# Patient Record
Sex: FEMALE | Race: WHITE | NOT HISPANIC OR LATINO | Employment: FULL TIME | URBAN - METROPOLITAN AREA
[De-identification: names, ages, dates, MRNs, and addresses within clinical notes are randomized per-mention and may not be internally consistent; named-entity substitution may affect disease eponyms.]

---

## 2023-01-01 ENCOUNTER — HOSPITAL ENCOUNTER (OUTPATIENT)
Dept: RADIOLOGY | Facility: HOSPITAL | Age: 68
Discharge: HOME | End: 2023-12-31
Payer: COMMERCIAL

## 2023-01-01 ENCOUNTER — APPOINTMENT (OUTPATIENT)
Dept: RADIOLOGY | Facility: HOSPITAL | Age: 68
DRG: 871 | End: 2023-01-01
Payer: COMMERCIAL

## 2023-01-01 ENCOUNTER — APPOINTMENT (OUTPATIENT)
Dept: CARDIOLOGY | Facility: HOSPITAL | Age: 68
DRG: 871 | End: 2023-01-01
Payer: COMMERCIAL

## 2023-01-01 ENCOUNTER — APPOINTMENT (OUTPATIENT)
Dept: RADIOLOGY | Facility: HOSPITAL | Age: 68
End: 2023-01-01
Payer: COMMERCIAL

## 2023-01-01 ENCOUNTER — APPOINTMENT (OUTPATIENT)
Dept: RADIOLOGY | Facility: HOSPITAL | Age: 68
DRG: 177 | End: 2023-01-01
Payer: COMMERCIAL

## 2023-01-01 ENCOUNTER — HOSPITAL ENCOUNTER (INPATIENT)
Facility: HOSPITAL | Age: 68
LOS: 17 days | Discharge: HOSPICE | DRG: 871 | End: 2023-11-09
Attending: STUDENT IN AN ORGANIZED HEALTH CARE EDUCATION/TRAINING PROGRAM | Admitting: INTERNAL MEDICINE
Payer: COMMERCIAL

## 2023-01-01 ENCOUNTER — APPOINTMENT (OUTPATIENT)
Dept: VASCULAR MEDICINE | Facility: HOSPITAL | Age: 68
DRG: 177 | End: 2023-01-01
Payer: COMMERCIAL

## 2023-01-01 ENCOUNTER — APPOINTMENT (OUTPATIENT)
Dept: CARDIOLOGY | Facility: HOSPITAL | Age: 68
End: 2023-01-01
Payer: COMMERCIAL

## 2023-01-01 ENCOUNTER — TELEMEDICINE (OUTPATIENT)
Dept: PRIMARY CARE | Facility: CLINIC | Age: 68
End: 2023-01-01
Payer: COMMERCIAL

## 2023-01-01 ENCOUNTER — APPOINTMENT (OUTPATIENT)
Dept: CARDIOLOGY | Facility: HOSPITAL | Age: 68
DRG: 177 | End: 2023-01-01
Payer: COMMERCIAL

## 2023-01-01 ENCOUNTER — HOSPITAL ENCOUNTER (OUTPATIENT)
Facility: HOSPITAL | Age: 68
Setting detail: OUTPATIENT SURGERY
End: 2023-01-01
Attending: STUDENT IN AN ORGANIZED HEALTH CARE EDUCATION/TRAINING PROGRAM | Admitting: STUDENT IN AN ORGANIZED HEALTH CARE EDUCATION/TRAINING PROGRAM
Payer: COMMERCIAL

## 2023-01-01 ENCOUNTER — HOSPITAL ENCOUNTER (INPATIENT)
Facility: HOSPITAL | Age: 68
LOS: 51 days | Discharge: NURSING, REHAB OR LONG-TERM CARE | DRG: 177 | End: 2023-12-30
Attending: INTERNAL MEDICINE | Admitting: STUDENT IN AN ORGANIZED HEALTH CARE EDUCATION/TRAINING PROGRAM
Payer: COMMERCIAL

## 2023-01-01 ENCOUNTER — LAB REQUISITION (OUTPATIENT)
Dept: LAB | Facility: HOSPITAL | Age: 68
End: 2023-01-01
Payer: COMMERCIAL

## 2023-01-01 ENCOUNTER — TELEPHONE (OUTPATIENT)
Dept: PRIMARY CARE | Facility: CLINIC | Age: 68
End: 2023-01-01

## 2023-01-01 ENCOUNTER — DOCUMENTATION (OUTPATIENT)
Dept: INTENSIVE CARE | Facility: HOSPITAL | Age: 68
End: 2023-01-01

## 2023-01-01 ENCOUNTER — TELEMEDICINE (OUTPATIENT)
Dept: UROLOGY | Facility: CLINIC | Age: 68
End: 2023-01-01
Payer: COMMERCIAL

## 2023-01-01 ENCOUNTER — HOSPITAL ENCOUNTER (OUTPATIENT)
Facility: HOSPITAL | Age: 68
End: 2024-01-29

## 2023-01-01 VITALS
BODY MASS INDEX: 26.45 KG/M2 | TEMPERATURE: 96.3 F | SYSTOLIC BLOOD PRESSURE: 129 MMHG | RESPIRATION RATE: 18 BRPM | HEIGHT: 63 IN | DIASTOLIC BLOOD PRESSURE: 66 MMHG | WEIGHT: 149.25 LBS | OXYGEN SATURATION: 99 % | HEART RATE: 68 BPM

## 2023-01-01 VITALS
DIASTOLIC BLOOD PRESSURE: 74 MMHG | SYSTOLIC BLOOD PRESSURE: 149 MMHG | HEIGHT: 63 IN | TEMPERATURE: 96.3 F | RESPIRATION RATE: 15 BRPM | BODY MASS INDEX: 26.91 KG/M2 | WEIGHT: 151.9 LBS | OXYGEN SATURATION: 95 % | HEART RATE: 51 BPM

## 2023-01-01 DIAGNOSIS — U07.1 COVID-19: ICD-10-CM

## 2023-01-01 DIAGNOSIS — J96.01 ACUTE RESPIRATORY FAILURE WITH HYPOXIA (MULTI): ICD-10-CM

## 2023-01-01 DIAGNOSIS — I10 BENIGN ESSENTIAL HTN: ICD-10-CM

## 2023-01-01 DIAGNOSIS — R53.81 PHYSICAL DECONDITIONING: ICD-10-CM

## 2023-01-01 DIAGNOSIS — N32.2 BLADDER FISTULA: Primary | ICD-10-CM

## 2023-01-01 DIAGNOSIS — F05 SUNDOWNING: ICD-10-CM

## 2023-01-01 DIAGNOSIS — N39.41 URGENCY INCONTINENCE: ICD-10-CM

## 2023-01-01 DIAGNOSIS — N39.0 RECURRENT UTI: Primary | ICD-10-CM

## 2023-01-01 DIAGNOSIS — Z00.00 ENCOUNTER FOR GENERAL ADULT MEDICAL EXAMINATION WITHOUT ABNORMAL FINDINGS: ICD-10-CM

## 2023-01-01 DIAGNOSIS — U07.1 COVID-19: Primary | ICD-10-CM

## 2023-01-01 DIAGNOSIS — R60.0 LOCALIZED EDEMA: ICD-10-CM

## 2023-01-01 DIAGNOSIS — M79.673 PAIN OF FOOT, UNSPECIFIED LATERALITY: ICD-10-CM

## 2023-01-01 DIAGNOSIS — E11.9 TYPE 2 DIABETES MELLITUS WITHOUT COMPLICATION, UNSPECIFIED WHETHER LONG TERM INSULIN USE (MULTI): ICD-10-CM

## 2023-01-01 DIAGNOSIS — U07.1 DEEP VEIN THROMBOSIS (DVT) ASSOCIATED WITH COVID-19: ICD-10-CM

## 2023-01-01 DIAGNOSIS — E55.9 VITAMIN D DEFICIENCY: ICD-10-CM

## 2023-01-01 DIAGNOSIS — I82.90 DEEP VEIN THROMBOSIS (DVT) ASSOCIATED WITH COVID-19: ICD-10-CM

## 2023-01-01 DIAGNOSIS — K13.79 MOUTH PAIN: ICD-10-CM

## 2023-01-01 DIAGNOSIS — N39.3 SUI (STRESS URINARY INCONTINENCE, FEMALE): ICD-10-CM

## 2023-01-01 DIAGNOSIS — J18.9 PNEUMONIA OF BOTH LOWER LOBES DUE TO INFECTIOUS ORGANISM: Primary | ICD-10-CM

## 2023-01-01 DIAGNOSIS — I48.91 ATRIAL FIBRILLATION, UNSPECIFIED TYPE (MULTI): ICD-10-CM

## 2023-01-01 DIAGNOSIS — N32.2 BLADDER FISTULA: ICD-10-CM

## 2023-01-01 DIAGNOSIS — J84.89 ORGANIZING PNEUMONIA (MULTI): ICD-10-CM

## 2023-01-01 DIAGNOSIS — N32.81 OAB (OVERACTIVE BLADDER): ICD-10-CM

## 2023-01-01 DIAGNOSIS — E51.9 THIAMINE DEFICIENCY: ICD-10-CM

## 2023-01-01 DIAGNOSIS — M54.50 LOW BACK PAIN, UNSPECIFIED BACK PAIN LATERALITY, UNSPECIFIED CHRONICITY, UNSPECIFIED WHETHER SCIATICA PRESENT: ICD-10-CM

## 2023-01-01 DIAGNOSIS — J96.01 ACUTE HYPOXEMIC RESPIRATORY FAILURE (MULTI): Primary | ICD-10-CM

## 2023-01-01 DIAGNOSIS — I82.4Z2 ACUTE EMBOLISM AND THROMBOSIS OF UNSPECIFIED DEEP VEINS OF LEFT DISTAL LOWER EXTREMITY (MULTI): ICD-10-CM

## 2023-01-01 DIAGNOSIS — K57.90 DIVERTICULAR DISEASE: ICD-10-CM

## 2023-01-01 DIAGNOSIS — M25.512 ACUTE PAIN OF LEFT SHOULDER: ICD-10-CM

## 2023-01-01 DIAGNOSIS — J96.01 ACUTE RESPIRATORY FAILURE WITH HYPOXEMIA (MULTI): ICD-10-CM

## 2023-01-01 DIAGNOSIS — Z79.52 LONG TERM (CURRENT) USE OF SYSTEMIC STEROIDS: ICD-10-CM

## 2023-01-01 LAB
25(OH)D3 SERPL-MCNC: 19 NG/ML (ref 30–100)
ABO GROUP (TYPE) IN BLOOD: NORMAL
ALBUMIN SERPL BCP-MCNC: 2.3 G/DL (ref 3.4–5)
ALBUMIN SERPL BCP-MCNC: 2.3 G/DL (ref 3.4–5)
ALBUMIN SERPL BCP-MCNC: 2.4 G/DL (ref 3.4–5)
ALBUMIN SERPL BCP-MCNC: 2.5 G/DL (ref 3.4–5)
ALBUMIN SERPL BCP-MCNC: 2.6 G/DL (ref 3.4–5)
ALBUMIN SERPL BCP-MCNC: 2.7 G/DL (ref 3.4–5)
ALBUMIN SERPL BCP-MCNC: 2.8 G/DL (ref 3.4–5)
ALBUMIN SERPL BCP-MCNC: 2.9 G/DL (ref 3.4–5)
ALBUMIN SERPL BCP-MCNC: 3 G/DL (ref 3.4–5)
ALBUMIN SERPL BCP-MCNC: 3.1 G/DL (ref 3.4–5)
ALBUMIN SERPL BCP-MCNC: 3.1 G/DL (ref 3.4–5)
ALBUMIN SERPL BCP-MCNC: 3.2 G/DL (ref 3.4–5)
ALBUMIN SERPL BCP-MCNC: 3.3 G/DL (ref 3.4–5)
ALBUMIN SERPL BCP-MCNC: 3.5 G/DL (ref 3.4–5)
ALBUMIN SERPL BCP-MCNC: 3.6 G/DL (ref 3.4–5)
ALBUMIN SERPL BCP-MCNC: 3.6 G/DL (ref 3.4–5)
ALBUMIN SERPL BCP-MCNC: 3.7 G/DL (ref 3.4–5)
ALBUMIN SERPL-MCNC: 2.5 G/DL (ref 3.5–5)
ALBUMIN SERPL-MCNC: 2.6 G/DL (ref 3.5–5)
ALBUMIN SERPL-MCNC: 2.8 G/DL (ref 3.5–5)
ALBUMIN SERPL-MCNC: 2.9 G/DL (ref 3.5–5)
ALBUMIN SERPL-MCNC: 2.9 G/DL (ref 3.5–5)
ALBUMIN SERPL-MCNC: 3 G/DL (ref 3.5–5)
ALBUMIN SERPL-MCNC: 3 G/DL (ref 3.5–5)
ALBUMIN SERPL-MCNC: 3.1 G/DL (ref 3.5–5)
ALBUMIN SERPL-MCNC: 3.2 G/DL (ref 3.5–5)
ALBUMIN SERPL-MCNC: 3.3 G/DL (ref 3.5–5)
ALBUMIN SERPL-MCNC: 3.3 G/DL (ref 3.5–5)
ALP BLD-CCNC: 106 U/L (ref 35–125)
ALP BLD-CCNC: 113 U/L (ref 35–125)
ALP BLD-CCNC: 114 U/L (ref 35–125)
ALP BLD-CCNC: 122 U/L (ref 35–125)
ALP BLD-CCNC: 133 U/L (ref 35–125)
ALP BLD-CCNC: 56 U/L (ref 35–125)
ALP BLD-CCNC: 59 U/L (ref 35–125)
ALP BLD-CCNC: 60 U/L (ref 35–125)
ALP BLD-CCNC: 61 U/L (ref 35–125)
ALP BLD-CCNC: 68 U/L (ref 35–125)
ALP BLD-CCNC: 74 U/L (ref 35–125)
ALP BLD-CCNC: 90 U/L (ref 35–125)
ALP BLD-CCNC: 91 U/L (ref 35–125)
ALP SERPL-CCNC: 45 U/L (ref 33–136)
ALP SERPL-CCNC: 50 U/L (ref 33–136)
ALP SERPL-CCNC: 51 U/L (ref 33–136)
ALP SERPL-CCNC: 61 U/L (ref 33–136)
ALP SERPL-CCNC: 64 U/L (ref 33–136)
ALP SERPL-CCNC: 65 U/L (ref 33–136)
ALP SERPL-CCNC: 68 U/L (ref 33–136)
ALP SERPL-CCNC: 71 U/L (ref 33–136)
ALT SERPL W P-5'-P-CCNC: 21 U/L (ref 7–45)
ALT SERPL W P-5'-P-CCNC: 23 U/L (ref 7–45)
ALT SERPL W P-5'-P-CCNC: 31 U/L (ref 7–45)
ALT SERPL W P-5'-P-CCNC: 35 U/L (ref 7–45)
ALT SERPL W P-5'-P-CCNC: 38 U/L (ref 7–45)
ALT SERPL W P-5'-P-CCNC: 53 U/L (ref 7–45)
ALT SERPL W P-5'-P-CCNC: 56 U/L (ref 7–45)
ALT SERPL W P-5'-P-CCNC: 63 U/L (ref 7–45)
ALT SERPL-CCNC: 13 U/L (ref 5–40)
ALT SERPL-CCNC: 14 U/L (ref 5–40)
ALT SERPL-CCNC: 14 U/L (ref 5–40)
ALT SERPL-CCNC: 15 U/L (ref 5–40)
ALT SERPL-CCNC: 18 U/L (ref 5–40)
ALT SERPL-CCNC: 21 U/L (ref 5–40)
ALT SERPL-CCNC: 21 U/L (ref 5–40)
ALT SERPL-CCNC: 22 U/L (ref 5–40)
ALT SERPL-CCNC: 23 U/L (ref 5–40)
ALT SERPL-CCNC: 25 U/L (ref 5–40)
ALT SERPL-CCNC: 28 U/L (ref 5–40)
ANION GAP BLDA CALCULATED.4IONS-SCNC: 14 MMO/L (ref 10–25)
ANION GAP BLDA CALCULATED.4IONS-SCNC: 15 MMO/L (ref 10–25)
ANION GAP BLDA CALCULATED.4IONS-SCNC: 4 MMO/L (ref 10–25)
ANION GAP BLDA CALCULATED.4IONS-SCNC: 7 MMO/L (ref 10–25)
ANION GAP BLDA CALCULATED.4IONS-SCNC: 7 MMO/L (ref 10–25)
ANION GAP SERPL CALC-SCNC: 10 MMOL/L
ANION GAP SERPL CALC-SCNC: 10 MMOL/L (ref 10–20)
ANION GAP SERPL CALC-SCNC: 11 MMOL/L
ANION GAP SERPL CALC-SCNC: 11 MMOL/L
ANION GAP SERPL CALC-SCNC: 11 MMOL/L (ref 10–20)
ANION GAP SERPL CALC-SCNC: 11 MMOL/L (ref 10–20)
ANION GAP SERPL CALC-SCNC: 12 MMOL/L
ANION GAP SERPL CALC-SCNC: 12 MMOL/L (ref 10–20)
ANION GAP SERPL CALC-SCNC: 13 MMOL/L
ANION GAP SERPL CALC-SCNC: 13 MMOL/L
ANION GAP SERPL CALC-SCNC: 13 MMOL/L (ref 10–20)
ANION GAP SERPL CALC-SCNC: 14 MMOL/L
ANION GAP SERPL CALC-SCNC: 14 MMOL/L
ANION GAP SERPL CALC-SCNC: 14 MMOL/L (ref 10–20)
ANION GAP SERPL CALC-SCNC: 15 MMOL/L (ref 10–20)
ANION GAP SERPL CALC-SCNC: 16 MMOL/L
ANION GAP SERPL CALC-SCNC: 16 MMOL/L
ANION GAP SERPL CALC-SCNC: 16 MMOL/L (ref 10–20)
ANION GAP SERPL CALC-SCNC: 17 MMOL/L
ANION GAP SERPL CALC-SCNC: 17 MMOL/L (ref 10–20)
ANION GAP SERPL CALC-SCNC: 18 MMOL/L
ANION GAP SERPL CALC-SCNC: 18 MMOL/L (ref 10–20)
ANION GAP SERPL CALC-SCNC: 19 MMOL/L (ref 10–20)
ANION GAP SERPL CALC-SCNC: 20 MMOL/L (ref 10–20)
ANION GAP SERPL CALC-SCNC: 21 MMOL/L (ref 10–20)
ANION GAP SERPL CALC-SCNC: 22 MMOL/L (ref 10–20)
ANION GAP SERPL CALC-SCNC: 23 MMOL/L (ref 10–20)
ANION GAP SERPL CALC-SCNC: 25 MMOL/L (ref 10–20)
ANION GAP SERPL CALC-SCNC: 26 MMOL/L (ref 10–20)
ANION GAP SERPL CALC-SCNC: 30 MMOL/L (ref 10–20)
ANION GAP SERPL CALC-SCNC: 31 MMOL/L (ref 10–20)
ANTIBODY SCREEN: NORMAL
AORTIC VALVE PEAK VELOCITY: 1.34
APPARATUS: ABNORMAL
APPEARANCE UR: ABNORMAL
APTT PPP: 17 SECONDS (ref 27–38)
APTT PPP: 19 SECONDS (ref 27–38)
APTT PPP: 25.6 SECONDS (ref 22–32.5)
APTT PPP: 26 SECONDS (ref 27–38)
APTT PPP: 26.5 SECONDS (ref 22–32.5)
APTT PPP: 47.6 SECONDS (ref 22–32.5)
APTT PPP: 58.4 SECONDS (ref 22–32.5)
APTT PPP: 58.8 SECONDS (ref 22–32.5)
APTT PPP: >139 SECONDS (ref 22–32.5)
APTT PPP: >200 SECONDS (ref 27–38)
ASPERGILLUS GALACTOMANNAN EIA,SERUM: 0.06
AST SERPL W P-5'-P-CCNC: 11 U/L (ref 9–39)
AST SERPL W P-5'-P-CCNC: 14 U/L (ref 9–39)
AST SERPL W P-5'-P-CCNC: 15 U/L (ref 9–39)
AST SERPL W P-5'-P-CCNC: 16 U/L (ref 9–39)
AST SERPL W P-5'-P-CCNC: 16 U/L (ref 9–39)
AST SERPL W P-5'-P-CCNC: 27 U/L (ref 9–39)
AST SERPL W P-5'-P-CCNC: 44 U/L (ref 9–39)
AST SERPL W P-5'-P-CCNC: 50 U/L (ref 9–39)
AST SERPL-CCNC: 11 U/L (ref 5–40)
AST SERPL-CCNC: 13 U/L (ref 5–40)
AST SERPL-CCNC: 13 U/L (ref 5–40)
AST SERPL-CCNC: 15 U/L (ref 5–40)
AST SERPL-CCNC: 16 U/L (ref 5–40)
AST SERPL-CCNC: 18 U/L (ref 5–40)
AST SERPL-CCNC: 19 U/L (ref 5–40)
AST SERPL-CCNC: 20 U/L (ref 5–40)
AST SERPL-CCNC: 20 U/L (ref 5–40)
AST SERPL-CCNC: 21 U/L (ref 5–40)
AST SERPL-CCNC: 21 U/L (ref 5–40)
AST SERPL-CCNC: 26 U/L (ref 5–40)
AST SERPL-CCNC: 29 U/L (ref 5–40)
ATRIAL RATE: 124 BPM
ATRIAL RATE: 52 BPM
ATRIAL RATE: 62 BPM
ATRIAL RATE: 67 BPM
ATRIAL RATE: 67 BPM
ATRIAL RATE: 77 BPM
AV PEAK GRADIENT: 7.2
AVA (PEAK VEL): 1.81
B DERMAT AB TITR SER: NEGATIVE {TITER}
BACTERIA #/AREA URNS AUTO: ABNORMAL /HPF
BACTERIA BLD AEROBE CULT: ABNORMAL
BACTERIA BLD AEROBE CULT: ABNORMAL
BACTERIA BLD CULT: ABNORMAL
BACTERIA BLD CULT: ABNORMAL
BACTERIA BLD CULT: NORMAL
BACTERIA BLD CULT: NORMAL
BACTERIA UR CULT: ABNORMAL
BACTERIA UR CULT: NORMAL
BASE EXCESS BLDA CALC-SCNC: -6.4 MMOL/L (ref -2–3)
BASE EXCESS BLDA CALC-SCNC: -7.5 MMOL/L (ref -2–3)
BASE EXCESS BLDA CALC-SCNC: 10.4 MMOL/L (ref -2–3)
BASE EXCESS BLDA CALC-SCNC: 12.6 MMOL/L (ref -2–3)
BASE EXCESS BLDA CALC-SCNC: 8.2 MMOL/L (ref -2–3)
BASE EXCESS BLDA CALC-SCNC: 8.2 MMOL/L (ref -2–3)
BASE EXCESS BLDA CALC-SCNC: 8.3 MMOL/L (ref -2–3)
BASO STIPL BLD QL SMEAR: PRESENT
BASO STIPL BLD QL SMEAR: PRESENT
BASOPHILS # BLD AUTO: 0 X10*3/UL (ref 0–0.1)
BASOPHILS # BLD AUTO: 0.01 X10*3/UL (ref 0–0.1)
BASOPHILS # BLD AUTO: 0.02 X10*3/UL (ref 0–0.1)
BASOPHILS # BLD AUTO: 0.03 X10*3/UL (ref 0–0.1)
BASOPHILS # BLD AUTO: 0.04 X10*3/UL (ref 0–0.1)
BASOPHILS # BLD AUTO: 0.05 X10*3/UL (ref 0–0.1)
BASOPHILS # BLD MANUAL: 0 X10*3/UL (ref 0–0.1)
BASOPHILS NFR BLD AUTO: 0 %
BASOPHILS NFR BLD AUTO: 0.1 %
BASOPHILS NFR BLD AUTO: 0.2 %
BASOPHILS NFR BLD AUTO: 0.3 %
BASOPHILS NFR BLD MANUAL: 0 %
BILIRUB DIRECT SERPL-MCNC: 0.1 MG/DL (ref 0–0.3)
BILIRUB DIRECT SERPL-MCNC: 0.1 MG/DL (ref 0–0.3)
BILIRUB SERPL-MCNC: 0.2 MG/DL (ref 0.1–1.2)
BILIRUB SERPL-MCNC: 0.2 MG/DL (ref 0–1.2)
BILIRUB SERPL-MCNC: 0.2 MG/DL (ref 0–1.2)
BILIRUB SERPL-MCNC: 0.3 MG/DL (ref 0.1–1.2)
BILIRUB SERPL-MCNC: 0.3 MG/DL (ref 0–1.2)
BILIRUB SERPL-MCNC: 0.4 MG/DL (ref 0.1–1.2)
BILIRUB SERPL-MCNC: 0.4 MG/DL (ref 0–1.2)
BILIRUB SERPL-MCNC: 0.5 MG/DL (ref 0.1–1.2)
BILIRUB SERPL-MCNC: 0.5 MG/DL (ref 0–1.2)
BILIRUB SERPL-MCNC: 0.6 MG/DL (ref 0–1.2)
BILIRUB SERPL-MCNC: 0.6 MG/DL (ref 0–1.2)
BILIRUB SERPL-MCNC: 0.9 MG/DL (ref 0–1.2)
BILIRUB UR STRIP.AUTO-MCNC: NEGATIVE MG/DL
BLOOD EXPIRATION DATE: NORMAL
BLOOD EXPIRATION DATE: NORMAL
BNP SERPL-MCNC: 210 PG/ML (ref 0–99)
BNP SERPL-MCNC: 88 PG/ML (ref 0–99)
BNP SERPL-MCNC: 99 PG/ML (ref 0–99)
BODY TEMPERATURE: 37 DEGREES CELSIUS
BUN SERPL-MCNC: 102 MG/DL (ref 6–23)
BUN SERPL-MCNC: 106 MG/DL (ref 6–23)
BUN SERPL-MCNC: 107 MG/DL (ref 6–23)
BUN SERPL-MCNC: 107 MG/DL (ref 6–23)
BUN SERPL-MCNC: 113 MG/DL (ref 6–23)
BUN SERPL-MCNC: 121 MG/DL (ref 6–23)
BUN SERPL-MCNC: 124 MG/DL (ref 6–23)
BUN SERPL-MCNC: 126 MG/DL (ref 6–23)
BUN SERPL-MCNC: 126 MG/DL (ref 6–23)
BUN SERPL-MCNC: 139 MG/DL (ref 6–23)
BUN SERPL-MCNC: 144 MG/DL (ref 6–23)
BUN SERPL-MCNC: 156 MG/DL (ref 6–23)
BUN SERPL-MCNC: 37 MG/DL (ref 6–23)
BUN SERPL-MCNC: 37 MG/DL (ref 6–23)
BUN SERPL-MCNC: 38 MG/DL (ref 6–23)
BUN SERPL-MCNC: 40 MG/DL (ref 6–23)
BUN SERPL-MCNC: 42 MG/DL (ref 6–23)
BUN SERPL-MCNC: 43 MG/DL (ref 6–23)
BUN SERPL-MCNC: 45 MG/DL (ref 6–23)
BUN SERPL-MCNC: 47 MG/DL (ref 6–23)
BUN SERPL-MCNC: 47 MG/DL (ref 6–23)
BUN SERPL-MCNC: 48 MG/DL (ref 6–23)
BUN SERPL-MCNC: 49 MG/DL (ref 6–23)
BUN SERPL-MCNC: 49 MG/DL (ref 6–23)
BUN SERPL-MCNC: 49 MG/DL (ref 8–25)
BUN SERPL-MCNC: 50 MG/DL (ref 6–23)
BUN SERPL-MCNC: 51 MG/DL (ref 8–25)
BUN SERPL-MCNC: 52 MG/DL (ref 6–23)
BUN SERPL-MCNC: 52 MG/DL (ref 6–23)
BUN SERPL-MCNC: 52 MG/DL (ref 8–25)
BUN SERPL-MCNC: 55 MG/DL (ref 8–25)
BUN SERPL-MCNC: 57 MG/DL (ref 8–25)
BUN SERPL-MCNC: 57 MG/DL (ref 8–25)
BUN SERPL-MCNC: 58 MG/DL (ref 6–23)
BUN SERPL-MCNC: 59 MG/DL (ref 8–25)
BUN SERPL-MCNC: 59 MG/DL (ref 8–25)
BUN SERPL-MCNC: 61 MG/DL (ref 8–25)
BUN SERPL-MCNC: 63 MG/DL (ref 6–23)
BUN SERPL-MCNC: 63 MG/DL (ref 6–23)
BUN SERPL-MCNC: 63 MG/DL (ref 8–25)
BUN SERPL-MCNC: 64 MG/DL (ref 6–23)
BUN SERPL-MCNC: 64 MG/DL (ref 8–25)
BUN SERPL-MCNC: 65 MG/DL (ref 6–23)
BUN SERPL-MCNC: 66 MG/DL (ref 6–23)
BUN SERPL-MCNC: 67 MG/DL (ref 6–23)
BUN SERPL-MCNC: 68 MG/DL (ref 6–23)
BUN SERPL-MCNC: 68 MG/DL (ref 6–23)
BUN SERPL-MCNC: 69 MG/DL (ref 6–23)
BUN SERPL-MCNC: 70 MG/DL (ref 6–23)
BUN SERPL-MCNC: 70 MG/DL (ref 6–23)
BUN SERPL-MCNC: 71 MG/DL (ref 6–23)
BUN SERPL-MCNC: 71 MG/DL (ref 6–23)
BUN SERPL-MCNC: 72 MG/DL (ref 6–23)
BUN SERPL-MCNC: 72 MG/DL (ref 6–23)
BUN SERPL-MCNC: 73 MG/DL (ref 6–23)
BUN SERPL-MCNC: 78 MG/DL (ref 8–25)
BUN SERPL-MCNC: 79 MG/DL (ref 6–23)
BUN SERPL-MCNC: 80 MG/DL (ref 6–23)
BUN SERPL-MCNC: 83 MG/DL (ref 6–23)
BUN SERPL-MCNC: 83 MG/DL (ref 6–23)
BUN SERPL-MCNC: 84 MG/DL (ref 6–23)
BUN SERPL-MCNC: 85 MG/DL (ref 6–23)
BUN SERPL-MCNC: 85 MG/DL (ref 6–23)
BUN SERPL-MCNC: 86 MG/DL (ref 6–23)
BUN SERPL-MCNC: 86 MG/DL (ref 8–25)
BUN SERPL-MCNC: 87 MG/DL (ref 8–25)
BUN SERPL-MCNC: 90 MG/DL (ref 6–23)
BUN SERPL-MCNC: 91 MG/DL (ref 6–23)
BUN SERPL-MCNC: 91 MG/DL (ref 6–23)
BUN SERPL-MCNC: 92 MG/DL (ref 6–23)
BUN SERPL-MCNC: 97 MG/DL (ref 6–23)
BURR CELLS BLD QL SMEAR: ABNORMAL
C COLI+JEJ+UPSA DNA STL QL NAA+PROBE: NOT DETECTED
C COLI+JEJ+UPSA DNA STL QL NAA+PROBE: NOT DETECTED
C DIF TOX TCDA+TCDB STL QL NAA+PROBE: NOT DETECTED
CA-I BLD-SCNC: 1.04 MMOL/L (ref 1.1–1.33)
CA-I BLDA-SCNC: 1.08 MMOL/L (ref 1.1–1.33)
CA-I BLDA-SCNC: 1.08 MMOL/L (ref 1.1–1.33)
CA-I BLDA-SCNC: 1.16 MMOL/L (ref 1.1–1.33)
CA-I BLDA-SCNC: 1.22 MMOL/L (ref 1.1–1.33)
CA-I BLDA-SCNC: 1.23 MMOL/L (ref 1.1–1.33)
CALCIUM SERPL-MCNC: 7.5 MG/DL (ref 8.6–10.6)
CALCIUM SERPL-MCNC: 7.9 MG/DL (ref 8.6–10.6)
CALCIUM SERPL-MCNC: 8 MG/DL (ref 8.6–10.6)
CALCIUM SERPL-MCNC: 8.1 MG/DL (ref 8.5–10.4)
CALCIUM SERPL-MCNC: 8.1 MG/DL (ref 8.6–10.3)
CALCIUM SERPL-MCNC: 8.1 MG/DL (ref 8.6–10.6)
CALCIUM SERPL-MCNC: 8.2 MG/DL (ref 8.6–10.6)
CALCIUM SERPL-MCNC: 8.3 MG/DL (ref 8.5–10.4)
CALCIUM SERPL-MCNC: 8.3 MG/DL (ref 8.6–10.6)
CALCIUM SERPL-MCNC: 8.4 MG/DL (ref 8.5–10.4)
CALCIUM SERPL-MCNC: 8.4 MG/DL (ref 8.6–10.6)
CALCIUM SERPL-MCNC: 8.5 MG/DL (ref 8.5–10.4)
CALCIUM SERPL-MCNC: 8.5 MG/DL (ref 8.6–10.6)
CALCIUM SERPL-MCNC: 8.6 MG/DL (ref 8.5–10.4)
CALCIUM SERPL-MCNC: 8.6 MG/DL (ref 8.6–10.6)
CALCIUM SERPL-MCNC: 8.7 MG/DL (ref 8.5–10.4)
CALCIUM SERPL-MCNC: 8.7 MG/DL (ref 8.5–10.4)
CALCIUM SERPL-MCNC: 8.7 MG/DL (ref 8.6–10.6)
CALCIUM SERPL-MCNC: 8.8 MG/DL (ref 8.6–10.6)
CALCIUM SERPL-MCNC: 8.9 MG/DL (ref 8.5–10.4)
CALCIUM SERPL-MCNC: 8.9 MG/DL (ref 8.6–10.6)
CALCIUM SERPL-MCNC: 9 MG/DL (ref 8.5–10.4)
CALCIUM SERPL-MCNC: 9 MG/DL (ref 8.5–10.4)
CALCIUM SERPL-MCNC: 9.1 MG/DL (ref 8.6–10.6)
CALCIUM SERPL-MCNC: 9.1 MG/DL (ref 8.6–10.6)
CALCIUM SERPL-MCNC: 9.2 MG/DL (ref 8.6–10.6)
CALCIUM SERPL-MCNC: 9.3 MG/DL (ref 8.5–10.4)
CALCIUM SERPL-MCNC: 9.4 MG/DL (ref 8.6–10.6)
CALCIUM SERPL-MCNC: 9.4 MG/DL (ref 8.6–10.6)
CALCIUM SERPL-MCNC: 9.5 MG/DL (ref 8.6–10.6)
CALCIUM SERPL-MCNC: 9.6 MG/DL (ref 8.6–10.6)
CARDIAC TROPONIN I PNL SERPL HS: 5 NG/L (ref 0–34)
CHLORIDE BLDA-SCNC: 108 MMOL/L (ref 98–107)
CHLORIDE BLDA-SCNC: 112 MMOL/L (ref 98–107)
CHLORIDE BLDA-SCNC: 97 MMOL/L (ref 98–107)
CHLORIDE BLDA-SCNC: 98 MMOL/L (ref 98–107)
CHLORIDE BLDA-SCNC: 98 MMOL/L (ref 98–107)
CHLORIDE SERPL-SCNC: 100 MMOL/L (ref 97–107)
CHLORIDE SERPL-SCNC: 100 MMOL/L (ref 98–107)
CHLORIDE SERPL-SCNC: 101 MMOL/L (ref 98–107)
CHLORIDE SERPL-SCNC: 102 MMOL/L (ref 97–107)
CHLORIDE SERPL-SCNC: 102 MMOL/L (ref 98–107)
CHLORIDE SERPL-SCNC: 103 MMOL/L (ref 97–107)
CHLORIDE SERPL-SCNC: 103 MMOL/L (ref 98–107)
CHLORIDE SERPL-SCNC: 104 MMOL/L (ref 97–107)
CHLORIDE SERPL-SCNC: 104 MMOL/L (ref 97–107)
CHLORIDE SERPL-SCNC: 105 MMOL/L (ref 97–107)
CHLORIDE SERPL-SCNC: 105 MMOL/L (ref 98–107)
CHLORIDE SERPL-SCNC: 105 MMOL/L (ref 98–107)
CHLORIDE SERPL-SCNC: 106 MMOL/L (ref 97–107)
CHLORIDE SERPL-SCNC: 106 MMOL/L (ref 98–107)
CHLORIDE SERPL-SCNC: 107 MMOL/L (ref 98–107)
CHLORIDE SERPL-SCNC: 108 MMOL/L (ref 97–107)
CHLORIDE SERPL-SCNC: 108 MMOL/L (ref 98–107)
CHLORIDE SERPL-SCNC: 109 MMOL/L (ref 98–107)
CHLORIDE SERPL-SCNC: 110 MMOL/L (ref 97–107)
CHLORIDE SERPL-SCNC: 110 MMOL/L (ref 98–107)
CHLORIDE SERPL-SCNC: 110 MMOL/L (ref 98–107)
CHLORIDE SERPL-SCNC: 111 MMOL/L (ref 98–107)
CHLORIDE SERPL-SCNC: 112 MMOL/L (ref 98–107)
CHLORIDE SERPL-SCNC: 113 MMOL/L (ref 98–107)
CHLORIDE SERPL-SCNC: 96 MMOL/L (ref 98–107)
CHLORIDE SERPL-SCNC: 97 MMOL/L (ref 97–107)
CHLORIDE SERPL-SCNC: 97 MMOL/L (ref 97–107)
CHLORIDE SERPL-SCNC: 97 MMOL/L (ref 98–107)
CHLORIDE SERPL-SCNC: 98 MMOL/L (ref 98–107)
CHLORIDE SERPL-SCNC: 98 MMOL/L (ref 98–107)
CHLORIDE SERPL-SCNC: 99 MMOL/L (ref 97–107)
CHLORIDE SERPL-SCNC: 99 MMOL/L (ref 98–107)
CHLORIDE UR-SCNC: 58 MMOL/L
CHLORIDE/CREATININE (MMOL/G) IN URINE: 145 MMOL/G CREAT (ref 38–318)
CO2 SERPL-SCNC: 14 MMOL/L (ref 24–31)
CO2 SERPL-SCNC: 16 MMOL/L (ref 24–31)
CO2 SERPL-SCNC: 17 MMOL/L (ref 21–32)
CO2 SERPL-SCNC: 17 MMOL/L (ref 24–31)
CO2 SERPL-SCNC: 18 MMOL/L (ref 21–32)
CO2 SERPL-SCNC: 18 MMOL/L (ref 21–32)
CO2 SERPL-SCNC: 18 MMOL/L (ref 24–31)
CO2 SERPL-SCNC: 19 MMOL/L (ref 24–31)
CO2 SERPL-SCNC: 20 MMOL/L (ref 21–32)
CO2 SERPL-SCNC: 20 MMOL/L (ref 24–31)
CO2 SERPL-SCNC: 20 MMOL/L (ref 24–31)
CO2 SERPL-SCNC: 21 MMOL/L (ref 21–32)
CO2 SERPL-SCNC: 21 MMOL/L (ref 24–31)
CO2 SERPL-SCNC: 22 MMOL/L (ref 21–32)
CO2 SERPL-SCNC: 23 MMOL/L (ref 21–32)
CO2 SERPL-SCNC: 23 MMOL/L (ref 24–31)
CO2 SERPL-SCNC: 24 MMOL/L (ref 21–32)
CO2 SERPL-SCNC: 24 MMOL/L (ref 24–31)
CO2 SERPL-SCNC: 24 MMOL/L (ref 24–31)
CO2 SERPL-SCNC: 25 MMOL/L (ref 21–32)
CO2 SERPL-SCNC: 25 MMOL/L (ref 24–31)
CO2 SERPL-SCNC: 25 MMOL/L (ref 24–31)
CO2 SERPL-SCNC: 26 MMOL/L (ref 21–32)
CO2 SERPL-SCNC: 26 MMOL/L (ref 24–31)
CO2 SERPL-SCNC: 27 MMOL/L (ref 21–32)
CO2 SERPL-SCNC: 28 MMOL/L (ref 21–32)
CO2 SERPL-SCNC: 29 MMOL/L (ref 21–32)
CO2 SERPL-SCNC: 29 MMOL/L (ref 21–32)
CO2 SERPL-SCNC: 30 MMOL/L (ref 21–32)
CO2 SERPL-SCNC: 31 MMOL/L (ref 24–31)
CO2 SERPL-SCNC: 31 MMOL/L (ref 24–31)
COLOR UR: ABNORMAL
COLOR UR: ABNORMAL
COLOR UR: YELLOW
CREAT SERPL-MCNC: 0.71 MG/DL (ref 0.5–1.05)
CREAT SERPL-MCNC: 0.72 MG/DL (ref 0.5–1.05)
CREAT SERPL-MCNC: 0.72 MG/DL (ref 0.5–1.05)
CREAT SERPL-MCNC: 0.73 MG/DL (ref 0.5–1.05)
CREAT SERPL-MCNC: 0.74 MG/DL (ref 0.5–1.05)
CREAT SERPL-MCNC: 0.74 MG/DL (ref 0.5–1.05)
CREAT SERPL-MCNC: 0.78 MG/DL (ref 0.5–1.05)
CREAT SERPL-MCNC: 0.8 MG/DL (ref 0.5–1.05)
CREAT SERPL-MCNC: 0.81 MG/DL (ref 0.5–1.05)
CREAT SERPL-MCNC: 0.83 MG/DL (ref 0.5–1.05)
CREAT SERPL-MCNC: 0.84 MG/DL (ref 0.5–1.05)
CREAT SERPL-MCNC: 0.87 MG/DL (ref 0.5–1.05)
CREAT SERPL-MCNC: 0.89 MG/DL (ref 0.5–1.05)
CREAT SERPL-MCNC: 0.9 MG/DL (ref 0.5–1.05)
CREAT SERPL-MCNC: 0.94 MG/DL (ref 0.5–1.05)
CREAT SERPL-MCNC: 0.94 MG/DL (ref 0.5–1.05)
CREAT SERPL-MCNC: 0.96 MG/DL (ref 0.5–1.05)
CREAT SERPL-MCNC: 0.96 MG/DL (ref 0.5–1.05)
CREAT SERPL-MCNC: 0.98 MG/DL (ref 0.5–1.05)
CREAT SERPL-MCNC: 1 MG/DL (ref 0.5–1.05)
CREAT SERPL-MCNC: 1.04 MG/DL (ref 0.5–1.05)
CREAT SERPL-MCNC: 1.07 MG/DL (ref 0.5–1.05)
CREAT SERPL-MCNC: 1.08 MG/DL (ref 0.5–1.05)
CREAT SERPL-MCNC: 1.09 MG/DL (ref 0.5–1.05)
CREAT SERPL-MCNC: 1.12 MG/DL (ref 0.5–1.05)
CREAT SERPL-MCNC: 1.13 MG/DL (ref 0.5–1.05)
CREAT SERPL-MCNC: 1.16 MG/DL (ref 0.5–1.05)
CREAT SERPL-MCNC: 1.17 MG/DL (ref 0.5–1.05)
CREAT SERPL-MCNC: 1.17 MG/DL (ref 0.5–1.05)
CREAT SERPL-MCNC: 1.22 MG/DL (ref 0.5–1.05)
CREAT SERPL-MCNC: 1.26 MG/DL (ref 0.5–1.05)
CREAT SERPL-MCNC: 1.27 MG/DL (ref 0.5–1.05)
CREAT SERPL-MCNC: 1.27 MG/DL (ref 0.5–1.05)
CREAT SERPL-MCNC: 1.29 MG/DL (ref 0.5–1.05)
CREAT SERPL-MCNC: 1.31 MG/DL (ref 0.5–1.05)
CREAT SERPL-MCNC: 1.32 MG/DL (ref 0.5–1.05)
CREAT SERPL-MCNC: 1.32 MG/DL (ref 0.5–1.05)
CREAT SERPL-MCNC: 1.4 MG/DL (ref 0.4–1.6)
CREAT SERPL-MCNC: 1.4 MG/DL (ref 0.4–1.6)
CREAT SERPL-MCNC: 1.42 MG/DL (ref 0.5–1.05)
CREAT SERPL-MCNC: 1.43 MG/DL (ref 0.5–1.05)
CREAT SERPL-MCNC: 1.44 MG/DL (ref 0.5–1.05)
CREAT SERPL-MCNC: 1.45 MG/DL (ref 0.5–1.05)
CREAT SERPL-MCNC: 1.48 MG/DL (ref 0.5–1.05)
CREAT SERPL-MCNC: 1.5 MG/DL (ref 0.4–1.6)
CREAT SERPL-MCNC: 1.5 MG/DL (ref 0.4–1.6)
CREAT SERPL-MCNC: 1.52 MG/DL (ref 0.5–1.05)
CREAT SERPL-MCNC: 1.52 MG/DL (ref 0.5–1.05)
CREAT SERPL-MCNC: 1.57 MG/DL (ref 0.5–1.05)
CREAT SERPL-MCNC: 1.58 MG/DL (ref 0.5–1.05)
CREAT SERPL-MCNC: 1.6 MG/DL (ref 0.4–1.6)
CREAT SERPL-MCNC: 1.63 MG/DL (ref 0.5–1.05)
CREAT SERPL-MCNC: 1.7 MG/DL (ref 0.4–1.6)
CREAT SERPL-MCNC: 1.8 MG/DL (ref 0.4–1.6)
CREAT SERPL-MCNC: 1.9 MG/DL (ref 0.4–1.6)
CREAT SERPL-MCNC: 1.99 MG/DL (ref 0.5–1.05)
CREAT SERPL-MCNC: 2.06 MG/DL (ref 0.5–1.05)
CREAT SERPL-MCNC: 2.1 MG/DL (ref 0.5–1.05)
CREAT SERPL-MCNC: 2.21 MG/DL (ref 0.5–1.05)
CREAT SERPL-MCNC: 2.23 MG/DL (ref 0.5–1.05)
CREAT SERPL-MCNC: 2.26 MG/DL (ref 0.5–1.05)
CREAT SERPL-MCNC: 2.26 MG/DL (ref 0.5–1.05)
CREAT SERPL-MCNC: 2.4 MG/DL (ref 0.4–1.6)
CREAT SERPL-MCNC: 2.46 MG/DL (ref 0.5–1.05)
CREAT SERPL-MCNC: 2.55 MG/DL (ref 0.5–1.05)
CREAT SERPL-MCNC: 2.67 MG/DL (ref 0.5–1.05)
CREAT SERPL-MCNC: 2.7 MG/DL (ref 0.4–1.6)
CREAT SERPL-MCNC: 2.81 MG/DL (ref 0.5–1.05)
CREAT SERPL-MCNC: 2.97 MG/DL (ref 0.5–1.05)
CREAT UR-MCNC: 40.1 MG/DL (ref 20–320)
CREAT UR-MCNC: 40.1 MG/DL (ref 20–320)
CREATININE (MG/DL) IN SER/PLAS: 2.53 MG/DL (ref 0.5–1.05)
CRITICAL CALL TIME: 1056
CRITICAL CALL TIME: 551
CRITICAL CALLED BY: ABNORMAL
CRITICAL CALLED BY: ABNORMAL
CRITICAL CALLED TO: ABNORMAL
CRITICAL CALLED TO: ABNORMAL
CRITICAL READ BACK: ABNORMAL
CRITICAL READ BACK: ABNORMAL
CRP SERPL-MCNC: 0.38 MG/DL
CRP SERPL-MCNC: 0.5 MG/DL (ref 0–2)
CRP SERPL-MCNC: 10.1 MG/DL (ref 0–2)
CRP SERPL-MCNC: 3.9 MG/DL (ref 0–2)
CRP SERPL-MCNC: 6.1 MG/DL (ref 0–2)
CRP SERPL-MCNC: <0.3 MG/DL (ref 0–2)
D DIMER PPP FEU-MCNC: 16.99 MG/L FEU (ref 0.19–0.5)
DACRYOCYTES BLD QL SMEAR: ABNORMAL
DISPENSE STATUS: NORMAL
DISPENSE STATUS: NORMAL
EC STX1 GENE STL QL NAA+PROBE: NOT DETECTED
EC STX1 GENE STL QL NAA+PROBE: NOT DETECTED
EC STX2 GENE STL QL NAA+PROBE: NOT DETECTED
EC STX2 GENE STL QL NAA+PROBE: NOT DETECTED
EJECTION FRACTION APICAL 4 CHAMBER: 72.1
EJECTION FRACTION APICAL 4 CHAMBER: 78.8
EJECTION FRACTION: 73
EOSINOPHIL # BLD AUTO: 0 X10*3/UL (ref 0–0.7)
EOSINOPHIL # BLD AUTO: 0.01 X10*3/UL (ref 0–0.7)
EOSINOPHIL # BLD AUTO: 0.02 X10*3/UL (ref 0–0.7)
EOSINOPHIL # BLD AUTO: 0.02 X10*3/UL (ref 0–0.7)
EOSINOPHIL # BLD AUTO: 0.03 X10*3/UL (ref 0–0.7)
EOSINOPHIL # BLD AUTO: 0.05 X10*3/UL (ref 0–0.7)
EOSINOPHIL # BLD AUTO: 0.08 X10*3/UL (ref 0–0.7)
EOSINOPHIL # BLD AUTO: 0.13 X10*3/UL (ref 0–0.7)
EOSINOPHIL # BLD MANUAL: 0 X10*3/UL (ref 0–0.7)
EOSINOPHIL # BLD MANUAL: 0.09 X10*3/UL (ref 0–0.7)
EOSINOPHIL # BLD MANUAL: 0.24 X10*3/UL (ref 0–0.7)
EOSINOPHIL NFR BLD AUTO: 0 %
EOSINOPHIL NFR BLD AUTO: 0.1 %
EOSINOPHIL NFR BLD AUTO: 0.2 %
EOSINOPHIL NFR BLD AUTO: 0.3 %
EOSINOPHIL NFR BLD AUTO: 0.6 %
EOSINOPHIL NFR BLD AUTO: 0.8 %
EOSINOPHIL NFR BLD MANUAL: 0 %
EOSINOPHIL NFR BLD MANUAL: 0.8 %
EOSINOPHIL NFR BLD MANUAL: 2.6 %
EPAP CMH2O: 8 CM H2O
ERYTHROCYTE [DISTWIDTH] IN BLOOD BY AUTOMATED COUNT: 14.3 % (ref 11.5–14.5)
ERYTHROCYTE [DISTWIDTH] IN BLOOD BY AUTOMATED COUNT: 14.3 % (ref 11.5–14.5)
ERYTHROCYTE [DISTWIDTH] IN BLOOD BY AUTOMATED COUNT: 14.4 % (ref 11.5–14.5)
ERYTHROCYTE [DISTWIDTH] IN BLOOD BY AUTOMATED COUNT: 14.5 % (ref 11.5–14.5)
ERYTHROCYTE [DISTWIDTH] IN BLOOD BY AUTOMATED COUNT: 14.6 % (ref 11.5–14.5)
ERYTHROCYTE [DISTWIDTH] IN BLOOD BY AUTOMATED COUNT: 14.7 % (ref 11.5–14.5)
ERYTHROCYTE [DISTWIDTH] IN BLOOD BY AUTOMATED COUNT: 14.8 % (ref 11.5–14.5)
ERYTHROCYTE [DISTWIDTH] IN BLOOD BY AUTOMATED COUNT: 14.9 % (ref 11.5–14.5)
ERYTHROCYTE [DISTWIDTH] IN BLOOD BY AUTOMATED COUNT: 15 % (ref 11.5–14.5)
ERYTHROCYTE [DISTWIDTH] IN BLOOD BY AUTOMATED COUNT: 15.1 % (ref 11.5–14.5)
ERYTHROCYTE [DISTWIDTH] IN BLOOD BY AUTOMATED COUNT: 15.1 % (ref 11.5–14.5)
ERYTHROCYTE [DISTWIDTH] IN BLOOD BY AUTOMATED COUNT: 15.2 % (ref 11.5–14.5)
ERYTHROCYTE [DISTWIDTH] IN BLOOD BY AUTOMATED COUNT: 15.2 % (ref 11.5–14.5)
ERYTHROCYTE [DISTWIDTH] IN BLOOD BY AUTOMATED COUNT: 15.4 % (ref 11.5–14.5)
ERYTHROCYTE [DISTWIDTH] IN BLOOD BY AUTOMATED COUNT: 15.8 % (ref 11.5–14.5)
ERYTHROCYTE [DISTWIDTH] IN BLOOD BY AUTOMATED COUNT: 15.9 % (ref 11.5–14.5)
ERYTHROCYTE [DISTWIDTH] IN BLOOD BY AUTOMATED COUNT: 16 % (ref 11.5–14.5)
ERYTHROCYTE [DISTWIDTH] IN BLOOD BY AUTOMATED COUNT: 16.1 % (ref 11.5–14.5)
ERYTHROCYTE [DISTWIDTH] IN BLOOD BY AUTOMATED COUNT: 16.5 % (ref 11.5–14.5)
ERYTHROCYTE [DISTWIDTH] IN BLOOD BY AUTOMATED COUNT: 16.5 % (ref 11.5–14.5)
ERYTHROCYTE [DISTWIDTH] IN BLOOD BY AUTOMATED COUNT: 16.6 % (ref 11.5–14.5)
ERYTHROCYTE [DISTWIDTH] IN BLOOD BY AUTOMATED COUNT: 16.8 % (ref 11.5–14.5)
ERYTHROCYTE [DISTWIDTH] IN BLOOD BY AUTOMATED COUNT: 16.9 % (ref 11.5–14.5)
ERYTHROCYTE [DISTWIDTH] IN BLOOD BY AUTOMATED COUNT: 16.9 % (ref 11.5–14.5)
ERYTHROCYTE [DISTWIDTH] IN BLOOD BY AUTOMATED COUNT: 17 % (ref 11.5–14.5)
ERYTHROCYTE [DISTWIDTH] IN BLOOD BY AUTOMATED COUNT: 17 % (ref 11.5–14.5)
ERYTHROCYTE [DISTWIDTH] IN BLOOD BY AUTOMATED COUNT: 17.1 % (ref 11.5–14.5)
ERYTHROCYTE [DISTWIDTH] IN BLOOD BY AUTOMATED COUNT: 17.2 % (ref 11.5–14.5)
ERYTHROCYTE [DISTWIDTH] IN BLOOD BY AUTOMATED COUNT: 17.4 % (ref 11.5–14.5)
ERYTHROCYTE [DISTWIDTH] IN BLOOD BY AUTOMATED COUNT: 17.6 % (ref 11.5–14.5)
ERYTHROCYTE [DISTWIDTH] IN BLOOD BY AUTOMATED COUNT: 17.6 % (ref 11.5–14.5)
ERYTHROCYTE [DISTWIDTH] IN BLOOD BY AUTOMATED COUNT: 18.1 % (ref 11.5–14.5)
ERYTHROCYTE [DISTWIDTH] IN BLOOD BY AUTOMATED COUNT: 18.3 % (ref 11.5–14.5)
ERYTHROCYTE [DISTWIDTH] IN BLOOD BY AUTOMATED COUNT: 18.6 % (ref 11.5–14.5)
ERYTHROCYTE [DISTWIDTH] IN BLOOD BY AUTOMATED COUNT: 18.6 % (ref 11.5–14.5)
ERYTHROCYTE [SEDIMENTATION RATE] IN BLOOD BY WESTERGREN METHOD: 30 MM/H (ref 0–30)
EST. AVERAGE GLUCOSE BLD GHB EST-MCNC: 134 MG/DL
FLOW: 30 LPM
FLOW: 30 LPM
FLUAV RNA RESP QL NAA+PROBE: NOT DETECTED
FLUBV RNA RESP QL NAA+PROBE: NOT DETECTED
FUNGITELL BETA-D GLUCAN,SERUM: <31 PG/ML
GFR FEMALE: 20 ML/MIN/1.73M2
GFR SERPL CREATININE-BSD FRML MDRD: 17 ML/MIN/1.73M*2
GFR SERPL CREATININE-BSD FRML MDRD: 18 ML/MIN/1.73M*2
GFR SERPL CREATININE-BSD FRML MDRD: 19 ML/MIN/1.73M*2
GFR SERPL CREATININE-BSD FRML MDRD: 20 ML/MIN/1.73M*2
GFR SERPL CREATININE-BSD FRML MDRD: 21 ML/MIN/1.73M*2
GFR SERPL CREATININE-BSD FRML MDRD: 21 ML/MIN/1.73M*2
GFR SERPL CREATININE-BSD FRML MDRD: 23 ML/MIN/1.73M*2
GFR SERPL CREATININE-BSD FRML MDRD: 24 ML/MIN/1.73M*2
GFR SERPL CREATININE-BSD FRML MDRD: 25 ML/MIN/1.73M*2
GFR SERPL CREATININE-BSD FRML MDRD: 26 ML/MIN/1.73M*2
GFR SERPL CREATININE-BSD FRML MDRD: 27 ML/MIN/1.73M*2
GFR SERPL CREATININE-BSD FRML MDRD: 28 ML/MIN/1.73M*2
GFR SERPL CREATININE-BSD FRML MDRD: 30 ML/MIN/1.73M*2
GFR SERPL CREATININE-BSD FRML MDRD: 32 ML/MIN/1.73M*2
GFR SERPL CREATININE-BSD FRML MDRD: 34 ML/MIN/1.73M*2
GFR SERPL CREATININE-BSD FRML MDRD: 35 ML/MIN/1.73M*2
GFR SERPL CREATININE-BSD FRML MDRD: 36 ML/MIN/1.73M*2
GFR SERPL CREATININE-BSD FRML MDRD: 37 ML/MIN/1.73M*2
GFR SERPL CREATININE-BSD FRML MDRD: 37 ML/MIN/1.73M*2
GFR SERPL CREATININE-BSD FRML MDRD: 38 ML/MIN/1.73M*2
GFR SERPL CREATININE-BSD FRML MDRD: 39 ML/MIN/1.73M*2
GFR SERPL CREATININE-BSD FRML MDRD: 39 ML/MIN/1.73M*2
GFR SERPL CREATININE-BSD FRML MDRD: 40 ML/MIN/1.73M*2
GFR SERPL CREATININE-BSD FRML MDRD: 40 ML/MIN/1.73M*2
GFR SERPL CREATININE-BSD FRML MDRD: 41 ML/MIN/1.73M*2
GFR SERPL CREATININE-BSD FRML MDRD: 41 ML/MIN/1.73M*2
GFR SERPL CREATININE-BSD FRML MDRD: 44 ML/MIN/1.73M*2
GFR SERPL CREATININE-BSD FRML MDRD: 45 ML/MIN/1.73M*2
GFR SERPL CREATININE-BSD FRML MDRD: 46 ML/MIN/1.73M*2
GFR SERPL CREATININE-BSD FRML MDRD: 48 ML/MIN/1.73M*2
GFR SERPL CREATININE-BSD FRML MDRD: 51 ML/MIN/1.73M*2
GFR SERPL CREATININE-BSD FRML MDRD: 53 ML/MIN/1.73M*2
GFR SERPL CREATININE-BSD FRML MDRD: 53 ML/MIN/1.73M*2
GFR SERPL CREATININE-BSD FRML MDRD: 55 ML/MIN/1.73M*2
GFR SERPL CREATININE-BSD FRML MDRD: 56 ML/MIN/1.73M*2
GFR SERPL CREATININE-BSD FRML MDRD: 56 ML/MIN/1.73M*2
GFR SERPL CREATININE-BSD FRML MDRD: 58 ML/MIN/1.73M*2
GFR SERPL CREATININE-BSD FRML MDRD: 61 ML/MIN/1.73M*2
GFR SERPL CREATININE-BSD FRML MDRD: 63 ML/MIN/1.73M*2
GFR SERPL CREATININE-BSD FRML MDRD: 64 ML/MIN/1.73M*2
GFR SERPL CREATININE-BSD FRML MDRD: 64 ML/MIN/1.73M*2
GFR SERPL CREATININE-BSD FRML MDRD: 66 ML/MIN/1.73M*2
GFR SERPL CREATININE-BSD FRML MDRD: 66 ML/MIN/1.73M*2
GFR SERPL CREATININE-BSD FRML MDRD: 69 ML/MIN/1.73M*2
GFR SERPL CREATININE-BSD FRML MDRD: 70 ML/MIN/1.73M*2
GFR SERPL CREATININE-BSD FRML MDRD: 72 ML/MIN/1.73M*2
GFR SERPL CREATININE-BSD FRML MDRD: 75 ML/MIN/1.73M*2
GFR SERPL CREATININE-BSD FRML MDRD: 76 ML/MIN/1.73M*2
GFR SERPL CREATININE-BSD FRML MDRD: 79 ML/MIN/1.73M*2
GFR SERPL CREATININE-BSD FRML MDRD: 80 ML/MIN/1.73M*2
GFR SERPL CREATININE-BSD FRML MDRD: 82 ML/MIN/1.73M*2
GFR SERPL CREATININE-BSD FRML MDRD: 88 ML/MIN/1.73M*2
GFR SERPL CREATININE-BSD FRML MDRD: 88 ML/MIN/1.73M*2
GFR SERPL CREATININE-BSD FRML MDRD: 89 ML/MIN/1.73M*2
GFR SERPL CREATININE-BSD FRML MDRD: >90 ML/MIN/1.73M*2
GLUCOSE BLD MANUAL STRIP-MCNC: 100 MG/DL (ref 74–99)
GLUCOSE BLD MANUAL STRIP-MCNC: 101 MG/DL (ref 74–99)
GLUCOSE BLD MANUAL STRIP-MCNC: 102 MG/DL (ref 74–99)
GLUCOSE BLD MANUAL STRIP-MCNC: 102 MG/DL (ref 74–99)
GLUCOSE BLD MANUAL STRIP-MCNC: 103 MG/DL (ref 74–99)
GLUCOSE BLD MANUAL STRIP-MCNC: 105 MG/DL (ref 74–99)
GLUCOSE BLD MANUAL STRIP-MCNC: 105 MG/DL (ref 74–99)
GLUCOSE BLD MANUAL STRIP-MCNC: 106 MG/DL (ref 74–99)
GLUCOSE BLD MANUAL STRIP-MCNC: 107 MG/DL (ref 74–99)
GLUCOSE BLD MANUAL STRIP-MCNC: 107 MG/DL (ref 74–99)
GLUCOSE BLD MANUAL STRIP-MCNC: 108 MG/DL (ref 74–99)
GLUCOSE BLD MANUAL STRIP-MCNC: 109 MG/DL (ref 74–99)
GLUCOSE BLD MANUAL STRIP-MCNC: 109 MG/DL (ref 74–99)
GLUCOSE BLD MANUAL STRIP-MCNC: 110 MG/DL (ref 74–99)
GLUCOSE BLD MANUAL STRIP-MCNC: 111 MG/DL (ref 74–99)
GLUCOSE BLD MANUAL STRIP-MCNC: 113 MG/DL (ref 74–99)
GLUCOSE BLD MANUAL STRIP-MCNC: 113 MG/DL (ref 74–99)
GLUCOSE BLD MANUAL STRIP-MCNC: 114 MG/DL (ref 74–99)
GLUCOSE BLD MANUAL STRIP-MCNC: 116 MG/DL (ref 74–99)
GLUCOSE BLD MANUAL STRIP-MCNC: 117 MG/DL (ref 74–99)
GLUCOSE BLD MANUAL STRIP-MCNC: 117 MG/DL (ref 74–99)
GLUCOSE BLD MANUAL STRIP-MCNC: 118 MG/DL (ref 74–99)
GLUCOSE BLD MANUAL STRIP-MCNC: 119 MG/DL (ref 74–99)
GLUCOSE BLD MANUAL STRIP-MCNC: 120 MG/DL (ref 74–99)
GLUCOSE BLD MANUAL STRIP-MCNC: 120 MG/DL (ref 74–99)
GLUCOSE BLD MANUAL STRIP-MCNC: 121 MG/DL (ref 74–99)
GLUCOSE BLD MANUAL STRIP-MCNC: 122 MG/DL (ref 74–99)
GLUCOSE BLD MANUAL STRIP-MCNC: 123 MG/DL (ref 74–99)
GLUCOSE BLD MANUAL STRIP-MCNC: 124 MG/DL (ref 74–99)
GLUCOSE BLD MANUAL STRIP-MCNC: 125 MG/DL (ref 74–99)
GLUCOSE BLD MANUAL STRIP-MCNC: 126 MG/DL (ref 74–99)
GLUCOSE BLD MANUAL STRIP-MCNC: 127 MG/DL (ref 74–99)
GLUCOSE BLD MANUAL STRIP-MCNC: 127 MG/DL (ref 74–99)
GLUCOSE BLD MANUAL STRIP-MCNC: 128 MG/DL (ref 74–99)
GLUCOSE BLD MANUAL STRIP-MCNC: 129 MG/DL (ref 74–99)
GLUCOSE BLD MANUAL STRIP-MCNC: 129 MG/DL (ref 74–99)
GLUCOSE BLD MANUAL STRIP-MCNC: 130 MG/DL (ref 74–99)
GLUCOSE BLD MANUAL STRIP-MCNC: 131 MG/DL (ref 74–99)
GLUCOSE BLD MANUAL STRIP-MCNC: 131 MG/DL (ref 74–99)
GLUCOSE BLD MANUAL STRIP-MCNC: 132 MG/DL (ref 74–99)
GLUCOSE BLD MANUAL STRIP-MCNC: 132 MG/DL (ref 74–99)
GLUCOSE BLD MANUAL STRIP-MCNC: 133 MG/DL (ref 74–99)
GLUCOSE BLD MANUAL STRIP-MCNC: 135 MG/DL (ref 74–99)
GLUCOSE BLD MANUAL STRIP-MCNC: 135 MG/DL (ref 74–99)
GLUCOSE BLD MANUAL STRIP-MCNC: 136 MG/DL (ref 74–99)
GLUCOSE BLD MANUAL STRIP-MCNC: 136 MG/DL (ref 74–99)
GLUCOSE BLD MANUAL STRIP-MCNC: 137 MG/DL (ref 74–99)
GLUCOSE BLD MANUAL STRIP-MCNC: 138 MG/DL (ref 74–99)
GLUCOSE BLD MANUAL STRIP-MCNC: 138 MG/DL (ref 74–99)
GLUCOSE BLD MANUAL STRIP-MCNC: 139 MG/DL (ref 74–99)
GLUCOSE BLD MANUAL STRIP-MCNC: 140 MG/DL (ref 74–99)
GLUCOSE BLD MANUAL STRIP-MCNC: 141 MG/DL (ref 74–99)
GLUCOSE BLD MANUAL STRIP-MCNC: 141 MG/DL (ref 74–99)
GLUCOSE BLD MANUAL STRIP-MCNC: 143 MG/DL (ref 74–99)
GLUCOSE BLD MANUAL STRIP-MCNC: 143 MG/DL (ref 74–99)
GLUCOSE BLD MANUAL STRIP-MCNC: 144 MG/DL (ref 74–99)
GLUCOSE BLD MANUAL STRIP-MCNC: 144 MG/DL (ref 74–99)
GLUCOSE BLD MANUAL STRIP-MCNC: 145 MG/DL (ref 74–99)
GLUCOSE BLD MANUAL STRIP-MCNC: 148 MG/DL (ref 74–99)
GLUCOSE BLD MANUAL STRIP-MCNC: 148 MG/DL (ref 74–99)
GLUCOSE BLD MANUAL STRIP-MCNC: 149 MG/DL (ref 74–99)
GLUCOSE BLD MANUAL STRIP-MCNC: 150 MG/DL (ref 74–99)
GLUCOSE BLD MANUAL STRIP-MCNC: 150 MG/DL (ref 74–99)
GLUCOSE BLD MANUAL STRIP-MCNC: 151 MG/DL (ref 74–99)
GLUCOSE BLD MANUAL STRIP-MCNC: 151 MG/DL (ref 74–99)
GLUCOSE BLD MANUAL STRIP-MCNC: 152 MG/DL (ref 74–99)
GLUCOSE BLD MANUAL STRIP-MCNC: 153 MG/DL (ref 74–99)
GLUCOSE BLD MANUAL STRIP-MCNC: 154 MG/DL (ref 74–99)
GLUCOSE BLD MANUAL STRIP-MCNC: 154 MG/DL (ref 74–99)
GLUCOSE BLD MANUAL STRIP-MCNC: 156 MG/DL (ref 74–99)
GLUCOSE BLD MANUAL STRIP-MCNC: 156 MG/DL (ref 74–99)
GLUCOSE BLD MANUAL STRIP-MCNC: 157 MG/DL (ref 74–99)
GLUCOSE BLD MANUAL STRIP-MCNC: 158 MG/DL (ref 74–99)
GLUCOSE BLD MANUAL STRIP-MCNC: 159 MG/DL (ref 74–99)
GLUCOSE BLD MANUAL STRIP-MCNC: 161 MG/DL (ref 74–99)
GLUCOSE BLD MANUAL STRIP-MCNC: 161 MG/DL (ref 74–99)
GLUCOSE BLD MANUAL STRIP-MCNC: 163 MG/DL (ref 74–99)
GLUCOSE BLD MANUAL STRIP-MCNC: 164 MG/DL (ref 74–99)
GLUCOSE BLD MANUAL STRIP-MCNC: 165 MG/DL (ref 74–99)
GLUCOSE BLD MANUAL STRIP-MCNC: 165 MG/DL (ref 74–99)
GLUCOSE BLD MANUAL STRIP-MCNC: 166 MG/DL (ref 74–99)
GLUCOSE BLD MANUAL STRIP-MCNC: 167 MG/DL (ref 74–99)
GLUCOSE BLD MANUAL STRIP-MCNC: 169 MG/DL (ref 74–99)
GLUCOSE BLD MANUAL STRIP-MCNC: 170 MG/DL (ref 74–99)
GLUCOSE BLD MANUAL STRIP-MCNC: 171 MG/DL (ref 74–99)
GLUCOSE BLD MANUAL STRIP-MCNC: 171 MG/DL (ref 74–99)
GLUCOSE BLD MANUAL STRIP-MCNC: 172 MG/DL (ref 74–99)
GLUCOSE BLD MANUAL STRIP-MCNC: 173 MG/DL (ref 74–99)
GLUCOSE BLD MANUAL STRIP-MCNC: 174 MG/DL (ref 74–99)
GLUCOSE BLD MANUAL STRIP-MCNC: 174 MG/DL (ref 74–99)
GLUCOSE BLD MANUAL STRIP-MCNC: 175 MG/DL (ref 74–99)
GLUCOSE BLD MANUAL STRIP-MCNC: 175 MG/DL (ref 74–99)
GLUCOSE BLD MANUAL STRIP-MCNC: 176 MG/DL (ref 74–99)
GLUCOSE BLD MANUAL STRIP-MCNC: 176 MG/DL (ref 74–99)
GLUCOSE BLD MANUAL STRIP-MCNC: 177 MG/DL (ref 74–99)
GLUCOSE BLD MANUAL STRIP-MCNC: 178 MG/DL (ref 74–99)
GLUCOSE BLD MANUAL STRIP-MCNC: 178 MG/DL (ref 74–99)
GLUCOSE BLD MANUAL STRIP-MCNC: 181 MG/DL (ref 74–99)
GLUCOSE BLD MANUAL STRIP-MCNC: 181 MG/DL (ref 74–99)
GLUCOSE BLD MANUAL STRIP-MCNC: 182 MG/DL (ref 74–99)
GLUCOSE BLD MANUAL STRIP-MCNC: 183 MG/DL (ref 74–99)
GLUCOSE BLD MANUAL STRIP-MCNC: 184 MG/DL (ref 74–99)
GLUCOSE BLD MANUAL STRIP-MCNC: 185 MG/DL (ref 74–99)
GLUCOSE BLD MANUAL STRIP-MCNC: 185 MG/DL (ref 74–99)
GLUCOSE BLD MANUAL STRIP-MCNC: 187 MG/DL (ref 74–99)
GLUCOSE BLD MANUAL STRIP-MCNC: 188 MG/DL (ref 74–99)
GLUCOSE BLD MANUAL STRIP-MCNC: 189 MG/DL (ref 74–99)
GLUCOSE BLD MANUAL STRIP-MCNC: 189 MG/DL (ref 74–99)
GLUCOSE BLD MANUAL STRIP-MCNC: 191 MG/DL (ref 74–99)
GLUCOSE BLD MANUAL STRIP-MCNC: 191 MG/DL (ref 74–99)
GLUCOSE BLD MANUAL STRIP-MCNC: 192 MG/DL (ref 74–99)
GLUCOSE BLD MANUAL STRIP-MCNC: 192 MG/DL (ref 74–99)
GLUCOSE BLD MANUAL STRIP-MCNC: 193 MG/DL (ref 74–99)
GLUCOSE BLD MANUAL STRIP-MCNC: 193 MG/DL (ref 74–99)
GLUCOSE BLD MANUAL STRIP-MCNC: 194 MG/DL (ref 74–99)
GLUCOSE BLD MANUAL STRIP-MCNC: 195 MG/DL (ref 74–99)
GLUCOSE BLD MANUAL STRIP-MCNC: 195 MG/DL (ref 74–99)
GLUCOSE BLD MANUAL STRIP-MCNC: 196 MG/DL (ref 74–99)
GLUCOSE BLD MANUAL STRIP-MCNC: 196 MG/DL (ref 74–99)
GLUCOSE BLD MANUAL STRIP-MCNC: 197 MG/DL (ref 74–99)
GLUCOSE BLD MANUAL STRIP-MCNC: 197 MG/DL (ref 74–99)
GLUCOSE BLD MANUAL STRIP-MCNC: 198 MG/DL (ref 74–99)
GLUCOSE BLD MANUAL STRIP-MCNC: 198 MG/DL (ref 74–99)
GLUCOSE BLD MANUAL STRIP-MCNC: 199 MG/DL (ref 74–99)
GLUCOSE BLD MANUAL STRIP-MCNC: 200 MG/DL (ref 74–99)
GLUCOSE BLD MANUAL STRIP-MCNC: 200 MG/DL (ref 74–99)
GLUCOSE BLD MANUAL STRIP-MCNC: 201 MG/DL (ref 74–99)
GLUCOSE BLD MANUAL STRIP-MCNC: 201 MG/DL (ref 74–99)
GLUCOSE BLD MANUAL STRIP-MCNC: 202 MG/DL (ref 74–99)
GLUCOSE BLD MANUAL STRIP-MCNC: 202 MG/DL (ref 74–99)
GLUCOSE BLD MANUAL STRIP-MCNC: 203 MG/DL (ref 74–99)
GLUCOSE BLD MANUAL STRIP-MCNC: 203 MG/DL (ref 74–99)
GLUCOSE BLD MANUAL STRIP-MCNC: 204 MG/DL (ref 74–99)
GLUCOSE BLD MANUAL STRIP-MCNC: 205 MG/DL (ref 74–99)
GLUCOSE BLD MANUAL STRIP-MCNC: 205 MG/DL (ref 74–99)
GLUCOSE BLD MANUAL STRIP-MCNC: 206 MG/DL (ref 74–99)
GLUCOSE BLD MANUAL STRIP-MCNC: 207 MG/DL (ref 74–99)
GLUCOSE BLD MANUAL STRIP-MCNC: 208 MG/DL (ref 74–99)
GLUCOSE BLD MANUAL STRIP-MCNC: 210 MG/DL (ref 74–99)
GLUCOSE BLD MANUAL STRIP-MCNC: 211 MG/DL (ref 74–99)
GLUCOSE BLD MANUAL STRIP-MCNC: 212 MG/DL (ref 74–99)
GLUCOSE BLD MANUAL STRIP-MCNC: 213 MG/DL (ref 74–99)
GLUCOSE BLD MANUAL STRIP-MCNC: 214 MG/DL (ref 74–99)
GLUCOSE BLD MANUAL STRIP-MCNC: 214 MG/DL (ref 74–99)
GLUCOSE BLD MANUAL STRIP-MCNC: 215 MG/DL (ref 74–99)
GLUCOSE BLD MANUAL STRIP-MCNC: 216 MG/DL (ref 74–99)
GLUCOSE BLD MANUAL STRIP-MCNC: 217 MG/DL (ref 74–99)
GLUCOSE BLD MANUAL STRIP-MCNC: 217 MG/DL (ref 74–99)
GLUCOSE BLD MANUAL STRIP-MCNC: 218 MG/DL (ref 74–99)
GLUCOSE BLD MANUAL STRIP-MCNC: 219 MG/DL (ref 74–99)
GLUCOSE BLD MANUAL STRIP-MCNC: 220 MG/DL (ref 74–99)
GLUCOSE BLD MANUAL STRIP-MCNC: 221 MG/DL (ref 74–99)
GLUCOSE BLD MANUAL STRIP-MCNC: 222 MG/DL (ref 74–99)
GLUCOSE BLD MANUAL STRIP-MCNC: 223 MG/DL (ref 74–99)
GLUCOSE BLD MANUAL STRIP-MCNC: 224 MG/DL (ref 74–99)
GLUCOSE BLD MANUAL STRIP-MCNC: 225 MG/DL (ref 74–99)
GLUCOSE BLD MANUAL STRIP-MCNC: 225 MG/DL (ref 74–99)
GLUCOSE BLD MANUAL STRIP-MCNC: 226 MG/DL (ref 74–99)
GLUCOSE BLD MANUAL STRIP-MCNC: 227 MG/DL (ref 74–99)
GLUCOSE BLD MANUAL STRIP-MCNC: 227 MG/DL (ref 74–99)
GLUCOSE BLD MANUAL STRIP-MCNC: 228 MG/DL (ref 74–99)
GLUCOSE BLD MANUAL STRIP-MCNC: 231 MG/DL (ref 74–99)
GLUCOSE BLD MANUAL STRIP-MCNC: 231 MG/DL (ref 74–99)
GLUCOSE BLD MANUAL STRIP-MCNC: 233 MG/DL (ref 74–99)
GLUCOSE BLD MANUAL STRIP-MCNC: 233 MG/DL (ref 74–99)
GLUCOSE BLD MANUAL STRIP-MCNC: 238 MG/DL (ref 74–99)
GLUCOSE BLD MANUAL STRIP-MCNC: 239 MG/DL (ref 74–99)
GLUCOSE BLD MANUAL STRIP-MCNC: 240 MG/DL (ref 74–99)
GLUCOSE BLD MANUAL STRIP-MCNC: 242 MG/DL (ref 74–99)
GLUCOSE BLD MANUAL STRIP-MCNC: 244 MG/DL (ref 74–99)
GLUCOSE BLD MANUAL STRIP-MCNC: 245 MG/DL (ref 74–99)
GLUCOSE BLD MANUAL STRIP-MCNC: 246 MG/DL (ref 74–99)
GLUCOSE BLD MANUAL STRIP-MCNC: 247 MG/DL (ref 74–99)
GLUCOSE BLD MANUAL STRIP-MCNC: 247 MG/DL (ref 74–99)
GLUCOSE BLD MANUAL STRIP-MCNC: 249 MG/DL (ref 74–99)
GLUCOSE BLD MANUAL STRIP-MCNC: 250 MG/DL (ref 74–99)
GLUCOSE BLD MANUAL STRIP-MCNC: 251 MG/DL (ref 74–99)
GLUCOSE BLD MANUAL STRIP-MCNC: 254 MG/DL (ref 74–99)
GLUCOSE BLD MANUAL STRIP-MCNC: 254 MG/DL (ref 74–99)
GLUCOSE BLD MANUAL STRIP-MCNC: 257 MG/DL (ref 74–99)
GLUCOSE BLD MANUAL STRIP-MCNC: 257 MG/DL (ref 74–99)
GLUCOSE BLD MANUAL STRIP-MCNC: 258 MG/DL (ref 74–99)
GLUCOSE BLD MANUAL STRIP-MCNC: 260 MG/DL (ref 74–99)
GLUCOSE BLD MANUAL STRIP-MCNC: 268 MG/DL (ref 74–99)
GLUCOSE BLD MANUAL STRIP-MCNC: 270 MG/DL (ref 74–99)
GLUCOSE BLD MANUAL STRIP-MCNC: 275 MG/DL (ref 74–99)
GLUCOSE BLD MANUAL STRIP-MCNC: 275 MG/DL (ref 74–99)
GLUCOSE BLD MANUAL STRIP-MCNC: 290 MG/DL (ref 74–99)
GLUCOSE BLD MANUAL STRIP-MCNC: 291 MG/DL (ref 74–99)
GLUCOSE BLD MANUAL STRIP-MCNC: 295 MG/DL (ref 74–99)
GLUCOSE BLD MANUAL STRIP-MCNC: 306 MG/DL (ref 74–99)
GLUCOSE BLD MANUAL STRIP-MCNC: 337 MG/DL (ref 74–99)
GLUCOSE BLD MANUAL STRIP-MCNC: 355 MG/DL (ref 74–99)
GLUCOSE BLD MANUAL STRIP-MCNC: 367 MG/DL (ref 74–99)
GLUCOSE BLD MANUAL STRIP-MCNC: 43 MG/DL (ref 74–99)
GLUCOSE BLD MANUAL STRIP-MCNC: 50 MG/DL (ref 74–99)
GLUCOSE BLD MANUAL STRIP-MCNC: 51 MG/DL (ref 74–99)
GLUCOSE BLD MANUAL STRIP-MCNC: 53 MG/DL (ref 74–99)
GLUCOSE BLD MANUAL STRIP-MCNC: 54 MG/DL (ref 74–99)
GLUCOSE BLD MANUAL STRIP-MCNC: 56 MG/DL (ref 74–99)
GLUCOSE BLD MANUAL STRIP-MCNC: 58 MG/DL (ref 74–99)
GLUCOSE BLD MANUAL STRIP-MCNC: 59 MG/DL (ref 74–99)
GLUCOSE BLD MANUAL STRIP-MCNC: 60 MG/DL (ref 74–99)
GLUCOSE BLD MANUAL STRIP-MCNC: 62 MG/DL (ref 74–99)
GLUCOSE BLD MANUAL STRIP-MCNC: 63 MG/DL (ref 74–99)
GLUCOSE BLD MANUAL STRIP-MCNC: 64 MG/DL (ref 74–99)
GLUCOSE BLD MANUAL STRIP-MCNC: 65 MG/DL (ref 74–99)
GLUCOSE BLD MANUAL STRIP-MCNC: 66 MG/DL (ref 74–99)
GLUCOSE BLD MANUAL STRIP-MCNC: 70 MG/DL (ref 74–99)
GLUCOSE BLD MANUAL STRIP-MCNC: 71 MG/DL (ref 74–99)
GLUCOSE BLD MANUAL STRIP-MCNC: 72 MG/DL (ref 74–99)
GLUCOSE BLD MANUAL STRIP-MCNC: 73 MG/DL (ref 74–99)
GLUCOSE BLD MANUAL STRIP-MCNC: 74 MG/DL (ref 74–99)
GLUCOSE BLD MANUAL STRIP-MCNC: 75 MG/DL (ref 74–99)
GLUCOSE BLD MANUAL STRIP-MCNC: 76 MG/DL (ref 74–99)
GLUCOSE BLD MANUAL STRIP-MCNC: 78 MG/DL (ref 74–99)
GLUCOSE BLD MANUAL STRIP-MCNC: 79 MG/DL (ref 74–99)
GLUCOSE BLD MANUAL STRIP-MCNC: 79 MG/DL (ref 74–99)
GLUCOSE BLD MANUAL STRIP-MCNC: 80 MG/DL (ref 74–99)
GLUCOSE BLD MANUAL STRIP-MCNC: 80 MG/DL (ref 74–99)
GLUCOSE BLD MANUAL STRIP-MCNC: 82 MG/DL (ref 74–99)
GLUCOSE BLD MANUAL STRIP-MCNC: 84 MG/DL (ref 74–99)
GLUCOSE BLD MANUAL STRIP-MCNC: 84 MG/DL (ref 74–99)
GLUCOSE BLD MANUAL STRIP-MCNC: 86 MG/DL (ref 74–99)
GLUCOSE BLD MANUAL STRIP-MCNC: 87 MG/DL (ref 74–99)
GLUCOSE BLD MANUAL STRIP-MCNC: 87 MG/DL (ref 74–99)
GLUCOSE BLD MANUAL STRIP-MCNC: 89 MG/DL (ref 74–99)
GLUCOSE BLD MANUAL STRIP-MCNC: 90 MG/DL (ref 74–99)
GLUCOSE BLD MANUAL STRIP-MCNC: 91 MG/DL (ref 74–99)
GLUCOSE BLD MANUAL STRIP-MCNC: 91 MG/DL (ref 74–99)
GLUCOSE BLD MANUAL STRIP-MCNC: 93 MG/DL (ref 74–99)
GLUCOSE BLD MANUAL STRIP-MCNC: 94 MG/DL (ref 74–99)
GLUCOSE BLD MANUAL STRIP-MCNC: 95 MG/DL (ref 74–99)
GLUCOSE BLD MANUAL STRIP-MCNC: 95 MG/DL (ref 74–99)
GLUCOSE BLD MANUAL STRIP-MCNC: 97 MG/DL (ref 74–99)
GLUCOSE BLD MANUAL STRIP-MCNC: 99 MG/DL (ref 74–99)
GLUCOSE BLD MANUAL STRIP-MCNC: <10 MG/DL (ref 74–99)
GLUCOSE BLDA-MCNC: 103 MG/DL (ref 74–99)
GLUCOSE BLDA-MCNC: 105 MG/DL (ref 74–99)
GLUCOSE BLDA-MCNC: 117 MG/DL (ref 74–99)
GLUCOSE BLDA-MCNC: 145 MG/DL (ref 74–99)
GLUCOSE BLDA-MCNC: 157 MG/DL (ref 74–99)
GLUCOSE SERPL-MCNC: 100 MG/DL (ref 74–99)
GLUCOSE SERPL-MCNC: 106 MG/DL (ref 74–99)
GLUCOSE SERPL-MCNC: 107 MG/DL (ref 74–99)
GLUCOSE SERPL-MCNC: 109 MG/DL (ref 74–99)
GLUCOSE SERPL-MCNC: 115 MG/DL (ref 65–99)
GLUCOSE SERPL-MCNC: 117 MG/DL (ref 74–99)
GLUCOSE SERPL-MCNC: 122 MG/DL (ref 74–99)
GLUCOSE SERPL-MCNC: 127 MG/DL (ref 74–99)
GLUCOSE SERPL-MCNC: 127 MG/DL (ref 74–99)
GLUCOSE SERPL-MCNC: 128 MG/DL (ref 74–99)
GLUCOSE SERPL-MCNC: 129 MG/DL (ref 65–99)
GLUCOSE SERPL-MCNC: 129 MG/DL (ref 74–99)
GLUCOSE SERPL-MCNC: 129 MG/DL (ref 74–99)
GLUCOSE SERPL-MCNC: 132 MG/DL (ref 74–99)
GLUCOSE SERPL-MCNC: 133 MG/DL (ref 74–99)
GLUCOSE SERPL-MCNC: 136 MG/DL (ref 74–99)
GLUCOSE SERPL-MCNC: 141 MG/DL (ref 74–99)
GLUCOSE SERPL-MCNC: 142 MG/DL (ref 74–99)
GLUCOSE SERPL-MCNC: 142 MG/DL (ref 74–99)
GLUCOSE SERPL-MCNC: 143 MG/DL (ref 65–99)
GLUCOSE SERPL-MCNC: 144 MG/DL (ref 74–99)
GLUCOSE SERPL-MCNC: 145 MG/DL (ref 65–99)
GLUCOSE SERPL-MCNC: 145 MG/DL (ref 74–99)
GLUCOSE SERPL-MCNC: 146 MG/DL (ref 65–99)
GLUCOSE SERPL-MCNC: 148 MG/DL (ref 65–99)
GLUCOSE SERPL-MCNC: 150 MG/DL (ref 65–99)
GLUCOSE SERPL-MCNC: 150 MG/DL (ref 74–99)
GLUCOSE SERPL-MCNC: 151 MG/DL (ref 65–99)
GLUCOSE SERPL-MCNC: 152 MG/DL (ref 74–99)
GLUCOSE SERPL-MCNC: 155 MG/DL (ref 65–99)
GLUCOSE SERPL-MCNC: 155 MG/DL (ref 74–99)
GLUCOSE SERPL-MCNC: 156 MG/DL (ref 74–99)
GLUCOSE SERPL-MCNC: 159 MG/DL (ref 65–99)
GLUCOSE SERPL-MCNC: 162 MG/DL (ref 74–99)
GLUCOSE SERPL-MCNC: 170 MG/DL (ref 65–99)
GLUCOSE SERPL-MCNC: 172 MG/DL (ref 74–99)
GLUCOSE SERPL-MCNC: 178 MG/DL (ref 74–99)
GLUCOSE SERPL-MCNC: 179 MG/DL (ref 74–99)
GLUCOSE SERPL-MCNC: 181 MG/DL (ref 74–99)
GLUCOSE SERPL-MCNC: 187 MG/DL (ref 65–99)
GLUCOSE SERPL-MCNC: 188 MG/DL (ref 74–99)
GLUCOSE SERPL-MCNC: 190 MG/DL (ref 74–99)
GLUCOSE SERPL-MCNC: 190 MG/DL (ref 74–99)
GLUCOSE SERPL-MCNC: 198 MG/DL (ref 65–99)
GLUCOSE SERPL-MCNC: 202 MG/DL (ref 74–99)
GLUCOSE SERPL-MCNC: 202 MG/DL (ref 74–99)
GLUCOSE SERPL-MCNC: 203 MG/DL (ref 65–99)
GLUCOSE SERPL-MCNC: 204 MG/DL (ref 74–99)
GLUCOSE SERPL-MCNC: 206 MG/DL (ref 74–99)
GLUCOSE SERPL-MCNC: 208 MG/DL (ref 65–99)
GLUCOSE SERPL-MCNC: 217 MG/DL (ref 74–99)
GLUCOSE SERPL-MCNC: 219 MG/DL (ref 74–99)
GLUCOSE SERPL-MCNC: 228 MG/DL (ref 74–99)
GLUCOSE SERPL-MCNC: 233 MG/DL (ref 74–99)
GLUCOSE SERPL-MCNC: 233 MG/DL (ref 74–99)
GLUCOSE SERPL-MCNC: 243 MG/DL (ref 74–99)
GLUCOSE SERPL-MCNC: 244 MG/DL (ref 74–99)
GLUCOSE SERPL-MCNC: 245 MG/DL (ref 74–99)
GLUCOSE SERPL-MCNC: 251 MG/DL (ref 74–99)
GLUCOSE SERPL-MCNC: 258 MG/DL (ref 74–99)
GLUCOSE SERPL-MCNC: 60 MG/DL (ref 74–99)
GLUCOSE SERPL-MCNC: 61 MG/DL (ref 74–99)
GLUCOSE SERPL-MCNC: 63 MG/DL (ref 74–99)
GLUCOSE SERPL-MCNC: 64 MG/DL (ref 74–99)
GLUCOSE SERPL-MCNC: 67 MG/DL (ref 74–99)
GLUCOSE SERPL-MCNC: 73 MG/DL (ref 74–99)
GLUCOSE SERPL-MCNC: 76 MG/DL (ref 65–99)
GLUCOSE SERPL-MCNC: 76 MG/DL (ref 74–99)
GLUCOSE SERPL-MCNC: 79 MG/DL (ref 74–99)
GLUCOSE SERPL-MCNC: 80 MG/DL (ref 74–99)
GLUCOSE SERPL-MCNC: 91 MG/DL (ref 74–99)
GLUCOSE SERPL-MCNC: 92 MG/DL (ref 74–99)
GLUCOSE SERPL-MCNC: 95 MG/DL (ref 74–99)
GLUCOSE SERPL-MCNC: 96 MG/DL (ref 74–99)
GLUCOSE SERPL-MCNC: 98 MG/DL (ref 74–99)
GLUCOSE UR STRIP.AUTO-MCNC: ABNORMAL MG/DL
GLUCOSE UR STRIP.AUTO-MCNC: NEGATIVE MG/DL
GLUCOSE UR STRIP.AUTO-MCNC: NEGATIVE MG/DL
GRAM STN SPEC: ABNORMAL
GRAN CASTS #/AREA UR COMP ASSIST: ABNORMAL /LPF
H CAPSUL AG SPEC QL: NOT DETECTED
HBA1C MFR BLD: 6.3 %
HCO3 BLDA-SCNC: 17.1 MMOL/L (ref 22–26)
HCO3 BLDA-SCNC: 17.7 MMOL/L (ref 22–26)
HCO3 BLDA-SCNC: 31.9 MMOL/L (ref 22–26)
HCO3 BLDA-SCNC: 31.9 MMOL/L (ref 22–26)
HCO3 BLDA-SCNC: 32.8 MMOL/L (ref 22–26)
HCO3 BLDA-SCNC: 35.7 MMOL/L (ref 22–26)
HCO3 BLDA-SCNC: 37.4 MMOL/L (ref 22–26)
HCT VFR BLD AUTO: 18.2 % (ref 36–46)
HCT VFR BLD AUTO: 23 % (ref 36–46)
HCT VFR BLD AUTO: 23.4 % (ref 36–46)
HCT VFR BLD AUTO: 23.9 % (ref 36–46)
HCT VFR BLD AUTO: 25.4 % (ref 36–46)
HCT VFR BLD AUTO: 25.9 % (ref 36–46)
HCT VFR BLD AUTO: 26 % (ref 36–46)
HCT VFR BLD AUTO: 26.4 % (ref 36–46)
HCT VFR BLD AUTO: 26.5 % (ref 36–46)
HCT VFR BLD AUTO: 26.9 % (ref 36–46)
HCT VFR BLD AUTO: 27 % (ref 36–46)
HCT VFR BLD AUTO: 27 % (ref 36–46)
HCT VFR BLD AUTO: 27.3 % (ref 36–46)
HCT VFR BLD AUTO: 27.7 % (ref 36–46)
HCT VFR BLD AUTO: 27.8 % (ref 36–46)
HCT VFR BLD AUTO: 28.1 % (ref 36–46)
HCT VFR BLD AUTO: 28.3 % (ref 36–46)
HCT VFR BLD AUTO: 28.6 % (ref 36–46)
HCT VFR BLD AUTO: 28.7 % (ref 36–46)
HCT VFR BLD AUTO: 28.7 % (ref 36–46)
HCT VFR BLD AUTO: 29 % (ref 36–46)
HCT VFR BLD AUTO: 29.2 % (ref 36–46)
HCT VFR BLD AUTO: 29.3 % (ref 36–46)
HCT VFR BLD AUTO: 29.3 % (ref 36–46)
HCT VFR BLD AUTO: 29.4 % (ref 36–46)
HCT VFR BLD AUTO: 29.6 % (ref 36–46)
HCT VFR BLD AUTO: 29.7 % (ref 36–46)
HCT VFR BLD AUTO: 29.7 % (ref 36–46)
HCT VFR BLD AUTO: 29.8 % (ref 36–46)
HCT VFR BLD AUTO: 30.2 % (ref 36–46)
HCT VFR BLD AUTO: 30.3 % (ref 36–46)
HCT VFR BLD AUTO: 30.6 % (ref 36–46)
HCT VFR BLD AUTO: 30.7 % (ref 36–46)
HCT VFR BLD AUTO: 30.9 % (ref 36–46)
HCT VFR BLD AUTO: 31 % (ref 36–46)
HCT VFR BLD AUTO: 31.1 % (ref 36–46)
HCT VFR BLD AUTO: 31.1 % (ref 36–46)
HCT VFR BLD AUTO: 31.6 % (ref 36–46)
HCT VFR BLD AUTO: 31.9 % (ref 36–46)
HCT VFR BLD AUTO: 32.6 % (ref 36–46)
HCT VFR BLD AUTO: 32.7 % (ref 36–46)
HCT VFR BLD AUTO: 32.8 % (ref 36–46)
HCT VFR BLD AUTO: 32.9 % (ref 36–46)
HCT VFR BLD AUTO: 32.9 % (ref 36–46)
HCT VFR BLD AUTO: 33.1 % (ref 36–46)
HCT VFR BLD AUTO: 33.3 % (ref 36–46)
HCT VFR BLD AUTO: 33.4 % (ref 36–46)
HCT VFR BLD AUTO: 33.6 % (ref 36–46)
HCT VFR BLD AUTO: 33.7 % (ref 36–46)
HCT VFR BLD AUTO: 33.8 % (ref 36–46)
HCT VFR BLD AUTO: 33.8 % (ref 36–46)
HCT VFR BLD AUTO: 34 % (ref 36–46)
HCT VFR BLD AUTO: 34 % (ref 36–46)
HCT VFR BLD AUTO: 34.1 % (ref 36–46)
HCT VFR BLD AUTO: 34.1 % (ref 36–46)
HCT VFR BLD AUTO: 34.3 % (ref 36–46)
HCT VFR BLD AUTO: 34.5 % (ref 36–46)
HCT VFR BLD AUTO: 34.7 % (ref 36–46)
HCT VFR BLD AUTO: 35.6 % (ref 36–46)
HCT VFR BLD AUTO: 35.7 % (ref 36–46)
HCT VFR BLD AUTO: 35.7 % (ref 36–46)
HCT VFR BLD AUTO: 35.8 % (ref 36–46)
HCT VFR BLD AUTO: 36.3 % (ref 36–46)
HCT VFR BLD AUTO: 36.5 % (ref 36–46)
HCT VFR BLD AUTO: 36.5 % (ref 36–46)
HCT VFR BLD AUTO: 36.6 % (ref 36–46)
HCT VFR BLD AUTO: 36.8 % (ref 36–46)
HCT VFR BLD AUTO: 37.2 % (ref 36–46)
HCT VFR BLD AUTO: 38.7 % (ref 36–46)
HCT VFR BLD AUTO: 39.2 % (ref 36–46)
HCT VFR BLD AUTO: 39.9 % (ref 36–46)
HCT VFR BLD AUTO: 40.8 % (ref 36–46)
HCT VFR BLD AUTO: 41.5 % (ref 36–46)
HCT VFR BLD AUTO: 42.3 % (ref 36–46)
HCT VFR BLD AUTO: 42.3 % (ref 36–46)
HCT VFR BLD AUTO: 42.5 % (ref 36–46)
HCT VFR BLD AUTO: 43.5 % (ref 36–46)
HCT VFR BLD EST: 34 % (ref 36–46)
HCT VFR BLD EST: 34 % (ref 36–46)
HCT VFR BLD EST: 35 % (ref 36–46)
HCT VFR BLD EST: 35 % (ref 36–46)
HCT VFR BLD EST: 38 % (ref 36–46)
HGB BLD-MCNC: 10.1 G/DL (ref 12–16)
HGB BLD-MCNC: 10.1 G/DL (ref 12–16)
HGB BLD-MCNC: 10.2 G/DL (ref 12–16)
HGB BLD-MCNC: 10.3 G/DL (ref 12–16)
HGB BLD-MCNC: 10.4 G/DL (ref 12–16)
HGB BLD-MCNC: 10.5 G/DL (ref 12–16)
HGB BLD-MCNC: 10.6 G/DL (ref 12–16)
HGB BLD-MCNC: 10.7 G/DL (ref 12–16)
HGB BLD-MCNC: 10.8 G/DL (ref 12–16)
HGB BLD-MCNC: 10.9 G/DL (ref 12–16)
HGB BLD-MCNC: 10.9 G/DL (ref 12–16)
HGB BLD-MCNC: 11 G/DL (ref 12–16)
HGB BLD-MCNC: 11.1 G/DL (ref 12–16)
HGB BLD-MCNC: 11.2 G/DL (ref 12–16)
HGB BLD-MCNC: 11.3 G/DL (ref 12–16)
HGB BLD-MCNC: 11.4 G/DL (ref 12–16)
HGB BLD-MCNC: 11.5 G/DL (ref 12–16)
HGB BLD-MCNC: 11.5 G/DL (ref 12–16)
HGB BLD-MCNC: 11.6 G/DL (ref 12–16)
HGB BLD-MCNC: 11.7 G/DL (ref 12–16)
HGB BLD-MCNC: 11.8 G/DL (ref 12–16)
HGB BLD-MCNC: 11.8 G/DL (ref 12–16)
HGB BLD-MCNC: 11.9 G/DL (ref 12–16)
HGB BLD-MCNC: 12 G/DL (ref 12–16)
HGB BLD-MCNC: 12.2 G/DL (ref 12–16)
HGB BLD-MCNC: 12.4 G/DL (ref 12–16)
HGB BLD-MCNC: 12.4 G/DL (ref 12–16)
HGB BLD-MCNC: 13.3 G/DL (ref 12–16)
HGB BLD-MCNC: 13.6 G/DL (ref 12–16)
HGB BLD-MCNC: 13.6 G/DL (ref 12–16)
HGB BLD-MCNC: 13.8 G/DL (ref 12–16)
HGB BLD-MCNC: 14 G/DL (ref 12–16)
HGB BLD-MCNC: 14 G/DL (ref 12–16)
HGB BLD-MCNC: 14.2 G/DL (ref 12–16)
HGB BLD-MCNC: 5.9 G/DL (ref 12–16)
HGB BLD-MCNC: 7 G/DL (ref 12–16)
HGB BLD-MCNC: 7.4 G/DL (ref 12–16)
HGB BLD-MCNC: 7.6 G/DL (ref 12–16)
HGB BLD-MCNC: 7.9 G/DL (ref 12–16)
HGB BLD-MCNC: 8.1 G/DL (ref 12–16)
HGB BLD-MCNC: 8.4 G/DL (ref 12–16)
HGB BLD-MCNC: 8.5 G/DL (ref 12–16)
HGB BLD-MCNC: 8.6 G/DL (ref 12–16)
HGB BLD-MCNC: 8.7 G/DL (ref 12–16)
HGB BLD-MCNC: 8.9 G/DL (ref 12–16)
HGB BLD-MCNC: 9 G/DL (ref 12–16)
HGB BLD-MCNC: 9 G/DL (ref 12–16)
HGB BLD-MCNC: 9.1 G/DL (ref 12–16)
HGB BLD-MCNC: 9.1 G/DL (ref 12–16)
HGB BLD-MCNC: 9.2 G/DL (ref 12–16)
HGB BLD-MCNC: 9.3 G/DL (ref 12–16)
HGB BLD-MCNC: 9.3 G/DL (ref 12–16)
HGB BLD-MCNC: 9.4 G/DL (ref 12–16)
HGB BLD-MCNC: 9.5 G/DL (ref 12–16)
HGB BLD-MCNC: 9.6 G/DL (ref 12–16)
HGB BLD-MCNC: 9.7 G/DL (ref 12–16)
HGB BLD-MCNC: 9.7 G/DL (ref 12–16)
HGB BLD-MCNC: 9.8 G/DL (ref 12–16)
HGB BLD-MCNC: 9.9 G/DL (ref 12–16)
HGB BLDA-MCNC: 11.4 G/DL (ref 12–16)
HGB BLDA-MCNC: 11.4 G/DL (ref 12–16)
HGB BLDA-MCNC: 11.6 G/DL (ref 12–16)
HGB BLDA-MCNC: 11.7 G/DL (ref 12–16)
HGB BLDA-MCNC: 12.8 G/DL (ref 12–16)
IGA SERPL-MCNC: 259 MG/DL (ref 70–400)
IGG SERPL-MCNC: 626 MG/DL (ref 700–1600)
IGM SERPL-MCNC: 118 MG/DL (ref 40–230)
IMM GRANULOCYTES # BLD AUTO: 0.07 X10*3/UL (ref 0–0.7)
IMM GRANULOCYTES # BLD AUTO: 0.08 X10*3/UL (ref 0–0.7)
IMM GRANULOCYTES # BLD AUTO: 0.08 X10*3/UL (ref 0–0.7)
IMM GRANULOCYTES # BLD AUTO: 0.09 X10*3/UL (ref 0–0.7)
IMM GRANULOCYTES # BLD AUTO: 0.1 X10*3/UL (ref 0–0.7)
IMM GRANULOCYTES # BLD AUTO: 0.11 X10*3/UL (ref 0–0.7)
IMM GRANULOCYTES # BLD AUTO: 0.15 X10*3/UL (ref 0–0.7)
IMM GRANULOCYTES # BLD AUTO: 0.18 X10*3/UL (ref 0–0.7)
IMM GRANULOCYTES # BLD AUTO: 0.19 X10*3/UL (ref 0–0.7)
IMM GRANULOCYTES # BLD AUTO: 0.2 X10*3/UL (ref 0–0.7)
IMM GRANULOCYTES # BLD AUTO: 0.24 X10*3/UL (ref 0–0.7)
IMM GRANULOCYTES # BLD AUTO: 0.3 X10*3/UL (ref 0–0.7)
IMM GRANULOCYTES # BLD AUTO: 0.3 X10*3/UL (ref 0–0.7)
IMM GRANULOCYTES # BLD AUTO: 0.31 X10*3/UL (ref 0–0.7)
IMM GRANULOCYTES # BLD AUTO: 0.34 X10*3/UL (ref 0–0.7)
IMM GRANULOCYTES # BLD AUTO: 0.34 X10*3/UL (ref 0–0.7)
IMM GRANULOCYTES # BLD AUTO: 0.36 X10*3/UL (ref 0–0.7)
IMM GRANULOCYTES # BLD AUTO: 0.37 X10*3/UL (ref 0–0.7)
IMM GRANULOCYTES # BLD AUTO: 0.37 X10*3/UL (ref 0–0.7)
IMM GRANULOCYTES # BLD AUTO: 0.41 X10*3/UL (ref 0–0.7)
IMM GRANULOCYTES # BLD AUTO: 0.43 X10*3/UL (ref 0–0.7)
IMM GRANULOCYTES # BLD AUTO: 0.44 X10*3/UL (ref 0–0.7)
IMM GRANULOCYTES # BLD AUTO: 0.44 X10*3/UL (ref 0–0.7)
IMM GRANULOCYTES # BLD AUTO: 0.48 X10*3/UL (ref 0–0.7)
IMM GRANULOCYTES # BLD AUTO: 0.51 X10*3/UL (ref 0–0.7)
IMM GRANULOCYTES # BLD AUTO: 0.54 X10*3/UL (ref 0–0.7)
IMM GRANULOCYTES # BLD AUTO: 0.57 X10*3/UL (ref 0–0.7)
IMM GRANULOCYTES # BLD AUTO: 0.58 X10*3/UL (ref 0–0.7)
IMM GRANULOCYTES # BLD AUTO: 0.61 X10*3/UL (ref 0–0.7)
IMM GRANULOCYTES # BLD AUTO: 0.62 X10*3/UL (ref 0–0.7)
IMM GRANULOCYTES # BLD AUTO: 0.63 X10*3/UL (ref 0–0.7)
IMM GRANULOCYTES # BLD AUTO: 0.63 X10*3/UL (ref 0–0.7)
IMM GRANULOCYTES # BLD AUTO: 0.66 X10*3/UL (ref 0–0.7)
IMM GRANULOCYTES # BLD AUTO: 0.7 X10*3/UL (ref 0–0.7)
IMM GRANULOCYTES # BLD AUTO: 0.71 X10*3/UL (ref 0–0.7)
IMM GRANULOCYTES # BLD AUTO: 0.74 X10*3/UL (ref 0–0.7)
IMM GRANULOCYTES # BLD AUTO: 0.78 X10*3/UL (ref 0–0.7)
IMM GRANULOCYTES # BLD AUTO: 0.81 X10*3/UL (ref 0–0.7)
IMM GRANULOCYTES # BLD AUTO: 0.84 X10*3/UL (ref 0–0.7)
IMM GRANULOCYTES # BLD AUTO: 0.85 X10*3/UL (ref 0–0.7)
IMM GRANULOCYTES # BLD AUTO: 0.86 X10*3/UL (ref 0–0.7)
IMM GRANULOCYTES # BLD AUTO: 0.89 X10*3/UL (ref 0–0.7)
IMM GRANULOCYTES # BLD AUTO: 0.89 X10*3/UL (ref 0–0.7)
IMM GRANULOCYTES # BLD AUTO: 0.97 X10*3/UL (ref 0–0.7)
IMM GRANULOCYTES # BLD AUTO: 0.97 X10*3/UL (ref 0–0.7)
IMM GRANULOCYTES # BLD AUTO: 1.02 X10*3/UL (ref 0–0.7)
IMM GRANULOCYTES # BLD AUTO: 1.05 X10*3/UL (ref 0–0.7)
IMM GRANULOCYTES # BLD AUTO: 1.06 X10*3/UL (ref 0–0.7)
IMM GRANULOCYTES # BLD AUTO: 1.06 X10*3/UL (ref 0–0.7)
IMM GRANULOCYTES # BLD AUTO: 1.08 X10*3/UL (ref 0–0.7)
IMM GRANULOCYTES # BLD AUTO: 1.09 X10*3/UL (ref 0–0.7)
IMM GRANULOCYTES # BLD AUTO: 1.1 X10*3/UL (ref 0–0.7)
IMM GRANULOCYTES # BLD AUTO: 1.13 X10*3/UL (ref 0–0.7)
IMM GRANULOCYTES # BLD AUTO: 1.3 X10*3/UL (ref 0–0.7)
IMM GRANULOCYTES # BLD AUTO: 1.31 X10*3/UL (ref 0–0.7)
IMM GRANULOCYTES NFR BLD AUTO: 0.5 % (ref 0–0.9)
IMM GRANULOCYTES NFR BLD AUTO: 0.7 % (ref 0–0.9)
IMM GRANULOCYTES NFR BLD AUTO: 0.8 % (ref 0–0.9)
IMM GRANULOCYTES NFR BLD AUTO: 0.9 % (ref 0–0.9)
IMM GRANULOCYTES NFR BLD AUTO: 1.1 % (ref 0–0.9)
IMM GRANULOCYTES NFR BLD AUTO: 1.3 % (ref 0–0.9)
IMM GRANULOCYTES NFR BLD AUTO: 1.7 % (ref 0–0.9)
IMM GRANULOCYTES NFR BLD AUTO: 1.8 % (ref 0–0.9)
IMM GRANULOCYTES NFR BLD AUTO: 10.1 % (ref 0–0.9)
IMM GRANULOCYTES NFR BLD AUTO: 10.1 % (ref 0–0.9)
IMM GRANULOCYTES NFR BLD AUTO: 10.5 % (ref 0–0.9)
IMM GRANULOCYTES NFR BLD AUTO: 11.2 % (ref 0–0.9)
IMM GRANULOCYTES NFR BLD AUTO: 11.5 % (ref 0–0.9)
IMM GRANULOCYTES NFR BLD AUTO: 11.6 % (ref 0–0.9)
IMM GRANULOCYTES NFR BLD AUTO: 11.7 % (ref 0–0.9)
IMM GRANULOCYTES NFR BLD AUTO: 11.9 % (ref 0–0.9)
IMM GRANULOCYTES NFR BLD AUTO: 2.1 % (ref 0–0.9)
IMM GRANULOCYTES NFR BLD AUTO: 2.3 % (ref 0–0.9)
IMM GRANULOCYTES NFR BLD AUTO: 2.4 % (ref 0–0.9)
IMM GRANULOCYTES NFR BLD AUTO: 2.5 % (ref 0–0.9)
IMM GRANULOCYTES NFR BLD AUTO: 2.7 % (ref 0–0.9)
IMM GRANULOCYTES NFR BLD AUTO: 2.8 % (ref 0–0.9)
IMM GRANULOCYTES NFR BLD AUTO: 3 % (ref 0–0.9)
IMM GRANULOCYTES NFR BLD AUTO: 3 % (ref 0–0.9)
IMM GRANULOCYTES NFR BLD AUTO: 3.2 % (ref 0–0.9)
IMM GRANULOCYTES NFR BLD AUTO: 3.5 % (ref 0–0.9)
IMM GRANULOCYTES NFR BLD AUTO: 3.8 % (ref 0–0.9)
IMM GRANULOCYTES NFR BLD AUTO: 3.9 % (ref 0–0.9)
IMM GRANULOCYTES NFR BLD AUTO: 4.4 % (ref 0–0.9)
IMM GRANULOCYTES NFR BLD AUTO: 4.4 % (ref 0–0.9)
IMM GRANULOCYTES NFR BLD AUTO: 4.6 % (ref 0–0.9)
IMM GRANULOCYTES NFR BLD AUTO: 4.8 % (ref 0–0.9)
IMM GRANULOCYTES NFR BLD AUTO: 5.4 % (ref 0–0.9)
IMM GRANULOCYTES NFR BLD AUTO: 5.5 % (ref 0–0.9)
IMM GRANULOCYTES NFR BLD AUTO: 5.9 % (ref 0–0.9)
IMM GRANULOCYTES NFR BLD AUTO: 6.1 % (ref 0–0.9)
IMM GRANULOCYTES NFR BLD AUTO: 6.5 % (ref 0–0.9)
IMM GRANULOCYTES NFR BLD AUTO: 7.6 % (ref 0–0.9)
IMM GRANULOCYTES NFR BLD AUTO: 7.6 % (ref 0–0.9)
IMM GRANULOCYTES NFR BLD AUTO: 7.9 % (ref 0–0.9)
IMM GRANULOCYTES NFR BLD AUTO: 9.1 % (ref 0–0.9)
IMM GRANULOCYTES NFR BLD AUTO: 9.4 % (ref 0–0.9)
IMM GRANULOCYTES NFR BLD AUTO: 9.4 % (ref 0–0.9)
IMM GRANULOCYTES NFR BLD AUTO: 9.8 % (ref 0–0.9)
IMM GRANULOCYTES NFR BLD AUTO: 9.8 % (ref 0–0.9)
IMM GRANULOCYTES NFR BLD AUTO: 9.9 % (ref 0–0.9)
INHALED O2 CONCENTRATION: 100 %
INHALED O2 CONCENTRATION: 50 %
INHALED O2 CONCENTRATION: 50 %
INHALED O2 CONCENTRATION: 70 %
INHALED O2 CONCENTRATION: 90 %
INR PPP: 1 (ref 0.9–1.1)
INR PPP: 1.1 (ref 0.9–1.1)
INR PPP: 1.2 (ref 0.9–1.1)
INR PPP: 1.2 (ref 0.9–1.1)
IPAP CMH2O: 20 CM H2O
KETONES UR STRIP.AUTO-MCNC: ABNORMAL MG/DL
KETONES UR STRIP.AUTO-MCNC: NEGATIVE MG/DL
LACTATE BLDA-SCNC: 1 MMOL/L (ref 0.4–2)
LACTATE BLDA-SCNC: 1 MMOL/L (ref 0.4–2)
LACTATE BLDA-SCNC: 1.2 MMOL/L (ref 0.4–2)
LACTATE BLDA-SCNC: 2.1 MMOL/L (ref 0.4–2)
LACTATE BLDA-SCNC: 2.9 MMOL/L (ref 0.4–2)
LACTATE BLDV-SCNC: 1.7 MMOL/L (ref 0.4–2)
LACTATE BLDV-SCNC: 2.2 MMOL/L (ref 0.4–2)
LACTATE SERPL-SCNC: 1.5 MMOL/L (ref 0.4–2)
LEFT VENTRICLE INTERNAL DIMENSION DIASTOLE: 4 (ref 3.5–6)
LEFT VENTRICULAR OUTFLOW TRACT DIAMETER: 1.9
LEGIONELLA AG UR QL: NEGATIVE
LEUKOCYTE ESTERASE UR QL STRIP.AUTO: ABNORMAL
LEUKOCYTE ESTERASE UR QL STRIP.AUTO: NEGATIVE
LYMPHOCYTES # BLD AUTO: 0.11 X10*3/UL (ref 1.2–4.8)
LYMPHOCYTES # BLD AUTO: 0.13 X10*3/UL (ref 1.2–4.8)
LYMPHOCYTES # BLD AUTO: 0.15 X10*3/UL (ref 1.2–4.8)
LYMPHOCYTES # BLD AUTO: 0.16 X10*3/UL (ref 1.2–4.8)
LYMPHOCYTES # BLD AUTO: 0.18 X10*3/UL (ref 1.2–4.8)
LYMPHOCYTES # BLD AUTO: 0.19 X10*3/UL (ref 1.2–4.8)
LYMPHOCYTES # BLD AUTO: 0.19 X10*3/UL (ref 1.2–4.8)
LYMPHOCYTES # BLD AUTO: 0.2 X10*3/UL (ref 1.2–4.8)
LYMPHOCYTES # BLD AUTO: 0.22 X10*3/UL (ref 1.2–4.8)
LYMPHOCYTES # BLD AUTO: 0.25 X10*3/UL (ref 1.2–4.8)
LYMPHOCYTES # BLD AUTO: 0.26 X10*3/UL (ref 1.2–4.8)
LYMPHOCYTES # BLD AUTO: 0.27 X10*3/UL (ref 1.2–4.8)
LYMPHOCYTES # BLD AUTO: 0.29 X10*3/UL (ref 1.2–4.8)
LYMPHOCYTES # BLD AUTO: 0.29 X10*3/UL (ref 1.2–4.8)
LYMPHOCYTES # BLD AUTO: 0.33 X10*3/UL (ref 1.2–4.8)
LYMPHOCYTES # BLD AUTO: 0.33 X10*3/UL (ref 1.2–4.8)
LYMPHOCYTES # BLD AUTO: 0.34 X10*3/UL (ref 1.2–4.8)
LYMPHOCYTES # BLD AUTO: 0.34 X10*3/UL (ref 1.2–4.8)
LYMPHOCYTES # BLD AUTO: 0.35 X10*3/UL (ref 1.2–4.8)
LYMPHOCYTES # BLD AUTO: 0.36 X10*3/UL (ref 1.2–4.8)
LYMPHOCYTES # BLD AUTO: 0.36 X10*3/UL (ref 1.2–4.8)
LYMPHOCYTES # BLD AUTO: 0.4 X10*3/UL (ref 1.2–4.8)
LYMPHOCYTES # BLD AUTO: 0.42 X10*3/UL (ref 1.2–4.8)
LYMPHOCYTES # BLD AUTO: 0.47 X10*3/UL (ref 1.2–4.8)
LYMPHOCYTES # BLD AUTO: 0.7 X10*3/UL (ref 1.2–4.8)
LYMPHOCYTES # BLD MANUAL: 0.07 X10*3/UL (ref 1.2–4.8)
LYMPHOCYTES # BLD MANUAL: 0.09 X10*3/UL (ref 1.2–4.8)
LYMPHOCYTES # BLD MANUAL: 0.16 X10*3/UL (ref 1.2–4.8)
LYMPHOCYTES # BLD MANUAL: 0.28 X10*3/UL (ref 1.2–4.8)
LYMPHOCYTES # BLD MANUAL: 0.28 X10*3/UL (ref 1.2–4.8)
LYMPHOCYTES # BLD MANUAL: 0.29 X10*3/UL (ref 1.2–4.8)
LYMPHOCYTES # BLD MANUAL: 0.3 X10*3/UL (ref 1.2–4.8)
LYMPHOCYTES # BLD MANUAL: 0.32 X10*3/UL (ref 1.2–4.8)
LYMPHOCYTES # BLD MANUAL: 0.34 X10*3/UL (ref 1.2–4.8)
LYMPHOCYTES # BLD MANUAL: 0.37 X10*3/UL (ref 1.2–4.8)
LYMPHOCYTES # BLD MANUAL: 0.46 X10*3/UL (ref 1.2–4.8)
LYMPHOCYTES # BLD MANUAL: 0.46 X10*3/UL (ref 1.2–4.8)
LYMPHOCYTES # BLD MANUAL: 0.48 X10*3/UL (ref 1.2–4.8)
LYMPHOCYTES # BLD MANUAL: 0.59 X10*3/UL (ref 1.2–4.8)
LYMPHOCYTES # BLD MANUAL: 0.65 X10*3/UL (ref 1.2–4.8)
LYMPHOCYTES # BLD MANUAL: 0.66 X10*3/UL (ref 1.2–4.8)
LYMPHOCYTES # BLD MANUAL: 0.67 X10*3/UL (ref 1.2–4.8)
LYMPHOCYTES # BLD MANUAL: 0.68 X10*3/UL (ref 1.2–4.8)
LYMPHOCYTES # BLD MANUAL: 0.99 X10*3/UL (ref 1.2–4.8)
LYMPHOCYTES # BLD MANUAL: 1.15 X10*3/UL (ref 1.2–4.8)
LYMPHOCYTES NFR BLD AUTO: 0.5 %
LYMPHOCYTES NFR BLD AUTO: 0.6 %
LYMPHOCYTES NFR BLD AUTO: 0.6 %
LYMPHOCYTES NFR BLD AUTO: 0.7 %
LYMPHOCYTES NFR BLD AUTO: 0.8 %
LYMPHOCYTES NFR BLD AUTO: 1 %
LYMPHOCYTES NFR BLD AUTO: 1.1 %
LYMPHOCYTES NFR BLD AUTO: 1.1 %
LYMPHOCYTES NFR BLD AUTO: 1.3 %
LYMPHOCYTES NFR BLD AUTO: 1.4 %
LYMPHOCYTES NFR BLD AUTO: 1.4 %
LYMPHOCYTES NFR BLD AUTO: 1.8 %
LYMPHOCYTES NFR BLD AUTO: 1.8 %
LYMPHOCYTES NFR BLD AUTO: 2 %
LYMPHOCYTES NFR BLD AUTO: 2.2 %
LYMPHOCYTES NFR BLD AUTO: 2.3 %
LYMPHOCYTES NFR BLD AUTO: 3.1 %
LYMPHOCYTES NFR BLD AUTO: 3.1 %
LYMPHOCYTES NFR BLD AUTO: 3.6 %
LYMPHOCYTES NFR BLD AUTO: 3.6 %
LYMPHOCYTES NFR BLD AUTO: 4.4 %
LYMPHOCYTES NFR BLD AUTO: 5.2 %
LYMPHOCYTES NFR BLD MANUAL: 0.9 %
LYMPHOCYTES NFR BLD MANUAL: 0.9 %
LYMPHOCYTES NFR BLD MANUAL: 1.7 %
LYMPHOCYTES NFR BLD MANUAL: 10.1 %
LYMPHOCYTES NFR BLD MANUAL: 2.6 %
LYMPHOCYTES NFR BLD MANUAL: 3 %
LYMPHOCYTES NFR BLD MANUAL: 3 %
LYMPHOCYTES NFR BLD MANUAL: 3.4 %
LYMPHOCYTES NFR BLD MANUAL: 3.5 %
LYMPHOCYTES NFR BLD MANUAL: 4.2 %
LYMPHOCYTES NFR BLD MANUAL: 4.3 %
LYMPHOCYTES NFR BLD MANUAL: 5.2 %
LYMPHOCYTES NFR BLD MANUAL: 5.3 %
LYMPHOCYTES NFR BLD MANUAL: 6.8 %
LYMPHOCYTES NFR BLD MANUAL: 6.8 %
LYMPHOCYTES NFR BLD MANUAL: 7 %
LYMPHOCYTES NFR BLD MANUAL: 7 %
LYMPHOCYTES NFR BLD MANUAL: 9.3 %
MAGNESIUM SERPL-MCNC: 1.83 MG/DL (ref 1.6–2.4)
MAGNESIUM SERPL-MCNC: 1.83 MG/DL (ref 1.6–2.4)
MAGNESIUM SERPL-MCNC: 1.87 MG/DL (ref 1.6–2.4)
MAGNESIUM SERPL-MCNC: 1.91 MG/DL (ref 1.6–2.4)
MAGNESIUM SERPL-MCNC: 1.95 MG/DL (ref 1.6–2.4)
MAGNESIUM SERPL-MCNC: 1.97 MG/DL (ref 1.6–2.4)
MAGNESIUM SERPL-MCNC: 1.97 MG/DL (ref 1.6–2.4)
MAGNESIUM SERPL-MCNC: 1.99 MG/DL (ref 1.6–2.4)
MAGNESIUM SERPL-MCNC: 2 MG/DL (ref 1.6–3.1)
MAGNESIUM SERPL-MCNC: 2.03 MG/DL (ref 1.6–2.4)
MAGNESIUM SERPL-MCNC: 2.03 MG/DL (ref 1.6–2.4)
MAGNESIUM SERPL-MCNC: 2.04 MG/DL (ref 1.6–2.4)
MAGNESIUM SERPL-MCNC: 2.05 MG/DL (ref 1.6–2.4)
MAGNESIUM SERPL-MCNC: 2.06 MG/DL (ref 1.6–2.4)
MAGNESIUM SERPL-MCNC: 2.08 MG/DL (ref 1.6–2.4)
MAGNESIUM SERPL-MCNC: 2.1 MG/DL (ref 1.6–2.4)
MAGNESIUM SERPL-MCNC: 2.1 MG/DL (ref 1.6–2.4)
MAGNESIUM SERPL-MCNC: 2.11 MG/DL (ref 1.6–2.4)
MAGNESIUM SERPL-MCNC: 2.12 MG/DL (ref 1.6–2.4)
MAGNESIUM SERPL-MCNC: 2.13 MG/DL (ref 1.6–2.4)
MAGNESIUM SERPL-MCNC: 2.14 MG/DL (ref 1.6–2.4)
MAGNESIUM SERPL-MCNC: 2.15 MG/DL (ref 1.6–2.4)
MAGNESIUM SERPL-MCNC: 2.16 MG/DL (ref 1.6–2.4)
MAGNESIUM SERPL-MCNC: 2.18 MG/DL (ref 1.6–2.4)
MAGNESIUM SERPL-MCNC: 2.19 MG/DL (ref 1.6–2.4)
MAGNESIUM SERPL-MCNC: 2.2 MG/DL (ref 1.6–3.1)
MAGNESIUM SERPL-MCNC: 2.2 MG/DL (ref 1.6–3.1)
MAGNESIUM SERPL-MCNC: 2.21 MG/DL (ref 1.6–2.4)
MAGNESIUM SERPL-MCNC: 2.24 MG/DL (ref 1.6–2.4)
MAGNESIUM SERPL-MCNC: 2.26 MG/DL (ref 1.6–2.4)
MAGNESIUM SERPL-MCNC: 2.27 MG/DL (ref 1.6–2.4)
MAGNESIUM SERPL-MCNC: 2.28 MG/DL (ref 1.6–2.4)
MAGNESIUM SERPL-MCNC: 2.28 MG/DL (ref 1.6–2.4)
MAGNESIUM SERPL-MCNC: 2.29 MG/DL (ref 1.6–2.4)
MAGNESIUM SERPL-MCNC: 2.3 MG/DL (ref 1.6–2.4)
MAGNESIUM SERPL-MCNC: 2.3 MG/DL (ref 1.6–3.1)
MAGNESIUM SERPL-MCNC: 2.33 MG/DL (ref 1.6–2.4)
MAGNESIUM SERPL-MCNC: 2.33 MG/DL (ref 1.6–2.4)
MAGNESIUM SERPL-MCNC: 2.41 MG/DL (ref 1.6–2.4)
MAGNESIUM SERPL-MCNC: 2.45 MG/DL (ref 1.6–2.4)
MAGNESIUM SERPL-MCNC: 2.46 MG/DL (ref 1.6–2.4)
MAGNESIUM SERPL-MCNC: 2.5 MG/DL (ref 1.6–2.4)
MAGNESIUM SERPL-MCNC: 2.61 MG/DL (ref 1.6–2.4)
MAGNESIUM SERPL-MCNC: 2.63 MG/DL (ref 1.6–2.4)
MAGNESIUM SERPL-MCNC: 2.64 MG/DL (ref 1.6–2.4)
MAGNESIUM SERPL-MCNC: 2.64 MG/DL (ref 1.6–2.4)
MAGNESIUM SERPL-MCNC: 2.65 MG/DL (ref 1.6–2.4)
MAGNESIUM SERPL-MCNC: 2.72 MG/DL (ref 1.6–2.4)
MAGNESIUM SERPL-MCNC: 2.87 MG/DL (ref 1.6–2.4)
MAGNESIUM SERPL-MCNC: 2.99 MG/DL (ref 1.6–2.4)
MAGNESIUM SERPL-MCNC: 3 MG/DL (ref 1.6–2.4)
MCH RBC QN AUTO: 27.1 PG (ref 26–34)
MCH RBC QN AUTO: 27.1 PG (ref 26–34)
MCH RBC QN AUTO: 27.2 PG (ref 26–34)
MCH RBC QN AUTO: 27.3 PG (ref 26–34)
MCH RBC QN AUTO: 27.4 PG (ref 26–34)
MCH RBC QN AUTO: 27.4 PG (ref 26–34)
MCH RBC QN AUTO: 27.5 PG (ref 26–34)
MCH RBC QN AUTO: 27.6 PG (ref 26–34)
MCH RBC QN AUTO: 27.7 PG (ref 26–34)
MCH RBC QN AUTO: 27.8 PG (ref 26–34)
MCH RBC QN AUTO: 27.9 PG (ref 26–34)
MCH RBC QN AUTO: 28 PG (ref 26–34)
MCH RBC QN AUTO: 28.1 PG (ref 26–34)
MCH RBC QN AUTO: 28.2 PG (ref 26–34)
MCH RBC QN AUTO: 28.3 PG (ref 26–34)
MCH RBC QN AUTO: 28.4 PG (ref 26–34)
MCH RBC QN AUTO: 28.4 PG (ref 26–34)
MCH RBC QN AUTO: 28.5 PG (ref 26–34)
MCH RBC QN AUTO: 28.6 PG (ref 26–34)
MCH RBC QN AUTO: 28.7 PG (ref 26–34)
MCH RBC QN AUTO: 28.8 PG (ref 26–34)
MCH RBC QN AUTO: 28.9 PG (ref 26–34)
MCH RBC QN AUTO: 29 PG (ref 26–34)
MCH RBC QN AUTO: 29 PG (ref 26–34)
MCH RBC QN AUTO: 29.1 PG (ref 26–34)
MCH RBC QN AUTO: 29.3 PG (ref 26–34)
MCH RBC QN AUTO: 29.4 PG (ref 26–34)
MCH RBC QN AUTO: 29.4 PG (ref 26–34)
MCH RBC QN AUTO: 29.6 PG (ref 26–34)
MCH RBC QN AUTO: 29.8 PG (ref 26–34)
MCH RBC QN AUTO: 29.8 PG (ref 26–34)
MCHC RBC AUTO-ENTMCNC: 29.9 G/DL (ref 32–36)
MCHC RBC AUTO-ENTMCNC: 30.4 G/DL (ref 32–36)
MCHC RBC AUTO-ENTMCNC: 30.5 G/DL (ref 32–36)
MCHC RBC AUTO-ENTMCNC: 30.7 G/DL (ref 32–36)
MCHC RBC AUTO-ENTMCNC: 31 G/DL (ref 32–36)
MCHC RBC AUTO-ENTMCNC: 31.3 G/DL (ref 32–36)
MCHC RBC AUTO-ENTMCNC: 31.3 G/DL (ref 32–36)
MCHC RBC AUTO-ENTMCNC: 31.4 G/DL (ref 32–36)
MCHC RBC AUTO-ENTMCNC: 31.5 G/DL (ref 32–36)
MCHC RBC AUTO-ENTMCNC: 31.5 G/DL (ref 32–36)
MCHC RBC AUTO-ENTMCNC: 31.6 G/DL (ref 32–36)
MCHC RBC AUTO-ENTMCNC: 31.6 G/DL (ref 32–36)
MCHC RBC AUTO-ENTMCNC: 31.7 G/DL (ref 32–36)
MCHC RBC AUTO-ENTMCNC: 31.7 G/DL (ref 32–36)
MCHC RBC AUTO-ENTMCNC: 31.8 G/DL (ref 32–36)
MCHC RBC AUTO-ENTMCNC: 31.9 G/DL (ref 32–36)
MCHC RBC AUTO-ENTMCNC: 31.9 G/DL (ref 32–36)
MCHC RBC AUTO-ENTMCNC: 32 G/DL (ref 32–36)
MCHC RBC AUTO-ENTMCNC: 32.1 G/DL (ref 32–36)
MCHC RBC AUTO-ENTMCNC: 32.2 G/DL (ref 32–36)
MCHC RBC AUTO-ENTMCNC: 32.2 G/DL (ref 32–36)
MCHC RBC AUTO-ENTMCNC: 32.3 G/DL (ref 32–36)
MCHC RBC AUTO-ENTMCNC: 32.4 G/DL (ref 32–36)
MCHC RBC AUTO-ENTMCNC: 32.5 G/DL (ref 32–36)
MCHC RBC AUTO-ENTMCNC: 32.5 G/DL (ref 32–36)
MCHC RBC AUTO-ENTMCNC: 32.6 G/DL (ref 32–36)
MCHC RBC AUTO-ENTMCNC: 32.7 G/DL (ref 32–36)
MCHC RBC AUTO-ENTMCNC: 32.8 G/DL (ref 32–36)
MCHC RBC AUTO-ENTMCNC: 32.9 G/DL (ref 32–36)
MCHC RBC AUTO-ENTMCNC: 33 G/DL (ref 32–36)
MCHC RBC AUTO-ENTMCNC: 33.1 G/DL (ref 32–36)
MCHC RBC AUTO-ENTMCNC: 33.2 G/DL (ref 32–36)
MCHC RBC AUTO-ENTMCNC: 33.3 G/DL (ref 32–36)
MCHC RBC AUTO-ENTMCNC: 33.4 G/DL (ref 32–36)
MCHC RBC AUTO-ENTMCNC: 33.4 G/DL (ref 32–36)
MCHC RBC AUTO-ENTMCNC: 33.5 G/DL (ref 32–36)
MCHC RBC AUTO-ENTMCNC: 33.6 G/DL (ref 32–36)
MCHC RBC AUTO-ENTMCNC: 33.7 G/DL (ref 32–36)
MCHC RBC AUTO-ENTMCNC: 33.8 G/DL (ref 32–36)
MCHC RBC AUTO-ENTMCNC: 33.9 G/DL (ref 32–36)
MCHC RBC AUTO-ENTMCNC: 34.1 G/DL (ref 32–36)
MCHC RBC AUTO-ENTMCNC: 34.3 G/DL (ref 32–36)
MCV RBC AUTO: 82 FL (ref 80–100)
MCV RBC AUTO: 82 FL (ref 80–100)
MCV RBC AUTO: 83 FL (ref 80–100)
MCV RBC AUTO: 84 FL (ref 80–100)
MCV RBC AUTO: 85 FL (ref 80–100)
MCV RBC AUTO: 86 FL (ref 80–100)
MCV RBC AUTO: 87 FL (ref 80–100)
MCV RBC AUTO: 88 FL (ref 80–100)
MCV RBC AUTO: 89 FL (ref 80–100)
MCV RBC AUTO: 90 FL (ref 80–100)
MCV RBC AUTO: 91 FL (ref 80–100)
MCV RBC AUTO: 92 FL (ref 80–100)
MCV RBC AUTO: 92 FL (ref 80–100)
MCV RBC AUTO: 93 FL (ref 80–100)
MCV RBC AUTO: 94 FL (ref 80–100)
MCV RBC AUTO: 95 FL (ref 80–100)
METAMYELOCYTES # BLD MANUAL: 0.07 X10*3/UL
METAMYELOCYTES # BLD MANUAL: 0.08 X10*3/UL
METAMYELOCYTES # BLD MANUAL: 0.08 X10*3/UL
METAMYELOCYTES # BLD MANUAL: 0.09 X10*3/UL
METAMYELOCYTES # BLD MANUAL: 0.1 X10*3/UL
METAMYELOCYTES # BLD MANUAL: 0.1 X10*3/UL
METAMYELOCYTES # BLD MANUAL: 0.15 X10*3/UL
METAMYELOCYTES # BLD MANUAL: 0.15 X10*3/UL
METAMYELOCYTES # BLD MANUAL: 0.17 X10*3/UL
METAMYELOCYTES # BLD MANUAL: 0.19 X10*3/UL
METAMYELOCYTES # BLD MANUAL: 0.28 X10*3/UL
METAMYELOCYTES # BLD MANUAL: 0.31 X10*3/UL
METAMYELOCYTES # BLD MANUAL: 0.33 X10*3/UL
METAMYELOCYTES NFR BLD MANUAL: 0.8 %
METAMYELOCYTES NFR BLD MANUAL: 0.9 %
METAMYELOCYTES NFR BLD MANUAL: 1.7 %
METAMYELOCYTES NFR BLD MANUAL: 2 %
METAMYELOCYTES NFR BLD MANUAL: 2 %
METAMYELOCYTES NFR BLD MANUAL: 3 %
METAMYELOCYTES NFR BLD MANUAL: 3.4 %
MITRAL VALVE E/A RATIO: 0.89
MITRAL VALVE E/E' RATIO: 14.28
MONOCYTES # BLD AUTO: 0.21 X10*3/UL (ref 0.1–1)
MONOCYTES # BLD AUTO: 0.21 X10*3/UL (ref 0.1–1)
MONOCYTES # BLD AUTO: 0.23 X10*3/UL (ref 0.1–1)
MONOCYTES # BLD AUTO: 0.28 X10*3/UL (ref 0.1–1)
MONOCYTES # BLD AUTO: 0.29 X10*3/UL (ref 0.1–1)
MONOCYTES # BLD AUTO: 0.29 X10*3/UL (ref 0.1–1)
MONOCYTES # BLD AUTO: 0.31 X10*3/UL (ref 0.1–1)
MONOCYTES # BLD AUTO: 0.36 X10*3/UL (ref 0.1–1)
MONOCYTES # BLD AUTO: 0.38 X10*3/UL (ref 0.1–1)
MONOCYTES # BLD AUTO: 0.39 X10*3/UL (ref 0.1–1)
MONOCYTES # BLD AUTO: 0.4 X10*3/UL (ref 0.1–1)
MONOCYTES # BLD AUTO: 0.41 X10*3/UL (ref 0.1–1)
MONOCYTES # BLD AUTO: 0.46 X10*3/UL (ref 0.1–1)
MONOCYTES # BLD AUTO: 0.46 X10*3/UL (ref 0.1–1)
MONOCYTES # BLD AUTO: 0.47 X10*3/UL (ref 0.1–1)
MONOCYTES # BLD AUTO: 0.48 X10*3/UL (ref 0.1–1)
MONOCYTES # BLD AUTO: 0.49 X10*3/UL (ref 0.1–1)
MONOCYTES # BLD AUTO: 0.49 X10*3/UL (ref 0.1–1)
MONOCYTES # BLD AUTO: 0.5 X10*3/UL (ref 0.1–1)
MONOCYTES # BLD AUTO: 0.51 X10*3/UL (ref 0.1–1)
MONOCYTES # BLD AUTO: 0.51 X10*3/UL (ref 0.1–1)
MONOCYTES # BLD AUTO: 0.55 X10*3/UL (ref 0.1–1)
MONOCYTES # BLD AUTO: 0.58 X10*3/UL (ref 0.1–1)
MONOCYTES # BLD AUTO: 0.59 X10*3/UL (ref 0.1–1)
MONOCYTES # BLD AUTO: 0.65 X10*3/UL (ref 0.1–1)
MONOCYTES # BLD AUTO: 0.68 X10*3/UL (ref 0.1–1)
MONOCYTES # BLD AUTO: 0.71 X10*3/UL (ref 0.1–1)
MONOCYTES # BLD AUTO: 0.72 X10*3/UL (ref 0.1–1)
MONOCYTES # BLD AUTO: 0.79 X10*3/UL (ref 0.1–1)
MONOCYTES # BLD AUTO: 0.87 X10*3/UL (ref 0.1–1)
MONOCYTES # BLD AUTO: 0.89 X10*3/UL (ref 0.1–1)
MONOCYTES # BLD AUTO: 1.04 X10*3/UL (ref 0.1–1)
MONOCYTES # BLD AUTO: 1.12 X10*3/UL (ref 0.1–1)
MONOCYTES # BLD AUTO: 1.18 X10*3/UL (ref 0.1–1)
MONOCYTES # BLD MANUAL: 0 X10*3/UL (ref 0.1–1)
MONOCYTES # BLD MANUAL: 0.07 X10*3/UL (ref 0.1–1)
MONOCYTES # BLD MANUAL: 0.1 X10*3/UL (ref 0.1–1)
MONOCYTES # BLD MANUAL: 0.15 X10*3/UL (ref 0.1–1)
MONOCYTES # BLD MANUAL: 0.17 X10*3/UL (ref 0.1–1)
MONOCYTES # BLD MANUAL: 0.17 X10*3/UL (ref 0.1–1)
MONOCYTES # BLD MANUAL: 0.19 X10*3/UL (ref 0.1–1)
MONOCYTES # BLD MANUAL: 0.25 X10*3/UL (ref 0.1–1)
MONOCYTES # BLD MANUAL: 0.28 X10*3/UL (ref 0.1–1)
MONOCYTES # BLD MANUAL: 0.41 X10*3/UL (ref 0.1–1)
MONOCYTES # BLD MANUAL: 0.46 X10*3/UL (ref 0.1–1)
MONOCYTES # BLD MANUAL: 0.47 X10*3/UL (ref 0.1–1)
MONOCYTES # BLD MANUAL: 0.49 X10*3/UL (ref 0.1–1)
MONOCYTES # BLD MANUAL: 0.72 X10*3/UL (ref 0.1–1)
MONOCYTES # BLD MANUAL: 0.77 X10*3/UL (ref 0.1–1)
MONOCYTES # BLD MANUAL: 1.15 X10*3/UL (ref 0.1–1)
MONOCYTES NFR BLD AUTO: 1.4 %
MONOCYTES NFR BLD AUTO: 1.5 %
MONOCYTES NFR BLD AUTO: 1.6 %
MONOCYTES NFR BLD AUTO: 1.9 %
MONOCYTES NFR BLD AUTO: 2.1 %
MONOCYTES NFR BLD AUTO: 2.1 %
MONOCYTES NFR BLD AUTO: 2.2 %
MONOCYTES NFR BLD AUTO: 2.3 %
MONOCYTES NFR BLD AUTO: 2.4 %
MONOCYTES NFR BLD AUTO: 2.4 %
MONOCYTES NFR BLD AUTO: 2.5 %
MONOCYTES NFR BLD AUTO: 2.6 %
MONOCYTES NFR BLD AUTO: 2.7 %
MONOCYTES NFR BLD AUTO: 2.9 %
MONOCYTES NFR BLD AUTO: 3.2 %
MONOCYTES NFR BLD AUTO: 3.2 %
MONOCYTES NFR BLD AUTO: 3.3 %
MONOCYTES NFR BLD AUTO: 3.4 %
MONOCYTES NFR BLD AUTO: 3.4 %
MONOCYTES NFR BLD AUTO: 3.8 %
MONOCYTES NFR BLD AUTO: 4.2 %
MONOCYTES NFR BLD AUTO: 4.4 %
MONOCYTES NFR BLD AUTO: 4.4 %
MONOCYTES NFR BLD AUTO: 4.6 %
MONOCYTES NFR BLD AUTO: 4.7 %
MONOCYTES NFR BLD AUTO: 5.1 %
MONOCYTES NFR BLD AUTO: 5.2 %
MONOCYTES NFR BLD AUTO: 6.3 %
MONOCYTES NFR BLD AUTO: 6.5 %
MONOCYTES NFR BLD AUTO: 7 %
MONOCYTES NFR BLD AUTO: 7.1 %
MONOCYTES NFR BLD MANUAL: 0 %
MONOCYTES NFR BLD MANUAL: 0.9 %
MONOCYTES NFR BLD MANUAL: 0.9 %
MONOCYTES NFR BLD MANUAL: 1.7 %
MONOCYTES NFR BLD MANUAL: 1.7 %
MONOCYTES NFR BLD MANUAL: 1.8 %
MONOCYTES NFR BLD MANUAL: 12 %
MONOCYTES NFR BLD MANUAL: 2 %
MONOCYTES NFR BLD MANUAL: 2.5 %
MONOCYTES NFR BLD MANUAL: 2.5 %
MONOCYTES NFR BLD MANUAL: 4.2 %
MONOCYTES NFR BLD MANUAL: 4.2 %
MONOCYTES NFR BLD MANUAL: 5 %
MONOCYTES NFR BLD MANUAL: 5.1 %
MONOCYTES NFR BLD MANUAL: 5.2 %
MONOCYTES NFR BLD MANUAL: 7.8 %
MRSA DNA SPEC QL NAA+PROBE: NOT DETECTED
MRSA DNA SPEC QL NAA+PROBE: NOT DETECTED
MYELOCYTES # BLD MANUAL: 0.07 X10*3/UL
MYELOCYTES # BLD MANUAL: 0.09 X10*3/UL
MYELOCYTES # BLD MANUAL: 0.1 X10*3/UL
MYELOCYTES # BLD MANUAL: 0.15 X10*3/UL
MYELOCYTES # BLD MANUAL: 0.15 X10*3/UL
MYELOCYTES # BLD MANUAL: 0.16 X10*3/UL
MYELOCYTES # BLD MANUAL: 0.17 X10*3/UL
MYELOCYTES # BLD MANUAL: 0.17 X10*3/UL
MYELOCYTES # BLD MANUAL: 0.24 X10*3/UL
MYELOCYTES # BLD MANUAL: 0.25 X10*3/UL
MYELOCYTES # BLD MANUAL: 0.28 X10*3/UL
MYELOCYTES # BLD MANUAL: 0.35 X10*3/UL
MYELOCYTES # BLD MANUAL: 0.38 X10*3/UL
MYELOCYTES # BLD MANUAL: 0.39 X10*3/UL
MYELOCYTES # BLD MANUAL: 0.47 X10*3/UL
MYELOCYTES # BLD MANUAL: 0.59 X10*3/UL
MYELOCYTES NFR BLD MANUAL: 0.9 %
MYELOCYTES NFR BLD MANUAL: 1.7 %
MYELOCYTES NFR BLD MANUAL: 1.8 %
MYELOCYTES NFR BLD MANUAL: 2.5 %
MYELOCYTES NFR BLD MANUAL: 2.5 %
MYELOCYTES NFR BLD MANUAL: 3 %
MYELOCYTES NFR BLD MANUAL: 3.4 %
MYELOCYTES NFR BLD MANUAL: 3.4 %
MYELOCYTES NFR BLD MANUAL: 3.5 %
MYELOCYTES NFR BLD MANUAL: 4.2 %
MYELOCYTES NFR BLD MANUAL: 6.1 %
NEUTROPHILS # BLD AUTO: 10.09 X10*3/UL (ref 1.2–7.7)
NEUTROPHILS # BLD AUTO: 10.49 X10*3/UL (ref 1.2–7.7)
NEUTROPHILS # BLD AUTO: 10.96 X10*3/UL (ref 1.2–7.7)
NEUTROPHILS # BLD AUTO: 11.56 X10*3/UL (ref 1.2–7.7)
NEUTROPHILS # BLD AUTO: 11.82 X10*3/UL (ref 1.2–7.7)
NEUTROPHILS # BLD AUTO: 12.23 X10*3/UL (ref 1.2–7.7)
NEUTROPHILS # BLD AUTO: 12.65 X10*3/UL (ref 1.2–7.7)
NEUTROPHILS # BLD AUTO: 13.07 X10*3/UL (ref 1.2–7.7)
NEUTROPHILS # BLD AUTO: 13.11 X10*3/UL (ref 1.2–7.7)
NEUTROPHILS # BLD AUTO: 13.44 X10*3/UL (ref 1.2–7.7)
NEUTROPHILS # BLD AUTO: 13.46 X10*3/UL (ref 1.2–7.7)
NEUTROPHILS # BLD AUTO: 13.62 X10*3/UL (ref 1.2–7.7)
NEUTROPHILS # BLD AUTO: 13.67 X10*3/UL (ref 1.2–7.7)
NEUTROPHILS # BLD AUTO: 13.87 X10*3/UL (ref 1.2–7.7)
NEUTROPHILS # BLD AUTO: 13.95 X10*3/UL (ref 1.2–7.7)
NEUTROPHILS # BLD AUTO: 14.04 X10*3/UL (ref 1.2–7.7)
NEUTROPHILS # BLD AUTO: 14.26 X10*3/UL (ref 1.2–7.7)
NEUTROPHILS # BLD AUTO: 15.2 X10*3/UL (ref 1.2–7.7)
NEUTROPHILS # BLD AUTO: 15.63 X10*3/UL (ref 1.2–7.7)
NEUTROPHILS # BLD AUTO: 16.11 X10*3/UL (ref 1.2–7.7)
NEUTROPHILS # BLD AUTO: 16.67 X10*3/UL (ref 1.2–7.7)
NEUTROPHILS # BLD AUTO: 16.67 X10*3/UL (ref 1.2–7.7)
NEUTROPHILS # BLD AUTO: 17.46 X10*3/UL (ref 1.2–7.7)
NEUTROPHILS # BLD AUTO: 18.61 X10*3/UL (ref 1.2–7.7)
NEUTROPHILS # BLD AUTO: 18.77 X10*3/UL (ref 1.2–7.7)
NEUTROPHILS # BLD AUTO: 19.98 X10*3/UL (ref 1.2–7.7)
NEUTROPHILS # BLD AUTO: 20.79 X10*3/UL (ref 1.2–7.7)
NEUTROPHILS # BLD AUTO: 21.71 X10*3/UL (ref 1.2–7.7)
NEUTROPHILS # BLD AUTO: 22.71 X10*3/UL (ref 1.2–7.7)
NEUTROPHILS # BLD AUTO: 22.99 X10*3/UL (ref 1.2–7.7)
NEUTROPHILS # BLD AUTO: 7.18 X10*3/UL (ref 1.2–7.7)
NEUTROPHILS # BLD AUTO: 8.91 X10*3/UL (ref 1.2–7.7)
NEUTROPHILS # BLD AUTO: 8.96 X10*3/UL (ref 1.2–7.7)
NEUTROPHILS # BLD AUTO: 9.73 X10*3/UL (ref 1.2–7.7)
NEUTROPHILS # BLD MANUAL: 10.11 X10*3/UL (ref 1.2–7.7)
NEUTROPHILS # BLD MANUAL: 10.14 X10*3/UL (ref 1.2–7.7)
NEUTROPHILS # BLD MANUAL: 10.61 X10*3/UL (ref 1.2–7.7)
NEUTROPHILS # BLD MANUAL: 11.24 X10*3/UL (ref 1.2–7.7)
NEUTROPHILS # BLD MANUAL: 13.86 X10*3/UL (ref 1.2–7.7)
NEUTROPHILS # BLD MANUAL: 7.29 X10*3/UL (ref 1.2–7.7)
NEUTROPHILS # BLD MANUAL: 7.48 X10*3/UL (ref 1.2–7.7)
NEUTROPHILS # BLD MANUAL: 7.67 X10*3/UL (ref 1.2–7.7)
NEUTROPHILS # BLD MANUAL: 7.97 X10*3/UL (ref 1.2–7.7)
NEUTROPHILS # BLD MANUAL: 8.07 X10*3/UL (ref 1.2–7.7)
NEUTROPHILS # BLD MANUAL: 8.16 X10*3/UL (ref 1.2–7.7)
NEUTROPHILS # BLD MANUAL: 8.28 X10*3/UL (ref 1.2–7.7)
NEUTROPHILS # BLD MANUAL: 9.1 X10*3/UL (ref 1.2–7.7)
NEUTROPHILS # BLD MANUAL: 9.34 X10*3/UL (ref 1.2–7.7)
NEUTROPHILS # BLD MANUAL: 9.47 X10*3/UL (ref 1.2–7.7)
NEUTROPHILS # BLD MANUAL: 9.78 X10*3/UL (ref 1.2–7.7)
NEUTROPHILS # BLD MANUAL: 9.87 X10*3/UL (ref 1.2–7.7)
NEUTROPHILS NFR BLD AUTO: 79.5 %
NEUTROPHILS NFR BLD AUTO: 86.4 %
NEUTROPHILS NFR BLD AUTO: 86.8 %
NEUTROPHILS NFR BLD AUTO: 88.4 %
NEUTROPHILS NFR BLD AUTO: 89.7 %
NEUTROPHILS NFR BLD AUTO: 89.9 %
NEUTROPHILS NFR BLD AUTO: 90.1 %
NEUTROPHILS NFR BLD AUTO: 90.1 %
NEUTROPHILS NFR BLD AUTO: 91.1 %
NEUTROPHILS NFR BLD AUTO: 91.2 %
NEUTROPHILS NFR BLD AUTO: 91.3 %
NEUTROPHILS NFR BLD AUTO: 91.7 %
NEUTROPHILS NFR BLD AUTO: 91.9 %
NEUTROPHILS NFR BLD AUTO: 92.3 %
NEUTROPHILS NFR BLD AUTO: 92.6 %
NEUTROPHILS NFR BLD AUTO: 92.6 %
NEUTROPHILS NFR BLD AUTO: 92.7 %
NEUTROPHILS NFR BLD AUTO: 92.7 %
NEUTROPHILS NFR BLD AUTO: 92.8 %
NEUTROPHILS NFR BLD AUTO: 92.8 %
NEUTROPHILS NFR BLD AUTO: 93 %
NEUTROPHILS NFR BLD AUTO: 93 %
NEUTROPHILS NFR BLD AUTO: 93.3 %
NEUTROPHILS NFR BLD AUTO: 93.3 %
NEUTROPHILS NFR BLD AUTO: 93.5 %
NEUTROPHILS NFR BLD AUTO: 93.6 %
NEUTROPHILS NFR BLD AUTO: 93.7 %
NEUTROPHILS NFR BLD AUTO: 94 %
NEUTROPHILS NFR BLD AUTO: 94.1 %
NEUTROPHILS NFR BLD AUTO: 94.5 %
NEUTROPHILS NFR BLD AUTO: 94.7 %
NEUTROPHILS NFR BLD AUTO: 94.7 %
NEUTROPHILS NFR BLD AUTO: 95.6 %
NEUTROPHILS NFR BLD AUTO: 95.7 %
NEUTS BAND # BLD MANUAL: 0.1 X10*3/UL (ref 0–0.7)
NEUTS BAND # BLD MANUAL: 0.17 X10*3/UL (ref 0–0.7)
NEUTS BAND # BLD MANUAL: 0.17 X10*3/UL (ref 0–0.7)
NEUTS BAND # BLD MANUAL: 0.19 X10*3/UL (ref 0–0.7)
NEUTS BAND # BLD MANUAL: 0.29 X10*3/UL (ref 0–0.7)
NEUTS BAND # BLD MANUAL: 0.31 X10*3/UL (ref 0–0.7)
NEUTS BAND # BLD MANUAL: 0.35 X10*3/UL (ref 0–0.7)
NEUTS BAND # BLD MANUAL: 0.43 X10*3/UL (ref 0–0.7)
NEUTS BAND # BLD MANUAL: 0.53 X10*3/UL (ref 0–0.7)
NEUTS BAND # BLD MANUAL: 0.68 X10*3/UL (ref 0–0.7)
NEUTS BAND # BLD MANUAL: 0.82 X10*3/UL (ref 0–0.7)
NEUTS BAND # BLD MANUAL: 0.84 X10*3/UL (ref 0–0.7)
NEUTS BAND # BLD MANUAL: 0.86 X10*3/UL (ref 0–0.7)
NEUTS BAND # BLD MANUAL: 0.9 X10*3/UL (ref 0–0.7)
NEUTS BAND # BLD MANUAL: 0.99 X10*3/UL (ref 0–0.7)
NEUTS BAND # BLD MANUAL: 1.15 X10*3/UL (ref 0–0.7)
NEUTS BAND # BLD MANUAL: 1.18 X10*3/UL (ref 0–0.7)
NEUTS BAND NFR BLD MANUAL: 0.9 %
NEUTS BAND NFR BLD MANUAL: 1.7 %
NEUTS BAND NFR BLD MANUAL: 1.7 %
NEUTS BAND NFR BLD MANUAL: 10.1 %
NEUTS BAND NFR BLD MANUAL: 10.3 %
NEUTS BAND NFR BLD MANUAL: 2 %
NEUTS BAND NFR BLD MANUAL: 2 %
NEUTS BAND NFR BLD MANUAL: 2.6 %
NEUTS BAND NFR BLD MANUAL: 4.4 %
NEUTS BAND NFR BLD MANUAL: 4.4 %
NEUTS BAND NFR BLD MANUAL: 6 %
NEUTS BAND NFR BLD MANUAL: 6.7 %
NEUTS BAND NFR BLD MANUAL: 7.6 %
NEUTS BAND NFR BLD MANUAL: 8.5 %
NEUTS BAND NFR BLD MANUAL: 8.8 %
NEUTS BAND NFR BLD MANUAL: 9 %
NEUTS BAND NFR BLD MANUAL: 9.3 %
NEUTS SEG # BLD MANUAL: 10.04 X10*3/UL (ref 1.2–7)
NEUTS SEG # BLD MANUAL: 10.09 X10*3/UL (ref 1.2–7)
NEUTS SEG # BLD MANUAL: 13.55 X10*3/UL (ref 1.2–7)
NEUTS SEG # BLD MANUAL: 7.1 X10*3/UL (ref 1.2–7)
NEUTS SEG # BLD MANUAL: 7.13 X10*3/UL (ref 1.2–7)
NEUTS SEG # BLD MANUAL: 7.14 X10*3/UL (ref 1.2–7)
NEUTS SEG # BLD MANUAL: 7.15 X10*3/UL (ref 1.2–7)
NEUTS SEG # BLD MANUAL: 7.17 X10*3/UL (ref 1.2–7)
NEUTS SEG # BLD MANUAL: 7.44 X10*3/UL (ref 1.2–7)
NEUTS SEG # BLD MANUAL: 7.68 X10*3/UL (ref 1.2–7)
NEUTS SEG # BLD MANUAL: 7.99 X10*3/UL (ref 1.2–7)
NEUTS SEG # BLD MANUAL: 8.25 X10*3/UL (ref 1.2–7)
NEUTS SEG # BLD MANUAL: 8.28 X10*3/UL (ref 1.2–7)
NEUTS SEG # BLD MANUAL: 8.66 X10*3/UL (ref 1.2–7)
NEUTS SEG # BLD MANUAL: 8.67 X10*3/UL (ref 1.2–7)
NEUTS SEG # BLD MANUAL: 9.01 X10*3/UL (ref 1.2–7)
NEUTS SEG # BLD MANUAL: 9.12 X10*3/UL (ref 1.2–7)
NEUTS SEG # BLD MANUAL: 9.3 X10*3/UL (ref 1.2–7)
NEUTS SEG # BLD MANUAL: 9.43 X10*3/UL (ref 1.2–7)
NEUTS SEG # BLD MANUAL: 9.49 X10*3/UL (ref 1.2–7)
NEUTS SEG NFR BLD MANUAL: 73.7 %
NEUTS SEG NFR BLD MANUAL: 74 %
NEUTS SEG NFR BLD MANUAL: 79.7 %
NEUTS SEG NFR BLD MANUAL: 80 %
NEUTS SEG NFR BLD MANUAL: 80.2 %
NEUTS SEG NFR BLD MANUAL: 80.6 %
NEUTS SEG NFR BLD MANUAL: 81.4 %
NEUTS SEG NFR BLD MANUAL: 81.4 %
NEUTS SEG NFR BLD MANUAL: 81.5 %
NEUTS SEG NFR BLD MANUAL: 82 %
NEUTS SEG NFR BLD MANUAL: 83.5 %
NEUTS SEG NFR BLD MANUAL: 85.1 %
NEUTS SEG NFR BLD MANUAL: 85.5 %
NEUTS SEG NFR BLD MANUAL: 85.7 %
NEUTS SEG NFR BLD MANUAL: 86.3 %
NEUTS SEG NFR BLD MANUAL: 88 %
NEUTS SEG NFR BLD MANUAL: 89.4 %
NEUTS SEG NFR BLD MANUAL: 90.3 %
NEUTS SEG NFR BLD MANUAL: 93.8 %
NEUTS SEG NFR BLD MANUAL: 93.9 %
NEUTS VAC BLD QL SMEAR: PRESENT
NITRITE UR QL STRIP.AUTO: NEGATIVE
NOROVIRUS GI + GII RNA STL NAA+PROBE: NOT DETECTED
NOROVIRUS GI + GII RNA STL NAA+PROBE: NOT DETECTED
NRBC BLD MANUAL-RTO: 2.6 % (ref 0–0)
NRBC BLD-RTO: 0 /100 WBCS (ref 0–0)
NRBC BLD-RTO: 0.1 /100 WBCS (ref 0–0)
NRBC BLD-RTO: 0.2 /100 WBCS (ref 0–0)
NRBC BLD-RTO: 0.2 /100 WBCS (ref 0–0)
NRBC BLD-RTO: 0.4 /100 WBCS (ref 0–0)
NRBC BLD-RTO: 0.5 /100 WBCS (ref 0–0)
NRBC BLD-RTO: 0.5 /100 WBCS (ref 0–0)
NRBC BLD-RTO: 0.6 /100 WBCS (ref 0–0)
NRBC BLD-RTO: 0.7 /100 WBCS (ref 0–0)
NRBC BLD-RTO: 0.8 /100 WBCS (ref 0–0)
NRBC BLD-RTO: 0.8 /100 WBCS (ref 0–0)
NRBC BLD-RTO: 0.9 /100 WBCS (ref 0–0)
NRBC BLD-RTO: 0.9 /100 WBCS (ref 0–0)
NRBC BLD-RTO: 1 /100 WBCS (ref 0–0)
NRBC BLD-RTO: 1 /100 WBCS (ref 0–0)
NRBC BLD-RTO: 1.1 /100 WBCS (ref 0–0)
NRBC BLD-RTO: 1.3 /100 WBCS (ref 0–0)
NRBC BLD-RTO: 1.4 /100 WBCS (ref 0–0)
NRBC BLD-RTO: 1.5 /100 WBCS (ref 0–0)
NRBC BLD-RTO: 1.5 /100 WBCS (ref 0–0)
NRBC BLD-RTO: 1.7 /100 WBCS (ref 0–0)
NRBC BLD-RTO: 2 /100 WBCS (ref 0–0)
NRBC BLD-RTO: 2.6 /100 WBCS (ref 0–0)
NRBC BLD-RTO: 2.8 /100 WBCS (ref 0–0)
NRBC BLD-RTO: 2.8 /100 WBCS (ref 0–0)
NT-PROBNP SERPL-MCNC: 1624 PG/ML (ref 0–353)
NT-PROBNP SERPL-MCNC: 510 PG/ML (ref 0–353)
NT-PROBNP SERPL-MCNC: 695 PG/ML (ref 0–353)
OVALOCYTES BLD QL SMEAR: ABNORMAL
OXYHGB MFR BLDA: 72.8 % (ref 94–98)
OXYHGB MFR BLDA: 80 % (ref 94–98)
OXYHGB MFR BLDA: 80 % (ref 94–98)
OXYHGB MFR BLDA: 91.5 % (ref 94–98)
OXYHGB MFR BLDA: 93.8 % (ref 94–98)
OXYHGB MFR BLDA: 96.9 % (ref 94–98)
OXYHGB MFR BLDA: 97.6 % (ref 94–98)
P AXIS: 44 DEGREES
P AXIS: 45 DEGREES
P AXIS: 46 DEGREES
P AXIS: 53 DEGREES
P AXIS: 58 DEGREES
P AXIS: 61 DEGREES
P OFFSET: 192 MS
P OFFSET: 193 MS
P OFFSET: 201 MS
P OFFSET: 209 MS
P OFFSET: 210 MS
P OFFSET: 211 MS
P ONSET: 147 MS
P ONSET: 150 MS
P ONSET: 159 MS
P ONSET: 159 MS
P ONSET: 160 MS
P ONSET: 163 MS
PCO2 BLDA: 30 MM HG (ref 38–42)
PCO2 BLDA: 31 MM HG (ref 38–42)
PCO2 BLDA: 40 MM HG (ref 38–42)
PCO2 BLDA: 40 MM HG (ref 38–42)
PCO2 BLDA: 44 MM HG (ref 38–42)
PCO2 BLDA: 48 MM HG (ref 38–42)
PCO2 BLDA: 49 MM HG (ref 38–42)
PH BLDA: 7.35 PH (ref 7.38–7.42)
PH BLDA: 7.38 PH (ref 7.38–7.42)
PH BLDA: 7.47 PH (ref 7.38–7.42)
PH BLDA: 7.48 PH (ref 7.38–7.42)
PH BLDA: 7.5 PH (ref 7.38–7.42)
PH BLDA: 7.51 PH (ref 7.38–7.42)
PH BLDA: 7.51 PH (ref 7.38–7.42)
PH UR STRIP.AUTO: 6 [PH]
PHOSPHATE SERPL-MCNC: 2.5 MG/DL (ref 2.5–4.9)
PHOSPHATE SERPL-MCNC: 2.5 MG/DL (ref 2.5–4.9)
PHOSPHATE SERPL-MCNC: 2.7 MG/DL (ref 2.5–4.9)
PHOSPHATE SERPL-MCNC: 2.8 MG/DL (ref 2.5–4.9)
PHOSPHATE SERPL-MCNC: 2.9 MG/DL (ref 2.5–4.9)
PHOSPHATE SERPL-MCNC: 3 MG/DL (ref 2.5–4.9)
PHOSPHATE SERPL-MCNC: 3.1 MG/DL (ref 2.5–4.9)
PHOSPHATE SERPL-MCNC: 3.2 MG/DL (ref 2.5–4.9)
PHOSPHATE SERPL-MCNC: 3.3 MG/DL (ref 2.5–4.9)
PHOSPHATE SERPL-MCNC: 3.4 MG/DL (ref 2.5–4.9)
PHOSPHATE SERPL-MCNC: 3.5 MG/DL (ref 2.5–4.9)
PHOSPHATE SERPL-MCNC: 3.6 MG/DL (ref 2.5–4.9)
PHOSPHATE SERPL-MCNC: 3.7 MG/DL (ref 2.5–4.9)
PHOSPHATE SERPL-MCNC: 3.7 MG/DL (ref 2.5–4.9)
PHOSPHATE SERPL-MCNC: 3.8 MG/DL (ref 2.5–4.9)
PHOSPHATE SERPL-MCNC: 3.9 MG/DL (ref 2.5–4.9)
PHOSPHATE SERPL-MCNC: 4 MG/DL (ref 2.5–4.5)
PHOSPHATE SERPL-MCNC: 4.2 MG/DL (ref 2.5–4.9)
PHOSPHATE SERPL-MCNC: 4.2 MG/DL (ref 2.5–4.9)
PHOSPHATE SERPL-MCNC: 4.3 MG/DL (ref 2.5–4.9)
PHOSPHATE SERPL-MCNC: 4.4 MG/DL (ref 2.5–4.9)
PHOSPHATE SERPL-MCNC: 4.5 MG/DL (ref 2.5–4.9)
PHOSPHATE SERPL-MCNC: 4.6 MG/DL (ref 2.5–4.9)
PHOSPHATE SERPL-MCNC: 4.7 MG/DL (ref 2.5–4.9)
PHOSPHATE SERPL-MCNC: 4.7 MG/DL (ref 2.5–4.9)
PHOSPHATE SERPL-MCNC: 4.8 MG/DL (ref 2.5–4.9)
PHOSPHATE SERPL-MCNC: 5.1 MG/DL (ref 2.5–4.9)
PHOSPHATE SERPL-MCNC: 5.4 MG/DL (ref 2.5–4.9)
PHOSPHATE SERPL-MCNC: 5.6 MG/DL (ref 2.5–4.9)
PHOSPHATE SERPL-MCNC: 6 MG/DL (ref 2.5–4.9)
PHOSPHATE SERPL-MCNC: 6.1 MG/DL (ref 2.5–4.9)
PHOSPHATE SERPL-MCNC: 6.2 MG/DL (ref 2.5–4.9)
PHOSPHATE SERPL-MCNC: 6.2 MG/DL (ref 2.5–4.9)
PHOSPHATE SERPL-MCNC: 6.4 MG/DL (ref 2.5–4.9)
PHOSPHATE SERPL-MCNC: 6.4 MG/DL (ref 2.5–4.9)
PHOSPHATE SERPL-MCNC: 6.5 MG/DL (ref 2.5–4.9)
PHOSPHATE SERPL-MCNC: 6.7 MG/DL (ref 2.5–4.9)
PHOSPHATE SERPL-MCNC: 7.4 MG/DL (ref 2.5–4.9)
PHOSPHATE SERPL-MCNC: 7.4 MG/DL (ref 2.5–4.9)
PLATELET # BLD AUTO: 102 X10*3/UL (ref 150–450)
PLATELET # BLD AUTO: 103 X10*3/UL (ref 150–450)
PLATELET # BLD AUTO: 103 X10*3/UL (ref 150–450)
PLATELET # BLD AUTO: 107 X10*3/UL (ref 150–450)
PLATELET # BLD AUTO: 111 X10*3/UL (ref 150–450)
PLATELET # BLD AUTO: 118 X10*3/UL (ref 150–450)
PLATELET # BLD AUTO: 120 X10*3/UL (ref 150–450)
PLATELET # BLD AUTO: 123 X10*3/UL (ref 150–450)
PLATELET # BLD AUTO: 124 X10*3/UL (ref 150–450)
PLATELET # BLD AUTO: 126 X10*3/UL (ref 150–450)
PLATELET # BLD AUTO: 127 X10*3/UL (ref 150–450)
PLATELET # BLD AUTO: 130 X10*3/UL (ref 150–450)
PLATELET # BLD AUTO: 131 X10*3/UL (ref 150–450)
PLATELET # BLD AUTO: 134 X10*3/UL (ref 150–450)
PLATELET # BLD AUTO: 137 X10*3/UL (ref 150–450)
PLATELET # BLD AUTO: 138 X10*3/UL (ref 150–450)
PLATELET # BLD AUTO: 139 X10*3/UL (ref 150–450)
PLATELET # BLD AUTO: 140 X10*3/UL (ref 150–450)
PLATELET # BLD AUTO: 140 X10*3/UL (ref 150–450)
PLATELET # BLD AUTO: 142 X10*3/UL (ref 150–450)
PLATELET # BLD AUTO: 144 X10*3/UL (ref 150–450)
PLATELET # BLD AUTO: 146 X10*3/UL (ref 150–450)
PLATELET # BLD AUTO: 148 X10*3/UL (ref 150–450)
PLATELET # BLD AUTO: 149 X10*3/UL (ref 150–450)
PLATELET # BLD AUTO: 150 X10*3/UL (ref 150–450)
PLATELET # BLD AUTO: 151 X10*3/UL (ref 150–450)
PLATELET # BLD AUTO: 152 X10*3/UL (ref 150–450)
PLATELET # BLD AUTO: 155 X10*3/UL (ref 150–450)
PLATELET # BLD AUTO: 155 X10*3/UL (ref 150–450)
PLATELET # BLD AUTO: 156 X10*3/UL (ref 150–450)
PLATELET # BLD AUTO: 158 X10*3/UL (ref 150–450)
PLATELET # BLD AUTO: 158 X10*3/UL (ref 150–450)
PLATELET # BLD AUTO: 161 X10*3/UL (ref 150–450)
PLATELET # BLD AUTO: 163 X10*3/UL (ref 150–450)
PLATELET # BLD AUTO: 167 X10*3/UL (ref 150–450)
PLATELET # BLD AUTO: 168 X10*3/UL (ref 150–450)
PLATELET # BLD AUTO: 168 X10*3/UL (ref 150–450)
PLATELET # BLD AUTO: 169 X10*3/UL (ref 150–450)
PLATELET # BLD AUTO: 170 X10*3/UL (ref 150–450)
PLATELET # BLD AUTO: 170 X10*3/UL (ref 150–450)
PLATELET # BLD AUTO: 172 X10*3/UL (ref 150–450)
PLATELET # BLD AUTO: 175 X10*3/UL (ref 150–450)
PLATELET # BLD AUTO: 179 X10*3/UL (ref 150–450)
PLATELET # BLD AUTO: 179 X10*3/UL (ref 150–450)
PLATELET # BLD AUTO: 182 X10*3/UL (ref 150–450)
PLATELET # BLD AUTO: 182 X10*3/UL (ref 150–450)
PLATELET # BLD AUTO: 191 X10*3/UL (ref 150–450)
PLATELET # BLD AUTO: 192 X10*3/UL (ref 150–450)
PLATELET # BLD AUTO: 194 X10*3/UL (ref 150–450)
PLATELET # BLD AUTO: 196 X10*3/UL (ref 150–450)
PLATELET # BLD AUTO: 198 X10*3/UL (ref 150–450)
PLATELET # BLD AUTO: 210 X10*3/UL (ref 150–450)
PLATELET # BLD AUTO: 215 X10*3/UL (ref 150–450)
PLATELET # BLD AUTO: 222 X10*3/UL (ref 150–450)
PLATELET # BLD AUTO: 225 X10*3/UL (ref 150–450)
PLATELET # BLD AUTO: 249 X10*3/UL (ref 150–450)
PLATELET # BLD AUTO: 253 X10*3/UL (ref 150–450)
PLATELET # BLD AUTO: 275 X10*3/UL (ref 150–450)
PLATELET # BLD AUTO: 289 X10*3/UL (ref 150–450)
PLATELET # BLD AUTO: 65 X10*3/UL (ref 150–450)
PLATELET # BLD AUTO: 81 X10*3/UL (ref 150–450)
PLATELET # BLD AUTO: 82 X10*3/UL (ref 150–450)
PLATELET # BLD AUTO: 82 X10*3/UL (ref 150–450)
PLATELET # BLD AUTO: 85 X10*3/UL (ref 150–450)
PLATELET # BLD AUTO: 87 X10*3/UL (ref 150–450)
PLATELET # BLD AUTO: 92 X10*3/UL (ref 150–450)
PLATELET # BLD AUTO: 93 X10*3/UL (ref 150–450)
PLATELET # BLD AUTO: 93 X10*3/UL (ref 150–450)
PLATELET # BLD AUTO: 94 X10*3/UL (ref 150–450)
PLATELET # BLD AUTO: 94 X10*3/UL (ref 150–450)
PLATELET # BLD AUTO: 98 X10*3/UL (ref 150–450)
PLATELET CLUMP BLD QL SMEAR: PRESENT
PMV BLD AUTO: 10.4 FL (ref 7.5–11.5)
PMV BLD AUTO: 10.6 FL (ref 7.5–11.5)
PMV BLD AUTO: 10.6 FL (ref 7.5–11.5)
PMV BLD AUTO: 10.7 FL (ref 7.5–11.5)
PMV BLD AUTO: 11 FL (ref 7.5–11.5)
PMV BLD AUTO: 11 FL (ref 7.5–11.5)
PMV BLD AUTO: 11.1 FL (ref 7.5–11.5)
PMV BLD AUTO: 11.4 FL (ref 7.5–11.5)
PMV BLD AUTO: 11.5 FL (ref 7.5–11.5)
PMV BLD AUTO: 11.5 FL (ref 7.5–11.5)
PMV BLD AUTO: 11.7 FL (ref 7.5–11.5)
PO2 BLDA: 115 MM HG (ref 85–95)
PO2 BLDA: 175 MM HG (ref 85–95)
PO2 BLDA: 43 MM HG (ref 85–95)
PO2 BLDA: 51 MM HG (ref 85–95)
PO2 BLDA: 51 MM HG (ref 85–95)
PO2 BLDA: 67 MM HG (ref 85–95)
PO2 BLDA: 71 MM HG (ref 85–95)
POLYCHROMASIA BLD QL SMEAR: ABNORMAL
POTASSIUM BLDA-SCNC: 3.3 MMOL/L (ref 3.5–5.3)
POTASSIUM BLDA-SCNC: 3.8 MMOL/L (ref 3.5–5.3)
POTASSIUM BLDA-SCNC: 4.1 MMOL/L (ref 3.5–5.3)
POTASSIUM BLDA-SCNC: 4.3 MMOL/L (ref 3.5–5.3)
POTASSIUM BLDA-SCNC: 4.9 MMOL/L (ref 3.5–5.3)
POTASSIUM SERPL-SCNC: 3.2 MMOL/L (ref 3.5–5.3)
POTASSIUM SERPL-SCNC: 3.3 MMOL/L (ref 3.4–5.1)
POTASSIUM SERPL-SCNC: 3.3 MMOL/L (ref 3.5–5.3)
POTASSIUM SERPL-SCNC: 3.4 MMOL/L (ref 3.5–5.3)
POTASSIUM SERPL-SCNC: 3.6 MMOL/L (ref 3.5–5.3)
POTASSIUM SERPL-SCNC: 3.7 MMOL/L (ref 3.4–5.1)
POTASSIUM SERPL-SCNC: 3.9 MMOL/L (ref 3.4–5.1)
POTASSIUM SERPL-SCNC: 3.9 MMOL/L (ref 3.5–5.3)
POTASSIUM SERPL-SCNC: 4 MMOL/L (ref 3.4–5.1)
POTASSIUM SERPL-SCNC: 4 MMOL/L (ref 3.5–5.3)
POTASSIUM SERPL-SCNC: 4.1 MMOL/L (ref 3.4–5.1)
POTASSIUM SERPL-SCNC: 4.1 MMOL/L (ref 3.4–5.1)
POTASSIUM SERPL-SCNC: 4.1 MMOL/L (ref 3.5–5.3)
POTASSIUM SERPL-SCNC: 4.2 MMOL/L (ref 3.4–5.1)
POTASSIUM SERPL-SCNC: 4.2 MMOL/L (ref 3.4–5.1)
POTASSIUM SERPL-SCNC: 4.2 MMOL/L (ref 3.5–5.3)
POTASSIUM SERPL-SCNC: 4.3 MMOL/L (ref 3.4–5.1)
POTASSIUM SERPL-SCNC: 4.3 MMOL/L (ref 3.5–5.3)
POTASSIUM SERPL-SCNC: 4.4 MMOL/L (ref 3.5–5.3)
POTASSIUM SERPL-SCNC: 4.5 MMOL/L (ref 3.4–5.1)
POTASSIUM SERPL-SCNC: 4.5 MMOL/L (ref 3.5–5.3)
POTASSIUM SERPL-SCNC: 4.6 MMOL/L (ref 3.4–5.1)
POTASSIUM SERPL-SCNC: 4.6 MMOL/L (ref 3.5–5.3)
POTASSIUM SERPL-SCNC: 4.7 MMOL/L (ref 3.4–5.1)
POTASSIUM SERPL-SCNC: 4.7 MMOL/L (ref 3.5–5.3)
POTASSIUM SERPL-SCNC: 4.7 MMOL/L (ref 3.5–5.3)
POTASSIUM SERPL-SCNC: 4.8 MMOL/L (ref 3.4–5.1)
POTASSIUM SERPL-SCNC: 4.8 MMOL/L (ref 3.5–5.3)
POTASSIUM SERPL-SCNC: 4.9 MMOL/L (ref 3.4–5.1)
POTASSIUM SERPL-SCNC: 4.9 MMOL/L (ref 3.5–5.3)
POTASSIUM SERPL-SCNC: 5 MMOL/L (ref 3.5–5.3)
POTASSIUM SERPL-SCNC: 5 MMOL/L (ref 3.5–5.3)
POTASSIUM SERPL-SCNC: 5.1 MMOL/L (ref 3.4–5.1)
POTASSIUM SERPL-SCNC: 5.1 MMOL/L (ref 3.5–5.3)
POTASSIUM SERPL-SCNC: 5.1 MMOL/L (ref 3.5–5.3)
POTASSIUM SERPL-SCNC: 5.2 MMOL/L (ref 3.5–5.3)
POTASSIUM SERPL-SCNC: 5.4 MMOL/L (ref 3.5–5.3)
POTASSIUM SERPL-SCNC: 5.4 MMOL/L (ref 3.5–5.3)
POTASSIUM SERPL-SCNC: 5.5 MMOL/L (ref 3.4–5.1)
POTASSIUM SERPL-SCNC: 5.5 MMOL/L (ref 3.5–5.3)
POTASSIUM SERPL-SCNC: 5.5 MMOL/L (ref 3.5–5.3)
POTASSIUM UR-SCNC: 39 MMOL/L
POTASSIUM/CREAT UR-RTO: 97 MMOL/G CREAT
PR INTERVAL: 112 MS
PR INTERVAL: 128 MS
PR INTERVAL: 130 MS
PR INTERVAL: 134 MS
PR INTERVAL: 134 MS
PR INTERVAL: 136 MS
PROCALCITONIN SERPL-MCNC: 0.04 NG/ML
PROCALCITONIN SERPL-MCNC: 0.25 NG/ML
PRODUCT BLOOD TYPE: 6200
PRODUCT BLOOD TYPE: 6200
PRODUCT CODE: NORMAL
PRODUCT CODE: NORMAL
PROMYELOCYTES # BLD MANUAL: 0.09 X10*3/UL
PROMYELOCYTES # BLD MANUAL: 0.2 X10*3/UL
PROMYELOCYTES NFR BLD MANUAL: 0.9 %
PROMYELOCYTES NFR BLD MANUAL: 1.8 %
PROT SERPL-MCNC: 4.9 G/DL (ref 6.4–8.2)
PROT SERPL-MCNC: 5.1 G/DL (ref 6.4–8.2)
PROT SERPL-MCNC: 5.3 G/DL (ref 6.4–8.2)
PROT SERPL-MCNC: 5.3 G/DL (ref 6.4–8.2)
PROT SERPL-MCNC: 5.4 G/DL (ref 6.4–8.2)
PROT SERPL-MCNC: 5.4 G/DL (ref 6.4–8.2)
PROT SERPL-MCNC: 5.5 G/DL (ref 5.9–7.9)
PROT SERPL-MCNC: 5.6 G/DL (ref 5.9–7.9)
PROT SERPL-MCNC: 5.6 G/DL (ref 6.4–8.2)
PROT SERPL-MCNC: 5.7 G/DL (ref 5.9–7.9)
PROT SERPL-MCNC: 5.8 G/DL (ref 6.4–8.2)
PROT SERPL-MCNC: 6 G/DL (ref 5.9–7.9)
PROT SERPL-MCNC: 6 G/DL (ref 5.9–7.9)
PROT SERPL-MCNC: 6.1 G/DL (ref 5.9–7.9)
PROT SERPL-MCNC: 7.1 G/DL (ref 5.9–7.9)
PROT UR STRIP.AUTO-MCNC: ABNORMAL MG/DL
PROTHROMBIN TIME: 11.4 SECONDS (ref 9.8–12.8)
PROTHROMBIN TIME: 12.4 SECONDS (ref 9.8–12.8)
PROTHROMBIN TIME: 13 SECONDS (ref 9.8–12.8)
PROTHROMBIN TIME: 13.7 SECONDS (ref 9.8–12.8)
Q ONSET: 215 MS
Q ONSET: 217 MS
Q ONSET: 219 MS
Q ONSET: 224 MS
Q ONSET: 224 MS
Q ONSET: 226 MS
QRS COUNT: 10 BEATS
QRS COUNT: 11 BEATS
QRS COUNT: 11 BEATS
QRS COUNT: 13 BEATS
QRS COUNT: 21 BEATS
QRS COUNT: 8 BEATS
QRS DURATION: 70 MS
QRS DURATION: 78 MS
QRS DURATION: 80 MS
QRS DURATION: 82 MS
QT INTERVAL: 330 MS
QT INTERVAL: 352 MS
QT INTERVAL: 370 MS
QT INTERVAL: 370 MS
QT INTERVAL: 388 MS
QT INTERVAL: 442 MS
QTC CALCULATION(BAZETT): 371 MS
QTC CALCULATION(BAZETT): 390 MS
QTC CALCULATION(BAZETT): 393 MS
QTC CALCULATION(BAZETT): 411 MS
QTC CALCULATION(BAZETT): 418 MS
QTC CALCULATION(BAZETT): 474 MS
QTC FREDERICIA: 365 MS
QTC FREDERICIA: 384 MS
QTC FREDERICIA: 392 MS
QTC FREDERICIA: 402 MS
QTC FREDERICIA: 420 MS
QTC FREDERICIA: 421 MS
R AXIS: 20 DEGREES
R AXIS: 24 DEGREES
R AXIS: 31 DEGREES
R AXIS: 39 DEGREES
R AXIS: 41 DEGREES
R AXIS: 48 DEGREES
RBC # BLD AUTO: 1.98 X10*6/UL (ref 4–5.2)
RBC # BLD AUTO: 2.43 X10*6/UL (ref 4–5.2)
RBC # BLD AUTO: 2.55 X10*6/UL (ref 4–5.2)
RBC # BLD AUTO: 2.55 X10*6/UL (ref 4–5.2)
RBC # BLD AUTO: 2.78 X10*6/UL (ref 4–5.2)
RBC # BLD AUTO: 2.81 X10*6/UL (ref 4–5.2)
RBC # BLD AUTO: 2.97 X10*6/UL (ref 4–5.2)
RBC # BLD AUTO: 2.97 X10*6/UL (ref 4–5.2)
RBC # BLD AUTO: 2.98 X10*6/UL (ref 4–5.2)
RBC # BLD AUTO: 3.05 X10*6/UL (ref 4–5.2)
RBC # BLD AUTO: 3.15 X10*6/UL (ref 4–5.2)
RBC # BLD AUTO: 3.16 X10*6/UL (ref 4–5.2)
RBC # BLD AUTO: 3.18 X10*6/UL (ref 4–5.2)
RBC # BLD AUTO: 3.2 X10*6/UL (ref 4–5.2)
RBC # BLD AUTO: 3.22 X10*6/UL (ref 4–5.2)
RBC # BLD AUTO: 3.23 X10*6/UL (ref 4–5.2)
RBC # BLD AUTO: 3.24 X10*6/UL (ref 4–5.2)
RBC # BLD AUTO: 3.25 X10*6/UL (ref 4–5.2)
RBC # BLD AUTO: 3.26 X10*6/UL (ref 4–5.2)
RBC # BLD AUTO: 3.27 X10*6/UL (ref 4–5.2)
RBC # BLD AUTO: 3.28 X10*6/UL (ref 4–5.2)
RBC # BLD AUTO: 3.31 X10*6/UL (ref 4–5.2)
RBC # BLD AUTO: 3.32 X10*6/UL (ref 4–5.2)
RBC # BLD AUTO: 3.33 X10*6/UL (ref 4–5.2)
RBC # BLD AUTO: 3.33 X10*6/UL (ref 4–5.2)
RBC # BLD AUTO: 3.37 X10*6/UL (ref 4–5.2)
RBC # BLD AUTO: 3.4 X10*6/UL (ref 4–5.2)
RBC # BLD AUTO: 3.41 X10*6/UL (ref 4–5.2)
RBC # BLD AUTO: 3.44 X10*6/UL (ref 4–5.2)
RBC # BLD AUTO: 3.44 X10*6/UL (ref 4–5.2)
RBC # BLD AUTO: 3.51 X10*6/UL (ref 4–5.2)
RBC # BLD AUTO: 3.6 X10*6/UL (ref 4–5.2)
RBC # BLD AUTO: 3.63 X10*6/UL (ref 4–5.2)
RBC # BLD AUTO: 3.69 X10*6/UL (ref 4–5.2)
RBC # BLD AUTO: 3.71 X10*6/UL (ref 4–5.2)
RBC # BLD AUTO: 3.72 X10*6/UL (ref 4–5.2)
RBC # BLD AUTO: 3.76 X10*6/UL (ref 4–5.2)
RBC # BLD AUTO: 3.77 X10*6/UL (ref 4–5.2)
RBC # BLD AUTO: 3.77 X10*6/UL (ref 4–5.2)
RBC # BLD AUTO: 3.78 X10*6/UL (ref 4–5.2)
RBC # BLD AUTO: 3.81 X10*6/UL (ref 4–5.2)
RBC # BLD AUTO: 3.83 X10*6/UL (ref 4–5.2)
RBC # BLD AUTO: 3.84 X10*6/UL (ref 4–5.2)
RBC # BLD AUTO: 3.85 X10*6/UL (ref 4–5.2)
RBC # BLD AUTO: 3.86 X10*6/UL (ref 4–5.2)
RBC # BLD AUTO: 3.87 X10*6/UL (ref 4–5.2)
RBC # BLD AUTO: 3.89 X10*6/UL (ref 4–5.2)
RBC # BLD AUTO: 3.89 X10*6/UL (ref 4–5.2)
RBC # BLD AUTO: 3.91 X10*6/UL (ref 4–5.2)
RBC # BLD AUTO: 3.94 X10*6/UL (ref 4–5.2)
RBC # BLD AUTO: 3.98 X10*6/UL (ref 4–5.2)
RBC # BLD AUTO: 3.99 X10*6/UL (ref 4–5.2)
RBC # BLD AUTO: 4 X10*6/UL (ref 4–5.2)
RBC # BLD AUTO: 4.02 X10*6/UL (ref 4–5.2)
RBC # BLD AUTO: 4.04 X10*6/UL (ref 4–5.2)
RBC # BLD AUTO: 4.06 X10*6/UL (ref 4–5.2)
RBC # BLD AUTO: 4.11 X10*6/UL (ref 4–5.2)
RBC # BLD AUTO: 4.11 X10*6/UL (ref 4–5.2)
RBC # BLD AUTO: 4.12 X10*6/UL (ref 4–5.2)
RBC # BLD AUTO: 4.13 X10*6/UL (ref 4–5.2)
RBC # BLD AUTO: 4.14 X10*6/UL (ref 4–5.2)
RBC # BLD AUTO: 4.21 X10*6/UL (ref 4–5.2)
RBC # BLD AUTO: 4.24 X10*6/UL (ref 4–5.2)
RBC # BLD AUTO: 4.26 X10*6/UL (ref 4–5.2)
RBC # BLD AUTO: 4.28 X10*6/UL (ref 4–5.2)
RBC # BLD AUTO: 4.29 X10*6/UL (ref 4–5.2)
RBC # BLD AUTO: 4.44 X10*6/UL (ref 4–5.2)
RBC # BLD AUTO: 4.54 X10*6/UL (ref 4–5.2)
RBC # BLD AUTO: 4.72 X10*6/UL (ref 4–5.2)
RBC # BLD AUTO: 4.91 X10*6/UL (ref 4–5.2)
RBC # BLD AUTO: 4.93 X10*6/UL (ref 4–5.2)
RBC # BLD AUTO: 5.09 X10*6/UL (ref 4–5.2)
RBC # UR STRIP.AUTO: ABNORMAL /UL
RBC #/AREA URNS AUTO: >20 /HPF
RBC MORPH BLD: ABNORMAL
RH FACTOR (ANTIGEN D): NORMAL
RIGHT VENTRICLE FREE WALL PEAK S': 14.7
RSV RNA RESP QL NAA+PROBE: NOT DETECTED
RV RNA STL NAA+PROBE: NOT DETECTED
RV RNA STL NAA+PROBE: NOT DETECTED
S PNEUM AG UR QL: NEGATIVE
SALMONELLA DNA STL QL NAA+PROBE: NOT DETECTED
SALMONELLA DNA STL QL NAA+PROBE: NOT DETECTED
SAO2 % BLDA: 74 % (ref 94–100)
SAO2 % BLDA: 82 % (ref 94–100)
SAO2 % BLDA: 82 % (ref 94–100)
SAO2 % BLDA: 94 % (ref 94–100)
SAO2 % BLDA: 96 % (ref 94–100)
SAO2 % BLDA: 98 % (ref 94–100)
SAO2 % BLDA: 99 % (ref 94–100)
SARS-COV-2 RNA RESP QL NAA+PROBE: DETECTED
SARS-COV-2 RNA RESP QL NAA+PROBE: DETECTED
SCAN RESULT: NORMAL
SCHISTOCYTES BLD QL SMEAR: ABNORMAL
SHIGELLA DNA SPEC QL NAA+PROBE: NOT DETECTED
SHIGELLA DNA SPEC QL NAA+PROBE: NOT DETECTED
SODIUM BLDA-SCNC: 132 MMOL/L (ref 136–145)
SODIUM BLDA-SCNC: 134 MMOL/L (ref 136–145)
SODIUM BLDA-SCNC: 135 MMOL/L (ref 136–145)
SODIUM BLDA-SCNC: 136 MMOL/L (ref 136–145)
SODIUM BLDA-SCNC: 140 MMOL/L (ref 136–145)
SODIUM SERPL-SCNC: 134 MMOL/L (ref 133–145)
SODIUM SERPL-SCNC: 135 MMOL/L (ref 133–145)
SODIUM SERPL-SCNC: 135 MMOL/L (ref 136–145)
SODIUM SERPL-SCNC: 136 MMOL/L (ref 133–145)
SODIUM SERPL-SCNC: 136 MMOL/L (ref 136–145)
SODIUM SERPL-SCNC: 136 MMOL/L (ref 136–145)
SODIUM SERPL-SCNC: 137 MMOL/L (ref 133–145)
SODIUM SERPL-SCNC: 137 MMOL/L (ref 136–145)
SODIUM SERPL-SCNC: 138 MMOL/L (ref 133–145)
SODIUM SERPL-SCNC: 138 MMOL/L (ref 136–145)
SODIUM SERPL-SCNC: 139 MMOL/L (ref 133–145)
SODIUM SERPL-SCNC: 139 MMOL/L (ref 136–145)
SODIUM SERPL-SCNC: 140 MMOL/L (ref 133–145)
SODIUM SERPL-SCNC: 140 MMOL/L (ref 136–145)
SODIUM SERPL-SCNC: 141 MMOL/L (ref 133–145)
SODIUM SERPL-SCNC: 141 MMOL/L (ref 136–145)
SODIUM SERPL-SCNC: 142 MMOL/L (ref 133–145)
SODIUM SERPL-SCNC: 142 MMOL/L (ref 136–145)
SODIUM SERPL-SCNC: 143 MMOL/L (ref 136–145)
SODIUM SERPL-SCNC: 144 MMOL/L (ref 136–145)
SODIUM SERPL-SCNC: 145 MMOL/L (ref 136–145)
SODIUM SERPL-SCNC: 147 MMOL/L (ref 136–145)
SODIUM UR-SCNC: 79 MMOL/L
SODIUM/CREAT UR-RTO: 197 MMOL/G CREAT
SP GR UR STRIP.AUTO: 1.01
SP GR UR STRIP.AUTO: 1.02
SQUAMOUS #/AREA URNS AUTO: ABNORMAL /HPF
T AXIS: 104 DEGREES
T AXIS: 109 DEGREES
T AXIS: 31 DEGREES
T AXIS: 58 DEGREES
T AXIS: 65 DEGREES
T AXIS: 87 DEGREES
T OFFSET: 384 MS
T OFFSET: 402 MS
T OFFSET: 402 MS
T OFFSET: 409 MS
T OFFSET: 418 MS
T OFFSET: 436 MS
T4 FREE SERPL-MCNC: 1.22 NG/DL (ref 0.78–1.48)
TARGETS BLD QL SMEAR: ABNORMAL
TOTAL CELLS COUNTED BLD: 100
TOTAL CELLS COUNTED BLD: 113
TOTAL CELLS COUNTED BLD: 113
TOTAL CELLS COUNTED BLD: 114
TOTAL CELLS COUNTED BLD: 115
TOTAL CELLS COUNTED BLD: 115
TOTAL CELLS COUNTED BLD: 116
TOTAL CELLS COUNTED BLD: 117
TOTAL CELLS COUNTED BLD: 118
TOTAL CELLS COUNTED BLD: 119
TROPONIN T SERPL-MCNC: 13 NG/L
TROPONIN T SERPL-MCNC: 13 NG/L
TROPONIN T SERPL-MCNC: 14 NG/L
TROPONIN T SERPL-MCNC: 15 NG/L
TSH SERPL-ACNC: 0.44 MIU/L (ref 0.44–3.98)
UFH PPP CHRO-ACNC: 0.2 IU/ML
UFH PPP CHRO-ACNC: 0.7 IU/ML
UFH PPP CHRO-ACNC: 1.1 IU/ML
UFH PPP CHRO-ACNC: 1.4 IU/ML
UFH PPP CHRO-ACNC: 1.6 IU/ML
UFH PPP CHRO-ACNC: 1.7 IU/ML
UFH PPP CHRO-ACNC: >2 IU/ML
UNIT ABO: NORMAL
UNIT ABO: NORMAL
UNIT NUMBER: NORMAL
UNIT NUMBER: NORMAL
UNIT RH: NORMAL
UNIT RH: NORMAL
UNIT VOLUME: 350
UNIT VOLUME: 350
UREA/CREAT UR-SRTO: 19.2 G/G CREAT
URINE CULTURE: NO GROWTH
URINE CULTURE: NORMAL
UROBILINOGEN UR STRIP.AUTO-MCNC: <2 MG/DL
UROBILINOGEN UR STRIP.AUTO-MCNC: NORMAL MG/DL
UUN UR-MCNC: 770 MG/DL
V CHOLERAE DNA STL QL NAA+PROBE: NOT DETECTED
V CHOLERAE DNA STL QL NAA+PROBE: NOT DETECTED
VANCOMYCIN SERPL-MCNC: 12 UG/ML (ref 10–20)
VANCOMYCIN SERPL-MCNC: 13 UG/ML (ref 10–20)
VANCOMYCIN SERPL-MCNC: 17 UG/ML (ref 10–20)
VANCOMYCIN SERPL-MCNC: 23 UG/ML (ref 10–20)
VANCOMYCIN SERPL-MCNC: 7 UG/ML (ref 10–20)
VARIANT LYMPHS # BLD MANUAL: 0.09 X10*3/UL (ref 0–0.5)
VARIANT LYMPHS # BLD MANUAL: 0.13 X10*3/UL (ref 0–0.5)
VARIANT LYMPHS # BLD MANUAL: 0.29 X10*3/UL (ref 0–0.5)
VARIANT LYMPHS # BLD MANUAL: 0.3 X10*3/UL (ref 0–0.5)
VARIANT LYMPHS NFR BLD: 0.9 %
VARIANT LYMPHS NFR BLD: 1.7 %
VARIANT LYMPHS NFR BLD: 2.7 %
VARIANT LYMPHS NFR BLD: 3 %
VENTILATOR MODE: ABNORMAL
VENTRICULAR RATE: 124 BPM
VENTRICULAR RATE: 52 BPM
VENTRICULAR RATE: 62 BPM
VENTRICULAR RATE: 67 BPM
VENTRICULAR RATE: 67 BPM
VENTRICULAR RATE: 77 BPM
WBC # BLD AUTO: 10.1 X10*3/UL (ref 4.4–11.3)
WBC # BLD AUTO: 10.1 X10*3/UL (ref 4.4–11.3)
WBC # BLD AUTO: 10.2 X10*3/UL (ref 4.4–11.3)
WBC # BLD AUTO: 10.7 X10*3/UL (ref 4.4–11.3)
WBC # BLD AUTO: 10.7 X10*3/UL (ref 4.4–11.3)
WBC # BLD AUTO: 10.9 X10*3/UL (ref 4.4–11.3)
WBC # BLD AUTO: 11 X10*3/UL (ref 4.4–11.3)
WBC # BLD AUTO: 11.1 X10*3/UL (ref 4.4–11.3)
WBC # BLD AUTO: 11.1 X10*3/UL (ref 4.4–11.3)
WBC # BLD AUTO: 11.2 X10*3/UL (ref 4.4–11.3)
WBC # BLD AUTO: 11.2 X10*3/UL (ref 4.4–11.3)
WBC # BLD AUTO: 11.3 X10*3/UL (ref 4.4–11.3)
WBC # BLD AUTO: 11.4 X10*3/UL (ref 4.4–11.3)
WBC # BLD AUTO: 11.5 X10*3/UL (ref 4.4–11.3)
WBC # BLD AUTO: 11.8 X10*3/UL (ref 4.4–11.3)
WBC # BLD AUTO: 12.4 X10*3/UL (ref 4.4–11.3)
WBC # BLD AUTO: 12.9 X10*3/UL (ref 4.4–11.3)
WBC # BLD AUTO: 13 X10*3/UL (ref 4.4–11.3)
WBC # BLD AUTO: 13.1 X10*3/UL (ref 4.4–11.3)
WBC # BLD AUTO: 13.7 X10*3/UL (ref 4.4–11.3)
WBC # BLD AUTO: 13.8 X10*3/UL (ref 4.4–11.3)
WBC # BLD AUTO: 13.9 X10*3/UL (ref 4.4–11.3)
WBC # BLD AUTO: 14.1 X10*3/UL (ref 4.4–11.3)
WBC # BLD AUTO: 14.2 X10*3/UL (ref 4.4–11.3)
WBC # BLD AUTO: 14.4 X10*3/UL (ref 4.4–11.3)
WBC # BLD AUTO: 14.8 X10*3/UL (ref 4.4–11.3)
WBC # BLD AUTO: 14.8 X10*3/UL (ref 4.4–11.3)
WBC # BLD AUTO: 15 X10*3/UL (ref 4.4–11.3)
WBC # BLD AUTO: 15 X10*3/UL (ref 4.4–11.3)
WBC # BLD AUTO: 15.1 X10*3/UL (ref 4.4–11.3)
WBC # BLD AUTO: 15.1 X10*3/UL (ref 4.4–11.3)
WBC # BLD AUTO: 15.4 X10*3/UL (ref 4.4–11.3)
WBC # BLD AUTO: 15.7 X10*3/UL (ref 4.4–11.3)
WBC # BLD AUTO: 15.9 X10*3/UL (ref 4.4–11.3)
WBC # BLD AUTO: 16 X10*3/UL (ref 4.4–11.3)
WBC # BLD AUTO: 16.2 X10*3/UL (ref 4.4–11.3)
WBC # BLD AUTO: 16.2 X10*3/UL (ref 4.4–11.3)
WBC # BLD AUTO: 16.9 X10*3/UL (ref 4.4–11.3)
WBC # BLD AUTO: 17.1 X10*3/UL (ref 4.4–11.3)
WBC # BLD AUTO: 17.5 X10*3/UL (ref 4.4–11.3)
WBC # BLD AUTO: 18.1 X10*3/UL (ref 4.4–11.3)
WBC # BLD AUTO: 18.2 X10*3/UL (ref 4.4–11.3)
WBC # BLD AUTO: 18.3 X10*3/UL (ref 4.4–11.3)
WBC # BLD AUTO: 18.4 X10*3/UL (ref 4.4–11.3)
WBC # BLD AUTO: 18.6 X10*3/UL (ref 4.4–11.3)
WBC # BLD AUTO: 19 X10*3/UL (ref 4.4–11.3)
WBC # BLD AUTO: 20.3 X10*3/UL (ref 4.4–11.3)
WBC # BLD AUTO: 20.6 X10*3/UL (ref 4.4–11.3)
WBC # BLD AUTO: 20.8 X10*3/UL (ref 4.4–11.3)
WBC # BLD AUTO: 21 X10*3/UL (ref 4.4–11.3)
WBC # BLD AUTO: 21 X10*3/UL (ref 4.4–11.3)
WBC # BLD AUTO: 21.2 X10*3/UL (ref 4.4–11.3)
WBC # BLD AUTO: 21.5 X10*3/UL (ref 4.4–11.3)
WBC # BLD AUTO: 22 X10*3/UL (ref 4.4–11.3)
WBC # BLD AUTO: 22.4 X10*3/UL (ref 4.4–11.3)
WBC # BLD AUTO: 23.2 X10*3/UL (ref 4.4–11.3)
WBC # BLD AUTO: 23.8 X10*3/UL (ref 4.4–11.3)
WBC # BLD AUTO: 24.7 X10*3/UL (ref 4.4–11.3)
WBC # BLD AUTO: 7.9 X10*3/UL (ref 4.4–11.3)
WBC # BLD AUTO: 8.1 X10*3/UL (ref 4.4–11.3)
WBC # BLD AUTO: 8.9 X10*3/UL (ref 4.4–11.3)
WBC # BLD AUTO: 8.9 X10*3/UL (ref 4.4–11.3)
WBC # BLD AUTO: 9 X10*3/UL (ref 4.4–11.3)
WBC # BLD AUTO: 9.2 X10*3/UL (ref 4.4–11.3)
WBC # BLD AUTO: 9.3 X10*3/UL (ref 4.4–11.3)
WBC # BLD AUTO: 9.6 X10*3/UL (ref 4.4–11.3)
WBC # BLD AUTO: 9.7 X10*3/UL (ref 4.4–11.3)
WBC # BLD AUTO: 9.8 X10*3/UL (ref 4.4–11.3)
WBC # BLD AUTO: 9.9 X10*3/UL (ref 4.4–11.3)
WBC #/AREA URNS AUTO: >50 /HPF
WBC #/AREA URNS AUTO: ABNORMAL /HPF
WBC #/AREA URNS AUTO: ABNORMAL /HPF
WBC CLUMPS #/AREA URNS AUTO: ABNORMAL /HPF
WBC CLUMPS #/AREA URNS AUTO: ABNORMAL /HPF
XM INTEP: NORMAL
XM INTEP: NORMAL
Y ENTEROCOL DNA STL QL NAA+PROBE: NOT DETECTED
Y ENTEROCOL DNA STL QL NAA+PROBE: NOT DETECTED
YEAST BUDDING #/AREA UR COMP ASSIST: PRESENT /HPF

## 2023-01-01 PROCEDURE — 2500000001 HC RX 250 WO HCPCS SELF ADMINISTERED DRUGS (ALT 637 FOR MEDICARE OP)

## 2023-01-01 PROCEDURE — 82947 ASSAY GLUCOSE BLOOD QUANT: CPT

## 2023-01-01 PROCEDURE — 93005 ELECTROCARDIOGRAM TRACING: CPT

## 2023-01-01 PROCEDURE — 80069 RENAL FUNCTION PANEL: CPT

## 2023-01-01 PROCEDURE — 2500000005 HC RX 250 GENERAL PHARMACY W/O HCPCS: Performed by: NURSE PRACTITIONER

## 2023-01-01 PROCEDURE — 87449 NOS EACH ORGANISM AG IA: CPT

## 2023-01-01 PROCEDURE — 1200000002 HC GENERAL ROOM WITH TELEMETRY DAILY

## 2023-01-01 PROCEDURE — 94640 AIRWAY INHALATION TREATMENT: CPT

## 2023-01-01 PROCEDURE — 85027 COMPLETE CBC AUTOMATED: CPT | Performed by: STUDENT IN AN ORGANIZED HEALTH CARE EDUCATION/TRAINING PROGRAM

## 2023-01-01 PROCEDURE — 80069 RENAL FUNCTION PANEL: CPT | Performed by: NURSE PRACTITIONER

## 2023-01-01 PROCEDURE — 2500000001 HC RX 250 WO HCPCS SELF ADMINISTERED DRUGS (ALT 637 FOR MEDICARE OP): Performed by: INTERNAL MEDICINE

## 2023-01-01 PROCEDURE — 96372 THER/PROPH/DIAG INJ SC/IM: CPT | Performed by: HOSPITALIST

## 2023-01-01 PROCEDURE — 2500000005 HC RX 250 GENERAL PHARMACY W/O HCPCS: Performed by: INTERNAL MEDICINE

## 2023-01-01 PROCEDURE — 99233 SBSQ HOSP IP/OBS HIGH 50: CPT | Performed by: INTERNAL MEDICINE

## 2023-01-01 PROCEDURE — 2500000004 HC RX 250 GENERAL PHARMACY W/ HCPCS (ALT 636 FOR OP/ED)

## 2023-01-01 PROCEDURE — 2500000005 HC RX 250 GENERAL PHARMACY W/O HCPCS

## 2023-01-01 PROCEDURE — 2500000004 HC RX 250 GENERAL PHARMACY W/ HCPCS (ALT 636 FOR OP/ED): Performed by: NURSE PRACTITIONER

## 2023-01-01 PROCEDURE — 99232 SBSQ HOSP IP/OBS MODERATE 35: CPT | Performed by: INTERNAL MEDICINE

## 2023-01-01 PROCEDURE — 94660 CPAP INITIATION&MGMT: CPT

## 2023-01-01 PROCEDURE — 97110 THERAPEUTIC EXERCISES: CPT | Mod: GP

## 2023-01-01 PROCEDURE — 84132 ASSAY OF SERUM POTASSIUM: CPT | Performed by: HOSPITALIST

## 2023-01-01 PROCEDURE — 2500000002 HC RX 250 W HCPCS SELF ADMINISTERED DRUGS (ALT 637 FOR MEDICARE OP, ALT 636 FOR OP/ED): Performed by: INTERNAL MEDICINE

## 2023-01-01 PROCEDURE — 2500000001 HC RX 250 WO HCPCS SELF ADMINISTERED DRUGS (ALT 637 FOR MEDICARE OP): Performed by: NURSE PRACTITIONER

## 2023-01-01 PROCEDURE — 36415 COLL VENOUS BLD VENIPUNCTURE: CPT | Performed by: NURSE PRACTITIONER

## 2023-01-01 PROCEDURE — 97535 SELF CARE MNGMENT TRAINING: CPT | Mod: GO

## 2023-01-01 PROCEDURE — 82947 ASSAY GLUCOSE BLOOD QUANT: CPT | Mod: 59

## 2023-01-01 PROCEDURE — 36415 COLL VENOUS BLD VENIPUNCTURE: CPT

## 2023-01-01 PROCEDURE — 97166 OT EVAL MOD COMPLEX 45 MIN: CPT | Mod: GO

## 2023-01-01 PROCEDURE — 85025 COMPLETE CBC W/AUTO DIFF WBC: CPT | Performed by: HOSPITALIST

## 2023-01-01 PROCEDURE — 83735 ASSAY OF MAGNESIUM: CPT

## 2023-01-01 PROCEDURE — 2500000004 HC RX 250 GENERAL PHARMACY W/ HCPCS (ALT 636 FOR OP/ED): Performed by: STUDENT IN AN ORGANIZED HEALTH CARE EDUCATION/TRAINING PROGRAM

## 2023-01-01 PROCEDURE — 36415 COLL VENOUS BLD VENIPUNCTURE: CPT | Performed by: INTERNAL MEDICINE

## 2023-01-01 PROCEDURE — 80053 COMPREHEN METABOLIC PANEL: CPT | Performed by: INTERNAL MEDICINE

## 2023-01-01 PROCEDURE — 2500000004 HC RX 250 GENERAL PHARMACY W/ HCPCS (ALT 636 FOR OP/ED): Performed by: HOSPITALIST

## 2023-01-01 PROCEDURE — 71045 X-RAY EXAM CHEST 1 VIEW: CPT

## 2023-01-01 PROCEDURE — 85025 COMPLETE CBC W/AUTO DIFF WBC: CPT | Performed by: NURSE PRACTITIONER

## 2023-01-01 PROCEDURE — 2500000005 HC RX 250 GENERAL PHARMACY W/O HCPCS: Performed by: STUDENT IN AN ORGANIZED HEALTH CARE EDUCATION/TRAINING PROGRAM

## 2023-01-01 PROCEDURE — 9420000001 HC RT PATIENT EDUCATION 5 MIN

## 2023-01-01 PROCEDURE — 85007 BL SMEAR W/DIFF WBC COUNT: CPT

## 2023-01-01 PROCEDURE — 84132 ASSAY OF SERUM POTASSIUM: CPT | Mod: OUT | Performed by: INTERNAL MEDICINE

## 2023-01-01 PROCEDURE — 85007 BL SMEAR W/DIFF WBC COUNT: CPT | Performed by: NURSE PRACTITIONER

## 2023-01-01 PROCEDURE — 84540 ASSAY OF URINE/UREA-N: CPT

## 2023-01-01 PROCEDURE — 85027 COMPLETE CBC AUTOMATED: CPT

## 2023-01-01 PROCEDURE — 85027 COMPLETE CBC AUTOMATED: CPT | Performed by: INTERNAL MEDICINE

## 2023-01-01 PROCEDURE — 2500000004 HC RX 250 GENERAL PHARMACY W/ HCPCS (ALT 636 FOR OP/ED): Performed by: INTERNAL MEDICINE

## 2023-01-01 PROCEDURE — 36415 COLL VENOUS BLD VENIPUNCTURE: CPT | Performed by: STUDENT IN AN ORGANIZED HEALTH CARE EDUCATION/TRAINING PROGRAM

## 2023-01-01 PROCEDURE — 36415 COLL VENOUS BLD VENIPUNCTURE: CPT | Performed by: HOSPITALIST

## 2023-01-01 PROCEDURE — 87493 C DIFF AMPLIFIED PROBE: CPT | Mod: 59 | Performed by: NURSE PRACTITIONER

## 2023-01-01 PROCEDURE — 71045 X-RAY EXAM CHEST 1 VIEW: CPT | Performed by: RADIOLOGY

## 2023-01-01 PROCEDURE — 83880 ASSAY OF NATRIURETIC PEPTIDE: CPT | Performed by: INTERNAL MEDICINE

## 2023-01-01 PROCEDURE — 85027 COMPLETE CBC AUTOMATED: CPT | Performed by: HOSPITALIST

## 2023-01-01 PROCEDURE — 2500000002 HC RX 250 W HCPCS SELF ADMINISTERED DRUGS (ALT 637 FOR MEDICARE OP, ALT 636 FOR OP/ED): Performed by: NURSE PRACTITIONER

## 2023-01-01 PROCEDURE — 99231 SBSQ HOSP IP/OBS SF/LOW 25: CPT | Performed by: INTERNAL MEDICINE

## 2023-01-01 PROCEDURE — 2020000001 HC ICU ROOM DAILY

## 2023-01-01 PROCEDURE — 2500000005 HC RX 250 GENERAL PHARMACY W/O HCPCS: Performed by: HOSPITALIST

## 2023-01-01 PROCEDURE — 82805 BLOOD GASES W/O2 SATURATION: CPT | Performed by: HOSPITALIST

## 2023-01-01 PROCEDURE — 87506 IADNA-DNA/RNA PROBE TQ 6-11: CPT

## 2023-01-01 PROCEDURE — 99232 SBSQ HOSP IP/OBS MODERATE 35: CPT | Performed by: NURSE PRACTITIONER

## 2023-01-01 PROCEDURE — 76770 US EXAM ABDO BACK WALL COMP: CPT

## 2023-01-01 PROCEDURE — 71250 CT THORAX DX C-: CPT

## 2023-01-01 PROCEDURE — 87641 MR-STAPH DNA AMP PROBE: CPT | Performed by: INTERNAL MEDICINE

## 2023-01-01 PROCEDURE — 85027 COMPLETE CBC AUTOMATED: CPT | Performed by: NURSE PRACTITIONER

## 2023-01-01 PROCEDURE — 78580 LUNG PERFUSION IMAGING: CPT

## 2023-01-01 PROCEDURE — 81001 URINALYSIS AUTO W/SCOPE: CPT

## 2023-01-01 PROCEDURE — 87086 URINE CULTURE/COLONY COUNT: CPT

## 2023-01-01 PROCEDURE — 71046 X-RAY EXAM CHEST 2 VIEWS: CPT | Performed by: RADIOLOGY

## 2023-01-01 PROCEDURE — 82805 BLOOD GASES W/O2 SATURATION: CPT

## 2023-01-01 PROCEDURE — 85025 COMPLETE CBC W/AUTO DIFF WBC: CPT

## 2023-01-01 PROCEDURE — 85520 HEPARIN ASSAY: CPT

## 2023-01-01 PROCEDURE — 93970 EXTREMITY STUDY: CPT

## 2023-01-01 PROCEDURE — 99233 SBSQ HOSP IP/OBS HIGH 50: CPT

## 2023-01-01 PROCEDURE — 94760 N-INVAS EAR/PLS OXIMETRY 1: CPT

## 2023-01-01 PROCEDURE — 99222 1ST HOSP IP/OBS MODERATE 55: CPT | Performed by: INTERNAL MEDICINE

## 2023-01-01 PROCEDURE — 80053 COMPREHEN METABOLIC PANEL: CPT

## 2023-01-01 PROCEDURE — 96372 THER/PROPH/DIAG INJ SC/IM: CPT

## 2023-01-01 PROCEDURE — 80053 COMPREHEN METABOLIC PANEL: CPT | Performed by: HOSPITALIST

## 2023-01-01 PROCEDURE — 82435 ASSAY OF BLOOD CHLORIDE: CPT | Performed by: INTERNAL MEDICINE

## 2023-01-01 PROCEDURE — 2500000002 HC RX 250 W HCPCS SELF ADMINISTERED DRUGS (ALT 637 FOR MEDICARE OP, ALT 636 FOR OP/ED)

## 2023-01-01 PROCEDURE — C9113 INJ PANTOPRAZOLE SODIUM, VIA: HCPCS

## 2023-01-01 PROCEDURE — 99291 CRITICAL CARE FIRST HOUR: CPT

## 2023-01-01 PROCEDURE — 97112 NEUROMUSCULAR REEDUCATION: CPT | Mod: GP

## 2023-01-01 PROCEDURE — 85520 HEPARIN ASSAY: CPT | Mod: OUT

## 2023-01-01 PROCEDURE — 97530 THERAPEUTIC ACTIVITIES: CPT | Mod: GO

## 2023-01-01 PROCEDURE — 5A0955A ASSISTANCE WITH RESPIRATORY VENTILATION, GREATER THAN 96 CONSECUTIVE HOURS, HIGH NASAL FLOW/VELOCITY: ICD-10-PCS

## 2023-01-01 PROCEDURE — 71046 X-RAY EXAM CHEST 2 VIEWS: CPT | Mod: FY

## 2023-01-01 PROCEDURE — 99231 SBSQ HOSP IP/OBS SF/LOW 25: CPT | Performed by: NURSE PRACTITIONER

## 2023-01-01 PROCEDURE — 2500000004 HC RX 250 GENERAL PHARMACY W/ HCPCS (ALT 636 FOR OP/ED): Mod: JZ

## 2023-01-01 PROCEDURE — 36600 WITHDRAWAL OF ARTERIAL BLOOD: CPT

## 2023-01-01 PROCEDURE — 86140 C-REACTIVE PROTEIN: CPT | Performed by: INTERNAL MEDICINE

## 2023-01-01 PROCEDURE — 84484 ASSAY OF TROPONIN QUANT: CPT

## 2023-01-01 PROCEDURE — 87077 CULTURE AEROBIC IDENTIFY: CPT | Performed by: NURSE PRACTITIONER

## 2023-01-01 PROCEDURE — 84484 ASSAY OF TROPONIN QUANT: CPT | Performed by: INTERNAL MEDICINE

## 2023-01-01 PROCEDURE — 83735 ASSAY OF MAGNESIUM: CPT | Performed by: NURSE PRACTITIONER

## 2023-01-01 PROCEDURE — 84145 PROCALCITONIN (PCT): CPT

## 2023-01-01 PROCEDURE — 86612 BLASTOMYCES ANTIBODY: CPT

## 2023-01-01 PROCEDURE — 76775 US EXAM ABDO BACK WALL LIM: CPT | Performed by: RADIOLOGY

## 2023-01-01 PROCEDURE — 80053 COMPREHEN METABOLIC PANEL: CPT | Performed by: STUDENT IN AN ORGANIZED HEALTH CARE EDUCATION/TRAINING PROGRAM

## 2023-01-01 PROCEDURE — 99223 1ST HOSP IP/OBS HIGH 75: CPT | Performed by: INTERNAL MEDICINE

## 2023-01-01 PROCEDURE — 87086 URINE CULTURE/COLONY COUNT: CPT | Performed by: NURSE PRACTITIONER

## 2023-01-01 PROCEDURE — 99232 SBSQ HOSP IP/OBS MODERATE 35: CPT

## 2023-01-01 PROCEDURE — 82805 BLOOD GASES W/O2 SATURATION: CPT | Performed by: INTERNAL MEDICINE

## 2023-01-01 PROCEDURE — C9113 INJ PANTOPRAZOLE SODIUM, VIA: HCPCS | Performed by: HOSPITALIST

## 2023-01-01 PROCEDURE — 97530 THERAPEUTIC ACTIVITIES: CPT | Mod: GP

## 2023-01-01 PROCEDURE — 83735 ASSAY OF MAGNESIUM: CPT | Mod: OUT | Performed by: INTERNAL MEDICINE

## 2023-01-01 PROCEDURE — 96372 THER/PROPH/DIAG INJ SC/IM: CPT | Performed by: INTERNAL MEDICINE

## 2023-01-01 PROCEDURE — 82947 ASSAY GLUCOSE BLOOD QUANT: CPT | Performed by: NURSE PRACTITIONER

## 2023-01-01 PROCEDURE — 93010 ELECTROCARDIOGRAM REPORT: CPT | Performed by: INTERNAL MEDICINE

## 2023-01-01 PROCEDURE — 71045 X-RAY EXAM CHEST 1 VIEW: CPT | Mod: FY

## 2023-01-01 PROCEDURE — 43761 REPOSITION GASTROSTOMY TUBE: CPT | Performed by: NURSE PRACTITIONER

## 2023-01-01 PROCEDURE — 80069 RENAL FUNCTION PANEL: CPT | Mod: CCI

## 2023-01-01 PROCEDURE — 86140 C-REACTIVE PROTEIN: CPT

## 2023-01-01 PROCEDURE — 2500000001 HC RX 250 WO HCPCS SELF ADMINISTERED DRUGS (ALT 637 FOR MEDICARE OP): Performed by: STUDENT IN AN ORGANIZED HEALTH CARE EDUCATION/TRAINING PROGRAM

## 2023-01-01 PROCEDURE — 96372 THER/PROPH/DIAG INJ SC/IM: CPT | Performed by: STUDENT IN AN ORGANIZED HEALTH CARE EDUCATION/TRAINING PROGRAM

## 2023-01-01 PROCEDURE — 99232 SBSQ HOSP IP/OBS MODERATE 35: CPT | Performed by: STUDENT IN AN ORGANIZED HEALTH CARE EDUCATION/TRAINING PROGRAM

## 2023-01-01 PROCEDURE — 83880 ASSAY OF NATRIURETIC PEPTIDE: CPT

## 2023-01-01 PROCEDURE — 94664 DEMO&/EVAL PT USE INHALER: CPT

## 2023-01-01 PROCEDURE — 87305 ASPERGILLUS AG IA: CPT

## 2023-01-01 PROCEDURE — 99222 1ST HOSP IP/OBS MODERATE 55: CPT

## 2023-01-01 PROCEDURE — 82248 BILIRUBIN DIRECT: CPT

## 2023-01-01 PROCEDURE — 85652 RBC SED RATE AUTOMATED: CPT

## 2023-01-01 PROCEDURE — 87634 RSV DNA/RNA AMP PROBE: CPT

## 2023-01-01 PROCEDURE — 83605 ASSAY OF LACTIC ACID: CPT | Performed by: HOSPITALIST

## 2023-01-01 PROCEDURE — 99285 EMERGENCY DEPT VISIT HI MDM: CPT | Performed by: STUDENT IN AN ORGANIZED HEALTH CARE EDUCATION/TRAINING PROGRAM

## 2023-01-01 PROCEDURE — 96365 THER/PROPH/DIAG IV INF INIT: CPT

## 2023-01-01 PROCEDURE — 83735 ASSAY OF MAGNESIUM: CPT | Performed by: INTERNAL MEDICINE

## 2023-01-01 PROCEDURE — 2550000001 HC RX 255 CONTRASTS: Mod: JZ | Performed by: INTERNAL MEDICINE

## 2023-01-01 PROCEDURE — 76770 US EXAM ABDO BACK WALL COMP: CPT | Performed by: STUDENT IN AN ORGANIZED HEALTH CARE EDUCATION/TRAINING PROGRAM

## 2023-01-01 PROCEDURE — 3E0333Z INTRODUCTION OF ANTI-INFLAMMATORY INTO PERIPHERAL VEIN, PERCUTANEOUS APPROACH: ICD-10-PCS | Performed by: INTERNAL MEDICINE

## 2023-01-01 PROCEDURE — 99291 CRITICAL CARE FIRST HOUR: CPT | Performed by: INTERNAL MEDICINE

## 2023-01-01 PROCEDURE — 97162 PT EVAL MOD COMPLEX 30 MIN: CPT | Mod: GP

## 2023-01-01 PROCEDURE — 82306 VITAMIN D 25 HYDROXY: CPT

## 2023-01-01 PROCEDURE — 51702 INSERT TEMP BLADDER CATH: CPT

## 2023-01-01 PROCEDURE — 84100 ASSAY OF PHOSPHORUS: CPT

## 2023-01-01 PROCEDURE — 99442 PR PHYS/QHP TELEPHONE EVALUATION 11-20 MIN: CPT | Performed by: STUDENT IN AN ORGANIZED HEALTH CARE EDUCATION/TRAINING PROGRAM

## 2023-01-01 PROCEDURE — 80202 ASSAY OF VANCOMYCIN: CPT | Performed by: INTERNAL MEDICINE

## 2023-01-01 PROCEDURE — 83036 HEMOGLOBIN GLYCOSYLATED A1C: CPT | Performed by: INTERNAL MEDICINE

## 2023-01-01 PROCEDURE — 99221 1ST HOSP IP/OBS SF/LOW 40: CPT

## 2023-01-01 PROCEDURE — 76377 3D RENDER W/INTRP POSTPROCES: CPT

## 2023-01-01 PROCEDURE — 80069 RENAL FUNCTION PANEL: CPT | Performed by: STUDENT IN AN ORGANIZED HEALTH CARE EDUCATION/TRAINING PROGRAM

## 2023-01-01 PROCEDURE — XW033E5 INTRODUCTION OF REMDESIVIR ANTI-INFECTIVE INTO PERIPHERAL VEIN, PERCUTANEOUS APPROACH, NEW TECHNOLOGY GROUP 5: ICD-10-PCS | Performed by: INTERNAL MEDICINE

## 2023-01-01 PROCEDURE — 84132 ASSAY OF SERUM POTASSIUM: CPT | Performed by: STUDENT IN AN ORGANIZED HEALTH CARE EDUCATION/TRAINING PROGRAM

## 2023-01-01 PROCEDURE — 78830 RP LOCLZJ TUM SPECT W/CT 1: CPT

## 2023-01-01 PROCEDURE — 76857 US EXAM PELVIC LIMITED: CPT

## 2023-01-01 PROCEDURE — 93970 EXTREMITY STUDY: CPT | Performed by: INTERNAL MEDICINE

## 2023-01-01 PROCEDURE — 85025 COMPLETE CBC W/AUTO DIFF WBC: CPT | Mod: OUT | Performed by: INTERNAL MEDICINE

## 2023-01-01 PROCEDURE — 84075 ASSAY ALKALINE PHOSPHATASE: CPT

## 2023-01-01 PROCEDURE — P9040 RBC LEUKOREDUCED IRRADIATED: HCPCS

## 2023-01-01 PROCEDURE — 82330 ASSAY OF CALCIUM: CPT

## 2023-01-01 PROCEDURE — 83605 ASSAY OF LACTIC ACID: CPT

## 2023-01-01 PROCEDURE — 82040 ASSAY OF SERUM ALBUMIN: CPT

## 2023-01-01 PROCEDURE — 92610 EVALUATE SWALLOWING FUNCTION: CPT | Mod: GN

## 2023-01-01 PROCEDURE — 84100 ASSAY OF PHOSPHORUS: CPT | Performed by: STUDENT IN AN ORGANIZED HEALTH CARE EDUCATION/TRAINING PROGRAM

## 2023-01-01 PROCEDURE — 84443 ASSAY THYROID STIM HORMONE: CPT

## 2023-01-01 PROCEDURE — 86901 BLOOD TYPING SEROLOGIC RH(D): CPT

## 2023-01-01 PROCEDURE — 87899 AGENT NOS ASSAY W/OPTIC: CPT

## 2023-01-01 PROCEDURE — 99223 1ST HOSP IP/OBS HIGH 75: CPT

## 2023-01-01 PROCEDURE — 93306 TTE W/DOPPLER COMPLETE: CPT

## 2023-01-01 PROCEDURE — 82784 ASSAY IGA/IGD/IGG/IGM EACH: CPT

## 2023-01-01 PROCEDURE — 71275 CT ANGIOGRAPHY CHEST: CPT | Performed by: RADIOLOGY

## 2023-01-01 PROCEDURE — 99213 OFFICE O/P EST LOW 20 MIN: CPT | Performed by: FAMILY MEDICINE

## 2023-01-01 PROCEDURE — 85025 COMPLETE CBC W/AUTO DIFF WBC: CPT | Performed by: INTERNAL MEDICINE

## 2023-01-01 PROCEDURE — 84132 ASSAY OF SERUM POTASSIUM: CPT

## 2023-01-01 PROCEDURE — 87641 MR-STAPH DNA AMP PROBE: CPT | Performed by: NURSE PRACTITIONER

## 2023-01-01 PROCEDURE — 80048 BASIC METABOLIC PNL TOTAL CA: CPT | Performed by: INTERNAL MEDICINE

## 2023-01-01 PROCEDURE — 99231 SBSQ HOSP IP/OBS SF/LOW 25: CPT

## 2023-01-01 PROCEDURE — 81001 URINALYSIS AUTO W/SCOPE: CPT | Performed by: NURSE PRACTITIONER

## 2023-01-01 PROCEDURE — 85610 PROTHROMBIN TIME: CPT

## 2023-01-01 PROCEDURE — 83880 ASSAY OF NATRIURETIC PEPTIDE: CPT | Performed by: STUDENT IN AN ORGANIZED HEALTH CARE EDUCATION/TRAINING PROGRAM

## 2023-01-01 PROCEDURE — 85730 THROMBOPLASTIN TIME PARTIAL: CPT

## 2023-01-01 PROCEDURE — 84100 ASSAY OF PHOSPHORUS: CPT | Performed by: NURSE PRACTITIONER

## 2023-01-01 PROCEDURE — 85730 THROMBOPLASTIN TIME PARTIAL: CPT | Performed by: INTERNAL MEDICINE

## 2023-01-01 PROCEDURE — 82436 ASSAY OF URINE CHLORIDE: CPT

## 2023-01-01 PROCEDURE — 71275 CT ANGIOGRAPHY CHEST: CPT

## 2023-01-01 PROCEDURE — 93970 EXTREMITY STUDY: CPT | Performed by: SURGERY

## 2023-01-01 PROCEDURE — 96375 TX/PRO/DX INJ NEW DRUG ADDON: CPT

## 2023-01-01 PROCEDURE — 93306 TTE W/DOPPLER COMPLETE: CPT | Performed by: STUDENT IN AN ORGANIZED HEALTH CARE EDUCATION/TRAINING PROGRAM

## 2023-01-01 PROCEDURE — 96367 TX/PROPH/DG ADDL SEQ IV INF: CPT

## 2023-01-01 PROCEDURE — 2550000001 HC RX 255 CONTRASTS: Performed by: INTERNAL MEDICINE

## 2023-01-01 PROCEDURE — 87385 HISTOPLASMA CAPSUL AG IA: CPT

## 2023-01-01 PROCEDURE — 74018 RADEX ABDOMEN 1 VIEW: CPT | Performed by: RADIOLOGY

## 2023-01-01 PROCEDURE — 99203 OFFICE O/P NEW LOW 30 MIN: CPT | Performed by: STUDENT IN AN ORGANIZED HEALTH CARE EDUCATION/TRAINING PROGRAM

## 2023-01-01 PROCEDURE — 83735 ASSAY OF MAGNESIUM: CPT | Performed by: STUDENT IN AN ORGANIZED HEALTH CARE EDUCATION/TRAINING PROGRAM

## 2023-01-01 PROCEDURE — 87040 BLOOD CULTURE FOR BACTERIA: CPT

## 2023-01-01 PROCEDURE — 5A0935A ASSISTANCE WITH RESPIRATORY VENTILATION, LESS THAN 24 CONSECUTIVE HOURS, HIGH NASAL FLOW/VELOCITY: ICD-10-PCS | Performed by: INTERNAL MEDICINE

## 2023-01-01 PROCEDURE — 3430000001 HC RX 343 DIAGNOSTIC RADIOPHARMACEUTICALS

## 2023-01-01 PROCEDURE — 1170000001 HC PRIVATE ONCOLOGY ROOM DAILY

## 2023-01-01 PROCEDURE — 74018 RADEX ABDOMEN 1 VIEW: CPT

## 2023-01-01 PROCEDURE — 85300 ANTITHROMBIN III ACTIVITY: CPT | Performed by: HOSPITALIST

## 2023-01-01 PROCEDURE — 94762 N-INVAS EAR/PLS OXIMTRY CONT: CPT

## 2023-01-01 PROCEDURE — 87493 C DIFF AMPLIFIED PROBE: CPT

## 2023-01-01 PROCEDURE — 87635 SARS-COV-2 COVID-19 AMP PRB: CPT

## 2023-01-01 PROCEDURE — 87506 IADNA-DNA/RNA PROBE TQ 6-11: CPT | Mod: TRILAB | Performed by: NURSE PRACTITIONER

## 2023-01-01 PROCEDURE — 86920 COMPATIBILITY TEST SPIN: CPT

## 2023-01-01 PROCEDURE — A9540 TC99M MAA: HCPCS

## 2023-01-01 PROCEDURE — 84100 ASSAY OF PHOSPHORUS: CPT | Performed by: INTERNAL MEDICINE

## 2023-01-01 PROCEDURE — 92612 ENDOSCOPY SWALLOW (FEES) VID: CPT | Mod: GN

## 2023-01-01 PROCEDURE — 2500000001 HC RX 250 WO HCPCS SELF ADMINISTERED DRUGS (ALT 637 FOR MEDICARE OP): Performed by: HOSPITALIST

## 2023-01-01 PROCEDURE — 76376 3D RENDER W/INTRP POSTPROCES: CPT | Performed by: RADIOLOGY

## 2023-01-01 PROCEDURE — 92526 ORAL FUNCTION THERAPY: CPT | Mod: GN

## 2023-01-01 PROCEDURE — 97129 THER IVNTJ 1ST 15 MIN: CPT | Mod: GO

## 2023-01-01 PROCEDURE — 81001 URINALYSIS AUTO W/SCOPE: CPT | Performed by: HOSPITALIST

## 2023-01-01 PROCEDURE — 84439 ASSAY OF FREE THYROXINE: CPT

## 2023-01-01 PROCEDURE — 86140 C-REACTIVE PROTEIN: CPT | Performed by: HOSPITALIST

## 2023-01-01 PROCEDURE — 80048 BASIC METABOLIC PNL TOTAL CA: CPT | Performed by: HOSPITALIST

## 2023-01-01 PROCEDURE — 82310 ASSAY OF CALCIUM: CPT | Performed by: NURSE PRACTITIONER

## 2023-01-01 PROCEDURE — 94799 UNLISTED PULMONARY SVC/PX: CPT

## 2023-01-01 PROCEDURE — 85007 BL SMEAR W/DIFF WBC COUNT: CPT | Performed by: STUDENT IN AN ORGANIZED HEALTH CARE EDUCATION/TRAINING PROGRAM

## 2023-01-01 PROCEDURE — 36430 TRANSFUSION BLD/BLD COMPNT: CPT

## 2023-01-01 PROCEDURE — 82435 ASSAY OF BLOOD CHLORIDE: CPT | Performed by: HOSPITALIST

## 2023-01-01 PROCEDURE — 93306 TTE W/DOPPLER COMPLETE: CPT | Performed by: INTERNAL MEDICINE

## 2023-01-01 PROCEDURE — 99223 1ST HOSP IP/OBS HIGH 75: CPT | Performed by: NURSE PRACTITIONER

## 2023-01-01 PROCEDURE — 99497 ADVNCD CARE PLAN 30 MIN: CPT | Performed by: INTERNAL MEDICINE

## 2023-01-01 PROCEDURE — 84520 ASSAY OF UREA NITROGEN: CPT | Performed by: NURSE PRACTITIONER

## 2023-01-01 PROCEDURE — 74177 CT ABD & PELVIS W/CONTRAST: CPT | Performed by: RADIOLOGY

## 2023-01-01 RX ORDER — METOPROLOL TARTRATE 25 MG/1
25 TABLET, FILM COATED ORAL ONCE
Status: COMPLETED | OUTPATIENT
Start: 2023-01-01 | End: 2023-01-01

## 2023-01-01 RX ORDER — HYDROMORPHONE HCL/0.9% NACL/PF 15 MG/30ML
PATIENT CONTROLLED ANALGESIA SYRINGE INTRAVENOUS CONTINUOUS
Status: DISCONTINUED | OUTPATIENT
Start: 2023-01-01 | End: 2023-01-01

## 2023-01-01 RX ORDER — POTASSIUM CHLORIDE 1.5 G/1.58G
20 POWDER, FOR SOLUTION ORAL ONCE
Status: COMPLETED | OUTPATIENT
Start: 2023-01-01 | End: 2023-01-01

## 2023-01-01 RX ORDER — LOPERAMIDE HCL 1MG/7.5ML
2 LIQUID (ML) ORAL 4 TIMES DAILY PRN
Status: DISCONTINUED | OUTPATIENT
Start: 2023-01-01 | End: 2023-01-01 | Stop reason: HOSPADM

## 2023-01-01 RX ORDER — METOPROLOL TARTRATE 1 MG/ML
5 INJECTION, SOLUTION INTRAVENOUS ONCE
Status: COMPLETED | OUTPATIENT
Start: 2023-01-01 | End: 2023-01-01

## 2023-01-01 RX ORDER — ACETAMINOPHEN 325 MG/1
650 TABLET ORAL EVERY 8 HOURS PRN
Status: DISCONTINUED | OUTPATIENT
Start: 2023-01-01 | End: 2023-01-01 | Stop reason: HOSPADM

## 2023-01-01 RX ORDER — HEPARIN SODIUM 10000 [USP'U]/100ML
0-4000 INJECTION, SOLUTION INTRAVENOUS CONTINUOUS
Start: 2023-01-01 | End: 2023-01-01 | Stop reason: HOSPADM

## 2023-01-01 RX ORDER — PREDNISONE 10 MG/1
30 TABLET ORAL 2 TIMES DAILY
Qty: 42 TABLET | Refills: 0 | Status: CANCELLED | OUTPATIENT
Start: 2023-01-01 | End: 2023-01-01

## 2023-01-01 RX ORDER — FUROSEMIDE 20 MG/1
10 TABLET ORAL ONCE
Status: COMPLETED | OUTPATIENT
Start: 2023-01-01 | End: 2023-01-01

## 2023-01-01 RX ORDER — PANTOPRAZOLE SODIUM 40 MG/10ML
40 INJECTION, POWDER, LYOPHILIZED, FOR SOLUTION INTRAVENOUS DAILY
Status: DISCONTINUED | OUTPATIENT
Start: 2023-01-01 | End: 2023-01-01

## 2023-01-01 RX ORDER — ENOXAPARIN SODIUM 100 MG/ML
30 INJECTION SUBCUTANEOUS DAILY
Status: DISCONTINUED | OUTPATIENT
Start: 2023-01-01 | End: 2023-01-01

## 2023-01-01 RX ORDER — POLYETHYLENE GLYCOL 3350 17 G/17G
17 POWDER, FOR SOLUTION ORAL DAILY PRN
Status: DISCONTINUED | OUTPATIENT
Start: 2023-01-01 | End: 2023-01-01 | Stop reason: HOSPADM

## 2023-01-01 RX ORDER — ONDANSETRON 4 MG/1
4 TABLET, ORALLY DISINTEGRATING ORAL EVERY 8 HOURS PRN
Status: DISCONTINUED | OUTPATIENT
Start: 2023-01-01 | End: 2023-01-01 | Stop reason: HOSPADM

## 2023-01-01 RX ORDER — FUROSEMIDE 10 MG/ML
INJECTION INTRAMUSCULAR; INTRAVENOUS
Status: COMPLETED
Start: 2023-01-01 | End: 2023-01-01

## 2023-01-01 RX ORDER — INSULIN LISPRO 100 [IU]/ML
0-15 INJECTION, SOLUTION INTRAVENOUS; SUBCUTANEOUS
Status: DISCONTINUED | OUTPATIENT
Start: 2023-01-01 | End: 2023-01-01 | Stop reason: HOSPADM

## 2023-01-01 RX ORDER — HYDRALAZINE HYDROCHLORIDE 20 MG/ML
10 INJECTION INTRAMUSCULAR; INTRAVENOUS EVERY 8 HOURS PRN
Qty: 1 ML
Start: 2023-01-01 | End: 2023-01-01 | Stop reason: HOSPADM

## 2023-01-01 RX ORDER — ACETAMINOPHEN 325 MG/1
650 TABLET ORAL EVERY 4 HOURS PRN
Status: DISCONTINUED | OUTPATIENT
Start: 2023-01-01 | End: 2023-01-01

## 2023-01-01 RX ORDER — ENOXAPARIN SODIUM 100 MG/ML
40 INJECTION SUBCUTANEOUS DAILY
Status: DISCONTINUED | OUTPATIENT
Start: 2023-01-01 | End: 2023-01-01 | Stop reason: HOSPADM

## 2023-01-01 RX ORDER — HEPARIN SODIUM 10000 [USP'U]/100ML
0-4500 INJECTION, SOLUTION INTRAVENOUS CONTINUOUS
Status: DISCONTINUED | OUTPATIENT
Start: 2023-01-01 | End: 2023-01-01

## 2023-01-01 RX ORDER — TALC
3 POWDER (GRAM) TOPICAL NIGHTLY PRN
Status: DISCONTINUED | OUTPATIENT
Start: 2023-01-01 | End: 2023-01-01

## 2023-01-01 RX ORDER — INSULIN GLARGINE 100 [IU]/ML
24 INJECTION, SOLUTION SUBCUTANEOUS DAILY
Status: DISCONTINUED | OUTPATIENT
Start: 2023-01-01 | End: 2023-01-01

## 2023-01-01 RX ORDER — FUROSEMIDE 10 MG/ML
40 INJECTION INTRAMUSCULAR; INTRAVENOUS ONCE
Status: COMPLETED | OUTPATIENT
Start: 2023-01-01 | End: 2023-01-01

## 2023-01-01 RX ORDER — TRIAMTERENE AND HYDROCHLOROTHIAZIDE 75; 50 MG/1; MG/1
1 TABLET ORAL DAILY
COMMUNITY
Start: 2015-05-05 | End: 2023-01-01 | Stop reason: ALTCHOICE

## 2023-01-01 RX ORDER — QUETIAPINE FUMARATE 25 MG/1
12.5 TABLET, FILM COATED ORAL DAILY PRN
Status: DISCONTINUED | OUTPATIENT
Start: 2023-01-01 | End: 2023-01-01 | Stop reason: HOSPADM

## 2023-01-01 RX ORDER — INSULIN LISPRO 100 [IU]/ML
0-15 INJECTION, SOLUTION INTRAVENOUS; SUBCUTANEOUS
Qty: 13.5 ML | Refills: 0
Start: 2023-01-01 | End: 2024-01-01

## 2023-01-01 RX ORDER — NALOXONE HYDROCHLORIDE 0.4 MG/ML
0.2 INJECTION, SOLUTION INTRAMUSCULAR; INTRAVENOUS; SUBCUTANEOUS AS NEEDED
Status: DISCONTINUED | OUTPATIENT
Start: 2023-01-01 | End: 2023-01-01

## 2023-01-01 RX ORDER — TALC
3 POWDER (GRAM) TOPICAL NIGHTLY
Status: DISCONTINUED | OUTPATIENT
Start: 2023-01-01 | End: 2023-01-01 | Stop reason: HOSPADM

## 2023-01-01 RX ORDER — IPRATROPIUM BROMIDE AND ALBUTEROL SULFATE 2.5; .5 MG/3ML; MG/3ML
3 SOLUTION RESPIRATORY (INHALATION)
Status: DISCONTINUED | OUTPATIENT
Start: 2023-01-01 | End: 2023-01-01

## 2023-01-01 RX ORDER — SULFAMETHOXAZOLE AND TRIMETHOPRIM 200; 40 MG/5ML; MG/5ML
80 SUSPENSION ORAL
Status: DISCONTINUED | OUTPATIENT
Start: 2023-01-01 | End: 2023-01-01

## 2023-01-01 RX ORDER — LOPERAMIDE HYDROCHLORIDE 2 MG/1
2 CAPSULE ORAL 4 TIMES DAILY PRN
Status: DISCONTINUED | OUTPATIENT
Start: 2023-01-01 | End: 2023-01-01

## 2023-01-01 RX ORDER — FLUTICASONE PROPIONATE 50 MCG
1 SPRAY, SUSPENSION (ML) NASAL 2 TIMES DAILY
Qty: 16 G | Refills: 12 | Status: SHIPPED | OUTPATIENT
Start: 2023-01-01 | End: 2024-02-01

## 2023-01-01 RX ORDER — LANOLIN ALCOHOL/MO/W.PET/CERES
100 CREAM (GRAM) TOPICAL DAILY
Qty: 30 TABLET | Refills: 0 | Status: CANCELLED | OUTPATIENT
Start: 2023-01-01 | End: 2024-01-01

## 2023-01-01 RX ORDER — METHYLPREDNISOLONE 4 MG/1
TABLET ORAL
COMMUNITY
Start: 2017-02-18 | End: 2023-01-01 | Stop reason: ALTCHOICE

## 2023-01-01 RX ORDER — DEXAMETHASONE SODIUM PHOSPHATE 100 MG/10ML
10 INJECTION INTRAMUSCULAR; INTRAVENOUS EVERY 12 HOURS
Status: DISCONTINUED | OUTPATIENT
Start: 2023-01-01 | End: 2023-01-01

## 2023-01-01 RX ORDER — GUAIFENESIN 100 MG/5ML
200 SOLUTION ORAL EVERY 4 HOURS PRN
Status: DISCONTINUED | OUTPATIENT
Start: 2023-01-01 | End: 2023-01-01 | Stop reason: HOSPADM

## 2023-01-01 RX ORDER — ACETYLCYSTEINE 200 MG/ML
3 SOLUTION ORAL; RESPIRATORY (INHALATION)
Start: 2023-01-01 | End: 2023-01-01 | Stop reason: HOSPADM

## 2023-01-01 RX ORDER — DEXTROSE MONOHYDRATE 100 MG/ML
0.3 INJECTION, SOLUTION INTRAVENOUS ONCE AS NEEDED
Status: DISCONTINUED | OUTPATIENT
Start: 2023-01-01 | End: 2023-01-01 | Stop reason: HOSPADM

## 2023-01-01 RX ORDER — PREDNISONE 20 MG/1
80 TABLET ORAL 2 TIMES DAILY
Status: DISCONTINUED | OUTPATIENT
Start: 2023-01-01 | End: 2023-01-01

## 2023-01-01 RX ORDER — POLYETHYLENE GLYCOL 3350 17 G/17G
17 POWDER, FOR SOLUTION ORAL DAILY
Status: DISCONTINUED | OUTPATIENT
Start: 2023-01-01 | End: 2023-01-01

## 2023-01-01 RX ORDER — BISACODYL 5 MG
10 TABLET, DELAYED RELEASE (ENTERIC COATED) ORAL DAILY PRN
Status: DISCONTINUED | OUTPATIENT
Start: 2023-01-01 | End: 2023-01-01

## 2023-01-01 RX ORDER — BUDESONIDE 0.5 MG/2ML
0.5 INHALANT ORAL
Qty: 60 ML | Refills: 11
Start: 2023-01-01 | End: 2023-01-01 | Stop reason: HOSPADM

## 2023-01-01 RX ORDER — DEXAMETHASONE 4 MG/1
1 TABLET ORAL DAILY
COMMUNITY
Start: 2017-02-22 | End: 2023-01-01 | Stop reason: ALTCHOICE

## 2023-01-01 RX ORDER — POTASSIUM CHLORIDE 1.5 G/1.58G
40 POWDER, FOR SOLUTION ORAL ONCE
Status: COMPLETED | OUTPATIENT
Start: 2023-01-01 | End: 2023-01-01

## 2023-01-01 RX ORDER — DEXMEDETOMIDINE HYDROCHLORIDE 4 UG/ML
.1-1.5 INJECTION, SOLUTION INTRAVENOUS CONTINUOUS
Status: DISCONTINUED | OUTPATIENT
Start: 2023-01-01 | End: 2023-01-01

## 2023-01-01 RX ORDER — ACETAMINOPHEN 650 MG/1
650 SUPPOSITORY RECTAL EVERY 4 HOURS PRN
Qty: 12 SUPPOSITORY | Refills: 0 | Status: SHIPPED | OUTPATIENT
Start: 2023-01-01 | End: 2023-01-01 | Stop reason: HOSPADM

## 2023-01-01 RX ORDER — METOPROLOL TARTRATE 1 MG/ML
INJECTION, SOLUTION INTRAVENOUS
Status: DISPENSED
Start: 2023-01-01 | End: 2023-01-01

## 2023-01-01 RX ORDER — DEXTROSE MONOHYDRATE 100 MG/ML
0.3 INJECTION, SOLUTION INTRAVENOUS ONCE AS NEEDED
Status: DISCONTINUED | OUTPATIENT
Start: 2023-01-01 | End: 2023-01-01

## 2023-01-01 RX ORDER — METOPROLOL TARTRATE 1 MG/ML
INJECTION, SOLUTION INTRAVENOUS
Status: COMPLETED
Start: 2023-01-01 | End: 2023-01-01

## 2023-01-01 RX ORDER — GUAIFENESIN 100 MG/5ML
200 SOLUTION ORAL EVERY 4 HOURS PRN
Status: DISCONTINUED | OUTPATIENT
Start: 2023-01-01 | End: 2023-01-01

## 2023-01-01 RX ORDER — CIPROFLOXACIN 750 MG/1
750 TABLET, FILM COATED ORAL 2 TIMES DAILY
Status: DISCONTINUED | OUTPATIENT
Start: 2023-01-01 | End: 2023-01-01

## 2023-01-01 RX ORDER — GUAIFENESIN 600 MG/1
600 TABLET, EXTENDED RELEASE ORAL EVERY 12 HOURS PRN
Status: DISCONTINUED | OUTPATIENT
Start: 2023-01-01 | End: 2023-01-01

## 2023-01-01 RX ORDER — OFLOXACIN 300 MG/1
300 TABLET, COATED ORAL
COMMUNITY
Start: 2022-09-06 | End: 2023-01-01 | Stop reason: ALTCHOICE

## 2023-01-01 RX ORDER — TRAZODONE HYDROCHLORIDE 50 MG/1
25 TABLET ORAL NIGHTLY
Status: DISCONTINUED | OUTPATIENT
Start: 2023-01-01 | End: 2023-01-01

## 2023-01-01 RX ORDER — PREDNISONE 20 MG/1
40 TABLET ORAL 2 TIMES DAILY
Status: DISCONTINUED | OUTPATIENT
Start: 2023-01-01 | End: 2023-01-01

## 2023-01-01 RX ORDER — LANOLIN ALCOHOL/MO/W.PET/CERES
100 CREAM (GRAM) TOPICAL DAILY
Status: DISCONTINUED | OUTPATIENT
Start: 2023-01-01 | End: 2023-01-01 | Stop reason: HOSPADM

## 2023-01-01 RX ORDER — INSULIN LISPRO 100 [IU]/ML
0-10 INJECTION, SOLUTION INTRAVENOUS; SUBCUTANEOUS
Status: DISCONTINUED | OUTPATIENT
Start: 2023-01-01 | End: 2023-01-01

## 2023-01-01 RX ORDER — DIGOXIN 0.25 MG/ML
250 INJECTION INTRAMUSCULAR; INTRAVENOUS ONCE
Status: DISCONTINUED | OUTPATIENT
Start: 2023-01-01 | End: 2023-01-01

## 2023-01-01 RX ORDER — DEXAMETHASONE SODIUM PHOSPHATE 4 MG/ML
8 INJECTION, SOLUTION INTRA-ARTICULAR; INTRALESIONAL; INTRAMUSCULAR; INTRAVENOUS; SOFT TISSUE EVERY 24 HOURS
Status: DISCONTINUED | OUTPATIENT
Start: 2023-01-01 | End: 2023-01-01

## 2023-01-01 RX ORDER — IPRATROPIUM BROMIDE AND ALBUTEROL SULFATE 2.5; .5 MG/3ML; MG/3ML
3 SOLUTION RESPIRATORY (INHALATION) EVERY 4 HOURS PRN
Status: DISCONTINUED | OUTPATIENT
Start: 2023-01-01 | End: 2023-01-01 | Stop reason: HOSPADM

## 2023-01-01 RX ORDER — DEXMEDETOMIDINE HYDROCHLORIDE 4 UG/ML
.1-1.5 INJECTION, SOLUTION INTRAVENOUS CONTINUOUS
Status: DISCONTINUED | OUTPATIENT
Start: 2023-01-01 | End: 2023-01-01 | Stop reason: HOSPADM

## 2023-01-01 RX ORDER — INSULIN GLARGINE 100 [IU]/ML
15 INJECTION, SOLUTION SUBCUTANEOUS NIGHTLY
Status: DISCONTINUED | OUTPATIENT
Start: 2023-01-01 | End: 2023-01-01

## 2023-01-01 RX ORDER — SULFAMETHOXAZOLE AND TRIMETHOPRIM 400; 80 MG/1; MG/1
80 TABLET ORAL DAILY
Status: DISCONTINUED | OUTPATIENT
Start: 2023-01-01 | End: 2023-01-01

## 2023-01-01 RX ORDER — VANCOMYCIN 1.75 G/350ML
1250 INJECTION, SOLUTION INTRAVENOUS ONCE
Status: COMPLETED | OUTPATIENT
Start: 2023-01-01 | End: 2023-01-01

## 2023-01-01 RX ORDER — GLIPIZIDE 5 MG/1
2.5 TABLET ORAL
Status: DISCONTINUED | OUTPATIENT
Start: 2023-01-01 | End: 2023-01-01

## 2023-01-01 RX ORDER — HYDRALAZINE HYDROCHLORIDE 20 MG/ML
10 INJECTION INTRAMUSCULAR; INTRAVENOUS EVERY 8 HOURS PRN
Status: DISCONTINUED | OUTPATIENT
Start: 2023-01-01 | End: 2023-01-01

## 2023-01-01 RX ORDER — INSULIN GLARGINE 100 [IU]/ML
16 INJECTION, SOLUTION SUBCUTANEOUS NIGHTLY
Status: ON HOLD
Start: 2023-01-01 | End: 2023-01-01 | Stop reason: SDUPTHER

## 2023-01-01 RX ORDER — ACETAMINOPHEN 160 MG/5ML
650 SOLUTION ORAL EVERY 4 HOURS PRN
Status: DISCONTINUED | OUTPATIENT
Start: 2023-01-01 | End: 2023-01-01

## 2023-01-01 RX ORDER — DOCUSATE SODIUM 100 MG/1
100 CAPSULE, LIQUID FILLED ORAL 2 TIMES DAILY PRN
Status: DISCONTINUED | OUTPATIENT
Start: 2023-01-01 | End: 2023-01-01

## 2023-01-01 RX ORDER — BISACODYL 5 MG
10 TABLET, DELAYED RELEASE (ENTERIC COATED) ORAL DAILY PRN
Status: DISCONTINUED | OUTPATIENT
Start: 2023-01-01 | End: 2023-01-01 | Stop reason: HOSPADM

## 2023-01-01 RX ORDER — LIDOCAINE HYDROCHLORIDE 20 MG/ML
1 JELLY TOPICAL ONCE
Status: DISCONTINUED | OUTPATIENT
Start: 2023-01-01 | End: 2023-01-01 | Stop reason: HOSPADM

## 2023-01-01 RX ORDER — ERGOCALCIFEROL 1.25 MG/1
1250 CAPSULE ORAL
Qty: 4 CAPSULE | Refills: 0
Start: 2024-01-01 | End: 2024-02-01

## 2023-01-01 RX ORDER — HEPARIN SODIUM 10000 [USP'U]/100ML
0-4000 INJECTION, SOLUTION INTRAVENOUS CONTINUOUS
Status: DISCONTINUED | OUTPATIENT
Start: 2023-01-01 | End: 2023-01-01

## 2023-01-01 RX ORDER — ACETAMINOPHEN 160 MG/5ML
650 SOLUTION ORAL EVERY 8 HOURS PRN
Status: DISCONTINUED | OUTPATIENT
Start: 2023-01-01 | End: 2023-01-01

## 2023-01-01 RX ORDER — INSULIN GLARGINE 100 [IU]/ML
4 INJECTION, SOLUTION SUBCUTANEOUS ONCE
Status: DISCONTINUED | OUTPATIENT
Start: 2023-01-01 | End: 2023-01-01 | Stop reason: HOSPADM

## 2023-01-01 RX ORDER — ACETAMINOPHEN 325 MG/1
650 TABLET ORAL EVERY 8 HOURS PRN
Qty: 30 TABLET | Refills: 0 | Status: CANCELLED | OUTPATIENT
Start: 2023-01-01

## 2023-01-01 RX ORDER — LIDOCAINE 560 MG/1
1 PATCH PERCUTANEOUS; TOPICAL; TRANSDERMAL DAILY
Status: DISCONTINUED | OUTPATIENT
Start: 2023-01-01 | End: 2023-01-01 | Stop reason: HOSPADM

## 2023-01-01 RX ORDER — DEXTROSE 50 % IN WATER (D50W) INTRAVENOUS SYRINGE
25
Status: DISCONTINUED | OUTPATIENT
Start: 2023-01-01 | End: 2023-01-01

## 2023-01-01 RX ORDER — HYDROXYZINE HYDROCHLORIDE 25 MG/1
25 TABLET, FILM COATED ORAL
COMMUNITY
End: 2023-01-01 | Stop reason: ALTCHOICE

## 2023-01-01 RX ORDER — ONDANSETRON HYDROCHLORIDE 2 MG/ML
4 INJECTION, SOLUTION INTRAVENOUS EVERY 8 HOURS PRN
Status: DISCONTINUED | OUTPATIENT
Start: 2023-01-01 | End: 2023-01-01 | Stop reason: HOSPADM

## 2023-01-01 RX ORDER — IPRATROPIUM BROMIDE AND ALBUTEROL SULFATE 2.5; .5 MG/3ML; MG/3ML
3 SOLUTION RESPIRATORY (INHALATION) EVERY 4 HOURS PRN
Qty: 180 ML | Refills: 11
Start: 2023-01-01 | End: 2024-02-01

## 2023-01-01 RX ORDER — BETAMETHASONE DIPROPIONATE 0.5 MG/G
CREAM TOPICAL 2 TIMES DAILY
COMMUNITY
End: 2023-01-01 | Stop reason: ENTERED-IN-ERROR

## 2023-01-01 RX ORDER — FENTANYL CITRATE 50 UG/ML
INJECTION, SOLUTION INTRAMUSCULAR; INTRAVENOUS
Status: COMPLETED
Start: 2023-01-01 | End: 2023-01-01

## 2023-01-01 RX ORDER — METOPROLOL TARTRATE 25 MG/1
12.5 TABLET, FILM COATED ORAL 2 TIMES DAILY
Status: DISCONTINUED | OUTPATIENT
Start: 2023-01-01 | End: 2023-01-01

## 2023-01-01 RX ORDER — SULFAMETHOXAZOLE AND TRIMETHOPRIM 200; 40 MG/5ML; MG/5ML
160 SUSPENSION ORAL
Status: DISCONTINUED | OUTPATIENT
Start: 2023-01-01 | End: 2023-01-01

## 2023-01-01 RX ORDER — ACETAMINOPHEN 650 MG/1
650 SUPPOSITORY RECTAL EVERY 8 HOURS PRN
Status: DISCONTINUED | OUTPATIENT
Start: 2023-01-01 | End: 2023-01-01

## 2023-01-01 RX ORDER — TALC
3 POWDER (GRAM) TOPICAL NIGHTLY
Status: DISCONTINUED | OUTPATIENT
Start: 2023-01-01 | End: 2023-01-01 | Stop reason: SDUPTHER

## 2023-01-01 RX ORDER — QUETIAPINE FUMARATE 25 MG/1
12.5 TABLET, FILM COATED ORAL ONCE
Status: COMPLETED | OUTPATIENT
Start: 2023-01-01 | End: 2023-01-01

## 2023-01-01 RX ORDER — INSULIN GLARGINE 100 [IU]/ML
8 INJECTION, SOLUTION SUBCUTANEOUS NIGHTLY
Qty: 2.4 ML | Refills: 0
Start: 2023-01-01 | End: 2024-01-01

## 2023-01-01 RX ORDER — SODIUM BICARBONATE 650 MG/1
650 TABLET ORAL 3 TIMES DAILY
Status: DISCONTINUED | OUTPATIENT
Start: 2023-01-01 | End: 2023-01-01

## 2023-01-01 RX ORDER — ACETAMINOPHEN 500 MG
50000 TABLET ORAL
Status: DISCONTINUED | OUTPATIENT
Start: 2023-01-01 | End: 2023-01-01

## 2023-01-01 RX ORDER — QUETIAPINE FUMARATE 25 MG/1
12.5 TABLET, FILM COATED ORAL
Status: DISCONTINUED | OUTPATIENT
Start: 2023-01-01 | End: 2023-01-01

## 2023-01-01 RX ORDER — CEPHALEXIN 500 MG/1
500 CAPSULE ORAL EVERY 24 HOURS
COMMUNITY
Start: 2021-11-19 | End: 2023-01-01 | Stop reason: ALTCHOICE

## 2023-01-01 RX ORDER — HEPARIN SODIUM 5000 [USP'U]/ML
INJECTION, SOLUTION INTRAVENOUS; SUBCUTANEOUS AS NEEDED
Status: DISCONTINUED | OUTPATIENT
Start: 2023-01-01 | End: 2023-01-01 | Stop reason: HOSPADM

## 2023-01-01 RX ORDER — HEPARIN SODIUM 5000 [USP'U]/ML
5000 INJECTION, SOLUTION INTRAVENOUS; SUBCUTANEOUS EVERY 8 HOURS
Status: DISCONTINUED | OUTPATIENT
Start: 2023-01-01 | End: 2023-01-01

## 2023-01-01 RX ORDER — LORAZEPAM 2 MG/ML
0.5 INJECTION INTRAMUSCULAR 4 TIMES DAILY PRN
Status: DISCONTINUED | OUTPATIENT
Start: 2023-01-01 | End: 2023-01-01 | Stop reason: HOSPADM

## 2023-01-01 RX ORDER — GLIPIZIDE 5 MG/1
5 TABLET ORAL
Status: DISCONTINUED | OUTPATIENT
Start: 2023-01-01 | End: 2023-01-01

## 2023-01-01 RX ORDER — CIPROFLOXACIN 750 MG/1
750 TABLET, FILM COATED ORAL 2 TIMES DAILY
Qty: 4 TABLET | Refills: 0
Start: 2023-01-01 | End: 2023-01-01 | Stop reason: SDUPTHER

## 2023-01-01 RX ORDER — NAPROXEN 250 MG/1
TABLET ORAL
COMMUNITY
End: 2023-01-01 | Stop reason: ALTCHOICE

## 2023-01-01 RX ORDER — ALBUTEROL SULFATE 0.83 MG/ML
2.5 SOLUTION RESPIRATORY (INHALATION) 2 TIMES DAILY
Status: DISCONTINUED | OUTPATIENT
Start: 2023-01-01 | End: 2023-01-01

## 2023-01-01 RX ORDER — MEROPENEM 500 MG/1
500 INJECTION, POWDER, FOR SOLUTION INTRAVENOUS EVERY 12 HOURS
Status: DISCONTINUED | OUTPATIENT
Start: 2023-01-01 | End: 2023-01-01

## 2023-01-01 RX ORDER — ACETAMINOPHEN 650 MG/1
650 SUPPOSITORY RECTAL EVERY 4 HOURS PRN
Status: DISCONTINUED | OUTPATIENT
Start: 2023-01-01 | End: 2023-01-01 | Stop reason: HOSPADM

## 2023-01-01 RX ORDER — PREDNISONE 20 MG/1
60 TABLET ORAL ONCE
Status: CANCELLED | OUTPATIENT
Start: 2023-01-01

## 2023-01-01 RX ORDER — HEPARIN SODIUM 5000 [USP'U]/ML
INJECTION, SOLUTION INTRAVENOUS; SUBCUTANEOUS AS NEEDED
Status: DISCONTINUED | OUTPATIENT
Start: 2023-01-01 | End: 2023-01-01

## 2023-01-01 RX ORDER — ACETAMINOPHEN 650 MG/1
650 SUPPOSITORY RECTAL EVERY 4 HOURS PRN
Status: DISCONTINUED | OUTPATIENT
Start: 2023-01-01 | End: 2023-01-01

## 2023-01-01 RX ORDER — IPRATROPIUM BROMIDE AND ALBUTEROL SULFATE 2.5; .5 MG/3ML; MG/3ML
3 SOLUTION RESPIRATORY (INHALATION) EVERY 2 HOUR PRN
Status: DISCONTINUED | OUTPATIENT
Start: 2023-01-01 | End: 2023-01-01 | Stop reason: HOSPADM

## 2023-01-01 RX ORDER — AMOXICILLIN 250 MG
1 CAPSULE ORAL 2 TIMES DAILY
Status: DISCONTINUED | OUTPATIENT
Start: 2023-01-01 | End: 2023-01-01 | Stop reason: HOSPADM

## 2023-01-01 RX ORDER — DOXYCYCLINE 40 MG/1
40 CAPSULE ORAL
COMMUNITY
Start: 2016-04-21 | End: 2023-01-01 | Stop reason: ALTCHOICE

## 2023-01-01 RX ORDER — VANCOMYCIN 750 MG/150ML
750 INJECTION, SOLUTION INTRAVENOUS EVERY 24 HOURS
Status: DISCONTINUED | OUTPATIENT
Start: 2023-01-01 | End: 2023-01-01

## 2023-01-01 RX ORDER — DUPILUMAB 300 MG/2ML
300 INJECTION, SOLUTION SUBCUTANEOUS
COMMUNITY
End: 2023-01-01 | Stop reason: HOSPADM

## 2023-01-01 RX ORDER — DIGOXIN 0.25 MG/ML
125 INJECTION INTRAMUSCULAR; INTRAVENOUS ONCE
Status: DISCONTINUED | OUTPATIENT
Start: 2023-01-01 | End: 2023-01-01

## 2023-01-01 RX ORDER — HYDROMORPHONE HYDROCHLORIDE 1 MG/ML
0.2 INJECTION, SOLUTION INTRAMUSCULAR; INTRAVENOUS; SUBCUTANEOUS ONCE
Status: COMPLETED | OUTPATIENT
Start: 2023-01-01 | End: 2023-01-01

## 2023-01-01 RX ORDER — SULFAMETHOXAZOLE AND TRIMETHOPRIM 400; 80 MG/1; MG/1
1 TABLET ORAL 2 TIMES DAILY
Qty: 8 TABLET
Start: 2023-01-01 | End: 2024-01-01

## 2023-01-01 RX ORDER — NITROFURANTOIN 25; 75 MG/1; MG/1
100 CAPSULE ORAL
COMMUNITY
End: 2023-01-01 | Stop reason: ENTERED-IN-ERROR

## 2023-01-01 RX ORDER — PANTOPRAZOLE SODIUM 40 MG/1
40 TABLET, DELAYED RELEASE ORAL
Start: 2023-01-01 | End: 2023-01-01 | Stop reason: HOSPADM

## 2023-01-01 RX ORDER — INSULIN GLARGINE 100 [IU]/ML
8 INJECTION, SOLUTION SUBCUTANEOUS NIGHTLY
Status: DISCONTINUED | OUTPATIENT
Start: 2023-01-01 | End: 2023-01-01 | Stop reason: HOSPADM

## 2023-01-01 RX ORDER — SODIUM CHLORIDE FOR INHALATION 3 %
3 VIAL, NEBULIZER (ML) INHALATION 2 TIMES DAILY
Status: DISCONTINUED | OUTPATIENT
Start: 2023-01-01 | End: 2023-01-01

## 2023-01-01 RX ORDER — QUETIAPINE FUMARATE 25 MG/1
25 TABLET, FILM COATED ORAL NIGHTLY
Status: DISCONTINUED | OUTPATIENT
Start: 2023-01-01 | End: 2023-01-01 | Stop reason: HOSPADM

## 2023-01-01 RX ORDER — PREDNISONE 10 MG/1
TABLET ORAL
Qty: 42 TABLET | Refills: 0
Start: 2023-01-01 | End: 2024-01-01

## 2023-01-01 RX ORDER — ACETAMINOPHEN 500 MG
5 TABLET ORAL NIGHTLY
Qty: 30 TABLET | Refills: 0
Start: 2023-01-01 | End: 2024-01-01

## 2023-01-01 RX ORDER — ESTRADIOL 0.1 MG/G
CREAM VAGINAL
COMMUNITY
End: 2023-01-01 | Stop reason: ENTERED-IN-ERROR

## 2023-01-01 RX ORDER — FUROSEMIDE 10 MG/ML
20 INJECTION INTRAMUSCULAR; INTRAVENOUS 2 TIMES DAILY
Status: DISCONTINUED | OUTPATIENT
Start: 2023-01-01 | End: 2023-01-01

## 2023-01-01 RX ORDER — ACETAMINOPHEN 325 MG/1
650 TABLET ORAL EVERY 4 HOURS PRN
Qty: 30 TABLET | Refills: 0 | Status: SHIPPED | OUTPATIENT
Start: 2023-01-01 | End: 2023-01-01 | Stop reason: HOSPADM

## 2023-01-01 RX ORDER — PANTOPRAZOLE SODIUM 40 MG/1
40 TABLET, DELAYED RELEASE ORAL
Status: DISCONTINUED | OUTPATIENT
Start: 2023-01-01 | End: 2023-01-01

## 2023-01-01 RX ORDER — LIDOCAINE 560 MG/1
1 PATCH PERCUTANEOUS; TOPICAL; TRANSDERMAL DAILY
Qty: 10 PATCH
Start: 2023-01-01 | End: 2024-01-01

## 2023-01-01 RX ORDER — PREDNISONE 20 MG/1
80 TABLET ORAL DAILY
Status: COMPLETED | OUTPATIENT
Start: 2023-01-01 | End: 2023-01-01

## 2023-01-01 RX ORDER — LIDOCAINE 560 MG/1
1 PATCH PERCUTANEOUS; TOPICAL; TRANSDERMAL DAILY
Status: CANCELLED | OUTPATIENT
Start: 2023-01-01

## 2023-01-01 RX ORDER — HEPARIN SODIUM 5000 [USP'U]/ML
80 INJECTION, SOLUTION INTRAVENOUS; SUBCUTANEOUS ONCE
Status: COMPLETED | OUTPATIENT
Start: 2023-01-01 | End: 2023-01-01

## 2023-01-01 RX ORDER — ACETAMINOPHEN 325 MG/1
650 TABLET ORAL EVERY 8 HOURS PRN
Status: DISCONTINUED | OUTPATIENT
Start: 2023-01-01 | End: 2023-01-01

## 2023-01-01 RX ORDER — FUROSEMIDE 10 MG/ML
20 INJECTION INTRAMUSCULAR; INTRAVENOUS ONCE
Status: COMPLETED | OUTPATIENT
Start: 2023-01-01 | End: 2023-01-01

## 2023-01-01 RX ORDER — INSULIN GLARGINE 100 [IU]/ML
24 INJECTION, SOLUTION SUBCUTANEOUS NIGHTLY
Status: DISCONTINUED | OUTPATIENT
Start: 2023-01-01 | End: 2023-01-01

## 2023-01-01 RX ORDER — LIDOCAINE 560 MG/1
1 PATCH PERCUTANEOUS; TOPICAL; TRANSDERMAL DAILY
Qty: 10 PATCH | Refills: 0
Start: 2023-01-01 | End: 2024-01-01

## 2023-01-01 RX ORDER — IPRATROPIUM BROMIDE AND ALBUTEROL SULFATE 2.5; .5 MG/3ML; MG/3ML
3 SOLUTION RESPIRATORY (INHALATION) EVERY 2 HOUR PRN
Qty: 180 ML | Refills: 11 | Status: SHIPPED | OUTPATIENT
Start: 2023-01-01 | End: 2023-01-01 | Stop reason: HOSPADM

## 2023-01-01 RX ORDER — HEPARIN SODIUM 10000 [USP'U]/100ML
0-4000 INJECTION, SOLUTION INTRAVENOUS CONTINUOUS
Status: CANCELLED | OUTPATIENT
Start: 2023-01-01

## 2023-01-01 RX ORDER — PANTOPRAZOLE SODIUM 40 MG/1
40 TABLET, DELAYED RELEASE ORAL
Status: DISCONTINUED | OUTPATIENT
Start: 2023-01-01 | End: 2023-01-01 | Stop reason: HOSPADM

## 2023-01-01 RX ORDER — HEPARIN SODIUM 5000 [USP'U]/ML
INJECTION, SOLUTION INTRAVENOUS; SUBCUTANEOUS EVERY 4 HOURS PRN
Status: CANCELLED | OUTPATIENT
Start: 2023-01-01

## 2023-01-01 RX ORDER — AMOXICILLIN 250 MG
1 CAPSULE ORAL 2 TIMES DAILY
Qty: 60 TABLET | Refills: 0
Start: 2023-01-01 | End: 2024-01-01

## 2023-01-01 RX ORDER — PREDNISONE 20 MG/1
20 TABLET ORAL ONCE
Status: COMPLETED | OUTPATIENT
Start: 2023-01-01 | End: 2023-01-01

## 2023-01-01 RX ORDER — IVERMECTIN 10 MG/G
CREAM TOPICAL
COMMUNITY
End: 2023-01-01 | Stop reason: ENTERED-IN-ERROR

## 2023-01-01 RX ORDER — DEXAMETHASONE 6 MG/1
6 TABLET ORAL DAILY
Qty: 7 TABLET | Refills: 0 | Status: SHIPPED | OUTPATIENT
Start: 2023-01-01 | End: 2023-01-01 | Stop reason: ENTERED-IN-ERROR

## 2023-01-01 RX ORDER — QUETIAPINE FUMARATE 25 MG/1
25 TABLET, FILM COATED ORAL NIGHTLY
Qty: 30 TABLET | Refills: 0
Start: 2023-01-01 | End: 2024-01-01

## 2023-01-01 RX ORDER — FUROSEMIDE 10 MG/ML
60 INJECTION INTRAMUSCULAR; INTRAVENOUS ONCE
Status: COMPLETED | OUTPATIENT
Start: 2023-01-01 | End: 2023-01-01

## 2023-01-01 RX ORDER — IPRATROPIUM BROMIDE AND ALBUTEROL SULFATE 2.5; .5 MG/3ML; MG/3ML
3 SOLUTION RESPIRATORY (INHALATION) EVERY 2 HOUR PRN
Status: DISCONTINUED | OUTPATIENT
Start: 2023-01-01 | End: 2023-01-01

## 2023-01-01 RX ORDER — DOCUSATE SODIUM 100 MG/1
100 CAPSULE, LIQUID FILLED ORAL 2 TIMES DAILY PRN
Status: DISCONTINUED | OUTPATIENT
Start: 2023-01-01 | End: 2023-01-01 | Stop reason: HOSPADM

## 2023-01-01 RX ORDER — PREDNISONE 20 MG/1
60 TABLET ORAL ONCE
Status: COMPLETED | OUTPATIENT
Start: 2023-01-01 | End: 2023-01-01

## 2023-01-01 RX ORDER — DIPHENHYDRAMINE HCL 25 MG
25 CAPSULE ORAL ONCE
Status: COMPLETED | OUTPATIENT
Start: 2023-01-01 | End: 2023-01-01

## 2023-01-01 RX ORDER — INSULIN LISPRO 100 [IU]/ML
0-5 INJECTION, SOLUTION INTRAVENOUS; SUBCUTANEOUS
Status: DISCONTINUED | OUTPATIENT
Start: 2023-01-01 | End: 2023-01-01

## 2023-01-01 RX ORDER — ACETAMINOPHEN 325 MG/1
325 TABLET ORAL EVERY 4 HOURS
COMMUNITY
End: 2023-01-01 | Stop reason: ENTERED-IN-ERROR

## 2023-01-01 RX ORDER — HYDROCODONE BITARTRATE AND HOMATROPINE METHYLBROMIDE ORAL SOLUTION 5; 1.5 MG/5ML; MG/5ML
5 LIQUID ORAL ONCE
Status: COMPLETED | OUTPATIENT
Start: 2023-01-01 | End: 2023-01-01

## 2023-01-01 RX ORDER — NYSTATIN 100000 [USP'U]/ML
4 SUSPENSION ORAL 2 TIMES DAILY
Status: DISCONTINUED | OUTPATIENT
Start: 2023-01-01 | End: 2023-01-01

## 2023-01-01 RX ORDER — LIDOCAINE 560 MG/1
1 PATCH PERCUTANEOUS; TOPICAL; TRANSDERMAL DAILY
Status: COMPLETED | OUTPATIENT
Start: 2023-01-01 | End: 2023-01-01

## 2023-01-01 RX ORDER — ACETAMINOPHEN, ASPIRIN (NSAID), AND CAFFEINE 250; 250; 65 MG/1; MG/1; MG/1
TABLET, FILM COATED ORAL
COMMUNITY
End: 2023-01-01 | Stop reason: ENTERED-IN-ERROR

## 2023-01-01 RX ORDER — HEPARIN SODIUM 5000 [USP'U]/ML
4000 INJECTION, SOLUTION INTRAVENOUS; SUBCUTANEOUS ONCE
Status: COMPLETED | OUTPATIENT
Start: 2023-01-01 | End: 2023-01-01

## 2023-01-01 RX ORDER — DIGOXIN 0.25 MG/ML
500 INJECTION INTRAMUSCULAR; INTRAVENOUS ONCE
Status: COMPLETED | OUTPATIENT
Start: 2023-01-01 | End: 2023-01-01

## 2023-01-01 RX ORDER — PREDNISONE 20 MG/1
60 TABLET ORAL 2 TIMES DAILY
Status: DISCONTINUED | OUTPATIENT
Start: 2023-01-01 | End: 2023-01-01

## 2023-01-01 RX ORDER — INSULIN GLARGINE 100 [IU]/ML
10 INJECTION, SOLUTION SUBCUTANEOUS NIGHTLY
Status: DISCONTINUED | OUTPATIENT
Start: 2023-01-01 | End: 2023-01-01

## 2023-01-01 RX ORDER — HYDRALAZINE HYDROCHLORIDE 20 MG/ML
10 INJECTION INTRAMUSCULAR; INTRAVENOUS EVERY 8 HOURS PRN
Status: DISCONTINUED | OUTPATIENT
Start: 2023-01-01 | End: 2023-01-01 | Stop reason: HOSPADM

## 2023-01-01 RX ORDER — FENTANYL CITRATE 50 UG/ML
25 INJECTION, SOLUTION INTRAMUSCULAR; INTRAVENOUS ONCE
Status: COMPLETED | OUTPATIENT
Start: 2023-01-01 | End: 2023-01-01

## 2023-01-01 RX ORDER — INSULIN GLARGINE 100 [IU]/ML
16 INJECTION, SOLUTION SUBCUTANEOUS NIGHTLY
Status: DISCONTINUED | OUTPATIENT
Start: 2023-01-01 | End: 2023-01-01 | Stop reason: HOSPADM

## 2023-01-01 RX ORDER — CIPROFLOXACIN 750 MG/1
750 TABLET, FILM COATED ORAL 2 TIMES DAILY
Status: DISCONTINUED | OUTPATIENT
Start: 2023-01-01 | End: 2023-01-01 | Stop reason: HOSPADM

## 2023-01-01 RX ORDER — DEXTROSE 50 % IN WATER (D50W) INTRAVENOUS SYRINGE
25
Status: DISCONTINUED | OUTPATIENT
Start: 2023-01-01 | End: 2023-01-01 | Stop reason: HOSPADM

## 2023-01-01 RX ORDER — INSULIN GLARGINE 100 [IU]/ML
7.5 INJECTION, SOLUTION SUBCUTANEOUS NIGHTLY
Status: DISCONTINUED | OUTPATIENT
Start: 2023-01-01 | End: 2023-01-01

## 2023-01-01 RX ORDER — AMLODIPINE BESYLATE 2.5 MG/1
2.5 TABLET ORAL DAILY
Status: DISCONTINUED | OUTPATIENT
Start: 2023-01-01 | End: 2023-01-01

## 2023-01-01 RX ORDER — DEXAMETHASONE SODIUM PHOSPHATE 10 MG/ML
6 INJECTION INTRAMUSCULAR; INTRAVENOUS EVERY 24 HOURS
Status: DISCONTINUED | OUTPATIENT
Start: 2023-01-01 | End: 2023-01-01

## 2023-01-01 RX ORDER — OXYCODONE HYDROCHLORIDE 5 MG/1
5 TABLET ORAL EVERY 6 HOURS PRN
Status: DISCONTINUED | OUTPATIENT
Start: 2023-01-01 | End: 2023-01-01 | Stop reason: HOSPADM

## 2023-01-01 RX ORDER — HYDROCORTISONE 1 %
1 CREAM (GRAM) TOPICAL DAILY
COMMUNITY

## 2023-01-01 RX ORDER — IPRATROPIUM BROMIDE AND ALBUTEROL SULFATE 2.5; .5 MG/3ML; MG/3ML
3 SOLUTION RESPIRATORY (INHALATION)
Start: 2023-01-01 | End: 2023-01-01 | Stop reason: HOSPADM

## 2023-01-01 RX ORDER — TRAZODONE HYDROCHLORIDE 50 MG/1
25 TABLET ORAL ONCE
Status: COMPLETED | OUTPATIENT
Start: 2023-01-01 | End: 2023-01-01

## 2023-01-01 RX ORDER — GUAIFENESIN 100 MG/5ML
200 SOLUTION ORAL EVERY 4 HOURS PRN
Qty: 120 ML | Refills: 0 | Status: SHIPPED | OUTPATIENT
Start: 2023-01-01 | End: 2023-01-01 | Stop reason: HOSPADM

## 2023-01-01 RX ORDER — LIDOCAINE 560 MG/1
1 PATCH PERCUTANEOUS; TOPICAL; TRANSDERMAL DAILY
Status: DISCONTINUED | OUTPATIENT
Start: 2023-01-01 | End: 2023-01-01

## 2023-01-01 RX ORDER — BUDESONIDE 0.5 MG/2ML
0.5 INHALANT ORAL
Status: DISCONTINUED | OUTPATIENT
Start: 2023-01-01 | End: 2023-01-01

## 2023-01-01 RX ORDER — ONDANSETRON 4 MG/1
4 TABLET, ORALLY DISINTEGRATING ORAL EVERY 8 HOURS PRN
Qty: 20 TABLET | Refills: 0 | Status: CANCELLED | OUTPATIENT
Start: 2023-01-01

## 2023-01-01 RX ORDER — QUETIAPINE FUMARATE 25 MG/1
12.5 TABLET, FILM COATED ORAL DAILY PRN
Status: DISCONTINUED | OUTPATIENT
Start: 2023-01-01 | End: 2023-01-01

## 2023-01-01 RX ORDER — METOPROLOL TARTRATE 75 MG/1
75 TABLET, FILM COATED ORAL 2 TIMES DAILY
Qty: 60 TABLET | Refills: 0
Start: 2023-01-01 | End: 2024-01-01

## 2023-01-01 RX ORDER — PHENAZOPYRIDINE HYDROCHLORIDE 200 MG/1
200 TABLET, FILM COATED ORAL
COMMUNITY
Start: 2022-09-15 | End: 2023-01-01 | Stop reason: ALTCHOICE

## 2023-01-01 RX ORDER — TALC
6 POWDER (GRAM) TOPICAL NIGHTLY PRN
Status: DISCONTINUED | OUTPATIENT
Start: 2023-01-01 | End: 2023-01-01

## 2023-01-01 RX ORDER — ACETAMINOPHEN 160 MG/5ML
650 SOLUTION ORAL EVERY 4 HOURS PRN
Status: DISCONTINUED | OUTPATIENT
Start: 2023-01-01 | End: 2023-01-01 | Stop reason: HOSPADM

## 2023-01-01 RX ORDER — LIDOCAINE HYDROCHLORIDE 20 MG/ML
1 JELLY TOPICAL ONCE
Status: COMPLETED | OUTPATIENT
Start: 2023-01-01 | End: 2023-01-01

## 2023-01-01 RX ORDER — MAGNESIUM HYDROXIDE 2400 MG/10ML
5 SUSPENSION ORAL
COMMUNITY
Start: 2022-12-14 | End: 2023-01-01 | Stop reason: ENTERED-IN-ERROR

## 2023-01-01 RX ORDER — ACETYLCYSTEINE 200 MG/ML
3 SOLUTION ORAL; RESPIRATORY (INHALATION)
Status: DISCONTINUED | OUTPATIENT
Start: 2023-01-01 | End: 2023-01-01 | Stop reason: HOSPADM

## 2023-01-01 RX ORDER — METOPROLOL TARTRATE 50 MG/1
50 TABLET ORAL 2 TIMES DAILY
Status: DISCONTINUED | OUTPATIENT
Start: 2023-01-01 | End: 2023-01-01

## 2023-01-01 RX ORDER — HEPARIN SODIUM 5000 [USP'U]/ML
3000-6000 INJECTION, SOLUTION INTRAVENOUS; SUBCUTANEOUS AS NEEDED
Status: DISCONTINUED | OUTPATIENT
Start: 2023-01-01 | End: 2023-01-01

## 2023-01-01 RX ORDER — BUDESONIDE 0.5 MG/2ML
0.5 INHALANT ORAL
Status: DISCONTINUED | OUTPATIENT
Start: 2023-01-01 | End: 2023-01-01 | Stop reason: HOSPADM

## 2023-01-01 RX ORDER — ACETYLCYSTEINE 200 MG/ML
6 SOLUTION ORAL; RESPIRATORY (INHALATION)
Status: DISCONTINUED | OUTPATIENT
Start: 2023-01-01 | End: 2023-01-01

## 2023-01-01 RX ORDER — METOPROLOL TARTRATE 1 MG/ML
5 INJECTION, SOLUTION INTRAVENOUS ONCE
Status: DISCONTINUED | OUTPATIENT
Start: 2023-01-01 | End: 2023-01-01

## 2023-01-01 RX ORDER — CIPROFLOXACIN 750 MG/1
750 TABLET, FILM COATED ORAL 2 TIMES DAILY
Qty: 4 TABLET | Refills: 0 | Status: SHIPPED | OUTPATIENT
Start: 2023-01-01 | End: 2023-01-01

## 2023-01-01 RX ORDER — HEPARIN SODIUM 10000 [USP'U]/100ML
0-4000 INJECTION, SOLUTION INTRAVENOUS CONTINUOUS
Status: DISCONTINUED | OUTPATIENT
Start: 2023-01-01 | End: 2023-01-01 | Stop reason: HOSPADM

## 2023-01-01 RX ORDER — INSULIN LISPRO 100 [IU]/ML
0-5 INJECTION, SOLUTION INTRAVENOUS; SUBCUTANEOUS EVERY 4 HOURS
Status: DISCONTINUED | OUTPATIENT
Start: 2023-01-01 | End: 2023-01-01

## 2023-01-01 RX ORDER — PANTOPRAZOLE SODIUM 40 MG/1
40 TABLET, DELAYED RELEASE ORAL
Qty: 30 TABLET | Refills: 0
Start: 2023-01-01 | End: 2024-01-01

## 2023-01-01 RX ORDER — DEXAMETHASONE SODIUM PHOSPHATE 10 MG/ML
10 INJECTION INTRAMUSCULAR; INTRAVENOUS EVERY 12 HOURS
Status: DISCONTINUED | OUTPATIENT
Start: 2023-01-01 | End: 2023-01-01 | Stop reason: HOSPADM

## 2023-01-01 RX ORDER — NAPROXEN SODIUM 220 MG
220 TABLET ORAL
COMMUNITY
Start: 2016-04-21 | End: 2023-01-01 | Stop reason: ENTERED-IN-ERROR

## 2023-01-01 RX ORDER — HEPARIN SODIUM 5000 [USP'U]/ML
4000 INJECTION, SOLUTION INTRAVENOUS; SUBCUTANEOUS ONCE
Status: CANCELLED | OUTPATIENT
Start: 2023-01-01 | End: 2023-01-01

## 2023-01-01 RX ORDER — CIPROFLOXACIN 500 MG/1
500 TABLET ORAL EVERY 12 HOURS SCHEDULED
Status: COMPLETED | OUTPATIENT
Start: 2023-01-01 | End: 2023-01-01

## 2023-01-01 RX ORDER — IPRATROPIUM BROMIDE AND ALBUTEROL SULFATE 2.5; .5 MG/3ML; MG/3ML
3 SOLUTION RESPIRATORY (INHALATION)
Status: DISCONTINUED | OUTPATIENT
Start: 2023-01-01 | End: 2023-01-01 | Stop reason: HOSPADM

## 2023-01-01 RX ORDER — DEXMEDETOMIDINE HYDROCHLORIDE 4 UG/ML
.1-1.5 INJECTION, SOLUTION INTRAVENOUS CONTINUOUS
Start: 2023-01-01 | End: 2023-01-01 | Stop reason: HOSPADM

## 2023-01-01 RX ORDER — ESOMEPRAZOLE MAGNESIUM 40 MG/1
40 GRANULE, DELAYED RELEASE ORAL
Status: DISCONTINUED | OUTPATIENT
Start: 2023-01-01 | End: 2023-01-01

## 2023-01-01 RX ORDER — LORAZEPAM 2 MG/ML
0.5 INJECTION INTRAMUSCULAR 4 TIMES DAILY PRN
Status: DISCONTINUED | OUTPATIENT
Start: 2023-01-01 | End: 2023-01-01

## 2023-01-01 RX ORDER — DEXAMETHASONE SODIUM PHOSPHATE 100 MG/10ML
10 INJECTION INTRAMUSCULAR; INTRAVENOUS DAILY
Status: DISCONTINUED | OUTPATIENT
Start: 2023-01-01 | End: 2023-01-01

## 2023-01-01 RX ORDER — METOPROLOL TARTRATE 25 MG/1
12.5 TABLET, FILM COATED ORAL EVERY 6 HOURS
Status: DISCONTINUED | OUTPATIENT
Start: 2023-01-01 | End: 2023-01-01

## 2023-01-01 RX ORDER — ACETAMINOPHEN 160 MG/5ML
650 SOLUTION ORAL EVERY 4 HOURS PRN
Start: 2023-01-01 | End: 2023-01-01 | Stop reason: HOSPADM

## 2023-01-01 RX ORDER — ONDANSETRON HYDROCHLORIDE 2 MG/ML
4 INJECTION, SOLUTION INTRAVENOUS EVERY 8 HOURS PRN
Status: DISCONTINUED | OUTPATIENT
Start: 2023-01-01 | End: 2023-01-01

## 2023-01-01 RX ORDER — FLUTICASONE PROPIONATE 50 MCG
1 SPRAY, SUSPENSION (ML) NASAL 2 TIMES DAILY
Status: DISCONTINUED | OUTPATIENT
Start: 2023-01-01 | End: 2023-01-01 | Stop reason: HOSPADM

## 2023-01-01 RX ORDER — MEROPENEM 500 MG/1
500 INJECTION, POWDER, FOR SOLUTION INTRAVENOUS EVERY 12 HOURS
Status: DISCONTINUED | OUTPATIENT
Start: 2023-01-01 | End: 2023-01-01 | Stop reason: HOSPADM

## 2023-01-01 RX ORDER — FLUTICASONE PROPIONATE 50 MCG
1 SPRAY, SUSPENSION (ML) NASAL 2 TIMES DAILY
Status: DISCONTINUED | OUTPATIENT
Start: 2023-01-01 | End: 2023-01-01

## 2023-01-01 RX ORDER — ENOXAPARIN SODIUM 100 MG/ML
40 INJECTION SUBCUTANEOUS EVERY 24 HOURS
Status: DISCONTINUED | OUTPATIENT
Start: 2023-01-01 | End: 2023-01-01

## 2023-01-01 RX ORDER — ACETYLCYSTEINE 200 MG/ML
3 SOLUTION ORAL; RESPIRATORY (INHALATION)
Status: DISCONTINUED | OUTPATIENT
Start: 2023-01-01 | End: 2023-01-01

## 2023-01-01 RX ORDER — SULFAMETHOXAZOLE AND TRIMETHOPRIM 400; 80 MG/1; MG/1
80 TABLET ORAL DAILY
Status: DISCONTINUED | OUTPATIENT
Start: 2023-01-01 | End: 2023-01-01 | Stop reason: HOSPADM

## 2023-01-01 RX ORDER — METOPROLOL TARTRATE 25 MG/1
12.5 TABLET, FILM COATED ORAL ONCE
Status: COMPLETED | OUTPATIENT
Start: 2023-01-01 | End: 2023-01-01

## 2023-01-01 RX ORDER — OXYCODONE HYDROCHLORIDE 5 MG/1
5 TABLET ORAL EVERY 8 HOURS PRN
Status: DISCONTINUED | OUTPATIENT
Start: 2023-01-01 | End: 2023-01-01

## 2023-01-01 RX ORDER — FEXOFENADINE HCL AND PSEUDOEPHEDRINE HCI 60; 120 MG/1; MG/1
1 TABLET, EXTENDED RELEASE ORAL
COMMUNITY
Start: 2016-04-21 | End: 2023-01-01 | Stop reason: ENTERED-IN-ERROR

## 2023-01-01 RX ORDER — DICLOFENAC SODIUM 10 MG/G
4 GEL TOPICAL 4 TIMES DAILY PRN
Status: DISCONTINUED | OUTPATIENT
Start: 2023-01-01 | End: 2023-01-01 | Stop reason: HOSPADM

## 2023-01-01 RX ORDER — LOSARTAN POTASSIUM 100 MG/1
100 TABLET ORAL EVERY 24 HOURS
COMMUNITY
End: 2023-01-01 | Stop reason: HOSPADM

## 2023-01-01 RX ORDER — BENZONATATE 100 MG/1
200 CAPSULE ORAL 3 TIMES DAILY PRN
Status: DISCONTINUED | OUTPATIENT
Start: 2023-01-01 | End: 2023-01-01

## 2023-01-01 RX ORDER — ACETAMINOPHEN 500 MG
5 TABLET ORAL
Status: DISCONTINUED | OUTPATIENT
Start: 2023-01-01 | End: 2023-01-01 | Stop reason: HOSPADM

## 2023-01-01 RX ORDER — CEFTRIAXONE 1 G/50ML
1 INJECTION, SOLUTION INTRAVENOUS ONCE
Status: COMPLETED | OUTPATIENT
Start: 2023-01-01 | End: 2023-01-01

## 2023-01-01 RX ORDER — DEXAMETHASONE SODIUM PHOSPHATE 10 MG/ML
10 INJECTION INTRAMUSCULAR; INTRAVENOUS EVERY 12 HOURS
Start: 2023-01-01 | End: 2023-01-01 | Stop reason: HOSPADM

## 2023-01-01 RX ORDER — HYDROMORPHONE HYDROCHLORIDE 1 MG/ML
INJECTION, SOLUTION INTRAMUSCULAR; INTRAVENOUS; SUBCUTANEOUS
Status: COMPLETED
Start: 2023-01-01 | End: 2023-01-01

## 2023-01-01 RX ORDER — LOSARTAN POTASSIUM 50 MG/1
100 TABLET ORAL DAILY
Status: DISCONTINUED | OUTPATIENT
Start: 2023-01-01 | End: 2023-01-01

## 2023-01-01 RX ORDER — DIGOXIN 0.25 MG/ML
INJECTION INTRAMUSCULAR; INTRAVENOUS
Status: COMPLETED
Start: 2023-01-01 | End: 2023-01-01

## 2023-01-01 RX ORDER — ESOMEPRAZOLE MAGNESIUM 40 MG/1
40 GRANULE, DELAYED RELEASE ORAL DAILY
Status: DISCONTINUED | OUTPATIENT
Start: 2023-01-01 | End: 2023-01-01

## 2023-01-01 RX ORDER — POLYETHYLENE GLYCOL 3350 17 G/17G
17 POWDER, FOR SOLUTION ORAL DAILY PRN
Status: DISCONTINUED | OUTPATIENT
Start: 2023-01-01 | End: 2023-01-01

## 2023-01-01 RX ORDER — LOSARTAN POTASSIUM 100 MG/1
100 TABLET ORAL DAILY
COMMUNITY
End: 2023-01-01 | Stop reason: HOSPADM

## 2023-01-01 RX ORDER — DIGOXIN 0.25 MG/ML
62.5 INJECTION INTRAMUSCULAR; INTRAVENOUS
Status: DISCONTINUED | OUTPATIENT
Start: 2023-01-01 | End: 2023-01-01

## 2023-01-01 RX ORDER — INSULIN LISPRO 100 [IU]/ML
0-10 INJECTION, SOLUTION INTRAVENOUS; SUBCUTANEOUS EVERY 4 HOURS
Status: DISCONTINUED | OUTPATIENT
Start: 2023-01-01 | End: 2023-01-01

## 2023-01-01 RX ORDER — ERGOCALCIFEROL 1.25 MG/1
1250 CAPSULE ORAL
Status: DISCONTINUED | OUTPATIENT
Start: 2023-01-01 | End: 2023-01-01 | Stop reason: HOSPADM

## 2023-01-01 RX ORDER — METOPROLOL TARTRATE 25 MG/1
25 TABLET, FILM COATED ORAL EVERY 6 HOURS
Status: DISCONTINUED | OUTPATIENT
Start: 2023-01-01 | End: 2023-01-01

## 2023-01-01 RX ORDER — ACETAMINOPHEN 325 MG/1
650 TABLET ORAL EVERY 4 HOURS PRN
Status: DISCONTINUED | OUTPATIENT
Start: 2023-01-01 | End: 2023-01-01 | Stop reason: HOSPADM

## 2023-01-01 RX ORDER — LEVOCETIRIZINE DIHYDROCHLORIDE 5 MG/1
5 TABLET, FILM COATED ORAL
COMMUNITY
End: 2023-01-01 | Stop reason: ALTCHOICE

## 2023-01-01 RX ORDER — ACETAMINOPHEN 325 MG/1
650 TABLET ORAL EVERY 8 HOURS PRN
Qty: 30 TABLET | Refills: 0
Start: 2023-01-01 | End: 2024-01-01

## 2023-01-01 RX ORDER — MEROPENEM 500 MG/1
500 INJECTION, POWDER, FOR SOLUTION INTRAVENOUS EVERY 12 HOURS
Start: 2023-01-01 | End: 2023-01-01 | Stop reason: HOSPADM

## 2023-01-01 RX ORDER — TRIAMCINOLONE ACETONIDE 1 MG/G
CREAM TOPICAL 2 TIMES DAILY
COMMUNITY
End: 2023-01-01 | Stop reason: ENTERED-IN-ERROR

## 2023-01-01 RX ORDER — DIGOXIN 0.25 MG/ML
125 INJECTION INTRAMUSCULAR; INTRAVENOUS DAILY
Status: DISCONTINUED | OUTPATIENT
Start: 2023-01-01 | End: 2023-01-01

## 2023-01-01 RX ORDER — HYDROXYZINE HYDROCHLORIDE 10 MG/1
10 TABLET, FILM COATED ORAL EVERY 6 HOURS PRN
Status: DISCONTINUED | OUTPATIENT
Start: 2023-01-01 | End: 2023-01-01

## 2023-01-01 RX ORDER — OXYCODONE HYDROCHLORIDE 5 MG/1
5 TABLET ORAL EVERY 6 HOURS PRN
Qty: 15 TABLET | Refills: 0 | Status: SHIPPED
Start: 2023-01-01 | End: 2024-01-01

## 2023-01-01 RX ORDER — LANOLIN ALCOHOL/MO/W.PET/CERES
100 CREAM (GRAM) TOPICAL DAILY
Qty: 30 TABLET | Refills: 0
Start: 2023-01-01 | End: 2024-01-01

## 2023-01-01 RX ORDER — TRAMADOL HYDROCHLORIDE 50 MG/1
50 TABLET ORAL 2 TIMES DAILY
COMMUNITY
Start: 2022-12-14 | End: 2023-01-01 | Stop reason: ENTERED-IN-ERROR

## 2023-01-01 RX ORDER — HEPARIN SODIUM 5000 [USP'U]/ML
2000-4000 INJECTION, SOLUTION INTRAVENOUS; SUBCUTANEOUS EVERY 4 HOURS PRN
Status: DISCONTINUED | OUTPATIENT
Start: 2023-01-01 | End: 2023-01-01

## 2023-01-01 RX ORDER — TALC
3 POWDER (GRAM) TOPICAL NIGHTLY PRN
Status: DISCONTINUED | OUTPATIENT
Start: 2023-01-01 | End: 2023-01-01 | Stop reason: HOSPADM

## 2023-01-01 RX ORDER — DEXAMETHASONE SODIUM PHOSPHATE 4 MG/ML
8 INJECTION, SOLUTION INTRA-ARTICULAR; INTRALESIONAL; INTRAMUSCULAR; INTRAVENOUS; SOFT TISSUE ONCE
Status: COMPLETED | OUTPATIENT
Start: 2023-01-01 | End: 2023-01-01

## 2023-01-01 RX ORDER — METOPROLOL TARTRATE 50 MG/1
50 TABLET ORAL 2 TIMES DAILY
Qty: 60 TABLET | Refills: 0 | Status: CANCELLED | OUTPATIENT
Start: 2023-01-01 | End: 2024-01-01

## 2023-01-01 RX ORDER — SODIUM CHLORIDE 9 MG/ML
100 INJECTION, SOLUTION INTRAVENOUS CONTINUOUS
Status: DISCONTINUED | OUTPATIENT
Start: 2023-01-01 | End: 2023-01-01

## 2023-01-01 RX ORDER — CIPROFLOXACIN 250 MG/1
250 TABLET, FILM COATED ORAL 2 TIMES DAILY
Status: DISCONTINUED | OUTPATIENT
Start: 2023-01-01 | End: 2023-01-01

## 2023-01-01 RX ORDER — LEVOCETIRIZINE DIHYDROCHLORIDE 5 MG/1
5 TABLET, FILM COATED ORAL EVERY EVENING
COMMUNITY

## 2023-01-01 RX ADMIN — FLUTICASONE PROPIONATE 1 SPRAY: 50 SPRAY, METERED NASAL at 10:12

## 2023-01-01 RX ADMIN — INSULIN LISPRO 2 UNITS: 100 INJECTION, SOLUTION INTRAVENOUS; SUBCUTANEOUS at 12:15

## 2023-01-01 RX ADMIN — ACETAMINOPHEN 650 MG: 325 TABLET ORAL at 20:32

## 2023-01-01 RX ADMIN — APIXABAN 5 MG: 5 TABLET, FILM COATED ORAL at 08:12

## 2023-01-01 RX ADMIN — DEXAMETHASONE SODIUM PHOSPHATE 6 MG: 10 INJECTION INTRAMUSCULAR; INTRAVENOUS at 21:11

## 2023-01-01 RX ADMIN — QUETIAPINE 25 MG: 25 TABLET, FILM COATED ORAL at 20:46

## 2023-01-01 RX ADMIN — SENNOSIDES AND DOCUSATE SODIUM 1 TABLET: 8.6; 5 TABLET ORAL at 08:26

## 2023-01-01 RX ADMIN — BENZONATATE 200 MG: 100 CAPSULE ORAL at 21:47

## 2023-01-01 RX ADMIN — APIXABAN 5 MG: 5 TABLET, FILM COATED ORAL at 10:13

## 2023-01-01 RX ADMIN — APIXABAN 5 MG: 5 TABLET, FILM COATED ORAL at 20:13

## 2023-01-01 RX ADMIN — SENNOSIDES AND DOCUSATE SODIUM 1 TABLET: 8.6; 5 TABLET ORAL at 08:59

## 2023-01-01 RX ADMIN — INSULIN LISPRO 2 UNITS: 100 INJECTION, SOLUTION INTRAVENOUS; SUBCUTANEOUS at 08:34

## 2023-01-01 RX ADMIN — MELATONIN 6 MG: 3 TAB ORAL at 20:30

## 2023-01-01 RX ADMIN — PREDNISONE 30 MG: 10 TABLET ORAL at 08:32

## 2023-01-01 RX ADMIN — METOPROLOL TARTRATE 50 MG: 50 TABLET, FILM COATED ORAL at 20:50

## 2023-01-01 RX ADMIN — Medication 5 MG: at 21:11

## 2023-01-01 RX ADMIN — PANTOPRAZOLE SODIUM 40 MG: 40 TABLET, DELAYED RELEASE ORAL at 07:00

## 2023-01-01 RX ADMIN — PANTOPRAZOLE SODIUM 40 MG: 40 TABLET, DELAYED RELEASE ORAL at 06:34

## 2023-01-01 RX ADMIN — Medication: at 19:00

## 2023-01-01 RX ADMIN — Medication 3 MG: at 20:32

## 2023-01-01 RX ADMIN — Medication 30 L/MIN: at 19:40

## 2023-01-01 RX ADMIN — APIXABAN 5 MG: 5 TABLET, FILM COATED ORAL at 08:58

## 2023-01-01 RX ADMIN — SODIUM BICARBONATE 650 MG: 650 TABLET ORAL at 15:12

## 2023-01-01 RX ADMIN — METOPROLOL TARTRATE 50 MG: 50 TABLET, FILM COATED ORAL at 08:27

## 2023-01-01 RX ADMIN — PREDNISONE 60 MG: 20 TABLET ORAL at 21:28

## 2023-01-01 RX ADMIN — HEPARIN SODIUM 5000 UNITS: 5000 INJECTION, SOLUTION INTRAVENOUS; SUBCUTANEOUS at 00:04

## 2023-01-01 RX ADMIN — METHYLPREDNISOLONE SODIUM SUCCINATE 125 MG: 125 INJECTION, POWDER, LYOPHILIZED, FOR SOLUTION INTRAMUSCULAR; INTRAVENOUS at 23:35

## 2023-01-01 RX ADMIN — PIPERACILLIN SODIUM AND TAZOBACTAM SODIUM 2.25 G: 2; .25 INJECTION, SOLUTION INTRAVENOUS at 19:30

## 2023-01-01 RX ADMIN — SODIUM BICARBONATE 650 MG: 650 TABLET ORAL at 09:19

## 2023-01-01 RX ADMIN — CIPROFLOXACIN HYDROCHLORIDE 500 MG: 500 TABLET, FILM COATED ORAL at 18:09

## 2023-01-01 RX ADMIN — PREDNISONE 50 MG: 20 TABLET ORAL at 12:20

## 2023-01-01 RX ADMIN — INSULIN LISPRO 4 UNITS: 100 INJECTION, SOLUTION INTRAVENOUS; SUBCUTANEOUS at 04:03

## 2023-01-01 RX ADMIN — SODIUM BICARBONATE 650 MG: 650 TABLET ORAL at 21:06

## 2023-01-01 RX ADMIN — GLIPIZIDE 2.5 MG: 5 TABLET ORAL at 06:36

## 2023-01-01 RX ADMIN — SODIUM BICARBONATE 650 MG: 650 TABLET ORAL at 15:13

## 2023-01-01 RX ADMIN — TRAZODONE HYDROCHLORIDE 25 MG: 50 TABLET ORAL at 21:45

## 2023-01-01 RX ADMIN — METOPROLOL TARTRATE 50 MG: 50 TABLET, FILM COATED ORAL at 08:46

## 2023-01-01 RX ADMIN — PREDNISONE 50 MG: 20 TABLET ORAL at 11:46

## 2023-01-01 RX ADMIN — Medication: at 07:27

## 2023-01-01 RX ADMIN — LIDOCAINE 1 PATCH: 4 PATCH TOPICAL at 20:50

## 2023-01-01 RX ADMIN — INSULIN LISPRO 4 UNITS: 100 INJECTION, SOLUTION INTRAVENOUS; SUBCUTANEOUS at 16:07

## 2023-01-01 RX ADMIN — INSULIN LISPRO 4 UNITS: 100 INJECTION, SOLUTION INTRAVENOUS; SUBCUTANEOUS at 12:22

## 2023-01-01 RX ADMIN — APIXABAN 5 MG: 5 TABLET, FILM COATED ORAL at 08:42

## 2023-01-01 RX ADMIN — SENNOSIDES AND DOCUSATE SODIUM 1 TABLET: 8.6; 5 TABLET ORAL at 20:41

## 2023-01-01 RX ADMIN — CIPROFLOXACIN 750 MG: 250 TABLET, COATED ORAL at 08:25

## 2023-01-01 RX ADMIN — OXYCODONE HYDROCHLORIDE 5 MG: 5 TABLET ORAL at 11:04

## 2023-01-01 RX ADMIN — INSULIN LISPRO 2 UNITS: 100 INJECTION, SOLUTION INTRAVENOUS; SUBCUTANEOUS at 20:57

## 2023-01-01 RX ADMIN — IPRATROPIUM BROMIDE AND ALBUTEROL SULFATE 3 ML: 2.5; .5 SOLUTION RESPIRATORY (INHALATION) at 06:46

## 2023-01-01 RX ADMIN — SULFAMETHOXAZOLE AND TRIMETHOPRIM 80 MG: 400; 80 TABLET ORAL at 09:27

## 2023-01-01 RX ADMIN — PREDNISONE 30 MG: 10 TABLET ORAL at 21:11

## 2023-01-01 RX ADMIN — PREDNISONE 30 MG: 10 TABLET ORAL at 20:37

## 2023-01-01 RX ADMIN — INSULIN LISPRO 4 UNITS: 100 INJECTION, SOLUTION INTRAVENOUS; SUBCUTANEOUS at 04:11

## 2023-01-01 RX ADMIN — ALBUTEROL SULFATE 2.5 MG: 2.5 SOLUTION RESPIRATORY (INHALATION) at 09:39

## 2023-01-01 RX ADMIN — POTASSIUM PHOSPHATE, MONOBASIC POTASSIUM PHOSPHATE, DIBASIC 30 MMOL: 224; 236 INJECTION, SOLUTION, CONCENTRATE INTRAVENOUS at 22:57

## 2023-01-01 RX ADMIN — QUETIAPINE 25 MG: 25 TABLET, FILM COATED ORAL at 20:21

## 2023-01-01 RX ADMIN — Medication 2 G: at 00:38

## 2023-01-01 RX ADMIN — Medication 5 MG: at 20:50

## 2023-01-01 RX ADMIN — HYDROXYZINE HYDROCHLORIDE 10 MG: 10 TABLET ORAL at 12:30

## 2023-01-01 RX ADMIN — CIPROFLOXACIN HYDROCHLORIDE 500 MG: 500 TABLET, FILM COATED ORAL at 17:01

## 2023-01-01 RX ADMIN — IPRATROPIUM BROMIDE AND ALBUTEROL SULFATE 3 ML: 2.5; .5 SOLUTION RESPIRATORY (INHALATION) at 07:27

## 2023-01-01 RX ADMIN — PIPERACILLIN SODIUM AND TAZOBACTAM SODIUM 3.38 G: 3; .375 INJECTION, SOLUTION INTRAVENOUS at 16:46

## 2023-01-01 RX ADMIN — NYSTATIN 400000 UNITS: 100000 SUSPENSION ORAL at 21:15

## 2023-01-01 RX ADMIN — APIXABAN 10 MG: 5 TABLET, FILM COATED ORAL at 21:45

## 2023-01-01 RX ADMIN — ALBUTEROL SULFATE 2.5 MG: 2.5 SOLUTION RESPIRATORY (INHALATION) at 21:35

## 2023-01-01 RX ADMIN — DEXAMETHASONE SODIUM PHOSPHATE 6 MG: 10 INJECTION INTRAMUSCULAR; INTRAVENOUS at 21:07

## 2023-01-01 RX ADMIN — ACETAMINOPHEN 650 MG: 325 TABLET ORAL at 20:41

## 2023-01-01 RX ADMIN — TRAZODONE HYDROCHLORIDE 25 MG: 50 TABLET ORAL at 20:11

## 2023-01-01 RX ADMIN — INSULIN LISPRO 4 UNITS: 100 INJECTION, SOLUTION INTRAVENOUS; SUBCUTANEOUS at 17:30

## 2023-01-01 RX ADMIN — SENNOSIDES AND DOCUSATE SODIUM 1 TABLET: 8.6; 5 TABLET ORAL at 20:36

## 2023-01-01 RX ADMIN — HYDROXYZINE HYDROCHLORIDE 10 MG: 10 TABLET ORAL at 20:46

## 2023-01-01 RX ADMIN — PANTOPRAZOLE SODIUM 40 MG: 40 TABLET, DELAYED RELEASE ORAL at 05:11

## 2023-01-01 RX ADMIN — Medication 2 G: at 00:04

## 2023-01-01 RX ADMIN — Medication 3 MG: at 20:04

## 2023-01-01 RX ADMIN — SODIUM ZIRCONIUM CYCLOSILICATE 10 G: 10 POWDER, FOR SUSPENSION ORAL at 09:00

## 2023-01-01 RX ADMIN — SULFAMETHOXAZOLE AND TRIMETHOPRIM 80 MG: 400; 80 TABLET ORAL at 08:44

## 2023-01-01 RX ADMIN — Medication 5 MG: at 20:22

## 2023-01-01 RX ADMIN — METOPROLOL TARTRATE 25 MG: 25 TABLET, FILM COATED ORAL at 16:50

## 2023-01-01 RX ADMIN — QUETIAPINE 25 MG: 25 TABLET, FILM COATED ORAL at 20:23

## 2023-01-01 RX ADMIN — Medication 5 MG: at 17:49

## 2023-01-01 RX ADMIN — INSULIN LISPRO 2 UNITS: 100 INJECTION, SOLUTION INTRAVENOUS; SUBCUTANEOUS at 11:31

## 2023-01-01 RX ADMIN — METOPROLOL TARTRATE 25 MG: 25 TABLET, FILM COATED ORAL at 16:27

## 2023-01-01 RX ADMIN — INSULIN LISPRO 2 UNITS: 100 INJECTION, SOLUTION INTRAVENOUS; SUBCUTANEOUS at 04:27

## 2023-01-01 RX ADMIN — DEXAMETHASONE SODIUM PHOSPHATE 6 MG: 10 INJECTION INTRAMUSCULAR; INTRAVENOUS at 21:58

## 2023-01-01 RX ADMIN — ENOXAPARIN SODIUM 30 MG: 30 INJECTION SUBCUTANEOUS at 05:00

## 2023-01-01 RX ADMIN — INSULIN GLARGINE 15 UNITS: 100 INJECTION, SOLUTION SUBCUTANEOUS at 21:54

## 2023-01-01 RX ADMIN — NYSTATIN 400000 UNITS: 100000 SUSPENSION ORAL at 08:54

## 2023-01-01 RX ADMIN — PANTOPRAZOLE SODIUM 40 MG: 40 TABLET, DELAYED RELEASE ORAL at 10:38

## 2023-01-01 RX ADMIN — PREDNISONE 80 MG: 20 TABLET ORAL at 08:46

## 2023-01-01 RX ADMIN — OXYCODONE HYDROCHLORIDE 5 MG: 5 TABLET ORAL at 09:35

## 2023-01-01 RX ADMIN — LIDOCAINE 1 PATCH: 4 PATCH TOPICAL at 08:44

## 2023-01-01 RX ADMIN — ALBUTEROL SULFATE 2.5 MG: 2.5 SOLUTION RESPIRATORY (INHALATION) at 21:42

## 2023-01-01 RX ADMIN — Medication 3 MG: at 21:15

## 2023-01-01 RX ADMIN — Medication 30 L/MIN: at 18:58

## 2023-01-01 RX ADMIN — SULFAMETHOXAZOLE AND TRIMETHOPRIM 160 MG OF TRIMETHOPRIM: 200; 40 SUSPENSION ORAL at 08:31

## 2023-01-01 RX ADMIN — ALBUTEROL SULFATE 2.5 MG: 2.5 SOLUTION RESPIRATORY (INHALATION) at 09:09

## 2023-01-01 RX ADMIN — ERGOCALCIFEROL 1250 MCG: 1.25 CAPSULE ORAL at 09:14

## 2023-01-01 RX ADMIN — ENOXAPARIN SODIUM 30 MG: 30 INJECTION SUBCUTANEOUS at 12:30

## 2023-01-01 RX ADMIN — LIDOCAINE 1 PATCH: 4 PATCH TOPICAL at 20:30

## 2023-01-01 RX ADMIN — PANTOPRAZOLE SODIUM 40 MG: 40 TABLET, DELAYED RELEASE ORAL at 06:26

## 2023-01-01 RX ADMIN — Medication: at 07:28

## 2023-01-01 RX ADMIN — MEROPENEM 500 MG: 500 INJECTION, POWDER, FOR SOLUTION INTRAVENOUS at 13:50

## 2023-01-01 RX ADMIN — AMLODIPINE BESYLATE 2.5 MG: 2.5 TABLET ORAL at 09:06

## 2023-01-01 RX ADMIN — IPRATROPIUM BROMIDE AND ALBUTEROL SULFATE 3 ML: 2.5; .5 SOLUTION RESPIRATORY (INHALATION) at 19:30

## 2023-01-01 RX ADMIN — Medication: at 15:00

## 2023-01-01 RX ADMIN — Medication 3 MG: at 20:50

## 2023-01-01 RX ADMIN — APIXABAN 5 MG: 5 TABLET, FILM COATED ORAL at 20:36

## 2023-01-01 RX ADMIN — FLUTICASONE PROPIONATE 1 SPRAY: 50 SPRAY, METERED NASAL at 20:52

## 2023-01-01 RX ADMIN — KIT FOR THE PREPARATION OF TECHNETIUM TC 99M ALBUMIN AGGREGATED 4.2 MILLICURIE: 2.5 INJECTION, POWDER, FOR SOLUTION INTRAVENOUS at 20:44

## 2023-01-01 RX ADMIN — INSULIN LISPRO 4 UNITS: 100 INJECTION, SOLUTION INTRAVENOUS; SUBCUTANEOUS at 09:10

## 2023-01-01 RX ADMIN — INSULIN LISPRO 2 UNITS: 100 INJECTION, SOLUTION INTRAVENOUS; SUBCUTANEOUS at 23:59

## 2023-01-01 RX ADMIN — INSULIN LISPRO 1 UNITS: 100 INJECTION, SOLUTION INTRAVENOUS; SUBCUTANEOUS at 12:31

## 2023-01-01 RX ADMIN — NYSTATIN 400000 UNITS: 100000 SUSPENSION ORAL at 21:50

## 2023-01-01 RX ADMIN — Medication 5 MG: at 18:12

## 2023-01-01 RX ADMIN — PREDNISONE 60 MG: 20 TABLET ORAL at 20:23

## 2023-01-01 RX ADMIN — SULFAMETHOXAZOLE AND TRIMETHOPRIM 80 MG: 400; 80 TABLET ORAL at 08:29

## 2023-01-01 RX ADMIN — Medication 30 L/MIN: at 19:00

## 2023-01-01 RX ADMIN — ALBUTEROL SULFATE 2.5 MG: 2.5 SOLUTION RESPIRATORY (INHALATION) at 20:27

## 2023-01-01 RX ADMIN — FLUTICASONE PROPIONATE 1 SPRAY: 50 SPRAY, METERED NASAL at 21:15

## 2023-01-01 RX ADMIN — Medication: at 07:00

## 2023-01-01 RX ADMIN — SODIUM ZIRCONIUM CYCLOSILICATE 10 G: 10 POWDER, FOR SUSPENSION ORAL at 08:58

## 2023-01-01 RX ADMIN — METOPROLOL TARTRATE 25 MG: 25 TABLET, FILM COATED ORAL at 22:57

## 2023-01-01 RX ADMIN — INSULIN LISPRO 2 UNITS: 100 INJECTION, SOLUTION INTRAVENOUS; SUBCUTANEOUS at 18:24

## 2023-01-01 RX ADMIN — DOXYCYCLINE 100 MG: 100 INJECTION, POWDER, LYOPHILIZED, FOR SOLUTION INTRAVENOUS at 19:45

## 2023-01-01 RX ADMIN — THIAMINE HCL TAB 100 MG 100 MG: 100 TAB at 09:48

## 2023-01-01 RX ADMIN — IPRATROPIUM BROMIDE AND ALBUTEROL SULFATE 3 ML: 2.5; .5 SOLUTION RESPIRATORY (INHALATION) at 07:12

## 2023-01-01 RX ADMIN — ACETAMINOPHEN 650 MG: 325 TABLET ORAL at 13:09

## 2023-01-01 RX ADMIN — ACETYLCYSTEINE 600 MG: 200 SOLUTION ORAL; RESPIRATORY (INHALATION) at 07:22

## 2023-01-01 RX ADMIN — Medication: at 01:00

## 2023-01-01 RX ADMIN — DIGOXIN 500 MCG: 0.25 INJECTION INTRAMUSCULAR; INTRAVENOUS at 00:25

## 2023-01-01 RX ADMIN — IPRATROPIUM BROMIDE AND ALBUTEROL SULFATE 3 ML: 2.5; .5 SOLUTION RESPIRATORY (INHALATION) at 12:15

## 2023-01-01 RX ADMIN — Medication 40 L/MIN: at 12:47

## 2023-01-01 RX ADMIN — PREDNISONE 50 MG: 20 TABLET ORAL at 17:40

## 2023-01-01 RX ADMIN — PIPERACILLIN SODIUM AND TAZOBACTAM SODIUM 3.38 G: 3; .375 INJECTION, SOLUTION INTRAVENOUS at 10:26

## 2023-01-01 RX ADMIN — ESOMEPRAZOLE MAGNESIUM 40 MG: 40 FOR SUSPENSION ORAL at 05:00

## 2023-01-01 RX ADMIN — INSULIN LISPRO 3 UNITS: 100 INJECTION, SOLUTION INTRAVENOUS; SUBCUTANEOUS at 12:14

## 2023-01-01 RX ADMIN — QUETIAPINE 25 MG: 25 TABLET, FILM COATED ORAL at 21:19

## 2023-01-01 RX ADMIN — VANCOMYCIN 750 MG: 750 INJECTION, SOLUTION INTRAVENOUS at 15:10

## 2023-01-01 RX ADMIN — APIXABAN 5 MG: 5 TABLET, FILM COATED ORAL at 09:28

## 2023-01-01 RX ADMIN — FLUTICASONE PROPIONATE 1 SPRAY: 50 SPRAY, METERED NASAL at 09:48

## 2023-01-01 RX ADMIN — FLUTICASONE PROPIONATE 1 SPRAY: 50 SPRAY, METERED NASAL at 09:00

## 2023-01-01 RX ADMIN — SULFAMETHOXAZOLE AND TRIMETHOPRIM 80 MG: 400; 80 TABLET ORAL at 08:20

## 2023-01-01 RX ADMIN — INSULIN LISPRO 6 UNITS: 100 INJECTION, SOLUTION INTRAVENOUS; SUBCUTANEOUS at 16:51

## 2023-01-01 RX ADMIN — DEXAMETHASONE SODIUM PHOSPHATE 6 MG: 10 INJECTION INTRAMUSCULAR; INTRAVENOUS at 20:32

## 2023-01-01 RX ADMIN — LOPERAMIDE HYDROCHLORIDE 2 MG: 2 SOLUTION ORAL at 21:20

## 2023-01-01 RX ADMIN — IPRATROPIUM BROMIDE AND ALBUTEROL SULFATE 3 ML: 2.5; .5 SOLUTION RESPIRATORY (INHALATION) at 07:28

## 2023-01-01 RX ADMIN — INSULIN LISPRO 6 UNITS: 100 INJECTION, SOLUTION INTRAVENOUS; SUBCUTANEOUS at 08:36

## 2023-01-01 RX ADMIN — METOPROLOL TARTRATE 50 MG: 50 TABLET, FILM COATED ORAL at 21:11

## 2023-01-01 RX ADMIN — IPRATROPIUM BROMIDE AND ALBUTEROL SULFATE 3 ML: 2.5; .5 SOLUTION RESPIRATORY (INHALATION) at 12:00

## 2023-01-01 RX ADMIN — Medication 5 MG: at 20:21

## 2023-01-01 RX ADMIN — SODIUM CHLORIDE 30 MG/ML INHALATION SOLUTION 3 ML: 30 SOLUTION INHALANT at 21:34

## 2023-01-01 RX ADMIN — Medication 5 MG: at 20:41

## 2023-01-01 RX ADMIN — INSULIN LISPRO 4 UNITS: 100 INJECTION, SOLUTION INTRAVENOUS; SUBCUTANEOUS at 04:46

## 2023-01-01 RX ADMIN — Medication: at 11:00

## 2023-01-01 RX ADMIN — OXYCODONE HYDROCHLORIDE 5 MG: 5 TABLET ORAL at 14:35

## 2023-01-01 RX ADMIN — LIDOCAINE 1 PATCH: 4 PATCH TOPICAL at 20:21

## 2023-01-01 RX ADMIN — THIAMINE HCL TAB 100 MG 100 MG: 100 TAB at 09:28

## 2023-01-01 RX ADMIN — SENNOSIDES AND DOCUSATE SODIUM 1 TABLET: 8.6; 5 TABLET ORAL at 08:45

## 2023-01-01 RX ADMIN — IPRATROPIUM BROMIDE AND ALBUTEROL SULFATE 3 ML: 2.5; .5 SOLUTION RESPIRATORY (INHALATION) at 19:20

## 2023-01-01 RX ADMIN — SODIUM ZIRCONIUM CYCLOSILICATE 10 G: 10 POWDER, FOR SUSPENSION ORAL at 08:48

## 2023-01-01 RX ADMIN — VORICONAZOLE 300 MG: 200 INJECTION, POWDER, LYOPHILIZED, FOR SOLUTION INTRAVENOUS at 20:30

## 2023-01-01 RX ADMIN — METOPROLOL TARTRATE 75 MG: 50 TABLET, FILM COATED ORAL at 08:33

## 2023-01-01 RX ADMIN — FENTANYL CITRATE 25 MCG: 50 INJECTION INTRAMUSCULAR; INTRAVENOUS at 16:08

## 2023-01-01 RX ADMIN — Medication 3 MG: at 21:01

## 2023-01-01 RX ADMIN — METOPROLOL TARTRATE 75 MG: 50 TABLET, FILM COATED ORAL at 21:27

## 2023-01-01 RX ADMIN — INSULIN GLARGINE 8 UNITS: 100 INJECTION, SOLUTION SUBCUTANEOUS at 20:35

## 2023-01-01 RX ADMIN — Medication: at 07:22

## 2023-01-01 RX ADMIN — ESOMEPRAZOLE MAGNESIUM 40 MG: 40 FOR SUSPENSION ORAL at 06:36

## 2023-01-01 RX ADMIN — Medication 100 MG: at 15:10

## 2023-01-01 RX ADMIN — PANTOPRAZOLE SODIUM 40 MG: 40 INJECTION, POWDER, FOR SOLUTION INTRAVENOUS at 08:17

## 2023-01-01 RX ADMIN — ACETYLCYSTEINE 600 MG: 200 SOLUTION ORAL; RESPIRATORY (INHALATION) at 13:07

## 2023-01-01 RX ADMIN — METOPROLOL TARTRATE 25 MG: 25 TABLET, FILM COATED ORAL at 22:40

## 2023-01-01 RX ADMIN — Medication 100 MG: at 14:01

## 2023-01-01 RX ADMIN — ENOXAPARIN SODIUM 30 MG: 30 INJECTION SUBCUTANEOUS at 05:20

## 2023-01-01 RX ADMIN — IPRATROPIUM BROMIDE AND ALBUTEROL SULFATE 3 ML: 2.5; .5 SOLUTION RESPIRATORY (INHALATION) at 12:59

## 2023-01-01 RX ADMIN — APIXABAN 5 MG: 5 TABLET, FILM COATED ORAL at 20:47

## 2023-01-01 RX ADMIN — SULFAMETHOXAZOLE AND TRIMETHOPRIM 80 MG OF TRIMETHOPRIM: 80; 16 INJECTION, SOLUTION, CONCENTRATE INTRAVENOUS at 10:35

## 2023-01-01 RX ADMIN — FLUTICASONE PROPIONATE 1 SPRAY: 50 SPRAY, METERED NASAL at 21:00

## 2023-01-01 RX ADMIN — METOPROLOL TARTRATE 50 MG: 50 TABLET, FILM COATED ORAL at 08:32

## 2023-01-01 RX ADMIN — SULFAMETHOXAZOLE AND TRIMETHOPRIM 80 MG: 400; 80 TABLET ORAL at 08:12

## 2023-01-01 RX ADMIN — SENNOSIDES AND DOCUSATE SODIUM 1 TABLET: 8.6; 5 TABLET ORAL at 08:47

## 2023-01-01 RX ADMIN — METHYLPREDNISOLONE SODIUM SUCCINATE 81.25 MG: 125 INJECTION, POWDER, FOR SOLUTION INTRAMUSCULAR; INTRAVENOUS at 23:14

## 2023-01-01 RX ADMIN — Medication 100 MG: at 14:44

## 2023-01-01 RX ADMIN — METOPROLOL TARTRATE 50 MG: 50 TABLET, FILM COATED ORAL at 08:29

## 2023-01-01 RX ADMIN — DEXAMETHASONE SODIUM PHOSPHATE 6 MG: 10 INJECTION INTRAMUSCULAR; INTRAVENOUS at 22:13

## 2023-01-01 RX ADMIN — METOPROLOL TARTRATE 50 MG: 50 TABLET, FILM COATED ORAL at 20:24

## 2023-01-01 RX ADMIN — DEXAMETHASONE SODIUM PHOSPHATE 10 MG: 10 INJECTION INTRAMUSCULAR; INTRAVENOUS at 17:44

## 2023-01-01 RX ADMIN — PREDNISONE 80 MG: 20 TABLET ORAL at 20:22

## 2023-01-01 RX ADMIN — IPRATROPIUM BROMIDE AND ALBUTEROL SULFATE 3 ML: 2.5; .5 SOLUTION RESPIRATORY (INHALATION) at 12:34

## 2023-01-01 RX ADMIN — SODIUM ZIRCONIUM CYCLOSILICATE 5 G: 5 POWDER, FOR SUSPENSION ORAL at 09:00

## 2023-01-01 RX ADMIN — DEXAMETHASONE SODIUM PHOSPHATE 8 MG: 4 INJECTION, SOLUTION INTRAMUSCULAR; INTRAVENOUS at 21:32

## 2023-01-01 RX ADMIN — SODIUM BICARBONATE 650 MG: 650 TABLET ORAL at 09:00

## 2023-01-01 RX ADMIN — THIAMINE HCL TAB 100 MG 100 MG: 100 TAB at 08:45

## 2023-01-01 RX ADMIN — NYSTATIN 400000 UNITS: 100000 SUSPENSION ORAL at 20:47

## 2023-01-01 RX ADMIN — PANTOPRAZOLE SODIUM 40 MG: 40 INJECTION, POWDER, LYOPHILIZED, FOR SOLUTION INTRAVENOUS at 10:05

## 2023-01-01 RX ADMIN — FLUTICASONE PROPIONATE 1 SPRAY: 50 SPRAY, METERED NASAL at 10:04

## 2023-01-01 RX ADMIN — SODIUM BICARBONATE 650 MG: 650 TABLET ORAL at 10:01

## 2023-01-01 RX ADMIN — SODIUM CHLORIDE, POTASSIUM CHLORIDE, SODIUM LACTATE AND CALCIUM CHLORIDE 500 ML: 600; 310; 30; 20 INJECTION, SOLUTION INTRAVENOUS at 12:31

## 2023-01-01 RX ADMIN — APIXABAN 5 MG: 5 TABLET, FILM COATED ORAL at 20:38

## 2023-01-01 RX ADMIN — QUETIAPINE FUMARATE 12.5 MG: 25 TABLET ORAL at 09:07

## 2023-01-01 RX ADMIN — LIDOCAINE 1 PATCH: 4 PATCH TOPICAL at 09:39

## 2023-01-01 RX ADMIN — METHYLPREDNISOLONE SODIUM SUCCINATE 40 MG: 40 INJECTION, POWDER, LYOPHILIZED, FOR SOLUTION INTRAMUSCULAR; INTRAVENOUS at 23:43

## 2023-01-01 RX ADMIN — THIAMINE HCL TAB 100 MG 100 MG: 100 TAB at 09:10

## 2023-01-01 RX ADMIN — IPRATROPIUM BROMIDE AND ALBUTEROL SULFATE 3 ML: 2.5; .5 SOLUTION RESPIRATORY (INHALATION) at 19:28

## 2023-01-01 RX ADMIN — VANCOMYCIN 750 MG: 750 INJECTION, SOLUTION INTRAVENOUS at 12:09

## 2023-01-01 RX ADMIN — CIPROFLOXACIN HYDROCHLORIDE 500 MG: 500 TABLET, FILM COATED ORAL at 17:29

## 2023-01-01 RX ADMIN — Medication 5 MG: at 20:36

## 2023-01-01 RX ADMIN — LOPERAMIDE HYDROCHLORIDE 2 MG: 2 SOLUTION ORAL at 16:08

## 2023-01-01 RX ADMIN — PANTOPRAZOLE SODIUM 40 MG: 40 TABLET, DELAYED RELEASE ORAL at 05:14

## 2023-01-01 RX ADMIN — PREDNISONE 80 MG: 20 TABLET ORAL at 08:12

## 2023-01-01 RX ADMIN — OXYCODONE HYDROCHLORIDE 5 MG: 5 TABLET ORAL at 11:44

## 2023-01-01 RX ADMIN — DEXAMETHASONE SODIUM PHOSPHATE 6 MG: 10 INJECTION INTRAMUSCULAR; INTRAVENOUS at 21:15

## 2023-01-01 RX ADMIN — INSULIN LISPRO 2 UNITS: 100 INJECTION, SOLUTION INTRAVENOUS; SUBCUTANEOUS at 16:37

## 2023-01-01 RX ADMIN — CIPROFLOXACIN 750 MG: 250 TABLET, COATED ORAL at 20:47

## 2023-01-01 RX ADMIN — PANTOPRAZOLE SODIUM 40 MG: 40 INJECTION, POWDER, LYOPHILIZED, FOR SOLUTION INTRAVENOUS at 14:09

## 2023-01-01 RX ADMIN — ACETAMINOPHEN, ASPIRIN, CAFFEINE 2 TABLET: 250; 65; 250 TABLET, FILM COATED ORAL at 23:58

## 2023-01-01 RX ADMIN — DEXAMETHASONE SODIUM PHOSPHATE 10 MG: 10 INJECTION INTRAMUSCULAR; INTRAVENOUS at 08:45

## 2023-01-01 RX ADMIN — PANTOPRAZOLE SODIUM 40 MG: 40 INJECTION, POWDER, FOR SOLUTION INTRAVENOUS at 08:44

## 2023-01-01 RX ADMIN — VANCOMYCIN 750 MG: 750 INJECTION, SOLUTION INTRAVENOUS at 15:13

## 2023-01-01 RX ADMIN — SODIUM BICARBONATE 650 MG: 650 TABLET ORAL at 21:34

## 2023-01-01 RX ADMIN — TRAZODONE HYDROCHLORIDE 25 MG: 50 TABLET ORAL at 20:18

## 2023-01-01 RX ADMIN — INSULIN LISPRO 4 UNITS: 100 INJECTION, SOLUTION INTRAVENOUS; SUBCUTANEOUS at 08:33

## 2023-01-01 RX ADMIN — INSULIN LISPRO 2 UNITS: 100 INJECTION, SOLUTION INTRAVENOUS; SUBCUTANEOUS at 11:52

## 2023-01-01 RX ADMIN — PANTOPRAZOLE SODIUM 40 MG: 40 TABLET, DELAYED RELEASE ORAL at 09:00

## 2023-01-01 RX ADMIN — SULFAMETHOXAZOLE AND TRIMETHOPRIM 160 MG OF TRIMETHOPRIM: 200; 40 SUSPENSION ORAL at 09:50

## 2023-01-01 RX ADMIN — METOPROLOL TARTRATE 50 MG: 50 TABLET, FILM COATED ORAL at 09:03

## 2023-01-01 RX ADMIN — POTASSIUM CHLORIDE 40 MEQ: 1.5 POWDER, FOR SOLUTION ORAL at 00:26

## 2023-01-01 RX ADMIN — SODIUM CHLORIDE 30 MG/ML INHALATION SOLUTION 3 ML: 30 SOLUTION INHALANT at 21:42

## 2023-01-01 RX ADMIN — INSULIN LISPRO 6 UNITS: 100 INJECTION, SOLUTION INTRAVENOUS; SUBCUTANEOUS at 18:34

## 2023-01-01 RX ADMIN — THIAMINE HCL TAB 100 MG 100 MG: 100 TAB at 10:38

## 2023-01-01 RX ADMIN — Medication 5 MG: at 20:24

## 2023-01-01 RX ADMIN — APIXABAN 5 MG: 5 TABLET, FILM COATED ORAL at 08:29

## 2023-01-01 RX ADMIN — OXYCODONE HYDROCHLORIDE 5 MG: 5 TABLET ORAL at 09:31

## 2023-01-01 RX ADMIN — Medication 50 L/MIN: at 07:00

## 2023-01-01 RX ADMIN — INSULIN LISPRO 4 UNITS: 100 INJECTION, SOLUTION INTRAVENOUS; SUBCUTANEOUS at 07:59

## 2023-01-01 RX ADMIN — INSULIN GLARGINE 10 UNITS: 100 INJECTION, SOLUTION SUBCUTANEOUS at 22:20

## 2023-01-01 RX ADMIN — SULFAMETHOXAZOLE AND TRIMETHOPRIM 160 MG OF TRIMETHOPRIM: 200; 40 SUSPENSION ORAL at 08:33

## 2023-01-01 RX ADMIN — ACETAMINOPHEN 650 MG: 325 TABLET ORAL at 18:18

## 2023-01-01 RX ADMIN — ACETAMINOPHEN 650 MG: 325 TABLET ORAL at 16:55

## 2023-01-01 RX ADMIN — SODIUM ZIRCONIUM CYCLOSILICATE 10 G: 10 POWDER, FOR SUSPENSION ORAL at 10:37

## 2023-01-01 RX ADMIN — FLUTICASONE PROPIONATE 1 SPRAY: 50 SPRAY, METERED NASAL at 08:51

## 2023-01-01 RX ADMIN — THIAMINE HCL TAB 100 MG 100 MG: 100 TAB at 08:30

## 2023-01-01 RX ADMIN — CIPROFLOXACIN 750 MG: 250 TABLET, COATED ORAL at 20:36

## 2023-01-01 RX ADMIN — PREDNISONE 50 MG: 20 TABLET ORAL at 23:45

## 2023-01-01 RX ADMIN — FLUTICASONE PROPIONATE 1 SPRAY: 50 SPRAY, METERED NASAL at 21:01

## 2023-01-01 RX ADMIN — LIDOCAINE 1 PATCH: 4 PATCH TOPICAL at 21:41

## 2023-01-01 RX ADMIN — METOPROLOL TARTRATE 25 MG: 25 TABLET, FILM COATED ORAL at 22:47

## 2023-01-01 RX ADMIN — IPRATROPIUM BROMIDE AND ALBUTEROL SULFATE 3 ML: 2.5; .5 SOLUTION RESPIRATORY (INHALATION) at 13:00

## 2023-01-01 RX ADMIN — PREDNISONE 30 MG: 10 TABLET ORAL at 20:18

## 2023-01-01 RX ADMIN — ACETAMINOPHEN 650 MG: 325 TABLET ORAL at 20:05

## 2023-01-01 RX ADMIN — FLUTICASONE PROPIONATE 1 SPRAY: 50 SPRAY, METERED NASAL at 09:10

## 2023-01-01 RX ADMIN — PREDNISONE 30 MG: 10 TABLET ORAL at 09:35

## 2023-01-01 RX ADMIN — BENZONATATE 200 MG: 100 CAPSULE ORAL at 21:27

## 2023-01-01 RX ADMIN — CIPROFLOXACIN HYDROCHLORIDE 500 MG: 500 TABLET, FILM COATED ORAL at 20:51

## 2023-01-01 RX ADMIN — METOPROLOL TARTRATE 25 MG: 25 TABLET, FILM COATED ORAL at 23:21

## 2023-01-01 RX ADMIN — Medication 3 MG: at 21:07

## 2023-01-01 RX ADMIN — PANTOPRAZOLE SODIUM 40 MG: 40 TABLET, DELAYED RELEASE ORAL at 06:39

## 2023-01-01 RX ADMIN — METOPROLOL TARTRATE 50 MG: 50 TABLET, FILM COATED ORAL at 21:29

## 2023-01-01 RX ADMIN — DEXMEDETOMIDINE HYDROCHLORIDE 0.84 MCG/KG/HR: 4 INJECTION, SOLUTION INTRAVENOUS at 07:14

## 2023-01-01 RX ADMIN — SODIUM CHLORIDE, POTASSIUM CHLORIDE, SODIUM LACTATE AND CALCIUM CHLORIDE 500 ML: 600; 310; 30; 20 INJECTION, SOLUTION INTRAVENOUS at 16:43

## 2023-01-01 RX ADMIN — SODIUM ZIRCONIUM CYCLOSILICATE 10 G: 10 POWDER, FOR SUSPENSION ORAL at 21:00

## 2023-01-01 RX ADMIN — PIPERACILLIN SODIUM AND TAZOBACTAM SODIUM 2.25 G: 2; .25 INJECTION, SOLUTION INTRAVENOUS at 10:24

## 2023-01-01 RX ADMIN — ACETYLCYSTEINE 800 MG: 200 SOLUTION ORAL; RESPIRATORY (INHALATION) at 20:23

## 2023-01-01 RX ADMIN — LIDOCAINE 1 PATCH: 4 PATCH TOPICAL at 20:38

## 2023-01-01 RX ADMIN — SULFAMETHOXAZOLE AND TRIMETHOPRIM 80 MG: 400; 80 TABLET ORAL at 08:45

## 2023-01-01 RX ADMIN — FLUTICASONE PROPIONATE 1 SPRAY: 50 SPRAY, METERED NASAL at 20:07

## 2023-01-01 RX ADMIN — HYDROMORPHONE HYDROCHLORIDE 0.2 MG: 1 INJECTION, SOLUTION INTRAMUSCULAR; INTRAVENOUS; SUBCUTANEOUS at 15:43

## 2023-01-01 RX ADMIN — TRAZODONE HYDROCHLORIDE 25 MG: 50 TABLET ORAL at 20:37

## 2023-01-01 RX ADMIN — PREDNISONE 30 MG: 10 TABLET ORAL at 08:33

## 2023-01-01 RX ADMIN — IPRATROPIUM BROMIDE AND ALBUTEROL SULFATE 3 ML: 2.5; .5 SOLUTION RESPIRATORY (INHALATION) at 19:11

## 2023-01-01 RX ADMIN — PREDNISONE 60 MG: 20 TABLET ORAL at 09:07

## 2023-01-01 RX ADMIN — THIAMINE HCL TAB 100 MG 100 MG: 100 TAB at 08:12

## 2023-01-01 RX ADMIN — LIDOCAINE 1 PATCH: 4 PATCH TOPICAL at 22:00

## 2023-01-01 RX ADMIN — DEXAMETHASONE SODIUM PHOSPHATE 10 MG: 10 INJECTION INTRAMUSCULAR; INTRAVENOUS at 08:29

## 2023-01-01 RX ADMIN — HEPARIN SODIUM 5000 UNITS: 5000 INJECTION, SOLUTION INTRAVENOUS; SUBCUTANEOUS at 16:00

## 2023-01-01 RX ADMIN — BUDESONIDE 0.5 MG: 0.5 INHALANT RESPIRATORY (INHALATION) at 19:42

## 2023-01-01 RX ADMIN — APIXABAN 10 MG: 5 TABLET, FILM COATED ORAL at 20:30

## 2023-01-01 RX ADMIN — CIPROFLOXACIN HYDROCHLORIDE 500 MG: 500 TABLET, FILM COATED ORAL at 05:33

## 2023-01-01 RX ADMIN — PREDNISONE 80 MG: 20 TABLET ORAL at 20:12

## 2023-01-01 RX ADMIN — FUROSEMIDE 40 MG: 10 INJECTION, SOLUTION INTRAMUSCULAR; INTRAVENOUS at 14:09

## 2023-01-01 RX ADMIN — HEPARIN SODIUM 5000 UNITS: 5000 INJECTION, SOLUTION INTRAVENOUS; SUBCUTANEOUS at 00:53

## 2023-01-01 RX ADMIN — SODIUM ZIRCONIUM CYCLOSILICATE 10 G: 10 POWDER, FOR SUSPENSION ORAL at 16:50

## 2023-01-01 RX ADMIN — SODIUM CHLORIDE 30 MG/ML INHALATION SOLUTION 3 ML: 30 SOLUTION INHALANT at 09:54

## 2023-01-01 RX ADMIN — PREDNISONE 80 MG: 20 TABLET ORAL at 09:50

## 2023-01-01 RX ADMIN — SODIUM BICARBONATE 650 MG: 650 TABLET ORAL at 17:34

## 2023-01-01 RX ADMIN — SODIUM BICARBONATE 650 MG: 650 TABLET ORAL at 15:16

## 2023-01-01 RX ADMIN — INSULIN GLARGINE 15 UNITS: 100 INJECTION, SOLUTION SUBCUTANEOUS at 21:50

## 2023-01-01 RX ADMIN — Medication 3 MG: at 21:59

## 2023-01-01 RX ADMIN — IPRATROPIUM BROMIDE AND ALBUTEROL SULFATE 3 ML: 2.5; .5 SOLUTION RESPIRATORY (INHALATION) at 19:36

## 2023-01-01 RX ADMIN — PANTOPRAZOLE SODIUM 40 MG: 40 INJECTION, POWDER, FOR SOLUTION INTRAVENOUS at 09:27

## 2023-01-01 RX ADMIN — LIDOCAINE 1 PATCH: 4 PATCH TOPICAL at 21:29

## 2023-01-01 RX ADMIN — SULFAMETHOXAZOLE AND TRIMETHOPRIM 80 MG: 400; 80 TABLET ORAL at 09:21

## 2023-01-01 RX ADMIN — ENOXAPARIN SODIUM 30 MG: 30 INJECTION SUBCUTANEOUS at 06:06

## 2023-01-01 RX ADMIN — ALBUTEROL SULFATE 2.5 MG: 2.5 SOLUTION RESPIRATORY (INHALATION) at 08:52

## 2023-01-01 RX ADMIN — INSULIN LISPRO 1 UNITS: 100 INJECTION, SOLUTION INTRAVENOUS; SUBCUTANEOUS at 09:27

## 2023-01-01 RX ADMIN — IPRATROPIUM BROMIDE AND ALBUTEROL SULFATE 3 ML: 2.5; .5 SOLUTION RESPIRATORY (INHALATION) at 19:42

## 2023-01-01 RX ADMIN — PANTOPRAZOLE SODIUM 40 MG: 40 TABLET, DELAYED RELEASE ORAL at 06:42

## 2023-01-01 RX ADMIN — ACETAMINOPHEN 650 MG: 325 TABLET ORAL at 12:43

## 2023-01-01 RX ADMIN — METOPROLOL TARTRATE 12.5 MG: 25 TABLET, FILM COATED ORAL at 16:27

## 2023-01-01 RX ADMIN — PREDNISONE 80 MG: 20 TABLET ORAL at 21:15

## 2023-01-01 RX ADMIN — NYSTATIN 400000 UNITS: 100000 SUSPENSION ORAL at 20:22

## 2023-01-01 RX ADMIN — INSULIN GLARGINE 8 UNITS: 100 INJECTION, SOLUTION SUBCUTANEOUS at 22:11

## 2023-01-01 RX ADMIN — TRAZODONE HYDROCHLORIDE 25 MG: 50 TABLET ORAL at 01:35

## 2023-01-01 RX ADMIN — INSULIN LISPRO 2 UNITS: 100 INJECTION, SOLUTION INTRAVENOUS; SUBCUTANEOUS at 12:50

## 2023-01-01 RX ADMIN — Medication 5 MG: at 21:17

## 2023-01-01 RX ADMIN — DEXAMETHASONE SODIUM PHOSPHATE 6 MG: 10 INJECTION INTRAMUSCULAR; INTRAVENOUS at 21:33

## 2023-01-01 RX ADMIN — INSULIN LISPRO 2 UNITS: 100 INJECTION, SOLUTION INTRAVENOUS; SUBCUTANEOUS at 11:55

## 2023-01-01 RX ADMIN — SODIUM CHLORIDE 30 MG/ML INHALATION SOLUTION 3 ML: 30 SOLUTION INHALANT at 10:48

## 2023-01-01 RX ADMIN — METOPROLOL TARTRATE 25 MG: 25 TABLET, FILM COATED ORAL at 10:07

## 2023-01-01 RX ADMIN — ESOMEPRAZOLE MAGNESIUM 40 MG: 40 FOR SUSPENSION ORAL at 09:50

## 2023-01-01 RX ADMIN — SENNOSIDES AND DOCUSATE SODIUM 1 TABLET: 8.6; 5 TABLET ORAL at 21:03

## 2023-01-01 RX ADMIN — BENZONATATE 200 MG: 100 CAPSULE ORAL at 20:22

## 2023-01-01 RX ADMIN — METOPROLOL TARTRATE 75 MG: 50 TABLET, FILM COATED ORAL at 21:16

## 2023-01-01 RX ADMIN — IPRATROPIUM BROMIDE AND ALBUTEROL SULFATE 3 ML: 2.5; .5 SOLUTION RESPIRATORY (INHALATION) at 12:06

## 2023-01-01 RX ADMIN — THIAMINE HCL TAB 100 MG 100 MG: 100 TAB at 09:18

## 2023-01-01 RX ADMIN — ENOXAPARIN SODIUM 40 MG: 100 INJECTION SUBCUTANEOUS at 21:28

## 2023-01-01 RX ADMIN — Medication 3 MG: at 21:03

## 2023-01-01 RX ADMIN — IPRATROPIUM BROMIDE AND ALBUTEROL SULFATE 3 ML: 2.5; .5 SOLUTION RESPIRATORY (INHALATION) at 20:12

## 2023-01-01 RX ADMIN — BENZONATATE 200 MG: 100 CAPSULE ORAL at 16:47

## 2023-01-01 RX ADMIN — OXYCODONE HYDROCHLORIDE 5 MG: 5 TABLET ORAL at 00:08

## 2023-01-01 RX ADMIN — LIDOCAINE 1 PATCH: 4 PATCH TOPICAL at 17:25

## 2023-01-01 RX ADMIN — METOPROLOL TARTRATE 75 MG: 50 TABLET, FILM COATED ORAL at 08:48

## 2023-01-01 RX ADMIN — CIPROFLOXACIN HYDROCHLORIDE 500 MG: 500 TABLET, FILM COATED ORAL at 06:16

## 2023-01-01 RX ADMIN — HEPARIN SODIUM 5000 UNITS: 5000 INJECTION, SOLUTION INTRAVENOUS; SUBCUTANEOUS at 16:57

## 2023-01-01 RX ADMIN — SODIUM BICARBONATE 650 MG: 650 TABLET ORAL at 14:44

## 2023-01-01 RX ADMIN — INSULIN LISPRO 4 UNITS: 100 INJECTION, SOLUTION INTRAVENOUS; SUBCUTANEOUS at 23:36

## 2023-01-01 RX ADMIN — TRAZODONE HYDROCHLORIDE 25 MG: 50 TABLET ORAL at 21:41

## 2023-01-01 RX ADMIN — PANTOPRAZOLE SODIUM 40 MG: 40 INJECTION, POWDER, LYOPHILIZED, FOR SOLUTION INTRAVENOUS at 10:23

## 2023-01-01 RX ADMIN — METOPROLOL TARTRATE 25 MG: 25 TABLET, FILM COATED ORAL at 16:12

## 2023-01-01 RX ADMIN — SENNOSIDES AND DOCUSATE SODIUM 1 TABLET: 8.6; 5 TABLET ORAL at 09:35

## 2023-01-01 RX ADMIN — Medication 35 L/MIN: at 23:00

## 2023-01-01 RX ADMIN — INSULIN GLARGINE 8 UNITS: 100 INJECTION, SOLUTION SUBCUTANEOUS at 20:39

## 2023-01-01 RX ADMIN — PIPERACILLIN SODIUM AND TAZOBACTAM SODIUM 3.38 G: 3; .375 INJECTION, SOLUTION INTRAVENOUS at 04:30

## 2023-01-01 RX ADMIN — SODIUM CHLORIDE 30 MG/ML INHALATION SOLUTION 3 ML: 30 SOLUTION INHALANT at 09:41

## 2023-01-01 RX ADMIN — THIAMINE HCL TAB 100 MG 100 MG: 100 TAB at 08:00

## 2023-01-01 RX ADMIN — FLUTICASONE PROPIONATE 1 SPRAY: 50 SPRAY, METERED NASAL at 22:00

## 2023-01-01 RX ADMIN — LIDOCAINE 1 PATCH: 4 PATCH TOPICAL at 20:17

## 2023-01-01 RX ADMIN — SODIUM BICARBONATE 650 MG: 650 TABLET ORAL at 20:04

## 2023-01-01 RX ADMIN — PREDNISONE 30 MG: 10 TABLET ORAL at 20:50

## 2023-01-01 RX ADMIN — TRAZODONE HYDROCHLORIDE 25 MG: 50 TABLET ORAL at 20:52

## 2023-01-01 RX ADMIN — FLUTICASONE PROPIONATE 1 SPRAY: 50 SPRAY, METERED NASAL at 08:18

## 2023-01-01 RX ADMIN — IPRATROPIUM BROMIDE AND ALBUTEROL SULFATE 3 ML: 2.5; .5 SOLUTION RESPIRATORY (INHALATION) at 14:43

## 2023-01-01 RX ADMIN — METOPROLOL TARTRATE 50 MG: 50 TABLET, FILM COATED ORAL at 09:21

## 2023-01-01 RX ADMIN — PANTOPRAZOLE SODIUM 40 MG: 40 TABLET, DELAYED RELEASE ORAL at 08:27

## 2023-01-01 RX ADMIN — METOPROLOL TARTRATE 12.5 MG: 25 TABLET, FILM COATED ORAL at 16:22

## 2023-01-01 RX ADMIN — SODIUM BICARBONATE 650 MG: 650 TABLET ORAL at 21:07

## 2023-01-01 RX ADMIN — SODIUM BICARBONATE 650 MG: 650 TABLET ORAL at 20:19

## 2023-01-01 RX ADMIN — DEXAMETHASONE SODIUM PHOSPHATE 10 MG: 10 INJECTION INTRAMUSCULAR; INTRAVENOUS at 09:34

## 2023-01-01 RX ADMIN — THIAMINE HCL TAB 100 MG 100 MG: 100 TAB at 09:38

## 2023-01-01 RX ADMIN — PREDNISONE 30 MG: 10 TABLET ORAL at 21:16

## 2023-01-01 RX ADMIN — INSULIN LISPRO 3 UNITS: 100 INJECTION, SOLUTION INTRAVENOUS; SUBCUTANEOUS at 17:51

## 2023-01-01 RX ADMIN — Medication 3 MG: at 20:19

## 2023-01-01 RX ADMIN — FUROSEMIDE 40 MG: 10 INJECTION, SOLUTION INTRAVENOUS at 17:27

## 2023-01-01 RX ADMIN — PANTOPRAZOLE SODIUM 40 MG: 40 TABLET, DELAYED RELEASE ORAL at 07:36

## 2023-01-01 RX ADMIN — SODIUM ZIRCONIUM CYCLOSILICATE 10 G: 10 POWDER, FOR SUSPENSION ORAL at 15:34

## 2023-01-01 RX ADMIN — INSULIN LISPRO 6 UNITS: 100 INJECTION, SOLUTION INTRAVENOUS; SUBCUTANEOUS at 12:30

## 2023-01-01 RX ADMIN — SENNOSIDES AND DOCUSATE SODIUM 1 TABLET: 8.6; 5 TABLET ORAL at 20:15

## 2023-01-01 RX ADMIN — ALBUTEROL SULFATE 2.5 MG: 2.5 SOLUTION RESPIRATORY (INHALATION) at 20:28

## 2023-01-01 RX ADMIN — METOPROLOL TARTRATE 25 MG: 25 TABLET, FILM COATED ORAL at 20:46

## 2023-01-01 RX ADMIN — INSULIN LISPRO 6 UNITS: 100 INJECTION, SOLUTION INTRAVENOUS; SUBCUTANEOUS at 08:44

## 2023-01-01 RX ADMIN — THIAMINE HCL TAB 100 MG 100 MG: 100 TAB at 09:45

## 2023-01-01 RX ADMIN — THIAMINE HCL TAB 100 MG 100 MG: 100 TAB at 10:13

## 2023-01-01 RX ADMIN — SULFAMETHOXAZOLE AND TRIMETHOPRIM 80 MG OF TRIMETHOPRIM: 200; 40 SUSPENSION ORAL at 08:43

## 2023-01-01 RX ADMIN — INSULIN LISPRO 3 UNITS: 100 INJECTION, SOLUTION INTRAVENOUS; SUBCUTANEOUS at 07:33

## 2023-01-01 RX ADMIN — INSULIN GLARGINE 8 UNITS: 100 INJECTION, SOLUTION SUBCUTANEOUS at 20:33

## 2023-01-01 RX ADMIN — HEPARIN SODIUM 800 UNITS/HR: 10000 INJECTION, SOLUTION INTRAVENOUS at 17:47

## 2023-01-01 RX ADMIN — FLUTICASONE PROPIONATE 1 SPRAY: 50 SPRAY, METERED NASAL at 20:22

## 2023-01-01 RX ADMIN — Medication: at 09:15

## 2023-01-01 RX ADMIN — PANTOPRAZOLE SODIUM 40 MG: 40 TABLET, DELAYED RELEASE ORAL at 05:15

## 2023-01-01 RX ADMIN — QUETIAPINE 25 MG: 25 TABLET, FILM COATED ORAL at 21:28

## 2023-01-01 RX ADMIN — SODIUM CHLORIDE 30 MG/ML INHALATION SOLUTION 3 ML: 30 SOLUTION INHALANT at 12:47

## 2023-01-01 RX ADMIN — CIPROFLOXACIN HYDROCHLORIDE 500 MG: 500 TABLET, FILM COATED ORAL at 20:44

## 2023-01-01 RX ADMIN — THIAMINE HCL TAB 100 MG 100 MG: 100 TAB at 09:07

## 2023-01-01 RX ADMIN — METOPROLOL TARTRATE 50 MG: 50 TABLET, FILM COATED ORAL at 21:49

## 2023-01-01 RX ADMIN — OXYCODONE HYDROCHLORIDE 5 MG: 5 TABLET ORAL at 17:13

## 2023-01-01 RX ADMIN — PREDNISONE 50 MG: 20 TABLET ORAL at 11:32

## 2023-01-01 RX ADMIN — OXYCODONE HYDROCHLORIDE 5 MG: 5 TABLET ORAL at 20:36

## 2023-01-01 RX ADMIN — Medication 5 MG: at 20:15

## 2023-01-01 RX ADMIN — INSULIN LISPRO 4 UNITS: 100 INJECTION, SOLUTION INTRAVENOUS; SUBCUTANEOUS at 17:05

## 2023-01-01 RX ADMIN — METOPROLOL TARTRATE 5 MG: 1 INJECTION, SOLUTION INTRAVENOUS at 23:20

## 2023-01-01 RX ADMIN — IPRATROPIUM BROMIDE AND ALBUTEROL SULFATE 3 ML: 2.5; .5 SOLUTION RESPIRATORY (INHALATION) at 07:52

## 2023-01-01 RX ADMIN — PREDNISONE 80 MG: 20 TABLET ORAL at 08:02

## 2023-01-01 RX ADMIN — METOPROLOL TARTRATE 25 MG: 25 TABLET, FILM COATED ORAL at 04:18

## 2023-01-01 RX ADMIN — APIXABAN 5 MG: 5 TABLET, FILM COATED ORAL at 21:17

## 2023-01-01 RX ADMIN — METOPROLOL TARTRATE 75 MG: 50 TABLET, FILM COATED ORAL at 20:36

## 2023-01-01 RX ADMIN — SENNOSIDES AND DOCUSATE SODIUM 1 TABLET: 8.6; 5 TABLET ORAL at 20:33

## 2023-01-01 RX ADMIN — SODIUM CHLORIDE 1000 ML: 900 INJECTION, SOLUTION INTRAVENOUS at 21:33

## 2023-01-01 RX ADMIN — THIAMINE HCL TAB 100 MG 100 MG: 100 TAB at 08:44

## 2023-01-01 RX ADMIN — IPRATROPIUM BROMIDE AND ALBUTEROL SULFATE 3 ML: 2.5; .5 SOLUTION RESPIRATORY (INHALATION) at 07:22

## 2023-01-01 RX ADMIN — LIDOCAINE 1 PATCH: 4 PATCH TOPICAL at 21:00

## 2023-01-01 RX ADMIN — INSULIN LISPRO 4 UNITS: 100 INJECTION, SOLUTION INTRAVENOUS; SUBCUTANEOUS at 16:51

## 2023-01-01 RX ADMIN — ESOMEPRAZOLE MAGNESIUM 40 MG: 40 FOR SUSPENSION ORAL at 09:45

## 2023-01-01 RX ADMIN — QUETIAPINE 25 MG: 25 TABLET, FILM COATED ORAL at 20:15

## 2023-01-01 RX ADMIN — PREDNISONE 60 MG: 20 TABLET ORAL at 08:29

## 2023-01-01 RX ADMIN — HEPARIN SODIUM 5000 UNITS: 5000 INJECTION, SOLUTION INTRAVENOUS; SUBCUTANEOUS at 09:03

## 2023-01-01 RX ADMIN — APIXABAN 10 MG: 5 TABLET, FILM COATED ORAL at 15:15

## 2023-01-01 RX ADMIN — METHYLPREDNISOLONE SODIUM SUCCINATE 81.25 MG: 125 INJECTION, POWDER, FOR SOLUTION INTRAMUSCULAR; INTRAVENOUS at 04:36

## 2023-01-01 RX ADMIN — INSULIN GLARGINE 16 UNITS: 100 INJECTION, SOLUTION SUBCUTANEOUS at 21:07

## 2023-01-01 RX ADMIN — LIDOCAINE 1 PATCH: 4 PATCH TOPICAL at 08:30

## 2023-01-01 RX ADMIN — METOPROLOL TARTRATE 25 MG: 25 TABLET, FILM COATED ORAL at 09:40

## 2023-01-01 RX ADMIN — OXYCODONE HYDROCHLORIDE 5 MG: 5 TABLET ORAL at 17:38

## 2023-01-01 RX ADMIN — Medication 3 MG: at 21:06

## 2023-01-01 RX ADMIN — METHYLPREDNISOLONE SODIUM SUCCINATE 40 MG: 40 INJECTION, POWDER, LYOPHILIZED, FOR SOLUTION INTRAMUSCULAR; INTRAVENOUS at 06:00

## 2023-01-01 RX ADMIN — IPRATROPIUM BROMIDE AND ALBUTEROL SULFATE 3 ML: 2.5; .5 SOLUTION RESPIRATORY (INHALATION) at 13:07

## 2023-01-01 RX ADMIN — METOPROLOL TARTRATE 25 MG: 25 TABLET, FILM COATED ORAL at 16:08

## 2023-01-01 RX ADMIN — SULFAMETHOXAZOLE AND TRIMETHOPRIM 160 MG OF TRIMETHOPRIM: 200; 40 SUSPENSION ORAL at 09:10

## 2023-01-01 RX ADMIN — FLUTICASONE PROPIONATE 1 SPRAY: 50 SPRAY, METERED NASAL at 08:58

## 2023-01-01 RX ADMIN — THIAMINE HCL TAB 100 MG 100 MG: 100 TAB at 08:47

## 2023-01-01 RX ADMIN — OXYCODONE HYDROCHLORIDE 5 MG: 5 TABLET ORAL at 21:04

## 2023-01-01 RX ADMIN — SULFAMETHOXAZOLE AND TRIMETHOPRIM 160 MG OF TRIMETHOPRIM: 200; 40 SUSPENSION ORAL at 09:03

## 2023-01-01 RX ADMIN — ACETAMINOPHEN 650 MG: 325 TABLET ORAL at 02:21

## 2023-01-01 RX ADMIN — Medication 5 MG: at 20:38

## 2023-01-01 RX ADMIN — PIPERACILLIN SODIUM AND TAZOBACTAM SODIUM 2.25 G: 2; .25 INJECTION, SOLUTION INTRAVENOUS at 03:36

## 2023-01-01 RX ADMIN — APIXABAN 10 MG: 5 TABLET, FILM COATED ORAL at 20:52

## 2023-01-01 RX ADMIN — REMDESIVIR 200 MG: 100 INJECTION, POWDER, LYOPHILIZED, FOR SOLUTION INTRAVENOUS at 14:09

## 2023-01-01 RX ADMIN — APIXABAN 5 MG: 5 TABLET, FILM COATED ORAL at 21:00

## 2023-01-01 RX ADMIN — ACETAMINOPHEN 650 MG: 325 TABLET ORAL at 09:03

## 2023-01-01 RX ADMIN — INSULIN LISPRO 4 UNITS: 100 INJECTION, SOLUTION INTRAVENOUS; SUBCUTANEOUS at 20:51

## 2023-01-01 RX ADMIN — NYSTATIN 400000 UNITS: 100000 SUSPENSION ORAL at 08:01

## 2023-01-01 RX ADMIN — Medication 30 L/MIN: at 16:00

## 2023-01-01 RX ADMIN — BENZONATATE 200 MG: 100 CAPSULE ORAL at 21:07

## 2023-01-01 RX ADMIN — PREDNISONE 50 MG: 20 TABLET ORAL at 17:42

## 2023-01-01 RX ADMIN — PANTOPRAZOLE SODIUM 40 MG: 40 TABLET, DELAYED RELEASE ORAL at 06:20

## 2023-01-01 RX ADMIN — PREDNISONE 50 MG: 20 TABLET ORAL at 17:28

## 2023-01-01 RX ADMIN — FLUTICASONE PROPIONATE 1 SPRAY: 50 SPRAY, METERED NASAL at 20:38

## 2023-01-01 RX ADMIN — THIAMINE HCL TAB 100 MG 100 MG: 100 TAB at 08:33

## 2023-01-01 RX ADMIN — Medication 5 MG: at 20:17

## 2023-01-01 RX ADMIN — FLUTICASONE PROPIONATE 1 SPRAY: 50 SPRAY, METERED NASAL at 21:19

## 2023-01-01 RX ADMIN — Medication 5 MG: at 20:37

## 2023-01-01 RX ADMIN — HYDROXYZINE HYDROCHLORIDE 10 MG: 10 TABLET ORAL at 15:31

## 2023-01-01 RX ADMIN — FLUTICASONE PROPIONATE 1 SPRAY: 50 SPRAY, METERED NASAL at 10:39

## 2023-01-01 RX ADMIN — LIDOCAINE 1 PATCH: 4 PATCH TOPICAL at 21:17

## 2023-01-01 RX ADMIN — QUETIAPINE 25 MG: 25 TABLET, FILM COATED ORAL at 20:33

## 2023-01-01 RX ADMIN — LOPERAMIDE HYDROCHLORIDE 2 MG: 2 SOLUTION ORAL at 10:59

## 2023-01-01 RX ADMIN — PREDNISONE 50 MG: 20 TABLET ORAL at 17:25

## 2023-01-01 RX ADMIN — APIXABAN 5 MG: 5 TABLET, FILM COATED ORAL at 21:44

## 2023-01-01 RX ADMIN — PREDNISONE 50 MG: 20 TABLET ORAL at 12:13

## 2023-01-01 RX ADMIN — POTASSIUM CHLORIDE 40 MEQ: 1.5 POWDER, FOR SOLUTION ORAL at 15:38

## 2023-01-01 RX ADMIN — METOPROLOL TARTRATE 75 MG: 50 TABLET, FILM COATED ORAL at 20:38

## 2023-01-01 RX ADMIN — SODIUM BICARBONATE 650 MG: 650 TABLET ORAL at 16:36

## 2023-01-01 RX ADMIN — METOPROLOL TARTRATE 50 MG: 50 TABLET, FILM COATED ORAL at 20:33

## 2023-01-01 RX ADMIN — DEXAMETHASONE SODIUM PHOSPHATE 6 MG: 10 INJECTION INTRAMUSCULAR; INTRAVENOUS at 20:04

## 2023-01-01 RX ADMIN — INSULIN LISPRO 10 UNITS: 100 INJECTION, SOLUTION INTRAVENOUS; SUBCUTANEOUS at 12:15

## 2023-01-01 RX ADMIN — IPRATROPIUM BROMIDE AND ALBUTEROL SULFATE 3 ML: 2.5; .5 SOLUTION RESPIRATORY (INHALATION) at 20:17

## 2023-01-01 RX ADMIN — INSULIN LISPRO 6 UNITS: 100 INJECTION, SOLUTION INTRAVENOUS; SUBCUTANEOUS at 17:41

## 2023-01-01 RX ADMIN — SENNOSIDES AND DOCUSATE SODIUM 1 TABLET: 8.6; 5 TABLET ORAL at 08:32

## 2023-01-01 RX ADMIN — FLUTICASONE PROPIONATE 1 SPRAY: 50 SPRAY, METERED NASAL at 20:50

## 2023-01-01 RX ADMIN — Medication: at 23:00

## 2023-01-01 RX ADMIN — NYSTATIN 400000 UNITS: 100000 SUSPENSION ORAL at 09:48

## 2023-01-01 RX ADMIN — METOPROLOL TARTRATE 75 MG: 50 TABLET, FILM COATED ORAL at 08:41

## 2023-01-01 RX ADMIN — METOPROLOL TARTRATE 50 MG: 50 TABLET, FILM COATED ORAL at 20:19

## 2023-01-01 RX ADMIN — FLUTICASONE PROPIONATE 1 SPRAY: 50 SPRAY, METERED NASAL at 21:31

## 2023-01-01 RX ADMIN — IOHEXOL 75 ML: 350 INJECTION, SOLUTION INTRAVENOUS at 11:36

## 2023-01-01 RX ADMIN — FLUTICASONE PROPIONATE 1 SPRAY: 50 SPRAY, METERED NASAL at 09:06

## 2023-01-01 RX ADMIN — VORICONAZOLE 300 MG: 200 INJECTION, POWDER, LYOPHILIZED, FOR SOLUTION INTRAVENOUS at 09:35

## 2023-01-01 RX ADMIN — INSULIN GLARGINE 16 UNITS: 100 INJECTION, SOLUTION SUBCUTANEOUS at 20:41

## 2023-01-01 RX ADMIN — SODIUM ZIRCONIUM CYCLOSILICATE 10 G: 10 POWDER, FOR SUSPENSION ORAL at 08:28

## 2023-01-01 RX ADMIN — METOPROLOL TARTRATE 25 MG: 25 TABLET, FILM COATED ORAL at 04:11

## 2023-01-01 RX ADMIN — APIXABAN 5 MG: 5 TABLET, FILM COATED ORAL at 20:41

## 2023-01-01 RX ADMIN — SODIUM CHLORIDE 30 MG/ML INHALATION SOLUTION 3 ML: 30 SOLUTION INHALANT at 20:27

## 2023-01-01 RX ADMIN — DEXMEDETOMIDINE HYDROCHLORIDE 1.5 MCG/KG/HR: 4 INJECTION, SOLUTION INTRAVENOUS at 20:14

## 2023-01-01 RX ADMIN — PIPERACILLIN SODIUM AND TAZOBACTAM SODIUM 3.38 G: 3; .375 INJECTION, SOLUTION INTRAVENOUS at 21:13

## 2023-01-01 RX ADMIN — QUETIAPINE 25 MG: 25 TABLET, FILM COATED ORAL at 20:39

## 2023-01-01 RX ADMIN — INSULIN LISPRO 4 UNITS: 100 INJECTION, SOLUTION INTRAVENOUS; SUBCUTANEOUS at 05:18

## 2023-01-01 RX ADMIN — CIPROFLOXACIN HYDROCHLORIDE 500 MG: 500 TABLET, FILM COATED ORAL at 18:14

## 2023-01-01 RX ADMIN — ESOMEPRAZOLE MAGNESIUM 40 MG: 40 FOR SUSPENSION ORAL at 06:15

## 2023-01-01 RX ADMIN — PIPERACILLIN SODIUM AND TAZOBACTAM SODIUM 3.38 G: 3; .375 INJECTION, SOLUTION INTRAVENOUS at 10:07

## 2023-01-01 RX ADMIN — SODIUM BICARBONATE 650 MG: 650 TABLET ORAL at 09:20

## 2023-01-01 RX ADMIN — LIDOCAINE 1 PATCH: 4 PATCH TOPICAL at 21:11

## 2023-01-01 RX ADMIN — HEPARIN SODIUM 5000 UNITS: 5000 INJECTION, SOLUTION INTRAVENOUS; SUBCUTANEOUS at 08:29

## 2023-01-01 RX ADMIN — CIPROFLOXACIN HYDROCHLORIDE 500 MG: 500 TABLET, FILM COATED ORAL at 06:04

## 2023-01-01 RX ADMIN — METOPROLOL TARTRATE 50 MG: 50 TABLET, FILM COATED ORAL at 09:31

## 2023-01-01 RX ADMIN — QUETIAPINE 25 MG: 25 TABLET, FILM COATED ORAL at 21:41

## 2023-01-01 RX ADMIN — PIPERACILLIN SODIUM AND TAZOBACTAM SODIUM 2.25 G: 2; .25 INJECTION, SOLUTION INTRAVENOUS at 10:10

## 2023-01-01 RX ADMIN — METOPROLOL TARTRATE 50 MG: 50 TABLET, FILM COATED ORAL at 08:12

## 2023-01-01 RX ADMIN — SULFAMETHOXAZOLE AND TRIMETHOPRIM 80 MG: 400; 80 TABLET ORAL at 09:34

## 2023-01-01 RX ADMIN — PREDNISONE 80 MG: 20 TABLET ORAL at 09:32

## 2023-01-01 RX ADMIN — Medication 5 MG: at 20:18

## 2023-01-01 RX ADMIN — METHYLPREDNISOLONE SODIUM SUCCINATE 81.25 MG: 125 INJECTION, POWDER, FOR SOLUTION INTRAMUSCULAR; INTRAVENOUS at 22:22

## 2023-01-01 RX ADMIN — APIXABAN 5 MG: 5 TABLET, FILM COATED ORAL at 09:10

## 2023-01-01 RX ADMIN — PANTOPRAZOLE SODIUM 40 MG: 40 TABLET, DELAYED RELEASE ORAL at 09:35

## 2023-01-01 RX ADMIN — HYDROCODONE BITARTRATE AND HOMATROPINE METHYLBROMIDE ORAL SOLUTION 5 ML: 5; 1.5 LIQUID ORAL at 10:57

## 2023-01-01 RX ADMIN — METOPROLOL TARTRATE 50 MG: 50 TABLET, FILM COATED ORAL at 09:27

## 2023-01-01 RX ADMIN — INSULIN LISPRO 2 UNITS: 100 INJECTION, SOLUTION INTRAVENOUS; SUBCUTANEOUS at 04:19

## 2023-01-01 RX ADMIN — METOPROLOL TARTRATE 25 MG: 25 TABLET, FILM COATED ORAL at 16:06

## 2023-01-01 RX ADMIN — Medication 5 MG: at 21:50

## 2023-01-01 RX ADMIN — METOPROLOL TARTRATE 25 MG: 25 TABLET, FILM COATED ORAL at 11:03

## 2023-01-01 RX ADMIN — INSULIN LISPRO 2 UNITS: 100 INJECTION, SOLUTION INTRAVENOUS; SUBCUTANEOUS at 13:13

## 2023-01-01 RX ADMIN — INSULIN GLARGINE 8 UNITS: 100 INJECTION, SOLUTION SUBCUTANEOUS at 20:24

## 2023-01-01 RX ADMIN — LIDOCAINE 1 PATCH: 4 PATCH TOPICAL at 20:36

## 2023-01-01 RX ADMIN — PREDNISONE 50 MG: 20 TABLET ORAL at 12:50

## 2023-01-01 RX ADMIN — OXYCODONE HYDROCHLORIDE 5 MG: 5 TABLET ORAL at 08:48

## 2023-01-01 RX ADMIN — GUAIFENESIN 200 MG: 200 SOLUTION ORAL at 12:34

## 2023-01-01 RX ADMIN — CIPROFLOXACIN HYDROCHLORIDE 500 MG: 500 TABLET, FILM COATED ORAL at 06:41

## 2023-01-01 RX ADMIN — Medication 5 MG: at 20:47

## 2023-01-01 RX ADMIN — TRAZODONE HYDROCHLORIDE 25 MG: 50 TABLET ORAL at 21:50

## 2023-01-01 RX ADMIN — TRAZODONE HYDROCHLORIDE 25 MG: 50 TABLET ORAL at 20:07

## 2023-01-01 RX ADMIN — BENZONATATE 200 MG: 100 CAPSULE ORAL at 23:40

## 2023-01-01 RX ADMIN — THIAMINE HCL TAB 100 MG 100 MG: 100 TAB at 08:42

## 2023-01-01 RX ADMIN — SULFAMETHOXAZOLE AND TRIMETHOPRIM 80 MG: 400; 80 TABLET ORAL at 08:59

## 2023-01-01 RX ADMIN — METOPROLOL TARTRATE 50 MG: 50 TABLET, FILM COATED ORAL at 09:13

## 2023-01-01 RX ADMIN — IPRATROPIUM BROMIDE AND ALBUTEROL SULFATE 3 ML: 2.5; .5 SOLUTION RESPIRATORY (INHALATION) at 15:27

## 2023-01-01 RX ADMIN — FLUTICASONE PROPIONATE 1 SPRAY: 50 SPRAY, METERED NASAL at 08:30

## 2023-01-01 RX ADMIN — APIXABAN 10 MG: 5 TABLET, FILM COATED ORAL at 08:59

## 2023-01-01 RX ADMIN — PREDNISONE 30 MG: 10 TABLET ORAL at 09:26

## 2023-01-01 RX ADMIN — QUETIAPINE 25 MG: 25 TABLET, FILM COATED ORAL at 20:36

## 2023-01-01 RX ADMIN — DEXMEDETOMIDINE HYDROCHLORIDE 0.7 MCG/KG/HR: 400 INJECTION INTRAVENOUS at 04:46

## 2023-01-01 RX ADMIN — PANTOPRAZOLE SODIUM 40 MG: 40 TABLET, DELAYED RELEASE ORAL at 06:49

## 2023-01-01 RX ADMIN — FLUTICASONE PROPIONATE 1 SPRAY: 50 SPRAY, METERED NASAL at 21:39

## 2023-01-01 RX ADMIN — METHYLPREDNISOLONE SODIUM SUCCINATE 81.25 MG: 125 INJECTION, POWDER, FOR SOLUTION INTRAMUSCULAR; INTRAVENOUS at 10:16

## 2023-01-01 RX ADMIN — FLUTICASONE PROPIONATE 1 SPRAY: 50 SPRAY, METERED NASAL at 08:31

## 2023-01-01 RX ADMIN — PREDNISONE 80 MG: 20 TABLET ORAL at 10:37

## 2023-01-01 RX ADMIN — OXYCODONE HYDROCHLORIDE 5 MG: 5 TABLET ORAL at 05:35

## 2023-01-01 RX ADMIN — PREDNISONE 80 MG: 20 TABLET ORAL at 08:26

## 2023-01-01 RX ADMIN — TRAZODONE HYDROCHLORIDE 25 MG: 50 TABLET ORAL at 21:15

## 2023-01-01 RX ADMIN — METOPROLOL TARTRATE 25 MG: 25 TABLET, FILM COATED ORAL at 11:34

## 2023-01-01 RX ADMIN — ESOMEPRAZOLE MAGNESIUM 40 MG: 40 FOR SUSPENSION ORAL at 08:54

## 2023-01-01 RX ADMIN — IPRATROPIUM BROMIDE AND ALBUTEROL SULFATE 3 ML: 2.5; .5 SOLUTION RESPIRATORY (INHALATION) at 10:35

## 2023-01-01 RX ADMIN — METOPROLOL TARTRATE 25 MG: 25 TABLET, FILM COATED ORAL at 05:33

## 2023-01-01 RX ADMIN — METOPROLOL TARTRATE 25 MG: 25 TABLET, FILM COATED ORAL at 04:41

## 2023-01-01 RX ADMIN — CIPROFLOXACIN HYDROCHLORIDE 500 MG: 500 TABLET, FILM COATED ORAL at 05:21

## 2023-01-01 RX ADMIN — LIDOCAINE 1 PATCH: 4 PATCH TOPICAL at 20:42

## 2023-01-01 RX ADMIN — Medication: at 11:22

## 2023-01-01 RX ADMIN — IPRATROPIUM BROMIDE AND ALBUTEROL SULFATE 3 ML: 2.5; .5 SOLUTION RESPIRATORY (INHALATION) at 12:37

## 2023-01-01 RX ADMIN — ERGOCALCIFEROL 1250 MCG: 1.25 CAPSULE ORAL at 08:34

## 2023-01-01 RX ADMIN — LIDOCAINE 1 PATCH: 4 PATCH TOPICAL at 21:20

## 2023-01-01 RX ADMIN — APIXABAN 5 MG: 5 TABLET, FILM COATED ORAL at 08:07

## 2023-01-01 RX ADMIN — SULFAMETHOXAZOLE AND TRIMETHOPRIM 160 MG OF TRIMETHOPRIM: 200; 40 SUSPENSION ORAL at 09:45

## 2023-01-01 RX ADMIN — VORICONAZOLE 300 MG: 200 INJECTION, POWDER, LYOPHILIZED, FOR SOLUTION INTRAVENOUS at 08:29

## 2023-01-01 RX ADMIN — PANTOPRAZOLE SODIUM 40 MG: 40 TABLET, DELAYED RELEASE ORAL at 05:55

## 2023-01-01 RX ADMIN — PREDNISONE 30 MG: 10 TABLET ORAL at 09:15

## 2023-01-01 RX ADMIN — BUDESONIDE 0.5 MG: 0.5 INHALANT RESPIRATORY (INHALATION) at 20:24

## 2023-01-01 RX ADMIN — SULFAMETHOXAZOLE AND TRIMETHOPRIM 80 MG: 400; 80 TABLET ORAL at 08:32

## 2023-01-01 RX ADMIN — METOPROLOL TARTRATE 25 MG: 25 TABLET, FILM COATED ORAL at 11:48

## 2023-01-01 RX ADMIN — SODIUM BICARBONATE 650 MG: 650 TABLET ORAL at 08:55

## 2023-01-01 RX ADMIN — METOPROLOL TARTRATE 5 MG: 1 INJECTION, SOLUTION INTRAVENOUS at 16:34

## 2023-01-01 RX ADMIN — SODIUM BICARBONATE 650 MG: 650 TABLET ORAL at 09:47

## 2023-01-01 RX ADMIN — APIXABAN 5 MG: 5 TABLET, FILM COATED ORAL at 21:49

## 2023-01-01 RX ADMIN — Medication 35 L/MIN: at 01:15

## 2023-01-01 RX ADMIN — INSULIN LISPRO 2 UNITS: 100 INJECTION, SOLUTION INTRAVENOUS; SUBCUTANEOUS at 16:46

## 2023-01-01 RX ADMIN — APIXABAN 5 MG: 5 TABLET, FILM COATED ORAL at 09:33

## 2023-01-01 RX ADMIN — OXYCODONE HYDROCHLORIDE 5 MG: 5 TABLET ORAL at 20:06

## 2023-01-01 RX ADMIN — PREDNISONE 80 MG: 20 TABLET ORAL at 09:45

## 2023-01-01 RX ADMIN — CIPROFLOXACIN HYDROCHLORIDE 500 MG: 500 TABLET, FILM COATED ORAL at 09:50

## 2023-01-01 RX ADMIN — APIXABAN 5 MG: 5 TABLET, FILM COATED ORAL at 08:47

## 2023-01-01 RX ADMIN — ACETAMINOPHEN 650 MG: 325 TABLET ORAL at 01:09

## 2023-01-01 RX ADMIN — APIXABAN 5 MG: 5 TABLET, FILM COATED ORAL at 20:08

## 2023-01-01 RX ADMIN — PIPERACILLIN SODIUM AND TAZOBACTAM SODIUM 2.25 G: 2; .25 INJECTION, SOLUTION INTRAVENOUS at 21:45

## 2023-01-01 RX ADMIN — SENNOSIDES AND DOCUSATE SODIUM 1 TABLET: 8.6; 5 TABLET ORAL at 09:31

## 2023-01-01 RX ADMIN — FUROSEMIDE 40 MG: 10 INJECTION, SOLUTION INTRAMUSCULAR; INTRAVENOUS at 11:43

## 2023-01-01 RX ADMIN — LIDOCAINE 1 PATCH: 4 PATCH TOPICAL at 08:59

## 2023-01-01 RX ADMIN — PREDNISONE 80 MG: 20 TABLET ORAL at 20:46

## 2023-01-01 RX ADMIN — Medication 3 MG: at 20:39

## 2023-01-01 RX ADMIN — GUAIFENESIN 200 MG: 200 SOLUTION ORAL at 05:01

## 2023-01-01 RX ADMIN — PANTOPRAZOLE SODIUM 40 MG: 40 TABLET, DELAYED RELEASE ORAL at 05:40

## 2023-01-01 RX ADMIN — ENOXAPARIN SODIUM 30 MG: 30 INJECTION SUBCUTANEOUS at 06:34

## 2023-01-01 RX ADMIN — APIXABAN 10 MG: 5 TABLET, FILM COATED ORAL at 08:34

## 2023-01-01 RX ADMIN — HEPARIN SODIUM 5000 UNITS: 5000 INJECTION, SOLUTION INTRAVENOUS; SUBCUTANEOUS at 16:06

## 2023-01-01 RX ADMIN — SODIUM BICARBONATE 650 MG: 650 TABLET ORAL at 15:49

## 2023-01-01 RX ADMIN — INSULIN LISPRO 4 UNITS: 100 INJECTION, SOLUTION INTRAVENOUS; SUBCUTANEOUS at 08:25

## 2023-01-01 RX ADMIN — METOPROLOL TARTRATE 25 MG: 25 TABLET, FILM COATED ORAL at 16:46

## 2023-01-01 RX ADMIN — SULFAMETHOXAZOLE AND TRIMETHOPRIM 80 MG: 400; 80 TABLET ORAL at 08:27

## 2023-01-01 RX ADMIN — INSULIN LISPRO 1 UNITS: 100 INJECTION, SOLUTION INTRAVENOUS; SUBCUTANEOUS at 10:02

## 2023-01-01 RX ADMIN — SODIUM BICARBONATE 650 MG: 650 TABLET ORAL at 14:43

## 2023-01-01 RX ADMIN — SENNOSIDES AND DOCUSATE SODIUM 1 TABLET: 8.6; 5 TABLET ORAL at 10:38

## 2023-01-01 RX ADMIN — METOPROLOL TARTRATE 25 MG: 25 TABLET, FILM COATED ORAL at 05:15

## 2023-01-01 RX ADMIN — DIPHENHYDRAMINE HYDROCHLORIDE AND LIDOCAINE HYDROCHLORIDE AND ALUMINUM HYDROXIDE AND MAGNESIUM HYDRO 10 ML: KIT at 22:10

## 2023-01-01 RX ADMIN — SODIUM ZIRCONIUM CYCLOSILICATE 10 G: 10 POWDER, FOR SUSPENSION ORAL at 12:15

## 2023-01-01 RX ADMIN — LIDOCAINE 1 PATCH: 4 PATCH TOPICAL at 20:12

## 2023-01-01 RX ADMIN — SULFAMETHOXAZOLE AND TRIMETHOPRIM 160 MG OF TRIMETHOPRIM: 200; 40 SUSPENSION ORAL at 09:28

## 2023-01-01 RX ADMIN — PREDNISONE 80 MG: 20 TABLET ORAL at 09:47

## 2023-01-01 RX ADMIN — Medication 30 L/MIN: at 00:01

## 2023-01-01 RX ADMIN — METOPROLOL TARTRATE 25 MG: 25 TABLET, FILM COATED ORAL at 10:46

## 2023-01-01 RX ADMIN — PIPERACILLIN SODIUM AND TAZOBACTAM SODIUM 3.38 G: 3; .375 INJECTION, SOLUTION INTRAVENOUS at 09:06

## 2023-01-01 RX ADMIN — METOPROLOL TARTRATE 25 MG: 25 TABLET, FILM COATED ORAL at 10:41

## 2023-01-01 RX ADMIN — ACETAMINOPHEN 650 MG: 325 TABLET ORAL at 04:56

## 2023-01-01 RX ADMIN — DEXAMETHASONE SODIUM PHOSPHATE 6 MG: 10 INJECTION INTRAMUSCULAR; INTRAVENOUS at 20:51

## 2023-01-01 RX ADMIN — Medication 15 L/MIN: at 23:00

## 2023-01-01 RX ADMIN — QUETIAPINE 25 MG: 25 TABLET, FILM COATED ORAL at 20:50

## 2023-01-01 RX ADMIN — QUETIAPINE 25 MG: 25 TABLET, FILM COATED ORAL at 20:38

## 2023-01-01 RX ADMIN — MELATONIN 6 MG: 3 TAB ORAL at 22:05

## 2023-01-01 RX ADMIN — INSULIN LISPRO 2 UNITS: 100 INJECTION, SOLUTION INTRAVENOUS; SUBCUTANEOUS at 00:04

## 2023-01-01 RX ADMIN — SULFAMETHOXAZOLE AND TRIMETHOPRIM 80 MG: 400; 80 TABLET ORAL at 09:07

## 2023-01-01 RX ADMIN — LIDOCAINE 1 PATCH: 4 PATCH TOPICAL at 20:24

## 2023-01-01 RX ADMIN — SENNOSIDES AND DOCUSATE SODIUM 1 TABLET: 8.6; 5 TABLET ORAL at 20:50

## 2023-01-01 RX ADMIN — INSULIN LISPRO 1 UNITS: 100 INJECTION, SOLUTION INTRAVENOUS; SUBCUTANEOUS at 19:31

## 2023-01-01 RX ADMIN — INSULIN LISPRO 2 UNITS: 100 INJECTION, SOLUTION INTRAVENOUS; SUBCUTANEOUS at 15:22

## 2023-01-01 RX ADMIN — CIPROFLOXACIN HYDROCHLORIDE 500 MG: 500 TABLET, FILM COATED ORAL at 05:50

## 2023-01-01 RX ADMIN — PREDNISONE 60 MG: 20 TABLET ORAL at 21:00

## 2023-01-01 RX ADMIN — OXYCODONE HYDROCHLORIDE 5 MG: 5 TABLET ORAL at 04:40

## 2023-01-01 RX ADMIN — APIXABAN 5 MG: 5 TABLET, FILM COATED ORAL at 10:38

## 2023-01-01 RX ADMIN — INSULIN LISPRO 6 UNITS: 100 INJECTION, SOLUTION INTRAVENOUS; SUBCUTANEOUS at 04:29

## 2023-01-01 RX ADMIN — THIAMINE HCL TAB 100 MG 100 MG: 100 TAB at 08:54

## 2023-01-01 RX ADMIN — APIXABAN 5 MG: 5 TABLET, FILM COATED ORAL at 21:28

## 2023-01-01 RX ADMIN — IPRATROPIUM BROMIDE AND ALBUTEROL SULFATE 3 ML: 2.5; .5 SOLUTION RESPIRATORY (INHALATION) at 12:04

## 2023-01-01 RX ADMIN — ENOXAPARIN SODIUM 30 MG: 30 INJECTION SUBCUTANEOUS at 05:11

## 2023-01-01 RX ADMIN — SODIUM ZIRCONIUM CYCLOSILICATE 10 G: 10 POWDER, FOR SUSPENSION ORAL at 20:21

## 2023-01-01 RX ADMIN — QUETIAPINE 25 MG: 25 TABLET, FILM COATED ORAL at 21:16

## 2023-01-01 RX ADMIN — PREDNISONE 80 MG: 20 TABLET ORAL at 21:50

## 2023-01-01 RX ADMIN — LIDOCAINE 1 PATCH: 4 PATCH TOPICAL at 20:22

## 2023-01-01 RX ADMIN — Medication 5 MG: at 21:42

## 2023-01-01 RX ADMIN — TRAZODONE HYDROCHLORIDE 25 MG: 50 TABLET ORAL at 20:46

## 2023-01-01 RX ADMIN — METOPROLOL TARTRATE 50 MG: 50 TABLET, FILM COATED ORAL at 09:33

## 2023-01-01 RX ADMIN — AMLODIPINE BESYLATE 2.5 MG: 2.5 TABLET ORAL at 08:02

## 2023-01-01 RX ADMIN — THIAMINE HCL TAB 100 MG 100 MG: 100 TAB at 08:07

## 2023-01-01 RX ADMIN — PANTOPRAZOLE SODIUM 40 MG: 40 TABLET, DELAYED RELEASE ORAL at 09:15

## 2023-01-01 RX ADMIN — THIAMINE HCL TAB 100 MG 100 MG: 100 TAB at 09:13

## 2023-01-01 RX ADMIN — APIXABAN 5 MG: 5 TABLET, FILM COATED ORAL at 21:41

## 2023-01-01 RX ADMIN — INSULIN LISPRO 1 UNITS: 100 INJECTION, SOLUTION INTRAVENOUS; SUBCUTANEOUS at 04:35

## 2023-01-01 RX ADMIN — ACETAMINOPHEN 650 MG: 325 TABLET ORAL at 10:49

## 2023-01-01 RX ADMIN — INSULIN GLARGINE 8 UNITS: 100 INJECTION, SOLUTION SUBCUTANEOUS at 20:51

## 2023-01-01 RX ADMIN — TRAZODONE HYDROCHLORIDE 25 MG: 50 TABLET ORAL at 20:47

## 2023-01-01 RX ADMIN — METOPROLOL TARTRATE 75 MG: 50 TABLET, FILM COATED ORAL at 08:25

## 2023-01-01 RX ADMIN — METOPROLOL TARTRATE 50 MG: 50 TABLET, FILM COATED ORAL at 20:38

## 2023-01-01 RX ADMIN — GLIPIZIDE 5 MG: 5 TABLET ORAL at 06:06

## 2023-01-01 RX ADMIN — QUETIAPINE 25 MG: 25 TABLET, FILM COATED ORAL at 21:18

## 2023-01-01 RX ADMIN — METOPROLOL TARTRATE 50 MG: 50 TABLET, FILM COATED ORAL at 09:50

## 2023-01-01 RX ADMIN — ENOXAPARIN SODIUM 30 MG: 30 INJECTION SUBCUTANEOUS at 06:09

## 2023-01-01 RX ADMIN — CIPROFLOXACIN HYDROCHLORIDE 500 MG: 500 TABLET, FILM COATED ORAL at 17:34

## 2023-01-01 RX ADMIN — CIPROFLOXACIN 750 MG: 250 TABLET, COATED ORAL at 21:28

## 2023-01-01 RX ADMIN — CIPROFLOXACIN HYDROCHLORIDE 500 MG: 500 TABLET, FILM COATED ORAL at 19:05

## 2023-01-01 RX ADMIN — PREDNISONE 50 MG: 20 TABLET ORAL at 00:09

## 2023-01-01 RX ADMIN — APIXABAN 5 MG: 5 TABLET, FILM COATED ORAL at 08:10

## 2023-01-01 RX ADMIN — VORICONAZOLE 300 MG: 200 INJECTION, POWDER, LYOPHILIZED, FOR SOLUTION INTRAVENOUS at 09:03

## 2023-01-01 RX ADMIN — PIPERACILLIN SODIUM AND TAZOBACTAM SODIUM 2.25 G: 2; .25 INJECTION, SOLUTION INTRAVENOUS at 16:14

## 2023-01-01 RX ADMIN — PREDNISONE 50 MG: 20 TABLET ORAL at 11:45

## 2023-01-01 RX ADMIN — QUETIAPINE FUMARATE 12.5 MG: 25 TABLET ORAL at 08:29

## 2023-01-01 RX ADMIN — METOPROLOL TARTRATE 50 MG: 50 TABLET, FILM COATED ORAL at 20:36

## 2023-01-01 RX ADMIN — SALINE NASAL SPRAY 1 SPRAY: 1.5 SOLUTION NASAL at 21:44

## 2023-01-01 RX ADMIN — Medication 3 MG: at 20:40

## 2023-01-01 RX ADMIN — FLUTICASONE PROPIONATE 1 SPRAY: 50 SPRAY, METERED NASAL at 22:28

## 2023-01-01 RX ADMIN — PANTOPRAZOLE SODIUM 40 MG: 40 TABLET, DELAYED RELEASE ORAL at 05:34

## 2023-01-01 RX ADMIN — APIXABAN 5 MG: 5 TABLET, FILM COATED ORAL at 08:01

## 2023-01-01 RX ADMIN — ESOMEPRAZOLE MAGNESIUM 40 MG: 40 FOR SUSPENSION ORAL at 06:05

## 2023-01-01 RX ADMIN — METOPROLOL TARTRATE 50 MG: 50 TABLET, FILM COATED ORAL at 20:15

## 2023-01-01 RX ADMIN — METOPROLOL TARTRATE 5 MG: 1 INJECTION, SOLUTION INTRAVENOUS at 15:58

## 2023-01-01 RX ADMIN — Medication 5 MG: at 20:30

## 2023-01-01 RX ADMIN — Medication 5 MG: at 20:46

## 2023-01-01 RX ADMIN — FUROSEMIDE 40 MG: 10 INJECTION INTRAMUSCULAR; INTRAVENOUS at 17:27

## 2023-01-01 RX ADMIN — Medication 35 L/MIN: at 02:14

## 2023-01-01 RX ADMIN — METHYLPREDNISOLONE SODIUM SUCCINATE 81.25 MG: 125 INJECTION, POWDER, FOR SOLUTION INTRAMUSCULAR; INTRAVENOUS at 16:43

## 2023-01-01 RX ADMIN — INSULIN LISPRO 3 UNITS: 100 INJECTION, SOLUTION INTRAVENOUS; SUBCUTANEOUS at 16:59

## 2023-01-01 RX ADMIN — FLUTICASONE PROPIONATE 1 SPRAY: 50 SPRAY, METERED NASAL at 21:03

## 2023-01-01 RX ADMIN — QUETIAPINE FUMARATE 12.5 MG: 25 TABLET ORAL at 08:45

## 2023-01-01 RX ADMIN — Medication 5 MG: at 18:06

## 2023-01-01 RX ADMIN — SODIUM ZIRCONIUM CYCLOSILICATE 5 G: 5 POWDER, FOR SUSPENSION ORAL at 15:04

## 2023-01-01 RX ADMIN — FLUTICASONE PROPIONATE 1 SPRAY: 50 SPRAY, METERED NASAL at 08:34

## 2023-01-01 RX ADMIN — HEPARIN SODIUM 5000 UNITS: 5000 INJECTION, SOLUTION INTRAVENOUS; SUBCUTANEOUS at 00:26

## 2023-01-01 RX ADMIN — AMLODIPINE BESYLATE 2.5 MG: 2.5 TABLET ORAL at 09:20

## 2023-01-01 RX ADMIN — APIXABAN 10 MG: 5 TABLET, FILM COATED ORAL at 21:15

## 2023-01-01 RX ADMIN — Medication 10 L/MIN: at 15:41

## 2023-01-01 RX ADMIN — SODIUM BICARBONATE 650 MG: 650 TABLET ORAL at 15:10

## 2023-01-01 RX ADMIN — FLUTICASONE PROPIONATE 1 SPRAY: 50 SPRAY, METERED NASAL at 21:22

## 2023-01-01 RX ADMIN — FUROSEMIDE 20 MG: 10 INJECTION, SOLUTION INTRAMUSCULAR; INTRAVENOUS at 10:45

## 2023-01-01 RX ADMIN — PANTOPRAZOLE SODIUM 40 MG: 40 TABLET, DELAYED RELEASE ORAL at 06:14

## 2023-01-01 RX ADMIN — PANTOPRAZOLE SODIUM 40 MG: 40 TABLET, DELAYED RELEASE ORAL at 07:59

## 2023-01-01 RX ADMIN — METOPROLOL TARTRATE 25 MG: 25 TABLET, FILM COATED ORAL at 23:59

## 2023-01-01 RX ADMIN — METOPROLOL TARTRATE 50 MG: 50 TABLET, FILM COATED ORAL at 09:02

## 2023-01-01 RX ADMIN — INSULIN LISPRO 3 UNITS: 100 INJECTION, SOLUTION INTRAVENOUS; SUBCUTANEOUS at 04:58

## 2023-01-01 RX ADMIN — METOPROLOL TARTRATE 50 MG: 50 TABLET, FILM COATED ORAL at 09:07

## 2023-01-01 RX ADMIN — CIPROFLOXACIN HYDROCHLORIDE 500 MG: 500 TABLET, FILM COATED ORAL at 06:00

## 2023-01-01 RX ADMIN — SENNOSIDES AND DOCUSATE SODIUM 1 TABLET: 8.6; 5 TABLET ORAL at 21:28

## 2023-01-01 RX ADMIN — GLIPIZIDE 2.5 MG: 5 TABLET ORAL at 06:37

## 2023-01-01 RX ADMIN — THIAMINE HCL TAB 100 MG 100 MG: 100 TAB at 09:41

## 2023-01-01 RX ADMIN — PIPERACILLIN SODIUM AND TAZOBACTAM SODIUM 3.38 G: 3; .375 INJECTION, SOLUTION INTRAVENOUS at 15:50

## 2023-01-01 RX ADMIN — ESOMEPRAZOLE MAGNESIUM 40 MG: 40 FOR SUSPENSION ORAL at 09:32

## 2023-01-01 RX ADMIN — METOPROLOL TARTRATE 25 MG: 25 TABLET, FILM COATED ORAL at 05:50

## 2023-01-01 RX ADMIN — INSULIN GLARGINE 16 UNITS: 100 INJECTION, SOLUTION SUBCUTANEOUS at 21:22

## 2023-01-01 RX ADMIN — ACETAMINOPHEN, ASPIRIN, CAFFEINE 2 TABLET: 250; 65; 250 TABLET, FILM COATED ORAL at 09:47

## 2023-01-01 RX ADMIN — ACETAMINOPHEN 650 MG: 325 TABLET ORAL at 01:33

## 2023-01-01 RX ADMIN — SENNOSIDES AND DOCUSATE SODIUM 1 TABLET: 8.6; 5 TABLET ORAL at 08:27

## 2023-01-01 RX ADMIN — FLUTICASONE PROPIONATE 1 SPRAY: 50 SPRAY, METERED NASAL at 09:45

## 2023-01-01 RX ADMIN — THIAMINE HCL TAB 100 MG 100 MG: 100 TAB at 08:10

## 2023-01-01 RX ADMIN — SODIUM CHLORIDE, POTASSIUM CHLORIDE, SODIUM LACTATE AND CALCIUM CHLORIDE 500 ML: 600; 310; 30; 20 INJECTION, SOLUTION INTRAVENOUS at 01:37

## 2023-01-01 RX ADMIN — METHYLPREDNISOLONE SODIUM SUCCINATE 40 MG: 40 INJECTION, POWDER, LYOPHILIZED, FOR SOLUTION INTRAMUSCULAR; INTRAVENOUS at 18:10

## 2023-01-01 RX ADMIN — HEPARIN SODIUM 5000 UNITS: 5000 INJECTION, SOLUTION INTRAVENOUS; SUBCUTANEOUS at 08:00

## 2023-01-01 RX ADMIN — INSULIN LISPRO 2 UNITS: 100 INJECTION, SOLUTION INTRAVENOUS; SUBCUTANEOUS at 16:27

## 2023-01-01 RX ADMIN — SENNOSIDES AND DOCUSATE SODIUM 1 TABLET: 8.6; 5 TABLET ORAL at 20:30

## 2023-01-01 RX ADMIN — PREDNISONE 80 MG: 20 TABLET ORAL at 21:42

## 2023-01-01 RX ADMIN — INSULIN LISPRO 3 UNITS: 100 INJECTION, SOLUTION INTRAVENOUS; SUBCUTANEOUS at 17:00

## 2023-01-01 RX ADMIN — LIDOCAINE 1 PATCH: 4 PATCH TOPICAL at 22:08

## 2023-01-01 RX ADMIN — PANTOPRAZOLE SODIUM 40 MG: 40 INJECTION, POWDER, FOR SOLUTION INTRAVENOUS at 09:34

## 2023-01-01 RX ADMIN — THIAMINE HCL TAB 100 MG 100 MG: 100 TAB at 09:50

## 2023-01-01 RX ADMIN — INSULIN LISPRO 2 UNITS: 100 INJECTION, SOLUTION INTRAVENOUS; SUBCUTANEOUS at 22:09

## 2023-01-01 RX ADMIN — FLUTICASONE PROPIONATE 1 SPRAY: 50 SPRAY, METERED NASAL at 21:14

## 2023-01-01 RX ADMIN — Medication 30 L/MIN: at 19:31

## 2023-01-01 RX ADMIN — IPRATROPIUM BROMIDE AND ALBUTEROL SULFATE 3 ML: 2.5; .5 SOLUTION RESPIRATORY (INHALATION) at 12:22

## 2023-01-01 RX ADMIN — PIPERACILLIN SODIUM AND TAZOBACTAM SODIUM 2.25 G: 2; .25 INJECTION, SOLUTION INTRAVENOUS at 03:42

## 2023-01-01 RX ADMIN — PANTOPRAZOLE SODIUM 40 MG: 40 TABLET, DELAYED RELEASE ORAL at 06:45

## 2023-01-01 RX ADMIN — METOPROLOL TARTRATE 5 MG: 1 INJECTION, SOLUTION INTRAVENOUS at 22:33

## 2023-01-01 RX ADMIN — SODIUM CHLORIDE 30 MG/ML INHALATION SOLUTION 3 ML: 30 SOLUTION INHALANT at 08:52

## 2023-01-01 RX ADMIN — ACETAMINOPHEN 650 MG: 325 TABLET ORAL at 20:56

## 2023-01-01 RX ADMIN — THIAMINE HCL TAB 100 MG 100 MG: 100 TAB at 08:18

## 2023-01-01 RX ADMIN — FLUTICASONE PROPIONATE 1 SPRAY: 50 SPRAY, METERED NASAL at 20:40

## 2023-01-01 RX ADMIN — QUETIAPINE FUMARATE 12.5 MG: 25 TABLET ORAL at 10:33

## 2023-01-01 RX ADMIN — Medication 3 MG: at 20:56

## 2023-01-01 RX ADMIN — FLUTICASONE PROPIONATE 1 SPRAY: 50 SPRAY, METERED NASAL at 09:38

## 2023-01-01 RX ADMIN — SODIUM CHLORIDE, POTASSIUM CHLORIDE, SODIUM LACTATE AND CALCIUM CHLORIDE 500 ML: 600; 310; 30; 20 INJECTION, SOLUTION INTRAVENOUS at 10:16

## 2023-01-01 RX ADMIN — ESOMEPRAZOLE MAGNESIUM 40 MG: 40 FOR SUSPENSION ORAL at 09:02

## 2023-01-01 RX ADMIN — APIXABAN 5 MG: 5 TABLET, FILM COATED ORAL at 20:37

## 2023-01-01 RX ADMIN — FUROSEMIDE 60 MG: 10 INJECTION, SOLUTION INTRAVENOUS at 06:42

## 2023-01-01 RX ADMIN — INSULIN LISPRO 2 UNITS: 100 INJECTION, SOLUTION INTRAVENOUS; SUBCUTANEOUS at 20:07

## 2023-01-01 RX ADMIN — PREDNISONE 80 MG: 20 TABLET ORAL at 20:37

## 2023-01-01 RX ADMIN — INSULIN LISPRO 4 UNITS: 100 INJECTION, SOLUTION INTRAVENOUS; SUBCUTANEOUS at 17:29

## 2023-01-01 RX ADMIN — IOHEXOL 56 ML: 350 INJECTION, SOLUTION INTRAVENOUS at 13:35

## 2023-01-01 RX ADMIN — APIXABAN 10 MG: 5 TABLET, FILM COATED ORAL at 09:38

## 2023-01-01 RX ADMIN — BUDESONIDE 0.5 MG: 0.5 INHALANT RESPIRATORY (INHALATION) at 19:36

## 2023-01-01 RX ADMIN — AMLODIPINE BESYLATE 2.5 MG: 2.5 TABLET ORAL at 09:47

## 2023-01-01 RX ADMIN — PREDNISONE 30 MG: 10 TABLET ORAL at 08:25

## 2023-01-01 RX ADMIN — FLUTICASONE PROPIONATE 1 SPRAY: 50 SPRAY, METERED NASAL at 20:39

## 2023-01-01 RX ADMIN — ACETAMINOPHEN 650 MG: 325 TABLET ORAL at 09:31

## 2023-01-01 RX ADMIN — FUROSEMIDE 60 MG: 10 INJECTION, SOLUTION INTRAVENOUS at 11:17

## 2023-01-01 RX ADMIN — APIXABAN 5 MG: 5 TABLET, FILM COATED ORAL at 20:23

## 2023-01-01 RX ADMIN — METOPROLOL TARTRATE 50 MG: 50 TABLET, FILM COATED ORAL at 20:46

## 2023-01-01 RX ADMIN — FLUTICASONE PROPIONATE 1 SPRAY: 50 SPRAY, METERED NASAL at 08:55

## 2023-01-01 RX ADMIN — ALBUTEROL SULFATE 2.5 MG: 2.5 SOLUTION RESPIRATORY (INHALATION) at 10:49

## 2023-01-01 RX ADMIN — INSULIN LISPRO 4 UNITS: 100 INJECTION, SOLUTION INTRAVENOUS; SUBCUTANEOUS at 20:16

## 2023-01-01 RX ADMIN — NYSTATIN 400000 UNITS: 100000 SUSPENSION ORAL at 09:32

## 2023-01-01 RX ADMIN — Medication 2 G: at 23:47

## 2023-01-01 RX ADMIN — SENNOSIDES AND DOCUSATE SODIUM 1 TABLET: 8.6; 5 TABLET ORAL at 20:17

## 2023-01-01 RX ADMIN — LIDOCAINE 1 PATCH: 4 PATCH TOPICAL at 20:15

## 2023-01-01 RX ADMIN — METHYLPREDNISOLONE SODIUM SUCCINATE 81.25 MG: 125 INJECTION, POWDER, FOR SOLUTION INTRAMUSCULAR; INTRAVENOUS at 16:28

## 2023-01-01 RX ADMIN — FLUTICASONE PROPIONATE 1 SPRAY: 50 SPRAY, METERED NASAL at 08:11

## 2023-01-01 RX ADMIN — ACETAMINOPHEN, ASPIRIN, CAFFEINE 2 TABLET: 250; 65; 250 TABLET, FILM COATED ORAL at 16:23

## 2023-01-01 RX ADMIN — CIPROFLOXACIN 750 MG: 250 TABLET, COATED ORAL at 08:10

## 2023-01-01 RX ADMIN — FLUTICASONE PROPIONATE 1 SPRAY: 50 SPRAY, METERED NASAL at 21:51

## 2023-01-01 RX ADMIN — PREDNISONE 20 MG: 20 TABLET ORAL at 16:51

## 2023-01-01 RX ADMIN — IPRATROPIUM BROMIDE AND ALBUTEROL SULFATE 3 ML: 2.5; .5 SOLUTION RESPIRATORY (INHALATION) at 12:09

## 2023-01-01 RX ADMIN — SENNOSIDES AND DOCUSATE SODIUM 1 TABLET: 8.6; 5 TABLET ORAL at 08:13

## 2023-01-01 RX ADMIN — METHYLPREDNISOLONE SODIUM SUCCINATE 81.25 MG: 125 INJECTION, POWDER, FOR SOLUTION INTRAMUSCULAR; INTRAVENOUS at 10:35

## 2023-01-01 RX ADMIN — INSULIN LISPRO 2 UNITS: 100 INJECTION, SOLUTION INTRAVENOUS; SUBCUTANEOUS at 09:00

## 2023-01-01 RX ADMIN — PREDNISONE 80 MG: 20 TABLET ORAL at 20:23

## 2023-01-01 RX ADMIN — Medication 5 MG: at 21:44

## 2023-01-01 RX ADMIN — CEFTRIAXONE SODIUM 1 G: 1 INJECTION, SOLUTION INTRAVENOUS at 17:31

## 2023-01-01 RX ADMIN — HEPARIN SODIUM 5000 UNITS: 5000 INJECTION, SOLUTION INTRAVENOUS; SUBCUTANEOUS at 08:45

## 2023-01-01 RX ADMIN — ACETAMINOPHEN 650 MG: 325 TABLET ORAL at 09:52

## 2023-01-01 RX ADMIN — ALBUTEROL SULFATE 2.5 MG: 2.5 SOLUTION RESPIRATORY (INHALATION) at 09:53

## 2023-01-01 RX ADMIN — FLUTICASONE PROPIONATE 1 SPRAY: 50 SPRAY, METERED NASAL at 20:53

## 2023-01-01 RX ADMIN — PIPERACILLIN SODIUM AND TAZOBACTAM SODIUM 2.25 G: 2; .25 INJECTION, SOLUTION INTRAVENOUS at 11:30

## 2023-01-01 RX ADMIN — ENOXAPARIN SODIUM 30 MG: 30 INJECTION SUBCUTANEOUS at 06:15

## 2023-01-01 RX ADMIN — OXYCODONE HYDROCHLORIDE 5 MG: 5 TABLET ORAL at 10:57

## 2023-01-01 RX ADMIN — DIPHENHYDRAMINE HYDROCHLORIDE AND LIDOCAINE HYDROCHLORIDE AND ALUMINUM HYDROXIDE AND MAGNESIUM HYDRO 10 ML: KIT at 00:08

## 2023-01-01 RX ADMIN — HYDROXYZINE HYDROCHLORIDE 10 MG: 10 TABLET ORAL at 21:47

## 2023-01-01 RX ADMIN — INSULIN LISPRO 2 UNITS: 100 INJECTION, SOLUTION INTRAVENOUS; SUBCUTANEOUS at 16:16

## 2023-01-01 RX ADMIN — INSULIN LISPRO 4 UNITS: 100 INJECTION, SOLUTION INTRAVENOUS; SUBCUTANEOUS at 09:02

## 2023-01-01 RX ADMIN — Medication 2 G: at 00:53

## 2023-01-01 RX ADMIN — INSULIN LISPRO 2 UNITS: 100 INJECTION, SOLUTION INTRAVENOUS; SUBCUTANEOUS at 16:50

## 2023-01-01 RX ADMIN — ACETAMINOPHEN 650 MG: 325 TABLET ORAL at 08:58

## 2023-01-01 RX ADMIN — OXYCODONE HYDROCHLORIDE 5 MG: 5 TABLET ORAL at 17:21

## 2023-01-01 RX ADMIN — IPRATROPIUM BROMIDE AND ALBUTEROL SULFATE 3 ML: 2.5; .5 SOLUTION RESPIRATORY (INHALATION) at 20:23

## 2023-01-01 RX ADMIN — SODIUM CHLORIDE, POTASSIUM CHLORIDE, SODIUM LACTATE AND CALCIUM CHLORIDE 500 ML: 600; 310; 30; 20 INJECTION, SOLUTION INTRAVENOUS at 15:48

## 2023-01-01 RX ADMIN — Medication 5 MG: at 21:03

## 2023-01-01 RX ADMIN — SODIUM BICARBONATE 650 MG: 650 TABLET ORAL at 09:06

## 2023-01-01 RX ADMIN — DEXAMETHASONE SODIUM PHOSPHATE 10 MG: 10 INJECTION INTRAMUSCULAR; INTRAVENOUS at 09:03

## 2023-01-01 RX ADMIN — QUETIAPINE 25 MG: 25 TABLET, FILM COATED ORAL at 21:43

## 2023-01-01 RX ADMIN — METHYLPREDNISOLONE SODIUM SUCCINATE 125 MG: 125 INJECTION, POWDER, LYOPHILIZED, FOR SOLUTION INTRAMUSCULAR; INTRAVENOUS at 05:00

## 2023-01-01 RX ADMIN — ACETAMINOPHEN 650 MG: 325 TABLET ORAL at 16:28

## 2023-01-01 RX ADMIN — APIXABAN 5 MG: 5 TABLET, FILM COATED ORAL at 21:11

## 2023-01-01 RX ADMIN — INSULIN LISPRO 2 UNITS: 100 INJECTION, SOLUTION INTRAVENOUS; SUBCUTANEOUS at 11:47

## 2023-01-01 RX ADMIN — LIDOCAINE 1 PATCH: 4 PATCH TOPICAL at 08:17

## 2023-01-01 RX ADMIN — PANTOPRAZOLE SODIUM 40 MG: 40 TABLET, DELAYED RELEASE ORAL at 06:09

## 2023-01-01 RX ADMIN — THIAMINE HCL TAB 100 MG 100 MG: 100 TAB at 08:34

## 2023-01-01 RX ADMIN — PREDNISONE 60 MG: 20 TABLET ORAL at 09:13

## 2023-01-01 RX ADMIN — PIPERACILLIN SODIUM AND TAZOBACTAM SODIUM 2.25 G: 2; .25 INJECTION, SOLUTION INTRAVENOUS at 04:11

## 2023-01-01 RX ADMIN — INSULIN GLARGINE 8 UNITS: 100 INJECTION, SOLUTION SUBCUTANEOUS at 23:07

## 2023-01-01 RX ADMIN — INSULIN LISPRO 6 UNITS: 100 INJECTION, SOLUTION INTRAVENOUS; SUBCUTANEOUS at 18:18

## 2023-01-01 RX ADMIN — PREDNISONE 50 MG: 20 TABLET ORAL at 23:20

## 2023-01-01 RX ADMIN — THIAMINE HCL TAB 100 MG 100 MG: 100 TAB at 08:48

## 2023-01-01 RX ADMIN — INSULIN LISPRO 4 UNITS: 100 INJECTION, SOLUTION INTRAVENOUS; SUBCUTANEOUS at 08:30

## 2023-01-01 RX ADMIN — FLUTICASONE PROPIONATE 1 SPRAY: 50 SPRAY, METERED NASAL at 09:28

## 2023-01-01 RX ADMIN — METOPROLOL TARTRATE 50 MG: 50 TABLET, FILM COATED ORAL at 20:37

## 2023-01-01 RX ADMIN — APIXABAN 10 MG: 5 TABLET, FILM COATED ORAL at 08:44

## 2023-01-01 RX ADMIN — ERGOCALCIFEROL 1250 MCG: 1.25 CAPSULE ORAL at 09:41

## 2023-01-01 RX ADMIN — APIXABAN 5 MG: 5 TABLET, FILM COATED ORAL at 09:07

## 2023-01-01 RX ADMIN — CIPROFLOXACIN 750 MG: 250 TABLET, COATED ORAL at 08:20

## 2023-01-01 RX ADMIN — PANTOPRAZOLE SODIUM 40 MG: 40 TABLET, DELAYED RELEASE ORAL at 08:08

## 2023-01-01 RX ADMIN — APIXABAN 10 MG: 5 TABLET, FILM COATED ORAL at 21:18

## 2023-01-01 RX ADMIN — ACETAMINOPHEN 650 MG: 325 TABLET ORAL at 06:20

## 2023-01-01 RX ADMIN — METOPROLOL TARTRATE 50 MG: 50 TABLET, FILM COATED ORAL at 08:59

## 2023-01-01 RX ADMIN — SODIUM ZIRCONIUM CYCLOSILICATE 10 G: 10 POWDER, FOR SUSPENSION ORAL at 15:17

## 2023-01-01 RX ADMIN — HEPARIN SODIUM 4000 UNITS: 5000 INJECTION, SOLUTION INTRAVENOUS; SUBCUTANEOUS at 15:45

## 2023-01-01 RX ADMIN — INSULIN LISPRO 1 UNITS: 100 INJECTION, SOLUTION INTRAVENOUS; SUBCUTANEOUS at 17:51

## 2023-01-01 RX ADMIN — THIAMINE HCL TAB 100 MG 100 MG: 100 TAB at 08:36

## 2023-01-01 RX ADMIN — SULFAMETHOXAZOLE AND TRIMETHOPRIM 80 MG: 400; 80 TABLET ORAL at 09:31

## 2023-01-01 RX ADMIN — INSULIN LISPRO 4 UNITS: 100 INJECTION, SOLUTION INTRAVENOUS; SUBCUTANEOUS at 23:47

## 2023-01-01 RX ADMIN — HEPARIN SODIUM AND DEXTROSE 800 UNITS/HR: 10000; 5 INJECTION INTRAVENOUS at 05:18

## 2023-01-01 RX ADMIN — SODIUM BICARBONATE 650 MG: 650 TABLET ORAL at 08:58

## 2023-01-01 RX ADMIN — SULFAMETHOXAZOLE AND TRIMETHOPRIM 80 MG: 400; 80 TABLET ORAL at 08:41

## 2023-01-01 RX ADMIN — TRAZODONE HYDROCHLORIDE 25 MG: 50 TABLET ORAL at 21:00

## 2023-01-01 RX ADMIN — Medication 1 SPRAY: at 12:30

## 2023-01-01 RX ADMIN — METHYLPREDNISOLONE SODIUM SUCCINATE 81.25 MG: 125 INJECTION, POWDER, FOR SOLUTION INTRAMUSCULAR; INTRAVENOUS at 05:31

## 2023-01-01 RX ADMIN — METHYLPREDNISOLONE SODIUM SUCCINATE 81.25 MG: 125 INJECTION, POWDER, FOR SOLUTION INTRAMUSCULAR; INTRAVENOUS at 11:55

## 2023-01-01 RX ADMIN — LIDOCAINE 1 PATCH: 4 PATCH TOPICAL at 09:02

## 2023-01-01 RX ADMIN — THIAMINE HCL TAB 100 MG 100 MG: 100 TAB at 08:59

## 2023-01-01 RX ADMIN — FLUTICASONE PROPIONATE 1 SPRAY: 50 SPRAY, METERED NASAL at 21:18

## 2023-01-01 RX ADMIN — INSULIN LISPRO 2 UNITS: 100 INJECTION, SOLUTION INTRAVENOUS; SUBCUTANEOUS at 11:59

## 2023-01-01 RX ADMIN — INSULIN LISPRO 4 UNITS: 100 INJECTION, SOLUTION INTRAVENOUS; SUBCUTANEOUS at 03:30

## 2023-01-01 RX ADMIN — IPRATROPIUM BROMIDE AND ALBUTEROL SULFATE 3 ML: 2.5; .5 SOLUTION RESPIRATORY (INHALATION) at 08:24

## 2023-01-01 RX ADMIN — INSULIN LISPRO 1 UNITS: 100 INJECTION, SOLUTION INTRAVENOUS; SUBCUTANEOUS at 18:01

## 2023-01-01 RX ADMIN — ESOMEPRAZOLE MAGNESIUM 40 MG: 40 FOR SUSPENSION ORAL at 08:42

## 2023-01-01 RX ADMIN — APIXABAN 5 MG: 5 TABLET, FILM COATED ORAL at 09:26

## 2023-01-01 RX ADMIN — METHYLPREDNISOLONE SODIUM SUCCINATE 125 MG: 125 INJECTION, POWDER, LYOPHILIZED, FOR SOLUTION INTRAMUSCULAR; INTRAVENOUS at 18:21

## 2023-01-01 RX ADMIN — METOPROLOL TARTRATE 50 MG: 50 TABLET, FILM COATED ORAL at 09:45

## 2023-01-01 RX ADMIN — ACETYLCYSTEINE 600 MG: 200 SOLUTION ORAL; RESPIRATORY (INHALATION) at 13:00

## 2023-01-01 RX ADMIN — FLUTICASONE PROPIONATE 1 SPRAY: 50 SPRAY, METERED NASAL at 23:35

## 2023-01-01 RX ADMIN — FUROSEMIDE 20 MG: 10 INJECTION, SOLUTION INTRAMUSCULAR; INTRAVENOUS at 09:19

## 2023-01-01 RX ADMIN — OXYCODONE HYDROCHLORIDE 5 MG: 5 TABLET ORAL at 17:41

## 2023-01-01 RX ADMIN — METOPROLOL TARTRATE 50 MG: 50 TABLET, FILM COATED ORAL at 08:02

## 2023-01-01 RX ADMIN — OXYCODONE HYDROCHLORIDE 5 MG: 5 TABLET ORAL at 14:40

## 2023-01-01 RX ADMIN — NYSTATIN 400000 UNITS: 100000 SUSPENSION ORAL at 09:45

## 2023-01-01 RX ADMIN — METOPROLOL TARTRATE 5 MG: 1 INJECTION, SOLUTION INTRAVENOUS at 11:54

## 2023-01-01 RX ADMIN — PIPERACILLIN SODIUM AND TAZOBACTAM SODIUM 2.25 G: 2; .25 INJECTION, SOLUTION INTRAVENOUS at 16:37

## 2023-01-01 RX ADMIN — DEXAMETHASONE SODIUM PHOSPHATE 10 MG: 10 INJECTION INTRAMUSCULAR; INTRAVENOUS at 17:06

## 2023-01-01 RX ADMIN — ESOMEPRAZOLE MAGNESIUM 40 MG: 40 FOR SUSPENSION ORAL at 06:00

## 2023-01-01 RX ADMIN — APIXABAN 5 MG: 5 TABLET, FILM COATED ORAL at 20:28

## 2023-01-01 RX ADMIN — METOPROLOL TARTRATE 50 MG: 50 TABLET, FILM COATED ORAL at 21:50

## 2023-01-01 RX ADMIN — METHYLPREDNISOLONE SODIUM SUCCINATE 40 MG: 40 INJECTION, POWDER, LYOPHILIZED, FOR SOLUTION INTRAMUSCULAR; INTRAVENOUS at 05:50

## 2023-01-01 RX ADMIN — DEXTROSE MONOHYDRATE 25 G: 25 INJECTION, SOLUTION INTRAVENOUS at 16:32

## 2023-01-01 RX ADMIN — THIAMINE HCL TAB 100 MG 100 MG: 100 TAB at 09:03

## 2023-01-01 RX ADMIN — IPRATROPIUM BROMIDE AND ALBUTEROL SULFATE 3 ML: 2.5; .5 SOLUTION RESPIRATORY (INHALATION) at 20:24

## 2023-01-01 RX ADMIN — VORICONAZOLE 400 MG: 200 INJECTION, POWDER, LYOPHILIZED, FOR SOLUTION INTRAVENOUS at 20:41

## 2023-01-01 RX ADMIN — INSULIN LISPRO 2 UNITS: 100 INJECTION, SOLUTION INTRAVENOUS; SUBCUTANEOUS at 18:05

## 2023-01-01 RX ADMIN — INSULIN LISPRO 1 UNITS: 100 INJECTION, SOLUTION INTRAVENOUS; SUBCUTANEOUS at 08:31

## 2023-01-01 RX ADMIN — ESOMEPRAZOLE MAGNESIUM 40 MG: 40 FOR SUSPENSION ORAL at 08:02

## 2023-01-01 RX ADMIN — SODIUM BICARBONATE 650 MG: 650 TABLET ORAL at 15:32

## 2023-01-01 RX ADMIN — SULFAMETHOXAZOLE AND TRIMETHOPRIM 80 MG: 400; 80 TABLET ORAL at 13:06

## 2023-01-01 RX ADMIN — NYSTATIN 400000 UNITS: 100000 SUSPENSION ORAL at 09:49

## 2023-01-01 RX ADMIN — METOPROLOL TARTRATE 12.5 MG: 25 TABLET, FILM COATED ORAL at 23:01

## 2023-01-01 RX ADMIN — LIDOCAINE 1 PATCH: 4 PATCH TOPICAL at 21:15

## 2023-01-01 RX ADMIN — Medication: at 15:23

## 2023-01-01 RX ADMIN — SODIUM CHLORIDE, POTASSIUM CHLORIDE, SODIUM LACTATE AND CALCIUM CHLORIDE 500 ML: 600; 310; 30; 20 INJECTION, SOLUTION INTRAVENOUS at 12:43

## 2023-01-01 RX ADMIN — METOPROLOL TARTRATE 50 MG: 50 TABLET, FILM COATED ORAL at 08:10

## 2023-01-01 RX ADMIN — INSULIN LISPRO 2 UNITS: 100 INJECTION, SOLUTION INTRAVENOUS; SUBCUTANEOUS at 12:23

## 2023-01-01 RX ADMIN — TOCILIZUMAB: 20 INJECTION, SOLUTION, CONCENTRATE INTRAVENOUS at 20:34

## 2023-01-01 RX ADMIN — Medication 30 L/MIN: at 01:00

## 2023-01-01 RX ADMIN — FUROSEMIDE 40 MG: 10 INJECTION, SOLUTION INTRAVENOUS at 17:14

## 2023-01-01 RX ADMIN — Medication 3 MG: at 21:34

## 2023-01-01 RX ADMIN — METHYLPREDNISOLONE SODIUM SUCCINATE 81.25 MG: 125 INJECTION, POWDER, FOR SOLUTION INTRAMUSCULAR; INTRAVENOUS at 16:27

## 2023-01-01 RX ADMIN — BUDESONIDE 0.5 MG: 0.5 INHALANT RESPIRATORY (INHALATION) at 07:23

## 2023-01-01 RX ADMIN — QUETIAPINE 25 MG: 25 TABLET, FILM COATED ORAL at 20:37

## 2023-01-01 RX ADMIN — INSULIN LISPRO 2 UNITS: 100 INJECTION, SOLUTION INTRAVENOUS; SUBCUTANEOUS at 11:35

## 2023-01-01 RX ADMIN — VANCOMYCIN 1250 MG: 1.75 INJECTION, SOLUTION INTRAVENOUS at 01:36

## 2023-01-01 RX ADMIN — INSULIN LISPRO 9 UNITS: 100 INJECTION, SOLUTION INTRAVENOUS; SUBCUTANEOUS at 17:11

## 2023-01-01 RX ADMIN — LIDOCAINE 1 PATCH: 4 PATCH TOPICAL at 21:18

## 2023-01-01 RX ADMIN — SULFAMETHOXAZOLE AND TRIMETHOPRIM 160 MG OF TRIMETHOPRIM: 200; 40 SUSPENSION ORAL at 09:48

## 2023-01-01 RX ADMIN — PREDNISONE 30 MG: 10 TABLET ORAL at 20:30

## 2023-01-01 RX ADMIN — PANTOPRAZOLE SODIUM 40 MG: 40 TABLET, DELAYED RELEASE ORAL at 06:10

## 2023-01-01 RX ADMIN — SENNOSIDES AND DOCUSATE SODIUM 1 TABLET: 8.6; 5 TABLET ORAL at 09:15

## 2023-01-01 RX ADMIN — METOPROLOL TARTRATE 12.5 MG: 25 TABLET, FILM COATED ORAL at 10:17

## 2023-01-01 RX ADMIN — INSULIN LISPRO 6 UNITS: 100 INJECTION, SOLUTION INTRAVENOUS; SUBCUTANEOUS at 12:42

## 2023-01-01 RX ADMIN — QUETIAPINE 25 MG: 25 TABLET, FILM COATED ORAL at 20:30

## 2023-01-01 RX ADMIN — INSULIN LISPRO 2 UNITS: 100 INJECTION, SOLUTION INTRAVENOUS; SUBCUTANEOUS at 11:49

## 2023-01-01 RX ADMIN — HEPARIN SODIUM 5500 UNITS: 5000 INJECTION, SOLUTION INTRAVENOUS; SUBCUTANEOUS at 22:19

## 2023-01-01 RX ADMIN — GLIPIZIDE 2.5 MG: 5 TABLET ORAL at 05:00

## 2023-01-01 RX ADMIN — HYDROXYZINE HYDROCHLORIDE 10 MG: 10 TABLET ORAL at 00:02

## 2023-01-01 RX ADMIN — METOPROLOL TARTRATE 50 MG: 50 TABLET, FILM COATED ORAL at 10:38

## 2023-01-01 RX ADMIN — OXYCODONE HYDROCHLORIDE 5 MG: 5 TABLET ORAL at 08:34

## 2023-01-01 RX ADMIN — Medication 5 MG: at 21:28

## 2023-01-01 RX ADMIN — LIDOCAINE 1 PATCH: 4 PATCH TOPICAL at 08:29

## 2023-01-01 RX ADMIN — INSULIN LISPRO 6 UNITS: 100 INJECTION, SOLUTION INTRAVENOUS; SUBCUTANEOUS at 16:11

## 2023-01-01 RX ADMIN — PREDNISONE 50 MG: 20 TABLET ORAL at 18:42

## 2023-01-01 RX ADMIN — FLUTICASONE PROPIONATE 1 SPRAY: 50 SPRAY, METERED NASAL at 21:21

## 2023-01-01 RX ADMIN — APIXABAN 5 MG: 5 TABLET, FILM COATED ORAL at 20:50

## 2023-01-01 RX ADMIN — Medication 5 MG: at 21:20

## 2023-01-01 RX ADMIN — FLUTICASONE PROPIONATE 1 SPRAY: 50 SPRAY, METERED NASAL at 10:16

## 2023-01-01 RX ADMIN — Medication 100 MG: at 13:45

## 2023-01-01 RX ADMIN — NYSTATIN 400000 UNITS: 100000 SUSPENSION ORAL at 08:43

## 2023-01-01 RX ADMIN — FLUTICASONE PROPIONATE 1 SPRAY: 50 SPRAY, METERED NASAL at 20:23

## 2023-01-01 RX ADMIN — INSULIN GLARGINE 8 UNITS: 100 INJECTION, SOLUTION SUBCUTANEOUS at 21:19

## 2023-01-01 RX ADMIN — METOPROLOL TARTRATE 12.5 MG: 25 TABLET, FILM COATED ORAL at 04:30

## 2023-01-01 RX ADMIN — SODIUM CHLORIDE 30 MG/ML INHALATION SOLUTION 3 ML: 30 SOLUTION INHALANT at 20:25

## 2023-01-01 RX ADMIN — Medication 2 G: at 15:28

## 2023-01-01 RX ADMIN — LIDOCAINE 1 PATCH: 4 PATCH TOPICAL at 09:10

## 2023-01-01 RX ADMIN — FLUTICASONE PROPIONATE 1 SPRAY: 50 SPRAY, METERED NASAL at 09:32

## 2023-01-01 RX ADMIN — METHYLPREDNISOLONE SODIUM SUCCINATE 81.25 MG: 125 INJECTION, POWDER, FOR SOLUTION INTRAMUSCULAR; INTRAVENOUS at 22:29

## 2023-01-01 RX ADMIN — BENZONATATE 200 MG: 100 CAPSULE ORAL at 21:12

## 2023-01-01 RX ADMIN — THIAMINE HCL TAB 100 MG 100 MG: 100 TAB at 09:02

## 2023-01-01 RX ADMIN — PIPERACILLIN SODIUM AND TAZOBACTAM SODIUM 2.25 G: 2; .25 INJECTION, SOLUTION INTRAVENOUS at 22:13

## 2023-01-01 RX ADMIN — APIXABAN 5 MG: 5 TABLET, FILM COATED ORAL at 09:03

## 2023-01-01 RX ADMIN — SULFAMETHOXAZOLE AND TRIMETHOPRIM 80 MG: 400; 80 TABLET ORAL at 08:48

## 2023-01-01 RX ADMIN — INSULIN LISPRO 2 UNITS: 100 INJECTION, SOLUTION INTRAVENOUS; SUBCUTANEOUS at 11:50

## 2023-01-01 RX ADMIN — SODIUM BICARBONATE 650 MG: 650 TABLET ORAL at 10:05

## 2023-01-01 RX ADMIN — THIAMINE HCL TAB 100 MG 100 MG: 100 TAB at 08:01

## 2023-01-01 RX ADMIN — METOPROLOL TARTRATE 75 MG: 50 TABLET, FILM COATED ORAL at 08:10

## 2023-01-01 RX ADMIN — FLUTICASONE PROPIONATE 1 SPRAY: 50 SPRAY, METERED NASAL at 09:15

## 2023-01-01 RX ADMIN — FLUTICASONE PROPIONATE 1 SPRAY: 50 SPRAY, METERED NASAL at 20:06

## 2023-01-01 RX ADMIN — IPRATROPIUM BROMIDE AND ALBUTEROL SULFATE 3 ML: 2.5; .5 SOLUTION RESPIRATORY (INHALATION) at 00:30

## 2023-01-01 RX ADMIN — MELATONIN 6 MG: 3 TAB ORAL at 21:05

## 2023-01-01 RX ADMIN — OXYCODONE HYDROCHLORIDE 5 MG: 5 TABLET ORAL at 04:28

## 2023-01-01 RX ADMIN — PANTOPRAZOLE SODIUM 40 MG: 40 TABLET, DELAYED RELEASE ORAL at 05:58

## 2023-01-01 RX ADMIN — SULFAMETHOXAZOLE AND TRIMETHOPRIM 160 MG OF TRIMETHOPRIM: 200; 40 SUSPENSION ORAL at 08:01

## 2023-01-01 RX ADMIN — FUROSEMIDE 20 MG: 10 INJECTION, SOLUTION INTRAMUSCULAR; INTRAVENOUS at 11:56

## 2023-01-01 RX ADMIN — APIXABAN 5 MG: 5 TABLET, FILM COATED ORAL at 20:33

## 2023-01-01 RX ADMIN — FLUTICASONE PROPIONATE 1 SPRAY: 50 SPRAY, METERED NASAL at 20:43

## 2023-01-01 RX ADMIN — INSULIN LISPRO 2 UNITS: 100 INJECTION, SOLUTION INTRAVENOUS; SUBCUTANEOUS at 17:14

## 2023-01-01 RX ADMIN — QUETIAPINE FUMARATE 12.5 MG: 25 TABLET ORAL at 14:26

## 2023-01-01 RX ADMIN — FLUTICASONE PROPIONATE 1 SPRAY: 50 SPRAY, METERED NASAL at 09:52

## 2023-01-01 RX ADMIN — METOPROLOL TARTRATE 50 MG: 50 TABLET, FILM COATED ORAL at 20:21

## 2023-01-01 RX ADMIN — HEPARIN SODIUM 5500 UNITS: 5000 INJECTION, SOLUTION INTRAVENOUS; SUBCUTANEOUS at 18:36

## 2023-01-01 RX ADMIN — OXYCODONE HYDROCHLORIDE 5 MG: 5 TABLET ORAL at 17:49

## 2023-01-01 RX ADMIN — Medication 200 MG: at 14:15

## 2023-01-01 RX ADMIN — APIXABAN 5 MG: 5 TABLET, FILM COATED ORAL at 08:27

## 2023-01-01 RX ADMIN — PREDNISONE 50 MG: 20 TABLET ORAL at 06:36

## 2023-01-01 RX ADMIN — CIPROFLOXACIN 750 MG: 250 TABLET, COATED ORAL at 21:18

## 2023-01-01 RX ADMIN — SODIUM BICARBONATE 650 MG: 650 TABLET ORAL at 09:08

## 2023-01-01 RX ADMIN — SODIUM BICARBONATE 650 MG: 650 TABLET ORAL at 16:50

## 2023-01-01 RX ADMIN — AMLODIPINE BESYLATE 2.5 MG: 2.5 TABLET ORAL at 12:25

## 2023-01-01 RX ADMIN — FUROSEMIDE 40 MG: 10 INJECTION, SOLUTION INTRAMUSCULAR; INTRAVENOUS at 19:10

## 2023-01-01 RX ADMIN — DEXTROSE MONOHYDRATE 25 G: 25 INJECTION, SOLUTION INTRAVENOUS at 12:09

## 2023-01-01 RX ADMIN — PREDNISONE 50 MG: 20 TABLET ORAL at 23:41

## 2023-01-01 RX ADMIN — SODIUM ZIRCONIUM CYCLOSILICATE 10 G: 10 POWDER, FOR SUSPENSION ORAL at 14:24

## 2023-01-01 RX ADMIN — LIDOCAINE 1 PATCH: 4 PATCH TOPICAL at 21:27

## 2023-01-01 RX ADMIN — PERFLUTREN 1.5 ML OF DILUTION: 6.52 INJECTION, SUSPENSION INTRAVENOUS at 11:18

## 2023-01-01 RX ADMIN — METOPROLOL TARTRATE 50 MG: 50 TABLET, FILM COATED ORAL at 08:45

## 2023-01-01 RX ADMIN — LIDOCAINE 1 PATCH: 4 PATCH TOPICAL at 09:34

## 2023-01-01 RX ADMIN — APIXABAN 5 MG: 5 TABLET, FILM COATED ORAL at 09:45

## 2023-01-01 RX ADMIN — IPRATROPIUM BROMIDE AND ALBUTEROL SULFATE 3 ML: .5; 3 SOLUTION RESPIRATORY (INHALATION) at 09:04

## 2023-01-01 RX ADMIN — SULFAMETHOXAZOLE AND TRIMETHOPRIM 80 MG OF TRIMETHOPRIM: 80; 16 INJECTION, SOLUTION, CONCENTRATE INTRAVENOUS at 08:17

## 2023-01-01 RX ADMIN — Medication 30 L/MIN: at 20:25

## 2023-01-01 RX ADMIN — FLUTICASONE PROPIONATE 1 SPRAY: 50 SPRAY, METERED NASAL at 08:48

## 2023-01-01 RX ADMIN — THIAMINE HCL TAB 100 MG 100 MG: 100 TAB at 09:26

## 2023-01-01 RX ADMIN — PANTOPRAZOLE SODIUM 40 MG: 40 TABLET, DELAYED RELEASE ORAL at 08:44

## 2023-01-01 RX ADMIN — Medication 2 G: at 13:04

## 2023-01-01 RX ADMIN — INSULIN LISPRO 1 UNITS: 100 INJECTION, SOLUTION INTRAVENOUS; SUBCUTANEOUS at 20:53

## 2023-01-01 RX ADMIN — SULFAMETHOXAZOLE AND TRIMETHOPRIM 80 MG: 400; 80 TABLET ORAL at 10:13

## 2023-01-01 RX ADMIN — Medication 5 MG: at 21:18

## 2023-01-01 RX ADMIN — PANTOPRAZOLE SODIUM 40 MG: 40 INJECTION, POWDER, FOR SOLUTION INTRAVENOUS at 08:58

## 2023-01-01 RX ADMIN — ENOXAPARIN SODIUM 30 MG: 30 INJECTION SUBCUTANEOUS at 06:37

## 2023-01-01 RX ADMIN — SULFAMETHOXAZOLE AND TRIMETHOPRIM 80 MG OF TRIMETHOPRIM: 200; 40 SUSPENSION ORAL at 14:59

## 2023-01-01 RX ADMIN — CIPROFLOXACIN 750 MG: 250 TABLET, COATED ORAL at 20:17

## 2023-01-01 RX ADMIN — PREDNISONE 50 MG: 20 TABLET ORAL at 00:03

## 2023-01-01 RX ADMIN — SULFUR HEXAFLUORIDE 24.28 MG: KIT at 14:52

## 2023-01-01 RX ADMIN — METOPROLOL TARTRATE 50 MG: 50 TABLET, FILM COATED ORAL at 20:30

## 2023-01-01 RX ADMIN — METOPROLOL TARTRATE 50 MG: 50 TABLET, FILM COATED ORAL at 08:42

## 2023-01-01 RX ADMIN — OXYCODONE HYDROCHLORIDE 5 MG: 5 TABLET ORAL at 01:48

## 2023-01-01 RX ADMIN — PREDNISONE 30 MG: 10 TABLET ORAL at 08:45

## 2023-01-01 RX ADMIN — PIPERACILLIN SODIUM AND TAZOBACTAM SODIUM 2.25 G: 2; .25 INJECTION, SOLUTION INTRAVENOUS at 15:44

## 2023-01-01 RX ADMIN — SULFAMETHOXAZOLE AND TRIMETHOPRIM 80 MG: 400; 80 TABLET ORAL at 09:35

## 2023-01-01 RX ADMIN — SODIUM BICARBONATE 650 MG: 650 TABLET ORAL at 09:27

## 2023-01-01 RX ADMIN — METOPROLOL TARTRATE 5 MG: 1 INJECTION, SOLUTION INTRAVENOUS at 11:30

## 2023-01-01 RX ADMIN — TRAZODONE HYDROCHLORIDE 25 MG: 50 TABLET ORAL at 21:18

## 2023-01-01 RX ADMIN — ACETAMINOPHEN 650 MG: 325 TABLET ORAL at 11:21

## 2023-01-01 RX ADMIN — THIAMINE HCL TAB 100 MG 100 MG: 100 TAB at 09:33

## 2023-01-01 RX ADMIN — SULFAMETHOXAZOLE AND TRIMETHOPRIM 160 MG OF TRIMETHOPRIM: 200; 40 SUSPENSION ORAL at 09:14

## 2023-01-01 RX ADMIN — SODIUM ZIRCONIUM CYCLOSILICATE 10 G: 10 POWDER, FOR SUSPENSION ORAL at 17:29

## 2023-01-01 RX ADMIN — PREDNISONE 50 MG: 20 TABLET ORAL at 11:33

## 2023-01-01 RX ADMIN — PREDNISONE 80 MG: 20 TABLET ORAL at 08:42

## 2023-01-01 RX ADMIN — FLUTICASONE PROPIONATE 1 SPRAY: 50 SPRAY, METERED NASAL at 20:30

## 2023-01-01 RX ADMIN — Medication 15 L/MIN: at 01:00

## 2023-01-01 RX ADMIN — METOPROLOL TARTRATE 50 MG: 50 TABLET, FILM COATED ORAL at 21:44

## 2023-01-01 RX ADMIN — QUETIAPINE 25 MG: 25 TABLET, FILM COATED ORAL at 21:02

## 2023-01-01 RX ADMIN — QUETIAPINE 25 MG: 25 TABLET, FILM COATED ORAL at 21:49

## 2023-01-01 RX ADMIN — Medication 5 MG: at 17:40

## 2023-01-01 RX ADMIN — INSULIN LISPRO 4 UNITS: 100 INJECTION, SOLUTION INTRAVENOUS; SUBCUTANEOUS at 00:04

## 2023-01-01 RX ADMIN — IPRATROPIUM BROMIDE AND ALBUTEROL SULFATE 3 ML: 2.5; .5 SOLUTION RESPIRATORY (INHALATION) at 07:58

## 2023-01-01 RX ADMIN — FLUTICASONE PROPIONATE 1 SPRAY: 50 SPRAY, METERED NASAL at 08:44

## 2023-01-01 RX ADMIN — ENOXAPARIN SODIUM 30 MG: 30 INJECTION SUBCUTANEOUS at 05:39

## 2023-01-01 RX ADMIN — ACETAMINOPHEN 650 MG: 325 TABLET ORAL at 01:48

## 2023-01-01 RX ADMIN — MEROPENEM 500 MG: 500 INJECTION, POWDER, FOR SOLUTION INTRAVENOUS at 11:10

## 2023-01-01 RX ADMIN — INSULIN LISPRO 4 UNITS: 100 INJECTION, SOLUTION INTRAVENOUS; SUBCUTANEOUS at 17:25

## 2023-01-01 RX ADMIN — HEPARIN SODIUM AND DEXTROSE 1200 UNITS/HR: 10000; 5 INJECTION INTRAVENOUS at 18:31

## 2023-01-01 RX ADMIN — APIXABAN 5 MG: 5 TABLET, FILM COATED ORAL at 20:21

## 2023-01-01 RX ADMIN — INSULIN LISPRO 4 UNITS: 100 INJECTION, SOLUTION INTRAVENOUS; SUBCUTANEOUS at 04:36

## 2023-01-01 RX ADMIN — SENNOSIDES AND DOCUSATE SODIUM 1 TABLET: 8.6; 5 TABLET ORAL at 21:17

## 2023-01-01 RX ADMIN — ENOXAPARIN SODIUM 30 MG: 30 INJECTION SUBCUTANEOUS at 05:40

## 2023-01-01 RX ADMIN — FUROSEMIDE 20 MG: 10 INJECTION, SOLUTION INTRAMUSCULAR; INTRAVENOUS at 21:07

## 2023-01-01 RX ADMIN — QUETIAPINE 25 MG: 25 TABLET, FILM COATED ORAL at 20:19

## 2023-01-01 RX ADMIN — INSULIN LISPRO 2 UNITS: 100 INJECTION, SOLUTION INTRAVENOUS; SUBCUTANEOUS at 16:06

## 2023-01-01 RX ADMIN — IPRATROPIUM BROMIDE AND ALBUTEROL SULFATE 3 ML: 2.5; .5 SOLUTION RESPIRATORY (INHALATION) at 07:11

## 2023-01-01 RX ADMIN — METOPROLOL TARTRATE 50 MG: 50 TABLET, FILM COATED ORAL at 21:18

## 2023-01-01 RX ADMIN — QUETIAPINE 25 MG: 25 TABLET, FILM COATED ORAL at 20:42

## 2023-01-01 RX ADMIN — Medication 15 L/MIN: at 19:00

## 2023-01-01 RX ADMIN — BENZONATATE 200 MG: 100 CAPSULE ORAL at 20:04

## 2023-01-01 RX ADMIN — Medication 5 MG: at 20:33

## 2023-01-01 RX ADMIN — INSULIN LISPRO 4 UNITS: 100 INJECTION, SOLUTION INTRAVENOUS; SUBCUTANEOUS at 12:13

## 2023-01-01 RX ADMIN — PANTOPRAZOLE SODIUM 40 MG: 40 TABLET, DELAYED RELEASE ORAL at 08:30

## 2023-01-01 RX ADMIN — Medication 5 MG: at 20:12

## 2023-01-01 RX ADMIN — TRAZODONE HYDROCHLORIDE 25 MG: 50 TABLET ORAL at 20:24

## 2023-01-01 RX ADMIN — NYSTATIN 400000 UNITS: 100000 SUSPENSION ORAL at 09:03

## 2023-01-01 RX ADMIN — METOPROLOL TARTRATE 25 MG: 25 TABLET, FILM COATED ORAL at 06:25

## 2023-01-01 RX ADMIN — INSULIN LISPRO 2 UNITS: 100 INJECTION, SOLUTION INTRAVENOUS; SUBCUTANEOUS at 04:18

## 2023-01-01 RX ADMIN — FLUTICASONE PROPIONATE 1 SPRAY: 50 SPRAY, METERED NASAL at 15:01

## 2023-01-01 RX ADMIN — METOPROLOL TARTRATE 50 MG: 50 TABLET, FILM COATED ORAL at 21:41

## 2023-01-01 RX ADMIN — ACETAMINOPHEN, ASPIRIN, CAFFEINE 2 TABLET: 250; 65; 250 TABLET, FILM COATED ORAL at 18:01

## 2023-01-01 RX ADMIN — Medication: at 11:25

## 2023-01-01 RX ADMIN — ACETAMINOPHEN 650 MG: 325 TABLET ORAL at 06:04

## 2023-01-01 RX ADMIN — PANTOPRAZOLE SODIUM 40 MG: 40 TABLET, DELAYED RELEASE ORAL at 07:02

## 2023-01-01 RX ADMIN — DEXMEDETOMIDINE HYDROCHLORIDE 0.4 MCG/KG/HR: 4 INJECTION, SOLUTION INTRAVENOUS at 15:16

## 2023-01-01 RX ADMIN — METOPROLOL TARTRATE 25 MG: 25 TABLET, FILM COATED ORAL at 04:56

## 2023-01-01 RX ADMIN — APIXABAN 10 MG: 5 TABLET, FILM COATED ORAL at 20:06

## 2023-01-01 RX ADMIN — METOPROLOL TARTRATE 75 MG: 50 TABLET, FILM COATED ORAL at 21:03

## 2023-01-01 RX ADMIN — PREDNISONE 60 MG: 20 TABLET ORAL at 21:19

## 2023-01-01 RX ADMIN — APIXABAN 5 MG: 5 TABLET, FILM COATED ORAL at 08:00

## 2023-01-01 RX ADMIN — SODIUM BICARBONATE 650 MG: 650 TABLET ORAL at 08:02

## 2023-01-01 RX ADMIN — IPRATROPIUM BROMIDE AND ALBUTEROL SULFATE 3 ML: 2.5; .5 SOLUTION RESPIRATORY (INHALATION) at 19:08

## 2023-01-01 RX ADMIN — Medication 5 MG: at 21:49

## 2023-01-01 RX ADMIN — QUETIAPINE 25 MG: 25 TABLET, FILM COATED ORAL at 20:12

## 2023-01-01 RX ADMIN — IPRATROPIUM BROMIDE AND ALBUTEROL SULFATE 3 ML: 2.5; .5 SOLUTION RESPIRATORY (INHALATION) at 07:15

## 2023-01-01 RX ADMIN — INSULIN LISPRO 1 UNITS: 100 INJECTION, SOLUTION INTRAVENOUS; SUBCUTANEOUS at 11:52

## 2023-01-01 RX ADMIN — INSULIN LISPRO 1 UNITS: 100 INJECTION, SOLUTION INTRAVENOUS; SUBCUTANEOUS at 12:02

## 2023-01-01 RX ADMIN — SULFAMETHOXAZOLE AND TRIMETHOPRIM 160 MG OF TRIMETHOPRIM: 200; 40 SUSPENSION ORAL at 09:52

## 2023-01-01 RX ADMIN — THIAMINE HCL TAB 100 MG 100 MG: 100 TAB at 08:29

## 2023-01-01 RX ADMIN — Medication: at 07:23

## 2023-01-01 RX ADMIN — ACETAMINOPHEN 650 MG: 325 TABLET ORAL at 08:57

## 2023-01-01 RX ADMIN — PREDNISONE 60 MG: 20 TABLET ORAL at 09:28

## 2023-01-01 RX ADMIN — PANTOPRAZOLE SODIUM 40 MG: 40 INJECTION, POWDER, FOR SOLUTION INTRAVENOUS at 09:49

## 2023-01-01 RX ADMIN — APIXABAN 10 MG: 5 TABLET, FILM COATED ORAL at 09:10

## 2023-01-01 RX ADMIN — FLUTICASONE PROPIONATE 1 SPRAY: 50 SPRAY, METERED NASAL at 08:59

## 2023-01-01 RX ADMIN — CIPROFLOXACIN 750 MG: 250 TABLET, COATED ORAL at 08:41

## 2023-01-01 RX ADMIN — SODIUM CHLORIDE 30 MG/ML INHALATION SOLUTION 3 ML: 30 SOLUTION INHALANT at 09:09

## 2023-01-01 RX ADMIN — METOPROLOL TARTRATE 50 MG: 50 TABLET, FILM COATED ORAL at 09:35

## 2023-01-01 RX ADMIN — PREDNISONE 30 MG: 10 TABLET ORAL at 09:31

## 2023-01-01 RX ADMIN — SENNOSIDES AND DOCUSATE SODIUM 1 TABLET: 8.6; 5 TABLET ORAL at 09:26

## 2023-01-01 RX ADMIN — INSULIN LISPRO 2 UNITS: 100 INJECTION, SOLUTION INTRAVENOUS; SUBCUTANEOUS at 20:22

## 2023-01-01 RX ADMIN — FUROSEMIDE 40 MG: 10 INJECTION, SOLUTION INTRAVENOUS at 11:25

## 2023-01-01 RX ADMIN — TRAZODONE HYDROCHLORIDE 25 MG: 50 TABLET ORAL at 21:29

## 2023-01-01 RX ADMIN — PANTOPRAZOLE SODIUM 40 MG: 40 TABLET, DELAYED RELEASE ORAL at 07:01

## 2023-01-01 RX ADMIN — LOPERAMIDE HYDROCHLORIDE 2 MG: 2 SOLUTION ORAL at 21:43

## 2023-01-01 RX ADMIN — SODIUM CHLORIDE 500 ML: 900 INJECTION, SOLUTION INTRAVENOUS at 17:31

## 2023-01-01 RX ADMIN — PANTOPRAZOLE SODIUM 40 MG: 40 TABLET, DELAYED RELEASE ORAL at 05:00

## 2023-01-01 RX ADMIN — PREDNISONE 50 MG: 20 TABLET ORAL at 05:15

## 2023-01-01 RX ADMIN — INSULIN LISPRO 6 UNITS: 100 INJECTION, SOLUTION INTRAVENOUS; SUBCUTANEOUS at 18:02

## 2023-01-01 RX ADMIN — PANTOPRAZOLE SODIUM 40 MG: 40 TABLET, DELAYED RELEASE ORAL at 06:06

## 2023-01-01 RX ADMIN — FLUTICASONE PROPIONATE 1 SPRAY: 50 SPRAY, METERED NASAL at 09:39

## 2023-01-01 RX ADMIN — INSULIN LISPRO 2 UNITS: 100 INJECTION, SOLUTION INTRAVENOUS; SUBCUTANEOUS at 09:48

## 2023-01-01 RX ADMIN — METOPROLOL TARTRATE 50 MG: 50 TABLET, FILM COATED ORAL at 20:41

## 2023-01-01 RX ADMIN — THIAMINE HCL TAB 100 MG 100 MG: 100 TAB at 08:41

## 2023-01-01 RX ADMIN — LIDOCAINE HYDROCHLORIDE 1 APPLICATION: 20 JELLY TOPICAL at 12:54

## 2023-01-01 RX ADMIN — PIPERACILLIN SODIUM AND TAZOBACTAM SODIUM 2.25 G: 2; .25 INJECTION, SOLUTION INTRAVENOUS at 10:05

## 2023-01-01 RX ADMIN — METOPROLOL TARTRATE 5 MG: 1 INJECTION, SOLUTION INTRAVENOUS at 06:19

## 2023-01-01 RX ADMIN — CIPROFLOXACIN HYDROCHLORIDE 500 MG: 500 TABLET, FILM COATED ORAL at 17:42

## 2023-01-01 RX ADMIN — LIDOCAINE 1 PATCH: 4 PATCH TOPICAL at 21:49

## 2023-01-01 RX ADMIN — TRAZODONE HYDROCHLORIDE 25 MG: 50 TABLET ORAL at 20:50

## 2023-01-01 RX ADMIN — ENOXAPARIN SODIUM 30 MG: 30 INJECTION SUBCUTANEOUS at 07:56

## 2023-01-01 RX ADMIN — INSULIN LISPRO 4 UNITS: 100 INJECTION, SOLUTION INTRAVENOUS; SUBCUTANEOUS at 08:31

## 2023-01-01 RX ADMIN — PREDNISONE 60 MG: 20 TABLET ORAL at 08:45

## 2023-01-01 RX ADMIN — PIPERACILLIN SODIUM AND TAZOBACTAM SODIUM 3.38 G: 3; .375 INJECTION, SOLUTION INTRAVENOUS at 16:50

## 2023-01-01 RX ADMIN — QUETIAPINE 25 MG: 25 TABLET, FILM COATED ORAL at 21:00

## 2023-01-01 RX ADMIN — OXYCODONE HYDROCHLORIDE 5 MG: 5 TABLET ORAL at 12:30

## 2023-01-01 RX ADMIN — INSULIN LISPRO 4 UNITS: 100 INJECTION, SOLUTION INTRAVENOUS; SUBCUTANEOUS at 20:46

## 2023-01-01 RX ADMIN — FLUTICASONE PROPIONATE 1 SPRAY: 50 SPRAY, METERED NASAL at 08:49

## 2023-01-01 RX ADMIN — ESOMEPRAZOLE MAGNESIUM 40 MG: 40 FOR SUSPENSION ORAL at 05:33

## 2023-01-01 RX ADMIN — METOPROLOL TARTRATE 25 MG: 25 TABLET, FILM COATED ORAL at 16:16

## 2023-01-01 RX ADMIN — MEROPENEM 500 MG: 500 INJECTION, POWDER, FOR SOLUTION INTRAVENOUS at 13:16

## 2023-01-01 RX ADMIN — METOPROLOL TARTRATE 25 MG: 25 TABLET, FILM COATED ORAL at 22:41

## 2023-01-01 RX ADMIN — SODIUM ZIRCONIUM CYCLOSILICATE 5 G: 5 POWDER, FOR SUSPENSION ORAL at 21:19

## 2023-01-01 RX ADMIN — ACETAMINOPHEN 650 MG: 325 TABLET ORAL at 03:29

## 2023-01-01 RX ADMIN — SODIUM BICARBONATE 650 MG: 650 TABLET ORAL at 15:15

## 2023-01-01 RX ADMIN — PANTOPRAZOLE SODIUM 40 MG: 40 TABLET, DELAYED RELEASE ORAL at 05:21

## 2023-01-01 RX ADMIN — POLYETHYLENE GLYCOL 3350 17 G: 17 POWDER, FOR SOLUTION ORAL at 09:05

## 2023-01-01 RX ADMIN — METOPROLOL TARTRATE 50 MG: 50 TABLET, FILM COATED ORAL at 08:54

## 2023-01-01 RX ADMIN — PREDNISONE 50 MG: 20 TABLET ORAL at 05:00

## 2023-01-01 RX ADMIN — PIPERACILLIN SODIUM AND TAZOBACTAM SODIUM 2.25 G: 2; .25 INJECTION, SOLUTION INTRAVENOUS at 10:07

## 2023-01-01 RX ADMIN — PANTOPRAZOLE SODIUM 40 MG: 40 TABLET, DELAYED RELEASE ORAL at 10:01

## 2023-01-01 RX ADMIN — PANTOPRAZOLE SODIUM 40 MG: 40 INJECTION, POWDER, LYOPHILIZED, FOR SOLUTION INTRAVENOUS at 09:08

## 2023-01-01 RX ADMIN — Medication 35 L/MIN: at 02:05

## 2023-01-01 RX ADMIN — INSULIN GLARGINE 15 UNITS: 100 INJECTION, SOLUTION SUBCUTANEOUS at 20:19

## 2023-01-01 RX ADMIN — SULFAMETHOXAZOLE AND TRIMETHOPRIM 80 MG: 400; 80 TABLET ORAL at 08:25

## 2023-01-01 RX ADMIN — PIPERACILLIN SODIUM AND TAZOBACTAM SODIUM 3.38 G: 3; .375 INJECTION, SOLUTION INTRAVENOUS at 21:07

## 2023-01-01 RX ADMIN — TRAZODONE HYDROCHLORIDE 25 MG: 50 TABLET ORAL at 20:56

## 2023-01-01 RX ADMIN — SODIUM BICARBONATE 650 MG: 650 TABLET ORAL at 20:50

## 2023-01-01 RX ADMIN — FLUTICASONE PROPIONATE 1 SPRAY: 50 SPRAY, METERED NASAL at 08:13

## 2023-01-01 RX ADMIN — ACETAMINOPHEN 650 MG: 325 TABLET ORAL at 13:02

## 2023-01-01 RX ADMIN — CIPROFLOXACIN 750 MG: 250 TABLET, COATED ORAL at 21:03

## 2023-01-01 RX ADMIN — METOPROLOL TARTRATE 25 MG: 25 TABLET, FILM COATED ORAL at 06:05

## 2023-01-01 RX ADMIN — PREDNISONE 50 MG: 20 TABLET ORAL at 06:05

## 2023-01-01 RX ADMIN — TRAZODONE HYDROCHLORIDE 25 MG: 50 TABLET ORAL at 21:44

## 2023-01-01 RX ADMIN — FLUTICASONE PROPIONATE 1 SPRAY: 50 SPRAY, METERED NASAL at 21:29

## 2023-01-01 RX ADMIN — MEROPENEM 500 MG: 500 INJECTION, POWDER, FOR SOLUTION INTRAVENOUS at 23:55

## 2023-01-01 RX ADMIN — PIPERACILLIN SODIUM AND TAZOBACTAM SODIUM 3.38 G: 3; .375 INJECTION, SOLUTION INTRAVENOUS at 09:47

## 2023-01-01 RX ADMIN — PREDNISONE 30 MG: 10 TABLET ORAL at 20:15

## 2023-01-01 RX ADMIN — INSULIN LISPRO 3 UNITS: 100 INJECTION, SOLUTION INTRAVENOUS; SUBCUTANEOUS at 17:03

## 2023-01-01 RX ADMIN — ACETAMINOPHEN 650 MG: 325 TABLET ORAL at 05:40

## 2023-01-01 RX ADMIN — APIXABAN 5 MG: 5 TABLET, FILM COATED ORAL at 09:35

## 2023-01-01 RX ADMIN — IPRATROPIUM BROMIDE AND ALBUTEROL SULFATE 3 ML: .5; 3 SOLUTION RESPIRATORY (INHALATION) at 15:49

## 2023-01-01 RX ADMIN — SENNOSIDES AND DOCUSATE SODIUM 1 TABLET: 8.6; 5 TABLET ORAL at 08:18

## 2023-01-01 RX ADMIN — Medication 30 L/MIN: at 23:00

## 2023-01-01 RX ADMIN — APIXABAN 5 MG: 5 TABLET, FILM COATED ORAL at 09:13

## 2023-01-01 RX ADMIN — THIAMINE HCL TAB 100 MG 100 MG: 100 TAB at 08:27

## 2023-01-01 RX ADMIN — PREDNISONE 80 MG: 20 TABLET ORAL at 10:13

## 2023-01-01 RX ADMIN — PIPERACILLIN SODIUM AND TAZOBACTAM SODIUM 2.25 G: 2; .25 INJECTION, SOLUTION INTRAVENOUS at 03:24

## 2023-01-01 RX ADMIN — PREDNISONE 50 MG: 20 TABLET ORAL at 16:46

## 2023-01-01 RX ADMIN — DEXAMETHASONE SODIUM PHOSPHATE 6 MG: 10 INJECTION INTRAMUSCULAR; INTRAVENOUS at 22:24

## 2023-01-01 RX ADMIN — DOCUSATE SODIUM 100 MG: 100 CAPSULE, LIQUID FILLED ORAL at 10:01

## 2023-01-01 RX ADMIN — CIPROFLOXACIN HYDROCHLORIDE 500 MG: 500 TABLET, FILM COATED ORAL at 06:05

## 2023-01-01 RX ADMIN — THIAMINE HCL TAB 100 MG 100 MG: 100 TAB at 22:22

## 2023-01-01 RX ADMIN — BENZONATATE 200 MG: 100 CAPSULE ORAL at 20:57

## 2023-01-01 RX ADMIN — VORICONAZOLE 300 MG: 200 INJECTION, POWDER, LYOPHILIZED, FOR SOLUTION INTRAVENOUS at 20:22

## 2023-01-01 RX ADMIN — PIPERACILLIN SODIUM AND TAZOBACTAM SODIUM 2.25 G: 2; .25 INJECTION, SOLUTION INTRAVENOUS at 22:41

## 2023-01-01 RX ADMIN — FUROSEMIDE 40 MG: 10 INJECTION, SOLUTION INTRAVENOUS at 12:12

## 2023-01-01 RX ADMIN — INSULIN LISPRO 2 UNITS: 100 INJECTION, SOLUTION INTRAVENOUS; SUBCUTANEOUS at 23:41

## 2023-01-01 RX ADMIN — NYSTATIN 400000 UNITS: 100000 SUSPENSION ORAL at 20:26

## 2023-01-01 RX ADMIN — INSULIN LISPRO 2 UNITS: 100 INJECTION, SOLUTION INTRAVENOUS; SUBCUTANEOUS at 23:43

## 2023-01-01 RX ADMIN — INSULIN LISPRO 2 UNITS: 100 INJECTION, SOLUTION INTRAVENOUS; SUBCUTANEOUS at 15:44

## 2023-01-01 RX ADMIN — PIPERACILLIN SODIUM AND TAZOBACTAM SODIUM 2.25 G: 2; .25 INJECTION, SOLUTION INTRAVENOUS at 10:15

## 2023-01-01 RX ADMIN — FUROSEMIDE 40 MG: 10 INJECTION, SOLUTION INTRAMUSCULAR; INTRAVENOUS at 11:54

## 2023-01-01 RX ADMIN — APIXABAN 5 MG: 5 TABLET, FILM COATED ORAL at 09:52

## 2023-01-01 RX ADMIN — METOPROLOL TARTRATE 50 MG: 50 TABLET, FILM COATED ORAL at 21:00

## 2023-01-01 RX ADMIN — APIXABAN 5 MG: 5 TABLET, FILM COATED ORAL at 21:50

## 2023-01-01 RX ADMIN — INSULIN LISPRO 9 UNITS: 100 INJECTION, SOLUTION INTRAVENOUS; SUBCUTANEOUS at 17:21

## 2023-01-01 RX ADMIN — SODIUM BICARBONATE 650 MG: 650 TABLET ORAL at 21:04

## 2023-01-01 RX ADMIN — PREDNISONE 80 MG: 20 TABLET ORAL at 08:59

## 2023-01-01 RX ADMIN — INSULIN LISPRO 4 UNITS: 100 INJECTION, SOLUTION INTRAVENOUS; SUBCUTANEOUS at 12:20

## 2023-01-01 RX ADMIN — APIXABAN 5 MG: 5 TABLET, FILM COATED ORAL at 08:45

## 2023-01-01 RX ADMIN — PIPERACILLIN SODIUM AND TAZOBACTAM SODIUM 3.38 G: 3; .375 INJECTION, SOLUTION INTRAVENOUS at 21:58

## 2023-01-01 RX ADMIN — DEXMEDETOMIDINE HYDROCHLORIDE 0.84 MCG/KG/HR: 4 INJECTION, SOLUTION INTRAVENOUS at 20:43

## 2023-01-01 RX ADMIN — INSULIN LISPRO 1 UNITS: 100 INJECTION, SOLUTION INTRAVENOUS; SUBCUTANEOUS at 12:30

## 2023-01-01 RX ADMIN — METOPROLOL TARTRATE 12.5 MG: 25 TABLET, FILM COATED ORAL at 22:22

## 2023-01-01 RX ADMIN — ACETAMINOPHEN 650 MG: 325 TABLET ORAL at 06:14

## 2023-01-01 RX ADMIN — NYSTATIN 400000 UNITS: 100000 SUSPENSION ORAL at 21:42

## 2023-01-01 RX ADMIN — APIXABAN 5 MG: 5 TABLET, FILM COATED ORAL at 21:18

## 2023-01-01 RX ADMIN — PREDNISONE 50 MG: 20 TABLET ORAL at 22:47

## 2023-01-01 RX ADMIN — PIPERACILLIN SODIUM AND TAZOBACTAM SODIUM 3.38 G: 3; .375 INJECTION, SOLUTION INTRAVENOUS at 09:19

## 2023-01-01 RX ADMIN — MELATONIN 6 MG: 3 TAB ORAL at 23:22

## 2023-01-01 RX ADMIN — PREDNISONE 50 MG: 20 TABLET ORAL at 18:12

## 2023-01-01 RX ADMIN — METOPROLOL TARTRATE 25 MG: 25 TABLET, FILM COATED ORAL at 21:45

## 2023-01-01 RX ADMIN — OXYCODONE HYDROCHLORIDE 5 MG: 5 TABLET ORAL at 09:51

## 2023-01-01 RX ADMIN — IPRATROPIUM BROMIDE AND ALBUTEROL SULFATE 3 ML: 2.5; .5 SOLUTION RESPIRATORY (INHALATION) at 07:34

## 2023-01-01 RX ADMIN — INSULIN LISPRO 2 UNITS: 100 INJECTION, SOLUTION INTRAVENOUS; SUBCUTANEOUS at 07:55

## 2023-01-01 RX ADMIN — PREDNISONE 80 MG: 20 TABLET ORAL at 08:54

## 2023-01-01 RX ADMIN — FLUTICASONE PROPIONATE 1 SPRAY: 50 SPRAY, METERED NASAL at 20:33

## 2023-01-01 RX ADMIN — INSULIN LISPRO 2 UNITS: 100 INJECTION, SOLUTION INTRAVENOUS; SUBCUTANEOUS at 17:35

## 2023-01-01 RX ADMIN — TRAZODONE HYDROCHLORIDE 25 MG: 50 TABLET ORAL at 21:32

## 2023-01-01 RX ADMIN — SULFAMETHOXAZOLE AND TRIMETHOPRIM 80 MG: 400; 80 TABLET ORAL at 08:33

## 2023-01-01 RX ADMIN — SODIUM BICARBONATE 650 MG: 650 TABLET ORAL at 20:32

## 2023-01-01 RX ADMIN — PREDNISONE 60 MG: 20 TABLET ORAL at 20:19

## 2023-01-01 RX ADMIN — FLUTICASONE PROPIONATE 1 SPRAY: 50 SPRAY, METERED NASAL at 08:35

## 2023-01-01 RX ADMIN — ACETAMINOPHEN 650 MG: 325 TABLET ORAL at 17:17

## 2023-01-01 RX ADMIN — PIPERACILLIN SODIUM AND TAZOBACTAM SODIUM 3.38 G: 3; .375 INJECTION, SOLUTION INTRAVENOUS at 15:16

## 2023-01-01 RX ADMIN — VANCOMYCIN 1250 MG: 1.75 INJECTION, SOLUTION INTRAVENOUS at 17:04

## 2023-01-01 RX ADMIN — SENNOSIDES AND DOCUSATE SODIUM 1 TABLET: 8.6; 5 TABLET ORAL at 20:21

## 2023-01-01 RX ADMIN — METOPROLOL TARTRATE 75 MG: 50 TABLET, FILM COATED ORAL at 08:18

## 2023-01-01 RX ADMIN — METOPROLOL TARTRATE 25 MG: 25 TABLET, FILM COATED ORAL at 04:06

## 2023-01-01 RX ADMIN — SULFAMETHOXAZOLE AND TRIMETHOPRIM 160 MG OF TRIMETHOPRIM: 200; 40 SUSPENSION ORAL at 09:32

## 2023-01-01 RX ADMIN — FLUTICASONE PROPIONATE 1 SPRAY: 50 SPRAY, METERED NASAL at 20:37

## 2023-01-01 RX ADMIN — DEXAMETHASONE SODIUM PHOSPHATE 10 MG: 10 INJECTION INTRAMUSCULAR; INTRAVENOUS at 05:16

## 2023-01-01 RX ADMIN — OXYCODONE HYDROCHLORIDE 5 MG: 5 TABLET ORAL at 21:28

## 2023-01-01 RX ADMIN — METOPROLOL TARTRATE 50 MG: 50 TABLET, FILM COATED ORAL at 10:13

## 2023-01-01 RX ADMIN — MELATONIN 6 MG: 3 TAB ORAL at 00:33

## 2023-01-01 RX ADMIN — PREDNISONE 30 MG: 10 TABLET ORAL at 20:36

## 2023-01-01 RX ADMIN — HEPARIN SODIUM 800 UNITS/HR: 10000 INJECTION, SOLUTION INTRAVENOUS at 20:44

## 2023-01-01 RX ADMIN — INSULIN LISPRO 2 UNITS: 100 INJECTION, SOLUTION INTRAVENOUS; SUBCUTANEOUS at 14:59

## 2023-01-01 RX ADMIN — Medication 5 MG: at 21:15

## 2023-01-01 RX ADMIN — CIPROFLOXACIN HYDROCHLORIDE 500 MG: 500 TABLET, FILM COATED ORAL at 17:04

## 2023-01-01 RX ADMIN — ACETAMINOPHEN 650 MG: 325 TABLET ORAL at 16:30

## 2023-01-01 RX ADMIN — PANTOPRAZOLE SODIUM 40 MG: 40 TABLET, DELAYED RELEASE ORAL at 06:47

## 2023-01-01 RX ADMIN — INSULIN LISPRO 3 UNITS: 100 INJECTION, SOLUTION INTRAVENOUS; SUBCUTANEOUS at 12:00

## 2023-01-01 RX ADMIN — PIPERACILLIN SODIUM AND TAZOBACTAM SODIUM 2.25 G: 2; .25 INJECTION, SOLUTION INTRAVENOUS at 22:10

## 2023-01-01 RX ADMIN — SODIUM BICARBONATE 650 MG: 650 TABLET ORAL at 16:46

## 2023-01-01 RX ADMIN — APIXABAN 5 MG: 5 TABLET, FILM COATED ORAL at 20:46

## 2023-01-01 RX ADMIN — APIXABAN 5 MG: 5 TABLET, FILM COATED ORAL at 09:50

## 2023-01-01 RX ADMIN — METOPROLOL TARTRATE 12.5 MG: 25 TABLET, FILM COATED ORAL at 21:32

## 2023-01-01 RX ADMIN — FLUTICASONE PROPIONATE 1 SPRAY: 50 SPRAY, METERED NASAL at 08:28

## 2023-01-01 RX ADMIN — INSULIN LISPRO 4 UNITS: 100 INJECTION, SOLUTION INTRAVENOUS; SUBCUTANEOUS at 00:09

## 2023-01-01 RX ADMIN — PREDNISONE 50 MG: 20 TABLET ORAL at 23:38

## 2023-01-01 RX ADMIN — METOPROLOL TARTRATE 25 MG: 25 TABLET, FILM COATED ORAL at 17:25

## 2023-01-01 RX ADMIN — IPRATROPIUM BROMIDE AND ALBUTEROL SULFATE 3 ML: 2.5; .5 SOLUTION RESPIRATORY (INHALATION) at 19:35

## 2023-01-01 RX ADMIN — PREDNISONE 60 MG: 20 TABLET ORAL at 21:18

## 2023-01-01 RX ADMIN — METOPROLOL TARTRATE 50 MG: 50 TABLET, FILM COATED ORAL at 21:20

## 2023-01-01 RX ADMIN — THIAMINE HCL TAB 100 MG 100 MG: 100 TAB at 09:35

## 2023-01-01 RX ADMIN — QUETIAPINE 25 MG: 25 TABLET, FILM COATED ORAL at 21:11

## 2023-01-01 RX ADMIN — PREDNISONE 50 MG: 20 TABLET ORAL at 12:30

## 2023-01-01 RX ADMIN — NYSTATIN 400000 UNITS: 100000 SUSPENSION ORAL at 20:50

## 2023-01-01 RX ADMIN — SULFAMETHOXAZOLE AND TRIMETHOPRIM 80 MG OF TRIMETHOPRIM: 200; 40 SUSPENSION ORAL at 08:59

## 2023-01-01 RX ADMIN — FLUTICASONE PROPIONATE 1 SPRAY: 50 SPRAY, METERED NASAL at 08:02

## 2023-01-01 RX ADMIN — DEXAMETHASONE SODIUM PHOSPHATE 6 MG: 10 INJECTION INTRAMUSCULAR; INTRAVENOUS at 20:19

## 2023-01-01 RX ADMIN — AMLODIPINE BESYLATE 2.5 MG: 2.5 TABLET ORAL at 09:19

## 2023-01-01 RX ADMIN — PIPERACILLIN SODIUM AND TAZOBACTAM SODIUM 2.25 G: 2; .25 INJECTION, SOLUTION INTRAVENOUS at 21:34

## 2023-01-01 RX ADMIN — PREDNISONE 30 MG: 10 TABLET ORAL at 08:07

## 2023-01-01 RX ADMIN — HEPARIN SODIUM 1300 UNITS/HR: 10000 INJECTION, SOLUTION INTRAVENOUS at 22:19

## 2023-01-01 RX ADMIN — Medication 40 L/MIN: at 23:44

## 2023-01-01 RX ADMIN — INSULIN LISPRO 4 UNITS: 100 INJECTION, SOLUTION INTRAVENOUS; SUBCUTANEOUS at 08:55

## 2023-01-01 RX ADMIN — INSULIN LISPRO 6 UNITS: 100 INJECTION, SOLUTION INTRAVENOUS; SUBCUTANEOUS at 18:07

## 2023-01-01 RX ADMIN — INSULIN LISPRO 2 UNITS: 100 INJECTION, SOLUTION INTRAVENOUS; SUBCUTANEOUS at 18:13

## 2023-01-01 RX ADMIN — CIPROFLOXACIN HYDROCHLORIDE 500 MG: 500 TABLET, FILM COATED ORAL at 05:03

## 2023-01-01 RX ADMIN — APIXABAN 5 MG: 5 TABLET, FILM COATED ORAL at 20:15

## 2023-01-01 RX ADMIN — LIDOCAINE 1 PATCH: 4 PATCH TOPICAL at 20:46

## 2023-01-01 RX ADMIN — ALBUTEROL SULFATE 2.5 MG: 2.5 SOLUTION RESPIRATORY (INHALATION) at 12:47

## 2023-01-01 RX ADMIN — DEXMEDETOMIDINE HYDROCHLORIDE 0.84 MCG/KG/HR: 4 INJECTION, SOLUTION INTRAVENOUS at 00:55

## 2023-01-01 RX ADMIN — PREDNISONE 60 MG: 20 TABLET ORAL at 13:06

## 2023-01-01 RX ADMIN — QUETIAPINE 25 MG: 25 TABLET, FILM COATED ORAL at 20:35

## 2023-01-01 RX ADMIN — SENNOSIDES AND DOCUSATE SODIUM 1 TABLET: 8.6; 5 TABLET ORAL at 21:11

## 2023-01-01 RX ADMIN — METHYLPREDNISOLONE SODIUM SUCCINATE 40 MG: 40 INJECTION, POWDER, LYOPHILIZED, FOR SOLUTION INTRAMUSCULAR; INTRAVENOUS at 12:23

## 2023-01-01 RX ADMIN — HEPARIN SODIUM 5000 UNITS: 5000 INJECTION, SOLUTION INTRAVENOUS; SUBCUTANEOUS at 23:47

## 2023-01-01 RX ADMIN — SODIUM ZIRCONIUM CYCLOSILICATE 10 G: 10 POWDER, FOR SUSPENSION ORAL at 16:27

## 2023-01-01 RX ADMIN — FUROSEMIDE 10 MG: 20 TABLET ORAL at 10:35

## 2023-01-01 RX ADMIN — CIPROFLOXACIN 750 MG: 250 TABLET, COATED ORAL at 08:34

## 2023-01-01 RX ADMIN — APIXABAN 5 MG: 5 TABLET, FILM COATED ORAL at 09:31

## 2023-01-01 RX ADMIN — FENTANYL CITRATE 25 MCG: 50 INJECTION, SOLUTION INTRAMUSCULAR; INTRAVENOUS at 16:08

## 2023-01-01 RX ADMIN — SULFAMETHOXAZOLE AND TRIMETHOPRIM 160 MG OF TRIMETHOPRIM: 200; 40 SUSPENSION ORAL at 09:40

## 2023-01-01 RX ADMIN — APIXABAN 5 MG: 5 TABLET, FILM COATED ORAL at 08:54

## 2023-01-01 RX ADMIN — BENZONATATE 200 MG: 100 CAPSULE ORAL at 23:39

## 2023-01-01 RX ADMIN — PIPERACILLIN SODIUM AND TAZOBACTAM SODIUM 3.38 G: 3; .375 INJECTION, SOLUTION INTRAVENOUS at 04:25

## 2023-01-01 RX ADMIN — IPRATROPIUM BROMIDE AND ALBUTEROL SULFATE 3 ML: 2.5; .5 SOLUTION RESPIRATORY (INHALATION) at 19:53

## 2023-01-01 RX ADMIN — INSULIN GLARGINE 15 UNITS: 100 INJECTION, SOLUTION SUBCUTANEOUS at 21:00

## 2023-01-01 RX ADMIN — QUETIAPINE 25 MG: 25 TABLET, FILM COATED ORAL at 21:50

## 2023-01-01 RX ADMIN — IPRATROPIUM BROMIDE AND ALBUTEROL SULFATE 3 ML: 2.5; .5 SOLUTION RESPIRATORY (INHALATION) at 06:56

## 2023-01-01 RX ADMIN — FLUTICASONE PROPIONATE 1 SPRAY: 50 SPRAY, METERED NASAL at 21:35

## 2023-01-01 RX ADMIN — SODIUM BICARBONATE 650 MG: 650 TABLET ORAL at 21:59

## 2023-01-01 RX ADMIN — APIXABAN 5 MG: 5 TABLET, FILM COATED ORAL at 09:15

## 2023-01-01 RX ADMIN — INSULIN GLARGINE 15 UNITS: 100 INJECTION, SOLUTION SUBCUTANEOUS at 20:25

## 2023-01-01 RX ADMIN — METOPROLOL TARTRATE 75 MG: 50 TABLET, FILM COATED ORAL at 20:17

## 2023-01-01 RX ADMIN — SENNOSIDES AND DOCUSATE SODIUM 1 TABLET: 8.6; 5 TABLET ORAL at 20:35

## 2023-01-01 RX ADMIN — HEPARIN SODIUM 5000 UNITS: 5000 INJECTION, SOLUTION INTRAVENOUS; SUBCUTANEOUS at 16:35

## 2023-01-01 RX ADMIN — TRAZODONE HYDROCHLORIDE 25 MG: 50 TABLET ORAL at 20:30

## 2023-01-01 RX ADMIN — PIPERACILLIN SODIUM AND TAZOBACTAM SODIUM 3.38 G: 3; .375 INJECTION, SOLUTION INTRAVENOUS at 20:52

## 2023-01-01 RX ADMIN — SODIUM BICARBONATE 650 MG: 650 TABLET ORAL at 20:56

## 2023-01-01 RX ADMIN — ERGOCALCIFEROL 1250 MCG: 1.25 CAPSULE ORAL at 08:12

## 2023-01-01 RX ADMIN — TRAZODONE HYDROCHLORIDE 25 MG: 50 TABLET ORAL at 21:19

## 2023-01-01 RX ADMIN — IPRATROPIUM BROMIDE AND ALBUTEROL SULFATE 3 ML: 2.5; .5 SOLUTION RESPIRATORY (INHALATION) at 19:14

## 2023-01-01 RX ADMIN — PREDNISONE 60 MG: 20 TABLET ORAL at 09:03

## 2023-01-01 RX ADMIN — INSULIN LISPRO 9 UNITS: 100 INJECTION, SOLUTION INTRAVENOUS; SUBCUTANEOUS at 18:31

## 2023-01-01 RX ADMIN — THIAMINE HCL TAB 100 MG 100 MG: 100 TAB at 09:52

## 2023-01-01 RX ADMIN — PANTOPRAZOLE SODIUM 40 MG: 40 TABLET, DELAYED RELEASE ORAL at 06:36

## 2023-01-01 RX ADMIN — METOPROLOL TARTRATE 50 MG: 50 TABLET, FILM COATED ORAL at 09:28

## 2023-01-01 RX ADMIN — CIPROFLOXACIN 750 MG: 250 TABLET, COATED ORAL at 08:49

## 2023-01-01 RX ADMIN — SULFAMETHOXAZOLE AND TRIMETHOPRIM 160 MG OF TRIMETHOPRIM: 200; 40 SUSPENSION ORAL at 08:54

## 2023-01-01 RX ADMIN — SULFAMETHOXAZOLE AND TRIMETHOPRIM 80 MG: 400; 80 TABLET ORAL at 10:38

## 2023-01-01 RX ADMIN — SENNOSIDES AND DOCUSATE SODIUM 1 TABLET: 8.6; 5 TABLET ORAL at 20:38

## 2023-01-01 RX ADMIN — FLUTICASONE PROPIONATE 1 SPRAY: 50 SPRAY, METERED NASAL at 22:26

## 2023-01-01 RX ADMIN — Medication 5 MG: at 20:56

## 2023-01-01 RX ADMIN — QUETIAPINE FUMARATE 12.5 MG: 25 TABLET ORAL at 12:32

## 2023-01-01 RX ADMIN — INSULIN LISPRO 4 UNITS: 100 INJECTION, SOLUTION INTRAVENOUS; SUBCUTANEOUS at 23:20

## 2023-01-01 RX ADMIN — SODIUM CHLORIDE 100 ML/HR: 900 INJECTION, SOLUTION INTRAVENOUS at 23:20

## 2023-01-01 RX ADMIN — POTASSIUM CHLORIDE 20 MEQ: 1.5 POWDER, FOR SOLUTION ORAL at 05:58

## 2023-01-01 RX ADMIN — INSULIN LISPRO 2 UNITS: 100 INJECTION, SOLUTION INTRAVENOUS; SUBCUTANEOUS at 09:52

## 2023-01-01 RX ADMIN — LIDOCAINE 1 PATCH: 4 PATCH TOPICAL at 20:47

## 2023-01-01 RX ADMIN — HYDROXYZINE HYDROCHLORIDE 10 MG: 10 TABLET ORAL at 20:50

## 2023-01-01 RX ADMIN — INSULIN LISPRO 2 UNITS: 100 INJECTION, SOLUTION INTRAVENOUS; SUBCUTANEOUS at 22:32

## 2023-01-01 RX ADMIN — PREDNISONE 50 MG: 20 TABLET ORAL at 05:45

## 2023-01-01 RX ADMIN — ACETAMINOPHEN 650 MG: 325 TABLET ORAL at 22:31

## 2023-01-01 RX ADMIN — ACETYLCYSTEINE 800 MG: 200 SOLUTION ORAL; RESPIRATORY (INHALATION) at 19:36

## 2023-01-01 RX ADMIN — INSULIN LISPRO 6 UNITS: 100 INJECTION, SOLUTION INTRAVENOUS; SUBCUTANEOUS at 18:35

## 2023-01-01 RX ADMIN — FLUTICASONE PROPIONATE 1 SPRAY: 50 SPRAY, METERED NASAL at 20:51

## 2023-01-01 RX ADMIN — INSULIN LISPRO 4 UNITS: 100 INJECTION, SOLUTION INTRAVENOUS; SUBCUTANEOUS at 19:39

## 2023-01-01 RX ADMIN — DIPHENHYDRAMINE HYDROCHLORIDE 25 MG: 25 CAPSULE ORAL at 02:28

## 2023-01-01 RX ADMIN — PREDNISONE 50 MG: 20 TABLET ORAL at 06:00

## 2023-01-01 RX ADMIN — SULFAMETHOXAZOLE AND TRIMETHOPRIM 80 MG: 400; 80 TABLET ORAL at 08:10

## 2023-01-01 RX ADMIN — INSULIN LISPRO 2 UNITS: 100 INJECTION, SOLUTION INTRAVENOUS; SUBCUTANEOUS at 12:30

## 2023-01-01 RX ADMIN — APIXABAN 10 MG: 5 TABLET, FILM COATED ORAL at 08:30

## 2023-01-01 RX ADMIN — TRAZODONE HYDROCHLORIDE 25 MG: 50 TABLET ORAL at 20:23

## 2023-01-01 RX ADMIN — FUROSEMIDE 40 MG: 10 INJECTION, SOLUTION INTRAVENOUS at 05:54

## 2023-01-01 RX ADMIN — MEROPENEM 500 MG: 500 INJECTION, POWDER, FOR SOLUTION INTRAVENOUS at 23:26

## 2023-01-01 RX ADMIN — ACETYLCYSTEINE 200 MG: 200 SOLUTION ORAL; RESPIRATORY (INHALATION) at 19:35

## 2023-01-01 RX ADMIN — METOPROLOL TARTRATE 25 MG: 25 TABLET, FILM COATED ORAL at 17:13

## 2023-01-01 RX ADMIN — PANTOPRAZOLE SODIUM 40 MG: 40 INJECTION, POWDER, FOR SOLUTION INTRAVENOUS at 08:30

## 2023-01-01 RX ADMIN — SENNOSIDES AND DOCUSATE SODIUM 1 TABLET: 8.6; 5 TABLET ORAL at 08:15

## 2023-01-01 RX ADMIN — METOPROLOL TARTRATE 25 MG: 25 TABLET, FILM COATED ORAL at 09:10

## 2023-01-01 RX ADMIN — NYSTATIN 400000 UNITS: 100000 SUSPENSION ORAL at 20:36

## 2023-01-01 RX ADMIN — ALBUTEROL SULFATE 2.5 MG: 2.5 SOLUTION RESPIRATORY (INHALATION) at 20:25

## 2023-01-01 RX ADMIN — SULFAMETHOXAZOLE AND TRIMETHOPRIM 160 MG OF TRIMETHOPRIM: 200; 40 SUSPENSION ORAL at 08:00

## 2023-01-01 RX ADMIN — FLUTICASONE PROPIONATE 1 SPRAY: 50 SPRAY, METERED NASAL at 21:42

## 2023-01-01 RX ADMIN — FLUTICASONE PROPIONATE 1 SPRAY: 50 SPRAY, METERED NASAL at 09:47

## 2023-01-01 RX ADMIN — HEPARIN SODIUM 5000 UNITS: 5000 INJECTION, SOLUTION INTRAVENOUS; SUBCUTANEOUS at 16:12

## 2023-01-01 RX ADMIN — SULFAMETHOXAZOLE AND TRIMETHOPRIM 80 MG: 400; 80 TABLET ORAL at 08:07

## 2023-01-01 RX ADMIN — DEXAMETHASONE SODIUM PHOSPHATE 6 MG: 10 INJECTION INTRAMUSCULAR; INTRAVENOUS at 21:45

## 2023-01-01 RX ADMIN — METOPROLOL TARTRATE 25 MG: 25 TABLET, FILM COATED ORAL at 10:49

## 2023-01-01 RX ADMIN — INSULIN LISPRO 1 UNITS: 100 INJECTION, SOLUTION INTRAVENOUS; SUBCUTANEOUS at 16:50

## 2023-01-01 RX ADMIN — METOPROLOL TARTRATE 25 MG: 25 TABLET, FILM COATED ORAL at 09:52

## 2023-01-01 RX ADMIN — INSULIN GLARGINE 15 UNITS: 100 INJECTION, SOLUTION SUBCUTANEOUS at 21:20

## 2023-01-01 RX ADMIN — LIDOCAINE 1 PATCH: 4 PATCH TOPICAL at 08:34

## 2023-01-01 RX ADMIN — FLUTICASONE PROPIONATE 1 SPRAY: 50 SPRAY, METERED NASAL at 09:01

## 2023-01-01 RX ADMIN — METOPROLOL TARTRATE 5 MG: 5 INJECTION, SOLUTION INTRAVENOUS at 11:54

## 2023-01-01 RX ADMIN — TRAZODONE HYDROCHLORIDE 25 MG: 50 TABLET ORAL at 01:44

## 2023-01-01 RX ADMIN — LIDOCAINE 1 PATCH: 4 PATCH TOPICAL at 09:00

## 2023-01-01 RX ADMIN — PANTOPRAZOLE SODIUM 40 MG: 40 TABLET, DELAYED RELEASE ORAL at 08:36

## 2023-01-01 RX ADMIN — SODIUM BICARBONATE 650 MG: 650 TABLET ORAL at 21:01

## 2023-01-01 RX ADMIN — PANTOPRAZOLE SODIUM 40 MG: 40 INJECTION, POWDER, FOR SOLUTION INTRAVENOUS at 08:34

## 2023-01-01 RX ADMIN — LIDOCAINE 1 PATCH: 4 PATCH TOPICAL at 21:02

## 2023-01-01 RX ADMIN — METOPROLOL TARTRATE 25 MG: 25 TABLET, FILM COATED ORAL at 21:34

## 2023-01-01 RX ADMIN — PREDNISONE 60 MG: 20 TABLET ORAL at 20:50

## 2023-01-01 RX ADMIN — LOPERAMIDE HYDROCHLORIDE 2 MG: 2 SOLUTION ORAL at 09:49

## 2023-01-01 RX ADMIN — Medication 5 MG: at 17:28

## 2023-01-01 RX ADMIN — SENNOSIDES AND DOCUSATE SODIUM 1 TABLET: 8.6; 5 TABLET ORAL at 08:10

## 2023-01-01 RX ADMIN — SENNOSIDES AND DOCUSATE SODIUM 1 TABLET: 8.6; 5 TABLET ORAL at 08:48

## 2023-01-01 RX ADMIN — FLUTICASONE PROPIONATE 1 SPRAY: 50 SPRAY, METERED NASAL at 08:32

## 2023-01-01 RX ADMIN — PIPERACILLIN SODIUM AND TAZOBACTAM SODIUM 3.38 G: 3; .375 INJECTION, SOLUTION INTRAVENOUS at 03:02

## 2023-01-01 RX ADMIN — APIXABAN 5 MG: 5 TABLET, FILM COATED ORAL at 09:47

## 2023-01-01 RX ADMIN — APIXABAN 10 MG: 5 TABLET, FILM COATED ORAL at 20:56

## 2023-01-01 RX ADMIN — FUROSEMIDE 40 MG: 10 INJECTION, SOLUTION INTRAMUSCULAR; INTRAVENOUS at 12:07

## 2023-01-01 RX ADMIN — APIXABAN 5 MG: 5 TABLET, FILM COATED ORAL at 08:32

## 2023-01-01 RX ADMIN — DEXAMETHASONE SODIUM PHOSPHATE 6 MG: 10 INJECTION INTRAMUSCULAR; INTRAVENOUS at 21:01

## 2023-01-01 RX ADMIN — OXYCODONE HYDROCHLORIDE 5 MG: 5 TABLET ORAL at 02:21

## 2023-01-01 RX ADMIN — DEXMEDETOMIDINE HYDROCHLORIDE 0.84 MCG/KG/HR: 4 INJECTION, SOLUTION INTRAVENOUS at 15:18

## 2023-01-01 RX ADMIN — CIPROFLOXACIN HYDROCHLORIDE 500 MG: 500 TABLET, FILM COATED ORAL at 05:36

## 2023-01-01 RX ADMIN — IPRATROPIUM BROMIDE AND ALBUTEROL SULFATE 3 ML: 2.5; .5 SOLUTION RESPIRATORY (INHALATION) at 19:07

## 2023-01-01 RX ADMIN — ENOXAPARIN SODIUM 30 MG: 30 INJECTION SUBCUTANEOUS at 05:09

## 2023-01-01 RX ADMIN — PREDNISONE 50 MG: 20 TABLET ORAL at 05:16

## 2023-01-01 RX ADMIN — NYSTATIN 400000 UNITS: 100000 SUSPENSION ORAL at 20:12

## 2023-01-01 RX ADMIN — DEXTROSE MONOHYDRATE 25 G: 25 INJECTION, SOLUTION INTRAVENOUS at 06:59

## 2023-01-01 RX ADMIN — IPRATROPIUM BROMIDE AND ALBUTEROL SULFATE 3 ML: 2.5; .5 SOLUTION RESPIRATORY (INHALATION) at 07:14

## 2023-01-01 RX ADMIN — APIXABAN 5 MG: 5 TABLET, FILM COATED ORAL at 21:14

## 2023-01-01 RX ADMIN — Medication 5 MG: at 20:35

## 2023-01-01 RX ADMIN — Medication 5 MG: at 18:09

## 2023-01-01 RX ADMIN — ENOXAPARIN SODIUM 30 MG: 30 INJECTION SUBCUTANEOUS at 05:48

## 2023-01-01 RX ADMIN — SENNOSIDES AND DOCUSATE SODIUM 1 TABLET: 8.6; 5 TABLET ORAL at 08:41

## 2023-01-01 RX ADMIN — PREDNISONE 30 MG: 10 TABLET ORAL at 08:41

## 2023-01-01 RX ADMIN — SULFAMETHOXAZOLE AND TRIMETHOPRIM 160 MG OF TRIMETHOPRIM: 200; 40 SUSPENSION ORAL at 08:28

## 2023-01-01 RX ADMIN — SODIUM BICARBONATE 650 MG: 650 TABLET ORAL at 20:39

## 2023-01-01 RX ADMIN — SODIUM ZIRCONIUM CYCLOSILICATE 10 G: 10 POWDER, FOR SUSPENSION ORAL at 20:37

## 2023-01-01 RX ADMIN — ACETAMINOPHEN 650 MG: 325 TABLET ORAL at 08:43

## 2023-01-01 RX ADMIN — VORICONAZOLE 400 MG: 200 INJECTION, POWDER, LYOPHILIZED, FOR SOLUTION INTRAVENOUS at 08:30

## 2023-01-01 RX ADMIN — Medication 15 L/MIN: at 02:22

## 2023-01-01 RX ADMIN — FLUTICASONE PROPIONATE 1 SPRAY: 50 SPRAY, METERED NASAL at 21:08

## 2023-01-01 RX ADMIN — PANTOPRAZOLE SODIUM 40 MG: 40 TABLET, DELAYED RELEASE ORAL at 06:04

## 2023-01-01 RX ADMIN — FLUTICASONE PROPIONATE 1 SPRAY: 50 SPRAY, METERED NASAL at 20:47

## 2023-01-01 RX ADMIN — ACETAMINOPHEN 650 MG: 325 TABLET ORAL at 07:35

## 2023-01-01 RX ADMIN — INSULIN LISPRO 15 UNITS: 100 INJECTION, SOLUTION INTRAVENOUS; SUBCUTANEOUS at 11:34

## 2023-01-01 RX ADMIN — SODIUM BICARBONATE 650 MG: 650 TABLET ORAL at 21:15

## 2023-01-01 RX ADMIN — DEXAMETHASONE SODIUM PHOSPHATE 10 MG: 10 INJECTION INTRAMUSCULAR; INTRAVENOUS at 04:45

## 2023-01-01 RX ADMIN — HYDROMORPHONE HYDROCHLORIDE 0.2 MG: 1 INJECTION, SOLUTION INTRAMUSCULAR; INTRAVENOUS; SUBCUTANEOUS at 04:10

## 2023-01-01 RX ADMIN — IPRATROPIUM BROMIDE AND ALBUTEROL SULFATE 3 ML: 2.5; .5 SOLUTION RESPIRATORY (INHALATION) at 13:24

## 2023-01-01 RX ADMIN — INSULIN LISPRO 6 UNITS: 100 INJECTION, SOLUTION INTRAVENOUS; SUBCUTANEOUS at 17:31

## 2023-01-01 RX ADMIN — INSULIN LISPRO 6 UNITS: 100 INJECTION, SOLUTION INTRAVENOUS; SUBCUTANEOUS at 08:02

## 2023-01-01 RX ADMIN — INSULIN LISPRO 4 UNITS: 100 INJECTION, SOLUTION INTRAVENOUS; SUBCUTANEOUS at 20:10

## 2023-01-01 RX ADMIN — SODIUM ZIRCONIUM CYCLOSILICATE 10 G: 10 POWDER, FOR SUSPENSION ORAL at 17:31

## 2023-01-01 RX ADMIN — PIPERACILLIN SODIUM AND TAZOBACTAM SODIUM 2.25 G: 2; .25 INJECTION, SOLUTION INTRAVENOUS at 04:06

## 2023-01-01 RX ADMIN — PREDNISONE 80 MG: 20 TABLET ORAL at 09:03

## 2023-01-01 RX ADMIN — APIXABAN 5 MG: 5 TABLET, FILM COATED ORAL at 20:30

## 2023-01-01 RX ADMIN — ENOXAPARIN SODIUM 40 MG: 100 INJECTION SUBCUTANEOUS at 21:02

## 2023-01-01 RX ADMIN — SODIUM BICARBONATE 650 MG: 650 TABLET ORAL at 08:30

## 2023-01-01 RX ADMIN — PANTOPRAZOLE SODIUM 40 MG: 40 TABLET, DELAYED RELEASE ORAL at 06:07

## 2023-01-01 RX ADMIN — SULFAMETHOXAZOLE AND TRIMETHOPRIM 160 MG OF TRIMETHOPRIM: 200; 40 SUSPENSION ORAL at 08:42

## 2023-01-01 RX ADMIN — APIXABAN 5 MG: 5 TABLET, FILM COATED ORAL at 21:19

## 2023-01-01 RX ADMIN — IPRATROPIUM BROMIDE AND ALBUTEROL SULFATE 3 ML: 2.5; .5 SOLUTION RESPIRATORY (INHALATION) at 13:51

## 2023-01-01 RX ADMIN — THIAMINE HCL TAB 100 MG 100 MG: 100 TAB at 08:26

## 2023-01-01 RX ADMIN — TRAZODONE HYDROCHLORIDE 25 MG: 50 TABLET ORAL at 20:21

## 2023-01-01 RX ADMIN — INSULIN GLARGINE 10 UNITS: 100 INJECTION, SOLUTION SUBCUTANEOUS at 20:51

## 2023-01-01 RX ADMIN — LIDOCAINE 1 PATCH: 4 PATCH TOPICAL at 09:26

## 2023-01-01 RX ADMIN — Medication: at 17:53

## 2023-01-01 RX ADMIN — QUETIAPINE FUMARATE 12.5 MG: 25 TABLET ORAL at 12:23

## 2023-01-01 SDOH — SOCIAL STABILITY: SOCIAL INSECURITY: HAVE YOU HAD THOUGHTS OF HARMING ANYONE ELSE?: NO

## 2023-01-01 SDOH — ECONOMIC STABILITY: INCOME INSECURITY: IN THE LAST 12 MONTHS, WAS THERE A TIME WHEN YOU WERE NOT ABLE TO PAY THE MORTGAGE OR RENT ON TIME?: NO

## 2023-01-01 SDOH — ECONOMIC STABILITY: TRANSPORTATION INSECURITY: IN THE PAST 12 MONTHS, HAS LACK OF TRANSPORTATION KEPT YOU FROM MEDICAL APPOINTMENTS OR FROM GETTING MEDICATIONS?: NO

## 2023-01-01 SDOH — ECONOMIC STABILITY: TRANSPORTATION INSECURITY
IN THE PAST 12 MONTHS, HAS THE LACK OF TRANSPORTATION KEPT YOU FROM MEDICAL APPOINTMENTS OR FROM GETTING MEDICATIONS?: NO

## 2023-01-01 SDOH — SOCIAL STABILITY: SOCIAL INSECURITY: ARE THERE ANY APPARENT SIGNS OF INJURIES/BEHAVIORS THAT COULD BE RELATED TO ABUSE/NEGLECT?: NO

## 2023-01-01 SDOH — SOCIAL STABILITY: SOCIAL INSECURITY
WITHIN THE LAST YEAR, HAVE YOU BEEN KICKED, HIT, SLAPPED, OR OTHERWISE PHYSICALLY HURT BY YOUR PARTNER OR EX-PARTNER?: NO

## 2023-01-01 SDOH — SOCIAL STABILITY: SOCIAL INSECURITY
WITHIN THE LAST YEAR, HAVE TO BEEN RAPED OR FORCED TO HAVE ANY KIND OF SEXUAL ACTIVITY BY YOUR PARTNER OR EX-PARTNER?: NO

## 2023-01-01 SDOH — ECONOMIC STABILITY: HOUSING INSECURITY
IN THE LAST 12 MONTHS, WAS THERE A TIME WHEN YOU DID NOT HAVE A STEADY PLACE TO SLEEP OR SLEPT IN A SHELTER (INCLUDING NOW)?: NO

## 2023-01-01 SDOH — SOCIAL STABILITY: SOCIAL INSECURITY: DO YOU FEEL ANYONE HAS EXPLOITED OR TAKEN ADVANTAGE OF YOU FINANCIALLY OR OF YOUR PERSONAL PROPERTY?: NO

## 2023-01-01 SDOH — SOCIAL STABILITY: SOCIAL INSECURITY: DOES ANYONE TRY TO KEEP YOU FROM HAVING/CONTACTING OTHER FRIENDS OR DOING THINGS OUTSIDE YOUR HOME?: NO

## 2023-01-01 SDOH — SOCIAL STABILITY: SOCIAL INSECURITY: DO YOU FEEL UNSAFE GOING BACK TO THE PLACE WHERE YOU ARE LIVING?: NO

## 2023-01-01 SDOH — ECONOMIC STABILITY: TRANSPORTATION INSECURITY
IN THE PAST 12 MONTHS, HAS LACK OF TRANSPORTATION KEPT YOU FROM MEETINGS, WORK, OR FROM GETTING THINGS NEEDED FOR DAILY LIVING?: NO

## 2023-01-01 SDOH — SOCIAL STABILITY: SOCIAL INSECURITY: ARE YOU OR HAVE YOU BEEN THREATENED OR ABUSED PHYSICALLY, EMOTIONALLY, OR SEXUALLY BY ANYONE?: NO

## 2023-01-01 SDOH — SOCIAL STABILITY: SOCIAL INSECURITY: WITHIN THE LAST YEAR, HAVE YOU BEEN AFRAID OF YOUR PARTNER OR EX-PARTNER?: NO

## 2023-01-01 SDOH — ECONOMIC STABILITY: HOUSING INSECURITY: IN THE LAST 12 MONTHS, HOW MANY PLACES HAVE YOU LIVED?: 1

## 2023-01-01 SDOH — ECONOMIC STABILITY: INCOME INSECURITY: IN THE PAST 12 MONTHS, HAS THE ELECTRIC, GAS, OIL, OR WATER COMPANY THREATENED TO SHUT OFF SERVICE IN YOUR HOME?: NO

## 2023-01-01 SDOH — SOCIAL STABILITY: SOCIAL INSECURITY: HAS ANYONE EVER THREATENED TO HURT YOUR FAMILY OR YOUR PETS?: NO

## 2023-01-01 SDOH — ECONOMIC STABILITY: INCOME INSECURITY: HOW HARD IS IT FOR YOU TO PAY FOR THE VERY BASICS LIKE FOOD, HOUSING, MEDICAL CARE, AND HEATING?: NOT VERY HARD

## 2023-01-01 SDOH — ECONOMIC STABILITY: FOOD INSECURITY: WITHIN THE PAST 12 MONTHS, THE FOOD YOU BOUGHT JUST DIDN'T LAST AND YOU DIDN'T HAVE MONEY TO GET MORE.: NEVER TRUE

## 2023-01-01 SDOH — ECONOMIC STABILITY: FOOD INSECURITY: HOW HARD IS IT FOR YOU TO PAY FOR THE VERY BASICS LIKE FOOD, HOUSING, MEDICAL CARE, AND HEATING?: NOT VERY HARD

## 2023-01-01 SDOH — SOCIAL STABILITY: SOCIAL INSECURITY: WITHIN THE LAST YEAR, HAVE YOU BEEN HUMILIATED OR EMOTIONALLY ABUSED IN OTHER WAYS BY YOUR PARTNER OR EX-PARTNER?: NO

## 2023-01-01 SDOH — SOCIAL STABILITY: SOCIAL INSECURITY: ABUSE: ADULT

## 2023-01-01 SDOH — ECONOMIC STABILITY: FOOD INSECURITY: WITHIN THE PAST 12 MONTHS, YOU WORRIED THAT YOUR FOOD WOULD RUN OUT BEFORE YOU GOT MONEY TO BUY MORE.: NEVER TRUE

## 2023-01-01 SDOH — ECONOMIC STABILITY: HOUSING INSECURITY: IN THE LAST 12 MONTHS, WAS THERE A TIME WHEN YOU WERE NOT ABLE TO PAY THE MORTGAGE OR RENT ON TIME?: NO

## 2023-01-01 SDOH — ECONOMIC STABILITY: INCOME INSECURITY: HOW HARD IS IT FOR YOU TO PAY FOR THE VERY BASICS LIKE FOOD, HOUSING, MEDICAL CARE, AND HEATING?: NOT HARD AT ALL

## 2023-01-01 SDOH — SOCIAL STABILITY: SOCIAL INSECURITY: WERE YOU ABLE TO COMPLETE ALL THE BEHAVIORAL HEALTH SCREENINGS?: YES

## 2023-01-01 ASSESSMENT — COGNITIVE AND FUNCTIONAL STATUS - GENERAL
CLIMB 3 TO 5 STEPS WITH RAILING: TOTAL
TOILETING: TOTAL
TURNING FROM BACK TO SIDE WHILE IN FLAT BAD: TOTAL
MOVING TO AND FROM BED TO CHAIR: A LOT
EATING MEALS: TOTAL
STANDING UP FROM CHAIR USING ARMS: TOTAL
DRESSING REGULAR LOWER BODY CLOTHING: A LITTLE
CLIMB 3 TO 5 STEPS WITH RAILING: TOTAL
STANDING UP FROM CHAIR USING ARMS: A LITTLE
DRESSING REGULAR LOWER BODY CLOTHING: TOTAL
STANDING UP FROM CHAIR USING ARMS: A LITTLE
DAILY ACTIVITIY SCORE: 12
MOBILITY SCORE: 7
MOBILITY SCORE: 6
TURNING FROM BACK TO SIDE WHILE IN FLAT BAD: A LOT
PERSONAL GROOMING: A LOT
DAILY ACTIVITIY SCORE: 16
DRESSING REGULAR LOWER BODY CLOTHING: A LITTLE
DAILY ACTIVITIY SCORE: 11
DRESSING REGULAR LOWER BODY CLOTHING: TOTAL
STANDING UP FROM CHAIR USING ARMS: TOTAL
WALKING IN HOSPITAL ROOM: TOTAL
TURNING FROM BACK TO SIDE WHILE IN FLAT BAD: TOTAL
WALKING IN HOSPITAL ROOM: TOTAL
HELP NEEDED FOR BATHING: TOTAL
PERSONAL GROOMING: A LOT
PERSONAL GROOMING: A LITTLE
STANDING UP FROM CHAIR USING ARMS: TOTAL
MOBILITY SCORE: 6
TOILETING: A LOT
MOVING TO AND FROM BED TO CHAIR: A LOT
HELP NEEDED FOR BATHING: TOTAL
TOILETING: A LOT
HELP NEEDED FOR BATHING: A LOT
TURNING FROM BACK TO SIDE WHILE IN FLAT BAD: A LITTLE
WALKING IN HOSPITAL ROOM: TOTAL
MOVING FROM LYING ON BACK TO SITTING ON SIDE OF FLAT BED WITH BEDRAILS: A LOT
TOILETING: TOTAL
MOVING TO AND FROM BED TO CHAIR: TOTAL
DAILY ACTIVITIY SCORE: 15
DAILY ACTIVITIY SCORE: 8
MOBILITY SCORE: 17
TURNING FROM BACK TO SIDE WHILE IN FLAT BAD: TOTAL
MOVING FROM LYING ON BACK TO SITTING ON SIDE OF FLAT BED WITH BEDRAILS: A LOT
MOBILITY SCORE: 12
CLIMB 3 TO 5 STEPS WITH RAILING: A LOT
DRESSING REGULAR UPPER BODY CLOTHING: A LITTLE
STANDING UP FROM CHAIR USING ARMS: TOTAL
MOBILITY SCORE: 6
DRESSING REGULAR UPPER BODY CLOTHING: A LOT
PERSONAL GROOMING: A LITTLE
CLIMB 3 TO 5 STEPS WITH RAILING: TOTAL
CLIMB 3 TO 5 STEPS WITH RAILING: TOTAL
DAILY ACTIVITIY SCORE: 14
MOVING TO AND FROM BED TO CHAIR: A LOT
HELP NEEDED FOR BATHING: A LOT
STANDING UP FROM CHAIR USING ARMS: TOTAL
WALKING IN HOSPITAL ROOM: TOTAL
MOBILITY SCORE: 20
TURNING FROM BACK TO SIDE WHILE IN FLAT BAD: A LOT
MOVING TO AND FROM BED TO CHAIR: TOTAL
TURNING FROM BACK TO SIDE WHILE IN FLAT BAD: A LOT
MOVING TO AND FROM BED TO CHAIR: TOTAL
TOILETING: A LOT
HELP NEEDED FOR BATHING: TOTAL
WALKING IN HOSPITAL ROOM: A LITTLE
CLIMB 3 TO 5 STEPS WITH RAILING: TOTAL
HELP NEEDED FOR BATHING: A LOT
DRESSING REGULAR UPPER BODY CLOTHING: A LOT
DAILY ACTIVITIY SCORE: 19
TURNING FROM BACK TO SIDE WHILE IN FLAT BAD: A LOT
WALKING IN HOSPITAL ROOM: TOTAL
TURNING FROM BACK TO SIDE WHILE IN FLAT BAD: A LITTLE
HELP NEEDED FOR BATHING: A LITTLE
DAILY ACTIVITIY SCORE: 12
HELP NEEDED FOR BATHING: TOTAL
MOBILITY SCORE: 12
CLIMB 3 TO 5 STEPS WITH RAILING: A LOT
MOVING TO AND FROM BED TO CHAIR: TOTAL
STANDING UP FROM CHAIR USING ARMS: TOTAL
TOILETING: A LOT
PERSONAL GROOMING: A LOT
WALKING IN HOSPITAL ROOM: A LITTLE
MOVING FROM LYING ON BACK TO SITTING ON SIDE OF FLAT BED WITH BEDRAILS: A LOT
CLIMB 3 TO 5 STEPS WITH RAILING: TOTAL
MOVING TO AND FROM BED TO CHAIR: TOTAL
HELP NEEDED FOR BATHING: A LITTLE
MOVING TO AND FROM BED TO CHAIR: A LOT
CLIMB 3 TO 5 STEPS WITH RAILING: A LOT
DRESSING REGULAR LOWER BODY CLOTHING: A LOT
HELP NEEDED FOR BATHING: TOTAL
DRESSING REGULAR LOWER BODY CLOTHING: TOTAL
TOILETING: A LITTLE
WALKING IN HOSPITAL ROOM: A LITTLE
DAILY ACTIVITIY SCORE: 12
STANDING UP FROM CHAIR USING ARMS: TOTAL
STANDING UP FROM CHAIR USING ARMS: A LOT
MOVING FROM LYING ON BACK TO SITTING ON SIDE OF FLAT BED WITH BEDRAILS: A LOT
MOBILITY SCORE: 8
PERSONAL GROOMING: A LOT
WALKING IN HOSPITAL ROOM: TOTAL
MOVING FROM LYING ON BACK TO SITTING ON SIDE OF FLAT BED WITH BEDRAILS: A LOT
TOILETING: TOTAL
DRESSING REGULAR UPPER BODY CLOTHING: A LOT
CLIMB 3 TO 5 STEPS WITH RAILING: TOTAL
DRESSING REGULAR UPPER BODY CLOTHING: A LOT
MOVING TO AND FROM BED TO CHAIR: TOTAL
CLIMB 3 TO 5 STEPS WITH RAILING: TOTAL
TURNING FROM BACK TO SIDE WHILE IN FLAT BAD: A LOT
CLIMB 3 TO 5 STEPS WITH RAILING: TOTAL
EATING MEALS: A LITTLE
PERSONAL GROOMING: A LITTLE
MOVING FROM LYING ON BACK TO SITTING ON SIDE OF FLAT BED WITH BEDRAILS: A LOT
TOILETING: TOTAL
MOVING TO AND FROM BED TO CHAIR: TOTAL
TOILETING: TOTAL
DRESSING REGULAR LOWER BODY CLOTHING: TOTAL
PERSONAL GROOMING: A LITTLE
DRESSING REGULAR LOWER BODY CLOTHING: A LOT
EATING MEALS: A LITTLE
PERSONAL GROOMING: A LOT
WALKING IN HOSPITAL ROOM: TOTAL
PERSONAL GROOMING: A LOT
WALKING IN HOSPITAL ROOM: TOTAL
MOBILITY SCORE: 6
PERSONAL GROOMING: A LITTLE
HELP NEEDED FOR BATHING: TOTAL
CLIMB 3 TO 5 STEPS WITH RAILING: TOTAL
MOVING FROM LYING ON BACK TO SITTING ON SIDE OF FLAT BED WITH BEDRAILS: A LITTLE
STANDING UP FROM CHAIR USING ARMS: A LOT
DRESSING REGULAR UPPER BODY CLOTHING: A LOT
DAILY ACTIVITIY SCORE: 10
TOILETING: A LOT
PERSONAL GROOMING: A LOT
TURNING FROM BACK TO SIDE WHILE IN FLAT BAD: A LITTLE
MOBILITY SCORE: 8
MOVING TO AND FROM BED TO CHAIR: A LOT
TURNING FROM BACK TO SIDE WHILE IN FLAT BAD: TOTAL
MOVING TO AND FROM BED TO CHAIR: TOTAL
TURNING FROM BACK TO SIDE WHILE IN FLAT BAD: A LOT
HELP NEEDED FOR BATHING: A LITTLE
EATING MEALS: A LITTLE
PERSONAL GROOMING: A LITTLE
DAILY ACTIVITIY SCORE: 11
DRESSING REGULAR LOWER BODY CLOTHING: TOTAL
CLIMB 3 TO 5 STEPS WITH RAILING: A LOT
HELP NEEDED FOR BATHING: TOTAL
CLIMB 3 TO 5 STEPS WITH RAILING: TOTAL
DRESSING REGULAR UPPER BODY CLOTHING: TOTAL
TURNING FROM BACK TO SIDE WHILE IN FLAT BAD: TOTAL
WALKING IN HOSPITAL ROOM: TOTAL
HELP NEEDED FOR BATHING: TOTAL
WALKING IN HOSPITAL ROOM: A LOT
CLIMB 3 TO 5 STEPS WITH RAILING: TOTAL
STANDING UP FROM CHAIR USING ARMS: TOTAL
HELP NEEDED FOR BATHING: A LITTLE
DRESSING REGULAR LOWER BODY CLOTHING: A LITTLE
MOVING TO AND FROM BED TO CHAIR: TOTAL
WALKING IN HOSPITAL ROOM: TOTAL
MOVING FROM LYING ON BACK TO SITTING ON SIDE OF FLAT BED WITH BEDRAILS: A LOT
STANDING UP FROM CHAIR USING ARMS: TOTAL
MOBILITY SCORE: 12
PERSONAL GROOMING: A LOT
TURNING FROM BACK TO SIDE WHILE IN FLAT BAD: TOTAL
DRESSING REGULAR UPPER BODY CLOTHING: A LOT
MOVING FROM LYING ON BACK TO SITTING ON SIDE OF FLAT BED WITH BEDRAILS: A LOT
DAILY ACTIVITIY SCORE: 12
MOBILITY SCORE: 6
MOBILITY SCORE: 8
WALKING IN HOSPITAL ROOM: TOTAL
DRESSING REGULAR LOWER BODY CLOTHING: TOTAL
EATING MEALS: A LITTLE
HELP NEEDED FOR BATHING: A LOT
MOBILITY SCORE: 6
PERSONAL GROOMING: A LOT
STANDING UP FROM CHAIR USING ARMS: TOTAL
TURNING FROM BACK TO SIDE WHILE IN FLAT BAD: A LOT
TOILETING: A LITTLE
TURNING FROM BACK TO SIDE WHILE IN FLAT BAD: TOTAL
MOVING TO AND FROM BED TO CHAIR: A LOT
MOBILITY SCORE: 8
TURNING FROM BACK TO SIDE WHILE IN FLAT BAD: TOTAL
STANDING UP FROM CHAIR USING ARMS: TOTAL
MOBILITY SCORE: 6
DRESSING REGULAR UPPER BODY CLOTHING: A LOT
HELP NEEDED FOR BATHING: TOTAL
WALKING IN HOSPITAL ROOM: TOTAL
STANDING UP FROM CHAIR USING ARMS: TOTAL
STANDING UP FROM CHAIR USING ARMS: TOTAL
TOILETING: TOTAL
PATIENT BASELINE BEDBOUND: NO
DRESSING REGULAR UPPER BODY CLOTHING: A LITTLE
MOBILITY SCORE: 12
HELP NEEDED FOR BATHING: TOTAL
DRESSING REGULAR UPPER BODY CLOTHING: A LOT
TURNING FROM BACK TO SIDE WHILE IN FLAT BAD: A LOT
MOVING TO AND FROM BED TO CHAIR: TOTAL
MOBILITY SCORE: 8
MOVING FROM LYING ON BACK TO SITTING ON SIDE OF FLAT BED WITH BEDRAILS: A LOT
DAILY ACTIVITIY SCORE: 16
WALKING IN HOSPITAL ROOM: A LOT
STANDING UP FROM CHAIR USING ARMS: TOTAL
TURNING FROM BACK TO SIDE WHILE IN FLAT BAD: A LOT
HELP NEEDED FOR BATHING: TOTAL
TOILETING: A LOT
MOVING TO AND FROM BED TO CHAIR: TOTAL
DRESSING REGULAR UPPER BODY CLOTHING: A LOT
DAILY ACTIVITIY SCORE: 13
WALKING IN HOSPITAL ROOM: TOTAL
MOVING FROM LYING ON BACK TO SITTING ON SIDE OF FLAT BED WITH BEDRAILS: A LOT
DRESSING REGULAR LOWER BODY CLOTHING: A LITTLE
HELP NEEDED FOR BATHING: A LOT
MOVING FROM LYING ON BACK TO SITTING ON SIDE OF FLAT BED WITH BEDRAILS: TOTAL
TOILETING: A LOT
EATING MEALS: A LITTLE
HELP NEEDED FOR BATHING: TOTAL
MOVING FROM LYING ON BACK TO SITTING ON SIDE OF FLAT BED WITH BEDRAILS: TOTAL
DAILY ACTIVITIY SCORE: 12
PERSONAL GROOMING: A LOT
MOVING TO AND FROM BED TO CHAIR: TOTAL
CLIMB 3 TO 5 STEPS WITH RAILING: A LOT
STANDING UP FROM CHAIR USING ARMS: TOTAL
WALKING IN HOSPITAL ROOM: A LITTLE
TURNING FROM BACK TO SIDE WHILE IN FLAT BAD: A LOT
MOVING TO AND FROM BED TO CHAIR: A LITTLE
DAILY ACTIVITIY SCORE: 13
WALKING IN HOSPITAL ROOM: TOTAL
DAILY ACTIVITIY SCORE: 15
CLIMB 3 TO 5 STEPS WITH RAILING: TOTAL
HELP NEEDED FOR BATHING: TOTAL
TOILETING: A LOT
DRESSING REGULAR LOWER BODY CLOTHING: A LOT
HELP NEEDED FOR BATHING: A LITTLE
STANDING UP FROM CHAIR USING ARMS: TOTAL
MOVING TO AND FROM BED TO CHAIR: TOTAL
STANDING UP FROM CHAIR USING ARMS: A LITTLE
DRESSING REGULAR UPPER BODY CLOTHING: A LOT
MOVING TO AND FROM BED TO CHAIR: A LOT
MOBILITY SCORE: 12
DRESSING REGULAR LOWER BODY CLOTHING: TOTAL
TURNING FROM BACK TO SIDE WHILE IN FLAT BAD: A LOT
MOVING TO AND FROM BED TO CHAIR: A LOT
DRESSING REGULAR LOWER BODY CLOTHING: A LOT
TOILETING: TOTAL
HELP NEEDED FOR BATHING: TOTAL
DAILY ACTIVITIY SCORE: 17
DAILY ACTIVITIY SCORE: 12
WALKING IN HOSPITAL ROOM: TOTAL
HELP NEEDED FOR BATHING: TOTAL
MOVING FROM LYING ON BACK TO SITTING ON SIDE OF FLAT BED WITH BEDRAILS: TOTAL
HELP NEEDED FOR BATHING: TOTAL
HELP NEEDED FOR BATHING: A LOT
DRESSING REGULAR LOWER BODY CLOTHING: TOTAL
STANDING UP FROM CHAIR USING ARMS: TOTAL
DRESSING REGULAR LOWER BODY CLOTHING: TOTAL
MOBILITY SCORE: 6
TOILETING: TOTAL
DRESSING REGULAR UPPER BODY CLOTHING: A LITTLE
TOILETING: A LOT
EATING MEALS: A LITTLE
STANDING UP FROM CHAIR USING ARMS: TOTAL
MOBILITY SCORE: 9
TOILETING: A LOT
TURNING FROM BACK TO SIDE WHILE IN FLAT BAD: TOTAL
CLIMB 3 TO 5 STEPS WITH RAILING: TOTAL
CLIMB 3 TO 5 STEPS WITH RAILING: TOTAL
DAILY ACTIVITIY SCORE: 12
TURNING FROM BACK TO SIDE WHILE IN FLAT BAD: TOTAL
STANDING UP FROM CHAIR USING ARMS: TOTAL
PERSONAL GROOMING: A LOT
HELP NEEDED FOR BATHING: A LOT
TURNING FROM BACK TO SIDE WHILE IN FLAT BAD: TOTAL
CLIMB 3 TO 5 STEPS WITH RAILING: A LOT
DRESSING REGULAR UPPER BODY CLOTHING: A LOT
HELP NEEDED FOR BATHING: TOTAL
MOVING FROM LYING ON BACK TO SITTING ON SIDE OF FLAT BED WITH BEDRAILS: A LOT
MOBILITY SCORE: 8
CLIMB 3 TO 5 STEPS WITH RAILING: A LITTLE
MOBILITY SCORE: 22
MOVING TO AND FROM BED TO CHAIR: TOTAL
STANDING UP FROM CHAIR USING ARMS: TOTAL
DRESSING REGULAR LOWER BODY CLOTHING: A LOT
MOBILITY SCORE: 8
MOBILITY SCORE: 6
TURNING FROM BACK TO SIDE WHILE IN FLAT BAD: TOTAL
MOBILITY SCORE: 8
CLIMB 3 TO 5 STEPS WITH RAILING: TOTAL
STANDING UP FROM CHAIR USING ARMS: TOTAL
MOBILITY SCORE: 7
WALKING IN HOSPITAL ROOM: A LOT
TOILETING: A LOT
DAILY ACTIVITIY SCORE: 17
CLIMB 3 TO 5 STEPS WITH RAILING: TOTAL
STANDING UP FROM CHAIR USING ARMS: TOTAL
MOVING FROM LYING ON BACK TO SITTING ON SIDE OF FLAT BED WITH BEDRAILS: A LOT
DRESSING REGULAR UPPER BODY CLOTHING: A LITTLE
STANDING UP FROM CHAIR USING ARMS: A LOT
MOVING FROM LYING ON BACK TO SITTING ON SIDE OF FLAT BED WITH BEDRAILS: A LOT
DAILY ACTIVITIY SCORE: 12
MOBILITY SCORE: 6
MOVING TO AND FROM BED TO CHAIR: TOTAL
EATING MEALS: A LITTLE
DRESSING REGULAR UPPER BODY CLOTHING: A LITTLE
TOILETING: A LOT
MOVING FROM LYING ON BACK TO SITTING ON SIDE OF FLAT BED WITH BEDRAILS: TOTAL
TOILETING: A LOT
TURNING FROM BACK TO SIDE WHILE IN FLAT BAD: A LOT
MOVING TO AND FROM BED TO CHAIR: A LITTLE
DRESSING REGULAR LOWER BODY CLOTHING: TOTAL
HELP NEEDED FOR BATHING: A LOT
HELP NEEDED FOR BATHING: A LOT
PERSONAL GROOMING: A LOT
MOVING FROM LYING ON BACK TO SITTING ON SIDE OF FLAT BED WITH BEDRAILS: A LOT
MOBILITY SCORE: 6
DRESSING REGULAR LOWER BODY CLOTHING: TOTAL
DRESSING REGULAR LOWER BODY CLOTHING: TOTAL
TOILETING: A LOT
CLIMB 3 TO 5 STEPS WITH RAILING: TOTAL
CLIMB 3 TO 5 STEPS WITH RAILING: A LITTLE
TURNING FROM BACK TO SIDE WHILE IN FLAT BAD: A LITTLE
TURNING FROM BACK TO SIDE WHILE IN FLAT BAD: A LOT
MOVING TO AND FROM BED TO CHAIR: A LITTLE
HELP NEEDED FOR BATHING: TOTAL
STANDING UP FROM CHAIR USING ARMS: A LOT
DAILY ACTIVITIY SCORE: 15
MOVING TO AND FROM BED TO CHAIR: TOTAL
MOBILITY SCORE: 21
DRESSING REGULAR UPPER BODY CLOTHING: A LITTLE
TURNING FROM BACK TO SIDE WHILE IN FLAT BAD: A LOT
DRESSING REGULAR UPPER BODY CLOTHING: A LOT
CLIMB 3 TO 5 STEPS WITH RAILING: TOTAL
WALKING IN HOSPITAL ROOM: TOTAL
MOVING FROM LYING ON BACK TO SITTING ON SIDE OF FLAT BED WITH BEDRAILS: TOTAL
DRESSING REGULAR UPPER BODY CLOTHING: A LOT
WALKING IN HOSPITAL ROOM: A LITTLE
STANDING UP FROM CHAIR USING ARMS: TOTAL
DAILY ACTIVITIY SCORE: 13
WALKING IN HOSPITAL ROOM: A LITTLE
MOVING FROM LYING ON BACK TO SITTING ON SIDE OF FLAT BED WITH BEDRAILS: A LOT
STANDING UP FROM CHAIR USING ARMS: A LITTLE
DRESSING REGULAR UPPER BODY CLOTHING: A LITTLE
DAILY ACTIVITIY SCORE: 16
TURNING FROM BACK TO SIDE WHILE IN FLAT BAD: TOTAL
MOVING TO AND FROM BED TO CHAIR: TOTAL
DRESSING REGULAR UPPER BODY CLOTHING: A LOT
TURNING FROM BACK TO SIDE WHILE IN FLAT BAD: A LOT
DRESSING REGULAR LOWER BODY CLOTHING: TOTAL
WALKING IN HOSPITAL ROOM: A LOT
CLIMB 3 TO 5 STEPS WITH RAILING: A LOT
WALKING IN HOSPITAL ROOM: A LOT
CLIMB 3 TO 5 STEPS WITH RAILING: TOTAL
STANDING UP FROM CHAIR USING ARMS: A LOT
WALKING IN HOSPITAL ROOM: TOTAL
CLIMB 3 TO 5 STEPS WITH RAILING: TOTAL
STANDING UP FROM CHAIR USING ARMS: TOTAL
DRESSING REGULAR LOWER BODY CLOTHING: TOTAL
MOVING FROM LYING ON BACK TO SITTING ON SIDE OF FLAT BED WITH BEDRAILS: TOTAL
TOILETING: A LITTLE
CLIMB 3 TO 5 STEPS WITH RAILING: TOTAL
CLIMB 3 TO 5 STEPS WITH RAILING: TOTAL
DRESSING REGULAR UPPER BODY CLOTHING: A LOT
TOILETING: A LOT
DRESSING REGULAR UPPER BODY CLOTHING: A LOT
TURNING FROM BACK TO SIDE WHILE IN FLAT BAD: A LOT
DRESSING REGULAR LOWER BODY CLOTHING: TOTAL
WALKING IN HOSPITAL ROOM: A LITTLE
MOVING FROM LYING ON BACK TO SITTING ON SIDE OF FLAT BED WITH BEDRAILS: A LITTLE
HELP NEEDED FOR BATHING: A LOT
HELP NEEDED FOR BATHING: TOTAL
MOVING FROM LYING ON BACK TO SITTING ON SIDE OF FLAT BED WITH BEDRAILS: TOTAL
MOBILITY SCORE: 7
MOVING TO AND FROM BED TO CHAIR: TOTAL
HELP NEEDED FOR BATHING: TOTAL
HELP NEEDED FOR BATHING: A LOT
WALKING IN HOSPITAL ROOM: TOTAL
MOVING FROM LYING ON BACK TO SITTING ON SIDE OF FLAT BED WITH BEDRAILS: TOTAL
PATIENT BASELINE BEDBOUND: NO
DRESSING REGULAR UPPER BODY CLOTHING: A LOT
TOILETING: A LOT
PERSONAL GROOMING: A LITTLE
MOVING FROM LYING ON BACK TO SITTING ON SIDE OF FLAT BED WITH BEDRAILS: TOTAL
DRESSING REGULAR LOWER BODY CLOTHING: TOTAL
DAILY ACTIVITIY SCORE: 16
DRESSING REGULAR UPPER BODY CLOTHING: A LOT
STANDING UP FROM CHAIR USING ARMS: TOTAL
WALKING IN HOSPITAL ROOM: TOTAL
PERSONAL GROOMING: A LITTLE
CLIMB 3 TO 5 STEPS WITH RAILING: A LOT
DAILY ACTIVITIY SCORE: 21
STANDING UP FROM CHAIR USING ARMS: A LOT
MOVING TO AND FROM BED TO CHAIR: A LOT
WALKING IN HOSPITAL ROOM: TOTAL
TOILETING: A LOT
CLIMB 3 TO 5 STEPS WITH RAILING: TOTAL
MOVING TO AND FROM BED TO CHAIR: TOTAL
TURNING FROM BACK TO SIDE WHILE IN FLAT BAD: A LOT
CLIMB 3 TO 5 STEPS WITH RAILING: TOTAL
MOVING FROM LYING ON BACK TO SITTING ON SIDE OF FLAT BED WITH BEDRAILS: A LOT
TURNING FROM BACK TO SIDE WHILE IN FLAT BAD: TOTAL
PERSONAL GROOMING: A LITTLE
MOVING FROM LYING ON BACK TO SITTING ON SIDE OF FLAT BED WITH BEDRAILS: A LOT
WALKING IN HOSPITAL ROOM: TOTAL
PERSONAL GROOMING: A LITTLE
PERSONAL GROOMING: A LITTLE
TOILETING: A LOT
DRESSING REGULAR UPPER BODY CLOTHING: A LOT
MOVING TO AND FROM BED TO CHAIR: TOTAL
MOBILITY SCORE: 6
TOILETING: A LOT
TURNING FROM BACK TO SIDE WHILE IN FLAT BAD: A LOT
MOBILITY SCORE: 17
MOBILITY SCORE: 8
CLIMB 3 TO 5 STEPS WITH RAILING: A LOT
WALKING IN HOSPITAL ROOM: TOTAL
WALKING IN HOSPITAL ROOM: TOTAL
MOBILITY SCORE: 8
EATING MEALS: A LITTLE
MOBILITY SCORE: 8
HELP NEEDED FOR BATHING: TOTAL
DAILY ACTIVITIY SCORE: 8
DRESSING REGULAR UPPER BODY CLOTHING: A LOT
STANDING UP FROM CHAIR USING ARMS: A LOT
CLIMB 3 TO 5 STEPS WITH RAILING: TOTAL
DRESSING REGULAR LOWER BODY CLOTHING: A LOT
TOILETING: TOTAL
MOVING FROM LYING ON BACK TO SITTING ON SIDE OF FLAT BED WITH BEDRAILS: A LITTLE
DRESSING REGULAR LOWER BODY CLOTHING: TOTAL
HELP NEEDED FOR BATHING: A LOT
TURNING FROM BACK TO SIDE WHILE IN FLAT BAD: TOTAL
HELP NEEDED FOR BATHING: TOTAL
EATING MEALS: A LITTLE
MOVING TO AND FROM BED TO CHAIR: A LOT
DRESSING REGULAR LOWER BODY CLOTHING: A LOT
MOBILITY SCORE: 8
CLIMB 3 TO 5 STEPS WITH RAILING: TOTAL
DRESSING REGULAR LOWER BODY CLOTHING: TOTAL
PERSONAL GROOMING: A LOT
DRESSING REGULAR UPPER BODY CLOTHING: A LOT
DAILY ACTIVITIY SCORE: 22
DRESSING REGULAR LOWER BODY CLOTHING: TOTAL
MOBILITY SCORE: 9
MOVING FROM LYING ON BACK TO SITTING ON SIDE OF FLAT BED WITH BEDRAILS: TOTAL
HELP NEEDED FOR BATHING: A LOT
TURNING FROM BACK TO SIDE WHILE IN FLAT BAD: A LOT
CLIMB 3 TO 5 STEPS WITH RAILING: TOTAL
MOVING TO AND FROM BED TO CHAIR: TOTAL
HELP NEEDED FOR BATHING: TOTAL
MOVING TO AND FROM BED TO CHAIR: A LITTLE
WALKING IN HOSPITAL ROOM: A LOT
MOBILITY SCORE: 16
TOILETING: A LOT
TURNING FROM BACK TO SIDE WHILE IN FLAT BAD: A LOT
DRESSING REGULAR LOWER BODY CLOTHING: A LOT
TOILETING: A LOT
MOVING TO AND FROM BED TO CHAIR: TOTAL
DRESSING REGULAR LOWER BODY CLOTHING: A LOT
MOVING FROM LYING ON BACK TO SITTING ON SIDE OF FLAT BED WITH BEDRAILS: TOTAL
CLIMB 3 TO 5 STEPS WITH RAILING: TOTAL
DRESSING REGULAR UPPER BODY CLOTHING: A LOT
DRESSING REGULAR LOWER BODY CLOTHING: TOTAL
CLIMB 3 TO 5 STEPS WITH RAILING: A LOT
HELP NEEDED FOR BATHING: A LOT
MOBILITY SCORE: 8
STANDING UP FROM CHAIR USING ARMS: TOTAL
STANDING UP FROM CHAIR USING ARMS: A LOT
PERSONAL GROOMING: A LITTLE
DAILY ACTIVITIY SCORE: 13
MOVING TO AND FROM BED TO CHAIR: TOTAL
WALKING IN HOSPITAL ROOM: A LOT
TURNING FROM BACK TO SIDE WHILE IN FLAT BAD: TOTAL
MOVING TO AND FROM BED TO CHAIR: TOTAL
MOBILITY SCORE: 13
DRESSING REGULAR UPPER BODY CLOTHING: A LOT
MOVING TO AND FROM BED TO CHAIR: TOTAL
MOVING FROM LYING ON BACK TO SITTING ON SIDE OF FLAT BED WITH BEDRAILS: A LITTLE
DRESSING REGULAR UPPER BODY CLOTHING: TOTAL
MOBILITY SCORE: 12
TOILETING: A LOT
WALKING IN HOSPITAL ROOM: A LITTLE
HELP NEEDED FOR BATHING: A LOT
PERSONAL GROOMING: A LITTLE
MOVING FROM LYING ON BACK TO SITTING ON SIDE OF FLAT BED WITH BEDRAILS: A LITTLE
MOVING FROM LYING ON BACK TO SITTING ON SIDE OF FLAT BED WITH BEDRAILS: A LITTLE
DAILY ACTIVITIY SCORE: 12
MOVING FROM LYING ON BACK TO SITTING ON SIDE OF FLAT BED WITH BEDRAILS: A LOT
CLIMB 3 TO 5 STEPS WITH RAILING: A LOT
CLIMB 3 TO 5 STEPS WITH RAILING: TOTAL
DRESSING REGULAR UPPER BODY CLOTHING: A LOT
TOILETING: TOTAL
DRESSING REGULAR UPPER BODY CLOTHING: A LOT
TOILETING: A LOT
DRESSING REGULAR LOWER BODY CLOTHING: A LOT
HELP NEEDED FOR BATHING: A LITTLE
WALKING IN HOSPITAL ROOM: TOTAL
STANDING UP FROM CHAIR USING ARMS: A LOT
PERSONAL GROOMING: A LOT
TOILETING: A LITTLE
STANDING UP FROM CHAIR USING ARMS: A LOT
EATING MEALS: A LOT
MOVING TO AND FROM BED TO CHAIR: TOTAL
EATING MEALS: A LITTLE
TOILETING: TOTAL
DRESSING REGULAR LOWER BODY CLOTHING: A LOT
DAILY ACTIVITIY SCORE: 12
DRESSING REGULAR UPPER BODY CLOTHING: A LOT
DAILY ACTIVITIY SCORE: 12
DAILY ACTIVITIY SCORE: 15
DRESSING REGULAR UPPER BODY CLOTHING: A LOT
PERSONAL GROOMING: A LOT
DRESSING REGULAR LOWER BODY CLOTHING: A LITTLE
DRESSING REGULAR UPPER BODY CLOTHING: TOTAL
MOVING TO AND FROM BED TO CHAIR: TOTAL
MOBILITY SCORE: 8
WALKING IN HOSPITAL ROOM: TOTAL
WALKING IN HOSPITAL ROOM: TOTAL
PERSONAL GROOMING: A LOT
MOBILITY SCORE: 8
WALKING IN HOSPITAL ROOM: TOTAL
CLIMB 3 TO 5 STEPS WITH RAILING: TOTAL
TOILETING: A LOT
DAILY ACTIVITIY SCORE: 13
CLIMB 3 TO 5 STEPS WITH RAILING: TOTAL
DRESSING REGULAR UPPER BODY CLOTHING: A LOT
DAILY ACTIVITIY SCORE: 17
WALKING IN HOSPITAL ROOM: A LITTLE
MOVING TO AND FROM BED TO CHAIR: TOTAL
TOILETING: A LOT
MOBILITY SCORE: 6
HELP NEEDED FOR BATHING: A LITTLE
HELP NEEDED FOR BATHING: A LOT
STANDING UP FROM CHAIR USING ARMS: TOTAL
MOBILITY SCORE: 6
MOVING FROM LYING ON BACK TO SITTING ON SIDE OF FLAT BED WITH BEDRAILS: A LITTLE
MOVING TO AND FROM BED TO CHAIR: TOTAL
DRESSING REGULAR UPPER BODY CLOTHING: A LOT
STANDING UP FROM CHAIR USING ARMS: TOTAL
STANDING UP FROM CHAIR USING ARMS: TOTAL
TOILETING: TOTAL
CLIMB 3 TO 5 STEPS WITH RAILING: A LITTLE
MOBILITY SCORE: 14
PERSONAL GROOMING: A LOT
DRESSING REGULAR LOWER BODY CLOTHING: A LOT
TURNING FROM BACK TO SIDE WHILE IN FLAT BAD: A LITTLE
DRESSING REGULAR LOWER BODY CLOTHING: TOTAL
CLIMB 3 TO 5 STEPS WITH RAILING: A LOT
DAILY ACTIVITIY SCORE: 11
DRESSING REGULAR UPPER BODY CLOTHING: A LOT
DAILY ACTIVITIY SCORE: 12
PERSONAL GROOMING: TOTAL
DRESSING REGULAR LOWER BODY CLOTHING: TOTAL
TOILETING: TOTAL
MOBILITY SCORE: 20
PERSONAL GROOMING: A LOT
MOVING FROM LYING ON BACK TO SITTING ON SIDE OF FLAT BED WITH BEDRAILS: A LOT
DAILY ACTIVITIY SCORE: 23
HELP NEEDED FOR BATHING: A LOT
EATING MEALS: A LITTLE
DRESSING REGULAR LOWER BODY CLOTHING: TOTAL
DRESSING REGULAR UPPER BODY CLOTHING: A LOT
MOVING FROM LYING ON BACK TO SITTING ON SIDE OF FLAT BED WITH BEDRAILS: A LOT
STANDING UP FROM CHAIR USING ARMS: TOTAL
WALKING IN HOSPITAL ROOM: TOTAL
STANDING UP FROM CHAIR USING ARMS: TOTAL
MOBILITY SCORE: 6
CLIMB 3 TO 5 STEPS WITH RAILING: A LITTLE
MOVING FROM LYING ON BACK TO SITTING ON SIDE OF FLAT BED WITH BEDRAILS: TOTAL
HELP NEEDED FOR BATHING: A LITTLE
DRESSING REGULAR UPPER BODY CLOTHING: A LOT
DRESSING REGULAR UPPER BODY CLOTHING: A LITTLE
WALKING IN HOSPITAL ROOM: TOTAL
DRESSING REGULAR LOWER BODY CLOTHING: A LOT
WALKING IN HOSPITAL ROOM: TOTAL
MOBILITY SCORE: 14
STANDING UP FROM CHAIR USING ARMS: TOTAL
MOBILITY SCORE: 12
MOBILITY SCORE: 20
TURNING FROM BACK TO SIDE WHILE IN FLAT BAD: A LOT
WALKING IN HOSPITAL ROOM: TOTAL
PERSONAL GROOMING: A LITTLE
MOVING TO AND FROM BED TO CHAIR: TOTAL
MOBILITY SCORE: 6
PERSONAL GROOMING: TOTAL
PERSONAL GROOMING: A LOT
EATING MEALS: A LITTLE
TURNING FROM BACK TO SIDE WHILE IN FLAT BAD: TOTAL
MOVING TO AND FROM BED TO CHAIR: A LITTLE
MOVING FROM LYING ON BACK TO SITTING ON SIDE OF FLAT BED WITH BEDRAILS: A LOT
WALKING IN HOSPITAL ROOM: TOTAL
STANDING UP FROM CHAIR USING ARMS: A LITTLE
STANDING UP FROM CHAIR USING ARMS: A LITTLE
PERSONAL GROOMING: A LITTLE
WALKING IN HOSPITAL ROOM: TOTAL
MOVING FROM LYING ON BACK TO SITTING ON SIDE OF FLAT BED WITH BEDRAILS: TOTAL
MOVING FROM LYING ON BACK TO SITTING ON SIDE OF FLAT BED WITH BEDRAILS: TOTAL
DRESSING REGULAR LOWER BODY CLOTHING: TOTAL
TURNING FROM BACK TO SIDE WHILE IN FLAT BAD: A LOT
DRESSING REGULAR UPPER BODY CLOTHING: A LOT
MOVING TO AND FROM BED TO CHAIR: A LOT
MOVING TO AND FROM BED TO CHAIR: A LOT
MOVING TO AND FROM BED TO CHAIR: A LITTLE
TOILETING: A LITTLE
DRESSING REGULAR LOWER BODY CLOTHING: A LOT
MOBILITY SCORE: 11
PERSONAL GROOMING: A LITTLE
TURNING FROM BACK TO SIDE WHILE IN FLAT BAD: TOTAL
TOILETING: TOTAL
PERSONAL GROOMING: A LOT
DRESSING REGULAR UPPER BODY CLOTHING: A LITTLE
STANDING UP FROM CHAIR USING ARMS: TOTAL
DRESSING REGULAR UPPER BODY CLOTHING: A LOT
PERSONAL GROOMING: A LOT
PERSONAL GROOMING: A LOT
MOBILITY SCORE: 10
STANDING UP FROM CHAIR USING ARMS: TOTAL
MOVING TO AND FROM BED TO CHAIR: TOTAL
HELP NEEDED FOR BATHING: TOTAL
EATING MEALS: A LITTLE
WALKING IN HOSPITAL ROOM: A LOT
CLIMB 3 TO 5 STEPS WITH RAILING: A LITTLE
WALKING IN HOSPITAL ROOM: TOTAL
WALKING IN HOSPITAL ROOM: A LOT
WALKING IN HOSPITAL ROOM: TOTAL
DAILY ACTIVITIY SCORE: 20
CLIMB 3 TO 5 STEPS WITH RAILING: TOTAL
TOILETING: TOTAL
DAILY ACTIVITIY SCORE: 8
MOVING FROM LYING ON BACK TO SITTING ON SIDE OF FLAT BED WITH BEDRAILS: TOTAL
WALKING IN HOSPITAL ROOM: TOTAL
HELP NEEDED FOR BATHING: A LITTLE
DAILY ACTIVITIY SCORE: 11
CLIMB 3 TO 5 STEPS WITH RAILING: TOTAL
MOVING FROM LYING ON BACK TO SITTING ON SIDE OF FLAT BED WITH BEDRAILS: A LOT
DRESSING REGULAR LOWER BODY CLOTHING: A LITTLE
TURNING FROM BACK TO SIDE WHILE IN FLAT BAD: TOTAL
WALKING IN HOSPITAL ROOM: TOTAL
DAILY ACTIVITIY SCORE: 12
CLIMB 3 TO 5 STEPS WITH RAILING: TOTAL
STANDING UP FROM CHAIR USING ARMS: TOTAL
STANDING UP FROM CHAIR USING ARMS: TOTAL
DRESSING REGULAR LOWER BODY CLOTHING: A LOT
TURNING FROM BACK TO SIDE WHILE IN FLAT BAD: A LOT
MOVING FROM LYING ON BACK TO SITTING ON SIDE OF FLAT BED WITH BEDRAILS: A LOT
DAILY ACTIVITIY SCORE: 17
TOILETING: A LITTLE
MOVING TO AND FROM BED TO CHAIR: TOTAL
CLIMB 3 TO 5 STEPS WITH RAILING: TOTAL
DRESSING REGULAR UPPER BODY CLOTHING: A LOT
CLIMB 3 TO 5 STEPS WITH RAILING: TOTAL
MOBILITY SCORE: 8
STANDING UP FROM CHAIR USING ARMS: A LOT
DRESSING REGULAR LOWER BODY CLOTHING: TOTAL
DRESSING REGULAR UPPER BODY CLOTHING: A LITTLE
DAILY ACTIVITIY SCORE: 14
TOILETING: A LOT
MOVING TO AND FROM BED TO CHAIR: A LOT
DRESSING REGULAR LOWER BODY CLOTHING: A LITTLE
MOVING TO AND FROM BED TO CHAIR: TOTAL
TOILETING: A LOT
TURNING FROM BACK TO SIDE WHILE IN FLAT BAD: TOTAL
DRESSING REGULAR UPPER BODY CLOTHING: A LOT
STANDING UP FROM CHAIR USING ARMS: TOTAL
DAILY ACTIVITIY SCORE: 14
TOILETING: TOTAL
CLIMB 3 TO 5 STEPS WITH RAILING: A LITTLE
MOBILITY SCORE: 20
MOVING TO AND FROM BED TO CHAIR: A LOT
EATING MEALS: A LOT
DRESSING REGULAR LOWER BODY CLOTHING: A LOT
WALKING IN HOSPITAL ROOM: TOTAL
DRESSING REGULAR UPPER BODY CLOTHING: A LOT
DAILY ACTIVITIY SCORE: 15
DAILY ACTIVITIY SCORE: 11
STANDING UP FROM CHAIR USING ARMS: TOTAL
CLIMB 3 TO 5 STEPS WITH RAILING: TOTAL
DAILY ACTIVITIY SCORE: 13
MOVING FROM LYING ON BACK TO SITTING ON SIDE OF FLAT BED WITH BEDRAILS: A LOT
DRESSING REGULAR LOWER BODY CLOTHING: A LITTLE
MOVING FROM LYING ON BACK TO SITTING ON SIDE OF FLAT BED WITH BEDRAILS: A LOT
MOVING FROM LYING ON BACK TO SITTING ON SIDE OF FLAT BED WITH BEDRAILS: TOTAL
DRESSING REGULAR LOWER BODY CLOTHING: A LOT
MOVING TO AND FROM BED TO CHAIR: TOTAL
MOVING TO AND FROM BED TO CHAIR: TOTAL
DAILY ACTIVITIY SCORE: 13
HELP NEEDED FOR BATHING: TOTAL
WALKING IN HOSPITAL ROOM: TOTAL
MOVING FROM LYING ON BACK TO SITTING ON SIDE OF FLAT BED WITH BEDRAILS: A LOT
DAILY ACTIVITIY SCORE: 19
HELP NEEDED FOR BATHING: TOTAL
WALKING IN HOSPITAL ROOM: TOTAL
HELP NEEDED FOR BATHING: TOTAL
DRESSING REGULAR LOWER BODY CLOTHING: TOTAL
TURNING FROM BACK TO SIDE WHILE IN FLAT BAD: A LOT
PERSONAL GROOMING: A LITTLE
CLIMB 3 TO 5 STEPS WITH RAILING: A LOT
HELP NEEDED FOR BATHING: A LOT
MOBILITY SCORE: 10
DAILY ACTIVITIY SCORE: 21
STANDING UP FROM CHAIR USING ARMS: TOTAL
TOILETING: A LOT
MOBILITY SCORE: 13
MOVING FROM LYING ON BACK TO SITTING ON SIDE OF FLAT BED WITH BEDRAILS: TOTAL
STANDING UP FROM CHAIR USING ARMS: TOTAL
STANDING UP FROM CHAIR USING ARMS: A LOT
TURNING FROM BACK TO SIDE WHILE IN FLAT BAD: A LOT
DAILY ACTIVITIY SCORE: 12
TOILETING: A LOT
TURNING FROM BACK TO SIDE WHILE IN FLAT BAD: A LOT
DRESSING REGULAR UPPER BODY CLOTHING: A LITTLE
HELP NEEDED FOR BATHING: A LOT
DAILY ACTIVITIY SCORE: 11
STANDING UP FROM CHAIR USING ARMS: A LITTLE
CLIMB 3 TO 5 STEPS WITH RAILING: TOTAL
MOVING FROM LYING ON BACK TO SITTING ON SIDE OF FLAT BED WITH BEDRAILS: A LOT
EATING MEALS: A LITTLE
DRESSING REGULAR LOWER BODY CLOTHING: TOTAL
PERSONAL GROOMING: A LITTLE
MOVING FROM LYING ON BACK TO SITTING ON SIDE OF FLAT BED WITH BEDRAILS: A LOT
WALKING IN HOSPITAL ROOM: A LOT
MOVING TO AND FROM BED TO CHAIR: TOTAL
CLIMB 3 TO 5 STEPS WITH RAILING: TOTAL
MOBILITY SCORE: 8
HELP NEEDED FOR BATHING: A LITTLE
MOVING TO AND FROM BED TO CHAIR: TOTAL
DRESSING REGULAR UPPER BODY CLOTHING: A LOT
MOVING TO AND FROM BED TO CHAIR: A LOT
DAILY ACTIVITIY SCORE: 14
CLIMB 3 TO 5 STEPS WITH RAILING: TOTAL
DAILY ACTIVITIY SCORE: 12
PERSONAL GROOMING: A LOT
DAILY ACTIVITIY SCORE: 14
WALKING IN HOSPITAL ROOM: TOTAL
PERSONAL GROOMING: A LITTLE
STANDING UP FROM CHAIR USING ARMS: TOTAL
PERSONAL GROOMING: A LITTLE
TURNING FROM BACK TO SIDE WHILE IN FLAT BAD: A LOT
MOBILITY SCORE: 8
TURNING FROM BACK TO SIDE WHILE IN FLAT BAD: A LITTLE
CLIMB 3 TO 5 STEPS WITH RAILING: A LITTLE
MOVING TO AND FROM BED TO CHAIR: TOTAL
MOVING TO AND FROM BED TO CHAIR: A LITTLE
MOBILITY SCORE: 17
DRESSING REGULAR LOWER BODY CLOTHING: A LOT
CLIMB 3 TO 5 STEPS WITH RAILING: A LOT
MOVING TO AND FROM BED TO CHAIR: TOTAL
CLIMB 3 TO 5 STEPS WITH RAILING: TOTAL
MOVING FROM LYING ON BACK TO SITTING ON SIDE OF FLAT BED WITH BEDRAILS: A LOT
MOBILITY SCORE: 6
MOVING FROM LYING ON BACK TO SITTING ON SIDE OF FLAT BED WITH BEDRAILS: A LOT
CLIMB 3 TO 5 STEPS WITH RAILING: TOTAL
MOVING FROM LYING ON BACK TO SITTING ON SIDE OF FLAT BED WITH BEDRAILS: A LOT
DAILY ACTIVITIY SCORE: 12
TOILETING: A LOT
TURNING FROM BACK TO SIDE WHILE IN FLAT BAD: TOTAL
WALKING IN HOSPITAL ROOM: TOTAL
DRESSING REGULAR LOWER BODY CLOTHING: TOTAL
TURNING FROM BACK TO SIDE WHILE IN FLAT BAD: A LOT
EATING MEALS: A LITTLE
MOVING FROM LYING ON BACK TO SITTING ON SIDE OF FLAT BED WITH BEDRAILS: TOTAL
WALKING IN HOSPITAL ROOM: TOTAL
TOILETING: A LITTLE
MOVING TO AND FROM BED TO CHAIR: TOTAL
DAILY ACTIVITIY SCORE: 12
DRESSING REGULAR LOWER BODY CLOTHING: TOTAL
TURNING FROM BACK TO SIDE WHILE IN FLAT BAD: A LOT
MOVING TO AND FROM BED TO CHAIR: TOTAL
CLIMB 3 TO 5 STEPS WITH RAILING: TOTAL
PERSONAL GROOMING: A LOT
WALKING IN HOSPITAL ROOM: A LOT
STANDING UP FROM CHAIR USING ARMS: TOTAL
STANDING UP FROM CHAIR USING ARMS: A LOT
TOILETING: A LITTLE
STANDING UP FROM CHAIR USING ARMS: TOTAL
MOVING TO AND FROM BED TO CHAIR: A LITTLE
TURNING FROM BACK TO SIDE WHILE IN FLAT BAD: TOTAL
PERSONAL GROOMING: A LITTLE
MOVING TO AND FROM BED TO CHAIR: A LOT
WALKING IN HOSPITAL ROOM: A LOT
WALKING IN HOSPITAL ROOM: A LOT
MOBILITY SCORE: 8
MOBILITY SCORE: 8
TOILETING: A LOT
MOVING FROM LYING ON BACK TO SITTING ON SIDE OF FLAT BED WITH BEDRAILS: TOTAL
MOVING TO AND FROM BED TO CHAIR: TOTAL
WALKING IN HOSPITAL ROOM: TOTAL
PERSONAL GROOMING: A LITTLE
TOILETING: A LOT
TOILETING: A LOT
STANDING UP FROM CHAIR USING ARMS: A LITTLE
TURNING FROM BACK TO SIDE WHILE IN FLAT BAD: A LOT
HELP NEEDED FOR BATHING: A LOT
TOILETING: A LOT
DRESSING REGULAR LOWER BODY CLOTHING: A LOT
MOVING FROM LYING ON BACK TO SITTING ON SIDE OF FLAT BED WITH BEDRAILS: TOTAL
TURNING FROM BACK TO SIDE WHILE IN FLAT BAD: TOTAL
PERSONAL GROOMING: A LOT
STANDING UP FROM CHAIR USING ARMS: TOTAL
DRESSING REGULAR LOWER BODY CLOTHING: TOTAL

## 2023-01-01 ASSESSMENT — LIFESTYLE VARIABLES
HOW OFTEN DO YOU HAVE 6 OR MORE DRINKS ON ONE OCCASION: NEVER
HOW MANY STANDARD DRINKS CONTAINING ALCOHOL DO YOU HAVE ON A TYPICAL DAY: PATIENT DOES NOT DRINK
SKIP TO QUESTIONS 9-10: 1
HOW OFTEN DO YOU HAVE A DRINK CONTAINING ALCOHOL: NEVER
SUBSTANCE_ABUSE_PAST_12_MONTHS: NO
AUDIT-C TOTAL SCORE: 0
AUDIT-C TOTAL SCORE: 0
HOW OFTEN DO YOU HAVE 6 OR MORE DRINKS ON ONE OCCASION: NEVER
AUDIT-C TOTAL SCORE: 0
SKIP TO QUESTIONS 9-10: 1
HOW MANY STANDARD DRINKS CONTAINING ALCOHOL DO YOU HAVE ON A TYPICAL DAY: PATIENT DOES NOT DRINK
HOW OFTEN DO YOU HAVE A DRINK CONTAINING ALCOHOL: NEVER
PRESCIPTION_ABUSE_PAST_12_MONTHS: NO
AUDIT-C TOTAL SCORE: 0

## 2023-01-01 ASSESSMENT — PAIN SCALES - GENERAL
PAINLEVEL_OUTOF10: 0 - NO PAIN
PAINLEVEL_OUTOF10: 7
PAINLEVEL_OUTOF10: 8
PAINLEVEL_OUTOF10: 0 - NO PAIN
PAINLEVEL_OUTOF10: 5 - MODERATE PAIN
PAINLEVEL_OUTOF10: 4
PAINLEVEL_OUTOF10: 0 - NO PAIN
PAINLEVEL_OUTOF10: 2
PAINLEVEL_OUTOF10: 0 - NO PAIN
PAINLEVEL_OUTOF10: 5 - MODERATE PAIN
PAINLEVEL_OUTOF10: 0 - NO PAIN
PAINLEVEL_OUTOF10: 5 - MODERATE PAIN
PAINLEVEL_OUTOF10: 8
PAINLEVEL_OUTOF10: 0 - NO PAIN
PAINLEVEL_OUTOF10: 3
PAINLEVEL_OUTOF10: 0 - NO PAIN
PAINLEVEL_OUTOF10: 7
PAINLEVEL_OUTOF10: 0 - NO PAIN
PAINLEVEL_OUTOF10: 0 - NO PAIN
PAINLEVEL_OUTOF10: 8
PAINLEVEL_OUTOF10: 0 - NO PAIN
PAINLEVEL_OUTOF10: 5 - MODERATE PAIN
PAINLEVEL_OUTOF10: 5 - MODERATE PAIN
PAINLEVEL_OUTOF10: 0 - NO PAIN
PAINLEVEL_OUTOF10: 7
PAINLEVEL_OUTOF10: 5 - MODERATE PAIN
PAINLEVEL_OUTOF10: 0 - NO PAIN
PAINLEVEL_OUTOF10: 4
PAINLEVEL_OUTOF10: 0 - NO PAIN
PAINLEVEL_OUTOF10: 0 - NO PAIN
PAINLEVEL_OUTOF10: 6
PAINLEVEL_OUTOF10: 6
PAINLEVEL_OUTOF10: 0 - NO PAIN
PAINLEVEL_OUTOF10: 6
PAINLEVEL_OUTOF10: 0 - NO PAIN
PAINLEVEL_OUTOF10: 7
PAINLEVEL_OUTOF10: 0 - NO PAIN
PAINLEVEL_OUTOF10: 6
PAINLEVEL_OUTOF10: 7
PAINLEVEL_OUTOF10: 0 - NO PAIN
PAINLEVEL_OUTOF10: 7
PAINLEVEL_OUTOF10: 0 - NO PAIN
PAINLEVEL_OUTOF10: 0 - NO PAIN
PAINLEVEL_OUTOF10: 2
PAINLEVEL_OUTOF10: 0 - NO PAIN
PAINLEVEL_OUTOF10: 6
PAINLEVEL_OUTOF10: 4
PAINLEVEL_OUTOF10: 0 - NO PAIN
PAINLEVEL_OUTOF10: 2
PAINLEVEL_OUTOF10: 0 - NO PAIN
PAINLEVEL_OUTOF10: 9
PAINLEVEL_OUTOF10: 0 - NO PAIN
PAINLEVEL_OUTOF10: 7
PAINLEVEL_OUTOF10: 0 - NO PAIN
PAINLEVEL_OUTOF10: 0 - NO PAIN
PAINLEVEL_OUTOF10: 4
PAINLEVEL_OUTOF10: 0 - NO PAIN
PAINLEVEL_OUTOF10: 4
PAINLEVEL_OUTOF10: 0 - NO PAIN
PAINLEVEL_OUTOF10: 5 - MODERATE PAIN
PAINLEVEL_OUTOF10: 0 - NO PAIN
PAINLEVEL_OUTOF10: 10 - WORST POSSIBLE PAIN
PAINLEVEL_OUTOF10: 0 - NO PAIN
PAINLEVEL_OUTOF10: 0 - NO PAIN
PAINLEVEL_OUTOF10: 8
PAINLEVEL_OUTOF10: 0 - NO PAIN
PAINLEVEL_OUTOF10: 0 - NO PAIN
PAINLEVEL_OUTOF10: 7
PAINLEVEL_OUTOF10: 4
PAINLEVEL_OUTOF10: 0 - NO PAIN
PAINLEVEL_OUTOF10: 5 - MODERATE PAIN
PAINLEVEL_OUTOF10: 0 - NO PAIN
PAINLEVEL_OUTOF10: 0 - NO PAIN
PAINLEVEL_OUTOF10: 6
PAINLEVEL_OUTOF10: 0 - NO PAIN
PAINLEVEL_OUTOF10: 5 - MODERATE PAIN
PAINLEVEL_OUTOF10: 0 - NO PAIN
PAINLEVEL_OUTOF10: 7
PAINLEVEL_OUTOF10: 0 - NO PAIN
PAINLEVEL_OUTOF10: 0 - NO PAIN
PAINLEVEL_OUTOF10: 7
PAINLEVEL_OUTOF10: 0 - NO PAIN
PAINLEVEL_OUTOF10: 7
PAINLEVEL_OUTOF10: 0 - NO PAIN
PAINLEVEL_OUTOF10: 8
PAINLEVEL_OUTOF10: 0 - NO PAIN
PAINLEVEL_OUTOF10: 0 - NO PAIN
PAINLEVEL_OUTOF10: 5 - MODERATE PAIN
PAINLEVEL_OUTOF10: 7
PAINLEVEL_OUTOF10: 0 - NO PAIN
PAINLEVEL_OUTOF10: 3
PAINLEVEL_OUTOF10: 0 - NO PAIN
PAINLEVEL_OUTOF10: 2
PAINLEVEL_OUTOF10: 0 - NO PAIN
PAINLEVEL_OUTOF10: 5 - MODERATE PAIN
PAINLEVEL_OUTOF10: 0 - NO PAIN
PAINLEVEL_OUTOF10: 7
PAINLEVEL_OUTOF10: 0 - NO PAIN
PAINLEVEL_OUTOF10: 0 - NO PAIN
PAINLEVEL_OUTOF10: 7
PAINLEVEL_OUTOF10: 0 - NO PAIN
PAINLEVEL_OUTOF10: 6
PAINLEVEL_OUTOF10: 0 - NO PAIN
PAINLEVEL_OUTOF10: 8
PAINLEVEL_OUTOF10: 5 - MODERATE PAIN
PAINLEVEL_OUTOF10: 0 - NO PAIN
PAINLEVEL_OUTOF10: 3
PAINLEVEL_OUTOF10: 0 - NO PAIN
PAINLEVEL_OUTOF10: 6
PAINLEVEL_OUTOF10: 6
PAINLEVEL_OUTOF10: 0 - NO PAIN
PAINLEVEL_OUTOF10: 7
PAINLEVEL_OUTOF10: 0 - NO PAIN
PAINLEVEL_OUTOF10: 3
PAINLEVEL_OUTOF10: 0 - NO PAIN
PAINLEVEL_OUTOF10: 0 - NO PAIN
PAINLEVEL_OUTOF10: 4
PAINLEVEL_OUTOF10: 0 - NO PAIN
PAINLEVEL_OUTOF10: 5 - MODERATE PAIN
PAINLEVEL_OUTOF10: 0 - NO PAIN
PAINLEVEL_OUTOF10: 4
PAINLEVEL_OUTOF10: 7
PAINLEVEL_OUTOF10: 0 - NO PAIN
PAINLEVEL_OUTOF10: 3
PAINLEVEL_OUTOF10: 0 - NO PAIN
PAINLEVEL_OUTOF10: 6
PAINLEVEL_OUTOF10: 4
PAINLEVEL_OUTOF10: 0 - NO PAIN
PAINLEVEL_OUTOF10: 0 - NO PAIN
PAINLEVEL_OUTOF10: 7
PAINLEVEL_OUTOF10: 0 - NO PAIN
PAINLEVEL_OUTOF10: 6
PAINLEVEL_OUTOF10: 6
PAINLEVEL_OUTOF10: 7
PAINLEVEL_OUTOF10: 0 - NO PAIN
PAINLEVEL_OUTOF10: 7
PAINLEVEL_OUTOF10: 6
PAINLEVEL_OUTOF10: 0 - NO PAIN
PAINLEVEL_OUTOF10: 0 - NO PAIN

## 2023-01-01 ASSESSMENT — PAIN - FUNCTIONAL ASSESSMENT
PAIN_FUNCTIONAL_ASSESSMENT: 0-10
PAIN_FUNCTIONAL_ASSESSMENT: UNABLE TO SELF-REPORT
PAIN_FUNCTIONAL_ASSESSMENT: 0-10

## 2023-01-01 ASSESSMENT — ENCOUNTER SYMPTOMS
DIFFICULTY URINATING: 0
HEMATURIA: 0
NERVOUS/ANXIOUS: 0
NEUROLOGICAL NEGATIVE: 1
DYSURIA: 0
PSYCHIATRIC NEGATIVE: 1
WHEEZING: 0
GASTROINTESTINAL NEGATIVE: 1
ABDOMINAL PAIN: 0
TROUBLE SWALLOWING: 0
MYALGIAS: 0
HEMATURIA: 0
ABDOMINAL PAIN: 0
VOMITING: 0
ENDOCRINE NEGATIVE: 1
ABDOMINAL DISTENTION: 0
PALPITATIONS: 0
FLANK PAIN: 0
MUSCULOSKELETAL NEGATIVE: 1
NUMBNESS: 0
MYALGIAS: 0
PALPITATIONS: 0
WOUND: 0
FATIGUE: 1
FATIGUE: 1
TREMORS: 0
VOMITING: 0
HEADACHES: 1
CONSTIPATION: 0
CONSTITUTIONAL NEGATIVE: 1
WHEEZING: 0
DIZZINESS: 0
WEAKNESS: 0
CHILLS: 0
COUGH: 1
SINUS PAIN: 0
SORE THROAT: 0
ABDOMINAL DISTENTION: 0
DIZZINESS: 0
CONFUSION: 0
COUGH: 0
FEVER: 0
CONSTITUTIONAL NEGATIVE: 1
ABDOMINAL DISTENTION: 0
EYES NEGATIVE: 1
DIZZINESS: 0
LIGHT-HEADEDNESS: 0
COUGH: 0
CONFUSION: 0
MUSCULOSKELETAL NEGATIVE: 1
PSYCHIATRIC NEGATIVE: 1
ABDOMINAL PAIN: 0
DIARRHEA: 0
CONFUSION: 0
POLYPHAGIA: 0
NAUSEA: 0
ARTHRALGIAS: 0
SHORTNESS OF BREATH: 1
COLOR CHANGE: 0
POLYDIPSIA: 0
EYE PAIN: 0
SEIZURES: 0
COLOR CHANGE: 0
PALPITATIONS: 0
NUMBNESS: 0
HEMATURIA: 0
NEUROLOGICAL NEGATIVE: 1
SHORTNESS OF BREATH: 1
SLEEP DISTURBANCE: 0
CARDIOVASCULAR NEGATIVE: 1
NAUSEA: 0
DIARRHEA: 0
NAUSEA: 0
WHEEZING: 0
CHILLS: 0
NUMBNESS: 0
ENDOCRINE NEGATIVE: 1
SHORTNESS OF BREATH: 0
EYES NEGATIVE: 1
FLANK PAIN: 0
FEVER: 0
COUGH: 0
FREQUENCY: 0
SHORTNESS OF BREATH: 1
BRUISES/BLEEDS EASILY: 0
UNEXPECTED WEIGHT CHANGE: 0
LIGHT-HEADEDNESS: 0
SINUS PRESSURE: 1
CARDIOVASCULAR NEGATIVE: 1
MYALGIAS: 0
LIGHT-HEADEDNESS: 0
APPETITE CHANGE: 1
DIARRHEA: 1
HEMATOLOGIC/LYMPHATIC NEGATIVE: 1
FACIAL SWELLING: 0
DIFFICULTY URINATING: 0
ACTIVITY CHANGE: 0
GASTROINTESTINAL NEGATIVE: 1
DIFFICULTY URINATING: 0
DYSURIA: 0
FREQUENCY: 0
SHORTNESS OF BREATH: 1
FEVER: 0
CHILLS: 0

## 2023-01-01 ASSESSMENT — PAIN DESCRIPTION - LOCATION
LOCATION: BACK
LOCATION: ABDOMEN
LOCATION: ABDOMEN
LOCATION: RIB CAGE
LOCATION: ABDOMEN
LOCATION: ABDOMEN
LOCATION: BACK
LOCATION: ABDOMEN
LOCATION: ANKLE
LOCATION: THROAT
LOCATION: ABDOMEN
LOCATION: ABDOMEN

## 2023-01-01 ASSESSMENT — ACTIVITIES OF DAILY LIVING (ADL)
HEARING - RIGHT EAR: FUNCTIONAL
PATIENT'S MEMORY ADEQUATE TO SAFELY COMPLETE DAILY ACTIVITIES?: YES
HOME_MANAGEMENT_TIME_ENTRY: 18
LACK_OF_TRANSPORTATION: NO
HOME_MANAGEMENT_TIME_ENTRY: 19
ADEQUATE_TO_COMPLETE_ADL: YES
JUDGMENT_ADEQUATE_SAFELY_COMPLETE_DAILY_ACTIVITIES: YES
BATHING: NEEDS ASSISTANCE
LACK_OF_TRANSPORTATION: NO
ADL_ASSISTANCE: INDEPENDENT
LACK_OF_TRANSPORTATION: NO
LACK_OF_TRANSPORTATION: NO
GROOMING: NEEDS ASSISTANCE
HEARING - RIGHT EAR: FUNCTIONAL
JUDGMENT_ADEQUATE_SAFELY_COMPLETE_DAILY_ACTIVITIES: YES
DRESSING YOURSELF: NEEDS ASSISTANCE
WALKS IN HOME: INDEPENDENT
HEARING - LEFT EAR: FUNCTIONAL
FEEDING YOURSELF: INDEPENDENT
HOME_MANAGEMENT_TIME_ENTRY: 23
BATHING_ASSISTANCE: MODERATE
PATIENT'S MEMORY ADEQUATE TO SAFELY COMPLETE DAILY ACTIVITIES?: YES
BATHING: NEEDS ASSISTANCE
HEARING - LEFT EAR: FUNCTIONAL
WALKS IN HOME: NEEDS ASSISTANCE
GROOMING: NEEDS ASSISTANCE
TOILETING: NEEDS ASSISTANCE
DRESSING YOURSELF: NEEDS ASSISTANCE
FEEDING YOURSELF: INDEPENDENT
ADEQUATE_TO_COMPLETE_ADL: YES
TOILETING: NEEDS ASSISTANCE
LACK_OF_TRANSPORTATION: NO
HOME_MANAGEMENT_TIME_ENTRY: 10
ADL_ASSISTANCE: INDEPENDENT

## 2023-01-01 ASSESSMENT — PAIN SCALES - WONG BAKER
WONGBAKER_NUMERICALRESPONSE: HURTS LITTLE BIT
WONGBAKER_NUMERICALRESPONSE: NO HURT

## 2023-01-01 ASSESSMENT — PAIN DESCRIPTION - DESCRIPTORS
DESCRIPTORS: BURNING;DISCOMFORT;ACHING
DESCRIPTORS: ACHING
DESCRIPTORS: DULL;ACHING
DESCRIPTORS: ACHING
DESCRIPTORS: ACHING;DISCOMFORT
DESCRIPTORS: ACHING

## 2023-01-01 ASSESSMENT — COLUMBIA-SUICIDE SEVERITY RATING SCALE - C-SSRS
4. HAVE YOU HAD THESE THOUGHTS AND HAD SOME INTENTION OF ACTING ON THEM?: NO
6. HAVE YOU EVER DONE ANYTHING, STARTED TO DO ANYTHING, OR PREPARED TO DO ANYTHING TO END YOUR LIFE?: NO
2. HAVE YOU ACTUALLY HAD ANY THOUGHTS OF KILLING YOURSELF?: NO
5. HAVE YOU STARTED TO WORK OUT OR WORKED OUT THE DETAILS OF HOW TO KILL YOURSELF? DO YOU INTEND TO CARRY OUT THIS PLAN?: NO
6. HAVE YOU EVER DONE ANYTHING, STARTED TO DO ANYTHING, OR PREPARED TO DO ANYTHING TO END YOUR LIFE?: NO
1. IN THE PAST MONTH, HAVE YOU WISHED YOU WERE DEAD OR WISHED YOU COULD GO TO SLEEP AND NOT WAKE UP?: NO
2. HAVE YOU ACTUALLY HAD ANY THOUGHTS OF KILLING YOURSELF?: NO
1. IN THE PAST MONTH, HAVE YOU WISHED YOU WERE DEAD OR WISHED YOU COULD GO TO SLEEP AND NOT WAKE UP?: NO

## 2023-01-01 ASSESSMENT — PATIENT HEALTH QUESTIONNAIRE - PHQ9
SUM OF ALL RESPONSES TO PHQ9 QUESTIONS 1 & 2: 0
SUM OF ALL RESPONSES TO PHQ9 QUESTIONS 1 & 2: 0
2. FEELING DOWN, DEPRESSED OR HOPELESS: NOT AT ALL
2. FEELING DOWN, DEPRESSED OR HOPELESS: NOT AT ALL
1. LITTLE INTEREST OR PLEASURE IN DOING THINGS: NOT AT ALL
1. LITTLE INTEREST OR PLEASURE IN DOING THINGS: NOT AT ALL

## 2023-01-01 ASSESSMENT — PAIN DESCRIPTION - ORIENTATION
ORIENTATION: LEFT
ORIENTATION: LEFT

## 2023-10-17 PROBLEM — R73.01 IMPAIRED FASTING GLUCOSE: Status: ACTIVE | Noted: 2023-01-01

## 2023-10-17 PROBLEM — N20.0 CALCULUS OF KIDNEY: Status: ACTIVE | Noted: 2023-01-01

## 2023-10-17 PROBLEM — N95.0 POST-MENOPAUSAL BLEEDING: Status: ACTIVE | Noted: 2023-01-01

## 2023-10-17 PROBLEM — R92.8 ABNORMAL MAMMOGRAM: Status: ACTIVE | Noted: 2023-01-01

## 2023-10-17 PROBLEM — K57.90 DIVERTICULAR DISEASE: Status: ACTIVE | Noted: 2023-01-01

## 2023-10-17 PROBLEM — E66.9 OBESITY: Status: ACTIVE | Noted: 2023-01-01

## 2023-10-17 PROBLEM — M25.512 ACUTE PAIN OF LEFT SHOULDER: Status: ACTIVE | Noted: 2023-01-01

## 2023-10-17 PROBLEM — N30.01 ACUTE CYSTITIS WITH HEMATURIA: Status: ACTIVE | Noted: 2023-01-01

## 2023-10-17 PROBLEM — R31.0 GROSS HEMATURIA: Status: ACTIVE | Noted: 2023-01-01

## 2023-10-17 PROBLEM — R73.03 PREDIABETES: Status: ACTIVE | Noted: 2023-01-01

## 2023-10-17 PROBLEM — N32.2 BLADDER FISTULA: Status: ACTIVE | Noted: 2023-01-01

## 2023-10-17 PROBLEM — N26.1 ATROPHIC KIDNEY: Status: ACTIVE | Noted: 2023-01-01

## 2023-10-17 PROBLEM — N18.4 STAGE 4 CHRONIC KIDNEY DISEASE (MULTI): Status: ACTIVE | Noted: 2023-01-01

## 2023-10-17 PROBLEM — I10 BENIGN ESSENTIAL HTN: Status: ACTIVE | Noted: 2023-01-01

## 2023-10-17 PROBLEM — N28.89 NODULE OF KIDNEY: Status: ACTIVE | Noted: 2023-01-01

## 2023-10-17 PROBLEM — R31.9 HEMATURIA, UNSPECIFIED: Status: ACTIVE | Noted: 2023-01-01

## 2023-10-17 PROBLEM — N20.0 STAGHORN RENAL CALCULUS: Status: ACTIVE | Noted: 2023-01-01

## 2023-10-17 PROBLEM — D47.2 GAMMOPATHY: Status: ACTIVE | Noted: 2023-01-01

## 2023-10-17 PROBLEM — N39.41 URGENCY INCONTINENCE: Status: ACTIVE | Noted: 2023-01-01

## 2023-10-17 PROBLEM — J30.9 ALLERGIC RHINITIS: Status: ACTIVE | Noted: 2023-01-01

## 2023-10-19 NOTE — PROGRESS NOTES
HISTORY OF PRESENT ILLNESS:  Doing well with bladder, still having some issues with UI, but better, but she does have COVID now, planning on surgery 12/22          Past Medical History  She has a past medical history of Personal history of diseases of the skin and subcutaneous tissue.    Surgical History  She has a past surgical history that includes Appendectomy (04/01/2016) and Other surgical history (11/14/2022).     Social History  She has no history on file for tobacco use, alcohol use, and drug use.    Family History  Family History   Problem Relation Name Age of Onset    Breast cancer Mother      Heart failure Mother      No Known Problems Father      No Known Problems Sister          Allergies  Fesoterodine, Mirabegron, and Prednisone      A comprehensive 10+ review of systems was negative except for: see hpi                     Assessment:  68-year-old with interstitial cystitis, OAB, and Rodrigo status post TLH and lysis of adhesions 12/14/2022     IC/OAB   - failed Elmiron, Myrbetriq, and Toviaz and had allergic reactions to the latter two medications  - DNA NGS demonstrated polymicrobial infection, olfloxacin did not change symptoms  - biopsy previously showed inflammation  - did 5/6 instillations without much improvement  - treated for kidney stones with Dr. Sandoval  - cystoscopy demonstrated large ulcer which was fulgurated and biopsied, biopsy showed chronic with acute inflammation on 10/17/22  - Botox performed 10/17/2022 with % improvement in her symptoms  - now reporting recurrent UUI, will schedule next Botox in office  Return to clinic for Botox  s/p Botox, 7/05/2023, 100 units, improved     Rodrigo  - continue using estradiol cream  - continue taking daily Macrobid     cystoscopy findings:   possible fistula tract, had negative cysto after hysterectomy, relatively benign findings on CT AP 3/23  CT cystogram negative, but still concern  will plan on cystocopy in OR with possible repair  vs if normal, injection of AM           Russel Duran MD

## 2023-10-19 NOTE — PROGRESS NOTES
Subjective   With patient's permission, this is a Telemedicine visit with video and audio. The provider and patient participated in this telemedicine encounter.     This is a 69 y/o WF on telehealth visit for URI sx's x 9 days with lethargy, tiredness, and dry cough. She tested + for COVID a couple days ago. She was exposed to a coworker with COVID.     HPI     Review of Systems    Objective   There were no vitals taken for this visit.    Physical Exam    Looks good on video, occasional cough but no respiratory distress      Diagnoses and all orders for this visit:  COVID-19  -     dexAMETHasone (Decadron) 6 mg tablet; Take 1 tablet (6 mg) by mouth once daily for 7 days.   -      I have given a note to RTW Monday 10/23/23. Her son will pick it up.

## 2023-10-19 NOTE — LETTER
Date: 2023  RE:  Macarena Wilson  :  1955      To Whom It May Concern:    Our patient, Macarena, has been under our care and now may return back to work without restrictions.    Their return to work date is:  10/23/23    If you have questions concerning this patient's immediate care, please feel free to contact our office at [phone].    Sincerely,      Shirley Bills MD

## 2023-10-23 PROBLEM — J18.9 PNEUMONIA OF BOTH LOWER LOBES DUE TO INFECTIOUS ORGANISM: Status: ACTIVE | Noted: 2023-01-01

## 2023-10-23 PROBLEM — U07.1 COVID-19: Status: ACTIVE | Noted: 2023-01-01

## 2023-10-23 PROBLEM — J96.01 ACUTE RESPIRATORY FAILURE WITH HYPOXEMIA (MULTI): Status: ACTIVE | Noted: 2023-01-01

## 2023-10-23 NOTE — ED PROVIDER NOTES
HPI   Chief Complaint   Patient presents with    Shortness of Breath     Covid positive 1 week ago. States she started feeling unwell about 10 days ago, SOB on exertion, generalized bodyaches and fatigue. Over the past couple days developed some dizziness.        Patient is a 68-year-old female presents emergency department for worsening shortness of breath and COVID symptoms.  Patient states that approximately 10 days ago she began having symptoms of upper respiratory cough, congestion, generalized fatigue, malaise.  She states that she took a home COVID test which was positive.  She has been doing okay, but notes over the last several days she has had increasing shortness of breath.  She states it significantly worse with exertion.  She states that she has history of high blood pressure and eczema, but denies any other significant past medical history.  EMS reports that upon their arrival she was significantly hypoxic on room air around 65% and requiring 4 L nasal cannula oxygen.  She does not wear any oxygen at baseline.  She denies any history of smoking or alcohol use.  She denies any history of any heart disease.  She states that she has no associated chest pain, abdominal pain, nausea, vomiting, fevers, chills.      History provided by:  Patient   used: No                        [place] Coma Scale Score: 15                  Patient History   Past Medical History:   Diagnosis Date    Personal history of diseases of the skin and subcutaneous tissue     History of rosacea     Past Surgical History:   Procedure Laterality Date    APPENDECTOMY  04/01/2016    Appendectomy    OTHER SURGICAL HISTORY  11/14/2022    Cystoscopy     Family History   Problem Relation Name Age of Onset    Breast cancer Mother      Heart failure Mother      No Known Problems Father      No Known Problems Sister       Social History     Tobacco Use    Smoking status: Never    Smokeless tobacco: Never   Vaping Use     Vaping Use: Never used   Substance Use Topics    Alcohol use: Not on file    Drug use: Never       Physical Exam   ED Triage Vitals [10/23/23 1541]   Temp Heart Rate Resp BP   36.8 °C (98.2 °F) 78 20 123/73      SpO2 Temp Source Heart Rate Source Patient Position   (!) 65 % Oral Monitor Sitting      BP Location FiO2 (%)     Left arm --       Physical Exam  Constitutional:       Appearance: She is well-developed.   HENT:      Head: Normocephalic and atraumatic.   Eyes:      Extraocular Movements: Extraocular movements intact.      Pupils: Pupils are equal, round, and reactive to light.   Cardiovascular:      Rate and Rhythm: Normal rate and regular rhythm.   Pulmonary:      Effort: Pulmonary effort is normal.      Breath sounds: Examination of the right-lower field reveals decreased breath sounds and rales. Examination of the left-lower field reveals rales. Decreased breath sounds and rales present. No wheezing.   Abdominal:      Palpations: Abdomen is soft.      Tenderness: There is no abdominal tenderness.   Musculoskeletal:         General: Normal range of motion.      Cervical back: Normal range of motion and neck supple.   Skin:     General: Skin is warm and dry.   Neurological:      General: No focal deficit present.      Mental Status: She is alert and oriented to person, place, and time.         ED Course & MDM   Diagnoses as of 10/23/23 2133   Pneumonia of both lower lobes due to infectious organism   COVID-19   Acute respiratory failure with hypoxia (CMS/HCC)       Medical Decision Making  Patient is a 68-year-old female presents emergency department for evaluation of worsening shortness of breath and recent positive COVID test.    EKG was interpreted by attending physician.    Lab work done today included CMP, CBC, troponins, proBNP, and COVID swab.  Lab work with mildly elevated proBNP, and troponins within normal range with leukocytosis and COVID swab positive.    Scans done today were  interpreted/confirmed by radiologist and also interpreted by me which included chest x-ray and VQ scan.  Chest x-ray shows bilateral groundglass infiltrates.  VQ scan shows normal perfusion lung scan with low suspicion for PE.    Medications given at today's visit include IV ceftriaxone, IV doxycycline, IV fluids     I saw this patient in conjunction with Dr. Duke.  Patient significantly hypoxic on room air and requiring 4 L nasal cannula oxygen.  She does not wear any oxygen at baseline and this is new requirement for her.  She is relatively healthy individual and denies any history of any heart disease or lung disease.  On exam today she does have diminished breath sounds bilaterally worse on the right than left and evidence of groundglass bilateral infiltrates along with testing positive for COVID-19.  In the setting of COVID-positive test 10 days ago, consider proposed bacterial pneumonia and covered with ceftriaxone and doxycycline.  Profound hypoxia initially considered CTA of chest, but given patient's chronic renal insufficiency, VQ scan was done instead which showed normal perfusion ventilation scan and low suspicion for PE.  Given her hypoxic respiratory failure in the setting of COVID-positive test will admit for further management. Dr. Duke with hospitalist on-call was agreeable admission of patient for further management.    The patient/family was counseled on clinical impression, expectations, and plan along with recommendations to admission.  All questions were answered and involved parties were understanding and agreeable to course of treatment.  Case was discussed with admitting physician and any consultants. Bed type, ED treatment and further ED workup decided by joint decision making with admitting team and any consultants. Patient stable for admission per my assessment and further management of patient will be deferred to the inpatient setting.    ** Disclaimer:  Parts of this document were  written utilizing a voice to text dictation software.  Note may contain minor transcription or typographical errors that were inadvertently transcribed by the computer software.           Myriam Prado PA-C  10/23/23 8238

## 2023-10-23 NOTE — TELEPHONE ENCOUNTER
Macarena is not improving after having COVID. Mathew called in to see if something can be called. She had taken her last dose of the steroid Foster this morning.      CAMILLE Recio

## 2023-10-23 NOTE — ED PROVIDER NOTES
Patient was seen by both myself and advanced practitioner.  I performed substantive portion of the visit including all aspects of the medical decision making.  Please refer to advanced practitioner's note further workup, evaluation.    Patient is a 68-year-old female who presents emergency room for evaluation of shortness of breath, hypoxia.  Patient notes that roughly just over 10 days ago she started having cough, congestion, generalized body aches and fatigue.  She did take a home COVID test at that time which was positive consistent with patient's symptoms.  She had been doing okay however over the last several days she has had increasing shortness of breath on exertion and fatigue.  She denies nausea, vomiting, fever, chest pain.  Patient not on oxygen at baseline.  Given her worsening shortness of breath she did call EMS who noted that she was significantly hypoxic and placed on oxygen and brought in for further evaluation.    On exam patient uncomfortable appearing but in no obvious distress.  She was noted to be significantly hypoxic with an oxygen saturation in the 60s on room air and was placed on 5 L nasal cannula with improvement of her oxygen saturation to 93 to 94%.  EKG showed no evidence of STEMI at this time.  Blood work ordered including CBC, CMP, troponin.  Patient did test positive for COVID-19 consistent with patient's known infection.  Chest x-ray shows bilateral infiltrates in the lower lobes consistent with a pneumonia however given her recent COVID infection initially CT scan with IV contrast of the chest was ordered to evaluate for pulmonary embolism.  Patient found to have acute renal failure and unable to obtain CT angio at this time.  VQ scan ordered.  Remainder of blood work was remarkable for significant leukocytosis of 15 and patient was treated with IV Rocephin and doxycycline for suspected pneumonia.  VQ scan ordered and pending at this time.  VQ scan was low risk for pulmonary  embolism and history exam more consistent with a pneumonia.  Patient was given dose of Decadron for her positive COVID test and hypoxia.  Given her new oxygen requirements patient will be admitted to stepdown unit.  Discussed case with Dr. Worley who accepted patient for further treatment and evaluation.     Bill Duke, DO  10/23/23 2406

## 2023-10-24 NOTE — CARE PLAN
The patient's goals for the shift include To get rest    The clinical goals for the shift include Decrease oxygen need    Over the shift, the patient did not make progress toward the following goals. Barriers to progression include  Recommendations to address these barriers include

## 2023-10-24 NOTE — CONSULTS
Inpatient consult to Infectious Diseases  Consult performed by: Charmaine Schaeffer, APRN-CNP  Consult ordered by: David Rodriguez MD  Reason for consult: covid        Primary MD: Shirley Bills MD      History Of Present Illness  Macarena Wilson is a 68 y.o. female with past medical history of chronic kidney disease, hypertension.  She reports to months that was on 10/13/2023 with cough, she tested positive coronavirus with home test.  She reports she was seen and started on steroids.  She reports her symptoms did not improve.  She reported progressive shortness of breath and worsening cough.  She denied nausea vomiting however did report episodes of diarrhea.  She denies chest pain.  She reports she opened a door into herself and bumped right side of head.   She denies any current pain in the area she does have a small ecchymosis right forehead, bridge of nose.  Her oxygen requirements have increased, she is on high flow oxygen, she was transferred to ICU.  She denies fever chills or sweats.  She is afebrile.  Chest x-ray showed bilateral groundglass infiltrates.  CT of chest without IV contrast and NM lung perfusion showed pulmonary embolism.  She was started on dexamethasone, remdesivir, Zosyn.    Past Medical History  She has a past medical history of Chronic kidney disease, Hypertension, and Personal history of diseases of the skin and subcutaneous tissue.    Surgical History  She has a past surgical history that includes Appendectomy (04/01/2016); Other surgical history (11/14/2022); and Hysterectomy.     Social History     Occupational History    Not on file   Tobacco Use    Smoking status: Never    Smokeless tobacco: Never   Vaping Use    Vaping Use: Never used   Substance and Sexual Activity    Alcohol use: Not on file    Drug use: Never    Sexual activity: Not on file     Travel History   Travel since 09/24/23    No documented travel since 09/24/23          Family History  Family History   Problem  "Relation Name Age of Onset    Breast cancer Mother      Heart failure Mother      No Known Problems Father      No Known Problems Sister       Allergies  Fesoterodine, Mirabegron, and Prednisone       There is no immunization history on file for this patient.  Medications  Home medications:  Medications Prior to Admission   Medication Sig Dispense Refill Last Dose    dupilumab (Dupixent Pen) 300 mg/2 mL injection Inject 2 mL (300 mg) under the skin every 14 (fourteen) days.   Past Week    losartan (Cozaar) 100 mg tablet Take 1 tablet (100 mg) by mouth once every 24 hours. TAKE PER DIRECTED   10/23/2023     Current medications:  Scheduled medications  dexAMETHasone, 6 mg, intravenous, q24h  enoxaparin, 30 mg, subcutaneous, Daily  insulin lispro, 0-5 Units, subcutaneous, TID with meals  ipratropium-albuteroL, 3 mL, nebulization, q6h while awake  piperacillin-tazobactam, 2.25 g, intravenous, q6h  polyethylene glycol, 17 g, oral, Daily  remdesivir, 200 mg, intravenous, Once   Followed by  [START ON 10/25/2023] remdesivir, 100 mg, intravenous, q24h      Continuous medications     PRN medications  PRN medications: acetaminophen **OR** acetaminophen **OR** acetaminophen, bisacodyl, dextrose 10 % in water (D10W), dextrose, glucagon, guaiFENesin, ipratropium-albuteroL, ondansetron ODT **OR** ondansetron, [Held by provider] oxygen, oxygen    Review of Systems   Constitutional: Negative.    HENT: Negative.     Eyes: Negative.    Respiratory:  Positive for cough and shortness of breath.    Cardiovascular: Negative.    Gastrointestinal: Negative.    Endocrine: Negative.    Genitourinary: Negative.    Musculoskeletal: Negative.    Skin: Negative.    Neurological: Negative.    Psychiatric/Behavioral: Negative.          Objective  Range of Vitals (last 24 hours)  Heart Rate:  [3-88]   Temp:  [35.5 °C (95.9 °F)-37 °C (98.6 °F)]   Resp:  [18-26]   BP: (118-152)/(60-92)   Height:  [160 cm (5' 3\")]   Weight:  [77.1 kg (170 lb)]   SpO2: "  [65 %-96 %]   Daily Weight  10/23/23 : 77.1 kg (170 lb)    Body mass index is 30.11 kg/m².     Physical Exam  Constitutional:       Appearance: Normal appearance. She is ill-appearing.   HENT:      Head: Normocephalic and atraumatic.      Nose: Nose normal.      Mouth/Throat:      Mouth: Mucous membranes are moist.      Pharynx: Oropharynx is clear.   Eyes:      Extraocular Movements: Extraocular movements intact.      Conjunctiva/sclera: Conjunctivae normal.   Cardiovascular:      Rate and Rhythm: Normal rate and regular rhythm.   Pulmonary:      Effort: Pulmonary effort is normal.      Breath sounds: Normal breath sounds.   Abdominal:      General: Bowel sounds are normal.      Palpations: Abdomen is soft.   Musculoskeletal:         General: Normal range of motion.      Cervical back: Normal range of motion and neck supple.   Skin:     General: Skin is warm and dry.   Neurological:      General: No focal deficit present.      Mental Status: She is alert and oriented to person, place, and time. Mental status is at baseline.   Psychiatric:         Mood and Affect: Mood normal.         Behavior: Behavior normal.          Relevant Results  Outside Hospital Results  No  Labs  Results from last 72 hours   Lab Units 10/24/23  0616 10/23/23  1627   WBC AUTO x10*3/uL 17.1* 15.9*   HEMOGLOBIN g/dL 10.4* 12.4   HEMATOCRIT % 32.9* 38.7   PLATELETS AUTO x10*3/uL 253 275     Results from last 72 hours   Lab Units 10/24/23  0616 10/23/23  1627   SODIUM mmol/L 139 138   POTASSIUM mmol/L 4.7 5.5*   CHLORIDE mmol/L 104 103   CO2 mmol/L 18* 17*   BUN mg/dL 87* 86*   CREATININE mg/dL 2.70* 2.70*   GLUCOSE mg/dL 150* 187*   CALCIUM mg/dL 8.9 9.3   ANION GAP mmol/L 17 18   EGFR mL/min/1.73m*2 19* 19*     Results from last 72 hours   Lab Units 10/24/23  0616 10/23/23  1627   ALK PHOS U/L 61 74   BILIRUBIN TOTAL mg/dL 0.2 0.3   PROTEIN TOTAL g/dL 6.1 7.1   ALT U/L 13 15   AST U/L 20 26   ALBUMIN g/dL 2.8* 3.3*     Estimated Creatinine  "Clearance: 19.3 mL/min (A) (by C-G formula based on SCr of 2.7 mg/dL (H)).  C-Reactive Protein   Date Value Ref Range Status   10/24/2023 10.10 (H) 0.00 - 2.00 mg/dL Final     No results found for: \"HIV1X2\", \"HIVCONF\", \"CCTXSY7ZV\"  No results found for: \"HEPCABINIT\", \"HEPCAB\", \"HCVPCRQUANT\"  Microbiology  No results found for the last 90 days.      Imaging  NM lung perfusion particulate    Result Date: 10/23/2023  Interpreted By:  Yoly Stanlye, STUDY: NM LUNG PERFUSION PARTICULATE 10/23/2023 8:44 pm   INDICATION: Signs/Symptoms:hypoxia, r/o PE   COMPARISON: Chest x-ray done earlier today   ACCESSION NUMBER(S): [id number]   ORDERING CLINICIAN: ANAHY EVANS   TECHNIQUE: 4.2 millicuries of technetium 99 M MAA was injected intravenously with perfusion lung scan in 8 projections completed.   FINDINGS: No perfusion defect is seen within either lung.       Normal perfusion lung scan.   Signed by: Yoly Satnley 10/23/2023 8:57 PM Dictation workstation:   [id number]    XR chest 1 view    Result Date: 10/23/2023  Interpreted By:  Yoly Stanley, STUDY: XR CHEST 1 VIEW 10/23/2023 4:55 pm   INDICATION: Signs/Symptoms:dyspnea, cough, fatigue, and malaise. Positive COVID.   COMPARISON: None available.   ACCESSION NUMBER(S): [id number]   ORDERING CLINICIAN: ANAHY EVANS   TECHNIQUE: AP erect view of the chest   FINDINGS: The cardiac size is normal with no mediastinal abnormality identified. The trachea is midline. No pleural abnormality is seen. However, there are bilateral infiltrates with ground-glass appearance with relative sparing of the left upper lung zone.       Bilateral ground-glass infiltrates.   Signed by: Yoly Stanley 10/23/2023 5:08 PM Dictation workstation:   [id number]     Assessment/Plan   Acute hypoxic respiratory failure  Coronavirus, CRP 10  Chronic Kidney disease, stage IV      IV dexamethasone  IV remdesivir  IV Zosyn  Monitor renal and hepatic parameters  CBC with diff  Discussing  giving Actemra " (Tocilizumab) with pulmonary, pharmacy  Assessing for starting baricitinib -  Monitor temperature and WBC  Monitor oxygenation  Supplemental oxygen  Pulmonary follow-up      Charmaine Schaeffer, APRN-CNP

## 2023-10-24 NOTE — CONSULTS
Pulmonary Consult Note    Chief Complaint   Patient presents with    Shortness of Breath     Covid positive 1 week ago. States she started feeling unwell about 10 days ago, SOB on exertion, generalized bodyaches and fatigue. Over the past couple days developed some dizziness.         History Of Present Illness  Macarena Wilson is a 68 y.o. female presenting with shortness of breath.  She had a home test on 10/13/2023 that was positive for COVID.  She endorses a dry cough.  Given no improvement she decided to seek medical attention.  Overnight she had escalating oxygen requirements and we are asked to assist in her management.  She denies any nausea or vomiting.  She denies any fevers or chills, abdominal pain or chest pain.     Past Medical History  She has a past medical history of Chronic kidney disease, Hypertension, and Personal history of diseases of the skin and subcutaneous tissue.    Surgical History  She has a past surgical history that includes Appendectomy (04/01/2016); Other surgical history (11/14/2022); and Hysterectomy.     Social History  She reports that she has never smoked. She has never used smokeless tobacco. She reports that she does not use drugs. No history on file for alcohol use.    Family History  Family History   Problem Relation Name Age of Onset    Breast cancer Mother      Heart failure Mother      No Known Problems Father      No Known Problems Sister          Allergies  Fesoterodine, Mirabegron, and Prednisone    Review of Systems   Constitutional:  Positive for fatigue.   HENT: Negative.     Eyes: Negative.    Respiratory:  Positive for cough and shortness of breath. Negative for wheezing.    Cardiovascular: Negative.    Gastrointestinal: Negative.    Genitourinary: Negative.    Musculoskeletal: Negative.    Skin: Negative.    Neurological: Negative.    Hematological: Negative.    Psychiatric/Behavioral: Negative.           Last Recorded Vitals  Blood pressure 140/64, pulse (!)  "3, temperature 36.5 °C (97.7 °F), temperature source Temporal, resp. rate 18, height 1.6 m (5' 3\"), weight 77.1 kg (170 lb), SpO2 94 %.     Physical Exam  Constitutional:       Appearance: She is ill-appearing.   HENT:      Head: Normocephalic and atraumatic.      Nose: Nose normal.      Mouth/Throat:      Pharynx: No oropharyngeal exudate.   Eyes:      Extraocular Movements: Extraocular movements intact.      Conjunctiva/sclera: Conjunctivae normal.   Cardiovascular:      Rate and Rhythm: Normal rate and regular rhythm.      Pulses: Normal pulses.      Heart sounds: Normal heart sounds.   Pulmonary:      Effort: Pulmonary effort is normal.      Breath sounds: No wheezing, rhonchi or rales.   Abdominal:      General: Abdomen is flat.      Palpations: Abdomen is soft.   Musculoskeletal:         General: Normal range of motion.      Cervical back: Normal range of motion.   Skin:     General: Skin is warm and dry.   Neurological:      General: No focal deficit present.      Mental Status: She is alert and oriented to person, place, and time.   Psychiatric:         Mood and Affect: Mood normal.         Behavior: Behavior normal.         Meds  Scheduled medications  dexAMETHasone, 6 mg, intravenous, q24h  enoxaparin, 30 mg, subcutaneous, Daily  insulin lispro, 0-5 Units, subcutaneous, TID with meals  ipratropium-albuteroL, 3 mL, nebulization, q6h while awake  piperacillin-tazobactam, 2.25 g, intravenous, q6h  polyethylene glycol, 17 g, oral, Daily  remdesivir, 200 mg, intravenous, Once   Followed by  [START ON 10/25/2023] remdesivir, 100 mg, intravenous, q24h      Continuous medications     PRN medications  PRN medications: acetaminophen **OR** acetaminophen **OR** acetaminophen, bisacodyl, dextrose 10 % in water (D10W), dextrose, glucagon, guaiFENesin, ipratropium-albuteroL, ondansetron ODT **OR** ondansetron, [Held by provider] oxygen, oxygen       Relevant Results  CBC reviewed and notable for white count of " 15,000  BMP reviewed and notable for chronic kidney disease with creatinine of 2.7 elevated potassium on admission which has improved this morning  Chest x-ray personally reviewed and agree shows bilateral airspace disease  Noncontrasted chest CT shows diffuse round glass infiltrates consistent with COVID-19 pneumonitis  VQ scan personally reviewed showing no perfusion defects to my eye.      Principal Problem:    Acute respiratory failure with hypoxemia (CMS/HCC)  Active Problems:    COVID-19    Benign essential HTN    Stage 4 chronic kidney disease (CMS/HCC)     Patient with acute lung injury in the setting of COVID-19 pneumonitis.    Recommendations  Continue with high flow.  Would be willing to target transcutaneous saturations of 88% and above.  At this point in time she truly represents the happy hypoxic.  Remains though at increased risk for requiring noninvasive versus invasive mechanical ventilation.  While in the room, I did instruct patient to do awake proning and with this maneuver for transcutaneous saturations did improve 97%.  She does have limited reserve and I encouraged her in the setting to try to perform prone positioning as best as possible  Decadron  Remdesivir per ID  We are also checking a CRP and seeing if she could qualify for baricitinib  Maintain on DVT prophylaxis  Empiric antibiotics per ID although I do believe this is all in the setting of COVID-19 pneumonitis  Given escalating oxygen requirements and high risk for progression and decompensation, will transfer to the ICU    Discussed with bedside RN, hospitalist team, infectious disease    Thank you for this consultation.  We will follow with you.      Due to the high probability of life threatening clinical decompensation, the patient required critical care time evaluating and managing this patient.  Critical care time included obtaining a history, examining the patient, ordering and reviewing studies, discussing, developing, and  implementing a management plan, evaluating the patient's response to treatment, and discussion with other care team providers. I saw and evaluated the patient myself.  Critical care time was performed exclusive of billable procedures.    Critical care time: 40 mins         Sean Barclay MD  Pulmonary/Critical Care Physician  [facility]

## 2023-10-24 NOTE — PROGRESS NOTES
Macarena Wilson is a 68 y.o. female on day 1 of admission presenting with Acute respiratory failure with hypoxemia (CMS/HCC).      Subjective   Patient is currently on 70% and 35 liters high flow  Patient is awake alert oriented x3 in mild respiratory distress    Objective     Last Recorded Vitals  /78 (BP Location: Right arm, Patient Position: Lying)   Pulse 93   Temp 35.4 °C (95.7 °F)   Resp 22   Wt 77.1 kg (170 lb)   SpO2 92%   Intake/Output last 3 Shifts:    Intake/Output Summary (Last 24 hours) at 10/24/2023 1259  Last data filed at 10/24/2023 0943  Gross per 24 hour   Intake 1663.33 ml   Output 0 ml   Net 1663.33 ml       Admission Weight  Weight: 77.1 kg (170 lb) (10/23/23 1541)    Daily Weight  10/23/23 : 77.1 kg (170 lb)    Image Results  CT chest wo IV contrast  Narrative: Interpreted By:  Nirmal Mcmillan,   STUDY:  CT CHEST WO IV CONTRAST; 10/24/2023 12:27 am      INDICATION:  Signs/Symptoms:Covid.      COMPARISON:  None      ACCESSION NUMBER(S):  [id number]      ORDERING CLINICIAN:  GERTRUDE FIERRO      TECHNIQUE:  Contiguous axial images of the thorax were obtained from the level of  the thoracic inlet through the lung bases. Coronal and sagittal  reformatted images were obtained.          FINDINGS:  The thyroid gland is unremarkable.      The heart size is within normal limits.  No pericardial effusion is  identified. The aorta and pulmonary arteries are normal in caliber.      There are no pathologically enlarged mediastinal, hilar, or axillary  lymph nodes are seen.      The trachea and mainstem bronchi are patent. Diffuse patchy  ground-glass opacities bilaterally which is compatible with atypical  pneumonia. There is no evidence of pneumothorax or pleural effusion.      The visualized osseous structures are intact.      Limited images through the upper abdomen are unremarkable.      Impression: Diffuse, patchy ground-glass opacities bilaterally suggesting  atypical pneumonia such  as COVID.          Signed by: Nirmal Mcmillan 10/24/2023 12:32 PM  Dictation workstation:   [id number]      Physical Exam  Constitutional:       Appearance: She is normal weight.   HENT:      Head: Normocephalic.      Nose: Congestion present.      Mouth/Throat:      Mouth: Mucous membranes are moist.   Eyes:      Extraocular Movements: Extraocular movements intact.      Conjunctiva/sclera: Conjunctivae normal.      Pupils: Pupils are equal, round, and reactive to light.   Cardiovascular:      Rate and Rhythm: Tachycardia present.   Pulmonary:      Effort: Respiratory distress present.   Abdominal:      General: Bowel sounds are normal.      Palpations: Abdomen is soft.      Tenderness: There is no abdominal tenderness.   Musculoskeletal:         General: Normal range of motion.      Cervical back: Normal range of motion.   Skin:     General: Skin is warm.      Capillary Refill: Capillary refill takes less than 2 seconds.   Neurological:      General: No focal deficit present.      Mental Status: She is alert and oriented to person, place, and time.         Relevant Results               Assessment/Plan   This patient currently has cardiac telemetry ordered; if you would like to modify or discontinue the telemetry order, click here to go to the orders activity to modify/discontinue the order.              Principal Problem:    Acute respiratory failure with hypoxemia (CMS/HCC)  Active Problems:    Benign essential HTN    Stage 4 chronic kidney disease (CMS/HCC)    COVID-19  Elevated CRP  Leukocytosis    -At risk of decompensation and may require intubation remains full code, chest xray  confirms bilateral interstitial changes concerning for ARDS  -Continue incentive spirometry  -VQ scan was of low probability for PE  -Continue empiric pneumonia treatment with vancomycin and Zosyn  -Continue high flow nasal cannula at this time patient open to intubation if indicated gets worse  -Given ARDS patient will be on  dexamethasone as well as remdesivir and broad-spectrum antibiotics, holding Lasix at this time due to elevation in creatinine in setting of stage IV CKD consult with nephrology  -Critically ill patient transferred to ICU status continue nebs every 4  -Pulmonology considering baricitinib appreciate recs  -Losartan is being held for hypotension  History of bladder fistula status post partial hysterectomy plan for surgical repair scheduled for December 2023    DVT prophylaxis: Lovenox daily    GI prophylaxis: Protonix    PT/OT/ST: Unable to tolerate PT OT at this time due to respiratory status    Nutrition: Regular diet    Code status: Full code    Have IV fluids been discontinued:  NO    Central lines present: NO    level of care indicated: ICU    Location prior to arrival: Home    Dispo plan: Critically ill                    Lydia Moreland MD

## 2023-10-24 NOTE — PROGRESS NOTES
"Vancomycin Dosing by Pharmacy- INITIAL    Macarena Wilson is a 68 y.o. year old female who Pharmacy has been consulted for vancomycin dosing for pneumonia. Based on the patient's indication and renal status this patient will be dosed based on a goal AUC of 400-600.     Renal function is currently stable.    Visit Vitals  /60 (BP Location: Left arm, Patient Position: Sitting)   Pulse 77   Temp 36.8 °C (98.2 °F)   Resp 21        Lab Results   Component Value Date    CREATININE 2.70 (H) 10/23/2023    CREATININE 2.53 (H) 07/24/2023    CREATININE 1.92 (H) 03/01/2023    CREATININE 2.05 (H) 12/06/2022    CREATININE 2.18 (H) 10/12/2022        Patient weight is No results found for: \"PTWEIGHT\"    No results found for: \"CULTURE\"     No intake/output data recorded.  [unfilled]    No results found for: \"PATIENTTEMP\"       Assessment/Plan     Patient will be given a loading dose of 1250 mg.  Will initiate vancomycin maintenance, a one time dose of 1250 mg.    This dosing regimen is predicted by [facility] to result in the following pharmacokinetic parameters:     One time dose and dosing by levels    Follow-up level will be ordered on 10/24/23 at 2300 unless clinically indicated sooner.  Will continue to monitor renal function daily while on vancomycin and order serum creatinine at least every 48 hours if not already ordered.  Follow for continued vancomycin needs, clinical response, and signs/symptoms of toxicity.       Faustino Walden, PharmD       "

## 2023-10-24 NOTE — PROGRESS NOTES
Patient on 35L/70% HFNC.  No PT/OT orders at this time.  Patient is from home where she is independent.  Patient anticipated to return home with no needs at discharge.  RN TCC continuing to follow for updates/changes.    Madiha Salazar RN

## 2023-10-24 NOTE — CARE PLAN
The patient's goals for the shift include To get rest    The clinical goals for the shift include Decrease oxygen    Over the shift, the patient did not make progress toward the following goals. Barriers to progression include increased O2. Recommendations to address these barriers include deep breathing ,I/S  .

## 2023-10-24 NOTE — PROGRESS NOTES
Macarena Wilson is a 68 y.o. female on day 1 of admission presenting with Acute respiratory failure with hypoxemia (CMS/[facility]).      Subjective   Duplicate note entered in error       Objective     Last Recorded Vitals  /64 (BP Location: Left arm, Patient Position: Lying)   Pulse (!) 3   Temp 36.5 °C (97.7 °F) (Temporal)   Resp 18   Wt 77.1 kg (170 lb)   SpO2 94%   Intake/Output last 3 Shifts:    Intake/Output Summary (Last 24 hours) at 10/24/2023 1046  Last data filed at 10/24/2023 0943  Gross per 24 hour   Intake 1663.33 ml   Output 0 ml   Net 1663.33 ml       Admission Weight  Weight: 77.1 kg (170 lb) (10/23/23 1541)    Daily Weight  10/23/23 : 77.1 kg (170 lb)    Image Results  NM lung perfusion particulate  Narrative: Interpreted By:  Yoly Stanley,   STUDY:  NM LUNG PERFUSION PARTICULATE 10/23/2023 8:44 pm      INDICATION:  Signs/Symptoms:hypoxia, r/o PE      COMPARISON:  Chest x-ray done earlier today      ACCESSION NUMBER(S):  [id number]      ORDERING CLINICIAN:  ANAHY EVANS      TECHNIQUE:  4.2 millicuries of technetium 99 M MAA was injected intravenously  with perfusion lung scan in 8 projections completed.      FINDINGS:  No perfusion defect is seen within either lung.      Impression: Normal perfusion lung scan.      Signed by: Yoly Stanley 10/23/2023 8:57 PM  Dictation workstation:   [id number]  XR chest 1 view  Narrative: Interpreted By:  Yoly Stanley,   STUDY:  XR CHEST 1 VIEW 10/23/2023 4:55 pm      INDICATION:  Signs/Symptoms:dyspnea, cough, fatigue, and malaise. Positive COVID.      COMPARISON:  None available.      ACCESSION NUMBER(S):  [id number]      ORDERING CLINICIAN:  ANAHY EVANS      TECHNIQUE:  AP erect view of the chest      FINDINGS:  The cardiac size is normal with no mediastinal abnormality  identified. The trachea is midline. No pleural abnormality is seen.  However, there are bilateral infiltrates with ground-glass appearance  with relative sparing of the left upper  lung zone.      Impression: Bilateral ground-glass infiltrates.      Signed by: Yoly Stanley 10/23/2023 5:08 PM  Dictation workstation:   [id number]      Physical Exam    Relevant Results               Assessment/Plan   This patient currently has cardiac telemetry ordered; if you would like to modify or discontinue the telemetry order, click here to go to the orders activity to modify/discontinue the order.              Principal Problem:    Acute respiratory failure with hypoxemia (CMS/HCC)  Active Problems:    Benign essential HTN    Stage 4 chronic kidney disease (CMS/HCC)    COVID-19    Duplicate note entered in error              Lydia Moreland MD

## 2023-10-24 NOTE — H&P
History Of Present Illness  Macarena Wilson is a 68 y.o. female presenting with shortness of breath. States she tested positive for Covid about 10 days ago on 10/13. She continues to have shortness of breath with no improvement. Presented today with shortness of breath.  Denies chest pain, lightheadedness, fevers, chills, abdominal pain, leg edema. .     Past Medical History  Past Medical History:   Diagnosis Date    Chronic kidney disease     Hypertension     Personal history of diseases of the skin and subcutaneous tissue     History of rosacea       Surgical History  Past Surgical History:   Procedure Laterality Date    APPENDECTOMY  04/01/2016    Appendectomy    HYSTERECTOMY      Partial    OTHER SURGICAL HISTORY  11/14/2022    Cystoscopy        Social History  She reports that she has never smoked. She has never used smokeless tobacco. She reports that she does not use drugs. No history on file for alcohol use.    Family History  Family History   Problem Relation Name Age of Onset    Breast cancer Mother      Heart failure Mother      No Known Problems Father      No Known Problems Sister          Allergies  Fesoterodine, Mirabegron, and Prednisone    Review of Systems   Constitutional: Negative.    HENT: Negative.     Eyes: Negative.    Respiratory:  Positive for shortness of breath.    Cardiovascular: Negative.    Gastrointestinal: Negative.    Endocrine: Negative.    Musculoskeletal: Negative.    Skin: Negative.    Neurological: Negative.    Psychiatric/Behavioral: Negative.          Physical Exam  Constitutional:       General: She is not in acute distress.     Appearance: Normal appearance.   HENT:      Head: Normocephalic and atraumatic.      Mouth/Throat:      Mouth: Mucous membranes are dry.   Eyes:      Extraocular Movements: Extraocular movements intact.      Conjunctiva/sclera: Conjunctivae normal.      Pupils: Pupils are equal, round, and reactive to light.   Cardiovascular:      Rate and Rhythm:  "Normal rate and regular rhythm.      Pulses: Normal pulses.      Heart sounds: Normal heart sounds.   Pulmonary:      Breath sounds: Rhonchi present.   Abdominal:      General: Bowel sounds are normal.      Palpations: Abdomen is soft.      Tenderness: There is no abdominal tenderness.   Musculoskeletal:         General: Normal range of motion.      Cervical back: Normal range of motion and neck supple.   Skin:     General: Skin is warm and dry.   Neurological:      General: No focal deficit present.      Mental Status: She is alert and oriented to person, place, and time.   Psychiatric:         Mood and Affect: Mood normal.         Behavior: Behavior normal.          Last Recorded Vitals  Blood pressure 118/60, pulse 77, temperature 36.8 °C (98.2 °F), resp. rate 21, height 1.6 m (5' 3\"), weight 77.1 kg (170 lb), SpO2 91 %.    Relevant Results  Results for orders placed or performed during the hospital encounter of 10/23/23 (from the past 24 hour(s))   CBC and Auto Differential   Result Value Ref Range    WBC 15.9 (H) 4.4 - 11.3 x10*3/uL    nRBC 0.0 0.0 - 0.0 /100 WBCs    RBC 4.54 4.00 - 5.20 x10*6/uL    Hemoglobin 12.4 12.0 - 16.0 g/dL    Hematocrit 38.7 36.0 - 46.0 %    MCV 85 80 - 100 fL    MCH 27.3 26.0 - 34.0 pg    MCHC 32.0 32.0 - 36.0 g/dL    RDW 15.0 (H) 11.5 - 14.5 %    Platelets 275 150 - 450 x10*3/uL    MPV 11.5 7.5 - 11.5 fL    Immature Granulocytes %, Automated 5.4 (H) 0.0 - 0.9 %    Immature Granulocytes Absolute, Automated 0.86 (H) 0.00 - 0.70 x10*3/uL   Comprehensive metabolic panel   Result Value Ref Range    Glucose 187 (H) 65 - 99 mg/dL    Sodium 138 133 - 145 mmol/L    Potassium 5.5 (H) 3.4 - 5.1 mmol/L    Chloride 103 97 - 107 mmol/L    Bicarbonate 17 (L) 24 - 31 mmol/L    Urea Nitrogen 86 (H) 8 - 25 mg/dL    Creatinine 2.70 (H) 0.40 - 1.60 mg/dL    eGFR 19 (L) >60 mL/min/1.73m*2    Calcium 9.3 8.5 - 10.4 mg/dL    Albumin 3.3 (L) 3.5 - 5.0 g/dL    Alkaline Phosphatase 74 35 - 125 U/L    Total " Protein 7.1 5.9 - 7.9 g/dL    AST 26 5 - 40 U/L    Bilirubin, Total 0.3 0.1 - 1.2 mg/dL    ALT 15 5 - 40 U/L    Anion Gap 18 <=19 mmol/L   SARS-CoV-2 RT PCR   Result Value Ref Range    Coronavirus 2019, PCR Detected (A) Not Detected   Serial Troponin, Initial ([facility])   Result Value Ref Range    Troponin T, High Sensitivity 15 <=15 ng/L   NT Pro-BNP   Result Value Ref Range    PROBNP 695 (H) 0 - 353 pg/mL   Serial Troponin, 2 Hour ([facility])   Result Value Ref Range    Troponin T, High Sensitivity 15 <=15 ng/L   Serial Troponin, 6 Hour ([facility])   Result Value Ref Range    Troponin T, High Sensitivity 15 <=15 ng/L            Assessment/Plan   Principal Problem:    Acute respiratory failure with hypoxemia (CMS/HCC)   Titrate O2 as tolerated    Consult pulmonology    Duonebs    IS    Consult respiratory    Concerning for Covid pneumonia - Covid + 10/13   VQ scan low probability   Active Problems:    Benign essential HTN   Hold losartan     Stage 4 chronic kidney disease (CMS/HCC)   Creatinine at baseline    Continue to monitor    Bladder fistula   S/P partial hysterectomy    Plan for surgical repair scheduled in December     COVID-19   IV decadron q day    Chest CT without contrast    IV antibiotics to cover possible bacterial pneumonia    X-ray concerning for bilateral pneumonia     Pneumonia of both lower lobes due to infectious organism        I spent 45 minutes in the professional and overall care of this patient.      Marcela Wesley, APRN-CNP

## 2023-10-24 NOTE — CARE PLAN
Problem: Fall/Injury  Goal: Not fall by end of shift  10/24/2023 1059 by Marcela Singh RN  Outcome: Progressing  10/24/2023 1050 by Marcela Singh RN  Outcome: Progressing  Goal: Be free from injury by end of the shift  10/24/2023 1059 by Marcela Singh RN  Outcome: Progressing  10/24/2023 1050 by Marcela Singh RN  Outcome: Progressing  Goal: Verbalize understanding of personal risk factors for fall in the hospital  10/24/2023 1059 by Marcela Singh RN  Outcome: Progressing  10/24/2023 1050 by Marcela Singh RN  Outcome: Progressing  Goal: Verbalize understanding of risk factor reduction measures to prevent injury from fall in the home  10/24/2023 1059 by Marcela Singh RN  Outcome: Progressing  10/24/2023 1050 by Marcela Singh RN  Outcome: Progressing  Goal: Use assistive devices by end of the shift  10/24/2023 1059 by Marcela Singh RN  Outcome: Progressing  10/24/2023 1050 by Marcela Singh RN  Outcome: Progressing  Goal: Pace activities to prevent fatigue by end of the shift  10/24/2023 1059 by Marcela Singh RN  Outcome: Progressing  10/24/2023 1050 by Marcela Singh RN  Outcome: Progressing   The patient's goals for the shift include To get rest    The clinical goals for the shift include decrease o2 demands    Over the shift, the patient did not make progress toward the following goals. Barriers to progression include o2 demands Recommendations to address these barriers include prone sleeping

## 2023-10-25 NOTE — CONSULTS
Inpatient consult to Palliative Care  Consult performed by: Lou Hodges, APRN-CNP  Consult ordered by: Lydia Moreland MD  Reason for consult: symptom control, family request          Reason For Consult  Reason for Consult: symptom management     History Of Present Illness  Macarena Wilson is a 68 y.o. female with past medical history of COVID-19 is presenting with sob.  Presented from home, COVID + since 10/13. Has had progressive dyspnea since then.  Upon presentation to the emergency room was placed on oxygen, CRP elevated, renal function at baseline(CKD 4).  Started on nasal cannula initially, titrated to high flow, given IV dexamethasone Actemra and remdesivir as well as Zosyn for possible secondary infection, initial VQ scan with low probability of pulmonary embolus, CT without contrast showing diffuse bilateral groundglass infiltrates.  Patient was able to be weaned down to 30 L/min with 80% FiO2, she continues to have hypoxia with even minimal exertion or conversation, she denies worsening dyspnea no fevers or chills, she does have complaints of headache alleviated with Tylenol, described as over her face.  She does have some worsening loose bowels without nausea abdominal pain or discomfort, she denies dysuria or hematuria flank pain.     Symptoms (0 - 10, Best to Worst)  [place] Symptom Assessment System  Pain Score: 0 - No pain    BM in last 48 hours? yes  Personal/Social History    She reports that she has never smoked. She has never used smokeless tobacco. She reports that she does not use drugs. No history on file for alcohol use.    Functional Status        Caregiving/Caregiver Support  Does the patient require assistance in some or all components of his care, including coordination of medical care? No  If Yes, which person serves that role?  unknown   Caregiver emotional or practical needs:      Past Medical History  She has a past medical history of Chronic kidney disease, Hypertension, and  Personal history of diseases of the skin and subcutaneous tissue.    Surgical History  She has a past surgical history that includes Appendectomy (04/01/2016); Other surgical history (11/14/2022); and Hysterectomy.     Family History  Family History   Problem Relation Name Age of Onset    Breast cancer Mother      Heart failure Mother      No Known Problems Father      No Known Problems Sister       Allergies  Fesoterodine, Mirabegron, and Prednisone    Review of Systems   Constitutional:  Positive for appetite change and fatigue. Negative for activity change, chills, fever and unexpected weight change.   HENT:  Positive for congestion and sinus pressure. Negative for ear pain, facial swelling, hearing loss, mouth sores, nosebleeds, sinus pain, sneezing, sore throat and trouble swallowing.    Eyes:  Negative for pain.   Respiratory:  Negative for cough and shortness of breath.    Cardiovascular:  Negative for chest pain, palpitations and leg swelling.   Gastrointestinal:  Positive for diarrhea. Negative for abdominal distention, abdominal pain, constipation and nausea.   Endocrine: Negative for polydipsia, polyphagia and polyuria.   Genitourinary:  Negative for difficulty urinating and hematuria.   Musculoskeletal:  Negative for arthralgias, gait problem and myalgias.   Skin:  Negative for color change, rash and wound.   Neurological:  Positive for headaches. Negative for dizziness, tremors, seizures, weakness, light-headedness and numbness.   Hematological:  Does not bruise/bleed easily.   Psychiatric/Behavioral:  Negative for confusion and sleep disturbance. The patient is not nervous/anxious.         Physical Exam  Vitals and nursing note reviewed.   Constitutional:       General: She is not in acute distress.     Appearance: She is ill-appearing.   HENT:      Head: Normocephalic and atraumatic.      Nose: Congestion present.      Mouth/Throat:      Mouth: Mucous membranes are moist.      Pharynx: Oropharynx is  "clear. No oropharyngeal exudate or posterior oropharyngeal erythema.   Eyes:      Extraocular Movements: Extraocular movements intact.      Pupils: Pupils are equal, round, and reactive to light.   Cardiovascular:      Rate and Rhythm: Normal rate and regular rhythm.      Pulses: Normal pulses.      Heart sounds: No murmur heard.  Pulmonary:      Effort: Pulmonary effort is normal. No respiratory distress.      Breath sounds: Normal breath sounds. No wheezing or rhonchi.      Comments: Hfnc 30/80%  Abdominal:      General: Abdomen is flat. Bowel sounds are normal. There is no distension.      Palpations: Abdomen is soft.      Tenderness: There is no abdominal tenderness.   Musculoskeletal:         General: No swelling, tenderness, deformity or signs of injury. Normal range of motion.      Cervical back: Normal range of motion and neck supple.   Skin:     General: Skin is warm and dry.      Capillary Refill: Capillary refill takes 2 to 3 seconds.   Neurological:      General: No focal deficit present.      Mental Status: She is alert and oriented to person, place, and time.   Psychiatric:         Mood and Affect: Mood normal.         Last Recorded Vitals  Blood pressure (!) 114/49, pulse 74, temperature 35.5 °C (95.9 °F), temperature source Oral, resp. rate 26, height 1.6 m (5' 3\"), weight 77.3 kg (170 lb 6.7 oz), SpO2 90 %.    Relevant Results  Results for orders placed or performed during the hospital encounter of 10/23/23 (from the past 24 hour(s))   POCT GLUCOSE   Result Value Ref Range    POCT Glucose 184 (H) 74 - 99 mg/dL   Vancomycin   Result Value Ref Range    Vancomycin 12.0 10.0 - 20.0 ug/mL   Blood Gas Arterial Full Panel   Result Value Ref Range    POCT pH, Arterial 7.35 (L) 7.38 - 7.42 pH    POCT pCO2, Arterial 31 (L) 38 - 42 mm Hg    POCT pO2, Arterial 115 (H) 85 - 95 mm Hg    POCT SO2, Arterial 99 94 - 100 %    POCT Oxy Hemoglobin, Arterial 96.9 94.0 - 98.0 %    POCT Hematocrit Calculated, Arterial " 34.0 (L) 36.0 - 46.0 %    POCT Sodium, Arterial 140 136 - 145 mmol/L    POCT Potassium, Arterial 4.3 3.5 - 5.3 mmol/L    POCT Chloride, Arterial 112 (H) 98 - 107 mmol/L    POCT Ionized Calcium, Arterial 1.23 1.10 - 1.33 mmol/L    POCT Glucose, Arterial 117 (H) 74 - 99 mg/dL    POCT Lactate, Arterial 2.1 (H) 0.4 - 2.0 mmol/L    POCT Base Excess, Arterial -7.5 (L) -2.0 - 3.0 mmol/L    POCT HCO3 Calculated, Arterial 17.1 (L) 22.0 - 26.0 mmol/L    POCT Hemoglobin, Arterial 11.4 (L) 12.0 - 16.0 g/dL    POCT Anion Gap, Arterial 15 10 - 25 mmo/L    Patient Temperature 37.0 degrees Celsius    FiO2 100 %    [place] HIGH FLOW CANNULA     Flow 30.0 LPM    Critical Called By MELINA     Critical Called To DR MERCADO     Critical Call Time [phone]     Critical Read Back Y    Comprehensive metabolic panel   Result Value Ref Range    Glucose 115 (H) 65 - 99 mg/dL    Sodium 142 133 - 145 mmol/L    Potassium 4.2 3.4 - 5.1 mmol/L    Chloride 110 (H) 97 - 107 mmol/L    Bicarbonate 16 (L) 24 - 31 mmol/L    Urea Nitrogen 78 (H) 8 - 25 mg/dL    Creatinine 2.70 (H) 0.40 - 1.60 mg/dL    eGFR 19 (L) >60 mL/min/1.73m*2    Calcium 9.0 8.5 - 10.4 mg/dL    Albumin 2.5 (L) 3.5 - 5.0 g/dL    Alkaline Phosphatase 60 35 - 125 U/L    Total Protein 6.0 5.9 - 7.9 g/dL    AST 18 5 - 40 U/L    Bilirubin, Total 0.2 0.1 - 1.2 mg/dL    ALT 13 5 - 40 U/L    Anion Gap 16 <=19 mmol/L   C-reactive protein   Result Value Ref Range    C-Reactive Protein 6.10 (H) 0.00 - 2.00 mg/dL   CBC and Auto Differential   Result Value Ref Range    WBC 15.4 (H) 4.4 - 11.3 x10*3/uL    nRBC 0.1 (H) 0.0 - 0.0 /100 WBCs    RBC 3.89 (L) 4.00 - 5.20 x10*6/uL    Hemoglobin 10.7 (L) 12.0 - 16.0 g/dL    Hematocrit 34.0 (L) 36.0 - 46.0 %    MCV 87 80 - 100 fL    MCH 27.5 26.0 - 34.0 pg    MCHC 31.5 (L) 32.0 - 36.0 g/dL    RDW 14.8 (H) 11.5 - 14.5 %    Platelets 249 150 - 450 x10*3/uL    MPV 10.4 7.5 - 11.5 fL    Immature Granulocytes %, Automated 5.5 (H) 0.0 - 0.9 %    Immature  Granulocytes Absolute, Automated 0.85 (H) 0.00 - 0.70 x10*3/uL   Manual Differential   Result Value Ref Range    Neutrophils %, Manual 88.0 40.0 - 80.0 %    Bands %, Manual 2.0 0.0 - 5.0 %    Lymphocytes %, Manual 3.0 13.0 - 44.0 %    Monocytes %, Manual 5.0 2.0 - 10.0 %    Eosinophils %, Manual 0.0 0.0 - 6.0 %    Basophils %, Manual 0.0 0.0 - 2.0 %    Metamyelocytes %, Manual 2.0 0.0 - 0.0 %    Seg Neutrophils Absolute, Manual 13.55 (H) 1.20 - 7.00 x10*3/uL    Bands Absolute, Manual 0.31 0.00 - 0.70 x10*3/uL    Lymphocytes Absolute, Manual 0.46 (L) 1.20 - 4.80 x10*3/uL    Monocytes Absolute, Manual 0.77 0.10 - 1.00 x10*3/uL    Eosinophils Absolute, Manual 0.00 0.00 - 0.70 x10*3/uL    Basophils Absolute, Manual 0.00 0.00 - 0.10 x10*3/uL    Metamyelocytes Absolute, Manual 0.31 0.00 - 0.00 x10*3/uL    Total Cells Counted 100     Neutrophils Absolute, Manual 13.86 (H) 1.20 - 7.70 x10*3/uL    RBC Morphology See Below     Polychromasia Mild     Ovalocytes Few     Bertrand Cells Few     Vacuolated Neutrophils Present    Blood Gas Lactic Acid, Venous   Result Value Ref Range    POCT Lactate, Venous 1.7 0.4 - 2.0 mmol/L   POCT GLUCOSE   Result Value Ref Range    POCT Glucose 101 (H) 74 - 99 mg/dL   POCT GLUCOSE   Result Value Ref Range    POCT Glucose 119 (H) 74 - 99 mg/dL    CT chest wo IV contrast    Result Date: 10/24/2023  Interpreted By:  Nirmal Mcmillan, STUDY: CT CHEST WO IV CONTRAST; 10/24/2023 12:27 am   INDICATION: Signs/Symptoms:Covid.   COMPARISON: None   ACCESSION NUMBER(S): [id number]   ORDERING CLINICIAN: GERTRUDE FIERRO   TECHNIQUE: Contiguous axial images of the thorax were obtained from the level of the thoracic inlet through the lung bases. Coronal and sagittal reformatted images were obtained.     FINDINGS: The thyroid gland is unremarkable.   The heart size is within normal limits.  No pericardial effusion is identified. The aorta and pulmonary arteries are normal in caliber.   There are no  pathologically enlarged mediastinal, hilar, or axillary lymph nodes are seen.   The trachea and mainstem bronchi are patent. Diffuse patchy ground-glass opacities bilaterally which is compatible with atypical pneumonia. There is no evidence of pneumothorax or pleural effusion.   The visualized osseous structures are intact.   Limited images through the upper abdomen are unremarkable.       Diffuse, patchy ground-glass opacities bilaterally suggesting atypical pneumonia such as COVID.     Signed by: Nirmal Mcmillan 10/24/2023 12:32 PM Dictation workstation:   [id number]    NM lung perfusion particulate    Result Date: 10/23/2023  Interpreted By:  Yoly Stanley, STUDY: NM LUNG PERFUSION PARTICULATE 10/23/2023 8:44 pm   INDICATION: Signs/Symptoms:hypoxia, r/o PE   COMPARISON: Chest x-ray done earlier today   ACCESSION NUMBER(S): [id number]   ORDERING CLINICIAN: ANAHY EVANS   TECHNIQUE: 4.2 millicuries of technetium 99 M MAA was injected intravenously with perfusion lung scan in 8 projections completed.   FINDINGS: No perfusion defect is seen within either lung.       Normal perfusion lung scan.   Signed by: Yoly Stanley 10/23/2023 8:57 PM Dictation workstation:   [id number]    XR chest 1 view    Result Date: 10/23/2023  Interpreted By:  Yoly Stanley, STUDY: XR CHEST 1 VIEW 10/23/2023 4:55 pm   INDICATION: Signs/Symptoms:dyspnea, cough, fatigue, and malaise. Positive COVID.   COMPARISON: None available.   ACCESSION NUMBER(S): [id number]   ORDERING CLINICIAN: ANAHY EVANS   TECHNIQUE: AP erect view of the chest   FINDINGS: The cardiac size is normal with no mediastinal abnormality identified. The trachea is midline. No pleural abnormality is seen. However, there are bilateral infiltrates with ground-glass appearance with relative sparing of the left upper lung zone.       Bilateral ground-glass infiltrates.   Signed by: Yoly Stanley 10/23/2023 5:08 PM Dictation workstation:   [id number]       Assessment/Plan    IMP:    COVID-19  Acute respiratory failure  CKD4  Diarrhea  Hypertension  Palliative care    Chart reviewed, discussed with attending service and staff RN.  Full code  Patient is capable  Her stated healthcare garcia of  are her 2 sons Mathew and Josias, she has a living will.    68-year-old female presenting to the hospital with acute respiratory failure in the setting of COVID-19 with underlying CKD 4 requiring high flow oxygen.  Prior to admission she was working full-time and independent with all ADLs and IADLs.  She had excellent quality of life without any functional limitations.  Her ultimate goal is to treat her acute condition and return to her prior level of function.  She is open to all measures including chest compressions ventilation in order to keep her alive at this point in time, however she has no wish to be sustained on life support indefinitely, and trusts her 2 sons to fill her living will if she cannot be weaned off of ventilator.  Patient very aware that her prognosis is guarded, given her high oxygen requirements.    She does have some stuffy nose and sinus congestion from her high flow oxygen, her cough is rather ineffective and preventing her from sleeping at night, she has headache and some loose bowel movements.  Added melatonin and Tessalon to suppress cough at bedtime to improve sleep.  Continue Tylenol for headache.  Added Flonase and Ocean nasal spray for sinus congestion and stuffiness.  Discontinue routine MiraLAX for diarrhea.    -Full code  -treatment model of care  -See medication changes to control symptoms.    I spent 75 minutes in the professional and overall care of this patient.      Lou Hodges, APRN-CNP

## 2023-10-25 NOTE — PROGRESS NOTES
Macarena Wilson is a 68 y.o. female on day 2 of admission presenting with Acute respiratory failure with hypoxemia (CMS/HCC).    Subjective   Interval History:   Afebrile, no chills  Awake, alert  Sitting in bed  Mild to moderate nonproductive cough  No chest pain        Review of Systems   All other systems reviewed and are negative.      Objective   Range of Vitals (last 24 hours)  Heart Rate:  [59-96]   Temp:  [35.4 °C (95.7 °F)-36.4 °C (97.5 °F)]   Resp:  [16-44]   BP: (103-140)/(49-96)   Weight:  [77.1 kg (169 lb 15.6 oz)]   SpO2:  [81 %-100 %]   Daily Weight  10/24/23 : 77.1 kg (169 lb 15.6 oz)    Body mass index is 30.11 kg/m².    Physical Exam  Constitutional:       Appearance: Normal appearance. She is ill-appearing.   HENT:      Head: Normocephalic and atraumatic.      Nose: Nose normal.      Mouth/Throat:      Mouth: Mucous membranes are moist.      Pharynx: Oropharynx is clear.   Eyes:      Extraocular Movements: Extraocular movements intact.      Conjunctiva/sclera: Conjunctivae normal.   Cardiovascular:      Rate and Rhythm: Normal rate and regular rhythm.   Pulmonary:      Effort: Pulmonary effort is normal.      Breath sounds: Normal breath sounds.   Abdominal:      General: Bowel sounds are normal.      Palpations: Abdomen is soft.   Musculoskeletal:         General: Normal range of motion.      Cervical back: Normal range of motion and neck supple.   Skin:     General: Skin is warm and dry.   Neurological:      General: No focal deficit present.      Mental Status: She is alert and oriented to person, place, and time. Mental status is at baseline.   Psychiatric:         Mood and Affect: Mood normal.         Behavior: Behavior normal.     Antibiotics    sodium chloride 0.9 % bolus 500 mL  doxycycline (Vibramycin) in dextrose 5 % in water (D5W) 100 mL  mg  cefTRIAXone (Rocephin) IVPB 1 g  Tc-99m-albumin (Draximage MAA) injection 4.2 millicurie  dexAMETHasone (Decadron) injection 8 mg  sodium  chloride 0.9 % bolus 1,000 mL  enoxaparin (Lovenox) syringe 30 mg  sodium chloride 0.9% infusion  acetaminophen (Tylenol) tablet 650 mg  acetaminophen (Tylenol) oral liquid 650 mg  acetaminophen (Tylenol) suppository 650 mg  ondansetron ODT (Zofran-ODT) disintegrating tablet 4 mg  ondansetron (Zofran) injection 4 mg  polyethylene glycol (Glycolax, Miralax) packet 17 g  bisacodyl (Dulcolax) EC tablet 10 mg  guaiFENesin (Mucinex) 12 hr tablet 600 mg  oxygen (O2) therapy  piperacillin-tazobactam-dextrose (Zosyn) IV 2.25 g  ipratropium-albuteroL (Duo-Neb) 0.5-2.5 mg/3 mL nebulizer solution 3 mL  dexAMETHasone (Decadron) injection 8 mg  dexAMETHasone (Decadron) injection 6 mg  dextrose 50 % injection 25 g  glucagon (Glucagen) injection 1 mg  dextrose 10 % in water (D10W) infusion  insulin lispro (HumaLOG) injection 0-5 Units  vancomycin-diluent combo no.1 (Xellia) IVPB 1,250 mg  ipratropium-albuteroL (Duo-Neb) 0.5-2.5 mg/3 mL nebulizer solution 3 mL  ipratropium-albuteroL (Duo-Neb) 0.5-2.5 mg/3 mL nebulizer solution 3 mL  oxygen (O2) therapy  remdesivir (Veklury) 200 mg in sodium chloride 0.9% 250 mL IV  remdesivir (Veklury) 100 mg in sodium chloride 0.9% 250 mL IV  oxygen (O2) therapy  pantoprazole (ProtoNix) injection 40 mg  remdesivir (Veklury) 200 mg in sodium chloride 0.9% 250 mL IV  remdesivir (Veklury) 100 mg in sodium chloride 0.9% 250 mL IV  tocilizumab (Actemra) 600 mg in sodium chloride 0.9% 70 mL IV      Relevant Results  Labs  Results from last 72 hours   Lab Units 10/25/23  0607 10/24/23  1237 10/24/23  0616   WBC AUTO x10*3/uL 15.4* 18.6* 17.1*   HEMOGLOBIN g/dL 10.7* 11.4* 10.4*   HEMATOCRIT % 34.0* 35.7* 32.9*   PLATELETS AUTO x10*3/uL 249 289 253   NEUTROS PCT AUTO %  --  86.4  --    LYMPHO PCT MAN % 3.0  --   --    LYMPHS PCT AUTO %  --  2.3  --    MONO PCT MAN % 5.0  --   --    MONOS PCT AUTO %  --  6.3  --    EOSINO PCT MAN % 0.0  --   --    EOS PCT AUTO %  --  0.0  --      Results from last 72 hours    Lab Units 10/25/23  0607 10/24/23  0616 10/23/23  1627   SODIUM mmol/L 142 139 138   POTASSIUM mmol/L 4.2 4.7 5.5*   CHLORIDE mmol/L 110* 104 103   CO2 mmol/L 16* 18* 17*   BUN mg/dL 78* 87* 86*   CREATININE mg/dL 2.70* 2.70* 2.70*   GLUCOSE mg/dL 115* 150* 187*   CALCIUM mg/dL 9.0 8.9 9.3   ANION GAP mmol/L 16 17 18   EGFR mL/min/1.73m*2 19* 19* 19*     Results from last 72 hours   Lab Units 10/25/23  0607 10/24/23  0616 10/23/23  1627   ALK PHOS U/L 60 61 74   BILIRUBIN TOTAL mg/dL 0.2 0.2 0.3   PROTEIN TOTAL g/dL 6.0 6.1 7.1   ALT U/L 13 13 15   AST U/L 18 20 26   ALBUMIN g/dL 2.5* 2.8* 3.3*     Estimated Creatinine Clearance: 19.3 mL/min (A) (by C-G formula based on SCr of 2.7 mg/dL (H)).  C-Reactive Protein   Date Value Ref Range Status   10/25/2023 6.10 (H) 0.00 - 2.00 mg/dL Final   10/24/2023 10.10 (H) 0.00 - 2.00 mg/dL Final     Microbiology  Reviewed  Imaging  CT chest wo IV contrast    Result Date: 10/24/2023  Interpreted By:  Nirmal Mcmillan, STUDY: CT CHEST WO IV CONTRAST; 10/24/2023 12:27 am   INDICATION: Signs/Symptoms:Covid.   COMPARISON: None   ACCESSION NUMBER(S): [id number]   ORDERING CLINICIAN: GERTRUDE FIERRO   TECHNIQUE: Contiguous axial images of the thorax were obtained from the level of the thoracic inlet through the lung bases. Coronal and sagittal reformatted images were obtained.     FINDINGS: The thyroid gland is unremarkable.   The heart size is within normal limits.  No pericardial effusion is identified. The aorta and pulmonary arteries are normal in caliber.   There are no pathologically enlarged mediastinal, hilar, or axillary lymph nodes are seen.   The trachea and mainstem bronchi are patent. Diffuse patchy ground-glass opacities bilaterally which is compatible with atypical pneumonia. There is no evidence of pneumothorax or pleural effusion.   The visualized osseous structures are intact.   Limited images through the upper abdomen are unremarkable.       Diffuse, patchy  ground-glass opacities bilaterally suggesting atypical pneumonia such as COVID.     Signed by: Nirmal Mcmillan 10/24/2023 12:32 PM Dictation workstation:   [id number]    NM lung perfusion particulate    Result Date: 10/23/2023  Interpreted By:  Yoly Stanley, STUDY: NM LUNG PERFUSION PARTICULATE 10/23/2023 8:44 pm   INDICATION: Signs/Symptoms:hypoxia, r/o PE   COMPARISON: Chest x-ray done earlier today   ACCESSION NUMBER(S): [id number]   ORDERING CLINICIAN: ANAHY EVANS   TECHNIQUE: 4.2 millicuries of technetium 99 M MAA was injected intravenously with perfusion lung scan in 8 projections completed.   FINDINGS: No perfusion defect is seen within either lung.       Normal perfusion lung scan.   Signed by: Yoly Stanley 10/23/2023 8:57 PM Dictation workstation:   [id number]    XR chest 1 view    Result Date: 10/23/2023  Interpreted By:  Yoly Stanley, STUDY: XR CHEST 1 VIEW 10/23/2023 4:55 pm   INDICATION: Signs/Symptoms:dyspnea, cough, fatigue, and malaise. Positive COVID.   COMPARISON: None available.   ACCESSION NUMBER(S): [id number]   ORDERING CLINICIAN: ANAHY EVANS   TECHNIQUE: AP erect view of the chest   FINDINGS: The cardiac size is normal with no mediastinal abnormality identified. The trachea is midline. No pleural abnormality is seen. However, there are bilateral infiltrates with ground-glass appearance with relative sparing of the left upper lung zone.       Bilateral ground-glass infiltrates.   Signed by: Yoly Stanley 10/23/2023 5:08 PM Dictation workstation:   [id number]    Assessment/Plan   Acute hypoxic respiratory failure  Severe coronavirus pneumonitis  Stage IV chronic kidney disease  Leukocytosis    Continue dexamethasone  Continue remdesivir  Monitor renal and hematologic parameters  S/p Actemra-10/24/2023  Supportive care  Monitor temperature and WBC  Supplemental oxygen as needed  Pulmonary follow-up    Js Vasquez MD

## 2023-10-25 NOTE — PROGRESS NOTES
"Critical Care Daily Progress        Subjective   Patient is a 68 y.o. female admitted on 10/23/2023  3:35 PM with the following indication(s) for ICU care respiratory failure in the setting of COVID-19 pneumonitis.     Interval History: No significant overnight events.  Did have increase in her high flow requirements overnight to 100% but now back down to 80%.  Did receive a dose of Actemra yesterday.    Complaints: Endorses a dry cough.  Denies any chest pain.    Scheduled Medications:   dexAMETHasone, 6 mg, intravenous, q24h  enoxaparin, 30 mg, subcutaneous, Daily  insulin lispro, 0-5 Units, subcutaneous, TID with meals  ipratropium-albuteroL, 3 mL, nebulization, q6h while awake  pantoprazole, 40 mg, intravenous, Daily  piperacillin-tazobactam, 2.25 g, intravenous, q6h  polyethylene glycol, 17 g, oral, Daily  remdesivir, 100 mg, intravenous, q24h         Continuous Medications:         PRN Medications:   PRN medications: acetaminophen **OR** acetaminophen **OR** acetaminophen, bisacodyl, dextrose 10 % in water (D10W), dextrose, glucagon, guaiFENesin, ipratropium-albuteroL, ondansetron ODT **OR** ondansetron, [Held by provider] oxygen, oxygen    Objective   Vitals:  Most Recent:  Vitals:    10/25/23 0744   BP: 129/71   Pulse: 61   Resp: (!) 35   Temp:    SpO2: 91%       24hr Min/Max:  Temp  Min: 35.4 °C (95.7 °F)  Max: 36.4 °C (97.5 °F)  Pulse  Min: 59  Max: 96  BP  Min: 103/58  Max: 140/78  Resp  Min: 16  Max: 44  SpO2  Min: 81 %  Max: 100 %    LDA:         Vent settings:  FiO2 (%):  [70 %-100 %] 100 %    Hemodynamic parameters for last 24 hours:       I/O:  I/O last 2 completed shifts:  In: 0 (0 mL/kg)   Out: 450 (5.8 mL/kg) [Urine:450 (0.2 mL/kg/hr)]  Weight: 77.1 kg     Physical Exam:   /71 (BP Location: Left arm, Patient Position: Lying)   Pulse 61   Temp 35.5 °C (95.9 °F) (Oral)   Resp (!) 35   Ht 1.6 m (5' 3\")   Wt 77.1 kg (169 lb 15.6 oz)   SpO2 91%   BMI 30.11 kg/m²     Alert, ill-appearing " but no acute distress  Vapotherm  Lungs are diminished but clear, no wheezing  S1-S2 +  No CCE  Normal mood and affect      Lab/Radiology/Diagnostic Review:  Results for orders placed or performed during the hospital encounter of 10/23/23 (from the past 24 hour(s))   CBC and Auto Differential   Result Value Ref Range    WBC 18.6 (H) 4.4 - 11.3 x10*3/uL    nRBC 0.0 0.0 - 0.0 /100 WBCs    RBC 4.21 4.00 - 5.20 x10*6/uL    Hemoglobin 11.4 (L) 12.0 - 16.0 g/dL    Hematocrit 35.7 (L) 36.0 - 46.0 %    MCV 85 80 - 100 fL    MCH 27.1 26.0 - 34.0 pg    MCHC 31.9 (L) 32.0 - 36.0 g/dL    RDW 14.9 (H) 11.5 - 14.5 %    Platelets 289 150 - 450 x10*3/uL    MPV 10.6 7.5 - 11.5 fL    Neutrophils % 86.4 40.0 - 80.0 %    Immature Granulocytes %, Automated 4.8 (H) 0.0 - 0.9 %    Lymphocytes % 2.3 13.0 - 44.0 %    Monocytes % 6.3 2.0 - 10.0 %    Eosinophils % 0.0 0.0 - 6.0 %    Basophils % 0.2 0.0 - 2.0 %    Neutrophils Absolute 16.11 (H) 1.20 - 7.70 x10*3/uL    Immature Granulocytes Absolute, Automated 0.89 (H) 0.00 - 0.70 x10*3/uL    Lymphocytes Absolute 0.42 (L) 1.20 - 4.80 x10*3/uL    Monocytes Absolute 1.18 (H) 0.10 - 1.00 x10*3/uL    Eosinophils Absolute 0.00 0.00 - 0.70 x10*3/uL    Basophils Absolute 0.03 0.00 - 0.10 x10*3/uL   Serial Troponin, 6 Hour ([facility])   Result Value Ref Range    Troponin T, High Sensitivity 13 <=15 ng/L   POCT GLUCOSE   Result Value Ref Range    POCT Glucose 151 (H) 74 - 99 mg/dL   BLOOD GAS LACTIC ACID, VENOUS   Result Value Ref Range    POCT Lactate, Venous 2.2 (H) 0.4 - 2.0 mmol/L   POCT GLUCOSE   Result Value Ref Range    POCT Glucose 184 (H) 74 - 99 mg/dL   Vancomycin   Result Value Ref Range    Vancomycin 12.0 10.0 - 20.0 ug/mL   Blood Gas Arterial Full Panel   Result Value Ref Range    POCT pH, Arterial 7.35 (L) 7.38 - 7.42 pH    POCT pCO2, Arterial 31 (L) 38 - 42 mm Hg    POCT pO2, Arterial 115 (H) 85 - 95 mm Hg    POCT SO2, Arterial 99 94 - 100 %    POCT Oxy Hemoglobin, Arterial 96.9 94.0 - 98.0 %     POCT Hematocrit Calculated, Arterial 34.0 (L) 36.0 - 46.0 %    POCT Sodium, Arterial 140 136 - 145 mmol/L    POCT Potassium, Arterial 4.3 3.5 - 5.3 mmol/L    POCT Chloride, Arterial 112 (H) 98 - 107 mmol/L    POCT Ionized Calcium, Arterial 1.23 1.10 - 1.33 mmol/L    POCT Glucose, Arterial 117 (H) 74 - 99 mg/dL    POCT Lactate, Arterial 2.1 (H) 0.4 - 2.0 mmol/L    POCT Base Excess, Arterial -7.5 (L) -2.0 - 3.0 mmol/L    POCT HCO3 Calculated, Arterial 17.1 (L) 22.0 - 26.0 mmol/L    POCT Hemoglobin, Arterial 11.4 (L) 12.0 - 16.0 g/dL    POCT Anion Gap, Arterial 15 10 - 25 mmo/L    Patient Temperature 37.0 degrees Celsius    FiO2 100 %    Apparatus HIGH FLOW CANNULA     Flow 30.0 LPM    Critical Called By MELINA     Critical Called To DR MERCADO     Critical Call Time [phone]     Critical Read Back Y    Comprehensive metabolic panel   Result Value Ref Range    Glucose 115 (H) 65 - 99 mg/dL    Sodium 142 133 - 145 mmol/L    Potassium 4.2 3.4 - 5.1 mmol/L    Chloride 110 (H) 97 - 107 mmol/L    Bicarbonate 16 (L) 24 - 31 mmol/L    Urea Nitrogen 78 (H) 8 - 25 mg/dL    Creatinine 2.70 (H) 0.40 - 1.60 mg/dL    eGFR 19 (L) >60 mL/min/1.73m*2    Calcium 9.0 8.5 - 10.4 mg/dL    Albumin 2.5 (L) 3.5 - 5.0 g/dL    Alkaline Phosphatase 60 35 - 125 U/L    Total Protein 6.0 5.9 - 7.9 g/dL    AST 18 5 - 40 U/L    Bilirubin, Total 0.2 0.1 - 1.2 mg/dL    ALT 13 5 - 40 U/L    Anion Gap 16 <=19 mmol/L   C-reactive protein   Result Value Ref Range    C-Reactive Protein 6.10 (H) 0.00 - 2.00 mg/dL   CBC and Auto Differential   Result Value Ref Range    WBC 15.4 (H) 4.4 - 11.3 x10*3/uL    nRBC 0.1 (H) 0.0 - 0.0 /100 WBCs    RBC 3.89 (L) 4.00 - 5.20 x10*6/uL    Hemoglobin 10.7 (L) 12.0 - 16.0 g/dL    Hematocrit 34.0 (L) 36.0 - 46.0 %    MCV 87 80 - 100 fL    MCH 27.5 26.0 - 34.0 pg    MCHC 31.5 (L) 32.0 - 36.0 g/dL    RDW 14.8 (H) 11.5 - 14.5 %    Platelets 249 150 - 450 x10*3/uL    MPV 10.4 7.5 - 11.5 fL    Immature Granulocytes %, Automated  5.5 (H) 0.0 - 0.9 %    Immature Granulocytes Absolute, Automated 0.85 (H) 0.00 - 0.70 x10*3/uL   Manual Differential   Result Value Ref Range    Neutrophils %, Manual 88.0 40.0 - 80.0 %    Bands %, Manual 2.0 0.0 - 5.0 %    Lymphocytes %, Manual 3.0 13.0 - 44.0 %    Monocytes %, Manual 5.0 2.0 - 10.0 %    Eosinophils %, Manual 0.0 0.0 - 6.0 %    Basophils %, Manual 0.0 0.0 - 2.0 %    Metamyelocytes %, Manual 2.0 0.0 - 0.0 %    Seg Neutrophils Absolute, Manual 13.55 (H) 1.20 - 7.00 x10*3/uL    Bands Absolute, Manual 0.31 0.00 - 0.70 x10*3/uL    Lymphocytes Absolute, Manual 0.46 (L) 1.20 - 4.80 x10*3/uL    Monocytes Absolute, Manual 0.77 0.10 - 1.00 x10*3/uL    Eosinophils Absolute, Manual 0.00 0.00 - 0.70 x10*3/uL    Basophils Absolute, Manual 0.00 0.00 - 0.10 x10*3/uL    Metamyelocytes Absolute, Manual 0.31 0.00 - 0.00 x10*3/uL    Total Cells Counted 100     Neutrophils Absolute, Manual 13.86 (H) 1.20 - 7.70 x10*3/uL    RBC Morphology See Below     Polychromasia Mild     Ovalocytes Few     [place] Cells Few     Vacuolated Neutrophils Present    Blood Gas Lactic Acid, Venous   Result Value Ref Range    POCT Lactate, Venous 1.7 0.4 - 2.0 mmol/L   POCT GLUCOSE   Result Value Ref Range    POCT Glucose 101 (H) 74 - 99 mg/dL     CT chest wo IV contrast    Result Date: 10/24/2023  Interpreted By:  Nirmal Mcmillan, STUDY: CT CHEST WO IV CONTRAST; 10/24/2023 12:27 am   INDICATION: Signs/Symptoms:Covid.   COMPARISON: None   ACCESSION NUMBER(S): [id number]   ORDERING CLINICIAN: GERTRUDE FIERRO   TECHNIQUE: Contiguous axial images of the thorax were obtained from the level of the thoracic inlet through the lung bases. Coronal and sagittal reformatted images were obtained.     FINDINGS: The thyroid gland is unremarkable.   The heart size is within normal limits.  No pericardial effusion is identified. The aorta and pulmonary arteries are normal in caliber.   There are no pathologically enlarged mediastinal, hilar, or axillary  lymph nodes are seen.   The trachea and mainstem bronchi are patent. Diffuse patchy ground-glass opacities bilaterally which is compatible with atypical pneumonia. There is no evidence of pneumothorax or pleural effusion.   The visualized osseous structures are intact.   Limited images through the upper abdomen are unremarkable.       Diffuse, patchy ground-glass opacities bilaterally suggesting atypical pneumonia such as COVID.     Signed by: Nirmal Mcmillan 10/24/2023 12:32 PM Dictation workstation:   [id number]    NM lung perfusion particulate    Result Date: 10/23/2023  Interpreted By:  Yoly Stanley, STUDY: NM LUNG PERFUSION PARTICULATE 10/23/2023 8:44 pm   INDICATION: Signs/Symptoms:hypoxia, r/o PE   COMPARISON: Chest x-ray done earlier today   ACCESSION NUMBER(S): [id number]   ORDERING CLINICIAN: ANAHY EVANS   TECHNIQUE: 4.2 millicuries of technetium 99 M MAA was injected intravenously with perfusion lung scan in 8 projections completed.   FINDINGS: No perfusion defect is seen within either lung.       Normal perfusion lung scan.   Signed by: Yoly Stanley 10/23/2023 8:57 PM Dictation workstation:   [id number]    XR chest 1 view    Result Date: 10/23/2023  Interpreted By:  Yoly Stanley, STUDY: XR CHEST 1 VIEW 10/23/2023 4:55 pm   INDICATION: Signs/Symptoms:dyspnea, cough, fatigue, and malaise. Positive COVID.   COMPARISON: None available.   ACCESSION NUMBER(S): [id number]   ORDERING CLINICIAN: ANAHY EVANS   TECHNIQUE: AP erect view of the chest   FINDINGS: The cardiac size is normal with no mediastinal abnormality identified. The trachea is midline. No pleural abnormality is seen. However, there are bilateral infiltrates with ground-glass appearance with relative sparing of the left upper lung zone.       Bilateral ground-glass infiltrates.   Signed by: Yoly Stanley 10/23/2023 5:08 PM Dictation workstation:   [id number]          Assessment/Plan   Principal Problem:    Acute respiratory failure with  hypoxemia (CMS/HCC)  Active Problems:    COVID-19    Benign essential HTN    Stage 4 chronic kidney disease (CMS/HCC)    Continue with Vapotherm and encouraging of proning.  Wean as tolerated  Remains though at risk for escalation  Status post Actemra  Continue with remdesivir  Decadron  Defer antibiotics to ID  Continue to watch creatinine and will ask nephrology to follow along  Prophylaxis  Guarded prognosis    Discussed with ICU RT, ICU RN      Due to the high probability of life threatening clinical decompensation, the patient required critical care time evaluating and managing this patient.  Critical care time included obtaining a history, examining the patient, ordering and reviewing studies, discussing, developing, and implementing a management plan, evaluating the patient's response to treatment, and discussion with other care team providers. I saw and evaluated the patient myself.  Critical care time was performed exclusive of billable procedures.    Critical care time: 33

## 2023-10-25 NOTE — PROGRESS NOTES
Macarena Wilson is a 68 y.o. female on day 2 of admission presenting with Acute respiratory failure with hypoxemia (CMS/[facility]).      Subjective   Patient is currently on 80% and 35 liters high flow, overnight her sats dropped to 81% she was placed on 100% FiO2 this was after ambulation    Patient is awake alert oriented x3 in mild respiratory distress    Objective     Last Recorded Vitals  /71 (BP Location: Left arm, Patient Position: Lying)   Pulse 61   Temp 35.5 °C (95.9 °F) (Oral)   Resp (!) 35   Wt 77.1 kg (169 lb 15.6 oz)   SpO2 91%   Intake/Output last 3 Shifts:    Intake/Output Summary (Last 24 hours) at 10/25/2023 1020  Last data filed at 10/25/2023 0324  Gross per 24 hour   Intake --   Output 450 ml   Net -450 ml         Admission Weight  Weight: 77.1 kg (170 lb) (10/23/23 1541)    Daily Weight  10/24/23 : 77.1 kg (169 lb 15.6 oz)    Image Results  CT chest wo IV contrast  Narrative: Interpreted By:  Nirmal Mcmillan,   STUDY:  CT CHEST WO IV CONTRAST; 10/24/2023 12:27 am      INDICATION:  Signs/Symptoms:Covid.      COMPARISON:  None      ACCESSION NUMBER(S):  [id number]      ORDERING CLINICIAN:  GERTRUDE FIERRO      TECHNIQUE:  Contiguous axial images of the thorax were obtained from the level of  the thoracic inlet through the lung bases. Coronal and sagittal  reformatted images were obtained.          FINDINGS:  The thyroid gland is unremarkable.      The heart size is within normal limits.  No pericardial effusion is  identified. The aorta and pulmonary arteries are normal in caliber.      There are no pathologically enlarged mediastinal, hilar, or axillary  lymph nodes are seen.      The trachea and mainstem bronchi are patent. Diffuse patchy  ground-glass opacities bilaterally which is compatible with atypical  pneumonia. There is no evidence of pneumothorax or pleural effusion.      The visualized osseous structures are intact.      Limited images through the upper abdomen are  unremarkable.      Impression: Diffuse, patchy ground-glass opacities bilaterally suggesting  atypical pneumonia such as COVID.          Signed by: Nirmal Mcmillan 10/24/2023 12:32 PM  Dictation workstation:   [id number]      Physical Exam  Constitutional:       Appearance: She is normal weight.   HENT:      Head: Normocephalic.      Nose: Congestion present.      Mouth/Throat:      Mouth: Mucous membranes are moist.   Eyes:      Extraocular Movements: Extraocular movements intact.      Conjunctiva/sclera: Conjunctivae normal.      Pupils: Pupils are equal, round, and reactive to light.   Cardiovascular:      Rate and Rhythm: Tachycardia present.   Pulmonary:      Effort: Respiratory distress present.      Breath sounds: Rhonchi and rales present.   Abdominal:      General: Bowel sounds are normal.      Palpations: Abdomen is soft.      Tenderness: There is no abdominal tenderness.   Musculoskeletal:         General: Normal range of motion.      Cervical back: Normal range of motion.   Skin:     General: Skin is warm.      Capillary Refill: Capillary refill takes less than 2 seconds.   Neurological:      General: No focal deficit present.      Mental Status: She is alert and oriented to person, place, and time.         Relevant Results             CT chest wo IV contrast    Result Date: 10/24/2023  Interpreted By:  Nirmal Mcmillan, STUDY: CT CHEST WO IV CONTRAST; 10/24/2023 12:27 am   INDICATION: Signs/Symptoms:Covid.   COMPARISON: None   ACCESSION NUMBER(S): [id number]   ORDERING CLINICIAN: GERTRUDE FIERRO   TECHNIQUE: Contiguous axial images of the thorax were obtained from the level of the thoracic inlet through the lung bases. Coronal and sagittal reformatted images were obtained.     FINDINGS: The thyroid gland is unremarkable.   The heart size is within normal limits.  No pericardial effusion is identified. The aorta and pulmonary arteries are normal in caliber.   There are no pathologically enlarged  mediastinal, hilar, or axillary lymph nodes are seen.   The trachea and mainstem bronchi are patent. Diffuse patchy ground-glass opacities bilaterally which is compatible with atypical pneumonia. There is no evidence of pneumothorax or pleural effusion.   The visualized osseous structures are intact.   Limited images through the upper abdomen are unremarkable.       Diffuse, patchy ground-glass opacities bilaterally suggesting atypical pneumonia such as COVID.     Signed by: Nirmal Mcmillan 10/24/2023 12:32 PM Dictation workstation:   [id number]    NM lung perfusion particulate    Result Date: 10/23/2023  Interpreted By:  Yoly Stanley, STUDY: NM LUNG PERFUSION PARTICULATE 10/23/2023 8:44 pm   INDICATION: Signs/Symptoms:hypoxia, r/o PE   COMPARISON: Chest x-ray done earlier today   ACCESSION NUMBER(S): [id number]   ORDERING CLINICIAN: ANAHY EVANS   TECHNIQUE: 4.2 millicuries of technetium 99 M MAA was injected intravenously with perfusion lung scan in 8 projections completed.   FINDINGS: No perfusion defect is seen within either lung.       Normal perfusion lung scan.   Signed by: Yoly Stanley 10/23/2023 8:57 PM Dictation workstation:   [id number]    XR chest 1 view    Result Date: 10/23/2023  Interpreted By:  Yoly Stanley, STUDY: XR CHEST 1 VIEW 10/23/2023 4:55 pm   INDICATION: Signs/Symptoms:dyspnea, cough, fatigue, and malaise. Positive COVID.   COMPARISON: None available.   ACCESSION NUMBER(S): [id number]   ORDERING CLINICIAN: ANAHY EVANS   TECHNIQUE: AP erect view of the chest   FINDINGS: The cardiac size is normal with no mediastinal abnormality identified. The trachea is midline. No pleural abnormality is seen. However, there are bilateral infiltrates with ground-glass appearance with relative sparing of the left upper lung zone.       Bilateral ground-glass infiltrates.   Signed by: Yoly Stanley 10/23/2023 5:08 PM Dictation workstation:   [id number]     Results for orders placed or performed during  the hospital encounter of 10/23/23 (from the past 24 hour(s))   CBC and Auto Differential   Result Value Ref Range    WBC 18.6 (H) 4.4 - 11.3 x10*3/uL    nRBC 0.0 0.0 - 0.0 /100 WBCs    RBC 4.21 4.00 - 5.20 x10*6/uL    Hemoglobin 11.4 (L) 12.0 - 16.0 g/dL    Hematocrit 35.7 (L) 36.0 - 46.0 %    MCV 85 80 - 100 fL    MCH 27.1 26.0 - 34.0 pg    MCHC 31.9 (L) 32.0 - 36.0 g/dL    RDW 14.9 (H) 11.5 - 14.5 %    Platelets 289 150 - 450 x10*3/uL    MPV 10.6 7.5 - 11.5 fL    Neutrophils % 86.4 40.0 - 80.0 %    Immature Granulocytes %, Automated 4.8 (H) 0.0 - 0.9 %    Lymphocytes % 2.3 13.0 - 44.0 %    Monocytes % 6.3 2.0 - 10.0 %    Eosinophils % 0.0 0.0 - 6.0 %    Basophils % 0.2 0.0 - 2.0 %    Neutrophils Absolute 16.11 (H) 1.20 - 7.70 x10*3/uL    Immature Granulocytes Absolute, Automated 0.89 (H) 0.00 - 0.70 x10*3/uL    Lymphocytes Absolute 0.42 (L) 1.20 - 4.80 x10*3/uL    Monocytes Absolute 1.18 (H) 0.10 - 1.00 x10*3/uL    Eosinophils Absolute 0.00 0.00 - 0.70 x10*3/uL    Basophils Absolute 0.03 0.00 - 0.10 x10*3/uL   Serial Troponin, 6 Hour ([facility])   Result Value Ref Range    Troponin T, High Sensitivity 13 <=15 ng/L   POCT GLUCOSE   Result Value Ref Range    POCT Glucose 151 (H) 74 - 99 mg/dL   BLOOD GAS LACTIC ACID, VENOUS   Result Value Ref Range    POCT Lactate, Venous 2.2 (H) 0.4 - 2.0 mmol/L   POCT GLUCOSE   Result Value Ref Range    POCT Glucose 184 (H) 74 - 99 mg/dL   Vancomycin   Result Value Ref Range    Vancomycin 12.0 10.0 - 20.0 ug/mL   Blood Gas Arterial Full Panel   Result Value Ref Range    POCT pH, Arterial 7.35 (L) 7.38 - 7.42 pH    POCT pCO2, Arterial 31 (L) 38 - 42 mm Hg    POCT pO2, Arterial 115 (H) 85 - 95 mm Hg    POCT SO2, Arterial 99 94 - 100 %    POCT Oxy Hemoglobin, Arterial 96.9 94.0 - 98.0 %    POCT Hematocrit Calculated, Arterial 34.0 (L) 36.0 - 46.0 %    POCT Sodium, Arterial 140 136 - 145 mmol/L    POCT Potassium, Arterial 4.3 3.5 - 5.3 mmol/L    POCT Chloride, Arterial 112 (H) 98 - 107  mmol/L    POCT Ionized Calcium, Arterial 1.23 1.10 - 1.33 mmol/L    POCT Glucose, Arterial 117 (H) 74 - 99 mg/dL    POCT Lactate, Arterial 2.1 (H) 0.4 - 2.0 mmol/L    POCT Base Excess, Arterial -7.5 (L) -2.0 - 3.0 mmol/L    POCT HCO3 Calculated, Arterial 17.1 (L) 22.0 - 26.0 mmol/L    POCT Hemoglobin, Arterial 11.4 (L) 12.0 - 16.0 g/dL    POCT Anion Gap, Arterial 15 10 - 25 mmo/L    Patient Temperature 37.0 degrees Celsius    FiO2 100 %    Apparatus HIGH FLOW CANNULA     Flow 30.0 LPM    Critical Called By MELINA     Critical Called To DR MERCADO     Critical Call Time [phone]     Critical Read Back Y    Comprehensive metabolic panel   Result Value Ref Range    Glucose 115 (H) 65 - 99 mg/dL    Sodium 142 133 - 145 mmol/L    Potassium 4.2 3.4 - 5.1 mmol/L    Chloride 110 (H) 97 - 107 mmol/L    Bicarbonate 16 (L) 24 - 31 mmol/L    Urea Nitrogen 78 (H) 8 - 25 mg/dL    Creatinine 2.70 (H) 0.40 - 1.60 mg/dL    eGFR 19 (L) >60 mL/min/1.73m*2    Calcium 9.0 8.5 - 10.4 mg/dL    Albumin 2.5 (L) 3.5 - 5.0 g/dL    Alkaline Phosphatase 60 35 - 125 U/L    Total Protein 6.0 5.9 - 7.9 g/dL    AST 18 5 - 40 U/L    Bilirubin, Total 0.2 0.1 - 1.2 mg/dL    ALT 13 5 - 40 U/L    Anion Gap 16 <=19 mmol/L   C-reactive protein   Result Value Ref Range    C-Reactive Protein 6.10 (H) 0.00 - 2.00 mg/dL   CBC and Auto Differential   Result Value Ref Range    WBC 15.4 (H) 4.4 - 11.3 x10*3/uL    nRBC 0.1 (H) 0.0 - 0.0 /100 WBCs    RBC 3.89 (L) 4.00 - 5.20 x10*6/uL    Hemoglobin 10.7 (L) 12.0 - 16.0 g/dL    Hematocrit 34.0 (L) 36.0 - 46.0 %    MCV 87 80 - 100 fL    MCH 27.5 26.0 - 34.0 pg    MCHC 31.5 (L) 32.0 - 36.0 g/dL    RDW 14.8 (H) 11.5 - 14.5 %    Platelets 249 150 - 450 x10*3/uL    MPV 10.4 7.5 - 11.5 fL    Immature Granulocytes %, Automated 5.5 (H) 0.0 - 0.9 %    Immature Granulocytes Absolute, Automated 0.85 (H) 0.00 - 0.70 x10*3/uL   Manual Differential   Result Value Ref Range    Neutrophils %, Manual 88.0 40.0 - 80.0 %    Bands %,  Manual 2.0 0.0 - 5.0 %    Lymphocytes %, Manual 3.0 13.0 - 44.0 %    Monocytes %, Manual 5.0 2.0 - 10.0 %    Eosinophils %, Manual 0.0 0.0 - 6.0 %    Basophils %, Manual 0.0 0.0 - 2.0 %    Metamyelocytes %, Manual 2.0 0.0 - 0.0 %    Seg Neutrophils Absolute, Manual 13.55 (H) 1.20 - 7.00 x10*3/uL    Bands Absolute, Manual 0.31 0.00 - 0.70 x10*3/uL    Lymphocytes Absolute, Manual 0.46 (L) 1.20 - 4.80 x10*3/uL    Monocytes Absolute, Manual 0.77 0.10 - 1.00 x10*3/uL    Eosinophils Absolute, Manual 0.00 0.00 - 0.70 x10*3/uL    Basophils Absolute, Manual 0.00 0.00 - 0.10 x10*3/uL    Metamyelocytes Absolute, Manual 0.31 0.00 - 0.00 x10*3/uL    Total Cells Counted 100     Neutrophils Absolute, Manual 13.86 (H) 1.20 - 7.70 x10*3/uL    RBC Morphology See Below     Polychromasia Mild     Ovalocytes Few     Bertrand Cells Few     Vacuolated Neutrophils Present    Blood Gas Lactic Acid, Venous   Result Value Ref Range    POCT Lactate, Venous 1.7 0.4 - 2.0 mmol/L   POCT GLUCOSE   Result Value Ref Range    POCT Glucose 101 (H) 74 - 99 mg/dL       Scheduled medications  dexAMETHasone, 6 mg, intravenous, q24h  enoxaparin, 30 mg, subcutaneous, Daily  insulin lispro, 0-5 Units, subcutaneous, TID with meals  ipratropium-albuteroL, 3 mL, nebulization, q6h while awake  pantoprazole, 40 mg, intravenous, Daily  piperacillin-tazobactam, 2.25 g, intravenous, q6h  polyethylene glycol, 17 g, oral, Daily  remdesivir, 100 mg, intravenous, q24h      Continuous medications     PRN medications  PRN medications: acetaminophen **OR** acetaminophen **OR** acetaminophen, bisacodyl, dextrose 10 % in water (D10W), dextrose, glucagon, guaiFENesin, ipratropium-albuteroL, ondansetron ODT **OR** ondansetron, [Held by provider] oxygen, oxygen    Assessment/Plan   This patient currently has cardiac telemetry ordered; if you would like to modify or discontinue the telemetry order, click here to go to the orders activity to modify/discontinue the  order.              Principal Problem:    Acute respiratory failure with hypoxemia (CMS/[facility])  Active Problems:    Benign essential HTN    Stage 4 chronic kidney disease (CMS/HCC)    COVID-19  Elevated CRP  Leukocytosis    -The patient unfortunately remains at high risk for decompensation thus we will continue ICU stay, presentation overall concerning for acute respiratory distress syndrome related to COVID 19 infection  -CRP remains elevated leukocytosis likely improvement with steroids as well  -Continue pulmonary toilet with incentive spirometer positional changes, patient educated on prone positioning as well.  -VQ scan was of low probability for PE on hospital presentation  -Continue empiric pneumonia treatment with vancomycin and Zosyn  -Continue remdesivir and Decadron  -Continue high flow nasal cannula at this time patient open to intubation if indicated gets worse  -Involve palliative care  -Given ARDS patient will be on dexamethasone as well as remdesivir and broad-spectrum antibiotics,  -I really want to try a Lasix oral dose for her even at 10 mg as a lung exam with basilar crackles, appreciate nephrology's input on diuresis today however I notice her creatinine is still elevated she has CKD but I believe that if she can tolerate oral this may be beneficial for her if there is a component of pulmonary edema  - continue nebs every 4  -Pulmonology considering baricitinib appreciate recs  -Losartan for hypertension as tolerated, hold for systolic blood pressure less than 110  History of bladder fistula status post partial hysterectomy plan for surgical repair scheduled for December 2023    DVT prophylaxis: Lovenox daily    GI prophylaxis: Protonix    PT/OT/ST: Unable to tolerate PT OT at this time due to respiratory status    Nutrition: Regular diet    Code status: Full code    Have IV fluids been discontinued:  yes    Central lines present: NO    level of care indicated: ICU    Location prior to arrival:  Home    Dispo plan: Critically ill                    Lydia Moreland MD

## 2023-10-25 NOTE — CONSULTS
.Reason For Consult  Acute kidney injury on top of chronic kidney disease stage IV    History Of Present Illness  Macarena Wilson is a 68 y.o. female who is known to have underlying CKD stage IV not been feeling well at home tested herself for COVID-19 which was positive she also complains of shortness of breath and cough dry cough admitted hospital with hypoxemia the patient in consultation because of her advanced kidney disease and worsening renal function she denies any diarrhea denies any abdominal pain chest pain vomiting she is eating okay.     Review of Systems  Review of Systems   10 point review of system was done all negative except response for the history of present illness    Past Medical History  She has a past medical history of Chronic kidney disease, Hypertension, and Personal history of diseases of the skin and subcutaneous tissue.    Surgical History  She has a past surgical history that includes Appendectomy (04/01/2016); Other surgical history (11/14/2022); and Hysterectomy.     Social History  She reports that she has never smoked. She has never used smokeless tobacco. She reports that she does not use drugs. No history on file for alcohol use.    Family History  Family History   Problem Relation Name Age of Onset    Breast cancer Mother      Heart failure Mother      No Known Problems Father      No Known Problems Sister          Current Facility-Administered Medications:     acetaminophen (Tylenol) tablet 650 mg, 650 mg, oral, q4h PRN, 650 mg at 10/25/23 1121 **OR** acetaminophen (Tylenol) oral liquid 650 mg, 650 mg, nasogastric tube, q4h PRN **OR** acetaminophen (Tylenol) suppository 650 mg, 650 mg, rectal, q4h PRN, David Rodriguez MD    benzonatate (Tessalon) capsule 200 mg, 200 mg, oral, TID PRN, Lou Hodges, APRN-CNP    bisacodyl (Dulcolax) EC tablet 10 mg, 10 mg, oral, Daily PRN, David Rodriguez MD    dexAMETHasone (Decadron) injection 6 mg, 6 mg, intravenous,  q24h, David Rodriguez MD, 6 mg at 10/24/23 2213    dextrose 10 % in water (D10W) infusion, 0.3 g/kg/hr, intravenous, Once PRN, David Rodriguez MD    dextrose 50 % injection 25 g, 25 g, intravenous, q15 min PRN, David Rodriguez MD    enoxaparin (Lovenox) syringe 30 mg, 30 mg, subcutaneous, Daily, David Rodriguez MD, 30 mg at 10/25/23 0539    fluticasone (Flonase) nasal spray 1 spray, 1 spray, Each Nostril, BID, JASON Burgess    glucagon (Glucagen) injection 1 mg, 1 mg, intramuscular, q15 min PRN, David Rodriguez MD    guaiFENesin (Mucinex) 12 hr tablet 600 mg, 600 mg, oral, q12h PRN, David Rodriguez MD    insulin lispro (HumaLOG) injection 0-5 Units, 0-5 Units, subcutaneous, TID with meals, David Rodriguez MD, 1 Units at 10/24/23 1931    ipratropium-albuteroL (Duo-Neb) 0.5-2.5 mg/3 mL nebulizer solution 3 mL, 3 mL, nebulization, q6h while awake, David Rodriguez MD, 3 mL at 10/25/23 1204    ipratropium-albuteroL (Duo-Neb) 0.5-2.5 mg/3 mL nebulizer solution 3 mL, 3 mL, nebulization, q2h PRN, David Rodriguez MD    melatonin tablet 3 mg, 3 mg, oral, Nightly, ZION Burgess-CNP    ondansetron ODT (Zofran-ODT) disintegrating tablet 4 mg, 4 mg, oral, q8h PRN **OR** ondansetron (Zofran) injection 4 mg, 4 mg, intravenous, q8h PRN, David Rodriguez MD    [Held by provider] oxygen (O2) therapy, , inhalation, Continuous PRN - O2/gases, David Rodriguez MD, 35 L/min at 10/24/23 0115    oxygen (O2) therapy, , inhalation, Continuous PRN - O2/gases, Lydia Moreland MD, 30 L/min at 10/25/23 0001    pantoprazole (ProtoNix) injection 40 mg, 40 mg, intravenous, Daily, Lydia Moreland MD, 40 mg at 10/25/23 1023    piperacillin-tazobactam-dextrose (Zosyn) IV 2.25 g, 2.25 g, intravenous, q6h, David Rodriguez MD, Stopped at 10/25/23 1054    polyethylene glycol (Glycolax, Miralax) packet 17 g, 17 g, oral, Daily PRN, Lou Hodges, APRN-CNP     remdesivir (Veklury) 100 mg in sodium chloride 0.9% 250 mL IV, 100 mg, intravenous, q24h, Lydia Moreland MD    sodium chloride (Ocean) 0.65 % nasal spray 1 spray, 1 spray, Each Nostril, PRN, Lou Hodges, APRN-CNP   Allergies  Fesoterodine, Mirabegron, and Prednisone         Physical Exam  No distress  Increasing his pressure  Normal lung movements  Not distended abdomen  No peripheral edema     I&O 24HR    Intake/Output Summary (Last 24 hours) at 10/25/2023 1341  Last data filed at 10/25/2023 1054  Gross per 24 hour   Intake 50 ml   Output 450 ml   Net -400 ml       Vitals 24HR  Heart Rate:  [59-96]   Temp:  [35.5 °C (95.9 °F)-36.4 °C (97.5 °F)]   Resp:  [16-44]   BP: (103-139)/(49-96)   Weight:  [77.1 kg (169 lb 15.6 oz)-77.3 kg (170 lb 6.7 oz)]   SpO2:  [81 %-100 %]         Relevant Results        Results for orders placed or performed during the hospital encounter of 10/23/23 (from the past 96 hour(s))   CBC and Auto Differential   Result Value Ref Range    WBC 15.9 (H) 4.4 - 11.3 x10*3/uL    nRBC 0.0 0.0 - 0.0 /100 WBCs    RBC 4.54 4.00 - 5.20 x10*6/uL    Hemoglobin 12.4 12.0 - 16.0 g/dL    Hematocrit 38.7 36.0 - 46.0 %    MCV 85 80 - 100 fL    MCH 27.3 26.0 - 34.0 pg    MCHC 32.0 32.0 - 36.0 g/dL    RDW 15.0 (H) 11.5 - 14.5 %    Platelets 275 150 - 450 x10*3/uL    MPV 11.5 7.5 - 11.5 fL    Immature Granulocytes %, Automated 5.4 (H) 0.0 - 0.9 %    Immature Granulocytes Absolute, Automated 0.86 (H) 0.00 - 0.70 x10*3/uL   Comprehensive metabolic panel   Result Value Ref Range    Glucose 187 (H) 65 - 99 mg/dL    Sodium 138 133 - 145 mmol/L    Potassium 5.5 (H) 3.4 - 5.1 mmol/L    Chloride 103 97 - 107 mmol/L    Bicarbonate 17 (L) 24 - 31 mmol/L    Urea Nitrogen 86 (H) 8 - 25 mg/dL    Creatinine 2.70 (H) 0.40 - 1.60 mg/dL    eGFR 19 (L) >60 mL/min/1.73m*2    Calcium 9.3 8.5 - 10.4 mg/dL    Albumin 3.3 (L) 3.5 - 5.0 g/dL    Alkaline Phosphatase 74 35 - 125 U/L    Total Protein 7.1 5.9 - 7.9 g/dL    AST 26 5 - 40  U/L    Bilirubin, Total 0.3 0.1 - 1.2 mg/dL    ALT 15 5 - 40 U/L    Anion Gap 18 <=19 mmol/L   SARS-CoV-2 RT PCR   Result Value Ref Range    Coronavirus 2019, PCR Detected (A) Not Detected   Serial Troponin, Initial ([facility])   Result Value Ref Range    Troponin T, High Sensitivity 15 <=15 ng/L   NT Pro-BNP   Result Value Ref Range    PROBNP 695 (H) 0 - 353 pg/mL   Serial Troponin, 2 Hour ([facility])   Result Value Ref Range    Troponin T, High Sensitivity 15 <=15 ng/L   Serial Troponin, 6 Hour ([facility])   Result Value Ref Range    Troponin T, High Sensitivity 15 <=15 ng/L   MRSA Surveillance for Vancomycin De-escalation, PCR    Specimen: Anterior Nares; Swab   Result Value Ref Range    MRSA PCR Not Detected Not Detected   Blood Gas Arterial Full Panel Unsolicited   Result Value Ref Range    POCT pH, Arterial 7.38 7.38 - 7.42 pH    POCT pCO2, Arterial 30 (L) 38 - 42 mm Hg    POCT pO2, Arterial 175 (H) 85 - 95 mm Hg    POCT SO2, Arterial 98 94 - 100 %    POCT Oxy Hemoglobin, Arterial 97.6 94.0 - 98.0 %    POCT Hematocrit Calculated, Arterial 35.0 (L) 36.0 - 46.0 %    POCT Sodium, Arterial 135 (L) 136 - 145 mmol/L    POCT Potassium, Arterial 4.9 3.5 - 5.3 mmol/L    POCT Chloride, Arterial 108 (H) 98 - 107 mmol/L    POCT Ionized Calcium, Arterial 1.22 1.10 - 1.33 mmol/L    POCT Glucose, Arterial 145 (H) 74 - 99 mg/dL    POCT Lactate, Arterial 1.0 0.4 - 2.0 mmol/L    POCT Base Excess, Arterial -6.4 (L) -2.0 - 3.0 mmol/L    POCT HCO3 Calculated, Arterial 17.7 (L) 22.0 - 26.0 mmol/L    POCT Hemoglobin, Arterial 11.6 (L) 12.0 - 16.0 g/dL    POCT Anion Gap, Arterial 14 10 - 25 mmo/L    Patient Temperature 37.0 degrees Celsius    FiO2 100 %    Apparatus NON REBREATHER    Hemoglobin A1C   Result Value Ref Range    Hemoglobin A1C 6.3 (H) See below %    Estimated Average Glucose 134 Not Established mg/dL   CBC   Result Value Ref Range    WBC 17.1 (H) 4.4 - 11.3 x10*3/uL    nRBC 0.0 0.0 - 0.0 /100 WBCs    RBC 3.77 (L) 4.00 - 5.20 x10*6/uL     Hemoglobin 10.4 (L) 12.0 - 16.0 g/dL    Hematocrit 32.9 (L) 36.0 - 46.0 %    MCV 87 80 - 100 fL    MCH 27.6 26.0 - 34.0 pg    MCHC 31.6 (L) 32.0 - 36.0 g/dL    RDW 14.9 (H) 11.5 - 14.5 %    Platelets 253 150 - 450 x10*3/uL    MPV 11.0 7.5 - 11.5 fL   Comprehensive metabolic panel   Result Value Ref Range    Glucose 150 (H) 65 - 99 mg/dL    Sodium 139 133 - 145 mmol/L    Potassium 4.7 3.4 - 5.1 mmol/L    Chloride 104 97 - 107 mmol/L    Bicarbonate 18 (L) 24 - 31 mmol/L    Urea Nitrogen 87 (H) 8 - 25 mg/dL    Creatinine 2.70 (H) 0.40 - 1.60 mg/dL    eGFR 19 (L) >60 mL/min/1.73m*2    Calcium 8.9 8.5 - 10.4 mg/dL    Albumin 2.8 (L) 3.5 - 5.0 g/dL    Alkaline Phosphatase 61 35 - 125 U/L    Total Protein 6.1 5.9 - 7.9 g/dL    AST 20 5 - 40 U/L    Bilirubin, Total 0.2 0.1 - 1.2 mg/dL    ALT 13 5 - 40 U/L    Anion Gap 17 <=19 mmol/L   Serial Troponin, Initial ([facility])   Result Value Ref Range    Troponin T, High Sensitivity 13 <=15 ng/L   Serial Troponin, 2 Hour ([facility])   Result Value Ref Range    Troponin T, High Sensitivity 14 <=15 ng/L   C-reactive protein   Result Value Ref Range    C-Reactive Protein 10.10 (H) 0.00 - 2.00 mg/dL   POCT GLUCOSE   Result Value Ref Range    POCT Glucose 136 (H) 74 - 99 mg/dL   CBC and Auto Differential   Result Value Ref Range    WBC 18.6 (H) 4.4 - 11.3 x10*3/uL    nRBC 0.0 0.0 - 0.0 /100 WBCs    RBC 4.21 4.00 - 5.20 x10*6/uL    Hemoglobin 11.4 (L) 12.0 - 16.0 g/dL    Hematocrit 35.7 (L) 36.0 - 46.0 %    MCV 85 80 - 100 fL    MCH 27.1 26.0 - 34.0 pg    MCHC 31.9 (L) 32.0 - 36.0 g/dL    RDW 14.9 (H) 11.5 - 14.5 %    Platelets 289 150 - 450 x10*3/uL    MPV 10.6 7.5 - 11.5 fL    Neutrophils % 86.4 40.0 - 80.0 %    Immature Granulocytes %, Automated 4.8 (H) 0.0 - 0.9 %    Lymphocytes % 2.3 13.0 - 44.0 %    Monocytes % 6.3 2.0 - 10.0 %    Eosinophils % 0.0 0.0 - 6.0 %    Basophils % 0.2 0.0 - 2.0 %    Neutrophils Absolute 16.11 (H) 1.20 - 7.70 x10*3/uL    Immature Granulocytes Absolute,  Automated 0.89 (H) 0.00 - 0.70 x10*3/uL    Lymphocytes Absolute 0.42 (L) 1.20 - 4.80 x10*3/uL    Monocytes Absolute 1.18 (H) 0.10 - 1.00 x10*3/uL    Eosinophils Absolute 0.00 0.00 - 0.70 x10*3/uL    Basophils Absolute 0.03 0.00 - 0.10 x10*3/uL   Serial Troponin, 6 Hour ([facility])   Result Value Ref Range    Troponin T, High Sensitivity 13 <=15 ng/L   POCT GLUCOSE   Result Value Ref Range    POCT Glucose 151 (H) 74 - 99 mg/dL   BLOOD GAS LACTIC ACID, VENOUS   Result Value Ref Range    POCT Lactate, Venous 2.2 (H) 0.4 - 2.0 mmol/L   POCT GLUCOSE   Result Value Ref Range    POCT Glucose 184 (H) 74 - 99 mg/dL   Vancomycin   Result Value Ref Range    Vancomycin 12.0 10.0 - 20.0 ug/mL   Blood Gas Arterial Full Panel   Result Value Ref Range    POCT pH, Arterial 7.35 (L) 7.38 - 7.42 pH    POCT pCO2, Arterial 31 (L) 38 - 42 mm Hg    POCT pO2, Arterial 115 (H) 85 - 95 mm Hg    POCT SO2, Arterial 99 94 - 100 %    POCT Oxy Hemoglobin, Arterial 96.9 94.0 - 98.0 %    POCT Hematocrit Calculated, Arterial 34.0 (L) 36.0 - 46.0 %    POCT Sodium, Arterial 140 136 - 145 mmol/L    POCT Potassium, Arterial 4.3 3.5 - 5.3 mmol/L    POCT Chloride, Arterial 112 (H) 98 - 107 mmol/L    POCT Ionized Calcium, Arterial 1.23 1.10 - 1.33 mmol/L    POCT Glucose, Arterial 117 (H) 74 - 99 mg/dL    POCT Lactate, Arterial 2.1 (H) 0.4 - 2.0 mmol/L    POCT Base Excess, Arterial -7.5 (L) -2.0 - 3.0 mmol/L    POCT HCO3 Calculated, Arterial 17.1 (L) 22.0 - 26.0 mmol/L    POCT Hemoglobin, Arterial 11.4 (L) 12.0 - 16.0 g/dL    POCT Anion Gap, Arterial 15 10 - 25 mmo/L    Patient Temperature 37.0 degrees Celsius    FiO2 100 %    Apparatus HIGH FLOW CANNULA     Flow 30.0 LPM    Critical Called By MELINA     Critical Called To DR MERCADO     Critical Call Time [phone]     Critical Read Back Y    Comprehensive metabolic panel   Result Value Ref Range    Glucose 115 (H) 65 - 99 mg/dL    Sodium 142 133 - 145 mmol/L    Potassium 4.2 3.4 - 5.1 mmol/L    Chloride 110 (H) 97  - 107 mmol/L    Bicarbonate 16 (L) 24 - 31 mmol/L    Urea Nitrogen 78 (H) 8 - 25 mg/dL    Creatinine 2.70 (H) 0.40 - 1.60 mg/dL    eGFR 19 (L) >60 mL/min/1.73m*2    Calcium 9.0 8.5 - 10.4 mg/dL    Albumin 2.5 (L) 3.5 - 5.0 g/dL    Alkaline Phosphatase 60 35 - 125 U/L    Total Protein 6.0 5.9 - 7.9 g/dL    AST 18 5 - 40 U/L    Bilirubin, Total 0.2 0.1 - 1.2 mg/dL    ALT 13 5 - 40 U/L    Anion Gap 16 <=19 mmol/L   C-reactive protein   Result Value Ref Range    C-Reactive Protein 6.10 (H) 0.00 - 2.00 mg/dL   CBC and Auto Differential   Result Value Ref Range    WBC 15.4 (H) 4.4 - 11.3 x10*3/uL    nRBC 0.1 (H) 0.0 - 0.0 /100 WBCs    RBC 3.89 (L) 4.00 - 5.20 x10*6/uL    Hemoglobin 10.7 (L) 12.0 - 16.0 g/dL    Hematocrit 34.0 (L) 36.0 - 46.0 %    MCV 87 80 - 100 fL    MCH 27.5 26.0 - 34.0 pg    MCHC 31.5 (L) 32.0 - 36.0 g/dL    RDW 14.8 (H) 11.5 - 14.5 %    Platelets 249 150 - 450 x10*3/uL    MPV 10.4 7.5 - 11.5 fL    Immature Granulocytes %, Automated 5.5 (H) 0.0 - 0.9 %    Immature Granulocytes Absolute, Automated 0.85 (H) 0.00 - 0.70 x10*3/uL   Manual Differential   Result Value Ref Range    Neutrophils %, Manual 88.0 40.0 - 80.0 %    Bands %, Manual 2.0 0.0 - 5.0 %    Lymphocytes %, Manual 3.0 13.0 - 44.0 %    Monocytes %, Manual 5.0 2.0 - 10.0 %    Eosinophils %, Manual 0.0 0.0 - 6.0 %    Basophils %, Manual 0.0 0.0 - 2.0 %    Metamyelocytes %, Manual 2.0 0.0 - 0.0 %    Seg Neutrophils Absolute, Manual 13.55 (H) 1.20 - 7.00 x10*3/uL    Bands Absolute, Manual 0.31 0.00 - 0.70 x10*3/uL    Lymphocytes Absolute, Manual 0.46 (L) 1.20 - 4.80 x10*3/uL    Monocytes Absolute, Manual 0.77 0.10 - 1.00 x10*3/uL    Eosinophils Absolute, Manual 0.00 0.00 - 0.70 x10*3/uL    Basophils Absolute, Manual 0.00 0.00 - 0.10 x10*3/uL    Metamyelocytes Absolute, Manual 0.31 0.00 - 0.00 x10*3/uL    Total Cells Counted 100     Neutrophils Absolute, Manual 13.86 (H) 1.20 - 7.70 x10*3/uL    RBC Morphology See Below     Polychromasia Mild      Ovalocytes Few     [place] Cells Few     Vacuolated Neutrophils Present    Blood Gas Lactic Acid, Venous   Result Value Ref Range    POCT Lactate, Venous 1.7 0.4 - 2.0 mmol/L   POCT GLUCOSE   Result Value Ref Range    POCT Glucose 101 (H) 74 - 99 mg/dL   POCT GLUCOSE   Result Value Ref Range    POCT Glucose 119 (H) 74 - 99 mg/dL          Assessment/Plan     CT chest wo IV contrast    Result Date: 10/24/2023  Interpreted By:  Nirmal Mcmillan, STUDY: CT CHEST WO IV CONTRAST; 10/24/2023 12:27 am   INDICATION: Signs/Symptoms:Covid.   COMPARISON: None   ACCESSION NUMBER(S): [id number]   ORDERING CLINICIAN: GERTRUDE FIERRO   TECHNIQUE: Contiguous axial images of the thorax were obtained from the level of the thoracic inlet through the lung bases. Coronal and sagittal reformatted images were obtained.     FINDINGS: The thyroid gland is unremarkable.   The heart size is within normal limits.  No pericardial effusion is identified. The aorta and pulmonary arteries are normal in caliber.   There are no pathologically enlarged mediastinal, hilar, or axillary lymph nodes are seen.   The trachea and mainstem bronchi are patent. Diffuse patchy ground-glass opacities bilaterally which is compatible with atypical pneumonia. There is no evidence of pneumothorax or pleural effusion.   The visualized osseous structures are intact.   Limited images through the upper abdomen are unremarkable.       Diffuse, patchy ground-glass opacities bilaterally suggesting atypical pneumonia such as COVID.     Signed by: Nirmal Mcmillan 10/24/2023 12:32 PM Dictation workstation:   [id number]    NM lung perfusion particulate    Result Date: 10/23/2023  Interpreted By:  Yoly Stanley, STUDY: NM LUNG PERFUSION PARTICULATE 10/23/2023 8:44 pm   INDICATION: Signs/Symptoms:hypoxia, r/o PE   COMPARISON: Chest x-ray done earlier today   ACCESSION NUMBER(S): [id number]   ORDERING CLINICIAN: ANAHY VEANS   TECHNIQUE: 4.2 millicuries of technetium 99 M MAA  was injected intravenously with perfusion lung scan in 8 projections completed.   FINDINGS: No perfusion defect is seen within either lung.       Normal perfusion lung scan.   Signed by: Yoly Stanley 10/23/2023 8:57 PM Dictation workstation:   [id number]    XR chest 1 view    Result Date: 10/23/2023  Interpreted By:  Yoly Stanley, STUDY: XR CHEST 1 VIEW 10/23/2023 4:55 pm   INDICATION: Signs/Symptoms:dyspnea, cough, fatigue, and malaise. Positive COVID.   COMPARISON: None available.   ACCESSION NUMBER(S): [id number]   ORDERING CLINICIAN: ANAHY EVANS   TECHNIQUE: AP erect view of the chest   FINDINGS: The cardiac size is normal with no mediastinal abnormality identified. The trachea is midline. No pleural abnormality is seen. However, there are bilateral infiltrates with ground-glass appearance with relative sparing of the left upper lung zone.       Bilateral ground-glass infiltrates.   Signed by: Yoly Stanley 10/23/2023 5:08 PM Dictation workstation:   [id number]      Impression:  Acute kidney injury secondary to prerenal azotemia from COVID-19  Chronic disease stage IV  COVID-19  Acute respiratory failur  Metabolic acidosis    Recommendations:  We will current management continue with oxygen therapy, continue remdesivir and Decadron, avoid nephrotoxic medications, there is no indication for therapy, we will continue to monitor renal function very closely we will add sodium bicarbonate orally for her metabolic acidosis.      Thank you for your consult      Katelynn Brown

## 2023-10-25 NOTE — CARE PLAN
The patient's goals for the shift include To get rest    The clinical goals for the shift include decrease oxygen demand    Over the shift, the patient did not make progress toward the following goals. Barriers to progression include Patient condition. Recommendations to address these barriers include Attempt to improve patient .

## 2023-10-25 NOTE — SIGNIFICANT EVENT
10/25/23 0001   Medical Gas Therapy/Pulse Ox   Oxygen Therapy Supplemental oxygen   O2 Delivery Method High flow nasal cannula   Humidification Yes   O2 Temperature 33 °C (91.4 °F)   Water Level Full   SpO2 (!) 81 %   Patient Activity During SpO2 Measurement At rest   Pulse Oximetry Type Continuous   Oximetry Probe Site Changed No   FiO2 (%) (S)  100 %   $ High Flow O2 Initial Day Charge High flow oxygenation     At this time pts FiO2 was increased to 100% after using bathroom, her sats stayed in high 70s and low 80s.

## 2023-10-26 NOTE — PROGRESS NOTES
Macarena Wilson is a 68 y.o. female on day 3 of admission presenting with Acute respiratory failure with hypoxemia (CMS/HCC).      Subjective   Clinically patient appears better though she remains on 80% high flow    Patient is awake alert oriented x3 in mild respiratory distress    Objective     Last Recorded Vitals  /54 (Patient Position: Lying)   Pulse 78   Temp 37.1 °C (98.8 °F) (Oral)   Resp (!) 32   Wt 77.3 kg (170 lb 6.7 oz)   SpO2 90%   Intake/Output last 3 Shifts:    Intake/Output Summary (Last 24 hours) at 10/26/2023 1005  Last data filed at 10/25/2023 1819  Gross per 24 hour   Intake 720 ml   Output --   Net 720 ml         Admission Weight  Weight: 77.1 kg (170 lb) (10/23/23 1541)    Daily Weight  10/25/23 : 77.3 kg (170 lb 6.7 oz)    Image Results  CT chest wo IV contrast  Narrative: Interpreted By:  Nirmal Mcmillan,   STUDY:  CT CHEST WO IV CONTRAST; 10/24/2023 12:27 am      INDICATION:  Signs/Symptoms:Covid.      COMPARISON:  None      ACCESSION NUMBER(S):  [id number]      ORDERING CLINICIAN:  GERTRUDE FIERRO      TECHNIQUE:  Contiguous axial images of the thorax were obtained from the level of  the thoracic inlet through the lung bases. Coronal and sagittal  reformatted images were obtained.          FINDINGS:  The thyroid gland is unremarkable.      The heart size is within normal limits.  No pericardial effusion is  identified. The aorta and pulmonary arteries are normal in caliber.      There are no pathologically enlarged mediastinal, hilar, or axillary  lymph nodes are seen.      The trachea and mainstem bronchi are patent. Diffuse patchy  ground-glass opacities bilaterally which is compatible with atypical  pneumonia. There is no evidence of pneumothorax or pleural effusion.      The visualized osseous structures are intact.      Limited images through the upper abdomen are unremarkable.      Impression: Diffuse, patchy ground-glass opacities bilaterally suggesting  atypical  pneumonia such as COVID.          Signed by: Nirmal Mcmillan 10/24/2023 12:32 PM  Dictation workstation:   [id number]      Physical Exam  Constitutional:       Appearance: She is normal weight.   HENT:      Head: Normocephalic.      Nose: Congestion present.      Mouth/Throat:      Mouth: Mucous membranes are moist.   Eyes:      Extraocular Movements: Extraocular movements intact.      Conjunctiva/sclera: Conjunctivae normal.      Pupils: Pupils are equal, round, and reactive to light.   Cardiovascular:      Rate and Rhythm: Tachycardia present.   Pulmonary:      Effort: Respiratory distress present.      Breath sounds: Rhonchi and rales present.   Abdominal:      General: Bowel sounds are normal.      Palpations: Abdomen is soft.      Tenderness: There is no abdominal tenderness.   Musculoskeletal:         General: Normal range of motion.      Cervical back: Normal range of motion.   Skin:     General: Skin is warm.      Capillary Refill: Capillary refill takes less than 2 seconds.   Neurological:      General: No focal deficit present.      Mental Status: She is alert and oriented to person, place, and time.         Relevant Results             CT chest wo IV contrast    Result Date: 10/24/2023  Interpreted By:  Nirmal Mcmillan, STUDY: CT CHEST WO IV CONTRAST; 10/24/2023 12:27 am   INDICATION: Signs/Symptoms:Covid.   COMPARISON: None   ACCESSION NUMBER(S): [id number]   ORDERING CLINICIAN: GERTRUDE FIERRO   TECHNIQUE: Contiguous axial images of the thorax were obtained from the level of the thoracic inlet through the lung bases. Coronal and sagittal reformatted images were obtained.     FINDINGS: The thyroid gland is unremarkable.   The heart size is within normal limits.  No pericardial effusion is identified. The aorta and pulmonary arteries are normal in caliber.   There are no pathologically enlarged mediastinal, hilar, or axillary lymph nodes are seen.   The trachea and mainstem bronchi are patent.  Diffuse patchy ground-glass opacities bilaterally which is compatible with atypical pneumonia. There is no evidence of pneumothorax or pleural effusion.   The visualized osseous structures are intact.   Limited images through the upper abdomen are unremarkable.       Diffuse, patchy ground-glass opacities bilaterally suggesting atypical pneumonia such as COVID.     Signed by: Nirmal Mcmillan 10/24/2023 12:32 PM Dictation workstation:   [id number]    NM lung perfusion particulate    Result Date: 10/23/2023  Interpreted By:  Yoly Stanley, STUDY: NM LUNG PERFUSION PARTICULATE 10/23/2023 8:44 pm   INDICATION: Signs/Symptoms:hypoxia, r/o PE   COMPARISON: Chest x-ray done earlier today   ACCESSION NUMBER(S): [id number]   ORDERING CLINICIAN: ANAHY EVANS   TECHNIQUE: 4.2 millicuries of technetium 99 M MAA was injected intravenously with perfusion lung scan in 8 projections completed.   FINDINGS: No perfusion defect is seen within either lung.       Normal perfusion lung scan.   Signed by: Yoly Stanley 10/23/2023 8:57 PM Dictation workstation:   [id number]    XR chest 1 view    Result Date: 10/23/2023  Interpreted By:  Yoly Stanley, STUDY: XR CHEST 1 VIEW 10/23/2023 4:55 pm   INDICATION: Signs/Symptoms:dyspnea, cough, fatigue, and malaise. Positive COVID.   COMPARISON: None available.   ACCESSION NUMBER(S): [id number]   ORDERING CLINICIAN: ANAHY EVANS   TECHNIQUE: AP erect view of the chest   FINDINGS: The cardiac size is normal with no mediastinal abnormality identified. The trachea is midline. No pleural abnormality is seen. However, there are bilateral infiltrates with ground-glass appearance with relative sparing of the left upper lung zone.       Bilateral ground-glass infiltrates.   Signed by: Yoly Stanley 10/23/2023 5:08 PM Dictation workstation:   [id number]     Results for orders placed or performed during the hospital encounter of 10/23/23 (from the past 24 hour(s))   POCT GLUCOSE   Result Value Ref Range     POCT Glucose 119 (H) 74 - 99 mg/dL   POCT GLUCOSE   Result Value Ref Range    POCT Glucose 79 74 - 99 mg/dL   POCT GLUCOSE   Result Value Ref Range    POCT Glucose 91 74 - 99 mg/dL   Comprehensive metabolic panel   Result Value Ref Range    Glucose 198 (H) 65 - 99 mg/dL    Sodium 136 133 - 145 mmol/L    Potassium 4.1 3.4 - 5.1 mmol/L    Chloride 106 97 - 107 mmol/L    Bicarbonate 14 (L) 24 - 31 mmol/L    Urea Nitrogen 64 (H) 8 - 25 mg/dL    Creatinine 2.40 (H) 0.40 - 1.60 mg/dL    eGFR 21 (L) >60 mL/min/1.73m*2    Calcium 8.6 8.5 - 10.4 mg/dL    Albumin 2.6 (L) 3.5 - 5.0 g/dL    Alkaline Phosphatase 68 35 - 125 U/L    Total Protein 5.7 (L) 5.9 - 7.9 g/dL    AST 19 5 - 40 U/L    Bilirubin, Total 0.2 0.1 - 1.2 mg/dL    ALT 13 5 - 40 U/L    Anion Gap 16 <=19 mmol/L   C-reactive protein   Result Value Ref Range    C-Reactive Protein 3.90 (H) 0.00 - 2.00 mg/dL   CBC and Auto Differential   Result Value Ref Range    WBC 13.1 (H) 4.4 - 11.3 x10*3/uL    nRBC 0.0 0.0 - 0.0 /100 WBCs    RBC 3.94 (L) 4.00 - 5.20 x10*6/uL    Hemoglobin 10.9 (L) 12.0 - 16.0 g/dL    Hematocrit 35.7 (L) 36.0 - 46.0 %    MCV 91 80 - 100 fL    MCH 27.7 26.0 - 34.0 pg    MCHC 30.5 (L) 32.0 - 36.0 g/dL    RDW 15.0 (H) 11.5 - 14.5 %    Platelets 225 150 - 450 x10*3/uL    MPV 10.4 7.5 - 11.5 fL    Neutrophils % 90.1 40.0 - 80.0 %    Immature Granulocytes %, Automated 4.6 (H) 0.0 - 0.9 %    Lymphocytes % 2.2 13.0 - 44.0 %    Monocytes % 2.9 2.0 - 10.0 %    Eosinophils % 0.0 0.0 - 6.0 %    Basophils % 0.2 0.0 - 2.0 %    Neutrophils Absolute 11.82 (H) 1.20 - 7.70 x10*3/uL    Immature Granulocytes Absolute, Automated 0.61 0.00 - 0.70 x10*3/uL    Lymphocytes Absolute 0.29 (L) 1.20 - 4.80 x10*3/uL    Monocytes Absolute 0.38 0.10 - 1.00 x10*3/uL    Eosinophils Absolute 0.00 0.00 - 0.70 x10*3/uL    Basophils Absolute 0.03 0.00 - 0.10 x10*3/uL   Vancomycin   Result Value Ref Range    Vancomycin 23.0 (H) 10.0 - 20.0 ug/mL   POCT GLUCOSE   Result Value Ref  Range    POCT Glucose 143 (H) 74 - 99 mg/dL       Scheduled medications  dexAMETHasone, 6 mg, intravenous, q24h  enoxaparin, 30 mg, subcutaneous, Daily  fluticasone, 1 spray, Each Nostril, BID  insulin lispro, 0-5 Units, subcutaneous, TID with meals  ipratropium-albuteroL, 3 mL, nebulization, q6h while awake  melatonin, 3 mg, oral, Nightly  pantoprazole, 40 mg, intravenous, Daily  piperacillin-tazobactam, 2.25 g, intravenous, q6h  remdesivir, 100 mg, intravenous, q24h  sodium bicarbonate, 650 mg, oral, TID      Continuous medications     PRN medications  PRN medications: acetaminophen **OR** acetaminophen **OR** acetaminophen, benzonatate, bisacodyl, dextrose 10 % in water (D10W), dextrose, glucagon, guaiFENesin, ipratropium-albuteroL, ondansetron ODT **OR** ondansetron, [Held by provider] oxygen, oxygen, polyethylene glycol, sodium chloride    Assessment/Plan   This patient currently has cardiac telemetry ordered; if you would like to modify or discontinue the telemetry order, click here to go to the orders activity to modify/discontinue the order.              Principal Problem:    Acute respiratory failure with hypoxemia (CMS/HCC)  Active Problems:    Benign essential HTN    Stage 4 chronic kidney disease (CMS/HCC)    COVID-19  Elevated CRP  Leukocytosis  Diarrhea    -The patient unfortunately remains at high risk for decompensation thus we will continue ICU stay, presentation overall concerning for acute respiratory distress syndrome related to COVID 19 infection  -She is status post baricitinib  -Continue pulmonary toilet with incentive spirometer positional changes, patient re  educated on prone positioning as well.  -VQ scan was of low probability for PE on hospital presentation  -Continue empiric pneumonia treatment with vancomycin and Zosyn, ID discontinued today  -Continue remdesivir and Decadron, Decadron dose has been decreased  -Continue high flow nasal cannula at this time patient open to intubation if  indicated gets worse  -Appreciate palliative care's recommendations  -Given ARDS patient will be on dexamethasone as well as remdesivir and broad-spectrum antibiotics,  -Tolerated Lasix 10 mg creatinine is still stable no additional Lasix at this time  - continue nebs every 4  - for diarrhea Cdiff/stool cx ordered  -Losartan for hypertension as tolerated, hold for systolic blood pressure less than 110  History of bladder fistula status post partial hysterectomy plan for surgical repair scheduled for December 2023    DVT prophylaxis: Lovenox daily    GI prophylaxis: Protonix    PT/OT/ST: Unable to tolerate PT OT at this time due to respiratory status    Nutrition: Regular diet    Code status: Full code    Have IV fluids been discontinued:  yes    Central lines present: NO    level of care indicated: ICU    Location prior to arrival: Home    Dispo plan: Critically ill                    Lydia Moreland MD

## 2023-10-26 NOTE — PROGRESS NOTES
Critical Care Daily Progress        Subjective   Patient is a 68 y.o. female admitted on 10/23/2023  3:35 PM with the following indication(s) for ICU care respiratory failure in the setting of COVID-19 pneumonitis.     Interval History:   Again with easy desaturation with minimal movement.  Placed on 100% FiO2 via Vapotherm but now back down to 80%.  Reports that she seems to do better with bedtime.  Not able to fully prone but does sleep on her side which aids in her oxygenation.    Complaints: Denies any chest pain.  Notes some diarrhea.  Endorses fatigue but no shortness of breath    Scheduled Medications:   dexAMETHasone, 6 mg, intravenous, q24h  enoxaparin, 30 mg, subcutaneous, Daily  fluticasone, 1 spray, Each Nostril, BID  insulin lispro, 0-5 Units, subcutaneous, TID with meals  ipratropium-albuteroL, 3 mL, nebulization, q6h while awake  melatonin, 3 mg, oral, Nightly  pantoprazole, 40 mg, intravenous, Daily  piperacillin-tazobactam, 2.25 g, intravenous, q6h  remdesivir, 100 mg, intravenous, q24h  sodium bicarbonate, 650 mg, oral, TID         Continuous Medications:         PRN Medications:   PRN medications: acetaminophen **OR** acetaminophen **OR** acetaminophen, benzonatate, bisacodyl, dextrose 10 % in water (D10W), dextrose, glucagon, guaiFENesin, ipratropium-albuteroL, ondansetron ODT **OR** ondansetron, [Held by provider] oxygen, oxygen, polyethylene glycol, sodium chloride    Objective   Vitals:  Most Recent:  Vitals:    10/26/23 0930   BP:    Pulse: 78   Resp: (!) 32   Temp:    SpO2: 90%       24hr Min/Max:  Temp  Min: 36 °C (96.8 °F)  Max: 37.1 °C (98.8 °F)  Pulse  Min: 58  Max: 100  BP  Min: 105/64  Max: 134/62  Resp  Min: 18  Max: 35  SpO2  Min: 83 %  Max: 99 %    LDA:         Vent settings:  FiO2 (%):  [80 %-100 %] 100 %    Hemodynamic parameters for last 24 hours:       I/O:  I/O last 2 completed shifts:  In: 720 (9.3 mL/kg) [P.O.:120; IV Piggyback:600]  Out: - (0 mL/kg)   Weight: 77.3 kg  "    Physical Exam:   /54 (Patient Position: Lying)   Pulse 78   Temp 37.1 °C (98.8 °F) (Oral)   Resp (!) 32   Ht 1.6 m (5' 3\")   Wt 77.3 kg (170 lb 6.7 oz)   SpO2 90%   BMI 30.19 kg/m²     Alert, ill-appearing but no acute distress  Vapotherm  Lungs are diminished but clear, no wheezing  Portable bedside ultrasound with butterfly ultrasound probe scan both anterior hemithoraces, good lung sliding bilaterally with B-lines bilaterally suggestive of likely interstitial edema from noncardiogenic processes.  S1-S2 +  No CCE  Normal mood and affect      Lab/Radiology/Diagnostic Review:  Results for orders placed or performed during the hospital encounter of 10/23/23 (from the past 24 hour(s))   POCT GLUCOSE   Result Value Ref Range    POCT Glucose 119 (H) 74 - 99 mg/dL   POCT GLUCOSE   Result Value Ref Range    POCT Glucose 79 74 - 99 mg/dL   POCT GLUCOSE   Result Value Ref Range    POCT Glucose 91 74 - 99 mg/dL   Comprehensive metabolic panel   Result Value Ref Range    Glucose 198 (H) 65 - 99 mg/dL    Sodium 136 133 - 145 mmol/L    Potassium 4.1 3.4 - 5.1 mmol/L    Chloride 106 97 - 107 mmol/L    Bicarbonate 14 (L) 24 - 31 mmol/L    Urea Nitrogen 64 (H) 8 - 25 mg/dL    Creatinine 2.40 (H) 0.40 - 1.60 mg/dL    eGFR 21 (L) >60 mL/min/1.73m*2    Calcium 8.6 8.5 - 10.4 mg/dL    Albumin 2.6 (L) 3.5 - 5.0 g/dL    Alkaline Phosphatase 68 35 - 125 U/L    Total Protein 5.7 (L) 5.9 - 7.9 g/dL    AST 19 5 - 40 U/L    Bilirubin, Total 0.2 0.1 - 1.2 mg/dL    ALT 13 5 - 40 U/L    Anion Gap 16 <=19 mmol/L   C-reactive protein   Result Value Ref Range    C-Reactive Protein 3.90 (H) 0.00 - 2.00 mg/dL   CBC and Auto Differential   Result Value Ref Range    WBC 13.1 (H) 4.4 - 11.3 x10*3/uL    nRBC 0.0 0.0 - 0.0 /100 WBCs    RBC 3.94 (L) 4.00 - 5.20 x10*6/uL    Hemoglobin 10.9 (L) 12.0 - 16.0 g/dL    Hematocrit 35.7 (L) 36.0 - 46.0 %    MCV 91 80 - 100 fL    MCH 27.7 26.0 - 34.0 pg    MCHC 30.5 (L) 32.0 - 36.0 g/dL    RDW 15.0 " (H) 11.5 - 14.5 %    Platelets 225 150 - 450 x10*3/uL    MPV 10.4 7.5 - 11.5 fL    Neutrophils % 90.1 40.0 - 80.0 %    Immature Granulocytes %, Automated 4.6 (H) 0.0 - 0.9 %    Lymphocytes % 2.2 13.0 - 44.0 %    Monocytes % 2.9 2.0 - 10.0 %    Eosinophils % 0.0 0.0 - 6.0 %    Basophils % 0.2 0.0 - 2.0 %    Neutrophils Absolute 11.82 (H) 1.20 - 7.70 x10*3/uL    Immature Granulocytes Absolute, Automated 0.61 0.00 - 0.70 x10*3/uL    Lymphocytes Absolute 0.29 (L) 1.20 - 4.80 x10*3/uL    Monocytes Absolute 0.38 0.10 - 1.00 x10*3/uL    Eosinophils Absolute 0.00 0.00 - 0.70 x10*3/uL    Basophils Absolute 0.03 0.00 - 0.10 x10*3/uL   Vancomycin   Result Value Ref Range    Vancomycin 23.0 (H) 10.0 - 20.0 ug/mL   POCT GLUCOSE   Result Value Ref Range    POCT Glucose 143 (H) 74 - 99 mg/dL     CT chest wo IV contrast    Result Date: 10/24/2023  Interpreted By:  Nirmal Mcmillan, STUDY: CT CHEST WO IV CONTRAST; 10/24/2023 12:27 am   INDICATION: Signs/Symptoms:Covid.   COMPARISON: None   ACCESSION NUMBER(S): [id number]   ORDERING CLINICIAN: GERTRUDE FIERRO   TECHNIQUE: Contiguous axial images of the thorax were obtained from the level of the thoracic inlet through the lung bases. Coronal and sagittal reformatted images were obtained.     FINDINGS: The thyroid gland is unremarkable.   The heart size is within normal limits.  No pericardial effusion is identified. The aorta and pulmonary arteries are normal in caliber.   There are no pathologically enlarged mediastinal, hilar, or axillary lymph nodes are seen.   The trachea and mainstem bronchi are patent. Diffuse patchy ground-glass opacities bilaterally which is compatible with atypical pneumonia. There is no evidence of pneumothorax or pleural effusion.   The visualized osseous structures are intact.   Limited images through the upper abdomen are unremarkable.       Diffuse, patchy ground-glass opacities bilaterally suggesting atypical pneumonia such as COVID.     Signed by:  Nirmal Mcmillan 10/24/2023 12:32 PM Dictation workstation:   [id number]    NM lung perfusion particulate    Result Date: 10/23/2023  Interpreted By:  Yoly Stanley, STUDY: NM LUNG PERFUSION PARTICULATE 10/23/2023 8:44 pm   INDICATION: Signs/Symptoms:hypoxia, r/o PE   COMPARISON: Chest x-ray done earlier today   ACCESSION NUMBER(S): [id number]   ORDERING CLINICIAN: ANAHY EVANS   TECHNIQUE: 4.2 millicuries of technetium 99 M MAA was injected intravenously with perfusion lung scan in 8 projections completed.   FINDINGS: No perfusion defect is seen within either lung.       Normal perfusion lung scan.   Signed by: Yoly Stanley 10/23/2023 8:57 PM Dictation workstation:   [id number]    XR chest 1 view    Result Date: 10/23/2023  Interpreted By:  Yoly Stanley, STUDY: XR CHEST 1 VIEW 10/23/2023 4:55 pm   INDICATION: Signs/Symptoms:dyspnea, cough, fatigue, and malaise. Positive COVID.   COMPARISON: None available.   ACCESSION NUMBER(S): [id number]   ORDERING CLINICIAN: ANAHY EVANS   TECHNIQUE: AP erect view of the chest   FINDINGS: The cardiac size is normal with no mediastinal abnormality identified. The trachea is midline. No pleural abnormality is seen. However, there are bilateral infiltrates with ground-glass appearance with relative sparing of the left upper lung zone.       Bilateral ground-glass infiltrates.   Signed by: Yoly Stanley 10/23/2023 5:08 PM Dictation workstation:   [id number]          Assessment/Plan   Principal Problem:    Acute respiratory failure with hypoxemia (CMS/HCC)  Active Problems:    COVID-19    Benign essential HTN    Stage 4 chronic kidney disease (CMS/HCC)    Continue with Vapotherm, would be willing to target transcutaneous saturations as low as 85% as long as not in any extremis.  Continue to wean as tolerated.  Encourage prone positioning, will hope to mobilize a little bit more today as much as can tolerate at the very least get up to the side of the bed.  Status post  Actemra  Continue with remdesivir  Decadron  Defer any antibiotics to ID although no need from my perspective  Prophylaxis  Guarded prognosis    Discussed with ICU RT, ICU RN      Due to the high probability of life threatening clinical decompensation, the patient required critical care time evaluating and managing this patient.  Critical care time included obtaining a history, examining the patient, ordering and reviewing studies, discussing, developing, and implementing a management plan, evaluating the patient's response to treatment, and discussion with other care team providers. I saw and evaluated the patient myself.  Critical care time was performed exclusive of billable procedures.    Critical care time: 31 mins

## 2023-10-26 NOTE — CARE PLAN
Problem: Respiratory  Goal: Minimize anxiety/maximize coping throughout shift  Outcome: Progressing     Problem: Respiratory  Goal: Minimal/no exertional discomfort or dyspnea this shift  Outcome: Progressing     Problem: Respiratory  Goal: No signs of respiratory distress (eg. Use of accessory muscles. Peds grunting)  Outcome: Progressing     Problem: Respiratory  Goal: Wean oxygen to maintain O2 saturation per order/standard this shift  Outcome: Progressing

## 2023-10-26 NOTE — CARE PLAN
The patient's goals for the shift include To get rest    The clinical goals for the shift include Decrease oxygen demands    Over the shift, the patient did not make progress toward the following goals. Barriers to progression include shortness of breath. Recommendations to address these barriers include cough and deep breathing to increase breathing.

## 2023-10-26 NOTE — PROGRESS NOTES
"Vancomycin Dosing by Pharmacy- FOLLOW UP    Macarena Wilson is a 68 y.o. year old female who Pharmacy has been consulted for vancomycin dosing for pneumonia. Based on the patient's indication and renal status this patient is being dosed based on levels.     Renal function is currently improving.    Last dose received 1250 mg x 1 10/25/23 1600    Most recent random level: 23 mcg/mL    Visit Vitals  /62 (Patient Position: Lying)   Pulse 88   Temp 37.1 °C (98.8 °F) (Oral)   Resp 23        Lab Results   Component Value Date    CREATININE 2.40 (H) 10/26/2023    CREATININE 2.70 (H) 10/25/2023    CREATININE 2.70 (H) 10/24/2023    CREATININE 2.70 (H) 10/23/2023        Patient weight is 77.3 kg    No results found for: \"CULTURE\"     I/O last 3 completed shifts:  In: 720 (9.3 mL/kg) [P.O.:120; IV Piggyback:600]  Out: 450 (5.8 mL/kg) [Urine:450 (0.2 mL/kg/hr)]  Weight: 77.3 kg   [unfilled]    Lab Results   Component Value Date    PATIENTTEMP 37.0 10/25/2023    PATIENTTEMP 37.0 10/24/2023        Assessment/Plan    Above goal random/trough level. Repeat level tomorrow 10/27 with AM labs and redose if below target level of 15-20.  2. Will continue to monitor renal function daily while on vancomycin and order serum creatinine at least every 48 hours if not already ordered.  3. Follow for continued vancomycin needs, clinical response, and signs/symptoms of toxicity.       KIMBERLY ALBERTS, PharmD           "

## 2023-10-26 NOTE — CARE PLAN
The patient's goals for the shift include To get rest    The clinical goals for the shift include Decrease O2 demand throughout the shift    Over the shift, the patient did not make progress toward the following goals. Barriers to progression include COVID, unable to wean O2, pt unwilling to prone d/t comfort. Recommendations to address these barriers include continue to encourage side sleeping and proning, reminders to breathe through nose for oxygen.     Problem: Respiratory  Goal: Minimal/no exertional discomfort or dyspnea this shift  Outcome: Not Progressing  Goal: No signs of respiratory distress (eg. Use of accessory muscles. Peds grunting)  Outcome: Not Progressing  Goal: Wean oxygen to maintain O2 saturation per order/standard this shift  Outcome: Not Progressing

## 2023-10-26 NOTE — PROGRESS NOTES
"Macarena Wilson is a 68 y.o. female on day 3 of admission presenting with Acute respiratory failure with hypoxemia (CMS/HCC).    Subjective      Seen for acute kidney injury on top of chronic kidney disease stage IV.  With COVID-19 she is stable has some diarrhea but her oxygenation is down to 80% FiO2    Review of Systems    Objective     Physical Exam  No change in physical exam  Last Recorded Vitals  Blood pressure 118/62, pulse 88, temperature 37.1 °C (98.8 °F), temperature source Oral, resp. rate 23, height 1.6 m (5' 3\"), weight 77.3 kg (170 lb 6.7 oz), SpO2 92 %.    Intake/Output last 3 Shifts:  I/O last 3 completed shifts:  In: 720 (9.3 mL/kg) [P.O.:120; IV Piggyback:600]  Out: 450 (5.8 mL/kg) [Urine:450 (0.2 mL/kg/hr)]  Weight: 77.3 kg     Current Facility-Administered Medications:     acetaminophen (Tylenol) tablet 650 mg, 650 mg, oral, q4h PRN, 650 mg at 10/26/23 0735 **OR** acetaminophen (Tylenol) oral liquid 650 mg, 650 mg, nasogastric tube, q4h PRN **OR** acetaminophen (Tylenol) suppository 650 mg, 650 mg, rectal, q4h PRN, David Rodriguez MD    benzonatate (Tessalon) capsule 200 mg, 200 mg, oral, TID PRN, Lou Hodges, APRN-CNP    bisacodyl (Dulcolax) EC tablet 10 mg, 10 mg, oral, Daily PRN, David Rodriguez MD    dexAMETHasone (Decadron) injection 6 mg, 6 mg, intravenous, q24h, David Rodriguez MD, 6 mg at 10/25/23 2111    dextrose 10 % in water (D10W) infusion, 0.3 g/kg/hr, intravenous, Once PRN, David Rodriguez MD    dextrose 50 % injection 25 g, 25 g, intravenous, q15 min PRN, David Rodriguez MD    enoxaparin (Lovenox) syringe 30 mg, 30 mg, subcutaneous, Daily, David Rodriguez MD, 30 mg at 10/26/23 0548    fluticasone (Flonase) nasal spray 1 spray, 1 spray, Each Nostril, BID, Lou Hodges, APRN-CNP, 1 spray at 10/25/23 2039    glucagon (Glucagen) injection 1 mg, 1 mg, intramuscular, q15 min PRN, David Rodriguez MD    guaiFENesin (Mucinex) " 12 hr tablet 600 mg, 600 mg, oral, q12h PRN, David Rodriguez MD    insulin lispro (HumaLOG) injection 0-5 Units, 0-5 Units, subcutaneous, TID with meals, David Rodriguez MD, 1 Units at 10/24/23 1931    ipratropium-albuteroL (Duo-Neb) 0.5-2.5 mg/3 mL nebulizer solution 3 mL, 3 mL, nebulization, q6h while awake, David Rodriguez MD, 3 mL at 10/26/23 0752    ipratropium-albuteroL (Duo-Neb) 0.5-2.5 mg/3 mL nebulizer solution 3 mL, 3 mL, nebulization, q2h PRN, David Rodriguez MD    melatonin tablet 3 mg, 3 mg, oral, Nightly, Lou Hodges APRN-CNP, 3 mg at 10/25/23 2039    ondansetron ODT (Zofran-ODT) disintegrating tablet 4 mg, 4 mg, oral, q8h PRN **OR** ondansetron (Zofran) injection 4 mg, 4 mg, intravenous, q8h PRN, David Rodriguez MD    [Held by provider] oxygen (O2) therapy, , inhalation, Continuous PRN - O2/gases, David Rodriguez MD, 35 L/min at 10/24/23 0115    oxygen (O2) therapy, , inhalation, Continuous PRN - O2/gases, Lydia Moreland MD, 30 L/min at 10/26/23 0400    pantoprazole (ProtoNix) injection 40 mg, 40 mg, intravenous, Daily, Lydia Moreland MD, 40 mg at 10/25/23 1023    piperacillin-tazobactam-dextrose (Zosyn) IV 2.25 g, 2.25 g, intravenous, q6h, David Rodriguez MD, Stopped at 10/26/23 0406    polyethylene glycol (Glycolax, Miralax) packet 17 g, 17 g, oral, Daily PRN, Lou Hodges, APRN-CNP    remdesivir (Veklury) 100 mg in sodium chloride 0.9% 250 mL IV, 100 mg, intravenous, q24h, Lydia Moreland MD, Stopped at 10/25/23 1544    sodium bicarbonate tablet 650 mg, 650 mg, oral, TID, Danial Henderson MD, 650 mg at 10/25/23 2039    sodium chloride (Ocean) 0.65 % nasal spray 1 spray, 1 spray, Each Nostril, PRN, Lou Hodges, APRN-CNP   Relevant Results    Results for orders placed or performed during the hospital encounter of 10/23/23 (from the past 96 hour(s))   CBC and Auto Differential   Result Value Ref Range    WBC 15.9 (H) 4.4 - 11.3 x10*3/uL    nRBC 0.0  0.0 - 0.0 /100 WBCs    RBC 4.54 4.00 - 5.20 x10*6/uL    Hemoglobin 12.4 12.0 - 16.0 g/dL    Hematocrit 38.7 36.0 - 46.0 %    MCV 85 80 - 100 fL    MCH 27.3 26.0 - 34.0 pg    MCHC 32.0 32.0 - 36.0 g/dL    RDW 15.0 (H) 11.5 - 14.5 %    Platelets 275 150 - 450 x10*3/uL    MPV 11.5 7.5 - 11.5 fL    Immature Granulocytes %, Automated 5.4 (H) 0.0 - 0.9 %    Immature Granulocytes Absolute, Automated 0.86 (H) 0.00 - 0.70 x10*3/uL   Comprehensive metabolic panel   Result Value Ref Range    Glucose 187 (H) 65 - 99 mg/dL    Sodium 138 133 - 145 mmol/L    Potassium 5.5 (H) 3.4 - 5.1 mmol/L    Chloride 103 97 - 107 mmol/L    Bicarbonate 17 (L) 24 - 31 mmol/L    Urea Nitrogen 86 (H) 8 - 25 mg/dL    Creatinine 2.70 (H) 0.40 - 1.60 mg/dL    eGFR 19 (L) >60 mL/min/1.73m*2    Calcium 9.3 8.5 - 10.4 mg/dL    Albumin 3.3 (L) 3.5 - 5.0 g/dL    Alkaline Phosphatase 74 35 - 125 U/L    Total Protein 7.1 5.9 - 7.9 g/dL    AST 26 5 - 40 U/L    Bilirubin, Total 0.3 0.1 - 1.2 mg/dL    ALT 15 5 - 40 U/L    Anion Gap 18 <=19 mmol/L   SARS-CoV-2 RT PCR   Result Value Ref Range    Coronavirus 2019, PCR Detected (A) Not Detected   Serial Troponin, Initial ([facility])   Result Value Ref Range    Troponin T, High Sensitivity 15 <=15 ng/L   NT Pro-BNP   Result Value Ref Range    PROBNP 695 (H) 0 - 353 pg/mL   Serial Troponin, 2 Hour ([facility])   Result Value Ref Range    Troponin T, High Sensitivity 15 <=15 ng/L   Serial Troponin, 6 Hour ([facility])   Result Value Ref Range    Troponin T, High Sensitivity 15 <=15 ng/L   MRSA Surveillance for Vancomycin De-escalation, PCR    Specimen: Anterior Nares; Swab   Result Value Ref Range    MRSA PCR Not Detected Not Detected   Blood Gas Arterial Full Panel Unsolicited   Result Value Ref Range    POCT pH, Arterial 7.38 7.38 - 7.42 pH    POCT pCO2, Arterial 30 (L) 38 - 42 mm Hg    POCT pO2, Arterial 175 (H) 85 - 95 mm Hg    POCT SO2, Arterial 98 94 - 100 %    POCT Oxy Hemoglobin, Arterial 97.6 94.0 - 98.0 %    POCT Hematocrit  Calculated, Arterial 35.0 (L) 36.0 - 46.0 %    POCT Sodium, Arterial 135 (L) 136 - 145 mmol/L    POCT Potassium, Arterial 4.9 3.5 - 5.3 mmol/L    POCT Chloride, Arterial 108 (H) 98 - 107 mmol/L    POCT Ionized Calcium, Arterial 1.22 1.10 - 1.33 mmol/L    POCT Glucose, Arterial 145 (H) 74 - 99 mg/dL    POCT Lactate, Arterial 1.0 0.4 - 2.0 mmol/L    POCT Base Excess, Arterial -6.4 (L) -2.0 - 3.0 mmol/L    POCT HCO3 Calculated, Arterial 17.7 (L) 22.0 - 26.0 mmol/L    POCT Hemoglobin, Arterial 11.6 (L) 12.0 - 16.0 g/dL    POCT Anion Gap, Arterial 14 10 - 25 mmo/L    Patient Temperature 37.0 degrees Celsius    FiO2 100 %    Apparatus NON REBREATHER    Hemoglobin A1C   Result Value Ref Range    Hemoglobin A1C 6.3 (H) See below %    Estimated Average Glucose 134 Not Established mg/dL   CBC   Result Value Ref Range    WBC 17.1 (H) 4.4 - 11.3 x10*3/uL    nRBC 0.0 0.0 - 0.0 /100 WBCs    RBC 3.77 (L) 4.00 - 5.20 x10*6/uL    Hemoglobin 10.4 (L) 12.0 - 16.0 g/dL    Hematocrit 32.9 (L) 36.0 - 46.0 %    MCV 87 80 - 100 fL    MCH 27.6 26.0 - 34.0 pg    MCHC 31.6 (L) 32.0 - 36.0 g/dL    RDW 14.9 (H) 11.5 - 14.5 %    Platelets 253 150 - 450 x10*3/uL    MPV 11.0 7.5 - 11.5 fL   Comprehensive metabolic panel   Result Value Ref Range    Glucose 150 (H) 65 - 99 mg/dL    Sodium 139 133 - 145 mmol/L    Potassium 4.7 3.4 - 5.1 mmol/L    Chloride 104 97 - 107 mmol/L    Bicarbonate 18 (L) 24 - 31 mmol/L    Urea Nitrogen 87 (H) 8 - 25 mg/dL    Creatinine 2.70 (H) 0.40 - 1.60 mg/dL    eGFR 19 (L) >60 mL/min/1.73m*2    Calcium 8.9 8.5 - 10.4 mg/dL    Albumin 2.8 (L) 3.5 - 5.0 g/dL    Alkaline Phosphatase 61 35 - 125 U/L    Total Protein 6.1 5.9 - 7.9 g/dL    AST 20 5 - 40 U/L    Bilirubin, Total 0.2 0.1 - 1.2 mg/dL    ALT 13 5 - 40 U/L    Anion Gap 17 <=19 mmol/L   Serial Troponin, Initial ([facility])   Result Value Ref Range    Troponin T, High Sensitivity 13 <=15 ng/L   Serial Troponin, 2 Hour ([facility])   Result Value Ref Range    Troponin T, High  Sensitivity 14 <=15 ng/L   C-reactive protein   Result Value Ref Range    C-Reactive Protein 10.10 (H) 0.00 - 2.00 mg/dL   POCT GLUCOSE   Result Value Ref Range    POCT Glucose 136 (H) 74 - 99 mg/dL   CBC and Auto Differential   Result Value Ref Range    WBC 18.6 (H) 4.4 - 11.3 x10*3/uL    nRBC 0.0 0.0 - 0.0 /100 WBCs    RBC 4.21 4.00 - 5.20 x10*6/uL    Hemoglobin 11.4 (L) 12.0 - 16.0 g/dL    Hematocrit 35.7 (L) 36.0 - 46.0 %    MCV 85 80 - 100 fL    MCH 27.1 26.0 - 34.0 pg    MCHC 31.9 (L) 32.0 - 36.0 g/dL    RDW 14.9 (H) 11.5 - 14.5 %    Platelets 289 150 - 450 x10*3/uL    MPV 10.6 7.5 - 11.5 fL    Neutrophils % 86.4 40.0 - 80.0 %    Immature Granulocytes %, Automated 4.8 (H) 0.0 - 0.9 %    Lymphocytes % 2.3 13.0 - 44.0 %    Monocytes % 6.3 2.0 - 10.0 %    Eosinophils % 0.0 0.0 - 6.0 %    Basophils % 0.2 0.0 - 2.0 %    Neutrophils Absolute 16.11 (H) 1.20 - 7.70 x10*3/uL    Immature Granulocytes Absolute, Automated 0.89 (H) 0.00 - 0.70 x10*3/uL    Lymphocytes Absolute 0.42 (L) 1.20 - 4.80 x10*3/uL    Monocytes Absolute 1.18 (H) 0.10 - 1.00 x10*3/uL    Eosinophils Absolute 0.00 0.00 - 0.70 x10*3/uL    Basophils Absolute 0.03 0.00 - 0.10 x10*3/uL   Serial Troponin, 6 Hour ([facility])   Result Value Ref Range    Troponin T, High Sensitivity 13 <=15 ng/L   POCT GLUCOSE   Result Value Ref Range    POCT Glucose 151 (H) 74 - 99 mg/dL   BLOOD GAS LACTIC ACID, VENOUS   Result Value Ref Range    POCT Lactate, Venous 2.2 (H) 0.4 - 2.0 mmol/L   POCT GLUCOSE   Result Value Ref Range    POCT Glucose 184 (H) 74 - 99 mg/dL   Vancomycin   Result Value Ref Range    Vancomycin 12.0 10.0 - 20.0 ug/mL   Blood Gas Arterial Full Panel   Result Value Ref Range    POCT pH, Arterial 7.35 (L) 7.38 - 7.42 pH    POCT pCO2, Arterial 31 (L) 38 - 42 mm Hg    POCT pO2, Arterial 115 (H) 85 - 95 mm Hg    POCT SO2, Arterial 99 94 - 100 %    POCT Oxy Hemoglobin, Arterial 96.9 94.0 - 98.0 %    POCT Hematocrit Calculated, Arterial 34.0 (L) 36.0 - 46.0 %     POCT Sodium, Arterial 140 136 - 145 mmol/L    POCT Potassium, Arterial 4.3 3.5 - 5.3 mmol/L    POCT Chloride, Arterial 112 (H) 98 - 107 mmol/L    POCT Ionized Calcium, Arterial 1.23 1.10 - 1.33 mmol/L    POCT Glucose, Arterial 117 (H) 74 - 99 mg/dL    POCT Lactate, Arterial 2.1 (H) 0.4 - 2.0 mmol/L    POCT Base Excess, Arterial -7.5 (L) -2.0 - 3.0 mmol/L    POCT HCO3 Calculated, Arterial 17.1 (L) 22.0 - 26.0 mmol/L    POCT Hemoglobin, Arterial 11.4 (L) 12.0 - 16.0 g/dL    POCT Anion Gap, Arterial 15 10 - 25 mmo/L    Patient Temperature 37.0 degrees Celsius    FiO2 100 %    Apparatus HIGH FLOW CANNULA     Flow 30.0 LPM    Critical Called By MELINA     Critical Called To DR MERCADO     Critical Call Time [phone]     Critical Read Back Y    Comprehensive metabolic panel   Result Value Ref Range    Glucose 115 (H) 65 - 99 mg/dL    Sodium 142 133 - 145 mmol/L    Potassium 4.2 3.4 - 5.1 mmol/L    Chloride 110 (H) 97 - 107 mmol/L    Bicarbonate 16 (L) 24 - 31 mmol/L    Urea Nitrogen 78 (H) 8 - 25 mg/dL    Creatinine 2.70 (H) 0.40 - 1.60 mg/dL    eGFR 19 (L) >60 mL/min/1.73m*2    Calcium 9.0 8.5 - 10.4 mg/dL    Albumin 2.5 (L) 3.5 - 5.0 g/dL    Alkaline Phosphatase 60 35 - 125 U/L    Total Protein 6.0 5.9 - 7.9 g/dL    AST 18 5 - 40 U/L    Bilirubin, Total 0.2 0.1 - 1.2 mg/dL    ALT 13 5 - 40 U/L    Anion Gap 16 <=19 mmol/L   C-reactive protein   Result Value Ref Range    C-Reactive Protein 6.10 (H) 0.00 - 2.00 mg/dL   CBC and Auto Differential   Result Value Ref Range    WBC 15.4 (H) 4.4 - 11.3 x10*3/uL    nRBC 0.1 (H) 0.0 - 0.0 /100 WBCs    RBC 3.89 (L) 4.00 - 5.20 x10*6/uL    Hemoglobin 10.7 (L) 12.0 - 16.0 g/dL    Hematocrit 34.0 (L) 36.0 - 46.0 %    MCV 87 80 - 100 fL    MCH 27.5 26.0 - 34.0 pg    MCHC 31.5 (L) 32.0 - 36.0 g/dL    RDW 14.8 (H) 11.5 - 14.5 %    Platelets 249 150 - 450 x10*3/uL    MPV 10.4 7.5 - 11.5 fL    Immature Granulocytes %, Automated 5.5 (H) 0.0 - 0.9 %    Immature Granulocytes Absolute, Automated  0.85 (H) 0.00 - 0.70 x10*3/uL   Manual Differential   Result Value Ref Range    Neutrophils %, Manual 88.0 40.0 - 80.0 %    Bands %, Manual 2.0 0.0 - 5.0 %    Lymphocytes %, Manual 3.0 13.0 - 44.0 %    Monocytes %, Manual 5.0 2.0 - 10.0 %    Eosinophils %, Manual 0.0 0.0 - 6.0 %    Basophils %, Manual 0.0 0.0 - 2.0 %    Metamyelocytes %, Manual 2.0 0.0 - 0.0 %    Seg Neutrophils Absolute, Manual 13.55 (H) 1.20 - 7.00 x10*3/uL    Bands Absolute, Manual 0.31 0.00 - 0.70 x10*3/uL    Lymphocytes Absolute, Manual 0.46 (L) 1.20 - 4.80 x10*3/uL    Monocytes Absolute, Manual 0.77 0.10 - 1.00 x10*3/uL    Eosinophils Absolute, Manual 0.00 0.00 - 0.70 x10*3/uL    Basophils Absolute, Manual 0.00 0.00 - 0.10 x10*3/uL    Metamyelocytes Absolute, Manual 0.31 0.00 - 0.00 x10*3/uL    Total Cells Counted 100     Neutrophils Absolute, Manual 13.86 (H) 1.20 - 7.70 x10*3/uL    RBC Morphology See Below     Polychromasia Mild     Ovalocytes Few     [place] Cells Few     Vacuolated Neutrophils Present    Blood Gas Lactic Acid, Venous   Result Value Ref Range    POCT Lactate, Venous 1.7 0.4 - 2.0 mmol/L   POCT GLUCOSE   Result Value Ref Range    POCT Glucose 101 (H) 74 - 99 mg/dL   POCT GLUCOSE   Result Value Ref Range    POCT Glucose 119 (H) 74 - 99 mg/dL   POCT GLUCOSE   Result Value Ref Range    POCT Glucose 79 74 - 99 mg/dL   POCT GLUCOSE   Result Value Ref Range    POCT Glucose 91 74 - 99 mg/dL   Comprehensive metabolic panel   Result Value Ref Range    Glucose 198 (H) 65 - 99 mg/dL    Sodium 136 133 - 145 mmol/L    Potassium 4.1 3.4 - 5.1 mmol/L    Chloride 106 97 - 107 mmol/L    Bicarbonate 14 (L) 24 - 31 mmol/L    Urea Nitrogen 64 (H) 8 - 25 mg/dL    Creatinine 2.40 (H) 0.40 - 1.60 mg/dL    eGFR 21 (L) >60 mL/min/1.73m*2    Calcium 8.6 8.5 - 10.4 mg/dL    Albumin 2.6 (L) 3.5 - 5.0 g/dL    Alkaline Phosphatase 68 35 - 125 U/L    Total Protein 5.7 (L) 5.9 - 7.9 g/dL    AST 19 5 - 40 U/L    Bilirubin, Total 0.2 0.1 - 1.2 mg/dL    ALT 13 5  - 40 U/L    Anion Gap 16 <=19 mmol/L   C-reactive protein   Result Value Ref Range    C-Reactive Protein 3.90 (H) 0.00 - 2.00 mg/dL   CBC and Auto Differential   Result Value Ref Range    WBC 13.1 (H) 4.4 - 11.3 x10*3/uL    nRBC 0.0 0.0 - 0.0 /100 WBCs    RBC 3.94 (L) 4.00 - 5.20 x10*6/uL    Hemoglobin 10.9 (L) 12.0 - 16.0 g/dL    Hematocrit 35.7 (L) 36.0 - 46.0 %    MCV 91 80 - 100 fL    MCH 27.7 26.0 - 34.0 pg    MCHC 30.5 (L) 32.0 - 36.0 g/dL    RDW 15.0 (H) 11.5 - 14.5 %    Platelets 225 150 - 450 x10*3/uL    MPV 10.4 7.5 - 11.5 fL    Neutrophils % 90.1 40.0 - 80.0 %    Immature Granulocytes %, Automated 4.6 (H) 0.0 - 0.9 %    Lymphocytes % 2.2 13.0 - 44.0 %    Monocytes % 2.9 2.0 - 10.0 %    Eosinophils % 0.0 0.0 - 6.0 %    Basophils % 0.2 0.0 - 2.0 %    Neutrophils Absolute 11.82 (H) 1.20 - 7.70 x10*3/uL    Immature Granulocytes Absolute, Automated 0.61 0.00 - 0.70 x10*3/uL    Lymphocytes Absolute 0.29 (L) 1.20 - 4.80 x10*3/uL    Monocytes Absolute 0.38 0.10 - 1.00 x10*3/uL    Eosinophils Absolute 0.00 0.00 - 0.70 x10*3/uL    Basophils Absolute 0.03 0.00 - 0.10 x10*3/uL   Vancomycin   Result Value Ref Range    Vancomycin 23.0 (H) 10.0 - 20.0 ug/mL   POCT GLUCOSE   Result Value Ref Range    POCT Glucose 143 (H) 74 - 99 mg/dL       Assessment/Plan   Acute kidney injury secondary to prerenal azotemia from COVID-19 renal function is fairly stable continue to monitor encourage oral intake no need for IV fluids  Chronic disease stage IV  COVID-19  Acute respiratory failur  Metabolic acidosis              Danial Henderson MD

## 2023-10-26 NOTE — CARE PLAN
Problem: Respiratory  Goal: Minimize anxiety/maximize coping throughout shift  10/25/2023 2000 by Diane Blackmon, RRT  Outcome: Progressing  10/25/2023 2000 by Diane Blackmon, RRT  Outcome: Progressing  Goal: Minimal/no exertional discomfort or dyspnea this shift  10/25/2023 2000 by Diane Blackmon, RRT  Outcome: Progressing  10/25/2023 2000 by Diane Blackmon, RRT  Outcome: Progressing  Goal: No signs of respiratory distress (eg. Use of accessory muscles. Peds grunting)  10/25/2023 2000 by Diane Blackmon, RRT  Outcome: Progressing  10/25/2023 2000 by Diane Blackmon, RRT  Outcome: Progressing  Goal: Wean oxygen to maintain O2 saturation per order/standard this shift  10/25/2023 2000 by Diane Blackmon, RRT  Outcome: Progressing  10/25/2023 2000 by Diane Blackmon, RRT  Outcome: Progressing

## 2023-10-26 NOTE — PROGRESS NOTES
Patient remains on HFNC.  Continuing to monitor patient.  Therapy orders will be entered as appropriate.  Patient is from home alone where she is independent.  Anticipate needs at discharge.  Awaiting therapy evals to assist with safe discharge planning.  RN TCC following.    Madiha Salazar RN

## 2023-10-26 NOTE — PROGRESS NOTES
"Macarena Wilson is a 68 y.o. female on day 3 of admission presenting with COVID, respiratory failure.     Feels like she is starting to cough up more sputum today, unable to describe color consistency, continues to complain of fatigue but felt like she slept better overnight last night    Objective     Vitals and nursing note reviewed.   Constitutional:       General: She is not in acute distress.     Appearance: She is ill-appearing.   HENT:      Head: Normocephalic and atraumatic.      Nose: Congestion present.      Mouth/Throat:      Mouth: Mucous membranes are moist.      Pharynx: Oropharynx is clear. No oropharyngeal exudate or posterior oropharyngeal erythema.   Eyes:      Extraocular Movements: Extraocular movements intact.      Pupils: Pupils are equal, round, and reactive to light.   Cardiovascular:      Rate and Rhythm: Normal rate and regular rhythm.      Pulses: Normal pulses.      Heart sounds: No murmur heard.  Pulmonary:      Effort: Pulmonary effort is normal. No respiratory distress.      Breath sounds: Normal breath sounds. No wheezing or rhonchi.      Comments: Hfnc 30/80%  Abdominal:      General: Abdomen is flat. Bowel sounds are normal. There is no distension.      Palpations: Abdomen is soft.      Tenderness: There is no abdominal tenderness.   Musculoskeletal:         General: No swelling, tenderness, deformity or signs of injury. Normal range of motion.      Cervical back: Normal range of motion and neck supple.   Skin:     General: Skin is warm and dry.      Capillary Refill: Capillary refill takes 2 to 3 seconds.   Neurological:      General: No focal deficit present.      Mental Status: She is alert and oriented to person, place, and time.   Psychiatric:         Mood and Affect: Mood normal.        Last Recorded Vitals  /52 (BP Location: Left arm, Patient Position: Lying)   Pulse 91   Temp 36 °C (96.8 °F) (Temporal)   Resp (!) 28   Ht 1.6 m (5' 3\")   Wt 77.3 kg (170 lb 6.7 " oz)   SpO2 92%   BMI 30.19 kg/m²      Intake/Output last 3 Shifts:  I/O last 3 completed shifts:  In: 720 (9.3 mL/kg) [P.O.:120; IV Piggyback:600]  Out: 450 (5.8 mL/kg) [Urine:450 (0.2 mL/kg/hr)]  Weight: 77.3 kg     Relevant Results  Results for orders placed or performed during the hospital encounter of 10/23/23 (from the past 24 hour(s))   POCT GLUCOSE   Result Value Ref Range    POCT Glucose 91 74 - 99 mg/dL   Comprehensive metabolic panel   Result Value Ref Range    Glucose 198 (H) 65 - 99 mg/dL    Sodium 136 133 - 145 mmol/L    Potassium 4.1 3.4 - 5.1 mmol/L    Chloride 106 97 - 107 mmol/L    Bicarbonate 14 (L) 24 - 31 mmol/L    Urea Nitrogen 64 (H) 8 - 25 mg/dL    Creatinine 2.40 (H) 0.40 - 1.60 mg/dL    eGFR 21 (L) >60 mL/min/1.73m*2    Calcium 8.6 8.5 - 10.4 mg/dL    Albumin 2.6 (L) 3.5 - 5.0 g/dL    Alkaline Phosphatase 68 35 - 125 U/L    Total Protein 5.7 (L) 5.9 - 7.9 g/dL    AST 19 5 - 40 U/L    Bilirubin, Total 0.2 0.1 - 1.2 mg/dL    ALT 13 5 - 40 U/L    Anion Gap 16 <=19 mmol/L   C-reactive protein   Result Value Ref Range    C-Reactive Protein 3.90 (H) 0.00 - 2.00 mg/dL   CBC and Auto Differential   Result Value Ref Range    WBC 13.1 (H) 4.4 - 11.3 x10*3/uL    nRBC 0.0 0.0 - 0.0 /100 WBCs    RBC 3.94 (L) 4.00 - 5.20 x10*6/uL    Hemoglobin 10.9 (L) 12.0 - 16.0 g/dL    Hematocrit 35.7 (L) 36.0 - 46.0 %    MCV 91 80 - 100 fL    MCH 27.7 26.0 - 34.0 pg    MCHC 30.5 (L) 32.0 - 36.0 g/dL    RDW 15.0 (H) 11.5 - 14.5 %    Platelets 225 150 - 450 x10*3/uL    MPV 10.4 7.5 - 11.5 fL    Neutrophils % 90.1 40.0 - 80.0 %    Immature Granulocytes %, Automated 4.6 (H) 0.0 - 0.9 %    Lymphocytes % 2.2 13.0 - 44.0 %    Monocytes % 2.9 2.0 - 10.0 %    Eosinophils % 0.0 0.0 - 6.0 %    Basophils % 0.2 0.0 - 2.0 %    Neutrophils Absolute 11.82 (H) 1.20 - 7.70 x10*3/uL    Immature Granulocytes Absolute, Automated 0.61 0.00 - 0.70 x10*3/uL    Lymphocytes Absolute 0.29 (L) 1.20 - 4.80 x10*3/uL    Monocytes Absolute 0.38  0.10 - 1.00 x10*3/uL    Eosinophils Absolute 0.00 0.00 - 0.70 x10*3/uL    Basophils Absolute 0.03 0.00 - 0.10 x10*3/uL   Vancomycin   Result Value Ref Range    Vancomycin 23.0 (H) 10.0 - 20.0 ug/mL   POCT GLUCOSE   Result Value Ref Range    POCT Glucose 143 (H) 74 - 99 mg/dL   POCT GLUCOSE   Result Value Ref Range    POCT Glucose 139 (H) 74 - 99 mg/dL      No results found.   Current Facility-Administered Medications   Medication Dose Route Frequency Provider Last Rate Last Admin    acetaminophen (Tylenol) tablet 650 mg  650 mg oral q4h PRN David Rodriguez MD   650 mg at 10/26/23 0735    Or    acetaminophen (Tylenol) oral liquid 650 mg  650 mg nasogastric tube q4h PRN David Rodriguez MD        Or    acetaminophen (Tylenol) suppository 650 mg  650 mg rectal q4h PRN David Rodriguez MD        benzonatate (Tessalon) capsule 200 mg  200 mg oral TID PRN ZION Burgess-CNP        bisacodyl (Dulcolax) EC tablet 10 mg  10 mg oral Daily PRN David Rdoriguez MD        dexAMETHasone (Decadron) injection 6 mg  6 mg intravenous q24h David Rodriguez MD   6 mg at 10/25/23 2111    dextrose 10 % in water (D10W) infusion  0.3 g/kg/hr intravenous Once PRN David Rodriguez MD        dextrose 50 % injection 25 g  25 g intravenous q15 min PRN David Rodriguez MD        enoxaparin (Lovenox) syringe 30 mg  30 mg subcutaneous Daily David Rodriguez MD   30 mg at 10/26/23 0548    fluticasone (Flonase) nasal spray 1 spray  1 spray Each Nostril BID ZION Burgess-CNP   1 spray at 10/26/23 1016    glucagon (Glucagen) injection 1 mg  1 mg intramuscular q15 min PRN David Rodriguez MD        guaiFENesin (Mucinex) 12 hr tablet 600 mg  600 mg oral q12h PRN David Rodriguez MD        insulin lispro (HumaLOG) injection 0-5 Units  0-5 Units subcutaneous TID with meals David Rodriguez MD   1 Units at 10/24/23 1931    ipratropium-albuteroL (Duo-Neb) 0.5-2.5 mg/3 mL  nebulizer solution 3 mL  3 mL nebulization q6h while awake David Rodriguez MD   3 mL at 10/26/23 1234    ipratropium-albuteroL (Duo-Neb) 0.5-2.5 mg/3 mL nebulizer solution 3 mL  3 mL nebulization q2h PRN David Rodriguez MD        melatonin tablet 3 mg  3 mg oral Nightly JASON Burgess   3 mg at 10/25/23 2039    ondansetron ODT (Zofran-ODT) disintegrating tablet 4 mg  4 mg oral q8h PRN David Rodriguez MD        Or    ondansetron (Zofran) injection 4 mg  4 mg intravenous q8h PRN David Rodriguez MD        [Held by provider] oxygen (O2) therapy   inhalation Continuous PRN - O2/gases David Rodriguez MD   35 L/min at 10/24/23 0115    oxygen (O2) therapy   inhalation Continuous PRN - O2/gases Lydia Moreland MD   30 L/min at 10/26/23 0400    pantoprazole (ProtoNix) injection 40 mg  40 mg intravenous Daily Lydia Moreland MD   40 mg at 10/26/23 1005    polyethylene glycol (Glycolax, Miralax) packet 17 g  17 g oral Daily PRN JASON Burgess        remdesivir (Veklury) 100 mg in sodium chloride 0.9% 250 mL IV  100 mg intravenous q24h Lydia Moreland  mL/hr at 10/26/23 1401 100 mg at 10/26/23 1401    sodium bicarbonate tablet 650 mg  650 mg oral TID Danial Henderson MD   650 mg at 10/26/23 1005    sodium chloride (Ocean) 0.65 % nasal spray 1 spray  1 spray Each Nostril PRN JASON Burgess            Assessment/Plan   Principal Problem:    Acute respiratory failure with hypoxemia (CMS/HCC)  Active Problems:    Benign essential HTN    Stage 4 chronic kidney disease (CMS/HCC)    COVID-19    COVID-19  Acute respiratory failure  CKD4, acidosis-started on bicarb 10/25  Diarrhea  Hypertension  Palliative care     Chart reviewed, discussed with attending service and staff RN.  Full code  Patient is capable  Her stated healthcare garcia of  are her 2 sons Mathew and Josias, she has a living will.     68-year-old female presenting to the hospital with acute respiratory  failure in the setting of COVID-19 with underlying CKD 4 requiring high flow oxygen.  Prior to admission she was working full-time and independent with all ADLs and IADLs.  She had excellent quality of life without any functional limitations.  Her ultimate goal is to treat her acute condition and return to her prior level of function.  She is open to all measures including chest compressions ventilation in order to keep her alive at this point in time, however she has no wish to be sustained on life support indefinitely, and trusts her 2 sons to fill her living will if she cannot be weaned off of ventilator.  Patient very aware that her prognosis is guarded, given her high oxygen requirements.     She does have some stuffy nose and sinus congestion from her high flow oxygen, her cough is rather ineffective and preventing her from sleeping at night, she has headache and some loose bowel movements.  Added melatonin and Tessalon to suppress cough at bedtime to improve sleep.  Continue Tylenol for headache.  Added Flonase and Ocean nasal spray for sinus congestion and stuffiness.  Discontinue routine MiraLAX for diarrhea.    10/26  Added Metamucil as needed to assist with bulking stools and reduce diarrhea, continue to encourage intake of yogurt to provide probiotics, encourage proning and deep breathing/coughing to improve pulmonary function.    Full code    I spent 25 minutes in the professional and overall care of this patient.      Lou Hodges, APRN-CNP

## 2023-10-26 NOTE — PROGRESS NOTES
Macarena Wilson is a 68 y.o. female on day 3 of admission presenting with Acute respiratory failure with hypoxemia (CMS/HCC).    Subjective   Interval History:   Afebrile, no chills  Awake, alert  Reports interval improvement  Mild to moderate intermittent productive cough  No chest pain   Intermittent diarrhea-none yet today           Objective   Range of Vitals (last 24 hours)  Heart Rate:  []   Temp:  [36 °C (96.8 °F)-37.1 °C (98.8 °F)]   Resp:  [18-35]   BP: (105-134)/(49-84)   Weight:  [77.3 kg (170 lb 6.7 oz)]   SpO2:  [83 %-99 %]   Daily Weight  10/25/23 : 77.3 kg (170 lb 6.7 oz)    Body mass index is 30.19 kg/m².    Physical Exam  Constitutional:       Appearance: Normal appearance. She is ill-appearing.   HENT:      Head: Normocephalic and atraumatic.      Nose: Nose normal.      Mouth/Throat:      Mouth: Mucous membranes are moist.      Pharynx: Oropharynx is clear.   Eyes:      Extraocular Movements: Extraocular movements intact.      Conjunctiva/sclera: Conjunctivae normal.   Cardiovascular:      Rate and Rhythm: Normal rate and regular rhythm.   Pulmonary:      Effort: Pulmonary effort is normal.      Breath sounds: Normal breath sounds.   Abdominal:      General: Bowel sounds are normal.      Palpations: Abdomen is soft.   Musculoskeletal:         General: Normal range of motion.      Cervical back: Normal range of motion and neck supple.   Skin:     General: Skin is warm and dry.   Neurological:      General: No focal deficit present.      Mental Status: She is alert and oriented to person, place, and time. Mental status is at baseline.   Psychiatric:         Mood and Affect: Mood normal.         Behavior: Behavior normal.     Antibiotics    sodium chloride 0.9 % bolus 500 mL  doxycycline (Vibramycin) in dextrose 5 % in water (D5W) 100 mL  mg  cefTRIAXone (Rocephin) IVPB 1 g  Tc-99m-albumin (Draximage MAA) injection 4.2 millicurie  dexAMETHasone (Decadron) injection 8 mg  sodium  chloride 0.9 % bolus 1,000 mL  enoxaparin (Lovenox) syringe 30 mg  sodium chloride 0.9% infusion  acetaminophen (Tylenol) tablet 650 mg  acetaminophen (Tylenol) oral liquid 650 mg  acetaminophen (Tylenol) suppository 650 mg  ondansetron ODT (Zofran-ODT) disintegrating tablet 4 mg  ondansetron (Zofran) injection 4 mg  polyethylene glycol (Glycolax, Miralax) packet 17 g  bisacodyl (Dulcolax) EC tablet 10 mg  guaiFENesin (Mucinex) 12 hr tablet 600 mg  oxygen (O2) therapy  piperacillin-tazobactam-dextrose (Zosyn) IV 2.25 g  ipratropium-albuteroL (Duo-Neb) 0.5-2.5 mg/3 mL nebulizer solution 3 mL  dexAMETHasone (Decadron) injection 8 mg  dexAMETHasone (Decadron) injection 6 mg  dextrose 50 % injection 25 g  glucagon (Glucagen) injection 1 mg  dextrose 10 % in water (D10W) infusion  insulin lispro (HumaLOG) injection 0-5 Units  vancomycin-diluent combo no.1 (Xellia) IVPB 1,250 mg  ipratropium-albuteroL (Duo-Neb) 0.5-2.5 mg/3 mL nebulizer solution 3 mL  ipratropium-albuteroL (Duo-Neb) 0.5-2.5 mg/3 mL nebulizer solution 3 mL  oxygen (O2) therapy  remdesivir (Veklury) 200 mg in sodium chloride 0.9% 250 mL IV  remdesivir (Veklury) 100 mg in sodium chloride 0.9% 250 mL IV  oxygen (O2) therapy  pantoprazole (ProtoNix) injection 40 mg  remdesivir (Veklury) 200 mg in sodium chloride 0.9% 250 mL IV  remdesivir (Veklury) 100 mg in sodium chloride 0.9% 250 mL IV  tocilizumab (Actemra) 600 mg in sodium chloride 0.9% 70 mL IV      Relevant Results  Labs  Results from last 72 hours   Lab Units 10/26/23  0550 10/25/23  0607 10/24/23  1237   WBC AUTO x10*3/uL 13.1* 15.4* 18.6*   HEMOGLOBIN g/dL 10.9* 10.7* 11.4*   HEMATOCRIT % 35.7* 34.0* 35.7*   PLATELETS AUTO x10*3/uL 225 249 289   NEUTROS PCT AUTO % 90.1  --  86.4   LYMPHO PCT MAN %  --  3.0  --    LYMPHS PCT AUTO % 2.2  --  2.3   MONO PCT MAN %  --  5.0  --    MONOS PCT AUTO % 2.9  --  6.3   EOSINO PCT MAN %  --  0.0  --    EOS PCT AUTO % 0.0  --  0.0       Results from last 72 hours    Lab Units 10/26/23  0550 10/25/23  0607 10/24/23  0616   SODIUM mmol/L 136 142 139   POTASSIUM mmol/L 4.1 4.2 4.7   CHLORIDE mmol/L 106 110* 104   CO2 mmol/L 14* 16* 18*   BUN mg/dL 64* 78* 87*   CREATININE mg/dL 2.40* 2.70* 2.70*   GLUCOSE mg/dL 198* 115* 150*   CALCIUM mg/dL 8.6 9.0 8.9   ANION GAP mmol/L 16 16 17   EGFR mL/min/1.73m*2 21* 19* 19*       Results from last 72 hours   Lab Units 10/26/23  0550 10/25/23  0607 10/24/23  0616   ALK PHOS U/L 68 60 61   BILIRUBIN TOTAL mg/dL 0.2 0.2 0.2   PROTEIN TOTAL g/dL 5.7* 6.0 6.1   ALT U/L 13 13 13   AST U/L 19 18 20   ALBUMIN g/dL 2.6* 2.5* 2.8*       Estimated Creatinine Clearance: 21.8 mL/min (A) (by C-G formula based on SCr of 2.4 mg/dL (H)).  C-Reactive Protein   Date Value Ref Range Status   10/26/2023 3.90 (H) 0.00 - 2.00 mg/dL Final   10/25/2023 6.10 (H) 0.00 - 2.00 mg/dL Final   10/24/2023 10.10 (H) 0.00 - 2.00 mg/dL Final     Microbiology  Reviewed  Imaging  CT chest wo IV contrast    Result Date: 10/24/2023  Interpreted By:  Nirmal Mcmillan, STUDY: CT CHEST WO IV CONTRAST; 10/24/2023 12:27 am   INDICATION: Signs/Symptoms:Covid.   COMPARISON: None   ACCESSION NUMBER(S): [id number]   ORDERING CLINICIAN: GERTRUDE FIERRO   TECHNIQUE: Contiguous axial images of the thorax were obtained from the level of the thoracic inlet through the lung bases. Coronal and sagittal reformatted images were obtained.     FINDINGS: The thyroid gland is unremarkable.   The heart size is within normal limits.  No pericardial effusion is identified. The aorta and pulmonary arteries are normal in caliber.   There are no pathologically enlarged mediastinal, hilar, or axillary lymph nodes are seen.   The trachea and mainstem bronchi are patent. Diffuse patchy ground-glass opacities bilaterally which is compatible with atypical pneumonia. There is no evidence of pneumothorax or pleural effusion.   The visualized osseous structures are intact.   Limited images through the upper  abdomen are unremarkable.       Diffuse, patchy ground-glass opacities bilaterally suggesting atypical pneumonia such as COVID.     Signed by: Nirmal Mcmillan 10/24/2023 12:32 PM Dictation workstation:   [id number]    NM lung perfusion particulate    Result Date: 10/23/2023  Interpreted By:  Yoly Stanley, STUDY: NM LUNG PERFUSION PARTICULATE 10/23/2023 8:44 pm   INDICATION: Signs/Symptoms:hypoxia, r/o PE   COMPARISON: Chest x-ray done earlier today   ACCESSION NUMBER(S): [id number]   ORDERING CLINICIAN: ANAHY EVANS   TECHNIQUE: 4.2 millicuries of technetium 99 M MAA was injected intravenously with perfusion lung scan in 8 projections completed.   FINDINGS: No perfusion defect is seen within either lung.       Normal perfusion lung scan.   Signed by: Yoly Stanley 10/23/2023 8:57 PM Dictation workstation:   [id number]    XR chest 1 view    Result Date: 10/23/2023  Interpreted By:  Yoly Stanley, STUDY: XR CHEST 1 VIEW 10/23/2023 4:55 pm   INDICATION: Signs/Symptoms:dyspnea, cough, fatigue, and malaise. Positive COVID.   COMPARISON: None available.   ACCESSION NUMBER(S): [id number]   ORDERING CLINICIAN: ANAHY EVANS   TECHNIQUE: AP erect view of the chest   FINDINGS: The cardiac size is normal with no mediastinal abnormality identified. The trachea is midline. No pleural abnormality is seen. However, there are bilateral infiltrates with ground-glass appearance with relative sparing of the left upper lung zone.       Bilateral ground-glass infiltrates.   Signed by: Yoly Stanley 10/23/2023 5:08 PM Dictation workstation:   [id number]    Assessment/Plan   Acute hypoxic respiratory failure  Severe coronavirus pneumonitis  Stage IV chronic kidney disease  Leukocytosis  Diarrhea-rule out infectious etiology    Continue dexamethasone  Continue remdesivir  Discontinue zosyn  Monitor renal and hematologic parameters  If diarrhea persists- C. Difficile PCR/stool pathogen PCR  S/p Actemra-10/24/2023  Supportive  care  Monitor temperature and WBC  Supplemental oxygen as needed  Pulmonary follow-up    Charmaine Schaeffer, APRN-CNP

## 2023-10-27 NOTE — PROGRESS NOTES
Macarena Wilson is a 68 y.o. female on day 4 of admission presenting with Acute respiratory failure with hypoxemia (CMS/[facility]).      Subjective       Patient is awake alert oriented x3 in mild respiratory distress    Objective     Last Recorded Vitals  /71   Pulse 103   Temp 35.5 °C (95.9 °F) (Oral)   Resp 24   Wt 70.9 kg (156 lb 4.9 oz)   SpO2 90%   Intake/Output last 3 Shifts:    Intake/Output Summary (Last 24 hours) at 10/27/2023 1043  Last data filed at 10/27/2023 0000  Gross per 24 hour   Intake 480 ml   Output --   Net 480 ml         Admission Weight  Weight: 77.1 kg (170 lb) (10/23/23 1541)    Daily Weight  10/26/23 : 70.9 kg (156 lb 4.9 oz)    Image Results  CT chest wo IV contrast  Narrative: Interpreted By:  Nirmal Mcmillan,   STUDY:  CT CHEST WO IV CONTRAST; 10/24/2023 12:27 am      INDICATION:  Signs/Symptoms:Covid.      COMPARISON:  None      ACCESSION NUMBER(S):  [id number]      ORDERING CLINICIAN:  GERTRUDE FIERRO      TECHNIQUE:  Contiguous axial images of the thorax were obtained from the level of  the thoracic inlet through the lung bases. Coronal and sagittal  reformatted images were obtained.          FINDINGS:  The thyroid gland is unremarkable.      The heart size is within normal limits.  No pericardial effusion is  identified. The aorta and pulmonary arteries are normal in caliber.      There are no pathologically enlarged mediastinal, hilar, or axillary  lymph nodes are seen.      The trachea and mainstem bronchi are patent. Diffuse patchy  ground-glass opacities bilaterally which is compatible with atypical  pneumonia. There is no evidence of pneumothorax or pleural effusion.      The visualized osseous structures are intact.      Limited images through the upper abdomen are unremarkable.      Impression: Diffuse, patchy ground-glass opacities bilaterally suggesting  atypical pneumonia such as COVID.          Signed by: Nirmal Mcmillan 10/24/2023 12:32 PM  Dictation  workstation:   [id number]      Physical Exam  Constitutional:       Appearance: Normal appearance. She is normal weight. She is ill-appearing.   HENT:      Head: Normocephalic.      Nose: Congestion present.      Mouth/Throat:      Mouth: Mucous membranes are moist.   Eyes:      Extraocular Movements: Extraocular movements intact.      Conjunctiva/sclera: Conjunctivae normal.      Pupils: Pupils are equal, round, and reactive to light.   Cardiovascular:      Rate and Rhythm: Tachycardia present.   Pulmonary:      Breath sounds: Rhonchi and rales present.   Chest:      Chest wall: No tenderness.   Abdominal:      General: Bowel sounds are normal.      Palpations: Abdomen is soft.      Tenderness: There is no abdominal tenderness.   Musculoskeletal:         General: Normal range of motion.      Cervical back: Normal range of motion.   Skin:     General: Skin is warm.      Capillary Refill: Capillary refill takes less than 2 seconds.   Neurological:      General: No focal deficit present.      Mental Status: She is alert and oriented to person, place, and time.         Relevant Results             CT chest wo IV contrast    Result Date: 10/24/2023  Interpreted By:  Nirmal Mcmillan, STUDY: CT CHEST WO IV CONTRAST; 10/24/2023 12:27 am   INDICATION: Signs/Symptoms:Covid.   COMPARISON: None   ACCESSION NUMBER(S): [id number]   ORDERING CLINICIAN: GERTRUDE FIERRO   TECHNIQUE: Contiguous axial images of the thorax were obtained from the level of the thoracic inlet through the lung bases. Coronal and sagittal reformatted images were obtained.     FINDINGS: The thyroid gland is unremarkable.   The heart size is within normal limits.  No pericardial effusion is identified. The aorta and pulmonary arteries are normal in caliber.   There are no pathologically enlarged mediastinal, hilar, or axillary lymph nodes are seen.   The trachea and mainstem bronchi are patent. Diffuse patchy ground-glass opacities bilaterally which  is compatible with atypical pneumonia. There is no evidence of pneumothorax or pleural effusion.   The visualized osseous structures are intact.   Limited images through the upper abdomen are unremarkable.       Diffuse, patchy ground-glass opacities bilaterally suggesting atypical pneumonia such as COVID.     Signed by: Nirmal Mcmillan 10/24/2023 12:32 PM Dictation workstation:   [id number]    NM lung perfusion particulate    Result Date: 10/23/2023  Interpreted By:  Yoly Stanley, STUDY: NM LUNG PERFUSION PARTICULATE 10/23/2023 8:44 pm   INDICATION: Signs/Symptoms:hypoxia, r/o PE   COMPARISON: Chest x-ray done earlier today   ACCESSION NUMBER(S): [id number]   ORDERING CLINICIAN: ANAHY EVANS   TECHNIQUE: 4.2 millicuries of technetium 99 M MAA was injected intravenously with perfusion lung scan in 8 projections completed.   FINDINGS: No perfusion defect is seen within either lung.       Normal perfusion lung scan.   Signed by: Yoly Stanley 10/23/2023 8:57 PM Dictation workstation:   [id number]    XR chest 1 view    Result Date: 10/23/2023  Interpreted By:  Yoly Stanley, STUDY: XR CHEST 1 VIEW 10/23/2023 4:55 pm   INDICATION: Signs/Symptoms:dyspnea, cough, fatigue, and malaise. Positive COVID.   COMPARISON: None available.   ACCESSION NUMBER(S): [id number]   ORDERING CLINICIAN: ANAHY EVANS   TECHNIQUE: AP erect view of the chest   FINDINGS: The cardiac size is normal with no mediastinal abnormality identified. The trachea is midline. No pleural abnormality is seen. However, there are bilateral infiltrates with ground-glass appearance with relative sparing of the left upper lung zone.       Bilateral ground-glass infiltrates.   Signed by: Yoly Stanley 10/23/2023 5:08 PM Dictation workstation:   [id number]         Scheduled medications  dexAMETHasone, 6 mg, intravenous, q24h  enoxaparin, 30 mg, subcutaneous, Daily  fluticasone, 1 spray, Each Nostril, BID  insulin lispro, 0-5 Units, subcutaneous, TID with  meals  ipratropium-albuteroL, 3 mL, nebulization, q6h while awake  melatonin, 3 mg, oral, Nightly  pantoprazole, 40 mg, intravenous, Daily  remdesivir, 100 mg, intravenous, q24h  sodium bicarbonate, 650 mg, oral, TID      Continuous medications     PRN medications  PRN medications: acetaminophen **OR** acetaminophen **OR** acetaminophen, benzonatate, bisacodyl, dextrose 10 % in water (D10W), dextrose, glucagon, guaiFENesin, ipratropium-albuteroL, ondansetron ODT **OR** ondansetron, [Held by provider] oxygen, oxygen, polyethylene glycol, sodium chloride    Assessment/Plan   This patient currently has cardiac telemetry ordered; if you would like to modify or discontinue the telemetry order, click here to go to the orders activity to modify/discontinue the order.    Results for orders placed or performed during the hospital encounter of 10/23/23 (from the past 24 hour(s))   POCT GLUCOSE   Result Value Ref Range    POCT Glucose 139 (H) 74 - 99 mg/dL   POCT GLUCOSE   Result Value Ref Range    POCT Glucose 114 (H) 74 - 99 mg/dL   C. difficile, PCR    Specimen: Stool   Result Value Ref Range    C. difficile, PCR Not Detected Not Detected   POCT GLUCOSE   Result Value Ref Range    POCT Glucose 123 (H) 74 - 99 mg/dL   Comprehensive metabolic panel   Result Value Ref Range    Glucose 143 (H) 65 - 99 mg/dL    Sodium 139 133 - 145 mmol/L    Potassium 4.1 3.4 - 5.1 mmol/L    Chloride 108 (H) 97 - 107 mmol/L    Bicarbonate 19 (L) 24 - 31 mmol/L    Urea Nitrogen 52 (H) 8 - 25 mg/dL    Creatinine 1.90 (H) 0.40 - 1.60 mg/dL    eGFR 28 (L) >60 mL/min/1.73m*2    Calcium 8.5 8.5 - 10.4 mg/dL    Albumin 2.6 (L) 3.5 - 5.0 g/dL    Alkaline Phosphatase 90 35 - 125 U/L    Total Protein 5.5 (L) 5.9 - 7.9 g/dL    AST 21 5 - 40 U/L    Bilirubin, Total 0.2 0.1 - 1.2 mg/dL    ALT 14 5 - 40 U/L    Anion Gap 12 <=19 mmol/L   CBC and Auto Differential   Result Value Ref Range    WBC 13.8 (H) 4.4 - 11.3 x10*3/uL    nRBC 0.0 0.0 - 0.0 /100 WBCs     RBC 4.04 4.00 - 5.20 x10*6/uL    Hemoglobin 11.0 (L) 12.0 - 16.0 g/dL    Hematocrit 33.8 (L) 36.0 - 46.0 %    MCV 84 80 - 100 fL    MCH 27.2 26.0 - 34.0 pg    MCHC 32.5 32.0 - 36.0 g/dL    RDW 14.6 (H) 11.5 - 14.5 %    Platelets 196 150 - 450 x10*3/uL    MPV 10.6 7.5 - 11.5 fL    Neutrophils % 91.7 40.0 - 80.0 %    Immature Granulocytes %, Automated 2.7 (H) 0.0 - 0.9 %    Lymphocytes % 2.0 13.0 - 44.0 %    Monocytes % 3.3 2.0 - 10.0 %    Eosinophils % 0.1 0.0 - 6.0 %    Basophils % 0.2 0.0 - 2.0 %    Neutrophils Absolute 12.65 (H) 1.20 - 7.70 x10*3/uL    Immature Granulocytes Absolute, Automated 0.37 0.00 - 0.70 x10*3/uL    Lymphocytes Absolute 0.27 (L) 1.20 - 4.80 x10*3/uL    Monocytes Absolute 0.46 0.10 - 1.00 x10*3/uL    Eosinophils Absolute 0.02 0.00 - 0.70 x10*3/uL    Basophils Absolute 0.03 0.00 - 0.10 x10*3/uL   Vancomycin   Result Value Ref Range    Vancomycin 13.0 10.0 - 20.0 ug/mL   POCT GLUCOSE   Result Value Ref Range    POCT Glucose 153 (H) 74 - 99 mg/dL   D-dimer, Non VTE   Result Value Ref Range    D-Dimer Non VTE, Quant (mg/L FEU) 16.99 (H) 0.19 - 0.50 mg/L FEU               Principal Problem:    Acute respiratory failure with hypoxemia (CMS/HCC)  Active Problems:    Benign essential HTN    Stage 4 chronic kidney disease (CMS/HCC)    COVID-19  Acute respiratory distress syndrome   elevated CRP  Leukocytosis  Diarrhea    - Clinical presentation overall concerning for acute respiratory distress syndrome related to COVID 19 infection  -The patient unfortunately remains at high risk for decompensation thus we will continue ICU stay for ARDS   - Still on 80% HFNC , check xray   - Pulm added dimer and elevated so DVT USG ordered    -Continue remdesivir and Decadron, Decadron dose has been decreased, started 10/24  -Continue high flow nasal cannula at this time patient open to intubation if indicated gets worse  -She is status post baricitinib 10/25  -Continue pulmonary toilet with incentive spirometer  positional changes, patient re  educated on prone positioning as well  -VQ scan was of low probability for PE on hospital presentation, no CTPE due to renal dysfunction  -Per ID discontinue  empiric pneumonia treatment with vancomycin and Zosyn    -Appreciate palliative care's recommendations  - Continue Nebs every 6hrs   - C diff is negative (10/26)     -Losartan for hypertension as tolerated, hold for systolic blood pressure less than 110  - History of bladder fistula status post partial hysterectomy plan for surgical repair scheduled for December 2023    DVT prophylaxis: Lovenox daily    GI prophylaxis: Protonix    PT/OT/ST: Unable to tolerate PT OT at this time due to respiratory status    Nutrition: Regular diet/protein supplements     Code status: Full code    Have IV fluids been discontinued:  yes    Central lines present: NO    level of care indicated: ICU    Location prior to arrival: Home    Dispo plan: Critically ill, continue ICU                    Lydia Moreland MD

## 2023-10-27 NOTE — CARE PLAN
The patient's goals for the shift include To get rest    The clinical goals for the shift include Decrease O2 demand throughout the shift    Over the shift, the patient did not make progress toward the following goals. Barriers to progression include COVID, pt refusing to prone. Recommendations to address these barriers include continue to encourage side sleeping and proning, continue to encourage coughing and deep breathing.

## 2023-10-27 NOTE — PROGRESS NOTES
Renal function continues to improve creatinine level is down to 1.9 mg/dL she has normal electrolytes treated for COVID-19 on 80% FiO2 we will continue to monitor renal function very closely

## 2023-10-27 NOTE — CARE PLAN
Problem: Respiratory  Goal: Wean oxygen to maintain O2 saturation per order/standard this shift  Outcome: Not Progressing   Had to increase FIO2 to 75% due to low SpO2

## 2023-10-27 NOTE — PROGRESS NOTES
"Macarena Wilson is a 68 y.o. female on day 4 of admission presenting with Acute respiratory failure with hypoxemia (CMS/HCC).    Subjective   O2 weaned from 80 to 70% since yesterday. Still having occasional cough, especially at night when trying to sleep. Nares dry, c/o occ mild nose bleed d/t dryness. Appetite fair. Not sleeping great, but does think the melatonin is helpful. Still having some diarrhea but she feels it is definitely slowing down.       Objective     Physical Exam  Vitals and nursing note reviewed.   Constitutional:       General: She is not in acute distress.  HENT:      Head: Normocephalic.      Comments: Bruise right forehead     Nose:      Comments: dry     Mouth/Throat:      Mouth: Mucous membranes are dry.      Pharynx: Oropharynx is clear.   Eyes:      Extraocular Movements: Extraocular movements intact.      Conjunctiva/sclera: Conjunctivae normal.   Pulmonary:      Effort: Pulmonary effort is normal.      Comments: 30 lpm, 70% FiO2  Musculoskeletal:      Cervical back: Neck supple.      Right lower leg: No edema.      Left lower leg: No edema.   Skin:     General: Skin is warm and dry.   Neurological:      General: No focal deficit present.      Mental Status: She is alert and oriented to person, place, and time.   Psychiatric:         Mood and Affect: Mood normal.         Behavior: Behavior normal.         Thought Content: Thought content normal.         Judgment: Judgment normal.         Last Recorded Vitals  Blood pressure 125/65, pulse 87, temperature 35.6 °C (96 °F), temperature source Oral, resp. rate (!) 29, height 1.6 m (5' 3\"), weight 70.9 kg (156 lb 4.9 oz), SpO2 98 %.  Intake/Output last 3 Shifts:  I/O last 3 completed shifts:  In: 480 (6.8 mL/kg) [P.O.:480]  Out: - (0 mL/kg)   Weight: 70.9 kg     Scheduled medications  dexAMETHasone, 6 mg, intravenous, q24h  enoxaparin, 30 mg, subcutaneous, Daily  fluticasone, 1 spray, Each Nostril, BID  insulin lispro, 0-5 Units, " subcutaneous, TID with meals  ipratropium-albuteroL, 3 mL, nebulization, q6h while awake  melatonin, 3 mg, oral, Nightly  pantoprazole, 40 mg, intravenous, Daily  remdesivir, 100 mg, intravenous, q24h  sodium bicarbonate, 650 mg, oral, TID      Continuous medications     PRN medications  PRN medications: acetaminophen **OR** acetaminophen **OR** acetaminophen, benzonatate, bisacodyl, dextrose 10 % in water (D10W), dextrose, glucagon, guaiFENesin, ipratropium-albuteroL, ondansetron ODT **OR** ondansetron, [Held by provider] oxygen, oxygen, polyethylene glycol, sodium chloride   Relevant Results     Results for orders placed or performed during the hospital encounter of 10/23/23 (from the past 24 hour(s))   POCT GLUCOSE   Result Value Ref Range    POCT Glucose 114 (H) 74 - 99 mg/dL   C. difficile, PCR    Specimen: Stool   Result Value Ref Range    C. difficile, PCR Not Detected Not Detected   POCT GLUCOSE   Result Value Ref Range    POCT Glucose 123 (H) 74 - 99 mg/dL   Comprehensive metabolic panel   Result Value Ref Range    Glucose 143 (H) 65 - 99 mg/dL    Sodium 139 133 - 145 mmol/L    Potassium 4.1 3.4 - 5.1 mmol/L    Chloride 108 (H) 97 - 107 mmol/L    Bicarbonate 19 (L) 24 - 31 mmol/L    Urea Nitrogen 52 (H) 8 - 25 mg/dL    Creatinine 1.90 (H) 0.40 - 1.60 mg/dL    eGFR 28 (L) >60 mL/min/1.73m*2    Calcium 8.5 8.5 - 10.4 mg/dL    Albumin 2.6 (L) 3.5 - 5.0 g/dL    Alkaline Phosphatase 90 35 - 125 U/L    Total Protein 5.5 (L) 5.9 - 7.9 g/dL    AST 21 5 - 40 U/L    Bilirubin, Total 0.2 0.1 - 1.2 mg/dL    ALT 14 5 - 40 U/L    Anion Gap 12 <=19 mmol/L   CBC and Auto Differential   Result Value Ref Range    WBC 13.8 (H) 4.4 - 11.3 x10*3/uL    nRBC 0.0 0.0 - 0.0 /100 WBCs    RBC 4.04 4.00 - 5.20 x10*6/uL    Hemoglobin 11.0 (L) 12.0 - 16.0 g/dL    Hematocrit 33.8 (L) 36.0 - 46.0 %    MCV 84 80 - 100 fL    MCH 27.2 26.0 - 34.0 pg    MCHC 32.5 32.0 - 36.0 g/dL    RDW 14.6 (H) 11.5 - 14.5 %    Platelets 196 150 - 450  x10*3/uL    MPV 10.6 7.5 - 11.5 fL    Neutrophils % 91.7 40.0 - 80.0 %    Immature Granulocytes %, Automated 2.7 (H) 0.0 - 0.9 %    Lymphocytes % 2.0 13.0 - 44.0 %    Monocytes % 3.3 2.0 - 10.0 %    Eosinophils % 0.1 0.0 - 6.0 %    Basophils % 0.2 0.0 - 2.0 %    Neutrophils Absolute 12.65 (H) 1.20 - 7.70 x10*3/uL    Immature Granulocytes Absolute, Automated 0.37 0.00 - 0.70 x10*3/uL    Lymphocytes Absolute 0.27 (L) 1.20 - 4.80 x10*3/uL    Monocytes Absolute 0.46 0.10 - 1.00 x10*3/uL    Eosinophils Absolute 0.02 0.00 - 0.70 x10*3/uL    Basophils Absolute 0.03 0.00 - 0.10 x10*3/uL   Vancomycin   Result Value Ref Range    Vancomycin 13.0 10.0 - 20.0 ug/mL   POCT GLUCOSE   Result Value Ref Range    POCT Glucose 153 (H) 74 - 99 mg/dL   D-dimer, Non VTE   Result Value Ref Range    D-Dimer Non VTE, Quant (mg/L FEU) 16.99 (H) 0.19 - 0.50 mg/L FEU   POCT GLUCOSE   Result Value Ref Range    POCT Glucose 139 (H) 74 - 99 mg/dL   Lower extremity venous duplex bilateral   Result Value Ref Range    BSA 1.78 m2        Assessment/Plan   Principal Problem:    Acute respiratory failure with hypoxemia (CMS/[facility])  Active Problems:    Benign essential HTN    Stage 4 chronic kidney disease (CMS/[facility])    COVID-19    Acute resp failure 2/2 Covid pneumonia: pulmonology following, cont to titrate O2 as able. On Decadron and antiviral; reviewed good pulm hygiene practices, IS  CKD-4: creatinine downtrending since admission. Monitor  Nosebleed: add nasal saline mist prn  Cough/congestion: claritin daily x7 days, robitussin prn  Diarrhea improved/resolving  Insomnia 2/2 illness, hospitalization somewhat better with melatonin  Palliative care: FULL CODE. Pt is a capable decision maker. Goals of care are established. Palliative will cont to follow to assist with symptom management.    D/w bedside nurse Coy Brown, APRN-CNP

## 2023-10-27 NOTE — CARE PLAN
The patient's goals for the shift include To get rest    The clinical goals for the shift include Wean oxygen    Over the shift, the patient did not make progress toward the following goals. Barriers to progression include Continued high oxygen demand. Recommendations to address these barriers include position for optimal breeathing.

## 2023-10-27 NOTE — CARE PLAN
Problem: Respiratory  Goal: No signs of respiratory distress (eg. Use of accessory muscles. Peds grunting)  Outcome: Progressing     Problem: Respiratory  Goal: Wean oxygen to maintain O2 saturation per order/standard this shift  Outcome: Progressing

## 2023-10-27 NOTE — PROGRESS NOTES
"Critical Care Daily Progress        Subjective   Patient is a 68 y.o. female admitted on 10/23/2023  3:35 PM with the following indication(s) for ICU care respiratory failure in the setting of COVID-19 pneumonitis.     Interval History:   Did not sleep well last night.  Difficult to prone.  Remains on Vapotherm.    Complaints:   Denies any chest pain    Scheduled Medications:   dexAMETHasone, 6 mg, intravenous, q24h  enoxaparin, 30 mg, subcutaneous, Daily  fluticasone, 1 spray, Each Nostril, BID  insulin lispro, 0-5 Units, subcutaneous, TID with meals  ipratropium-albuteroL, 3 mL, nebulization, q6h while awake  melatonin, 3 mg, oral, Nightly  pantoprazole, 40 mg, intravenous, Daily  remdesivir, 100 mg, intravenous, q24h  sodium bicarbonate, 650 mg, oral, TID         Continuous Medications:         PRN Medications:   PRN medications: acetaminophen **OR** acetaminophen **OR** acetaminophen, benzonatate, bisacodyl, dextrose 10 % in water (D10W), dextrose, glucagon, guaiFENesin, ipratropium-albuteroL, ondansetron ODT **OR** ondansetron, [Held by provider] oxygen, oxygen, polyethylene glycol, sodium chloride    Objective   Vitals:  Most Recent:  Vitals:    10/27/23 0723   BP:    Pulse:    Resp:    Temp:    SpO2: 94%       24hr Min/Max:  Temp  Min: 35.9 °C (96.6 °F)  Max: 37.1 °C (98.8 °F)  Pulse  Min: 61  Max: 100  BP  Min: 102/53  Max: 127/71  Resp  Min: 18  Max: 39  SpO2  Min: 83 %  Max: 97 %    LDA:         Vent settings:  FiO2 (%):  [80 %-100 %] 80 %    Hemodynamic parameters for last 24 hours:       I/O:  I/O last 2 completed shifts:  In: 480 (6.8 mL/kg) [P.O.:480]  Out: - (0 mL/kg)   Weight: 70.9 kg     Physical Exam:   /65   Pulse 78   Temp 36.6 °C (97.8 °F) (Oral)   Resp (!) 27   Ht 1.6 m (5' 3\")   Wt 70.9 kg (156 lb 4.9 oz)   SpO2 94%   BMI 27.69 kg/m²     Direct physical exam deferred  Ill-appearing again but holding her own  Remains on Vapotherm  Sinus on the monitor  Does appear to tachypneic " observation  Remains alert, comprehends all questions were reports    Lab/Radiology/Diagnostic Review:  Results for orders placed or performed during the hospital encounter of 10/23/23 (from the past 24 hour(s))   POCT GLUCOSE   Result Value Ref Range    POCT Glucose 143 (H) 74 - 99 mg/dL   POCT GLUCOSE   Result Value Ref Range    POCT Glucose 139 (H) 74 - 99 mg/dL   POCT GLUCOSE   Result Value Ref Range    POCT Glucose 114 (H) 74 - 99 mg/dL   C. difficile, PCR    Specimen: Stool   Result Value Ref Range    C. difficile, PCR Not Detected Not Detected   POCT GLUCOSE   Result Value Ref Range    POCT Glucose 123 (H) 74 - 99 mg/dL   Comprehensive metabolic panel   Result Value Ref Range    Glucose 143 (H) 65 - 99 mg/dL    Sodium 139 133 - 145 mmol/L    Potassium 4.1 3.4 - 5.1 mmol/L    Chloride 108 (H) 97 - 107 mmol/L    Bicarbonate 19 (L) 24 - 31 mmol/L    Urea Nitrogen 52 (H) 8 - 25 mg/dL    Creatinine 1.90 (H) 0.40 - 1.60 mg/dL    eGFR 28 (L) >60 mL/min/1.73m*2    Calcium 8.5 8.5 - 10.4 mg/dL    Albumin 2.6 (L) 3.5 - 5.0 g/dL    Alkaline Phosphatase 90 35 - 125 U/L    Total Protein 5.5 (L) 5.9 - 7.9 g/dL    AST 21 5 - 40 U/L    Bilirubin, Total 0.2 0.1 - 1.2 mg/dL    ALT 14 5 - 40 U/L    Anion Gap 12 <=19 mmol/L   CBC and Auto Differential   Result Value Ref Range    WBC 13.8 (H) 4.4 - 11.3 x10*3/uL    nRBC 0.0 0.0 - 0.0 /100 WBCs    RBC 4.04 4.00 - 5.20 x10*6/uL    Hemoglobin 11.0 (L) 12.0 - 16.0 g/dL    Hematocrit 33.8 (L) 36.0 - 46.0 %    MCV 84 80 - 100 fL    MCH 27.2 26.0 - 34.0 pg    MCHC 32.5 32.0 - 36.0 g/dL    RDW 14.6 (H) 11.5 - 14.5 %    Platelets 196 150 - 450 x10*3/uL    MPV 10.6 7.5 - 11.5 fL    Neutrophils % 91.7 40.0 - 80.0 %    Immature Granulocytes %, Automated 2.7 (H) 0.0 - 0.9 %    Lymphocytes % 2.0 13.0 - 44.0 %    Monocytes % 3.3 2.0 - 10.0 %    Eosinophils % 0.1 0.0 - 6.0 %    Basophils % 0.2 0.0 - 2.0 %    Neutrophils Absolute 12.65 (H) 1.20 - 7.70 x10*3/uL    Immature Granulocytes Absolute,  Automated 0.37 0.00 - 0.70 x10*3/uL    Lymphocytes Absolute 0.27 (L) 1.20 - 4.80 x10*3/uL    Monocytes Absolute 0.46 0.10 - 1.00 x10*3/uL    Eosinophils Absolute 0.02 0.00 - 0.70 x10*3/uL    Basophils Absolute 0.03 0.00 - 0.10 x10*3/uL   Vancomycin   Result Value Ref Range    Vancomycin 13.0 10.0 - 20.0 ug/mL     CT chest wo IV contrast    Result Date: 10/24/2023  Interpreted By:  Nirmal Mcmillan, STUDY: CT CHEST WO IV CONTRAST; 10/24/2023 12:27 am   INDICATION: Signs/Symptoms:Covid.   COMPARISON: None   ACCESSION NUMBER(S): [id number]   ORDERING CLINICIAN: GERTRUDE FIERRO   TECHNIQUE: Contiguous axial images of the thorax were obtained from the level of the thoracic inlet through the lung bases. Coronal and sagittal reformatted images were obtained.     FINDINGS: The thyroid gland is unremarkable.   The heart size is within normal limits.  No pericardial effusion is identified. The aorta and pulmonary arteries are normal in caliber.   There are no pathologically enlarged mediastinal, hilar, or axillary lymph nodes are seen.   The trachea and mainstem bronchi are patent. Diffuse patchy ground-glass opacities bilaterally which is compatible with atypical pneumonia. There is no evidence of pneumothorax or pleural effusion.   The visualized osseous structures are intact.   Limited images through the upper abdomen are unremarkable.       Diffuse, patchy ground-glass opacities bilaterally suggesting atypical pneumonia such as COVID.     Signed by: Nirmal Mcmillan 10/24/2023 12:32 PM Dictation workstation:   [id number]    NM lung perfusion particulate    Result Date: 10/23/2023  Interpreted By:  Yoly Stanley, STUDY: NM LUNG PERFUSION PARTICULATE 10/23/2023 8:44 pm   INDICATION: Signs/Symptoms:hypoxia, r/o PE   COMPARISON: Chest x-ray done earlier today   ACCESSION NUMBER(S): [id number]   ORDERING CLINICIAN: ANAHY EVANS   TECHNIQUE: 4.2 millicuries of technetium 99 M MAA was injected intravenously with perfusion  lung scan in 8 projections completed.   FINDINGS: No perfusion defect is seen within either lung.       Normal perfusion lung scan.   Signed by: Yoly Stanley 10/23/2023 8:57 PM Dictation workstation:   [id number]    XR chest 1 view    Result Date: 10/23/2023  Interpreted By:  Yoly Stanley, STUDY: XR CHEST 1 VIEW 10/23/2023 4:55 pm   INDICATION: Signs/Symptoms:dyspnea, cough, fatigue, and malaise. Positive COVID.   COMPARISON: None available.   ACCESSION NUMBER(S): [id number]   ORDERING CLINICIAN: ANAHY EVANS   TECHNIQUE: AP erect view of the chest   FINDINGS: The cardiac size is normal with no mediastinal abnormality identified. The trachea is midline. No pleural abnormality is seen. However, there are bilateral infiltrates with ground-glass appearance with relative sparing of the left upper lung zone.       Bilateral ground-glass infiltrates.   Signed by: Yoly Stanley 10/23/2023 5:08 PM Dictation workstation:   [id number]          Assessment/Plan   Principal Problem:    Acute respiratory failure with hypoxemia (CMS/HCC)  Active Problems:    COVID-19    Benign essential HTN    Stage 4 chronic kidney disease (CMS/HCC)    Continues to be be happy hypoxic.  I do admit that I remain guarded about patient's ability to maintain respiratory status with current measures.  We have required escalation while at bedtime but has settled out during the daytime.  As expected she does drop her oxygen with any movement.  We will rule out any additional insults and although had bilateral pleural sliding on portable bedside lung ultrasound yesterday, we will proceed with a portable chest x-ray today.  We will also get a D-dimer and if elevated at the very minimum get ultrasound of her legs and/or empirically start on therapeutic anticoagulation  Again encourage prone positioning  Status post Actemra  Continue with remdesivir  Decadron  Prophylaxis  Guarded prognosis    Discussed with ICU RN      Due to the high probability of life  threatening clinical decompensation, the patient required critical care time evaluating and managing this patient.  Critical care time included obtaining a history, examining the patient, ordering and reviewing studies, discussing, developing, and implementing a management plan, evaluating the patient's response to treatment, and discussion with other care team providers. I saw and evaluated the patient myself.  Critical care time was performed exclusive of billable procedures.    Critical care time: 18 mins

## 2023-10-27 NOTE — CARE PLAN
Problem: Respiratory  Goal: Minimize anxiety/maximize coping throughout shift  Outcome: Progressing  Goal: Minimal/no exertional discomfort or dyspnea this shift  Outcome: Progressing  Goal: No signs of respiratory distress (eg. Use of accessory muscles. Peds grunting)  Outcome: Progressing  Goal: Wean oxygen to maintain O2 saturation per order/standard this shift  Outcome: Progressing

## 2023-10-27 NOTE — PROGRESS NOTES
"Vancomycin Dosing by Pharmacy- FOLLOW UP    Macarena Wilson is a 68 y.o. year old female who Pharmacy has been consulted for vancomycin dosing for pneumonia. Based on the patient's indication and renal status this patient is being dosed based on a goal AUC of 400-600.     Renal function is currently improving. Has received 2 doses 1250 mg and dosed by levels due to HUNG.  Renal function improving, and will utilize [employer] for dosing based on good fit result.    Current vancomycin dose: 750 mg given every 24 hours      Visit Vitals  BP (!) 126/112   Pulse 90   Temp 35.6 °C (96 °F) (Oral)   Resp 23        Lab Results   Component Value Date    CREATININE 1.90 (H) 10/27/2023    CREATININE 2.40 (H) 10/26/2023    CREATININE 2.70 (H) 10/25/2023    CREATININE 2.70 (H) 10/24/2023        Patient weight is 70.9 kg    No results found for: \"CULTURE\"     I/O last 3 completed shifts:  In: 480 (6.8 mL/kg) [P.O.:480]  Out: - (0 mL/kg)   Weight: 70.9 kg   [unfilled]    Lab Results   Component Value Date    PATIENTTEMP 37.0 10/25/2023    PATIENTTEMP 37.0 10/24/2023        Assessment/Plan    Random level below target so will redose based on AUC dosing protocol as patient has improving renal function and good fit per [employer].    This dosing regimen is predicted by [employer] to result in the following pharmacokinetic parameters:    Loading dose: N/A  Regimen: 750 mg IV every 24 hours.  Start time: 12:59 on 10/27/2023  Exposure target: AUC24 (range)400-600 mg/L.hr   AUC24,ss: 490 mg/L.hr  Probability of AUC24 > 400: 87 %  Ctrough,ss: 15.5 mg/L  Probability of Ctrough,ss > 20: 15 %  Probability of nephrotoxicity (Lodise DANILO 2009): 11 %    The next level will be obtained on 10/28 at 0500 with AM labs. May be obtained sooner if clinically indicated.   Will continue to monitor renal function daily while on vancomycin and order serum creatinine at least every 48 hours if not already ordered.  Follow for continued vancomycin needs, " clinical response, and signs/symptoms of toxicity.       KIMBERLY ALBERTS, PharmD

## 2023-10-27 NOTE — PROGRESS NOTES
Macarena iWlson is a 68 y.o. female on day 4 of admission presenting with Acute respiratory failure with hypoxemia (CMS/HCC).      Subjective  Patient sitting up on the side of the bed.  On high flow 80/20, saturating at 95% on my exam  Tells me she is feeling a little better  Denies chest pain, nausea or vomiting  Endorses fatigue    Objective    Physical Exam  Constitutional:       Appearance: Normal appearance.  No acute distress  HENT:      Head: Normocephalic and atraumatic.      Nose: Nose normal.      Mouth/Throat:      Mouth: Mucous membranes are moist.      Pharynx: Oropharynx is clear.   Eyes:      Extraocular Movements: Extraocular movements intact.      Conjunctiva/sclera: Conjunctivae normal.   Cardiovascular:      Rate and Rhythm: Normal rate and regular rhythm.   Pulmonary:      Effort: Pulmonary effort is normal.      Breath sounds: Diminished  Abdominal:      General: Bowel sounds are normal.      Palpations: Abdomen is soft.   Musculoskeletal:         General: Normal range of motion.      Cervical back: Normal range of motion and neck supple.   Skin:     General: Skin is warm and dry.   Neurological:      General: No focal deficit present.      Mental Status: She is alert and oriented to person, place, and time. Mental status is at baseline.   Psychiatric:         Mood and Affect: Mood normal.         Behavior: Behavior normal.     Last Recorded Vitals      10/27/2023     2:00 AM 10/27/2023     3:00 AM 10/27/2023     4:00 AM 10/27/2023     5:00 AM 10/27/2023     6:00 AM 10/27/2023     7:23 AM 10/27/2023     8:00 AM   Vitals   Systolic 118 102 123 125 126  111   Diastolic 64 53 61 57 65  77   Heart Rate 76 71 61 68 78  90   Temp   36.6 °C (97.8 °F)   35.5 °C (95.9 °F)    Resp 20 38 39 27 27  25       Relevant Results  Results for orders placed or performed during the hospital encounter of 10/23/23 (from the past 24 hour(s))   POCT GLUCOSE   Result Value Ref Range    POCT Glucose 139 (H) 74 - 99  mg/dL   POCT GLUCOSE   Result Value Ref Range    POCT Glucose 114 (H) 74 - 99 mg/dL   C. difficile, PCR    Specimen: Stool   Result Value Ref Range    C. difficile, PCR Not Detected Not Detected   POCT GLUCOSE   Result Value Ref Range    POCT Glucose 123 (H) 74 - 99 mg/dL   Comprehensive metabolic panel   Result Value Ref Range    Glucose 143 (H) 65 - 99 mg/dL    Sodium 139 133 - 145 mmol/L    Potassium 4.1 3.4 - 5.1 mmol/L    Chloride 108 (H) 97 - 107 mmol/L    Bicarbonate 19 (L) 24 - 31 mmol/L    Urea Nitrogen 52 (H) 8 - 25 mg/dL    Creatinine 1.90 (H) 0.40 - 1.60 mg/dL    eGFR 28 (L) >60 mL/min/1.73m*2    Calcium 8.5 8.5 - 10.4 mg/dL    Albumin 2.6 (L) 3.5 - 5.0 g/dL    Alkaline Phosphatase 90 35 - 125 U/L    Total Protein 5.5 (L) 5.9 - 7.9 g/dL    AST 21 5 - 40 U/L    Bilirubin, Total 0.2 0.1 - 1.2 mg/dL    ALT 14 5 - 40 U/L    Anion Gap 12 <=19 mmol/L   CBC and Auto Differential   Result Value Ref Range    WBC 13.8 (H) 4.4 - 11.3 x10*3/uL    nRBC 0.0 0.0 - 0.0 /100 WBCs    RBC 4.04 4.00 - 5.20 x10*6/uL    Hemoglobin 11.0 (L) 12.0 - 16.0 g/dL    Hematocrit 33.8 (L) 36.0 - 46.0 %    MCV 84 80 - 100 fL    MCH 27.2 26.0 - 34.0 pg    MCHC 32.5 32.0 - 36.0 g/dL    RDW 14.6 (H) 11.5 - 14.5 %    Platelets 196 150 - 450 x10*3/uL    MPV 10.6 7.5 - 11.5 fL    Neutrophils % 91.7 40.0 - 80.0 %    Immature Granulocytes %, Automated 2.7 (H) 0.0 - 0.9 %    Lymphocytes % 2.0 13.0 - 44.0 %    Monocytes % 3.3 2.0 - 10.0 %    Eosinophils % 0.1 0.0 - 6.0 %    Basophils % 0.2 0.0 - 2.0 %    Neutrophils Absolute 12.65 (H) 1.20 - 7.70 x10*3/uL    Immature Granulocytes Absolute, Automated 0.37 0.00 - 0.70 x10*3/uL    Lymphocytes Absolute 0.27 (L) 1.20 - 4.80 x10*3/uL    Monocytes Absolute 0.46 0.10 - 1.00 x10*3/uL    Eosinophils Absolute 0.02 0.00 - 0.70 x10*3/uL    Basophils Absolute 0.03 0.00 - 0.10 x10*3/uL   Vancomycin   Result Value Ref Range    Vancomycin 13.0 10.0 - 20.0 ug/mL   POCT GLUCOSE   Result Value Ref Range    POCT  Glucose 153 (H) 74 - 99 mg/dL   D-dimer, Non VTE   Result Value Ref Range    D-Dimer Non VTE, Quant (mg/L FEU) 16.99 (H) 0.19 - 0.50 mg/L FEU       Assessment/Plan   Acute hypoxic respiratory failure, requiring high flow  Severe coronavirus pneumonitis  Stage IV chronic kidney disease  Leukocytosis  Diarrhea-rule out infectious etiology     Continue dexamethasone  Continue remdesivir  Monitor renal and hematologic parameters  If diarrhea persists- C. Difficile PCR/stool pathogen PCR  S/p Actemra-10/24/2023  Supportive care  Monitor temperature and WBC  Supplemental oxygen as needed  Pulmonary follow-up

## 2023-10-28 NOTE — CARE PLAN
Problem: Respiratory  Goal: Minimal/no exertional discomfort or dyspnea this shift  Outcome: Progressing  Goal: No signs of respiratory distress (eg. Use of accessory muscles. Peds grunting)  Outcome: Progressing  Goal: Wean oxygen to maintain O2 saturation per order/standard this shift  Outcome: Progressing   The patient's goals for the shift include To get rest    The clinical goals for the shift include wean oxygen    Over the shift, the patient did not make progress toward the following goals. Barriers to progression include Recommendations to address these barriers include

## 2023-10-28 NOTE — PROGRESS NOTES
Critical Care Progress Note    Macarena Wilson is a 68 y.o. female on day 5 of admission presenting with Acute respiratory failure with hypoxemia (CMS/HCC).    Subjective   Follow up hypoxia / covid  Objective   Vital Signs      10/28/2023     9:00 AM 10/28/2023    10:00 AM 10/28/2023    11:59 AM 10/28/2023    12:06 PM 10/28/2023    12:16 PM 10/28/2023     3:46 PM 10/28/2023     4:09 PM   Vitals   Systolic 118 129 120  117  131   Diastolic 64 62 69  64  65   Heart Rate 98 86 81  96  98   Temp    34.6 °C (94.3 °F)   35.2 °C (95.4 °F)   Resp 30 30 31  20  30   Weight (lb)      156.09    BMI      27.65 kg/m2    BSA (m2)      1.77 m2        Oxygen Therapy  SpO2: 92 %  Medical Gas Therapy: Supplemental oxygen  O2 Delivery Method: High flow nasal cannula    FiO2 (%):  [85 %-100 %] 100 %    Intake/Output previous 24 hours:  No intake or output data in the 24 hours ending 10/28/23 3623    Physical Exam  Constitutional:       Appearance: She is ill-appearing.   HENT:      Head: Normocephalic and atraumatic.      Mouth/Throat:      Mouth: Mucous membranes are dry.   Eyes:      Extraocular Movements: Extraocular movements intact.      Pupils: Pupils are equal, round, and reactive to light.   Cardiovascular:      Rate and Rhythm: Normal rate and regular rhythm.      Pulses: Normal pulses.      Heart sounds: Normal heart sounds.   Pulmonary:      Effort: Pulmonary effort is normal.      Breath sounds: Decreased air movement present. Decreased breath sounds present.   Abdominal:      General: Bowel sounds are normal.      Palpations: Abdomen is soft.   Musculoskeletal:         General: Normal range of motion.      Cervical back: Normal range of motion.   Skin:     General: Skin is warm and dry.   Neurological:      General: No focal deficit present.      Mental Status: She is alert and oriented to person, place, and time.   Psychiatric:         Mood and Affect: Mood normal.       Lines and Tubes:  Peripheral IV 10/23/23 20 G  (Active)   Placement Date/Time: 10/23/23 1624   Size (Gauge): 20 G   Number of days: 5       Peripheral IV 10/27/23 20 G Distal;Left;Anterior Forearm (Active)   Placement Date/Time: 10/27/23 1900   Earliest Known Present: 10/27/23  Size (Gauge): 20 G  Orientation: Distal;Left;Anterior  Location: Forearm   Number of days: 0       Peripheral IV 10/23/23 20 G Left;Proximal;Anterior Forearm (Active)   Placement Date/Time: (?) 10/23/23 (?) 0000   Earliest Known Present: (?) 10/23/23  Size (Gauge): (?) 20 G  Orientation: (?) Left;Proximal;Anterior  Location: (?) Forearm   Number of days: 5         Scheduled medications  dexAMETHasone, 6 mg, intravenous, q24h  enoxaparin, 30 mg, subcutaneous, Daily  fluticasone, 1 spray, Each Nostril, BID  insulin lispro, 0-5 Units, subcutaneous, TID with meals  ipratropium-albuteroL, 3 mL, nebulization, q6h while awake  melatonin, 3 mg, oral, Nightly  pantoprazole, 40 mg, oral, Daily before breakfast  sodium bicarbonate, 650 mg, oral, TID  vancomycin-diluent combo no.1, 750 mg, intravenous, q24h      Continuous medications     PRN medications  PRN medications: acetaminophen **OR** acetaminophen **OR** acetaminophen, benzonatate, bisacodyl, dextrose 10 % in water (D10W), dextrose, glucagon, guaiFENesin, ipratropium-albuteroL, ondansetron ODT **OR** ondansetron, [Held by provider] oxygen, oxygen, polyethylene glycol, sodium chloride    Relevant Results  Results from last 7 days   Lab Units 10/28/23  0505 10/27/23  0555 10/26/23  0550   WBC AUTO x10*3/uL 13.9* 13.8* 13.1*   HEMOGLOBIN g/dL 11.1* 11.0* 10.9*   HEMATOCRIT % 33.4* 33.8* 35.7*   PLATELETS AUTO x10*3/uL 134* 196 225      Results from last 7 days   Lab Units 10/28/23  0505 10/27/23  0555 10/26/23  0550   SODIUM mmol/L 135 139 136   POTASSIUM mmol/L 3.7 4.1 4.1   CHLORIDE mmol/L 104 108* 106   CO2 mmol/L 20* 19* 14*   BUN mg/dL 49* 52* 64*   CREATININE mg/dL 1.70* 1.90* 2.40*   GLUCOSE mg/dL 208* 143* 198*   CALCIUM mg/dL 8.3* 8.5  8.6      Results from last 7 days   Lab Units 10/25/23  0507   POCT PH, ARTERIAL pH 7.35*   POCT PCO2, ARTERIAL mm Hg 31*   POCT PO2, ARTERIAL mm Hg 115*   POCT HCO3 CALCULATED, ARTERIAL mmol/L 17.1*   POCT BASE EXCESS, ARTERIAL mmol/L -7.5*     XR chest 1 view 10/27/2023    Narrative  Interpreted By:  Nirmal Mcmillan,  STUDY:  XR CHEST 1 VIEW; 10/27/2023 10:06 am    INDICATION:  Signs/Symptoms:follow up hypoxemia.    COMPARISON:  10/23/2023    ACCESSION NUMBER(S):  [id number]    ORDERING CLINICIAN:  SUSANNE FRANCOIS    TECHNIQUE:  1 view of the chest was performed.    FINDINGS:  There is diffuse ground-glass opacity bilaterally consistent with  history of COVID. No lobar or large consolidation however the  ground-glass opacities are prominent but stable from the previous  examination. No pleural effusion. No pneumothorax.  The  cardiomediastinal silhouette is within normal limits.    Impression  No significant change. Prominent ground-glass opacities bilaterally,  however, not significantly changed.    Signed by: Nirmal Mcmillan 10/27/2023 10:24 PM  Dictation workstation:   [id number]      Patient Active Problem List   Diagnosis    Abnormal mammogram    Acute pain of left shoulder    Allergic rhinitis    Benign essential HTN    Calculus of kidney    Staghorn renal calculus    Diverticular disease    Gammopathy    Gross hematuria    Hematuria, unspecified    Impaired fasting glucose    Nodule of kidney    Obesity    Post-menopausal bleeding    Prediabetes    Acute cystitis with hematuria    Stage 4 chronic kidney disease (CMS/HCC)    Urgency incontinence    Bladder fistula    Atrophic kidney    COVID-19    Acute respiratory failure with hypoxemia (CMS/HCC)    Pneumonia of both lower lobes due to infectious organism     Assessment/Plan    Acute respiratory failure with hypoxemia (CMS/HCC)  Active Problems:    COVID-19    Benign essential HTN    Stage 4 chronic kidney disease (CMS/HCC)    Persistent hypoxia  D-dimer  16  VQ was low probability several days ago  Duplex studies pending  Diuresis  HFNC  Discusses NIPPV - patient does not feel she will tolerate  Remdesivir  Decadron  Aerosol Rx    Davin Avendaño MD  [facility]       This critically ill patient continues to be at-risk for deterioration / failure due to the above mentioned dysfunctional unstable organ systems.   Critical care time is spent at bedside includes review of diagnostic tests, labs, and radiographs, serial assessments and management of hemodynamics, respiratory status, and coordination of care with different members of interdisciplinary team  Assessment, impression and plans are reflected in the note above as well as the orders.     Critical concerns addressed:   Acute respiratory failure  Hypoxia  COVID-19 pneumonitis  Elevated D-dimer rule out VTE  Time Spent in critical Care, exclusive of procedures : 25 mins.     Disclaimer: Parts of this chart were dictated with voice recognition software. Please excuse any errors of grammar, spelling, or transcription which are not corrected. Please contact me with any questions regarding documentation.

## 2023-10-28 NOTE — PROGRESS NOTES
"Macarena Wilson is a 68 y.o. female on day 5 of admission presenting with Acute respiratory failure with hypoxemia (CMS/HCC).    Subjective      Seen for acute kidney injury on top of chronic kidney disease stage IV.  Admitted with COVID-19 the patient oxygen current went up to FiO2 of 100  Diarrhea to get severely short of breath on exertion she is in positive fluid balance  Review of Systems    Objective     Physical Exam  No change in physical exam  Last Recorded Vitals  Blood pressure 128/68, pulse 92, temperature 36.5 °C (97.7 °F), temperature source Temporal, resp. rate (!) 30, height 1.6 m (5' 3\"), weight 70.9 kg (156 lb 4.9 oz), SpO2 90 %.    Intake/Output last 3 Shifts:  I/O last 3 completed shifts:  In: 880 (12.4 mL/kg) [P.O.:480; IV Piggyback:400]  Out: - (0 mL/kg)   Weight: 70.9 kg     Current Facility-Administered Medications:     acetaminophen (Tylenol) tablet 650 mg, 650 mg, oral, q4h PRN, 650 mg at 10/27/23 2056 **OR** acetaminophen (Tylenol) oral liquid 650 mg, 650 mg, nasogastric tube, q4h PRN **OR** acetaminophen (Tylenol) suppository 650 mg, 650 mg, rectal, q4h PRN, David Rodriguez MD    benzonatate (Tessalon) capsule 200 mg, 200 mg, oral, TID PRN, Lou Hodges, APRN-CNP    bisacodyl (Dulcolax) EC tablet 10 mg, 10 mg, oral, Daily PRN, David Rodriguez MD    dexAMETHasone (Decadron) injection 6 mg, 6 mg, intravenous, q24h, David Rodriguez MD, 6 mg at 10/27/23 2145    dextrose 10 % in water (D10W) infusion, 0.3 g/kg/hr, intravenous, Once PRN, David Rodriguez MD    dextrose 50 % injection 25 g, 25 g, intravenous, q15 min PRN, David Rodriguez MD    enoxaparin (Lovenox) syringe 30 mg, 30 mg, subcutaneous, Daily, David Rodriguez MD, 30 mg at 10/28/23 0634    fluticasone (Flonase) nasal spray 1 spray, 1 spray, Each Nostril, BID, Lou Hodges, APRN-CNP, 1 spray at 10/28/23 0900    glucagon (Glucagen) injection 1 mg, 1 mg, intramuscular, q15 min PRN, " David Rodriguez MD    guaiFENesin (Robitussin) 100 mg/5 mL syrup 200 mg, 200 mg, oral, q4h PRN, JASON Shankar    insulin lispro (HumaLOG) injection 0-5 Units, 0-5 Units, subcutaneous, TID with meals, David Rodriguez MD, 1 Units at 10/28/23 0927    ipratropium-albuteroL (Duo-Neb) 0.5-2.5 mg/3 mL nebulizer solution 3 mL, 3 mL, nebulization, q6h while awake, David Rodriguez MD, 3 mL at 10/28/23 0711    ipratropium-albuteroL (Duo-Neb) 0.5-2.5 mg/3 mL nebulizer solution 3 mL, 3 mL, nebulization, q2h PRN, David Rodriguez MD    melatonin tablet 3 mg, 3 mg, oral, Nightly, ZION Burgess-CNP, 3 mg at 10/27/23 2056    ondansetron ODT (Zofran-ODT) disintegrating tablet 4 mg, 4 mg, oral, q8h PRN **OR** ondansetron (Zofran) injection 4 mg, 4 mg, intravenous, q8h PRN, David Rodriguez MD    [Held by provider] oxygen (O2) therapy, , inhalation, Continuous PRN - O2/gases, David Rodriguez MD, 35 L/min at 10/24/23 0115    oxygen (O2) therapy, , inhalation, Continuous PRN - O2/gases, Lydai Moreland MD    pantoprazole (ProtoNix) EC tablet 40 mg, 40 mg, oral, Daily before breakfast, Lydia Moreland MD, 40 mg at 10/28/23 0634    polyethylene glycol (Glycolax, Miralax) packet 17 g, 17 g, oral, Daily PRN, ZION Burgess-CNP    remdesivir (Veklury) 100 mg in sodium chloride 0.9% 250 mL IV, 100 mg, intravenous, q24h, Lydia Moreland MD, Stopped at 10/27/23 1445    sodium bicarbonate tablet 650 mg, 650 mg, oral, TID, Danial Henderson MD, 650 mg at 10/28/23 0927    sodium chloride (Ocean) 0.65 % nasal spray 1 spray, 1 spray, Each Nostril, PRN, Lou Hodges, ZION-CNP    vancomycin-diluent combo no.1 (Xellia) IVPB 750 mg, 750 mg, intravenous, q24h, Marcela Wesley, ZION-CNP, Stopped at 10/27/23 1558   Relevant Results    Results for orders placed or performed during the hospital encounter of 10/23/23 (from the past 96 hour(s))   CBC and Auto Differential   Result Value Ref Range     WBC 18.6 (H) 4.4 - 11.3 x10*3/uL    nRBC 0.0 0.0 - 0.0 /100 WBCs    RBC 4.21 4.00 - 5.20 x10*6/uL    Hemoglobin 11.4 (L) 12.0 - 16.0 g/dL    Hematocrit 35.7 (L) 36.0 - 46.0 %    MCV 85 80 - 100 fL    MCH 27.1 26.0 - 34.0 pg    MCHC 31.9 (L) 32.0 - 36.0 g/dL    RDW 14.9 (H) 11.5 - 14.5 %    Platelets 289 150 - 450 x10*3/uL    MPV 10.6 7.5 - 11.5 fL    Neutrophils % 86.4 40.0 - 80.0 %    Immature Granulocytes %, Automated 4.8 (H) 0.0 - 0.9 %    Lymphocytes % 2.3 13.0 - 44.0 %    Monocytes % 6.3 2.0 - 10.0 %    Eosinophils % 0.0 0.0 - 6.0 %    Basophils % 0.2 0.0 - 2.0 %    Neutrophils Absolute 16.11 (H) 1.20 - 7.70 x10*3/uL    Immature Granulocytes Absolute, Automated 0.89 (H) 0.00 - 0.70 x10*3/uL    Lymphocytes Absolute 0.42 (L) 1.20 - 4.80 x10*3/uL    Monocytes Absolute 1.18 (H) 0.10 - 1.00 x10*3/uL    Eosinophils Absolute 0.00 0.00 - 0.70 x10*3/uL    Basophils Absolute 0.03 0.00 - 0.10 x10*3/uL   Serial Troponin, 6 Hour ([facility])   Result Value Ref Range    Troponin T, High Sensitivity 13 <=15 ng/L   POCT GLUCOSE   Result Value Ref Range    POCT Glucose 151 (H) 74 - 99 mg/dL   BLOOD GAS LACTIC ACID, VENOUS   Result Value Ref Range    POCT Lactate, Venous 2.2 (H) 0.4 - 2.0 mmol/L   POCT GLUCOSE   Result Value Ref Range    POCT Glucose 184 (H) 74 - 99 mg/dL   Vancomycin   Result Value Ref Range    Vancomycin 12.0 10.0 - 20.0 ug/mL   Blood Gas Arterial Full Panel   Result Value Ref Range    POCT pH, Arterial 7.35 (L) 7.38 - 7.42 pH    POCT pCO2, Arterial 31 (L) 38 - 42 mm Hg    POCT pO2, Arterial 115 (H) 85 - 95 mm Hg    POCT SO2, Arterial 99 94 - 100 %    POCT Oxy Hemoglobin, Arterial 96.9 94.0 - 98.0 %    POCT Hematocrit Calculated, Arterial 34.0 (L) 36.0 - 46.0 %    POCT Sodium, Arterial 140 136 - 145 mmol/L    POCT Potassium, Arterial 4.3 3.5 - 5.3 mmol/L    POCT Chloride, Arterial 112 (H) 98 - 107 mmol/L    POCT Ionized Calcium, Arterial 1.23 1.10 - 1.33 mmol/L    POCT Glucose, Arterial 117 (H) 74 - 99 mg/dL    POCT  Lactate, Arterial 2.1 (H) 0.4 - 2.0 mmol/L    POCT Base Excess, Arterial -7.5 (L) -2.0 - 3.0 mmol/L    POCT HCO3 Calculated, Arterial 17.1 (L) 22.0 - 26.0 mmol/L    POCT Hemoglobin, Arterial 11.4 (L) 12.0 - 16.0 g/dL    POCT Anion Gap, Arterial 15 10 - 25 mmo/L    Patient Temperature 37.0 degrees Celsius    FiO2 100 %    Apparatus HIGH FLOW CANNULA     Flow 30.0 LPM    Critical Called By MELINA Humphries Called To DR MERCADO     Critical Call Time [phone]     Critical Read Back Y    Comprehensive metabolic panel   Result Value Ref Range    Glucose 115 (H) 65 - 99 mg/dL    Sodium 142 133 - 145 mmol/L    Potassium 4.2 3.4 - 5.1 mmol/L    Chloride 110 (H) 97 - 107 mmol/L    Bicarbonate 16 (L) 24 - 31 mmol/L    Urea Nitrogen 78 (H) 8 - 25 mg/dL    Creatinine 2.70 (H) 0.40 - 1.60 mg/dL    eGFR 19 (L) >60 mL/min/1.73m*2    Calcium 9.0 8.5 - 10.4 mg/dL    Albumin 2.5 (L) 3.5 - 5.0 g/dL    Alkaline Phosphatase 60 35 - 125 U/L    Total Protein 6.0 5.9 - 7.9 g/dL    AST 18 5 - 40 U/L    Bilirubin, Total 0.2 0.1 - 1.2 mg/dL    ALT 13 5 - 40 U/L    Anion Gap 16 <=19 mmol/L   C-reactive protein   Result Value Ref Range    C-Reactive Protein 6.10 (H) 0.00 - 2.00 mg/dL   CBC and Auto Differential   Result Value Ref Range    WBC 15.4 (H) 4.4 - 11.3 x10*3/uL    nRBC 0.1 (H) 0.0 - 0.0 /100 WBCs    RBC 3.89 (L) 4.00 - 5.20 x10*6/uL    Hemoglobin 10.7 (L) 12.0 - 16.0 g/dL    Hematocrit 34.0 (L) 36.0 - 46.0 %    MCV 87 80 - 100 fL    MCH 27.5 26.0 - 34.0 pg    MCHC 31.5 (L) 32.0 - 36.0 g/dL    RDW 14.8 (H) 11.5 - 14.5 %    Platelets 249 150 - 450 x10*3/uL    MPV 10.4 7.5 - 11.5 fL    Immature Granulocytes %, Automated 5.5 (H) 0.0 - 0.9 %    Immature Granulocytes Absolute, Automated 0.85 (H) 0.00 - 0.70 x10*3/uL   Manual Differential   Result Value Ref Range    Neutrophils %, Manual 88.0 40.0 - 80.0 %    Bands %, Manual 2.0 0.0 - 5.0 %    Lymphocytes %, Manual 3.0 13.0 - 44.0 %    Monocytes %, Manual 5.0 2.0 - 10.0 %    Eosinophils %,  Manual 0.0 0.0 - 6.0 %    Basophils %, Manual 0.0 0.0 - 2.0 %    Metamyelocytes %, Manual 2.0 0.0 - 0.0 %    Seg Neutrophils Absolute, Manual 13.55 (H) 1.20 - 7.00 x10*3/uL    Bands Absolute, Manual 0.31 0.00 - 0.70 x10*3/uL    Lymphocytes Absolute, Manual 0.46 (L) 1.20 - 4.80 x10*3/uL    Monocytes Absolute, Manual 0.77 0.10 - 1.00 x10*3/uL    Eosinophils Absolute, Manual 0.00 0.00 - 0.70 x10*3/uL    Basophils Absolute, Manual 0.00 0.00 - 0.10 x10*3/uL    Metamyelocytes Absolute, Manual 0.31 0.00 - 0.00 x10*3/uL    Total Cells Counted 100     Neutrophils Absolute, Manual 13.86 (H) 1.20 - 7.70 x10*3/uL    RBC Morphology See Below     Polychromasia Mild     Ovalocytes Few     Bertrand Cells Few     Vacuolated Neutrophils Present    Blood Gas Lactic Acid, Venous   Result Value Ref Range    POCT Lactate, Venous 1.7 0.4 - 2.0 mmol/L   POCT GLUCOSE   Result Value Ref Range    POCT Glucose 101 (H) 74 - 99 mg/dL   POCT GLUCOSE   Result Value Ref Range    POCT Glucose 119 (H) 74 - 99 mg/dL   POCT GLUCOSE   Result Value Ref Range    POCT Glucose 79 74 - 99 mg/dL   POCT GLUCOSE   Result Value Ref Range    POCT Glucose 91 74 - 99 mg/dL   Comprehensive metabolic panel   Result Value Ref Range    Glucose 198 (H) 65 - 99 mg/dL    Sodium 136 133 - 145 mmol/L    Potassium 4.1 3.4 - 5.1 mmol/L    Chloride 106 97 - 107 mmol/L    Bicarbonate 14 (L) 24 - 31 mmol/L    Urea Nitrogen 64 (H) 8 - 25 mg/dL    Creatinine 2.40 (H) 0.40 - 1.60 mg/dL    eGFR 21 (L) >60 mL/min/1.73m*2    Calcium 8.6 8.5 - 10.4 mg/dL    Albumin 2.6 (L) 3.5 - 5.0 g/dL    Alkaline Phosphatase 68 35 - 125 U/L    Total Protein 5.7 (L) 5.9 - 7.9 g/dL    AST 19 5 - 40 U/L    Bilirubin, Total 0.2 0.1 - 1.2 mg/dL    ALT 13 5 - 40 U/L    Anion Gap 16 <=19 mmol/L   C-reactive protein   Result Value Ref Range    C-Reactive Protein 3.90 (H) 0.00 - 2.00 mg/dL   CBC and Auto Differential   Result Value Ref Range    WBC 13.1 (H) 4.4 - 11.3 x10*3/uL    nRBC 0.0 0.0 - 0.0 /100 WBCs     RBC 3.94 (L) 4.00 - 5.20 x10*6/uL    Hemoglobin 10.9 (L) 12.0 - 16.0 g/dL    Hematocrit 35.7 (L) 36.0 - 46.0 %    MCV 91 80 - 100 fL    MCH 27.7 26.0 - 34.0 pg    MCHC 30.5 (L) 32.0 - 36.0 g/dL    RDW 15.0 (H) 11.5 - 14.5 %    Platelets 225 150 - 450 x10*3/uL    MPV 10.4 7.5 - 11.5 fL    Neutrophils % 90.1 40.0 - 80.0 %    Immature Granulocytes %, Automated 4.6 (H) 0.0 - 0.9 %    Lymphocytes % 2.2 13.0 - 44.0 %    Monocytes % 2.9 2.0 - 10.0 %    Eosinophils % 0.0 0.0 - 6.0 %    Basophils % 0.2 0.0 - 2.0 %    Neutrophils Absolute 11.82 (H) 1.20 - 7.70 x10*3/uL    Immature Granulocytes Absolute, Automated 0.61 0.00 - 0.70 x10*3/uL    Lymphocytes Absolute 0.29 (L) 1.20 - 4.80 x10*3/uL    Monocytes Absolute 0.38 0.10 - 1.00 x10*3/uL    Eosinophils Absolute 0.00 0.00 - 0.70 x10*3/uL    Basophils Absolute 0.03 0.00 - 0.10 x10*3/uL   Vancomycin   Result Value Ref Range    Vancomycin 23.0 (H) 10.0 - 20.0 ug/mL   POCT GLUCOSE   Result Value Ref Range    POCT Glucose 143 (H) 74 - 99 mg/dL   POCT GLUCOSE   Result Value Ref Range    POCT Glucose 139 (H) 74 - 99 mg/dL   POCT GLUCOSE   Result Value Ref Range    POCT Glucose 114 (H) 74 - 99 mg/dL   C. difficile, PCR    Specimen: Stool   Result Value Ref Range    C. difficile, PCR Not Detected Not Detected   Stool Pathogen Panel, PCR    Specimen: Stool   Result Value Ref Range    Campylobacter Group Not Detected Not Detected    Salmonella species Not Detected Not Detected    Shigella species Not Detected Not Detected    Vibrio Group Not Detected Not Detected    Yersinia Enterocolitica Not Detected Not Detected    Shiga Toxin 1 Not Detected Not Detected    Shiga Toxin 2 Not Detected Not Detected    Norovirus GI/GII Not Detected Not Detected    Rotavirus A Not Detected Not Detected   POCT GLUCOSE   Result Value Ref Range    POCT Glucose 123 (H) 74 - 99 mg/dL   Comprehensive metabolic panel   Result Value Ref Range    Glucose 143 (H) 65 - 99 mg/dL    Sodium 139 133 - 145 mmol/L     Potassium 4.1 3.4 - 5.1 mmol/L    Chloride 108 (H) 97 - 107 mmol/L    Bicarbonate 19 (L) 24 - 31 mmol/L    Urea Nitrogen 52 (H) 8 - 25 mg/dL    Creatinine 1.90 (H) 0.40 - 1.60 mg/dL    eGFR 28 (L) >60 mL/min/1.73m*2    Calcium 8.5 8.5 - 10.4 mg/dL    Albumin 2.6 (L) 3.5 - 5.0 g/dL    Alkaline Phosphatase 90 35 - 125 U/L    Total Protein 5.5 (L) 5.9 - 7.9 g/dL    AST 21 5 - 40 U/L    Bilirubin, Total 0.2 0.1 - 1.2 mg/dL    ALT 14 5 - 40 U/L    Anion Gap 12 <=19 mmol/L   CBC and Auto Differential   Result Value Ref Range    WBC 13.8 (H) 4.4 - 11.3 x10*3/uL    nRBC 0.0 0.0 - 0.0 /100 WBCs    RBC 4.04 4.00 - 5.20 x10*6/uL    Hemoglobin 11.0 (L) 12.0 - 16.0 g/dL    Hematocrit 33.8 (L) 36.0 - 46.0 %    MCV 84 80 - 100 fL    MCH 27.2 26.0 - 34.0 pg    MCHC 32.5 32.0 - 36.0 g/dL    RDW 14.6 (H) 11.5 - 14.5 %    Platelets 196 150 - 450 x10*3/uL    MPV 10.6 7.5 - 11.5 fL    Neutrophils % 91.7 40.0 - 80.0 %    Immature Granulocytes %, Automated 2.7 (H) 0.0 - 0.9 %    Lymphocytes % 2.0 13.0 - 44.0 %    Monocytes % 3.3 2.0 - 10.0 %    Eosinophils % 0.1 0.0 - 6.0 %    Basophils % 0.2 0.0 - 2.0 %    Neutrophils Absolute 12.65 (H) 1.20 - 7.70 x10*3/uL    Immature Granulocytes Absolute, Automated 0.37 0.00 - 0.70 x10*3/uL    Lymphocytes Absolute 0.27 (L) 1.20 - 4.80 x10*3/uL    Monocytes Absolute 0.46 0.10 - 1.00 x10*3/uL    Eosinophils Absolute 0.02 0.00 - 0.70 x10*3/uL    Basophils Absolute 0.03 0.00 - 0.10 x10*3/uL   Vancomycin   Result Value Ref Range    Vancomycin 13.0 10.0 - 20.0 ug/mL   POCT GLUCOSE   Result Value Ref Range    POCT Glucose 153 (H) 74 - 99 mg/dL   D-dimer, Non VTE   Result Value Ref Range    D-Dimer Non VTE, Quant (mg/L FEU) 16.99 (H) 0.19 - 0.50 mg/L FEU   POCT GLUCOSE   Result Value Ref Range    POCT Glucose 139 (H) 74 - 99 mg/dL   Lower extremity venous duplex bilateral   Result Value Ref Range    BSA 1.78 m2   POCT GLUCOSE   Result Value Ref Range    POCT Glucose 109 (H) 74 - 99 mg/dL   POCT GLUCOSE    Result Value Ref Range    POCT Glucose 124 (H) 74 - 99 mg/dL   Comprehensive metabolic panel   Result Value Ref Range    Glucose 208 (H) 65 - 99 mg/dL    Sodium 135 133 - 145 mmol/L    Potassium 3.7 3.4 - 5.1 mmol/L    Chloride 104 97 - 107 mmol/L    Bicarbonate 20 (L) 24 - 31 mmol/L    Urea Nitrogen 49 (H) 8 - 25 mg/dL    Creatinine 1.70 (H) 0.40 - 1.60 mg/dL    eGFR 32 (L) >60 mL/min/1.73m*2    Calcium 8.3 (L) 8.5 - 10.4 mg/dL    Albumin 2.6 (L) 3.5 - 5.0 g/dL    Alkaline Phosphatase 113 35 - 125 U/L    Total Protein 5.7 (L) 5.9 - 7.9 g/dL    AST 21 5 - 40 U/L    Bilirubin, Total 0.2 0.1 - 1.2 mg/dL    ALT 14 5 - 40 U/L    Anion Gap 11 <=19 mmol/L   CBC and Auto Differential   Result Value Ref Range    WBC 13.9 (H) 4.4 - 11.3 x10*3/uL    nRBC 0.0 0.0 - 0.0 /100 WBCs    RBC 3.99 (L) 4.00 - 5.20 x10*6/uL    Hemoglobin 11.1 (L) 12.0 - 16.0 g/dL    Hematocrit 33.4 (L) 36.0 - 46.0 %    MCV 84 80 - 100 fL    MCH 27.8 26.0 - 34.0 pg    MCHC 33.2 32.0 - 36.0 g/dL    RDW 14.6 (H) 11.5 - 14.5 %    Platelets 134 (L) 150 - 450 x10*3/uL    MPV 10.7 7.5 - 11.5 fL    Neutrophils % 93.7 40.0 - 80.0 %    Immature Granulocytes %, Automated 1.7 (H) 0.0 - 0.9 %    Lymphocytes % 1.8 13.0 - 44.0 %    Monocytes % 2.1 2.0 - 10.0 %    Eosinophils % 0.6 0.0 - 6.0 %    Basophils % 0.1 0.0 - 2.0 %    Neutrophils Absolute 13.07 (H) 1.20 - 7.70 x10*3/uL    Immature Granulocytes Absolute, Automated 0.24 0.00 - 0.70 x10*3/uL    Lymphocytes Absolute 0.25 (L) 1.20 - 4.80 x10*3/uL    Monocytes Absolute 0.29 0.10 - 1.00 x10*3/uL    Eosinophils Absolute 0.08 0.00 - 0.70 x10*3/uL    Basophils Absolute 0.01 0.00 - 0.10 x10*3/uL   Vancomycin   Result Value Ref Range    Vancomycin 17.0 10.0 - 20.0 ug/mL   POCT GLUCOSE   Result Value Ref Range    POCT Glucose 151 (H) 74 - 99 mg/dL       Assessment/Plan   Acute kidney injury which is resolved  Chronic disease stage IV  COVID-19  Acute respiratory failur  Metabolic acidosis     Plan:  Give her 1 dose of  IV furosemide 20 mg today  Continue oxygen therapy  Need to monitor renal function         Danial Henderson MD

## 2023-10-28 NOTE — PROGRESS NOTES
"Vancomycin Dosing by Pharmacy- FOLLOW UP    Macarena Wilson is a 68 y.o. year old female who Pharmacy has been consulted for vancomycin dosing for pneumonia. Based on the patient's indication and renal status this patient is being dosed based on a goal AUC of 400-600.     Renal function is currently improving.    Current vancomycin dose: 750 mg given every 24 hours    Estimated vancomycin AUC on current dose: 452 mg/L.hr     Visit Vitals  /68 (BP Location: Left arm, Patient Position: Lying)   Pulse 92   Temp 36.5 °C (97.7 °F) (Temporal)   Resp (!) 30        Lab Results   Component Value Date    CREATININE 1.70 (H) 10/28/2023    CREATININE 1.90 (H) 10/27/2023    CREATININE 2.40 (H) 10/26/2023    CREATININE 2.70 (H) 10/25/2023        Patient weight is No results found for: \"PTWEIGHT\"    No results found for: \"CULTURE\"     I/O last 3 completed shifts:  In: 880 (12.4 mL/kg) [P.O.:480; IV Piggyback:400]  Out: - (0 mL/kg)   Weight: 70.9 kg   [unfilled]    Lab Results   Component Value Date    PATIENTTEMP 37.0 10/25/2023    PATIENTTEMP 37.0 10/24/2023        Assessment/Plan    Within goal AUC range. Continue current vancomycin regimen.    This dosing regimen is predicted by [employer] to result in the following pharmacokinetic parameters:  Loading dose: N/A  Regimen: 750 mg IV every 48 hours.  Start time: 15:13 on 10/29/2023  Exposure target: AUC24 (range)400-600 mg/L.hr   AUC24,ss: 237 mg/L.hr  Probability of AUC24 > 400: 0 %  Ctrough,ss: 5.7 mg/L  Probability of Ctrough,ss > 20: 0 %  Probability of nephrotoxicity (Lodise DANILO 2009): 4 %      The next level will be obtained on 10/30 at 0500. May be obtained sooner if clinically indicated.   Will continue to monitor renal function daily while on vancomycin and order serum creatinine at least every 48 hours if not already ordered.  Follow for continued vancomycin needs, clinical response, and signs/symptoms of toxicity.       RENATO BOLAND, PharmD           "

## 2023-10-28 NOTE — PROGRESS NOTES
"Macarena Wilson is a 68 y.o. female on day 5 of admission presenting with covid.     Diarrhea better. Appetite improved, denies shortness of breath at rest.  Increased FiO2 now on 100% FiO2 at 40 L/min SPO2 96%, Lasix ordered by nephrology today for positive fluid balance.  No reports of edema.    Objective     Vitals and nursing note reviewed.   Constitutional:       General: She is not in acute distress.     Appearance: She is ill-appearing.   HENT:      Head: Normocephalic and atraumatic.      Nose: Congestion present.      Mouth/Throat:      Mouth: Mucous membranes are moist.      Pharynx: Oropharynx is clear. No oropharyngeal exudate or posterior oropharyngeal erythema.   Eyes:      Extraocular Movements: Extraocular movements intact.      Pupils: Pupils are equal, round, and reactive to light.   Cardiovascular:      Rate and Rhythm: Normal rate and regular rhythm.      Pulses: Normal pulses.      Heart sounds: No murmur heard.  Pulmonary:      Effort: Pulmonary effort is normal. No respiratory distress.      Breath sounds: Normal breath sounds. No wheezing or rhonchi.      Comments: Hfnc 40/100%  Abdominal:      General: Abdomen is flat. Bowel sounds are normal. There is no distension.      Palpations: Abdomen is soft.      Tenderness: There is no abdominal tenderness.   Musculoskeletal:         General: No swelling, tenderness, deformity or signs of injury. Normal range of motion.      Cervical back: Normal range of motion and neck supple.   Skin:     General: Skin is warm and dry.      Capillary Refill: Capillary refill takes 2 to 3 seconds.   Neurological:      General: No focal deficit present.      Mental Status: She is alert and oriented to person, place, and time.   Psychiatric:         Mood and Affect: Mood normal.     Last Recorded Vitals  /64 (BP Location: Left arm, Patient Position: Lying)   Pulse 96   Temp 34.6 °C (94.3 °F) (Temporal)   Resp 20   Ht 1.6 m (5' 3\")   Wt 70.9 kg (156 lb " 4.9 oz)   SpO2 92%   BMI 27.69 kg/m²      Intake/Output last 3 Shifts:  I/O last 3 completed shifts:  In: 880 (12.4 mL/kg) [P.O.:480; IV Piggyback:400]  Out: - (0 mL/kg)   Weight: 70.9 kg     Relevant Results  Results for orders placed or performed during the hospital encounter of 10/23/23 (from the past 24 hour(s))   POCT GLUCOSE   Result Value Ref Range    POCT Glucose 109 (H) 74 - 99 mg/dL   POCT GLUCOSE   Result Value Ref Range    POCT Glucose 124 (H) 74 - 99 mg/dL   Comprehensive metabolic panel   Result Value Ref Range    Glucose 208 (H) 65 - 99 mg/dL    Sodium 135 133 - 145 mmol/L    Potassium 3.7 3.4 - 5.1 mmol/L    Chloride 104 97 - 107 mmol/L    Bicarbonate 20 (L) 24 - 31 mmol/L    Urea Nitrogen 49 (H) 8 - 25 mg/dL    Creatinine 1.70 (H) 0.40 - 1.60 mg/dL    eGFR 32 (L) >60 mL/min/1.73m*2    Calcium 8.3 (L) 8.5 - 10.4 mg/dL    Albumin 2.6 (L) 3.5 - 5.0 g/dL    Alkaline Phosphatase 113 35 - 125 U/L    Total Protein 5.7 (L) 5.9 - 7.9 g/dL    AST 21 5 - 40 U/L    Bilirubin, Total 0.2 0.1 - 1.2 mg/dL    ALT 14 5 - 40 U/L    Anion Gap 11 <=19 mmol/L   CBC and Auto Differential   Result Value Ref Range    WBC 13.9 (H) 4.4 - 11.3 x10*3/uL    nRBC 0.0 0.0 - 0.0 /100 WBCs    RBC 3.99 (L) 4.00 - 5.20 x10*6/uL    Hemoglobin 11.1 (L) 12.0 - 16.0 g/dL    Hematocrit 33.4 (L) 36.0 - 46.0 %    MCV 84 80 - 100 fL    MCH 27.8 26.0 - 34.0 pg    MCHC 33.2 32.0 - 36.0 g/dL    RDW 14.6 (H) 11.5 - 14.5 %    Platelets 134 (L) 150 - 450 x10*3/uL    MPV 10.7 7.5 - 11.5 fL    Neutrophils % 93.7 40.0 - 80.0 %    Immature Granulocytes %, Automated 1.7 (H) 0.0 - 0.9 %    Lymphocytes % 1.8 13.0 - 44.0 %    Monocytes % 2.1 2.0 - 10.0 %    Eosinophils % 0.6 0.0 - 6.0 %    Basophils % 0.1 0.0 - 2.0 %    Neutrophils Absolute 13.07 (H) 1.20 - 7.70 x10*3/uL    Immature Granulocytes Absolute, Automated 0.24 0.00 - 0.70 x10*3/uL    Lymphocytes Absolute 0.25 (L) 1.20 - 4.80 x10*3/uL    Monocytes Absolute 0.29 0.10 - 1.00 x10*3/uL     Eosinophils Absolute 0.08 0.00 - 0.70 x10*3/uL    Basophils Absolute 0.01 0.00 - 0.10 x10*3/uL   Vancomycin   Result Value Ref Range    Vancomycin 17.0 10.0 - 20.0 ug/mL   POCT GLUCOSE   Result Value Ref Range    POCT Glucose 151 (H) 74 - 99 mg/dL   POCT GLUCOSE   Result Value Ref Range    POCT Glucose 91 74 - 99 mg/dL      XR chest 1 view    Result Date: 10/27/2023  Interpreted By:  Nirmal Mcmillan, STUDY: XR CHEST 1 VIEW; 10/27/2023 10:06 am   INDICATION: Signs/Symptoms:follow up hypoxemia.   COMPARISON: 10/23/2023   ACCESSION NUMBER(S): [id number]   ORDERING CLINICIAN: SUSANNE FRANCOIS   TECHNIQUE: 1 view of the chest was performed.   FINDINGS: There is diffuse ground-glass opacity bilaterally consistent with history of COVID. No lobar or large consolidation however the ground-glass opacities are prominent but stable from the previous examination. No pleural effusion. No pneumothorax.  The cardiomediastinal silhouette is within normal limits.       No significant change. Prominent ground-glass opacities bilaterally, however, not significantly changed.   Signed by: Nirmal Mcmillan 10/27/2023 10:24 PM Dictation workstation:   [id number]     Current Facility-Administered Medications   Medication Dose Route Frequency Provider Last Rate Last Admin    acetaminophen (Tylenol) tablet 650 mg  650 mg oral q4h PRN David Rodriguez MD   650 mg at 10/27/23 2056    Or    acetaminophen (Tylenol) oral liquid 650 mg  650 mg nasogastric tube q4h PRN David Rodriguez MD        Or    acetaminophen (Tylenol) suppository 650 mg  650 mg rectal q4h PRN David Rodriguez MD        benzonatate (Tessalon) capsule 200 mg  200 mg oral TID PRN Lou Hodges, APRN-CNP        bisacodyl (Dulcolax) EC tablet 10 mg  10 mg oral Daily PRN David Rodriguez MD        dexAMETHasone (Decadron) injection 6 mg  6 mg intravenous q24h David Rodriguez MD   6 mg at 10/27/23 2145    dextrose 10 % in water (D10W) infusion  0.3  g/kg/hr intravenous Once PRN David Rodriguez MD        dextrose 50 % injection 25 g  25 g intravenous q15 min PRN David Rodriguez MD        enoxaparin (Lovenox) syringe 30 mg  30 mg subcutaneous Daily David Rodriguez MD   30 mg at 10/28/23 0634    fluticasone (Flonase) nasal spray 1 spray  1 spray Each Nostril BID JASON Burgess   1 spray at 10/28/23 0900    glucagon (Glucagen) injection 1 mg  1 mg intramuscular q15 min PRN David Rodriguez MD        guaiFENesin (Robitussin) 100 mg/5 mL syrup 200 mg  200 mg oral q4h PRN JASON Shankar        insulin lispro (HumaLOG) injection 0-5 Units  0-5 Units subcutaneous TID with meals David Rodriguez MD   1 Units at 10/28/23 0927    ipratropium-albuteroL (Duo-Neb) 0.5-2.5 mg/3 mL nebulizer solution 3 mL  3 mL nebulization q6h while awake David Rodriguez MD   3 mL at 10/28/23 1259    ipratropium-albuteroL (Duo-Neb) 0.5-2.5 mg/3 mL nebulizer solution 3 mL  3 mL nebulization q2h PRN David Rodriguez MD        melatonin tablet 3 mg  3 mg oral Nightly JASON Burgess   3 mg at 10/27/23 2056    ondansetron ODT (Zofran-ODT) disintegrating tablet 4 mg  4 mg oral q8h PRN David Rodriguez MD        Or    ondansetron (Zofran) injection 4 mg  4 mg intravenous q8h PRN David Rodriguez MD        [Held by provider] oxygen (O2) therapy   inhalation Continuous PRN - O2/gases David Rodriguez MD   35 L/min at 10/24/23 0115    oxygen (O2) therapy   inhalation Continuous PRN - O2/gases Lydia Moreland MD        pantoprazole (ProtoNix) EC tablet 40 mg  40 mg oral Daily before breakfast Lydia Moreland MD   40 mg at 10/28/23 0634    polyethylene glycol (Glycolax, Miralax) packet 17 g  17 g oral Daily PRN Lou Hodges APRN-CNP        remdesivir (Veklury) 100 mg in sodium chloride 0.9% 250 mL IV  100 mg intravenous q24h Lydia Moreland MD   Stopped at 10/27/23 1445    sodium bicarbonate tablet 650 mg  650  mg oral TID Danial Henderson MD   650 mg at 10/28/23 0927    sodium chloride (Ocean) 0.65 % nasal spray 1 spray  1 spray Each Nostril PRN ZION Burgess-CNP        vancomycin-diluent combo no.1 (Xellia) IVPB 750 mg  750 mg intravenous q24h JASON Sands   Stopped at 10/27/23 1558        Assessment/Plan   Principal Problem:    Acute respiratory failure with hypoxemia (CMS/HCC)  Active Problems:    Benign essential HTN    Stage 4 chronic kidney disease (CMS/HCC)    COVID-19    COVID-19  Acute respiratory failure  CKD4, acidosis-started on bicarb 10/25  Diarrhea  Hypertension  Palliative care     Chart reviewed, discussed with attending service and staff RN.  Full code  Patient is capable  Her stated healthcare garcia of  are her 2 sons Mathew and Josias, she has a living will.     68-year-old female presenting to the hospital with acute respiratory failure in the setting of COVID-19 with underlying CKD 4 requiring high flow oxygen.  Prior to admission she was working full-time and independent with all ADLs and IADLs.  She had excellent quality of life without any functional limitations.  Her ultimate goal is to treat her acute condition and return to her prior level of function.  She is open to all measures including chest compressions ventilation in order to keep her alive at this point in time, however she has no wish to be sustained on life support indefinitely, and trusts her 2 sons to fill her living will if she cannot be weaned off of ventilator.  Patient very aware that her prognosis is guarded, given her high oxygen requirements.      10/28  Diarrhea improved. No sinus congestion. Worried about chronic eczema, added cream.      Full code    I spent 25 minutes in the professional and overall care of this patient.      Lou Hodges, ZION-ANGELIQUE

## 2023-10-28 NOTE — PROGRESS NOTES
Macarena Wilson is a 68 y.o. female on day 5 of admission presenting with Acute respiratory failure with hypoxemia (CMS/HCC).    Subjective   Interval History:   Afebrile, no chills  Remains on high flow oxygen  Nonproductive cough  No chest pain  No nausea vomiting or diarrhea        Review of Systems   All other systems reviewed and are negative.      Objective   Range of Vitals (last 24 hours)  Heart Rate:  []   Temp:  [34.6 °C (94.3 °F)-36.5 °C (97.7 °F)]   Resp:  [15-42]   BP: (104-139)/(55-75)   Weight:  [70.9 kg (156 lb 4.9 oz)]   SpO2:  [86 %-99 %]   Daily Weight  10/27/23 : 70.9 kg (156 lb 4.9 oz)    Body mass index is 27.69 kg/m².    Physical Exam  Constitutional:       Appearance: Normal appearance.  No acute distress  HENT:      Head: Normocephalic and atraumatic.      Nose: Nose normal.      Mouth/Throat:      Mouth: Mucous membranes are moist.      Pharynx: Oropharynx is clear.   Eyes:      Extraocular Movements: Extraocular movements intact.      Conjunctiva/sclera: Conjunctivae normal.   Cardiovascular:      Rate and Rhythm: Normal rate and regular rhythm.   Pulmonary:      Effort: Pulmonary effort is normal.      Breath sounds: Diminished  Abdominal:      General: Bowel sounds are normal.      Palpations: Abdomen is soft.   Musculoskeletal:         General: Normal range of motion.      Cervical back: Normal range of motion and neck supple.   Skin:     General: Skin is warm and dry.   Neurological:      General: No focal deficit present.      Mental Status: She is alert and oriented to person, place, and time. Mental status is at baseline.   Psychiatric:         Mood and Affect: Mood normal.         Behavior: Behavior normal.        Antibiotics    sodium chloride 0.9 % bolus 500 mL  doxycycline (Vibramycin) in dextrose 5 % in water (D5W) 100 mL  mg  cefTRIAXone (Rocephin) IVPB 1 g  Tc-99m-albumin (Draximage MAA) injection 4.2 millicurie  dexAMETHasone (Decadron) injection 8 mg  sodium  chloride 0.9 % bolus 1,000 mL  enoxaparin (Lovenox) syringe 30 mg  sodium chloride 0.9% infusion  acetaminophen (Tylenol) tablet 650 mg  acetaminophen (Tylenol) oral liquid 650 mg  acetaminophen (Tylenol) suppository 650 mg  ondansetron ODT (Zofran-ODT) disintegrating tablet 4 mg  ondansetron (Zofran) injection 4 mg  polyethylene glycol (Glycolax, Miralax) packet 17 g  bisacodyl (Dulcolax) EC tablet 10 mg  guaiFENesin (Mucinex) 12 hr tablet 600 mg  oxygen (O2) therapy  piperacillin-tazobactam-dextrose (Zosyn) IV 2.25 g  ipratropium-albuteroL (Duo-Neb) 0.5-2.5 mg/3 mL nebulizer solution 3 mL  dexAMETHasone (Decadron) injection 8 mg  dexAMETHasone (Decadron) injection 6 mg  dextrose 50 % injection 25 g  glucagon (Glucagen) injection 1 mg  dextrose 10 % in water (D10W) infusion  insulin lispro (HumaLOG) injection 0-5 Units  vancomycin-diluent combo no.1 (Xellia) IVPB 1,250 mg  ipratropium-albuteroL (Duo-Neb) 0.5-2.5 mg/3 mL nebulizer solution 3 mL  ipratropium-albuteroL (Duo-Neb) 0.5-2.5 mg/3 mL nebulizer solution 3 mL  oxygen (O2) therapy  remdesivir (Veklury) 200 mg in sodium chloride 0.9% 250 mL IV  remdesivir (Veklury) 100 mg in sodium chloride 0.9% 250 mL IV  oxygen (O2) therapy  pantoprazole (ProtoNix) injection 40 mg  remdesivir (Veklury) 200 mg in sodium chloride 0.9% 250 mL IV  remdesivir (Veklury) 100 mg in sodium chloride 0.9% 250 mL IV  tocilizumab (Actemra) 600 mg in sodium chloride 0.9% 70 mL IV  furosemide (Lasix) tablet 10 mg  melatonin tablet 3 mg  benzonatate (Tessalon) capsule 200 mg  polyethylene glycol (Glycolax, Miralax) packet 17 g  fluticasone (Flonase) nasal spray 1 spray  sodium chloride (Ocean) 0.65 % nasal spray 1 spray  sodium bicarbonate tablet 650 mg  vancomycin-diluent combo no.1 (Xellia) IVPB 1,250 mg  guaiFENesin (Robitussin) 100 mg/5 mL syrup 200 mg  vancomycin-diluent combo no.1 (Xellia) IVPB 750 mg  pantoprazole (ProtoNix) EC tablet 40 mg  oxygen (O2) therapy  furosemide (Lasix)  injection 20 mg      Relevant Results  Labs  Results from last 72 hours   Lab Units 10/28/23  0505 10/27/23  0555 10/26/23  0550   WBC AUTO x10*3/uL 13.9* 13.8* 13.1*   HEMOGLOBIN g/dL 11.1* 11.0* 10.9*   HEMATOCRIT % 33.4* 33.8* 35.7*   PLATELETS AUTO x10*3/uL 134* 196 225   NEUTROS PCT AUTO % 93.7 91.7 90.1   LYMPHS PCT AUTO % 1.8 2.0 2.2   MONOS PCT AUTO % 2.1 3.3 2.9   EOS PCT AUTO % 0.6 0.1 0.0     Results from last 72 hours   Lab Units 10/28/23  0505 10/27/23  0555 10/26/23  0550   SODIUM mmol/L 135 139 136   POTASSIUM mmol/L 3.7 4.1 4.1   CHLORIDE mmol/L 104 108* 106   CO2 mmol/L 20* 19* 14*   BUN mg/dL 49* 52* 64*   CREATININE mg/dL 1.70* 1.90* 2.40*   GLUCOSE mg/dL 208* 143* 198*   CALCIUM mg/dL 8.3* 8.5 8.6   ANION GAP mmol/L 11 12 16   EGFR mL/min/1.73m*2 32* 28* 21*     Results from last 72 hours   Lab Units 10/28/23  0505 10/27/23  0555 10/26/23  0550   ALK PHOS U/L 113 90 68   BILIRUBIN TOTAL mg/dL 0.2 0.2 0.2   PROTEIN TOTAL g/dL 5.7* 5.5* 5.7*   ALT U/L 14 14 13   AST U/L 21 21 19   ALBUMIN g/dL 2.6* 2.6* 2.6*     Estimated Creatinine Clearance: 29.5 mL/min (A) (by C-G formula based on SCr of 1.7 mg/dL (H)).  C-Reactive Protein   Date Value Ref Range Status   10/26/2023 3.90 (H) 0.00 - 2.00 mg/dL Final   10/25/2023 6.10 (H) 0.00 - 2.00 mg/dL Final   10/24/2023 10.10 (H) 0.00 - 2.00 mg/dL Final     Microbiology  Reviewed   Imaging  XR chest 1 view    Result Date: 10/27/2023  Interpreted By:  Nirmal Mcmillan, STUDY: XR CHEST 1 VIEW; 10/27/2023 10:06 am   INDICATION: Signs/Symptoms:follow up hypoxemia.   COMPARISON: 10/23/2023   ACCESSION NUMBER(S): [id number]   ORDERING CLINICIAN: SUSANNE FRANCOIS   TECHNIQUE: 1 view of the chest was performed.   FINDINGS: There is diffuse ground-glass opacity bilaterally consistent with history of COVID. No lobar or large consolidation however the ground-glass opacities are prominent but stable from the previous examination. No pleural effusion. No pneumothorax.  The  cardiomediastinal silhouette is within normal limits.       No significant change. Prominent ground-glass opacities bilaterally, however, not significantly changed.   Signed by: Nirmal Mcmillan 10/27/2023 10:24 PM Dictation workstation:   [id number]    CT chest wo IV contrast    Result Date: 10/24/2023  Interpreted By:  Nirmal Mcmillan, STUDY: CT CHEST WO IV CONTRAST; 10/24/2023 12:27 am   INDICATION: Signs/Symptoms:Covid.   COMPARISON: None   ACCESSION NUMBER(S): [id number]   ORDERING CLINICIAN: GERTRUDE FIERRO   TECHNIQUE: Contiguous axial images of the thorax were obtained from the level of the thoracic inlet through the lung bases. Coronal and sagittal reformatted images were obtained.     FINDINGS: The thyroid gland is unremarkable.   The heart size is within normal limits.  No pericardial effusion is identified. The aorta and pulmonary arteries are normal in caliber.   There are no pathologically enlarged mediastinal, hilar, or axillary lymph nodes are seen.   The trachea and mainstem bronchi are patent. Diffuse patchy ground-glass opacities bilaterally which is compatible with atypical pneumonia. There is no evidence of pneumothorax or pleural effusion.   The visualized osseous structures are intact.   Limited images through the upper abdomen are unremarkable.       Diffuse, patchy ground-glass opacities bilaterally suggesting atypical pneumonia such as COVID.     Signed by: Nirmal Mcmillan 10/24/2023 12:32 PM Dictation workstation:   [id number]    NM lung perfusion particulate    Result Date: 10/23/2023  Interpreted By:  Yoly Stanley, STUDY: NM LUNG PERFUSION PARTICULATE 10/23/2023 8:44 pm   INDICATION: Signs/Symptoms:hypoxia, r/o PE   COMPARISON: Chest x-ray done earlier today   ACCESSION NUMBER(S): [id number]   ORDERING CLINICIAN: ANAHY EVANS   TECHNIQUE: 4.2 millicuries of technetium 99 M MAA was injected intravenously with perfusion lung scan in 8 projections completed.   FINDINGS: No  perfusion defect is seen within either lung.       Normal perfusion lung scan.   Signed by: Yoly Stanley 10/23/2023 8:57 PM Dictation workstation:   [id number]    XR chest 1 view    Result Date: 10/23/2023  Interpreted By:  Yoly Stanley, STUDY: XR CHEST 1 VIEW 10/23/2023 4:55 pm   INDICATION: Signs/Symptoms:dyspnea, cough, fatigue, and malaise. Positive COVID.   COMPARISON: None available.   ACCESSION NUMBER(S): [id number]   ORDERING CLINICIAN: ANAHY EVANS   TECHNIQUE: AP erect view of the chest   FINDINGS: The cardiac size is normal with no mediastinal abnormality identified. The trachea is midline. No pleural abnormality is seen. However, there are bilateral infiltrates with ground-glass appearance with relative sparing of the left upper lung zone.       Bilateral ground-glass infiltrates.   Signed by: Yoly Stanley 10/23/2023 5:08 PM Dictation workstation:   [id number]      Assessment/Plan   Acute hypoxic respiratory failure, requiring high flow  Severe coronavirus pneumonitis  Stage IV chronic kidney disease  Leukocytosis  Diarrhea-rule out infectious etiology-resolved     Continue dexamethasone  Remdesivir completed  Monitor renal and hematologic parameters  S/p Actemra-10/24/2023  Supportive care  Monitor temperature and WBC  Supplemental oxygen as needed  Pulmonary follow-up    Js Vasquez MD

## 2023-10-28 NOTE — PROGRESS NOTES
Macarena Wilson is a 68 y.o. female on day 5 of admission presenting with Acute respiratory failure with hypoxemia (CMS/HCC).      Subjective       Patient is awake alert oriented x3 in mild respiratory distress    Objective     Last Recorded Vitals  /64 (BP Location: Left arm, Patient Position: Lying)   Pulse 96   Temp 34.6 °C (94.3 °F) (Temporal)   Resp 20   Wt 70.9 kg (156 lb 4.9 oz)   SpO2 92%   Intake/Output last 3 Shifts:    Intake/Output Summary (Last 24 hours) at 10/28/2023 1424  Last data filed at 10/27/2023 1513  Gross per 24 hour   Intake 150 ml   Output --   Net 150 ml         Admission Weight  Weight: 77.1 kg (170 lb) (10/23/23 1541)    Daily Weight  10/27/23 : 70.9 kg (156 lb 4.9 oz)    Image Results  XR chest 1 view  Narrative: Interpreted By:  Nirmal Mcmillan,   STUDY:  XR CHEST 1 VIEW; 10/27/2023 10:06 am      INDICATION:  Signs/Symptoms:follow up hypoxemia.      COMPARISON:  10/23/2023      ACCESSION NUMBER(S):  [id number]      ORDERING CLINICIAN:  SUSANNE FRANCOIS      TECHNIQUE:  1 view of the chest was performed.      FINDINGS:  There is diffuse ground-glass opacity bilaterally consistent with  history of COVID. No lobar or large consolidation however the  ground-glass opacities are prominent but stable from the previous  examination. No pleural effusion. No pneumothorax.  The  cardiomediastinal silhouette is within normal limits.      Impression: No significant change. Prominent ground-glass opacities bilaterally,  however, not significantly changed.      Signed by: Nirmal Mcmillan 10/27/2023 10:24 PM  Dictation workstation:   [id number]      Physical Exam  Constitutional:       Appearance: Normal appearance. She is normal weight. She is ill-appearing.   HENT:      Head: Normocephalic.      Nose: Congestion present.      Mouth/Throat:      Mouth: Mucous membranes are moist.   Eyes:      Extraocular Movements: Extraocular movements intact.      Conjunctiva/sclera: Conjunctivae  normal.      Pupils: Pupils are equal, round, and reactive to light.   Cardiovascular:      Rate and Rhythm: Tachycardia present.   Pulmonary:      Breath sounds: Rhonchi and rales present.   Chest:      Chest wall: No tenderness.   Abdominal:      General: Bowel sounds are normal.      Palpations: Abdomen is soft.      Tenderness: There is no abdominal tenderness.   Musculoskeletal:         General: Normal range of motion.      Cervical back: Normal range of motion.   Skin:     General: Skin is warm.      Capillary Refill: Capillary refill takes less than 2 seconds.   Neurological:      General: No focal deficit present.      Mental Status: She is alert and oriented to person, place, and time.         Relevant Results             CT chest wo IV contrast    Result Date: 10/24/2023  Interpreted By:  Nirmal Mcmillan, STUDY: CT CHEST WO IV CONTRAST; 10/24/2023 12:27 am   INDICATION: Signs/Symptoms:Covid.   COMPARISON: None   ACCESSION NUMBER(S): [id number]   ORDERING CLINICIAN: GERTRUDE FIERRO   TECHNIQUE: Contiguous axial images of the thorax were obtained from the level of the thoracic inlet through the lung bases. Coronal and sagittal reformatted images were obtained.     FINDINGS: The thyroid gland is unremarkable.   The heart size is within normal limits.  No pericardial effusion is identified. The aorta and pulmonary arteries are normal in caliber.   There are no pathologically enlarged mediastinal, hilar, or axillary lymph nodes are seen.   The trachea and mainstem bronchi are patent. Diffuse patchy ground-glass opacities bilaterally which is compatible with atypical pneumonia. There is no evidence of pneumothorax or pleural effusion.   The visualized osseous structures are intact.   Limited images through the upper abdomen are unremarkable.       Diffuse, patchy ground-glass opacities bilaterally suggesting atypical pneumonia such as COVID.     Signed by: Nirmal Mcmillan 10/24/2023 12:32 PM Dictation  workstation:   [id number]    NM lung perfusion particulate    Result Date: 10/23/2023  Interpreted By:  Yoly Stanley, STUDY: NM LUNG PERFUSION PARTICULATE 10/23/2023 8:44 pm   INDICATION: Signs/Symptoms:hypoxia, r/o PE   COMPARISON: Chest x-ray done earlier today   ACCESSION NUMBER(S): [id number]   ORDERING CLINICIAN: ANAHY EVANS   TECHNIQUE: 4.2 millicuries of technetium 99 M MAA was injected intravenously with perfusion lung scan in 8 projections completed.   FINDINGS: No perfusion defect is seen within either lung.       Normal perfusion lung scan.   Signed by: Yoly Stanley 10/23/2023 8:57 PM Dictation workstation:   [id number]    XR chest 1 view    Result Date: 10/23/2023  Interpreted By:  Yoly Stanley, STUDY: XR CHEST 1 VIEW 10/23/2023 4:55 pm   INDICATION: Signs/Symptoms:dyspnea, cough, fatigue, and malaise. Positive COVID.   COMPARISON: None available.   ACCESSION NUMBER(S): [id number]   ORDERING CLINICIAN: ANAHY EVANS   TECHNIQUE: AP erect view of the chest   FINDINGS: The cardiac size is normal with no mediastinal abnormality identified. The trachea is midline. No pleural abnormality is seen. However, there are bilateral infiltrates with ground-glass appearance with relative sparing of the left upper lung zone.       Bilateral ground-glass infiltrates.   Signed by: Yoly Stanley 10/23/2023 5:08 PM Dictation workstation:   [id number]     Results for orders placed or performed during the hospital encounter of 10/23/23 (from the past 24 hour(s))   POCT GLUCOSE   Result Value Ref Range    POCT Glucose 109 (H) 74 - 99 mg/dL   POCT GLUCOSE   Result Value Ref Range    POCT Glucose 124 (H) 74 - 99 mg/dL   Comprehensive metabolic panel   Result Value Ref Range    Glucose 208 (H) 65 - 99 mg/dL    Sodium 135 133 - 145 mmol/L    Potassium 3.7 3.4 - 5.1 mmol/L    Chloride 104 97 - 107 mmol/L    Bicarbonate 20 (L) 24 - 31 mmol/L    Urea Nitrogen 49 (H) 8 - 25 mg/dL    Creatinine 1.70 (H) 0.40 - 1.60 mg/dL    eGFR  32 (L) >60 mL/min/1.73m*2    Calcium 8.3 (L) 8.5 - 10.4 mg/dL    Albumin 2.6 (L) 3.5 - 5.0 g/dL    Alkaline Phosphatase 113 35 - 125 U/L    Total Protein 5.7 (L) 5.9 - 7.9 g/dL    AST 21 5 - 40 U/L    Bilirubin, Total 0.2 0.1 - 1.2 mg/dL    ALT 14 5 - 40 U/L    Anion Gap 11 <=19 mmol/L   CBC and Auto Differential   Result Value Ref Range    WBC 13.9 (H) 4.4 - 11.3 x10*3/uL    nRBC 0.0 0.0 - 0.0 /100 WBCs    RBC 3.99 (L) 4.00 - 5.20 x10*6/uL    Hemoglobin 11.1 (L) 12.0 - 16.0 g/dL    Hematocrit 33.4 (L) 36.0 - 46.0 %    MCV 84 80 - 100 fL    MCH 27.8 26.0 - 34.0 pg    MCHC 33.2 32.0 - 36.0 g/dL    RDW 14.6 (H) 11.5 - 14.5 %    Platelets 134 (L) 150 - 450 x10*3/uL    MPV 10.7 7.5 - 11.5 fL    Neutrophils % 93.7 40.0 - 80.0 %    Immature Granulocytes %, Automated 1.7 (H) 0.0 - 0.9 %    Lymphocytes % 1.8 13.0 - 44.0 %    Monocytes % 2.1 2.0 - 10.0 %    Eosinophils % 0.6 0.0 - 6.0 %    Basophils % 0.1 0.0 - 2.0 %    Neutrophils Absolute 13.07 (H) 1.20 - 7.70 x10*3/uL    Immature Granulocytes Absolute, Automated 0.24 0.00 - 0.70 x10*3/uL    Lymphocytes Absolute 0.25 (L) 1.20 - 4.80 x10*3/uL    Monocytes Absolute 0.29 0.10 - 1.00 x10*3/uL    Eosinophils Absolute 0.08 0.00 - 0.70 x10*3/uL    Basophils Absolute 0.01 0.00 - 0.10 x10*3/uL   Vancomycin   Result Value Ref Range    Vancomycin 17.0 10.0 - 20.0 ug/mL   POCT GLUCOSE   Result Value Ref Range    POCT Glucose 151 (H) 74 - 99 mg/dL   POCT GLUCOSE   Result Value Ref Range    POCT Glucose 91 74 - 99 mg/dL         Scheduled medications  dexAMETHasone, 6 mg, intravenous, q24h  enoxaparin, 30 mg, subcutaneous, Daily  fluticasone, 1 spray, Each Nostril, BID  insulin lispro, 0-5 Units, subcutaneous, TID with meals  ipratropium-albuteroL, 3 mL, nebulization, q6h while awake  melatonin, 3 mg, oral, Nightly  pantoprazole, 40 mg, oral, Daily before breakfast  remdesivir, 100 mg, intravenous, q24h  sodium bicarbonate, 650 mg, oral, TID  vancomycin-diluent combo no.1, 750 mg,  intravenous, q24h      Continuous medications     PRN medications  PRN medications: acetaminophen **OR** acetaminophen **OR** acetaminophen, benzonatate, bisacodyl, dextrose 10 % in water (D10W), dextrose, glucagon, guaiFENesin, ipratropium-albuteroL, ondansetron ODT **OR** ondansetron, [Held by provider] oxygen, oxygen, polyethylene glycol, sodium chloride    Assessment/Plan   This patient currently has cardiac telemetry ordered; if you would like to modify or discontinue the telemetry order, click here to go to the orders activity to modify/discontinue the order.    Results for orders placed or performed during the hospital encounter of 10/23/23 (from the past 24 hour(s))   POCT GLUCOSE   Result Value Ref Range    POCT Glucose 109 (H) 74 - 99 mg/dL   POCT GLUCOSE   Result Value Ref Range    POCT Glucose 124 (H) 74 - 99 mg/dL   Comprehensive metabolic panel   Result Value Ref Range    Glucose 208 (H) 65 - 99 mg/dL    Sodium 135 133 - 145 mmol/L    Potassium 3.7 3.4 - 5.1 mmol/L    Chloride 104 97 - 107 mmol/L    Bicarbonate 20 (L) 24 - 31 mmol/L    Urea Nitrogen 49 (H) 8 - 25 mg/dL    Creatinine 1.70 (H) 0.40 - 1.60 mg/dL    eGFR 32 (L) >60 mL/min/1.73m*2    Calcium 8.3 (L) 8.5 - 10.4 mg/dL    Albumin 2.6 (L) 3.5 - 5.0 g/dL    Alkaline Phosphatase 113 35 - 125 U/L    Total Protein 5.7 (L) 5.9 - 7.9 g/dL    AST 21 5 - 40 U/L    Bilirubin, Total 0.2 0.1 - 1.2 mg/dL    ALT 14 5 - 40 U/L    Anion Gap 11 <=19 mmol/L   CBC and Auto Differential   Result Value Ref Range    WBC 13.9 (H) 4.4 - 11.3 x10*3/uL    nRBC 0.0 0.0 - 0.0 /100 WBCs    RBC 3.99 (L) 4.00 - 5.20 x10*6/uL    Hemoglobin 11.1 (L) 12.0 - 16.0 g/dL    Hematocrit 33.4 (L) 36.0 - 46.0 %    MCV 84 80 - 100 fL    MCH 27.8 26.0 - 34.0 pg    MCHC 33.2 32.0 - 36.0 g/dL    RDW 14.6 (H) 11.5 - 14.5 %    Platelets 134 (L) 150 - 450 x10*3/uL    MPV 10.7 7.5 - 11.5 fL    Neutrophils % 93.7 40.0 - 80.0 %    Immature Granulocytes %, Automated 1.7 (H) 0.0 - 0.9 %     Lymphocytes % 1.8 13.0 - 44.0 %    Monocytes % 2.1 2.0 - 10.0 %    Eosinophils % 0.6 0.0 - 6.0 %    Basophils % 0.1 0.0 - 2.0 %    Neutrophils Absolute 13.07 (H) 1.20 - 7.70 x10*3/uL    Immature Granulocytes Absolute, Automated 0.24 0.00 - 0.70 x10*3/uL    Lymphocytes Absolute 0.25 (L) 1.20 - 4.80 x10*3/uL    Monocytes Absolute 0.29 0.10 - 1.00 x10*3/uL    Eosinophils Absolute 0.08 0.00 - 0.70 x10*3/uL    Basophils Absolute 0.01 0.00 - 0.10 x10*3/uL   Vancomycin   Result Value Ref Range    Vancomycin 17.0 10.0 - 20.0 ug/mL   POCT GLUCOSE   Result Value Ref Range    POCT Glucose 151 (H) 74 - 99 mg/dL   POCT GLUCOSE   Result Value Ref Range    POCT Glucose 91 74 - 99 mg/dL               Principal Problem:    Acute respiratory failure with hypoxemia (CMS/[facility])  Active Problems:    Benign essential HTN    Stage 4 chronic kidney disease (CMS/[facility])    COVID-19  Acute respiratory distress syndrome   elevated CRP  Leukocytosis  Diarrhea    - Clinical presentation overall concerning for acute respiratory distress syndrome related to COVID 19 infection,  happy hypoxemia, patient unfortunately remains at high risk for decompensation thus we will continue ICU stay for ARDS   - Still on 100% HFNC , CXR: no significant change, prominent glass opacities bilaterally  - DVT USG results pending, VQ scan was of low probability for PE on hospital presentation, no CTPE due to renal dysfunction  - Agree with lasix today  -Continue Remdesivir and Decadron, Decadron dose has been decreased, started 10/24  -Continue high flow nasal cannula at this time patient open to intubation if indicated gets worse  -She is status post baricitinib 10/25  -Continue pulmonary toilet with incentive spirometer positional changes, patient re -educated on prone positioning as well  -Per ID discontinued  empiric pneumonia treatment with vancomycin and Zosyn  -Appreciate palliative care's recommendations  - Continue Nebs every 6hrs   - C diff is negative (10/26)    -Losartan for hypertension as tolerated, hold for systolic blood pressure less than 110  - History of bladder fistula status post partial hysterectomy plan for surgical repair scheduled for December 2023    DVT prophylaxis: Lovenox daily    GI prophylaxis: Protonix    PT/OT/ST: Unable to tolerate PT OT at this time due to respiratory status    Nutrition: Regular diet/protein supplements     Code status: Full code    Have IV fluids been discontinued:  yes    Central lines present: NO    level of care indicated: ICU    Location prior to arrival: Home    Dispo plan: Critically ill, continue ICU                    Lydia Moreland MD

## 2023-10-29 NOTE — CARE PLAN
Problem: Respiratory  Goal: Minimal/no exertional discomfort or dyspnea this shift  Outcome: Progressing  Goal: No signs of respiratory distress (eg. Use of accessory muscles. Peds grunting)  Outcome: Progressing  Goal: Wean oxygen to maintain O2 saturation per order/standard this shift  Outcome: Progressing     Problem: Pain - Adult  Goal: Verbalizes/displays adequate comfort level or baseline comfort level  Outcome: Progressing     Problem: Safety - Adult  Goal: Free from fall injury  Outcome: Progressing     Problem: Discharge Planning  Goal: Discharge to home or other facility with appropriate resources  Outcome: Progressing     Problem: Chronic Conditions and Co-morbidities  Goal: Patient's chronic conditions and co-morbidity symptoms are monitored and maintained or improved  Outcome: Progressing   The patient's goals for the shift include To get rest    The clinical goals for the shift include Improved oxygen

## 2023-10-29 NOTE — CARE PLAN
Problem: Respiratory  Goal: Tolerate pulmonary toileting this shift  Outcome: Progressing  Goal: Verbalize decreased shortness of breath this shift  Outcome: Progressing  Goal: Increase self care and/or family involvement in next 24 hours  Outcome: Progressing

## 2023-10-29 NOTE — CARE PLAN
Problem: Skin  Goal: Participates in plan/prevention/treatment measures  Outcome: Progressing   The patient's goals for the shift include To get rest    The clinical goals for the shift include decrease oxygen demands, use IS    Monitor skin integrity

## 2023-10-29 NOTE — PROGRESS NOTES
Macarena Wilson is a 68 y.o. female on day 6 of admission presenting with Acute respiratory failure with hypoxemia (CMS/HCC).      Subjective       Patient was up to bedside commode and desaturated to 77 percent , recovered after 5 mins on 90% . Reasonably concerned about her prognosis.    Objective     Last Recorded Vitals  /68 (BP Location: Right arm, Patient Position: Sitting)   Pulse 60   Temp 35.9 °C (96.6 °F) (Temporal)   Resp (!) 31   Wt 70 kg (154 lb 5.2 oz)   SpO2 93%   Intake/Output last 3 Shifts:    Intake/Output Summary (Last 24 hours) at 10/29/2023 0939  Last data filed at 10/29/2023 0519  Gross per 24 hour   Intake 360 ml   Output 1600 ml   Net -1240 ml         Admission Weight  Weight: 77.1 kg (170 lb) (10/23/23 1541)    Daily Weight  10/29/23 : 70 kg (154 lb 5.2 oz)    Image Results  XR chest 1 view  Narrative: Interpreted By:  Nirmal Mcmillan,   STUDY:  XR CHEST 1 VIEW; 10/27/2023 10:06 am      INDICATION:  Signs/Symptoms:follow up hypoxemia.      COMPARISON:  10/23/2023      ACCESSION NUMBER(S):  [id number]      ORDERING CLINICIAN:  SUSANNE FRANCOIS      TECHNIQUE:  1 view of the chest was performed.      FINDINGS:  There is diffuse ground-glass opacity bilaterally consistent with  history of COVID. No lobar or large consolidation however the  ground-glass opacities are prominent but stable from the previous  examination. No pleural effusion. No pneumothorax.  The  cardiomediastinal silhouette is within normal limits.      Impression: No significant change. Prominent ground-glass opacities bilaterally,  however, not significantly changed.      Signed by: Nirmal Mcmillan 10/27/2023 10:24 PM  Dictation workstation:   [id number]      Physical Exam  Constitutional:       General: She is in acute distress.      Appearance: Normal appearance. She is normal weight. She is ill-appearing.   HENT:      Head: Normocephalic.      Nose: Congestion present.      Mouth/Throat:      Mouth: Mucous  membranes are moist.   Eyes:      Extraocular Movements: Extraocular movements intact.      Conjunctiva/sclera: Conjunctivae normal.      Pupils: Pupils are equal, round, and reactive to light.   Cardiovascular:      Rate and Rhythm: Tachycardia present.   Pulmonary:      Effort: Respiratory distress present.   Chest:      Chest wall: No tenderness.   Abdominal:      General: Bowel sounds are normal.      Palpations: Abdomen is soft.      Tenderness: There is no abdominal tenderness.   Musculoskeletal:         General: Normal range of motion.      Cervical back: Normal range of motion.   Skin:     General: Skin is warm.      Capillary Refill: Capillary refill takes less than 2 seconds.   Neurological:      General: No focal deficit present.      Mental Status: She is alert and oriented to person, place, and time.   Psychiatric:         Mood and Affect: Mood normal.         Thought Content: Thought content normal.         Judgment: Judgment normal.         Relevant Results             CT chest wo IV contrast    Result Date: 10/24/2023  Interpreted By:  Nirmal Mcmillan, STUDY: CT CHEST WO IV CONTRAST; 10/24/2023 12:27 am   INDICATION: Signs/Symptoms:Covid.   COMPARISON: None   ACCESSION NUMBER(S): [id number]   ORDERING CLINICIAN: GERTRUDE FIERRO   TECHNIQUE: Contiguous axial images of the thorax were obtained from the level of the thoracic inlet through the lung bases. Coronal and sagittal reformatted images were obtained.     FINDINGS: The thyroid gland is unremarkable.   The heart size is within normal limits.  No pericardial effusion is identified. The aorta and pulmonary arteries are normal in caliber.   There are no pathologically enlarged mediastinal, hilar, or axillary lymph nodes are seen.   The trachea and mainstem bronchi are patent. Diffuse patchy ground-glass opacities bilaterally which is compatible with atypical pneumonia. There is no evidence of pneumothorax or pleural effusion.   The visualized  osseous structures are intact.   Limited images through the upper abdomen are unremarkable.       Diffuse, patchy ground-glass opacities bilaterally suggesting atypical pneumonia such as COVID.     Signed by: Nirmal Mcmillan 10/24/2023 12:32 PM Dictation workstation:   [id number]    NM lung perfusion particulate    Result Date: 10/23/2023  Interpreted By:  Yoly Stanley, STUDY: NM LUNG PERFUSION PARTICULATE 10/23/2023 8:44 pm   INDICATION: Signs/Symptoms:hypoxia, r/o PE   COMPARISON: Chest x-ray done earlier today   ACCESSION NUMBER(S): [id number]   ORDERING CLINICIAN: ANAHY EVANS   TECHNIQUE: 4.2 millicuries of technetium 99 M MAA was injected intravenously with perfusion lung scan in 8 projections completed.   FINDINGS: No perfusion defect is seen within either lung.       Normal perfusion lung scan.   Signed by: Yoly Stanley 10/23/2023 8:57 PM Dictation workstation:   [id number]    XR chest 1 view    Result Date: 10/23/2023  Interpreted By:  Yoly Stanley, STUDY: XR CHEST 1 VIEW 10/23/2023 4:55 pm   INDICATION: Signs/Symptoms:dyspnea, cough, fatigue, and malaise. Positive COVID.   COMPARISON: None available.   ACCESSION NUMBER(S): [id number]   ORDERING CLINICIAN: ANAHY EVANS   TECHNIQUE: AP erect view of the chest   FINDINGS: The cardiac size is normal with no mediastinal abnormality identified. The trachea is midline. No pleural abnormality is seen. However, there are bilateral infiltrates with ground-glass appearance with relative sparing of the left upper lung zone.       Bilateral ground-glass infiltrates.   Signed by: Yoly Stanley 10/23/2023 5:08 PM Dictation workstation:   [id number]     Results for orders placed or performed during the hospital encounter of 10/23/23 (from the past 24 hour(s))   POCT GLUCOSE   Result Value Ref Range    POCT Glucose 91 74 - 99 mg/dL   POCT GLUCOSE   Result Value Ref Range    POCT Glucose 123 (H) 74 - 99 mg/dL   aPTT   Result Value Ref Range    aPTT 26.5 22.0 - 32.5  seconds   CBC   Result Value Ref Range    WBC 15.7 (H) 4.4 - 11.3 x10*3/uL    nRBC 0.0 0.0 - 0.0 /100 WBCs    RBC 4.00 4.00 - 5.20 x10*6/uL    Hemoglobin 11.0 (L) 12.0 - 16.0 g/dL    Hematocrit 33.7 (L) 36.0 - 46.0 %    MCV 84 80 - 100 fL    MCH 27.5 26.0 - 34.0 pg    MCHC 32.6 32.0 - 36.0 g/dL    RDW 14.7 (H) 11.5 - 14.5 %    Platelets 124 (L) 150 - 450 x10*3/uL    MPV 10.4 7.5 - 11.5 fL   POCT GLUCOSE   Result Value Ref Range    POCT Glucose 129 (H) 74 - 99 mg/dL   CBC and Auto Differential   Result Value Ref Range    WBC 15.0 (H) 4.4 - 11.3 x10*3/uL    nRBC 0.0 0.0 - 0.0 /100 WBCs    RBC 3.72 (L) 4.00 - 5.20 x10*6/uL    Hemoglobin 10.2 (L) 12.0 - 16.0 g/dL    Hematocrit 30.6 (L) 36.0 - 46.0 %    MCV 82 80 - 100 fL    MCH 27.4 26.0 - 34.0 pg    MCHC 33.3 32.0 - 36.0 g/dL    RDW 14.6 (H) 11.5 - 14.5 %    Platelets 120 (L) 150 - 450 x10*3/uL    MPV 11.5 7.5 - 11.5 fL    Neutrophils % 93.3 40.0 - 80.0 %    Immature Granulocytes %, Automated 2.5 (H) 0.0 - 0.9 %    Lymphocytes % 1.8 13.0 - 44.0 %    Monocytes % 2.1 2.0 - 10.0 %    Eosinophils % 0.2 0.0 - 6.0 %    Basophils % 0.1 0.0 - 2.0 %    Neutrophils Absolute 14.04 (H) 1.20 - 7.70 x10*3/uL    Immature Granulocytes Absolute, Automated 0.37 0.00 - 0.70 x10*3/uL    Lymphocytes Absolute 0.27 (L) 1.20 - 4.80 x10*3/uL    Monocytes Absolute 0.31 0.10 - 1.00 x10*3/uL    Eosinophils Absolute 0.03 0.00 - 0.70 x10*3/uL    Basophils Absolute 0.02 0.00 - 0.10 x10*3/uL   APTT   Result Value Ref Range    aPTT >139.0 (HH) 22.0 - 32.5 seconds   POCT GLUCOSE   Result Value Ref Range    POCT Glucose 123 (H) 74 - 99 mg/dL   APTT   Result Value Ref Range    aPTT 47.6 (H) 22.0 - 32.5 seconds           Scheduled medications  dexAMETHasone, 6 mg, intravenous, q24h  fluticasone, 1 spray, Each Nostril, BID  insulin lispro, 0-5 Units, subcutaneous, TID with meals  ipratropium-albuteroL, 3 mL, nebulization, q6h while awake  melatonin, 3 mg, oral, Nightly  pantoprazole, 40 mg, oral, Daily  "before breakfast  sodium bicarbonate, 650 mg, oral, TID      Continuous medications  heparin, 0-4,500 Units/hr, Last Rate: Stopped (10/29/23 0543)    PRN medications  PRN medications: acetaminophen **OR** acetaminophen **OR** acetaminophen, benzonatate, bisacodyl, dextrose 10 % in water (D10W), dextrose, glucagon, guaiFENesin, heparin (porcine), ipratropium-albuteroL, ondansetron ODT **OR** ondansetron, [Held by provider] oxygen, oxygen, polyethylene glycol, sodium chloride    Assessment/Plan   This patient currently has cardiac telemetry ordered; if you would like to modify or discontinue the telemetry order, click here to go to the orders activity to modify/discontinue the order.                Principal Problem:    Acute respiratory failure with hypoxemia (CMS/HCC)  Active Problems:    Benign essential HTN    Stage 4 chronic kidney disease (CMS/HCC)    COVID-19  Acute respiratory distress syndrome   elevated CRP  Leukocytosis  Diarrhea  Anemia  DVT ruled out  Debility  Protein Calorie Malnutrition  Thrombocytopenia    - Guarded prognosis as without marked clinical improvement  -  Presentation c/w acute respiratory distress syndrome related to severe COVID 19 infection in  \"happy hypoxemia\" patient unfortunately remains at high risk for decompensation thus we will continue ICU stay for ARDS   - Discussed  NIPPV, MV, tracheostomy vs prolonged HFNC, defer to pulm for long term ventilation plan  - Still on 90% HFNC , CXR: no significant change, prominent glass opacities bilaterally  - DVT USG no DVT (10/28), VQ scan was of low probability for PE on hospital presentation, no CTPE due to renal dysfunction  - Tolerated lasix for pulm edema UOP -940ml, nephrology guiding diuresis given CKD 3  -Completed 5 doses of  Remdesivir on 10/28  - Remains on Decadron, Decadron dose has been decreased, started 10/24, 10 day goal  -She is status post baricitinib 10/25  -Continue pulmonary toilet with incentive spirometer positional " changes, patient re -educated on prone positioning as well, not able to tolerate proning unfortunately  -Per ID discontinued  empiric pneumonia treatment with vancomycin and Zosyn  -Palliative care following   - Continue Nebs every 6hrs   - Will clarify with pulm if goal is empiric therapeutic anticoagulation vs prophylactic     Diarrhea  - C diff is negative (10/26)    Thrombocytopenia  - If platelets drop tomorrow then consider HIT abx     Anemia of chronic disease  -monitor    Hypertension - chronic   -Losartan for hypertension as tolerated, hold for systolic blood pressure less than 110    Bladder fistula - chronic   - History of bladder fistula status post partial hysterectomy plan for surgical repair scheduled for December 2023    Hematuria  - started after hep drip, on hold for now, plts lower, monitor,check ua  -?relationship with bladder fistula    DVT prophylaxis: Lovenox daily, dc hep drip    GI prophylaxis: Protonix    PT/OT/ST: Unable to tolerate PT OT at this time due to respiratory status    Nutrition: Regular diet/protein supplements     Code status: Full code    Have IV fluids been discontinued:  yes    Central lines present: NO    level of care indicated: ICU    Location prior to arrival: Home    Dispo plan: Critically ill, continue ICU                    Lydia Moreland MD

## 2023-10-29 NOTE — PROGRESS NOTES
Macarena Wilson is a 68 y.o. female on day 6 of admission presenting with acute respiratory failure, COVID.    More fatigued today, sleeping poorly overnight.  Having a difficult time recovering respiratory wise for minimal exertion, utilizing 100% nonrebreather after exertion to improve SPO2.  Remains on 90% high flow at 40 L/min.  Coughing today somewhat productive.       Objective     Vitals and nursing note reviewed.   Constitutional:       General: She is not in acute distress.     Appearance: She is ill-appearing.  Increased work of breathing today after minimal exertion  HENT:      Head: Normocephalic and atraumatic.      Nose: Congestion present.      Mouth/Throat:      Mouth: Mucous membranes are moist.      Pharynx: Oropharynx is clear. No oropharyngeal exudate or posterior oropharyngeal erythema.   Eyes:      Extraocular Movements: Extraocular movements intact.      Pupils: Pupils are equal, round, and reactive to light.   Cardiovascular:      Rate and Rhythm: Normal rate and regular rhythm.      Pulses: Normal pulses.      Heart sounds: No murmur heard.  Pulmonary:      Effort: Pulmonary effort is normal.  Tachypneic, accessory muscle use noted     Breath sounds: Normal breath sounds.  Scattered crackles posteriorly     Comments: Hfnc 40/90%, +100% nonrebreather  Abdominal:      General: Abdomen is flat. Bowel sounds are normal. There is no distension.      Palpations: Abdomen is soft.      Tenderness: There is no abdominal tenderness.   Musculoskeletal:         General: No swelling, tenderness, deformity or signs of injury. Normal range of motion.      Cervical back: Normal range of motion and neck supple.   Skin:     General: Skin is warm and dry.      Capillary Refill: Capillary refill takes 2 to 3 seconds.   Neurological:      General: No focal deficit present.      Mental Status: She is alert and oriented to person, place, and time.   Psychiatric:         Mood and Affect: Mood normal.     Last  "Recorded Vitals  /70   Pulse 89   Temp 36.3 °C (97.3 °F)   Resp 12   Ht 1.6 m (5' 3\")   Wt 70 kg (154 lb 5.2 oz)   SpO2 91%   BMI 27.34 kg/m²      Intake/Output last 3 Shifts:  I/O last 3 completed shifts:  In: 660 (9.3 mL/kg) [P.O.:660]  Out: 1600 (22.6 mL/kg) [Urine:1600 (0.6 mL/kg/hr)]  Weight: 70.8 kg     Relevant Results  Results for orders placed or performed during the hospital encounter of 10/23/23 (from the past 24 hour(s))   POCT GLUCOSE   Result Value Ref Range    POCT Glucose 123 (H) 74 - 99 mg/dL   aPTT   Result Value Ref Range    aPTT 26.5 22.0 - 32.5 seconds   CBC   Result Value Ref Range    WBC 15.7 (H) 4.4 - 11.3 x10*3/uL    nRBC 0.0 0.0 - 0.0 /100 WBCs    RBC 4.00 4.00 - 5.20 x10*6/uL    Hemoglobin 11.0 (L) 12.0 - 16.0 g/dL    Hematocrit 33.7 (L) 36.0 - 46.0 %    MCV 84 80 - 100 fL    MCH 27.5 26.0 - 34.0 pg    MCHC 32.6 32.0 - 36.0 g/dL    RDW 14.7 (H) 11.5 - 14.5 %    Platelets 124 (L) 150 - 450 x10*3/uL    MPV 10.4 7.5 - 11.5 fL   POCT GLUCOSE   Result Value Ref Range    POCT Glucose 129 (H) 74 - 99 mg/dL   CBC and Auto Differential   Result Value Ref Range    WBC 15.0 (H) 4.4 - 11.3 x10*3/uL    nRBC 0.0 0.0 - 0.0 /100 WBCs    RBC 3.72 (L) 4.00 - 5.20 x10*6/uL    Hemoglobin 10.2 (L) 12.0 - 16.0 g/dL    Hematocrit 30.6 (L) 36.0 - 46.0 %    MCV 82 80 - 100 fL    MCH 27.4 26.0 - 34.0 pg    MCHC 33.3 32.0 - 36.0 g/dL    RDW 14.6 (H) 11.5 - 14.5 %    Platelets 120 (L) 150 - 450 x10*3/uL    MPV 11.5 7.5 - 11.5 fL    Neutrophils % 93.3 40.0 - 80.0 %    Immature Granulocytes %, Automated 2.5 (H) 0.0 - 0.9 %    Lymphocytes % 1.8 13.0 - 44.0 %    Monocytes % 2.1 2.0 - 10.0 %    Eosinophils % 0.2 0.0 - 6.0 %    Basophils % 0.1 0.0 - 2.0 %    Neutrophils Absolute 14.04 (H) 1.20 - 7.70 x10*3/uL    Immature Granulocytes Absolute, Automated 0.37 0.00 - 0.70 x10*3/uL    Lymphocytes Absolute 0.27 (L) 1.20 - 4.80 x10*3/uL    Monocytes Absolute 0.31 0.10 - 1.00 x10*3/uL    Eosinophils Absolute 0.03 " 0.00 - 0.70 x10*3/uL    Basophils Absolute 0.02 0.00 - 0.10 x10*3/uL   APTT   Result Value Ref Range    aPTT >139.0 (HH) 22.0 - 32.5 seconds   POCT GLUCOSE   Result Value Ref Range    POCT Glucose 123 (H) 74 - 99 mg/dL   APTT   Result Value Ref Range    aPTT 47.6 (H) 22.0 - 32.5 seconds   CBC   Result Value Ref Range    WBC 13.7 (H) 4.4 - 11.3 x10*3/uL    nRBC 0.0 0.0 - 0.0 /100 WBCs    RBC 3.81 (L) 4.00 - 5.20 x10*6/uL    Hemoglobin 10.5 (L) 12.0 - 16.0 g/dL    Hematocrit 31.1 (L) 36.0 - 46.0 %    MCV 82 80 - 100 fL    MCH 27.6 26.0 - 34.0 pg    MCHC 33.8 32.0 - 36.0 g/dL    RDW 14.6 (H) 11.5 - 14.5 %    Platelets 120 (L) 150 - 450 x10*3/uL    MPV 11.1 7.5 - 11.5 fL   Urinalysis with Reflex Microscopic   Result Value Ref Range    Color, Urine Brown (N) Light-Yellow, Yellow, Dark-Yellow    Appearance, Urine Turbid (N) Clear    Specific Gravity, Urine 1.016 1.005 - 1.035    pH, Urine 6.0 5.0, 5.5, 6.0, 6.5, 7.0, 7.5, 8.0    Protein, Urine 70 (1+) (A) NEGATIVE, 10 (TRACE), 20 (TRACE) mg/dL    Glucose, Urine 30 (TRACE) (A) Normal mg/dL    Blood, Urine OVER (3+) (A) NEGATIVE    Ketones, Urine NEGATIVE NEGATIVE mg/dL    Bilirubin, Urine NEGATIVE NEGATIVE    Urobilinogen, Urine Normal Normal mg/dL    Nitrite, Urine NEGATIVE NEGATIVE    Leukocyte Esterase, Urine 500 Fanny/µL (A) NEGATIVE   Microscopic Only, Urine   Result Value Ref Range    WBC, Urine >50 (A) 1-5, NONE /HPF    WBC Clumps, Urine FEW Reference range not established. /HPF    RBC, Urine >20 (A) NONE, 1-2, 3-5 /HPF    Squamous Epithelial Cells, Urine 1-9 (SPARSE) Reference range not established. /HPF   Basic metabolic panel   Result Value Ref Range    Glucose 148 (H) 65 - 99 mg/dL    Sodium 137 133 - 145 mmol/L    Potassium 3.3 (L) 3.4 - 5.1 mmol/L    Chloride 103 97 - 107 mmol/L    Bicarbonate 20 (L) 24 - 31 mmol/L    Urea Nitrogen 49 (H) 8 - 25 mg/dL    Creatinine 1.60 0.40 - 1.60 mg/dL    eGFR 35 (L) >60 mL/min/1.73m*2    Calcium 8.4 (L) 8.5 - 10.4 mg/dL     Anion Gap 14 <=19 mmol/L   POCT GLUCOSE   Result Value Ref Range    POCT Glucose 157 (H) 74 - 99 mg/dL      No results found.   Current Facility-Administered Medications   Medication Dose Route Frequency Provider Last Rate Last Admin    acetaminophen (Tylenol) tablet 650 mg  650 mg oral q4h PRN David Rodriguez MD   650 mg at 10/29/23 1243    Or    acetaminophen (Tylenol) oral liquid 650 mg  650 mg nasogastric tube q4h PRN David Rodriguez MD        Or    acetaminophen (Tylenol) suppository 650 mg  650 mg rectal q4h PRN David Rodriguez MD        benzonatate (Tessalon) capsule 200 mg  200 mg oral TID PRN JASON Burgess        bisacodyl (Dulcolax) EC tablet 10 mg  10 mg oral Daily PRN David Rodriguez MD        dexAMETHasone (Decadron) injection 6 mg  6 mg intravenous q24h David Rodriguez MD   6 mg at 10/28/23 2101    dextrose 10 % in water (D10W) infusion  0.3 g/kg/hr intravenous Once PRN David Rodriguez MD        dextrose 50 % injection 25 g  25 g intravenous q15 min PRN David Rodriguez MD        enoxaparin (Lovenox) syringe 30 mg  30 mg subcutaneous Daily Lydia Moreland MD   30 mg at 10/29/23 1230    fluticasone (Flonase) nasal spray 1 spray  1 spray Each Nostril BID JASON Burgess   1 spray at 10/29/23 0900    glucagon (Glucagen) injection 1 mg  1 mg intramuscular q15 min PRN David Rodriguez MD        guaiFENesin (Robitussin) 100 mg/5 mL syrup 200 mg  200 mg oral q4h PRN JASON Shankar   200 mg at 10/29/23 1234    insulin lispro (HumaLOG) injection 0-5 Units  0-5 Units subcutaneous TID with meals David Rodriguez MD   1 Units at 10/29/23 1230    ipratropium-albuteroL (Duo-Neb) 0.5-2.5 mg/3 mL nebulizer solution 3 mL  3 mL nebulization q6h while awake David Rodriguez MD   3 mL at 10/29/23 1206    ipratropium-albuteroL (Duo-Neb) 0.5-2.5 mg/3 mL nebulizer solution 3 mL  3 mL nebulization q2h PRN David Rodriguez  MD        melatonin tablet 3 mg  3 mg oral Nightly ZION Burgess-CNP   3 mg at 10/28/23 2101    ondansetron ODT (Zofran-ODT) disintegrating tablet 4 mg  4 mg oral q8h PRN David Rodriguez MD        Or    ondansetron (Zofran) injection 4 mg  4 mg intravenous q8h PRN David Rodriguez MD        [Held by provider] oxygen (O2) therapy   inhalation Continuous PRN - O2/gases David Rodriguez MD   35 L/min at 10/24/23 0115    oxygen (O2) therapy   inhalation Continuous PRN - O2/gases Davin Avendaño MD        pantoprazole (ProtoNix) EC tablet 40 mg  40 mg oral Daily before breakfast Lydia Moreland MD   40 mg at 10/29/23 0900    polyethylene glycol (Glycolax, Miralax) packet 17 g  17 g oral Daily PRN Lou Hodges APRN-CNP        sodium bicarbonate tablet 650 mg  650 mg oral TID Danial Henderson MD   650 mg at 10/29/23 0900    sodium chloride (Ocean) 0.65 % nasal spray 1 spray  1 spray Each Nostril PRN ZION Burgess-CNP   1 spray at 10/29/23 1230        Assessment/Plan   Principal Problem:    Acute respiratory failure with hypoxemia (CMS/HCC)  Active Problems:    Benign essential HTN    Stage 4 chronic kidney disease (CMS/HCC)    COVID-19    COVID-19-Actemra 10/24, finished course of remdesivir, remains on IV dexamethasone, aerosols, given Lasix 20 mg x 1 yesterday by nephrology remains on high flow  Acute respiratory failure  CKD4, acidosis-started on bicarb 10/25, creatinine stable, nephrology managing  Diarrhea-solved off laxatives  Insomnia-continue melatonin, adjust dose  Hypertension  Palliative care     Chart reviewed, discussed with attending service and staff RN.  Full code  Patient is capable  Her stated healthcare garcia of  are her 2 sons Mathew and Josias, she has a living will.     68-year-old female presenting to the hospital with acute respiratory failure in the setting of COVID-19 with underlying CKD 4 requiring high flow oxygen.  Prior to admission she was working  full-time and independent with all ADLs and IADLs.  She had excellent quality of life without any functional limitations.  Her ultimate goal is to treat her acute condition and return to her prior level of function.  She is open to all measures including chest compressions ventilation in order to keep her alive at this point in time, however she has no wish to be sustained on life support indefinitely, and trusts her 2 sons to fill her living will if she cannot be weaned off of ventilator.  Patient very aware that her prognosis is guarded, given her high oxygen requirements.      10/28  Diarrhea improved. No sinus congestion. Worried about chronic eczema, added cream.     10/29 appears more fatigued today, not sleeping well, increased work of breathing, concern the patient is fatiguing from her prolonged respiratory failure.  Remains aggressive treatment model of care and full code, open to intubation if necessary.     Full code    I spent 30 minutes in the professional and overall care of this patient.      Lou Hodges, APRN-CNP

## 2023-10-29 NOTE — CARE PLAN
Problem: Skin  Goal: Participates in plan/prevention/treatment measures  10/29/2023 0805 by Kayce Lobato RN  Outcome: Progressing  10/29/2023 0803 by Kayce Lobato RN  Flowsheets (Taken 10/26/2023 0622 by Shayna Whitehead RN)  Participates in plan/prevention/treatment measures: Elevate heels  10/29/2023 0802 by Kayce Lobato RN  Outcome: Progressing   The patient's goals for the shift include To get rest    The clinical goals for the shift include decrease oxygen demands, use IS    Monitor scooter area for excess moisture

## 2023-10-29 NOTE — CARE PLAN
RT Care provided as ordered.  Problem: Respiratory  Goal: Minimal/no exertional discomfort or dyspnea this shift  Outcome: Progressing  Goal: No signs of respiratory distress (eg. Use of accessory muscles. Peds grunting)  Outcome: Progressing  Goal: Wean oxygen to maintain O2 saturation per order/standard this shift  Outcome: Progressing

## 2023-10-29 NOTE — PROGRESS NOTES
Critical Care Progress Note    Macarena Wilson is a 68 y.o. female on day 6 of admission presenting with Acute respiratory failure with hypoxemia (CMS/HCC).    Subjective   Follow up hypoxia / covid  On HFNC  Objective   Vital Signs      10/29/2023     5:00 AM 10/29/2023     7:11 AM 10/29/2023     7:17 AM 10/29/2023     8:00 AM 10/29/2023     8:05 AM 10/29/2023    10:00 AM 10/29/2023    11:00 AM   Vitals   Systolic 120 124  134  135 121   Diastolic 62 68  61  73 70   Heart Rate 73 60  85  78 89   Temp   35.9 °C (96.6 °F)    36.3 °C (97.3 °F)   Resp 31   32  12    Weight (lb)     154.32     BMI     27.34 kg/m2     BSA (m2)     1.76 m2         Oxygen Therapy  SpO2: 91 %  Medical Gas Therapy: Supplemental oxygen  O2 Delivery Method: High flow nasal cannula (90%/40L)    FiO2 (%):  [90 %] 90 %    Intake/Output previous 24 hours:    Intake/Output Summary (Last 24 hours) at 10/29/2023 1240  Last data filed at 10/29/2023 0519  Gross per 24 hour   Intake 360 ml   Output 1600 ml   Net -1240 ml       Physical Exam  Constitutional:       Appearance: She is ill-appearing.   HENT:      Head: Normocephalic and atraumatic.      Mouth/Throat:      Mouth: Mucous membranes are dry.   Eyes:      Extraocular Movements: Extraocular movements intact.      Pupils: Pupils are equal, round, and reactive to light.   Cardiovascular:      Rate and Rhythm: Normal rate and regular rhythm.      Pulses: Normal pulses.      Heart sounds: Normal heart sounds.   Pulmonary:      Effort: Pulmonary effort is normal.      Breath sounds: Decreased air movement present. Decreased breath sounds present.   Abdominal:      General: Bowel sounds are normal.      Palpations: Abdomen is soft.   Musculoskeletal:         General: Normal range of motion.      Cervical back: Normal range of motion.   Skin:     General: Skin is warm and dry.   Neurological:      General: No focal deficit present.      Mental Status: She is alert and oriented to person, place, and  time.   Psychiatric:         Mood and Affect: Mood normal.         Lines and Tubes:  Peripheral IV 10/23/23 20 G (Active)   Placement Date/Time: 10/23/23 1624   Size (Gauge): 20 G   Number of days: 5       Peripheral IV 10/27/23 20 G Distal;Left;Anterior Forearm (Active)   Placement Date/Time: 10/27/23 1900   Earliest Known Present: 10/27/23  Size (Gauge): 20 G  Orientation: Distal;Left;Anterior  Location: Forearm   Number of days: 0       Peripheral IV 10/23/23 20 G Left;Proximal;Anterior Forearm (Active)   Placement Date/Time: (?) 10/23/23 (?) 0000   Earliest Known Present: (?) 10/23/23  Size (Gauge): (?) 20 G  Orientation: (?) Left;Proximal;Anterior  Location: (?) Forearm   Number of days: 5         Scheduled medications  dexAMETHasone, 6 mg, intravenous, q24h  enoxaparin, 30 mg, subcutaneous, Daily  fluticasone, 1 spray, Each Nostril, BID  insulin lispro, 0-5 Units, subcutaneous, TID with meals  ipratropium-albuteroL, 3 mL, nebulization, q6h while awake  melatonin, 3 mg, oral, Nightly  pantoprazole, 40 mg, oral, Daily before breakfast  sodium bicarbonate, 650 mg, oral, TID      Continuous medications     PRN medications  PRN medications: acetaminophen **OR** acetaminophen **OR** acetaminophen, benzonatate, bisacodyl, dextrose 10 % in water (D10W), dextrose, glucagon, guaiFENesin, ipratropium-albuteroL, ondansetron ODT **OR** ondansetron, [Held by provider] oxygen, oxygen, polyethylene glycol, sodium chloride    Relevant Results  Results from last 7 days   Lab Units 10/29/23  1005 10/29/23  0523 10/28/23  2100   WBC AUTO x10*3/uL 13.7* 15.0* 15.7*   HEMOGLOBIN g/dL 10.5* 10.2* 11.0*   HEMATOCRIT % 31.1* 30.6* 33.7*   PLATELETS AUTO x10*3/uL 120* 120* 124*        Results from last 7 days   Lab Units 10/29/23  1133 10/28/23  0505 10/27/23  0555   SODIUM mmol/L 137 135 139   POTASSIUM mmol/L 3.3* 3.7 4.1   CHLORIDE mmol/L 103 104 108*   CO2 mmol/L 20* 20* 19*   BUN mg/dL 49* 49* 52*   CREATININE mg/dL 1.60 1.70*  1.90*   GLUCOSE mg/dL 148* 208* 143*   CALCIUM mg/dL 8.4* 8.3* 8.5        Results from last 7 days   Lab Units 10/25/23  0507   POCT PH, ARTERIAL pH 7.35*   POCT PCO2, ARTERIAL mm Hg 31*   POCT PO2, ARTERIAL mm Hg 115*   POCT HCO3 CALCULATED, ARTERIAL mmol/L 17.1*   POCT BASE EXCESS, ARTERIAL mmol/L -7.5*       XR chest 1 view 10/27/2023    Narrative  Interpreted By:  Nirmal Mcmillan,  STUDY:  XR CHEST 1 VIEW; 10/27/2023 10:06 am    INDICATION:  Signs/Symptoms:follow up hypoxemia.    COMPARISON:  10/23/2023    ACCESSION NUMBER(S):  [id number]    ORDERING CLINICIAN:  SUSANNE FRANCOIS    TECHNIQUE:  1 view of the chest was performed.    FINDINGS:  There is diffuse ground-glass opacity bilaterally consistent with  history of COVID. No lobar or large consolidation however the  ground-glass opacities are prominent but stable from the previous  examination. No pleural effusion. No pneumothorax.  The  cardiomediastinal silhouette is within normal limits.    Impression  No significant change. Prominent ground-glass opacities bilaterally,  however, not significantly changed.    Signed by: Nirmal Mcmillan 10/27/2023 10:24 PM  Dictation workstation:   [id number]      Patient Active Problem List   Diagnosis    Abnormal mammogram    Acute pain of left shoulder    Allergic rhinitis    Benign essential HTN    Calculus of kidney    Staghorn renal calculus    Diverticular disease    Gammopathy    Gross hematuria    Hematuria, unspecified    Impaired fasting glucose    Nodule of kidney    Obesity    Post-menopausal bleeding    Prediabetes    Acute cystitis with hematuria    Stage 4 chronic kidney disease (CMS/HCC)    Urgency incontinence    Bladder fistula    Atrophic kidney    COVID-19    Acute respiratory failure with hypoxemia (CMS/HCC)    Pneumonia of both lower lobes due to infectious organism     Assessment/Plan    Acute respiratory failure with hypoxemia (CMS/HCC)  Active Problems:    COVID-19    Benign essential HTN    Stage 4  chronic kidney disease (CMS/HCC)    Persistent hypoxia  D-dimer 16  VQ was low probability several days ago  Duplex studies negative  Empiric heparin drip was started last night due to elevated D-dimer.  Nonresolving hypoxia.  Doppler results in the meantime of come back negative.  Patient is experiencing significant hematuria.  Heparin drip stopped.  Resume prophylactic Lovenox    Continue diuresis  Wean HFNC -on 90% and 40 L  Hold on NIPPV - patient does not feel she will tolerate  Remdesivir  Decadron  Aerosol Rx    Davin Avendaño MD  [facility]       This critically ill patient continues to be at-risk for deterioration / failure due to the above mentioned dysfunctional unstable organ systems.   Critical care time is spent at bedside includes review of diagnostic tests, labs, and radiographs, serial assessments and management of hemodynamics, respiratory status, and coordination of care with different members of interdisciplinary team  Assessment, impression and plans are reflected in the note above as well as the orders.     Critical concerns addressed:  Acute respiratory failure  Hypoxia  COVID-19 pneumonitis  Elevated D-dimer rule out VTE  Time Spent in critical Care, exclusive of procedures : 25 mins.     Disclaimer: Parts of this chart were dictated with voice recognition software. Please excuse any errors of grammar, spelling, or transcription which are not corrected. Please contact me with any questions regarding documentation.

## 2023-10-29 NOTE — PROGRESS NOTES
Macarena Wilson is a 68 y.o. female on day 6 of admission presenting with Acute respiratory failure with hypoxemia (CMS/HCC).    Subjective   Interval History:   Room not entered-limit exposure  Patient on high flow oxygen-90%-worsening oxygenation with minimal exertion  Given Lasix per nephrology          Review of Systems    Objective   Range of Vitals (last 24 hours)  Heart Rate:  []   Temp:  [35.2 °C (95.4 °F)-36.3 °C (97.3 °F)]   Resp:  [12-37]   BP: ()/(56-87)   Weight:  [70 kg (154 lb 5.2 oz)-70.8 kg (156 lb 1.4 oz)]   SpO2:  [82 %-100 %]   Daily Weight  10/29/23 : 70 kg (154 lb 5.2 oz)    Body mass index is 27.34 kg/m².    Physical Exam  Awake, alert  On high flow oxygen    Antibiotics    sodium chloride 0.9 % bolus 500 mL  doxycycline (Vibramycin) in dextrose 5 % in water (D5W) 100 mL  mg  cefTRIAXone (Rocephin) IVPB 1 g  Tc-99m-albumin (Draximage MAA) injection 4.2 millicurie  dexAMETHasone (Decadron) injection 8 mg  sodium chloride 0.9 % bolus 1,000 mL  enoxaparin (Lovenox) syringe 30 mg  sodium chloride 0.9% infusion  acetaminophen (Tylenol) tablet 650 mg  acetaminophen (Tylenol) oral liquid 650 mg  acetaminophen (Tylenol) suppository 650 mg  ondansetron ODT (Zofran-ODT) disintegrating tablet 4 mg  ondansetron (Zofran) injection 4 mg  polyethylene glycol (Glycolax, Miralax) packet 17 g  bisacodyl (Dulcolax) EC tablet 10 mg  guaiFENesin (Mucinex) 12 hr tablet 600 mg  oxygen (O2) therapy  piperacillin-tazobactam-dextrose (Zosyn) IV 2.25 g  ipratropium-albuteroL (Duo-Neb) 0.5-2.5 mg/3 mL nebulizer solution 3 mL  dexAMETHasone (Decadron) injection 8 mg  dexAMETHasone (Decadron) injection 6 mg  dextrose 50 % injection 25 g  glucagon (Glucagen) injection 1 mg  dextrose 10 % in water (D10W) infusion  insulin lispro (HumaLOG) injection 0-5 Units  vancomycin-diluent combo no.1 (Xellia) IVPB 1,250 mg  ipratropium-albuteroL (Duo-Neb) 0.5-2.5 mg/3 mL nebulizer solution 3  mL  ipratropium-albuteroL (Duo-Neb) 0.5-2.5 mg/3 mL nebulizer solution 3 mL  oxygen (O2) therapy  remdesivir (Veklury) 200 mg in sodium chloride 0.9% 250 mL IV  remdesivir (Veklury) 100 mg in sodium chloride 0.9% 250 mL IV  oxygen (O2) therapy  pantoprazole (ProtoNix) injection 40 mg  remdesivir (Veklury) 200 mg in sodium chloride 0.9% 250 mL IV  remdesivir (Veklury) 100 mg in sodium chloride 0.9% 250 mL IV  tocilizumab (Actemra) 600 mg in sodium chloride 0.9% 70 mL IV  furosemide (Lasix) tablet 10 mg  melatonin tablet 3 mg  benzonatate (Tessalon) capsule 200 mg  polyethylene glycol (Glycolax, Miralax) packet 17 g  fluticasone (Flonase) nasal spray 1 spray  sodium chloride (Ocean) 0.65 % nasal spray 1 spray  sodium bicarbonate tablet 650 mg  vancomycin-diluent combo no.1 (Xellia) IVPB 1,250 mg  guaiFENesin (Robitussin) 100 mg/5 mL syrup 200 mg  vancomycin-diluent combo no.1 (Xellia) IVPB 750 mg  pantoprazole (ProtoNix) EC tablet 40 mg  oxygen (O2) therapy  furosemide (Lasix) injection 20 mg  oxygen (O2) therapy  heparin (porcine) injection 5,750 Units  heparin 25,000 Units in dextrose 5% 250 mL (100 Units/mL) infusion (premix)  heparin (porcine) injection 3,000-6,000 Units  enoxaparin (Lovenox) syringe 30 mg  melatonin tablet 3 mg      Relevant Results  Labs  Results from last 72 hours   Lab Units 10/29/23  1005 10/29/23  0523 10/28/23  2100 10/28/23  0505 10/27/23  0555   WBC AUTO x10*3/uL 13.7* 15.0* 15.7* 13.9* 13.8*   HEMOGLOBIN g/dL 10.5* 10.2* 11.0* 11.1* 11.0*   HEMATOCRIT % 31.1* 30.6* 33.7* 33.4* 33.8*   PLATELETS AUTO x10*3/uL 120* 120* 124* 134* 196   NEUTROS PCT AUTO %  --  93.3  --  93.7 91.7   LYMPHS PCT AUTO %  --  1.8  --  1.8 2.0   MONOS PCT AUTO %  --  2.1  --  2.1 3.3   EOS PCT AUTO %  --  0.2  --  0.6 0.1     Results from last 72 hours   Lab Units 10/29/23  1133 10/28/23  0505 10/27/23  0555   SODIUM mmol/L 137 135 139   POTASSIUM mmol/L 3.3* 3.7 4.1   CHLORIDE mmol/L 103 104 108*   CO2 mmol/L  20* 20* 19*   BUN mg/dL 49* 49* 52*   CREATININE mg/dL 1.60 1.70* 1.90*   GLUCOSE mg/dL 148* 208* 143*   CALCIUM mg/dL 8.4* 8.3* 8.5   ANION GAP mmol/L 14 11 12   EGFR mL/min/1.73m*2 35* 32* 28*     Results from last 72 hours   Lab Units 10/28/23  0505 10/27/23  0555   ALK PHOS U/L 113 90   BILIRUBIN TOTAL mg/dL 0.2 0.2   PROTEIN TOTAL g/dL 5.7* 5.5*   ALT U/L 14 14   AST U/L 21 21   ALBUMIN g/dL 2.6* 2.6*     Estimated Creatinine Clearance: 31.1 mL/min (by C-G formula based on SCr of 1.6 mg/dL).  C-Reactive Protein   Date Value Ref Range Status   10/26/2023 3.90 (H) 0.00 - 2.00 mg/dL Final   10/25/2023 6.10 (H) 0.00 - 2.00 mg/dL Final   10/24/2023 10.10 (H) 0.00 - 2.00 mg/dL Final     Microbiology  No results found for the last 14 days.  Reviewed  Imaging  XR chest 1 view    Result Date: 10/29/2023  Interpreted By:  Karina Rodriguez, STUDY: XR CHEST 1 VIEW; 10/29/2023 1:02 pm   INDICATION: Signs/Symptoms:hyppoxemia   COMPARISON: 10/27/2023   ACCESSION NUMBER(S): [id number]   ORDERING CLINICIAN: SUSANNE FRANCOIS   TECHNIQUE: Frontal  chest radiographs.   FINDINGS: The cardiomediastinal silhouette is unremarkable. Diffuse airspace opacities are noted overlying both lungs, unchanged from prior examinations. No pleural effusion is identified.   The osseous structures are intact.       No significant change from prior examination. Diffuse airspace opacities overlying both lungs, differential includes ARDS, diffuse alveolar edema/hemorrhage.     Signed by: Karina Rodriguez 10/29/2023 2:19 PM Dictation workstation:   [id number]    XR chest 1 view    Result Date: 10/27/2023  Interpreted By:  Nirmal Mcmillan, STUDY: XR CHEST 1 VIEW; 10/27/2023 10:06 am   INDICATION: Signs/Symptoms:follow up hypoxemia.   COMPARISON: 10/23/2023   ACCESSION NUMBER(S): [id number]   ORDERING CLINICIAN: SUSANNE FRANCOIS   TECHNIQUE: 1 view of the chest was performed.   FINDINGS: There is diffuse ground-glass opacity bilaterally consistent with history of  COVID. No lobar or large consolidation however the ground-glass opacities are prominent but stable from the previous examination. No pleural effusion. No pneumothorax.  The cardiomediastinal silhouette is within normal limits.       No significant change. Prominent ground-glass opacities bilaterally, however, not significantly changed.   Signed by: Nirmal Mcmillan 10/27/2023 10:24 PM Dictation workstation:   [id number]    CT chest wo IV contrast    Result Date: 10/24/2023  Interpreted By:  Nirmal Mcmillan, STUDY: CT CHEST WO IV CONTRAST; 10/24/2023 12:27 am   INDICATION: Signs/Symptoms:Covid.   COMPARISON: None   ACCESSION NUMBER(S): [id number]   ORDERING CLINICIAN: GERTRUDE FIERRO   TECHNIQUE: Contiguous axial images of the thorax were obtained from the level of the thoracic inlet through the lung bases. Coronal and sagittal reformatted images were obtained.     FINDINGS: The thyroid gland is unremarkable.   The heart size is within normal limits.  No pericardial effusion is identified. The aorta and pulmonary arteries are normal in caliber.   There are no pathologically enlarged mediastinal, hilar, or axillary lymph nodes are seen.   The trachea and mainstem bronchi are patent. Diffuse patchy ground-glass opacities bilaterally which is compatible with atypical pneumonia. There is no evidence of pneumothorax or pleural effusion.   The visualized osseous structures are intact.   Limited images through the upper abdomen are unremarkable.       Diffuse, patchy ground-glass opacities bilaterally suggesting atypical pneumonia such as COVID.     Signed by: Nirmal Mcmillan 10/24/2023 12:32 PM Dictation workstation:   [id number]    NM lung perfusion particulate    Result Date: 10/23/2023  Interpreted By:  Yoly Stanley, STUDY: NM LUNG PERFUSION PARTICULATE 10/23/2023 8:44 pm   INDICATION: Signs/Symptoms:hypoxia, r/o PE   COMPARISON: Chest x-ray done earlier today   ACCESSION NUMBER(S): [id number]   ORDERING  CLINICIAN: ANAHY EVANS   TECHNIQUE: 4.2 millicuries of technetium 99 M MAA was injected intravenously with perfusion lung scan in 8 projections completed.   FINDINGS: No perfusion defect is seen within either lung.       Normal perfusion lung scan.   Signed by: Yoly Stanley 10/23/2023 8:57 PM Dictation workstation:   [id number]    XR chest 1 view    Result Date: 10/23/2023  Interpreted By:  Yoly Stanley, STUDY: XR CHEST 1 VIEW 10/23/2023 4:55 pm   INDICATION: Signs/Symptoms:dyspnea, cough, fatigue, and malaise. Positive COVID.   COMPARISON: None available.   ACCESSION NUMBER(S): [id number]   ORDERING CLINICIAN: ANAHY EVANS   TECHNIQUE: AP erect view of the chest   FINDINGS: The cardiac size is normal with no mediastinal abnormality identified. The trachea is midline. No pleural abnormality is seen. However, there are bilateral infiltrates with ground-glass appearance with relative sparing of the left upper lung zone.       Bilateral ground-glass infiltrates.   Signed by: Yoly Stanley 10/23/2023 5:08 PM Dictation workstation:   [id number]       Assessment/Plan     Acute hypoxic respiratory failure, requiring high flow-interval worsening  Severe coronavirus pneumonitis  Acute kidney injury on chronic kidney disease-resolved  Leukocytosis  Diarrhea-rule out infectious etiology-resolved     Continue dexamethasone  Remdesivir completed  Monitor renal and hematologic parameters  S/p Actemra-10/24/2023  Supportive care  Monitor temperature and WBC  Supplemental oxygen as needed  Pulmonary follow-up  Consider CT chest if patient is able to tolerate    Js ANAMARIA Vasquez MD

## 2023-10-29 NOTE — CARE PLAN
The patient's goals for the shift include To get rest    The clinical goals for the shift include improved oxygen

## 2023-10-29 NOTE — SIGNIFICANT EVENT
10/29/23 0519   Urine Output/Assessment   Voided Urine (mL) 400 mL   Urinary Incontinence Yes   Urine Amount Medium   Urine Color Red  (heparin drip stopped)

## 2023-10-29 NOTE — CARE PLAN
Problem: Skin  Goal: Participates in plan/prevention/treatment measures  10/29/2023 0807 by Kayce Lobato RN  Outcome: Progressing  10/29/2023 0805 by Kayce Lobato RN  Outcome: Progressing  10/29/2023 0803 by Kayce Lobato RN  Flowsheets (Taken 10/26/2023 0622 by Shayna Whitehead RN)  Participates in plan/prevention/treatment measures: Elevate heels  10/29/2023 0802 by Kayce Lobato RN  Outcome: Progressing   The patient's goals for the shift include To get rest    The clinical goals for the shift include decrease oxygen demands, use IS

## 2023-10-29 NOTE — PROGRESS NOTES
"Macarena Wilson is a 68 y.o. female on day 6 of admission presenting with Acute respiratory failure with hypoxemia (CMS/HCC).    Subjective      Seen for acute kidney injury on top of chronic kidney disease stage IV.  Admitted with COVID-19 the patient oxygen current went up to FiO2 of 100  Still struggling with respiration in spite of 100% FiO2 I did give her 1 dose of Lasix yesterday she is in negative fluid balance 940 over the last 24 hours      Objective     Physical Exam  No change in physical exam  Last Recorded Vitals  Blood pressure 139/73, pulse 107, temperature 36.3 °C (97.3 °F), resp. rate 25, height 1.6 m (5' 3\"), weight 70 kg (154 lb 5.2 oz), SpO2 94 %.    Intake/Output last 3 Shifts:  I/O last 3 completed shifts:  In: 660 (9.3 mL/kg) [P.O.:660]  Out: 1600 (22.6 mL/kg) [Urine:1600 (0.6 mL/kg/hr)]  Weight: 70.8 kg     Current Facility-Administered Medications:     acetaminophen (Tylenol) tablet 650 mg, 650 mg, oral, q4h PRN, 650 mg at 10/29/23 1243 **OR** acetaminophen (Tylenol) oral liquid 650 mg, 650 mg, nasogastric tube, q4h PRN **OR** acetaminophen (Tylenol) suppository 650 mg, 650 mg, rectal, q4h PRN, David Rodriguez MD    benzonatate (Tessalon) capsule 200 mg, 200 mg, oral, TID PRN, Lou Hodges, APRN-CNP    bisacodyl (Dulcolax) EC tablet 10 mg, 10 mg, oral, Daily PRN, David Rodriguez MD    dexAMETHasone (Decadron) injection 6 mg, 6 mg, intravenous, q24h, David Rodriguez MD, 6 mg at 10/28/23 2101    dextrose 10 % in water (D10W) infusion, 0.3 g/kg/hr, intravenous, Once PRN, David Rodriguez MD    dextrose 50 % injection 25 g, 25 g, intravenous, q15 min PRN, David Rodriguez MD    enoxaparin (Lovenox) syringe 30 mg, 30 mg, subcutaneous, Daily, Lydia Moreland MD, 30 mg at 10/29/23 1230    fluticasone (Flonase) nasal spray 1 spray, 1 spray, Each Nostril, BID, Lou Hodges, APRN-CNP, 1 spray at 10/29/23 0900    glucagon (Glucagen) injection 1 mg, 1 mg, " intramuscular, q15 min PRN, David Rodriguez MD    guaiFENesin (Robitussin) 100 mg/5 mL syrup 200 mg, 200 mg, oral, q4h PRN, JASON Shankar, 200 mg at 10/29/23 1234    insulin lispro (HumaLOG) injection 0-5 Units, 0-5 Units, subcutaneous, TID with meals, David Rodriguez MD, 1 Units at 10/29/23 1230    ipratropium-albuteroL (Duo-Neb) 0.5-2.5 mg/3 mL nebulizer solution 3 mL, 3 mL, nebulization, q6h while awake, David Rodriguez MD, 3 mL at 10/29/23 1206    ipratropium-albuteroL (Duo-Neb) 0.5-2.5 mg/3 mL nebulizer solution 3 mL, 3 mL, nebulization, q2h PRN, David Rodriguez MD    melatonin tablet 3 mg, 3 mg, oral, Nightly, JASON Burgess, 3 mg at 10/28/23 2101    melatonin tablet 3 mg, 3 mg, oral, Nightly PRN, JASON Burgess    ondansetron ODT (Zofran-ODT) disintegrating tablet 4 mg, 4 mg, oral, q8h PRN **OR** ondansetron (Zofran) injection 4 mg, 4 mg, intravenous, q8h PRN, David Rodriguez MD    [Held by provider] oxygen (O2) therapy, , inhalation, Continuous PRN - O2/gases, David Rodriguez MD, 35 L/min at 10/24/23 0115    oxygen (O2) therapy, , inhalation, Continuous PRN - O2/gases, Davin Avendaño MD    pantoprazole (ProtoNix) EC tablet 40 mg, 40 mg, oral, Daily before breakfast, Lydia Moreland MD, 40 mg at 10/29/23 0900    polyethylene glycol (Glycolax, Miralax) packet 17 g, 17 g, oral, Daily PRN, Lou R Parsell, APRN-CNP    sodium bicarbonate tablet 650 mg, 650 mg, oral, TID, Danial Henderson MD, 650 mg at 10/29/23 0900    sodium chloride (Ocean) 0.65 % nasal spray 1 spray, 1 spray, Each Nostril, PRN, ZION Burgess-CNP, 1 spray at 10/29/23 1230   Relevant Results    Results for orders placed or performed during the hospital encounter of 10/23/23 (from the past 96 hour(s))   POCT GLUCOSE   Result Value Ref Range    POCT Glucose 79 74 - 99 mg/dL   POCT GLUCOSE   Result Value Ref Range    POCT Glucose 91 74 - 99 mg/dL   Comprehensive  metabolic panel   Result Value Ref Range    Glucose 198 (H) 65 - 99 mg/dL    Sodium 136 133 - 145 mmol/L    Potassium 4.1 3.4 - 5.1 mmol/L    Chloride 106 97 - 107 mmol/L    Bicarbonate 14 (L) 24 - 31 mmol/L    Urea Nitrogen 64 (H) 8 - 25 mg/dL    Creatinine 2.40 (H) 0.40 - 1.60 mg/dL    eGFR 21 (L) >60 mL/min/1.73m*2    Calcium 8.6 8.5 - 10.4 mg/dL    Albumin 2.6 (L) 3.5 - 5.0 g/dL    Alkaline Phosphatase 68 35 - 125 U/L    Total Protein 5.7 (L) 5.9 - 7.9 g/dL    AST 19 5 - 40 U/L    Bilirubin, Total 0.2 0.1 - 1.2 mg/dL    ALT 13 5 - 40 U/L    Anion Gap 16 <=19 mmol/L   C-reactive protein   Result Value Ref Range    C-Reactive Protein 3.90 (H) 0.00 - 2.00 mg/dL   CBC and Auto Differential   Result Value Ref Range    WBC 13.1 (H) 4.4 - 11.3 x10*3/uL    nRBC 0.0 0.0 - 0.0 /100 WBCs    RBC 3.94 (L) 4.00 - 5.20 x10*6/uL    Hemoglobin 10.9 (L) 12.0 - 16.0 g/dL    Hematocrit 35.7 (L) 36.0 - 46.0 %    MCV 91 80 - 100 fL    MCH 27.7 26.0 - 34.0 pg    MCHC 30.5 (L) 32.0 - 36.0 g/dL    RDW 15.0 (H) 11.5 - 14.5 %    Platelets 225 150 - 450 x10*3/uL    MPV 10.4 7.5 - 11.5 fL    Neutrophils % 90.1 40.0 - 80.0 %    Immature Granulocytes %, Automated 4.6 (H) 0.0 - 0.9 %    Lymphocytes % 2.2 13.0 - 44.0 %    Monocytes % 2.9 2.0 - 10.0 %    Eosinophils % 0.0 0.0 - 6.0 %    Basophils % 0.2 0.0 - 2.0 %    Neutrophils Absolute 11.82 (H) 1.20 - 7.70 x10*3/uL    Immature Granulocytes Absolute, Automated 0.61 0.00 - 0.70 x10*3/uL    Lymphocytes Absolute 0.29 (L) 1.20 - 4.80 x10*3/uL    Monocytes Absolute 0.38 0.10 - 1.00 x10*3/uL    Eosinophils Absolute 0.00 0.00 - 0.70 x10*3/uL    Basophils Absolute 0.03 0.00 - 0.10 x10*3/uL   Vancomycin   Result Value Ref Range    Vancomycin 23.0 (H) 10.0 - 20.0 ug/mL   POCT GLUCOSE   Result Value Ref Range    POCT Glucose 143 (H) 74 - 99 mg/dL   POCT GLUCOSE   Result Value Ref Range    POCT Glucose 139 (H) 74 - 99 mg/dL   POCT GLUCOSE   Result Value Ref Range    POCT Glucose 114 (H) 74 - 99 mg/dL   C.  difficile, PCR    Specimen: Stool   Result Value Ref Range    C. difficile, PCR Not Detected Not Detected   Stool Pathogen Panel, PCR    Specimen: Stool   Result Value Ref Range    Campylobacter Group Not Detected Not Detected    Salmonella species Not Detected Not Detected    Shigella species Not Detected Not Detected    Vibrio Group Not Detected Not Detected    Yersinia Enterocolitica Not Detected Not Detected    Shiga Toxin 1 Not Detected Not Detected    Shiga Toxin 2 Not Detected Not Detected    Norovirus GI/GII Not Detected Not Detected    Rotavirus A Not Detected Not Detected   POCT GLUCOSE   Result Value Ref Range    POCT Glucose 123 (H) 74 - 99 mg/dL   Comprehensive metabolic panel   Result Value Ref Range    Glucose 143 (H) 65 - 99 mg/dL    Sodium 139 133 - 145 mmol/L    Potassium 4.1 3.4 - 5.1 mmol/L    Chloride 108 (H) 97 - 107 mmol/L    Bicarbonate 19 (L) 24 - 31 mmol/L    Urea Nitrogen 52 (H) 8 - 25 mg/dL    Creatinine 1.90 (H) 0.40 - 1.60 mg/dL    eGFR 28 (L) >60 mL/min/1.73m*2    Calcium 8.5 8.5 - 10.4 mg/dL    Albumin 2.6 (L) 3.5 - 5.0 g/dL    Alkaline Phosphatase 90 35 - 125 U/L    Total Protein 5.5 (L) 5.9 - 7.9 g/dL    AST 21 5 - 40 U/L    Bilirubin, Total 0.2 0.1 - 1.2 mg/dL    ALT 14 5 - 40 U/L    Anion Gap 12 <=19 mmol/L   CBC and Auto Differential   Result Value Ref Range    WBC 13.8 (H) 4.4 - 11.3 x10*3/uL    nRBC 0.0 0.0 - 0.0 /100 WBCs    RBC 4.04 4.00 - 5.20 x10*6/uL    Hemoglobin 11.0 (L) 12.0 - 16.0 g/dL    Hematocrit 33.8 (L) 36.0 - 46.0 %    MCV 84 80 - 100 fL    MCH 27.2 26.0 - 34.0 pg    MCHC 32.5 32.0 - 36.0 g/dL    RDW 14.6 (H) 11.5 - 14.5 %    Platelets 196 150 - 450 x10*3/uL    MPV 10.6 7.5 - 11.5 fL    Neutrophils % 91.7 40.0 - 80.0 %    Immature Granulocytes %, Automated 2.7 (H) 0.0 - 0.9 %    Lymphocytes % 2.0 13.0 - 44.0 %    Monocytes % 3.3 2.0 - 10.0 %    Eosinophils % 0.1 0.0 - 6.0 %    Basophils % 0.2 0.0 - 2.0 %    Neutrophils Absolute 12.65 (H) 1.20 - 7.70 x10*3/uL     Immature Granulocytes Absolute, Automated 0.37 0.00 - 0.70 x10*3/uL    Lymphocytes Absolute 0.27 (L) 1.20 - 4.80 x10*3/uL    Monocytes Absolute 0.46 0.10 - 1.00 x10*3/uL    Eosinophils Absolute 0.02 0.00 - 0.70 x10*3/uL    Basophils Absolute 0.03 0.00 - 0.10 x10*3/uL   Vancomycin   Result Value Ref Range    Vancomycin 13.0 10.0 - 20.0 ug/mL   POCT GLUCOSE   Result Value Ref Range    POCT Glucose 153 (H) 74 - 99 mg/dL   D-dimer, Non VTE   Result Value Ref Range    D-Dimer Non VTE, Quant (mg/L FEU) 16.99 (H) 0.19 - 0.50 mg/L FEU   POCT GLUCOSE   Result Value Ref Range    POCT Glucose 139 (H) 74 - 99 mg/dL   Lower extremity venous duplex bilateral   Result Value Ref Range    BSA 1.78 m2   POCT GLUCOSE   Result Value Ref Range    POCT Glucose 109 (H) 74 - 99 mg/dL   POCT GLUCOSE   Result Value Ref Range    POCT Glucose 124 (H) 74 - 99 mg/dL   Comprehensive metabolic panel   Result Value Ref Range    Glucose 208 (H) 65 - 99 mg/dL    Sodium 135 133 - 145 mmol/L    Potassium 3.7 3.4 - 5.1 mmol/L    Chloride 104 97 - 107 mmol/L    Bicarbonate 20 (L) 24 - 31 mmol/L    Urea Nitrogen 49 (H) 8 - 25 mg/dL    Creatinine 1.70 (H) 0.40 - 1.60 mg/dL    eGFR 32 (L) >60 mL/min/1.73m*2    Calcium 8.3 (L) 8.5 - 10.4 mg/dL    Albumin 2.6 (L) 3.5 - 5.0 g/dL    Alkaline Phosphatase 113 35 - 125 U/L    Total Protein 5.7 (L) 5.9 - 7.9 g/dL    AST 21 5 - 40 U/L    Bilirubin, Total 0.2 0.1 - 1.2 mg/dL    ALT 14 5 - 40 U/L    Anion Gap 11 <=19 mmol/L   CBC and Auto Differential   Result Value Ref Range    WBC 13.9 (H) 4.4 - 11.3 x10*3/uL    nRBC 0.0 0.0 - 0.0 /100 WBCs    RBC 3.99 (L) 4.00 - 5.20 x10*6/uL    Hemoglobin 11.1 (L) 12.0 - 16.0 g/dL    Hematocrit 33.4 (L) 36.0 - 46.0 %    MCV 84 80 - 100 fL    MCH 27.8 26.0 - 34.0 pg    MCHC 33.2 32.0 - 36.0 g/dL    RDW 14.6 (H) 11.5 - 14.5 %    Platelets 134 (L) 150 - 450 x10*3/uL    MPV 10.7 7.5 - 11.5 fL    Neutrophils % 93.7 40.0 - 80.0 %    Immature Granulocytes %, Automated 1.7 (H) 0.0 - 0.9 %     Lymphocytes % 1.8 13.0 - 44.0 %    Monocytes % 2.1 2.0 - 10.0 %    Eosinophils % 0.6 0.0 - 6.0 %    Basophils % 0.1 0.0 - 2.0 %    Neutrophils Absolute 13.07 (H) 1.20 - 7.70 x10*3/uL    Immature Granulocytes Absolute, Automated 0.24 0.00 - 0.70 x10*3/uL    Lymphocytes Absolute 0.25 (L) 1.20 - 4.80 x10*3/uL    Monocytes Absolute 0.29 0.10 - 1.00 x10*3/uL    Eosinophils Absolute 0.08 0.00 - 0.70 x10*3/uL    Basophils Absolute 0.01 0.00 - 0.10 x10*3/uL   Vancomycin   Result Value Ref Range    Vancomycin 17.0 10.0 - 20.0 ug/mL   POCT GLUCOSE   Result Value Ref Range    POCT Glucose 151 (H) 74 - 99 mg/dL   POCT GLUCOSE   Result Value Ref Range    POCT Glucose 91 74 - 99 mg/dL   POCT GLUCOSE   Result Value Ref Range    POCT Glucose 123 (H) 74 - 99 mg/dL   aPTT   Result Value Ref Range    aPTT 26.5 22.0 - 32.5 seconds   CBC   Result Value Ref Range    WBC 15.7 (H) 4.4 - 11.3 x10*3/uL    nRBC 0.0 0.0 - 0.0 /100 WBCs    RBC 4.00 4.00 - 5.20 x10*6/uL    Hemoglobin 11.0 (L) 12.0 - 16.0 g/dL    Hematocrit 33.7 (L) 36.0 - 46.0 %    MCV 84 80 - 100 fL    MCH 27.5 26.0 - 34.0 pg    MCHC 32.6 32.0 - 36.0 g/dL    RDW 14.7 (H) 11.5 - 14.5 %    Platelets 124 (L) 150 - 450 x10*3/uL    MPV 10.4 7.5 - 11.5 fL   POCT GLUCOSE   Result Value Ref Range    POCT Glucose 129 (H) 74 - 99 mg/dL   CBC and Auto Differential   Result Value Ref Range    WBC 15.0 (H) 4.4 - 11.3 x10*3/uL    nRBC 0.0 0.0 - 0.0 /100 WBCs    RBC 3.72 (L) 4.00 - 5.20 x10*6/uL    Hemoglobin 10.2 (L) 12.0 - 16.0 g/dL    Hematocrit 30.6 (L) 36.0 - 46.0 %    MCV 82 80 - 100 fL    MCH 27.4 26.0 - 34.0 pg    MCHC 33.3 32.0 - 36.0 g/dL    RDW 14.6 (H) 11.5 - 14.5 %    Platelets 120 (L) 150 - 450 x10*3/uL    MPV 11.5 7.5 - 11.5 fL    Neutrophils % 93.3 40.0 - 80.0 %    Immature Granulocytes %, Automated 2.5 (H) 0.0 - 0.9 %    Lymphocytes % 1.8 13.0 - 44.0 %    Monocytes % 2.1 2.0 - 10.0 %    Eosinophils % 0.2 0.0 - 6.0 %    Basophils % 0.1 0.0 - 2.0 %    Neutrophils Absolute  14.04 (H) 1.20 - 7.70 x10*3/uL    Immature Granulocytes Absolute, Automated 0.37 0.00 - 0.70 x10*3/uL    Lymphocytes Absolute 0.27 (L) 1.20 - 4.80 x10*3/uL    Monocytes Absolute 0.31 0.10 - 1.00 x10*3/uL    Eosinophils Absolute 0.03 0.00 - 0.70 x10*3/uL    Basophils Absolute 0.02 0.00 - 0.10 x10*3/uL   APTT   Result Value Ref Range    aPTT >139.0 (HH) 22.0 - 32.5 seconds   POCT GLUCOSE   Result Value Ref Range    POCT Glucose 123 (H) 74 - 99 mg/dL   APTT   Result Value Ref Range    aPTT 47.6 (H) 22.0 - 32.5 seconds   CBC   Result Value Ref Range    WBC 13.7 (H) 4.4 - 11.3 x10*3/uL    nRBC 0.0 0.0 - 0.0 /100 WBCs    RBC 3.81 (L) 4.00 - 5.20 x10*6/uL    Hemoglobin 10.5 (L) 12.0 - 16.0 g/dL    Hematocrit 31.1 (L) 36.0 - 46.0 %    MCV 82 80 - 100 fL    MCH 27.6 26.0 - 34.0 pg    MCHC 33.8 32.0 - 36.0 g/dL    RDW 14.6 (H) 11.5 - 14.5 %    Platelets 120 (L) 150 - 450 x10*3/uL    MPV 11.1 7.5 - 11.5 fL   Urinalysis with Reflex Microscopic   Result Value Ref Range    Color, Urine Brown (N) Light-Yellow, Yellow, Dark-Yellow    Appearance, Urine Turbid (N) Clear    Specific Gravity, Urine 1.016 1.005 - 1.035    pH, Urine 6.0 5.0, 5.5, 6.0, 6.5, 7.0, 7.5, 8.0    Protein, Urine 70 (1+) (A) NEGATIVE, 10 (TRACE), 20 (TRACE) mg/dL    Glucose, Urine 30 (TRACE) (A) Normal mg/dL    Blood, Urine OVER (3+) (A) NEGATIVE    Ketones, Urine NEGATIVE NEGATIVE mg/dL    Bilirubin, Urine NEGATIVE NEGATIVE    Urobilinogen, Urine Normal Normal mg/dL    Nitrite, Urine NEGATIVE NEGATIVE    Leukocyte Esterase, Urine 500 Fanny/µL (A) NEGATIVE   Microscopic Only, Urine   Result Value Ref Range    WBC, Urine >50 (A) 1-5, NONE /HPF    WBC Clumps, Urine FEW Reference range not established. /HPF    RBC, Urine >20 (A) NONE, 1-2, 3-5 /HPF    Squamous Epithelial Cells, Urine 1-9 (SPARSE) Reference range not established. /HPF   Basic metabolic panel   Result Value Ref Range    Glucose 148 (H) 65 - 99 mg/dL    Sodium 137 133 - 145 mmol/L    Potassium 3.3 (L)  3.4 - 5.1 mmol/L    Chloride 103 97 - 107 mmol/L    Bicarbonate 20 (L) 24 - 31 mmol/L    Urea Nitrogen 49 (H) 8 - 25 mg/dL    Creatinine 1.60 0.40 - 1.60 mg/dL    eGFR 35 (L) >60 mL/min/1.73m*2    Calcium 8.4 (L) 8.5 - 10.4 mg/dL    Anion Gap 14 <=19 mmol/L   POCT GLUCOSE   Result Value Ref Range    POCT Glucose 157 (H) 74 - 99 mg/dL       Assessment/Plan   Acute kidney injury which is resolved  Chronic disease stage IV creatinine is back to baseline as a matter fact a little bit better than baseline  COVID-19  Acute respiratory failur getting worse pulmonary is following     Plan:  Continue oxygen therapy  monitor renal function         Danial Henderson MD

## 2023-10-30 NOTE — CARE PLAN
The patient's goals for the shift include To get rest    The clinical goals for the shift include Decrease O2 demand throughout the shift    Over the shift, the patient did not make progress toward the following goals. Barriers to progression include COVID, pt unwilling to prone d/t comfort. Recommendations to address these barriers include continue to encourage IS and proning, side sleeping.

## 2023-10-30 NOTE — PROGRESS NOTES
"Macarena Wilson is a 68 y.o. female on day 7 of admission presenting with Acute respiratory failure with hypoxemia (CMS/HCC).    Subjective   Symptoms (0 - 10, Best to Worst)  [facility] Symptom Assessment System  Pain Score: 0 - No pain  Still increased work of breathing, tachypnic, but no pain with cough, cough improved, less sob. Had incomplete understanding of HFNC, education provided on wean and o2 requirements necessary for dc home. Pt voices hopes she will get better.   Noted to have erythema on cheeks, pt denies fever, states related to rosacea and eczema, chronic.         Objective       Constitutional:       General: Patient is not in acute distress.  HENT:      Head: Normocephalic.      Mouth: Mucous membranes are moist. Rosacea erythema on cheeks and forehead  Eyes:      Conjunctiva/sclera: Conjunctivae clear, sclerae white. No discharge.     Pupils: Pupils are equal, round, and reactive to light.   Neck:      Vascular: No carotid bruit.   Cardiovascular:      Rate and Rhythm: Normal rate and regular rhythm.      Heart sounds: No murmur heard.  Pulmonary:      Effort:  Tachypnic in 30s. On 90/40 HF, no more need for NRB     Breath sounds: Clear to auscultation  Abdominal:      General: There is no distension.      Tenderness: There is no abdominal tenderness. There is no guarding.   Urology:     Genitalia: normal genitalia    Urine: urine yellow and clear  Musculoskeletal:         General: No deformity.   Skin:     Coloration: Skin is not jaundiced. Cap refill delayed  Neurological:      General: No focal deficit present.      Mental Status: is oriented to person, place, and time.   Psychiatric:         Behavior: Behavior normal. Behavior is cooperative.        Last Recorded Vitals  Blood pressure 147/65, pulse 101, temperature 35.5 °C (95.9 °F), temperature source Temporal, resp. rate (!) 27, height 1.6 m (5' 3\"), weight 71.7 kg (158 lb 1.1 oz), SpO2 90 %.  Intake/Output last 3 Shifts:  I/O last 3 " completed shifts:  In: 480 (6.7 mL/kg) [P.O.:480]  Out: 2500 (34.9 mL/kg) [Urine:2500 (1 mL/kg/hr)]  Weight: 71.7 kg     Relevant Results  Results for orders placed or performed during the hospital encounter of 10/23/23 (from the past 24 hour(s))   POCT GLUCOSE   Result Value Ref Range    POCT Glucose 141 (H) 74 - 99 mg/dL   POCT GLUCOSE   Result Value Ref Range    POCT Glucose 156 (H) 74 - 99 mg/dL   Comprehensive metabolic panel   Result Value Ref Range    Glucose 151 (H) 65 - 99 mg/dL    Sodium 140 133 - 145 mmol/L    Potassium 4.0 3.4 - 5.1 mmol/L    Chloride 105 97 - 107 mmol/L    Bicarbonate 21 (L) 24 - 31 mmol/L    Urea Nitrogen 51 (H) 8 - 25 mg/dL    Creatinine 1.40 0.40 - 1.60 mg/dL    eGFR 41 (L) >60 mL/min/1.73m*2    Calcium 8.5 8.5 - 10.4 mg/dL    Albumin 2.9 (L) 3.5 - 5.0 g/dL    Alkaline Phosphatase 133 (H) 35 - 125 U/L    Total Protein 5.7 (L) 5.9 - 7.9 g/dL    AST 29 5 - 40 U/L    Bilirubin, Total 0.2 0.1 - 1.2 mg/dL    ALT 28 5 - 40 U/L    Anion Gap 14 <=19 mmol/L   POCT GLUCOSE   Result Value Ref Range    POCT Glucose 133 (H) 74 - 99 mg/dL    Scheduled medications  dexAMETHasone, 6 mg, intravenous, q24h  enoxaparin, 30 mg, subcutaneous, Daily  fluticasone, 1 spray, Each Nostril, BID  insulin lispro, 0-5 Units, subcutaneous, TID with meals  ipratropium-albuteroL, 3 mL, nebulization, q6h while awake  melatonin, 3 mg, oral, Nightly  oxygen, , inhalation, q4h  pantoprazole, 40 mg, oral, Daily before breakfast  sodium bicarbonate, 650 mg, oral, TID      Continuous medications     PRN medications  PRN medications: acetaminophen **OR** acetaminophen **OR** acetaminophen, benzonatate, bisacodyl, dextrose 10 % in water (D10W), dextrose, glucagon, guaiFENesin, ipratropium-albuteroL, melatonin, ondansetron ODT **OR** ondansetron, [Held by provider] oxygen, polyethylene glycol, sodium chloride      Assessment/Plan     COVID-19-Actemra 10/24, finished course of remdesivir, remains on IV dexamethasone, aerosols,  remains on high flow  Acute respiratory failure - slight improvement as off nrb and feels better  CKD4, acidosis-started on bicarb 10/25, creatinine stable, nephrology managing  Diarrhea-resolved, off laxatives  Insomnia-continue melatonin  Hypertension - controlled  Palliative care    Full code  Patient is capable  Her stated healthcare garcia of  are her 2 sons Mathew and Josias, she has a living will.    10/27  68-year-old female presenting to the hospital with acute respiratory failure in the setting of COVID-19 with underlying CKD 4 requiring high flow oxygen.  Prior to admission she was working full-time and independent with all ADLs and IADLs.  She had excellent quality of life without any functional limitations.  Her ultimate goal is to treat her acute condition and return to her prior level of function.  She is open to all measures including chest compressions ventilation in order to keep her alive at this point in time, however she has no wish to be sustained on life support indefinitely, and trusts her 2 sons to fill her living will if she cannot be weaned off of ventilator.  Patient very aware that her prognosis is guarded, given her high oxygen requirements.     10/28  Diarrhea improved. No sinus congestion. Worried about chronic eczema, added cream.      10/29 appears more fatigued today, not sleeping well, increased work of breathing, concern the patient is fatiguing from her prolonged respiratory failure.  Remains aggressive treatment model of care and full code, open to intubation if necessary.    10/30  Plan remains to be aggressive. Pt feels better is optimistic. Full code.   No med changes by me today, much education provided.  Will follow.     I spent 40 minutes in the professional and overall care of this patient.      Chari Borjas, APRN-CNP

## 2023-10-30 NOTE — PROGRESS NOTES
Critical Care Progress Note    Macarena Wilson is a 68 y.o. female on day 7 of admission presenting with Acute respiratory failure with hypoxemia (CMS/HCC).    Subjective   Follow up hypoxia / covid  On HFNC  Objective   Vital Signs      10/30/2023     3:00 AM 10/30/2023     4:00 AM 10/30/2023     5:00 AM 10/30/2023     6:00 AM 10/30/2023     7:00 AM 10/30/2023     8:00 AM 10/30/2023     9:05 AM   Vitals   Systolic 154 142 155 153 141 148 141   Diastolic 65 75 80 75 73 69 64   Heart Rate 64 71 87 81 82 98 100   Temp  35.6 °C (96.1 °F)    35.6 °C (96.1 °F)    Resp 26 26 20 23 32 28 21   Weight (lb)  158.07        BMI  28 kg/m2        BSA (m2)  1.79 m2            Oxygen Therapy  SpO2: 92 %  Medical Gas Therapy: Supplemental oxygen  O2 Delivery Method: High flow nasal cannula    FiO2 (%):  [90 %] 90 %    Intake/Output previous 24 hours:    Intake/Output Summary (Last 24 hours) at 10/30/2023 1059  Last data filed at 10/30/2023 0500  Gross per 24 hour   Intake 480 ml   Output 900 ml   Net -420 ml       Direct physical exam deferred  Ill-appearing again but holding her own  Remains on Vapotherm  Sinus on the monitor  Does appear to tachypneic observation  Remains alert, comprehends all questions were reports      Lines and Tubes:  Peripheral IV 10/23/23 20 G (Active)   Placement Date/Time: 10/23/23 1624   Size (Gauge): 20 G   Number of days: 5       Peripheral IV 10/27/23 20 G Distal;Left;Anterior Forearm (Active)   Placement Date/Time: 10/27/23 1900   Earliest Known Present: 10/27/23  Size (Gauge): 20 G  Orientation: Distal;Left;Anterior  Location: Forearm   Number of days: 0       Peripheral IV 10/23/23 20 G Left;Proximal;Anterior Forearm (Active)   Placement Date/Time: (?) 10/23/23 (?) 0000   Earliest Known Present: (?) 10/23/23  Size (Gauge): (?) 20 G  Orientation: (?) Left;Proximal;Anterior  Location: (?) Forearm   Number of days: 5         Scheduled medications  dexAMETHasone, 6 mg, intravenous,  q24h  enoxaparin, 30 mg, subcutaneous, Daily  fluticasone, 1 spray, Each Nostril, BID  insulin lispro, 0-5 Units, subcutaneous, TID with meals  ipratropium-albuteroL, 3 mL, nebulization, q6h while awake  melatonin, 3 mg, oral, Nightly  oxygen, , inhalation, q4h  pantoprazole, 40 mg, oral, Daily before breakfast  sodium bicarbonate, 650 mg, oral, TID      Continuous medications     PRN medications  PRN medications: acetaminophen **OR** acetaminophen **OR** acetaminophen, benzonatate, bisacodyl, dextrose 10 % in water (D10W), dextrose, glucagon, guaiFENesin, ipratropium-albuteroL, melatonin, ondansetron ODT **OR** ondansetron, [Held by provider] oxygen, polyethylene glycol, sodium chloride    Relevant Results  Results from last 7 days   Lab Units 10/29/23  1005 10/29/23  0523 10/28/23  2100   WBC AUTO x10*3/uL 13.7* 15.0* 15.7*   HEMOGLOBIN g/dL 10.5* 10.2* 11.0*   HEMATOCRIT % 31.1* 30.6* 33.7*   PLATELETS AUTO x10*3/uL 120* 120* 124*        Results from last 7 days   Lab Units 10/30/23  0551 10/29/23  1133 10/28/23  0505   SODIUM mmol/L 140 137 135   POTASSIUM mmol/L 4.0 3.3* 3.7   CHLORIDE mmol/L 105 103 104   CO2 mmol/L 21* 20* 20*   BUN mg/dL 51* 49* 49*   CREATININE mg/dL 1.40 1.60 1.70*   GLUCOSE mg/dL 151* 148* 208*   CALCIUM mg/dL 8.5 8.4* 8.3*        Results from last 7 days   Lab Units 10/25/23  0507   POCT PH, ARTERIAL pH 7.35*   POCT PCO2, ARTERIAL mm Hg 31*   POCT PO2, ARTERIAL mm Hg 115*   POCT HCO3 CALCULATED, ARTERIAL mmol/L 17.1*   POCT BASE EXCESS, ARTERIAL mmol/L -7.5*       XR chest 1 view 10/27/2023    Narrative  Interpreted By:  Nirmal Mcmillan,  STUDY:  XR CHEST 1 VIEW; 10/27/2023 10:06 am    INDICATION:  Signs/Symptoms:follow up hypoxemia.    COMPARISON:  10/23/2023    ACCESSION NUMBER(S):  [id number]    ORDERING CLINICIAN:  SUSANNE FRANCOIS    TECHNIQUE:  1 view of the chest was performed.    FINDINGS:  There is diffuse ground-glass opacity bilaterally consistent with  history of COVID. No lobar  or large consolidation however the  ground-glass opacities are prominent but stable from the previous  examination. No pleural effusion. No pneumothorax.  The  cardiomediastinal silhouette is within normal limits.    Impression  No significant change. Prominent ground-glass opacities bilaterally,  however, not significantly changed.    Signed by: Nirmal Mcmillan 10/27/2023 10:24 PM  Dictation workstation:   [id number]      Patient Active Problem List   Diagnosis    Abnormal mammogram    Acute pain of left shoulder    Allergic rhinitis    Benign essential HTN    Calculus of kidney    Staghorn renal calculus    Diverticular disease    Gammopathy    Gross hematuria    Hematuria, unspecified    Impaired fasting glucose    Nodule of kidney    Obesity    Post-menopausal bleeding    Prediabetes    Acute cystitis with hematuria    Stage 4 chronic kidney disease (CMS/HCC)    Urgency incontinence    Bladder fistula    Atrophic kidney    COVID-19    Acute respiratory failure with hypoxemia (CMS/HCC)    Pneumonia of both lower lobes due to infectious organism     Assessment/Plan    Acute respiratory failure with hypoxemia (CMS/HCC)  Active Problems:    COVID-19    Benign essential HTN    Stage 4 chronic kidney disease (CMS/HCC)    Persistent hypoxia  D-dimer 16  VQ was low probability several days ago  Duplex studies negative  Empiric heparin drip was started but stopped due to significant hematuria.  Prophylactic Lovenox  CXR stable ARDS      HFNC -on 90% and 40 L  Try to wean  Chest xray unchanged  Continue diuresis  Hold on NIPPV - patient does not feel she will tolerate  Remdesivir  Decadron  Aerosol Rx    Davin Avendaño MD  [facility]       This critically ill patient continues to be at-risk for deterioration / failure due to the above mentioned dysfunctional unstable organ systems.   Critical care time is spent at bedside includes review of diagnostic tests, labs, and radiographs, serial assessments  and management of hemodynamics, respiratory status, and coordination of care with different members of interdisciplinary team  Assessment, impression and plans are reflected in the note above as well as the orders.     Critical concerns addressed:  Acute respiratory failure  Hypoxia  COVID-19 pneumonitis  Elevated D-dimer rule out VTE  Time Spent in critical Care, exclusive of procedures : 25 mins.     Disclaimer: Parts of this chart were dictated with voice recognition software. Please excuse any errors of grammar, spelling, or transcription which are not corrected. Please contact me with any questions regarding documentation.

## 2023-10-30 NOTE — PROGRESS NOTES
Patient remains on 90% HFNC.  Awaiting PT/OT orders as appropriate.  PTA, patient was home alone where she was independent.  RN TCC following.    Madiha Salazar RN

## 2023-10-30 NOTE — PROGRESS NOTES
Macarena Wilson is a 68 y.o. female on day 7 of admission presenting with Acute respiratory failure with hypoxemia (CMS/HCC).    Subjective   Interval History:   Patient seen and examined  Afebrile, no chills  Remains on high flow but feeling better overall  Mild to moderate nonproductive cough  No chest pain  Shortness of breath with exertion  No nausea vomiting or diarrhea        Review of Systems   All other systems reviewed and are negative.      Objective   Range of Vitals (last 24 hours)  Heart Rate:  []   Temp:  [35.6 °C (96.1 °F)-36.6 °C (97.9 °F)]   Resp:  [20-39]   BP: (104-155)/(60-81)   Weight:  [71.7 kg (158 lb 1.1 oz)]   SpO2:  [88 %-98 %]   Daily Weight  10/30/23 : 71.7 kg (158 lb 1.1 oz)    Body mass index is 28 kg/m².    Physical Exam  Constitutional:       Appearance: Normal appearance.  No acute distress  HENT:      Head: Normocephalic and atraumatic.      Nose: Nose normal.      Mouth/Throat:      Mouth: Mucous membranes are moist.      Pharynx: Oropharynx is clear.   Eyes:      Extraocular Movements: Extraocular movements intact.      Conjunctiva/sclera: Conjunctivae normal.   Cardiovascular:      Rate and Rhythm: Normal rate and regular rhythm.   Pulmonary:      Effort: Pulmonary effort is normal.      Breath sounds: Diminished  Abdominal:      General: Bowel sounds are normal.      Palpations: Abdomen is soft.   Musculoskeletal:         General: Normal range of motion.      Cervical back: Normal range of motion and neck supple.   Skin:     General: Skin is warm and dry.   Neurological:      General: No focal deficit present.      Mental Status: She is alert and oriented to person, place, and time. Mental status is at baseline.   Psychiatric:         Mood and Affect: Mood normal.         Behavior: Behavior normal.        Antibiotics    sodium chloride 0.9 % bolus 500 mL  doxycycline (Vibramycin) in dextrose 5 % in water (D5W) 100 mL  mg  cefTRIAXone (Rocephin) IVPB 1  g  Tc-99m-albumin (Draximage MAA) injection 4.2 millicurie  dexAMETHasone (Decadron) injection 8 mg  sodium chloride 0.9 % bolus 1,000 mL  enoxaparin (Lovenox) syringe 30 mg  sodium chloride 0.9% infusion  acetaminophen (Tylenol) tablet 650 mg  acetaminophen (Tylenol) oral liquid 650 mg  acetaminophen (Tylenol) suppository 650 mg  ondansetron ODT (Zofran-ODT) disintegrating tablet 4 mg  ondansetron (Zofran) injection 4 mg  polyethylene glycol (Glycolax, Miralax) packet 17 g  bisacodyl (Dulcolax) EC tablet 10 mg  guaiFENesin (Mucinex) 12 hr tablet 600 mg  oxygen (O2) therapy  piperacillin-tazobactam-dextrose (Zosyn) IV 2.25 g  ipratropium-albuteroL (Duo-Neb) 0.5-2.5 mg/3 mL nebulizer solution 3 mL  dexAMETHasone (Decadron) injection 8 mg  dexAMETHasone (Decadron) injection 6 mg  dextrose 50 % injection 25 g  glucagon (Glucagen) injection 1 mg  dextrose 10 % in water (D10W) infusion  insulin lispro (HumaLOG) injection 0-5 Units  vancomycin-diluent combo no.1 (Xellia) IVPB 1,250 mg  ipratropium-albuteroL (Duo-Neb) 0.5-2.5 mg/3 mL nebulizer solution 3 mL  ipratropium-albuteroL (Duo-Neb) 0.5-2.5 mg/3 mL nebulizer solution 3 mL  oxygen (O2) therapy  remdesivir (Veklury) 200 mg in sodium chloride 0.9% 250 mL IV  remdesivir (Veklury) 100 mg in sodium chloride 0.9% 250 mL IV  oxygen (O2) therapy  pantoprazole (ProtoNix) injection 40 mg  remdesivir (Veklury) 200 mg in sodium chloride 0.9% 250 mL IV  remdesivir (Veklury) 100 mg in sodium chloride 0.9% 250 mL IV  tocilizumab (Actemra) 600 mg in sodium chloride 0.9% 70 mL IV  furosemide (Lasix) tablet 10 mg  melatonin tablet 3 mg  benzonatate (Tessalon) capsule 200 mg  polyethylene glycol (Glycolax, Miralax) packet 17 g  fluticasone (Flonase) nasal spray 1 spray  sodium chloride (Ocean) 0.65 % nasal spray 1 spray  sodium bicarbonate tablet 650 mg  vancomycin-diluent combo no.1 (Xellia) IVPB 1,250 mg  guaiFENesin (Robitussin) 100 mg/5 mL syrup 200 mg  vancomycin-diluent combo  no.1 (Xellia) IVPB 750 mg  pantoprazole (ProtoNix) EC tablet 40 mg  oxygen (O2) therapy  furosemide (Lasix) injection 20 mg  oxygen (O2) therapy  heparin (porcine) injection 5,750 Units  heparin 25,000 Units in dextrose 5% 250 mL (100 Units/mL) infusion (premix)  heparin (porcine) injection 3,000-6,000 Units  enoxaparin (Lovenox) syringe 30 mg  melatonin tablet 3 mg  oxygen (O2) therapy      Relevant Results  Labs  Results from last 72 hours   Lab Units 10/29/23  1005 10/29/23  0523 10/28/23  2100 10/28/23  0505   WBC AUTO x10*3/uL 13.7* 15.0* 15.7* 13.9*   HEMOGLOBIN g/dL 10.5* 10.2* 11.0* 11.1*   HEMATOCRIT % 31.1* 30.6* 33.7* 33.4*   PLATELETS AUTO x10*3/uL 120* 120* 124* 134*   NEUTROS PCT AUTO %  --  93.3  --  93.7   LYMPHS PCT AUTO %  --  1.8  --  1.8   MONOS PCT AUTO %  --  2.1  --  2.1   EOS PCT AUTO %  --  0.2  --  0.6     Results from last 72 hours   Lab Units 10/30/23  0551 10/29/23  1133 10/28/23  0505   SODIUM mmol/L 140 137 135   POTASSIUM mmol/L 4.0 3.3* 3.7   CHLORIDE mmol/L 105 103 104   CO2 mmol/L 21* 20* 20*   BUN mg/dL 51* 49* 49*   CREATININE mg/dL 1.40 1.60 1.70*   GLUCOSE mg/dL 151* 148* 208*   CALCIUM mg/dL 8.5 8.4* 8.3*   ANION GAP mmol/L 14 14 11   EGFR mL/min/1.73m*2 41* 35* 32*     Results from last 72 hours   Lab Units 10/30/23  0551 10/28/23  0505   ALK PHOS U/L 133* 113   BILIRUBIN TOTAL mg/dL 0.2 0.2   PROTEIN TOTAL g/dL 5.7* 5.7*   ALT U/L 28 14   AST U/L 29 21   ALBUMIN g/dL 2.9* 2.6*     Estimated Creatinine Clearance: 36 mL/min (by C-G formula based on SCr of 1.4 mg/dL).  C-Reactive Protein   Date Value Ref Range Status   10/26/2023 3.90 (H) 0.00 - 2.00 mg/dL Final   10/25/2023 6.10 (H) 0.00 - 2.00 mg/dL Final   10/24/2023 10.10 (H) 0.00 - 2.00 mg/dL Final     Reviewed  Imaging    Assessment/Plan   Acute hypoxic respiratory failure, requiring high flow  Severe coronavirus pneumonitis  Stage IV chronic kidney disease  Leukocytosis-resolving  Diarrhea-rule out infectious  etiology-resolved     Continue dexamethasone  Remdesivir completed  Monitor renal and hematologic parameters  S/p Actemra-10/24/2023  Supportive care  Monitor temperature and WBC  Supplemental oxygen as needed  Pulmonary follow-up      Js Vasquez MD

## 2023-10-30 NOTE — CARE PLAN
Problem: Respiratory  Goal: Minimal/no exertional discomfort or dyspnea this shift  Outcome: Progressing  Goal: No signs of respiratory distress (eg. Use of accessory muscles. Peds grunting)  Outcome: Progressing  Goal: Wean oxygen to maintain O2 saturation per order/standard this shift  Outcome: Progressing  Goal: Verbalize decreased shortness of breath this shift  Outcome: Progressing

## 2023-10-30 NOTE — PROGRESS NOTES
Macarena Wilson is a 68 y.o. female on day 7 of admission presenting with Acute respiratory failure with hypoxemia (CMS/[facility]).      Subjective   68-year-old female still admits to a nonproductive cough and shortness of breath.  Is tolerating her breakfast.  Denies any dysphagia or odontophagia.  Patient denies any headache admits to fatigue and weakness.  Patient denies any pain.  Patient denies any fevers or chills.  Patient on high flow oxygen       Objective     Last Recorded Vitals  /64   Pulse 100   Temp 35.6 °C (96.1 °F) (Temporal)   Resp 21   Wt 71.7 kg (158 lb 1.1 oz)   SpO2 92%   Intake/Output last 3 Shifts:    Intake/Output Summary (Last 24 hours) at 10/30/2023 1038  Last data filed at 10/30/2023 0500  Gross per 24 hour   Intake 480 ml   Output 900 ml   Net -420 ml       Admission Weight  Weight: 77.1 kg (170 lb) (10/23/23 1541)    Daily Weight  10/30/23 : 71.7 kg (158 lb 1.1 oz)    Image Results  XR chest 1 view  Narrative: Interpreted By:  Karina Rodriguez,   STUDY:  XR CHEST 1 VIEW; 10/29/2023 1:02 pm      INDICATION:  Signs/Symptoms:hyppoxemia      COMPARISON:  10/27/2023      ACCESSION NUMBER(S):  [id number]      ORDERING CLINICIAN:  SUSANNE FRANCOIS      TECHNIQUE:  Frontal  chest radiographs.      FINDINGS:  The cardiomediastinal silhouette is unremarkable. Diffuse airspace  opacities are noted overlying both lungs, unchanged from prior  examinations. No pleural effusion is identified.      The osseous structures are intact.      Impression: No significant change from prior examination.  Diffuse airspace opacities overlying both lungs, differential  includes ARDS, diffuse alveolar edema/hemorrhage.          Signed by: Karina Rodriguez 10/29/2023 2:19 PM  Dictation workstation:   [id number]      Physical Exam    Relevant Results           General: Alert and oriented x3  Lungs: Minimal fine crackles heard at the bilateral bases no rales or rhonchi.  Symmetrical expansion  Cardiovascular: S1  and S2, no S3, regular rate and rhythm no murmurs rubs or gallops  Vascular: 2++ bilateral radial dorsal pedis  Abdomen: Soft nontender palpation palpable the quadrants  Skin: To normal skin turgor  Assessment/Plan   This patient currently has cardiac telemetry ordered; if you would like to modify or discontinue the telemetry order, click here to go to the orders activity to modify/discontinue the order.              Principal Problem:    Acute respiratory failure with hypoxemia (CMS/HCC)  Active Problems:    Benign essential HTN    Stage 4 chronic kidney disease (CMS/HCC)    COVID-19    1.  Acute respiratory failure secondary to COVID-19 continue high flow oxygen continue steroids.  Pulmonology also continue to follow patient.  ID advised continue dexamethasone remdesivir has been completed consider repeat CT scan  2.  Chronic kidney stage III creatinine appears to be at baseline  3.  Plan to continue current management-oxygen continue with incentive spirometer              Justin Ferrara MD

## 2023-10-30 NOTE — CARE PLAN
RT Care provided as ordered.  Problem: Respiratory  Goal: Minimal/no exertional discomfort or dyspnea this shift  Outcome: Progressing  Goal: No signs of respiratory distress (eg. Use of accessory muscles. Peds grunting)  Outcome: Progressing  Goal: Wean oxygen to maintain O2 saturation per order/standard this shift  Outcome: Progressing  Goal: Clear secretions with interventions this shift  Outcome: Progressing

## 2023-10-30 NOTE — PROGRESS NOTES
"Macarena Wilson is a 68 y.o. female on day 7 of admission presenting with Acute respiratory failure with hypoxemia (CMS/HCC).    Subjective      Seen for acute kidney injury on top of chronic kidney disease stage IV.  Admitted with COVID-19 the patient oxygen requirement down to 90% FiO2 the patient is eating and drinking she is resting comfortably a little bit sleepy not appear in distress at this time      Objective   No change in physical exam no evidence of edema in lower extremities and chest movements are intact and abdomen not distended regular rhythm on the monitor  Last Recorded Vitals  Blood pressure 148/69, pulse 98, temperature 35.6 °C (96.1 °F), temperature source Temporal, resp. rate (!) 28, height 1.6 m (5' 3\"), weight 71.7 kg (158 lb 1.1 oz), SpO2 91 %.    Intake/Output last 3 Shifts:  I/O last 3 completed shifts:  In: 480 (6.7 mL/kg) [P.O.:480]  Out: 2500 (34.9 mL/kg) [Urine:2500 (1 mL/kg/hr)]  Weight: 71.7 kg     Current Facility-Administered Medications:     acetaminophen (Tylenol) tablet 650 mg, 650 mg, oral, q4h PRN, 650 mg at 10/30/23 0540 **OR** acetaminophen (Tylenol) oral liquid 650 mg, 650 mg, nasogastric tube, q4h PRN **OR** acetaminophen (Tylenol) suppository 650 mg, 650 mg, rectal, q4h PRN, David Rodriguez MD    benzonatate (Tessalon) capsule 200 mg, 200 mg, oral, TID PRN, Lou Hodges, APRN-CNP, 200 mg at 10/29/23 2004    bisacodyl (Dulcolax) EC tablet 10 mg, 10 mg, oral, Daily PRN, David Rodriguez MD    dexAMETHasone (Decadron) injection 6 mg, 6 mg, intravenous, q24h, David Rodriguez MD, 6 mg at 10/29/23 2004    dextrose 10 % in water (D10W) infusion, 0.3 g/kg/hr, intravenous, Once PRN, David Rodriguez MD    dextrose 50 % injection 25 g, 25 g, intravenous, q15 min PRN, David Rodriguez MD    enoxaparin (Lovenox) syringe 30 mg, 30 mg, subcutaneous, Daily, Lydia Moreland MD, 30 mg at 10/30/23 0540    fluticasone (Flonase) nasal spray 1 spray, 1 " spray, Each Nostril, BID, JASON Burgess, 1 spray at 10/30/23 0855    glucagon (Glucagen) injection 1 mg, 1 mg, intramuscular, q15 min PRN, David Rodriguez MD    guaiFENesin (Robitussin) 100 mg/5 mL syrup 200 mg, 200 mg, oral, q4h PRN, Sarina Brown, DELILAHCNP, 200 mg at 10/29/23 1234    insulin lispro (HumaLOG) injection 0-5 Units, 0-5 Units, subcutaneous, TID with meals, David Rodriguez MD, 1 Units at 10/29/23 1230    ipratropium-albuteroL (Duo-Neb) 0.5-2.5 mg/3 mL nebulizer solution 3 mL, 3 mL, nebulization, q6h while awake, David Rodriguez MD, 3 mL at 10/30/23 0727    ipratropium-albuteroL (Duo-Neb) 0.5-2.5 mg/3 mL nebulizer solution 3 mL, 3 mL, nebulization, q2h PRN, David Rodriguez MD    melatonin tablet 3 mg, 3 mg, oral, Nightly, JASON Burgess, 3 mg at 10/29/23 2004    melatonin tablet 3 mg, 3 mg, oral, Nightly PRN, JASON Burgess    ondansetron ODT (Zofran-ODT) disintegrating tablet 4 mg, 4 mg, oral, q8h PRN **OR** ondansetron (Zofran) injection 4 mg, 4 mg, intravenous, q8h PRN, David Rodriguez MD    [Held by provider] oxygen (O2) therapy, , inhalation, Continuous PRN - O2/gases, David Rodriguez MD, 35 L/min at 10/24/23 0115    oxygen (O2) therapy, , inhalation, Continuous PRN - O2/gases, Davin Avendaño MD, Start at 10/30/23 0727    pantoprazole (ProtoNix) EC tablet 40 mg, 40 mg, oral, Daily before breakfast, Lydia Moreland MD, 40 mg at 10/30/23 0540    polyethylene glycol (Glycolax, Miralax) packet 17 g, 17 g, oral, Daily PRN, JASON Burgess    sodium bicarbonate tablet 650 mg, 650 mg, oral, TID, Danial Henderson MD, 650 mg at 10/30/23 0855    sodium chloride (Ocean) 0.65 % nasal spray 1 spray, 1 spray, Each Nostril, PRN, JASON Burgess, 1 spray at 10/29/23 1230   Relevant Results    Results for orders placed or performed during the hospital encounter of 10/23/23 (from the past 96 hour(s))   POCT GLUCOSE    Result Value Ref Range    POCT Glucose 139 (H) 74 - 99 mg/dL   POCT GLUCOSE   Result Value Ref Range    POCT Glucose 114 (H) 74 - 99 mg/dL   C. difficile, PCR    Specimen: Stool   Result Value Ref Range    C. difficile, PCR Not Detected Not Detected   Stool Pathogen Panel, PCR    Specimen: Stool   Result Value Ref Range    Campylobacter Group Not Detected Not Detected    Salmonella species Not Detected Not Detected    Shigella species Not Detected Not Detected    Vibrio Group Not Detected Not Detected    Yersinia Enterocolitica Not Detected Not Detected    Shiga Toxin 1 Not Detected Not Detected    Shiga Toxin 2 Not Detected Not Detected    Norovirus GI/GII Not Detected Not Detected    Rotavirus A Not Detected Not Detected   POCT GLUCOSE   Result Value Ref Range    POCT Glucose 123 (H) 74 - 99 mg/dL   Comprehensive metabolic panel   Result Value Ref Range    Glucose 143 (H) 65 - 99 mg/dL    Sodium 139 133 - 145 mmol/L    Potassium 4.1 3.4 - 5.1 mmol/L    Chloride 108 (H) 97 - 107 mmol/L    Bicarbonate 19 (L) 24 - 31 mmol/L    Urea Nitrogen 52 (H) 8 - 25 mg/dL    Creatinine 1.90 (H) 0.40 - 1.60 mg/dL    eGFR 28 (L) >60 mL/min/1.73m*2    Calcium 8.5 8.5 - 10.4 mg/dL    Albumin 2.6 (L) 3.5 - 5.0 g/dL    Alkaline Phosphatase 90 35 - 125 U/L    Total Protein 5.5 (L) 5.9 - 7.9 g/dL    AST 21 5 - 40 U/L    Bilirubin, Total 0.2 0.1 - 1.2 mg/dL    ALT 14 5 - 40 U/L    Anion Gap 12 <=19 mmol/L   CBC and Auto Differential   Result Value Ref Range    WBC 13.8 (H) 4.4 - 11.3 x10*3/uL    nRBC 0.0 0.0 - 0.0 /100 WBCs    RBC 4.04 4.00 - 5.20 x10*6/uL    Hemoglobin 11.0 (L) 12.0 - 16.0 g/dL    Hematocrit 33.8 (L) 36.0 - 46.0 %    MCV 84 80 - 100 fL    MCH 27.2 26.0 - 34.0 pg    MCHC 32.5 32.0 - 36.0 g/dL    RDW 14.6 (H) 11.5 - 14.5 %    Platelets 196 150 - 450 x10*3/uL    MPV 10.6 7.5 - 11.5 fL    Neutrophils % 91.7 40.0 - 80.0 %    Immature Granulocytes %, Automated 2.7 (H) 0.0 - 0.9 %    Lymphocytes % 2.0 13.0 - 44.0 %     Monocytes % 3.3 2.0 - 10.0 %    Eosinophils % 0.1 0.0 - 6.0 %    Basophils % 0.2 0.0 - 2.0 %    Neutrophils Absolute 12.65 (H) 1.20 - 7.70 x10*3/uL    Immature Granulocytes Absolute, Automated 0.37 0.00 - 0.70 x10*3/uL    Lymphocytes Absolute 0.27 (L) 1.20 - 4.80 x10*3/uL    Monocytes Absolute 0.46 0.10 - 1.00 x10*3/uL    Eosinophils Absolute 0.02 0.00 - 0.70 x10*3/uL    Basophils Absolute 0.03 0.00 - 0.10 x10*3/uL   Vancomycin   Result Value Ref Range    Vancomycin 13.0 10.0 - 20.0 ug/mL   POCT GLUCOSE   Result Value Ref Range    POCT Glucose 153 (H) 74 - 99 mg/dL   D-dimer, Non VTE   Result Value Ref Range    D-Dimer Non VTE, Quant (mg/L FEU) 16.99 (H) 0.19 - 0.50 mg/L FEU   POCT GLUCOSE   Result Value Ref Range    POCT Glucose 139 (H) 74 - 99 mg/dL   Lower extremity venous duplex bilateral   Result Value Ref Range    BSA 1.78 m2   POCT GLUCOSE   Result Value Ref Range    POCT Glucose 109 (H) 74 - 99 mg/dL   POCT GLUCOSE   Result Value Ref Range    POCT Glucose 124 (H) 74 - 99 mg/dL   Comprehensive metabolic panel   Result Value Ref Range    Glucose 208 (H) 65 - 99 mg/dL    Sodium 135 133 - 145 mmol/L    Potassium 3.7 3.4 - 5.1 mmol/L    Chloride 104 97 - 107 mmol/L    Bicarbonate 20 (L) 24 - 31 mmol/L    Urea Nitrogen 49 (H) 8 - 25 mg/dL    Creatinine 1.70 (H) 0.40 - 1.60 mg/dL    eGFR 32 (L) >60 mL/min/1.73m*2    Calcium 8.3 (L) 8.5 - 10.4 mg/dL    Albumin 2.6 (L) 3.5 - 5.0 g/dL    Alkaline Phosphatase 113 35 - 125 U/L    Total Protein 5.7 (L) 5.9 - 7.9 g/dL    AST 21 5 - 40 U/L    Bilirubin, Total 0.2 0.1 - 1.2 mg/dL    ALT 14 5 - 40 U/L    Anion Gap 11 <=19 mmol/L   CBC and Auto Differential   Result Value Ref Range    WBC 13.9 (H) 4.4 - 11.3 x10*3/uL    nRBC 0.0 0.0 - 0.0 /100 WBCs    RBC 3.99 (L) 4.00 - 5.20 x10*6/uL    Hemoglobin 11.1 (L) 12.0 - 16.0 g/dL    Hematocrit 33.4 (L) 36.0 - 46.0 %    MCV 84 80 - 100 fL    MCH 27.8 26.0 - 34.0 pg    MCHC 33.2 32.0 - 36.0 g/dL    RDW 14.6 (H) 11.5 - 14.5 %     Platelets 134 (L) 150 - 450 x10*3/uL    MPV 10.7 7.5 - 11.5 fL    Neutrophils % 93.7 40.0 - 80.0 %    Immature Granulocytes %, Automated 1.7 (H) 0.0 - 0.9 %    Lymphocytes % 1.8 13.0 - 44.0 %    Monocytes % 2.1 2.0 - 10.0 %    Eosinophils % 0.6 0.0 - 6.0 %    Basophils % 0.1 0.0 - 2.0 %    Neutrophils Absolute 13.07 (H) 1.20 - 7.70 x10*3/uL    Immature Granulocytes Absolute, Automated 0.24 0.00 - 0.70 x10*3/uL    Lymphocytes Absolute 0.25 (L) 1.20 - 4.80 x10*3/uL    Monocytes Absolute 0.29 0.10 - 1.00 x10*3/uL    Eosinophils Absolute 0.08 0.00 - 0.70 x10*3/uL    Basophils Absolute 0.01 0.00 - 0.10 x10*3/uL   Vancomycin   Result Value Ref Range    Vancomycin 17.0 10.0 - 20.0 ug/mL   POCT GLUCOSE   Result Value Ref Range    POCT Glucose 151 (H) 74 - 99 mg/dL   POCT GLUCOSE   Result Value Ref Range    POCT Glucose 91 74 - 99 mg/dL   POCT GLUCOSE   Result Value Ref Range    POCT Glucose 123 (H) 74 - 99 mg/dL   aPTT   Result Value Ref Range    aPTT 26.5 22.0 - 32.5 seconds   CBC   Result Value Ref Range    WBC 15.7 (H) 4.4 - 11.3 x10*3/uL    nRBC 0.0 0.0 - 0.0 /100 WBCs    RBC 4.00 4.00 - 5.20 x10*6/uL    Hemoglobin 11.0 (L) 12.0 - 16.0 g/dL    Hematocrit 33.7 (L) 36.0 - 46.0 %    MCV 84 80 - 100 fL    MCH 27.5 26.0 - 34.0 pg    MCHC 32.6 32.0 - 36.0 g/dL    RDW 14.7 (H) 11.5 - 14.5 %    Platelets 124 (L) 150 - 450 x10*3/uL    MPV 10.4 7.5 - 11.5 fL   POCT GLUCOSE   Result Value Ref Range    POCT Glucose 129 (H) 74 - 99 mg/dL   CBC and Auto Differential   Result Value Ref Range    WBC 15.0 (H) 4.4 - 11.3 x10*3/uL    nRBC 0.0 0.0 - 0.0 /100 WBCs    RBC 3.72 (L) 4.00 - 5.20 x10*6/uL    Hemoglobin 10.2 (L) 12.0 - 16.0 g/dL    Hematocrit 30.6 (L) 36.0 - 46.0 %    MCV 82 80 - 100 fL    MCH 27.4 26.0 - 34.0 pg    MCHC 33.3 32.0 - 36.0 g/dL    RDW 14.6 (H) 11.5 - 14.5 %    Platelets 120 (L) 150 - 450 x10*3/uL    MPV 11.5 7.5 - 11.5 fL    Neutrophils % 93.3 40.0 - 80.0 %    Immature Granulocytes %, Automated 2.5 (H) 0.0 - 0.9 %     Lymphocytes % 1.8 13.0 - 44.0 %    Monocytes % 2.1 2.0 - 10.0 %    Eosinophils % 0.2 0.0 - 6.0 %    Basophils % 0.1 0.0 - 2.0 %    Neutrophils Absolute 14.04 (H) 1.20 - 7.70 x10*3/uL    Immature Granulocytes Absolute, Automated 0.37 0.00 - 0.70 x10*3/uL    Lymphocytes Absolute 0.27 (L) 1.20 - 4.80 x10*3/uL    Monocytes Absolute 0.31 0.10 - 1.00 x10*3/uL    Eosinophils Absolute 0.03 0.00 - 0.70 x10*3/uL    Basophils Absolute 0.02 0.00 - 0.10 x10*3/uL   APTT   Result Value Ref Range    aPTT >139.0 (HH) 22.0 - 32.5 seconds   POCT GLUCOSE   Result Value Ref Range    POCT Glucose 123 (H) 74 - 99 mg/dL   APTT   Result Value Ref Range    aPTT 47.6 (H) 22.0 - 32.5 seconds   CBC   Result Value Ref Range    WBC 13.7 (H) 4.4 - 11.3 x10*3/uL    nRBC 0.0 0.0 - 0.0 /100 WBCs    RBC 3.81 (L) 4.00 - 5.20 x10*6/uL    Hemoglobin 10.5 (L) 12.0 - 16.0 g/dL    Hematocrit 31.1 (L) 36.0 - 46.0 %    MCV 82 80 - 100 fL    MCH 27.6 26.0 - 34.0 pg    MCHC 33.8 32.0 - 36.0 g/dL    RDW 14.6 (H) 11.5 - 14.5 %    Platelets 120 (L) 150 - 450 x10*3/uL    MPV 11.1 7.5 - 11.5 fL   Urinalysis with Reflex Microscopic   Result Value Ref Range    Color, Urine Brown (N) Light-Yellow, Yellow, Dark-Yellow    Appearance, Urine Turbid (N) Clear    Specific Gravity, Urine 1.016 1.005 - 1.035    pH, Urine 6.0 5.0, 5.5, 6.0, 6.5, 7.0, 7.5, 8.0    Protein, Urine 70 (1+) (A) NEGATIVE, 10 (TRACE), 20 (TRACE) mg/dL    Glucose, Urine 30 (TRACE) (A) Normal mg/dL    Blood, Urine OVER (3+) (A) NEGATIVE    Ketones, Urine NEGATIVE NEGATIVE mg/dL    Bilirubin, Urine NEGATIVE NEGATIVE    Urobilinogen, Urine Normal Normal mg/dL    Nitrite, Urine NEGATIVE NEGATIVE    Leukocyte Esterase, Urine 500 Fanny/µL (A) NEGATIVE   Microscopic Only, Urine   Result Value Ref Range    WBC, Urine >50 (A) 1-5, NONE /HPF    WBC Clumps, Urine FEW Reference range not established. /HPF    RBC, Urine >20 (A) NONE, 1-2, 3-5 /HPF    Squamous Epithelial Cells, Urine 1-9 (SPARSE) Reference range not  established. /HPF   Basic metabolic panel   Result Value Ref Range    Glucose 148 (H) 65 - 99 mg/dL    Sodium 137 133 - 145 mmol/L    Potassium 3.3 (L) 3.4 - 5.1 mmol/L    Chloride 103 97 - 107 mmol/L    Bicarbonate 20 (L) 24 - 31 mmol/L    Urea Nitrogen 49 (H) 8 - 25 mg/dL    Creatinine 1.60 0.40 - 1.60 mg/dL    eGFR 35 (L) >60 mL/min/1.73m*2    Calcium 8.4 (L) 8.5 - 10.4 mg/dL    Anion Gap 14 <=19 mmol/L   POCT GLUCOSE   Result Value Ref Range    POCT Glucose 157 (H) 74 - 99 mg/dL   POCT GLUCOSE   Result Value Ref Range    POCT Glucose 141 (H) 74 - 99 mg/dL   POCT GLUCOSE   Result Value Ref Range    POCT Glucose 156 (H) 74 - 99 mg/dL   Comprehensive metabolic panel   Result Value Ref Range    Glucose 151 (H) 65 - 99 mg/dL    Sodium 140 133 - 145 mmol/L    Potassium 4.0 3.4 - 5.1 mmol/L    Chloride 105 97 - 107 mmol/L    Bicarbonate 21 (L) 24 - 31 mmol/L    Urea Nitrogen 51 (H) 8 - 25 mg/dL    Creatinine 1.40 0.40 - 1.60 mg/dL    eGFR 41 (L) >60 mL/min/1.73m*2    Calcium 8.5 8.5 - 10.4 mg/dL    Albumin 2.9 (L) 3.5 - 5.0 g/dL    Alkaline Phosphatase 133 (H) 35 - 125 U/L    Total Protein 5.7 (L) 5.9 - 7.9 g/dL    AST 29 5 - 40 U/L    Bilirubin, Total 0.2 0.1 - 1.2 mg/dL    ALT 28 5 - 40 U/L    Anion Gap 14 <=19 mmol/L       Assessment/Plan   Acute kidney injury which is resolved  Chronic disease stage 3 creatinine is better than baseline  COVID-19  Acute respiratory failur getting worse pulmonary is following     Plan:  Continue oxygen therapy  monitor renal function         Danial Henderson MD

## 2023-10-31 NOTE — PROGRESS NOTES
Critical Care Progress Note    Macarena Wilson is a 68 y.o. female on day 8 of admission presenting with Acute respiratory failure with hypoxemia (CMS/HCC).    Subjective   Follow up hypoxia / covid  On HFNC  Objective   Vital Signs      10/31/2023     1:00 AM 10/31/2023     2:00 AM 10/31/2023     3:00 AM 10/31/2023     4:00 AM 10/31/2023     5:00 AM 10/31/2023     6:00 AM 10/31/2023     7:00 AM   Vitals   Systolic 155 153 141 142 113 163 151   Diastolic 93 87 80 75 93 83 82   Heart Rate 88 81 82 84 81 81 84   Temp    35.7 °C (96.3 °F)   35.9 °C (96.6 °F)   Resp 27 32 27 29 25 20 20   Weight (lb)    156.75      BMI    27.77 kg/m2      BSA (m2)    1.78 m2          Oxygen Therapy  SpO2: 90 %  Medical Gas Therapy: Supplemental oxygen  O2 Delivery Method: Other (Comment) (VAPOTHERM)    FiO2 (%):  [85 %-90 %] 85 %    Intake/Output previous 24 hours:    Intake/Output Summary (Last 24 hours) at 10/31/2023 0920  Last data filed at 10/31/2023 0400  Gross per 24 hour   Intake 490 ml   Output 1600 ml   Net -1110 ml     Physical Exam  Constitutional:       Appearance: She is ill-appearing.   HENT:      Head: Normocephalic and atraumatic.      Mouth/Throat:      Mouth: Mucous membranes are dry.   Eyes:      Extraocular Movements: Extraocular movements intact.      Pupils: Pupils are equal, round, and reactive to light.   Cardiovascular:      Rate and Rhythm: Normal rate and regular rhythm.      Pulses: Normal pulses.      Heart sounds: Normal heart sounds.   Pulmonary:      Effort: Pulmonary effort is normal.      Breath sounds: Decreased air movement present. Decreased breath sounds present.   Abdominal:      General: Bowel sounds are normal.      Palpations: Abdomen is soft.   Musculoskeletal:         General: Normal range of motion.      Cervical back: Normal range of motion.   Skin:     General: Skin is warm and dry.   Neurological:      General: No focal deficit present.      Mental Status: She is alert and oriented to  person, place, and time.   Psychiatric:         Mood and Affect: Mood normal.       Lines and Tubes:  Peripheral IV 10/23/23 20 G (Active)   Placement Date/Time: 10/23/23 1624   Size (Gauge): 20 G   Number of days: 5       Peripheral IV 10/27/23 20 G Distal;Left;Anterior Forearm (Active)   Placement Date/Time: 10/27/23 1900   Earliest Known Present: 10/27/23  Size (Gauge): 20 G  Orientation: Distal;Left;Anterior  Location: Forearm   Number of days: 0       Peripheral IV 10/23/23 20 G Left;Proximal;Anterior Forearm (Active)   Placement Date/Time: (?) 10/23/23 (?) 0000   Earliest Known Present: (?) 10/23/23  Size (Gauge): (?) 20 G  Orientation: (?) Left;Proximal;Anterior  Location: (?) Forearm   Number of days: 5         Scheduled medications  dexAMETHasone, 6 mg, intravenous, q24h  enoxaparin, 30 mg, subcutaneous, Daily  fluticasone, 1 spray, Each Nostril, BID  insulin lispro, 0-5 Units, subcutaneous, TID with meals  ipratropium-albuteroL, 3 mL, nebulization, q6h while awake  melatonin, 3 mg, oral, Nightly  oxygen, , inhalation, q4h  pantoprazole, 40 mg, oral, Daily before breakfast  sodium bicarbonate, 650 mg, oral, TID      Continuous medications     PRN medications  PRN medications: acetaminophen **OR** acetaminophen **OR** acetaminophen, aspirin-acetaminophen-caffeine, benzonatate, bisacodyl, dextrose 10 % in water (D10W), dextrose, glucagon, guaiFENesin, ipratropium-albuteroL, melatonin, ondansetron ODT **OR** ondansetron, [Held by provider] oxygen, polyethylene glycol, sodium chloride    Relevant Results  Results from last 7 days   Lab Units 10/30/23  2036 10/29/23  1005 10/29/23  0523   WBC AUTO x10*3/uL 22.0* 13.7* 15.0*   HEMOGLOBIN g/dL 10.6* 10.5* 10.2*   HEMATOCRIT % 32.7* 31.1* 30.6*   PLATELETS AUTO x10*3/uL 148* 120* 120*      Results from last 7 days   Lab Units 10/31/23  0554 10/30/23  0551 10/29/23  1133   SODIUM mmol/L 138 140 137   POTASSIUM mmol/L 4.2 4.0 3.3*   CHLORIDE mmol/L 103 105 103   CO2  mmol/L 23* 21* 20*   BUN mg/dL 57* 51* 49*   CREATININE mg/dL 1.60 1.40 1.60   GLUCOSE mg/dL 146* 151* 148*   CALCIUM mg/dL 8.6 8.5 8.4*      Results from last 7 days   Lab Units 10/25/23  0507   POCT PH, ARTERIAL pH 7.35*   POCT PCO2, ARTERIAL mm Hg 31*   POCT PO2, ARTERIAL mm Hg 115*   POCT HCO3 CALCULATED, ARTERIAL mmol/L 17.1*   POCT BASE EXCESS, ARTERIAL mmol/L -7.5*     XR chest 1 view 10/27/2023    Narrative  Interpreted By:  Nirmal Mcmillan,  STUDY:  XR CHEST 1 VIEW; 10/27/2023 10:06 am    INDICATION:  Signs/Symptoms:follow up hypoxemia.    COMPARISON:  10/23/2023    ACCESSION NUMBER(S):  [id number]    ORDERING CLINICIAN:  SUSANNE FRANCOIS    TECHNIQUE:  1 view of the chest was performed.    FINDINGS:  There is diffuse ground-glass opacity bilaterally consistent with  history of COVID. No lobar or large consolidation however the  ground-glass opacities are prominent but stable from the previous  examination. No pleural effusion. No pneumothorax.  The  cardiomediastinal silhouette is within normal limits.    Impression  No significant change. Prominent ground-glass opacities bilaterally,  however, not significantly changed.    Signed by: Nirmal Mcmillan 10/27/2023 10:24 PM  Dictation workstation:   [id number]      Patient Active Problem List   Diagnosis    Abnormal mammogram    Acute pain of left shoulder    Allergic rhinitis    Benign essential HTN    Calculus of kidney    Staghorn renal calculus    Diverticular disease    Gammopathy    Gross hematuria    Hematuria, unspecified    Impaired fasting glucose    Nodule of kidney    Obesity    Post-menopausal bleeding    Prediabetes    Acute cystitis with hematuria    Stage 4 chronic kidney disease (CMS/HCC)    Urgency incontinence    Bladder fistula    Atrophic kidney    COVID-19    Acute respiratory failure with hypoxemia (CMS/HCC)    Pneumonia of both lower lobes due to infectious organism     Assessment/Plan    Acute respiratory failure with hypoxemia  (CMS/[facility])  Active Problems:    COVID-19    Benign essential HTN    Stage 4 chronic kidney disease (CMS/[facility])    Persistent hypoxia  D-dimer 16  VQ was low probability several days ago  Duplex studies negative  Empiric heparin drip was started but stopped due to significant hematuria.  CXR stable ARDS      HFNC -on 90% and 40 L  Very slow to wean  Chest xray unchanged    HUNG  Cr up to 1.6  Hold lasix today    Hold on NIPPV - patient does not feel she will tolerate    Remdesivir completed  Decadron  Aerosol Rx    Very slow to progress. Continue to eval daily    Davin Avendaño MD  [facility]       This critically ill patient continues to be at-risk for deterioration / failure due to the above mentioned dysfunctional unstable organ systems.   Critical care time is spent at bedside includes review of diagnostic tests, labs, and radiographs, serial assessments and management of hemodynamics, respiratory status, and coordination of care with different members of interdisciplinary team  Assessment, impression and plans are reflected in the note above as well as the orders.     Critical concerns addressed:  Acute respiratory failure  Hypoxia  COVID-19 pneumonitis  Elevated D-dimer rule out VTE  Time Spent in critical Care, exclusive of procedures : 20 mins.     Disclaimer: Parts of this chart were dictated with voice recognition software. Please excuse any errors of grammar, spelling, or transcription which are not corrected. Please contact me with any questions regarding documentation.

## 2023-10-31 NOTE — CARE PLAN
The patient's goals for the shift include tolerate activity/decreased SOB.    The clinical goals for the shift include decrease O2 demand.

## 2023-10-31 NOTE — PROGRESS NOTES
"HOSPITALIST  PROGRESS NOTE   Macarena Wilson    :  1955    Medical Record:  [id number]    DATE OF SERVICE: 10/31/2023  ADMIT DAY: 8.    Subjective:  Macarena Wilson is a 68 y.o. year-old female who was admitted on 10/23/2023 for Acute hypoxemic respiratory failure due to COVID-19, HUNG, among others.  The patient's labs, imaging studies and vital signs are noted with the case discussed with the nursing staff. The patient was seen and examined and the chart was reviewed. The patient is being seen for management of their BP along with the pt's other medical conditions.  I assumed care of the patient from Dr. Anderson on 10/31.  Today pt still on HFNC, reports \"short of breath if I move too much\" but she denies any F/C/N/V/Abd pain.    Objective:  Vitals:    10/31/23 0714   BP:    Pulse:    Resp:    Temp:    SpO2: 90%       I/O last 3 completed shifts:  In: 970 (13.6 mL/kg) [P.O.:970]  Out: 2000 (28.1 mL/kg) [Urine:2000 (0.8 mL/kg/hr)]  Weight: 71.1 kg   No intake/output data recorded.  Pulmonary:    General: Female, A&Ox3, is following commands but has conversational dyspnea.  HEENT: Normocephalic atraumatic, pupils equally reactive to light and accomodation, extra occular muscles are intact. Neck is supple, trachea is midline without observable bruits.  CVS: Regular rate and rhythm, S1 S2 without any 3 or S4.  Pulmonary: Decreased breath sounds with occasional rales and trace rhonchi.  GI: Abdomen is soft, non tender, positive bowel sounds are present.  EXT: B/L LE peripheral edema 1/4 with pulses are palpable throughout.  NEUROLOGY: CN 3-12 grossly intact for known chronic visual impairments.  Muscle strength is 4/5, DTRs are 2/4 throughout. There is no obvious facial asymmetry and no resting tremors.Moves all 4 extremities spontaneously.    LABS:  Results for orders placed or performed during the hospital encounter of 10/23/23 (from the past 24 hour(s))   POCT GLUCOSE   Result Value Ref Range    POCT " Glucose 156 (H) 74 - 99 mg/dL   CBC   Result Value Ref Range    WBC 22.0 (H) 4.4 - 11.3 x10*3/uL    nRBC 0.0 0.0 - 0.0 /100 WBCs    RBC 3.86 (L) 4.00 - 5.20 x10*6/uL    Hemoglobin 10.6 (L) 12.0 - 16.0 g/dL    Hematocrit 32.7 (L) 36.0 - 46.0 %    MCV 85 80 - 100 fL    MCH 27.5 26.0 - 34.0 pg    MCHC 32.4 32.0 - 36.0 g/dL    RDW 14.7 (H) 11.5 - 14.5 %    Platelets 148 (L) 150 - 450 x10*3/uL    MPV 11.7 (H) 7.5 - 11.5 fL   Comprehensive metabolic panel   Result Value Ref Range    Glucose 146 (H) 65 - 99 mg/dL    Sodium 138 133 - 145 mmol/L    Potassium 4.2 3.4 - 5.1 mmol/L    Chloride 103 97 - 107 mmol/L    Bicarbonate 23 (L) 24 - 31 mmol/L    Urea Nitrogen 57 (H) 8 - 25 mg/dL    Creatinine 1.60 0.40 - 1.60 mg/dL    eGFR 35 (L) >60 mL/min/1.73m*2    Calcium 8.6 8.5 - 10.4 mg/dL    Albumin 2.9 (L) 3.5 - 5.0 g/dL    Alkaline Phosphatase 122 35 - 125 U/L    Total Protein 5.7 (L) 5.9 - 7.9 g/dL    AST 20 5 - 40 U/L    Bilirubin, Total 0.3 0.1 - 1.2 mg/dL    ALT 25 5 - 40 U/L    Anion Gap 12 <=19 mmol/L   C-Reactive Protein   Result Value Ref Range    C-Reactive Protein 0.50 0.00 - 2.00 mg/dL   Phosphorus   Result Value Ref Range    Phosphorus 4.0 2.5 - 4.5 mg/dL   Magnesium   Result Value Ref Range    Magnesium 2.00 1.60 - 3.10 mg/dL   NT Pro-BNP   Result Value Ref Range    PROBNP 1,624 (H) 0 - 353 pg/mL   POCT GLUCOSE   Result Value Ref Range    POCT Glucose 128 (H) 74 - 99 mg/dL   CBC   Result Value Ref Range    WBC 20.8 (H) 4.4 - 11.3 x10*3/uL    nRBC 0.0 0.0 - 0.0 /100 WBCs    RBC 3.98 (L) 4.00 - 5.20 x10*6/uL    Hemoglobin 11.1 (L) 12.0 - 16.0 g/dL    Hematocrit 34.3 (L) 36.0 - 46.0 %    MCV 86 80 - 100 fL    MCH 27.9 26.0 - 34.0 pg    MCHC 32.4 32.0 - 36.0 g/dL    RDW 14.8 (H) 11.5 - 14.5 %    Platelets 152 150 - 450 x10*3/uL    MPV 11.4 7.5 - 11.5 fL   POCT GLUCOSE   Result Value Ref Range    POCT Glucose 114 (H) 74 - 99 mg/dL        No results found for the last 90 days.         MEDICATIONS:  Medication  Documentation Review Audit       Reviewed by Diane Peterson RN (Registered Nurse) on 10/23/23 at 1618      Medication Order Taking? Sig Documenting Provider Last Dose Status   acetaminophen (TylenoL) 325 mg tablet [id number] No Take 1 tablet (325 mg) by mouth every 4 hours. TAKE PER DIRECTED Historical MD Walt Past Month Active   aspirin-acetaminophen-caffeine (Excedrin Extra Strength) 250-250-65 mg tablet [id number] No Take by mouth. TAKE PER DIRECTED Historical MD Walt More than a month Active   betamethasone dipropionate 0.05 % cream [id number] No Apply topically 2 times a day. APPLY PER DIRECTED Historical Provider, MD More than a month Active   dexAMETHasone (Decadron) 6 mg tablet [id number] No Take 1 tablet (6 mg) by mouth once daily for 7 days.   Patient not taking: Reported on 10/23/2023    Shirley Bills MD More than a month Active   dupilumab (Dupixent Pen) 300 mg/2 mL injection [id number] No Inject 2 mL (300 mg) under the skin every 14 (fourteen) days. Historical Provider, MD Past Week Active   estradiol (Estrace) 0.01 % (0.1 mg/gram) vaginal cream [id number] No Insert into the vagina. PER DIRECTED Historical Provider, MD More than a month Active   fexofenadine-pseudoephedrine (Allegra-D 12 Hour)  mg 12 hr tablet [id number] No Take 1 tablet by mouth. TAKE PER DIRECTED Historical MD Walt More than a month Active   ivermectin (Soolantra) 1 % cream [id number] No Apply topically. PER DIRECTED Historical MD Walt More than a month Active   losartan (Cozaar) 100 mg tablet [id number] No Take 1 tablet (100 mg) by mouth once every 24 hours. TAKE PER DIRECTED Historical Provider, MD 10/23/2023 Active   magnesium hydroxide (Milk of Magnesia) 2,400 mg/10 mL suspension suspension [id number] No Take 5 mL by mouth. TAKE PER DIRECTED Historical Provider, MD More than a month Active   naproxen sodium (Aleve) 220 mg tablet [id number] No Take 1 tablet (220 mg) by mouth. TAKE PER DIRECTED Historical Provider  MD More than a month Active   nitrofurantoin, macrocrystal-monohydrate, (Macrobid) 100 mg capsule [id number] No Take 1 capsule (100 mg) by mouth. PER DIRECTED Historical Provider, MD More than a month Active   tralokinumab-ldrm (ADBRY SUBQ) [id number] No Inject under the skin. PER DIRECTED Historical Provider, MD More than a month Active   traMADol (Ultram) 50 mg tablet [id number] No Take 1 tablet (50 mg) by mouth twice a day. TAKE PER DIRECTED Historical Provider, MD More than a month Active   triamcinolone (Kenalog) 0.1 % cream [id number] No Apply topically 2 times a day. APPLY PER DIRECTED Historical Provider, MD More than a month Active                   PERTINENT IMAGING STUDIES/PROCEDURES:    CXR 10/31/2023 IMPRESSION:  No significant interval change in diffuse bilateral airspace  opacities. No pleural effusion or significant interval change.    Venous Doppler legs 10/28/2023 IMPRESSION:  No DVT    VQ scan 10/23/2023 IMPRESSION:  Normal perfusion lung scan.    CT chest 10/24/2023 IMPRESSION:  Diffuse, patchy ground-glass opacities bilaterally suggesting  atypical pneumonia such as COVID.    Code Status:Full Code    Assessment:  Macarena Wilson is a 68 y.o. 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 Still on HFNC   -Venous Doppler of legs no DVT (10/28), VQ scan was of low probability for PE on hospital presentation, no CTPE due to renal dysfunction  -Completed 5 doses of  Remdesivir on 10/28  - Remains on IV Decadron, Decadron dose has been decreased, started 10/24, 10 day goal  -She is status post  baricitinib 10/25  -Continue pulmonary toilet with incentive spirometer positional changes, patient re -educated on prone positioning as well, not able to tolerate proning unfortunately  -Per ID discontinued  empiric pneumonia treatment with vancomycin and Zosyn  - C diff is negative (10/26)     CARDIOLOGY:  -BP is high at times so started as needed IV hydralazine on 10/31.  The patient had been given a few doses of of Lasix with repeat labs ordered in the am.  2D echo ordered on 10/31    ENDOCRINOLOGY:     Accu-Cheks are being monitored as patient on Decadron and continue ISS.     GI:   -On Protonix     HEMATOLOGY:  -CBC noted especially the platelets and will recheck in am with no need for transfusions unless pt develops severe bleeding, platelet counts less than 10,000 or Hgb becomes < 7.        NEPHROLOGY:   -Nephrology on the case and is managing the pt's renal function, diuretics, as needed fluids as well as the electrolytes. Continue bicarb and patient previously on Lasix with nephrology guiding diuresis given CKD 3.The patient's Is and Os are being monitored and the patient continues to be off nephrotoxic agents as much as possible.     UROLOGY:        Bladder training protocol in place and patient is started on Hytrin and Urecholine at the LTAC facility with plan to wean off Max soon.     MUSCULOSKELETAL:     1- generalized weakness:  PT&OT     OTHER:    -The patient continues to be on the rest of their chronic home medications for osteoarthritis, etc.  Will update family once available.   -Palliative care input is appreciated  -Discharge planner is on the case with PT and OT following as well.    Bowel regimen: Colace     *Pain regimen:  Tylenol     *DVT prophylaxis:  Lovenox     *PUD PROPHYLAXIS:  Protonix     Time spent managing patient's care is greater than 40 minutes with approximately 50% or more spent in counseling and coordination of care.    Evan WIGGINS    Portions of this Progress  note were dictated using MModal and reviewed . While every effort was made to correct mistranscriptions, minor grammatical or typographical errors may be present from machine dictation

## 2023-10-31 NOTE — PROGRESS NOTES
Macarena Wilson is a 68 y.o. female on day 8 of admission presenting with Acute respiratory failure with hypoxemia (CMS/HCC).      Subjective  Patient sitting up in bed in no acute distress  Tells me they tried to wean her high flow down this morning but she desaturated.  She is on high flow 90 saturating at 89%  Eating breakfast  Denies chest pain, nausea or vomiting    Objective    Last Recorded Vitals      10/31/2023     1:00 AM 10/31/2023     2:00 AM 10/31/2023     3:00 AM 10/31/2023     4:00 AM 10/31/2023     5:00 AM 10/31/2023     6:00 AM 10/31/2023     7:00 AM   Vitals   Systolic 155 153 141 142 113 163 151   Diastolic 93 87 80 75 93 83 82   Heart Rate 88 81 82 84 81 81 84   Temp    35.7 °C (96.3 °F)   35.9 °C (96.6 °F)   Resp 27 32 27 29 25 20 20   Weight (lb)    156.75      BMI    27.77 kg/m2      BSA (m2)    1.78 m2        Physical Exam  Constitutional:       Appearance: Normal appearance.  No acute distress, ill-appearing  HENT:      Head: Normocephalic and atraumatic.      Nose: Nose normal.      Mouth/Throat:      Mouth: Mucous membranes are moist.      Pharynx: Oropharynx is clear.   Eyes:      Extraocular Movements: Extraocular movements intact.      Conjunctiva/sclera: Conjunctivae normal.   Cardiovascular:      Rate and Rhythm: Normal rate and regular rhythm.   Pulmonary:      Effort: Pulmonary effort is normal.      Breath sounds: Diminished  Abdominal:      General: Bowel sounds are normal.      Palpations: Abdomen is soft.   Musculoskeletal:         General: Normal range of motion.      Cervical back: Normal range of motion and neck supple.   Skin:     General: Skin is warm and dry.   Neurological:      General: No focal deficit present.      Mental Status: She is alert and oriented to person, place, and time. Mental status is at baseline.   Psychiatric:         Mood and Affect: Mood normal.         Behavior: Behavior normal.       Relevant Results  Results for orders placed or performed  during the hospital encounter of 10/23/23 (from the past 24 hour(s))   POCT GLUCOSE   Result Value Ref Range    POCT Glucose 133 (H) 74 - 99 mg/dL   POCT GLUCOSE   Result Value Ref Range    POCT Glucose 156 (H) 74 - 99 mg/dL   CBC   Result Value Ref Range    WBC 22.0 (H) 4.4 - 11.3 x10*3/uL    nRBC 0.0 0.0 - 0.0 /100 WBCs    RBC 3.86 (L) 4.00 - 5.20 x10*6/uL    Hemoglobin 10.6 (L) 12.0 - 16.0 g/dL    Hematocrit 32.7 (L) 36.0 - 46.0 %    MCV 85 80 - 100 fL    MCH 27.5 26.0 - 34.0 pg    MCHC 32.4 32.0 - 36.0 g/dL    RDW 14.7 (H) 11.5 - 14.5 %    Platelets 148 (L) 150 - 450 x10*3/uL    MPV 11.7 (H) 7.5 - 11.5 fL   Comprehensive metabolic panel   Result Value Ref Range    Glucose 146 (H) 65 - 99 mg/dL    Sodium 138 133 - 145 mmol/L    Potassium 4.2 3.4 - 5.1 mmol/L    Chloride 103 97 - 107 mmol/L    Bicarbonate 23 (L) 24 - 31 mmol/L    Urea Nitrogen 57 (H) 8 - 25 mg/dL    Creatinine 1.60 0.40 - 1.60 mg/dL    eGFR 35 (L) >60 mL/min/1.73m*2    Calcium 8.6 8.5 - 10.4 mg/dL    Albumin 2.9 (L) 3.5 - 5.0 g/dL    Alkaline Phosphatase 122 35 - 125 U/L    Total Protein 5.7 (L) 5.9 - 7.9 g/dL    AST 20 5 - 40 U/L    Bilirubin, Total 0.3 0.1 - 1.2 mg/dL    ALT 25 5 - 40 U/L    Anion Gap 12 <=19 mmol/L   C-Reactive Protein   Result Value Ref Range    C-Reactive Protein 0.50 0.00 - 2.00 mg/dL   Phosphorus   Result Value Ref Range    Phosphorus 4.0 2.5 - 4.5 mg/dL   Magnesium   Result Value Ref Range    Magnesium 2.00 1.60 - 3.10 mg/dL   NT Pro-BNP   Result Value Ref Range    PROBNP 1,624 (H) 0 - 353 pg/mL   POCT GLUCOSE   Result Value Ref Range    POCT Glucose 128 (H) 74 - 99 mg/dL       Assessment/Plan   Acute hypoxic respiratory failure, requiring high flow  Severe coronavirus pneumonitis  Stage IV chronic kidney disease  Leukocytosis-resolving  Diarrhea-rule out infectious etiology-resolved     Continue dexamethasone  Remdesivir completed  Monitor renal and hematologic parameters  S/p Actemra-10/24/2023  Supportive care  Monitor  temperature and WBC  Supplemental oxygen as needed, requiring high flow  Pulmonary follow-up

## 2023-10-31 NOTE — PROGRESS NOTES
Macarena Wilson is a 68 y.o. female on day 8 of admission presenting with Acute respiratory failure with hypoxemia (CMS/HCC).    Subjective   Symptoms (0 - 10, Best to Worst)  Rosine Symptom Assessment System  Pain Score: 0 - No pain  Sitting up in bed. remains tachypnic,unable to wean. Pt voices hope she will get better.        Objective       Constitutional:       General: Patient is not in acute distress.  HENT:      Head: Normocephalic.      Mouth: Mucous membranes are moist. Rosacea erythema on cheeks and forehead  Eyes:      Conjunctiva/sclera: Conjunctivae clear, sclerae white. No discharge.     Pupils: Pupils are equal, round, and reactive to light.   Neck:      Vascular: No carotid bruit.   Cardiovascular:      Rate and Rhythm: Normal rate and regular rhythm.      Heart sounds: No murmur heard.  Pulmonary:      Effort:  Tachypnic in 30s. On 90/40 HF, no more need for NRB     Breath sounds: Clear to auscultation  Abdominal:      General: There is no distension.      Tenderness: There is no abdominal tenderness. There is no guarding.   Urology:     Genitalia: normal genitalia    Urine: urine yellow and clear  Musculoskeletal:         General: No deformity.   Skin:     Coloration: Skin is not jaundiced. Cap refill delayed  Neurological:      General: No focal deficit present.      Mental Status: is oriented to person, place, and time.   Psychiatric:         Behavior: Behavior normal. Behavior is cooperative.        Relevant Results  Results for orders placed or performed during the hospital encounter of 10/23/23 (from the past 24 hour(s))   POCT GLUCOSE   Result Value Ref Range    POCT Glucose 156 (H) 74 - 99 mg/dL   CBC   Result Value Ref Range    WBC 22.0 (H) 4.4 - 11.3 x10*3/uL    nRBC 0.0 0.0 - 0.0 /100 WBCs    RBC 3.86 (L) 4.00 - 5.20 x10*6/uL    Hemoglobin 10.6 (L) 12.0 - 16.0 g/dL    Hematocrit 32.7 (L) 36.0 - 46.0 %    MCV 85 80 - 100 fL    MCH 27.5 26.0 - 34.0 pg    MCHC 32.4 32.0 - 36.0 g/dL     RDW 14.7 (H) 11.5 - 14.5 %    Platelets 148 (L) 150 - 450 x10*3/uL    MPV 11.7 (H) 7.5 - 11.5 fL   Comprehensive metabolic panel   Result Value Ref Range    Glucose 146 (H) 65 - 99 mg/dL    Sodium 138 133 - 145 mmol/L    Potassium 4.2 3.4 - 5.1 mmol/L    Chloride 103 97 - 107 mmol/L    Bicarbonate 23 (L) 24 - 31 mmol/L    Urea Nitrogen 57 (H) 8 - 25 mg/dL    Creatinine 1.60 0.40 - 1.60 mg/dL    eGFR 35 (L) >60 mL/min/1.73m*2    Calcium 8.6 8.5 - 10.4 mg/dL    Albumin 2.9 (L) 3.5 - 5.0 g/dL    Alkaline Phosphatase 122 35 - 125 U/L    Total Protein 5.7 (L) 5.9 - 7.9 g/dL    AST 20 5 - 40 U/L    Bilirubin, Total 0.3 0.1 - 1.2 mg/dL    ALT 25 5 - 40 U/L    Anion Gap 12 <=19 mmol/L   C-Reactive Protein   Result Value Ref Range    C-Reactive Protein 0.50 0.00 - 2.00 mg/dL   Phosphorus   Result Value Ref Range    Phosphorus 4.0 2.5 - 4.5 mg/dL   Magnesium   Result Value Ref Range    Magnesium 2.00 1.60 - 3.10 mg/dL   NT Pro-BNP   Result Value Ref Range    PROBNP 1,624 (H) 0 - 353 pg/mL   POCT GLUCOSE   Result Value Ref Range    POCT Glucose 128 (H) 74 - 99 mg/dL   CBC   Result Value Ref Range    WBC 20.8 (H) 4.4 - 11.3 x10*3/uL    nRBC 0.0 0.0 - 0.0 /100 WBCs    RBC 3.98 (L) 4.00 - 5.20 x10*6/uL    Hemoglobin 11.1 (L) 12.0 - 16.0 g/dL    Hematocrit 34.3 (L) 36.0 - 46.0 %    MCV 86 80 - 100 fL    MCH 27.9 26.0 - 34.0 pg    MCHC 32.4 32.0 - 36.0 g/dL    RDW 14.8 (H) 11.5 - 14.5 %    Platelets 152 150 - 450 x10*3/uL    MPV 11.4 7.5 - 11.5 fL     Scheduled medications  dexAMETHasone, 6 mg, intravenous, q24h  enoxaparin, 30 mg, subcutaneous, Daily  fluticasone, 1 spray, Each Nostril, BID  insulin lispro, 0-5 Units, subcutaneous, TID with meals  ipratropium-albuteroL, 3 mL, nebulization, q6h while awake  melatonin, 3 mg, oral, Nightly  oxygen, , inhalation, q4h  pantoprazole, 40 mg, oral, Daily before breakfast  sodium bicarbonate, 650 mg, oral, TID      Continuous medications     PRN medications  PRN medications: acetaminophen  "**OR** acetaminophen **OR** acetaminophen, aspirin-acetaminophen-caffeine, benzonatate, bisacodyl, dextrose 10 % in water (D10W), dextrose, glucagon, guaiFENesin, ipratropium-albuteroL, melatonin, ondansetron ODT **OR** ondansetron, [Held by provider] oxygen, polyethylene glycol, sodium chloride      Last Recorded Vitals  Blood pressure 151/82, pulse 84, temperature 35.9 °C (96.6 °F), temperature source Temporal, resp. rate 20, height 1.6 m (5' 3\"), weight 71.1 kg (156 lb 12 oz), SpO2 90 %.  Intake/Output last 3 Shifts:  I/O last 3 completed shifts:  In: 970 (13.6 mL/kg) [P.O.:970]  Out: 2000 (28.1 mL/kg) [Urine:2000 (0.8 mL/kg/hr)]  Weight: 71.1 kg       Assessment/Plan     COVID-19-Actemra 10/24, finished course of remdesivir, remains on IV dexamethasone, aerosols, remains on high flow  Acute respiratory failure - slight improvement as off nrb and feels better  CKD4, acidosis-started on bicarb 10/25, creatinine stable, nephrology managing  Diarrhea-resolved, off laxatives  Insomnia-continue melatonin  Hypertension - controlled  Palliative care    Full code  Patient is capable  Her stated healthcare garcia of  are her 2 sons Mathew and Josias, she has a living will.    10/27  68-year-old female presenting to the hospital with acute respiratory failure in the setting of COVID-19 with underlying CKD 4 requiring high flow oxygen.  Prior to admission she was working full-time and independent with all ADLs and IADLs.  She had excellent quality of life without any functional limitations.  Her ultimate goal is to treat her acute condition and return to her prior level of function.  She is open to all measures including chest compressions ventilation in order to keep her alive at this point in time, however she has no wish to be sustained on life support indefinitely, and trusts her 2 sons to fill her living will if she cannot be weaned off of ventilator.  Patient very aware that her prognosis is guarded, given her " high oxygen requirements.     10/28  Diarrhea improved. No sinus congestion. Worried about chronic eczema, added cream.      10/29 appears more fatigued today, not sleeping well, increased work of breathing, concern the patient is fatiguing from her prolonged respiratory failure.  Remains aggressive treatment model of care and full code, open to intubation if necessary.    10/30  Plan remains to be aggressive. Pt feels better is optimistic. Full code.   No med changes by me today, much education provided.  Will follow.     10/31  Remains full code  No improvement but wants to continue trying  Discussed with pulmonology- continue all treatments. Give more time.  Might need LTAC  Pall care signing off, please reconsult if needed    I spent 40 minutes in the professional and overall care of this patient.    Chari Borjas, APRELSI-CNP

## 2023-10-31 NOTE — CARE PLAN
Problem: Skin  Goal: Participates in plan/prevention/treatment measures  Outcome: Progressing  Flowsheets (Taken 10/26/2023 0622)  Participates in plan/prevention/treatment measures: Elevate heels  Goal: Prevent/manage excess moisture  Outcome: Progressing  Flowsheets (Taken 10/29/2023 0824 by Kayce Lobato, RN)  Prevent/manage excess moisture: Cleanse incontinence/protect with barrier cream  Goal: Prevent/minimize sheer/friction injuries  Outcome: Progressing  Flowsheets (Taken 10/27/2023 0609)  Prevent/minimize sheer/friction injuries: Turn/reposition every 2 hours/use positioning/transfer devices  Goal: Promote/optimize nutrition  Outcome: Progressing  Flowsheets (Taken 10/27/2023 0609)  Promote/optimize nutrition: Monitor/record intake including meals  Goal: Promote skin healing  Outcome: Progressing  Flowsheets (Taken 10/29/2023 0825 by Kayce Lobato RN)  Promote skin healing: Assess skin/pad under line(s)/device(s)   The patient's goals for the shift include To get rest    The clinical goals for the shift include decrease O2 demand throughout the shift    Over the shift, the patient did not make progress toward the following goals. Barriers to progression include covid, pt refusing to prone d/t comfort. Recommendations to address these barriers include encourage C&DB, IS, side sleeping and proning.

## 2023-10-31 NOTE — PROGRESS NOTES
Creatinine remains stable at baseline, will sign off at this time, please call back with any questions. Patient can follow-up with us with a renal function panel in 1 week and office visit in 2 weeks.       Seth Henderson MD

## 2023-10-31 NOTE — CARE PLAN
Problem: Respiratory  Goal: Minimal/no exertional discomfort or dyspnea this shift  Outcome: Progressing  Goal: No signs of respiratory distress (eg. Use of accessory muscles. Peds grunting)  Outcome: Progressing  Goal: Wean oxygen to maintain O2 saturation per order/standard this shift  Outcome: Progressing

## 2023-11-01 NOTE — CARE PLAN
Problem: Respiratory  Goal: Minimal/no exertional discomfort or dyspnea this shift  Outcome: Progressing  Goal: Wean oxygen to maintain O2 saturation per order/standard this shift  Outcome: Progressing  Goal: Minimize anxiety/maximize coping throughout shift  Outcome: Progressing

## 2023-11-01 NOTE — CONSULTS
"Nutrition Assessment Note    Reason for Hospital Admission:  Macarena Wilson is a 68 y.o. female who is admitted for acute respiratory failure w/ hypoxemia. Pt w/ COVID, conducted interview via phone, denies wt loss. PO intake not recorded, pt states she is eating \"fine\". States she has snacks in her room, denies need for supplements    Nutrition Assessment:  Reason for Assessment  Reason for Assessment: Length of stay     has a past medical history of Chronic kidney disease, Hypertension, and Personal history of diseases of the skin and subcutaneous tissue. diverticulitis    Allergies   Allergen Reactions    Fesoterodine Unknown    Mirabegron Rash    Prednisone Other     \"body aches, flu like symptoms\"        Lab Results   Component Value Date    WBC 18.3 (H) 11/01/2023    HGB 10.6 (L) 11/01/2023    HCT 31.6 (L) 11/01/2023     (L) 11/01/2023    ALT 22 11/01/2023    AST 16 11/01/2023     11/01/2023    K 4.6 11/01/2023     11/01/2023    CREATININE 1.50 11/01/2023    BUN 59 (H) 11/01/2023    CO2 24 11/01/2023    INR 1.0 12/02/2018    HGBA1C 6.3 (H) 10/24/2023       Current Facility-Administered Medications:     acetaminophen (Tylenol) tablet 650 mg, 650 mg, oral, q4h PRN, 650 mg at 10/30/23 1302 **OR** acetaminophen (Tylenol) oral liquid 650 mg, 650 mg, nasogastric tube, q4h PRN **OR** acetaminophen (Tylenol) suppository 650 mg, 650 mg, rectal, q4h PRN, David Rodriguez MD    aspirin-acetaminophen-caffeine (Excedrin Migraine) 250-250-65 mg per tablet 2 tablet, 2 tablet, oral, q6h PRN, Justin Ferrara MD, 2 tablet at 10/30/23 1801    benzonatate (Tessalon) capsule 200 mg, 200 mg, oral, TID PRN, Lou Hodges, APRN-CNP, 200 mg at 10/31/23 1136    bisacodyl (Dulcolax) EC tablet 10 mg, 10 mg, oral, Daily PRN, David Rodriguez MD    dexAMETHasone (Decadron) injection 6 mg, 6 mg, intravenous, q24h, David Rodriguez MD, 6 mg at 10/31/23 2224    dextrose 10 % in water (D10W) " infusion, 0.3 g/kg/hr, intravenous, Once PRN, David Rodriguez MD    dextrose 50 % injection 25 g, 25 g, intravenous, q15 min PRN, David Rodriguez MD    docusate sodium (Colace) capsule 100 mg, 100 mg, oral, BID PRN, Evan Mullins DO, 100 mg at 11/01/23 1001    enoxaparin (Lovenox) syringe 30 mg, 30 mg, subcutaneous, Daily, Lydia Moreland MD, 30 mg at 11/01/23 0756    fluticasone (Flonase) nasal spray 1 spray, 1 spray, Each Nostril, BID, JASON Burgess, 1 spray at 11/01/23 1004    glucagon (Glucagen) injection 1 mg, 1 mg, intramuscular, q15 min PRN, David Rodriguez MD    guaiFENesin (Robitussin) 100 mg/5 mL syrup 200 mg, 200 mg, oral, q4h PRN, Sarina Brown, ZION-CNP, 200 mg at 10/29/23 1234    hydrALAZINE (Apresoline) injection 10 mg, 10 mg, intravenous, q8h PRN, Evan Mullins DO    insulin lispro (HumaLOG) injection 0-5 Units, 0-5 Units, subcutaneous, TID with meals, David Rodriguez MD, 1 Units at 11/01/23 1231    ipratropium-albuteroL (Duo-Neb) 0.5-2.5 mg/3 mL nebulizer solution 3 mL, 3 mL, nebulization, q6h while awake, David Rodriguez MD, 3 mL at 11/01/23 1351    ipratropium-albuteroL (Duo-Neb) 0.5-2.5 mg/3 mL nebulizer solution 3 mL, 3 mL, nebulization, q2h PRN, David Rodriguez MD    melatonin tablet 3 mg, 3 mg, oral, Nightly, ZION Burgess-CNP, 3 mg at 10/31/23 2103    melatonin tablet 3 mg, 3 mg, oral, Nightly PRN, Lou R Parsell, APRN-CNP    ondansetron ODT (Zofran-ODT) disintegrating tablet 4 mg, 4 mg, oral, q8h PRN **OR** ondansetron (Zofran) injection 4 mg, 4 mg, intravenous, q8h PRN, David Rodriguez MD    [Held by provider] oxygen (O2) therapy, , inhalation, Continuous PRN - O2/gases, David Rodriguez MD, 35 L/min at 10/24/23 0115    oxygen (O2) therapy, , inhalation, q4h, Justin Frerara MD, Start at 11/01/23 1500    pantoprazole (ProtoNix) EC tablet 40 mg, 40 mg, oral, Daily before breakfast, Lydia Moreland MD, 40 mg at  "11/01/23 1001    perflutren lipid microspheres (Definity) injection 0.5-10 mL of dilution, 0.5-10 mL of dilution, intravenous, Once in imaging, Evan Mullins DO    perflutren protein A microsphere (Optison) injection 0.5 mL, 0.5 mL, intravenous, Once in imaging, Evan Mullins DO    polyethylene glycol (Glycolax, Miralax) packet 17 g, 17 g, oral, Daily PRN, JASON Burgess    sodium bicarbonate tablet 650 mg, 650 mg, oral, TID, Danial Henderson MD, 650 mg at 11/01/23 1001    sodium chloride (Ocean) 0.65 % nasal spray 1 spray, 1 spray, Each Nostril, PRN, JASON Burgess, 1 spray at 10/29/23 1230    Dietary Orders (From admission, onward)       Start     Ordered    10/25/23 1328  Oral nutritional supplements  Until discontinued        Comments: Chocolate   Question Answer Comment   Deliver with All meals    Select supplement: Ensure Compact        10/25/23 1328    10/23/23 2305  Adult diet Regular  Diet effective now        Question:  Diet type  Answer:  Regular    10/23/23 2304                   History:  Food and Nutrient History  Energy Intake: Good > 75 %    Anthropometrics:  Ht: 160 cm (5' 3\"), Wt: 71.1 kg (156 lb 12 oz), BMI: 27.77     Weight Change  Significant Weight Loss: No     IBW/kg (Dietitian Calculated): 52 kg  Percent of IBW: 136 %  Adjusted Body Weight (kg): 57 kg     Estimated Energy Needs  Total Energy Estimated Needs (kCal):  (2922-4864)  Total Estimated Energy Need per Day (kCal/kg):  (25-30)  Method for Estimating Needs: adj IBW    Estimated Protein Needs  Total Protein Estimated Needs (g):  (57-68)  Total Protein Estimated Needs (g/kg):  (1-1.2)  Method for Estimating Needs: adj IBW    Estimated Fluid Needs  Total Fluid Estimated Needs (mL):  (8198-6366)  Total Fluid Estimated Needs (mL/kg): 1 mL/kg  Method for Estimating Needs: adj IBW     Edema  Edema: none     Nutrition Diagnosis:  Malnutrition Diagnosis  Patient has Malnutrition Diagnosis: No    Patient has Nutrition " Diagnosis: No      Nutrition Interventions/Recommendations:  Food and/or Nutrient Delivery Interventions      Nutrition Monitoring/Evaluation:  Food and Nutrient Related History      Follow Up  Time Spent (min): 30 minutes  Last Date of Nutrition Visit: 11/01/23  Nutrition Follow-Up Needed?: 5-7 days  Follow up Comment: 11/8/23

## 2023-11-01 NOTE — PROGRESS NOTES
"HOSPITALIST  PROGRESS NOTE   Macarena Wilson    :  1955    Medical Record:  [id number]    DATE OF SERVICE: 2023  ADMIT DAY: 9.    Subjective:  Macarena Wilson is a 68 y.o. year-old female who was admitted on 10/23/2023 for Acute hypoxemic respiratory failure due to COVID-19, HUNG, among others.  The patient's labs, imaging studies and vital signs are noted with the case discussed with the nursing staff. The patient was seen and examined and the chart was reviewed. The patient is being seen for management of their BP along with the pt's other medical conditions.  I assumed care of the patient from Dr. Anderson on 10/31.  Today pt still on HFNC, denies any F/C/N/V/Abd pain and she reports \"doing a little better\".    Objective:  Vitals:    23 1351   BP:    Pulse:    Resp:    Temp:    SpO2: 91%       I/O last 3 completed shifts:  In: 490 (6.9 mL/kg) [P.O.:490]  Out: 400 (5.6 mL/kg) [Urine:400 (0.2 mL/kg/hr)]  Weight: 71.1 kg   I/O this shift:  In: -   Out: 1 [Stool:1]  Pulmonary:    General: Female, A&Ox3, is following commands with occasional conversational dyspnea.  HEENT: Normocephalic atraumatic, pupils equally reactive to light and accomodation, extra occular muscles are intact. Neck is supple, trachea is midline without observable bruits.  CVS: Regular rate and rhythm, S1 S2 without any 3 or S4.  Pulmonary: Decreased breath sounds with intermittent rhonchi.  GI: Abdomen is soft, non tender, positive bowel sounds are present.  EXT: B/L LE peripheral edema 1/4 with pulses are palpable throughout.  NEUROLOGY: CN 3-12 grossly intact for known chronic visual impairments.  Muscle strength is 4/5, DTRs are 2/4 throughout. There is no obvious facial asymmetry and no resting tremors.Moves all 4 extremities spontaneously.    LABS:  Results for orders placed or performed during the hospital encounter of 10/23/23 (from the past 24 hour(s))   Transthoracic Echo (TTE) Complete   Result Value Ref Range    LV " A4C EF 78.8    Comprehensive metabolic panel   Result Value Ref Range    Glucose 170 (H) 65 - 99 mg/dL    Sodium 137 133 - 145 mmol/L    Potassium 4.6 3.4 - 5.1 mmol/L    Chloride 102 97 - 107 mmol/L    Bicarbonate 24 24 - 31 mmol/L    Urea Nitrogen 59 (H) 8 - 25 mg/dL    Creatinine 1.50 0.40 - 1.60 mg/dL    eGFR 38 (L) >60 mL/min/1.73m*2    Calcium 8.5 8.5 - 10.4 mg/dL    Albumin 3.0 (L) 3.5 - 5.0 g/dL    Alkaline Phosphatase 106 35 - 125 U/L    Total Protein 5.5 (L) 5.9 - 7.9 g/dL    AST 16 5 - 40 U/L    Bilirubin, Total 0.3 0.1 - 1.2 mg/dL    ALT 22 5 - 40 U/L    Anion Gap 11 <=19 mmol/L   POCT GLUCOSE   Result Value Ref Range    POCT Glucose 169 (H) 74 - 99 mg/dL   CBC and Auto Differential   Result Value Ref Range    WBC 18.3 (H) 4.4 - 11.3 x10*3/uL    nRBC 0.0 0.0 - 0.0 /100 WBCs    RBC 3.77 (L) 4.00 - 5.20 x10*6/uL    Hemoglobin 10.6 (L) 12.0 - 16.0 g/dL    Hematocrit 31.6 (L) 36.0 - 46.0 %    MCV 84 80 - 100 fL    MCH 28.1 26.0 - 34.0 pg    MCHC 33.5 32.0 - 36.0 g/dL    RDW 14.7 (H) 11.5 - 14.5 %    Platelets 137 (L) 150 - 450 x10*3/uL    MPV 11.0 7.5 - 11.5 fL    Neutrophils % 91.1 40.0 - 80.0 %    Immature Granulocytes %, Automated 3.9 (H) 0.0 - 0.9 %    Lymphocytes % 2.2 13.0 - 44.0 %    Monocytes % 2.7 2.0 - 10.0 %    Eosinophils % 0.0 0.0 - 6.0 %    Basophils % 0.1 0.0 - 2.0 %    Neutrophils Absolute 16.67 (H) 1.20 - 7.70 x10*3/uL    Immature Granulocytes Absolute, Automated 0.71 (H) 0.00 - 0.70 x10*3/uL    Lymphocytes Absolute 0.40 (L) 1.20 - 4.80 x10*3/uL    Monocytes Absolute 0.49 0.10 - 1.00 x10*3/uL    Eosinophils Absolute 0.00 0.00 - 0.70 x10*3/uL    Basophils Absolute 0.02 0.00 - 0.10 x10*3/uL   POCT GLUCOSE   Result Value Ref Range    POCT Glucose 177 (H) 74 - 99 mg/dL        No results found for the last 90 days.         MEDICATIONS:  Medication Documentation Review Audit       Reviewed by Diane Peterson RN (Registered Nurse) on 10/23/23 at 1618      Medication Order Taking? Sig Documenting  Provider Last Dose Status   acetaminophen (TylenoL) 325 mg tablet [id number] No Take 1 tablet (325 mg) by mouth every 4 hours. TAKE PER DIRECTED Historical Provider, MD Past Month Active   aspirin-acetaminophen-caffeine (Excedrin Extra Strength) 250-250-65 mg tablet [id number] No Take by mouth. TAKE PER DIRECTED Historical Provider, MD More than a month Active   betamethasone dipropionate 0.05 % cream [id number] No Apply topically 2 times a day. APPLY PER DIRECTED Historical Provider, MD More than a month Active   dexAMETHasone (Decadron) 6 mg tablet [id number] No Take 1 tablet (6 mg) by mouth once daily for 7 days.   Patient not taking: Reported on 10/23/2023    Shirley Bills MD More than a month Active   dupilumab (Dupixent Pen) 300 mg/2 mL injection [id number] No Inject 2 mL (300 mg) under the skin every 14 (fourteen) days. Historical Provider, MD Past Week Active   estradiol (Estrace) 0.01 % (0.1 mg/gram) vaginal cream [id number] No Insert into the vagina. PER DIRECTED Historical Provider, MD More than a month Active   fexofenadine-pseudoephedrine (Allegra-D 12 Hour)  mg 12 hr tablet [id number] No Take 1 tablet by mouth. TAKE PER DIRECTED Historical Provider, MD More than a month Active   ivermectin (Soolantra) 1 % cream [id number] No Apply topically. PER DIRECTED Historical Provider, MD More than a month Active   losartan (Cozaar) 100 mg tablet [id number] No Take 1 tablet (100 mg) by mouth once every 24 hours. TAKE PER DIRECTED Historical Provider, MD 10/23/2023 Active   magnesium hydroxide (Milk of Magnesia) 2,400 mg/10 mL suspension suspension [id number] No Take 5 mL by mouth. TAKE PER DIRECTED Historical Provider, MD More than a month Active   naproxen sodium (Aleve) 220 mg tablet [id number] No Take 1 tablet (220 mg) by mouth. TAKE PER DIRECTED Historical Provider, MD More than a month Active   nitrofurantoin, macrocrystal-monohydrate, (Macrobid) 100 mg capsule [id number] No Take 1 capsule (100 mg) by mouth. PER  DIRECTED Historical Provider, MD More than a month Active   tralokinumab-ldrm (ADBRY SUBQ) [id number] No Inject under the skin. PER DIRECTED Historical Provider, MD More than a month Active   traMADol (Ultram) 50 mg tablet [id number] No Take 1 tablet (50 mg) by mouth twice a day. TAKE PER DIRECTED Historical Provider, MD More than a month Active   triamcinolone (Kenalog) 0.1 % cream [id number] No Apply topically 2 times a day. APPLY PER DIRECTED Historical Provider, MD More than a month Active                   PERTINENT IMAGING STUDIES/PROCEDURES:    CXR 10/31/2023 IMPRESSION:  No significant interval change in diffuse bilateral airspace  opacities. No pleural effusion or significant interval change.    Venous Doppler legs 10/28/2023 IMPRESSION:  No DVT    VQ scan 10/23/2023 IMPRESSION:  Normal perfusion lung scan.    CT chest 10/24/2023 IMPRESSION:  Diffuse, patchy ground-glass opacities bilaterally suggesting  atypical pneumonia such as COVID.    2D echo 10/31/2023 CONCLUSIONS:   1. Left ventricular systolic function is normal with a 60-65% estimated ejection fraction.   2. Spectral Doppler shows an impaired relaxation pattern of left ventricular diastolic filling.    Code Status:Full Code    Assessment:  Macarena Wilson is a 68 y.o. year-old female @ on admission for 9    Acute hypoxemic respiratory failure  Acute COVID-19 pneumonia sepsis  HUNG on CKD 4  Acute elevated BNP due to above  Acute leukocytosis exacerbated by steroids  Acute on chronic anemia  Acute transient thrombocytopenia  Acute hyperglycemia exacerbated by steroids  Essential hypertension  Deconditioning and impaired mobility    PULMONARY/ID:  -Pulmonary on the case and repeat chest x-ray results reviewed on 10/31. Still on HFNC   -Venous Doppler of legs no DVT (10/28), VQ scan was of low probability for PE on hospital presentation, no CTPE due to renal dysfunction  -Completed 5 doses of  Remdesivir on 10/28  - Remains on IV Decadron, Decadron  dose has been decreased, started 10/24, 10 day goal  -She is status post baricitinib 10/25  -Continue pulmonary toilet with incentive spirometer positional changes, patient re -educated on prone positioning as well, not able to tolerate proning unfortunately  -Per ID discontinued  empiric pneumonia treatment with vancomycin and Zosyn  - C diff is negative (10/26)     CARDIOLOGY:  -BP is being watched closely after I started as needed IV hydralazine on 10/31.  The patient had been given a few doses of of Lasix with repeat labs ordered in the am.  2D echo on 10/31 showed EF of 60-65%    ENDOCRINOLOGY:     Accu-Cheks are being checked frequently as patient on Decadron and continue ISS.     GI:   -On Protonix     HEMATOLOGY:  -CBC noted especially the platelets and will recheck in am with no need for transfusions unless pt develops severe bleeding, platelet counts less than 10,000 or Hgb becomes < 7.     NEPHROLOGY:   -Nephrology on the case and is managing the pt's renal function, diuretics, as needed fluids as well as the electrolytes. Continue bicarb and patient previously on Lasix with nephrology guiding diuresis given CKD 3.The patient's Is and Os are being monitored and the patient continues to be off nephrotoxic agents as much as possible.      MUSCULOSKELETAL:     1- generalized weakness:  PT&OT     OTHER:    -The patient continues to be on the rest of their chronic home medications for osteoarthritis, etc.  Will update family once available.   -Palliative care input is appreciated  -Discharge planner is on the case with PT and OT following as well.    Bowel regimen: Colace     *Pain regimen:  Tylenol     *DVT prophylaxis:  Lovenox     *PUD PROPHYLAXIS:  Protonix     Time spent managing patient's care is greater than 36 minutes with approximately 50% or more spent in counseling and coordination of care.    Evan WIGGINS    Portions of this Progress note were dictated using MModal and reviewed . While  every effort was made to correct mistranscriptions, minor grammatical or typographical errors may be present from machine dictation

## 2023-11-01 NOTE — PROGRESS NOTES
Macarena Wilson is a 68 y.o. female on day 9 of admission presenting with Acute respiratory failure with hypoxemia (CMS/HCC).    Subjective   Remains afebrile  Reports some SOB but reports improvement. Reports occasional cough. Denies chest pain  Denies nausea, vomiting, diarrhea  On HFNC at 80% FiO2 during my evaluation        Objective   Range of Vitals (last 24 hours)  Heart Rate:  []   Temp:  [35.6 °C (96.1 °F)-36.6 °C (97.9 °F)]   Resp:  [17-29]   BP: (113-159)/(62-95)   SpO2:  [86 %-100 %]   Daily Weight  10/31/23 : 71.1 kg (156 lb 12 oz)    Body mass index is 27.77 kg/m².    Physical Exam  Constitutional:       Appearance: Normal appearance.  No acute distress, ill-appearing  HENT:      Head: Normocephalic and atraumatic.      Nose: Nose normal.      Mouth/Throat:      Mouth: Mucous membranes are moist.      Pharynx: Oropharynx is clear.   Eyes:      Extraocular Movements: Extraocular movements intact.      Conjunctiva/sclera: Conjunctivae normal.   Cardiovascular:      Rate and Rhythm: Normal rate and regular rhythm.   Pulmonary:      Effort: Pulmonary effort is normal.      Breath sounds: Diminished  Abdominal:      General: Bowel sounds are normal.      Palpations: Abdomen is soft.   Musculoskeletal:         General: Normal range of motion.      Cervical back: Normal range of motion and neck supple.   Skin:     General: Skin is warm and dry.   Neurological:      General: No focal deficit present.      Mental Status: She is alert and oriented to person, place, and time. Mental status is at baseline.   Psychiatric:         Mood and Affect: Mood normal.         Behavior: Behavior normal.     Antibiotics    sodium chloride 0.9 % bolus 500 mL  doxycycline (Vibramycin) in dextrose 5 % in water (D5W) 100 mL  mg  cefTRIAXone (Rocephin) IVPB 1 g  Tc-99m-albumin (Draximage MAA) injection 4.2 millicurie  dexAMETHasone (Decadron) injection 8 mg  sodium chloride 0.9 % bolus 1,000 mL  enoxaparin (Lovenox)  syringe 30 mg  sodium chloride 0.9% infusion  acetaminophen (Tylenol) tablet 650 mg  acetaminophen (Tylenol) oral liquid 650 mg  acetaminophen (Tylenol) suppository 650 mg  ondansetron ODT (Zofran-ODT) disintegrating tablet 4 mg  ondansetron (Zofran) injection 4 mg  polyethylene glycol (Glycolax, Miralax) packet 17 g  bisacodyl (Dulcolax) EC tablet 10 mg  guaiFENesin (Mucinex) 12 hr tablet 600 mg  oxygen (O2) therapy  piperacillin-tazobactam-dextrose (Zosyn) IV 2.25 g  ipratropium-albuteroL (Duo-Neb) 0.5-2.5 mg/3 mL nebulizer solution 3 mL  dexAMETHasone (Decadron) injection 8 mg  dexAMETHasone (Decadron) injection 6 mg  dextrose 50 % injection 25 g  glucagon (Glucagen) injection 1 mg  dextrose 10 % in water (D10W) infusion  insulin lispro (HumaLOG) injection 0-5 Units  vancomycin-diluent combo no.1 (Xellia) IVPB 1,250 mg  ipratropium-albuteroL (Duo-Neb) 0.5-2.5 mg/3 mL nebulizer solution 3 mL  ipratropium-albuteroL (Duo-Neb) 0.5-2.5 mg/3 mL nebulizer solution 3 mL  oxygen (O2) therapy  remdesivir (Veklury) 200 mg in sodium chloride 0.9% 250 mL IV  remdesivir (Veklury) 100 mg in sodium chloride 0.9% 250 mL IV  oxygen (O2) therapy  pantoprazole (ProtoNix) injection 40 mg  remdesivir (Veklury) 200 mg in sodium chloride 0.9% 250 mL IV  remdesivir (Veklury) 100 mg in sodium chloride 0.9% 250 mL IV  tocilizumab (Actemra) 600 mg in sodium chloride 0.9% 70 mL IV  furosemide (Lasix) tablet 10 mg  melatonin tablet 3 mg  benzonatate (Tessalon) capsule 200 mg  polyethylene glycol (Glycolax, Miralax) packet 17 g  fluticasone (Flonase) nasal spray 1 spray  sodium chloride (Ocean) 0.65 % nasal spray 1 spray  sodium bicarbonate tablet 650 mg  vancomycin-diluent combo no.1 (Xellia) IVPB 1,250 mg  guaiFENesin (Robitussin) 100 mg/5 mL syrup 200 mg  vancomycin-diluent combo no.1 (Xellia) IVPB 750 mg  pantoprazole (ProtoNix) EC tablet 40 mg  oxygen (O2) therapy  furosemide (Lasix) injection 20 mg  oxygen (O2) therapy  heparin (porcine)  injection 5,750 Units  heparin 25,000 Units in dextrose 5% 250 mL (100 Units/mL) infusion (premix)  heparin (porcine) injection 3,000-6,000 Units  enoxaparin (Lovenox) syringe 30 mg  melatonin tablet 3 mg  oxygen (O2) therapy  furosemide (Lasix) injection 40 mg  acetaminophen-caffeine 500-65 mg tablet 2 tablet  aspirin-acetaminophen-caffeine (Excedrin Migraine) 250-250-65 mg per tablet 2 tablet  perflutren lipid microspheres (Definity) injection 0.5-10 mL of dilution  sulfur hexafluoride microsphr (Lumason) injection 24.28 mg  perflutren protein A microsphere (Optison) injection 0.5 mL  hydrALAZINE (Apresoline) injection 10 mg  docusate sodium (Colace) capsule 100 mg      Relevant Results  Labs  Results from last 72 hours   Lab Units 11/01/23  0914 10/31/23  1004 10/30/23  2036   WBC AUTO x10*3/uL 18.3* 20.8* 22.0*   HEMOGLOBIN g/dL 10.6* 11.1* 10.6*   HEMATOCRIT % 31.6* 34.3* 32.7*   PLATELETS AUTO x10*3/uL 137* 152 148*   NEUTROS PCT AUTO % 91.1  --   --    LYMPHS PCT AUTO % 2.2  --   --    MONOS PCT AUTO % 2.7  --   --    EOS PCT AUTO % 0.0  --   --      Results from last 72 hours   Lab Units 11/01/23  0643 10/31/23  0554 10/30/23  0551   SODIUM mmol/L 137 138 140   POTASSIUM mmol/L 4.6 4.2 4.0   CHLORIDE mmol/L 102 103 105   CO2 mmol/L 24 23* 21*   BUN mg/dL 59* 57* 51*   CREATININE mg/dL 1.50 1.60 1.40   GLUCOSE mg/dL 170* 146* 151*   CALCIUM mg/dL 8.5 8.6 8.5   ANION GAP mmol/L 11 12 14   EGFR mL/min/1.73m*2 38* 35* 41*   PHOSPHORUS mg/dL  --  4.0  --      Results from last 72 hours   Lab Units 11/01/23  0643 10/31/23  0554 10/30/23  0551   ALK PHOS U/L 106 122 133*   BILIRUBIN TOTAL mg/dL 0.3 0.3 0.2   PROTEIN TOTAL g/dL 5.5* 5.7* 5.7*   ALT U/L 22 25 28   AST U/L 16 20 29   ALBUMIN g/dL 3.0* 2.9* 2.9*     Estimated Creatinine Clearance: 33.5 mL/min (by C-G formula based on SCr of 1.5 mg/dL).  C-Reactive Protein   Date Value Ref Range Status   10/31/2023 0.50 0.00 - 2.00 mg/dL Final   10/26/2023 3.90 (H) 0.00  - 2.00 mg/dL Final   10/25/2023 6.10 (H) 0.00 - 2.00 mg/dL Final     Microbiology  C.diff negative  Stool pathogen panel negative  MRSA PCR negative  Imaging  Transthoracic Echo (TTE) Complete    Result Date: 11/1/2023            [facility] [address] , [place] [address]             Phone [phone] TRANSTHORACIC ECHOCARDIOGRAM REPORT  Patient Name:      KARLA AGUILERA Reading Physician:   [id number] Riccardo Bagley MD Study Date:        10/31/2023         Ordering Provider:   [id number] CUCA SAM MRN/PID:           [id number]           Fellow: Accession#:        [id number]       Nurse: Date of Birth/Age: 1955 / 68      Sonographer:         Temi Knig RCS                    years Gender:            F                  Additional Staff: Height:            160.02 cm          Admit Date:          10/31/2023 Weight:            70.76 kg           Admission Status:    Inpatient - Routine BSA:               1.74 m2            Department Location: Blood Pressure: 135 /85 mmHg Study Type:    TRANSTHORACIC ECHO (TTE) COMPLETE Diagnosis/ICD: Acute respiratory failure with hypoxia-J96.01 Indication:    Acute Respiratory failure with hypoxemia CPT Codes:     Echo Complete w Full [id number] Patient History: Pertinent History: HTN Prediabetes Chronic Kidney Disease. Study Detail: The following Echo studies were performed: 2D, M-Mode, Doppler and               color flow. Technically challenging study due to poor acoustic               windows, prominent lung artifact and patient lying in supine               position. Lumason used as a contrast agent for endocardial border               definition. Unable to obtain subcostal and suprasternal notch               view.  PHYSICIAN INTERPRETATION: Left Ventricle: Left ventricular systolic function is normal, with an estimated ejection fraction of 60-65%. There are no regional wall motion abnormalities. The left ventricular cavity size is normal.  Spectral Doppler shows an impaired relaxation pattern of left ventricular diastolic filling. Left Atrium: The left atrium is normal in size. Right Ventricle: The right ventricle is normal in size. There is normal right ventricular global systolic function. Right Atrium: The right atrium is normal in size. Aortic Valve: The aortic valve is trileaflet. There is trivial aortic valve regurgitation. The peak instantaneous gradient of the aortic valve is 12.0 mmHg. Mitral Valve: The mitral valve is normal in structure. There is trace mitral valve regurgitation. Tricuspid Valve: The tricuspid valve is structurally normal. There is trace tricuspid regurgitation. Pulmonic Valve: The pulmonic valve is not well visualized. The pulmonic valve regurgitation was not well visualized. Pericardium: There is no pericardial effusion noted. Aorta: The aortic root was not well visualized.  CONCLUSIONS:  1. Left ventricular systolic function is normal with a 60-65% estimated ejection fraction.  2. Spectral Doppler shows an impaired relaxation pattern of left ventricular diastolic filling. QUANTITATIVE DATA SUMMARY: 2D MEASUREMENTS:                          Normal Ranges: LAs:           2.60 cm   (2.7-4.0cm) IVSd:          0.88 cm   (0.6-1.1cm) LVPWd:         0.85 cm   (0.6-1.1cm) LVIDd:         3.51 cm   (3.9-5.9cm) LVIDs:         2.55 cm LV Mass Index: 48.5 g/m2 LV % FS        27.4 % LA VOLUME:                               Normal Ranges: LA Vol A2C:        73.7 ml LA Vol Index A2C:  42.4 ml/m2 LA Area A2C:       21.6 cm2 LA Major Axis A2C: 5.4 cm LA Volume Index:   33.2 ml/m2 LA Vol A2C:        70.6 ml RA VOLUME BY A/L METHOD:                              Normal Ranges: RA Vol A2C:        1.3 ml RA Vol Index A2C:  0.8 ml/m2 RA Area A2C:       5.0 cm2 RA Major Axis A2C: 15.7 cm M-MODE MEASUREMENTS:                  Normal Ranges: Ao Root: 2.70 cm (2.0-3.7cm) LAs:     2.60 cm (2.7-4.0cm) LV SYSTOLIC FUNCTION BY 2D PLANIMETRY (MOD):                      Normal Ranges: EF-A4C View: 78.8 % (>=55%) EF-A2C View: 73.2 % EF-Biplane:  75.3 % LV DIASTOLIC FUNCTION:                     Normal Ranges: MV Peak E: 0.82 m/s (0.7-1.2 m/s) MV Peak A: 1.03 m/s (0.42-0.7 m/s) E/A Ratio: 0.80     (1.0-2.2) MITRAL VALVE:                 Normal Ranges: MV DT: 193 msec (150-240msec) AORTIC VALVE:                          Normal Ranges: AoV Vmax:      1.73 m/s  (<=1.7m/s) AoV Peak P.0 mmHg (<20mmHg) LVOT Max Garrett:  1.37 m/s  (<=1.1m/s) LVOT VTI:      28.40 cm LVOT Diameter: 2.00 cm   (1.8-2.4cm) AoV Area,Vmax: 2.49 cm2  (2.5-4.5cm2)  RIGHT VENTRICLE: RV Basal 3.35 cm RV Mid   2.73 cm RV Major 5.6 cm TRICUSPID VALVE/RVSP:                             Normal Ranges: Peak TR Velocity: 1.97 m/s RV Syst Pressure: 18.5 mmHg (< 30mmHg) PULMONIC VALVE:                         Normal Ranges: PV Accel Time: 74 msec  (>120ms) PV Max Garrett:    1.1 m/s  (0.6-0.9m/s) PV Max P.5 mmHg  [id number] Riccardo Bagley MD Electronically signed on 2023 at 9:53:29 AM  ** Final **     XR chest 1 view    Result Date: 10/31/2023  Interpreted By:  Nirmal Mcmillan, STUDY: XR CHEST 1 VIEW; 10/31/2023 9:48 am   INDICATION: Shortness of breath   COMPARISON: 10/29/2023   ACCESSION NUMBER(S): [id number]   ORDERING CLINICIAN: CUCA SAM   TECHNIQUE: 1 view of the chest was performed.   FINDINGS: No significant interval change in diffuse bilateral airspace opacities involving both lungs more prominent in the mid and lower lungs. Findings do not appear significantly changed. No pleural effusion. No pneumothorax.  The cardiomediastinal silhouette is within normal limits.       No significant interval change in diffuse bilateral airspace opacities. No pleural effusion or significant interval change.   Signed by: Nirmal Mcmillan 10/31/2023 10:45 AM Dictation workstation:   [id number]     Assessment/Plan   Acute hypoxic respiratory failure, requiring high flow  Severe coronavirus pneumonitis  Stage  IV chronic kidney disease  Leukocytosis-resolving  Diarrhea-rule out infectious etiology-resolved     Continue dexamethasone  Remdesivir completed  Monitor renal and hematologic parameters  S/p Actemra-10/24/2023  Supportive care  Monitor temperature and WBC  Supplemental oxygen as needed, requiring high flow  Pulmonary follow-up      Shivani Fish, APRN-CNP

## 2023-11-01 NOTE — CARE PLAN
Problem: Respiratory  Goal: Minimal/no exertional discomfort or dyspnea this shift  Outcome: Progressing  Goal: No signs of respiratory distress (eg. Use of accessory muscles. Peds grunting)  Outcome: Progressing  Goal: Minimize anxiety/maximize coping throughout shift  Outcome: Progressing

## 2023-11-01 NOTE — PROGRESS NOTES
Critical Care Progress Note    Macarena Wilson is a 68 y.o. female on day 9 of admission presenting with Acute respiratory failure with hypoxemia (CMS/HCC).    Subjective   Follow up hypoxia / covid  On HFNC  Objective   Vital Signs      11/1/2023     4:00 AM 11/1/2023     5:00 AM 11/1/2023     6:00 AM 11/1/2023     7:00 AM 11/1/2023     8:00 AM 11/1/2023     8:14 AM 11/1/2023     9:00 AM   Vitals   Systolic 148 153 151 151 139 139 114   Diastolic 71 78 79 80 72 72 65   Heart Rate 83 67 65 72 115 126 109   Temp      35.6 °C (96.1 °F)    Resp 23 29 24 19 28 25 27       Oxygen Therapy  SpO2: 95 %  Medical Gas Therapy: Supplemental oxygen  O2 Delivery Method: Other (Comment) (VAPOTHERM)    FiO2 (%):  [80 %-90 %] 80 %    Intake/Output previous 24 hours:  No intake or output data in the 24 hours ending 11/01/23 1028    Physical Exam  Direct physical exam deferred  Ill-appearing again but holding her own  Remains on Vapotherm  Sinus on the monitor  Does appear to tachypneic observation  Remains alert, comprehends all questions were reports    Lines and Tubes:  Peripheral IV 10/23/23 20 G (Active)   Placement Date/Time: 10/23/23 1624   Size (Gauge): 20 G   Number of days: 5       Peripheral IV 10/27/23 20 G Distal;Left;Anterior Forearm (Active)   Placement Date/Time: 10/27/23 1900   Earliest Known Present: 10/27/23  Size (Gauge): 20 G  Orientation: Distal;Left;Anterior  Location: Forearm   Number of days: 0       Peripheral IV 10/23/23 20 G Left;Proximal;Anterior Forearm (Active)   Placement Date/Time: (?) 10/23/23 (?) 0000   Earliest Known Present: (?) 10/23/23  Size (Gauge): (?) 20 G  Orientation: (?) Left;Proximal;Anterior  Location: (?) Forearm   Number of days: 5         Scheduled medications  dexAMETHasone, 6 mg, intravenous, q24h  enoxaparin, 30 mg, subcutaneous, Daily  fluticasone, 1 spray, Each Nostril, BID  insulin lispro, 0-5 Units, subcutaneous, TID with meals  ipratropium-albuteroL, 3 mL, nebulization,  q6h while awake  melatonin, 3 mg, oral, Nightly  oxygen, , inhalation, q4h  pantoprazole, 40 mg, oral, Daily before breakfast  perflutren lipid microspheres, 0.5-10 mL of dilution, intravenous, Once in imaging  perflutren protein A microsphere, 0.5 mL, intravenous, Once in imaging  sodium bicarbonate, 650 mg, oral, TID      Continuous medications     PRN medications  PRN medications: acetaminophen **OR** acetaminophen **OR** acetaminophen, aspirin-acetaminophen-caffeine, benzonatate, bisacodyl, dextrose 10 % in water (D10W), dextrose, docusate sodium, glucagon, guaiFENesin, hydrALAZINE, ipratropium-albuteroL, melatonin, ondansetron ODT **OR** ondansetron, [Held by provider] oxygen, polyethylene glycol, sodium chloride    Relevant Results  Results from last 7 days   Lab Units 11/01/23  0914 10/31/23  1004 10/30/23  2036   WBC AUTO x10*3/uL 18.3* 20.8* 22.0*   HEMOGLOBIN g/dL 10.6* 11.1* 10.6*   HEMATOCRIT % 31.6* 34.3* 32.7*   PLATELETS AUTO x10*3/uL 137* 152 148*      Results from last 7 days   Lab Units 11/01/23  0643 10/31/23  0554 10/30/23  0551   SODIUM mmol/L 137 138 140   POTASSIUM mmol/L 4.6 4.2 4.0   CHLORIDE mmol/L 102 103 105   CO2 mmol/L 24 23* 21*   BUN mg/dL 59* 57* 51*   CREATININE mg/dL 1.50 1.60 1.40   GLUCOSE mg/dL 170* 146* 151*   CALCIUM mg/dL 8.5 8.6 8.5            XR chest 1 view 10/27/2023    Narrative  Interpreted By:  Nirmal Mcmillan,  STUDY:  XR CHEST 1 VIEW; 10/27/2023 10:06 am    INDICATION:  Signs/Symptoms:follow up hypoxemia.    COMPARISON:  10/23/2023    ACCESSION NUMBER(S):  [id number]    ORDERING CLINICIAN:  SUSANNE FRANCOIS    TECHNIQUE:  1 view of the chest was performed.    FINDINGS:  There is diffuse ground-glass opacity bilaterally consistent with  history of COVID. No lobar or large consolidation however the  ground-glass opacities are prominent but stable from the previous  examination. No pleural effusion. No pneumothorax.  The  cardiomediastinal silhouette is within normal  limits.    Impression  No significant change. Prominent ground-glass opacities bilaterally,  however, not significantly changed.    Signed by: Nirmal Mcmillan 10/27/2023 10:24 PM  Dictation workstation:   [id number]      Patient Active Problem List   Diagnosis    Abnormal mammogram    Acute pain of left shoulder    Allergic rhinitis    Benign essential HTN    Calculus of kidney    Staghorn renal calculus    Diverticular disease    Gammopathy    Gross hematuria    Hematuria, unspecified    Impaired fasting glucose    Nodule of kidney    Obesity    Post-menopausal bleeding    Prediabetes    Acute cystitis with hematuria    Stage 4 chronic kidney disease (CMS/HCC)    Urgency incontinence    Bladder fistula    Atrophic kidney    COVID-19    Acute respiratory failure with hypoxemia (CMS/HCC)    Pneumonia of both lower lobes due to infectious organism     Assessment/Plan    Acute respiratory failure with hypoxemia (CMS/HCC)  Active Problems:    COVID-19    Benign essential HTN    Stage 4 chronic kidney disease (CMS/HCC)    Persistent hypoxia  VQ was low probability several days ago  Duplex studies negative  Empiric heparin drip was started but stopped due to significant hematuria.  CXR stable ARDS      HFNC   Trial of  80% and 40 L  Very slow to wean      HUNG  Cr 1.5  Lasix on hold    Hold on NIPPV - patient does not feel she will tolerate    Remdesivir completed  Decadron  Aerosol Rx    Very slow to progress. Continue to eval daily    Davin Avendaño MD  [facility]       This critically ill patient continues to be at-risk for deterioration / failure due to the above mentioned dysfunctional unstable organ systems.   Critical care time is spent at bedside includes review of diagnostic tests, labs, and radiographs, serial assessments and management of hemodynamics, respiratory status, and coordination of care with different members of interdisciplinary team  Assessment, impression and plans are reflected  in the note above as well as the orders.     Critical concerns addressed:  Acute respiratory failure  Hypoxia  COVID-19 pneumonitis  Elevated D-dimer rule out VTE  Time Spent in critical Care, exclusive of procedures : 20 mins.     Disclaimer: Parts of this chart were dictated with voice recognition software. Please excuse any errors of grammar, spelling, or transcription which are not corrected. Please contact me with any questions regarding documentation.

## 2023-11-01 NOTE — CARE PLAN
The patient's goals for the shift include To get rest    The clinical goals for the shift include Wean oxygen as tolerated    Over the shift, the patient did not make progress toward the following goals. Barriers to progression include respiratory distress from COVID. Recommendations to address these barriers include Increase activity as tolerated.

## 2023-11-02 NOTE — PROGRESS NOTES
Macarena Wilson is a 68 y.o. female on day 10 of admission presenting with Acute respiratory failure with hypoxemia (CMS/HCC).    Subjective   Remains afebrile, no chills  Reports overall improvement  Reports dyspnea with exertion  Reports occasional cough.   Denies chest pain  Denies nausea, vomiting, diarrhea  On HFNC at 80% FiO2       Objective   Range of Vitals (last 24 hours)  Heart Rate:  []   Temp:  [35.3 °C (95.5 °F)-36.5 °C (97.7 °F)]   Resp:  [17-30]   BP: (125-182)/()   Weight:  [71.1 kg (156 lb 12 oz)]   SpO2:  [89 %-97 %]   Daily Weight  11/02/23 : 71.1 kg (156 lb 12 oz)    Body mass index is 27.77 kg/m².    Physical Exam  Constitutional:       Appearance: Normal appearance.  No acute distress, ill-appearing  HENT:      Head: Normocephalic and atraumatic.      Nose: Nose normal.      Mouth/Throat:      Mouth: Mucous membranes are moist.      Pharynx: Oropharynx is clear.   Eyes:      Extraocular Movements: Extraocular movements intact.      Conjunctiva/sclera: Conjunctivae normal.   Cardiovascular:      Rate and Rhythm: Normal rate and regular rhythm.   Pulmonary:      Effort: Pulmonary effort is normal.      Breath sounds: Diminished  Abdominal:      General: Bowel sounds are normal.      Palpations: Abdomen is soft.   Musculoskeletal:         General: Normal range of motion.      Cervical back: Normal range of motion and neck supple.   Skin:     General: Skin is warm and dry.   Neurological:      General: No focal deficit present.      Mental Status: She is alert and oriented to person, place, and time. Mental status is at baseline.   Psychiatric:         Mood and Affect: Mood normal.         Behavior: Behavior normal.     Antibiotics    sodium chloride 0.9 % bolus 500 mL  doxycycline (Vibramycin) in dextrose 5 % in water (D5W) 100 mL  mg  cefTRIAXone (Rocephin) IVPB 1 g  Tc-99m-albumin (Draximage MAA) injection 4.2 millicurie  dexAMETHasone (Decadron) injection 8 mg  sodium  chloride 0.9 % bolus 1,000 mL  enoxaparin (Lovenox) syringe 30 mg  sodium chloride 0.9% infusion  acetaminophen (Tylenol) tablet 650 mg  acetaminophen (Tylenol) oral liquid 650 mg  acetaminophen (Tylenol) suppository 650 mg  ondansetron ODT (Zofran-ODT) disintegrating tablet 4 mg  ondansetron (Zofran) injection 4 mg  polyethylene glycol (Glycolax, Miralax) packet 17 g  bisacodyl (Dulcolax) EC tablet 10 mg  guaiFENesin (Mucinex) 12 hr tablet 600 mg  oxygen (O2) therapy  piperacillin-tazobactam-dextrose (Zosyn) IV 2.25 g  ipratropium-albuteroL (Duo-Neb) 0.5-2.5 mg/3 mL nebulizer solution 3 mL  dexAMETHasone (Decadron) injection 8 mg  dexAMETHasone (Decadron) injection 6 mg  dextrose 50 % injection 25 g  glucagon (Glucagen) injection 1 mg  dextrose 10 % in water (D10W) infusion  insulin lispro (HumaLOG) injection 0-5 Units  vancomycin-diluent combo no.1 (Xellia) IVPB 1,250 mg  ipratropium-albuteroL (Duo-Neb) 0.5-2.5 mg/3 mL nebulizer solution 3 mL  ipratropium-albuteroL (Duo-Neb) 0.5-2.5 mg/3 mL nebulizer solution 3 mL  oxygen (O2) therapy  remdesivir (Veklury) 200 mg in sodium chloride 0.9% 250 mL IV  remdesivir (Veklury) 100 mg in sodium chloride 0.9% 250 mL IV  oxygen (O2) therapy  pantoprazole (ProtoNix) injection 40 mg  remdesivir (Veklury) 200 mg in sodium chloride 0.9% 250 mL IV  remdesivir (Veklury) 100 mg in sodium chloride 0.9% 250 mL IV  tocilizumab (Actemra) 600 mg in sodium chloride 0.9% 70 mL IV  furosemide (Lasix) tablet 10 mg  melatonin tablet 3 mg  benzonatate (Tessalon) capsule 200 mg  polyethylene glycol (Glycolax, Miralax) packet 17 g  fluticasone (Flonase) nasal spray 1 spray  sodium chloride (Ocean) 0.65 % nasal spray 1 spray  sodium bicarbonate tablet 650 mg  vancomycin-diluent combo no.1 (Xellia) IVPB 1,250 mg  guaiFENesin (Robitussin) 100 mg/5 mL syrup 200 mg  vancomycin-diluent combo no.1 (Xellia) IVPB 750 mg  pantoprazole (ProtoNix) EC tablet 40 mg  oxygen (O2) therapy  furosemide (Lasix)  injection 20 mg  oxygen (O2) therapy  heparin (porcine) injection 5,750 Units  heparin 25,000 Units in dextrose 5% 250 mL (100 Units/mL) infusion (premix)  heparin (porcine) injection 3,000-6,000 Units  enoxaparin (Lovenox) syringe 30 mg  melatonin tablet 3 mg  oxygen (O2) therapy  furosemide (Lasix) injection 40 mg  acetaminophen-caffeine 500-65 mg tablet 2 tablet  aspirin-acetaminophen-caffeine (Excedrin Migraine) 250-250-65 mg per tablet 2 tablet  perflutren lipid microspheres (Definity) injection 0.5-10 mL of dilution  sulfur hexafluoride microsphr (Lumason) injection 24.28 mg  perflutren protein A microsphere (Optison) injection 0.5 mL  hydrALAZINE (Apresoline) injection 10 mg  docusate sodium (Colace) capsule 100 mg      Relevant Results  Labs  Results from last 72 hours   Lab Units 11/02/23  0617 11/01/23  0914 10/31/23  1004   WBC AUTO x10*3/uL 21.2* 18.3* 20.8*   HEMOGLOBIN g/dL 11.0* 10.6* 11.1*   HEMATOCRIT % 34.1* 31.6* 34.3*   PLATELETS AUTO x10*3/uL 168 137* 152   NEUTROS PCT AUTO %  --  91.1  --    LYMPHS PCT AUTO %  --  2.2  --    MONOS PCT AUTO %  --  2.7  --    EOS PCT AUTO %  --  0.0  --        Results from last 72 hours   Lab Units 11/02/23 0617 11/01/23  0643 10/31/23  0554   SODIUM mmol/L 139 137 138   POTASSIUM mmol/L 4.8 4.6 4.2   CHLORIDE mmol/L 102 102 103   CO2 mmol/L 24 24 23*   BUN mg/dL 59* 59* 57*   CREATININE mg/dL 1.40 1.50 1.60   GLUCOSE mg/dL 203* 170* 146*   CALCIUM mg/dL 8.6 8.5 8.6   ANION GAP mmol/L 13 11 12   EGFR mL/min/1.73m*2 41* 38* 35*   PHOSPHORUS mg/dL  --   --  4.0       Results from last 72 hours   Lab Units 11/02/23  0617 11/01/23  0643 10/31/23  0554   ALK PHOS U/L 114 106 122   BILIRUBIN TOTAL mg/dL 0.3 0.3 0.3   PROTEIN TOTAL g/dL 6.0 5.5* 5.7*   ALT U/L 21 22 25   AST U/L 15 16 20   ALBUMIN g/dL 3.2* 3.0* 2.9*       Estimated Creatinine Clearance: 35.9 mL/min (by C-G formula based on SCr of 1.4 mg/dL).  C-Reactive Protein   Date Value Ref Range Status    10/31/2023 0.50 0.00 - 2.00 mg/dL Final   10/26/2023 3.90 (H) 0.00 - 2.00 mg/dL Final   10/25/2023 6.10 (H) 0.00 - 2.00 mg/dL Final     Microbiology  C.diff negative  Stool pathogen panel negative  MRSA PCR negative  Imaging  Transthoracic Echo (TTE) Complete    Result Date: 11/1/2023            [facility] [address], [place] [id number]             Phone [phone] TRANSTHORACIC ECHOCARDIOGRAM REPORT  Patient Name:      KARLAMILAGROS AGUILERA Reading Physician:   [id number] Riccardo Bagley MD Study Date:        10/31/2023         Ordering Provider:   [id number] CUCA SAM MRN/PID:           [id number]           Fellow: Accession#:        [id number]       Nurse: Date of Birth/Age: 1955 / 68      Sonographer:         Temi King RCS                    years Gender:            F                  Additional Staff: Height:            160.02 cm          Admit Date:          10/31/2023 Weight:            70.76 kg           Admission Status:    Inpatient - Routine BSA:               1.74 m2            Department Location: Blood Pressure: 135 /85 mmHg Study Type:    TRANSTHORACIC ECHO (TTE) COMPLETE Diagnosis/ICD: Acute respiratory failure with hypoxia-J96.01 Indication:    Acute Respiratory failure with hypoxemia CPT Codes:     Echo Complete w Full [id number] Patient History: Pertinent History: HTN Prediabetes Chronic Kidney Disease. Study Detail: The following Echo studies were performed: 2D, M-Mode, Doppler and               color flow. Technically challenging study due to poor acoustic               windows, prominent lung artifact and patient lying in supine               position. Lumason used as a contrast agent for endocardial border               definition. Unable to obtain subcostal and suprasternal notch               view.  PHYSICIAN INTERPRETATION: Left Ventricle: Left ventricular systolic function is normal, with an estimated ejection fraction of 60-65%. There are no regional wall  motion abnormalities. The left ventricular cavity size is normal. Spectral Doppler shows an impaired relaxation pattern of left ventricular diastolic filling. Left Atrium: The left atrium is normal in size. Right Ventricle: The right ventricle is normal in size. There is normal right ventricular global systolic function. Right Atrium: The right atrium is normal in size. Aortic Valve: The aortic valve is trileaflet. There is trivial aortic valve regurgitation. The peak instantaneous gradient of the aortic valve is 12.0 mmHg. Mitral Valve: The mitral valve is normal in structure. There is trace mitral valve regurgitation. Tricuspid Valve: The tricuspid valve is structurally normal. There is trace tricuspid regurgitation. Pulmonic Valve: The pulmonic valve is not well visualized. The pulmonic valve regurgitation was not well visualized. Pericardium: There is no pericardial effusion noted. Aorta: The aortic root was not well visualized.  CONCLUSIONS:  1. Left ventricular systolic function is normal with a 60-65% estimated ejection fraction.  2. Spectral Doppler shows an impaired relaxation pattern of left ventricular diastolic filling. QUANTITATIVE DATA SUMMARY: 2D MEASUREMENTS:                          Normal Ranges: LAs:           2.60 cm   (2.7-4.0cm) IVSd:          0.88 cm   (0.6-1.1cm) LVPWd:         0.85 cm   (0.6-1.1cm) LVIDd:         3.51 cm   (3.9-5.9cm) LVIDs:         2.55 cm LV Mass Index: 48.5 g/m2 LV % FS        27.4 % LA VOLUME:                               Normal Ranges: LA Vol A2C:        73.7 ml LA Vol Index A2C:  42.4 ml/m2 LA Area A2C:       21.6 cm2 LA Major Axis A2C: 5.4 cm LA Volume Index:   33.2 ml/m2 LA Vol A2C:        70.6 ml RA VOLUME BY A/L METHOD:                              Normal Ranges: RA Vol A2C:        1.3 ml RA Vol Index A2C:  0.8 ml/m2 RA Area A2C:       5.0 cm2 RA Major Axis A2C: 15.7 cm M-MODE MEASUREMENTS:                  Normal Ranges: Ao Root: 2.70 cm (2.0-3.7cm) LAs:     2.60  cm (2.7-4.0cm) LV SYSTOLIC FUNCTION BY 2D PLANIMETRY (MOD):                     Normal Ranges: EF-A4C View: 78.8 % (>=55%) EF-A2C View: 73.2 % EF-Biplane:  75.3 % LV DIASTOLIC FUNCTION:                     Normal Ranges: MV Peak E: 0.82 m/s (0.7-1.2 m/s) MV Peak A: 1.03 m/s (0.42-0.7 m/s) E/A Ratio: 0.80     (1.0-2.2) MITRAL VALVE:                 Normal Ranges: MV DT: 193 msec (150-240msec) AORTIC VALVE:                          Normal Ranges: AoV Vmax:      1.73 m/s  (<=1.7m/s) AoV Peak P.0 mmHg (<20mmHg) LVOT Max Garrett:  1.37 m/s  (<=1.1m/s) LVOT VTI:      28.40 cm LVOT Diameter: 2.00 cm   (1.8-2.4cm) AoV Area,Vmax: 2.49 cm2  (2.5-4.5cm2)  RIGHT VENTRICLE: RV Basal 3.35 cm RV Mid   2.73 cm RV Major 5.6 cm TRICUSPID VALVE/RVSP:                             Normal Ranges: Peak TR Velocity: 1.97 m/s RV Syst Pressure: 18.5 mmHg (< 30mmHg) PULMONIC VALVE:                         Normal Ranges: PV Accel Time: 74 msec  (>120ms) PV Max Garrett:    1.1 m/s  (0.6-0.9m/s) PV Max P.5 mmHg  [id number] Riccardo Bagley MD Electronically signed on 2023 at 9:53:29 AM  ** Final **     XR chest 1 view    Result Date: 10/31/2023  Interpreted By:  Nirmal cMmillan, STUDY: XR CHEST 1 VIEW; 10/31/2023 9:48 am   INDICATION: Shortness of breath   COMPARISON: 10/29/2023   ACCESSION NUMBER(S): [id number]   ORDERING CLINICIAN: CUCA SAM   TECHNIQUE: 1 view of the chest was performed.   FINDINGS: No significant interval change in diffuse bilateral airspace opacities involving both lungs more prominent in the mid and lower lungs. Findings do not appear significantly changed. No pleural effusion. No pneumothorax.  The cardiomediastinal silhouette is within normal limits.       No significant interval change in diffuse bilateral airspace opacities. No pleural effusion or significant interval change.   Signed by: Nirmal Mcmillan 10/31/2023 10:45 AM Dictation workstation:   [id number]     Assessment/Plan   Acute hypoxic respiratory  failure, requiring high flow  Severe coronavirus pneumonitis  Stage IV chronic kidney disease  Leukocytosis-resolving  Diarrhea-rule out infectious etiology-resolved     Continue dexamethasone  Remdesivir completed  Monitor renal and hematologic parameters  S/p Actemra-10/24/2023  Supportive care  Monitor temperature and WBC  Supplemental oxygen as needed, requiring high flow  Pulmonary follow-up      Charmaine Schaeffer, APRN-CNP

## 2023-11-02 NOTE — CARE PLAN
Problem: Respiratory  Goal: Wean oxygen to maintain O2 saturation per order/standard this shift  Outcome: Progressing  Goal: Minimize anxiety/maximize coping throughout shift  Outcome: Progressing  Goal: Verbalize decreased shortness of breath this shift  Outcome: Progressing

## 2023-11-02 NOTE — PROGRESS NOTES
Per conversation with pulmonology, patient appropriate for LTACH.  Referral submitted fore [facility] to [facility] for processing.  Awaiting updates.  Continuing to monitor patient.  RN TCC following.    1620  Per [facility], patient appropriate for LTACH and accepted by [facility].  Reached out to patient via telephone due to isolation; however, no answer.  Unable to discuss LTACH at discharge with patient at this time.  RN TCC following.    Madiha Salazar RN

## 2023-11-02 NOTE — PROGRESS NOTES
Critical Care Progress Note    Macarena Wilson is a 68 y.o. female on day 10 of admission presenting with Acute respiratory failure with hypoxemia (CMS/HCC).    Subjective   Follow up hypoxia / covid  On HFNC  Objective   Vital Signs      11/2/2023     2:00 AM 11/2/2023     3:00 AM 11/2/2023     4:00 AM 11/2/2023     5:00 AM 11/2/2023     6:00 AM 11/2/2023     7:00 AM 11/2/2023     8:00 AM   Vitals   Systolic 150 146 125 133 125  142   Diastolic 75 81 111 72 106  66   Heart Rate 73 82 73 75 83 66 95   Temp   36.4 °C (97.5 °F)    36 °C (96.8 °F)   Resp 25 26 27 24 28 24 28       Oxygen Therapy  SpO2: (!) 89 %  Medical Gas Therapy: Supplemental oxygen  O2 Delivery Method: High flow nasal cannula (80%/40L)    FiO2 (%):  [80 %] 80 %    Intake/Output previous 24 hours:    Intake/Output Summary (Last 24 hours) at 11/2/2023 0934  Last data filed at 11/1/2023 1600  Gross per 24 hour   Intake 440 ml   Output 1001 ml   Net -561 ml     Physical Exam  Constitutional:       Appearance: She is ill-appearing.   HENT:      Head: Normocephalic and atraumatic.      Mouth/Throat:      Mouth: Mucous membranes are dry.   Eyes:      Extraocular Movements: Extraocular movements intact.      Pupils: Pupils are equal, round, and reactive to light.   Cardiovascular:      Rate and Rhythm: Normal rate and regular rhythm.      Pulses: Normal pulses.      Heart sounds: Normal heart sounds.   Pulmonary:      Effort: Pulmonary effort is normal.      Breath sounds: Decreased air movement present. Decreased breath sounds present.   Abdominal:      General: Bowel sounds are normal.      Palpations: Abdomen is soft.   Musculoskeletal:         General: Normal range of motion.      Cervical back: Normal range of motion.   Skin:     General: Skin is warm and dry.   Neurological:      General: No focal deficit present.      Mental Status: She is alert and oriented to person, place, and time.   Psychiatric:         Mood and Affect: Mood normal.        Lines and Tubes:  Peripheral IV 10/23/23 20 G (Active)   Placement Date/Time: 10/23/23 1624   Size (Gauge): 20 G   Number of days: 5       Peripheral IV 10/27/23 20 G Distal;Left;Anterior Forearm (Active)   Placement Date/Time: 10/27/23 1900   Earliest Known Present: 10/27/23  Size (Gauge): 20 G  Orientation: Distal;Left;Anterior  Location: Forearm   Number of days: 0       Peripheral IV 10/23/23 20 G Left;Proximal;Anterior Forearm (Active)   Placement Date/Time: (?) 10/23/23 (?) 0000   Earliest Known Present: (?) 10/23/23  Size (Gauge): (?) 20 G  Orientation: (?) Left;Proximal;Anterior  Location: (?) Forearm   Number of days: 5         Scheduled medications  dexAMETHasone, 6 mg, intravenous, q24h  enoxaparin, 30 mg, subcutaneous, Daily  fluticasone, 1 spray, Each Nostril, BID  insulin lispro, 0-5 Units, subcutaneous, TID with meals  ipratropium-albuteroL, 3 mL, nebulization, q6h while awake  melatonin, 3 mg, oral, Nightly  oxygen, , inhalation, q4h  pantoprazole, 40 mg, oral, Daily before breakfast  perflutren lipid microspheres, 0.5-10 mL of dilution, intravenous, Once in imaging  perflutren protein A microsphere, 0.5 mL, intravenous, Once in imaging  sodium bicarbonate, 650 mg, oral, TID      Continuous medications     PRN medications  PRN medications: acetaminophen **OR** acetaminophen **OR** acetaminophen, aspirin-acetaminophen-caffeine, benzonatate, bisacodyl, dextrose 10 % in water (D10W), dextrose, docusate sodium, glucagon, guaiFENesin, hydrALAZINE, ipratropium-albuteroL, melatonin, ondansetron ODT **OR** ondansetron, [Held by provider] oxygen, polyethylene glycol, sodium chloride    Relevant Results  Results from last 7 days   Lab Units 11/02/23  0617 11/01/23  0914 10/31/23  1004   WBC AUTO x10*3/uL 21.2* 18.3* 20.8*   HEMOGLOBIN g/dL 11.0* 10.6* 11.1*   HEMATOCRIT % 34.1* 31.6* 34.3*   PLATELETS AUTO x10*3/uL 168 137* 152      Results from last 7 days   Lab Units 11/02/23  0617 11/01/23  0643  10/31/23  0554   SODIUM mmol/L 139 137 138   POTASSIUM mmol/L 4.8 4.6 4.2   CHLORIDE mmol/L 102 102 103   CO2 mmol/L 24 24 23*   BUN mg/dL 59* 59* 57*   CREATININE mg/dL 1.40 1.50 1.60   GLUCOSE mg/dL 203* 170* 146*   CALCIUM mg/dL 8.6 8.5 8.6            XR chest 1 view 10/27/2023    Narrative  Interpreted By:  Nirmal Mcmillan,  STUDY:  XR CHEST 1 VIEW; 10/27/2023 10:06 am    INDICATION:  Signs/Symptoms:follow up hypoxemia.    COMPARISON:  10/23/2023    ACCESSION NUMBER(S):  [id number]    ORDERING CLINICIAN:  SUSANNE FRANCOIS    TECHNIQUE:  1 view of the chest was performed.    FINDINGS:  There is diffuse ground-glass opacity bilaterally consistent with  history of COVID. No lobar or large consolidation however the  ground-glass opacities are prominent but stable from the previous  examination. No pleural effusion. No pneumothorax.  The  cardiomediastinal silhouette is within normal limits.    Impression  No significant change. Prominent ground-glass opacities bilaterally,  however, not significantly changed.    Signed by: Nirmal Mcmillan 10/27/2023 10:24 PM  Dictation workstation:   [id number]      Patient Active Problem List   Diagnosis    Abnormal mammogram    Acute pain of left shoulder    Allergic rhinitis    Benign essential HTN    Calculus of kidney    Staghorn renal calculus    Diverticular disease    Gammopathy    Gross hematuria    Hematuria, unspecified    Impaired fasting glucose    Nodule of kidney    Obesity    Post-menopausal bleeding    Prediabetes    Acute cystitis with hematuria    Stage 4 chronic kidney disease (CMS/HCC)    Urgency incontinence    Bladder fistula    Atrophic kidney    COVID-19    Acute respiratory failure with hypoxemia (CMS/HCC)    Pneumonia of both lower lobes due to infectious organism     Assessment/Plan    Acute respiratory failure with hypoxemia (CMS/HCC)  Active Problems:    COVID-19    Benign essential HTN    Stage 4 chronic kidney disease (CMS/HCC)    Persistent  hypoxia  VQ was low probability several days ago  Duplex studies negative  Empiric heparin drip was started but stopped due to significant hematuria.  CXR stable ARDS      HFNC   Continue with current settings.  80% and 40 L  Very slow to wean      HUNG  Cr 1.5  Lasix on hold    Hold on NIPPV - patient does not feel she will tolerate    Remdesivir completed  Decadron  Aerosol Rx    Very slow to progress. Continue to current treatments.  Likely will need LTAC    Davin Avendaño MD  [facility]       This critically ill patient continues to be at-risk for deterioration / failure due to the above mentioned dysfunctional unstable organ systems.   Critical care time is spent at bedside includes review of diagnostic tests, labs, and radiographs, serial assessments and management of hemodynamics, respiratory status, and coordination of care with different members of interdisciplinary team  Assessment, impression and plans are reflected in the note above as well as the orders.     Critical concerns addressed:  Acute respiratory failure  Hypoxia  COVID-19 pneumonitis  Elevated D-dimer rule out VTE  Time Spent in critical Care, exclusive of procedures : 20 mins.     Disclaimer: Parts of this chart were dictated with voice recognition software. Please excuse any errors of grammar, spelling, or transcription which are not corrected. Please contact me with any questions regarding documentation.

## 2023-11-02 NOTE — PROGRESS NOTES
"HOSPITALIST  PROGRESS NOTE   Macarena Wilson    :  1955    Medical Record:  [id number]    DATE OF SERVICE: 2023  ADMIT DAY: 10.    Subjective:  Macarena Wilson is a 68 y.o. year-old female who was admitted on 10/23/2023 for Acute hypoxemic respiratory failure due to COVID-19, HUNG, among others.  The patient's labs, imaging studies and vital signs are noted with the case discussed with the nursing staff. The patient was seen and examined and the chart was reviewed. The patient is being seen for management of their BP along with the pt's other medical conditions.  I assumed care of the patient from Dr. Anderson on 10/31.  Today pt still on HFNC, reports \"worried about my sugars\" but she denies any F/C/N/V/Abd pain.    Objective:  Vitals:    23 1000   BP: 128/70   Pulse: 88   Resp: (!) 28   Temp:    SpO2: 94%     I/O last 3 completed shifts:  In: 800 (11.3 mL/kg) [P.O.:800]  Out: 1001 (14.1 mL/kg) [Urine:1000 (0.4 mL/kg/hr); Stool:1]  Weight: 71.1 kg   No intake/output data recorded.  Pulmonary:    General: Female, A&Ox3, is following commands with occasional conversational dyspnea.  HEENT: Normocephalic atraumatic, pupils equally reactive to light and accomodation, extra occular muscles are intact. Neck is supple, trachea is midline without observable bruits.  CVS: Regular rate and rhythm, S1 S2 without any 3 or S4.  Pulmonary: Decreased breath sounds with intermittent rhonchi.  GI: Abdomen is soft, non tender, positive bowel sounds are present.  EXT: B/L LE peripheral edema 1/4 with pulses are palpable throughout.  NEUROLOGY: CN 3-12 grossly intact for known chronic visual impairments.  Muscle strength is 4/5, DTRs are 2/4 throughout. There is no obvious facial asymmetry and no resting tremors.Moves all 4 extremities spontaneously.    LABS:  Results for orders placed or performed during the hospital encounter of 10/23/23 (from the past 24 hour(s))   POCT GLUCOSE   Result Value Ref Range    POCT " Glucose 118 (H) 74 - 99 mg/dL   Comprehensive metabolic panel   Result Value Ref Range    Glucose 203 (H) 65 - 99 mg/dL    Sodium 139 133 - 145 mmol/L    Potassium 4.8 3.4 - 5.1 mmol/L    Chloride 102 97 - 107 mmol/L    Bicarbonate 24 24 - 31 mmol/L    Urea Nitrogen 59 (H) 8 - 25 mg/dL    Creatinine 1.40 0.40 - 1.60 mg/dL    eGFR 41 (L) >60 mL/min/1.73m*2    Calcium 8.6 8.5 - 10.4 mg/dL    Albumin 3.2 (L) 3.5 - 5.0 g/dL    Alkaline Phosphatase 114 35 - 125 U/L    Total Protein 6.0 5.9 - 7.9 g/dL    AST 15 5 - 40 U/L    Bilirubin, Total 0.3 0.1 - 1.2 mg/dL    ALT 21 5 - 40 U/L    Anion Gap 13 <=19 mmol/L   CBC   Result Value Ref Range    WBC 21.2 (H) 4.4 - 11.3 x10*3/uL    nRBC 0.0 0.0 - 0.0 /100 WBCs    RBC 4.02 4.00 - 5.20 x10*6/uL    Hemoglobin 11.0 (L) 12.0 - 16.0 g/dL    Hematocrit 34.1 (L) 36.0 - 46.0 %    MCV 85 80 - 100 fL    MCH 27.4 26.0 - 34.0 pg    MCHC 32.3 32.0 - 36.0 g/dL    RDW 14.8 (H) 11.5 - 14.5 %    Platelets 168 150 - 450 x10*3/uL   POCT GLUCOSE   Result Value Ref Range    POCT Glucose 165 (H) 74 - 99 mg/dL      MEDICATIONS:  Medication Documentation Review Audit       Reviewed by Diane Peterson RN (Registered Nurse) on 10/23/23 at 1618      Medication Order Taking? Sig Documenting Provider Last Dose Status   acetaminophen (TylenoL) 325 mg tablet [id number] No Take 1 tablet (325 mg) by mouth every 4 hours. TAKE PER DIRECTED Historical Provider, MD Past Month Active   aspirin-acetaminophen-caffeine (Excedrin Extra Strength) 250-250-65 mg tablet [id number] No Take by mouth. TAKE PER DIRECTED Historical Provider, MD More than a month Active   betamethasone dipropionate 0.05 % cream [id number] No Apply topically 2 times a day. APPLY PER DIRECTED Historical Provider, MD More than a month Active   dexAMETHasone (Decadron) 6 mg tablet [id number] No Take 1 tablet (6 mg) by mouth once daily for 7 days.   Patient not taking: Reported on 10/23/2023    Shirley Bills MD More than a month Active   dupilumab  (Dupixent Pen) 300 mg/2 mL injection [id number] No Inject 2 mL (300 mg) under the skin every 14 (fourteen) days. Historical Provider, MD Past Week Active   estradiol (Estrace) 0.01 % (0.1 mg/gram) vaginal cream [id number] No Insert into the vagina. PER DIRECTED Historical Provider, MD More than a month Active   fexofenadine-pseudoephedrine (Allegra-D 12 Hour)  mg 12 hr tablet [id number] No Take 1 tablet by mouth. TAKE PER DIRECTED Historical Provider, MD More than a month Active   ivermectin (Soolantra) 1 % cream [id number] No Apply topically. PER DIRECTED Historical Provider, MD More than a month Active   losartan (Cozaar) 100 mg tablet [id number] No Take 1 tablet (100 mg) by mouth once every 24 hours. TAKE PER DIRECTED Historical Provider, MD 10/23/2023 Active   magnesium hydroxide (Milk of Magnesia) 2,400 mg/10 mL suspension suspension [id number] No Take 5 mL by mouth. TAKE PER DIRECTED Historical Provider, MD More than a month Active   naproxen sodium (Aleve) 220 mg tablet [id number] No Take 1 tablet (220 mg) by mouth. TAKE PER DIRECTED Historical Provider, MD More than a month Active   nitrofurantoin, macrocrystal-monohydrate, (Macrobid) 100 mg capsule [id number] No Take 1 capsule (100 mg) by mouth. PER DIRECTED Historical Provider, MD More than a month Active   tralokinumab-ldrm (ADBRY SUBQ) [id number] No Inject under the skin. PER DIRECTED Historical Provider, MD More than a month Active   traMADol (Ultram) 50 mg tablet [id number] No Take 1 tablet (50 mg) by mouth twice a day. TAKE PER DIRECTED Historical Provider, MD More than a month Active   triamcinolone (Kenalog) 0.1 % cream [id number] No Apply topically 2 times a day. APPLY PER DIRECTED Historical Provider, MD More than a month Active                   PERTINENT IMAGING STUDIES/PROCEDURES:    CXR 10/31/2023 IMPRESSION:  No significant interval change in diffuse bilateral airspace  opacities. No pleural effusion or significant interval change.    Venous  Doppler legs 10/28/2023 IMPRESSION:  No DVT    VQ scan 10/23/2023 IMPRESSION:  Normal perfusion lung scan.    CT chest 10/24/2023 IMPRESSION:  Diffuse, patchy ground-glass opacities bilaterally suggesting  atypical pneumonia such as COVID.    2D echo 10/31/2023 CONCLUSIONS:   1. Left ventricular systolic function is normal with a 60-65% estimated ejection fraction.   2. Spectral Doppler shows an impaired relaxation pattern of left ventricular diastolic filling.    Assessment:  Macarena Wilson is a 68 y.o. year-old female @ on admission for 10    Acute hypoxemic respiratory failure  Acute COVID-19 pneumonia sepsis  HUNG on CKD 4  Acute elevated BNP due to above  Acute leukocytosis exacerbated by steroids  Acute on chronic anemia  Acute transient thrombocytopenia  Acute hyperglycemia exacerbated by steroids  Essential hypertension  Diabetes mellitus type 2-new onset with hemoglobin A1c 6.4  Deconditioning and impaired mobility    PULMONARY/ID:  -Pulmonary on the case and repeat chest x-ray results reviewed on 10/31. Still on HFNC   -Venous Doppler of legs no DVT (10/28), VQ scan was of low probability for PE on hospital presentation, no CTPE due to renal dysfunction  -Completed 5 doses of  Remdesivir on 10/28  - Remains on IV Decadron, Decadron dose has been decreased, started 10/24, 10 day goal  -She is status post baricitinib 10/25  -Continue pulmonary toilet with incentive spirometer positional changes, patient re -educated on prone positioning as well, not able to tolerate proning unfortunately  -Per ID discontinued  empiric pneumonia treatment with vancomycin and Zosyn  - C diff is negative (10/26)     CARDIOLOGY:  -BP is high at times so added Norvasc 5 mg daily on 11/2.  I previously started as needed IV hydralazine on 10/31.  The patient had been given a few doses of of Lasix with repeat labs ordered in the am.  2D echo on 10/31 showed EF of 60-65%    NEPHROLOGY:   -Nephrology on the case and is managing the  pt's renal function, diuretics, as needed fluids as well as the electrolytes. Continue bicarb and patient previously on Lasix with nephrology to decide when to restart Lasix.  The patient's Is and Os are being monitored and the patient continues to be off nephrotoxic agents as much as possible.     ENDOCRINOLOGY:     Accu-Cheks are elevated so started Glucotrol 5 mg daily on 11/2.  Hemoglobin A1c 6.3 and continue ISS.     GI:   -On Protonix     HEMATOLOGY:  -CBC noted and will recheck in am with no need for transfusions unless pt develops severe bleeding, platelet counts less than 10,000 or Hgb becomes < 7.     MUSCULOSKELETAL:     1- generalized weakness:  PT&OT with referral made to LTAC     OTHER:    -The patient continues to be on the rest of their chronic home medications for osteoarthritis, etc.  Will update family once available.   -Palliative care input is appreciated  -Discharge planner is on the case with PT and OT following as well.    Bowel regimen: Colace     *Pain regimen:  Tylenol     *DVT prophylaxis:  Lovenox     *PUD PROPHYLAXIS:  Protonix     Time spent managing patient's care is greater than 41 minutes with approximately 50% or more spent in counseling and coordination of care.    Evan WIGGINS    Portions of this Progress note were dictated using [employer] and reviewed . While every effort was made to correct mistranscriptions, minor grammatical or typographical errors may be present from machine dictation

## 2023-11-03 NOTE — PROGRESS NOTES
"HOSPITALIST  PROGRESS NOTE   Macarena Wilson    :  1955    Medical Record:  [id number]    DATE OF SERVICE: 11/3/2023  ADMIT DAY: 11.    Subjective:  Macarena Wilson is a 68 y.o. year-old female who was admitted on 10/23/2023 for Acute hypoxemic respiratory failure due to COVID-19, HUNG, among others.  The patient's labs, imaging studies and vital signs are noted with the case discussed with the nursing staff. The patient was seen and examined and the chart was reviewed. The patient is being seen for management of their BP along with the pt's other medical conditions.  I assumed care of the patient from Dr. Anderson on 10/31.  Today pt still on HFNC, denies any F/C/N/V/Abd pain but she reports \"unsteady on my feet\"    Objective:  Vitals:    23 0830   BP:    Pulse:    Resp:    Temp: 35.6 °C (96.1 °F)   SpO2:      I/O last 3 completed shifts:  In: 200 (2.8 mL/kg) [P.O.:200]  Out: 600 (8.4 mL/kg) [Urine:600 (0.2 mL/kg/hr)]  Weight: 71.1 kg   No intake/output data recorded.  Pulmonary:    General: Female, A&Ox3, is following commands with occasional conversational dyspnea.  HEENT: Normocephalic atraumatic, pupils equally reactive to light and accomodation, extra occular muscles are intact. Neck is supple, trachea is midline without observable bruits.  CVS: Regular rate and rhythm, S1 S2 without any 3 or S4.  Pulmonary: Decreased breath sounds with intermittent rhonchi.  GI: Abdomen is soft, non tender, positive bowel sounds are present.  EXT: B/L LE peripheral edema 1/4 with pulses are palpable throughout.  NEUROLOGY: CN 3-12 grossly intact for known chronic visual impairments.  Muscle strength is 4/5, DTRs are 2/4 throughout. There is no obvious facial asymmetry and no resting tremors.Moves all 4 extremities spontaneously.    LABS:  Results for orders placed or performed during the hospital encounter of 10/23/23 (from the past 24 hour(s))   POCT GLUCOSE   Result Value Ref Range    POCT Glucose 117 (H) 74 - " 99 mg/dL   POCT GLUCOSE   Result Value Ref Range    POCT Glucose 120 (H) 74 - 99 mg/dL   Comprehensive metabolic panel   Result Value Ref Range    Glucose 159 (H) 65 - 99 mg/dL    Sodium 134 133 - 145 mmol/L    Potassium 4.9 3.4 - 5.1 mmol/L    Chloride 99 97 - 107 mmol/L    Bicarbonate 25 24 - 31 mmol/L    Urea Nitrogen 61 (H) 8 - 25 mg/dL    Creatinine 1.60 0.40 - 1.60 mg/dL    eGFR 35 (L) >60 mL/min/1.73m*2    Calcium 8.7 8.5 - 10.4 mg/dL    Albumin 3.0 (L) 3.5 - 5.0 g/dL    Alkaline Phosphatase 91 35 - 125 U/L    Total Protein 5.5 (L) 5.9 - 7.9 g/dL    AST 13 5 - 40 U/L    Bilirubin, Total 0.3 0.1 - 1.2 mg/dL    ALT 18 5 - 40 U/L    Anion Gap 10 <=19 mmol/L   C-Reactive Protein   Result Value Ref Range    C-Reactive Protein <0.30 0.00 - 2.00 mg/dL   CBC   Result Value Ref Range    WBC 20.6 (H) 4.4 - 11.3 x10*3/uL    nRBC 0.1 (H) 0.0 - 0.0 /100 WBCs    RBC 3.71 (L) 4.00 - 5.20 x10*6/uL    Hemoglobin 10.3 (L) 12.0 - 16.0 g/dL    Hematocrit 31.1 (L) 36.0 - 46.0 %    MCV 84 80 - 100 fL    MCH 27.8 26.0 - 34.0 pg    MCHC 33.1 32.0 - 36.0 g/dL    RDW 14.9 (H) 11.5 - 14.5 %    Platelets 158 150 - 450 x10*3/uL   POCT GLUCOSE   Result Value Ref Range    POCT Glucose 73 (L) 74 - 99 mg/dL      MEDICATIONS:  Medication Documentation Review Audit       Reviewed by Diane Peterson RN (Registered Nurse) on 10/23/23 at 1618      Medication Order Taking? Sig Documenting Provider Last Dose Status   acetaminophen (TylenoL) 325 mg tablet [id number] No Take 1 tablet (325 mg) by mouth every 4 hours. TAKE PER DIRECTED Historical Provider, MD Past Month Active   aspirin-acetaminophen-caffeine (Excedrin Extra Strength) 250-250-65 mg tablet [id number] No Take by mouth. TAKE PER DIRECTED Historical Provider, MD More than a month Active   betamethasone dipropionate 0.05 % cream [id number] No Apply topically 2 times a day. APPLY PER DIRECTED Historical Provider, MD More than a month Active   dexAMETHasone (Decadron) 6 mg tablet [id number] No  Take 1 tablet (6 mg) by mouth once daily for 7 days.   Patient not taking: Reported on 10/23/2023    Shirley Bills MD More than a month Active   dupilumab (Dupixent Pen) 300 mg/2 mL injection [id number] No Inject 2 mL (300 mg) under the skin every 14 (fourteen) days. Historical Provider, MD Past Week Active   estradiol (Estrace) 0.01 % (0.1 mg/gram) vaginal cream [id number] No Insert into the vagina. PER DIRECTED Historical Provider, MD More than a month Active   fexofenadine-pseudoephedrine (Allegra-D 12 Hour)  mg 12 hr tablet [id number] No Take 1 tablet by mouth. TAKE PER DIRECTED Historical Provider, MD More than a month Active   ivermectin (Soolantra) 1 % cream [id number] No Apply topically. PER DIRECTED Historical Provider, MD More than a month Active   losartan (Cozaar) 100 mg tablet [id number] No Take 1 tablet (100 mg) by mouth once every 24 hours. TAKE PER DIRECTED Historical Provider, MD 10/23/2023 Active   magnesium hydroxide (Milk of Magnesia) 2,400 mg/10 mL suspension suspension [id number] No Take 5 mL by mouth. TAKE PER DIRECTED Historical Provider, MD More than a month Active   naproxen sodium (Aleve) 220 mg tablet [id number] No Take 1 tablet (220 mg) by mouth. TAKE PER DIRECTED Historical Provider, MD More than a month Active   nitrofurantoin, macrocrystal-monohydrate, (Macrobid) 100 mg capsule [id number] No Take 1 capsule (100 mg) by mouth. PER DIRECTED Historical Provider, MD More than a month Active   tralokinumab-ldrm (ADBRY SUBQ) [id number] No Inject under the skin. PER DIRECTED Historical Provider, MD More than a month Active   traMADol (Ultram) 50 mg tablet [id number] No Take 1 tablet (50 mg) by mouth twice a day. TAKE PER DIRECTED Historical Provider, MD More than a month Active   triamcinolone (Kenalog) 0.1 % cream [id number] No Apply topically 2 times a day. APPLY PER DIRECTED Historical Provider, MD More than a month Active                   PERTINENT IMAGING STUDIES/PROCEDURES:    CXR  10/31/2023 IMPRESSION:  No significant interval change in diffuse bilateral airspace  opacities. No pleural effusion or significant interval change.    Venous Doppler legs 10/28/2023 IMPRESSION:  No DVT    VQ scan 10/23/2023 IMPRESSION:  Normal perfusion lung scan.    CT chest 10/24/2023 IMPRESSION:  Diffuse, patchy ground-glass opacities bilaterally suggesting  atypical pneumonia such as COVID.    2D echo 10/31/2023 CONCLUSIONS:   1. Left ventricular systolic function is normal with a 60-65% estimated ejection fraction.   2. Spectral Doppler shows an impaired relaxation pattern of left ventricular diastolic filling.    Assessment:  Macarena Wilson is a 68 y.o. year-old female @ on admission for 11    Acute hypoxemic respiratory failure  Acute COVID-19 pneumonia sepsis  HUNG on CKD 4  Acute elevated BNP due to above  Acute leukocytosis exacerbated by steroids  Acute on chronic anemia  Acute transient thrombocytopenia  Acute hyperglycemia exacerbated by steroids  Essential hypertension  Diabetes mellitus type 2-new onset with hemoglobin A1c 6.4  Deconditioning and impaired mobility    PULMONARY/ID:  -Pulmonary on the case and repeat chest x-ray ordered for 11/3.  Still on HFNC   -Venous Doppler of legs no DVT (10/28), VQ scan was of low probability for PE on hospital presentation, no CTPE due to renal dysfunction  -Completed 5 doses of  Remdesivir on 10/28  - Remains on IV Decadron, Decadron dose has been decreased, started 10/24, 10 day goal  -She is status post baricitinib 10/25  -Continue pulmonary toilet with incentive spirometer positional changes, patient re   -Per ID discontinued  empiric pneumonia treatment with vancomycin and Zosyn  - C diff is negative (10/26)     CARDIOLOGY:  -BP is being watched closely after I added Norvasc 5 mg daily on 11/2.  I previously started as needed IV hydralazine on 10/31.  The patient had been given a few doses of of Lasix with repeat labs ordered in the am.  2D echo on  10/31 showed EF of 60-65%    NEPHROLOGY:   -Nephrology on the case and is managing the pt's renal function, diuretics, as needed fluids as well as the electrolytes. Continue bicarb and patient previously on Lasix with nephrology to decide when to restart Lasix.  The patient's Is and Os are being monitored and the patient continues to be off nephrotoxic agents as much as possible.     ENDOCRINOLOGY:     Accu-Cheks are low at times so decreased Glucotrol from 5 mg to 2.5 mg daily on 11/3 (started on 11/2).  Hemoglobin A1c 6.3 and continue ISS.     GI:   -On Protonix     HEMATOLOGY:  -CBC noted and will recheck in am with no need for transfusions unless pt develops severe bleeding, platelet counts less than 10,000 or Hgb becomes < 7.     MUSCULOSKELETAL:     1- generalized weakness:  PT&OT with referral made to LTAC but insurance Auth will likely take a few days     OTHER:    -The patient continues to be on the rest of their chronic home medications for osteoarthritis, etc.  Will update family once available.   -Palliative care input is appreciated  -Discharge planner is on the case with PT and OT following as well.    CODE STATUS: Full code    *DVT prophylaxis:  Lovenox     *PUD PROPHYLAXIS:  Protonix     Time spent managing patient's care is greater than 37 minutes with approximately 50% or more spent in counseling and coordination of care.    Evan WIGGINS    Portions of this Progress note were dictated using [employer] and reviewed . While every effort was made to correct mistranscriptions, minor grammatical or typographical errors may be present from machine dictation

## 2023-11-03 NOTE — CARE PLAN
Problem: Respiratory  Goal: Minimal/no exertional discomfort or dyspnea this shift  Outcome: Progressing  Goal: No signs of respiratory distress (eg. Use of accessory muscles. Peds grunting)  Outcome: Progressing  Goal: Verbalize decreased shortness of breath this shift  Outcome: Progressing

## 2023-11-03 NOTE — CARE PLAN
Problem: Respiratory  Goal: Minimal/no exertional discomfort or dyspnea this shift  Outcome: Progressing  Goal: No signs of respiratory distress (eg. Use of accessory muscles. Peds grunting)  Outcome: Progressing  Goal: Wean oxygen to maintain O2 saturation per order/standard this shift  Outcome: Progressing  Goal: Clear secretions with interventions this shift  Outcome: Progressing

## 2023-11-03 NOTE — SIGNIFICANT EVENT
11/03/23 1134   Medical Gas Therapy/Pulse Ox   Medical Gas Therapy Supplemental oxygen   O2 Delivery Method (S)  High flow nasal cannula  (MINI BUBBLER AT 15L)   Humidification Yes   Water Level Full   SpO2 92 %   Patient Activity During SpO2 Measurement At rest   Pulse Oximetry Type Continuous   FiO2 (%) 70 %     PATIENT TITRATED TO 15L MINI HFNC BUBBLER

## 2023-11-03 NOTE — PROGRESS NOTES
Critical Care Progress Note    Macarena Wilson is a 68 y.o. female on day 11 of admission presenting with Acute respiratory failure with hypoxemia (CMS/HCC).    Subjective   Follow up hypoxia / covid  On HFNC  Objective   Vital Signs      11/3/2023     1:00 AM 11/3/2023     2:00 AM 11/3/2023     3:00 AM 11/3/2023     4:00 AM 11/3/2023     7:59 AM 11/3/2023     8:27 AM 11/3/2023     8:30 AM   Vitals   Systolic 141 141 145 146  144    Diastolic 73 74 79 76  83    Heart Rate 80 78 94 75  91    Temp       35.6 °C (96.1 °F)   Resp 24 25 36 21      Weight (lb)     155.42     BMI     27.53 kg/m2     BSA (m2)     1.77 m2         Oxygen Therapy  SpO2: 95 %  Medical Gas Therapy: Supplemental oxygen  O2 Delivery Method: High flow nasal cannula    FiO2 (%):  [80 %] 80 %    Intake/Output previous 24 hours:    Intake/Output Summary (Last 24 hours) at 11/3/2023 0907  Last data filed at 11/2/2023 1400  Gross per 24 hour   Intake 200 ml   Output 600 ml   Net -400 ml     Physical Exam  Direct physical exam deferred  Ill-appearing again but holding her own  Remains on Vapotherm  Sinus on the monitor  Does appear to tachypneic observation  Remains alert, comprehends all questions were reports      Lines and Tubes:  Peripheral IV 10/23/23 20 G (Active)   Placement Date/Time: 10/23/23 1624   Size (Gauge): 20 G   Number of days: 5       Peripheral IV 10/27/23 20 G Distal;Left;Anterior Forearm (Active)   Placement Date/Time: 10/27/23 1900   Earliest Known Present: 10/27/23  Size (Gauge): 20 G  Orientation: Distal;Left;Anterior  Location: Forearm   Number of days: 0       Peripheral IV 10/23/23 20 G Left;Proximal;Anterior Forearm (Active)   Placement Date/Time: (?) 10/23/23 (?) 0000   Earliest Known Present: (?) 10/23/23  Size (Gauge): (?) 20 G  Orientation: (?) Left;Proximal;Anterior  Location: (?) Forearm   Number of days: 5         Scheduled medications  amLODIPine, 2.5 mg, oral, Daily  dexAMETHasone, 6 mg, intravenous,  q24h  enoxaparin, 30 mg, subcutaneous, Daily  fluticasone, 1 spray, Each Nostril, BID  glipiZIDE, 5 mg, oral, Daily before breakfast  insulin lispro, 0-5 Units, subcutaneous, TID with meals  ipratropium-albuteroL, 3 mL, nebulization, q6h while awake  melatonin, 3 mg, oral, Nightly  oxygen, , inhalation, q4h  pantoprazole, 40 mg, oral, Daily before breakfast  perflutren lipid microspheres, 0.5-10 mL of dilution, intravenous, Once in imaging  perflutren protein A microsphere, 0.5 mL, intravenous, Once in imaging  sodium bicarbonate, 650 mg, oral, TID      Continuous medications     PRN medications  PRN medications: acetaminophen **OR** acetaminophen **OR** acetaminophen, aspirin-acetaminophen-caffeine, benzonatate, bisacodyl, dextrose 10 % in water (D10W), dextrose, docusate sodium, glucagon, guaiFENesin, hydrALAZINE, ipratropium-albuteroL, melatonin, ondansetron ODT **OR** ondansetron, [Held by provider] oxygen, polyethylene glycol, sodium chloride    Relevant Results  Results from last 7 days   Lab Units 11/03/23  0617 11/02/23 0617 11/01/23  0914   WBC AUTO x10*3/uL 20.6* 21.2* 18.3*   HEMOGLOBIN g/dL 10.3* 11.0* 10.6*   HEMATOCRIT % 31.1* 34.1* 31.6*   PLATELETS AUTO x10*3/uL 158 168 137*      Results from last 7 days   Lab Units 11/03/23  0617 11/02/23 0617 11/01/23  0643   SODIUM mmol/L 134 139 137   POTASSIUM mmol/L 4.9 4.8 4.6   CHLORIDE mmol/L 99 102 102   CO2 mmol/L 25 24 24   BUN mg/dL 61* 59* 59*   CREATININE mg/dL 1.60 1.40 1.50   GLUCOSE mg/dL 159* 203* 170*   CALCIUM mg/dL 8.7 8.6 8.5            XR chest 1 view 10/27/2023    Narrative  Interpreted By:  Nirmal Mcmillan,  STUDY:  XR CHEST 1 VIEW; 10/27/2023 10:06 am    INDICATION:  Signs/Symptoms:follow up hypoxemia.    COMPARISON:  10/23/2023    ACCESSION NUMBER(S):  [id number]    ORDERING CLINICIAN:  SUSANNE FRANCOIS    TECHNIQUE:  1 view of the chest was performed.    FINDINGS:  There is diffuse ground-glass opacity bilaterally consistent with  history  of COVID. No lobar or large consolidation however the  ground-glass opacities are prominent but stable from the previous  examination. No pleural effusion. No pneumothorax.  The  cardiomediastinal silhouette is within normal limits.    Impression  No significant change. Prominent ground-glass opacities bilaterally,  however, not significantly changed.    Signed by: Nirmal Mcmillan 10/27/2023 10:24 PM  Dictation workstation:   [id number]      Patient Active Problem List   Diagnosis    Abnormal mammogram    Acute pain of left shoulder    Allergic rhinitis    Benign essential HTN    Calculus of kidney    Staghorn renal calculus    Diverticular disease    Gammopathy    Gross hematuria    Hematuria, unspecified    Impaired fasting glucose    Nodule of kidney    Obesity    Post-menopausal bleeding    Prediabetes    Acute cystitis with hematuria    Stage 4 chronic kidney disease (CMS/HCC)    Urgency incontinence    Bladder fistula    Atrophic kidney    COVID-19    Acute respiratory failure with hypoxemia (CMS/HCC)    Pneumonia of both lower lobes due to infectious organism     Assessment/Plan    Acute respiratory failure with hypoxemia (CMS/HCC)  Active Problems:    COVID-19    Benign essential HTN    Stage 4 chronic kidney disease (CMS/HCC)    Persistent hypoxia  VQ was low probability several days ago  Duplex studies negative  Empiric heparin drip was started but stopped due to significant hematuria.  CXR stable ARDS      HFNC   Continue with current settings.  80% and 40 L  Very slow to wean  WBCs elevated at 20    HUNG  Cr 1.6  Lasix on hold    Hold on NIPPV - patient does not feel she will tolerate    Persistent leukocytoisis  Repeat chest xray  Antibiotics                               Remdesivir completed  Decadron  Aerosol Rx    Very slow to progress. Continue to current treatments.  Likely will need LTAC    Davin Avendaño MD  [facility]       This critically ill patient continues to be at-risk  for deterioration / failure due to the above mentioned dysfunctional unstable organ systems.   Critical care time is spent at bedside includes review of diagnostic tests, labs, and radiographs, serial assessments and management of hemodynamics, respiratory status, and coordination of care with different members of interdisciplinary team  Assessment, impression and plans are reflected in the note above as well as the orders.     Critical concerns addressed:  Acute respiratory failure  Hypoxia  COVID-19 pneumonitis  Elevated D-dimer rule out VTE  Time Spent in critical Care, exclusive of procedures : 20 mins.     Disclaimer: Parts of this chart were dictated with voice recognition software. Please excuse any errors of grammar, spelling, or transcription which are not corrected. Please contact me with any questions regarding documentation.

## 2023-11-03 NOTE — PROGRESS NOTES
Macarena Wilson is a 68 y.o. female on day 11 of admission presenting with Acute respiratory failure with hypoxemia (CMS/HCC).    Subjective   Room not entered-limit exposure  Observed through glass window  Remains afebrile  On HFNC at 80% FiO2       Objective   Range of Vitals (last 24 hours)  Heart Rate:  []   Temp:  [35.6 °C (96.1 °F)-36 °C (96.8 °F)]   Resp:  [17-36]   BP: (132-151)/(63-83)   Weight:  [70.5 kg (155 lb 6.8 oz)-71.1 kg (156 lb 12 oz)]   SpO2:  [88 %-97 %]   Daily Weight  11/03/23 : 70.5 kg (155 lb 6.8 oz)    Body mass index is 27.53 kg/m².    Physical Exam  No acute distress  On high flow oxygen  Antibiotics    sodium chloride 0.9 % bolus 500 mL  doxycycline (Vibramycin) in dextrose 5 % in water (D5W) 100 mL  mg  cefTRIAXone (Rocephin) IVPB 1 g  Tc-99m-albumin (Draximage MAA) injection 4.2 millicurie  dexAMETHasone (Decadron) injection 8 mg  sodium chloride 0.9 % bolus 1,000 mL  enoxaparin (Lovenox) syringe 30 mg  sodium chloride 0.9% infusion  acetaminophen (Tylenol) tablet 650 mg  acetaminophen (Tylenol) oral liquid 650 mg  acetaminophen (Tylenol) suppository 650 mg  ondansetron ODT (Zofran-ODT) disintegrating tablet 4 mg  ondansetron (Zofran) injection 4 mg  polyethylene glycol (Glycolax, Miralax) packet 17 g  bisacodyl (Dulcolax) EC tablet 10 mg  guaiFENesin (Mucinex) 12 hr tablet 600 mg  oxygen (O2) therapy  piperacillin-tazobactam-dextrose (Zosyn) IV 2.25 g  ipratropium-albuteroL (Duo-Neb) 0.5-2.5 mg/3 mL nebulizer solution 3 mL  dexAMETHasone (Decadron) injection 8 mg  dexAMETHasone (Decadron) injection 6 mg  dextrose 50 % injection 25 g  glucagon (Glucagen) injection 1 mg  dextrose 10 % in water (D10W) infusion  insulin lispro (HumaLOG) injection 0-5 Units  vancomycin-diluent combo no.1 (Xellia) IVPB 1,250 mg  ipratropium-albuteroL (Duo-Neb) 0.5-2.5 mg/3 mL nebulizer solution 3 mL  ipratropium-albuteroL (Duo-Neb) 0.5-2.5 mg/3 mL nebulizer solution 3 mL  oxygen (O2)  therapy  remdesivir (Veklury) 200 mg in sodium chloride 0.9% 250 mL IV  remdesivir (Veklury) 100 mg in sodium chloride 0.9% 250 mL IV  oxygen (O2) therapy  pantoprazole (ProtoNix) injection 40 mg  remdesivir (Veklury) 200 mg in sodium chloride 0.9% 250 mL IV  remdesivir (Veklury) 100 mg in sodium chloride 0.9% 250 mL IV  tocilizumab (Actemra) 600 mg in sodium chloride 0.9% 70 mL IV  furosemide (Lasix) tablet 10 mg  melatonin tablet 3 mg  benzonatate (Tessalon) capsule 200 mg  polyethylene glycol (Glycolax, Miralax) packet 17 g  fluticasone (Flonase) nasal spray 1 spray  sodium chloride (Ocean) 0.65 % nasal spray 1 spray  sodium bicarbonate tablet 650 mg  vancomycin-diluent combo no.1 (Xellia) IVPB 1,250 mg  guaiFENesin (Robitussin) 100 mg/5 mL syrup 200 mg  vancomycin-diluent combo no.1 (Xellia) IVPB 750 mg  pantoprazole (ProtoNix) EC tablet 40 mg  oxygen (O2) therapy  furosemide (Lasix) injection 20 mg  oxygen (O2) therapy  heparin (porcine) injection 5,750 Units  heparin 25,000 Units in dextrose 5% 250 mL (100 Units/mL) infusion (premix)  heparin (porcine) injection 3,000-6,000 Units  enoxaparin (Lovenox) syringe 30 mg  melatonin tablet 3 mg  oxygen (O2) therapy  furosemide (Lasix) injection 40 mg  acetaminophen-caffeine 500-65 mg tablet 2 tablet  aspirin-acetaminophen-caffeine (Excedrin Migraine) 250-250-65 mg per tablet 2 tablet  perflutren lipid microspheres (Definity) injection 0.5-10 mL of dilution  sulfur hexafluoride microsphr (Lumason) injection 24.28 mg  perflutren protein A microsphere (Optison) injection 0.5 mL  hydrALAZINE (Apresoline) injection 10 mg  docusate sodium (Colace) capsule 100 mg      Relevant Results  Labs  Results from last 72 hours   Lab Units 11/03/23  0617 11/02/23  0617 11/01/23  0914   WBC AUTO x10*3/uL 20.6* 21.2* 18.3*   HEMOGLOBIN g/dL 10.3* 11.0* 10.6*   HEMATOCRIT % 31.1* 34.1* 31.6*   PLATELETS AUTO x10*3/uL 158 168 137*   NEUTROS PCT AUTO %  --   --  91.1   LYMPHS PCT AUTO %   --   --  2.2   MONOS PCT AUTO %  --   --  2.7   EOS PCT AUTO %  --   --  0.0       Results from last 72 hours   Lab Units 11/03/23  0617 11/02/23 0617 11/01/23  0643   SODIUM mmol/L 134 139 137   POTASSIUM mmol/L 4.9 4.8 4.6   CHLORIDE mmol/L 99 102 102   CO2 mmol/L 25 24 24   BUN mg/dL 61* 59* 59*   CREATININE mg/dL 1.60 1.40 1.50   GLUCOSE mg/dL 159* 203* 170*   CALCIUM mg/dL 8.7 8.6 8.5   ANION GAP mmol/L 10 13 11   EGFR mL/min/1.73m*2 35* 41* 38*       Results from last 72 hours   Lab Units 11/03/23 0617 11/02/23 0617 11/01/23  0643   ALK PHOS U/L 91 114 106   BILIRUBIN TOTAL mg/dL 0.3 0.3 0.3   PROTEIN TOTAL g/dL 5.5* 6.0 5.5*   ALT U/L 18 21 22   AST U/L 13 15 16   ALBUMIN g/dL 3.0* 3.2* 3.0*       Estimated Creatinine Clearance: 31.2 mL/min (by C-G formula based on SCr of 1.6 mg/dL).  C-Reactive Protein   Date Value Ref Range Status   11/03/2023 <0.30 0.00 - 2.00 mg/dL Final   10/31/2023 0.50 0.00 - 2.00 mg/dL Final   10/26/2023 3.90 (H) 0.00 - 2.00 mg/dL Final     Microbiology  C.diff negative  Stool pathogen panel negative  MRSA PCR negative  Imaging  Transthoracic Echo (TTE) Complete    Result Date: 11/1/2023            [facility] [address], [place] [id number]             Phone [phone] TRANSTHORACIC ECHOCARDIOGRAM REPORT  Patient Name:      KARLA Cruz Physician:   [id number] Riccardo Bagley MD Study Date:        10/31/2023         Ordering Provider:   [id number] CUCA SAM MRN/PID:           [id number]           Fellow: Accession#:        [id number]       Nurse: Date of Birth/Age: 1955 / 68      Sonographer:         Temi King RCS                    years Gender:            F                  Additional Staff: Height:            160.02 cm          Admit Date:          10/31/2023 Weight:            70.76 kg           Admission Status:    Inpatient - Routine BSA:               1.74 m2            Department Location: Blood Pressure: 135 /85 mmHg Study Type:     TRANSTHORACIC ECHO (TTE) COMPLETE Diagnosis/ICD: Acute respiratory failure with hypoxia-J96.01 Indication:    Acute Respiratory failure with hypoxemia CPT Codes:     Echo Complete w Full [id number] Patient History: Pertinent History: HTN Prediabetes Chronic Kidney Disease. Study Detail: The following Echo studies were performed: 2D, M-Mode, Doppler and               color flow. Technically challenging study due to poor acoustic               windows, prominent lung artifact and patient lying in supine               position. Lumason used as a contrast agent for endocardial border               definition. Unable to obtain subcostal and suprasternal notch               view.  PHYSICIAN INTERPRETATION: Left Ventricle: Left ventricular systolic function is normal, with an estimated ejection fraction of 60-65%. There are no regional wall motion abnormalities. The left ventricular cavity size is normal. Spectral Doppler shows an impaired relaxation pattern of left ventricular diastolic filling. Left Atrium: The left atrium is normal in size. Right Ventricle: The right ventricle is normal in size. There is normal right ventricular global systolic function. Right Atrium: The right atrium is normal in size. Aortic Valve: The aortic valve is trileaflet. There is trivial aortic valve regurgitation. The peak instantaneous gradient of the aortic valve is 12.0 mmHg. Mitral Valve: The mitral valve is normal in structure. There is trace mitral valve regurgitation. Tricuspid Valve: The tricuspid valve is structurally normal. There is trace tricuspid regurgitation. Pulmonic Valve: The pulmonic valve is not well visualized. The pulmonic valve regurgitation was not well visualized. Pericardium: There is no pericardial effusion noted. Aorta: The aortic root was not well visualized.  CONCLUSIONS:  1. Left ventricular systolic function is normal with a 60-65% estimated ejection fraction.  2. Spectral Doppler shows an impaired  relaxation pattern of left ventricular diastolic filling. QUANTITATIVE DATA SUMMARY: 2D MEASUREMENTS:                          Normal Ranges: LAs:           2.60 cm   (2.7-4.0cm) IVSd:          0.88 cm   (0.6-1.1cm) LVPWd:         0.85 cm   (0.6-1.1cm) LVIDd:         3.51 cm   (3.9-5.9cm) LVIDs:         2.55 cm LV Mass Index: 48.5 g/m2 LV % FS        27.4 % LA VOLUME:                               Normal Ranges: LA Vol A2C:        73.7 ml LA Vol Index A2C:  42.4 ml/m2 LA Area A2C:       21.6 cm2 LA Major Axis A2C: 5.4 cm LA Volume Index:   33.2 ml/m2 LA Vol A2C:        70.6 ml RA VOLUME BY A/L METHOD:                              Normal Ranges: RA Vol A2C:        1.3 ml RA Vol Index A2C:  0.8 ml/m2 RA Area A2C:       5.0 cm2 RA Major Axis A2C: 15.7 cm M-MODE MEASUREMENTS:                  Normal Ranges: Ao Root: 2.70 cm (2.0-3.7cm) LAs:     2.60 cm (2.7-4.0cm) LV SYSTOLIC FUNCTION BY 2D PLANIMETRY (MOD):                     Normal Ranges: EF-A4C View: 78.8 % (>=55%) EF-A2C View: 73.2 % EF-Biplane:  75.3 % LV DIASTOLIC FUNCTION:                     Normal Ranges: MV Peak E: 0.82 m/s (0.7-1.2 m/s) MV Peak A: 1.03 m/s (0.42-0.7 m/s) E/A Ratio: 0.80     (1.0-2.2) MITRAL VALVE:                 Normal Ranges: MV DT: 193 msec (150-240msec) AORTIC VALVE:                          Normal Ranges: AoV Vmax:      1.73 m/s  (<=1.7m/s) AoV Peak P.0 mmHg (<20mmHg) LVOT Max Garrett:  1.37 m/s  (<=1.1m/s) LVOT VTI:      28.40 cm LVOT Diameter: 2.00 cm   (1.8-2.4cm) AoV Area,Vmax: 2.49 cm2  (2.5-4.5cm2)  RIGHT VENTRICLE: RV Basal 3.35 cm RV Mid   2.73 cm RV Major 5.6 cm TRICUSPID VALVE/RVSP:                             Normal Ranges: Peak TR Velocity: 1.97 m/s RV Syst Pressure: 18.5 mmHg (< 30mmHg) PULMONIC VALVE:                         Normal Ranges: PV Accel Time: 74 msec  (>120ms) PV Max Garrett:    1.1 m/s  (0.6-0.9m/s) PV Max P.5 mmHg  [id number] Riccardo Bagley MD Electronically signed on 2023 at 9:53:29 AM  ** Final **      XR chest 1 view    Result Date: 10/31/2023  Interpreted By:  Nirmal Mcmillan, STUDY: XR CHEST 1 VIEW; 10/31/2023 9:48 am   INDICATION: Shortness of breath   COMPARISON: 10/29/2023   ACCESSION NUMBER(S): [id number]   ORDERING CLINICIAN: CUCA SAM   TECHNIQUE: 1 view of the chest was performed.   FINDINGS: No significant interval change in diffuse bilateral airspace opacities involving both lungs more prominent in the mid and lower lungs. Findings do not appear significantly changed. No pleural effusion. No pneumothorax.  The cardiomediastinal silhouette is within normal limits.       No significant interval change in diffuse bilateral airspace opacities. No pleural effusion or significant interval change.   Signed by: Nirmal Mcmillan 10/31/2023 10:45 AM Dictation workstation:   [id number]     Assessment/Plan   Acute hypoxic respiratory failure, requiring high flow  Severe coronavirus pneumonitis  Stage IV chronic kidney disease  Leukocytosis-resolving  Diarrhea-rule out infectious etiology-resolved     Continue dexamethasone  Remdesivir completed  Monitor renal and hematologic parameters  S/p Actemra-10/24/2023  Supportive care  Monitor temperature and WBC  Supplemental oxygen as needed, requiring high flow  Pulmonary follow-up      Charmaine Schaeffer, APRN-CNP

## 2023-11-03 NOTE — CARE PLAN
The patient's goals for the shift include To get rest    The clinical goals for the shift include TITRATE OXYGEN AS TOLERATED

## 2023-11-04 NOTE — PROGRESS NOTES
Pulmonary Progress Note 11/04/23     Patient seen in follow-up for COVID-19 pneumonitis and respiratory failure    Subjective   Interval History:   First time evaluating since day of admission.  She tells me she is feeling much better.  Was able to get up out of bed and move around.  Remains in droplet precautions.  Transitioned to mini bubbler high flow with holding of her transcutaneous saturation.    Objective   Vital signs in last 24 hours:  Temp:  [35.6 °C (96.1 °F)-36.7 °C (98.1 °F)] 35.6 °C (96.1 °F)  Heart Rate:  [60-96] 74  Resp:  [16-35] 30  BP: (128-157)/(63-85) 135/78  FiO2 (%):  [70 %-80 %] 80 %    Intake/Output last 3 shifts:  I/O last 3 completed shifts:  In: 100 (1.4 mL/kg) [IV Piggyback:100]  Out: 1400 (19.6 mL/kg) [Urine:1400 (0.5 mL/kg/hr)]  Weight: 71.3 kg   Intake/Output this shift:  I/O this shift:  In: 50 [IV Piggyback:50]  Out: -     Physical Exam  Direct physical exam deferred to preserve personal protective equipment in the setting of COVID-19 infection.  Exam based on staff documentation and  observation  Alert, no acute distress  Nasal cannula  Sinus on the monitor  Speaking in full sentences, nonlabored respirations  Prevention intact, answers questions appropriately, moves all extremities on observation    Scheduled medications  amLODIPine, 2.5 mg, oral, Daily  dexAMETHasone, 6 mg, intravenous, q24h  enoxaparin, 30 mg, subcutaneous, Daily  fluticasone, 1 spray, Each Nostril, BID  glipiZIDE, 2.5 mg, oral, Daily before breakfast  insulin lispro, 0-5 Units, subcutaneous, TID with meals  ipratropium-albuteroL, 3 mL, nebulization, q6h while awake  melatonin, 3 mg, oral, Nightly  oxygen, , inhalation, q4h  pantoprazole, 40 mg, oral, Daily before breakfast  perflutren lipid microspheres, 0.5-10 mL of dilution, intravenous, Once in imaging  perflutren protein A microsphere, 0.5 mL, intravenous, Once in imaging  piperacillin-tazobactam, 3.375 g, intravenous, q6h  sodium bicarbonate,  650 mg, oral, TID      Continuous medications     PRN medications  PRN medications: acetaminophen **OR** acetaminophen **OR** acetaminophen, aspirin-acetaminophen-caffeine, benzonatate, bisacodyl, dextrose 10 % in water (D10W), dextrose, docusate sodium, glucagon, guaiFENesin, hydrALAZINE, ipratropium-albuteroL, melatonin, ondansetron ODT **OR** ondansetron, [Held by provider] oxygen, polyethylene glycol, sodium chloride     Labs:  CBC and BMP reviewed.  Elevated BUN potentially in the setting of steroids.  Additionally elevated white count likely also in the setting of steroids    Imaging:  XR chest 1 view    Result Date: 11/3/2023  Interpreted By:  Aba Roach, STUDY: XR CHEST 1 VIEW; 11/3/2023 11:37 am   INDICATION: Signs/Symptoms:respiratory failure   COMPARISON: 10/31/2023   ACCESSION NUMBER(S): [id number]   ORDERING CLINICIAN: DENNYS MERCADO   FINDINGS: The cardiomediastinal silhouette is stable. Interval progression of severe patchy airspace opacities are noted bilaterally. No pleural effusion is identified.   The osseous structures are intact.       Interval progression of severe bilateral infiltrate.   MACRO: None   Signed by: Aba Roach 11/3/2023 4:15 PM Dictation workstation:   [id number]    Lower extremity venous duplex bilateral    Result Date: 11/2/2023  Interpreted By:  Russel Handley, STUDY: [facility] LOWER EXTREMITY VENOUS DUPLEX BILATERAL; 10/27/2023; 10/27/2023 11:15 am   INDICATION: Signs/Symptoms:elev dimer cv pos ro dvt. Shortness of breath.   ACCESSION NUMBER(S): [id number]   ORDERING CLINICIAN: SUSANNE FRANCOIS   TECHNIQUE: PROCEDURE: Grayscale, 2D, color Doppler, Doppler spectral analysis (waveform) and duplex images obtained.   FINDINGS: Right Lower Extremity: Patent with normal flow, and compressibility of the common and superficial femoral, popliteal, and greater saphenous veins  as well as in the tibioperoneal trunk and trifurcation.  There is no subcutaneous edema.   Left Lower  Extremity: Patent with normal flow, and compressibility of the common and superficial femoral, popliteal, and greater saphenous veins  as well as in the tibioperoneal trunk and trifurcation.  There is no subcutaneous edema.       1. Right Lower Extremity: No evidence of deep vein thrombosis.   2. Left Lower Extremity: No evidence of deep vein thrombosis.   Signed by: Russel Handley 11/2/2023 2:24 PM Dictation workstation:   [id number]    Transthoracic Echo (TTE) Complete    Result Date: 11/1/2023            [address], [place] [id number]             Phone [phone] TRANSTHORACIC ECHOCARDIOGRAM REPORT  Patient Name:      KARLA AGUILERA Reading Physician:   [id number] Riccardo Bagley MD Study Date:        10/31/2023         Ordering Provider:   [id number] CUCA SAM MRN/PID:           [id number]           Fellow: Accession#:        [id number]       Nurse: Date of Birth/Age: 1955 / 68      Sonographer:         Temi King RCS                    years Gender:            F                  Additional Staff: Height:            160.02 cm          Admit Date:          10/31/2023 Weight:            70.76 kg           Admission Status:    Inpatient - Routine BSA:               1.74 m2            Department Location: Blood Pressure: 135 /85 mmHg Study Type:    TRANSTHORACIC ECHO (TTE) COMPLETE Diagnosis/ICD: Acute respiratory failure with hypoxia-J96.01 Indication:    Acute Respiratory failure with hypoxemia CPT Codes:     Echo Complete w Full Doppler-[id number] Patient History: Pertinent History: HTN Prediabetes Chronic Kidney Disease. Study Detail: The following Echo studies were performed: 2D, M-Mode, Doppler and               color flow. Technically challenging study due to poor acoustic               windows, prominent lung artifact and patient lying in supine               position. Lumason used as a contrast agent for endocardial border               definition. Unable to obtain  subcostal and suprasternal notch               view.  PHYSICIAN INTERPRETATION: Left Ventricle: Left ventricular systolic function is normal, with an estimated ejection fraction of 60-65%. There are no regional wall motion abnormalities. The left ventricular cavity size is normal. Spectral Doppler shows an impaired relaxation pattern of left ventricular diastolic filling. Left Atrium: The left atrium is normal in size. Right Ventricle: The right ventricle is normal in size. There is normal right ventricular global systolic function. Right Atrium: The right atrium is normal in size. Aortic Valve: The aortic valve is trileaflet. There is trivial aortic valve regurgitation. The peak instantaneous gradient of the aortic valve is 12.0 mmHg. Mitral Valve: The mitral valve is normal in structure. There is trace mitral valve regurgitation. Tricuspid Valve: The tricuspid valve is structurally normal. There is trace tricuspid regurgitation. Pulmonic Valve: The pulmonic valve is not well visualized. The pulmonic valve regurgitation was not well visualized. Pericardium: There is no pericardial effusion noted. Aorta: The aortic root was not well visualized.  CONCLUSIONS:  1. Left ventricular systolic function is normal with a 60-65% estimated ejection fraction.  2. Spectral Doppler shows an impaired relaxation pattern of left ventricular diastolic filling. QUANTITATIVE DATA SUMMARY: 2D MEASUREMENTS:                          Normal Ranges: LAs:           2.60 cm   (2.7-4.0cm) IVSd:          0.88 cm   (0.6-1.1cm) LVPWd:         0.85 cm   (0.6-1.1cm) LVIDd:         3.51 cm   (3.9-5.9cm) LVIDs:         2.55 cm LV Mass Index: 48.5 g/m2 LV % FS        27.4 % LA VOLUME:                               Normal Ranges: LA Vol A2C:        73.7 ml LA Vol Index A2C:  42.4 ml/m2 LA Area A2C:       21.6 cm2 LA Major Axis A2C: 5.4 cm LA Volume Index:   33.2 ml/m2 LA Vol A2C:        70.6 ml RA VOLUME BY A/L METHOD:                               Normal Ranges: RA Vol A2C:        1.3 ml RA Vol Index A2C:  0.8 ml/m2 RA Area A2C:       5.0 cm2 RA Major Axis A2C: 15.7 cm M-MODE MEASUREMENTS:                  Normal Ranges: Ao Root: 2.70 cm (2.0-3.7cm) LAs:     2.60 cm (2.7-4.0cm) LV SYSTOLIC FUNCTION BY 2D PLANIMETRY (MOD):                     Normal Ranges: EF-A4C View: 78.8 % (>=55%) EF-A2C View: 73.2 % EF-Biplane:  75.3 % LV DIASTOLIC FUNCTION:                     Normal Ranges: MV Peak E: 0.82 m/s (0.7-1.2 m/s) MV Peak A: 1.03 m/s (0.42-0.7 m/s) E/A Ratio: 0.80     (1.0-2.2) MITRAL VALVE:                 Normal Ranges: MV DT: 193 msec (150-240msec) AORTIC VALVE:                          Normal Ranges: AoV Vmax:      1.73 m/s  (<=1.7m/s) AoV Peak P.0 mmHg (<20mmHg) LVOT Max Garrett:  1.37 m/s  (<=1.1m/s) LVOT VTI:      28.40 cm LVOT Diameter: 2.00 cm   (1.8-2.4cm) AoV Area,Vmax: 2.49 cm2  (2.5-4.5cm2)  RIGHT VENTRICLE: RV Basal 3.35 cm RV Mid   2.73 cm RV Major 5.6 cm TRICUSPID VALVE/RVSP:                             Normal Ranges: Peak TR Velocity: 1.97 m/s RV Syst Pressure: 18.5 mmHg (< 30mmHg) PULMONIC VALVE:                         Normal Ranges: PV Accel Time: 74 msec  (>120ms) PV Max Garrett:    1.1 m/s  (0.6-0.9m/s) PV Max P.5 mmHg  [id number] Riccardo Bagley MD Electronically signed on 2023 at 9:53:29 AM  ** Final **     XR chest 1 view    Result Date: 10/31/2023  Interpreted By:  Nirmal Mcmillan, STUDY: XR CHEST 1 VIEW; 10/31/2023 9:48 am   INDICATION: Shortness of breath   COMPARISON: 10/29/2023   ACCESSION NUMBER(S): [id number]   ORDERING CLINICIAN: CUCA SAM   TECHNIQUE: 1 view of the chest was performed.   FINDINGS: No significant interval change in diffuse bilateral airspace opacities involving both lungs more prominent in the mid and lower lungs. Findings do not appear significantly changed. No pleural effusion. No pneumothorax.  The cardiomediastinal silhouette is within normal limits.       No significant interval change in diffuse  bilateral airspace opacities. No pleural effusion or significant interval change.   Signed by: Nirmal Mcmillan 10/31/2023 10:45 AM Dictation workstation:   [id number]    XR chest 1 view    Result Date: 10/29/2023  Interpreted By:  Karina Rodriguez, STUDY: XR CHEST 1 VIEW; 10/29/2023 1:02 pm   INDICATION: Signs/Symptoms:hyppoxemia   COMPARISON: 10/27/2023   ACCESSION NUMBER(S): [id number]   ORDERING CLINICIAN: SUSANNE FRANCOIS   TECHNIQUE: Frontal  chest radiographs.   FINDINGS: The cardiomediastinal silhouette is unremarkable. Diffuse airspace opacities are noted overlying both lungs, unchanged from prior examinations. No pleural effusion is identified.   The osseous structures are intact.       No significant change from prior examination. Diffuse airspace opacities overlying both lungs, differential includes ARDS, diffuse alveolar edema/hemorrhage.     Signed by: Karina Rodriguez 10/29/2023 2:19 PM Dictation workstation:   [id number]    XR chest 1 view    Result Date: 10/27/2023  Interpreted By:  Nirmal Mcmillan, STUDY: XR CHEST 1 VIEW; 10/27/2023 10:06 am   INDICATION: Signs/Symptoms:follow up hypoxemia.   COMPARISON: 10/23/2023   ACCESSION NUMBER(S): [id number]   ORDERING CLINICIAN: SUSANNE FRANCOIS   TECHNIQUE: 1 view of the chest was performed.   FINDINGS: There is diffuse ground-glass opacity bilaterally consistent with history of COVID. No lobar or large consolidation however the ground-glass opacities are prominent but stable from the previous examination. No pleural effusion. No pneumothorax.  The cardiomediastinal silhouette is within normal limits.       No significant change. Prominent ground-glass opacities bilaterally, however, not significantly changed.   Signed by: Nirmal Mcmillan 10/27/2023 10:24 PM Dictation workstation:   [id number]    CT chest wo IV contrast    Result Date: 10/24/2023  Interpreted By:  Nirmal Mcmillan, STUDY: CT CHEST WO IV CONTRAST; 10/24/2023 12:27 am   INDICATION:  Signs/Symptoms:Covid.   COMPARISON: None   ACCESSION NUMBER(S): [id number]   ORDERING CLINICIAN: GERTRUDE FIERRO   TECHNIQUE: Contiguous axial images of the thorax were obtained from the level of the thoracic inlet through the lung bases. Coronal and sagittal reformatted images were obtained.     FINDINGS: The thyroid gland is unremarkable.   The heart size is within normal limits.  No pericardial effusion is identified. The aorta and pulmonary arteries are normal in caliber.   There are no pathologically enlarged mediastinal, hilar, or axillary lymph nodes are seen.   The trachea and mainstem bronchi are patent. Diffuse patchy ground-glass opacities bilaterally which is compatible with atypical pneumonia. There is no evidence of pneumothorax or pleural effusion.   The visualized osseous structures are intact.   Limited images through the upper abdomen are unremarkable.       Diffuse, patchy ground-glass opacities bilaterally suggesting atypical pneumonia such as COVID.     Signed by: Nirmal Mcmillan 10/24/2023 12:32 PM Dictation workstation:   [id number]    NM lung perfusion particulate    Result Date: 10/23/2023  Interpreted By:  Yoly Stanley, STUDY: NM LUNG PERFUSION PARTICULATE 10/23/2023 8:44 pm   INDICATION: Signs/Symptoms:hypoxia, r/o PE   COMPARISON: Chest x-ray done earlier today   ACCESSION NUMBER(S): [id number]   ORDERING CLINICIAN: ANAHY EVANS   TECHNIQUE: 4.2 millicuries of technetium 99 M MAA was injected intravenously with perfusion lung scan in 8 projections completed.   FINDINGS: No perfusion defect is seen within either lung.       Normal perfusion lung scan.   Signed by: Yoly Stanley 10/23/2023 8:57 PM Dictation workstation:   [id number]    XR chest 1 view    Result Date: 10/23/2023  Interpreted By:  Yoly Stanley, STUDY: XR CHEST 1 VIEW 10/23/2023 4:55 pm   INDICATION: Signs/Symptoms:dyspnea, cough, fatigue, and malaise. Positive COVID.   COMPARISON: None available.   ACCESSION NUMBER(S):  [id number]   ORDERING CLINICIAN: ANAHY EVANS   TECHNIQUE: AP erect view of the chest   FINDINGS: The cardiac size is normal with no mediastinal abnormality identified. The trachea is midline. No pleural abnormality is seen. However, there are bilateral infiltrates with ground-glass appearance with relative sparing of the left upper lung zone.       Bilateral ground-glass infiltrates.   Signed by: Yoly Stanley 10/23/2023 5:08 PM Dictation workstation:   [id number]              Assessment/Plan   Principal Problem:    Acute respiratory failure with hypoxemia (CMS/HCC)  Active Problems:    COVID-19    Benign essential HTN    Stage 4 chronic kidney disease (CMS/HCC)    Overall holding her own on many high flow.  Reviewed yesterday's chest x-ray showing acute lung injury.  Not sure how much of this is cardiogenic edema.    Continue with mini high flow with hopefully we can wean off of the 15 L.  I am okay with transcutaneous saturation of 88% and above  Completed remdesivir  Bronchodilators  Even fluid balance.  Assess for diuresis  Antibiotics per ID  Prophylaxis    We will continue to follow    Sean Barclay MD  Pulmonary/CC Medicine  [facility]       LOS: 12 days

## 2023-11-04 NOTE — CARE PLAN
Problem: Respiratory  Goal: Minimal/no exertional discomfort or dyspnea this shift  Outcome: Progressing  Goal: No signs of respiratory distress (eg. Use of accessory muscles. Peds grunting)  Outcome: Progressing  Goal: Clear secretions with interventions this shift  Outcome: Progressing  Goal: Minimize anxiety/maximize coping throughout shift  Outcome: Progressing

## 2023-11-04 NOTE — PROGRESS NOTES
Macarena Wilson is a 68 y.o. female on day 12 of admission presenting with Acute respiratory failure with hypoxemia (CMS/HCC).    Subjective   Interval History:   Awake, alert  Afebrile, no chills  No chest pain  On 15 L of oxygen  Mild nonproductive cough  No nausea vomiting or diarrhea        Review of Systems   All other systems reviewed and are negative.      Objective   Range of Vitals (last 24 hours)  Heart Rate:  [60-96]   Temp:  [35.6 °C (96.1 °F)-36.5 °C (97.7 °F)]   Resp:  [16-35]   BP: (118-171)/(57-85)   Weight:  [71.3 kg (157 lb 3 oz)]   SpO2:  [91 %-97 %]   Daily Weight  11/04/23 : 71.3 kg (157 lb 3 oz)    Body mass index is 27.84 kg/m².    Physical Exam  Constitutional:       Appearance: Normal appearance.  No acute distress, ill-appearing  HENT:      Head: Normocephalic and atraumatic.      Nose: Nose normal.     Eyes:      Extraocular Movements: Extraocular movements intact.      Conjunctiva/sclera: Conjunctivae normal.   Cardiovascular:      Rate and Rhythm: Normal rate and regular rhythm.   Pulmonary:      Effort: Pulmonary effort is normal.      Breath sounds: Diminished  Abdominal:      General: Bowel sounds are normal.      Palpations: Abdomen is soft.   Musculoskeletal:         General: Normal range of motion.      Cervical back: Normal range of motion and neck supple.   Skin:     General: Skin is warm and dry.   Neurological:      General: No focal deficit present.      Mental Status: She is alert and oriented to person, place, and time. Mental status is at baseline.   Psychiatric:         Mood and Affect: Mood normal.         Behavior: Behavior normal.     Antibiotics    sodium chloride 0.9 % bolus 500 mL  doxycycline (Vibramycin) in dextrose 5 % in water (D5W) 100 mL  mg  cefTRIAXone (Rocephin) IVPB 1 g  Tc-99m-albumin (Draximage MAA) injection 4.2 millicurie  dexAMETHasone (Decadron) injection 8 mg  sodium chloride 0.9 % bolus 1,000 mL  enoxaparin (Lovenox) syringe 30 mg  sodium  chloride 0.9% infusion  acetaminophen (Tylenol) tablet 650 mg  acetaminophen (Tylenol) oral liquid 650 mg  acetaminophen (Tylenol) suppository 650 mg  ondansetron ODT (Zofran-ODT) disintegrating tablet 4 mg  ondansetron (Zofran) injection 4 mg  polyethylene glycol (Glycolax, Miralax) packet 17 g  bisacodyl (Dulcolax) EC tablet 10 mg  guaiFENesin (Mucinex) 12 hr tablet 600 mg  oxygen (O2) therapy  piperacillin-tazobactam-dextrose (Zosyn) IV 2.25 g  ipratropium-albuteroL (Duo-Neb) 0.5-2.5 mg/3 mL nebulizer solution 3 mL  dexAMETHasone (Decadron) injection 8 mg  dexAMETHasone (Decadron) injection 6 mg  dextrose 50 % injection 25 g  glucagon (Glucagen) injection 1 mg  dextrose 10 % in water (D10W) infusion  insulin lispro (HumaLOG) injection 0-5 Units  vancomycin-diluent combo no.1 (Xellia) IVPB 1,250 mg  ipratropium-albuteroL (Duo-Neb) 0.5-2.5 mg/3 mL nebulizer solution 3 mL  ipratropium-albuteroL (Duo-Neb) 0.5-2.5 mg/3 mL nebulizer solution 3 mL  oxygen (O2) therapy  remdesivir (Veklury) 200 mg in sodium chloride 0.9% 250 mL IV  remdesivir (Veklury) 100 mg in sodium chloride 0.9% 250 mL IV  oxygen (O2) therapy  pantoprazole (ProtoNix) injection 40 mg  remdesivir (Veklury) 200 mg in sodium chloride 0.9% 250 mL IV  remdesivir (Veklury) 100 mg in sodium chloride 0.9% 250 mL IV  tocilizumab (Actemra) 600 mg in sodium chloride 0.9% 70 mL IV  furosemide (Lasix) tablet 10 mg  melatonin tablet 3 mg  benzonatate (Tessalon) capsule 200 mg  polyethylene glycol (Glycolax, Miralax) packet 17 g  fluticasone (Flonase) nasal spray 1 spray  sodium chloride (Ocean) 0.65 % nasal spray 1 spray  sodium bicarbonate tablet 650 mg  vancomycin-diluent combo no.1 (Xellia) IVPB 1,250 mg  guaiFENesin (Robitussin) 100 mg/5 mL syrup 200 mg  vancomycin-diluent combo no.1 (Xellia) IVPB 750 mg  pantoprazole (ProtoNix) EC tablet 40 mg  oxygen (O2) therapy  furosemide (Lasix) injection 20 mg  oxygen (O2) therapy  heparin (porcine) injection 5,750  Units  heparin 25,000 Units in dextrose 5% 250 mL (100 Units/mL) infusion (premix)  heparin (porcine) injection 3,000-6,000 Units  enoxaparin (Lovenox) syringe 30 mg  melatonin tablet 3 mg  oxygen (O2) therapy  furosemide (Lasix) injection 40 mg  acetaminophen-caffeine 500-65 mg tablet 2 tablet  aspirin-acetaminophen-caffeine (Excedrin Migraine) 250-250-65 mg per tablet 2 tablet  perflutren lipid microspheres (Definity) injection 0.5-10 mL of dilution  sulfur hexafluoride microsphr (Lumason) injection 24.28 mg  perflutren protein A microsphere (Optison) injection 0.5 mL  hydrALAZINE (Apresoline) injection 10 mg  docusate sodium (Colace) capsule 100 mg  amLODIPine (Norvasc) tablet 2.5 mg  glipiZIDE (Glucotrol) tablet 5 mg  piperacillin-tazobactam-dextrose (Zosyn) IV 3.375 g  glipiZIDE (Glucotrol) tablet 2.5 mg      Relevant Results  Labs  Results from last 72 hours   Lab Units 11/04/23  0639 11/03/23 0617 11/02/23 0617   WBC AUTO x10*3/uL 17.5* 20.6* 21.2*   HEMOGLOBIN g/dL 11.0* 10.3* 11.0*   HEMATOCRIT % 33.6* 31.1* 34.1*   PLATELETS AUTO x10*3/uL 149* 158 168     Results from last 72 hours   Lab Units 11/04/23  0638 11/03/23  0617 11/02/23 0617   SODIUM mmol/L 137 134 139   POTASSIUM mmol/L 5.1 4.9 4.8   CHLORIDE mmol/L 102 99 102   CO2 mmol/L 25 25 24   BUN mg/dL 63* 61* 59*   CREATININE mg/dL 1.60 1.60 1.40   GLUCOSE mg/dL 129* 159* 203*   CALCIUM mg/dL 8.1* 8.7 8.6   ANION GAP mmol/L 10 10 13   EGFR mL/min/1.73m*2 35* 35* 41*     Results from last 72 hours   Lab Units 11/03/23  0617 11/02/23 0617   ALK PHOS U/L 91 114   BILIRUBIN TOTAL mg/dL 0.3 0.3   PROTEIN TOTAL g/dL 5.5* 6.0   ALT U/L 18 21   AST U/L 13 15   ALBUMIN g/dL 3.0* 3.2*     Estimated Creatinine Clearance: 31.4 mL/min (by C-G formula based on SCr of 1.6 mg/dL).  C-Reactive Protein   Date Value Ref Range Status   11/03/2023 <0.30 0.00 - 2.00 mg/dL Final   10/31/2023 0.50 0.00 - 2.00 mg/dL Final   10/26/2023 3.90 (H) 0.00 - 2.00 mg/dL Final      Microbiology  Reviewed  Imaging  XR chest 1 view    Result Date: 11/3/2023  Interpreted By:  Aba Roach, STUDY: XR CHEST 1 VIEW; 11/3/2023 11:37 am   INDICATION: Signs/Symptoms:respiratory failure   COMPARISON: 10/31/2023   ACCESSION NUMBER(S): [id number]   ORDERING CLINICIAN: DENNYS MERCADO   FINDINGS: The cardiomediastinal silhouette is stable. Interval progression of severe patchy airspace opacities are noted bilaterally. No pleural effusion is identified.   The osseous structures are intact.       Interval progression of severe bilateral infiltrate.   MACRO: None   Signed by: Aba Roach 11/3/2023 4:15 PM Dictation workstation:   [id number]    Lower extremity venous duplex bilateral    Result Date: 11/2/2023  Interpreted By:  Russel Handley, STUDY: [facility] LOWER EXTREMITY VENOUS DUPLEX BILATERAL; 10/27/2023; 10/27/2023 11:15 am   INDICATION: Signs/Symptoms:elev dimer cv pos ro dvt. Shortness of breath.   ACCESSION NUMBER(S): [id number]   ORDERING CLINICIAN: SUSANNE FRANCOIS   TECHNIQUE: PROCEDURE: Grayscale, 2D, color Doppler, Doppler spectral analysis (waveform) and duplex images obtained.   FINDINGS: Right Lower Extremity: Patent with normal flow, and compressibility of the common and superficial femoral, popliteal, and greater saphenous veins  as well as in the tibioperoneal trunk and trifurcation.  There is no subcutaneous edema.   Left Lower Extremity: Patent with normal flow, and compressibility of the common and superficial femoral, popliteal, and greater saphenous veins  as well as in the tibioperoneal trunk and trifurcation.  There is no subcutaneous edema.       1. Right Lower Extremity: No evidence of deep vein thrombosis.   2. Left Lower Extremity: No evidence of deep vein thrombosis.   Signed by: Russel Handley 11/2/2023 2:24 PM Dictation workstation:   [id number]    Transthoracic Echo (TTE) Complete    Result Date: 11/1/2023            [facility] [address], [place] [address]             Phone [phone] TRANSTHORACIC ECHOCARDIOGRAM REPORT  Patient Name:      KARLA CAICEDO WILLY Reading Physician:   [id number] Riccardo Bagley MD Study Date:        10/31/2023         Ordering Provider:   [id number] CUCA SAM MRN/PID:           [id number]           Fellow: Accession#:        [id number]       Nurse: Date of Birth/Age: 1955 / 68      Sonographer:         Temi King RCS                    years Gender:            F                  Additional Staff: Height:            160.02 cm          Admit Date:          10/31/2023 Weight:            70.76 kg           Admission Status:    Inpatient - Routine BSA:               1.74 m2            Department Location: Blood Pressure: 135 /85 mmHg Study Type:    TRANSTHORACIC ECHO (TTE) COMPLETE Diagnosis/ICD: Acute respiratory failure with hypoxia-J96.01 Indication:    Acute Respiratory failure with hypoxemia CPT Codes:     Echo Complete w Full Doppler-[id number] Patient History: Pertinent History: HTN Prediabetes Chronic Kidney Disease. Study Detail: The following Echo studies were performed: 2D, M-Mode, Doppler and               color flow. Technically challenging study due to poor acoustic               windows, prominent lung artifact and patient lying in supine               position. Lumason used as a contrast agent for endocardial border               definition. Unable to obtain subcostal and suprasternal notch               view.  PHYSICIAN INTERPRETATION: Left Ventricle: Left ventricular systolic function is normal, with an estimated ejection fraction of 60-65%. There are no regional wall motion abnormalities. The left ventricular cavity size is normal. Spectral Doppler shows an impaired relaxation pattern of left ventricular diastolic filling. Left Atrium: The left atrium is normal in size. Right Ventricle: The right ventricle is normal in size. There is normal right ventricular global systolic function. Right Atrium: The right  atrium is normal in size. Aortic Valve: The aortic valve is trileaflet. There is trivial aortic valve regurgitation. The peak instantaneous gradient of the aortic valve is 12.0 mmHg. Mitral Valve: The mitral valve is normal in structure. There is trace mitral valve regurgitation. Tricuspid Valve: The tricuspid valve is structurally normal. There is trace tricuspid regurgitation. Pulmonic Valve: The pulmonic valve is not well visualized. The pulmonic valve regurgitation was not well visualized. Pericardium: There is no pericardial effusion noted. Aorta: The aortic root was not well visualized.  CONCLUSIONS:  1. Left ventricular systolic function is normal with a 60-65% estimated ejection fraction.  2. Spectral Doppler shows an impaired relaxation pattern of left ventricular diastolic filling. QUANTITATIVE DATA SUMMARY: 2D MEASUREMENTS:                          Normal Ranges: LAs:           2.60 cm   (2.7-4.0cm) IVSd:          0.88 cm   (0.6-1.1cm) LVPWd:         0.85 cm   (0.6-1.1cm) LVIDd:         3.51 cm   (3.9-5.9cm) LVIDs:         2.55 cm LV Mass Index: 48.5 g/m2 LV % FS        27.4 % LA VOLUME:                               Normal Ranges: LA Vol A2C:        73.7 ml LA Vol Index A2C:  42.4 ml/m2 LA Area A2C:       21.6 cm2 LA Major Axis A2C: 5.4 cm LA Volume Index:   33.2 ml/m2 LA Vol A2C:        70.6 ml RA VOLUME BY A/L METHOD:                              Normal Ranges: RA Vol A2C:        1.3 ml RA Vol Index A2C:  0.8 ml/m2 RA Area A2C:       5.0 cm2 RA Major Axis A2C: 15.7 cm M-MODE MEASUREMENTS:                  Normal Ranges: Ao Root: 2.70 cm (2.0-3.7cm) LAs:     2.60 cm (2.7-4.0cm) LV SYSTOLIC FUNCTION BY 2D PLANIMETRY (MOD):                     Normal Ranges: EF-A4C View: 78.8 % (>=55%) EF-A2C View: 73.2 % EF-Biplane:  75.3 % LV DIASTOLIC FUNCTION:                     Normal Ranges: MV Peak E: 0.82 m/s (0.7-1.2 m/s) MV Peak A: 1.03 m/s (0.42-0.7 m/s) E/A Ratio: 0.80     (1.0-2.2) MITRAL VALVE:                  Normal Ranges: MV DT: 193 msec (150-240msec) AORTIC VALVE:                          Normal Ranges: AoV Vmax:      1.73 m/s  (<=1.7m/s) AoV Peak P.0 mmHg (<20mmHg) LVOT Max Garrett:  1.37 m/s  (<=1.1m/s) LVOT VTI:      28.40 cm LVOT Diameter: 2.00 cm   (1.8-2.4cm) AoV Area,Vmax: 2.49 cm2  (2.5-4.5cm2)  RIGHT VENTRICLE: RV Basal 3.35 cm RV Mid   2.73 cm RV Major 5.6 cm TRICUSPID VALVE/RVSP:                             Normal Ranges: Peak TR Velocity: 1.97 m/s RV Syst Pressure: 18.5 mmHg (< 30mmHg) PULMONIC VALVE:                         Normal Ranges: PV Accel Time: 74 msec  (>120ms) PV Max Garrett:    1.1 m/s  (0.6-0.9m/s) PV Max P.5 mmHg  [id number] Riccardo Bagley MD Electronically signed on 2023 at 9:53:29 AM  ** Final **     XR chest 1 view    Result Date: 10/31/2023  Interpreted By:  Nirmal Mcmillan, STUDY: XR CHEST 1 VIEW; 10/31/2023 9:48 am   INDICATION: Shortness of breath   COMPARISON: 10/29/2023   ACCESSION NUMBER(S): [id number]   ORDERING CLINICIAN: CUCA SAM   TECHNIQUE: 1 view of the chest was performed.   FINDINGS: No significant interval change in diffuse bilateral airspace opacities involving both lungs more prominent in the mid and lower lungs. Findings do not appear significantly changed. No pleural effusion. No pneumothorax.  The cardiomediastinal silhouette is within normal limits.       No significant interval change in diffuse bilateral airspace opacities. No pleural effusion or significant interval change.   Signed by: Nirmal Mcmillan 10/31/2023 10:45 AM Dictation workstation:   [id number]    XR chest 1 view    Result Date: 10/29/2023  Interpreted By:  Karina Rodriguez, STUDY: XR CHEST 1 VIEW; 10/29/2023 1:02 pm   INDICATION: Signs/Symptoms:hyppoxemia   COMPARISON: 10/27/2023   ACCESSION NUMBER(S): [id number]   ORDERING CLINICIAN: SUSANNE FRANCOIS   TECHNIQUE: Frontal  chest radiographs.   FINDINGS: The cardiomediastinal silhouette is unremarkable. Diffuse airspace opacities are noted  overlying both lungs, unchanged from prior examinations. No pleural effusion is identified.   The osseous structures are intact.       No significant change from prior examination. Diffuse airspace opacities overlying both lungs, differential includes ARDS, diffuse alveolar edema/hemorrhage.     Signed by: Karina Rodriguez 10/29/2023 2:19 PM Dictation workstation:   [id number]    XR chest 1 view    Result Date: 10/27/2023  Interpreted By:  Nirmal Mcmillan, STUDY: XR CHEST 1 VIEW; 10/27/2023 10:06 am   INDICATION: Signs/Symptoms:follow up hypoxemia.   COMPARISON: 10/23/2023   ACCESSION NUMBER(S): [id number]   ORDERING CLINICIAN: SUSANNE FRANCOIS   TECHNIQUE: 1 view of the chest was performed.   FINDINGS: There is diffuse ground-glass opacity bilaterally consistent with history of COVID. No lobar or large consolidation however the ground-glass opacities are prominent but stable from the previous examination. No pleural effusion. No pneumothorax.  The cardiomediastinal silhouette is within normal limits.       No significant change. Prominent ground-glass opacities bilaterally, however, not significantly changed.   Signed by: Nirmal Mcmillan 10/27/2023 10:24 PM Dictation workstation:   [id number]    CT chest wo IV contrast    Result Date: 10/24/2023  Interpreted By:  Nirmal Mcmillan, STUDY: CT CHEST WO IV CONTRAST; 10/24/2023 12:27 am   INDICATION: Signs/Symptoms:Covid.   COMPARISON: None   ACCESSION NUMBER(S): [id number]   ORDERING CLINICIAN: GERTRUDE FIERRO   TECHNIQUE: Contiguous axial images of the thorax were obtained from the level of the thoracic inlet through the lung bases. Coronal and sagittal reformatted images were obtained.     FINDINGS: The thyroid gland is unremarkable.   The heart size is within normal limits.  No pericardial effusion is identified. The aorta and pulmonary arteries are normal in caliber.   There are no pathologically enlarged mediastinal, hilar, or axillary lymph nodes are seen.    The trachea and mainstem bronchi are patent. Diffuse patchy ground-glass opacities bilaterally which is compatible with atypical pneumonia. There is no evidence of pneumothorax or pleural effusion.   The visualized osseous structures are intact.   Limited images through the upper abdomen are unremarkable.       Diffuse, patchy ground-glass opacities bilaterally suggesting atypical pneumonia such as COVID.     Signed by: Nirmal Mcmillan 10/24/2023 12:32 PM Dictation workstation:   [id number]    NM lung perfusion particulate    Result Date: 10/23/2023  Interpreted By:  Yoly Stanley, STUDY: NM LUNG PERFUSION PARTICULATE 10/23/2023 8:44 pm   INDICATION: Signs/Symptoms:hypoxia, r/o PE   COMPARISON: Chest x-ray done earlier today   ACCESSION NUMBER(S): [id number]   ORDERING CLINICIAN: ANAHY EVANS   TECHNIQUE: 4.2 millicuries of technetium 99 M MAA was injected intravenously with perfusion lung scan in 8 projections completed.   FINDINGS: No perfusion defect is seen within either lung.       Normal perfusion lung scan.   Signed by: Yoly Stanley 10/23/2023 8:57 PM Dictation workstation:   [id number]    XR chest 1 view    Result Date: 10/23/2023  Interpreted By:  Yoly Stanley, STUDY: XR CHEST 1 VIEW 10/23/2023 4:55 pm   INDICATION: Signs/Symptoms:dyspnea, cough, fatigue, and malaise. Positive COVID.   COMPARISON: None available.   ACCESSION NUMBER(S): [id number]   ORDERING CLINICIAN: ANAHY EVANS   TECHNIQUE: AP erect view of the chest   FINDINGS: The cardiac size is normal with no mediastinal abnormality identified. The trachea is midline. No pleural abnormality is seen. However, there are bilateral infiltrates with ground-glass appearance with relative sparing of the left upper lung zone.       Bilateral ground-glass infiltrates.   Signed by: Yoly Stanley 10/23/2023 5:08 PM Dictation workstation:   [id number]    Assessment/Plan   Acute hypoxic respiratory failure, resolving  Severe coronavirus  pneumonitis-improving  Stage IV chronic kidney disease  Leukocytosis-resolving  Diarrhea-rule out infectious etiology-resolved     Continue dexamethasone  Remdesivir completed  Monitor renal and hematologic parameters  S/p Actemra-10/24/2023  Supportive care  Monitor temperature and WBC  Supplemental oxygen as needed, requiring high flow  Pulmonary follow-up      Js Vasquez MD

## 2023-11-04 NOTE — PROGRESS NOTES
Macarena Wislon is a 68 y.o. female on day 12 of admission presenting with Acute respiratory failure with hypoxemia (CMS/HCC).      Subjective   Patient is starting to feel better.  Still requiring vent support.  No complaints of chest pain, nausea vomiting.       Objective     Last Recorded Vitals  /78 (BP Location: Left arm, Patient Position: Lying)   Pulse 74   Temp 35.8 °C (96.4 °F) (Temporal)   Resp (!) 30   Wt 71.3 kg (157 lb 3 oz)   SpO2 93%   Intake/Output last 3 Shifts:    Intake/Output Summary (Last 24 hours) at 11/4/2023 1153  Last data filed at 11/4/2023 0936  Gross per 24 hour   Intake 150 ml   Output 1400 ml   Net -1250 ml       Admission Weight  Weight: 77.1 kg (170 lb) (10/23/23 1541)    Daily Weight  11/04/23 : 71.3 kg (157 lb 3 oz)    Image Results  XR chest 1 view  Narrative: Interpreted By:  Aba Roach,   STUDY:  XR CHEST 1 VIEW; 11/3/2023 11:37 am      INDICATION:  Signs/Symptoms:respiratory failure      COMPARISON:  10/31/2023      ACCESSION NUMBER(S):  [id number]      ORDERING CLINICIAN:  DENNYS MERCADO      FINDINGS:  The cardiomediastinal silhouette is stable. Interval progression of  severe patchy airspace opacities are noted bilaterally. No pleural  effusion is identified.      The osseous structures are intact.      Impression: Interval progression of severe bilateral infiltrate.      MACRO:  None      Signed by: Aba Roach 11/3/2023 4:15 PM  Dictation workstation:   [id number]      Physical Exam  Generally no acute distress  HEENT PERRL EOMI  Cardiovascular S1-S2 heard regular rate and rhythm  Lungs slight crackles at the left base otherwise relatively clear  Abdomen nontender nondistended bowel sounds present  Extremities no clubbing cyanosis edema    Relevant Results  Scheduled medications  amLODIPine, 2.5 mg, oral, Daily  dexAMETHasone, 6 mg, intravenous, q24h  enoxaparin, 30 mg, subcutaneous, Daily  fluticasone, 1 spray, Each Nostril, BID  glipiZIDE, 2.5  mg, oral, Daily before breakfast  insulin lispro, 0-5 Units, subcutaneous, TID with meals  ipratropium-albuteroL, 3 mL, nebulization, q6h while awake  melatonin, 3 mg, oral, Nightly  oxygen, , inhalation, q4h  pantoprazole, 40 mg, oral, Daily before breakfast  perflutren lipid microspheres, 0.5-10 mL of dilution, intravenous, Once in imaging  perflutren protein A microsphere, 0.5 mL, intravenous, Once in imaging  piperacillin-tazobactam, 3.375 g, intravenous, q6h  sodium bicarbonate, 650 mg, oral, TID      Continuous medications     PRN medications  PRN medications: acetaminophen **OR** acetaminophen **OR** acetaminophen, aspirin-acetaminophen-caffeine, benzonatate, bisacodyl, dextrose 10 % in water (D10W), dextrose, docusate sodium, glucagon, guaiFENesin, hydrALAZINE, ipratropium-albuteroL, melatonin, ondansetron ODT **OR** ondansetron, [Held by provider] oxygen, polyethylene glycol, sodium chloride  Results for orders placed or performed during the hospital encounter of 10/23/23 (from the past 24 hour(s))   POCT GLUCOSE   Result Value Ref Range    POCT Glucose 56 (L) 74 - 99 mg/dL   POCT GLUCOSE   Result Value Ref Range    POCT Glucose 93 74 - 99 mg/dL   POCT GLUCOSE   Result Value Ref Range    POCT Glucose 99 74 - 99 mg/dL   Magnesium   Result Value Ref Range    Magnesium 2.20 1.60 - 3.10 mg/dL   Basic Metabolic Panel   Result Value Ref Range    Glucose 129 (H) 65 - 99 mg/dL    Sodium 137 133 - 145 mmol/L    Potassium 5.1 3.4 - 5.1 mmol/L    Chloride 102 97 - 107 mmol/L    Bicarbonate 25 24 - 31 mmol/L    Urea Nitrogen 63 (H) 8 - 25 mg/dL    Creatinine 1.60 0.40 - 1.60 mg/dL    eGFR 35 (L) >60 mL/min/1.73m*2    Calcium 8.1 (L) 8.5 - 10.4 mg/dL    Anion Gap 10 <=19 mmol/L   CBC   Result Value Ref Range    WBC 17.5 (H) 4.4 - 11.3 x10*3/uL    nRBC 0.1 (H) 0.0 - 0.0 /100 WBCs    RBC 3.91 (L) 4.00 - 5.20 x10*6/uL    Hemoglobin 11.0 (L) 12.0 - 16.0 g/dL    Hematocrit 33.6 (L) 36.0 - 46.0 %    MCV 86 80 - 100 fL    MCH  28.1 26.0 - 34.0 pg    MCHC 32.7 32.0 - 36.0 g/dL    RDW 14.9 (H) 11.5 - 14.5 %    Platelets 149 (L) 150 - 450 x10*3/uL   POCT GLUCOSE   Result Value Ref Range    POCT Glucose 84 74 - 99 mg/dL   POCT GLUCOSE   Result Value Ref Range    POCT Glucose 62 (L) 74 - 99 mg/dL                    Assessment/Plan   This patient currently has cardiac telemetry ordered; if you would like to modify or discontinue the telemetry order, click here to go to the orders activity to modify/discontinue the order.    Impression: 68-year-old woman with acute hypoxic respiratory failure secondary to COVID, stabilized and improving.    Plan:    1.  Hypoxemia secondary to COVID  -Patient completed full course of anti--viral therapy, continue antibiotics per ID, appreciate infectious disease and pulmonary consult.  -Incentive spirometry at bedside this patient probably has some atelectasis on exam    2.  Hypertension  -Stable    3.  Acute renal failure  -Resolved    In general patient is on a good trajectory, can probably de-escalate antibiotics over the next couple days, continue to wean oxygen as tolerated.          Principal Problem:    Acute respiratory failure with hypoxemia (CMS/HCC)  Active Problems:    Benign essential HTN    Stage 4 chronic kidney disease (CMS/HCC)    COVID-19                  Donavan Ureña MD

## 2023-11-04 NOTE — CARE PLAN
Problem: Respiratory  Goal: Minimal/no exertional discomfort or dyspnea this shift  11/4/2023 0654 by Shayna Whitehead RN  Outcome: Progressing  Flowsheets (Taken 10/26/2023 0851 by Martha Lowery RN)  Minimal/no exertional discomfort or dyspnea this shift: Positioning to promote ventilation/comfort  11/4/2023 0653 by Shayna Whitehead RN  Outcome: Progressing  Goal: No signs of respiratory distress (eg. Use of accessory muscles. Peds grunting)  11/4/2023 0654 by Shayna Whitehead RN  Outcome: Progressing  11/4/2023 0653 by Shayna Whitehead RN  Outcome: Progressing  Goal: Wean oxygen to maintain O2 saturation per order/standard this shift  11/4/2023 0654 by Shayna Whitehead RN  Outcome: Progressing  Flowsheets (Taken 10/26/2023 0851 by Martha Lowery RN)  Wean oxygen to maintain O2 saturation per order/standard this shift:   Encourage activity/mobility   Incentive spirometry  11/4/2023 0653 by Shayna Whitehead RN  Outcome: Progressing  Goal: Clear secretions with interventions this shift  11/4/2023 0654 by Shayna Whitehead RN  Outcome: Progressing  Flowsheets (Taken 10/31/2023 0535)  Clear secretions with interventions this shift:   Encourage/provide pulmonary hygiene/secretion clearance   Incentive spirometry  11/4/2023 0653 by Shayna Whitehead RN  Outcome: Progressing  Goal: Minimize anxiety/maximize coping throughout shift  11/4/2023 0654 by Shayna Whitehead RN  Outcome: Progressing  Flowsheets (Taken 10/31/2023 0535)  Minimize anxiety/maximize coping throughout shift: Monitor pain/anxiety level  11/4/2023 0653 by Shayna Whitehead RN  Outcome: Progressing  Goal: Patent airway maintained this shift  11/4/2023 0654 by Shayna Whitehead RN  Outcome: Progressing  11/4/2023 0653 by Shayna Whitehead RN  Outcome: Progressing  Goal: Tolerate mechanical ventilation evidenced by VS/agitation level this shift  11/4/2023 0654 by Shayna Whitehead RN  Outcome: Progressing  11/4/2023 0653 by Shayna Whitehead RN  Outcome:  Progressing  Goal: Tolerate pulmonary toileting this shift  11/4/2023 0654 by Shayna Whitehead RN  Outcome: Progressing  Flowsheets (Taken 10/31/2023 0535)  Tolerate pulmonary toileting this shift: Positioning to promote ventilation/comfort  11/4/2023 0653 by Shayna Whitehead RN  Outcome: Progressing  Goal: Verbalize decreased shortness of breath this shift  11/4/2023 0654 by Shayna Whitehead RN  Outcome: Progressing  Flowsheets (Taken 10/31/2023 0535)  Verbalize decreased shortness of breath this shift:   Encourage/provide pulmonary hygiene/secretion clearance   Incentive spirometry  11/4/2023 0653 by Shayna Whitehead RN  Outcome: Progressing  Goal: Increase self care and/or family involvement in next 24 hours  11/4/2023 0654 by Shayna hWitehead RN  Outcome: Progressing  11/4/2023 0653 by Shyana Whitehead RN  Outcome: Progressing   The patient's goals for the shift include To get rest    The clinical goals for the shift include Decrease O2    Over the shift, the patient did not make progress toward the following goals. Barriers to progression include Covid. Recommendations to address these barriers include continue to encourage proning and side sleeping, as well as IS.

## 2023-11-05 NOTE — PROGRESS NOTES
Pulmonary Progress Note 11/05/23     Patient seen in follow-up for COVID-19 pneumonitis and respiratory failure    Subjective   Interval History:   No overnight events.  Tells me she is doing well.    Objective   Vital signs in last 24 hours:  Temp:  [35.8 °C (96.4 °F)-36 °C (96.8 °F)] 36 °C (96.8 °F)  Heart Rate:  [67-89] 81  Resp:  [18-30] 29  BP: (118-151)/(57-88) 129/67  FiO2 (%):  [80 %] 80 %    Intake/Output last 3 shifts:  I/O last 3 completed shifts:  In: 150 (2.1 mL/kg) [IV Piggyback:150]  Out: 2025 (28.4 mL/kg) [Urine:2025 (0.8 mL/kg/hr)]  Weight: 71.3 kg   Intake/Output this shift:  I/O this shift:  In: -   Out: 500 [Urine:500]    Physical Exam  Direct physical exam deferred to preserve personal protective equipment in the setting of COVID-19 infection.  Exam based on staff documentation and  observation  Alert, no acute distress  Remains on high flow at 15 L/min  Sinus on the monitor  Comprehension remains intact,    Scheduled medications  amLODIPine, 2.5 mg, oral, Daily  dexAMETHasone, 6 mg, intravenous, q24h  enoxaparin, 30 mg, subcutaneous, Daily  fluticasone, 1 spray, Each Nostril, BID  glipiZIDE, 2.5 mg, oral, Daily before breakfast  insulin lispro, 0-5 Units, subcutaneous, TID with meals  ipratropium-albuteroL, 3 mL, nebulization, q6h while awake  melatonin, 3 mg, oral, Nightly  oxygen, , inhalation, q4h  pantoprazole, 40 mg, oral, Daily before breakfast  perflutren lipid microspheres, 0.5-10 mL of dilution, intravenous, Once in imaging  perflutren protein A microsphere, 0.5 mL, intravenous, Once in imaging  piperacillin-tazobactam, 3.375 g, intravenous, q6h  sodium bicarbonate, 650 mg, oral, TID      Continuous medications     PRN medications  PRN medications: acetaminophen **OR** acetaminophen **OR** acetaminophen, aspirin-acetaminophen-caffeine, benzonatate, bisacodyl, dextrose 10 % in water (D10W), dextrose, docusate sodium, glucagon, guaiFENesin, hydrALAZINE,  ipratropium-albuteroL, melatonin, ondansetron ODT **OR** ondansetron, [Held by provider] oxygen, polyethylene glycol, sodium chloride     Labs:  Pending at the time of note    Imaging:  XR chest 1 view    Result Date: 11/3/2023  Interpreted By:  Aba Roach, STUDY: XR CHEST 1 VIEW; 11/3/2023 11:37 am   INDICATION: Signs/Symptoms:respiratory failure   COMPARISON: 10/31/2023   ACCESSION NUMBER(S): [id number]   ORDERING CLINICIAN: DENNYS MERCADO   FINDINGS: The cardiomediastinal silhouette is stable. Interval progression of severe patchy airspace opacities are noted bilaterally. No pleural effusion is identified.   The osseous structures are intact.       Interval progression of severe bilateral infiltrate.   MACRO: None   Signed by: Aba Roach 11/3/2023 4:15 PM Dictation workstation:   [id number]    Lower extremity venous duplex bilateral    Result Date: 11/2/2023  Interpreted By:  Russel Handley, STUDY: [facility] LOWER EXTREMITY VENOUS DUPLEX BILATERAL; 10/27/2023; 10/27/2023 11:15 am   INDICATION: Signs/Symptoms:elev dimer cv pos ro dvt. Shortness of breath.   ACCESSION NUMBER(S): [id number]   ORDERING CLINICIAN: SUSANNE FRANCOIS   TECHNIQUE: PROCEDURE: Grayscale, 2D, color Doppler, Doppler spectral analysis (waveform) and duplex images obtained.   FINDINGS: Right Lower Extremity: Patent with normal flow, and compressibility of the common and superficial femoral, popliteal, and greater saphenous veins  as well as in the tibioperoneal trunk and trifurcation.  There is no subcutaneous edema.   Left Lower Extremity: Patent with normal flow, and compressibility of the common and superficial femoral, popliteal, and greater saphenous veins  as well as in the tibioperoneal trunk and trifurcation.  There is no subcutaneous edema.       1. Right Lower Extremity: No evidence of deep vein thrombosis.   2. Left Lower Extremity: No evidence of deep vein thrombosis.   Signed by: Russel Handley 11/2/2023 2:24 PM Dictation  workstation:   [id number]    Transthoracic Echo (TTE) Complete    Result Date: 11/1/2023            Formerly named [facility] [address], [place] [address]             Phone [phone] TRANSTHORACIC ECHOCARDIOGRAM REPORT  Patient Name:      KARLA CAICEDO WILLY Cruz Physician:   [id number] Riccardo Bagley MD Study Date:        10/31/2023         Ordering Provider:   [id number] CUCA SAM MRN/PID:           [id number]           Fellow: Accession#:        [id number]       Nurse: Date of Birth/Age: 1955 / 68      Sonographer:         Temi King RCS                    years Gender:            F                  Additional Staff: Height:            160.02 cm          Admit Date:          10/31/2023 Weight:            70.76 kg           Admission Status:    Inpatient - Routine BSA:               1.74 m2            Department Location: Blood Pressure: 135 /85 mmHg Study Type:    TRANSTHORACIC ECHO (TTE) COMPLETE Diagnosis/ICD: Acute respiratory failure with hypoxia-J96.01 Indication:    Acute Respiratory failure with hypoxemia CPT Codes:     Echo Complete w Full Doppler-[id number] Patient History: Pertinent History: HTN Prediabetes Chronic Kidney Disease. Study Detail: The following Echo studies were performed: 2D, M-Mode, Doppler and               color flow. Technically challenging study due to poor acoustic               windows, prominent lung artifact and patient lying in supine               position. Lumason used as a contrast agent for endocardial border               definition. Unable to obtain subcostal and suprasternal notch               view.  PHYSICIAN INTERPRETATION: Left Ventricle: Left ventricular systolic function is normal, with an estimated ejection fraction of 60-65%. There are no regional wall motion abnormalities. The left ventricular cavity size is normal. Spectral Doppler shows an impaired relaxation pattern of left ventricular diastolic filling. Left Atrium: The left atrium is normal in  size. Right Ventricle: The right ventricle is normal in size. There is normal right ventricular global systolic function. Right Atrium: The right atrium is normal in size. Aortic Valve: The aortic valve is trileaflet. There is trivial aortic valve regurgitation. The peak instantaneous gradient of the aortic valve is 12.0 mmHg. Mitral Valve: The mitral valve is normal in structure. There is trace mitral valve regurgitation. Tricuspid Valve: The tricuspid valve is structurally normal. There is trace tricuspid regurgitation. Pulmonic Valve: The pulmonic valve is not well visualized. The pulmonic valve regurgitation was not well visualized. Pericardium: There is no pericardial effusion noted. Aorta: The aortic root was not well visualized.  CONCLUSIONS:  1. Left ventricular systolic function is normal with a 60-65% estimated ejection fraction.  2. Spectral Doppler shows an impaired relaxation pattern of left ventricular diastolic filling. QUANTITATIVE DATA SUMMARY: 2D MEASUREMENTS:                          Normal Ranges: LAs:           2.60 cm   (2.7-4.0cm) IVSd:          0.88 cm   (0.6-1.1cm) LVPWd:         0.85 cm   (0.6-1.1cm) LVIDd:         3.51 cm   (3.9-5.9cm) LVIDs:         2.55 cm LV Mass Index: 48.5 g/m2 LV % FS        27.4 % LA VOLUME:                               Normal Ranges: LA Vol A2C:        73.7 ml LA Vol Index A2C:  42.4 ml/m2 LA Area A2C:       21.6 cm2 LA Major Axis A2C: 5.4 cm LA Volume Index:   33.2 ml/m2 LA Vol A2C:        70.6 ml RA VOLUME BY A/L METHOD:                              Normal Ranges: RA Vol A2C:        1.3 ml RA Vol Index A2C:  0.8 ml/m2 RA Area A2C:       5.0 cm2 RA Major Axis A2C: 15.7 cm M-MODE MEASUREMENTS:                  Normal Ranges: Ao Root: 2.70 cm (2.0-3.7cm) LAs:     2.60 cm (2.7-4.0cm) LV SYSTOLIC FUNCTION BY 2D PLANIMETRY (MOD):                     Normal Ranges: EF-A4C View: 78.8 % (>=55%) EF-A2C View: 73.2 % EF-Biplane:  75.3 % LV DIASTOLIC FUNCTION:                      Normal Ranges: MV Peak E: 0.82 m/s (0.7-1.2 m/s) MV Peak A: 1.03 m/s (0.42-0.7 m/s) E/A Ratio: 0.80     (1.0-2.2) MITRAL VALVE:                 Normal Ranges: MV DT: 193 msec (150-240msec) AORTIC VALVE:                          Normal Ranges: AoV Vmax:      1.73 m/s  (<=1.7m/s) AoV Peak P.0 mmHg (<20mmHg) LVOT Max Garrett:  1.37 m/s  (<=1.1m/s) LVOT VTI:      28.40 cm LVOT Diameter: 2.00 cm   (1.8-2.4cm) AoV Area,Vmax: 2.49 cm2  (2.5-4.5cm2)  RIGHT VENTRICLE: RV Basal 3.35 cm RV Mid   2.73 cm RV Major 5.6 cm TRICUSPID VALVE/RVSP:                             Normal Ranges: Peak TR Velocity: 1.97 m/s RV Syst Pressure: 18.5 mmHg (< 30mmHg) PULMONIC VALVE:                         Normal Ranges: PV Accel Time: 74 msec  (>120ms) PV Max Garrett:    1.1 m/s  (0.6-0.9m/s) PV Max P.5 mmHg  [id number] Riccardo Bagley MD Electronically signed on 2023 at 9:53:29 AM  ** Final **     XR chest 1 view    Result Date: 10/31/2023  Interpreted By:  Nirmal Mcmillan, STUDY: XR CHEST 1 VIEW; 10/31/2023 9:48 am   INDICATION: Shortness of breath   COMPARISON: 10/29/2023   ACCESSION NUMBER(S): [id number]   ORDERING CLINICIAN: CUCA SAM   TECHNIQUE: 1 view of the chest was performed.   FINDINGS: No significant interval change in diffuse bilateral airspace opacities involving both lungs more prominent in the mid and lower lungs. Findings do not appear significantly changed. No pleural effusion. No pneumothorax.  The cardiomediastinal silhouette is within normal limits.       No significant interval change in diffuse bilateral airspace opacities. No pleural effusion or significant interval change.   Signed by: Nirmal Mcmillan 10/31/2023 10:45 AM Dictation workstation:   [id number]    XR chest 1 view    Result Date: 10/29/2023  Interpreted By:  Karina Rodriguez, STUDY: XR CHEST 1 VIEW; 10/29/2023 1:02 pm   INDICATION: Signs/Symptoms:hyppoxemia   COMPARISON: 10/27/2023   ACCESSION NUMBER(S): [id number]   ORDERING CLINICIAN:  SUSANNE FRANCOIS   TECHNIQUE: Frontal  chest radiographs.   FINDINGS: The cardiomediastinal silhouette is unremarkable. Diffuse airspace opacities are noted overlying both lungs, unchanged from prior examinations. No pleural effusion is identified.   The osseous structures are intact.       No significant change from prior examination. Diffuse airspace opacities overlying both lungs, differential includes ARDS, diffuse alveolar edema/hemorrhage.     Signed by: Karina Rodriguez 10/29/2023 2:19 PM Dictation workstation:   [id number]    XR chest 1 view    Result Date: 10/27/2023  Interpreted By:  Nirmal Mcmillan, STUDY: XR CHEST 1 VIEW; 10/27/2023 10:06 am   INDICATION: Signs/Symptoms:follow up hypoxemia.   COMPARISON: 10/23/2023   ACCESSION NUMBER(S): [id number]   ORDERING CLINICIAN: SUSANNE FRANCOIS   TECHNIQUE: 1 view of the chest was performed.   FINDINGS: There is diffuse ground-glass opacity bilaterally consistent with history of COVID. No lobar or large consolidation however the ground-glass opacities are prominent but stable from the previous examination. No pleural effusion. No pneumothorax.  The cardiomediastinal silhouette is within normal limits.       No significant change. Prominent ground-glass opacities bilaterally, however, not significantly changed.   Signed by: Nirmal Mcmillan 10/27/2023 10:24 PM Dictation workstation:   [id number]    CT chest wo IV contrast    Result Date: 10/24/2023  Interpreted By:  Nirmal Mcmillan, STUDY: CT CHEST WO IV CONTRAST; 10/24/2023 12:27 am   INDICATION: Signs/Symptoms:Covid.   COMPARISON: None   ACCESSION NUMBER(S): [id number]   ORDERING CLINICIAN: GERTRUDE FIERRO   TECHNIQUE: Contiguous axial images of the thorax were obtained from the level of the thoracic inlet through the lung bases. Coronal and sagittal reformatted images were obtained.     FINDINGS: The thyroid gland is unremarkable.   The heart size is within normal limits.  No pericardial effusion is identified.  The aorta and pulmonary arteries are normal in caliber.   There are no pathologically enlarged mediastinal, hilar, or axillary lymph nodes are seen.   The trachea and mainstem bronchi are patent. Diffuse patchy ground-glass opacities bilaterally which is compatible with atypical pneumonia. There is no evidence of pneumothorax or pleural effusion.   The visualized osseous structures are intact.   Limited images through the upper abdomen are unremarkable.       Diffuse, patchy ground-glass opacities bilaterally suggesting atypical pneumonia such as COVID.     Signed by: Nirmal Mcmillan 10/24/2023 12:32 PM Dictation workstation:   [id number]    NM lung perfusion particulate    Result Date: 10/23/2023  Interpreted By:  Yoly Stanley, STUDY: NM LUNG PERFUSION PARTICULATE 10/23/2023 8:44 pm   INDICATION: Signs/Symptoms:hypoxia, r/o PE   COMPARISON: Chest x-ray done earlier today   ACCESSION NUMBER(S): [id number]   ORDERING CLINICIAN: ANAHY EVANS   TECHNIQUE: 4.2 millicuries of technetium 99 M MAA was injected intravenously with perfusion lung scan in 8 projections completed.   FINDINGS: No perfusion defect is seen within either lung.       Normal perfusion lung scan.   Signed by: Yoly Stanley 10/23/2023 8:57 PM Dictation workstation:   [id number]    XR chest 1 view    Result Date: 10/23/2023  Interpreted By:  Yoly Satnley, STUDY: XR CHEST 1 VIEW 10/23/2023 4:55 pm   INDICATION: Signs/Symptoms:dyspnea, cough, fatigue, and malaise. Positive COVID.   COMPARISON: None available.   ACCESSION NUMBER(S): [id number]   ORDERING CLINICIAN: ANAHY EVANS   TECHNIQUE: AP erect view of the chest   FINDINGS: The cardiac size is normal with no mediastinal abnormality identified. The trachea is midline. No pleural abnormality is seen. However, there are bilateral infiltrates with ground-glass appearance with relative sparing of the left upper lung zone.       Bilateral ground-glass infiltrates.   Signed by: Yoly Stanley 10/23/2023  5:08 PM Dictation workstation:   [id number]              Assessment/Plan   Principal Problem:    Acute respiratory failure with hypoxemia (CMS/HCC)  Active Problems:    COVID-19    Benign essential HTN    Stage 4 chronic kidney disease (CMS/HCC)    Continues to hold her own.    Continue with mini high flow at 15 L and see if we can try to wean  Completed remdesivir  Bronchodilators  Daily assessment for ability to diuresis  Antibiotics per ID  Prophylaxis  As we have not had to escalate and has remained relatively stable on mini high flow, believe should be able to transfer out of the ICU today    Dr. Avendaño to follow-up in the morning    Sean Barclay MD  Pulmonary/CC Medicine  [facility]       LOS: 13 days

## 2023-11-05 NOTE — PROGRESS NOTES
Macarena Wilson is a 68 y.o. female on day 13 of admission presenting with Acute respiratory failure with hypoxemia (CMS/HCC).    Subjective   Interval History:   Room not entered-limit exposure  Patient discussed with respiratory therapist and nurse  Interval increase in oxygen requirement  Patient observed through glass window  Awake, alert    Review of Systems    Objective   Range of Vitals (last 24 hours)  Heart Rate:  [73-90]   Temp:  [35.7 °C (96.3 °F)-36.4 °C (97.5 °F)]   Resp:  [17-31]   BP: (120-154)/(67-78)   Weight:  [70 kg (154 lb 5.2 oz)]   SpO2:  [92 %-98 %]   Daily Weight  11/05/23 : 70 kg (154 lb 5.2 oz)    Body mass index is 27.34 kg/m².    Physical Exam  Awake, alert  Nasal cannula in place    Antibiotics    sodium chloride 0.9 % bolus 500 mL  doxycycline (Vibramycin) in dextrose 5 % in water (D5W) 100 mL  mg  cefTRIAXone (Rocephin) IVPB 1 g  Tc-99m-albumin (Draximage MAA) injection 4.2 millicurie  dexAMETHasone (Decadron) injection 8 mg  sodium chloride 0.9 % bolus 1,000 mL  enoxaparin (Lovenox) syringe 30 mg  sodium chloride 0.9% infusion  acetaminophen (Tylenol) tablet 650 mg  acetaminophen (Tylenol) oral liquid 650 mg  acetaminophen (Tylenol) suppository 650 mg  ondansetron ODT (Zofran-ODT) disintegrating tablet 4 mg  ondansetron (Zofran) injection 4 mg  polyethylene glycol (Glycolax, Miralax) packet 17 g  bisacodyl (Dulcolax) EC tablet 10 mg  guaiFENesin (Mucinex) 12 hr tablet 600 mg  oxygen (O2) therapy  piperacillin-tazobactam-dextrose (Zosyn) IV 2.25 g  ipratropium-albuteroL (Duo-Neb) 0.5-2.5 mg/3 mL nebulizer solution 3 mL  dexAMETHasone (Decadron) injection 8 mg  dexAMETHasone (Decadron) injection 6 mg  dextrose 50 % injection 25 g  glucagon (Glucagen) injection 1 mg  dextrose 10 % in water (D10W) infusion  insulin lispro (HumaLOG) injection 0-5 Units  vancomycin-diluent combo no.1 (Xellia) IVPB 1,250 mg  ipratropium-albuteroL (Duo-Neb) 0.5-2.5 mg/3 mL nebulizer solution 3  mL  ipratropium-albuteroL (Duo-Neb) 0.5-2.5 mg/3 mL nebulizer solution 3 mL  oxygen (O2) therapy  remdesivir (Veklury) 200 mg in sodium chloride 0.9% 250 mL IV  remdesivir (Veklury) 100 mg in sodium chloride 0.9% 250 mL IV  oxygen (O2) therapy  pantoprazole (ProtoNix) injection 40 mg  remdesivir (Veklury) 200 mg in sodium chloride 0.9% 250 mL IV  remdesivir (Veklury) 100 mg in sodium chloride 0.9% 250 mL IV  tocilizumab (Actemra) 600 mg in sodium chloride 0.9% 70 mL IV  furosemide (Lasix) tablet 10 mg  melatonin tablet 3 mg  benzonatate (Tessalon) capsule 200 mg  polyethylene glycol (Glycolax, Miralax) packet 17 g  fluticasone (Flonase) nasal spray 1 spray  sodium chloride (Ocean) 0.65 % nasal spray 1 spray  sodium bicarbonate tablet 650 mg  vancomycin-diluent combo no.1 (Xellia) IVPB 1,250 mg  guaiFENesin (Robitussin) 100 mg/5 mL syrup 200 mg  vancomycin-diluent combo no.1 (Xellia) IVPB 750 mg  pantoprazole (ProtoNix) EC tablet 40 mg  oxygen (O2) therapy  furosemide (Lasix) injection 20 mg  oxygen (O2) therapy  heparin (porcine) injection 5,750 Units  heparin 25,000 Units in dextrose 5% 250 mL (100 Units/mL) infusion (premix)  heparin (porcine) injection 3,000-6,000 Units  enoxaparin (Lovenox) syringe 30 mg  melatonin tablet 3 mg  oxygen (O2) therapy  furosemide (Lasix) injection 40 mg  acetaminophen-caffeine 500-65 mg tablet 2 tablet  aspirin-acetaminophen-caffeine (Excedrin Migraine) 250-250-65 mg per tablet 2 tablet  perflutren lipid microspheres (Definity) injection 0.5-10 mL of dilution  sulfur hexafluoride microsphr (Lumason) injection 24.28 mg  perflutren protein A microsphere (Optison) injection 0.5 mL  hydrALAZINE (Apresoline) injection 10 mg  docusate sodium (Colace) capsule 100 mg  amLODIPine (Norvasc) tablet 2.5 mg  glipiZIDE (Glucotrol) tablet 5 mg  piperacillin-tazobactam-dextrose (Zosyn) IV 3.375 g  glipiZIDE (Glucotrol) tablet 2.5 mg  furosemide (Lasix) injection 40 mg  oxygen (O2)  therapy      Relevant Results  Labs  Results from last 72 hours   Lab Units 11/05/23  0901 11/04/23  0639 11/03/23  0617   WBC AUTO x10*3/uL 16.2* 17.5* 20.6*   HEMOGLOBIN g/dL 11.5* 11.0* 10.3*   HEMATOCRIT % 35.6* 33.6* 31.1*   PLATELETS AUTO x10*3/uL 161 149* 158     Results from last 72 hours   Lab Units 11/05/23  0901 11/04/23  0638 11/03/23  0617   SODIUM mmol/L 138 137 134   POTASSIUM mmol/L 4.5 5.1 4.9   CHLORIDE mmol/L 100 102 99   CO2 mmol/L 26 25 25   BUN mg/dL 57* 63* 61*   CREATININE mg/dL 1.60 1.60 1.60   GLUCOSE mg/dL 76 129* 159*   CALCIUM mg/dL 8.7 8.1* 8.7   ANION GAP mmol/L 12 10 10   EGFR mL/min/1.73m*2 35* 35* 35*     Results from last 72 hours   Lab Units 11/03/23  0617   ALK PHOS U/L 91   BILIRUBIN TOTAL mg/dL 0.3   PROTEIN TOTAL g/dL 5.5*   ALT U/L 18   AST U/L 13   ALBUMIN g/dL 3.0*     Estimated Creatinine Clearance: 31.1 mL/min (by C-G formula based on SCr of 1.6 mg/dL).  C-Reactive Protein   Date Value Ref Range Status   11/03/2023 <0.30 0.00 - 2.00 mg/dL Final   10/31/2023 0.50 0.00 - 2.00 mg/dL Final   10/26/2023 3.90 (H) 0.00 - 2.00 mg/dL Final     Microbiology  Reviewed  Imaging  XR chest 1 view    Result Date: 11/3/2023  Interpreted By:  Aba Roach, STUDY: XR CHEST 1 VIEW; 11/3/2023 11:37 am   INDICATION: Signs/Symptoms:respiratory failure   COMPARISON: 10/31/2023   ACCESSION NUMBER(S): [id number]   ORDERING CLINICIAN: DENNYS MERCADO   FINDINGS: The cardiomediastinal silhouette is stable. Interval progression of severe patchy airspace opacities are noted bilaterally. No pleural effusion is identified.   The osseous structures are intact.       Interval progression of severe bilateral infiltrate.   MACRO: None   Signed by: Aba Roach 11/3/2023 4:15 PM Dictation workstation:   [id number]    Lower extremity venous duplex bilateral    Result Date: 11/2/2023  Interpreted By:  Russel Handley, STUDY: [facility] LOWER EXTREMITY VENOUS DUPLEX BILATERAL; 10/27/2023; 10/27/2023 11:15  am   INDICATION: Signs/Symptoms:elev dimer cv pos ro dvt. Shortness of breath.   ACCESSION NUMBER(S): [id number]   ORDERING CLINICIAN: SUSANNE FRANCOIS   TECHNIQUE: PROCEDURE: Grayscale, 2D, color Doppler, Doppler spectral analysis (waveform) and duplex images obtained.   FINDINGS: Right Lower Extremity: Patent with normal flow, and compressibility of the common and superficial femoral, popliteal, and greater saphenous veins  as well as in the tibioperoneal trunk and trifurcation.  There is no subcutaneous edema.   Left Lower Extremity: Patent with normal flow, and compressibility of the common and superficial femoral, popliteal, and greater saphenous veins  as well as in the tibioperoneal trunk and trifurcation.  There is no subcutaneous edema.       1. Right Lower Extremity: No evidence of deep vein thrombosis.   2. Left Lower Extremity: No evidence of deep vein thrombosis.   Signed by: Russel Handley 11/2/2023 2:24 PM Dictation workstation:   [id number]    Transthoracic Echo (TTE) Complete    Result Date: 11/1/2023            [place] [address]             Phone [phone] TRANSTHORACIC ECHOCARDIOGRAM REPORT  Patient Name:      KARLA AGUILERA Reading Physician:   [id number] Riccardo Bagley MD Study Date:        10/31/2023         Ordering Provider:   [id number] CUCA SAM MRN/PID:           [id number]           Fellow: Accession#:        [id number]       Nurse: Date of Birth/Age: 1955 / 68      Sonographer:         Temi King RCS                    years Gender:            F                  Additional Staff: Height:            160.02 cm          Admit Date:          10/31/2023 Weight:            70.76 kg           Admission Status:    Inpatient - Routine BSA:               1.74 m2            Department Location: Blood Pressure: 135 /85 mmHg Study Type:    TRANSTHORACIC ECHO (TTE) COMPLETE Diagnosis/ICD: Acute respiratory failure with hypoxia-J96.01 Indication:     Acute Respiratory failure with hypoxemia CPT Codes:     Echo Complete w Full [id number] Patient History: Pertinent History: HTN Prediabetes Chronic Kidney Disease. Study Detail: The following Echo studies were performed: 2D, M-Mode, Doppler and               color flow. Technically challenging study due to poor acoustic               windows, prominent lung artifact and patient lying in supine               position. Lumason used as a contrast agent for endocardial border               definition. Unable to obtain subcostal and suprasternal notch               view.  PHYSICIAN INTERPRETATION: Left Ventricle: Left ventricular systolic function is normal, with an estimated ejection fraction of 60-65%. There are no regional wall motion abnormalities. The left ventricular cavity size is normal. Spectral Doppler shows an impaired relaxation pattern of left ventricular diastolic filling. Left Atrium: The left atrium is normal in size. Right Ventricle: The right ventricle is normal in size. There is normal right ventricular global systolic function. Right Atrium: The right atrium is normal in size. Aortic Valve: The aortic valve is trileaflet. There is trivial aortic valve regurgitation. The peak instantaneous gradient of the aortic valve is 12.0 mmHg. Mitral Valve: The mitral valve is normal in structure. There is trace mitral valve regurgitation. Tricuspid Valve: The tricuspid valve is structurally normal. There is trace tricuspid regurgitation. Pulmonic Valve: The pulmonic valve is not well visualized. The pulmonic valve regurgitation was not well visualized. Pericardium: There is no pericardial effusion noted. Aorta: The aortic root was not well visualized.  CONCLUSIONS:  1. Left ventricular systolic function is normal with a 60-65% estimated ejection fraction.  2. Spectral Doppler shows an impaired relaxation pattern of left ventricular diastolic filling. QUANTITATIVE DATA SUMMARY: 2D MEASUREMENTS:                           Normal Ranges: LAs:           2.60 cm   (2.7-4.0cm) IVSd:          0.88 cm   (0.6-1.1cm) LVPWd:         0.85 cm   (0.6-1.1cm) LVIDd:         3.51 cm   (3.9-5.9cm) LVIDs:         2.55 cm LV Mass Index: 48.5 g/m2 LV % FS        27.4 % LA VOLUME:                               Normal Ranges: LA Vol A2C:        73.7 ml LA Vol Index A2C:  42.4 ml/m2 LA Area A2C:       21.6 cm2 LA Major Axis A2C: 5.4 cm LA Volume Index:   33.2 ml/m2 LA Vol A2C:        70.6 ml RA VOLUME BY A/L METHOD:                              Normal Ranges: RA Vol A2C:        1.3 ml RA Vol Index A2C:  0.8 ml/m2 RA Area A2C:       5.0 cm2 RA Major Axis A2C: 15.7 cm M-MODE MEASUREMENTS:                  Normal Ranges: Ao Root: 2.70 cm (2.0-3.7cm) LAs:     2.60 cm (2.7-4.0cm) LV SYSTOLIC FUNCTION BY 2D PLANIMETRY (MOD):                     Normal Ranges: EF-A4C View: 78.8 % (>=55%) EF-A2C View: 73.2 % EF-Biplane:  75.3 % LV DIASTOLIC FUNCTION:                     Normal Ranges: MV Peak E: 0.82 m/s (0.7-1.2 m/s) MV Peak A: 1.03 m/s (0.42-0.7 m/s) E/A Ratio: 0.80     (1.0-2.2) MITRAL VALVE:                 Normal Ranges: MV DT: 193 msec (150-240msec) AORTIC VALVE:                          Normal Ranges: AoV Vmax:      1.73 m/s  (<=1.7m/s) AoV Peak P.0 mmHg (<20mmHg) LVOT Max Garrett:  1.37 m/s  (<=1.1m/s) LVOT VTI:      28.40 cm LVOT Diameter: 2.00 cm   (1.8-2.4cm) AoV Area,Vmax: 2.49 cm2  (2.5-4.5cm2)  RIGHT VENTRICLE: RV Basal 3.35 cm RV Mid   2.73 cm RV Major 5.6 cm TRICUSPID VALVE/RVSP:                             Normal Ranges: Peak TR Velocity: 1.97 m/s RV Syst Pressure: 18.5 mmHg (< 30mmHg) PULMONIC VALVE:                         Normal Ranges: PV Accel Time: 74 msec  (>120ms) PV Max Garrett:    1.1 m/s  (0.6-0.9m/s) PV Max P.5 mmHg  [id number] Riccardo Bagley MD Electronically signed on 2023 at 9:53:29 AM  ** Final **     XR chest 1 view    Result Date: 10/31/2023  Interpreted By:  Nirmal Mcmillan, STUDY: XR CHEST 1 VIEW; 10/31/2023  9:48 am   INDICATION: Shortness of breath   COMPARISON: 10/29/2023   ACCESSION NUMBER(S): [id number]   ORDERING CLINICIAN: CUCA SAM   TECHNIQUE: 1 view of the chest was performed.   FINDINGS: No significant interval change in diffuse bilateral airspace opacities involving both lungs more prominent in the mid and lower lungs. Findings do not appear significantly changed. No pleural effusion. No pneumothorax.  The cardiomediastinal silhouette is within normal limits.       No significant interval change in diffuse bilateral airspace opacities. No pleural effusion or significant interval change.   Signed by: Nirmal Mcmillan 10/31/2023 10:45 AM Dictation workstation:   [id number]    XR chest 1 view    Result Date: 10/29/2023  Interpreted By:  Karina Rodriguez, STUDY: XR CHEST 1 VIEW; 10/29/2023 1:02 pm   INDICATION: Signs/Symptoms:hyppoxemia   COMPARISON: 10/27/2023   ACCESSION NUMBER(S): [id number]   ORDERING CLINICIAN: SUSANNE FRANCOIS   TECHNIQUE: Frontal  chest radiographs.   FINDINGS: The cardiomediastinal silhouette is unremarkable. Diffuse airspace opacities are noted overlying both lungs, unchanged from prior examinations. No pleural effusion is identified.   The osseous structures are intact.       No significant change from prior examination. Diffuse airspace opacities overlying both lungs, differential includes ARDS, diffuse alveolar edema/hemorrhage.     Signed by: Karina Rodriguez 10/29/2023 2:19 PM Dictation workstation:   [id number]    XR chest 1 view    Result Date: 10/27/2023  Interpreted By:  Nirmal Mcmillan, STUDY: XR CHEST 1 VIEW; 10/27/2023 10:06 am   INDICATION: Signs/Symptoms:follow up hypoxemia.   COMPARISON: 10/23/2023   ACCESSION NUMBER(S): [id number]   ORDERING CLINICIAN: SUSANNE FRANCOIS   TECHNIQUE: 1 view of the chest was performed.   FINDINGS: There is diffuse ground-glass opacity bilaterally consistent with history of COVID. No lobar or large consolidation however the ground-glass opacities  are prominent but stable from the previous examination. No pleural effusion. No pneumothorax.  The cardiomediastinal silhouette is within normal limits.       No significant change. Prominent ground-glass opacities bilaterally, however, not significantly changed.   Signed by: Nirmal Mcmillan 10/27/2023 10:24 PM Dictation workstation:   [id number]    CT chest wo IV contrast    Result Date: 10/24/2023  Interpreted By:  Nirmal Mcmillan, STUDY: CT CHEST WO IV CONTRAST; 10/24/2023 12:27 am   INDICATION: Signs/Symptoms:Covid.   COMPARISON: None   ACCESSION NUMBER(S): [id number]   ORDERING CLINICIAN: GERTRUDE FIERRO   TECHNIQUE: Contiguous axial images of the thorax were obtained from the level of the thoracic inlet through the lung bases. Coronal and sagittal reformatted images were obtained.     FINDINGS: The thyroid gland is unremarkable.   The heart size is within normal limits.  No pericardial effusion is identified. The aorta and pulmonary arteries are normal in caliber.   There are no pathologically enlarged mediastinal, hilar, or axillary lymph nodes are seen.   The trachea and mainstem bronchi are patent. Diffuse patchy ground-glass opacities bilaterally which is compatible with atypical pneumonia. There is no evidence of pneumothorax or pleural effusion.   The visualized osseous structures are intact.   Limited images through the upper abdomen are unremarkable.       Diffuse, patchy ground-glass opacities bilaterally suggesting atypical pneumonia such as COVID.     Signed by: Nirmal Mcmillan 10/24/2023 12:32 PM Dictation workstation:   [id number]    NM lung perfusion particulate    Result Date: 10/23/2023  Interpreted By:  Yoly Stanley, STUDY: NM LUNG PERFUSION PARTICULATE 10/23/2023 8:44 pm   INDICATION: Signs/Symptoms:hypoxia, r/o PE   COMPARISON: Chest x-ray done earlier today   ACCESSION NUMBER(S): [id number]   ORDERING CLINICIAN: ANAHY EVANS   TECHNIQUE: 4.2 millicuries of technetium 99 M MAA  was injected intravenously with perfusion lung scan in 8 projections completed.   FINDINGS: No perfusion defect is seen within either lung.       Normal perfusion lung scan.   Signed by: Yoly Stanley 10/23/2023 8:57 PM Dictation workstation:   [id number]    XR chest 1 view    Result Date: 10/23/2023  Interpreted By:  Yoly Stanley, STUDY: XR CHEST 1 VIEW 10/23/2023 4:55 pm   INDICATION: Signs/Symptoms:dyspnea, cough, fatigue, and malaise. Positive COVID.   COMPARISON: None available.   ACCESSION NUMBER(S): [id number]   ORDERING CLINICIAN: ANAHY EVANS   TECHNIQUE: AP erect view of the chest   FINDINGS: The cardiac size is normal with no mediastinal abnormality identified. The trachea is midline. No pleural abnormality is seen. However, there are bilateral infiltrates with ground-glass appearance with relative sparing of the left upper lung zone.       Bilateral ground-glass infiltrates.   Signed by: Yoly Stanley 10/23/2023 5:08 PM Dictation workstation:   [id number]    Assessment/Plan   Acute hypoxic respiratory failure, interval worsening-on 80% high flow  Severe coronavirus pneumonitis   Stage IV chronic kidney disease  Leukocytosis-resolving  Diarrhea-rule out infectious etiology-resolved     Continue dexamethasone  Remdesivir completed  Monitor renal and hematologic parameters  S/p Actemra-10/24/2023  Supportive care  Monitor temperature and WBC  Supplemental oxygen as needed,  Pulmonary follow-up      Js Vasquez MD

## 2023-11-05 NOTE — PROGRESS NOTES
Macarena Wilson is a 68 y.o. female on day 13 of admission presenting with Acute respiratory failure with hypoxemia (CMS/HCC).      Subjective   Patient about the same as yesterday, trying to keep spirits up.       Objective     Last Recorded Vitals  /75 (BP Location: Left arm, Patient Position: Lying)   Pulse 90   Temp 36.4 °C (97.5 °F) (Temporal)   Resp 25   Wt 70 kg (154 lb 5.2 oz)   SpO2 93%   Intake/Output last 3 Shifts:    Intake/Output Summary (Last 24 hours) at 11/5/2023 1521  Last data filed at 11/5/2023 1455  Gross per 24 hour   Intake --   Output 2425 ml   Net -2425 ml       Admission Weight  Weight: 77.1 kg (170 lb) (10/23/23 1541)    Daily Weight  11/05/23 : 70 kg (154 lb 5.2 oz)    Image Results  XR chest 1 view  Narrative: Interpreted By:  Aba Roach,   STUDY:  XR CHEST 1 VIEW; 11/3/2023 11:37 am      INDICATION:  Signs/Symptoms:respiratory failure      COMPARISON:  10/31/2023      ACCESSION NUMBER(S):  [id number]      ORDERING CLINICIAN:  DENNYS MERCADO      FINDINGS:  The cardiomediastinal silhouette is stable. Interval progression of  severe patchy airspace opacities are noted bilaterally. No pleural  effusion is identified.      The osseous structures are intact.      Impression: Interval progression of severe bilateral infiltrate.      MACRO:  None      Signed by: Aba Roach 11/3/2023 4:15 PM  Dictation workstation:   [id number]      Physical Exam  Generally looks okay no acute distress  HEENT PERRL EOMI  Cardiovascular S1-S2 heard regular rate rhythm no murmurs rubs or gallops  Lungs bibasilar crackles  Abdomen nontender nondistended bowel sounds present  Extremities there is no clubbing cyanosis or edema    Relevant Results  Scheduled medications  amLODIPine, 2.5 mg, oral, Daily  dexAMETHasone, 6 mg, intravenous, q24h  enoxaparin, 30 mg, subcutaneous, Daily  fluticasone, 1 spray, Each Nostril, BID  glipiZIDE, 2.5 mg, oral, Daily before breakfast  insulin lispro, 0-5  Units, subcutaneous, TID with meals  ipratropium-albuteroL, 3 mL, nebulization, q6h while awake  melatonin, 3 mg, oral, Nightly  oxygen, , inhalation, q4h  pantoprazole, 40 mg, oral, Daily before breakfast  perflutren lipid microspheres, 0.5-10 mL of dilution, intravenous, Once in imaging  perflutren protein A microsphere, 0.5 mL, intravenous, Once in imaging  piperacillin-tazobactam, 3.375 g, intravenous, q6h  sodium bicarbonate, 650 mg, oral, TID      Continuous medications     PRN medications  PRN medications: acetaminophen **OR** acetaminophen **OR** acetaminophen, aspirin-acetaminophen-caffeine, benzonatate, bisacodyl, dextrose 10 % in water (D10W), dextrose, docusate sodium, glucagon, guaiFENesin, hydrALAZINE, ipratropium-albuteroL, melatonin, ondansetron ODT **OR** ondansetron, [Held by provider] oxygen, polyethylene glycol, sodium chloride  Results for orders placed or performed during the hospital encounter of 10/23/23 (from the past 24 hour(s))   POCT GLUCOSE   Result Value Ref Range    POCT Glucose 144 (H) 74 - 99 mg/dL   POCT GLUCOSE   Result Value Ref Range    POCT Glucose 93 74 - 99 mg/dL   Basic metabolic panel   Result Value Ref Range    Glucose 76 65 - 99 mg/dL    Sodium 138 133 - 145 mmol/L    Potassium 4.5 3.4 - 5.1 mmol/L    Chloride 100 97 - 107 mmol/L    Bicarbonate 26 24 - 31 mmol/L    Urea Nitrogen 57 (H) 8 - 25 mg/dL    Creatinine 1.60 0.40 - 1.60 mg/dL    eGFR 35 (L) >60 mL/min/1.73m*2    Calcium 8.7 8.5 - 10.4 mg/dL    Anion Gap 12 <=19 mmol/L   CBC   Result Value Ref Range    WBC 16.2 (H) 4.4 - 11.3 x10*3/uL    nRBC 0.0 0.0 - 0.0 /100 WBCs    RBC 4.13 4.00 - 5.20 x10*6/uL    Hemoglobin 11.5 (L) 12.0 - 16.0 g/dL    Hematocrit 35.6 (L) 36.0 - 46.0 %    MCV 86 80 - 100 fL    MCH 27.8 26.0 - 34.0 pg    MCHC 32.3 32.0 - 36.0 g/dL    RDW 15.2 (H) 11.5 - 14.5 %    Platelets 161 150 - 450 x10*3/uL   POCT GLUCOSE   Result Value Ref Range    POCT Glucose 59 (L) 74 - 99 mg/dL                     Assessment/Plan   This patient currently has cardiac telemetry ordered; if you would like to modify or discontinue the telemetry order, click here to go to the orders activity to modify/discontinue the order.    Impression: This is a 68-year-old woman with acute hypoxic respiratory failure secondary to COVID with continued oxygen needs and bibasilar crackles.    Plan:    1.  Hypoxemia  -Patient completed a full course of antiviral therapy continue antibiotics per ID but patient is still hypoxemic and will need diuresis today given her history of acute lung injury and likely a component of vascular leak syndrome.  We will give a dose of 40 of IV Lasix monitor response, continue to follow renal function.  Discussed with pulmonary.    2.  Hypertension  -Stable    Continue with current plan to wean oxygen.  Still on good trajectory for recovery continue with current plan as above.          Principal Problem:    Acute respiratory failure with hypoxemia (CMS/HCC)  Active Problems:    Benign essential HTN    Stage 4 chronic kidney disease (CMS/HCC)    COVID-19                  Donavan Ureña MD

## 2023-11-05 NOTE — CARE PLAN
The patient's goals for the shift include improve respiratory status.     The clinical goals for the shift include decreasing oxygen requirements as tolerated by pt.     Over the shift, the patient did not make progress toward the following goals. Barriers to progression include . Recommendations to address these barriers include .

## 2023-11-06 NOTE — PROGRESS NOTES
"Macarena Wilson is a 68 y.o. female on day 14 of admission presenting with Acute respiratory failure with hypoxemia (CMS/HCC).    Subjective   Seen and examined during high flow nasal cannula 70% FiO2 30 L of flow repeat arterial blood gases pending adjust inhalation therapy check laboratories rating a diet O2 is improved since yesterday but still with significant work of breathing requiring ongoing ICU care       Objective     Physical Exam  Vitals and nursing note reviewed.   Constitutional:       Appearance: Normal appearance.   HENT:      Head: Normocephalic and atraumatic.      Mouth/Throat:      Mouth: Mucous membranes are moist.   Eyes:      Extraocular Movements: Extraocular movements intact.      Pupils: Pupils are equal, round, and reactive to light.   Cardiovascular:      Rate and Rhythm: Normal rate and regular rhythm.   Pulmonary:      Effort: Pulmonary effort is normal.      Comments: Breath sounds bilaterally moderate work of breathing  Abdominal:      General: Abdomen is flat.      Palpations: Abdomen is soft.   Musculoskeletal:         General: Normal range of motion.      Cervical back: Normal range of motion and neck supple.   Skin:     General: Skin is warm.      Capillary Refill: Capillary refill takes less than 2 seconds.   Neurological:      General: No focal deficit present.      Mental Status: She is alert and oriented to person, place, and time. Mental status is at baseline.   Psychiatric:         Mood and Affect: Mood normal.         Last Recorded Vitals  Blood pressure (!) 125/93, pulse 103, temperature 35.8 °C (96.4 °F), temperature source Temporal, resp. rate 17, height 1.6 m (5' 3\"), weight 70.6 kg (155 lb 10.3 oz), SpO2 93 %.  Intake/Output last 3 Shifts:  I/O last 3 completed shifts:  In: - (0 mL/kg)   Out: 1800 (25.7 mL/kg) [Urine:1800 (0.7 mL/kg/hr)]  Weight: 70 kg     Relevant Results           This patient currently has cardiac telemetry ordered; if you would like to modify or " discontinue the telemetry order, click here to go to the orders activity to modify/discontinue the order.    Results for orders placed or performed during the hospital encounter of 10/23/23 (from the past 24 hour(s))   POCT GLUCOSE   Result Value Ref Range    POCT Glucose 59 (L) 74 - 99 mg/dL   POCT GLUCOSE   Result Value Ref Range    POCT Glucose 71 (L) 74 - 99 mg/dL   POCT GLUCOSE   Result Value Ref Range    POCT Glucose 107 (H) 74 - 99 mg/dL   CBC   Result Value Ref Range    WBC 15.0 (H) 4.4 - 11.3 x10*3/uL    nRBC 0.0 0.0 - 0.0 /100 WBCs    RBC 3.89 (L) 4.00 - 5.20 x10*6/uL    Hemoglobin 10.9 (L) 12.0 - 16.0 g/dL    Hematocrit 33.3 (L) 36.0 - 46.0 %    MCV 86 80 - 100 fL    MCH 28.0 26.0 - 34.0 pg    MCHC 32.7 32.0 - 36.0 g/dL    RDW 15.1 (H) 11.5 - 14.5 %    Platelets 140 (L) 150 - 450 x10*3/uL   POCT GLUCOSE   Result Value Ref Range    POCT Glucose 337 (H) 74 - 99 mg/dL   Blood Gas Arterial Full Panel   Result Value Ref Range    POCT pH, Arterial 7.48 (H) 7.38 - 7.42 pH    POCT pCO2, Arterial 44 (H) 38 - 42 mm Hg    POCT pO2, Arterial 43 (LL) 85 - 95 mm Hg    POCT SO2, Arterial 74 (L) 94 - 100 %    POCT Oxy Hemoglobin, Arterial 72.8 (L) 94.0 - 98.0 %    POCT Hematocrit Calculated, Arterial 34.0 (L) 36.0 - 46.0 %    POCT Sodium, Arterial 134 (L) 136 - 145 mmol/L    POCT Potassium, Arterial 4.1 3.5 - 5.3 mmol/L    POCT Chloride, Arterial 98 98 - 107 mmol/L    POCT Ionized Calcium, Arterial 1.08 (L) 1.10 - 1.33 mmol/L    POCT Glucose, Arterial 157 (H) 74 - 99 mg/dL    POCT Lactate, Arterial 2.9 (H) 0.4 - 2.0 mmol/L    POCT Base Excess, Arterial 8.3 (H) -2.0 - 3.0 mmol/L    POCT HCO3 Calculated, Arterial 32.8 (H) 22.0 - 26.0 mmol/L    POCT Hemoglobin, Arterial 11.4 (L) 12.0 - 16.0 g/dL    POCT Anion Gap, Arterial 7 (L) 10 - 25 mmo/L    Patient Temperature 37.0 degrees Celsius    FiO2 70 %    Apparatus HIGH FLOW CANNULA     Flow 30.0 LPM    Critical Called By ALEN     Critical Called To DR MERCADO     Critical Call  Time 1056.0000     Critical Read Back Y            Scheduled medications  acetylcysteine, 6 mL, nebulization, 4x daily  amLODIPine, 2.5 mg, oral, Daily  budesonide, 0.5 mg, nebulization, BID  dexAMETHasone, 6 mg, intravenous, q24h  enoxaparin, 30 mg, subcutaneous, Daily  fluticasone, 1 spray, Each Nostril, BID  furosemide, 20 mg, intravenous, BID  insulin glargine, 24 Units, subcutaneous, q24h  insulin lispro, 0-5 Units, subcutaneous, TID with meals  ipratropium-albuteroL, 3 mL, nebulization, q6h while awake  ipratropium-albuteroL, 3 mL, nebulization, q6h  melatonin, 3 mg, oral, Nightly  oxygen, , inhalation, q4h  pantoprazole, 40 mg, oral, Daily before breakfast  perflutren lipid microspheres, 0.5-10 mL of dilution, intravenous, Once in imaging  perflutren protein A microsphere, 0.5 mL, intravenous, Once in imaging  piperacillin-tazobactam, 3.375 g, intravenous, q6h  sodium bicarbonate, 650 mg, oral, TID      Continuous medications     PRN medications  PRN medications: acetaminophen **OR** acetaminophen **OR** acetaminophen, aspirin-acetaminophen-caffeine, benzonatate, bisacodyl, dextrose 10 % in water (D10W), dextrose, docusate sodium, glucagon, guaiFENesin, hydrALAZINE, ipratropium-albuteroL, melatonin, ondansetron ODT **OR** ondansetron, oxygen, polyethylene glycol, sodium chloride        Assessment/Plan   Principal Problem:    Acute respiratory failure with hypoxemia (CMS/HCC)  Active Problems:    Benign essential HTN    Stage 4 chronic kidney disease (CMS/HCC)    COVID-19    Adjust insulin therapy add DuoNebs and inhaled mucolytic's continue IV antibiotics recheck morning laboratories consider need for CT if clinically worsening check lower extremity venous duplex.  Patient remains critically ill requiring high flow nasal cannula 70% FiO2 30 L of flow and is in respiratory isolation necessitating additional ICU level stay       I spent 45 minutes in the professional and overall care of this  patient.      Da Brown, DO

## 2023-11-06 NOTE — CARE PLAN
Problem: Respiratory  Goal: No signs of respiratory distress (eg. Use of accessory muscles. Peds grunting)  Outcome: Progressing  Goal: Wean oxygen to maintain O2 saturation per order/standard this shift  Outcome: Progressing  Goal: Clear secretions with interventions this shift  Outcome: Progressing

## 2023-11-06 NOTE — PROGRESS NOTES
Patient on 30L/80% HFNC with supplemental NRB at times.  Patient has been accepted to [facility].  Patient was to discuss with family over the weekend.  Awaiting outcomes of those conversations.  RN TCC following.    Madiha Salazar RN

## 2023-11-06 NOTE — PROGRESS NOTES
"Macarena Wilson is a 68 y.o. female on day 14 of admission presenting with Acute respiratory failure with hypoxemia (CMS/HCC).    Subjective   Patient is alert and pleasant in bed.  She denies any difficulty breathing, cough, wheezing, chest pain, palpitations, nausea or vomiting, abdominal pain, headaches, or changes in vision and hearing.    Blood pressures are normal elevated.  SPO2 is low on 40 L nasal cannula 70% FiO2.  Respiratory rate is increased.  Telemetry shows irregular heart rates overnight as well as an episode of SVT.  There are also episodes of desaturation and low SPO2.       Objective     Physical Exam  Constitutional:       General: She is not in acute distress.     Appearance: She is ill-appearing.   HENT:      Head: Normocephalic.   Eyes:      Extraocular Movements: Extraocular movements intact.      Pupils: Pupils are equal, round, and reactive to light.   Cardiovascular:      Rate and Rhythm: Regular rhythm. Tachycardia present.      Pulses: Normal pulses.   Pulmonary:      Comments: On 40 L NC 70% FiO2  Abdominal:      General: Abdomen is flat.      Palpations: Abdomen is soft.   Musculoskeletal:      Right lower leg: No edema.      Left lower leg: No edema.   Skin:     General: Skin is warm and dry.   Neurological:      Mental Status: She is alert and oriented to person, place, and time.   Psychiatric:         Mood and Affect: Mood normal.         Behavior: Behavior normal.         Thought Content: Thought content normal.         Judgment: Judgment normal.         Last Recorded Vitals  Blood pressure 119/62, pulse 106, temperature 35.8 °C (96.4 °F), temperature source Temporal, resp. rate (!) 31, height 1.6 m (5' 3\"), weight 70.6 kg (155 lb 10.3 oz), SpO2 (!) 85 %.  Intake/Output last 3 Shifts:  I/O last 3 completed shifts:  In: - (0 mL/kg)   Out: 1800 (25.7 mL/kg) [Urine:1800 (0.7 mL/kg/hr)]  Weight: 70 kg     Relevant Results  Elevated white blood cell count: 15  Decreased hemoglobin: " 10.9  Decreased platelets: 140  Elevated glucose: 337    Chest x-ray from 11/3: Severe bilateral infiltrate  Chest x-ray from 11/6: Ordered and pending       Assessment/Plan   Principal Problem:    Acute respiratory failure with hypoxemia (CMS/[facility])  Active Problems:    Benign essential HTN    Stage 4 chronic kidney disease (CMS/[facility])    COVID-19    Acute respiratory failure with hypoxemia  - Likely due to COVID-19  - Continue COVID-19 protocol  - Continue antibiotic and steroid regimen per infectious disease; Zosyn, dexamethasone  - Continue to monitor white blood cells, currently elevated  - Continue to wean oxygen as able per pulmonology  - Continue inhaled nebulizers for respiratory symptoms  - Monitor with chest x-ray for infiltrates, pending    Hypertension  - Continue amlodipine    Diabetes  - Consider alterations to current regimen, glucose is very elevated at 337  - Continue to monitor glucose levels             Ellen Sloan

## 2023-11-06 NOTE — CARE PLAN
Problem: Respiratory  Goal: Minimal/no exertional discomfort or dyspnea this shift  Outcome: Progressing  Goal: Wean oxygen to maintain O2 saturation per order/standard this shift  Outcome: Progressing  Goal: Verbalize decreased shortness of breath this shift  Outcome: Progressing

## 2023-11-06 NOTE — PROGRESS NOTES
Critical Care Progress Note    Macarena Wilson is a 68 y.o. female on day 14 of admission presenting with Acute respiratory failure with hypoxemia (CMS/HCC).    Subjective   Awake and alert. Increased oxygen need yesterday and overnight.  States she had a bad day yesterday.  Respiratory notes indicate she was switched to high flow for increased work of breathing and desats.  Patient also indicates that she was feeling increased work of breathing and feeling poorly.  Appears improved today.  Objective   Vital Signs      11/4/2023     6:00 PM 11/4/2023     7:30 PM 11/5/2023     8:23 AM 11/5/2023    11:59 AM 11/5/2023    12:43 PM 11/5/2023     4:26 PM 11/6/2023     7:57 AM   Vitals   Systolic 120 129 142 154  123 119   Diastolic 70 67 78 75  78 62   Heart Rate 73 81 78 90  82 106   Temp  36 °C (96.8 °F) 36 °C (96.8 °F) 36.4 °C (97.5 °F)  35.7 °C (96.3 °F) 35.8 °C (96.4 °F)   Resp 27 29 17 25  31    Weight (lb)     154.32     BMI     27.34 kg/m2     BSA (m2)     1.76 m2         Oxygen Therapy  SpO2: (!) 85 %  Medical Gas Therapy: Supplemental oxygen  O2 Delivery Method: High flow nasal cannula    FiO2 (%):  [80 %-100 %] 100 %    Intake/Output previous 24 hours:    Intake/Output Summary (Last 24 hours) at 11/6/2023 0956  Last data filed at 11/5/2023 1455  Gross per 24 hour   Intake --   Output 1300 ml   Net -1300 ml       Physical Exam  Constitutional:       Appearance: Normal appearance.   HENT:      Head: Normocephalic and atraumatic.   Eyes:      Extraocular Movements: Extraocular movements intact.      Pupils: Pupils are equal, round, and reactive to light.   Cardiovascular:      Rate and Rhythm: Normal rate and regular rhythm.      Pulses: Normal pulses.      Heart sounds: Normal heart sounds.   Pulmonary:      Effort: Pulmonary effort is normal.      Breath sounds: Wheezing present.   Abdominal:      General: Abdomen is flat. Bowel sounds are normal.      Palpations: Abdomen is soft.   Musculoskeletal:       Cervical back: Normal range of motion and neck supple.   Skin:     General: Skin is warm and dry.   Neurological:      General: No focal deficit present.      Mental Status: She is alert and oriented to person, place, and time.         Lines and Tubes:  Peripheral IV 10/29/23 Right;Anterior Forearm (Active)   Placement Date: 10/29/23   Earliest Known Present: 10/29/23  Orientation: Right;Anterior  Location: Forearm   Number of days: 8       Peripheral IV 10/30/23 20 G Right Arm (Active)   Placement Date: 10/30/23   Size (Gauge): 20 G  Orientation: Right  Location: Arm   Number of days: 7         Scheduled medications  amLODIPine, 2.5 mg, oral, Daily  dexAMETHasone, 6 mg, intravenous, q24h  enoxaparin, 30 mg, subcutaneous, Daily  fluticasone, 1 spray, Each Nostril, BID  glipiZIDE, 2.5 mg, oral, Daily before breakfast  insulin lispro, 0-5 Units, subcutaneous, TID with meals  ipratropium-albuteroL, 3 mL, nebulization, q6h while awake  melatonin, 3 mg, oral, Nightly  oxygen, , inhalation, q4h  pantoprazole, 40 mg, oral, Daily before breakfast  perflutren lipid microspheres, 0.5-10 mL of dilution, intravenous, Once in imaging  perflutren protein A microsphere, 0.5 mL, intravenous, Once in imaging  piperacillin-tazobactam, 3.375 g, intravenous, q6h  sodium bicarbonate, 650 mg, oral, TID      Continuous medications     PRN medications  PRN medications: acetaminophen **OR** acetaminophen **OR** acetaminophen, aspirin-acetaminophen-caffeine, benzonatate, bisacodyl, dextrose 10 % in water (D10W), dextrose, docusate sodium, glucagon, guaiFENesin, hydrALAZINE, ipratropium-albuteroL, melatonin, ondansetron ODT **OR** ondansetron, [Held by provider] oxygen, polyethylene glycol, sodium chloride    Relevant Results  Results from last 7 days   Lab Units 11/06/23  0538 11/05/23  0901 11/04/23  0639   WBC AUTO x10*3/uL 15.0* 16.2* 17.5*   HEMOGLOBIN g/dL 10.9* 11.5* 11.0*   HEMATOCRIT % 33.3* 35.6* 33.6*   PLATELETS AUTO x10*3/uL 140*  161 149*      Results from last 7 days   Lab Units 11/05/23  0901 11/04/23  0638 11/03/23  0617   SODIUM mmol/L 138 137 134   POTASSIUM mmol/L 4.5 5.1 4.9   CHLORIDE mmol/L 100 102 99   CO2 mmol/L 26 25 25   BUN mg/dL 57* 63* 61*   CREATININE mg/dL 1.60 1.60 1.60   GLUCOSE mg/dL 76 129* 159*   CALCIUM mg/dL 8.7 8.1* 8.7          XR chest 1 view 11/03/2023    Narrative  Interpreted By:  Aba Roach,  STUDY:  XR CHEST 1 VIEW; 11/3/2023 11:37 am    INDICATION:  Signs/Symptoms:respiratory failure    COMPARISON:  10/31/2023    ACCESSION NUMBER(S):  [id number]    ORDERING CLINICIAN:  DENNYS MERCADO    FINDINGS:  The cardiomediastinal silhouette is stable. Interval progression of  severe patchy airspace opacities are noted bilaterally. No pleural  effusion is identified.    The osseous structures are intact.    Impression  Interval progression of severe bilateral infiltrate.         CT CHEST WO IV CONTRAST;   10/24/2023 12:27 am      FINDINGS:   The thyroid gland is unremarkable.       The heart size is within normal limits.  No pericardial effusion is   identified. The aorta and pulmonary arteries are normal in caliber.      There are no pathologically enlarged mediastinal, hilar, or axillary   lymph nodes are seen.       The trachea and mainstem bronchi are patent. Diffuse patchy   ground-glass opacities bilaterally which is compatible with atypical   pneumonia. There is no evidence of pneumothorax or pleural effusion.       The visualized osseous structures are intact.       Limited images through the upper abdomen are unremarkable.   Impression:    Diffuse, patchy ground-glass opacities bilaterally suggesting   atypical pneumonia such as COVID.         TRANSTHORACIC ECHOCARDIOGRAM REPORT    CONCLUSIONS:   1. Left ventricular systolic function is normal with a 60-65% estimated ejection fraction.   2. Spectral Doppler shows an impaired relaxation pattern of left ventricular diastolic filling.    Patient Active  Problem List   Diagnosis    Abnormal mammogram    Acute pain of left shoulder    Allergic rhinitis    Benign essential HTN    Calculus of kidney    Staghorn renal calculus    Diverticular disease    Gammopathy    Gross hematuria    Hematuria, unspecified    Impaired fasting glucose    Nodule of kidney    Obesity    Post-menopausal bleeding    Prediabetes    Acute cystitis with hematuria    Stage 4 chronic kidney disease (CMS/HCC)    Urgency incontinence    Bladder fistula    Atrophic kidney    COVID-19    Acute respiratory failure with hypoxemia (CMS/[facility])    Pneumonia of both lower lobes due to infectious organism     Assessment/Plan   Principal Problem:    Acute respiratory failure with hypoxemia (CMS/HCC)  Active Problems:    COVID-19    Benign essential HTN    Stage 4 chronic kidney disease (CMS/HCC)    Chest x-ray from 11/3 showed some worsening infiltrates.  On increasing oxygen overnight.  Unclear etiology.  Question worsening edema, new infectious process.  Possible PE always remains in the setting of COVID.  However, she is receiving daily Lovenox.    Repeat chest x-ray today.  Question worsening edema.  Fluid status is -1.8 L for charting.  Lasix IV x1    Completed remdesivir  Continue steroids  Continue antibiotics    Await results of chest x-ray.  Consider CT of the chest pending these results.  However, reluctant to give contrast given her baseline creatinine.  Unsure if this is necessary at this time.  Again wait for chest x-ray today and then will proceed accordingly.    Wean high flow nasal cannula.  Current settings are 30 L/min and 70%.  Down from 100% this a.m.    Overall her progress is very slow.  Concerned about this recent setback.  However, have been able to drop her FiO2 significantly this AM.  We will continue to follow her closely.  If she remains stable on 70% or less for the next 24 hours, we can consider transfer to stepdown.    Davin Avendaño MD  [facility]       This  critically ill patient continues to be at-risk for deterioration / failure due to the above mentioned dysfunctional unstable organ systems.   Critical care time is spent at bedside includes review of diagnostic tests, labs, and radiographs, serial assessments and management of hemodynamics, respiratory status, and coordination of care with different members of interdisciplinary team  Assessment, impression and plans are reflected in the note above as well as the orders.     Critical concerns addressed:  Acute respiratory failure  COVID-19  Worsening hypoxia  HUNG    Time Spent in critical Care, exclusive of procedures : 30 mins.     Disclaimer: Parts of this chart were dictated with voice recognition software. Please excuse any errors of grammar, spelling, or transcription which are not corrected. Please contact me with any questions regarding documentation.

## 2023-11-06 NOTE — PROGRESS NOTES
Macarena Wilson is a 68 y.o. female on day 14 of admission presenting with Acute respiratory failure with hypoxemia (CMS/HCC).    Subjective   Interval History:   Discussed with nursing  On high flow oxygen Fio2 70%  Reports feeling better than yesterday  Afebrile          Objective   Range of Vitals (last 24 hours)  Heart Rate:  []   Temp:  [35.7 °C (96.3 °F)-35.8 °C (96.4 °F)]   Resp:  [17-37]   BP: (119-131)/()   Weight:  [70.6 kg (155 lb 10.3 oz)]   SpO2:  [73 %-98 %]   Daily Weight  11/06/23 : 70.6 kg (155 lb 10.3 oz)    Body mass index is 27.57 kg/m².    Physical Exam  Constitutional:       Appearance: Normal appearance.   HENT:      Head: Normocephalic and atraumatic.      Nose: Nose normal.      Mouth/Throat:      Mouth: Mucous membranes are moist.      Pharynx: Oropharynx is clear.   Eyes:      Extraocular Movements: Extraocular movements intact.      Conjunctiva/sclera: Conjunctivae normal.   Cardiovascular:      Rate and Rhythm: Normal rate and regular rhythm.   Pulmonary:      Comments: Decreased breath sounds bilateral  Abdominal:      General: Bowel sounds are normal.      Palpations: Abdomen is soft.   Musculoskeletal:         General: Normal range of motion.      Cervical back: Normal range of motion and neck supple.   Skin:     General: Skin is warm and dry.   Neurological:      General: No focal deficit present.      Mental Status: She is alert and oriented to person, place, and time. Mental status is at baseline.   Psychiatric:         Mood and Affect: Mood normal.         Behavior: Behavior normal.           Antibiotics    sodium chloride 0.9 % bolus 500 mL  doxycycline (Vibramycin) in dextrose 5 % in water (D5W) 100 mL  mg  cefTRIAXone (Rocephin) IVPB 1 g  Tc-99m-albumin (Draximage MAA) injection 4.2 millicurie  dexAMETHasone (Decadron) injection 8 mg  sodium chloride 0.9 % bolus 1,000 mL  enoxaparin (Lovenox) syringe 30 mg  sodium chloride 0.9% infusion  acetaminophen  (Tylenol) tablet 650 mg  acetaminophen (Tylenol) oral liquid 650 mg  acetaminophen (Tylenol) suppository 650 mg  ondansetron ODT (Zofran-ODT) disintegrating tablet 4 mg  ondansetron (Zofran) injection 4 mg  polyethylene glycol (Glycolax, Miralax) packet 17 g  bisacodyl (Dulcolax) EC tablet 10 mg  guaiFENesin (Mucinex) 12 hr tablet 600 mg  oxygen (O2) therapy  piperacillin-tazobactam-dextrose (Zosyn) IV 2.25 g  ipratropium-albuteroL (Duo-Neb) 0.5-2.5 mg/3 mL nebulizer solution 3 mL  dexAMETHasone (Decadron) injection 8 mg  dexAMETHasone (Decadron) injection 6 mg  dextrose 50 % injection 25 g  glucagon (Glucagen) injection 1 mg  dextrose 10 % in water (D10W) infusion  insulin lispro (HumaLOG) injection 0-5 Units  vancomycin-diluent combo no.1 (Xellia) IVPB 1,250 mg  ipratropium-albuteroL (Duo-Neb) 0.5-2.5 mg/3 mL nebulizer solution 3 mL  ipratropium-albuteroL (Duo-Neb) 0.5-2.5 mg/3 mL nebulizer solution 3 mL  oxygen (O2) therapy  remdesivir (Veklury) 200 mg in sodium chloride 0.9% 250 mL IV  remdesivir (Veklury) 100 mg in sodium chloride 0.9% 250 mL IV  oxygen (O2) therapy  pantoprazole (ProtoNix) injection 40 mg  remdesivir (Veklury) 200 mg in sodium chloride 0.9% 250 mL IV  remdesivir (Veklury) 100 mg in sodium chloride 0.9% 250 mL IV  tocilizumab (Actemra) 600 mg in sodium chloride 0.9% 70 mL IV  furosemide (Lasix) tablet 10 mg  melatonin tablet 3 mg  benzonatate (Tessalon) capsule 200 mg  polyethylene glycol (Glycolax, Miralax) packet 17 g  fluticasone (Flonase) nasal spray 1 spray  sodium chloride (Ocean) 0.65 % nasal spray 1 spray  sodium bicarbonate tablet 650 mg  vancomycin-diluent combo no.1 (Xellia) IVPB 1,250 mg  guaiFENesin (Robitussin) 100 mg/5 mL syrup 200 mg  vancomycin-diluent combo no.1 (Xellia) IVPB 750 mg  pantoprazole (ProtoNix) EC tablet 40 mg  oxygen (O2) therapy  furosemide (Lasix) injection 20 mg  oxygen (O2) therapy  heparin (porcine) injection 5,750 Units  heparin 25,000 Units in dextrose 5%  250 mL (100 Units/mL) infusion (premix)  heparin (porcine) injection 3,000-6,000 Units  enoxaparin (Lovenox) syringe 30 mg  melatonin tablet 3 mg  oxygen (O2) therapy  furosemide (Lasix) injection 40 mg  acetaminophen-caffeine 500-65 mg tablet 2 tablet  aspirin-acetaminophen-caffeine (Excedrin Migraine) 250-250-65 mg per tablet 2 tablet  perflutren lipid microspheres (Definity) injection 0.5-10 mL of dilution  sulfur hexafluoride microsphr (Lumason) injection 24.28 mg  perflutren protein A microsphere (Optison) injection 0.5 mL  hydrALAZINE (Apresoline) injection 10 mg  docusate sodium (Colace) capsule 100 mg  amLODIPine (Norvasc) tablet 2.5 mg  glipiZIDE (Glucotrol) tablet 5 mg  piperacillin-tazobactam-dextrose (Zosyn) IV 3.375 g  glipiZIDE (Glucotrol) tablet 2.5 mg  furosemide (Lasix) injection 40 mg  oxygen (O2) therapy      Relevant Results  Labs  Results from last 72 hours   Lab Units 11/06/23  0538 11/05/23  0901 11/04/23  0639   WBC AUTO x10*3/uL 15.0* 16.2* 17.5*   HEMOGLOBIN g/dL 10.9* 11.5* 11.0*   HEMATOCRIT % 33.3* 35.6* 33.6*   PLATELETS AUTO x10*3/uL 140* 161 149*       Results from last 72 hours   Lab Units 11/05/23  0901 11/04/23  0638   SODIUM mmol/L 138 137   POTASSIUM mmol/L 4.5 5.1   CHLORIDE mmol/L 100 102   CO2 mmol/L 26 25   BUN mg/dL 57* 63*   CREATININE mg/dL 1.60 1.60   GLUCOSE mg/dL 76 129*   CALCIUM mg/dL 8.7 8.1*   ANION GAP mmol/L 12 10   EGFR mL/min/1.73m*2 35* 35*             Estimated Creatinine Clearance: 31.3 mL/min (by C-G formula based on SCr of 1.6 mg/dL).  C-Reactive Protein   Date Value Ref Range Status   11/03/2023 <0.30 0.00 - 2.00 mg/dL Final   10/31/2023 0.50 0.00 - 2.00 mg/dL Final   10/26/2023 3.90 (H) 0.00 - 2.00 mg/dL Final     Microbiology  Reviewed  Imaging  XR chest 1 view    Result Date: 11/3/2023  Interpreted By:  Aba Roach, STUDY: XR CHEST 1 VIEW; 11/3/2023 11:37 am   INDICATION: Signs/Symptoms:respiratory failure   COMPARISON: 10/31/2023   ACCESSION  NUMBER(S): [id number]   ORDERING CLINICIAN: DENNYS MERCADO   FINDINGS: The cardiomediastinal silhouette is stable. Interval progression of severe patchy airspace opacities are noted bilaterally. No pleural effusion is identified.   The osseous structures are intact.       Interval progression of severe bilateral infiltrate.   MACRO: None   Signed by: Aba Roach 11/3/2023 4:15 PM Dictation workstation:   [id number]    Lower extremity venous duplex bilateral    Result Date: 11/2/2023  Interpreted By:  Russel Handley, STUDY: [facility] LOWER EXTREMITY VENOUS DUPLEX BILATERAL; 10/27/2023; 10/27/2023 11:15 am   INDICATION: Signs/Symptoms:elev dimer cv pos ro dvt. Shortness of breath.   ACCESSION NUMBER(S): [id number]   ORDERING CLINICIAN: SUSANNE FRANCOIS   TECHNIQUE: PROCEDURE: Grayscale, 2D, color Doppler, Doppler spectral analysis (waveform) and duplex images obtained.   FINDINGS: Right Lower Extremity: Patent with normal flow, and compressibility of the common and superficial femoral, popliteal, and greater saphenous veins  as well as in the tibioperoneal trunk and trifurcation.  There is no subcutaneous edema.   Left Lower Extremity: Patent with normal flow, and compressibility of the common and superficial femoral, popliteal, and greater saphenous veins  as well as in the tibioperoneal trunk and trifurcation.  There is no subcutaneous edema.       1. Right Lower Extremity: No evidence of deep vein thrombosis.   2. Left Lower Extremity: No evidence of deep vein thrombosis.   Signed by: Russel Handley 11/2/2023 2:24 PM Dictation workstation:   [id number]    Transthoracic Echo (TTE) Complete    Result Date: 11/1/2023            [address], [place] [id number]             Phone [phone] TRANSTHORACIC ECHOCARDIOGRAM REPORT  Patient Name:      KARLA AGUILERA Reading Physician:   [id number] Riccardo Bagley MD Study Date:        10/31/2023         Ordering Provider:   [id number] CUCA SAM  MRN/PID:           [id number]           Fellow: Accession#:        [id number]       Nurse: Date of Birth/Age: 1955 / 68      Sonographer:         Temi King RCS                    years Gender:            F                  Additional Staff: Height:            160.02 cm          Admit Date:          10/31/2023 Weight:            70.76 kg           Admission Status:    Inpatient - Routine BSA:               1.74 m2            Department Location: Blood Pressure: 135 /85 mmHg Study Type:    TRANSTHORACIC ECHO (TTE) COMPLETE Diagnosis/ICD: Acute respiratory failure with hypoxia-J96.01 Indication:    Acute Respiratory failure with hypoxemia CPT Codes:     Echo Complete w Full Doppler-[id number] Patient History: Pertinent History: HTN Prediabetes Chronic Kidney Disease. Study Detail: The following Echo studies were performed: 2D, M-Mode, Doppler and               color flow. Technically challenging study due to poor acoustic               windows, prominent lung artifact and patient lying in supine               position. Lumason used as a contrast agent for endocardial border               definition. Unable to obtain subcostal and suprasternal notch               view.  PHYSICIAN INTERPRETATION: Left Ventricle: Left ventricular systolic function is normal, with an estimated ejection fraction of 60-65%. There are no regional wall motion abnormalities. The left ventricular cavity size is normal. Spectral Doppler shows an impaired relaxation pattern of left ventricular diastolic filling. Left Atrium: The left atrium is normal in size. Right Ventricle: The right ventricle is normal in size. There is normal right ventricular global systolic function. Right Atrium: The right atrium is normal in size. Aortic Valve: The aortic valve is trileaflet. There is trivial aortic valve regurgitation. The peak instantaneous gradient of the aortic valve is 12.0 mmHg. Mitral Valve: The mitral valve is normal in structure. There is  trace mitral valve regurgitation. Tricuspid Valve: The tricuspid valve is structurally normal. There is trace tricuspid regurgitation. Pulmonic Valve: The pulmonic valve is not well visualized. The pulmonic valve regurgitation was not well visualized. Pericardium: There is no pericardial effusion noted. Aorta: The aortic root was not well visualized.  CONCLUSIONS:  1. Left ventricular systolic function is normal with a 60-65% estimated ejection fraction.  2. Spectral Doppler shows an impaired relaxation pattern of left ventricular diastolic filling. QUANTITATIVE DATA SUMMARY: 2D MEASUREMENTS:                          Normal Ranges: LAs:           2.60 cm   (2.7-4.0cm) IVSd:          0.88 cm   (0.6-1.1cm) LVPWd:         0.85 cm   (0.6-1.1cm) LVIDd:         3.51 cm   (3.9-5.9cm) LVIDs:         2.55 cm LV Mass Index: 48.5 g/m2 LV % FS        27.4 % LA VOLUME:                               Normal Ranges: LA Vol A2C:        73.7 ml LA Vol Index A2C:  42.4 ml/m2 LA Area A2C:       21.6 cm2 LA Major Axis A2C: 5.4 cm LA Volume Index:   33.2 ml/m2 LA Vol A2C:        70.6 ml RA VOLUME BY A/L METHOD:                              Normal Ranges: RA Vol A2C:        1.3 ml RA Vol Index A2C:  0.8 ml/m2 RA Area A2C:       5.0 cm2 RA Major Axis A2C: 15.7 cm M-MODE MEASUREMENTS:                  Normal Ranges: Ao Root: 2.70 cm (2.0-3.7cm) LAs:     2.60 cm (2.7-4.0cm) LV SYSTOLIC FUNCTION BY 2D PLANIMETRY (MOD):                     Normal Ranges: EF-A4C View: 78.8 % (>=55%) EF-A2C View: 73.2 % EF-Biplane:  75.3 % LV DIASTOLIC FUNCTION:                     Normal Ranges: MV Peak E: 0.82 m/s (0.7-1.2 m/s) MV Peak A: 1.03 m/s (0.42-0.7 m/s) E/A Ratio: 0.80     (1.0-2.2) MITRAL VALVE:                 Normal Ranges: MV DT: 193 msec (150-240msec) AORTIC VALVE:                          Normal Ranges: AoV Vmax:      1.73 m/s  (<=1.7m/s) AoV Peak P.0 mmHg (<20mmHg) LVOT Max Garrett:  1.37 m/s  (<=1.1m/s) LVOT VTI:      28.40 cm LVOT  Diameter: 2.00 cm   (1.8-2.4cm) AoV Area,Vmax: 2.49 cm2  (2.5-4.5cm2)  RIGHT VENTRICLE: RV Basal 3.35 cm RV Mid   2.73 cm RV Major 5.6 cm TRICUSPID VALVE/RVSP:                             Normal Ranges: Peak TR Velocity: 1.97 m/s RV Syst Pressure: 18.5 mmHg (< 30mmHg) PULMONIC VALVE:                         Normal Ranges: PV Accel Time: 74 msec  (>120ms) PV Max Garrett:    1.1 m/s  (0.6-0.9m/s) PV Max P.5 mmHg  [id number] Riccardo Bagley MD Electronically signed on 2023 at 9:53:29 AM  ** Final **     XR chest 1 view    Result Date: 10/31/2023  Interpreted By:  Nirmal Mcmillan, STUDY: XR CHEST 1 VIEW; 10/31/2023 9:48 am   INDICATION: Shortness of breath   COMPARISON: 10/29/2023   ACCESSION NUMBER(S): [id number]   ORDERING CLINICIAN: CUCA SAM   TECHNIQUE: 1 view of the chest was performed.   FINDINGS: No significant interval change in diffuse bilateral airspace opacities involving both lungs more prominent in the mid and lower lungs. Findings do not appear significantly changed. No pleural effusion. No pneumothorax.  The cardiomediastinal silhouette is within normal limits.       No significant interval change in diffuse bilateral airspace opacities. No pleural effusion or significant interval change.   Signed by: Nirmal Mcmillan 10/31/2023 10:45 AM Dictation workstation:   [id number]    XR chest 1 view    Result Date: 10/29/2023  Interpreted By:  Karina Rodriguez, STUDY: XR CHEST 1 VIEW; 10/29/2023 1:02 pm   INDICATION: Signs/Symptoms:hyppoxemia   COMPARISON: 10/27/2023   ACCESSION NUMBER(S): [id number]   ORDERING CLINICIAN: SUSANNE FRANCOIS   TECHNIQUE: Frontal  chest radiographs.   FINDINGS: The cardiomediastinal silhouette is unremarkable. Diffuse airspace opacities are noted overlying both lungs, unchanged from prior examinations. No pleural effusion is identified.   The osseous structures are intact.       No significant change from prior examination. Diffuse airspace opacities overlying both lungs,  differential includes ARDS, diffuse alveolar edema/hemorrhage.     Signed by: Karina Rodriguez 10/29/2023 2:19 PM Dictation workstation:   [id number]    XR chest 1 view    Result Date: 10/27/2023  Interpreted By:  Nirmal Mcmillan, STUDY: XR CHEST 1 VIEW; 10/27/2023 10:06 am   INDICATION: Signs/Symptoms:follow up hypoxemia.   COMPARISON: 10/23/2023   ACCESSION NUMBER(S): [id number]   ORDERING CLINICIAN: SUSANNE FRANCOIS   TECHNIQUE: 1 view of the chest was performed.   FINDINGS: There is diffuse ground-glass opacity bilaterally consistent with history of COVID. No lobar or large consolidation however the ground-glass opacities are prominent but stable from the previous examination. No pleural effusion. No pneumothorax.  The cardiomediastinal silhouette is within normal limits.       No significant change. Prominent ground-glass opacities bilaterally, however, not significantly changed.   Signed by: Nirmal Mcmillan 10/27/2023 10:24 PM Dictation workstation:   [id number]    CT chest wo IV contrast    Result Date: 10/24/2023  Interpreted By:  Nirmal Mcmillan, STUDY: CT CHEST WO IV CONTRAST; 10/24/2023 12:27 am   INDICATION: Signs/Symptoms:Covid.   COMPARISON: None   ACCESSION NUMBER(S): [id number]   ORDERING CLINICIAN: GERTRUDE FIERRO   TECHNIQUE: Contiguous axial images of the thorax were obtained from the level of the thoracic inlet through the lung bases. Coronal and sagittal reformatted images were obtained.     FINDINGS: The thyroid gland is unremarkable.   The heart size is within normal limits.  No pericardial effusion is identified. The aorta and pulmonary arteries are normal in caliber.   There are no pathologically enlarged mediastinal, hilar, or axillary lymph nodes are seen.   The trachea and mainstem bronchi are patent. Diffuse patchy ground-glass opacities bilaterally which is compatible with atypical pneumonia. There is no evidence of pneumothorax or pleural effusion.   The visualized osseous  structures are intact.   Limited images through the upper abdomen are unremarkable.       Diffuse, patchy ground-glass opacities bilaterally suggesting atypical pneumonia such as COVID.     Signed by: Nirmal Mcmillan 10/24/2023 12:32 PM Dictation workstation:   [id number]    NM lung perfusion particulate    Result Date: 10/23/2023  Interpreted By:  Yoly Stanley, STUDY: NM LUNG PERFUSION PARTICULATE 10/23/2023 8:44 pm   INDICATION: Signs/Symptoms:hypoxia, r/o PE   COMPARISON: Chest x-ray done earlier today   ACCESSION NUMBER(S): [id number]   ORDERING CLINICIAN: ANAHY EVANS   TECHNIQUE: 4.2 millicuries of technetium 99 M MAA was injected intravenously with perfusion lung scan in 8 projections completed.   FINDINGS: No perfusion defect is seen within either lung.       Normal perfusion lung scan.   Signed by: Yoly Stanley 10/23/2023 8:57 PM Dictation workstation:   [id number]    XR chest 1 view    Result Date: 10/23/2023  Interpreted By:  Yoly Stanley, STUDY: XR CHEST 1 VIEW 10/23/2023 4:55 pm   INDICATION: Signs/Symptoms:dyspnea, cough, fatigue, and malaise. Positive COVID.   COMPARISON: None available.   ACCESSION NUMBER(S): [id number]   ORDERING CLINICIAN: ANAHY EVANS   TECHNIQUE: AP erect view of the chest   FINDINGS: The cardiac size is normal with no mediastinal abnormality identified. The trachea is midline. No pleural abnormality is seen. However, there are bilateral infiltrates with ground-glass appearance with relative sparing of the left upper lung zone.       Bilateral ground-glass infiltrates.   Signed by: Yoly Stanley 10/23/2023 5:08 PM Dictation workstation:   [id number]    Assessment/Plan   Acute hypoxic respiratory failure, interval decrease-on 70% high flow  Severe coronavirus pneumonitis   Stage IV chronic kidney disease  Leukocytosis-resolving  Diarrhea-rule out infectious etiology-resolved     Continue dexamethasone  Remdesivir completed  Monitor renal and hematologic parameters  S/p  Actemra-10/24/2023  Supportive care  Monitor temperature and WBC  Supplemental oxygen as needed,  Pulmonary follow-up      Charmaine Schaeffer, APRN-CNP

## 2023-11-07 NOTE — PROGRESS NOTES
Macarena Wilson is a 68 y.o. female on day 15 of admission presenting with Acute respiratory failure with hypoxemia (CMS/[facility]).    Subjective   Seen and examined patient now requiring 100% high flow nasal cannula at 35 L still desatting from 92% to 82 to 84% with conversation and taking a significant time for recovery her CT scan of the chest is performed and shows no areas of normal lung in fact she has severe ARDS type picture involving the lower lobes and posterior lobes and moderate arts type picture involving the anterior and upper lobes.  I had a lengthy discussion with the patient regarding her impending hypoxemic respiratory failure she is also breathing anywhere from 28 to 40 breaths/minAnd I am anticipating this patient failing high flow nasal cannula oxygen we did discuss initiation of noninvasive positive pressure ventilation utilizing Precedex for anxiety control we will attempt this over the next few hours hoping to get to BiPAP settings of 20/8 at 100% FiO2.  I am equally concerned the patient will fail noninvasive positive pressure ventilation and require mechanical ventilation with intubation and BiPAP she may ultimately require transfer to a higher level of care.  She remains full code by her advanced directives.  She does not appear to be a candidate for tocilizumab given her renal impairment but I will review this with infectious disease.       Objective     Physical Exam  Vitals and nursing note reviewed.   Constitutional:       Appearance: Normal appearance.   HENT:      Head: Normocephalic and atraumatic.      Mouth/Throat:      Mouth: Mucous membranes are dry.   Eyes:      Extraocular Movements: Extraocular movements intact.      Pupils: Pupils are equal, round, and reactive to light.   Cardiovascular:      Rate and Rhythm: Normal rate and regular rhythm.      Pulses: Normal pulses.      Heart sounds: Normal heart sounds.   Pulmonary:      Effort: Respiratory distress present.       "Comments: Breath sounds are diminished increasingly tubular in nature with poor air exchange with increasing work of breathing with respiratory rate 30 to 35 breaths/min  Abdominal:      Palpations: Abdomen is soft.   Musculoskeletal:         General: Normal range of motion.      Cervical back: Normal range of motion and neck supple.   Skin:     General: Skin is warm.      Capillary Refill: Capillary refill takes less than 2 seconds.   Neurological:      General: No focal deficit present.      Mental Status: She is alert and oriented to person, place, and time. Mental status is at baseline.   Psychiatric:         Mood and Affect: Mood normal.         Last Recorded Vitals  Blood pressure 137/66, pulse 94, temperature 36.5 °C (97.7 °F), temperature source Temporal, resp. rate (!) 32, height 1.6 m (5' 3\"), weight 68.4 kg (150 lb 12.7 oz), SpO2 93 %.  Intake/Output last 3 Shifts:  I/O last 3 completed shifts:  In: 630 (9.2 mL/kg) [P.O.:480; IV Piggyback:150]  Out: 1600 (23.4 mL/kg) [Urine:1600 (0.6 mL/kg/hr)]  Weight: 68.4 kg     Relevant Results           This patient currently has cardiac telemetry ordered; if you would like to modify or discontinue the telemetry order, click here to go to the orders activity to modify/discontinue the order.    Scheduled medications  acetylcysteine, 3 mL, nebulization, TID  amLODIPine, 2.5 mg, oral, Daily  budesonide, 0.5 mg, nebulization, BID  dexAMETHasone, 6 mg, intravenous, q24h  enoxaparin, 30 mg, subcutaneous, Daily  fluticasone, 1 spray, Each Nostril, BID  insulin glargine, 16 Units, subcutaneous, Nightly  ipratropium-albuteroL, 3 mL, nebulization, q6h while awake  melatonin, 3 mg, oral, Nightly  meropenem, 500 mg, intravenous, q12h  oxygen, , inhalation, q4h  pantoprazole, 40 mg, oral, Daily before breakfast  perflutren lipid microspheres, 0.5-10 mL of dilution, intravenous, Once in imaging  perflutren protein A microsphere, 0.5 mL, intravenous, Once in imaging  sodium " bicarbonate, 650 mg, oral, TID  vancomycin, 750 mg, intravenous, q24h      Continuous medications  dexmedeTOMIDine, 0.1-1.5 mcg/kg/hr, Last Rate: 0.4 mcg/kg/hr (11/07/23 1516)      PRN medications  PRN medications: acetaminophen **OR** acetaminophen **OR** acetaminophen, aspirin-acetaminophen-caffeine, benzonatate, bisacodyl, dextrose 10 % in water (D10W), dextrose, docusate sodium, glucagon, guaiFENesin, hydrALAZINE, ipratropium-albuteroL, melatonin, ondansetron ODT **OR** ondansetron, oxygen, oxygen, polyethylene glycol, sodium chloride  Results for orders placed or performed during the hospital encounter of 10/23/23 (from the past 24 hour(s))   POCT GLUCOSE   Result Value Ref Range    POCT Glucose 66 (L) 74 - 99 mg/dL   POCT GLUCOSE   Result Value Ref Range    POCT Glucose 103 (H) 74 - 99 mg/dL   POCT GLUCOSE   Result Value Ref Range    POCT Glucose 105 (H) 74 - 99 mg/dL   Comprehensive Metabolic Panel   Result Value Ref Range    Glucose 155 (H) 65 - 99 mg/dL    Sodium 141 133 - 145 mmol/L    Potassium 4.3 3.4 - 5.1 mmol/L    Chloride 97 97 - 107 mmol/L    Bicarbonate 31 24 - 31 mmol/L    Urea Nitrogen 55 (H) 8 - 25 mg/dL    Creatinine 1.80 (H) 0.40 - 1.60 mg/dL    eGFR 30 (L) >60 mL/min/1.73m*2    Calcium 8.6 8.5 - 10.4 mg/dL    Albumin 3.3 (L) 3.5 - 5.0 g/dL    Alkaline Phosphatase 59 35 - 125 U/L    Total Protein 5.5 (L) 5.9 - 7.9 g/dL    AST 11 5 - 40 U/L    Bilirubin, Total 0.5 0.1 - 1.2 mg/dL    ALT 21 5 - 40 U/L    Anion Gap 13 <=19 mmol/L   Magnesium   Result Value Ref Range    Magnesium 2.20 1.60 - 3.10 mg/dL   CBC and Auto Differential   Result Value Ref Range    WBC 16.2 (H) 4.4 - 11.3 x10*3/uL    nRBC 0.0 0.0 - 0.0 /100 WBCs    RBC 3.84 (L) 4.00 - 5.20 x10*6/uL    Hemoglobin 10.8 (L) 12.0 - 16.0 g/dL    Hematocrit 32.9 (L) 36.0 - 46.0 %    MCV 86 80 - 100 fL    MCH 28.1 26.0 - 34.0 pg    MCHC 32.8 32.0 - 36.0 g/dL    RDW 14.9 (H) 11.5 - 14.5 %    Platelets 140 (L) 150 - 450 x10*3/uL    Neutrophils %  94.1 40.0 - 80.0 %    Immature Granulocytes %, Automated 1.1 (H) 0.0 - 0.9 %    Lymphocytes % 2.2 13.0 - 44.0 %    Monocytes % 2.4 2.0 - 10.0 %    Eosinophils % 0.1 0.0 - 6.0 %    Basophils % 0.1 0.0 - 2.0 %    Neutrophils Absolute 15.20 (H) 1.20 - 7.70 x10*3/uL    Immature Granulocytes Absolute, Automated 0.18 0.00 - 0.70 x10*3/uL    Lymphocytes Absolute 0.35 (L) 1.20 - 4.80 x10*3/uL    Monocytes Absolute 0.39 0.10 - 1.00 x10*3/uL    Eosinophils Absolute 0.02 0.00 - 0.70 x10*3/uL    Basophils Absolute 0.01 0.00 - 0.10 x10*3/uL   NT Pro-BNP   Result Value Ref Range    PROBNP 510 (H) 0 - 353 pg/mL   POCT GLUCOSE   Result Value Ref Range    POCT Glucose 135 (H) 74 - 99 mg/dL   MRSA Surveillance for Vancomycin De-escalation, PCR    Specimen: Anterior Nares; Swab   Result Value Ref Range    MRSA PCR Not Detected Not Detected   POCT GLUCOSE   Result Value Ref Range    POCT Glucose 187 (H) 74 - 99 mg/dL                 Assessment/Plan   Principal Problem:    Acute respiratory failure with hypoxemia (CMS/[facility])  Active Problems:    Benign essential HTN    Stage 4 chronic kidney disease (CMS/[facility])    COVID-19    Patient is manifesting progressive hypoxia and impending severe hypoxemic respiratory failure she may ultimately require intubation and mechanical ventilation we will initiate noninvasive positive pressure ventilation with BiPAP with a goal of 20 over 800% FiO2 initiate Precedex for anxiety we will increase her IV Decadron and initiate IV heparin in place of her DVT prophylaxis.  Her lower extremity venous duplex are negative however we cannot safely exclude any pulmonary emboli.  Her MRSA nasal swab is negative and will discontinue vancomycinAnd her outlook is extremely guarded with high probability of respiratory failure necessitating mechanical ventilation       I spent 60 minutes in the professional and overall care of this patient.      Da Brown DO

## 2023-11-07 NOTE — SIGNIFICANT EVENT
11/07/23 1115 11/07/23 1150   Medical Gas Therapy/Pulse Ox   Medical Gas Therapy Supplemental oxygen Supplemental oxygen   O2 Delivery Method Nasal cannula  (pt placed on 15L NC and 15L NRB simultaneously for transport) High flow nasal cannula  (90%/40L)   Humidification  --  Yes   O2 Temperature  --  33 °C (91.4 °F)   Water Level  --  Full   SpO2 (!) 86 % 91 %   Patient Activity During SpO2 Measurement Other (Comment)  (during transport) At rest   Pulse Oximetry Type Continuous Continuous     Pt transported to CT from ICU. No complications.

## 2023-11-07 NOTE — CARE PLAN
Problem: Skin  Goal: Participates in plan/prevention/treatment measures  Recent Flowsheet Documentation  Taken 11/7/2023 0532 by Shayna Whitehead RN  Participates in plan/prevention/treatment measures: Increase activity/out of bed for meals     Problem: Skin  Goal: Participates in plan/prevention/treatment measures  Recent Flowsheet Documentation  Taken 11/7/2023 0532 by Shayna Whitehead RN  Participates in plan/prevention/treatment measures: Increase activity/out of bed for meals     Problem: Skin  Goal: Participates in plan/prevention/treatment measures  Outcome: Progressing  Flowsheets (Taken 11/7/2023 0532)  Participates in plan/prevention/treatment measures: Increase activity/out of bed for meals  Goal: Prevent/manage excess moisture  Outcome: Progressing  Flowsheets (Taken 11/6/2023 0821 by Jaycee Wesley RN)  Prevent/manage excess moisture:   Cleanse incontinence/protect with barrier cream   Moisturize dry skin     Problem: Skin  Goal: Prevent/manage excess moisture  Outcome: Progressing  Flowsheets (Taken 11/6/2023 0821 by Jaycee Wesley RN)  Prevent/manage excess moisture:   Cleanse incontinence/protect with barrier cream   Moisturize dry skin     Problem: Respiratory  Goal: Minimal/no exertional discomfort or dyspnea this shift  Outcome: Progressing  Flowsheets (Taken 10/26/2023 0851 by Martha Lowery RN)  Minimal/no exertional discomfort or dyspnea this shift: Positioning to promote ventilation/comfort  Goal: No signs of respiratory distress (eg. Use of accessory muscles. Peds grunting)  Outcome: Progressing  Goal: Wean oxygen to maintain O2 saturation per order/standard this shift  Outcome: Progressing  Flowsheets (Taken 10/26/2023 0851 by Martha Lowery RN)  Wean oxygen to maintain O2 saturation per order/standard this shift:   Encourage activity/mobility   Incentive spirometry  Goal: Clear secretions with interventions this shift  Outcome: Progressing  Flowsheets (Taken 10/31/2023  0535)  Clear secretions with interventions this shift:   Encourage/provide pulmonary hygiene/secretion clearance   Incentive spirometry  Goal: Minimize anxiety/maximize coping throughout shift  Outcome: Progressing  Flowsheets (Taken 10/31/2023 0535)  Minimize anxiety/maximize coping throughout shift: Monitor pain/anxiety level  Goal: Patent airway maintained this shift  Outcome: Progressing  Goal: Tolerate mechanical ventilation evidenced by VS/agitation level this shift  Outcome: Progressing  Goal: Tolerate pulmonary toileting this shift  Outcome: Progressing  Flowsheets (Taken 10/31/2023 0535)  Tolerate pulmonary toileting this shift: Positioning to promote ventilation/comfort  Goal: Verbalize decreased shortness of breath this shift  Outcome: Progressing  Flowsheets (Taken 10/31/2023 0535)  Verbalize decreased shortness of breath this shift:   Encourage/provide pulmonary hygiene/secretion clearance   Incentive spirometry  Goal: Increase self care and/or family involvement in next 24 hours  Outcome: Progressing  Flowsheets (Taken 10/31/2023 0535)  Increase self care and/or family involvement in next 24 hours: Encourage activity/mobility     Problem: Chronic Conditions and Co-morbidities  Goal: Patient's chronic conditions and co-morbidity symptoms are monitored and maintained or improved  Outcome: Progressing  Flowsheets (Taken 10/31/2023 0535)  Care Plan - Patient's Chronic Conditions and Co-Morbidity Symptoms are Monitored and Maintained or Improved: Monitor and assess patient's chronic conditions and comorbid symptoms for stability, deterioration, or improvement   The patient's goals for the shift include To get rest    The clinical goals for the shift include Decrease O2 requirements    Over the shift, the patient did not make progress toward the following goals. Barriers to progression include Covid, pt not wanting to lay on side or prone. Recommendations to address these barriers include continue to  encourage IS, C&DB and side/proning to open lungs.

## 2023-11-07 NOTE — SIGNIFICANT EVENT
11/07/23 1700   Non-Invasive Ventilation   Mode - Non-Invasive BiPAP   Mask Type Face mask   Mask Size Medium   NIV ON/OFF On   Skin Integrity Checked Yes   Inspiratory Positive Airway Pressure (IPAP) (cmH20) 20 cmH20   Expiratory Positive Airway Pressure (EPAP) (cmH20) 8 cmH20     Pt placed on NIPPV per MD. FiO2 100%

## 2023-11-07 NOTE — PROGRESS NOTES
Attempted to place patient on NIPPV per Dr. Brown. Patient did not tolerate after x3 attempts. Dr. Brown aware. Patient will begin precedex and will reattempt.

## 2023-11-07 NOTE — PROGRESS NOTES
Macarena Wilson is a 68 y.o. female on day 15 of admission presenting with Acute respiratory failure with hypoxemia (CMS/HCC).    Subjective   Interval History:   Room not entered-limit exposure  Interval discussion with primary team and nurse  Interval worsening of respiratory status  CT chest ordered          Review of Systems    Objective   Range of Vitals (last 24 hours)  Heart Rate:  []   Temp:  [35.6 °C (96.1 °F)-36.5 °C (97.7 °F)]   Resp:  [17-36]   BP: (107-138)/(62-88)   Weight:  [68.4 kg (150 lb 12.7 oz)]   SpO2:  [71 %-97 %]   Daily Weight  11/07/23 : 68.4 kg (150 lb 12.7 oz)    Body mass index is 26.71 kg/m².    Physical Exam  Awake, alert  Nasal cannula in place    Antibiotics    sodium chloride 0.9 % bolus 500 mL  doxycycline (Vibramycin) in dextrose 5 % in water (D5W) 100 mL  mg  cefTRIAXone (Rocephin) IVPB 1 g  Tc-99m-albumin (Draximage MAA) injection 4.2 millicurie  dexAMETHasone (Decadron) injection 8 mg  sodium chloride 0.9 % bolus 1,000 mL  enoxaparin (Lovenox) syringe 30 mg  sodium chloride 0.9% infusion  acetaminophen (Tylenol) tablet 650 mg  acetaminophen (Tylenol) oral liquid 650 mg  acetaminophen (Tylenol) suppository 650 mg  ondansetron ODT (Zofran-ODT) disintegrating tablet 4 mg  ondansetron (Zofran) injection 4 mg  polyethylene glycol (Glycolax, Miralax) packet 17 g  bisacodyl (Dulcolax) EC tablet 10 mg  guaiFENesin (Mucinex) 12 hr tablet 600 mg  oxygen (O2) therapy  piperacillin-tazobactam-dextrose (Zosyn) IV 2.25 g  ipratropium-albuteroL (Duo-Neb) 0.5-2.5 mg/3 mL nebulizer solution 3 mL  dexAMETHasone (Decadron) injection 8 mg  dexAMETHasone (Decadron) injection 6 mg  dextrose 50 % injection 25 g  glucagon (Glucagen) injection 1 mg  dextrose 10 % in water (D10W) infusion  insulin lispro (HumaLOG) injection 0-5 Units  vancomycin-diluent combo no.1 (Xellia) IVPB 1,250 mg  ipratropium-albuteroL (Duo-Neb) 0.5-2.5 mg/3 mL nebulizer solution 3 mL  ipratropium-albuteroL  (Duo-Neb) 0.5-2.5 mg/3 mL nebulizer solution 3 mL  oxygen (O2) therapy  remdesivir (Veklury) 200 mg in sodium chloride 0.9% 250 mL IV  remdesivir (Veklury) 100 mg in sodium chloride 0.9% 250 mL IV  oxygen (O2) therapy  pantoprazole (ProtoNix) injection 40 mg  remdesivir (Veklury) 200 mg in sodium chloride 0.9% 250 mL IV  remdesivir (Veklury) 100 mg in sodium chloride 0.9% 250 mL IV  tocilizumab (Actemra) 600 mg in sodium chloride 0.9% 70 mL IV  furosemide (Lasix) tablet 10 mg  melatonin tablet 3 mg  benzonatate (Tessalon) capsule 200 mg  polyethylene glycol (Glycolax, Miralax) packet 17 g  fluticasone (Flonase) nasal spray 1 spray  sodium chloride (Ocean) 0.65 % nasal spray 1 spray  sodium bicarbonate tablet 650 mg  vancomycin-diluent combo no.1 (Xellia) IVPB 1,250 mg  guaiFENesin (Robitussin) 100 mg/5 mL syrup 200 mg  vancomycin-diluent combo no.1 (Xellia) IVPB 750 mg  pantoprazole (ProtoNix) EC tablet 40 mg  oxygen (O2) therapy  furosemide (Lasix) injection 20 mg  oxygen (O2) therapy  heparin (porcine) injection 5,750 Units  heparin 25,000 Units in dextrose 5% 250 mL (100 Units/mL) infusion (premix)  heparin (porcine) injection 3,000-6,000 Units  enoxaparin (Lovenox) syringe 30 mg  melatonin tablet 3 mg  oxygen (O2) therapy  furosemide (Lasix) injection 40 mg  acetaminophen-caffeine 500-65 mg tablet 2 tablet  aspirin-acetaminophen-caffeine (Excedrin Migraine) 250-250-65 mg per tablet 2 tablet  perflutren lipid microspheres (Definity) injection 0.5-10 mL of dilution  sulfur hexafluoride microsphr (Lumason) injection 24.28 mg  perflutren protein A microsphere (Optison) injection 0.5 mL  hydrALAZINE (Apresoline) injection 10 mg  docusate sodium (Colace) capsule 100 mg  amLODIPine (Norvasc) tablet 2.5 mg  glipiZIDE (Glucotrol) tablet 5 mg  piperacillin-tazobactam-dextrose (Zosyn) IV 3.375 g  glipiZIDE (Glucotrol) tablet 2.5 mg  furosemide (Lasix) injection 40 mg  oxygen (O2) therapy  ipratropium-albuteroL (Duo-Neb)  0.5-2.5 mg/3 mL nebulizer solution 3 mL  acetylcysteine (Mucomyst) 200 mg/mL (20 %) nebulizer solution 1,200 mg  budesonide (Pulmicort) 0.5 mg/2 mL nebulizer solution 0.5 mg  furosemide (Lasix) injection 20 mg  insulin glargine (Lantus) injection 24 Units  acetylcysteine (Mucomyst) 200 mg/mL (20 %) nebulizer solution 1,200 mg  acetylcysteine (Mucomyst) 200 mg/mL (20 %) nebulizer solution 600 mg  insulin glargine (Lantus) injection 24 Units  insulin glargine (Lantus) injection 16 Units  meropenem (Merrem) in sodium chloride 0.9 % 100 mL 500 mg  vancomycin-diluent combo no.1 (Xellia) IVPB 750 mg  oxygen (O2) therapy      Relevant Results  Labs  Results from last 72 hours   Lab Units 11/07/23  0634 11/06/23  0538 11/05/23  0901   WBC AUTO x10*3/uL 16.2* 15.0* 16.2*   HEMOGLOBIN g/dL 10.8* 10.9* 11.5*   HEMATOCRIT % 32.9* 33.3* 35.6*   PLATELETS AUTO x10*3/uL 140* 140* 161   NEUTROS PCT AUTO % 94.1  --   --    LYMPHS PCT AUTO % 2.2  --   --    MONOS PCT AUTO % 2.4  --   --    EOS PCT AUTO % 0.1  --   --      Results from last 72 hours   Lab Units 11/07/23  0634 11/05/23  0901   SODIUM mmol/L 141 138   POTASSIUM mmol/L 4.3 4.5   CHLORIDE mmol/L 97 100   CO2 mmol/L 31 26   BUN mg/dL 55* 57*   CREATININE mg/dL 1.80* 1.60   GLUCOSE mg/dL 155* 76   CALCIUM mg/dL 8.6 8.7   ANION GAP mmol/L 13 12   EGFR mL/min/1.73m*2 30* 35*     Results from last 72 hours   Lab Units 11/07/23  0634   ALK PHOS U/L 59   BILIRUBIN TOTAL mg/dL 0.5   PROTEIN TOTAL g/dL 5.5*   ALT U/L 21   AST U/L 11   ALBUMIN g/dL 3.3*     Estimated Creatinine Clearance: 27.4 mL/min (A) (by C-G formula based on SCr of 1.8 mg/dL (H)).  C-Reactive Protein   Date Value Ref Range Status   11/03/2023 <0.30 0.00 - 2.00 mg/dL Final   10/31/2023 0.50 0.00 - 2.00 mg/dL Final   10/26/2023 3.90 (H) 0.00 - 2.00 mg/dL Final     Microbiology  Reviewed  Imaging  XR chest 1 view    Result Date: 11/6/2023  Interpreted By:  Karina Rodriguez, STUDY: XR CHEST 1 VIEW; 11/6/2023 1:07  pm   INDICATION: Signs/Symptoms:hypoxia / pneumonia / covid   COMPARISON: 11/03/2023   ACCESSION NUMBER(S): [id number]   ORDERING CLINICIAN: DENNYS MERCADO   TECHNIQUE: Frontal  chest radiographs.   FINDINGS: The cardiomediastinal silhouette is unremarkable. Diffuse airspace opacities overlying both lungs appears stable as compared to prior examination. Question trace bibasilar effusions.   The osseous structures are intact.       Diffuse airspace opacities overlying both lungs is unchanged as compared to prior examinations.       Signed by: Karina Rodriguez 11/6/2023 9:13 PM Dictation workstation:   [id number]    Lower extremity venous duplex bilateral    Result Date: 11/6/2023  Interpreted By:  Aba Roach, STUDY: [facility] US LOWER EXTREMITY VENOUS DUPLEX BILATERAL; 11/6/2023 12:31 pm   INDICATION: Signs/Symptoms:swelling. /patient COVID positive/swelling/patient on blood thinners   COMPARISON: None   ACCESSION NUMBER(S): [id number]   ORDERING CLINICIAN: ERICKSON FALCON   TECHNIQUE: High resolution real-time compression grayscale ultrasound imaging with color flow/Doppler and spectral waveform analysis of the bilateral lower extremity was performed to evaluate for deep vein thrombosis. Attempts were made to visualize the posterior tibial and peroneal veins.   FINDINGS: RIGHT LOWER EXTREMITY:   The common femoral vein through the popliteal vein demonstrates normal compressibility, color, and spectral Doppler waveform analysis. Portions of the posterior tibial and peroneal veins were visualized.   LEFT LOWER EXTREMITY:   The common femoral vein through the popliteal vein demonstrates normal compressibility, color, and spectral Doppler waveform analysis. Portions of the posterior tibial and peroneal veins were visualized.       No evidence of deep vein thrombosis of the  bilateral lower extremities within the limits of this examination.   MACRO: None.   Signed by: Aba Roach 11/6/2023 12:33 PM Dictation  workstation:   [id number]    XR chest 1 view    Result Date: 11/3/2023  Interpreted By:  Aba Roach, STUDY: XR CHEST 1 VIEW; 11/3/2023 11:37 am   INDICATION: Signs/Symptoms:respiratory failure   COMPARISON: 10/31/2023   ACCESSION NUMBER(S): [id number]   ORDERING CLINICIAN: DENNYS MERCADO   FINDINGS: The cardiomediastinal silhouette is stable. Interval progression of severe patchy airspace opacities are noted bilaterally. No pleural effusion is identified.   The osseous structures are intact.       Interval progression of severe bilateral infiltrate.   MACRO: None   Signed by: Aba Roach 11/3/2023 4:15 PM Dictation workstation:   [id number]    Lower extremity venous duplex bilateral    Result Date: 11/2/2023  Interpreted By:  Russel Handley, STUDY: VAS US LOWER EXTREMITY VENOUS DUPLEX BILATERAL; 10/27/2023; 10/27/2023 11:15 am   INDICATION: Signs/Symptoms:elev dimer cv pos ro dvt. Shortness of breath.   ACCESSION NUMBER(S): [id number]   ORDERING CLINICIAN: SUSANNE FRANCOIS   TECHNIQUE: PROCEDURE: Grayscale, 2D, color Doppler, Doppler spectral analysis (waveform) and duplex images obtained.   FINDINGS: Right Lower Extremity: Patent with normal flow, and compressibility of the common and superficial femoral, popliteal, and greater saphenous veins  as well as in the tibioperoneal trunk and trifurcation.  There is no subcutaneous edema.   Left Lower Extremity: Patent with normal flow, and compressibility of the common and superficial femoral, popliteal, and greater saphenous veins  as well as in the tibioperoneal trunk and trifurcation.  There is no subcutaneous edema.       1. Right Lower Extremity: No evidence of deep vein thrombosis.   2. Left Lower Extremity: No evidence of deep vein thrombosis.   Signed by: Russel Handley 11/2/2023 2:24 PM Dictation workstation:   [id number]    Transthoracic Echo (TTE) Complete    Result Date: 11/1/2023            [id number], [place]  [address]             Phone [phone] TRANSTHORACIC ECHOCARDIOGRAM REPORT  Patient Name:      KARLA AGUILERA Reading Physician:   [id number] Riccardo Bagley MD Study Date:        10/31/2023         Ordering Provider:   [id number] CUCA SAM MRN/PID:           [id number]           Fellow: Accession#:        [id number]       Nurse: Date of Birth/Age: 1955 / 68      Sonographer:         Temi King RCS                    years Gender:            F                  Additional Staff: Height:            160.02 cm          Admit Date:          10/31/2023 Weight:            70.76 kg           Admission Status:    Inpatient - Routine BSA:               1.74 m2            Department Location: Blood Pressure: 135 /85 mmHg Study Type:    TRANSTHORACIC ECHO (TTE) COMPLETE Diagnosis/ICD: Acute respiratory failure with hypoxia-J96.01 Indication:    Acute Respiratory failure with hypoxemia CPT Codes:     Echo Complete w Full Doppler-[id number] Patient History: Pertinent History: HTN Prediabetes Chronic Kidney Disease. Study Detail: The following Echo studies were performed: 2D, M-Mode, Doppler and               color flow. Technically challenging study due to poor acoustic               windows, prominent lung artifact and patient lying in supine               position. Lumason used as a contrast agent for endocardial border               definition. Unable to obtain subcostal and suprasternal notch               view.  PHYSICIAN INTERPRETATION: Left Ventricle: Left ventricular systolic function is normal, with an estimated ejection fraction of 60-65%. There are no regional wall motion abnormalities. The left ventricular cavity size is normal. Spectral Doppler shows an impaired relaxation pattern of left ventricular diastolic filling. Left Atrium: The left atrium is normal in size. Right Ventricle: The right ventricle is normal in size. There is normal right ventricular global systolic function. Right Atrium: The right atrium is normal  in size. Aortic Valve: The aortic valve is trileaflet. There is trivial aortic valve regurgitation. The peak instantaneous gradient of the aortic valve is 12.0 mmHg. Mitral Valve: The mitral valve is normal in structure. There is trace mitral valve regurgitation. Tricuspid Valve: The tricuspid valve is structurally normal. There is trace tricuspid regurgitation. Pulmonic Valve: The pulmonic valve is not well visualized. The pulmonic valve regurgitation was not well visualized. Pericardium: There is no pericardial effusion noted. Aorta: The aortic root was not well visualized.  CONCLUSIONS:  1. Left ventricular systolic function is normal with a 60-65% estimated ejection fraction.  2. Spectral Doppler shows an impaired relaxation pattern of left ventricular diastolic filling. QUANTITATIVE DATA SUMMARY: 2D MEASUREMENTS:                          Normal Ranges: LAs:           2.60 cm   (2.7-4.0cm) IVSd:          0.88 cm   (0.6-1.1cm) LVPWd:         0.85 cm   (0.6-1.1cm) LVIDd:         3.51 cm   (3.9-5.9cm) LVIDs:         2.55 cm LV Mass Index: 48.5 g/m2 LV % FS        27.4 % LA VOLUME:                               Normal Ranges: LA Vol A2C:        73.7 ml LA Vol Index A2C:  42.4 ml/m2 LA Area A2C:       21.6 cm2 LA Major Axis A2C: 5.4 cm LA Volume Index:   33.2 ml/m2 LA Vol A2C:        70.6 ml RA VOLUME BY A/L METHOD:                              Normal Ranges: RA Vol A2C:        1.3 ml RA Vol Index A2C:  0.8 ml/m2 RA Area A2C:       5.0 cm2 RA Major Axis A2C: 15.7 cm M-MODE MEASUREMENTS:                  Normal Ranges: Ao Root: 2.70 cm (2.0-3.7cm) LAs:     2.60 cm (2.7-4.0cm) LV SYSTOLIC FUNCTION BY 2D PLANIMETRY (MOD):                     Normal Ranges: EF-A4C View: 78.8 % (>=55%) EF-A2C View: 73.2 % EF-Biplane:  75.3 % LV DIASTOLIC FUNCTION:                     Normal Ranges: MV Peak E: 0.82 m/s (0.7-1.2 m/s) MV Peak A: 1.03 m/s (0.42-0.7 m/s) E/A Ratio: 0.80     (1.0-2.2) MITRAL VALVE:                 Normal  Ranges: MV DT: 193 msec (150-240msec) AORTIC VALVE:                          Normal Ranges: AoV Vmax:      1.73 m/s  (<=1.7m/s) AoV Peak P.0 mmHg (<20mmHg) LVOT Max Garrett:  1.37 m/s  (<=1.1m/s) LVOT VTI:      28.40 cm LVOT Diameter: 2.00 cm   (1.8-2.4cm) AoV Area,Vmax: 2.49 cm2  (2.5-4.5cm2)  RIGHT VENTRICLE: RV Basal 3.35 cm RV Mid   2.73 cm RV Major 5.6 cm TRICUSPID VALVE/RVSP:                             Normal Ranges: Peak TR Velocity: 1.97 m/s RV Syst Pressure: 18.5 mmHg (< 30mmHg) PULMONIC VALVE:                         Normal Ranges: PV Accel Time: 74 msec  (>120ms) PV Max Garrett:    1.1 m/s  (0.6-0.9m/s) PV Max P.5 mmHg  [id number] Riccardo Bagley MD Electronically signed on 2023 at 9:53:29 AM  ** Final **     XR chest 1 view    Result Date: 10/31/2023  Interpreted By:  Nirmal Mcmillan, STUDY: XR CHEST 1 VIEW; 10/31/2023 9:48 am   INDICATION: Shortness of breath   COMPARISON: 10/29/2023   ACCESSION NUMBER(S): [id number]   ORDERING CLINICIAN: CUCA SAM   TECHNIQUE: 1 view of the chest was performed.   FINDINGS: No significant interval change in diffuse bilateral airspace opacities involving both lungs more prominent in the mid and lower lungs. Findings do not appear significantly changed. No pleural effusion. No pneumothorax.  The cardiomediastinal silhouette is within normal limits.       No significant interval change in diffuse bilateral airspace opacities. No pleural effusion or significant interval change.   Signed by: Nirmal Mcmillan 10/31/2023 10:45 AM Dictation workstation:   [id number]    XR chest 1 view    Result Date: 10/29/2023  Interpreted By:  Karina Rodriguez, STUDY: XR CHEST 1 VIEW; 10/29/2023 1:02 pm   INDICATION: Signs/Symptoms:hyppoxemia   COMPARISON: 10/27/2023   ACCESSION NUMBER(S): [id number]   ORDERING CLINICIAN: SUSANNE FRANCOIS   TECHNIQUE: Frontal  chest radiographs.   FINDINGS: The cardiomediastinal silhouette is unremarkable. Diffuse airspace opacities are noted overlying  both lungs, unchanged from prior examinations. No pleural effusion is identified.   The osseous structures are intact.       No significant change from prior examination. Diffuse airspace opacities overlying both lungs, differential includes ARDS, diffuse alveolar edema/hemorrhage.     Signed by: Karina Rodriguez 10/29/2023 2:19 PM Dictation workstation:   [id number]    XR chest 1 view    Result Date: 10/27/2023  Interpreted By:  Nirmal Mcmillan, STUDY: XR CHEST 1 VIEW; 10/27/2023 10:06 am   INDICATION: Signs/Symptoms:follow up hypoxemia.   COMPARISON: 10/23/2023   ACCESSION NUMBER(S): [id number]   ORDERING CLINICIAN: SUSANNE FRANCOIS   TECHNIQUE: 1 view of the chest was performed.   FINDINGS: There is diffuse ground-glass opacity bilaterally consistent with history of COVID. No lobar or large consolidation however the ground-glass opacities are prominent but stable from the previous examination. No pleural effusion. No pneumothorax.  The cardiomediastinal silhouette is within normal limits.       No significant change. Prominent ground-glass opacities bilaterally, however, not significantly changed.   Signed by: Nirmal Mcmillan 10/27/2023 10:24 PM Dictation workstation:   [id number]    CT chest wo IV contrast    Result Date: 10/24/2023  Interpreted By:  Nirmal Mcmillan, STUDY: CT CHEST WO IV CONTRAST; 10/24/2023 12:27 am   INDICATION: Signs/Symptoms:Covid.   COMPARISON: None   ACCESSION NUMBER(S): [id number]   ORDERING CLINICIAN: GERTRUDE FIERRO   TECHNIQUE: Contiguous axial images of the thorax were obtained from the level of the thoracic inlet through the lung bases. Coronal and sagittal reformatted images were obtained.     FINDINGS: The thyroid gland is unremarkable.   The heart size is within normal limits.  No pericardial effusion is identified. The aorta and pulmonary arteries are normal in caliber.   There are no pathologically enlarged mediastinal, hilar, or axillary lymph nodes are seen.   The  trachea and mainstem bronchi are patent. Diffuse patchy ground-glass opacities bilaterally which is compatible with atypical pneumonia. There is no evidence of pneumothorax or pleural effusion.   The visualized osseous structures are intact.   Limited images through the upper abdomen are unremarkable.       Diffuse, patchy ground-glass opacities bilaterally suggesting atypical pneumonia such as COVID.     Signed by: Nirmal Mcmillan 10/24/2023 12:32 PM Dictation workstation:   [id number]    NM lung perfusion particulate    Result Date: 10/23/2023  Interpreted By:  Yoly Stanley, STUDY: NM LUNG PERFUSION PARTICULATE 10/23/2023 8:44 pm   INDICATION: Signs/Symptoms:hypoxia, r/o PE   COMPARISON: Chest x-ray done earlier today   ACCESSION NUMBER(S): [id number]   ORDERING CLINICIAN: ANAHY EVANS   TECHNIQUE: 4.2 millicuries of technetium 99 M MAA was injected intravenously with perfusion lung scan in 8 projections completed.   FINDINGS: No perfusion defect is seen within either lung.       Normal perfusion lung scan.   Signed by: Yoly Stanley 10/23/2023 8:57 PM Dictation workstation:   [id number]    XR chest 1 view    Result Date: 10/23/2023  Interpreted By:  Yoly Stanley, STUDY: XR CHEST 1 VIEW 10/23/2023 4:55 pm   INDICATION: Signs/Symptoms:dyspnea, cough, fatigue, and malaise. Positive COVID.   COMPARISON: None available.   ACCESSION NUMBER(S): [id number]   ORDERING CLINICIAN: ANAHY EVANS   TECHNIQUE: AP erect view of the chest   FINDINGS: The cardiac size is normal with no mediastinal abnormality identified. The trachea is midline. No pleural abnormality is seen. However, there are bilateral infiltrates with ground-glass appearance with relative sparing of the left upper lung zone.       Bilateral ground-glass infiltrates.   Signed by: Yoly Stanley 10/23/2023 5:08 PM Dictation workstation:   [id number]     Assessment/Plan   Acute hypoxic respiratory failure, interval decrease-interval worsening  Severe  coronavirus pneumonitis   Stage IV chronic kidney disease  Leukocytosis-resolving  Diarrhea-rule out infectious etiology-resolved     Continue dexamethasone  Remdesivir completed  Monitor renal and hematologic parameters  S/p Actemra-10/24/2023  Supportive care  Monitor temperature and WBC  Supplemental oxygen as needed,  Pulmonary follow-up    Js Vasquez MD

## 2023-11-07 NOTE — CARE PLAN
The patient's goals for the shift include To get rest    The clinical goals for the shift include Wean oxygen    Over the shift, the patient did not make progress toward the following goals. Barriers to progression include COVID . Recommendations to address these barriers include trial of bipap.    Problem: Respiratory  Goal: Minimal/no exertional discomfort or dyspnea this shift  Outcome: Not Progressing  Goal: No signs of respiratory distress (eg. Use of accessory muscles. Peds grunting)  Outcome: Not Progressing  Goal: Wean oxygen to maintain O2 saturation per order/standard this shift  Outcome: Not Progressing  Goal: Clear secretions with interventions this shift  Outcome: Not Progressing  Goal: Minimize anxiety/maximize coping throughout shift  Outcome: Not Progressing  Goal: Patent airway maintained this shift  Outcome: Not Progressing  Goal: Tolerate mechanical ventilation evidenced by VS/agitation level this shift  Outcome: Not Progressing  Goal: Tolerate pulmonary toileting this shift  Outcome: Not Progressing  Goal: Verbalize decreased shortness of breath this shift  Outcome: Not Progressing  Goal: Increase self care and/or family involvement in next 24 hours  Outcome: Not Progressing      Island Pedicle Flap-Requiring Vessel Identification Text: The defect edges were debeveled with a #15 scalpel blade.  Given the location of the defect, shape of the defect and the proximity to free margins an island pedicle advancement flap was deemed most appropriate.  Using a sterile surgical marker, an appropriate advancement flap was drawn, based on the axial vessel mentioned above, incorporating the defect, outlining the appropriate donor tissue and placing the expected incisions within the relaxed skin tension lines where possible.    The area thus outlined was incised deep to adipose tissue with a #15 scalpel blade.  The skin margins were undermined to an appropriate distance in all directions around the primary defect and laterally outward around the island pedicle utilizing iris scissors.  There was minimal undermining beneath the pedicle flap.

## 2023-11-07 NOTE — PROGRESS NOTES
"Macarena Wilosn is a 68 y.o. female on day 15 of admission presenting with Acute respiratory failure with hypoxemia (CMS/Formerly [facility]).    Subjective   Patient patient was awake and alert in bed, breathing through mask.  She denies any chest pain, cough, headaches, abdominal pain, nausea, or vomiting.  She admits to shortness of breath as well as palpitations.  Her shortness of breath worsens with changing positions, and her palpitations feel more severe when she has difficulty breathing.     Blood pressure normal or mildly elevated.  SPO2 is low on 40 L high flow nasal cannula with an FiO2 of 90%.  This has increased since yesterday.  There is an episode of an SPO2 of 71 at 8 AM today.  Telemetry shows episodes of desaturation as well as irregularly irregular arrhythmias.  I's and O's indicate a net -970.       Objective     Physical Exam  Constitutional:       General: She is in acute distress.      Appearance: She is ill-appearing.   HENT:      Head: Normocephalic and atraumatic.   Eyes:      Extraocular Movements: Extraocular movements intact.      Pupils: Pupils are equal, round, and reactive to light.   Cardiovascular:      Rate and Rhythm: Regular rhythm. Tachycardia present.      Pulses: Normal pulses.   Pulmonary:      Comments: 40 L high flow NC with 90% FiO2  Musculoskeletal:      Cervical back: Normal range of motion.      Right lower leg: No edema.      Left lower leg: No edema.   Skin:     General: Skin is warm and dry.   Neurological:      Mental Status: She is alert and oriented to person, place, and time.         Last Recorded Vitals  Blood pressure 128/82, pulse 105, temperature 36.5 °C (97.7 °F), temperature source Temporal, resp. rate (!) 32, height 1.6 m (5' 3\"), weight 68.4 kg (150 lb 12.7 oz), SpO2 (!) 71 %.  Intake/Output last 3 Shifts:  I/O last 3 completed shifts:  In: 630 (9.2 mL/kg) [P.O.:480; IV Piggyback:150]  Out: 1600 (23.4 mL/kg) [Urine:1600 (0.6 mL/kg/hr)]  Weight: 68.4 kg     Relevant " Results  Scheduled medications  acetylcysteine, 3 mL, nebulization, TID  amLODIPine, 2.5 mg, oral, Daily  budesonide, 0.5 mg, nebulization, BID  dexAMETHasone, 6 mg, intravenous, q24h  enoxaparin, 30 mg, subcutaneous, Daily  fluticasone, 1 spray, Each Nostril, BID  furosemide, 20 mg, intravenous, BID  insulin glargine, 16 Units, subcutaneous, Nightly  ipratropium-albuteroL, 3 mL, nebulization, q6h while awake  melatonin, 3 mg, oral, Nightly  oxygen, , inhalation, q4h  pantoprazole, 40 mg, oral, Daily before breakfast  perflutren lipid microspheres, 0.5-10 mL of dilution, intravenous, Once in imaging  perflutren protein A microsphere, 0.5 mL, intravenous, Once in imaging  piperacillin-tazobactam, 3.375 g, intravenous, q6h  sodium bicarbonate, 650 mg, oral, TID      Continuous medications     PRN medications  PRN medications: acetaminophen **OR** acetaminophen **OR** acetaminophen, aspirin-acetaminophen-caffeine, benzonatate, bisacodyl, dextrose 10 % in water (D10W), dextrose, docusate sodium, glucagon, guaiFENesin, hydrALAZINE, ipratropium-albuteroL, melatonin, ondansetron ODT **OR** ondansetron, oxygen, polyethylene glycol, sodium chloride  Results for orders placed or performed during the hospital encounter of 10/23/23 (from the past 24 hour(s))   Blood Gas Arterial Full Panel   Result Value Ref Range    POCT pH, Arterial 7.48 (H) 7.38 - 7.42 pH    POCT pCO2, Arterial 44 (H) 38 - 42 mm Hg    POCT pO2, Arterial 43 (LL) 85 - 95 mm Hg    POCT SO2, Arterial 74 (L) 94 - 100 %    POCT Oxy Hemoglobin, Arterial 72.8 (L) 94.0 - 98.0 %    POCT Hematocrit Calculated, Arterial 34.0 (L) 36.0 - 46.0 %    POCT Sodium, Arterial 134 (L) 136 - 145 mmol/L    POCT Potassium, Arterial 4.1 3.5 - 5.3 mmol/L    POCT Chloride, Arterial 98 98 - 107 mmol/L    POCT Ionized Calcium, Arterial 1.08 (L) 1.10 - 1.33 mmol/L    POCT Glucose, Arterial 157 (H) 74 - 99 mg/dL    POCT Lactate, Arterial 2.9 (H) 0.4 - 2.0 mmol/L    POCT Base Excess,  Arterial 8.3 (H) -2.0 - 3.0 mmol/L    POCT HCO3 Calculated, Arterial 32.8 (H) 22.0 - 26.0 mmol/L    POCT Hemoglobin, Arterial 11.4 (L) 12.0 - 16.0 g/dL    POCT Anion Gap, Arterial 7 (L) 10 - 25 mmo/L    Patient Temperature 37.0 degrees Celsius    FiO2 70 %    Apparatus HIGH FLOW CANNULA     Flow 30.0 LPM    Critical Called By ALEN Humphries Called To DR MERCADO     Critical Call Time 1056.0000     Critical Read Back Y    POCT GLUCOSE   Result Value Ref Range    POCT Glucose 122 (H) 74 - 99 mg/dL   POCT GLUCOSE   Result Value Ref Range    POCT Glucose 66 (L) 74 - 99 mg/dL   POCT GLUCOSE   Result Value Ref Range    POCT Glucose 103 (H) 74 - 99 mg/dL   POCT GLUCOSE   Result Value Ref Range    POCT Glucose 105 (H) 74 - 99 mg/dL   Comprehensive Metabolic Panel   Result Value Ref Range    Glucose 155 (H) 65 - 99 mg/dL    Sodium 141 133 - 145 mmol/L    Potassium 4.3 3.4 - 5.1 mmol/L    Chloride 97 97 - 107 mmol/L    Bicarbonate 31 24 - 31 mmol/L    Urea Nitrogen 55 (H) 8 - 25 mg/dL    Creatinine 1.80 (H) 0.40 - 1.60 mg/dL    eGFR 30 (L) >60 mL/min/1.73m*2    Calcium 8.6 8.5 - 10.4 mg/dL    Albumin 3.3 (L) 3.5 - 5.0 g/dL    Alkaline Phosphatase 59 35 - 125 U/L    Total Protein 5.5 (L) 5.9 - 7.9 g/dL    AST 11 5 - 40 U/L    Bilirubin, Total 0.5 0.1 - 1.2 mg/dL    ALT 21 5 - 40 U/L    Anion Gap 13 <=19 mmol/L   Magnesium   Result Value Ref Range    Magnesium 2.20 1.60 - 3.10 mg/dL   CBC and Auto Differential   Result Value Ref Range    WBC 16.2 (H) 4.4 - 11.3 x10*3/uL    nRBC 0.0 0.0 - 0.0 /100 WBCs    RBC 3.84 (L) 4.00 - 5.20 x10*6/uL    Hemoglobin 10.8 (L) 12.0 - 16.0 g/dL    Hematocrit 32.9 (L) 36.0 - 46.0 %    MCV 86 80 - 100 fL    MCH 28.1 26.0 - 34.0 pg    MCHC 32.8 32.0 - 36.0 g/dL    RDW 14.9 (H) 11.5 - 14.5 %    Platelets 140 (L) 150 - 450 x10*3/uL    Neutrophils % 94.1 40.0 - 80.0 %    Immature Granulocytes %, Automated 1.1 (H) 0.0 - 0.9 %    Lymphocytes % 2.2 13.0 - 44.0 %    Monocytes % 2.4 2.0 - 10.0 %     Eosinophils % 0.1 0.0 - 6.0 %    Basophils % 0.1 0.0 - 2.0 %    Neutrophils Absolute 15.20 (H) 1.20 - 7.70 x10*3/uL    Immature Granulocytes Absolute, Automated 0.18 0.00 - 0.70 x10*3/uL    Lymphocytes Absolute 0.35 (L) 1.20 - 4.80 x10*3/uL    Monocytes Absolute 0.39 0.10 - 1.00 x10*3/uL    Eosinophils Absolute 0.02 0.00 - 0.70 x10*3/uL    Basophils Absolute 0.01 0.00 - 0.10 x10*3/uL   NT Pro-BNP   Result Value Ref Range    PROBNP 510 (H) 0 - 353 pg/mL   POCT GLUCOSE   Result Value Ref Range    POCT Glucose 135 (H) 74 - 99 mg/dL     XR chest 1 view    Result Date: 11/6/2023  Interpreted By:  Karina Rodriguez, STUDY: XR CHEST 1 VIEW; 11/6/2023 1:07 pm   INDICATION: Signs/Symptoms:hypoxia / pneumonia / covid   COMPARISON: 11/03/2023   ACCESSION NUMBER(S): [id number]   ORDERING CLINICIAN: DENNYS MERCADO   TECHNIQUE: Frontal  chest radiographs.   FINDINGS: The cardiomediastinal silhouette is unremarkable. Diffuse airspace opacities overlying both lungs appears stable as compared to prior examination. Question trace bibasilar effusions.   The osseous structures are intact.       Diffuse airspace opacities overlying both lungs is unchanged as compared to prior examinations.       Signed by: Karina Rodriguez 11/6/2023 9:13 PM Dictation workstation:   [id number]    Lower extremity venous duplex bilateral    Result Date: 11/6/2023  Interpreted By:  Aba Roach, STUDY: [facility] US LOWER EXTREMITY VENOUS DUPLEX BILATERAL; 11/6/2023 12:31 pm   INDICATION: Signs/Symptoms:swelling. /patient COVID positive/swelling/patient on blood thinners   COMPARISON: None   ACCESSION NUMBER(S): [id number]   ORDERING CLINICIAN: ERICKSON FALCON   TECHNIQUE: High resolution real-time compression grayscale ultrasound imaging with color flow/Doppler and spectral waveform analysis of the bilateral lower extremity was performed to evaluate for deep vein thrombosis. Attempts were made to visualize the posterior tibial and peroneal veins.    FINDINGS: RIGHT LOWER EXTREMITY:   The common femoral vein through the popliteal vein demonstrates normal compressibility, color, and spectral Doppler waveform analysis. Portions of the posterior tibial and peroneal veins were visualized.   LEFT LOWER EXTREMITY:   The common femoral vein through the popliteal vein demonstrates normal compressibility, color, and spectral Doppler waveform analysis. Portions of the posterior tibial and peroneal veins were visualized.       No evidence of deep vein thrombosis of the  bilateral lower extremities within the limits of this examination.   MACRO: None.   Signed by: Aba Roach 11/6/2023 12:33 PM Dictation workstation:   [id number]             Assessment/Plan   Principal Problem:    Acute respiratory failure with hypoxemia (CMS/HCC)  Active Problems:    Benign essential HTN    Stage 4 chronic kidney disease (CMS/HCC)    COVID-19    Acute respiratory failure with hypoxemia  - Likely due to COVID-19 with possible superimposition of another organism, respiratory culture ordered on 11/3 needs to be collected, white blood cells elevated  - Consider a chest CT, CXR only shows mild improvement since 11/3  - Continue inhaled nebulizers, steroids, and mucolytic  - Continue Zosyn, consider the addition of another antibiotic  - Continue oxygen therapy as needed, wean as possible  -Hold Lasix due to worsening kidney function, BUN of 55 and creatinine of 1.8    Diabetes  - Consider alterations to current insulin regimen, glucose is elevated at 155 and then 135 this morning    Hypertension  - Stable  - Continue regimen with amlodipine           Ellen Sloan

## 2023-11-07 NOTE — PROGRESS NOTES
"Vancomycin Dosing by Pharmacy- INITIAL    Macarena Wilson is a 68 y.o. year old female who Pharmacy has been consulted for vancomycin dosing for pneumonia. Based on the patient's indication and renal status this patient will be dosed based on a goal AUC of 400-600.     Renal function is currently declining.    Visit Vitals  /62   Pulse 98   Temp 36.5 °C (97.7 °F) (Temporal)   Resp (!) 35        Lab Results   Component Value Date    CREATININE 1.80 (H) 11/07/2023    CREATININE 1.60 11/05/2023    CREATININE 1.60 11/04/2023    CREATININE 1.60 11/03/2023        Patient weight is No results found for: \"PTWEIGHT\"    No results found for: \"CULTURE\"     I/O last 3 completed shifts:  In: 630 (9.2 mL/kg) [P.O.:480; IV Piggyback:150]  Out: 1600 (23.4 mL/kg) [Urine:1600 (0.6 mL/kg/hr)]  Weight: 68.4 kg   [unfilled]    Lab Results   Component Value Date    PATIENTTEMP 37.0 11/06/2023    PATIENTTEMP 37.0 10/25/2023    PATIENTTEMP 37.0 10/24/2023          Assessment/Plan     Patient will not be given a loading dose.  Will initiate vancomycin maintenance,  750 mg every 24 hours.    This dosing regimen is predicted by [employer] to result in the following pharmacokinetic parameters:  Loading dose: N/A  Regimen: 750 mg IV every 24 hours.  Start time: 09:40 on 11/07/2023  Exposure target: AUC24 (range)400-600 mg/L.hr   AUC24,ss: 434 mg/L.hr  Probability of AUC24 > 400: 60 %  Ctrough,ss: 14.3 mg/L  Probability of Ctrough,ss > 20: 15 %  Probability of nephrotoxicity (Lodise DANILO 2009): 9 %      Follow-up level will be ordered on 11/8 at 0500 unless clinically indicated sooner.  Will continue to monitor renal function daily while on vancomycin and order serum creatinine at least every 48 hours if not already ordered.  Follow for continued vancomycin needs, clinical response, and signs/symptoms of toxicity.       RENATO BOLAND, PharmD       "

## 2023-11-07 NOTE — CARE PLAN
Problem: Respiratory  Goal: Minimal/no exertional discomfort or dyspnea this shift  Outcome: Not Progressing  Goal: No signs of respiratory distress (eg. Use of accessory muscles. Peds grunting)  Outcome: Not Progressing  Goal: Wean oxygen to maintain O2 saturation per order/standard this shift  Outcome: Not Progressing     Problem: Respiratory  Goal: Minimal/no exertional discomfort or dyspnea this shift  Outcome: Not Progressing  Goal: No signs of respiratory distress (eg. Use of accessory muscles. Peds grunting)  Outcome: Not Progressing  Goal: Wean oxygen to maintain O2 saturation per order/standard this shift  Outcome: Not Progressing

## 2023-11-07 NOTE — PROGRESS NOTES
Critical Care Progress Note    Macarena Wilson is a 68 y.o. female on day 15 of admission presenting with Acute respiratory failure with hypoxemia (CMS/HCC).    Subjective   Awake and alert. Increased oxygen need yesterday and overnight.  Desat to 70s with conservation this AM.  Patient also indicates that she was feeling increased work of breathing and feeling poorly.  HFNC at 90% with NRB supplementation    Objective   Vital Signs      11/7/2023     2:00 AM 11/7/2023     3:00 AM 11/7/2023     4:00 AM 11/7/2023     5:00 AM 11/7/2023     6:00 AM 11/7/2023     7:00 AM 11/7/2023     8:00 AM   Vitals   Systolic 125 119 117 111 107 120 128   Diastolic 71 69 81 68 68 88 82   Heart Rate 74 92 91 75 68 94 105   Temp  36 °C (96.8 °F)    36.5 °C (97.7 °F)    Resp 19 17 31 20 23 23 32   Weight (lb)     150.79     BMI     26.71 kg/m2     BSA (m2)     1.74 m2         Oxygen Therapy  SpO2: (!) 71 %  Medical Gas Therapy: Supplemental oxygen  O2 Delivery Method: High flow nasal cannula    FiO2 (%):  [70 %-90 %] 90 %    Intake/Output previous 24 hours:    Intake/Output Summary (Last 24 hours) at 11/7/2023 0919  Last data filed at 11/7/2023 0302  Gross per 24 hour   Intake 630 ml   Output 1600 ml   Net -970 ml         Physical Exam  Constitutional:       Appearance: Normal appearance.   HENT:      Head: Normocephalic and atraumatic.   Eyes:      Extraocular Movements: Extraocular movements intact.      Pupils: Pupils are equal, round, and reactive to light.   Cardiovascular:      Rate and Rhythm: Normal rate and regular rhythm.      Pulses: Normal pulses.      Heart sounds: Normal heart sounds.   Pulmonary:      Effort: Pulmonary effort is normal.      Breath sounds: Wheezing present.   Abdominal:      General: Abdomen is flat. Bowel sounds are normal.      Palpations: Abdomen is soft.   Musculoskeletal:      Cervical back: Normal range of motion and neck supple.   Skin:     General: Skin is warm and dry.   Neurological:       General: No focal deficit present.      Mental Status: She is alert and oriented to person, place, and time.         Lines and Tubes:  Peripheral IV 10/29/23 Right;Anterior Forearm (Active)   Placement Date: 10/29/23   Earliest Known Present: 10/29/23  Orientation: Right;Anterior  Location: Forearm   Number of days: 8       Peripheral IV 10/30/23 20 G Right Arm (Active)   Placement Date: 10/30/23   Size (Gauge): 20 G  Orientation: Right  Location: Arm   Number of days: 7         Scheduled medications  acetylcysteine, 3 mL, nebulization, TID  amLODIPine, 2.5 mg, oral, Daily  budesonide, 0.5 mg, nebulization, BID  dexAMETHasone, 6 mg, intravenous, q24h  enoxaparin, 30 mg, subcutaneous, Daily  fluticasone, 1 spray, Each Nostril, BID  furosemide, 20 mg, intravenous, BID  insulin glargine, 16 Units, subcutaneous, Nightly  ipratropium-albuteroL, 3 mL, nebulization, q6h while awake  melatonin, 3 mg, oral, Nightly  oxygen, , inhalation, q4h  pantoprazole, 40 mg, oral, Daily before breakfast  perflutren lipid microspheres, 0.5-10 mL of dilution, intravenous, Once in imaging  perflutren protein A microsphere, 0.5 mL, intravenous, Once in imaging  piperacillin-tazobactam, 3.375 g, intravenous, q6h  sodium bicarbonate, 650 mg, oral, TID      Continuous medications     PRN medications  PRN medications: acetaminophen **OR** acetaminophen **OR** acetaminophen, aspirin-acetaminophen-caffeine, benzonatate, bisacodyl, dextrose 10 % in water (D10W), dextrose, docusate sodium, glucagon, guaiFENesin, hydrALAZINE, ipratropium-albuteroL, melatonin, ondansetron ODT **OR** ondansetron, oxygen, polyethylene glycol, sodium chloride    Relevant Results  Results from last 7 days   Lab Units 11/07/23  0634 11/06/23  0538 11/05/23  0901   WBC AUTO x10*3/uL 16.2* 15.0* 16.2*   HEMOGLOBIN g/dL 10.8* 10.9* 11.5*   HEMATOCRIT % 32.9* 33.3* 35.6*   PLATELETS AUTO x10*3/uL 140* 140* 161        Results from last 7 days   Lab Units 11/07/23  0629  11/05/23  0901 11/04/23  0638   SODIUM mmol/L 141 138 137   POTASSIUM mmol/L 4.3 4.5 5.1   CHLORIDE mmol/L 97 100 102   CO2 mmol/L 31 26 25   BUN mg/dL 55* 57* 63*   CREATININE mg/dL 1.80* 1.60 1.60   GLUCOSE mg/dL 155* 76 129*   CALCIUM mg/dL 8.6 8.7 8.1*        Results from last 7 days   Lab Units 11/06/23  1052   POCT PH, ARTERIAL pH 7.48*   POCT PCO2, ARTERIAL mm Hg 44*   POCT PO2, ARTERIAL mm Hg 43*   POCT HCO3 CALCULATED, ARTERIAL mmol/L 32.8*   POCT BASE EXCESS, ARTERIAL mmol/L 8.3*     XR chest 1 view 11/03/2023    Narrative  Interpreted By:  Aba Roach,  STUDY:  XR CHEST 1 VIEW; 11/3/2023 11:37 am    INDICATION:  Signs/Symptoms:respiratory failure    COMPARISON:  10/31/2023    ACCESSION NUMBER(S):  [id number]    ORDERING CLINICIAN:  DENNYS MERCADO    FINDINGS:  The cardiomediastinal silhouette is stable. Interval progression of  severe patchy airspace opacities are noted bilaterally. No pleural  effusion is identified.    The osseous structures are intact.    Impression  Interval progression of severe bilateral infiltrate.         CT CHEST WO IV CONTRAST;   10/24/2023 12:27 am      FINDINGS:   The thyroid gland is unremarkable.       The heart size is within normal limits.  No pericardial effusion is   identified. The aorta and pulmonary arteries are normal in caliber.      There are no pathologically enlarged mediastinal, hilar, or axillary   lymph nodes are seen.       The trachea and mainstem bronchi are patent. Diffuse patchy   ground-glass opacities bilaterally which is compatible with atypical   pneumonia. There is no evidence of pneumothorax or pleural effusion.       The visualized osseous structures are intact.       Limited images through the upper abdomen are unremarkable.   Impression:    Diffuse, patchy ground-glass opacities bilaterally suggesting   atypical pneumonia such as COVID.         TRANSTHORACIC ECHOCARDIOGRAM REPORT    CONCLUSIONS:   1. Left ventricular systolic function is  normal with a 60-65% estimated ejection fraction.   2. Spectral Doppler shows an impaired relaxation pattern of left ventricular diastolic filling.    Patient Active Problem List   Diagnosis    Abnormal mammogram    Acute pain of left shoulder    Allergic rhinitis    Benign essential HTN    Calculus of kidney    Staghorn renal calculus    Diverticular disease    Gammopathy    Gross hematuria    Hematuria, unspecified    Impaired fasting glucose    Nodule of kidney    Obesity    Post-menopausal bleeding    Prediabetes    Acute cystitis with hematuria    Stage 4 chronic kidney disease (CMS/HCC)    Urgency incontinence    Bladder fistula    Atrophic kidney    COVID-19    Acute respiratory failure with hypoxemia (CMS/HCC)    Pneumonia of both lower lobes due to infectious organism     Assessment/Plan   Principal Problem:    Acute respiratory failure with hypoxemia (CMS/HCC)  Active Problems:    COVID-19 pneumonitis - severe    ARDS    Benign essential HTN    Stage 4 chronic kidney disease (CMS/HCC)    HUNG    Chest x-ray from 11/3 showed some worsening infiltrates.  Chest xray yesterday stable compared with this  On increasing oxygen.   Unclear etiology.  Question worsening edema, new infectious process.  Possible PE always remains in the setting of COVID.  However, she is receiving daily Lovenox.    Obtain CT chest  Send sputum if able  Broaden abx    Completed remdesivir  Continue steroids    CT of the chest non contrast    HUNG  Hold  lasix    Overall her progress is very slow.  Concerned about this recent setback.  She is slowly worsening  May need to address code status again. She is refusing nippv    Davin Avendaño MD  [facility]       This critically ill patient continues to be at-risk for deterioration / failure due to the above mentioned dysfunctional unstable organ systems.   Critical care time is spent at bedside includes review of diagnostic tests, labs, and radiographs, serial assessments and  management of hemodynamics, respiratory status, and coordination of care with different members of interdisciplinary team  Assessment, impression and plans are reflected in the note above as well as the orders.     Critical concerns addressed:  Acute respiratory failure  COVID-19  Worsening hypoxia  HUNG    Time Spent in critical Care, exclusive of procedures : 35 mins.     Disclaimer: Parts of this chart were dictated with voice recognition software. Please excuse any errors of grammar, spelling, or transcription which are not corrected. Please contact me with any questions regarding documentation.

## 2023-11-07 NOTE — PROGRESS NOTES
Per rounds, patient to have CT chest.  Patient having increasing O2 needs.  Patient has been accepted by [facility].  Patient discussing with family.  Awaiting updates and improvement in patient's condition.  RN TCC following.    Madiha Salazar RN

## 2023-11-08 NOTE — PROGRESS NOTES
Macarena Wilson is a 68 y.o. female on day 16 of admission presenting with Acute respiratory failure with hypoxemia (CMS/HCC).    Subjective   Interval History:   Afebrile, no chills  Nonproductive cough  No chest pain  On high flow oxygen-40 L at 90%  No nausea vomiting or diarrhea        Review of Systems   All other systems reviewed and are negative.      Objective   Range of Vitals (last 24 hours)  Heart Rate:  []   Temp:  [35.2 °C (95.4 °F)-36.5 °C (97.7 °F)]   Resp:  [15-34]   BP: ()/(57-86)   Weight:  [68.9 kg (151 lb 14.4 oz)]   SpO2:  [84 %-98 %]   Daily Weight  11/08/23 : 68.9 kg (151 lb 14.4 oz)    Body mass index is 26.91 kg/m².    Physical Exam  Constitutional:       Appearance: Normal appearance.   HENT:      Head: Normocephalic and atraumatic.      Nose: Nose normal.      Mouth/Throat:      Mouth: Mucous membranes are moist.      Pharynx: Oropharynx is clear.   Eyes:      Extraocular Movements: Extraocular movements intact.      Conjunctiva/sclera: Conjunctivae normal.   Cardiovascular:      Rate and Rhythm: Normal rate and regular rhythm.   Pulmonary:      Comments: Decreased breath sounds bilateral  Abdominal:      General: Bowel sounds are normal.      Palpations: Abdomen is soft.   Musculoskeletal:         General: Normal range of motion.      Cervical back: Normal range of motion and neck supple.   Skin:     General: Skin is warm and dry.   Neurological:      General: No focal deficit present.      Mental Status: She is alert and oriented to person, place, and time. Mental status is at baseline.   Psychiatric:         Mood and Affect: Mood normal.         Behavior: Behavior normal.     Antibiotics    sodium chloride 0.9 % bolus 500 mL  doxycycline (Vibramycin) in dextrose 5 % in water (D5W) 100 mL  mg  cefTRIAXone (Rocephin) IVPB 1 g  Tc-99m-albumin (Draximage MAA) injection 4.2 millicurie  dexAMETHasone (Decadron) injection 8 mg  sodium chloride 0.9 % bolus 1,000  mL  enoxaparin (Lovenox) syringe 30 mg  sodium chloride 0.9% infusion  acetaminophen (Tylenol) tablet 650 mg  acetaminophen (Tylenol) oral liquid 650 mg  acetaminophen (Tylenol) suppository 650 mg  ondansetron ODT (Zofran-ODT) disintegrating tablet 4 mg  ondansetron (Zofran) injection 4 mg  polyethylene glycol (Glycolax, Miralax) packet 17 g  bisacodyl (Dulcolax) EC tablet 10 mg  guaiFENesin (Mucinex) 12 hr tablet 600 mg  oxygen (O2) therapy  piperacillin-tazobactam-dextrose (Zosyn) IV 2.25 g  ipratropium-albuteroL (Duo-Neb) 0.5-2.5 mg/3 mL nebulizer solution 3 mL  dexAMETHasone (Decadron) injection 8 mg  dexAMETHasone (Decadron) injection 6 mg  dextrose 50 % injection 25 g  glucagon (Glucagen) injection 1 mg  dextrose 10 % in water (D10W) infusion  insulin lispro (HumaLOG) injection 0-5 Units  vancomycin-diluent combo no.1 (Xellia) IVPB 1,250 mg  ipratropium-albuteroL (Duo-Neb) 0.5-2.5 mg/3 mL nebulizer solution 3 mL  ipratropium-albuteroL (Duo-Neb) 0.5-2.5 mg/3 mL nebulizer solution 3 mL  oxygen (O2) therapy  remdesivir (Veklury) 200 mg in sodium chloride 0.9% 250 mL IV  remdesivir (Veklury) 100 mg in sodium chloride 0.9% 250 mL IV  oxygen (O2) therapy  pantoprazole (ProtoNix) injection 40 mg  remdesivir (Veklury) 200 mg in sodium chloride 0.9% 250 mL IV  remdesivir (Veklury) 100 mg in sodium chloride 0.9% 250 mL IV  tocilizumab (Actemra) 600 mg in sodium chloride 0.9% 70 mL IV  furosemide (Lasix) tablet 10 mg  melatonin tablet 3 mg  benzonatate (Tessalon) capsule 200 mg  polyethylene glycol (Glycolax, Miralax) packet 17 g  fluticasone (Flonase) nasal spray 1 spray  sodium chloride (Ocean) 0.65 % nasal spray 1 spray  sodium bicarbonate tablet 650 mg  vancomycin-diluent combo no.1 (Xellia) IVPB 1,250 mg  guaiFENesin (Robitussin) 100 mg/5 mL syrup 200 mg  vancomycin-diluent combo no.1 (Xellia) IVPB 750 mg  pantoprazole (ProtoNix) EC tablet 40 mg  oxygen (O2) therapy  furosemide (Lasix) injection 20 mg  oxygen (O2)  therapy  heparin (porcine) injection 5,750 Units  heparin 25,000 Units in dextrose 5% 250 mL (100 Units/mL) infusion (premix)  heparin (porcine) injection 3,000-6,000 Units  enoxaparin (Lovenox) syringe 30 mg  melatonin tablet 3 mg  oxygen (O2) therapy  furosemide (Lasix) injection 40 mg  acetaminophen-caffeine 500-65 mg tablet 2 tablet  aspirin-acetaminophen-caffeine (Excedrin Migraine) 250-250-65 mg per tablet 2 tablet  perflutren lipid microspheres (Definity) injection 0.5-10 mL of dilution  sulfur hexafluoride microsphr (Lumason) injection 24.28 mg  perflutren protein A microsphere (Optison) injection 0.5 mL  hydrALAZINE (Apresoline) injection 10 mg  docusate sodium (Colace) capsule 100 mg  amLODIPine (Norvasc) tablet 2.5 mg  glipiZIDE (Glucotrol) tablet 5 mg  piperacillin-tazobactam-dextrose (Zosyn) IV 3.375 g  glipiZIDE (Glucotrol) tablet 2.5 mg  furosemide (Lasix) injection 40 mg  oxygen (O2) therapy  ipratropium-albuteroL (Duo-Neb) 0.5-2.5 mg/3 mL nebulizer solution 3 mL  acetylcysteine (Mucomyst) 200 mg/mL (20 %) nebulizer solution 1,200 mg  budesonide (Pulmicort) 0.5 mg/2 mL nebulizer solution 0.5 mg  furosemide (Lasix) injection 20 mg  insulin glargine (Lantus) injection 24 Units  acetylcysteine (Mucomyst) 200 mg/mL (20 %) nebulizer solution 1,200 mg  acetylcysteine (Mucomyst) 200 mg/mL (20 %) nebulizer solution 600 mg  insulin glargine (Lantus) injection 24 Units  insulin glargine (Lantus) injection 16 Units  meropenem (Merrem) in sodium chloride 0.9 % 100 mL 500 mg  vancomycin-diluent combo no.1 (Xellia) IVPB 750 mg  oxygen (O2) therapy  dexmedeTOMIDine in NS (Precedex) 400 mcg in 100 mL (4 mcg/mL) infusion  dexAMETHasone (Decadron) injection 10 mg  heparin (porcine) injection 4,000 Units  heparin 25,000 Units in dextrose 5% 250 mL (100 Units/mL) infusion (premix)  heparin (porcine) injection 1,500-3,000 Units  LORazepam (Ativan) injection 0.5 mg      Relevant Results  Labs  Results from last 72 hours    Lab Units 11/08/23  0623 11/07/23  1706 11/07/23  0634   WBC AUTO x10*3/uL 15.1* 18.2* 16.2*   HEMOGLOBIN g/dL 11.3* 11.8* 10.8*   HEMATOCRIT % 34.7* 36.5 32.9*   PLATELETS AUTO x10*3/uL 130* 148* 140*   NEUTROS PCT AUTO % 92.8  --  94.1   LYMPHS PCT AUTO % 2.2  --  2.2   MONOS PCT AUTO % 3.3  --  2.4   EOS PCT AUTO % 0.3  --  0.1     Results from last 72 hours   Lab Units 11/08/23  0623 11/07/23  0634   SODIUM mmol/L 138 141   POTASSIUM mmol/L 3.9 4.3   CHLORIDE mmol/L 97 97   CO2 mmol/L 31 31   BUN mg/dL 49* 55*   CREATININE mg/dL 1.50 1.80*   GLUCOSE mg/dL 145* 155*   CALCIUM mg/dL 9.0 8.6   ANION GAP mmol/L 10 13   EGFR mL/min/1.73m*2 38* 30*     Results from last 72 hours   Lab Units 11/08/23  0623 11/07/23  0634   ALK PHOS U/L 56 59   BILIRUBIN TOTAL mg/dL 0.4 0.5   PROTEIN TOTAL g/dL 5.6* 5.5*   ALT U/L 23 21   AST U/L 13 11   ALBUMIN g/dL 3.1* 3.3*     Estimated Creatinine Clearance: 33 mL/min (by C-G formula based on SCr of 1.5 mg/dL).  C-Reactive Protein   Date Value Ref Range Status   11/03/2023 <0.30 0.00 - 2.00 mg/dL Final   10/31/2023 0.50 0.00 - 2.00 mg/dL Final   10/26/2023 3.90 (H) 0.00 - 2.00 mg/dL Final     Microbiology  Reviewed  Imaging  CT chest wo IV contrast    Result Date: 11/7/2023  Interpreted By:  Da Samuels, STUDY: CT CHEST WO IV CONTRAST; 11/7/2023 10:57 am   INDICATION: Signs/Symptoms:worsening pneumonia.   COMPARISON: October 24, 2023.   ACCESSION NUMBER(S): [id number]   ORDERING CLINICIAN: DENNYS MERCADO   TECHNIQUE: Axial unenhanced images were obtained through the chest. Post processing sagittal and coronal reconstruction images were also performed. Coronal MIP images.   FINDINGS: Lung and Large Airways: The extensive ground-glass densities noted throughout both lungs now appear more confluent in the bilateral lower lobes and posterior aspect of the bilateral upper lobes. There is persistent ground-glass densities in the anterior aspect of the bilateral upper lobes.  Pleura: within normal limits. Vessels: No evidence for aortic aneurysm is noted. Heart: normal size. No pericardial effusion. Mediastinum and Gogo: within normal limits. Chest Wall and Lower Neck: within normal limits.   Upper Abdomen: within normal limits.   Bones: within normal limits.       Persistent extensive ground-glass densities in the lung apices and anterior aspect of the bilateral upper lobes with progression of previously identified ground-glass densities in the more confluent airspace opacities involving the bilateral lower lobes and posterior aspects of the bilateral upper lobes. Findings suggest atypical infection. Correlation with COVID status is recommended.   All CT examinations are performed with 1 or more of the following dose reduction techniques: Automated exposure control, adjustment of mA and/or kv according to patient's size, or use of iterative reconstruction techniques.   MACRO: none   Signed by: Da Samuels 11/7/2023 3:30 PM Dictation workstation:   [id number]    XR chest 1 view    Result Date: 11/6/2023  Interpreted By:  Karina Rodriguez, STUDY: XR CHEST 1 VIEW; 11/6/2023 1:07 pm   INDICATION: Signs/Symptoms:hypoxia / pneumonia / covid   COMPARISON: 11/03/2023   ACCESSION NUMBER(S): [id number]   ORDERING CLINICIAN: DENNYS MERCADO   TECHNIQUE: Frontal  chest radiographs.   FINDINGS: The cardiomediastinal silhouette is unremarkable. Diffuse airspace opacities overlying both lungs appears stable as compared to prior examination. Question trace bibasilar effusions.   The osseous structures are intact.       Diffuse airspace opacities overlying both lungs is unchanged as compared to prior examinations.       Signed by: Karina Rodriguez 11/6/2023 9:13 PM Dictation workstation:   [id number]    Lower extremity venous duplex bilateral    Result Date: 11/6/2023  Interpreted By:  Aba Roach, STUDY: VASC US LOWER EXTREMITY VENOUS DUPLEX BILATERAL; 11/6/2023 12:31 pm   INDICATION:  Signs/Symptoms:swelling. /patient COVID positive/swelling/patient on blood thinners   COMPARISON: None   ACCESSION NUMBER(S): [id number]   ORDERING CLINICIAN: ERICKSON FALCON   TECHNIQUE: High resolution real-time compression grayscale ultrasound imaging with color flow/Doppler and spectral waveform analysis of the bilateral lower extremity was performed to evaluate for deep vein thrombosis. Attempts were made to visualize the posterior tibial and peroneal veins.   FINDINGS: RIGHT LOWER EXTREMITY:   The common femoral vein through the popliteal vein demonstrates normal compressibility, color, and spectral Doppler waveform analysis. Portions of the posterior tibial and peroneal veins were visualized.   LEFT LOWER EXTREMITY:   The common femoral vein through the popliteal vein demonstrates normal compressibility, color, and spectral Doppler waveform analysis. Portions of the posterior tibial and peroneal veins were visualized.       No evidence of deep vein thrombosis of the  bilateral lower extremities within the limits of this examination.   MACRO: None.   Signed by: Aba Roach 11/6/2023 12:33 PM Dictation workstation:   [id number]    XR chest 1 view    Result Date: 11/3/2023  Interpreted By:  Aba Roach, STUDY: XR CHEST 1 VIEW; 11/3/2023 11:37 am   INDICATION: Signs/Symptoms:respiratory failure   COMPARISON: 10/31/2023   ACCESSION NUMBER(S): [id number]   ORDERING CLINICIAN: DENNYS MERCADO   FINDINGS: The cardiomediastinal silhouette is stable. Interval progression of severe patchy airspace opacities are noted bilaterally. No pleural effusion is identified.   The osseous structures are intact.       Interval progression of severe bilateral infiltrate.   MACRO: None   Signed by: Aba Roach 11/3/2023 4:15 PM Dictation workstation:   [id number]    Lower extremity venous duplex bilateral    Result Date: 11/2/2023  Interpreted By:  Russel Handley, STUDY: [facility] US LOWER EXTREMITY VENOUS DUPLEX  BILATERAL; 10/27/2023; 10/27/2023 11:15 am   INDICATION: Signs/Symptoms:elev dimer cv pos ro dvt. Shortness of breath.   ACCESSION NUMBER(S): [id number]   ORDERING CLINICIAN: SUSANNE FRANCOIS   TECHNIQUE: PROCEDURE: Grayscale, 2D, color Doppler, Doppler spectral analysis (waveform) and duplex images obtained.   FINDINGS: Right Lower Extremity: Patent with normal flow, and compressibility of the common and superficial femoral, popliteal, and greater saphenous veins  as well as in the tibioperoneal trunk and trifurcation.  There is no subcutaneous edema.   Left Lower Extremity: Patent with normal flow, and compressibility of the common and superficial femoral, popliteal, and greater saphenous veins  as well as in the tibioperoneal trunk and trifurcation.  There is no subcutaneous edema.       1. Right Lower Extremity: No evidence of deep vein thrombosis.   2. Left Lower Extremity: No evidence of deep vein thrombosis.   Signed by: Russel Handley 11/2/2023 2:24 PM Dictation workstation:   [id number]    Transthoracic Echo (TTE) Complete    Result Date: 11/1/2023            [place] [address]             Phone [phone] TRANSTHORACIC ECHOCARDIOGRAM REPORT  Patient Name:      KARLA AGUILERA Reading Physician:   [id number] Riccardo Bagley MD Study Date:        10/31/2023         Ordering Provider:   [id number] CUCA SAM MRN/PID:           [id number]           Fellow: Accession#:        [id number]       Nurse: Date of Birth/Age: 1955 / 68      Sonographer:         Temi King RCS                    years Gender:            F                  Additional Staff: Height:            160.02 cm          Admit Date:          10/31/2023 Weight:            70.76 kg           Admission Status:    Inpatient - Routine BSA:               1.74 m2            Department Location: Blood Pressure: 135 /85 mmHg Study Type:    TRANSTHORACIC ECHO (TTE) COMPLETE Diagnosis/ICD: Acute respiratory failure  with hypoxia-J96.01 Indication:    Acute Respiratory failure with hypoxemia CPT Codes:     Echo Complete w Full Doppler-[id number] Patient History: Pertinent History: HTN Prediabetes Chronic Kidney Disease. Study Detail: The following Echo studies were performed: 2D, M-Mode, Doppler and               color flow. Technically challenging study due to poor acoustic               windows, prominent lung artifact and patient lying in supine               position. Lumason used as a contrast agent for endocardial border               definition. Unable to obtain subcostal and suprasternal notch               view.  PHYSICIAN INTERPRETATION: Left Ventricle: Left ventricular systolic function is normal, with an estimated ejection fraction of 60-65%. There are no regional wall motion abnormalities. The left ventricular cavity size is normal. Spectral Doppler shows an impaired relaxation pattern of left ventricular diastolic filling. Left Atrium: The left atrium is normal in size. Right Ventricle: The right ventricle is normal in size. There is normal right ventricular global systolic function. Right Atrium: The right atrium is normal in size. Aortic Valve: The aortic valve is trileaflet. There is trivial aortic valve regurgitation. The peak instantaneous gradient of the aortic valve is 12.0 mmHg. Mitral Valve: The mitral valve is normal in structure. There is trace mitral valve regurgitation. Tricuspid Valve: The tricuspid valve is structurally normal. There is trace tricuspid regurgitation. Pulmonic Valve: The pulmonic valve is not well visualized. The pulmonic valve regurgitation was not well visualized. Pericardium: There is no pericardial effusion noted. Aorta: The aortic root was not well visualized.  CONCLUSIONS:  1. Left ventricular systolic function is normal with a 60-65% estimated ejection fraction.  2. Spectral Doppler shows an impaired relaxation pattern of left ventricular diastolic filling. QUANTITATIVE DATA  SUMMARY: 2D MEASUREMENTS:                          Normal Ranges: LAs:           2.60 cm   (2.7-4.0cm) IVSd:          0.88 cm   (0.6-1.1cm) LVPWd:         0.85 cm   (0.6-1.1cm) LVIDd:         3.51 cm   (3.9-5.9cm) LVIDs:         2.55 cm LV Mass Index: 48.5 g/m2 LV % FS        27.4 % LA VOLUME:                               Normal Ranges: LA Vol A2C:        73.7 ml LA Vol Index A2C:  42.4 ml/m2 LA Area A2C:       21.6 cm2 LA Major Axis A2C: 5.4 cm LA Volume Index:   33.2 ml/m2 LA Vol A2C:        70.6 ml RA VOLUME BY A/L METHOD:                              Normal Ranges: RA Vol A2C:        1.3 ml RA Vol Index A2C:  0.8 ml/m2 RA Area A2C:       5.0 cm2 RA Major Axis A2C: 15.7 cm M-MODE MEASUREMENTS:                  Normal Ranges: Ao Root: 2.70 cm (2.0-3.7cm) LAs:     2.60 cm (2.7-4.0cm) LV SYSTOLIC FUNCTION BY 2D PLANIMETRY (MOD):                     Normal Ranges: EF-A4C View: 78.8 % (>=55%) EF-A2C View: 73.2 % EF-Biplane:  75.3 % LV DIASTOLIC FUNCTION:                     Normal Ranges: MV Peak E: 0.82 m/s (0.7-1.2 m/s) MV Peak A: 1.03 m/s (0.42-0.7 m/s) E/A Ratio: 0.80     (1.0-2.2) MITRAL VALVE:                 Normal Ranges: MV DT: 193 msec (150-240msec) AORTIC VALVE:                          Normal Ranges: AoV Vmax:      1.73 m/s  (<=1.7m/s) AoV Peak P.0 mmHg (<20mmHg) LVOT Max Garrett:  1.37 m/s  (<=1.1m/s) LVOT VTI:      28.40 cm LVOT Diameter: 2.00 cm   (1.8-2.4cm) AoV Area,Vmax: 2.49 cm2  (2.5-4.5cm2)  RIGHT VENTRICLE: RV Basal 3.35 cm RV Mid   2.73 cm RV Major 5.6 cm TRICUSPID VALVE/RVSP:                             Normal Ranges: Peak TR Velocity: 1.97 m/s RV Syst Pressure: 18.5 mmHg (< 30mmHg) PULMONIC VALVE:                         Normal Ranges: PV Accel Time: 74 msec  (>120ms) PV Max Grarett:    1.1 m/s  (0.6-0.9m/s) PV Max P.5 mmHg  [id number] Riccardo Bagley MD Electronically signed on 2023 at 9:53:29 AM  ** Final **     XR chest 1 view    Result Date: 10/31/2023  Interpreted By:  Hipolito  Nirmal, STUDY: XR CHEST 1 VIEW; 10/31/2023 9:48 am   INDICATION: Shortness of breath   COMPARISON: 10/29/2023   ACCESSION NUMBER(S): [id number]   ORDERING CLINICIAN: CUCA SAM   TECHNIQUE: 1 view of the chest was performed.   FINDINGS: No significant interval change in diffuse bilateral airspace opacities involving both lungs more prominent in the mid and lower lungs. Findings do not appear significantly changed. No pleural effusion. No pneumothorax.  The cardiomediastinal silhouette is within normal limits.       No significant interval change in diffuse bilateral airspace opacities. No pleural effusion or significant interval change.   Signed by: Nirmal Mcmillan 10/31/2023 10:45 AM Dictation workstation:   [id number]    XR chest 1 view    Result Date: 10/29/2023  Interpreted By:  Karina Rodriguez, STUDY: XR CHEST 1 VIEW; 10/29/2023 1:02 pm   INDICATION: Signs/Symptoms:hyppoxemia   COMPARISON: 10/27/2023   ACCESSION NUMBER(S): [id number]   ORDERING CLINICIAN: SUSANNE FRANCOIS   TECHNIQUE: Frontal  chest radiographs.   FINDINGS: The cardiomediastinal silhouette is unremarkable. Diffuse airspace opacities are noted overlying both lungs, unchanged from prior examinations. No pleural effusion is identified.   The osseous structures are intact.       No significant change from prior examination. Diffuse airspace opacities overlying both lungs, differential includes ARDS, diffuse alveolar edema/hemorrhage.     Signed by: Karina Rodriguez 10/29/2023 2:19 PM Dictation workstation:   [id number]    XR chest 1 view    Result Date: 10/27/2023  Interpreted By:  Nirmal Mcmillan, STUDY: XR CHEST 1 VIEW; 10/27/2023 10:06 am   INDICATION: Signs/Symptoms:follow up hypoxemia.   COMPARISON: 10/23/2023   ACCESSION NUMBER(S): [id number]   ORDERING CLINICIAN: SUSANNE FRANCOIS   TECHNIQUE: 1 view of the chest was performed.   FINDINGS: There is diffuse ground-glass opacity bilaterally consistent with history of COVID. No lobar or large  consolidation however the ground-glass opacities are prominent but stable from the previous examination. No pleural effusion. No pneumothorax.  The cardiomediastinal silhouette is within normal limits.       No significant change. Prominent ground-glass opacities bilaterally, however, not significantly changed.   Signed by: Nirmal Mcmillan 10/27/2023 10:24 PM Dictation workstation:   [id number]    CT chest wo IV contrast    Result Date: 10/24/2023  Interpreted By:  Nirmal Mcmillan, STUDY: CT CHEST WO IV CONTRAST; 10/24/2023 12:27 am   INDICATION: Signs/Symptoms:Covid.   COMPARISON: None   ACCESSION NUMBER(S): [id number]   ORDERING CLINICIAN: GERTRUDE FIERRO   TECHNIQUE: Contiguous axial images of the thorax were obtained from the level of the thoracic inlet through the lung bases. Coronal and sagittal reformatted images were obtained.     FINDINGS: The thyroid gland is unremarkable.   The heart size is within normal limits.  No pericardial effusion is identified. The aorta and pulmonary arteries are normal in caliber.   There are no pathologically enlarged mediastinal, hilar, or axillary lymph nodes are seen.   The trachea and mainstem bronchi are patent. Diffuse patchy ground-glass opacities bilaterally which is compatible with atypical pneumonia. There is no evidence of pneumothorax or pleural effusion.   The visualized osseous structures are intact.   Limited images through the upper abdomen are unremarkable.       Diffuse, patchy ground-glass opacities bilaterally suggesting atypical pneumonia such as COVID.     Signed by: Nirmal Mcmillan 10/24/2023 12:32 PM Dictation workstation:   [id number]    NM lung perfusion particulate    Result Date: 10/23/2023  Interpreted By:  Yoly Stanley, STUDY: NM LUNG PERFUSION PARTICULATE 10/23/2023 8:44 pm   INDICATION: Signs/Symptoms:hypoxia, r/o PE   COMPARISON: Chest x-ray done earlier today   ACCESSION NUMBER(S): [id number]   ORDERING CLINICIAN: ANAHY EVANS    TECHNIQUE: 4.2 millicuries of technetium 99 M MAA was injected intravenously with perfusion lung scan in 8 projections completed.   FINDINGS: No perfusion defect is seen within either lung.       Normal perfusion lung scan.   Signed by: Yoly Stanley 10/23/2023 8:57 PM Dictation workstation:   [id number]    XR chest 1 view    Result Date: 10/23/2023  Interpreted By:  Yoly Stanley, STUDY: XR CHEST 1 VIEW 10/23/2023 4:55 pm   INDICATION: Signs/Symptoms:dyspnea, cough, fatigue, and malaise. Positive COVID.   COMPARISON: None available.   ACCESSION NUMBER(S): [id number]   ORDERING CLINICIAN: ANAHY EVANS   TECHNIQUE: AP erect view of the chest   FINDINGS: The cardiac size is normal with no mediastinal abnormality identified. The trachea is midline. No pleural abnormality is seen. However, there are bilateral infiltrates with ground-glass appearance with relative sparing of the left upper lung zone.       Bilateral ground-glass infiltrates.   Signed by: Yoly Stanley 10/23/2023 5:08 PM Dictation workstation:   [id number]     Assessment/Plan   Acute hypoxic respiratory failure, interval decrease-interval worsening  Severe coronavirus pneumonitis   Stage IV chronic kidney disease  Leukocytosis-resolving  Diarrhea-rule out infectious etiology-resolved     Continue dexamethasone  Remdesivir completed  Monitor renal function  S/p Actemra-10/24/2023  Supportive care  Monitor temperature and WBC  Supplemental oxygen as needed,  Pulmonary follow-up      Js Vasquez MD

## 2023-11-08 NOTE — PROGRESS NOTES
Per morning rounds pt will be transferring to [facility] once they have an open bed.     Katina MICHELA, LSW

## 2023-11-08 NOTE — PROGRESS NOTES
"Macarena Wilson is a 68 y.o. female on day 16 of admission presenting with Acute respiratory failure with hypoxemia (CMS/HCC).    Subjective      Seen and examined patient did not tolerate BiPAP overnight transition back to high flow nasal cannula at 100% FiO2 40 L of flow this morning's been reduced from 100% FiO2 to 90% FiO2.  Spoke with  transfer center and  medical intensive care unit the patient has been accepted for transfer but we are awaiting a bed they are aware and updating their plans for other patient transfers as well    Objective     Physical Exam  Vitals and nursing note reviewed.   Constitutional:       Appearance: Normal appearance.   HENT:      Head: Normocephalic and atraumatic.      Mouth/Throat:      Mouth: Mucous membranes are moist.   Eyes:      Extraocular Movements: Extraocular movements intact.      Pupils: Pupils are equal, round, and reactive to light.   Cardiovascular:      Rate and Rhythm: Normal rate and regular rhythm.   Pulmonary:      Effort: Pulmonary effort is normal.      Breath sounds: Normal breath sounds.   Abdominal:      Palpations: Abdomen is soft.   Musculoskeletal:         General: Normal range of motion.      Cervical back: Normal range of motion and neck supple.   Skin:     General: Skin is warm.      Capillary Refill: Capillary refill takes less than 2 seconds.   Neurological:      General: No focal deficit present.      Mental Status: She is alert and oriented to person, place, and time. Mental status is at baseline.   Psychiatric:         Mood and Affect: Mood normal.         Last Recorded Vitals  Blood pressure 139/80, pulse 56, temperature 36.3 °C (97.3 °F), temperature source Temporal, resp. rate 18, height 1.6 m (5' 3\"), weight 68.9 kg (151 lb 14.4 oz), SpO2 93 %.  Intake/Output last 3 Shifts:  I/O last 3 completed shifts:  In: 1085.4 (15.8 mL/kg) [P.O.:680; I.V.:355.4 (5.2 mL/kg); IV Piggyback:50]  Out: 2200 (31.9 mL/kg) [Urine:2200 (0.9 " mL/kg/hr)]  Weight: 68.9 kg     Relevant Results           This patient currently has cardiac telemetry ordered; if you would like to modify or discontinue the telemetry order, click here to go to the orders activity to modify/discontinue the order.    Scheduled medications  acetylcysteine, 3 mL, nebulization, TID  budesonide, 0.5 mg, nebulization, BID  dexAMETHasone, 10 mg, intravenous, q12h  fluticasone, 1 spray, Each Nostril, BID  insulin glargine, 16 Units, subcutaneous, Nightly  ipratropium-albuteroL, 3 mL, nebulization, q6h while awake  melatonin, 3 mg, oral, Nightly  meropenem, 500 mg, intravenous, q12h  oxygen, , inhalation, q4h  pantoprazole, 40 mg, oral, Daily before breakfast  perflutren lipid microspheres, 0.5-10 mL of dilution, intravenous, Once in imaging  perflutren protein A microsphere, 0.5 mL, intravenous, Once in imaging      Continuous medications  dexmedeTOMIDine, 0.1-1.5 mcg/kg/hr, Last Rate: 0.84 mcg/kg/hr (11/08/23 0714)  heparin, 0-4,000 Units/hr, Last Rate: 800 Units/hr (11/08/23 1019)      PRN medications  PRN medications: acetaminophen **OR** acetaminophen **OR** acetaminophen, bisacodyl, dextrose 10 % in water (D10W), dextrose, docusate sodium, glucagon, guaiFENesin, heparin (porcine), hydrALAZINE, ipratropium-albuteroL, LORazepam, melatonin, ondansetron ODT **OR** ondansetron, oxygen, oxygen, polyethylene glycol, sodium chloride  Results for orders placed or performed during the hospital encounter of 10/23/23 (from the past 24 hour(s))   POCT GLUCOSE   Result Value Ref Range    POCT Glucose 95 74 - 99 mg/dL   aPTT   Result Value Ref Range    aPTT 25.6 22.0 - 32.5 seconds   CBC   Result Value Ref Range    WBC 18.2 (H) 4.4 - 11.3 x10*3/uL    nRBC 0.0 0.0 - 0.0 /100 WBCs    RBC 4.26 4.00 - 5.20 x10*6/uL    Hemoglobin 11.8 (L) 12.0 - 16.0 g/dL    Hematocrit 36.5 36.0 - 46.0 %    MCV 86 80 - 100 fL    MCH 27.7 26.0 - 34.0 pg    MCHC 32.3 32.0 - 36.0 g/dL    RDW 14.7 (H) 11.5 - 14.5 %     Platelets 148 (L) 150 - 450 x10*3/uL   Blood Gas Arterial Full Panel   Result Value Ref Range    POCT pH, Arterial 7.50 (H) 7.38 - 7.42 pH    POCT pCO2, Arterial 48 (H) 38 - 42 mm Hg    POCT pO2, Arterial 67 (L) 85 - 95 mm Hg    POCT SO2, Arterial 94 94 - 100 %    POCT Oxy Hemoglobin, Arterial 91.5 (L) 94.0 - 98.0 %    POCT Hematocrit Calculated, Arterial 35.0 (L) 36.0 - 46.0 %    POCT Sodium, Arterial 136 136 - 145 mmol/L    POCT Potassium, Arterial 3.3 (L) 3.5 - 5.3 mmol/L    POCT Chloride, Arterial 98 98 - 107 mmol/L    POCT Ionized Calcium, Arterial 1.08 (L) 1.10 - 1.33 mmol/L    POCT Glucose, Arterial 105 (H) 74 - 99 mg/dL    POCT Lactate, Arterial 1.2 0.4 - 2.0 mmol/L    POCT Base Excess, Arterial 12.6 (H) -2.0 - 3.0 mmol/L    POCT HCO3 Calculated, Arterial 37.4 (H) 22.0 - 26.0 mmol/L    POCT Hemoglobin, Arterial 11.7 (L) 12.0 - 16.0 g/dL    POCT Anion Gap, Arterial 4 (L) 10 - 25 mmo/L    Patient Temperature 37.0 degrees Celsius    FiO2 100 %    Apparatus      Ventilator Mode BiPAP     Ipap CMH2O 20.0 cm H2O    Epap CMH2O 8.0 cm H2O   POCT GLUCOSE   Result Value Ref Range    POCT Glucose 215 (H) 74 - 99 mg/dL   APTT   Result Value Ref Range    aPTT 58.4 (H) 22.0 - 32.5 seconds   Comprehensive Metabolic Panel   Result Value Ref Range    Glucose 145 (H) 65 - 99 mg/dL    Sodium 138 133 - 145 mmol/L    Potassium 3.9 3.4 - 5.1 mmol/L    Chloride 97 97 - 107 mmol/L    Bicarbonate 31 24 - 31 mmol/L    Urea Nitrogen 49 (H) 8 - 25 mg/dL    Creatinine 1.50 0.40 - 1.60 mg/dL    eGFR 38 (L) >60 mL/min/1.73m*2    Calcium 9.0 8.5 - 10.4 mg/dL    Albumin 3.1 (L) 3.5 - 5.0 g/dL    Alkaline Phosphatase 56 35 - 125 U/L    Total Protein 5.6 (L) 5.9 - 7.9 g/dL    AST 13 5 - 40 U/L    Bilirubin, Total 0.4 0.1 - 1.2 mg/dL    ALT 23 5 - 40 U/L    Anion Gap 10 <=19 mmol/L   Magnesium   Result Value Ref Range    Magnesium 2.30 1.60 - 3.10 mg/dL   CBC and Auto Differential   Result Value Ref Range    WBC 15.1 (H) 4.4 - 11.3 x10*3/uL     nRBC 0.0 0.0 - 0.0 /100 WBCs    RBC 4.06 4.00 - 5.20 x10*6/uL    Hemoglobin 11.3 (L) 12.0 - 16.0 g/dL    Hematocrit 34.7 (L) 36.0 - 46.0 %    MCV 86 80 - 100 fL    MCH 27.8 26.0 - 34.0 pg    MCHC 32.6 32.0 - 36.0 g/dL    RDW 14.5 11.5 - 14.5 %    Platelets 130 (L) 150 - 450 x10*3/uL    Neutrophils % 92.8 40.0 - 80.0 %    Immature Granulocytes %, Automated 1.3 (H) 0.0 - 0.9 %    Lymphocytes % 2.2 13.0 - 44.0 %    Monocytes % 3.3 2.0 - 10.0 %    Eosinophils % 0.3 0.0 - 6.0 %    Basophils % 0.1 0.0 - 2.0 %    Neutrophils Absolute 13.95 (H) 1.20 - 7.70 x10*3/uL    Immature Granulocytes Absolute, Automated 0.20 0.00 - 0.70 x10*3/uL    Lymphocytes Absolute 0.33 (L) 1.20 - 4.80 x10*3/uL    Monocytes Absolute 0.50 0.10 - 1.00 x10*3/uL    Eosinophils Absolute 0.05 0.00 - 0.70 x10*3/uL    Basophils Absolute 0.02 0.00 - 0.10 x10*3/uL   Vancomycin   Result Value Ref Range    Vancomycin 7.0 (L) 10.0 - 20.0 ug/mL   aPTT   Result Value Ref Range    aPTT 58.8 (H) 22.0 - 32.5 seconds   POCT GLUCOSE   Result Value Ref Range    POCT Glucose 148 (H) 74 - 99 mg/dL   Blood Gas Arterial   Result Value Ref Range    POCT pH, Arterial 7.47 (H) 7.38 - 7.42 pH    POCT pCO2, Arterial 49 (H) 38 - 42 mm Hg    POCT pO2, Arterial 71 (L) 85 - 95 mm Hg    POCT SO2, Arterial 96 94 - 100 %    POCT Oxy Hemoglobin, Arterial 93.8 (L) 94.0 - 98.0 %    POCT Base Excess, Arterial 10.4 (H) -2.0 - 3.0 mmol/L    POCT HCO3 Calculated, Arterial 35.7 (H) 22.0 - 26.0 mmol/L    Patient Temperature 37.0 degrees Celsius    FiO2 90 %    Apparatus HIGH FLOW CANNULA    POCT GLUCOSE   Result Value Ref Range    POCT Glucose 139 (H) 74 - 99 mg/dL                  Assessment/Plan   Principal Problem:    Acute respiratory failure with hypoxemia (CMS/HCC)  Active Problems:    Benign essential HTN    Stage 4 chronic kidney disease (CMS/HCC)    COVID-19    Continue supportive care plan for transfer to [facility] when ICU bed becomes available       I spent 40  minutes in the professional and overall care of this patient.      Da Brown, DO

## 2023-11-08 NOTE — CARE PLAN
Problem: Respiratory  Goal: Wean oxygen to maintain O2 saturation per order/standard this shift  Outcome: Progressing  Goal: Tolerate pulmonary toileting this shift  Outcome: Progressing  Goal: Verbalize decreased shortness of breath this shift  Outcome: Progressing

## 2023-11-08 NOTE — PROGRESS NOTES
Critical Care Progress Note    Macarena Wilson is a 68 y.o. female on day 16 of admission presenting with Acute respiratory failure with hypoxemia (CMS/HCC).    Subjective   Persistent hypoxic respiratory failure  Remains on high flow  Could not tolerate BiPAP last night  Objective   Vital Signs      11/8/2023     2:00 AM 11/8/2023     3:00 AM 11/8/2023     4:00 AM 11/8/2023     5:00 AM 11/8/2023     6:00 AM 11/8/2023     7:00 AM 11/8/2023     7:14 AM   Vitals   Systolic 129 125 128 143 143 153 152   Diastolic 82 76 75 74 86 79 82   Heart Rate 66 61 57 55 60 51 50   Temp   35.5 °C (95.9 °F)    35.6 °C (96.1 °F)   Resp 23 17 16 16 25 15 16   Weight (lb)    151.9      BMI    26.91 kg/m2      BSA (m2)    1.75 m2          Oxygen Therapy  SpO2: 95 %  Medical Gas Therapy: Supplemental oxygen  O2 Delivery Method: (S) High flow nasal cannula (titreated to 90%)    FiO2 (%):  [90 %-100 %] 100 %    Intake/Output previous 24 hours:    Intake/Output Summary (Last 24 hours) at 11/8/2023 0940  Last data filed at 11/8/2023 0649  Gross per 24 hour   Intake 555.38 ml   Output 900 ml   Net -344.62 ml     Physical Exam  Constitutional:       Appearance: Normal appearance.   HENT:      Head: Normocephalic and atraumatic.   Eyes:      Extraocular Movements: Extraocular movements intact.      Pupils: Pupils are equal, round, and reactive to light.   Cardiovascular:      Rate and Rhythm: Normal rate and regular rhythm.      Pulses: Normal pulses.      Heart sounds: Normal heart sounds.   Pulmonary:      Effort: Pulmonary effort is normal.      Breath sounds: Wheezing present.   Abdominal:      General: Abdomen is flat. Bowel sounds are normal.      Palpations: Abdomen is soft.   Musculoskeletal:      Cervical back: Normal range of motion and neck supple.   Skin:     General: Skin is warm and dry.   Neurological:      General: No focal deficit present.      Mental Status: She is alert and oriented to person, place, and time.        Lines and Tubes:  Peripheral IV 10/29/23 Right;Anterior Forearm (Active)   Placement Date: 10/29/23   Earliest Known Present: 10/29/23  Orientation: Right;Anterior  Location: Forearm   Number of days: 10       Peripheral IV 11/08/23 20 G Left;Anterior Hand (Active)   Placement Date/Time: 11/08/23 0000   Size (Gauge): 20 G  Orientation: Left;Anterior  Location: Hand   Number of days: 0         Scheduled medications  acetylcysteine, 3 mL, nebulization, TID  budesonide, 0.5 mg, nebulization, BID  dexAMETHasone, 10 mg, intravenous, q12h  fluticasone, 1 spray, Each Nostril, BID  insulin glargine, 16 Units, subcutaneous, Nightly  ipratropium-albuteroL, 3 mL, nebulization, q6h while awake  melatonin, 3 mg, oral, Nightly  meropenem, 500 mg, intravenous, q12h  oxygen, , inhalation, q4h  pantoprazole, 40 mg, oral, Daily before breakfast  perflutren lipid microspheres, 0.5-10 mL of dilution, intravenous, Once in imaging  perflutren protein A microsphere, 0.5 mL, intravenous, Once in imaging      Continuous medications  dexmedeTOMIDine, 0.1-1.5 mcg/kg/hr, Last Rate: 0.84 mcg/kg/hr (11/08/23 0714)  heparin, 0-4,000 Units/hr, Last Rate: 800 Units/hr (11/08/23 0649)      PRN medications  PRN medications: acetaminophen **OR** acetaminophen **OR** acetaminophen, bisacodyl, dextrose 10 % in water (D10W), dextrose, docusate sodium, glucagon, guaiFENesin, heparin (porcine), hydrALAZINE, ipratropium-albuteroL, LORazepam, melatonin, ondansetron ODT **OR** ondansetron, oxygen, oxygen, polyethylene glycol, sodium chloride    Relevant Results  Results from last 7 days   Lab Units 11/08/23  0623 11/07/23  1706 11/07/23  0634   WBC AUTO x10*3/uL 15.1* 18.2* 16.2*   HEMOGLOBIN g/dL 11.3* 11.8* 10.8*   HEMATOCRIT % 34.7* 36.5 32.9*   PLATELETS AUTO x10*3/uL 130* 148* 140*      Results from last 7 days   Lab Units 11/08/23  0623 11/07/23  0634 11/05/23  0901   SODIUM mmol/L 138 141 138   POTASSIUM mmol/L 3.9 4.3 4.5   CHLORIDE mmol/L 97 97  100   CO2 mmol/L 31 31 26   BUN mg/dL 49* 55* 57*   CREATININE mg/dL 1.50 1.80* 1.60   GLUCOSE mg/dL 145* 155* 76   CALCIUM mg/dL 9.0 8.6 8.7      Results from last 7 days   Lab Units 11/07/23  1813   POCT PH, ARTERIAL pH 7.50*   POCT PCO2, ARTERIAL mm Hg 48*   POCT PO2, ARTERIAL mm Hg 67*   POCT HCO3 CALCULATED, ARTERIAL mmol/L 37.4*   POCT BASE EXCESS, ARTERIAL mmol/L 12.6*     XR chest 1 view 11/06/2023    Narrative  Interpreted By:  Karina Rodriguez,  STUDY:  XR CHEST 1 VIEW; 11/6/2023 1:07 pm    INDICATION:  Signs/Symptoms:hypoxia / pneumonia / covid    COMPARISON:  11/03/2023    ACCESSION NUMBER(S):  [id number]    ORDERING CLINICIAN:  DENNYS MERCADO    TECHNIQUE:  Frontal  chest radiographs.    FINDINGS:  The cardiomediastinal silhouette is unremarkable. Diffuse airspace  opacities overlying both lungs appears stable as compared to prior  examination. Question trace bibasilar effusions.    The osseous structures are intact.    Impression  Diffuse airspace opacities overlying both lungs is unchanged as  compared to prior examinations.        Signed by: Karina Rodriguez 11/6/2023 9:13 PM  Dictation workstation:   [id number]      CT CHEST WO IV CONTRAST      COMPARISON:   October 24, 2023.       FINDINGS:   Lung and Large Airways: The extensive ground-glass densities noted   throughout both lungs now appear more confluent in the bilateral   lower lobes and posterior aspect of the bilateral upper lobes. There   is persistent ground-glass densities in the anterior aspect of the   bilateral upper lobes. Pleura: within normal limits.   Vessels: No evidence for aortic aneurysm is noted.   Heart: normal size. No pericardial effusion.   Mediastinum and Gogo: within normal limits.   Chest Wall and Lower Neck: within normal limits.       Upper Abdomen: within normal limits.       Bones: within normal limits.       Impression:    Persistent extensive ground-glass densities in the lung apices and   anterior aspect of the  bilateral upper lobes with progression of   previously identified ground-glass densities in the more confluent   airspace opacities involving the bilateral lower lobes and posterior   aspects of the bilateral upper lobes. Findings suggest atypical   infection. Correlation with COVID status is recommended.             Patient Active Problem List   Diagnosis    Abnormal mammogram    Acute pain of left shoulder    Allergic rhinitis    Benign essential HTN    Calculus of kidney    Staghorn renal calculus    Diverticular disease    Gammopathy    Gross hematuria    Hematuria, unspecified    Impaired fasting glucose    Nodule of kidney    Obesity    Post-menopausal bleeding    Prediabetes    Acute cystitis with hematuria    Stage 4 chronic kidney disease (CMS/HCC)    Urgency incontinence    Bladder fistula    Atrophic kidney    COVID-19    Acute respiratory failure with hypoxemia (CMS/HCC)    Pneumonia of both lower lobes due to infectious organism     Assessment/Plan   Principal Problem:    Acute respiratory failure with hypoxemia (CMS/HCC)  Active Problems:    COVID-19 pneumonitis - severe    ARDS    Benign essential HTN    Stage 4 chronic kidney disease (CMS/HCC)    HUNG     CT of the chest shows worsening bilateral changes consistent with severe COVID-19 pneumonitis and ARDS.  She continues to do marginal at best on high flow nasal cannula with intermittent use of nonrebreather.    Yesterday post CT scan we will try to start BiPAP.  Patient could not tolerate.  Also tried use Precedex to help facilitate medicine patient failed reveal tolerated BiPAP.  Call placed to  for potential transfer.  Patient has been accepted bed is pending.    Continue with increased dose of steroids.  Heparin drip started yesterday in place of Lovenox.  Creatinine is improved slightly today.  Lasix IV x1  Continue with broaden antibiotic coverage  Patient completed remdesivir  Continue with standard GI prophylaxis    Patient is willing to  accept intubation.  I discussed with her today that she may need intubation for transport.  Continue to follow         Davin Avendaño MD  [facility]       This critically ill patient continues to be at-risk for deterioration / failure due to the above mentioned dysfunctional unstable organ systems.   Critical care time is spent at bedside includes review of diagnostic tests, labs, and radiographs, serial assessments and management of hemodynamics, respiratory status, and coordination of care with different members of interdisciplinary team  Assessment, impression and plans are reflected in the note above as well as the orders.     Critical concerns addressed:  Acute hypoxic respiratory failure  ARDS  Acute kidney injury  Time Spent in critical Care, exclusive of procedures : 30 mins.     Disclaimer: Parts of this chart were dictated with voice recognition software. Please excuse any errors of grammar, spelling, or transcription which are not corrected. Please contact me with any questions regarding documentation.

## 2023-11-08 NOTE — CARE PLAN
Problem: Respiratory  Goal: Minimal/no exertional discomfort or dyspnea this shift  Outcome: Progressing  Flowsheets (Taken 10/26/2023 0851 by Martha Lowery RN)  Minimal/no exertional discomfort or dyspnea this shift: Positioning to promote ventilation/comfort     Problem: Respiratory  Goal: No signs of respiratory distress (eg. Use of accessory muscles. Peds grunting)  Outcome: Progressing     Problem: Respiratory  Goal: Wean oxygen to maintain O2 saturation per order/standard this shift  Outcome: Progressing  Flowsheets (Taken 10/26/2023 0851 by Martha Lowery RN)  Wean oxygen to maintain O2 saturation per order/standard this shift:   Encourage activity/mobility   Incentive spirometry     Problem: Respiratory  Goal: Clear secretions with interventions this shift  Outcome: Progressing  Flowsheets (Taken 10/31/2023 0535)  Clear secretions with interventions this shift:   Encourage/provide pulmonary hygiene/secretion clearance   Incentive spirometry     Problem: Respiratory  Goal: Minimize anxiety/maximize coping throughout shift  Outcome: Progressing  Flowsheets (Taken 10/31/2023 0535)  Minimize anxiety/maximize coping throughout shift: Monitor pain/anxiety level     Problem: Respiratory  Goal: Patent airway maintained this shift  Outcome: Progressing     Problem: Respiratory  Goal: Tolerate pulmonary toileting this shift  Outcome: Progressing  Flowsheets (Taken 10/31/2023 0535)  Tolerate pulmonary toileting this shift: Positioning to promote ventilation/comfort     Problem: Respiratory  Goal: Verbalize decreased shortness of breath this shift  Outcome: Progressing  Flowsheets (Taken 10/31/2023 0535)  Verbalize decreased shortness of breath this shift:   Encourage/provide pulmonary hygiene/secretion clearance   Incentive spirometry     Problem: Chronic Conditions and Co-morbidities  Goal: Patient's chronic conditions and co-morbidity symptoms are monitored and maintained or improved  Outcome: Progressing    The patient's goals for the shift include To get rest    The clinical goals for the shift include decrease O2 requirements    Over the shift, the patient did not make progress toward the following goals. Barriers to progression include anxiety, covid, refusal to prone or side sleep. Recommendations to address these barriers include continue Precedex gtt, encourage C&DB, IS and  turning.

## 2023-11-08 NOTE — PROGRESS NOTES
Nutrition Follow up Note    Pending transfer to [facility]. Possible need for intubation. SOB noted when speaking and changing positions. Still in isolation for COVID.    Lab Results   Component Value Date    WBC 15.1 (H) 11/08/2023    HGB 11.3 (L) 11/08/2023    HCT 34.7 (L) 11/08/2023     (L) 11/08/2023    ALT 23 11/08/2023    AST 13 11/08/2023     11/08/2023    K 3.9 11/08/2023    CL 97 11/08/2023    CREATININE 1.50 11/08/2023    BUN 49 (H) 11/08/2023    CO2 31 11/08/2023    INR 1.0 12/02/2018    HGBA1C 6.3 (H) 10/24/2023       Current Facility-Administered Medications:     acetaminophen (Tylenol) tablet 650 mg, 650 mg, oral, q4h PRN, 650 mg at 10/30/23 1302 **OR** acetaminophen (Tylenol) oral liquid 650 mg, 650 mg, nasogastric tube, q4h PRN **OR** acetaminophen (Tylenol) suppository 650 mg, 650 mg, rectal, q4h PRN, David Rodriguez MD    acetylcysteine (Mucomyst) 200 mg/mL (20 %) nebulizer solution 600 mg, 3 mL, nebulization, TID, Davin Avendaño MD, 600 mg at 11/08/23 0722    bisacodyl (Dulcolax) EC tablet 10 mg, 10 mg, oral, Daily PRN, David Rodriguez MD    budesonide (Pulmicort) 0.5 mg/2 mL nebulizer solution 0.5 mg, 0.5 mg, nebulization, BID, Da Brown DO, 0.5 mg at 11/08/23 0723    dexAMETHasone (Decadron) injection 10 mg, 10 mg, intravenous, q12h, Da Brown DO, 10 mg at 11/08/23 0516    dexmedeTOMIDine in NS (Precedex) 400 mcg in 100 mL (4 mcg/mL) infusion, 0.1-1.5 mcg/kg/hr, intravenous, Continuous, Modesto Menolasino, DO, Last Rate: 14.36 mL/hr at 11/08/23 0714, 0.84 mcg/kg/hr at 11/08/23 0714    dextrose 10 % in water (D10W) infusion, 0.3 g/kg/hr, intravenous, Once PRN, David Rodriguez MD    dextrose 50 % injection 25 g, 25 g, intravenous, q15 min PRN, David Rodriguez MD, 25 g at 11/05/23 1209    docusate sodium (Colace) capsule 100 mg, 100 mg, oral, BID PRN, Evan Mullins DO, 100 mg at 11/01/23 1001    fluticasone (Flonase) nasal spray 1  spray, 1 spray, Each Nostril, BID, ZION Burgess-CNP, 1 spray at 11/08/23 0928    glucagon (Glucagen) injection 1 mg, 1 mg, intramuscular, q15 min PRN, David Rodriguez MD    guaiFENesin (Robitussin) 100 mg/5 mL syrup 200 mg, 200 mg, oral, q4h PRN, Sarina Brown, APRN-CNP, 200 mg at 10/29/23 1234    heparin (porcine) injection 1,500-3,000 Units, 1,500-3,000 Units, intravenous, PRN, Da Brown DO    heparin 25,000 Units in dextrose 5% 250 mL (100 Units/mL) infusion (premix), 0-4,000 Units/hr, intravenous, Continuous, Da Brown DO, Last Rate: 8 mL/hr at 11/08/23 1019, 800 Units/hr at 11/08/23 1019    hydrALAZINE (Apresoline) injection 10 mg, 10 mg, intravenous, q8h PRN, Evan Mullins DO    insulin glargine (Lantus) injection 16 Units, 16 Units, subcutaneous, Nightly, Da Brown DO, 16 Units at 11/07/23 2122    ipratropium-albuteroL (Duo-Neb) 0.5-2.5 mg/3 mL nebulizer solution 3 mL, 3 mL, nebulization, q6h while awake, David Rodriguez MD, 3 mL at 11/08/23 0722    ipratropium-albuteroL (Duo-Neb) 0.5-2.5 mg/3 mL nebulizer solution 3 mL, 3 mL, nebulization, q2h PRN, David Rodriguez MD, 3 mL at 11/06/23 0030    LORazepam (Ativan) injection 0.5 mg, 0.5 mg, intravenous, 4x daily PRN, Mars Honeycutt MD    melatonin tablet 3 mg, 3 mg, oral, Nightly, JASON Burgess, 3 mg at 11/06/23 2107    melatonin tablet 3 mg, 3 mg, oral, Nightly PRN, Lou Hodges, APRN-CNP    meropenem (Merrem) in sodium chloride 0.9 % 100 mL 500 mg, 500 mg, intravenous, q12h, Davin Avendaño MD, 500 mg at 11/07/23 2326    ondansetron ODT (Zofran-ODT) disintegrating tablet 4 mg, 4 mg, oral, q8h PRN **OR** ondansetron (Zofran) injection 4 mg, 4 mg, intravenous, q8h PRN, David Rodriguez MD    oxygen (O2) therapy, , inhalation, Continuous PRN - O2/gases, David Rodriguez MD, Rate Verify at 11/04/23 0000    oxygen (O2) therapy, , inhalation, q4h, Donavan DAILEY  "MD Niranjan, Start at 11/08/23 0722    oxygen (O2) therapy, , inhalation, Continuous PRN - O2/gases, Da Brown, DO    pantoprazole (ProtoNix) EC tablet 40 mg, 40 mg, oral, Daily before breakfast, Lydia Moreland MD, 40 mg at 11/08/23 0515    perflutren lipid microspheres (Definity) injection 0.5-10 mL of dilution, 0.5-10 mL of dilution, intravenous, Once in imaging, Evan Mullins, DO    perflutren protein A microsphere (Optison) injection 0.5 mL, 0.5 mL, intravenous, Once in imaging, Evan Mullins, DO    polyethylene glycol (Glycolax, Miralax) packet 17 g, 17 g, oral, Daily PRN, JASON Burgess    sodium chloride (Ocean) 0.65 % nasal spray 1 spray, 1 spray, Each Nostril, PRN, JASON Burgess, 1 spray at 10/29/23 1230    Dietary Orders (From admission, onward)       Start     Ordered    10/25/23 1328  Oral nutritional supplements  Until discontinued        Comments: Chocolate   Question Answer Comment   Deliver with All meals    Select supplement: Ensure Compact        10/25/23 1328    10/23/23 2305  Adult diet Regular  Diet effective now        Question:  Diet type  Answer:  Regular    10/23/23 2304                   Food and Nutrient History  Food and Nutrient History: no meals recorded since 10/25/23. pt likely with inadequate po intake.    Anthropometrics:  Ht: 160 cm (5' 3\"), Wt: 68.9 kg (151 lb 14.4 oz) (obtained at 0500), BMI: 26.91    Weight Change  Weight History / % Weight Change: wt of 170# noted during admission on 10/25/23 (likely not accurate). wt of 156# noted the next day on 10/26/23. pt with 5# (3.2%) wt loss over the 16 days she has been admitted.    IBW/kg (Dietitian Calculated): 52 kg  Adjusted Body Weight (kg): 57 kg    Estimated Energy Needs  Total Energy Estimated Needs (kCal):  (1654-5833)  Total Estimated Energy Need per Day (kCal/kg):  (25-30)  Method for Estimating Needs: adjusted wt    Estimated Protein Needs  Total Protein Estimated Needs (g):  " (57-68)  Total Protein Estimated Needs (g/kg):  (1-1.2)  Method for Estimating Needs: adjusted wt    Estimated Fluid Needs  Total Fluid Estimated Needs (mL):  (2260-1182)  Total Fluid Estimated Needs (mL/kg):  (25-30)  Method for Estimating Needs: adjusted wt    Nutrition Focused Physical Findings: deferred - covid isolation     Nutrition Diagnosis:  Patient has Nutrition Diagnosis: Yes  Nutrition Diagnosis 1: Inadequate oral intake  Diagnosis Status (1): New  Related to (1): decreased ability to consume sufficient energy  As Evidenced by (1): poor po intake    Nutrition Interventions/Recommendations:  Food and/or Nutrient Delivery Interventions  Interventions: Meals and snacks, Medical food supplement    Meals and Snacks: General healthful diet  Goal: provide as ordered    Medical Food Supplement: Commercial beverage  Goal: continue providing ensure compact TID    Education Documentation  No documentation found.      Nutrition Monitoring/Evaluation:  Food and Nutrient Related History  Energy Intake: Estimated energy intake  Criteria: pt to tolerate >/= 75% meals    Fluid Intake: Estimated fluid intake  Criteria: pt to tolerate supplements as ordered    RD Recommendations: None at this time.      Follow Up  Time Spent (min): 20 minutes  Last Date of Nutrition Visit: 11/08/23  Nutrition Follow-Up Needed?: 3-5 days  Follow up Comment: 11/10/23

## 2023-11-08 NOTE — NURSING NOTE
At change of shift, pt became very anxious and panic stricken. Would not tolerate BIPAP placed on her an hour prior.  RT replaced pt on HFNC 100% 40L and had family called in to discuss next steps. She, at this time, is currently sating in 90's but can not really talk or exert herself without decreasing sats.

## 2023-11-09 PROBLEM — J96.01 ACUTE HYPOXEMIC RESPIRATORY FAILURE (MULTI): Status: ACTIVE | Noted: 2023-01-01

## 2023-11-09 NOTE — PROGRESS NOTES
Macarena Wilson is a 68 y.o. female on day 0 of admission presenting with Acute hypoxemic respiratory failure (CMS/[facility]).    Subjective   Macarena Wilson is a 68 year old female with a PMH of CKD stage 3, HTN, and Rosasea who presents to the MICU in acute hypoxemic respiratory failure due to COVID pneumonia from an OSH where she was hospitalized on 10/24 with complaints of  shortness of breath. She states that she tested positive for Covid on 10/13 and continues to have shortness of breath.      OSH Hospital Course   The patient originally tested positive for coronavirus on October 13, 2023 and continued to experience shortness of breath, which caused her to present to [facility] on 10/24.  Patient remained in the hospital for 16 days being treated for acute respiratory failure secondary to coronavirus pneumonitis and considered as developing severe ARDS.  She had complications of acute kidney injury on top of chronic kidney disease.  CT of the chest showed worsening bilateral changes consistent with severe COVID-19 pneumonitis and ARDS.  She continued to do marginal at best on high flow nasal cannula with intermittent use of nonrebreather and could not tolerate a BiPAP device due to anxiety.  She was given increased dose of IV corticosteroids, started on a heparin drip., an given IV diuretics.  She was also evaluated by a pulmonologist and infectious disease specialist.  In addition, she completed a course of remdesivir and received a dose of Actemra on October 24, 2023.  The patient did develop diarrhea, which resolved.   Patient noted that symptoms of fatigue, malaise and SOB on exertion started on the 10/13, but her SOB worsened from SOB on exertion to at rest. She couldn't get in sentences without being sob. She took some time off work however her symptoms were not getting better so she decided to go into the hospital. She has not had a covid vaccine or flu vaccine and had been active and able to do her ADLS  "and IDLS. She denies smoking or use of illicit drugs. Patient denies any fever, chills, cough, diarrhea, abdominal pain, dysuria, lightheadedness, shortness of breath, chest pain, abdominal pain, N/V/C/D, lower extremity pain/swelling.    Upon presentation to the [facility] MICU, the patient was on AirVo 40L/90% satting at 96%. She was weaned down to 40L/50% with saturations at 96% for a few hours, but then started to desat again and was moved up to 50L/90%.      Objective     Physical Exam  Vitals and nursing note reviewed.   Constitutional:       Appearance: Normal appearance.   HENT:      Head: Normocephalic and atraumatic.   Cardiovascular:      Rate and Rhythm: Normal rate and regular rhythm.      Pulses: Normal pulses.      Heart sounds: Normal heart sounds.   Pulmonary:      Breath sounds: Normal breath sounds.      Comments: Patient on AirVo 40L/50%  Abdominal:      General: Abdomen is flat. There is no distension.      Palpations: Abdomen is soft.   Musculoskeletal:         General: Normal range of motion.      Cervical back: Normal range of motion.      Right lower leg: No edema.      Left lower leg: No edema.   Skin:     General: Skin is warm and dry.      Capillary Refill: Capillary refill takes less than 2 seconds.   Neurological:      Mental Status: She is alert and oriented to person, place, and time.      GCS: GCS eye subscore is 4. GCS verbal subscore is 5. GCS motor subscore is 6.   Psychiatric:         Attention and Perception: Attention and perception normal.         Mood and Affect: Mood and affect normal.         Behavior: Behavior is cooperative.       Last Recorded Vitals  Blood pressure 122/79, pulse 56, temperature 35.9 °C (96.6 °F), temperature source Temporal, resp. rate 25, height 1.6 m (5' 3\"), weight 68.4 kg (150 lb 12.7 oz), SpO2 96 %.  Intake/Output last 3 Shifts:  I/O last 3 completed shifts:  In: 29.9 (0.4 mL/kg) [I.V.:29.9 (0.4 mL/kg)]  Out: 100 (1.5 mL/kg) [Urine:100 (0 " mL/kg/hr)]  Dosing Weight: 68.4 kg     Relevant Results  Scheduled medications  dexAMETHasone, 10 mg, intravenous, Daily  fluticasone, 1 spray, Each Nostril, BID  insulin lispro, 0-5 Units, subcutaneous, TID with meals  meropenem, 500 mg, intravenous, q12h  oxygen, , inhalation, q4h  pantoprazole, 40 mg, oral, Daily before breakfast  perflutren lipid microspheres, 0.5-10 mL of dilution, intravenous, Once in imaging  perflutren protein A microsphere, 0.5 mL, intravenous, Once in imaging      Continuous medications     PRN medications  PRN medications: acetaminophen **OR** acetaminophen **OR** acetaminophen, bisacodyl, dextrose 10 % in water (D10W), dextrose, docusate sodium, glucagon, guaiFENesin, hydrALAZINE, ipratropium-albuteroL, melatonin, ondansetron ODT **OR** ondansetron, oxygen, oxygen, polyethylene glycol, sodium chloride    Results for orders placed or performed during the hospital encounter of 11/09/23 (from the past 24 hour(s))   Calcium, Ionized   Result Value Ref Range    POCT Calcium, Ionized 1.04 (L) 1.1 - 1.33 mmol/L     CT chest wo IV contrast    Result Date: 11/7/2023  Interpreted By:  Da Samuels, STUDY: CT CHEST WO IV CONTRAST; 11/7/2023 10:57 am   INDICATION: Signs/Symptoms:worsening pneumonia.   COMPARISON: October 24, 2023.   ACCESSION NUMBER(S): [id number]   ORDERING CLINICIAN: DENNYS MERCADO   TECHNIQUE: Axial unenhanced images were obtained through the chest. Post processing sagittal and coronal reconstruction images were also performed. Coronal MIP images.   FINDINGS: Lung and Large Airways: The extensive ground-glass densities noted throughout both lungs now appear more confluent in the bilateral lower lobes and posterior aspect of the bilateral upper lobes. There is persistent ground-glass densities in the anterior aspect of the bilateral upper lobes. Pleura: within normal limits. Vessels: No evidence for aortic aneurysm is noted. Heart: normal size. No pericardial effusion.  Mediastinum and Gogo: within normal limits. Chest Wall and Lower Neck: within normal limits.   Upper Abdomen: within normal limits.   Bones: within normal limits.       Persistent extensive ground-glass densities in the lung apices and anterior aspect of the bilateral upper lobes with progression of previously identified ground-glass densities in the more confluent airspace opacities involving the bilateral lower lobes and posterior aspects of the bilateral upper lobes. Findings suggest atypical infection. Correlation with COVID status is recommended.   All CT examinations are performed with 1 or more of the following dose reduction techniques: Automated exposure control, adjustment of mA and/or kv according to patient's size, or use of iterative reconstruction techniques.   MACRO: none   Signed by: Da Samuels 11/7/2023 3:30 PM Dictation workstation:   [id number]       Assessment/Plan   Principal Problem:    Acute hypoxemic respiratory failure (CMS/HCC)    Macarena Wilson is a 68 year old female with a PMH of CKD stage 3, HTN, and Rosasea who presents to the MICU in acute hypoxemic respiratory failure due to COVID pneumonia from an OSH where she was hospitalized on 10/24 with complaints of  shortness of breath. She states that she originally tested positive for Covid on 10/13 and continues to have shortness of breath. Imaging and timeline concerning for pulmonary fibrosis and organizing PNA.        NEUROLOGY/ PSYCH:  Awake, alert, oriented x3  On precedex drip for anxiety  Titrated off precedex  ICU delirium protocol, she has been in the ICU for >2 weeks      CARDIOVASCULAR:  Hypertension  Holding Losartan in the setting of HUNG on CKD  Hydralazine PRN ordered for sbp >180      PULMONARY:  #Acute hypoxic respiratory failure likely secondary to covid PNA  #Severe acute respiratory distress syndrome   #Known unvaccinated patient   #Worsening Ground glass opacities noted on imaging  Positive covid result on  10/13  #Increasing Oxygen requirement   #Pulmonary Fibrosis vs Organizing PNA  CT of the chest showed worsening bilateral changes consistent with severe COVID-19 pneumonitis and ARDS.   Patient could not tolerate BIPAP due to severe anxiety   S/p est on high flow nasal cannula with intermittent use of nonrebreather.  S/p Remdesivir (completed course)  S/p Actemra on October 24, 2023.  Continue Decadron 10mg daily (make sure she's on prednisone equivalent of 1mg/kg/day)  Duonebs q4 PRN   Rule out other causes of PNS: ordered streppnemo, RSV/flu, procal, legionella  Keep in view mechanically ventilating patient if respiratory status worsens  Diuresis with lasix 40mg  Follow up ESR, CRP ,D dimer  - Infection Disease Consult - recommended not doing another Remdesivir course  - NOT candidate for baricitininb (as got toci on 10/24 - needs to be 4 weeks out)  - Aspergillus antigen, Blastomyces antibodies, Fugal culture, Fungitell, Histoplasma antigen ordered  - Empiric treatment for fungal infection     - Consider Voriconazole     - Infectious Control contacted regarding Covid precautions      #RENAL/ GENITOURINARY:  #HUNG on CKD III(resolved)  eGFR 38  Monitor Is and outputs  Renally dose medications  Avoid neprotoxins  Added on urine lytes         #GASTROENTEROLOGY:  NPO given risk of intubation   Diarrhea(resolved)  Stool panel negative  On bowel regimen(miralax and docusate senna)  Monitor      ENDOCRINOLOGY:  #Steroid induced hyperglycemia  On lantus 16units at osh   ISS  Monitor blood glu q4 while NPO     RHEUMATOLOGY:  No active issues      HEMATOLOGY:  Covid PNA  Positive prothrombotic state   Started on prophylactic Heparin gtt      MUSCULOSKELETAL/ SKIN:  Eczema/ Rosasea  On home depixent      INFECTIOUS DISEASE:  Covid PNA   Patient unvaccinated  S/p Vancomycin at OSH (Discontinued)  MRSA negative  Ordered Respiratory panel  Cont Meropenem 500mg q12 (renally dosed)     Fluids: Replete PRN  Electrolytes: Keep mg  >2, phos >3  and K >4  Nutrition:  NPO Diet; Effective now   Antimicrobials: Vanc, meropenem  On Decadron 10mg daily  DVT PPX:  on therapeutic AC w/ Heparin gtt  GI ppx: Pantoprazole  Bowel care: Miralax and docusate senna   Catheter:Pure wick external catheter   Lines: PIV  Oxygen: Airvow 50L FIO2 90%  Drips: heparin      Disposition:   To be determined      Code Status: Full Code (confirmed on admission)   NOK:  Primary Emergency Contact: Mathew Wilson, Home Phone: [phone]         Russel Wesley DO

## 2023-11-09 NOTE — PROGRESS NOTES
Physical Therapy                 Therapy Communication Note    Patient Name: Macarena Wilson  MRN: [id number]  Today's Date: 11/9/2023     Discipline: Physical Therapy    Missed Visit Reason: Missed Visit Reason:  (RN, RT in the room; patient desaturating into high 70s/low 80s, questionable waveform however, patient with tenuous respiratory status; PT will hold and re-attempt as time and schedule allows and as medically appropriate.)    Missed Time: Attempt    Comment:

## 2023-11-09 NOTE — PROGRESS NOTES
SOCIAL WORK NOTE   Patient not currently able to participate. SW spoke with son Mathew for assessment (please see flowsheets for further details). Patient also has son Homar and Daughter in law Nicolasa. Patient was originally home, independent, and working prior to admission. Per notes from [facility], plan was to discharge to [facility], however family states that they no longer want this, but a facility in [place] ([place]?). Preference is home, but son acknowledged that this may not be realistic depending on her care needs. Social work to follow.  Milly Hatch, ROGELIO, LISW-S ([id number])

## 2023-11-09 NOTE — CARE PLAN
The patient's goals for the shift include To get rest    The clinical goals for the shift include Wean FIO2 as able to remain hemodynamically stable    Over the shift, the patient did not make progress toward the following goals. Barriers to progression include intolerance of bipap mask for ordered treatment. Recommendations to address these barriers include patient education & anxiety reduction.    Problem: Skin  Goal: Decreased wound size/increased tissue granulation at next dressing change  Outcome: Met - no wounds observed     Problem: Skin  Goal: Participates in plan/prevention/treatment measures  Outcome: Progressing  Goal: Prevent/manage excess moisture  Outcome: Progressing  Goal: Prevent/minimize sheer/friction injuries  Outcome: Progressing  Goal: Promote/optimize nutrition  Outcome: Progressing  Goal: Promote skin healing  Outcome: Progressing     Problem: Respiratory  Goal: Minimal/no exertional discomfort or dyspnea this shift  Outcome: Progressing  Goal: No signs of respiratory distress (eg. Use of accessory muscles. Peds grunting)  Outcome: Progressing  Goal: Wean oxygen to maintain O2 saturation per order/standard this shift  Outcome: Progressing  Goal: Clear secretions with interventions this shift  Outcome: Progressing  Goal: Minimize anxiety/maximize coping throughout shift  Outcome: Progressing  Goal: Patent airway maintained this shift  Outcome: Progressing  Goal: Tolerate pulmonary toileting this shift  Outcome: Progressing  Goal: Verbalize decreased shortness of breath this shift  Outcome: Progressing  Goal: Increase self care and/or family involvement in next 24 hours  Outcome: Progressing     Problem: Chronic Conditions and Co-morbidities  Goal: Patient's chronic conditions and co-morbidity symptoms are monitored and maintained or improved  Outcome: Progressing       Problem: Respiratory  Goal: Tolerate mechanical ventilation evidenced by VS/agitation level this shift  Outcome: Not  Progressing - pt refusing bipap

## 2023-11-09 NOTE — H&P
MEDICINE ADMISSION NOTE    History of Present Illness  Macarena Wilson is a 68 y.o. female with PMHx of  CKD tage 3, HTN, [place] who presented to an OSH on 10/24 with complaints of  shortness of breath. States she tested positive for Covid about 10 prior to presentation on 10/13. She continues to have shortness of breath with no improvement.     OSH Hospital Course   Her to arrival the patient tested positive for coronavirus on October 13, 2023.  She continued to experience shortness of breath.  Patient roughly remained in the hospital for 16 days being treated for acute respiratory failure secondary to coronavirus pneumonitis.  Considered as developing severe ARDS.  She had complications of acute kidney injury on top of chronic kidney disease.  Repeat imaging for the patient specifically CT of the chest showed worsening bilateral changes consistent with severe COVID-19 pneumonitis and ARDS.  Continues to do marginal at best on high flow nasal cannula with intermittent use of nonrebreather.  Difficulty wearing BiPAP device.  Was given increased dose of IV corticosteroids.  She was started on a heparin drip.  She was given IV diuretics.  Was evaluated by our pulmonologist as well as her infectious disease specialist.  Completed a course of remdesivir.  She received a dose of Actemra on October 24, 2023.  Developed diarrhea as well which resolved      On arrival to the MICU  She notes that her symptoms started on the 10/13 with fatigue, malaise and SOBE. Her SOB worsened from SOBE to at rest. She couldn't get in sentences without being sob. She took some time off work however her symptoms were not getting better so she decided to go into the hospital. She has not had a covid vaccine or flu vaccine, pretty active and able to do her ADLS and IDLS. She denies smoking or use of illicit drugs. Additional history could not be obtained due to extensive SOB.   Patient denies any fever, chills, cough, diarrhea, abdominal  "pain, dysuria, lightheadedness, shortness of breath, chest pain, abdominal pain, N/V/C/D, lower extremity pain/swelling.    Review of Systems  A 10 point ROS was negative except stated above    ED Course:  /75   Pulse 62   Temp 35.9 °C (96.6 °F) (Temporal)   Resp 24   Ht 1.6 m (5' 3\")   Wt 68.4 kg (150 lb 12.7 oz)   SpO2 98%   BMI 26.71 kg/m²      Labs:     CBC: WBC 15.1 , HGB 11.3,   BMP: , K 3.9, Cl 97, HCO3 31, BUN 49, CR 1.50, Glu 145  LFTS: AST 13 , ALT 23, ALKPHOS 56 , TBILI 0.4 , DBILI No results found for requested labs within last 365 days.  TROP: No results found for requested labs within last 365 days.  BNP: No results found for requested labs within last 365 days.  COAGS: PT No results found for requested labs within last 365 days. , PTT 58.8  , INR No results found for requested labs within last 365 days.  D dimer 16.99  PTT 58  CRP 10.1à <0.30  Campylobacter not detected   Viral stool panel negative   ABG:   Results from last 7 days   Lab Units 11/08/23  0938 11/07/23  1813 11/06/23  1052   POCT PH, ARTERIAL pH 7.47* 7.50* 7.48*   POCT PCO2, ARTERIAL mm Hg 49* 48* 44*   POCT PO2, ARTERIAL mm Hg 71* 67* 43*   POCT HCO3 CALCULATED, ARTERIAL mmol/L 35.7* 37.4* 32.8*   POCT BASE EXCESS, ARTERIAL mmol/L 10.4* 12.6* 8.3*    CALCIUM 9.0 MAG No results found for requested labs within last 365 days. ALB 3.1 LACTATE 0.9 PHOS No results found for requested labs within last 365 days. COVIDNo results found for requested labs within last 365 days.         EKG  No results found for this or any previous visit (from the past 4464 hour(s)).     Imaging:  CT chest wo IV contrast    Result Date: 11/7/2023  Interpreted By:  Da Samuels, STUDY: CT CHEST WO IV CONTRAST; 11/7/2023 10:57 am   INDICATION: Signs/Symptoms:worsening pneumonia.   COMPARISON: October 24, 2023.   ACCESSION NUMBER(S): [id number]   ORDERING CLINICIAN: DENNYS MERCADO   TECHNIQUE: Axial unenhanced images were obtained " through the chest. Post processing sagittal and coronal reconstruction images were also performed. Coronal MIP images.   FINDINGS: Lung and Large Airways: The extensive ground-glass densities noted throughout both lungs now appear more confluent in the bilateral lower lobes and posterior aspect of the bilateral upper lobes. There is persistent ground-glass densities in the anterior aspect of the bilateral upper lobes. Pleura: within normal limits. Vessels: No evidence for aortic aneurysm is noted. Heart: normal size. No pericardial effusion. Mediastinum and Gogo: within normal limits. Chest Wall and Lower Neck: within normal limits.   Upper Abdomen: within normal limits.   Bones: within normal limits.       Persistent extensive ground-glass densities in the lung apices and anterior aspect of the bilateral upper lobes with progression of previously identified ground-glass densities in the more confluent airspace opacities involving the bilateral lower lobes and posterior aspects of the bilateral upper lobes. Findings suggest atypical infection. Correlation with COVID status is recommended.   All CT examinations are performed with 1 or more of the following dose reduction techniques: Automated exposure control, adjustment of mA and/or kv according to patient's size, or use of iterative reconstruction techniques.   MACRO: none   Signed by: Da Samuels 11/7/2023 3:30 PM Dictation workstation:   [id number]       ED Interventions:  Medications   acetaminophen (Tylenol) tablet 650 mg (has no administration in time range)     Or   acetaminophen (Tylenol) oral liquid 650 mg (has no administration in time range)     Or   acetaminophen (Tylenol) suppository 650 mg (has no administration in time range)   ondansetron ODT (Zofran-ODT) disintegrating tablet 4 mg (has no administration in time range)     Or   ondansetron (Zofran) injection 4 mg (has no administration in time range)   bisacodyl (Dulcolax) EC tablet 10 mg (has  no administration in time range)   oxygen (O2) therapy (has no administration in time range)   polyethylene glycol (Glycolax, Miralax) packet 17 g (has no administration in time range)   fluticasone (Flonase) nasal spray 1 spray (has no administration in time range)   sodium chloride (Ocean) 0.65 % nasal spray 1 spray (has no administration in time range)   guaiFENesin (Robitussin) 100 mg/5 mL syrup 200 mg (has no administration in time range)   pantoprazole (ProtoNix) EC tablet 40 mg (has no administration in time range)   melatonin tablet 3 mg (has no administration in time range)   perflutren lipid microspheres (Definity) injection 0.5-10 mL of dilution (has no administration in time range)   perflutren protein A microsphere (Optison) injection 0.5 mL (has no administration in time range)   hydrALAZINE (Apresoline) injection 10 mg (has no administration in time range)   docusate sodium (Colace) capsule 100 mg (has no administration in time range)   meropenem (Merrem) 500 mg in sodium chloride 0.9 % 100 mL IV (has no administration in time range)   oxygen (O2) therapy ( inhalation Rate/Dose Change 11/9/23 0335)   heparin 25,000 Units in dextrose 5% 250 mL (100 Units/mL) infusion (premix) (800 Units/hr intravenous Rate/Dose Verify 11/9/23 0400)   heparin (porcine) injection 1,500-3,000 Units (has no administration in time range)   oxygen (O2) therapy (has no administration in time range)   ipratropium-albuteroL (Duo-Neb) 0.5-2.5 mg/3 mL nebulizer solution 3 mL (has no administration in time range)   furosemide (Lasix) injection 40 mg (has no administration in time range)   dexAMETHasone (Decadron) injection 10 mg (has no administration in time range)       Cardiac Hx:  Last echo: No results found for this or any previous visit.   Last cath: CV NCDR CATHPCI V5 COLLECTION FORM   Last EKG: No results found for this or any previous visit (from the past 4464 hour(s)).     GI Hx:  Last EGD: No results found for this or  any previous visit. No results found for this or any previous visit.  Last Colonoscopy: No results found for this or any previous visit. No results found for this or any previous visit.    Historical Data:  CT chest wo IV contrast    Result Date: 11/7/2023  Interpreted By:  Da Samuels, STUDY: CT CHEST WO IV CONTRAST; 11/7/2023 10:57 am   INDICATION: Signs/Symptoms:worsening pneumonia.   COMPARISON: October 24, 2023.   ACCESSION NUMBER(S): [id number]   ORDERING CLINICIAN: DENNYS MERCADO   TECHNIQUE: Axial unenhanced images were obtained through the chest. Post processing sagittal and coronal reconstruction images were also performed. Coronal MIP images.   FINDINGS: Lung and Large Airways: The extensive ground-glass densities noted throughout both lungs now appear more confluent in the bilateral lower lobes and posterior aspect of the bilateral upper lobes. There is persistent ground-glass densities in the anterior aspect of the bilateral upper lobes. Pleura: within normal limits. Vessels: No evidence for aortic aneurysm is noted. Heart: normal size. No pericardial effusion. Mediastinum and Gogo: within normal limits. Chest Wall and Lower Neck: within normal limits.   Upper Abdomen: within normal limits.   Bones: within normal limits.       Persistent extensive ground-glass densities in the lung apices and anterior aspect of the bilateral upper lobes with progression of previously identified ground-glass densities in the more confluent airspace opacities involving the bilateral lower lobes and posterior aspects of the bilateral upper lobes. Findings suggest atypical infection. Correlation with COVID status is recommended.   All CT examinations are performed with 1 or more of the following dose reduction techniques: Automated exposure control, adjustment of mA and/or kv according to patient's size, or use of iterative reconstruction techniques.   MACRO: none   Signed by: Da Samuels 11/7/2023 3:30 PM  Dictation workstation:   [id number]    XR chest 1 view    Result Date: 11/6/2023  Interpreted By:  Karina Rodriguez, STUDY: XR CHEST 1 VIEW; 11/6/2023 1:07 pm   INDICATION: Signs/Symptoms:hypoxia / pneumonia / covid   COMPARISON: 11/03/2023   ACCESSION NUMBER(S): [id number]   ORDERING CLINICIAN: DENNYS MERCADO   TECHNIQUE: Frontal  chest radiographs.   FINDINGS: The cardiomediastinal silhouette is unremarkable. Diffuse airspace opacities overlying both lungs appears stable as compared to prior examination. Question trace bibasilar effusions.   The osseous structures are intact.       Diffuse airspace opacities overlying both lungs is unchanged as compared to prior examinations.       Signed by: Karina Rodriguez 11/6/2023 9:13 PM Dictation workstation:   [id number]    Lower extremity venous duplex bilateral    Result Date: 11/6/2023  Interpreted By:  Aba Roach, STUDY: VASC US LOWER EXTREMITY VENOUS DUPLEX BILATERAL; 11/6/2023 12:31 pm   INDICATION: Signs/Symptoms:swelling. /patient COVID positive/swelling/patient on blood thinners   COMPARISON: None   ACCESSION NUMBER(S): [id number]   ORDERING CLINICIAN: ERICKSON FALCON   TECHNIQUE: High resolution real-time compression grayscale ultrasound imaging with color flow/Doppler and spectral waveform analysis of the bilateral lower extremity was performed to evaluate for deep vein thrombosis. Attempts were made to visualize the posterior tibial and peroneal veins.   FINDINGS: RIGHT LOWER EXTREMITY:   The common femoral vein through the popliteal vein demonstrates normal compressibility, color, and spectral Doppler waveform analysis. Portions of the posterior tibial and peroneal veins were visualized.   LEFT LOWER EXTREMITY:   The common femoral vein through the popliteal vein demonstrates normal compressibility, color, and spectral Doppler waveform analysis. Portions of the posterior tibial and peroneal veins were visualized.       No evidence of deep vein  thrombosis of the  bilateral lower extremities within the limits of this examination.   MACRO: None.   Signed by: Aba Roach 11/6/2023 12:33 PM Dictation workstation:   [id number]    XR chest 1 view    Result Date: 11/3/2023  Interpreted By:  Aba Roach, STUDY: XR CHEST 1 VIEW; 11/3/2023 11:37 am   INDICATION: Signs/Symptoms:respiratory failure   COMPARISON: 10/31/2023   ACCESSION NUMBER(S): [id number]   ORDERING CLINICIAN: DENNYS MERCADO   FINDINGS: The cardiomediastinal silhouette is stable. Interval progression of severe patchy airspace opacities are noted bilaterally. No pleural effusion is identified.   The osseous structures are intact.       Interval progression of severe bilateral infiltrate.   MACRO: None   Signed by: Aba Roach 11/3/2023 4:15 PM Dictation workstation:   [id number]    Lower extremity venous duplex bilateral    Result Date: 11/2/2023  Interpreted By:  Russel Handley, STUDY: [facility] LOWER EXTREMITY VENOUS DUPLEX BILATERAL; 10/27/2023; 10/27/2023 11:15 am   INDICATION: Signs/Symptoms:elev dimer cv pos ro dvt. Shortness of breath.   ACCESSION NUMBER(S): [id number]   ORDERING CLINICIAN: SUSANNE FRANCOIS   TECHNIQUE: PROCEDURE: Grayscale, 2D, color Doppler, Doppler spectral analysis (waveform) and duplex images obtained.   FINDINGS: Right Lower Extremity: Patent with normal flow, and compressibility of the common and superficial femoral, popliteal, and greater saphenous veins  as well as in the tibioperoneal trunk and trifurcation.  There is no subcutaneous edema.   Left Lower Extremity: Patent with normal flow, and compressibility of the common and superficial femoral, popliteal, and greater saphenous veins  as well as in the tibioperoneal trunk and trifurcation.  There is no subcutaneous edema.       1. Right Lower Extremity: No evidence of deep vein thrombosis.   2. Left Lower Extremity: No evidence of deep vein thrombosis.   Signed by: Russel Handley 11/2/2023 2:24 PM Dictation  workstation:   [id number]    Transthoracic Echo (TTE) Complete    Result Date: 11/1/2023            [facility] [address], [place] [address]             Phone [phone] TRANSTHORACIC ECHOCARDIOGRAM REPORT  Patient Name:      KARLA CAICEDO WILLY Cruz Physician:   [id number] Riccardo Bagley MD Study Date:        10/31/2023         Ordering Provider:   [id number] CUCA SAM MRN/PID:           [id number]           Fellow: Accession#:        [id number]       Nurse: Date of Birth/Age: 1955 / 68      Sonographer:         Temi King RCS                    years Gender:            F                  Additional Staff: Height:            160.02 cm          Admit Date:          10/31/2023 Weight:            70.76 kg           Admission Status:    Inpatient - Routine BSA:               1.74 m2            Department Location: Blood Pressure: 135 /85 mmHg Study Type:    TRANSTHORACIC ECHO (TTE) COMPLETE Diagnosis/ICD: Acute respiratory failure with hypoxia-J96.01 Indication:    Acute Respiratory failure with hypoxemia CPT Codes:     Echo Complete w Full Doppler-22072 Patient History: Pertinent History: HTN Prediabetes Chronic Kidney Disease. Study Detail: The following Echo studies were performed: 2D, M-Mode, Doppler and               color flow. Technically challenging study due to poor acoustic               windows, prominent lung artifact and patient lying in supine               position. Lumason used as a contrast agent for endocardial border               definition. Unable to obtain subcostal and suprasternal notch               view.  PHYSICIAN INTERPRETATION: Left Ventricle: Left ventricular systolic function is normal, with an estimated ejection fraction of 60-65%. There are no regional wall motion abnormalities. The left ventricular cavity size is normal. Spectral Doppler shows an impaired relaxation pattern of left ventricular diastolic filling. Left Atrium: The left atrium is normal in  size. Right Ventricle: The right ventricle is normal in size. There is normal right ventricular global systolic function. Right Atrium: The right atrium is normal in size. Aortic Valve: The aortic valve is trileaflet. There is trivial aortic valve regurgitation. The peak instantaneous gradient of the aortic valve is 12.0 mmHg. Mitral Valve: The mitral valve is normal in structure. There is trace mitral valve regurgitation. Tricuspid Valve: The tricuspid valve is structurally normal. There is trace tricuspid regurgitation. Pulmonic Valve: The pulmonic valve is not well visualized. The pulmonic valve regurgitation was not well visualized. Pericardium: There is no pericardial effusion noted. Aorta: The aortic root was not well visualized.  CONCLUSIONS:  1. Left ventricular systolic function is normal with a 60-65% estimated ejection fraction.  2. Spectral Doppler shows an impaired relaxation pattern of left ventricular diastolic filling. QUANTITATIVE DATA SUMMARY: 2D MEASUREMENTS:                          Normal Ranges: LAs:           2.60 cm   (2.7-4.0cm) IVSd:          0.88 cm   (0.6-1.1cm) LVPWd:         0.85 cm   (0.6-1.1cm) LVIDd:         3.51 cm   (3.9-5.9cm) LVIDs:         2.55 cm LV Mass Index: 48.5 g/m2 LV % FS        27.4 % LA VOLUME:                               Normal Ranges: LA Vol A2C:        73.7 ml LA Vol Index A2C:  42.4 ml/m2 LA Area A2C:       21.6 cm2 LA Major Axis A2C: 5.4 cm LA Volume Index:   33.2 ml/m2 LA Vol A2C:        70.6 ml RA VOLUME BY A/L METHOD:                              Normal Ranges: RA Vol A2C:        1.3 ml RA Vol Index A2C:  0.8 ml/m2 RA Area A2C:       5.0 cm2 RA Major Axis A2C: 15.7 cm M-MODE MEASUREMENTS:                  Normal Ranges: Ao Root: 2.70 cm (2.0-3.7cm) LAs:     2.60 cm (2.7-4.0cm) LV SYSTOLIC FUNCTION BY 2D PLANIMETRY (MOD):                     Normal Ranges: EF-A4C View: 78.8 % (>=55%) EF-A2C View: 73.2 % EF-Biplane:  75.3 % LV DIASTOLIC FUNCTION:                      Normal Ranges: MV Peak E: 0.82 m/s (0.7-1.2 m/s) MV Peak A: 1.03 m/s (0.42-0.7 m/s) E/A Ratio: 0.80     (1.0-2.2) MITRAL VALVE:                 Normal Ranges: MV DT: 193 msec (150-240msec) AORTIC VALVE:                          Normal Ranges: AoV Vmax:      1.73 m/s  (<=1.7m/s) AoV Peak P.0 mmHg (<20mmHg) LVOT Max Garrett:  1.37 m/s  (<=1.1m/s) LVOT VTI:      28.40 cm LVOT Diameter: 2.00 cm   (1.8-2.4cm) AoV Area,Vmax: 2.49 cm2  (2.5-4.5cm2)  RIGHT VENTRICLE: RV Basal 3.35 cm RV Mid   2.73 cm RV Major 5.6 cm TRICUSPID VALVE/RVSP:                             Normal Ranges: Peak TR Velocity: 1.97 m/s RV Syst Pressure: 18.5 mmHg (< 30mmHg) PULMONIC VALVE:                         Normal Ranges: PV Accel Time: 74 msec  (>120ms) PV Max Garrett:    1.1 m/s  (0.6-0.9m/s) PV Max P.5 mmHg  [id number] Riccardo Bagley MD Electronically signed on 2023 at 9:53:29 AM  ** Final **     XR chest 1 view    Result Date: 10/31/2023  Interpreted By:  Nirmal Mcmillan, STUDY: XR CHEST 1 VIEW; 10/31/2023 9:48 am   INDICATION: Shortness of breath   COMPARISON: 10/29/2023   ACCESSION NUMBER(S): [id number]   ORDERING CLINICIAN: CUCA SAM   TECHNIQUE: 1 view of the chest was performed.   FINDINGS: No significant interval change in diffuse bilateral airspace opacities involving both lungs more prominent in the mid and lower lungs. Findings do not appear significantly changed. No pleural effusion. No pneumothorax.  The cardiomediastinal silhouette is within normal limits.       No significant interval change in diffuse bilateral airspace opacities. No pleural effusion or significant interval change.   Signed by: Nirmal Mcmillan 10/31/2023 10:45 AM Dictation workstation:   [id number]    XR chest 1 view    Result Date: 10/29/2023  Interpreted By:  Karina Rodriguez, STUDY: XR CHEST 1 VIEW; 10/29/2023 1:02 pm   INDICATION: Signs/Symptoms:hyppoxemia   COMPARISON: 10/27/2023   ACCESSION NUMBER(S): [id number]   ORDERING CLINICIAN:  SUSANNE FRANCOIS   TECHNIQUE: Frontal  chest radiographs.   FINDINGS: The cardiomediastinal silhouette is unremarkable. Diffuse airspace opacities are noted overlying both lungs, unchanged from prior examinations. No pleural effusion is identified.   The osseous structures are intact.       No significant change from prior examination. Diffuse airspace opacities overlying both lungs, differential includes ARDS, diffuse alveolar edema/hemorrhage.     Signed by: Karina Rodriguez 10/29/2023 2:19 PM Dictation workstation:   [id number]    XR chest 1 view    Result Date: 10/27/2023  Interpreted By:  Nirmal Mcmillan, STUDY: XR CHEST 1 VIEW; 10/27/2023 10:06 am   INDICATION: Signs/Symptoms:follow up hypoxemia.   COMPARISON: 10/23/2023   ACCESSION NUMBER(S): [id number]   ORDERING CLINICIAN: SUSANNE FRANCOIS   TECHNIQUE: 1 view of the chest was performed.   FINDINGS: There is diffuse ground-glass opacity bilaterally consistent with history of COVID. No lobar or large consolidation however the ground-glass opacities are prominent but stable from the previous examination. No pleural effusion. No pneumothorax.  The cardiomediastinal silhouette is within normal limits.       No significant change. Prominent ground-glass opacities bilaterally, however, not significantly changed.   Signed by: Nirmal Mcmillan 10/27/2023 10:24 PM Dictation workstation:   [id number]    CT chest wo IV contrast    Result Date: 10/24/2023  Interpreted By:  Nirmal Mcmillan, STUDY: CT CHEST WO IV CONTRAST; 10/24/2023 12:27 am   INDICATION: Signs/Symptoms:Covid.   COMPARISON: None   ACCESSION NUMBER(S): [id number]   ORDERING CLINICIAN: GERTRUDE FIERRO   TECHNIQUE: Contiguous axial images of the thorax were obtained from the level of the thoracic inlet through the lung bases. Coronal and sagittal reformatted images were obtained.     FINDINGS: The thyroid gland is unremarkable.   The heart size is within normal limits.  No pericardial effusion is identified.  The aorta and pulmonary arteries are normal in caliber.   There are no pathologically enlarged mediastinal, hilar, or axillary lymph nodes are seen.   The trachea and mainstem bronchi are patent. Diffuse patchy ground-glass opacities bilaterally which is compatible with atypical pneumonia. There is no evidence of pneumothorax or pleural effusion.   The visualized osseous structures are intact.   Limited images through the upper abdomen are unremarkable.       Diffuse, patchy ground-glass opacities bilaterally suggesting atypical pneumonia such as COVID.     Signed by: Nirmal Mcmillan 10/24/2023 12:32 PM Dictation workstation:   [id number]    NM lung perfusion particulate    Result Date: 10/23/2023  Interpreted By:  Yoly Stanley, STUDY: NM LUNG PERFUSION PARTICULATE 10/23/2023 8:44 pm   INDICATION: Signs/Symptoms:hypoxia, r/o PE   COMPARISON: Chest x-ray done earlier today   ACCESSION NUMBER(S): [id number]   ORDERING CLINICIAN: ANAHY EVANS   TECHNIQUE: 4.2 millicuries of technetium 99 M MAA was injected intravenously with perfusion lung scan in 8 projections completed.   FINDINGS: No perfusion defect is seen within either lung.       Normal perfusion lung scan.   Signed by: Yoly Stanley 10/23/2023 8:57 PM Dictation workstation:   [id number]    XR chest 1 view    Result Date: 10/23/2023  Interpreted By:  Yoly Stanley, STUDY: XR CHEST 1 VIEW 10/23/2023 4:55 pm   INDICATION: Signs/Symptoms:dyspnea, cough, fatigue, and malaise. Positive COVID.   COMPARISON: None available.   ACCESSION NUMBER(S): [id number]   ORDERING CLINICIAN: ANAHY EVANS   TECHNIQUE: AP erect view of the chest   FINDINGS: The cardiac size is normal with no mediastinal abnormality identified. The trachea is midline. No pleural abnormality is seen. However, there are bilateral infiltrates with ground-glass appearance with relative sparing of the left upper lung zone.       Bilateral ground-glass infiltrates.   Signed by: Yoly Stanley 10/23/2023  5:08 PM Dictation workstation:   [id number]        Past Medical History  Past Medical History:   Diagnosis Date    Chronic kidney disease     Hypertension     Personal history of diseases of the skin and subcutaneous tissue     History of rosacea       Surgical History  Past Surgical History:   Procedure Laterality Date    APPENDECTOMY  04/01/2016    Appendectomy    HYSTERECTOMY      Partial    OTHER SURGICAL HISTORY  11/14/2022    Cystoscopy       Social History  She reports that she has never smoked. She has never used smokeless tobacco. She reports that she does not use drugs. No history on file for alcohol use.    Family History  Family History   Problem Relation Name Age of Onset    Breast cancer Mother      Heart failure Mother      No Known Problems Father      No Known Problems Sister          Allergies  Fesoterodine, Mirabegron, and Prednisone     Physical Exam    Medications  Scheduled medications  dexAMETHasone, 10 mg, intravenous, Daily  fluticasone, 1 spray, Each Nostril, BID  furosemide, 40 mg, intravenous, Once  meropenem, 500 mg, intravenous, q12h  oxygen, , inhalation, q4h  pantoprazole, 40 mg, oral, Daily before breakfast  perflutren lipid microspheres, 0.5-10 mL of dilution, intravenous, Once in imaging  perflutren protein A microsphere, 0.5 mL, intravenous, Once in imaging      Continuous medications  heparin, 0-4,000 Units/hr, Last Rate: 800 Units/hr (11/09/23 0400)      PRN medications  PRN medications: acetaminophen **OR** acetaminophen **OR** acetaminophen, bisacodyl, docusate sodium, guaiFENesin, heparin (porcine), hydrALAZINE, ipratropium-albuteroL, melatonin, ondansetron ODT **OR** ondansetron, oxygen, oxygen, polyethylene glycol, sodium chloride        Physical exam:  Vitals 124/74 RR 24 st 95% on airvow 40L with 100% FIO2    Assessment/Plan   Ms Macarena Wilson is a 68 y.o. female with PMHx of  CKD tage 3, HTN, Rosasea who presented to an OSH on 10/24 with complaints of  worsening  shortness of breath after she   tested positive for Covid about 10 prior to presentation on 10/13.She is being managed for acute hypoxic respiratory failure secondary to covid PNA. Pt transferred from an OSH after a 16 days worsening clinical status to the MICU for further management.      NEUROLOGY/ PSYCH:  Awake, alert, oriented x3  On precedex drip for anxiety  Will titrate off precedex  ICU delirium protocol, she has been in the ICU for >2 weeks       CARDIOVASCULAR:  Hypertension  Holding Losartan in the setting of HUNG on CKD  Hydralazine PRN ordered for sbp >180        PULMONARY:  #Acute hypoxic respiratory failure likely secondary to covid PNA  #Severe acute respiratory distress syndrome   #Known unvaccinated patient   #Worsening Ground glass opacities noted on imaging  Positive covid result on 10/13  #Increasing Oxygen requirement   CT of the chest showed worsening bilateral changes consistent with severe COVID-19 pneumonitis and ARDS.   Patient could not tolerate BIPAP due to severe anxiety   S/p est on high flow nasal cannula with intermittent use of nonrebreather.  S/p Remdesivir (completed course)  S/p Actemra on October 24, 2023.  Continue Decadron 10mg daily   Duonebs q4 PRN   Rule out other causes of PNS: ordered streppnemo, RSV/flu, procal, legionella  Keep in view mechanically ventilating patient if respiratory status worsens  ABG stat  Wean off oxygen as needed   Trial of lasix 40mg stat  Follow up ESR, CRP ,D dimer          #RENAL/ GENITOURINARY:  #HUNG on CKD III(resolved)  eGFR 38  Monitor Is and outputs  Renally dose medications  Avoid neprotoxins  Added on urine lytes       #GASTROENTEROLOGY:  NPO given risk of intubation   Diarrhea(resolved)  Stool panel negative  On bowel regimen(miralax and docusate senna)  Monitor       ENDOCRINOLOGY:  #Steroid induced hyperglycemia  On lantus 16units at osh   ISS  Monitor blood glu q4 while NPO        RHEUMATOLOGY:  No active issues          HEMATOLOGY:  Covid PNA  Positive prothrombotic state   Started on prophylactic Heparin gtt         MUSCULOSKELETAL/ SKIN:  Eczema/ Rosasea  On home depixent         INFECTIOUS DISEASE:  Covid PNA   Patient unvaccinated  S/p Vancomycin at OSH (Discontinued)  MRSA negative  Ordered Respiratory panel  Cont Meropenem 500mg q12 (renally dosed)        Fluids: Replete PRN  Electrolytes: Keep mg >2, phos >3  and K >4  Nutrition:  NPO Diet; Effective now   Antimicrobials:   DVT PPX:  on therapeutic AC w/ Heparin gtt  GI ppx: Pantoprazole  Bowel care: Miralax and docusate senna   Catheter:Pure wick external catheter   Lines: PIV  Oxygen: Airvow 40L FIO2 100%  Drips: heparin       Disposition:   To be determined       Code Status: Full Code (confirmed on admission)   NOK:  Primary Emergency Contact: Mathew Wilson, Home Phone: [phone]         Bruno Blanco MD  Internal Medicine, PGY-3

## 2023-11-09 NOTE — CONSULTS
"Nutrition Assessment    Nutrition Initial Assessment:   Reason for Assessment: Provider consult order for assessment and recommendation as well as enteral assessment.  In reviewing chart, it appears that pt was maintained on a PO diet and supplements at OSH and never received any enteral nutrition.     Patient is a 68 y.o. female presenting from an OSH for further management of severe ARDS and COVID pneumonitis.  She was admitted to OSH on 10/24 after a +COVID test at home on 10/13 and subsequent unresolved SOB.  Treated for severe ARDS, HUNG on CKD and COVID pneumonitis.  She is now on 12L HFNC.      Pt is currently NPO.    Past medical history includes CKD stage 3, HTN      Nutrition History:  Food and Nutrient History: Met with patient and family at bedside.  She reports prior to getting sick, she was eating well with no nutrition issues.  She did have some issues wiht a decrease in appetite and intake after testing postitive for COVID.  She was getting Ensure at OSH and says that she would like it again here once daily.  No issues with N/V, did have some diarrhea but this has resolved.  No trouble chewing or swallowing.    Anthropometrics:  Height: 160 cm (5' 2.99\")  Weight: 68.4 kg (150 lb 12.7 oz)  BMI (Calculated): 26.72  IBW/kg (Dietitian Calculated): 52.3 kg  Percent of IBW: 131 %       Objective/Subjective Weight History:     Weight Change %:  Weight History / % Weight Change: Reports a usual body weight of 77.3kg.  Appears that weight at OSH on 11/1 was 71.1kg.  This would be an overall loss of 12% from usual (77.3kg--> 68.4kg).     Wt Readings from Last 10 Encounters:   11/09/23 68.4 kg (150 lb 12.7 oz)   11/08/23 68.9 kg (151 lb 14.4 oz)   01/19/23 76.3 kg (168 lb 3.2 oz)   12/14/22 76.2 kg (167 lb 15.9 oz)   10/17/22 74 kg (163 lb 2.3 oz)   07/19/22 78.9 kg (174 lb)   05/13/22 78.9 kg (174 lb)   03/23/22 78.9 kg (174 lb)   03/16/22 78.9 kg (174 lb)   03/02/22 79 kg (174 lb 3.2 oz)              Nutrition " Focused Physical Exam Findings:    Subcutaneous Fat Loss:   Orbital Fat Pads: Well nourshed (slightly bulging fat pads)   Buccal Fat Pads: Well nourished (full, rounded cheeks)   Triceps: Severe (negligible fat tissue)       Muscle Wasting:  Temporalis: Well nourished (well-defined muscle)  Pectoralis (Clavicular Region): Mild-Moderate (some protrusion of clavicle)           Quadriceps: Mild-moderate (mild depression on inner and outer thigh)  Gastrocnemius: Mild-Moderate (not well developed muscle)  Edema:  Edema: none       Physical Findings:                   Objective Data:         Nutrition Significant Labs:  Na+ 138  K+ 4.4  Chloride 97  Bicarb 27  BUN 52  Creatinine 1.22  Calcium 88.7  Phos 5.4  A1c 6.3%    Nutrition Specific Mediations:    Current Facility-Administered Medications:     acetaminophen (Tylenol) tablet 650 mg, 650 mg, oral, q4h PRN **OR** acetaminophen (Tylenol) oral liquid 650 mg, 650 mg, nasogastric tube, q4h PRN **OR** acetaminophen (Tylenol) suppository 650 mg, 650 mg, rectal, q4h PRN, Bruno Blanco MD    bisacodyl (Dulcolax) EC tablet 10 mg, 10 mg, oral, Daily PRN, Brnuo Blanco MD    dexAMETHasone (Decadron) injection 10 mg, 10 mg, intravenous, Daily, Bruno Blanco MD    dextrose 10 % in water (D10W) infusion, 0.3 g/kg/hr, intravenous, Once PRN, Bruno Blanco MD    dextrose 50 % injection 25 g, 25 g, intravenous, q15 min PRN, Bruno Blanco MD    docusate sodium (Colace) capsule 100 mg, 100 mg, oral, BID PRN, Bruno Blanco MD    fluticasone (Flonase) nasal spray 1 spray, 1 spray, Each Nostril, BID, Bruno Blacno MD    glucagon (Glucagen) injection 1 mg, 1 mg, intramuscular, q15 min PRN, Bruno Blanco MD    guaiFENesin (Robitussin) 100 mg/5 mL syrup 200 mg, 200 mg, oral, q4h PRN, Bruno Blanco MD    heparin (porcine) injection 5,000 Units, 5,000 Units, subcutaneous, q8h, Birdie Oleary MD     hydrALAZINE (Apresoline) injection 10 mg, 10 mg, intravenous, q8h PRN, Bruno Blanco MD    insulin lispro (HumaLOG) injection 0-5 Units, 0-5 Units, subcutaneous, TID with meals, Bruno Blanco MD    ipratropium-albuteroL (Duo-Neb) 0.5-2.5 mg/3 mL nebulizer solution 3 mL, 3 mL, nebulization, q4h PRN, Bruno Blanco MD    melatonin tablet 3 mg, 3 mg, oral, Nightly PRN, Bruno Blanco MD    meropenem (Merrem) 500 mg in sodium chloride 0.9 % 100 mL IV, 500 mg, intravenous, q12h, Bruno Blanco MD    ondansetron ODT (Zofran-ODT) disintegrating tablet 4 mg, 4 mg, oral, q8h PRN **OR** ondansetron (Zofran) injection 4 mg, 4 mg, intravenous, q8h PRN, Bruno Blanco MD    oxygen (O2) therapy, , inhalation, Continuous PRN - O2/gases, Bruno Blanco MD, 12 L/min at 11/09/23 0810    oxygen (O2) therapy, , inhalation, q4h, Bruno Blanco MD    pantoprazole (ProtoNix) EC tablet 40 mg, 40 mg, oral, Daily before breakfast, Bruno Blanco MD, 40 mg at 11/09/23 0540    perflutren lipid microspheres (Definity) injection 0.5-10 mL of dilution, 0.5-10 mL of dilution, intravenous, Once in imaging, Bruno Blanco MD    perflutren protein A microsphere (Optison) injection 0.5 mL, 0.5 mL, intravenous, Once in imaging, Bruno Blanco MD    polyethylene glycol (Glycolax, Miralax) packet 17 g, 17 g, oral, Daily PRN, Bruno Blanco MD    sodium chloride (Ocean) 0.65 % nasal spray 1 spray, 1 spray, Each Nostril, PRN, Bruno Blanco MD        Dietary Orders (From admission, onward)       Start     Ordered    11/09/23 0400  May Participate in Room Service With Assistance  Once        Question:  .  Answer:  Yes    11/09/23 0400 11/09/23 0359  NPO Diet; Effective now  Diet effective now         11/09/23 0401                     Estimated Needs:   Total Energy Estimated Needs (kCal):  (5407-8292)   Method for Estimating Needs: MSJ=  1183    Total Protein Estimated Needs (g):  (65-75)   Method for Estimating Needs: 1.2-1.4 x 52.3kg          Nutrition Diagnosis   Nutrition Diagnosis:  Malnutrition Diagnosis  Patient has Malnutrition Diagnosis: Yes  Diagnosis Status: New  Malnutrition Diagnosis: Severe malnutrition related to acute disease or injury  As Evidenced by: 12% weight loss from normal along with moderate to severe muscle and fat loss on physical exam in setting of a decrease in PO intake/appetite after being diagnosed with COVID            Nutrition Interventions/Recommendations   Nutrition Interventions and Recommendations:        Nutrition Prescription:  PO diet advancement per team's discretion to goal of Regular.  RDN can order supplements once PO diet advances.  Check Vitamin D level          Time Spent/Follow-up Reminder:   Follow Up  Time Spent (min): 60 minutes  Last Date of Nutrition Visit: 11/09/23  Nutrition Follow-Up Needed?: Dietitian to reassess per policy  Follow up Comment: PO diet at OSH/now NPO

## 2023-11-09 NOTE — HOSPITAL COURSE
Macarena Wilson is a 68 year old female with a PMH of CKD stage 3, HTN, and rosasea who presented to the MICU in acute hypoxemic respiratory failure due to COVID pneumonia from an OSH where she was hospitalized on 10/24 with complaints of dyspnea. She was positive for COVID on 10/13 with persistent dyspnea, prompting initial presentation to [facility] on 10/24 and admission for 16 days. She was treated for acute respiratory failure secondary to coronavirus pneumonitis c/b severe ARDS. CT of the chest showed worsening bilateral changes consistent with severe COVID-19 pneumonitis and ARDS.  She continued to do marginal at best on high flow nasal cannula with intermittent use of nonrebreather and could not tolerate a BiPAP device due to anxiety. In addition, she completed a course of remdesivir and received a dose of Actemra on October 24, 2023.    Patient transferred to [facility] MICU on 11/9 for further evaluation of acute hypoxemic respiratory failure requiring AirVo 40L/90%. Empirically covered for bacterial and fungal etiologies of organizing PNA - discontinued antimicrobials on 11/13 per guidance from ID as with workup was largely unremarkable. Continued course of steroids for management of organizing PNA 2/2 COVID, initially with 10 mg decadron daily, then received several days of 160 mg methylpred q6, weaned down to 80 q6, then to 40 q6, and transitioned to 50 mg prednisone q6 once enteral access was obtained. Started on bactrim for PCP prophylaxis while on steroids, and steroid-induced hyperglycemia managed with SSI. O2 requirements fluctuated, but had weaned down to 35L/50% FiO2, then on 11/13, required increased support to 60L at 90% FiO2, where she was satting high 80s. High clinical concern for PE given acute decompensation, with findings of distal DVTs; however, did not obtain CTPE due to tenuous clinical status. Initially on heparin gtt, but had difficulties with her levels being supratherapeutic. Ultimately  started on Eliquis.     On 11/15, attempted 10 minutes of CPAP therapy to correct atelectasis - had resultant small pneumomediastinum, which resolved. Course complicated by intermittent runs of SVT and Afib with RVR corresponding with hypoxemia - ultimately was loaded with digoxin and started on metoprolol tartrate 25 q6 for rate control. Also aggressively diuresed to the point of prerenal HUNG with azotemia, with BUN > 150 and oliguria, without significant improvement in respiratory status. On 11/16, began gentle rehydration with LR and placed NGT to begin enteral feeding. Tolerated uptitration of feeds to goal, and kidney function improved significantly. Slowly weaned down on O2 requirements with time - once transitioned to nasal canula, was transferred to SDU.      While in SDU, she had increased FiO2 requirements on Airvo, then weaned to 8 L HF and AirVo with PT. CTA PE on 11/26 showed no evidence of PE, findings consistent with multifocal PNA (improving from last study), bronchiectasis scattered throughout the lungs (slightly worst) and fibrotic changes. She was continued on a steroid taper. Blood cultures from 11/21 grew Pseudomonas, and she was started on Zosyn then switched to ciprofloxacin for a total of 14 days. Lung transplant team consulted on 11/30, who stated that steroid treatment must be tapered down to <10 mg of prednisone, and the patient must be able to ambulate 100 feet (on any amount of O2). Transplant team to be re-engaged when the patient meets those criteria. SLP completed FEES (passed) on 11/28; resumed diet and Cortrak removed. Weaned down to 6L NC. Added Seroquil 12.5mg PRN for anxiety when working with PT. Patient was stable for transfer to floors.     While on the floor, the patient remained HDS and was at her baseline 6L O2. On 12/18, she developed sharp, non-radiating lateral left heel pain and US BL LE was ordered to r/o DVT and was negative for DVT, suggesting a likely MSK etiology to  pain. On 12/24, she developed gross hematuria with dysuria and increased frequency. She remained hemodynamically stable. GYN consulted who recommended urine culture, which grew normal  tita (collected after antibiotics were started). Urology consulted as well, recommended monitoring urine output and transfusions as needed. She was treated with cipro 250mg BID for 7 days for presumed UTI. On 12/27, her hemoglobin dropped to 5.9 and she required 2 units of blood with appropriate increment. Given the patient's drop in hemoglobin, Lovenox was held per vascular medicine recommendations. Urology re-engaged who recommended formal bladder ultrasound to assess for clots. U/S of the bladder showed a complex heterogeneously echogenic layer material within the bladder. Urology placed a hematuria catheter. CT urogram showed hyperdense material consistent with blood as well as a stable left hemorrhagic cyst and a nonobstructive calculi. There was diffuse mid to proximal right urothelial thickening. Repeat DVT ultrasound showed an acute occlusive DVT in the soleal vein and a non-occlusive DVT in the posterior tibial vein. Vascular medicine contacted, recommend weekly ultrasounds which will be arranged by vascular medicine at the [facility].     The PT team evaluated her and recommended moderate intensity rehab, so patient will be discharged to [facility], [facility] at [facility].

## 2023-11-09 NOTE — DISCHARGE SUMMARY
Discharge Diagnosis  Acute respiratory failure with hypoxemia (CMS/HCC)    Issues Requiring Follow-Up      Discharge Meds     Your medication list        START taking these medications        Instructions Last Dose Given Next Dose Due   acetaminophen 325 mg tablet  Commonly known as: Tylenol      Take 2 tablets (650 mg) by mouth every 4 hours if needed for fever (temp greater than 38.0 C) (greater than or equal to 38 C).       acetaminophen 325 mg/10.15 mL oral liquid  Commonly known as: Tylenol      20.3 mL (650 mg) by nasogastric tube route every 4 hours if needed for fever (temp greater than 38.0 C) (greater than or equal to 38 C).       acetaminophen 650 mg suppository  Commonly known as: Tylenol      Insert 1 suppository (650 mg) into the rectum every 4 hours if needed for fever (temp greater than 38.0 C) (greater than or equal to 38 C).       acetylcysteine 200 mg/mL (20 %) nebulizer solution  Commonly known as: Mucomyst      Take 3 mL (600 mg) by nebulization 3 times a day.       budesonide 0.5 mg/2 mL nebulizer solution  Commonly known as: Pulmicort      Take 2 mL (0.5 mg) by nebulization 2 times a day for 360 doses. Rinse mouth with water after use to reduce aftertaste and incidence of candidiasis. Do not swallow.       dexAMETHasone 10 mg/mL injection  Commonly known as: Decadron      Infuse 1 mL (10 mg) into a venous catheter every 12 hours.       dexmedeTOMIDine in  mcg in 100 mL (4 mcg/mL) premix  Commonly known as: Precedex      Infuse 6..6 mcg/hr at 1.71-25.65 mL/hr into a venous catheter continuously.       fluticasone 50 mcg/actuation nasal spray  Commonly known as: Flonase      Administer 1 spray into each nostril 2 times a day. Shake gently. Before first use, prime pump. After use, clean tip and replace cap.       glucagon 1 mg injection  Commonly known as: Glucagen      Inject 1 mg into the muscle every 15 minutes if needed for low blood sugar - see comments (For blood glucose less  than or equal to 70 mg/dL and no IV access).       guaiFENesin 100 mg/5 mL syrup  Commonly known as: Robitussin      Take 10 mL (200 mg) by mouth every 4 hours if needed for congestion.       heparin (porcine) 25,000 unit/250 mL(100 unit/mL) parenteral solution in D5W infusion      Infuse 0-4,000 Units/hr at 0-40 mL/hr into a venous catheter continuously.       hydrALAZINE 20 mg/mL injection  Commonly known as: Apresoline      Infuse 0.5 mL (10 mg) into a venous catheter every 8 hours if needed (If SBP greater than 170 or DBP greater than 100).       insulin glargine 100 unit/mL injection  Commonly known as: Lantus      Inject 16 Units under the skin once daily at bedtime. Take as directed per insulin instructions.       ipratropium-albuteroL 0.5-2.5 mg/3 mL nebulizer solution  Commonly known as: Duo-Neb      Take 3 mL by nebulization every 6 hours while awake.       ipratropium-albuteroL 0.5-2.5 mg/3 mL nebulizer solution  Commonly known as: Duo-Neb      Take 3 mL by nebulization every 2 hours if needed for wheezing.       meropenem 500 mg/100 mL IV  Commonly known as: Merrem      Infuse 100 mL (500 mg) into a venous catheter every 12 hours.       oxygen gas therapy  Commonly known as: O2      Inhale 1 each once every 24 hours.       pantoprazole 40 mg EC tablet  Commonly known as: ProtoNix      Take 1 tablet (40 mg) by mouth once daily in the morning. Take before meals. Do not crush, chew, or split.       sodium chloride 0.65 % nasal spray  Commonly known as: [place]      Administer 1 spray into each nostril if needed for congestion.              STOP taking these medications      Dupixent Pen 300 mg/2 mL injection  Generic drug: dupilumab        losartan 100 mg tablet  Commonly known as: Cozaar                  Where to Get Your Medications        These medications were sent to [id number] - [place], [place] - [address]  [address], [place] [facility]  [address]      Phone: [phone]   acetaminophen 325 mg tablet  acetaminophen 650 mg suppository  fluticasone 50 mcg/actuation nasal spray  glucagon 1 mg injection  guaiFENesin 100 mg/5 mL syrup  ipratropium-albuteroL 0.5-2.5 mg/3 mL nebulizer solution  sodium chloride 0.65 % nasal spray       Information about where to get these medications is not yet available    Ask your nurse or doctor about these medications  acetaminophen 325 mg/10.15 mL oral liquid  acetylcysteine 200 mg/mL (20 %) nebulizer solution  budesonide 0.5 mg/2 mL nebulizer solution  dexAMETHasone 10 mg/mL injection  dexmedeTOMIDine in  mcg in 100 mL (4 mcg/mL) premix  heparin (porcine) 25,000 unit/250 mL(100 unit/mL) parenteral solution in D5W infusion  hydrALAZINE 20 mg/mL injection  insulin glargine 100 unit/mL injection  ipratropium-albuteroL 0.5-2.5 mg/3 mL nebulizer solution  meropenem 500 mg/100 mL IV  oxygen gas therapy  pantoprazole 40 mg EC tablet         Test Results Pending At Discharge  Pending Labs       No current pending labs.            Hospital Course       Patient admitted to the hospital on October 23, 2023 with dyspnea.  Her to arrival the patient tested positive for coronavirus on October 13, 2023.  She continued to experience shortness of breath.  Patient roughly remained in the hospital for 16 days being treated for acute respiratory failure secondary to coronavirus pneumonitis.  Considered as developing severe ARDS.  She had complications of acute kidney injury on top of chronic kidney disease.  Repeat imaging for the patient specifically CT of the chest showed worsening bilateral changes consistent with severe COVID-19 pneumonitis and ARDS.  Continues to do marginal at best on high flow nasal cannula with intermittent use of nonrebreather.  Difficulty wearing BiPAP device.  Was given increased dose of IV corticosteroids.  She was started on a heparin drip.  She was given IV diuretics.  Was evaluated by our  pulmonologist as well as her infectious disease specialist.  Completed a course of remdesivir.  She received a dose of Actemra on October 24, 2023.  Developed diarrhea as well which resolved.  This time patient was accepted to a tertiary center at [facility] for further evaluation and management.  She has been going to be transferred to MICU.  Patient will be transported by [facility].  Was to have an RN present as well as BiPAP and high flow nasal cannula capabilities.  Off note upon further questioning patient does inform to me that she has not been previously vaccinated for coronavirus in the past. MICU RN has informed the patient's son of the transfer and everyone including patient is agreeable to transport via ACLS mode.       Pertinent Physical Exam At Time of Discharge  Physical Exam    Vitals and nursing note reviewed.   Constitutional:       Appearance: Normal appearance. Wearing HFNC @ 90% FiO2  HENT:      Head: Normocephalic and atraumatic.      Mouth/Throat:      Mouth: Mucous membranes are moist.   Eyes:      Extraocular Movements: Extraocular movements intact.      Pupils: Pupils are equal, round, and reactive to light.   Cardiovascular:      Rate and Rhythm: Normal rate and regular rhythm.   Pulmonary:      Effort: Pulmonary effort is normal.      Breath sounds: Normal breath sounds.   Abdominal:      Palpations: Abdomen is soft.   Musculoskeletal:         General: Normal range of motion.      Cervical back: Normal range of motion and neck supple.   Skin:     General: Skin is warm.      Capillary Refill: Capillary refill takes less than 2 seconds.   Neurological:      General: No focal deficit present.      Mental Status: She is alert and oriented to person, place, and time. Mental status is at baseline.   Psychiatric:         Mood and Affect: Mood normal.     Outpatient Follow-Up  Future Appointments   Date Time Provider Department Center   1/18/2024  4:00 PM Shirley Bills MD [address]    2/1/2024  9:20 AM Russel Duran MD [id number] [facility]   5/2/2024  8:40 AM Arlene Sandoval MD [id number] [facility]     Discharge Time 35 minutes    Berhane Bagley MD

## 2023-11-09 NOTE — PROGRESS NOTES
Occupational Therapy                 Therapy Communication Note    Patient Name: Macarena Wilson  MRN: [id number]  Today's Date: 11/9/2023     Discipline: Occupational Therapy    Missed Visit Reason: Missed Visit Reason:  (RN reports patient with increased O2 needs 2/2 tenuous respiratory status, not appropriate for OT at this time; will attempt OT next visit as appropriate.)    Missed Time: Attempt    Comment:

## 2023-11-10 NOTE — H&P
Inpatient consult to Palliative Care  Consult performed by: Da Gee MD  Consult ordered by: Nida MOSS MD        Reason For Consult  Reason for Consult: communication / medical decision making, symptom management, and patient/family support     History Of Present Illness  Macarena Wilson is a 68 year old female with a PMH of CKD stage 3, HTN, and Rosasea who was transferred to [facility] MICU for acute hypoxic respiratory failure and ARDS 2/2 Covid-19, now found to have severe COVID-19 pneumonitis requiring HFNC. Imaging and timeline concerning for pulmonary fibrosis and organizing PNA.     She states that overall she is feeling better since she was transferred to [facility]. Her biggest concern at this time is CPAP. When trialed on CPAP at the OSH, she had a panic attack and felt severe anxiety despite a precedex drip. She has never felt anxiety like this or had panic attacks before, but states that the feeling of the mask on her face made her claustrophobic and induced panic. Discussing this with her, she is open to trying CPAP again when indicated as long as her healthcare team tries to make sure she is comfortable before putting the mask on and working together with her. She does not recall any other medications given for her anxiety other than precedex.    Having some back pain that resolves with her current acetaminophen and lidocaine patch regimen. Was not able to sleep well the past few nights, denies dyspnea or anxiety etiology. Discussed some breathing techniques and reflection to help before bed. Her mood has been stable, some anxiety but mostly related to CPAP. No nausea or vomiting, but has had diarrhea, roughly 4-5 times per day. Hoping to get something to help with that once C Diff is ruled out.     She is spiritual, but not Hinduism. Appreciates [place] support.     Serious Illness Conversation  How much information about what is likely to be ahead with your illness would you like: She would  like to be fully informed as well as have her family updated with the full information  What is your understanding now of where you are with your illness:  She understands that her lungs are badly damaged and although her O2 requirement is lower than yesterday, she is still very sick  What brings you brandon: She loves cooking and baking with her grandkids. Their favorite thing to make is apple pie, especially at this time of the year.   What are you hoping for: She hopes to get back to her normal self. She loves being active and on the go. She also wants to return back to her job.   What are your fears, worries, or concerns about the future: She worries she will not get back to her normal self.   What are some functions that are so critical that you can't imagine living without: She is optimistic about her future and knows she is tough and can adapt. She feels like she will adapt to whatever life and her health throw at her, and will find ways to be happy no matter what happens.   How much does your family know about your priorities and wishes:  They know her wishes and thoughts about the future and her health.     Symptoms  [place] Symptom Assessment Scores  Pain Score: 0 - No pain                                 Past Medical History  She has a past medical history of Chronic kidney disease, Hypertension, and Personal history of diseases of the skin and subcutaneous tissue.    Surgical History  She has a past surgical history that includes Appendectomy (04/01/2016); Other surgical history (11/14/2022); and Hysterectomy.     Social History  She reports that she has never smoked. She has never used smokeless tobacco. She reports that she does not use drugs. No history on file for alcohol use.    Family History  Family History   Problem Relation Name Age of Onset    Breast cancer Mother      Heart failure Mother      No Known Problems Father      No Known Problems Sister         Functional Status    "        Allergies  Fesoterodine, Mirabegron, and Prednisone    Review of Systems:   Constitutional:  Negative for activity change, appetite change, fatigue and unexpected weight change.   HENT:  Negative for sore throat and trouble swallowing.    Eyes:  Negative for discharge and visual disturbance.   Respiratory:  Negative for cough and shortness of breath.    Cardiovascular:  Negative for chest pain, palpitations and leg swelling.   Gastrointestinal:  Negative for abdominal pain, constipation, diarrhea and nausea.   Endocrine: Negative for cold intolerance.   Genitourinary:  Negative for difficulty urinating and dysuria.   Musculoskeletal:  Negative for myalgias.   Skin:  Negative for rash and wound.   Neurological:  Negative for dizziness, weakness and headaches.   Psychiatric/Behavioral:  Negative for confusion and hallucinations.        Physical Exam:  Constitutional: Well-developed female in no acute distress. Friendly.  HEENT: Normocephalic, atraumatic. PERRL. EOMI. No cervical lymphadenopathy.  Respiratory: Decreased breath sounds bilaterally.  Cardiovascular: RRR. No murmurs, gallops, or rubs. No JVD. Radial pulses 2+.  Abdominal: Soft, nondistended, nontender to palpation. Bowel sounds present. No hepatosplenomegaly or masses. No CVA tenderness.  Neuro: CN II-XII intact. UE and LE strength 5/5 bilaterally and sensation intact. Normal FTN testing.  MSK: No LE edema bilaterally.  Skin: Warm, dry. Diffuse ecchymosis b/l UE.  Psych: Appropriate mood and affect.    Last Recorded Vitals  Blood pressure 148/71, pulse 101, temperature 36.5 °C (97.7 °F), resp. rate 22, height 1.6 m (5' 2.99\"), weight 68.4 kg (150 lb 12.7 oz), SpO2 (!) 88 %.    Relevant Results  Lab Results   Component Value Date    WBC 16.0 (H) 11/10/2023    HGB 11.7 (L) 11/10/2023    HCT 36.8 11/10/2023    MCV 90 11/10/2023     (L) 11/10/2023      Lab Results   Component Value Date    GLUCOSE 63 (L) 11/10/2023    CALCIUM 8.4 (L) 11/10/2023 "     11/10/2023    K 4.1 11/10/2023    CO2 26 11/10/2023     11/10/2023    BUN 58 (H) 11/10/2023    CREATININE 1.52 (H) 11/10/2023      Lab Results   Component Value Date    ALT 21 11/10/2023    AST 14 11/10/2023    ALKPHOS 45 11/10/2023    BILITOT 0.4 11/10/2023        Relevant Imaging  CT chest wo IV contrast 11/07/2023    Narrative  Interpreted By:  Da Samuels,  STUDY:  CT CHEST WO IV CONTRAST; 11/7/2023 10:57 am    INDICATION:  Signs/Symptoms:worsening pneumonia.    COMPARISON:  October 24, 2023.    ACCESSION NUMBER(S):  [id number]    ORDERING CLINICIAN:  DENNYS MERCADO    TECHNIQUE:  Axial unenhanced images were obtained through the chest. Post  processing sagittal and coronal reconstruction images were also  performed. Coronal MIP images.    FINDINGS:  Lung and Large Airways: The extensive ground-glass densities noted  throughout both lungs now appear more confluent in the bilateral  lower lobes and posterior aspect of the bilateral upper lobes. There  is persistent ground-glass densities in the anterior aspect of the  bilateral upper lobes. Pleura: within normal limits.  Vessels: No evidence for aortic aneurysm is noted.  Heart: normal size. No pericardial effusion.  Mediastinum and Gogo: within normal limits.  Chest Wall and Lower Neck: within normal limits.    Upper Abdomen: within normal limits.    Bones: within normal limits.    Impression  Persistent extensive ground-glass densities in the lung apices and  anterior aspect of the bilateral upper lobes with progression of  previously identified ground-glass densities in the more confluent  airspace opacities involving the bilateral lower lobes and posterior  aspects of the bilateral upper lobes. Findings suggest atypical  infection. Correlation with COVID status is recommended.    All CT examinations are performed with 1 or more of the following  dose reduction techniques: Automated exposure control, adjustment of  mA and/or kv according  to patient's size, or use of iterative  reconstruction techniques.    MACRO:  none    Signed by: Da Samuels 11/7/2023 3:30 PM  Dictation workstation:   [id number]     Assessment and Plan  Macarena Wilson is a 68 year old female with a PMH of CKD stage 3, HTN, and Rosasea who was transferred to [facility] MICU for acute hypoxic respiratory failure and ARDS 2/2 Covid-19, now found to have severe COVID-19 pneumonitis requiring HFNC. Imaging and timeline concerning for pulmonary fibrosis and organizing PNA.     Code Status  Full  Patient is a capable decision maker: Yes      Symptom Management:  Pain: Continue tylenol and lidocaine patch regimen  Nausea: None  Depression/Anxiety: Consider hydroxyzine PRN for anxiety and prior to CPAP/BiPAP. Nightly reflection and breathing exercises discussed with patient.  Appetite: No concerns  Dyspnea: Continue HFNC, steroids.   Constipation/Diarrhea: Loperamide once C diff ruled out  Other: Continue PT/OT, patient enjoys feeling active and appreciates therapy assistance. Continue  support.     Regarding her anxiety towards CPAP/BiPAP, consider discussing mask options with RT and patient and finding one she tolerates best. Would be ideal to do this before she requires CPAP as this will greatly decrease her anxiety.     Patient and plan discussed with Dr. Jacobs.    Da Gee MD  Internal Medicine, PGY-1

## 2023-11-10 NOTE — PROGRESS NOTES
Spiritual Care Visit    Clinical Encounter Type  Visited With: Patient  Routine Visit: Follow-up    Taxonomy  Intended Effects: Build relationship of care and support, Demonstrate caring and concern  Methods: Encourage self reflection, Encourage sharing of feelings, Offer support  Interventions: Active listening, Ask guided questions about pat, Silent prayer     followed up with patient Macarena Wilson. Patient shared she received physical therapy today and her gratitude for that. Patient expressed the difficulty of being in a hospital for weeks as she was very active before that, and acknowledged it will take time to regain strength. Patient shared she especially looks forward to when she can cook and bake with her grandchildren again.      inquired about pat/spiritual preferences and held space for patient to recount what that has looked like for her. Patient expressed a curiosity about pat. Patient shared that she knows many people are praying for her and the comfort that brings.     provided support through reflective listening, validation of feelings, supportive conversation, and assurance of prayer/silent prayer. Patient was appreciative and did not have any needs at this time. Spiritual Care remains available as needed/requested.    Rev. Estee Lai MDiv, [facility]

## 2023-11-10 NOTE — PROGRESS NOTES
Physical Therapy    Physical Therapy Evaluation & Treatment    Patient Name: Macarena Wilson  MRN: [id number]  Today's Date: 11/10/2023   Time Calculation  Start Time: 1045  Stop Time: 1120  Time Calculation (min): 35 min    Assessment/Plan   PT Assessment  PT Assessment Results: Decreased strength, Decreased endurance, Impaired balance, Decreased mobility  Rehab Prognosis: Good  Evaluation/Treatment Tolerance:  (limited d/t desaturation with mobility)  End of Session Communication: Bedside nurse  End of Session Patient Position: Bed, 3 rail up, Alarm off, not on at start of session  IP OR SWING BED PT PLAN  Inpatient or Swing Bed: Inpatient  PT Plan  Treatment/Interventions: Bed mobility, Transfer training, Gait training, Balance training, Strengthening, Endurance training, Therapeutic exercise, Therapeutic activity, Postural re-education  PT Plan: Skilled PT  PT Frequency: 4 times per week  PT Discharge Recommendations: Moderate intensity level of continued care  PT - OK to Discharge: Yes      Subjective     General Visit Information:  General  Reason for Referral: transferred from OSH to the MICU for acute hypoxic respiratory failure d/t COVID PNA; unvaccinated Flu/COVID  Past Medical History Relevant to Rehab: CKD stage III, HTN, rosasea; denied falls/yr.  Missed Visit: Yes  Missed Visit Reason:  (RN, RT in the room; patient desaturating into high 70s/low 80s, questionable waveform however, patient with tenuous respiratory status; PT will hold and re-attempt as time and schedule allows and as medically appropriate.)  Family/Caregiver Present: No  Prior to Session Communication: Bedside nurse  Patient Position Received: Bed, 3 rail up, Alarm off, not on at start of session  General Comment: pleasant and agreeable to participate in therapy. Increase time spent with patient d/t patient rewuiring increase time for all mobilityd/t tenuous respiratory status and O2 desaturation with all mobility. Patient desaturated  "to 63% sitting EOB, after only sitting EOB x15 seconds. RN, RT came into the room once patient returned to supine. Required 11 mins to recover Spo2 to 90%  Home Living:  Home Living  Type of Home: House  Lives With: Alone  Home Layout: One level  Home Access: Stairs to enter with rails  Entrance Stairs-Rails:  (x1 HR)  Entrance Stairs-Number of Steps: 1-2  Bathroom Shower/Tub: Walk-in shower  Bathroom Equipment:  (patient reports handicap accessible)  Home Living Comments: patient reports having \"in-law suite\" that she can stay in if needed. Mentioned family can stay with her if needed.  Prior Level of Function:  Prior Function Per Pt/Caregiver Report  Level of [place]: Independent with ADLs and functional transfers, Independent with homemaking with ambulation  ADL Assistance: Independent  Homemaking Assistance: Independent  Ambulatory Assistance: Independent  Vocational: Full time employment  Hand Dominance: Right  Prior Function Comments: (+) drive  Precautions:  Precautions  Medical Precautions: Oxygen therapy device and L/min, Fall precautions  Vital Signs:  Vital Signs  Heart Rate:  (pre: 77 post: 83)  Resp:  (pre: 23 post: 27)  SpO2:  (pre: 96%, dep chair position: 86-90%, sitting EOB x15 seconds, desat to 63%, returned to supine, required 11 mins to recover to 90%; easily desaturates to mid 80s talking in bed and rolling. 50L/80% AirVo)  BP:  (pre: 118/71 DEP chair: 140/72 post: 130/58)    Objective   Pain:  Pain Assessment  Pain Assessment: 0-10  Pain Score: 0 - No pain (however, mentioned buttocks was sore when in supine post mobility; PT positionined pillow underneath patient's buttocks for pressure relief.)  Cognition:  Cognition  Overall Cognitive Status: Within Functional Limits  Orientation Level:  (AxO x3)    General Assessments:      Activity Tolerance  Endurance: Tolerates less than 10 min exercise with changes in vital signs, Decreased tolerance for upright activites  Rate of Perceived " Exertion (RPE):  (reports mild SOB with activity)    Sensation  Light Touch:  (denied numbness/tingling in BUEs/BLEs)    Postural Control  Posture Comment:  (only able to briefly sit EOB x15 seconds d/t significant desaturation into the 60s, however, appeared to not have acute LOB sitting EOB, appeared CGx1 with x1 UE support EOB)        Functional Assessments:  Bed Mobility  Bed Mobility: Yes  Bed Mobility 1  Bed Mobility 1: Supine to sitting  Level of Assistance 1: Minimum assistance, Minimal verbal cues, Minimal tactile cues  Extremity/Trunk Assessments:  RUE   RUE :  (AROM WFL)  LUE   LUE:  (AROM WFL)  RLE   RLE :  (AROM WFL)  LLE   LLE :  (AROM WFL)  Treatments:  Therapeutic Activity  Therapeutic Activity Performed: Yes  Therapeutic Activity 1: PT placed bed in DEP chair position x15 mins prior to attempting supine->sit task. PT monitored patient's hemodynamic response to upright positioning. Patient reported mild SOB, desaturated to the low-mid 80s in this position briefly with ROM assessment and patient having continuous conversation with the PT. PT provided demo and education for activity pacing, PLB, paired breathing.  Outcome Measures:  [facility] Basic Mobility  Turning from your back to your side while in a flat bed without using bedrails: A little  Moving from lying on your back to sitting on the side of a flat bed without using bedrails: A little  Moving to and from bed to chair (including a wheelchair): A lot  Standing up from a chair using your arms (e.g. wheelchair or bedside chair): A lot  To walk in hospital room: A lot  Climbing 3-5 steps with railing: A lot  Basic Mobility - Total Score: 14    FSS-ICU  Ambulation: Unable to attempt due to weakness  Rolling: Minimal assistance (performs 75% or more of task)  Sitting: Minimal assistance (performs 75% or more of task)  Transfer Sit-to-Stand: Unable to perform  Transfer Supine-to-Sit: Minimal assistance (performs 75% or more of task)  Total Score:  12    E = Exercise and Early Mobility  Current Activity: Sitting at edge of bed    Encounter Problems       Encounter Problems (Active)       Balance       patient will complete static (SBAx1) and dynamic (Cgx1) standing balance activities using LRD as needed without acute LOB, while maintaining stable vitals.        Start:  11/10/23    Expected End:  12/01/23               Mobility       STG - Patient will ambulate >/=50 ft using LRD as needed with Cgx1 assist without acute LOB, while maintaining stable vitals.        Start:  11/10/23    Expected End:  12/01/23            patient will participate in BLE there-ex in order to improve strength and to assist with the completion of functional mobility tasks.        Start:  11/10/23    Expected End:  12/01/23            patient will ambulate >/=30 ft consecutively and >/=75 ft cumulatively with </=1 sitting rest break while maintaining stable vitals, reporting <13/20 on the RPE scale.        Start:  11/10/23    Expected End:  12/01/23               Pain - Adult          Transfers       STG - Transfer from bed to chair with CGx1 assist without acute LOB, using LRD as needed        Start:  11/10/23    Expected End:  12/01/23            STG - Patient will perform bed mobility with SBAx1 without acute LOB, using bedrailing as needed.        Start:  11/10/23    Expected End:  12/01/23            STG - Patient will transfer sit to and from stand with Cgx1 assist using LRD as needed without acute LOB        Start:  11/10/23    Expected End:  12/01/23                   Education Documentation  Mobility Training, taught by Cindy Olmos, PT at 11/10/2023  2:51 PM.  Learner: Patient  Readiness: Acceptance  Method: Explanation, Demonstration  Response: Verbalizes Understanding  Comment: paired breathing with activity, pursed lip breathing; increasing upright sittng tolerance (supported) in DEP chair position; completing BLE ther-ex (QS, GS, AP)    Education Comments  No comments  found.    Cindy Olmos, PT, DPT

## 2023-11-10 NOTE — CONSULTS
Consults  Referred by Nida MOSS MD    Primary MD: Shirley Bills MD    Reason For Consult  empiric antifungal treatment     History Of Present Illness  Macarena Wilson is a 68 y.o. female with a PMH of CKD tage 3, HTN, Rosacea, who presented to [facility] as a transfer from [facility] with a c/o shortness of breath. She was tested positive for COIVD on 10/13, presented to [facility] on 10/23 with worsening SOB, and was treated for AHRF 2/2 COVID pneumonitis c/b HUNG on CKD. She treated with BIPAP, IV corticosteroids, IV diuretics, Heparin gtt, and finished a course of Remdesivir, and was given a dose of Tocilizumab on 10/24. Due to worsening respiratory status she required an escalation of treatment and transfer to [facility] MICU on 11/9.    Antimicrobial regiment history:  - Rocephin 10/23  - Doxycycline 10/23  - Remdesivir (10/24-10/28) and Tolizumab (10/24)  - Zosyn 10/23-10/26, 11/3-11/7  - Vancomycin 10/24-10/28, 11/7  - Meropenem 11/9-  - Voriconazole 11/9-    Micro history:  - MRSA swab 10/23 & 11/7 - negative  - COVID PCR 10/23 & 11/9 - POSITIVE  - Legionella & Strep urine 11/9 - negative  - Flu A/B & RSV 11/9 - negative  - fungal culture 11/9 - pending    ED COURSE AT [facility]:   Vitals:   - T 35.9C, HR 60, /71, RR 18, SpO2 95% on RA  Labs:  - WBC 15.9, HGB 12.4,   - Na 138, K 5.5, Cl 103, HCO3 17, BUN 86, Cr 2.70,   - COVID positive  Images:  - CXR 10/23/23 showed bilateral ground glass infiltrates.  - NM lung perfusion 10/23/23 was unremarkable.  - CT chest 10/24/23 showed diffuse patchy ground-glass opacities bilaterally suggesting atypical pneumonia such as COVID.    PMH: CKD tage 3, HTN, Rosacea  PSH: appendectomy, hysterectomy, cystoscopy  SH: denies tobacco, alcohol, and illicit drug use  FH: breast cancer in mother, heart failure in father  Allergies: prednisone (body aches, flu like symptoms), marbegron (rash), fesoterodine (unknown)  Medications: reviewd    Past Medical  History  She has a past medical history of Chronic kidney disease, Hypertension, and Personal history of diseases of the skin and subcutaneous tissue.    Surgical History  She has a past surgical history that includes Appendectomy (04/01/2016); Other surgical history (11/14/2022); and Hysterectomy.     Social History     Occupational History    Not on file   Tobacco Use    Smoking status: Never    Smokeless tobacco: Never   Vaping Use    Vaping Use: Never used   Substance and Sexual Activity    Alcohol use: Not on file    Drug use: Never    Sexual activity: Not on file     Travel History   Travel since 10/10/23    No documented travel since 10/10/23            Pets: no  Hobbies: unknown    Family History  Family History   Problem Relation Name Age of Onset    Breast cancer Mother      Heart failure Mother      No Known Problems Father      No Known Problems Sister       Allergies  Fesoterodine, Mirabegron, and Prednisone       There is no immunization history on file for this patient.  Medications  Home medications:  Medications Prior to Admission   Medication Sig Dispense Refill Last Dose    acetaminophen (Tylenol) 325 mg tablet Take 2 tablets (650 mg) by mouth every 4 hours if needed for fever (temp greater than 38.0 C) (greater than or equal to 38 C). 30 tablet 0     acetaminophen (Tylenol) 325 mg/10.15 mL oral liquid 20.3 mL (650 mg) by nasogastric tube route every 4 hours if needed for fever (temp greater than 38.0 C) (greater than or equal to 38 C).       acetaminophen (Tylenol) 650 mg suppository Insert 1 suppository (650 mg) into the rectum every 4 hours if needed for fever (temp greater than 38.0 C) (greater than or equal to 38 C). 12 suppository 0     acetylcysteine (Mucomyst) 200 mg/mL (20 %) nebulizer solution Take 3 mL (600 mg) by nebulization 3 times a day. (Patient not taking: Reported on 11/10/2023)   Not Taking    budesonide (Pulmicort) 0.5 mg/2 mL nebulizer solution Take 2 mL (0.5 mg) by  nebulization 2 times a day for 360 doses. Rinse mouth with water after use to reduce aftertaste and incidence of candidiasis. Do not swallow. (Patient not taking: Reported on 11/10/2023) 60 mL 11 Not Taking    dexAMETHasone (Decadron) 10 mg/mL injection Infuse 1 mL (10 mg) into a venous catheter every 12 hours. (Patient not taking: Reported on 11/10/2023)   Not Taking    dexmedeTOMIDine in NS (Precedex) 400 mcg in 100 mL (4 mcg/mL) premix Infuse 6..6 mcg/hr at 1.71-25.65 mL/hr into a venous catheter continuously.       fluticasone (Flonase) 50 mcg/actuation nasal spray Administer 1 spray into each nostril 2 times a day. Shake gently. Before first use, prime pump. After use, clean tip and replace cap. 16 g 12     glucagon (Glucagen) 1 mg injection Inject 1 mg into the muscle every 15 minutes if needed for low blood sugar - see comments (For blood glucose less than or equal to 70 mg/dL and no IV access). 1 each 12     guaiFENesin (Robitussin) 100 mg/5 mL syrup Take 10 mL (200 mg) by mouth every 4 hours if needed for congestion. (Patient not taking: Reported on 11/10/2023) 120 mL 0 Not Taking    heparin sodium,porcine/D5W (heparin, porcine,) 25,000 unit/250 mL(100 unit/mL) parenteral solution in D5W infusion Infuse 0-4,000 Units/hr at 0-40 mL/hr into a venous catheter continuously.       hydrALAZINE (Apresoline) 20 mg/mL injection Infuse 0.5 mL (10 mg) into a venous catheter every 8 hours if needed (If SBP greater than 170 or DBP greater than 100). 1 mL      insulin glargine (Lantus) 100 unit/mL injection Inject 16 Units under the skin once daily at bedtime. Take as directed per insulin instructions.       ipratropium-albuteroL (Duo-Neb) 0.5-2.5 mg/3 mL nebulizer solution Take 3 mL by nebulization every 6 hours while awake.       ipratropium-albuteroL (Duo-Neb) 0.5-2.5 mg/3 mL nebulizer solution Take 3 mL by nebulization every 2 hours if needed for wheezing. 180 mL 11     meropenem (Merrem) 500 mg/100 mL IV  Infuse 100 mL (500 mg) into a venous catheter every 12 hours.       oxygen (O2) gas therapy Inhale 1 each once every 24 hours.       pantoprazole (ProtoNix) 40 mg EC tablet Take 1 tablet (40 mg) by mouth once daily in the morning. Take before meals. Do not crush, chew, or split.       sodium chloride (Ocean) 0.65 % nasal spray Administer 1 spray into each nostril if needed for congestion. 30 mL 12      Current medications:  Scheduled medications  dexAMETHasone, 10 mg, intravenous, Daily  fluticasone, 1 spray, Each Nostril, BID  heparin, 5,000 Units, subcutaneous, q8h  insulin lispro, 0-5 Units, subcutaneous, TID with meals  lidocaine, 1 patch, transdermal, Daily  meropenem, 2 g, intravenous, q12h  oxygen, , inhalation, q4h  pantoprazole, 40 mg, oral, Daily before breakfast  perflutren protein A microsphere, 0.5 mL, intravenous, Once in imaging  voriconazole, 300 mg, intravenous, q12h      Continuous medications     PRN medications  PRN medications: acetaminophen **OR** acetaminophen **OR** acetaminophen, bisacodyl, dextrose 10 % in water (D10W), dextrose, docusate sodium, glucagon, guaiFENesin, hydrALAZINE, ipratropium-albuteroL, melatonin, ondansetron ODT **OR** ondansetron, oxygen, polyethylene glycol, sodium chloride    Review of Systems   Constitutional:  Negative for chills and fever.   Eyes:  Negative for visual disturbance.   Respiratory:  Positive for shortness of breath. Negative for cough and wheezing.    Cardiovascular:  Negative for chest pain, palpitations and leg swelling.   Gastrointestinal:  Negative for abdominal distention, abdominal pain, diarrhea, nausea and vomiting.   Genitourinary:  Negative for difficulty urinating, dysuria, flank pain, frequency and hematuria.   Musculoskeletal:  Negative for myalgias.   Skin:         Hyperpigmented healed psoriatic changes   Neurological:  Negative for dizziness, light-headedness and numbness.   Psychiatric/Behavioral:  Negative for confusion.       Objective  Range of Vitals (last 24 hours)  Heart Rate:  []   Temp:  [35.7 °C (96.3 °F)-36.7 °C (98.1 °F)]   Resp:  [19-31]   BP: (112-147)/(62-82)   SpO2:  [71 %-100 %]   Daily Weight  11/09/23 : 68.4 kg (150 lb 12.7 oz)    Body mass index is 26.72 kg/m².     Physical Exam  Constitutional:       General: She is not in acute distress.     Appearance: Normal appearance.   HENT:      Head: Normocephalic and atraumatic.      Mouth/Throat:      Mouth: Mucous membranes are moist.   Eyes:      Conjunctiva/sclera: Conjunctivae normal.      Pupils: Pupils are equal, round, and reactive to light.   Cardiovascular:      Rate and Rhythm: Normal rate and regular rhythm.      Heart sounds: Normal heart sounds.   Pulmonary:      Effort: No respiratory distress.      Breath sounds: Normal breath sounds. No wheezing or rhonchi.      Comments: Airvo tubing in place  Abdominal:      General: Bowel sounds are normal.      Palpations: Abdomen is soft.      Tenderness: There is no abdominal tenderness.   Musculoskeletal:         General: No swelling.      Cervical back: Neck supple.   Skin:     General: Skin is warm and dry.      Comments: Hyperpigmented healed psoriatic changes noted over the lower extremities bilaterally   Neurological:      General: No focal deficit present.      Mental Status: She is alert.       Relevant Results  Outside Hospital Results  Yes  Labs  Results from last 72 hours   Lab Units 11/10/23  0421 11/09/23  0545 11/08/23  0623   WBC AUTO x10*3/uL 16.0* 14.8* 15.1*   HEMOGLOBIN g/dL 11.7* 10.8* 11.3*   HEMATOCRIT % 36.8 34.5* 34.7*   PLATELETS AUTO x10*3/uL 144* 127* 130*   NEUTROS PCT AUTO % 86.8 91.9 92.8   LYMPHS PCT AUTO % 4.4 2.2 2.2   MONOS PCT AUTO % 7.0 4.4 3.3   EOS PCT AUTO % 0.8 0.1 0.3     Results from last 72 hours   Lab Units 11/10/23  0421 11/09/23  1648 11/09/23  0545   SODIUM mmol/L 140 137 138   POTASSIUM mmol/L 4.1 3.6 4.4   CHLORIDE mmol/L 101 97* 97*   CO2 mmol/L 26 27 27   BUN  "mg/dL 58* 58* 52*   CREATININE mg/dL 1.52* 1.31* 1.22*   GLUCOSE mg/dL 63* 217* 145*   CALCIUM mg/dL 8.4* 8.3* 8.7   ANION GAP mmol/L 17 17 18   EGFR mL/min/1.73m*2 37* 44* 48*   PHOSPHORUS mg/dL  --  4.8 5.4*     Results from last 72 hours   Lab Units 11/10/23  0421 11/09/23  1648 11/09/23  0545 11/08/23  0623   ALK PHOS U/L 45  --  51 56   BILIRUBIN TOTAL mg/dL 0.4  --  0.5 0.4   BILIRUBIN DIRECT mg/dL  --   --  0.1  --    PROTEIN TOTAL g/dL 5.4*  --  5.8* 5.6*   ALT U/L 21  --  23 23   AST U/L 14  --  16 13   ALBUMIN g/dL 2.9* 3.0* 3.0* 3.1*     Estimated Creatinine Clearance: 32.4 mL/min (A) (by C-G formula based on SCr of 1.52 mg/dL (H)).  C-Reactive Protein   Date Value Ref Range Status   11/09/2023 0.38 <1.00 mg/dL Final   11/03/2023 <0.30 0.00 - 2.00 mg/dL Final   10/31/2023 0.50 0.00 - 2.00 mg/dL Final     Sedimentation Rate   Date Value Ref Range Status   11/09/2023 30 0 - 30 mm/h Final     No results found for: \"HIV1X2\", \"HIVCONF\", \"RVCGQO5EG\"  No results found for: \"HEPCABINIT\", \"HEPCAB\", \"HCVPCRQUANT\"  Microbiology  No results found for the last 90 days.    Imaging         Assessment/Plan     Macarena Wilson is a 68 y.o. female with a PMH of CKD tage 3, HTN, Rosacea, who presented to [facility] as a transfer from [facility] with a c/o shortness of breath. She was tested positive for COIVD on 10/13, presented to [facility] on 10/23 with worsening SOB, and was treated for AHRF 2/2 COVID pneumonitis c/b HUNG on CKD. She treated with BIPAP, IV corticosteroids, IV diuretics, Heparin gtt, and finished a course of Remdesivir, and was given a dose of Tocilizumab on 10/24. Due to worsening respiratory status she required an escalation of treatment and transfer to [facility] MICU on 11/9.    #Acute Hypoxic Respiratory Failure 2/2 COVID PNA:  #c/f Fungal PNA:  #Pulmonary Fibrosis:  - CT chest 11/7 showed worsening bilateral lung changes compared to CT chest from 10/24 consistent with severe COVID-19 pneumonitis and " ARDS  - completed 5-day course of Remdesivir (10/24-10/28) and Tolizumab (10/24)  - MRSA swab 10/23 & 11/7 - negative  - COVID PCR 10/23 & 11/9 - positive  - Legionella & Strep urine 11/9 - negative  - Flu A/B & RSV 11/9 - negative         This is likely not due to secondary fungal infection or progressive pulmonary aspergillosis but rather end stages of COVID that is now presenting with pulmonary fibrosis considering that patient now is clinically feeling better.          No antecedent lung disease (no COPD, no smoking and no known environmental exposures).  No antecedent immunosuppression. Some COVID-19 infections described with secondary fungal pneumonia and/or colonization thought due to immunosuppression with corticosteroid treatment.  No current data at this time to suggest fungal infection but agree with evaluation.  This seem mostly a consequence of severe COVID-19 infection and anticipate slow recovery.       The repeat Positive COVID-19 testing is likely from residual nucleic acid (? Dead virus) and prolong positive testing has been reported to occur, at times for several weeks.  Infection control aware and has not recommended continued COVID isolation at this time.        Recommendations:  - follow up on fungal culture from 11/9  - collect sputum/respiratory culture if not collected already  - okay to continue Meropenem (11/9-) and Voriconazole (11/9-) in the meantime       BUT anticipate rapid de-escalation pending sputum analysis  - continue steroids per pulmonary team    Discussed with patient and primary team  Plan was reviewed with the attending physician.     ID Team B, pager [phone]  Zaire Mathias, PGY-2  Internal Medicine

## 2023-11-10 NOTE — PROGRESS NOTES
Spiritual Care Visit    Clinical Encounter Type  Visited With: Patient and family together  Routine Visit: Introduction  Continue Visiting: Yes    Taxonomy  Intended Effects: Demonstrate caring and concern, Establish rapport and connectedness  Methods: Encourage story-telling, Offer support  Interventions: Acknowledge current situation, Active listening, Discuss coping mechanisms with someone, Identify supportive relationship(s)     introduced self and Spiritual Care services to patient Macarena Wilson and one of her sons; daughter-in-law was also at bedside and known to this . Patient expressed she was doing okay and awaiting next steps. Patient shared she has coped with her last few weeks with the support of her family and taking things day by day.  noted the blanket patient had over her with pictures of her family.  held space for them to share about their family, especially patient's spouse. Patient expressed she likes to stay busy; she works full time and enjoys making crafts, cooking, and baking.     Medical team arrived for rounds. Patient and family did not have anything further they wanted to discuss and had no needs at the time.  provided support through active listening and supportive conversation. Spiritual Care remains available as needed/requested.    Rev. Estee Lai MDiv, [facility]

## 2023-11-10 NOTE — ED NOTES
Pharmacy Medication History Review    Macarena Wilson is a 68 y.o. female admitted for Acute hypoxemic respiratory failure (CMS/[facility]). Pharmacy reviewed the patient's [id number] medications and allergies for accuracy.    The list below reflects the updated PTA list. Comments regarding how patient may be taking medications differently can be found in the Admit Orders Activity  Prior to Admission Medications   Prescriptions Last Dose Informant Patient Reported? Taking?   acetaminophen (Tylenol) 325 mg tablet  Self No Yes   Sig: Take 2 tablets (650 mg) by mouth every 4 hours if needed for fever (temp greater than 38.0 C) (greater than or equal to 38 C).   acetaminophen (Tylenol) 325 mg/10.15 mL oral liquid  Self No No   Si.3 mL (650 mg) by nasogastric tube route every 4 hours if needed for fever (temp greater than 38.0 C) (greater than or equal to 38 C).  -->Patient not taking   acetaminophen (Tylenol) 650 mg suppository  Self No No   Sig: Insert 1 suppository (650 mg) into the rectum every 4 hours if needed for fever (temp greater than 38.0 C) (greater than or equal to 38 C).  -->Patient not taking   acetylcysteine (Mucomyst) 200 mg/mL (20 %) nebulizer solution Not Taking Self No No   Sig: Take 3 mL (600 mg) by nebulization 3 times a day.   Patient not taking: Reported on 11/10/2023   budesonide (Pulmicort) 0.5 mg/2 mL nebulizer solution Not Taking Self No No   Sig: Take 2 mL (0.5 mg) by nebulization 2 times a day for 360 doses. Rinse mouth with water after use to reduce aftertaste and incidence of candidiasis. Do not swallow.   Patient not taking: Reported on 11/10/2023   dexAMETHasone (Decadron) 10 mg/mL injection Not Taking Self No No   Sig: Infuse 1 mL (10 mg) into a venous catheter every 12 hours.   Patient not taking: Reported on 11/10/2023   dexmedeTOMIDine in NS (Precedex) 400 mcg in 100 mL (4 mcg/mL) premix  Self No No   Sig: Infuse 6..6 mcg/hr at 1.71-25.65 mL/hr into a venous catheter  continuously.   fluticasone (Flonase) 50 mcg/actuation nasal spray Not Taking Self No No   Sig: Administer 1 spray into each nostril 2 times a day. Shake gently. Before first use, prime pump. After use, clean tip and replace cap.   Patient not taking: Reported on 11/10/2023   glucagon (Glucagen) 1 mg injection  Self No No   Sig: Inject 1 mg into the muscle every 15 minutes if needed for low blood sugar - see comments (For blood glucose less than or equal to 70 mg/dL and no IV access).   guaiFENesin (Robitussin) 100 mg/5 mL syrup Not Taking Self No No   Sig: Take 10 mL (200 mg) by mouth every 4 hours if needed for congestion.   Patient not taking: Reported on 11/10/2023   heparin sodium,porcine/D5W (heparin, porcine,) 25,000 unit/250 mL(100 unit/mL) parenteral solution in D5W infusion  Self No No   Sig: Infuse 0-4,000 Units/hr at 0-40 mL/hr into a venous catheter continuously.   hydrALAZINE (Apresoline) 20 mg/mL injection Not Taking Self No No   Sig: Infuse 0.5 mL (10 mg) into a venous catheter every 8 hours if needed (If SBP greater than 170 or DBP greater than 100).   Patient not taking: Reported on 11/10/2023   hydrocortisone 1 % cream  Self Yes No   Sig: Apply 1 Application topically once daily. As needed for eczema   insulin glargine (Lantus) 100 unit/mL injection Not Taking Self No No   Sig: Inject 16 Units under the skin once daily at bedtime. Take as directed per insulin instructions.   Patient not taking: Reported on 11/10/2023   ipratropium-albuteroL (Duo-Neb) 0.5-2.5 mg/3 mL nebulizer solution Not Taking Self No No   Sig: Take 3 mL by nebulization every 6 hours while awake.   Patient not taking: Reported on 11/10/2023   ipratropium-albuteroL (Duo-Neb) 0.5-2.5 mg/3 mL nebulizer solution Not Taking Self No No   Sig: Take 3 mL by nebulization every 2 hours if needed for wheezing.   Patient not taking: Reported on 11/10/2023   levocetirizine (Xyzal) 5 mg tablet  Self Yes Yes   Sig: Take 1 tablet (5 mg) by  "mouth once daily in the evening. As needed   losartan (Cozaar) 100 mg tablet  Self Yes Yes   Sig: Take 1 tablet (100 mg) by mouth once daily.   meropenem (Merrem) 500 mg/100 mL IV Not Taking Self No No   Sig: Infuse 100 mL (500 mg) into a venous catheter every 12 hours.   Patient not taking: Reported on 11/10/2023   mv-mn/C/glutamin/lysin/fxhf600 (AIRBORNE, ASCORBATE SODIUM, ORAL)  Self Yes Yes   Sig: Take 1 tablet by mouth once daily. As needed   oxygen (O2) gas therapy  Self No No   Sig: Inhale 1 each once every 24 hours.   pantoprazole (ProtoNix) 40 mg EC tablet Not Taking Self No No   Sig: Take 1 tablet (40 mg) by mouth once daily in the morning. Take before meals. Do not crush, chew, or split.   Patient not taking: Reported on 11/10/2023   sodium chloride (Ocean) 0.65 % nasal spray Not Taking Self No No   Sig: Administer 1 spray into each nostril if needed for congestion.   Patient not taking: Reported on 11/10/2023      Facility-Administered Medications: None        The list below reflects the updated allergy list. Please review each documented allergy for additional clarification and justification.  Allergies  Reviewed by Yolanda Dubois PharmD on 11/10/2023        Severity Reactions Comments    Fesoterodine Low Unknown     Mirabegron Low Rash     Prednisone Low Other \"body aches, flu like symptoms\"            Patient accepts M2B at discharge. Pharmacy has been updated to [facility].    Sources used to complete the med history include Patient who was a good historian and able to verify home medications, strengths, and directions. Additional sources used include outpatient dispense report, and medication list from 2022 via patient's son.     Below are additional concerns with the patient's PTA list.  -Many prescriptions active in PTA med list that patient is not taking at home at this time.    Yolanda Dubois PharmD   [employer] PGY1 Pharmacy Resident   [facility]    Please " "reach out via [employer] Secure Chat for questions, or if no response call [phone] or [facility] \"MedRec\"   "

## 2023-11-10 NOTE — PROGRESS NOTES
Macarena Wilson is a 68 y.o. female on day 1 of admission presenting with Acute hypoxemic respiratory failure (CMS/[facility]).    Subjective   Macarena Wilson is a 68 year old female with a PMH of CKD stage 3, HTN, and Rosasea who presents to the MICU in acute hypoxemic respiratory failure due to COVID pneumonia from an OSH where she was hospitalized on 10/24 with complaints of  shortness of breath. She states that she tested positive for Covid on 10/13 and continues to have shortness of breath.      OSH Hospital Course   The patient originally tested positive for coronavirus on October 13, 2023 and continued to experience shortness of breath, which caused her to present to [facility] on 10/24.  Patient remained in the hospital for 16 days being treated for acute respiratory failure secondary to coronavirus pneumonitis and considered as developing severe ARDS.  She had complications of acute kidney injury on top of chronic kidney disease.  CT of the chest showed worsening bilateral changes consistent with severe COVID-19 pneumonitis and ARDS.  She continued to do marginal at best on high flow nasal cannula with intermittent use of nonrebreather and could not tolerate a BiPAP device due to anxiety.  She was given increased dose of IV corticosteroids, started on a heparin drip., an given IV diuretics.  She was also evaluated by a pulmonologist and infectious disease specialist.  In addition, she completed a course of remdesivir and received a dose of Actemra on October 24, 2023.  The patient did develop diarrhea, which resolved.   Patient noted that symptoms of fatigue, malaise and SOB on exertion started on the 10/13, but her SOB worsened from SOB on exertion to at rest. She couldn't get in sentences without being sob. She took some time off work however her symptoms were not getting better so she decided to go into the hospital. She has not had a covid vaccine or flu vaccine and had been active and able to do her ADLS  and IDLS. She denies smoking or use of illicit drugs. Patient denies any fever, chills, cough, diarrhea, abdominal pain, dysuria, lightheadedness, shortness of breath, chest pain, abdominal pain, N/V/C/D, lower extremity pain/swelling.    Upon presentation to the [facility]U, the patient was on AirVo 40L/90% satting at 96%. She was weaned down to 40L/50% with saturations at 96% for a few hours, but then started to desat again and was moved up to 50L/90%.    11/10 - Patient stable overnight with AirVo remaining at 50L/90% with saturations in the mid-to-upper 90s. If she moves, her saturations will drop quickly to the low 80s, but they will also recover within a few minutes. She also had two loose bowel movements overnight with another two in the morning. The last c-diff screen was on 10/26 so another is ordered.  Palliative consulted for discussion about goals of quality of life.    Objective     Physical Exam  Vitals and nursing note reviewed.   Constitutional:       Appearance: Normal appearance.   HENT:      Head: Normocephalic and atraumatic.   Cardiovascular:      Rate and Rhythm: Normal rate and regular rhythm.      Pulses: Normal pulses.      Heart sounds: Normal heart sounds.   Pulmonary:      Breath sounds: Normal breath sounds.      Comments: Patient on AirVo 40L/50%  Abdominal:      General: Abdomen is flat. There is no distension.      Palpations: Abdomen is soft.   Musculoskeletal:         General: Normal range of motion.      Cervical back: Normal range of motion.      Right lower leg: No edema.      Left lower leg: No edema.   Skin:     General: Skin is warm and dry.      Capillary Refill: Capillary refill takes less than 2 seconds.   Neurological:      Mental Status: She is alert and oriented to person, place, and time.      GCS: GCS eye subscore is 4. GCS verbal subscore is 5. GCS motor subscore is 6.   Psychiatric:         Attention and Perception: Attention and perception normal.         Mood and  "Affect: Mood and affect normal.         Behavior: Behavior is cooperative.       Last Recorded Vitals  Blood pressure 123/66, pulse 72, temperature 35.7 °C (96.3 °F), temperature source Temporal, resp. rate 25, height 1.6 m (5' 2.99\"), weight 68.4 kg (150 lb 12.7 oz), SpO2 99 %.  Intake/Output last 3 Shifts:  I/O last 3 completed shifts:  In: 29.9 (0.4 mL/kg) [I.V.:29.9 (0.4 mL/kg)]  Out: 1700 (24.9 mL/kg) [Urine:1700 (0.7 mL/kg/hr)]  Dosing Weight: 68.4 kg     Relevant Results  Scheduled medications  dexAMETHasone, 10 mg, intravenous, Daily  fluticasone, 1 spray, Each Nostril, BID  heparin, 5,000 Units, subcutaneous, q8h  insulin lispro, 0-5 Units, subcutaneous, TID with meals  lidocaine, 1 patch, transdermal, Daily  meropenem, 2 g, intravenous, q12h  oxygen, , inhalation, q4h  pantoprazole, 40 mg, oral, Daily before breakfast  perflutren protein A microsphere, 0.5 mL, intravenous, Once in imaging  surgical lubricant, , ,   voriconazole, 300 mg, intravenous, q12h  voriconazole, 400 mg, intravenous, q12h      Continuous medications     PRN medications  PRN medications: acetaminophen **OR** acetaminophen **OR** acetaminophen, bisacodyl, dextrose 10 % in water (D10W), dextrose, docusate sodium, glucagon, guaiFENesin, hydrALAZINE, ipratropium-albuteroL, melatonin, ondansetron ODT **OR** ondansetron, oxygen, polyethylene glycol, sodium chloride, surgical lubricant    Results for orders placed or performed during the hospital encounter of 11/09/23 (from the past 24 hour(s))   POCT GLUCOSE   Result Value Ref Range    POCT Glucose 119 (H) 74 - 99 mg/dL   Transthoracic Echo (TTE) Complete   Result Value Ref Range    AV pk pelon 1.34     LVOT diam 1.90     LV biplane EF 73     MV avg E/e' ratio 14.28     MV E/A ratio 0.89     RV free wall pk S' 14.70     LVIDd 4.00     Aortic Valve Area by Continuity of Peak Velocity 1.81     AV pk grad 7.2     LV A4C EF 72.1    Blood Gas Arterial   Result Value Ref Range    POCT pH, Arterial " 7.51 (H) 7.38 - 7.42 pH    POCT pCO2, Arterial 40 38 - 42 mm Hg    POCT pO2, Arterial 51 (L) 85 - 95 mm Hg    POCT SO2, Arterial 82 (L) 94 - 100 %    POCT Oxy Hemoglobin, Arterial 80.0 (L) 94.0 - 98.0 %    POCT Base Excess, Arterial 8.2 (H) -2.0 - 3.0 mmol/L    POCT HCO3 Calculated, Arterial 31.9 (H) 22.0 - 26.0 mmol/L    Patient Temperature 37.0 degrees Celsius    FiO2 50 %   Blood Gas Arterial Full Panel   Result Value Ref Range    POCT pH, Arterial 7.51 (H) 7.38 - 7.42 pH    POCT pCO2, Arterial 40 38 - 42 mm Hg    POCT pO2, Arterial 51 (L) 85 - 95 mm Hg    POCT SO2, Arterial 82 (L) 94 - 100 %    POCT Oxy Hemoglobin, Arterial 80.0 (L) 94.0 - 98.0 %    POCT Hematocrit Calculated, Arterial 38.0 36.0 - 46.0 %    POCT Sodium, Arterial 132 (L) 136 - 145 mmol/L    POCT Potassium, Arterial 3.8 3.5 - 5.3 mmol/L    POCT Chloride, Arterial 97 (L) 98 - 107 mmol/L    POCT Ionized Calcium, Arterial 1.16 1.10 - 1.33 mmol/L    POCT Glucose, Arterial 103 (H) 74 - 99 mg/dL    POCT Lactate, Arterial 1.0 0.4 - 2.0 mmol/L    POCT Base Excess, Arterial 8.2 (H) -2.0 - 3.0 mmol/L    POCT HCO3 Calculated, Arterial 31.9 (H) 22.0 - 26.0 mmol/L    POCT Hemoglobin, Arterial 12.8 12.0 - 16.0 g/dL    POCT Anion Gap, Arterial 7 (L) 10 - 25 mmo/L    Patient Temperature 37.0 degrees Celsius    FiO2 50 %   POCT GLUCOSE   Result Value Ref Range    POCT Glucose 73 (L) 74 - 99 mg/dL   POCT GLUCOSE   Result Value Ref Range    POCT Glucose 53 (L) 74 - 99 mg/dL   POCT GLUCOSE   Result Value Ref Range    POCT Glucose 86 74 - 99 mg/dL   POCT GLUCOSE   Result Value Ref Range    POCT Glucose 76 74 - 99 mg/dL   POCT GLUCOSE   Result Value Ref Range    POCT Glucose 64 (L) 74 - 99 mg/dL   Renal function panel   Result Value Ref Range    Glucose 217 (H) 74 - 99 mg/dL    Sodium 137 136 - 145 mmol/L    Potassium 3.6 3.5 - 5.3 mmol/L    Chloride 97 (L) 98 - 107 mmol/L    Bicarbonate 27 21 - 32 mmol/L    Anion Gap 17 10 - 20 mmol/L    Urea Nitrogen 58 (H) 6 - 23  mg/dL    Creatinine 1.31 (H) 0.50 - 1.05 mg/dL    eGFR 44 (L) >60 mL/min/1.73m*2    Calcium 8.3 (L) 8.6 - 10.6 mg/dL    Phosphorus 4.8 2.5 - 4.9 mg/dL    Albumin 3.0 (L) 3.4 - 5.0 g/dL   POCT GLUCOSE   Result Value Ref Range    POCT Glucose 177 (H) 74 - 99 mg/dL   POCT GLUCOSE   Result Value Ref Range    POCT Glucose 89 74 - 99 mg/dL   POCT GLUCOSE   Result Value Ref Range    POCT Glucose 80 74 - 99 mg/dL   Comprehensive Metabolic Panel   Result Value Ref Range    Glucose 63 (L) 74 - 99 mg/dL    Sodium 140 136 - 145 mmol/L    Potassium 4.1 3.5 - 5.3 mmol/L    Chloride 101 98 - 107 mmol/L    Bicarbonate 26 21 - 32 mmol/L    Anion Gap 17 10 - 20 mmol/L    Urea Nitrogen 58 (H) 6 - 23 mg/dL    Creatinine 1.52 (H) 0.50 - 1.05 mg/dL    eGFR 37 (L) >60 mL/min/1.73m*2    Calcium 8.4 (L) 8.6 - 10.6 mg/dL    Albumin 2.9 (L) 3.4 - 5.0 g/dL    Alkaline Phosphatase 45 33 - 136 U/L    Total Protein 5.4 (L) 6.4 - 8.2 g/dL    AST 14 9 - 39 U/L    Bilirubin, Total 0.4 0.0 - 1.2 mg/dL    ALT 21 7 - 45 U/L   Magnesium   Result Value Ref Range    Magnesium 2.03 1.60 - 2.40 mg/dL   CBC and Auto Differential   Result Value Ref Range    WBC 16.0 (H) 4.4 - 11.3 x10*3/uL    nRBC 0.0 0.0 - 0.0 /100 WBCs    RBC 4.11 4.00 - 5.20 x10*6/uL    Hemoglobin 11.7 (L) 12.0 - 16.0 g/dL    Hematocrit 36.8 36.0 - 46.0 %    MCV 90 80 - 100 fL    MCH 28.5 26.0 - 34.0 pg    MCHC 31.8 (L) 32.0 - 36.0 g/dL    RDW 14.6 (H) 11.5 - 14.5 %    Platelets 144 (L) 150 - 450 x10*3/uL    Neutrophils % 86.8 40.0 - 80.0 %    Immature Granulocytes %, Automated 0.9 0.0 - 0.9 %    Lymphocytes % 4.4 13.0 - 44.0 %    Monocytes % 7.0 2.0 - 10.0 %    Eosinophils % 0.8 0.0 - 6.0 %    Basophils % 0.1 0.0 - 2.0 %    Neutrophils Absolute 13.87 (H) 1.20 - 7.70 x10*3/uL    Immature Granulocytes Absolute, Automated 0.15 0.00 - 0.70 x10*3/uL    Lymphocytes Absolute 0.70 (L) 1.20 - 4.80 x10*3/uL    Monocytes Absolute 1.12 (H) 0.10 - 1.00 x10*3/uL    Eosinophils Absolute 0.13 0.00 - 0.70  x10*3/uL    Basophils Absolute 0.02 0.00 - 0.10 x10*3/uL     CT chest wo IV contrast    Result Date: 11/7/2023  Interpreted By:  Da Samuels, STUDY: CT CHEST WO IV CONTRAST; 11/7/2023 10:57 am   INDICATION: Signs/Symptoms:worsening pneumonia.   COMPARISON: October 24, 2023.   ACCESSION NUMBER(S): [id number]   ORDERING CLINICIAN: DENNYS MERCADO   TECHNIQUE: Axial unenhanced images were obtained through the chest. Post processing sagittal and coronal reconstruction images were also performed. Coronal MIP images.   FINDINGS: Lung and Large Airways: The extensive ground-glass densities noted throughout both lungs now appear more confluent in the bilateral lower lobes and posterior aspect of the bilateral upper lobes. There is persistent ground-glass densities in the anterior aspect of the bilateral upper lobes. Pleura: within normal limits. Vessels: No evidence for aortic aneurysm is noted. Heart: normal size. No pericardial effusion. Mediastinum and Gogo: within normal limits. Chest Wall and Lower Neck: within normal limits.   Upper Abdomen: within normal limits.   Bones: within normal limits.       Persistent extensive ground-glass densities in the lung apices and anterior aspect of the bilateral upper lobes with progression of previously identified ground-glass densities in the more confluent airspace opacities involving the bilateral lower lobes and posterior aspects of the bilateral upper lobes. Findings suggest atypical infection. Correlation with COVID status is recommended.   All CT examinations are performed with 1 or more of the following dose reduction techniques: Automated exposure control, adjustment of mA and/or kv according to patient's size, or use of iterative reconstruction techniques.   MACRO: none   Signed by: Da Samuels 11/7/2023 3:30 PM Dictation workstation:   [id number]       Assessment/Plan   Principal Problem:    Acute hypoxemic respiratory failure (CMS/HCC)    Macarena Wilson  is a 68 year old female with a PMH of CKD stage 3, HTN, and Rosasea who presents to the MICU in acute hypoxemic respiratory failure due to COVID pneumonia from an OSH where she was hospitalized on 10/24 with complaints of  shortness of breath. She states that she originally tested positive for Covid on 10/13 and continues to have shortness of breath. Imaging and timeline concerning for pulmonary fibrosis and organizing PNA.      NEUROLOGY/ PSYCH:  Awake, alert, oriented x3  Titrated off precedex  ICU delirium protocol, she has been in the ICU for >2 weeks   Palliative consulted for discussion about goals of quality of life     CARDIOVASCULAR:  #Hypertension  Holding Losartan 100mg in the setting of HUNG on CKD  Hydralazine PRN ordered for sbp >180  /73 (87) on 11/10      PULMONARY:  #Acute hypoxic respiratory failure likely secondary to covid PNA  #Severe acute respiratory distress syndrome   #Known unvaccinated patient   #Worsening Ground glass opacities noted on imaging  Positive covid result on 10/13  #Increasing Oxygen requirement   #Pulmonary Fibrosis vs Organizing PNA  CT of the chest showed worsening bilateral changes consistent with severe COVID-19 pneumonitis and ARDS.   Patient could not tolerate BIPAP due to severe anxiety   S/p est on high flow nasal cannula with intermittent use of nonrebreather.  S/p Remdesivir (completed course)  S/p Actemra on October 24, 2023.  Continue Decadron 10mg daily (make sure she's on prednisone equivalent of 1mg/kg/day)  Duonebs q4 PRN   Ordered streppnemo, legionella - negative  RSV/flu  Procal - elevated (0.25)  Diuresis with lasix 40mg  Follow up ESR, CRP - normal  - Infection Disease Consult - recommended not doing another Remdesivir course  - NOT candidate for baricitininb (as got toci on 10/24 - needs to be 4 weeks out)  - Aspergillus antigen, Blastomyces antibodies, Fugal culture, Fungitell, Histoplasma antigen ordered  - Empiric treatment for fungal infection      - Voriconazole started per Infectious Disease recs (11/09)    - Infectious Control contacted - no need for Covid precautions      #RENAL/ GENITOURINARY:  #HUNG on CKD III(resolved)  eGFR 38  Monitor Is and outputs  Renally dose medications  Avoid neprotoxins  Added on urine lytes         #GASTROENTEROLOGY:  NPO given risk of intubation   Diarrhea(resolved)  Stool panel negative  On bowel regimen(miralax and docusate senna)  #Diarrhea  - C-diff, PCR ordered  - Loperamide if C-diff negaive     ENDOCRINOLOGY:  #Steroid induced hyperglycemia  On lantus 16units at osh   ISS  Monitor blood glu q4 while NPO     RHEUMATOLOGY:  No active issues      HEMATOLOGY:  Covid PNA  Positive prothrombotic state   Started on prophylactic Heparin gtt      MUSCULOSKELETAL/ SKIN:  Eczema/ Rosasea  On home depixent      INFECTIOUS DISEASE:  Covid PNA   Patient unvaccinated  S/p Vancomycin at OSH (Discontinued)  MRSA negative  Ordered Respiratory panel  Cont Meropenem 500mg q12 (renally dosed)  IgG, IgM, IgA ordered     Fluids: Replete PRN  Electrolytes: Keep mg >2, phos >3  and K >4  Nutrition:  NPO Diet  Antimicrobials: meropenem, Voriconazole  On Decadron 10mg daily  DVT PPX:  on therapeutic AC w/ Heparin gtt  GI ppx: Pantoprazole  Bowel care: Miralax and docusate senna   Catheter:Pure wick external catheter   Lines: PIV  Oxygen: Airvow 50L FIO2 90%  Drips: heparin      Disposition:   To be determined      Code Status: Full Code (confirmed on admission)   NOK:  Primary Emergency Contact: Mathew Wilson, [place] Phone: [phone]         Russel Wesley DO

## 2023-11-11 NOTE — CONSULTS
Inpatient consult to Palliative Care  Consult performed by: Da Gee MD  Consult ordered by: Linda Cespedes MD        Reason For Consult  Reason for Consult: communication / medical decision making, symptom management, and patient/family support     History Of Present Illness  Macarena Wilson is a 68 year old female with a PMH of CKD stage 3, HTN, and Rosasea who was transferred to [facility] MICU for acute hypoxic respiratory failure and ARDS 2/2 Covid-19, now found to have severe COVID-19 pneumonitis requiring HFNC. Imaging and timeline concerning for pulmonary fibrosis and organizing PNA.      She states that overall she is feeling better since she was transferred to [facility]. Her biggest concern at this time is CPAP. When trialed on CPAP at the OSH, she had a panic attack and felt severe anxiety despite a precedex drip. She has never felt anxiety like this or had panic attacks before, but states that the feeling of the mask on her face made her claustrophobic and induced panic. Discussing this with her, she is open to trying CPAP again when indicated as long as her healthcare team tries to make sure she is comfortable before putting the mask on and working together with her. She does not recall any other medications given for her anxiety other than precedex.     Having some back pain that resolves with her current acetaminophen and lidocaine patch regimen. Was not able to sleep well the past few nights, denies dyspnea or anxiety etiology. Discussed some breathing techniques and reflection to help before bed. Her mood has been stable, some anxiety but mostly related to CPAP. No nausea or vomiting, but has had diarrhea, roughly 4-5 times per day. Hoping to get something to help with that once C Diff is ruled out.      She is spiritual, but not Zoroastrianism. Appreciates Reading support.      Serious Illness Conversation  How much information about what is likely to be ahead with your illness would you like: She  would like to be fully informed as well as have her family updated with the full information  What is your understanding now of where you are with your illness:  She understands that her lungs are badly damaged and although her O2 requirement is lower than yesterday, she is still very sick  What brings you brandon: She loves cooking and baking with her grandkids. Their favorite thing to make is apple pie, especially at this time of the year.   What are you hoping for: She hopes to get back to her normal self. She loves being active and on the go. She also wants to return back to her job.   What are your fears, worries, or concerns about the future: She worries she will not get back to her normal self.   What are some functions that are so critical that you can't imagine living without: She is optimistic about her future and knows she is tough and can adapt. She feels like she will adapt to whatever life and her health throw at her, and will find ways to be happy no matter what happens.   How much does your family know about your priorities and wishes:  They know her wishes and thoughts about the future and her health.     Symptoms  [place] Symptom Assessment Scores  Pain Score: 0 - No pain                          Past Medical History  She has a past medical history of Chronic kidney disease, Hypertension, and Personal history of diseases of the skin and subcutaneous tissue.     Surgical History  She has a past surgical history that includes Appendectomy (04/01/2016); Other surgical history (11/14/2022); and Hysterectomy.     Social History  She reports that she has never smoked. She has never used smokeless tobacco. She reports that she does not use drugs. No history on file for alcohol use.     Family History  Family History          Family History   Problem Relation Name Age of Onset    Breast cancer Mother        Heart failure Mother        No Known Problems Father        No Known Problems Sister               "  Functional Status           Allergies  Fesoterodine, Mirabegron, and Prednisone     Review of Systems:   Constitutional:  Negative for activity change, appetite change, fatigue and unexpected weight change.   HENT:  Negative for sore throat and trouble swallowing.    Eyes:  Negative for discharge and visual disturbance.   Respiratory:  Negative for cough and shortness of breath.    Cardiovascular:  Negative for chest pain, palpitations and leg swelling.   Gastrointestinal:  Negative for abdominal pain, constipation, diarrhea and nausea.   Endocrine: Negative for cold intolerance.   Genitourinary:  Negative for difficulty urinating and dysuria.   Musculoskeletal:  Negative for myalgias.   Skin:  Negative for rash and wound.   Neurological:  Negative for dizziness, weakness and headaches.   Psychiatric/Behavioral:  Negative for confusion and hallucinations.        Physical Exam:  Constitutional: Well-developed female in no acute distress. Friendly.  HEENT: Normocephalic, atraumatic. PERRL. EOMI. No cervical lymphadenopathy.  Respiratory: Decreased breath sounds bilaterally.  Cardiovascular: RRR. No murmurs, gallops, or rubs. No JVD. Radial pulses 2+.  Abdominal: Soft, nondistended, nontender to palpation. Bowel sounds present. No hepatosplenomegaly or masses. No CVA tenderness.  Neuro: CN II-XII intact. UE and LE strength 5/5 bilaterally and sensation intact. Normal FTN testing.  MSK: No LE edema bilaterally.  Skin: Warm, dry. Diffuse ecchymosis b/l UE.  Psych: Appropriate mood and affect.     Last Recorded Vitals  Blood pressure 148/71, pulse 101, temperature 36.5 °C (97.7 °F), resp. rate 22, height 1.6 m (5' 2.99\"), weight 68.4 kg (150 lb 12.7 oz), SpO2 (!) 88 %.     Relevant Results        Lab Results   Component Value Date     WBC 16.0 (H) 11/10/2023     HGB 11.7 (L) 11/10/2023     HCT 36.8 11/10/2023     MCV 90 11/10/2023      (L) 11/10/2023            Lab Results   Component Value Date     GLUCOSE 63 " (L) 11/10/2023     CALCIUM 8.4 (L) 11/10/2023      11/10/2023     K 4.1 11/10/2023     CO2 26 11/10/2023      11/10/2023     BUN 58 (H) 11/10/2023     CREATININE 1.52 (H) 11/10/2023            Lab Results   Component Value Date     ALT 21 11/10/2023     AST 14 11/10/2023     ALKPHOS 45 11/10/2023     BILITOT 0.4 11/10/2023         Relevant Imaging  CT chest wo IV contrast 11/07/2023     Narrative  Interpreted By:  Da Samuels,  STUDY:  CT CHEST WO IV CONTRAST; 11/7/2023 10:57 am     INDICATION:  Signs/Symptoms:worsening pneumonia.     COMPARISON:  October 24, 2023.     ACCESSION NUMBER(S):  [id number]     ORDERING CLINICIAN:  DENNYS MERCADO     TECHNIQUE:  Axial unenhanced images were obtained through the chest. Post  processing sagittal and coronal reconstruction images were also  performed. Coronal MIP images.     FINDINGS:  Lung and Large Airways: The extensive ground-glass densities noted  throughout both lungs now appear more confluent in the bilateral  lower lobes and posterior aspect of the bilateral upper lobes. There  is persistent ground-glass densities in the anterior aspect of the  bilateral upper lobes. Pleura: within normal limits.  Vessels: No evidence for aortic aneurysm is noted.  Heart: normal size. No pericardial effusion.  Mediastinum and Gogo: within normal limits.  Chest Wall and Lower Neck: within normal limits.     Upper Abdomen: within normal limits.     Bones: within normal limits.     Impression  Persistent extensive ground-glass densities in the lung apices and  anterior aspect of the bilateral upper lobes with progression of  previously identified ground-glass densities in the more confluent  airspace opacities involving the bilateral lower lobes and posterior  aspects of the bilateral upper lobes. Findings suggest atypical  infection. Correlation with COVID status is recommended.     All CT examinations are performed with 1 or more of the following  dose reduction  techniques: Automated exposure control, adjustment of  mA and/or kv according to patient's size, or use of iterative  reconstruction techniques.     MACRO:  none     Signed by: Da Samuels 11/7/2023 3:30 PM  Dictation workstation:   [id number]      Assessment and Plan  Macarena Wilson is a 68 year old female with a PMH of CKD stage 3, HTN, and Rosasea who was transferred to [facility] MICU for acute hypoxic respiratory failure and ARDS 2/2 Covid-19, now found to have severe COVID-19 pneumonitis requiring HFNC. Imaging and timeline concerning for pulmonary fibrosis and organizing PNA.      Code Status  Full  Patient is a capable decision maker: Yes        Symptom Management:  Pain: Continue tylenol and lidocaine patch regimen  Nausea: None  Depression/Anxiety: Consider hydroxyzine PRN for anxiety and prior to CPAP/BiPAP. Nightly reflection and breathing exercises discussed with patient.  Appetite: No concerns  Dyspnea: Continue HFNC, steroids.   Constipation/Diarrhea: Loperamide once C diff ruled out  Other: Continue PT/OT, patient enjoys feeling active and appreciates therapy assistance. Continue  support.      Regarding her anxiety towards CPAP/BiPAP, consider discussing mask options with RT and patient and finding one she tolerates best. Would be ideal to do this before she requires CPAP as this will greatly decrease her anxiety.      Patient and plan discussed with Dr. Jacobs.     Da Gee MD  Internal Medicine, PGY-1

## 2023-11-11 NOTE — PROGRESS NOTES
Macarena Wilson is a 68 y.o. female on day 2 of admission presenting with Acute hypoxemic respiratory failure (CMS/[facility]).    Subjective   Macarena Wilson is a 68 year old female with a PMH of CKD stage 3, HTN, and Rosasea who presents to the MICU in acute hypoxemic respiratory failure due to COVID pneumonia from an OSH where she was hospitalized on 10/24 with complaints of  shortness of breath. She states that she tested positive for Covid on 10/13 and continues to have shortness of breath.      OSH Hospital Course   The patient originally tested positive for coronavirus on October 13, 2023 and continued to experience shortness of breath, which caused her to present to [facility] on 10/24.  Patient remained in the hospital for 16 days being treated for acute respiratory failure secondary to coronavirus pneumonitis and considered as developing severe ARDS.  She had complications of acute kidney injury on top of chronic kidney disease.  CT of the chest showed worsening bilateral changes consistent with severe COVID-19 pneumonitis and ARDS.  She continued to do marginal at best on high flow nasal cannula with intermittent use of nonrebreather and could not tolerate a BiPAP device due to anxiety.  She was given increased dose of IV corticosteroids, started on a heparin drip., an given IV diuretics.  She was also evaluated by a pulmonologist and infectious disease specialist.  In addition, she completed a course of remdesivir and received a dose of Actemra on October 24, 2023.  The patient did develop diarrhea, which resolved.   Patient noted that symptoms of fatigue, malaise and SOB on exertion started on the 10/13, but her SOB worsened from SOB on exertion to at rest. She couldn't get in sentences without being sob. She took some time off work however her symptoms were not getting better so she decided to go into the hospital. She has not had a covid vaccine or flu vaccine and had been active and able to do her ADLS  and IDLS. She denies smoking or use of illicit drugs. Patient denies any fever, chills, cough, diarrhea, abdominal pain, dysuria, lightheadedness, shortness of breath, chest pain, abdominal pain, N/V/C/D, lower extremity pain/swelling.    Upon presentation to the [facility] MICU, the patient was on AirVo 40L/90% satting at 96%. She was weaned down to 40L/50% with saturations at 96% for a few hours, but then started to desat again and was moved up to 50L/90%.    11/10 - Patient stable overnight with AirVo remaining at 50L/90% with saturations in the mid-to-upper 90s. If she moves, her saturations will drop quickly to the low 80s, but they will also recover within a few minutes. She also had two loose bowel movements overnight with another two in the morning. The last c-diff screen was on 10/26 so another is ordered.  Palliative consulted for discussion about goals of quality of life.  11/11 - Patient stable overnight with no significant events.  Patient's Airvo settings are reduced to 40 L / 60% with patient satting in the low 90s.  Patient's C. difficile screen returned negative patient given loperamide for diarrhea control.  Patient given clear liquid diet.    Objective     Physical Exam  Vitals and nursing note reviewed.   Constitutional:       Appearance: Normal appearance.   HENT:      Head: Normocephalic and atraumatic.   Cardiovascular:      Rate and Rhythm: Normal rate and regular rhythm.      Pulses: Normal pulses.      Heart sounds: Normal heart sounds.   Pulmonary:      Breath sounds: Normal breath sounds.      Comments: Patient on AirVo 40L/60%  Abdominal:      General: Abdomen is flat. There is no distension.      Palpations: Abdomen is soft.   Musculoskeletal:         General: Normal range of motion.      Cervical back: Normal range of motion.      Right lower leg: No edema.      Left lower leg: No edema.   Skin:     General: Skin is warm and dry.      Capillary Refill: Capillary refill takes less than 2  "seconds.   Neurological:      Mental Status: She is alert and oriented to person, place, and time.      GCS: GCS eye subscore is 4. GCS verbal subscore is 5. GCS motor subscore is 6.   Psychiatric:         Attention and Perception: Attention and perception normal.         Mood and Affect: Mood and affect normal.         Behavior: Behavior is cooperative.         Last Recorded Vitals  Blood pressure 123/75, pulse 82, temperature 36.1 °C (97 °F), temperature source Temporal, resp. rate 21, height 1.6 m (5' 2.99\"), weight 68.4 kg (150 lb 12.7 oz), SpO2 96 %.  Intake/Output last 3 Shifts:  I/O last 3 completed shifts:  In: 200 (2.9 mL/kg) [IV Piggyback:200]  Out: 2350 (34.4 mL/kg) [Urine:2350 (1 mL/kg/hr)]  Dosing Weight: 68.4 kg     Relevant Results  Scheduled medications  dexAMETHasone, 10 mg, intravenous, Daily  fluticasone, 1 spray, Each Nostril, BID  heparin, 5,000 Units, subcutaneous, q8h  insulin lispro, 0-5 Units, subcutaneous, TID with meals  lidocaine, 1 patch, transdermal, Daily  meropenem, 2 g, intravenous, q12h  oxygen, , inhalation, q4h  pantoprazole, 40 mg, oral, Daily before breakfast  perflutren protein A microsphere, 0.5 mL, intravenous, Once in imaging  voriconazole, 300 mg, intravenous, q12h      Continuous medications     PRN medications  PRN medications: acetaminophen **OR** acetaminophen **OR** acetaminophen, bisacodyl, dextrose 10 % in water (D10W), dextrose 10 % in water (D10W), dextrose, dextrose, docusate sodium, glucagon, glucagon, guaiFENesin, hydrALAZINE, ipratropium-albuteroL, melatonin, ondansetron ODT **OR** ondansetron, oxygen, polyethylene glycol, sodium chloride    Results for orders placed or performed during the hospital encounter of 11/09/23 (from the past 24 hour(s))   POCT GLUCOSE   Result Value Ref Range    POCT Glucose 73 (L) 74 - 99 mg/dL   POCT GLUCOSE   Result Value Ref Range    POCT Glucose 78 74 - 99 mg/dL   POCT GLUCOSE   Result Value Ref Range    POCT Glucose 108 (H) 74 " - 99 mg/dL   C. difficile, PCR    Specimen: Stool   Result Value Ref Range    C. difficile, PCR Not Detected Not Detected   Stool Pathogen Panel, PCR    Specimen: Stool   Result Value Ref Range    Campylobacter Group Not Detected Not Detected    Salmonella species Not Detected Not Detected    Shigella species Not Detected Not Detected    Vibrio Group Not Detected Not Detected    Yersinia Enterocolitica Not Detected Not Detected    Shiga Toxin 1 Not Detected Not Detected    Shiga Toxin 2 Not Detected Not Detected    Norovirus GI/GII Not Detected Not Detected    Rotavirus A Not Detected Not Detected   IgG, IgA, IgM   Result Value Ref Range    IgG 626 (L) 700 - 1,600 mg/dL    IgA 259 70 - 400 mg/dL    IgM 118 40 - 230 mg/dL   POCT GLUCOSE   Result Value Ref Range    POCT Glucose 95 74 - 99 mg/dL   POCT GLUCOSE   Result Value Ref Range    POCT Glucose 93 74 - 99 mg/dL   CBC   Result Value Ref Range    WBC 9.7 4.4 - 11.3 x10*3/uL    nRBC 0.0 0.0 - 0.0 /100 WBCs    RBC 4.24 4.00 - 5.20 x10*6/uL    Hemoglobin 11.8 (L) 12.0 - 16.0 g/dL    Hematocrit 36.6 36.0 - 46.0 %    MCV 86 80 - 100 fL    MCH 27.8 26.0 - 34.0 pg    MCHC 32.2 32.0 - 36.0 g/dL    RDW 14.4 11.5 - 14.5 %    Platelets 158 150 - 450 x10*3/uL   POCT GLUCOSE   Result Value Ref Range    POCT Glucose 80 74 - 99 mg/dL   CBC and Auto Differential   Result Value Ref Range    WBC 9.9 4.4 - 11.3 x10*3/uL    nRBC 0.0 0.0 - 0.0 /100 WBCs    RBC 4.29 4.00 - 5.20 x10*6/uL    Hemoglobin 12.0 12.0 - 16.0 g/dL    Hematocrit 37.2 36.0 - 46.0 %    MCV 87 80 - 100 fL    MCH 28.0 26.0 - 34.0 pg    MCHC 32.3 32.0 - 36.0 g/dL    RDW 14.5 11.5 - 14.5 %    Platelets 151 150 - 450 x10*3/uL    Neutrophils % 90.1 40.0 - 80.0 %    Immature Granulocytes %, Automated 0.9 0.0 - 0.9 %    Lymphocytes % 3.6 13.0 - 44.0 %    Monocytes % 5.2 2.0 - 10.0 %    Eosinophils % 0.0 0.0 - 6.0 %    Basophils % 0.2 0.0 - 2.0 %    Neutrophils Absolute 8.91 (H) 1.20 - 7.70 x10*3/uL    Immature Granulocytes  Absolute, Automated 0.09 0.00 - 0.70 x10*3/uL    Lymphocytes Absolute 0.36 (L) 1.20 - 4.80 x10*3/uL    Monocytes Absolute 0.51 0.10 - 1.00 x10*3/uL    Eosinophils Absolute 0.00 0.00 - 0.70 x10*3/uL    Basophils Absolute 0.02 0.00 - 0.10 x10*3/uL   Renal Function Panel   Result Value Ref Range    Glucose 67 (L) 74 - 99 mg/dL    Sodium 142 136 - 145 mmol/L    Potassium 4.6 3.5 - 5.3 mmol/L    Chloride 102 98 - 107 mmol/L    Bicarbonate 25 21 - 32 mmol/L    Anion Gap 20 10 - 20 mmol/L    Urea Nitrogen 68 (H) 6 - 23 mg/dL    Creatinine 1.45 (H) 0.50 - 1.05 mg/dL    eGFR 39 (L) >60 mL/min/1.73m*2    Calcium 9.2 8.6 - 10.6 mg/dL    Phosphorus 5.4 (H) 2.5 - 4.9 mg/dL    Albumin 3.2 (L) 3.4 - 5.0 g/dL   Magnesium   Result Value Ref Range    Magnesium 2.30 1.60 - 2.40 mg/dL     CT chest wo IV contrast    Result Date: 11/7/2023  Interpreted By:  Da Samuels, STUDY: CT CHEST WO IV CONTRAST; 11/7/2023 10:57 am   INDICATION: Signs/Symptoms:worsening pneumonia.   COMPARISON: October 24, 2023.   ACCESSION NUMBER(S): [id number]   ORDERING CLINICIAN: DENNYS MERCADO   TECHNIQUE: Axial unenhanced images were obtained through the chest. Post processing sagittal and coronal reconstruction images were also performed. Coronal MIP images.   FINDINGS: Lung and Large Airways: The extensive ground-glass densities noted throughout both lungs now appear more confluent in the bilateral lower lobes and posterior aspect of the bilateral upper lobes. There is persistent ground-glass densities in the anterior aspect of the bilateral upper lobes. Pleura: within normal limits. Vessels: No evidence for aortic aneurysm is noted. Heart: normal size. No pericardial effusion. Mediastinum and Gogo: within normal limits. Chest Wall and Lower Neck: within normal limits.   Upper Abdomen: within normal limits.   Bones: within normal limits.       Persistent extensive ground-glass densities in the lung apices and anterior aspect of the bilateral upper  lobes with progression of previously identified ground-glass densities in the more confluent airspace opacities involving the bilateral lower lobes and posterior aspects of the bilateral upper lobes. Findings suggest atypical infection. Correlation with COVID status is recommended.   All CT examinations are performed with 1 or more of the following dose reduction techniques: Automated exposure control, adjustment of mA and/or kv according to patient's size, or use of iterative reconstruction techniques.   MACRO: none   Signed by: Da Samuels 11/7/2023 3:30 PM Dictation workstation:   [id number]       Assessment/Plan   Principal Problem:    Acute hypoxemic respiratory failure (CMS/HCC)    Macarena Wilson is a 68 year old female with a PMH of CKD stage 3, HTN, and Rosasea who presents to the MICU in acute hypoxemic respiratory failure due to COVID pneumonia from an OSH where she was hospitalized on 10/24 with complaints of  shortness of breath. She states that she originally tested positive for Covid on 10/13 and continues to have shortness of breath. Imaging and timeline concerning for pulmonary fibrosis and organizing PNA.      NEUROLOGY/ PSYCH:  Awake, alert, oriented x3  Titrated off precedex  ICU delirium protocol, she has been in the ICU for >2 weeks   Palliative consulted for discussion about goals of quality of life     CARDIOVASCULAR:  #Hypertension  Holding Losartan 100mg in the setting of HUNG on CKD  Hydralazine PRN ordered for sbp >180  /61 (81) on 11/11      PULMONARY:  #Acute hypoxic respiratory failure likely secondary to covid PNA  #Severe acute respiratory distress syndrome   #Known unvaccinated patient   #Worsening Ground glass opacities noted on imaging  Positive covid result on 10/13  #Increasing Oxygen requirement   #Pulmonary Fibrosis vs Organizing PNA  CT of the chest showed worsening bilateral changes consistent with severe COVID-19 pneumonitis and ARDS.   Patient could not  tolerate BIPAP due to severe anxiety   S/p est on high flow nasal cannula with intermittent use of nonrebreather.  S/p Remdesivir (completed course)  S/p Actemra on October 24, 2023.  Continue Decadron 10mg daily (make sure she's on prednisone equivalent of 1mg/kg/day)  Duonebs q4 PRN   Ordered streppnemo, legionella - negative  RSV/flu  Procal - elevated (0.25)  Diuresis with lasix 40mg  Follow up ESR, CRP - normal  - Infection Disease Consult - recommended not doing another Remdesivir course  - NOT candidate for baricitininb (as got toci on 10/24 - needs to be 4 weeks out)  - Aspergillus antigen, Blastomyces antibodies, Fugal culture, Fungitell, Histoplasma antigen ordered  - Empiric treatment for fungal infection     - Voriconazole started per Infectious Disease recs (11/09)    - Infectious Control contacted - no need for Covid precautions      #RENAL/ GENITOURINARY:  #HUNG on CKD III(resolved)  eGFR 39  Monitor Is and outputs - Diurese as needed for a goal Net of -500cc to -1L  Renally dose medications  Avoid neprotoxins  Added on urine lytes         #GASTROENTEROLOGY:  Clear Liquid diet  Diarrhea - Loperamide ordered PRN  Stool panel negative  On bowel regimen(miralax and docusate senna)     ENDOCRINOLOGY:  #Steroid induced hyperglycemia  On lantus 16units at osh   ISS  Monitor RFP Q6      RHEUMATOLOGY:  No active issues      HEMATOLOGY:  Covid PNA  Positive prothrombotic state   Started on prophylactic Heparin gtt      MUSCULOSKELETAL/ SKIN:  Eczema/ Rosasea  On home depixent      INFECTIOUS DISEASE:  Covid PNA   Patient unvaccinated  S/p Vancomycin at OSH (Discontinued)  MRSA negative  Ordered Respiratory panel  Cont Meropenem 500mg q12 (renally dosed)  IgG, IgM, IgA ordered - IgG elevated     Fluids: Replete PRN  Electrolytes: Keep mg >2, phos >3  and K >4  Nutrition:  Clear Liquid patient  Antimicrobials: meropenem, Voriconazole  On Decadron 10mg daily  DVT PPX:  on therapeutic AC w/ Heparin gtt  GI ppx:  Pantoprazole  Bowel care: Miralax and docusate senna   Catheter: Pure wick external catheter   Lines: PIV  Oxygen: Airvow 40L FIO2 60%  Drips: heparin.   Disposition:   To be determined      Code Status: Full Code (confirmed on admission)   NOK:  Primary Emergency Contact: Mathew Wilson, Home Phone: [phone]         Russel Wesley DO

## 2023-11-11 NOTE — CARE PLAN
The patient's goals for the shift include  improved sleep     The clinical goals for the shift include patient to maintain SpO2 greater than or equal to 94%    Problem: Nutrition  Goal: Less than 5 days NPO/clear liquids  11/10/2023 2241 by Alma Delia Chambers RN  Outcome: Progressing  Goal: Oral intake greater than 50%  11/10/2023 2241 by Alma Delia Chambers RN  Outcome: Progressing  11/10/2023 2239 by Alma Delia Chambers RN  Outcome: Not Progressing  Goal: Oral intake greater 75%  11/10/2023 2241 by Alma Delia Chambers RN  Outcome: Progressing  11/10/2023 2239 by Alma Delia Chambers RN  Outcome: Not Progressing  Goal: Consume prescribed supplement  11/10/2023 2241 by Alma Delia Chambers RN  Outcome: Progressing  Goal: Adequate PO fluid intake  11/10/2023 2241 by Alma Delia Chambers RN  Outcome: Progressing  Goal: Nutrition support goals are met within 48 hrs  11/10/2023 2241 by Alma Delia Chambers RN  Outcome: Progressing  11/10/2023 2239 by Alma Delia Chambers RN  Outcome: Not Progressing  Goal: Nutrition support is meeting 75% of nutrient needs  11/10/2023 2241 by Alma Delia Chambers RN  Outcome: Progressing  Goal: Tube feed tolerance  11/10/2023 2241 by Alma Delia Chambers RN  Outcome: Progressing  Goal: BG  mg/dL  11/10/2023 2241 by Alma Delia Chambers RN  Outcome: Progressing  Goal: Lab values WNL  11/10/2023 2241 by Alma Delia Chambers RN  Outcome: Progressing  Goal: Electrolytes WNL  11/10/2023 2241 by Alma Delia Chambers RN  Outcome: Progressing    Goal: Promote healing  11/10/2023 2241 by Alma Delia Chambers RN  Outcome: Progressing    Goal: Maintain stable weight  11/10/2023 2241 by Alma Delia Chambers RN  Outcome: Progressing    Goal: Reduce weight from edema/fluid  11/10/2023 2241 by Alma Delia Chambers RN  Outcome: Progressing  Goal: Gradual weight gain  11/10/2023 2241 by Alma Delia Chambers RN  Outcome: Progressing  Goal: Improve ostomy output  11/10/2023 2241 by Alma Delia Chambers, RN  Outcome: Progressing     Problem: Skin  Goal: Decreased wound  size/increased tissue granulation at next dressing change  11/10/2023 2241 by Alma Delia Chambers RN  Outcome: Progressing  Flowsheets (Taken 11/10/2023 2241)  Decreased wound size/increased tissue granulation at next dressing change:   Promote sleep for wound healing   Protective dressings over bony prominences   Utilize specialty bed per algorithm  Goal: Participates in plan/prevention/treatment measures  11/10/2023 2241 by Alma Delia Chambers RN  Outcome: Progressing  Flowsheets (Taken 11/10/2023 2241)  Participates in plan/prevention/treatment measures:   Discuss with provider PT/OT consult   Elevate heels   Increase activity/out of bed for meals  11/10/2023 2239 by Alma Delia Chambers RN  Flowsheets (Taken 11/10/2023 2239)  Participates in plan/prevention/treatment measures:   Discuss with provider PT/OT consult   Elevate heels   Increase activity/out of bed for meals  Goal: Prevent/manage excess moisture  11/10/2023 2241 by Alma Delia Chambers RN  Outcome: Progressing  Flowsheets (Taken 11/10/2023 2241)  Prevent/manage excess moisture:   Cleanse incontinence/protect with barrier cream   Monitor for/manage infection if present   Follow provider orders for dressing changes   Use wicking fabric (obtain order)   Moisturize dry skin  Goal: Prevent/minimize sheer/friction injuries  11/10/2023 2241 by Alma Delia Chambers RN  Outcome: Progressing  Flowsheets (Taken 11/10/2023 2241)  Prevent/minimize sheer/friction injuries:   Complete micro-shifts as needed if patient unable. Adjust patient position to relieve pressure points, not a full turn   Increase activity/out of bed for meals   Use pull sheet   HOB 30 degrees or less   Utilize specialty bed per algorithm   Turn/reposition every 2 hours/use positioning/transfer devices  11/10/2023 2239 by Alma Delia Chambers RN  Outcome: Not Progressing  Goal: Promote/optimize nutrition  11/10/2023 2241 by Alma Delia Chambers RN  Outcome: Progressing  Flowsheets (Taken 11/10/2023  2241)  Promote/optimize nutrition:   Assist with feeding   Monitor/record intake including meals   Consume > 50% meals/supplements   Offer water/supplements/favorite foods   Discuss with provider if NPO > 2 days   Reassess MST if dietician not consulted  Goal: Promote skin healing  11/10/2023 2241 by Alma Delia Chambers RN  Outcome: Progressing  Flowsheets (Taken 11/10/2023 2241)  Promote skin healing:   Assess skin/pad under line(s)/device(s)   Protective dressings over bony prominences   Turn/reposition every 2 hours/use positioning/transfer devices   Ensure correct size (line/device) and apply per  instructions   Rotate device position/do not position patient on device     Problem: Fall/Injury  Goal: Not fall by end of shift  11/10/2023 2241 by Alma Delia Chambers RN  Outcome: Progressing  Goal: Be free from injury by end of the shift  11/10/2023 2241 by Alma Delia Chambers RN  Outcome: Progressing  Goal: Verbalize understanding of personal risk factors for fall in the hospital  11/10/2023 2241 by Alma Delia Chambers RN  Outcome: Progressing  11/10/2023 2239 by Alma Delia Chambers RN  Outcome: Not Progressing  Goal: Verbalize understanding of risk factor reduction measures to prevent injury from fall in the home  11/10/2023 2241 by Alma Delia Chambers RN  Outcome: Progressing  11/10/2023 2239 by Alma Delia Chambers RN  Outcome: Not Progressing  Goal: Use assistive devices by end of the shift  11/10/2023 2241 by Alma Delia Chambers RN  Outcome: Progressing  Goal: Pace activities to prevent fatigue by end of the shift  11/10/2023 2241 by Alma Delia Chambers RN  Outcome: Progressing     Problem: Pain - Adult  Goal: Verbalizes/displays adequate comfort level or baseline comfort level  11/10/2023 2241 by Alma Delia Chambers RN  Outcome: Progressing     Problem: Safety - Adult  Goal: Free from fall injury  11/10/2023 2241 by Alma Delia Chambers RN  Outcome: Progressing     Problem: Discharge Planning  Goal: Discharge to home or other  facility with appropriate resources  11/10/2023 2241 by Alma Delia Chambers RN  Outcome: Progressing     Problem: Chronic Conditions and Co-morbidities  Goal: Patient's chronic conditions and co-morbidity symptoms are monitored and maintained or improved  11/10/2023 2241 by Alma Delia Chambers RN  Outcome: Progressing

## 2023-11-12 NOTE — CARE PLAN
Problem: Nutrition  Goal: Less than 5 days NPO/clear liquids  Outcome: Progressing  Goal: Oral intake greater than 50%  Outcome: Progressing  Goal: Oral intake greater 75%  Outcome: Progressing  Goal: Consume prescribed supplement  Outcome: Progressing  Goal: Adequate PO fluid intake  Outcome: Progressing  Goal: Nutrition support goals are met within 48 hrs  Outcome: Progressing  Goal: Nutrition support is meeting 75% of nutrient needs  Outcome: Progressing  Goal: Tube feed tolerance  Outcome: Progressing  Goal: BG  mg/dL  Outcome: Progressing  Goal: Lab values WNL  Outcome: Progressing  Goal: Electrolytes WNL  Outcome: Progressing  Goal: Promote healing  Outcome: Progressing  Goal: Maintain stable weight  Outcome: Progressing  Goal: Reduce weight from edema/fluid  Outcome: Progressing  Goal: Gradual weight gain  Outcome: Progressing  Goal: Improve ostomy output  Outcome: Progressing     Problem: Skin  Goal: Decreased wound size/increased tissue granulation at next dressing change  Outcome: Progressing  Flowsheets (Taken 11/11/2023 2122)  Decreased wound size/increased tissue granulation at next dressing change:   Promote sleep for wound healing   Utilize specialty bed per algorithm   Protective dressings over bony prominences  Goal: Participates in plan/prevention/treatment measures  Outcome: Progressing  Flowsheets (Taken 11/11/2023 2122)  Participates in plan/prevention/treatment measures:   Discuss with provider PT/OT consult   Increase activity/out of bed for meals   Elevate heels  Goal: Prevent/manage excess moisture  Outcome: Progressing  Flowsheets (Taken 11/11/2023 2122)  Prevent/manage excess moisture:   Cleanse incontinence/protect with barrier cream   Moisturize dry skin   Use wicking fabric (obtain order)   Follow provider orders for dressing changes   Monitor for/manage infection if present  Goal: Prevent/minimize sheer/friction injuries  Outcome: Progressing  Flowsheets (Taken 11/11/2023  2122)  Prevent/minimize sheer/friction injuries:   Complete micro-shifts as needed if patient unable. Adjust patient position to relieve pressure points, not a full turn   HOB 30 degrees or less   Increase activity/out of bed for meals   Turn/reposition every 2 hours/use positioning/transfer devices   Use pull sheet   Utilize specialty bed per algorithm  Goal: Promote/optimize nutrition  Outcome: Progressing  Flowsheets (Taken 11/11/2023 2122)  Promote/optimize nutrition:   Assist with feeding   Discuss with provider if NPO > 2 days   Offer water/supplements/favorite foods   Consume > 50% meals/supplements   Monitor/record intake including meals   Reassess MST if dietician not consulted  Goal: Promote skin healing  Outcome: Progressing  Flowsheets (Taken 11/11/2023 2122)  Promote skin healing:   Assess skin/pad under line(s)/device(s)   Ensure correct size (line/device) and apply per  instructions   Protective dressings over bony prominences   Rotate device position/do not position patient on device   Turn/reposition every 2 hours/use positioning/transfer devices     Problem: Fall/Injury  Goal: Not fall by end of shift  Outcome: Progressing  Goal: Be free from injury by end of the shift  Outcome: Progressing  Goal: Verbalize understanding of personal risk factors for fall in the hospital  Outcome: Progressing  Goal: Verbalize understanding of risk factor reduction measures to prevent injury from fall in the home  Outcome: Progressing  Goal: Use assistive devices by end of the shift  Outcome: Progressing  Goal: Pace activities to prevent fatigue by end of the shift  Outcome: Progressing     Problem: Pain - Adult  Goal: Verbalizes/displays adequate comfort level or baseline comfort level  Outcome: Progressing     Problem: Safety - Adult  Goal: Free from fall injury  Outcome: Progressing     Problem: Discharge Planning  Goal: Discharge to home or other facility with appropriate resources  Outcome:  Progressing     Problem: Chronic Conditions and Co-morbidities  Goal: Patient's chronic conditions and co-morbidity symptoms are monitored and maintained or improved  Outcome: Progressing       The patient's goals for the shift include Maintain SpO2 and improve sleep     The clinical goals for the shift include patient to maintain SpO2 greater than or equal to 94%

## 2023-11-12 NOTE — PROGRESS NOTES
Macarena Wilson is a 68 y.o. female on day 3 of admission presenting with Acute hypoxemic respiratory failure (CMS/[facility]).    Subjective   Macarena Wilson is a 68 year old female with a PMH of CKD stage 3, HTN, and Rosasea who presents to the MICU in acute hypoxemic respiratory failure due to COVID pneumonia from an OSH where she was hospitalized on 10/24 with complaints of  shortness of breath. She states that she tested positive for Covid on 10/13 and continues to have shortness of breath.      OSH Hospital Course   The patient originally tested positive for coronavirus on October 13, 2023 and continued to experience shortness of breath, which caused her to present to [facility] on 10/24.  Patient remained in the hospital for 16 days being treated for acute respiratory failure secondary to coronavirus pneumonitis c/by severe ARDS.  She had complications of acute kidney injury on top of chronic kidney disease.  CT of the chest showed worsening bilateral changes consistent with severe COVID-19 pneumonitis and ARDS.  She continued to do marginal at best on high flow nasal cannula with intermittent use of nonrebreather and could not tolerate a BiPAP device due to anxiety.  She was given increased dose of IV corticosteroids, started on a heparin drip., an given IV diuretics.  She was also evaluated by a pulmonologist and infectious disease specialist.  In addition, she completed a course of remdesivir and received a dose of Actemra on October 24, 2023.  The patient did develop diarrhea, which resolved.   Patient noted that symptoms of fatigue, malaise and SOB on exertion started on the 10/13, but her SOB worsened from SOB on exertion to at rest. She couldn't get in sentences without being sob. She took some time off work however her symptoms were not getting better so she decided to go into the hospital. She has not had a covid vaccine or flu vaccine and had been active and able to do her ADLS and IDLS. She denies  "smoking or use of illicit drugs. Patient denies any fever, chills, cough, diarrhea, abdominal pain, dysuria, lightheadedness, shortness of breath, chest pain, abdominal pain, N/V/C/D, lower extremity pain/swelling.    Upon presentation to the [facility] MICU, the patient was on AirVo 40L/90% satting at 96%. She was weaned down to 40L/50% with saturations at 96% for a few hours, but then started to desat again and was moved up to 50L/90%.    11/10 - Patient stable overnight with AirVo remaining at 50L/90% with saturations in the mid-to-upper 90s. If she moves, her saturations will drop quickly to the low 80s, but they will also recover within a few minutes. She also had two loose bowel movements overnight with another two in the morning. The last c-diff screen was on 10/26 so another is ordered.  Palliative consulted for discussion about goals of quality of life.  11/11 - Patient stable overnight with no significant events.  Patient's Airvo settings are reduced to 40 L / 60% with patient satting in the low 90s.  Patient's C. difficile screen returned negative patient given loperamide for diarrhea control.  Patient given clear liquid diet.    Objective         Last Recorded Vitals  Blood pressure 127/85, pulse 80, temperature 36.5 °C (97.7 °F), temperature source Temporal, resp. rate 26, height 1.6 m (5' 2.99\"), weight 67.6 kg (149 lb 0.5 oz), SpO2 95 %.  Intake/Output last 3 Shifts:  I/O last 3 completed shifts:  In: 7.4 (0.1 mL/kg) [I.V.:7.4 (0.1 mL/kg)]  Out: 2700 (39.5 mL/kg) [Urine:2700 (1.1 mL/kg/hr)]  Dosing Weight: 68.4 kg     Intake/Output Summary (Last 24 hours) at 11/12/2023 0745  Last data filed at 11/12/2023 0600  Gross per 24 hour   Intake --   Output 1900 ml   Net -1900 ml   1900 mL UOP    FiO2 (%):  [60 %-82 %] 80 %  35L/min on 78%    Physical Exam  Cardiovascular:      Rate and Rhythm: Normal rate and regular rhythm.      Pulses: Normal pulses.      Heart sounds: Normal heart sounds.   Pulmonary:      " Effort: Pulmonary effort is normal.      Breath sounds: No wheezing.      Comments: Airvo 35%L/min on 78%   Abdominal:      Palpations: Abdomen is soft.   Skin:     General: Skin is warm.      Capillary Refill: Capillary refill takes less than 2 seconds.   Neurological:      General: No focal deficit present.      Mental Status: She is alert.           Relevant Results  Scheduled medications  dexAMETHasone, 10 mg, intravenous, Daily  fluticasone, 1 spray, Each Nostril, BID  heparin, 5,000 Units, subcutaneous, q8h  insulin lispro, 0-5 Units, subcutaneous, TID with meals  meropenem, 2 g, intravenous, q12h  pantoprazole, 40 mg, oral, Daily before breakfast  perflutren protein A microsphere, 0.5 mL, intravenous, Once in imaging  voriconazole, 300 mg, intravenous, q12h      Lab Results   Component Value Date    WBC 11.1 11/12/2023    HGB 12.2 11/12/2023    HCT 36.5 11/12/2023    MCV 85 11/12/2023     11/12/2023     Lab Results   Component Value Date    CREATININE 1.57 (H) 11/12/2023    BUN 73 (H) 11/12/2023     11/12/2023    K 4.5 11/12/2023     11/12/2023    CO2 26 11/12/2023     Glucose   Date Value Ref Range Status   11/12/2023 155 (H) 74 - 99 mg/dL Final       Creatinine   Date Value Ref Range Status   11/12/2023 1.57 (H) 0.50 - 1.05 mg/dL Final   11/12/2023 1.58 (H) 0.50 - 1.05 mg/dL Final   11/11/2023 1.48 (H) 0.50 - 1.05 mg/dL Final   11/11/2023 1.45 (H) 0.50 - 1.05 mg/dL Final   11/10/2023 1.52 (H) 0.50 - 1.05 mg/dL Final   Initial creatinine on hospitalization was 2.70     Lab Results   Component Value Date    CALCIUM 8.9 11/12/2023    PHOS 6.1 (H) 11/12/2023     Magnesium 2.29    Latest Reference Range & Units 11/09/23 06:07   Flu A Result Not Detected  Not Detected   Flu B Result Not Detected  Not Detected   RSV PCR Not Detected  Not Detected      Latest Reference Range & Units 11/09/23 16:48   Fungitell Beta-D Glucan,Serum <80 pg/mL <31      Latest Reference Range & Units 11/09/23 16:48    Aspergillus Galactomanan EIA,S <0.500  0.056   Culture  No results found for the last 90 days.      Continuous medications     PRN medications  PRN medications: acetaminophen **OR** acetaminophen **OR** acetaminophen, bisacodyl, dextrose 10 % in water (D10W), dextrose 10 % in water (D10W), dextrose, dextrose, docusate sodium, glucagon, glucagon, guaiFENesin, hydrALAZINE, ipratropium-albuteroL, loperamide, melatonin, ondansetron ODT **OR** ondansetron, oxygen, polyethylene glycol, sodium chloride    CXR 11/9/2023  IMPRESSION:  1.  Perihilar airspace disease with underlying interstitial opacities  and correlate with pulmonary fibrosis. Correlate with atypical  infection.    CT chest wo IV contrast Result Date: 11/7/2023    Persistent extensive ground-glass densities in the lung apices and anterior aspect of the bilateral upper lobes with progression of previously identified ground-glass densities in the more confluent airspace opacities involving the bilateral lower lobes and posterior aspects of the bilateral upper lobes. Findings suggest atypical infection. Correlation with COVID status is recommended.        Assessment/Plan   Principal Problem:    Acute hypoxemic respiratory failure (CMS/HCC)    Macarena Wilson is a 68 year old female with a PMH of CKD stage 3, HTN, and Rosasea who presents to the MICU in acute hypoxemic respiratory failure due to COVID pneumonia from an OSH where she was hospitalized on 10/24 with complaints of  shortness of breath. She states that she originally tested positive for Covid on 10/13 and continues to have shortness of breath. Imaging and timeline concerning for pulmonary fibrosis and organizing PNA.      NEUROLOGY/ PSYCH:  Awake, alert, oriented x3  off precedex  ICU delirium protocol, she has been in the ICU for >2 weeks   Palliative consulted for discussion about goals of quality of life     CARDIOVASCULAR:  #Hypertension  Holding Losartan 100mg in the setting of HUNG on  CKD  Hydralazine PRN ordered for sbp >180  /61 (81) on 11/11      PULMONARY:  #Acute hypoxic respiratory failure from covid PNA (10/13) c/b Severe acute respiratory distress syndrome, recovering  #Worsening Ground glass opacities noted on imaging, concerning for Pulmonary Fibrosis vs Organizing PNA  - CT chest - worsening bilateral changes consistent with severe COVID-19 pneumonitis and ARDS.   -Patient could not tolerate BIPAP due to severe anxiety   S/p Remdesivir (completed course)  S/p Actemra on October 24, 2023.  Toci 10/26  RVP, step pneumo, ur legionella negative  Follow up ESR, CRP - normal  -Procal 11/7 - elevated (0.25)    This is likely not due to secondary fungal infection or progressive pulmonary aspergillosis but rather end stages of COVID that is now presenting with pulmonary fibrosis considering that patient now is clinically feeling better.    -fungitell, aspergillus galactomannan negative    Plan:  -Continue Decadron 10mg daily (10/22-p)(make sure she's on prednisone equivalent of 1mg/kg/day)  -fluticasone 1 spray bid   -blasto,histo pending  -Cxr 11/12 ordered   -lasix prn for goal net negative 500mL to 1L   -on HFNC 35 l/min on 80%, wean to sat 89-92%      - Infection Disease Consult - recommended not doing another Remdesivir course  - NOT candidate for baricitininb (as got toci on 10/24 - needs to be 4 weeks out)  - Aspergillus antigen, Blastomyces antibodies, Fugal culture, Fungitell, Histoplasma antigen ordered  - Empiric treatment for fungal infection     - Voriconazole started per Infectious Disease recs (11/09)    - Infectious Control contacted - no need for Covid precautions      #RENAL/ GENITOURINARY:  #HUNG on CKD III(resolved)  -eGFR 39. Scr of 1.5 may be her new baseline  - 11/12 -Diurese as needed for a goal Net of -500cc to -1L  -Renally dose medications  Avoid neprotoxins  -Added on urine lytes         #GASTROENTEROLOGY:  -Clear Liquid diet   #diarrhea as of 11/11   -Diarrhea  - Loperamide ordered PRN  -Stool panel negative  -prn miralax and docusate senna ffor constipation      ENDOCRINOLOGY:  #Steroid induced hyperglycemia  -Iispro ssi       RHEUMATOLOGY:  No active issues      HEMATOLOGY:      MUSCULOSKELETAL/ SKIN:  Eczema/ Rosasea  On home dupixent     INFECTIOUS DISEASE:  Covid PNA   Patient unvaccinated  S/p Vancomycin (Discontinued) 10/22-28, 11/7-11/7  MRSA negative  Procal 0.25 on 11/9  Plan:   -Cont Meropenem 500mg q12 (renally dosed) 11/7- p  -Voriconazoole 11/9- p  -cultures pending ngtd as of 11/12  -Blood cultures pending 11/12  -trend procal     Fluids: Replete PRN  Electrolytes: prn  Nutrition:  Clear Liquid patient  Antimicrobials: meropenem, Voriconazole  On Decadron 10mg daily 10/22-p  DVT PPX:  subcutaneous heparin  GI ppx: Pantoprazole daily  Bowel care: Miralax and docusate senna prn, loperamide prn (diarrhea 11/11)  Catheter: Pure wick external catheter   Lines: PIV  Oxygen: Airvow 35L FIO2 80%      Disposition:   To be determined      Code Status: Full Code (confirmed on admission)   NOK:  Primary Emergency Contact: Mathew Wilson, Wilfr[place] Phone: [phone]         Russell Azul MD

## 2023-11-13 NOTE — CARE PLAN
The patient's goals for the shift include      The clinical goals for the shift include comtinue to maintain a PO > 89%.  Remains on airvo, 90% fiO2, 60 L. PO 89 - 92% when at rest, but Her PO drops with any exersion into the low 80's.

## 2023-11-13 NOTE — PROGRESS NOTES
"Subjective      Overnight Events:   Patient was seen and examined at bedside. Per primary team and patient, she experienced an episode of worsening respiratory function with SOB this AM upon minimal exertion and required an increased in her supplemental oxygen Airvo up to 60L with 90% FiO2.     Review Of Systems:  11-point ROS was performed and is negative except as noted below and in the HPI.     Review of Systems   Constitutional:  Negative for chills and fever.   Eyes:  Negative for visual disturbance.   Respiratory:  Positive for shortness of breath. Negative for cough and wheezing.    Cardiovascular:  Negative for chest pain, palpitations and leg swelling.   Gastrointestinal:  Negative for abdominal distention, abdominal pain, diarrhea, nausea and vomiting.   Genitourinary:  Negative for difficulty urinating, dysuria, flank pain, frequency and hematuria.   Musculoskeletal:  Negative for myalgias.   Skin:  Negative for color change.   Neurological:  Negative for dizziness, light-headedness and numbness.   Psychiatric/Behavioral:  Negative for confusion.         Objective      /85   Pulse 92   Temp 35.5 °C (95.9 °F) (Temporal)   Resp 21   Ht 1.6 m (5' 2.99\")   Wt 67.6 kg (149 lb 0.5 oz)   SpO2 95%   BMI 26.41 kg/m²     Physical Exam  Constitutional:       General: She is not in acute distress.     Appearance: Normal appearance.   HENT:      Head: Normocephalic and atraumatic.      Mouth/Throat:      Mouth: Mucous membranes are moist.   Eyes:      Conjunctiva/sclera: Conjunctivae normal.      Pupils: Pupils are equal, round, and reactive to light.   Cardiovascular:      Rate and Rhythm: Normal rate and regular rhythm.      Heart sounds: Normal heart sounds.   Pulmonary:      Effort: Respiratory distress present.      Breath sounds: Normal breath sounds. No wheezing or rhonchi.      Comments: Airvo present, satting in high 80% low 90%  Abdominal:      General: Bowel sounds are normal.      Palpations: " Abdomen is soft.      Tenderness: There is no abdominal tenderness.   Musculoskeletal:         General: No swelling.      Cervical back: Neck supple.   Skin:     General: Skin is warm and dry.   Neurological:      General: No focal deficit present.      Mental Status: She is alert.       Lab Work:     Lab Results   Component Value Date    WBC 10.9 11/13/2023    HGB 13.3 11/13/2023    HCT 39.9 11/13/2023    MCV 85 11/13/2023     11/13/2023     Lab Results   Component Value Date    GLUCOSE 100 (H) 11/13/2023    CALCIUM 9.1 11/13/2023     11/13/2023    K 4.4 11/13/2023    CO2 26 11/13/2023    CL 97 (L) 11/13/2023    BUN 83 (H) 11/13/2023    CREATININE 1.99 (H) 11/13/2023     Hemoglobin A1C   Date Value Ref Range Status   10/24/2023 6.3 (H) See below % Final     Cultures:   No results found for the last 90 days.    Images:     XR chest 1 view   Final Result   1. Unchanged appearance of interstitial opacities most prominent in   the bilateral lower lobes, likely related to chronic interstitial   lung disease.        I personally reviewed the image(s)/study and resident interpretation   as stated by Dr. Racehl Pedraza MD. I agree with the findings as   stated. This study was interpreted at [place], [place].        MACRO:   None        Signed by: Garrett Mittal 11/13/2023 9:55 AM   Dictation workstation:   [id number]      XR chest 1 view   Final Result   1.  Mild interval improvement in mid to lower lung predominant   airspace and interstitial opacities.             I personally reviewed the images/study and I agree with the findings   as stated by Resident Kevin Pena MD. This study was interpreted   at [place], [place].        MACRO:   NONE.        Signed by: Enmanuel Myers 11/12/2023 3:09 PM   Dictation workstation:   [id number]      Transthoracic Echo (TTE) Complete   Final Result      XR chest 1 view    Final Result   1.  Perihilar airspace disease with underlying interstitial opacities   and correlate with pulmonary fibrosis. Correlate with atypical   infection.             Signed by: Salvador Robles 11/9/2023 8:14 AM   Dictation workstation:   [id number]         Medications:     Scheduled:  dexAMETHasone, 10 mg, intravenous, Daily  fluticasone, 1 spray, Each Nostril, BID  heparin, 5,000 Units, subcutaneous, q8h  insulin lispro, 0-5 Units, subcutaneous, TID with meals  lidocaine, 1 patch, transdermal, Daily  pantoprazole, 40 mg, intravenous, Daily  perflutren protein A microsphere, 0.5 mL, intravenous, Once in imaging  sulfamethoxazole-trimethoprim, 80 mg, oral, Daily    Continuous:     PRN:  PRN medications: acetaminophen **OR** acetaminophen **OR** acetaminophen, bisacodyl, dextrose 10 % in water (D10W), dextrose, docusate sodium, glucagon, guaiFENesin, hydrALAZINE, ipratropium-albuteroL, loperamide, melatonin, ondansetron ODT **OR** ondansetron, oxygen, polyethylene glycol, sodium chloride     Assessment & Plan:     Macarena Wilson is a 68 y.o. female with a PMH of CKD tage 3, HTN, Rosacea, who presented to [facility] as a transfer from [facility] with a c/o shortness of breath. She was tested positive for COIVD on 10/13, presented to [facility] on 10/23 with worsening SOB, and was treated for AHRF 2/2 COVID pneumonitis c/b HUNG on CKD. She treated with BIPAP, IV corticosteroids, IV diuretics, Heparin gtt, and finished a course of Remdesivir, and was given a dose of Tocilizumab on 10/24. Due to worsening respiratory status she required an escalation of treatment and transfer to [facility] MICU on 11/9.     #Acute Hypoxic Respiratory Failure 2/2 COVID PNA:  #c/f Fungal PNA:  #Pulmonary Fibrosis:  - CT chest 11/7 showed worsening bilateral lung changes compared to CT chest from 10/24 consistent with severe COVID-19 pneumonitis and ARDS  - completed 5-day course of Remdesivir (10/24-10/28) and Tolizumab (10/24)  -  MRSA swab 10/23 & 11/7 - negative  - COVID PCR 10/23 & 11/9 - positive  - Legionella & Strep urine 11/9 - negative  - Flu A/B & RSV 11/9 - negative    Recommendations:  - follow up on fungal culture from 11/9  - okay to discontinue Meropenem (11/9-11/13) and Voriconazole (11/9-11/13) in the meantime  - continue steroids per pulmonary team  - will sign off     This is likely not due to secondary fungal infection or progressive pulmonary aspergillosis but rather end stages of COVID that is now presenting with pulmonary fibrosis considering that patient now is clinically feeling better.      No antecedent lung disease (no COPD, no smoking and no known environmental exposures).  No antecedent immunosuppression. Some COVID-19 infections described with secondary fungal pneumonia and/or colonization thought due to immunosuppression with corticosteroid treatment.  No current data at this time to suggest fungal infection but agree with evaluation.  This seem mostly a consequence of severe COVID-19 infection and anticipate slow recovery.    The repeat Positive COVID-19 testing is likely from residual nucleic acid (? Dead virus) and prolong positive testing has been reported to occur, at times for several weeks.  Infection control aware and has not recommended continued COVID isolation at this time.    Plan was reviewed with the attending physician.   Thank you for the consult and the opportunity to co-manage the patient.   Please, do not hesitate to reach out with any questions, clarifications, or re-consult if new concerns may arise.     ID Team B, pager [phone]  Zaire Mathias, PGY-2  Internal Medicine    I saw and evaluated the patient. I personally obtained the key and critical portions of the history and physical exam or was physically present for key and critical portions performed by the resident/fellow. I reviewed the resident/fellow's documentation and discussed the patient with the resident/fellow. I agree with the  resident/fellow's medical decision making as documented in the note.    Discussed with primary team.  Suspicion for fungal pneumonia low.  Suspicion for bacterial infection low.  Seems most likely to have ARDS reaction to severe COVID infection.  Would monitor after stopping meropenem and voriconazole.    ID team will sign off    Mars Urena MD

## 2023-11-13 NOTE — PROGRESS NOTES
Physical Therapy                 Therapy Communication Note    Patient Name: Macarena Wilson  MRN: [id number]  Today's Date: 11/13/2023     Discipline: Physical Therapy    Missed Visit Reason: Missed Visit Reason:  (patient on maximal AirVo settings; easily desaturates with minimal movement; patient with tenous respiratory status; PT will hold and re-attempt as time and schedule allows and as medically appropriate.)    Missed Time: Attempt    Comment:

## 2023-11-13 NOTE — PROGRESS NOTES
Subjective   Interval History:        Patient seen during late morning rounds.  Patient's son and 2 grand daughters present.       Supine in bed with head of bed elevated.       Drinking clear liquids yesterday and had no issues       Oxygenation at 40 L High flow and 60%       Had no sputum production       Has had no fever, rigors, chest pain       Has dyspnea and shortness of breath but improved    Objective   Range of Vitals (last 24 hours)  Heart Rate:  []   Temp:  [36 °C (96.8 °F)-36.5 °C (97.7 °F)]   Resp:  [15-33]   BP: (105-139)/(65-96)   SpO2:  [76 %-100 %]   Daily Weight  11/11/23 : 67.6 kg (149 lb 0.5 oz)    Body mass index is 26.41 kg/m².    Physical Exam  And oriented x3, no acute distress, normal conversation, pleasant and interactive  Anterolateral chest remarkably clear.  No wheezing.  S1 and S2  Soft nontender abdomen  No peripheral cyanosis  Peripheral edema    Relevant Results  Labs  Results from last 72 hours   Lab Units 11/12/23  0002 11/11/23  0427 11/11/23  0106 11/10/23  0421   WBC AUTO x10*3/uL 11.1 9.9 9.7 16.0*   HEMOGLOBIN g/dL 12.2 12.0 11.8* 11.7*   HEMATOCRIT % 36.5 37.2 36.6 36.8   PLATELETS AUTO x10*3/uL 170 151 158 144*   NEUTROS PCT AUTO % 91.3 90.1  --  86.8   LYMPHS PCT AUTO % 3.1 3.6  --  4.4   MONOS PCT AUTO % 4.6 5.2  --  7.0   EOS PCT AUTO % 0.0 0.0  --  0.8     Results from last 72 hours   Lab Units 11/12/23  0003 11/12/23  0002 11/11/23  1813   SODIUM mmol/L 139 141 140   POTASSIUM mmol/L 4.5 4.6 4.7   CHLORIDE mmol/L 100 101 100   CO2 mmol/L 26 24 25   BUN mg/dL 73* 72* 64*   CREATININE mg/dL 1.57* 1.58* 1.48*   GLUCOSE mg/dL 155* 156* 129*   CALCIUM mg/dL 8.9 8.9 9.2   ANION GAP mmol/L 18 21* 20   EGFR mL/min/1.73m*2 36* 35* 38*   PHOSPHORUS mg/dL 6.1* 6.0* 5.1*     Results from last 72 hours   Lab Units 11/12/23  0003 11/12/23  0002 11/11/23  1813 11/11/23  0427 11/10/23  0421   ALK PHOS U/L  --   --   --   --  45   BILIRUBIN TOTAL mg/dL  --   --   --   --  0.4    PROTEIN TOTAL g/dL  --   --   --   --  5.4*   ALT U/L  --   --   --   --  21   AST U/L  --   --   --   --  14   ALBUMIN g/dL 3.2* 3.2* 3.3*   < > 2.9*    < > = values in this interval not displayed.     Estimated Creatinine Clearance: 31.2 mL/min (A) (by C-G formula based on SCr of 1.57 mg/dL (H)).  C-Reactive Protein   Date Value Ref Range Status   11/09/2023 0.38 <1.00 mg/dL Final   11/03/2023 <0.30 0.00 - 2.00 mg/dL Final   10/31/2023 0.50 0.00 - 2.00 mg/dL Final         Assessment/Plan   Macarena Wilson is a 68 y.o. female with a PMH of CKD tage 3, HTN, Rosacea, who presented to [facility] as a transfer from [facility] with a c/o shortness of breath. She was tested positive for COIVD on 10/13, presented to [facility] on 10/23 with worsening SOB, and was treated for AHRF 2/2 COVID pneumonitis c/b HUNG on CKD. She treated with BIPAP, IV corticosteroids, IV diuretics, Heparin gtt, and finished a course of Remdesivir, and was given a dose of Tocilizumab on 10/24. Due to worsening respiratory status she required an escalation of treatment and transfer to [facility] MICU on 11/9.     #Acute Hypoxic Respiratory Failure 2/2 COVID PNA:  #c/f Fungal PNA:  #Pulmonary Fibrosis:  - CT chest 11/7 showed worsening bilateral lung changes compared to CT chest from 10/24 consistent with severe COVID-19 pneumonitis and ARDS  - completed 5-day course of Remdesivir (10/24-10/28) and Tolizumab (10/24)  - MRSA swab 10/23 & 11/7 - negative  - COVID PCR 10/23 & 11/9 - positive  - Legionella & Strep urine 11/9 - negative  - Flu A/B & RSV 11/9 - negative         No antecedent lung disease (no COPD, no smoking and no known environmental exposures).  No antecedent immunosuppression. Some COVID-19 infections described with secondary fungal pneumonia and/or colonization thought due to immunosuppression with corticosteroid treatment.  No current data at this time to suggest fungal infection but agree with evaluation.  This seem mostly a  consequence of severe COVID-19 infection and anticipate slow recovery.       The repeat Positive COVID-19 testing is likely from residual nucleic acid (? Dead virus) and prolong positive testing has been reported to occur, at times for several weeks.  Infection control aware and has not recommended continued COVID isolation at this time.        Clinical picture most consistent with severe ARDS from severe COVID-19 pneumonia.  Doubt secondary fungal infection but certainly at risk given immunosuppression with steroids, and tocilizumab.  HOWEVER, no sputum production.  Patient improving with steroids.  Admittedly patient also improving after having started voriconazole and meropenem.  That said, doubt bacterial infection.  Unable to truly rule out fungal infection of sputum and or bronchoalveolar lavage and or biopsy (not practical or indicated at this time).       Negative galactomannan and Fungitell provide some head but negative studies do not rule out diagnosis.  Overall doubt invasive fungal infection.  Need to discuss de-escalation.    Today 11/12/2023 would recommend discontinuation of meropenem  In 1 to 2 days would discontinue voriconazole             Recommendations:  1. Continue steroids per pulmonary for ARDS  2.  Discontinue meropenem  3.  Discuss discontinuation of voriconazole.  Could stop in 1 to 2 days.  HIGHLY doubt invasive fungal infection but if we need to to rule out more definitively then would need bronchoscopy for evaluation and even then results could be nondiagnostic.      Discussed with patient and and her family      I spent 25 minutes in the professional and overall care of this patient.  I spent 10 minutes charting    Mars Urena MD

## 2023-11-13 NOTE — PROGRESS NOTES
Occupational Therapy                 Therapy Communication Note    Patient Name: Macarena Wilson  MRN: [id number]  Today's Date: 11/13/2023     Discipline: Occupational Therapy    Missed Visit Reason: Missed Visit Reason:  (Hold per RN as patient on max AirVo settings and desats with minimal movement in bed; will attempt OT next visit as appropriate.)    Missed Time: Attempt    Comment:  Alicia Kilgore OTR/L

## 2023-11-13 NOTE — PROGRESS NOTES
Macarena Wilson is a 68 y.o. female on day 4 of admission presenting with Acute hypoxemic respiratory failure (CMS/[facility]).    Subjective   Macarena Wilson is a 68 year old female with a PMH of CKD stage 3, HTN, and Rosasea who presents to the MICU in acute hypoxemic respiratory failure due to COVID pneumonia from an OSH where she was hospitalized on 10/24 with complaints of  shortness of breath. She states that she tested positive for Covid on 10/13 and continues to have shortness of breath.      OSH Hospital Course   The patient originally tested positive for coronavirus on October 13, 2023 and continued to experience shortness of breath, which caused her to present to [facility] on 10/24.  Patient remained in the hospital for 16 days being treated for acute respiratory failure secondary to coronavirus pneumonitis c/by severe ARDS.  She had complications of acute kidney injury on top of chronic kidney disease.  CT of the chest showed worsening bilateral changes consistent with severe COVID-19 pneumonitis and ARDS.  She continued to do marginal at best on high flow nasal cannula with intermittent use of nonrebreather and could not tolerate a BiPAP device due to anxiety.  She was given increased dose of IV corticosteroids, started on a heparin drip., an given IV diuretics.  She was also evaluated by a pulmonologist and infectious disease specialist.  In addition, she completed a course of remdesivir and received a dose of Actemra on October 24, 2023.  The patient did develop diarrhea, which resolved.   Patient noted that symptoms of fatigue, malaise and SOB on exertion started on the 10/13, but her SOB worsened from SOB on exertion to at rest. She couldn't get in sentences without being sob. She took some time off work however her symptoms were not getting better so she decided to go into the hospital. She has not had a covid vaccine or flu vaccine and had been active and able to do her ADLS and IDLS. She denies  "smoking or use of illicit drugs. Patient denies any fever, chills, cough, diarrhea, abdominal pain, dysuria, lightheadedness, shortness of breath, chest pain, abdominal pain, N/V/C/D, lower extremity pain/swelling.    [place] [facility], the patient was on AirVo 40L/90% satting at 96%. She was weaned down to 40L/50% with saturations at 96% for a few hours, but then started to desat again and was moved up to 50L/90%.    11/10 - Patient stable overnight with AirVo remaining at 50L/90% with saturations in the mid-to-upper 90s. If she moves, her saturations will drop quickly to the low 80s, but they will also recover within a few minutes. She also had two loose bowel movements overnight with another two in the morning. The last c-diff screen was on 10/26 so another is ordered.  Palliative consulted for discussion about goals of quality of life.  11/11 - Patient stable overnight with no significant events.  Patient's Airvo settings are reduced to 40 L / 60% with patient satting in the low 90s.  Patient's C. difficile screen returned negative patient given loperamide for diarrhea control.  Patient given clear liquid diet.  11/13 - Patient stable overnight on 35L/50% FiO2 - reports she got up to get a drink and become more dyspneic, desatting to 78%. Support increased to 60L at 90% FiO2, satting high 80s. Given 60 IV lasix, repeat CXR without interval change. LE dopplers to r/o DVT, considered pursuing CTPA to r/o PE, held off in setting of increasing Cr. Started SS bactrim daily for PCP prophy while on steroids.    Objective     Last Recorded Vitals  Blood pressure 123/85, pulse 92, temperature 35.5 °C (95.9 °F), temperature source Temporal, resp. rate 21, height 1.6 m (5' 2.99\"), weight 67.6 kg (149 lb 0.5 oz), SpO2 95 %.  Intake/Output last 3 Shifts:  I/O last 3 completed shifts:  In: 1080 (15.8 mL/kg) [P.O.:480; IV Piggyback:600]  Out: 2150 (31.4 mL/kg) [Urine:2150 (0.9 mL/kg/hr)]  Dosing Weight: 68.4 kg "       Intake/Output Summary (Last 24 hours) at 11/13/2023 1416  Last data filed at 11/13/2023 1300  Gross per 24 hour   Intake 340 ml   Output 1150 ml   Net -810 ml   1900 mL UOP    FiO2 (%):  [50 %-90 %] 90 %  60L/min on 90%    Physical Exam  Cardiovascular:      Rate and Rhythm: Normal rate and regular rhythm.      Pulses: Normal pulses.      Heart sounds: Normal heart sounds.   Pulmonary:      Effort: Pulmonary effort is normal.      Breath sounds: No wheezing, rhonchi or rales.      Comments: Airvo 60L/min at 90%  Poor air movement BL  No increased WOB  Abdominal:      Palpations: Abdomen is soft.   Skin:     General: Skin is warm.      Capillary Refill: Capillary refill takes less than 2 seconds.   Neurological:      General: No focal deficit present.      Mental Status: She is alert.       Relevant Results  Scheduled medications  dexAMETHasone, 10 mg, intravenous, Daily  fluticasone, 1 spray, Each Nostril, BID  heparin, 5,000 Units, subcutaneous, q8h  insulin lispro, 0-5 Units, subcutaneous, TID with meals  lidocaine, 1 patch, transdermal, Daily  pantoprazole, 40 mg, intravenous, Daily  perflutren protein A microsphere, 0.5 mL, intravenous, Once in imaging  sulfamethoxazole-trimethoprim, 80 mg, oral, Daily      Lab Results   Component Value Date    WBC 10.9 11/13/2023    HGB 13.3 11/13/2023    HCT 39.9 11/13/2023    MCV 85 11/13/2023     11/13/2023     Lab Results   Component Value Date    CREATININE 1.99 (H) 11/13/2023    BUN 83 (H) 11/13/2023     11/13/2023    K 4.4 11/13/2023    CL 97 (L) 11/13/2023    CO2 26 11/13/2023     Glucose   Date Value Ref Range Status   11/13/2023 100 (H) 74 - 99 mg/dL Final       Creatinine   Date Value Ref Range Status   11/13/2023 1.99 (H) 0.50 - 1.05 mg/dL Final   11/12/2023 1.57 (H) 0.50 - 1.05 mg/dL Final   11/12/2023 1.58 (H) 0.50 - 1.05 mg/dL Final   11/11/2023 1.48 (H) 0.50 - 1.05 mg/dL Final   11/11/2023 1.45 (H) 0.50 - 1.05 mg/dL Final   Initial creatinine  on hospitalization was 2.70     Lab Results   Component Value Date    CALCIUM 9.1 11/13/2023    PHOS 6.2 (H) 11/13/2023     Magnesium 2.29    Latest Reference Range & Units 11/09/23 06:07   Flu A Result Not Detected  Not Detected   Flu B Result Not Detected  Not Detected   RSV PCR Not Detected  Not Detected      Latest Reference Range & Units 11/09/23 16:48   Fungitell Beta-D Glucan,Serum <80 pg/mL <31      Latest Reference Range & Units 11/09/23 16:48   Aspergillus Galactomanan EIA,S <0.500  0.056   Culture  No results found for the last 90 days.      Continuous medications     PRN medications  PRN medications: acetaminophen **OR** acetaminophen **OR** acetaminophen, bisacodyl, dextrose 10 % in water (D10W), dextrose, docusate sodium, glucagon, guaiFENesin, hydrALAZINE, ipratropium-albuteroL, loperamide, melatonin, ondansetron ODT **OR** ondansetron, oxygen, polyethylene glycol, sodium chloride    CXR 11/9/2023  IMPRESSION:  1.  Perihilar airspace disease with underlying interstitial opacities  and correlate with pulmonary fibrosis. Correlate with atypical  infection.    CT chest wo IV contrast Result Date: 11/7/2023    Persistent extensive ground-glass densities in the lung apices and anterior aspect of the bilateral upper lobes with progression of previously identified ground-glass densities in the more confluent airspace opacities involving the bilateral lower lobes and posterior aspects of the bilateral upper lobes. Findings suggest atypical infection. Correlation with COVID status is recommended.        Assessment/Plan   Principal Problem:    Acute hypoxemic respiratory failure (CMS/HCC)    Macarena Wilson is a 68 year old female with a PMH of CKD III and HTN admitted to the MICU from OSH with AHRF 2/2 COVID pneumonia (positive on 10/13): course complicated by ARDS, and now with pulmonary fibrosis. Continues to require significant respiratory support via HFNC.    11/13 updates:  - Requiring increased  support from 35L at 50% FiO2 to 60L at 90% FiO2 to maintain sats: 2/2 progression of known fibrosis vs possible PE   > 60 IV lasix ordered 0600   > BL dopplers ordered   > Consider CTPA, holding off with worsening creatinine   - Starting SS bactrim daily for PCP prophylaxis while on steroids  - Recurrent HUNG on CKD, creatinine from 1.57 to 1.99    > Holding off on further diuresis    > Continue CLD    NEUROLOGY/ PSYCH:  Awake, alert, oriented x3.  - ICU delirium protocol, she has been in the ICU for >2 weeks   - Palliative consulted for discussion about GOC and quality of life     CARDIOVASCULAR:  #Hypertension  Pressures have been within goal.  - Holding Losartan 100mg in the setting of recurrent HUNG on CKD  - PRN hydralazine, SBP >180      PULMONARY:  #AHRF 2/2 COVID PNA (10/13) c/b ARDS  #GGO c/f pulmonary fibrosis vs organizing PNA, likely 2/2 COVID PNA  CT chest 11/7 - worsening bilateral changes consistent with severe COVID-19 pneumonitis and ARDS. On HFNC as patient could not tolerate BIPAP due to severe anxiety. S/p Remdesivir (completed course), a dose of Actemra on 10/24, and a dose of toci 10/26. ID not recommending another remdesivir course, and cannot get baricitinib until 11/24 (4 weeks out from toci). RVP, step pneumo, urine legionella negative, procal 11/7 0.25, now downtrending to 0.04 on 11/13. Pulmonary fibrosis 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 Fungitell, aspergillus galactomannan negative, with blasto and histo pending. Following input from ID, will discontinue voriconazole and meropenem. Increased oxygen requirement today 2/2 progression of known fibrosis vs PE: will obtain BL LE dopplers, hold off on CTPA given HUNG on CKD.    Plan:  - Continue Decadron 10mg daily (10/22-p)  > Prednisone equivalent of 1mg/kg/day  > Daily SS bactrim for PCP prophy  - Fluticasone 1 spray bid   - Blasto, histo pending  - Lasix 60 IV this AM   > Holding off on continued  diuresis with creatinine  - BL LE dopplers now  - Consider CTPA  - On HFNC 60 l/min on 90%, wean to sat 89-92%  - Infectious Control contacted - no need for Covid precautions     RENAL/GENITOURINARY:  #HUNG on CKD III, RECURRENT  Believe 1.5 may be her new baseline creatinine. HUNG on CKD had resolved. Now with recurrent HUNG: Cr from 1.57 to 1.99. Prerenal vs intrarenal, still with good UOP. Will hold additional diuresis.  - 60 of IV lasix this AM   > Hold further diuersis  - Consider urine lytes  - Renally dose medications  - Avoid nephrotoxins     GASTROENTEROLOGY:  #FEN  - Clear liquid diet   #Diarrhea, improving   Stool panel negative.  - Loperamide ordered PRN  - PRN miralax and docusate senna ffor constipation      ENDOCRINOLOGY:  #Steroid induced hyperglycemia  Sugars well-controlled.  - LDSSI    RHEUMATOLOGY:  No active issues      HEMATOLOGY:  No active issues    MUSCULOSKELETAL/ SKIN:  #Eczema/ Rosasea  - On home dupixent     INFECTIOUS DISEASE:  #Covid PNA, unvaccinated  Procal downtrending, from 0.25 on 11/9 to 0.04 on 11/12. Discontinuing voriconazole and meropenem per ID recommendations, and with low concern for fungal or bacterial infection, respectively.  - S/p Vancomycin 10/22-28, 11/7-11/7, with negative MRSA nares, meropenem 11/7- 11/13, and voriconazole 11/9- 11/13     Fluids: Replete PRN  Electrolytes: prn  Nutrition: CLD  Antimicrobials: None  On Decadron 10mg daily 10/22-p  DVT PPX:  subcutaneous heparin  GI ppx: Pantoprazole daily  Bowel care: Miralax and docusate senna prn, loperamide prn (diarrhea 11/11 improved)  Catheter: Pure wick external catheter   Lines: PIV  Oxygen: Airvow 60L FIO2 00%      Disposition:   To be determined   GOC discussion ongoing     Code Status: Full Code (confirmed on admission)   NOK:  Primary Emergency Contact: Mathew Wilson, Home Phone: [phone]    Madiha Cervantes MD  Internal Medicine and Pediatrics, PGY-1  Epic [employer]       Brief Attending Summary:   Acute hypoxemic  respiratory failure  COVID Pneumonia  HUNG on CKD    I have reviewed and evaluated the most recent data and results, personally examined the patient, and formulated the plan of care as presented above. This patient was critically ill and required continued critical care treatment. Teaching and any separately billable procedures are not included in the time calculation.    Billing Provider Critical Care Time: 40 minutes    Jonas Eden MD

## 2023-11-14 NOTE — PROGRESS NOTES
Macarena Wilson is a 68 y.o. female on day 5 of admission presenting with Acute hypoxemic respiratory failure (CMS/Formerly [facility]).    Subjective   Macarena Wilson is a 68 year old female with a PMH of CKD stage 3, HTN, and Rosasea who presents to the MICU in acute hypoxemic respiratory failure due to COVID pneumonia from an OSH where she was hospitalized on 10/24 with complaints of  shortness of breath. She states that she tested positive for Covid on 10/13 and continues to have shortness of breath.      OSH Hospital Course   The patient originally tested positive for coronavirus on October 13, 2023 and continued to experience shortness of breath, which caused her to present to [facility] on 10/24.  Patient remained in the hospital for 16 days being treated for acute respiratory failure secondary to coronavirus pneumonitis c/by severe ARDS.  She had complications of acute kidney injury on top of chronic kidney disease.  CT of the chest showed worsening bilateral changes consistent with severe COVID-19 pneumonitis and ARDS.  She continued to do marginal at best on high flow nasal cannula with intermittent use of nonrebreather and could not tolerate a BiPAP device due to anxiety.  She was given increased dose of IV corticosteroids, started on a heparin drip., an given IV diuretics.  She was also evaluated by a pulmonologist and infectious disease specialist.  In addition, she completed a course of remdesivir and received a dose of Actemra on October 24, 2023.  The patient did develop diarrhea, which resolved.   Patient noted that symptoms of fatigue, malaise and SOB on exertion started on the 10/13, but her SOB worsened from SOB on exertion to at rest. She couldn't get in sentences without being sob. She took some time off work however her symptoms were not getting better so she decided to go into the hospital. She has not had a covid vaccine or flu vaccine and had been active and able to do her ADLS and IDLS. She denies  "smoking or use of illicit drugs. Patient denies any fever, chills, cough, diarrhea, abdominal pain, dysuria, lightheadedness, shortness of breath, chest pain, abdominal pain, N/V/C/D, lower extremity pain/swelling.    [place] [facility] [place] the patient was on AirVo 40L/90% satting at 96%. She was weaned down to 40L/50% with saturations at 96% for a few hours, but then started to desat again and was moved up to 50L/90%.    11/10 - Patient stable overnight with AirVo remaining at 50L/90% with saturations in the mid-to-upper 90s. If she moves, her saturations will drop quickly to the low 80s, but they will also recover within a few minutes. She also had two loose bowel movements overnight with another two in the morning. The last c-diff screen was on 10/26 so another is ordered.  Palliative consulted for discussion about goals of quality of life.  11/11 - Patient stable overnight with no significant events.  Patient's Airvo settings are reduced to 40 L / 60% with patient satting in the low 90s.  Patient's C. difficile screen returned negative patient given loperamide for diarrhea control.  Patient given clear liquid diet.  11/13 - Patient stable overnight on 35L/50% FiO2 - reports she got up to get a drink and become more dyspneic, desatting to 78%. Support increased to 60L at 90% FiO2, satting high 80s. Given 60 IV lasix, repeat CXR without interval change. LE dopplers to r/o DVT, considered pursuing CTPA to r/o PE, held off in setting of increasing Cr. Started SS bactrim daily for PCP prophy while on steroids.  11/14 - On heparin gtt for distal DVTs in R tibial and L soleal veins. Remained on 60L at 90% FiO2 over the past 24 hours. Several runs of SVT corresponding with hypoxemia, responsive to oxygenation and vagal maneuvers.     Objective     Last Recorded Vitals  Blood pressure (!) 116/93, pulse (!) 114, temperature 35.8 °C (96.4 °F), resp. rate (!) 36, height 1.6 m (5' 2.99\"), weight 61.4 kg (135 lb " 5.8 oz), SpO2 (!) 88 %.  Intake/Output last 3 Shifts:  I/O last 3 completed shifts:  In: 392.2 (5.7 mL/kg) [P.O.:220; I.V.:72.2 (1.1 mL/kg); IV Piggyback:100]  Out: 1400 (20.5 mL/kg) [Urine:1400 (0.6 mL/kg/hr)]  Dosing Weight: 68.4 kg       Intake/Output Summary (Last 24 hours) at 11/14/2023 1420  Last data filed at 11/14/2023 0600  Gross per 24 hour   Intake 172.2 ml   Output 450 ml   Net -277.8 ml   1900 mL UOP    FiO2 (%):  [90 %] 90 %  60L/min on 90%    Physical Exam  Cardiovascular:      Rate and Rhythm: Normal rate and regular rhythm.      Pulses: Normal pulses.      Heart sounds: Normal heart sounds.   Pulmonary:      Effort: Pulmonary effort is normal.      Breath sounds: No wheezing, rhonchi or rales.      Comments: Airvo 60L/min at 90%  Poor air movement BL  No increased WOB  Abdominal:      Palpations: Abdomen is soft.   Skin:     General: Skin is warm.      Capillary Refill: Capillary refill takes less than 2 seconds.   Neurological:      General: No focal deficit present.      Mental Status: She is alert.       Relevant Results  Scheduled medications  dexAMETHasone, 10 mg, intravenous, Daily  fluticasone, 1 spray, Each Nostril, BID  insulin lispro, 0-5 Units, subcutaneous, TID with meals  lidocaine, 1 patch, transdermal, Daily  pantoprazole, 40 mg, intravenous, Daily  sulfamethoxazole-trimethoprim, 80 mg, oral, Daily  traZODone, 25 mg, oral, Nightly      Lab Results   Component Value Date    WBC 10.1 11/14/2023    HGB 14.2 11/14/2023    HCT 42.5 11/14/2023    MCV 84 11/14/2023     11/14/2023     Lab Results   Component Value Date    CREATININE 2.23 (H) 11/14/2023     (HH) 11/14/2023     11/14/2023    K 4.6 11/14/2023    CL 98 11/14/2023    CO2 26 11/14/2023     Glucose   Date Value Ref Range Status   11/14/2023 92 74 - 99 mg/dL Final       Creatinine   Date Value Ref Range Status   11/14/2023 2.23 (H) 0.50 - 1.05 mg/dL Final   11/14/2023 2.21 (H) 0.50 - 1.05 mg/dL Final   11/13/2023  2.10 (H) 0.50 - 1.05 mg/dL Final   11/13/2023 1.99 (H) 0.50 - 1.05 mg/dL Final   11/12/2023 1.57 (H) 0.50 - 1.05 mg/dL Final   Initial creatinine on hospitalization was 2.70     Lab Results   Component Value Date    CALCIUM 9.4 11/14/2023    PHOS 6.4 (H) 11/14/2023     Magnesium 2.29    Latest Reference Range & Units 11/09/23 06:07   Flu A Result Not Detected  Not Detected   Flu B Result Not Detected  Not Detected   RSV PCR Not Detected  Not Detected      Latest Reference Range & Units 11/09/23 16:48   Fungitell Beta-D Glucan,Serum <80 pg/mL <31      Latest Reference Range & Units 11/09/23 16:48   Aspergillus Galactomanan EIA,S <0.500  0.056   Culture  No results found for the last 90 days.      Continuous medications  heparin, 0-4,500 Units/hr, Last Rate: Stopped (11/14/23 0032)    PRN medications  PRN medications: acetaminophen **OR** acetaminophen **OR** acetaminophen, bisacodyl, dextrose 10 % in water (D10W), dextrose, docusate sodium, glucagon, guaiFENesin, heparin, hydrALAZINE, ipratropium-albuteroL, loperamide, melatonin, ondansetron ODT **OR** ondansetron, oxygen, polyethylene glycol, sodium chloride    CXR 11/9/2023  IMPRESSION:  1.  Perihilar airspace disease with underlying interstitial opacities  and correlate with pulmonary fibrosis. Correlate with atypical  infection.    CT chest wo IV contrast Result Date: 11/7/2023    Persistent extensive ground-glass densities in the lung apices and anterior aspect of the bilateral upper lobes with progression of previously identified ground-glass densities in the more confluent airspace opacities involving the bilateral lower lobes and posterior aspects of the bilateral upper lobes. Findings suggest atypical infection. Correlation with COVID status is recommended.        Assessment/Plan   Principal Problem:    Acute hypoxemic respiratory failure (CMS/HCC)  Active Problems:    COVID-19    Pneumonia of both lower lobes due to infectious organism    Macarena Wilson  is a 68 year old female with a PMH of CKD III and HTN admitted to the MICU from OSH with AHRF 2/2 COVID pneumonia (positive on 10/13) complicated by ARDS and pulmonary fibrosis concerning for organizing PNA. Course complicated by distal DVTs and oliguric HUNG on CKD. Overall, patient remains tenuous and continues to require significant respiratory support via HFNC: 60L at 90% FiO2, with limited options for escalation of care.    11/14 updates:  - Continues to require significant respiratory support on 60L at 90% HFNC: satting high 80s and desats with slight positional changes. Have discussed with patient that intubation/ventilation doesn't offer her much benefit. Ongoing GOC discussion.   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  - ICU delirium protocol, she has been in the ICU for >2 weeks   - Palliative consulted for discussion about GOC and quality of life     CARDIOVASCULAR:  #Hypertension  Pressures have been within goal.  - Holding Losartan 100mg in the setting of recurrent HUNG on CKD  - PRN hydralazine, SBP >180    #Distal DVTs  - On heparin gtt, titrating to therapeutic range    #Intermittent SVT  First occurrence today with desats to 70s, likely 2/2 hypoxemia - responsive to vagal maneuvers and without HDS instability.  - Goal sats > 88      PULMONARY:  #AHRF 2/2 COVID PNA (10/13) c/b ARDS  #GGO c/f pulmonary fibrosis vs organizing PNA, likely 2/2 COVID PNA  CT chest 11/7 - worsening bilateral changes consistent with severe  COVID-19 pneumonitis and ARDS. On HFNC as patient could not tolerate BIPAP due to severe anxiety. S/p Remdesivir (completed course), a dose of Actemra on 10/24, and a dose of toci 10/26. ID not recommending another remdesivir course, and cannot get baricitinib until 11/24 (4 weeks out from toci). RVP, step pneumo, urine legionella negative, procal 11/7 0.25, now downtrending to 0.04 on 11/13. Pulmonary fibrosis 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 Fungitell, aspergillus galactomannan negative, with blasto and histo pending. Following input from ID, will discontinue voriconazole and meropenem. Managing DVTs on heparin gtt.  - Continue Decadron 10mg daily (10/22-p)  > Prednisone equivalent of 1mg/kg/day  > Daily SS bactrim for PCP prophy  - Fluticasone 1 spray bid   - Blasto, histo pending  - Lasix 60 IV today  - On HFNC 60 l/min on 90%, wean to sat 89-92%  - Infectious Control contacted - no need for Covid precautions     RENAL/GENITOURINARY:  #Oliguric HUNG on CKD III, RECURRENT, with uremia  2.23 with baseline of 1.6, oliguric today producing scant tea-colored urine. BUN climbing, now at 107.   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  - Loperamide ordered PRN  - PRN miralax and docusate senna ffor constipation      ENDOCRINOLOGY:  #Steroid induced hyperglycemia  Sugars well-controlled.  - LDSSI    RHEUMATOLOGY:  No active issues      HEMATOLOGY:  No active issues    MUSCULOSKELETAL/ SKIN:  #Eczema/ Rosasea  - On home dupixent     INFECTIOUS DISEASE:  #Covid PNA, unvaccinated  Procal downtrending, from 0.25 on 11/9 to 0.04 on 11/12. Discontinuing voriconazole and meropenem per ID  recommendations, and with low concern for fungal or bacterial infection, respectively.  - S/p Vancomycin 10/22-28, 11/7-11/7, with negative MRSA nares, meropenem 11/7- 11/13, and voriconazole 11/9- 11/13     Fluids: per nephro  Electrolytes: prn  Nutrition: NPO  Antimicrobials: None, d/c'd 11/13  On Decadron 10mg daily 10/22-p  DVT PPX: therapeutic heparin  GI ppx: Pantoprazole daily  Bowel care: Miralax and docusate senna prn, loperamide prn (diarrhea 11/11 improved)  Catheter: Pure wick external catheter   Lines: PIV  Oxygen: Airvow 60L FIO2 90%      Disposition:   To be determined   GOC discussion ongoing     Code Status: Full Code (confirmed on admission)   NOK:  Primary Emergency Contact: Mathew Wilson, Home Phone: [phone]    Madiha Cervantes MD  Internal Medicine and Pediatrics, PGY-1  [employer]

## 2023-11-14 NOTE — CONSULTS
NEPHROLOGY NEW CONSULT NOTE   Macarena Wilson   68 y.o.    MRN/Room: [id number]/19/19-A    Reason for consult: HUNG on CKD, need for ultrafiltration?    HPI:  Macarena Wilson is a 68 y.o. female with a past medical hx of reported CKD III, HTN, rosacea who presented as a transfer from [facility] for persistent dyspnea on exertion secondary to a Covid infection.  She was found to have acute hypoxic respiratory failure and ARDS with concerns for pulmonary fibrosis and organizing pneumonia. At [facility] she received dexamethasone, remdesivir, and tocilizumab. She could not tolerate BiPAP and was maintained on HFNC.     She was transferred to the [facility] MICU on Airvo where she was treated with antibiotics/antifungals empirically and diuresed as well. She presented with a serum creatinine (1.31) that had been below her baseline at [facility] (1.6) but this has gradually worsened over the course of the admission up to 2.23 currently.     Overnight, patient had been stable on 35L/50% FiO2 but she reportedly got up to get a drink and desaturated to 78%. Her HFNC was titrated up to 60L/90% to allow her to sat in the high 80s. She was given further diuresis. Primary team considering PE vs worsening of pulmonary fibrosis. DVT ultrasounds have returned positive.    Nephrology consulted for worsening HUNG on CKD and question of possible ultrafiltration    Baseline creatinine is 2-2.5  Creatinine at the time of admission was 2-2.5 as an outpatient. At [facility], the creatinine had trended down to 1.2 but has climbed to 2.23 currently  Blood pressure has remained stable during hospital course.   Urine output was 750cc last 24hrs  Recent contrast studies: None  Recent nephrotoxic medication use (vanco/vanco+zosyn, amphotericin B, gentamicin, chemo drugs, NsAIDS): Bactrim  Recent ACE/ARB/diuretic use: Lasix doses given nearly ever day since admission    Patient reports good health pre-hospitalization and pre-Covid infection. She denies  "any fevers, arthralgias, abnormalities with her urine as an outpatient. Her only medications as an outpatient were hydrocortisone cream, levocetirizine, and losartan. No history of vascular disease or cancer.      In The ICU: BP (!) 116/93   Pulse (!) 114   Temp 35.8 °C (96.4 °F)   Resp (!) 36   Ht 1.6 m (5' 2.99\")   Wt 61.4 kg (135 lb 5.8 oz)   SpO2 (!) 88%   BMI 23.98 kg/m²      Past Medical History:   Diagnosis Date    Chronic kidney disease     Hypertension     Personal history of diseases of the skin and subcutaneous tissue     History of rosacea      Past Surgical History:   Procedure Laterality Date    APPENDECTOMY  04/01/2016    Appendectomy    HYSTERECTOMY      Partial    OTHER SURGICAL HISTORY  11/14/2022    Cystoscopy      Family History   Problem Relation Name Age of Onset    Breast cancer Mother      Heart failure Mother      No Known Problems Father      No Known Problems Sister       Social History     Socioeconomic History    Marital status:      Spouse name: Not on file    Number of children: Not on file    Years of education: Not on file    Highest education level: Not on file   Occupational History    Not on file   Tobacco Use    Smoking status: Never    Smokeless tobacco: Never   Vaping Use    Vaping Use: Never used   Substance and Sexual Activity    Alcohol use: Not on file    Drug use: Never    Sexual activity: Not on file   Other Topics Concern    Not on file   Social History Narrative    Not on file     Social Determinants of Health     Financial Resource Strain: Low Risk  (11/9/2023)    Overall Financial Resource Strain (CARDIA)     Difficulty of Paying Living Expenses: Not hard at all   Food Insecurity: No Food Insecurity (11/9/2023)    Hunger Vital Sign     Worried About Running Out of Food in the Last Year: Never true     Ran Out of Food in the Last Year: Never true   Transportation Needs: No Transportation Needs (11/9/2023)    PRAPARE - Transportation     Lack of " "Transportation (Medical): No     Lack of Transportation (Non-Medical): No   Physical Activity: Not on file   Stress: Not on file   Social Connections: Not on file   Intimate Partner Violence: Not At Risk (11/9/2023)    Humiliation, Afraid, Rape, and Kick questionnaire     Fear of Current or Ex-Partner: No     Emotionally Abused: No     Physically Abused: No     Sexually Abused: No   Housing Stability: Low Risk  (11/9/2023)    Housing Stability Vital Sign     Unable to Pay for Housing in the Last Year: No     Number of Places Lived in the Last Year: 1     Unstable Housing in the Last Year: No       Allergies   Allergen Reactions    Fesoterodine Unknown    Mirabegron Rash    Prednisone Other     \"body aches, flu like symptoms\"        Medications Prior to Admission   Medication Sig Dispense Refill Last Dose    acetaminophen (Tylenol) 325 mg tablet Take 2 tablets (650 mg) by mouth every 4 hours if needed for fever (temp greater than 38.0 C) (greater than or equal to 38 C). 30 tablet 0     acetaminophen (Tylenol) 325 mg/10.15 mL oral liquid 20.3 mL (650 mg) by nasogastric tube route every 4 hours if needed for fever (temp greater than 38.0 C) (greater than or equal to 38 C).       acetaminophen (Tylenol) 650 mg suppository Insert 1 suppository (650 mg) into the rectum every 4 hours if needed for fever (temp greater than 38.0 C) (greater than or equal to 38 C). 12 suppository 0     acetylcysteine (Mucomyst) 200 mg/mL (20 %) nebulizer solution Take 3 mL (600 mg) by nebulization 3 times a day. (Patient not taking: Reported on 11/10/2023)   Not Taking    budesonide (Pulmicort) 0.5 mg/2 mL nebulizer solution Take 2 mL (0.5 mg) by nebulization 2 times a day for 360 doses. Rinse mouth with water after use to reduce aftertaste and incidence of candidiasis. Do not swallow. (Patient not taking: Reported on 11/10/2023) 60 mL 11 Not Taking    dexAMETHasone (Decadron) 10 mg/mL injection Infuse 1 mL (10 mg) into a venous catheter " every 12 hours. (Patient not taking: Reported on 11/10/2023)   Not Taking    dexmedeTOMIDine in NS (Precedex) 400 mcg in 100 mL (4 mcg/mL) premix Infuse 6..6 mcg/hr at 1.71-25.65 mL/hr into a venous catheter continuously.       fluticasone (Flonase) 50 mcg/actuation nasal spray Administer 1 spray into each nostril 2 times a day. Shake gently. Before first use, prime pump. After use, clean tip and replace cap. (Patient not taking: Reported on 11/10/2023) 16 g 12 Not Taking    glucagon (Glucagen) 1 mg injection Inject 1 mg into the muscle every 15 minutes if needed for low blood sugar - see comments (For blood glucose less than or equal to 70 mg/dL and no IV access). 1 each 12     guaiFENesin (Robitussin) 100 mg/5 mL syrup Take 10 mL (200 mg) by mouth every 4 hours if needed for congestion. (Patient not taking: Reported on 11/10/2023) 120 mL 0 Not Taking    heparin sodium,porcine/D5W (heparin, porcine,) 25,000 unit/250 mL(100 unit/mL) parenteral solution in D5W infusion Infuse 0-4,000 Units/hr at 0-40 mL/hr into a venous catheter continuously.       hydrALAZINE (Apresoline) 20 mg/mL injection Infuse 0.5 mL (10 mg) into a venous catheter every 8 hours if needed (If SBP greater than 170 or DBP greater than 100). (Patient not taking: Reported on 11/10/2023) 1 mL  Not Taking    hydrocortisone 1 % cream Apply 1 Application topically once daily. As needed for eczema       insulin glargine (Lantus) 100 unit/mL injection Inject 16 Units under the skin once daily at bedtime. Take as directed per insulin instructions. (Patient not taking: Reported on 11/10/2023)   Not Taking    ipratropium-albuteroL (Duo-Neb) 0.5-2.5 mg/3 mL nebulizer solution Take 3 mL by nebulization every 6 hours while awake. (Patient not taking: Reported on 11/10/2023)   Not Taking    ipratropium-albuteroL (Duo-Neb) 0.5-2.5 mg/3 mL nebulizer solution Take 3 mL by nebulization every 2 hours if needed for wheezing. (Patient not taking: Reported on  11/10/2023) 180 mL 11 Not Taking    levocetirizine (Xyzal) 5 mg tablet Take 1 tablet (5 mg) by mouth once daily in the evening. As needed       losartan (Cozaar) 100 mg tablet Take 1 tablet (100 mg) by mouth once daily.       meropenem (Merrem) 500 mg/100 mL IV Infuse 100 mL (500 mg) into a venous catheter every 12 hours. (Patient not taking: Reported on 11/10/2023)   Not Taking    mv-mn/C/glutamin/lysin/idie898 (AIRBORNE, ASCORBATE SODIUM, ORAL) Take 1 tablet by mouth once daily. As needed       oxygen (O2) gas therapy Inhale 1 each once every 24 hours.       pantoprazole (ProtoNix) 40 mg EC tablet Take 1 tablet (40 mg) by mouth once daily in the morning. Take before meals. Do not crush, chew, or split. (Patient not taking: Reported on 11/10/2023)   Not Taking    sodium chloride (Ocean) 0.65 % nasal spray Administer 1 spray into each nostril if needed for congestion. (Patient not taking: Reported on 11/10/2023) 30 mL 12 Not Taking        Meds:   dexAMETHasone, 10 mg, Daily  fluticasone, 1 spray, BID  insulin lispro, 0-5 Units, TID with meals  lidocaine, 1 patch, Daily  pantoprazole, 40 mg, Daily  sulfamethoxazole-trimethoprim, 80 mg, Daily  traZODone, 25 mg, Nightly      heparin, Last Rate: Stopped (11/14/23 0032)      acetaminophen, 650 mg, q4h PRN   Or  acetaminophen, 650 mg, q4h PRN   Or  acetaminophen, 650 mg, q4h PRN  bisacodyl, 10 mg, Daily PRN  dextrose 10 % in water (D10W), 0.3 g/kg/hr, Once PRN  dextrose, 25 g, q15 min PRN  docusate sodium, 100 mg, BID PRN  glucagon, 1 mg, q15 min PRN  guaiFENesin, 200 mg, q4h PRN  heparin, 2,000-4,000 Units, q4h PRN  hydrALAZINE, 10 mg, q8h PRN  ipratropium-albuteroL, 3 mL, q4h PRN  loperamide, 2 mg, 4x daily PRN  melatonin, 6 mg, Nightly PRN  ondansetron ODT, 4 mg, q8h PRN   Or  ondansetron, 4 mg, q8h PRN  oxygen, , q4h PRN  polyethylene glycol, 17 g, Daily PRN  sodium chloride, 1 spray, PRN        Vitals:    11/14/23 1200   BP: (!) 116/93   Pulse: (!) 114   Resp: (!) 36    Temp:    SpO2: (!) 88%        11/12 1900 - 11/14 0659  In: 392.2 [P.O.:220; I.V.:72.2]  Out: 1400 [Urine:1400]   Weight change:      General appearance: AAOx3. No distress. On Airvo currently  Eyes: non-icteric  Skin: no apparent rash  Heart: Regular rate and rhythm with no murmurs  Lungs: CTA bilat.  Poor inspiratory effort  Abdomen: soft, nt/nd  Extremities: No edema bilat  : External urinary catheter in place  Neuro: No FND  ACCESS: PIVx3      Blood Labs:  Results for orders placed or performed during the hospital encounter of 11/09/23 (from the past 24 hour(s))   POCT GLUCOSE   Result Value Ref Range    POCT Glucose 178 (H) 74 - 99 mg/dL   CBC   Result Value Ref Range    WBC 10.2 4.4 - 11.3 x10*3/uL    nRBC 0.0 0.0 - 0.0 /100 WBCs    RBC 4.91 4.00 - 5.20 x10*6/uL    Hemoglobin 13.6 12.0 - 16.0 g/dL    Hematocrit 40.8 36.0 - 46.0 %    MCV 83 80 - 100 fL    MCH 27.7 26.0 - 34.0 pg    MCHC 33.3 32.0 - 36.0 g/dL    RDW 14.4 11.5 - 14.5 %    Platelets 222 150 - 450 x10*3/uL   Renal Function Panel   Result Value Ref Range    Glucose 141 (H) 74 - 99 mg/dL    Sodium 141 136 - 145 mmol/L    Potassium 4.5 3.5 - 5.3 mmol/L    Chloride 96 (L) 98 - 107 mmol/L    Bicarbonate 27 21 - 32 mmol/L    Anion Gap 23 (H) 10 - 20 mmol/L    Urea Nitrogen 91 (HH) 6 - 23 mg/dL    Creatinine 2.10 (H) 0.50 - 1.05 mg/dL    eGFR 25 (L) >60 mL/min/1.73m*2    Calcium 9.6 8.6 - 10.6 mg/dL    Phosphorus 6.7 (H) 2.5 - 4.9 mg/dL    Albumin 3.5 3.4 - 5.0 g/dL   Magnesium   Result Value Ref Range    Magnesium 2.46 (H) 1.60 - 2.40 mg/dL   Protime-INR   Result Value Ref Range    Protime 11.4 9.8 - 12.8 seconds    INR 1.0 0.9 - 1.1   aPTT - baseline   Result Value Ref Range    aPTT 26 (L) 27 - 38 seconds   POCT GLUCOSE   Result Value Ref Range    POCT Glucose 113 (H) 74 - 99 mg/dL   Heparin Assay, UFH   Result Value Ref Range    Heparin Unfractionated >2.0 (HH) See Comment Below for Therapeutic Ranges IU/mL   POCT GLUCOSE   Result Value Ref Range     POCT Glucose 126 (H) 74 - 99 mg/dL   CBC and Auto Differential   Result Value Ref Range    WBC 10.1 4.4 - 11.3 x10*3/uL    nRBC 0.0 0.0 - 0.0 /100 WBCs    RBC 5.09 4.00 - 5.20 x10*6/uL    Hemoglobin 14.2 12.0 - 16.0 g/dL    Hematocrit 42.5 36.0 - 46.0 %    MCV 84 80 - 100 fL    MCH 27.9 26.0 - 34.0 pg    MCHC 33.4 32.0 - 36.0 g/dL    RDW 14.6 (H) 11.5 - 14.5 %    Platelets 210 150 - 450 x10*3/uL    Neutrophils % 88.4 40.0 - 80.0 %    Immature Granulocytes %, Automated 0.8 0.0 - 0.9 %    Lymphocytes % 3.6 13.0 - 44.0 %    Monocytes % 7.1 2.0 - 10.0 %    Eosinophils % 0.0 0.0 - 6.0 %    Basophils % 0.1 0.0 - 2.0 %    Neutrophils Absolute 8.96 (H) 1.20 - 7.70 x10*3/uL    Immature Granulocytes Absolute, Automated 0.08 0.00 - 0.70 x10*3/uL    Lymphocytes Absolute 0.36 (L) 1.20 - 4.80 x10*3/uL    Monocytes Absolute 0.72 0.10 - 1.00 x10*3/uL    Eosinophils Absolute 0.00 0.00 - 0.70 x10*3/uL    Basophils Absolute 0.01 0.00 - 0.10 x10*3/uL   Renal Function Panel   Result Value Ref Range    Glucose 122 (H) 74 - 99 mg/dL    Sodium 141 136 - 145 mmol/L    Potassium 4.3 3.5 - 5.3 mmol/L    Chloride 96 (L) 98 - 107 mmol/L    Bicarbonate 27 21 - 32 mmol/L    Anion Gap 22 (H) 10 - 20 mmol/L    Urea Nitrogen 97 (HH) 6 - 23 mg/dL    Creatinine 2.21 (H) 0.50 - 1.05 mg/dL    eGFR 24 (L) >60 mL/min/1.73m*2    Calcium 9.4 8.6 - 10.6 mg/dL    Phosphorus 6.4 (H) 2.5 - 4.9 mg/dL    Albumin 3.7 3.4 - 5.0 g/dL   Magnesium   Result Value Ref Range    Magnesium 2.63 (H) 1.60 - 2.40 mg/dL   Heparin Assay, UFH   Result Value Ref Range    Heparin Unfractionated >2.0 (HH) See Comment Below for Therapeutic Ranges IU/mL   POCT GLUCOSE   Result Value Ref Range    POCT Glucose 114 (H) 74 - 99 mg/dL   Heparin Assay, UFH   Result Value Ref Range    Heparin Unfractionated 1.1 () See Comment Below for Therapeutic Ranges IU/mL   Renal Function Panel   Result Value Ref Range    Glucose 92 74 - 99 mg/dL    Sodium 142 136 - 145 mmol/L    Potassium 4.6 3.5 -  5.3 mmol/L    Chloride 98 98 - 107 mmol/L    Bicarbonate 26 21 - 32 mmol/L    Anion Gap 23 (H) 10 - 20 mmol/L    Urea Nitrogen 107 (HH) 6 - 23 mg/dL    Creatinine 2.23 (H) 0.50 - 1.05 mg/dL    eGFR 23 (L) >60 mL/min/1.73m*2    Calcium 9.4 8.6 - 10.6 mg/dL    Phosphorus 6.4 (H) 2.5 - 4.9 mg/dL    Albumin 3.5 3.4 - 5.0 g/dL   Magnesium   Result Value Ref Range    Magnesium 2.61 (H) 1.60 - 2.40 mg/dL   POCT GLUCOSE   Result Value Ref Range    POCT Glucose 117 (H) 74 - 99 mg/dL         ASSESSMENT:  Macarena Wilson is a  68 y.o.  Year old , with PMHx of HTN, CKD III-IV who presents as a transfer from [facility] for Covid pneumonia c/b fibrosis and organizing pneumonia. She was transferred to [facility] on Airvo and has been diuresed and treated with empiric antibiotics and antifungals. She had acute worsening of her hypoxia overnight which is possibly secondary to worsening of her fibrosis vs DVT/PE. Nephrology consulted for steadily rising BUN/Cr as well as poor urinary response to IV Lasix    #HUNG on CKD III-IV, stage I non-oliguric  -Questionable HUNG as current creatinine is 2.23 and pre-hospital baseline was around 2-2.5  -Her creatinine did reach a low of 1.22 during this hospitalization  -Etiology is likely multifactorial with prolonged diuresis, Covid infection, addition of Bactrim contributing to rising BUN/Cr  -Most recent UA showing 1+ proteinuria, large LE, High RBC and WBC  -Clinically, patient appears euvolemic on exam  -Electrolytes WNL  -Acid base status WNL      Recommendations:  - Check UA and urine urea/urine creatinine for FEUrea  - Indication for dialysis:  No. If continued fluid removal is desire, continue titrating up on Lasix dosage and/or consider addition of metolazone. Renal to continue following and will reevaluate urine output and renal function  - Ok for CTPE if results will . Otherwise please avoid contrast if possible  - Avoid nephrotoxins  - strict Is/Os  - Renal dosing for  medications for latest eGFR, follow medication trough as appropriate  - Avoid hypotension/rapid fluctuations in BPs    Hung Cesar MD  Nephrology Resident  24 hour Renal Pager - [phone]    Discussed with attending nephrologist

## 2023-11-14 NOTE — ACP (ADVANCE CARE PLANNING)
Confirming Previous Code Status: Full code  Advance Care Planning Note     Discussion Date: 11/14/23   Discussion Participants: patient  (Daughter in law- Tab; and son) were on phone.    The patient wishes to discuss Advance Care Planning today and the following is a brief summary of our discussion.     Patient has capacity to make their own medical decisions: Yes  Health Care Agent/Surrogate Decision Maker documented in chart: Yes  (Both sons)  Documents on file and valid:  Advance Directive/Living Will: No   Health Care Power of : No      Communication of Medical Status/Prognosis:   Macarena understands that she has an overall potential poor prognosis. We are currently treating her with support of Airvo-  60L and 90% FIO2. We are concerned that she could worsen and die. At the same time we are doing all we can to improve her symptoms, including increasing steroids.     Communication of Treatment Goals/Options:   Some options of care is to continue doing what we are doing. If worsens, we have two options- intubation, which would not correct the underlying cause or focus on comfort. She has had some of these conversations with her family; and today has decided on DNR/DNI, if she worsens.   Also reviewed some strategies to decrease anxiety and dyspnea- bedside fan, mindfulness, and opioids.   Provided reflective listening and presence.     Treatment Decisions  Goals of Care: quality of life is prioritized; willing to accept low-burden treatments to achieve meaningful goals     Follow Up Plan  DNR/DNI.   Continue all other aggressive measures.     Team Members  Palliative Care- Gamal Jacobs, Rosina Carvalho, Jw Atkins, Jayant Figueredo, and Dr. Kang Middleton.   Time Statement: Total face to face time spent on advance care planning was 30 mins.     Shannen Jacobs MD  11/14/2023 5:34 PM

## 2023-11-15 NOTE — PROGRESS NOTES
Physical Therapy                 Therapy Communication Note    Patient Name: Macarena Wilson  MRN: [id number]  Today's Date: 11/15/2023     Discipline: Physical Therapy    Missed Visit Reason: Missed Visit Reason:  (patient with tenuous respiratory status and cardiac status; Per RN, patient goes into SVT with HR into the 190s-200s; PT will hold and re-attempt as time and schedule allows and as medically appropriate.)    Missed Time: Attempt    Comment:

## 2023-11-15 NOTE — PROGRESS NOTES
SOCIAL WORK NOTE  (late entry)  SW met with team for ID rounds. Patient is suspected to have chronic fibrosis post COVID-19. Team to develop feeding plan. Palliative care is follow. Currently recommended for Moderate intensity therapy, but has high oxygen needs at this time. Social work to follow.   ROGELIO Kendall, LISW-S ([id number])

## 2023-11-15 NOTE — PROGRESS NOTES
NEPHROLOGY FOLLOW UP NOTE    Macarena Wilson   68 y.o.    @WT@  MRN/Room: [id number]/19/19-A    Subjective: Patient endorsing very good sleep last night and does endorse some fatigue, she states she is attempting to rest as much as possible. She has had intermittent runs of SVT and has been persistently hypoxic with uptitration of her HFNC to 60L/85%. Her urine output has continued to be very poor in response to Lasix diuresis.    Objective:     Meds:   fluticasone, 1 spray, BID  insulin lispro, 0-5 Units, TID with meals  lidocaine, 1 patch, Daily  methylPREDNISolone sodium succinate (PF), 81.25 mg, q6h  pantoprazole, 40 mg, Daily  sulfamethoxazole-trimethoprim, 80 mg of trimethoprim, Daily  [Held by provider] traZODone, 25 mg, Nightly      heparin, Last Rate: Stopped (11/15/23 1300)      acetaminophen, 650 mg, q4h PRN   Or  acetaminophen, 650 mg, q4h PRN   Or  acetaminophen, 650 mg, q4h PRN  bisacodyl, 10 mg, Daily PRN  dextrose 10 % in water (D10W), 0.3 g/kg/hr, Once PRN  dextrose, 25 g, q15 min PRN  docusate sodium, 100 mg, BID PRN  glucagon, 1 mg, q15 min PRN  guaiFENesin, 200 mg, q4h PRN  heparin, 2,000-4,000 Units, q4h PRN  hydrALAZINE, 10 mg, q8h PRN  ipratropium-albuteroL, 3 mL, q4h PRN  loperamide, 2 mg, 4x daily PRN  melatonin, 6 mg, Nightly PRN  ondansetron ODT, 4 mg, q8h PRN   Or  ondansetron, 4 mg, q8h PRN  oxygen, , q4h PRN  polyethylene glycol, 17 g, Daily PRN  sodium chloride, 1 spray, PRN        Vitals:    11/15/23 1500   BP: 129/76   Pulse: 98   Resp: 26   Temp:    SpO2: 90%          Intake/Output Summary (Last 24 hours) at 11/15/2023 1536  Last data filed at 11/15/2023 1500  Gross per 24 hour   Intake 241.85 ml   Output 575 ml   Net -333.15 ml       General appearance: Awake and alert, oriented, . No distress  HEENT: NC/AT, moist oral mucosa  Skin: no apparent rash  Heart: heart sounds 1 & 2 present and normal, no murmurs heard or rub  Lungs: Mild respiratory distress, very faint rhonchi  bilaterally  Abdomen: soft, non tender  Extremities: No edema, no joint swelling  Neuro: No FND  ACCESS: PIV, external urinary catheter    Blood Labs:  Results for orders placed or performed during the hospital encounter of 11/09/23 (from the past 24 hour(s))   POCT GLUCOSE   Result Value Ref Range    POCT Glucose 150 (H) 74 - 99 mg/dL   Heparin Assay, UFH   Result Value Ref Range    Heparin Unfractionated 0.2 See Comment Below for Therapeutic Ranges IU/mL   Renal function panel   Result Value Ref Range    Glucose 117 (H) 74 - 99 mg/dL    Sodium 142 136 - 145 mmol/L    Potassium 5.0 3.5 - 5.3 mmol/L    Chloride 98 98 - 107 mmol/L    Bicarbonate 24 21 - 32 mmol/L    Anion Gap 25 (H) 10 - 20 mmol/L    Urea Nitrogen 107 (HH) 6 - 23 mg/dL    Creatinine 2.26 (H) 0.50 - 1.05 mg/dL    eGFR 23 (L) >60 mL/min/1.73m*2    Calcium 9.5 8.6 - 10.6 mg/dL    Phosphorus 6.5 (H) 2.5 - 4.9 mg/dL    Albumin 3.5 3.4 - 5.0 g/dL   Magnesium   Result Value Ref Range    Magnesium 2.65 (H) 1.60 - 2.40 mg/dL   Urinalysis with Reflex Microscopic   Result Value Ref Range    Color, Urine Yellow Straw, Yellow    Appearance, Urine Hazy (N) Clear    Specific Gravity, Urine 1.014 1.005 - 1.035    pH, Urine 6.0 5.0, 5.5, 6.0, 6.5, 7.0, 7.5, 8.0    Protein, Urine 30 (1+) (N) NEGATIVE mg/dL    Glucose, Urine NEGATIVE NEGATIVE mg/dL    Blood, Urine LARGE (3+) (A) NEGATIVE    Ketones, Urine 5 (TRACE) (A) NEGATIVE mg/dL    Bilirubin, Urine NEGATIVE NEGATIVE    Urobilinogen, Urine <2.0 <2.0 mg/dL    Nitrite, Urine NEGATIVE NEGATIVE    Leukocyte Esterase, Urine MODERATE (2+) (A) NEGATIVE   Urea Nitrogen, Urine Random   Result Value Ref Range    Urea Nitrogen, Urine Random 770 mg/dL    Creatinine, Urine Random 40.1 20.0 - 320.0 mg/dL    Urea Nitrogen/Creatinine Ratio 19.2 Not established. g/g creat   Urine electrolytes   Result Value Ref Range    Sodium, Urine Random 79 mmol/L    Sodium/Creatinine Ratio 197 Not established. mmol/g Creat    Potassium, Urine  Random 39 mmol/L    Potassium/Creatinine Ratio 97 Not established mmol/g Creat    Chloride, Urine Random 58 mmol/L    Chloride/Creatinine Ratio 145 38 - 318 mmol/g creat    Creatinine, Urine Random 40.1 20.0 - 320.0 mg/dL   Microscopic Only, Urine   Result Value Ref Range    WBC, Urine 21-50 (A) 1-5, NONE /HPF    RBC, Urine >20 (A) NONE, 1-2, 3-5 /HPF    Bacteria, Urine 1+ (A) NONE SEEN /HPF   POCT GLUCOSE   Result Value Ref Range    POCT Glucose 119 (H) 74 - 99 mg/dL   CBC   Result Value Ref Range    WBC 11.4 (H) 4.4 - 11.3 x10*3/uL    nRBC 0.0 0.0 - 0.0 /100 WBCs    RBC 4.93 4.00 - 5.20 x10*6/uL    Hemoglobin 13.6 12.0 - 16.0 g/dL    Hematocrit 41.5 36.0 - 46.0 %    MCV 84 80 - 100 fL    MCH 27.6 26.0 - 34.0 pg    MCHC 32.8 32.0 - 36.0 g/dL    RDW 14.6 (H) 11.5 - 14.5 %    Platelets 215 150 - 450 x10*3/uL   Heparin Assay, UFH   Result Value Ref Range    Heparin Unfractionated >2.0 (HH) See Comment Below for Therapeutic Ranges IU/mL   POCT GLUCOSE   Result Value Ref Range    POCT Glucose 132 (H) 74 - 99 mg/dL   POCT GLUCOSE   Result Value Ref Range    POCT Glucose 136 (H) 74 - 99 mg/dL   POCT GLUCOSE   Result Value Ref Range    POCT Glucose 211 (H) 74 - 99 mg/dL   Heparin Assay, UFH   Result Value Ref Range    Heparin Unfractionated >2.0 (HH) See Comment Below for Therapeutic Ranges IU/mL          ASSESSMENT:  Macarena Wilson is a  68 y.o.  Year old , with PMHx of HTN, CKD III-IV who presents as a transfer from [facility] for Covid pneumonia c/b fibrosis and organizing pneumonia. She was transferred to [facility] on Airvo and has been diuresed and treated with empiric antibiotics and antifungals. She had acute worsening of her hypoxia overnight which is possibly secondary to worsening of her fibrosis vs DVT/PE. Nephrology consulted for steadily rising BUN/Cr as well as poor urinary response to IV Lasix     #HUNG on CKD III-IV, stage I non-oliguric  -Rising BUN/Cr with most recent Cr of 2.81 up from 2.25  yesterday  -FEUrea of 40.6% suggesting intrinsic renal disease  -Her creatinine did reach a low of 1.22 during this hospitalization  -Etiology is likely multifactorial with hypoxia, prolonged diuresis, Covid infection, addition of Bactrim contributing to rising BUN/Cr  -Most recent UA showing 1+ proteinuria, large LE, High RBC and WBC  -Clinically, patient appears euvolemic on exam  -Electrolytes WNL  -Acid base status WNL        Recommendations:  - Recommend renal ultrasound to exclude renal infarct or renal vein thrombosis  - If desired by primary team, can continue uptitration of Lasix to 120mg with possible addition of metolazone  - No urgent dialysis needs. If she remains oliguric, she may require dialysis for solute removal, as well as any ultrafiltration which is desired for interstitial pulmonary edema in the near future. Possibility of dialysis discussed with patient who is agreeable. Renal will continue to follow  - Ok for CTPE if results will . Otherwise please avoid contrast if possible  - Avoid nephrotoxins  - strict Is/Os  - Renal dosing for medications for latest eGFR, follow medication trough as appropriate  - Avoid hypotension/rapid fluctuations in BPs     Hung Cesar MD  Nephrology Resident  24 hour Renal Pager - [phone]     Discussed with attending nephrologist

## 2023-11-15 NOTE — CARE PLAN
The patient's goals for the shift include      The clinical goals for the shift include comtinue to maintain a PO > 89%.    Over the shift, the patient did not make progress toward the following goals. Barriers to progression include Recommendations to address these barriers include

## 2023-11-15 NOTE — PROGRESS NOTES
Spiritual Care Visit    Clinical Encounter Type  Visited With: Patient and family together  Routine Visit: Follow-up  Continue Visiting: Yes    Taxonomy  Intended Effects: Convey a calming presence, Demonstrate caring and concern, Helping someone feel comforted  Methods: Encourage sharing of feelings, Offer emotional support  Interventions: Acknowledge response to difficult experience, Active listening, Silent prayer    Earlier in the day,  visited patient Macarena Wilson and introduced self and role to her grandson at bedside. Patient shared she was trying to get her heart rate to come down and that it had been a difficult couple of days. She and grandson did not express any needs at the time;  emphasized availability and ongoing support.     returned later in the day and provided compassionate presence to patient son, daughter-in-law, and granddaughters.  held space for them to share how they were doing.  provided cold packs for patient per their request, and assured them and patient of continued prayer. They were appreciative and did not have any further needs at this time. Spiritual Care remains available as needed/requested.    Rev. Estee Lai MDiv, BCC

## 2023-11-15 NOTE — PROGRESS NOTES
Occupational Therapy                 Therapy Communication Note    Patient Name: Macarena Wilosn  MRN: [id number]  Today's Date: 11/15/2023     Discipline: Occupational Therapy    Missed Visit Reason: Missed Visit Reason:  (Patient desat to low 80s at rest in bed this AM, HR going to 190s-200s in SVT this AM, hold per RN, patient now DNR/DNI per RN; will continue to follow.)    Missed Time: Attempt    Comment:  Alicia Kilgore, OTR/L

## 2023-11-15 NOTE — PROGRESS NOTES
"Macarena Wilson is a 68 y.o. female on day 6 of admission presenting with Acute hypoxemic respiratory failure (CMS/HCC).    Subjective   Macarena Wilson is a 68 year old female with a PMH of CKD III and HTN admitted to the MICU from OSH with AHRF 2/2 COVID pneumonia (positive on 10/13) complicated by ARDS and pulmonary fibrosis concerning for organizing PNA. Course complicated by distal DVTs and likely PE, along with oliguric HUNG on CKD.     Made DNR/DNI yesterday. Changed from 10 daily of dex to 125 q6 of solumedrol. No runs of SVT overnight. Heparin finally within range, restarted gtt.     Objective     Last Recorded Vitals  Blood pressure 114/78, pulse 89, temperature 35 °C (95 °F), resp. rate (!) 30, height 1.6 m (5' 2.99\"), weight 60.2 kg (132 lb 11.5 oz), SpO2 90 %.  Intake/Output last 3 Shifts:  I/O last 3 completed shifts:  In: 133.8 (2 mL/kg) [I.V.:133.8 (2 mL/kg)]  Out: 850 (12.4 mL/kg) [Urine:850 (0.3 mL/kg/hr)]  Dosing Weight: 68.4 kg       Intake/Output Summary (Last 24 hours) at 11/15/2023 1326  Last data filed at 11/15/2023 1300  Gross per 24 hour   Intake 241.85 ml   Output 750 ml   Net -508.15 ml   1900 mL UOP    FiO2 (%):  [80 %-90 %] 85 %  60L/min on 85%    Physical Exam  Cardiovascular:      Rate and Rhythm: Regular rhythm. Tachycardia present.      Pulses: Normal pulses.      Heart sounds: Normal heart sounds.   Pulmonary:      Effort: Pulmonary effort is normal.      Breath sounds: No wheezing, rhonchi or rales.      Comments: Airvo 60L/min at 85%  Poor air movement BL  No increased WOB  Abdominal:      Palpations: Abdomen is soft.   Skin:     General: Skin is warm.      Capillary Refill: Capillary refill takes less than 2 seconds.   Neurological:      General: No focal deficit present.      Mental Status: She is alert.       Relevant Results  Scheduled medications  fluticasone, 1 spray, Each Nostril, BID  insulin lispro, 0-5 Units, subcutaneous, TID with meals  lidocaine, 1 patch, " transdermal, Daily  methylPREDNISolone sodium succinate (PF), 81.25 mg, intravenous, q6h  pantoprazole, 40 mg, intravenous, Daily  sulfamethoxazole-trimethoprim, 80 mg of trimethoprim, intravenous, Daily  [Held by provider] traZODone, 25 mg, oral, Nightly      Lab Results   Component Value Date    WBC 11.8 (H) 11/15/2023    HGB 13.8 11/15/2023    HCT 43.5 11/15/2023    MCV 86 11/15/2023     11/15/2023     Lab Results   Component Value Date    CREATININE 2.81 (H) 11/15/2023     (HH) 11/15/2023     11/15/2023    K 4.9 11/15/2023     11/15/2023    CO2 21 11/15/2023     Glucose   Date Value Ref Range Status   11/15/2023 128 (H) 74 - 99 mg/dL Final       Creatinine   Date Value Ref Range Status   11/15/2023 2.81 (H) 0.50 - 1.05 mg/dL Final   11/14/2023 2.26 (H) 0.50 - 1.05 mg/dL Final   11/14/2023 2.23 (H) 0.50 - 1.05 mg/dL Final   11/14/2023 2.21 (H) 0.50 - 1.05 mg/dL Final   11/13/2023 2.10 (H) 0.50 - 1.05 mg/dL Final   Initial creatinine on hospitalization was 2.70     Lab Results   Component Value Date    CALCIUM 9.2 11/15/2023    PHOS 7.4 (H) 11/15/2023     Magnesium 2.29    Latest Reference Range & Units 11/09/23 06:07   Flu A Result Not Detected  Not Detected   Flu B Result Not Detected  Not Detected   RSV PCR Not Detected  Not Detected      Latest Reference Range & Units 11/09/23 16:48   Fungitell Beta-D Glucan,Serum <80 pg/mL <31      Latest Reference Range & Units 11/09/23 16:48   Aspergillus Galactomanan EIA,S <0.500  0.056   Culture  No results found for the last 90 days.      Continuous medications  heparin, 0-4,500 Units/hr, Last Rate: Stopped (11/15/23 1300)    PRN medications  PRN medications: acetaminophen **OR** acetaminophen **OR** acetaminophen, bisacodyl, dextrose 10 % in water (D10W), dextrose, docusate sodium, glucagon, guaiFENesin, heparin, hydrALAZINE, ipratropium-albuteroL, loperamide, melatonin, ondansetron ODT **OR** ondansetron, oxygen, polyethylene glycol, sodium  chloride    CXR 11/9/2023  IMPRESSION:  1.  Perihilar airspace disease with underlying interstitial opacities  and correlate with pulmonary fibrosis. Correlate with atypical  infection.    CT chest wo IV contrast Result Date: 11/7/2023    Persistent extensive ground-glass densities in the lung apices and anterior aspect of the bilateral upper lobes with progression of previously identified ground-glass densities in the more confluent airspace opacities involving the bilateral lower lobes and posterior aspects of the bilateral upper lobes. Findings suggest atypical infection. Correlation with COVID status is recommended.        Assessment/Plan   Principal Problem:    Acute hypoxemic respiratory failure (CMS/HCC)  Active Problems:    COVID-19    Pneumonia of both lower lobes due to infectious organism    Macarena Wilson is a 68 year old female with a PMH of CKD III and HTN admitted to the MICU from OSH with AHRF 2/2 COVID pneumonia (positive on 10/13) complicated by ARDS and pulmonary fibrosis concerning for organizing PNA. Course complicated by distal DVTs and likely PE, oliguric HUNG on CKD, and intermittent hypoxia-associated SVT. Overall, patient remains tenuous and continues to require significant respiratory support via HFNC: 60L at 90% FiO2, with limited options for escalation of care.    11/15 updates:  - Continues to require significant respiratory support on 60L at 85% HFNC: satting mid to high 80s and desats with slight positional changes or talking. DNR/DNI following palliative discussion.    >  NPO - will attempt to wean flow to allow her to eat   > Will try to have patient do short bursts of CPAP to hopefully help with any atelectasis   - Steroid plan adjusted: now on methylpred 80 mg q6  - Continues to have runs of SVT corresponding with desats are continuing - responsive to vagal maneuvers   > Metop 5 push this AM for persistent SVT   > TSH now  - Repeat heparin assay q4, gtt has been held 2/2  supratherapeutic  > Restart if < 0.7  - Oliguric HUNG on CKD, creatinine 2.81 from baseline of 1.6   > Nephro consult   > q6 vs q12 RFPs   > Hold off on desouza except if absolutely necessary, 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  - ICU delirium protocol, she has been in the ICU for >2 weeks   - Palliative consulted for discussion about GOC and quality of life - DNR/DNI     CARDIOVASCULAR:  #Hypertension  Pressures have been within goal.  - Holding Losartan 100mg in the setting of recurrent HUNG on CKD  - PRN hydralazine, SBP >180    #Distal DVTs  #Likely PE  - On heparin gtt, titrating to therapeutic range  - Holding off on CTPE 2/2 current tenuous state    #Intermittent SVT  Likely 2/2 hypoxemia - responsive to vagal maneuvers and without HDS instability.  - Goal sats > 88  - Has received spot pushes of metop 5 mg      PULMONARY:  #AHRF 2/2 COVID PNA (10/13) c/b ARDS  #GGO c/f pulmonary fibrosis vs organizing PNA, likely 2/2 COVID PNA  #Likely PE, known DVTs  CT chest 11/7 - worsening bilateral changes consistent with severe COVID-19 pneumonitis and ARDS. On HFNC as patient could not tolerate BIPAP due to severe anxiety. S/p Remdesivir (completed course), a dose of Actemra on 10/24, and a dose of toci 10/26. ID not recommending another remdesivir course, and cannot get baricitinib until 11/24 (4 weeks out from toci). RVP, step pneumo, urine legionella negative, procal 11/7 0.25, now downtrending to 0.04 on 11/13. Pulmonary fibrosis 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 Fungitell, aspergillus galactomannan negative, with blasto and histo pending. Discontinued voriconazole and meropenem on 11/13. Managing DVTs on heparin gtt, with likely PE at play as well.  - Switched to methylpred 80 mg q6  > Daily SS bactrim for PCP prophy  - Fluticasone 1 spray bid   - Blasto, histo pending  -  Holding further diuresis  - On HFNC 60 l/min on 85%, wean to sat 89-92%  - Will trial brief periods on CPAP to improve atelectasis, pending patient tolerance  - Infectious Control contacted - no need for Covid precautions     RENAL/GENITOURINARY:  #Oliguric HUNG on CKD III, RECURRENT, with uremia  2.81 with baseline of 1.6, roughly 500cc urine output following 60 of IV lasix yesterday. BUN climbing, now at 121. Euvolemic on exam.  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  - Loperamide ordered PRN  - PRN miralax and docusate senna ffor constipation      ENDOCRINOLOGY:  #Steroid induced hyperglycemia  Sugars well-controlled.  - LDSSI    RHEUMATOLOGY:  No active issues      HEMATOLOGY:  No active issues    MUSCULOSKELETAL/ SKIN:  #Eczema/ Rosasea  - On home dupixent     INFECTIOUS DISEASE:  #Covid PNA, unvaccinated  Procal downtrending, from 0.25 on 11/9 to 0.04 on 11/13. Discontinued voriconazole and meropenem per ID recommendations, and with low concern for fungal or bacterial infection, respectively.  - S/p Vancomycin 10/22-28, 11/7-11/7, with negative MRSA nares, meropenem 11/7- 11/13, and voriconazole 11/9- 11/13     Fluids: per nephro  Electrolytes: prn  Nutrition: NPO  Antimicrobials: None, d/c'd 11/13  On methylpred 80 q6  DVT PPX: Therapeutic heparin  GI ppx: Pantoprazole daily  Bowel care: Miralax and docusate senna prn, loperamide prn (diarrhea 11/11 improved)  Catheter: Pure wick external catheter   Lines: PIV  Oxygen: Airvow 60L FIO2 85%      Disposition:   To be determined   GOC discussion ongoing     Code Status: DNR/DNI  NOK:  Primary Emergency Contact: Mathew Wilson, Home Phone: [phone]    Madiha Cervantes MD  Internal Medicine  and Pediatrics, PGY-1  [employer]

## 2023-11-16 NOTE — PROGRESS NOTES
NEPHROLOGY FOLLOW UP NOTE    Macarena Wilson   68 y.o.    @WT@  MRN/Room: [id number]    Subjective: Per patient, no change in clinical status. She states that her breathing currently feels comfortable. She did have runs of SVT early this morning that resolved with IV metoprolol pushes and she does not feel chest pain or palpitations currently.    She denies mental fog, fatigue, pruritus, tremors.    Possibility of dialysis again discussed with patient and she states that she would have to discuss with family to determine if it is still within her goals of care.    Objective:     Meds:   fluticasone, 1 spray, BID  insulin lispro, 0-5 Units, TID with meals  lactated Ringer's, 500 mL, Once  lidocaine, 1 patch, Daily  methylPREDNISolone sodium succinate (PF), 81.25 mg, q6h  pantoprazole, 40 mg, Daily  sulfamethoxazole-trimethoprim, 80 mg of trimethoprim, Daily  [Held by provider] traZODone, 25 mg, Nightly      heparin, Last Rate: Stopped (11/16/23 1220)  HYDROmorphone      acetaminophen, 650 mg, q4h PRN   Or  acetaminophen, 650 mg, q4h PRN   Or  acetaminophen, 650 mg, q4h PRN  bisacodyl, 10 mg, Daily PRN  dextrose 10 % in water (D10W), 0.3 g/kg/hr, Once PRN  dextrose, 25 g, q15 min PRN  docusate sodium, 100 mg, BID PRN  glucagon, 1 mg, q15 min PRN  guaiFENesin, 200 mg, q4h PRN  heparin, 2,000-4,000 Units, q4h PRN  hydrALAZINE, 10 mg, q8h PRN  ipratropium-albuteroL, 3 mL, q4h PRN  loperamide, 2 mg, 4x daily PRN  melatonin, 6 mg, Nightly PRN  naloxone, 0.2 mg, PRN  ondansetron ODT, 4 mg, q8h PRN   Or  ondansetron, 4 mg, q8h PRN  oxygen, , q4h PRN  polyethylene glycol, 17 g, Daily PRN  sodium chloride, 1 spray, PRN        Vitals:    11/16/23 1200   BP: 114/70   Pulse: 70   Resp: 19   Temp:    SpO2: 94%          Intake/Output Summary (Last 24 hours) at 11/16/2023 1236  Last data filed at 11/16/2023 1200  Gross per 24 hour   Intake 337.35 ml   Output 450 ml   Net -112.65 ml       General appearance: Awake and  alert, oriented, . No distress  HEENT: NC/AT, moist oral mucosa  Skin: no apparent rash  Heart: heart sounds 1 & 2 present and normal, no murmurs heard or rub  Lungs: Mild respiratory distress, very faint rhonchi bilaterally  Abdomen: soft, non tender  Extremities: No edema, no joint swelling  Neuro: No FND  ACCESS: PIV, external urinary catheter    Blood Labs:  Results for orders placed or performed during the hospital encounter of 11/09/23 (from the past 24 hour(s))   Renal function panel   Result Value Ref Range    Glucose 127 (H) 74 - 99 mg/dL    Sodium 145 136 - 145 mmol/L    Potassium 4.9 3.5 - 5.3 mmol/L    Chloride 102 98 - 107 mmol/L    Bicarbonate 17 (L) 21 - 32 mmol/L    Anion Gap 31 (H) 10 - 20 mmol/L    Urea Nitrogen 124 (HH) 6 - 23 mg/dL    Creatinine 2.67 (H) 0.50 - 1.05 mg/dL    eGFR 19 (L) >60 mL/min/1.73m*2    Calcium 8.8 8.6 - 10.6 mg/dL    Phosphorus 6.2 (H) 2.5 - 4.9 mg/dL    Albumin 3.6 3.4 - 5.0 g/dL   Magnesium   Result Value Ref Range    Magnesium 3.00 (H) 1.60 - 2.40 mg/dL   Heparin Assay, UFH   Result Value Ref Range    Heparin Unfractionated 1.6 (HH) See Comment Below for Therapeutic Ranges IU/mL   POCT GLUCOSE   Result Value Ref Range    POCT Glucose 137 (H) 74 - 99 mg/dL   T4, free   Result Value Ref Range    Thyroxine, Free 1.22 0.78 - 1.48 ng/dL   Heparin Assay, UFH   Result Value Ref Range    Heparin Unfractionated 0.7 See Comment Below for Therapeutic Ranges IU/mL   POCT GLUCOSE   Result Value Ref Range    POCT Glucose 181 (H) 74 - 99 mg/dL   POCT GLUCOSE   Result Value Ref Range    POCT Glucose 141 (H) 74 - 99 mg/dL   Renal function panel   Result Value Ref Range    Glucose 202 (H) 74 - 99 mg/dL    Sodium 145 136 - 145 mmol/L    Potassium 4.9 3.5 - 5.3 mmol/L    Chloride 99 98 - 107 mmol/L    Bicarbonate 21 21 - 32 mmol/L    Anion Gap 30 (H) 10 - 20 mmol/L    Urea Nitrogen 156 (HH) 6 - 23 mg/dL    Creatinine 2.97 (H) 0.50 - 1.05 mg/dL    eGFR 17 (L) >60 mL/min/1.73m*2    Calcium  9.1 8.6 - 10.6 mg/dL    Phosphorus 7.4 (H) 2.5 - 4.9 mg/dL    Albumin 3.6 3.4 - 5.0 g/dL   Magnesium   Result Value Ref Range    Magnesium 2.99 (H) 1.60 - 2.40 mg/dL   CBC   Result Value Ref Range    WBC 21.0 (H) 4.4 - 11.3 x10*3/uL    nRBC 0.0 0.0 - 0.0 /100 WBCs    RBC 5.09 4.00 - 5.20 x10*6/uL    Hemoglobin 14.0 12.0 - 16.0 g/dL    Hematocrit 42.3 36.0 - 46.0 %    MCV 83 80 - 100 fL    MCH 27.5 26.0 - 34.0 pg    MCHC 33.1 32.0 - 36.0 g/dL    RDW 14.7 (H) 11.5 - 14.5 %    Platelets 215 150 - 450 x10*3/uL   Heparin Assay, UFH   Result Value Ref Range    Heparin Unfractionated 1.7 (HH) See Comment Below for Therapeutic Ranges IU/mL   CBC and Auto Differential   Result Value Ref Range    WBC 21.0 (H) 4.4 - 11.3 x10*3/uL    nRBC 0.0 0.0 - 0.0 /100 WBCs    RBC 5.09 4.00 - 5.20 x10*6/uL    Hemoglobin 14.0 12.0 - 16.0 g/dL    Hematocrit 42.3 36.0 - 46.0 %    MCV 83 80 - 100 fL    MCH 27.5 26.0 - 34.0 pg    MCHC 33.1 32.0 - 36.0 g/dL    RDW 14.7 (H) 11.5 - 14.5 %    Platelets 215 150 - 450 x10*3/uL    Neutrophils % 92.8 40.0 - 80.0 %    Immature Granulocytes %, Automated 2.4 (H) 0.0 - 0.9 %    Lymphocytes % 1.3 13.0 - 44.0 %    Monocytes % 3.4 2.0 - 10.0 %    Eosinophils % 0.0 0.0 - 6.0 %    Basophils % 0.1 0.0 - 2.0 %    Neutrophils Absolute 18.61 (H) 1.20 - 7.70 x10*3/uL    Immature Granulocytes Absolute, Automated 0.48 0.00 - 0.70 x10*3/uL    Lymphocytes Absolute 0.26 (L) 1.20 - 4.80 x10*3/uL    Monocytes Absolute 0.68 0.10 - 1.00 x10*3/uL    Eosinophils Absolute 0.00 0.00 - 0.70 x10*3/uL    Basophils Absolute 0.02 0.00 - 0.10 x10*3/uL   Heparin Assay, UFH   Result Value Ref Range    Heparin Unfractionated 1.4 (HH) See Comment Below for Therapeutic Ranges IU/mL   POCT GLUCOSE   Result Value Ref Range    POCT Glucose 152 (H) 74 - 99 mg/dL   Heparin Assay, UFH   Result Value Ref Range    Heparin Unfractionated 0.7 See Comment Below for Therapeutic Ranges IU/mL   POCT GLUCOSE   Result Value Ref Range    POCT Glucose 154  (H) 74 - 99 mg/dL          ASSESSMENT:  Macarena Wilson is a  68 y.o.  Year old , with PMHx of HTN, CKD III-IV who presents as a transfer from [facility] for Covid pneumonia c/b fibrosis and organizing pneumonia. [place] [facility] [place] has been diuresed and treated with empiric antibiotics and antifungals. She had acute worsening of her hypoxia overnight which is possibly secondary to worsening of her fibrosis vs DVT/PE. Nephrology consulted for steadily rising BUN/Cr as well as poor urinary response to IV Lasix     #HUNG on CKD III-IV, stage II non-oliguric  -Rising BUN/Cr with most recent Cr of 2.81 up from 2.25 yesterday  -FEUrea of 40.6% suggesting intrinsic renal disease  -Her creatinine did reach a low of 1.22 during this hospitalization  -Etiology is likely multifactorial with hypoxia, prolonged diuresis, Covid infection, addition of Bactrim contributing to rising BUN/Cr  -Most recent UA showing 1+ proteinuria, large LE, High RBC and WBC  -Clinically, patient appears euvolemic on exam. IVC assessment done this AM also showing euvolemic status  -Electrolytes WNL  -Acid base status WNL        Recommendations:  - Recommend renal ultrasound to exclude renal infarct or renal vein thrombosis  - Agree with holding off on further diuresis given patient's euvolemic status  - No urgent dialysis needs. Patient's BUN is rising, but her electrolytes and acid/base status remain stable and she does not show clinical evidence of uremia. Will continue to follow closely and assess for dialysis needs  - Ok for CTPE if results will . Otherwise please avoid contrast if possible  - Avoid nephrotoxins  - strict Is/Os  - Renal dosing for medications for latest eGFR, follow medication trough as appropriate  - Avoid hypotension/rapid fluctuations in BPs     Hung Cesar MD  Nephrology Resident  24 hour Renal Pager - [phone]     Discussed with attending nephrologist

## 2023-11-16 NOTE — PROGRESS NOTES
"Macarena Wilson is a 68 y.o. female on day 7 of admission presenting with Acute hypoxemic respiratory failure (CMS/HCC).    Subjective   Macarena Wilson is a 68 year old female with a PMH of CKD III and HTN admitted to the MICU from OSH with AHRF 2/2 COVID pneumonia (positive on 10/13) complicated by ARDS and pulmonary fibrosis concerning for organizing PNA. Course complicated by distal DVTs and likely PE, along with oliguric HUNG on CKD.     Several runs of SVT overnight, metoprolol tartrate 25 mg given this AM. Pneumomediastinum noted yesterday resolved on repeat imaging, with improvement in chest pain. Heparin remained supratherapeutic.    Objective     Last Recorded Vitals  Blood pressure 93/81, pulse (!) 183, temperature 35.2 °C (95.4 °F), resp. rate 26, height 1.6 m (5' 2.99\"), weight 60.2 kg (132 lb 11.5 oz), SpO2 94 %.  Intake/Output last 3 Shifts:  I/O last 3 completed shifts:  In: 373.8 (5.5 mL/kg) [I.V.:243.8 (3.6 mL/kg); IV Piggyback:130]  Out: 675 (9.9 mL/kg) [Urine:675 (0.3 mL/kg/hr)]  Dosing Weight: 68.4 kg       Intake/Output Summary (Last 24 hours) at 11/16/2023 1707  Last data filed at 11/16/2023 1643  Gross per 24 hour   Intake 1449.35 ml   Output 550 ml   Net 899.35 ml   1900 mL UOP    FiO2 (%):  [90 %] 90 %  60L/min on 85%    Physical Exam  Cardiovascular:      Rate and Rhythm: Regular rhythm. Tachycardia present.      Pulses: Normal pulses.      Heart sounds: Normal heart sounds.   Pulmonary:      Effort: Pulmonary effort is normal.      Breath sounds: No wheezing, rhonchi or rales.      Comments: Airvo 60L/min at 85%  Poor air movement BL  No increased WOB  Abdominal:      Palpations: Abdomen is soft.   Skin:     General: Skin is warm.      Capillary Refill: Capillary refill takes less than 2 seconds.   Neurological:      General: No focal deficit present.      Mental Status: She is alert.       Relevant Results  Scheduled medications  apixaban, 10 mg, nasogastric tube, BID   Followed " by  [START ON 11/23/2023] apixaban, 5 mg, nasogastric tube, BID  fluticasone, 1 spray, Each Nostril, BID  insulin lispro, 0-5 Units, subcutaneous, TID with meals  lactated Ringer's, 500 mL, intravenous, Once  lidocaine, 1 patch, transdermal, Daily  methylPREDNISolone sodium succinate (PF), 81.25 mg, intravenous, q6h  metoprolol tartrate, 12.5 mg, nasogastric tube, q6h  pantoprazole, 40 mg, intravenous, Daily  sulfamethoxazole-trimethoprim, 80 mg of trimethoprim, intravenous, Daily  [Held by provider] traZODone, 25 mg, oral, Nightly      Lab Results   Component Value Date    WBC 21.0 (H) 11/16/2023    WBC 21.0 (H) 11/16/2023    HGB 14.0 11/16/2023    HGB 14.0 11/16/2023    HCT 42.3 11/16/2023    HCT 42.3 11/16/2023    MCV 83 11/16/2023    MCV 83 11/16/2023     11/16/2023     11/16/2023     Lab Results   Component Value Date    CREATININE 2.97 (H) 11/16/2023     (HH) 11/16/2023     11/16/2023    K 4.9 11/16/2023    CL 99 11/16/2023    CO2 21 11/16/2023     Glucose   Date Value Ref Range Status   11/16/2023 202 (H) 74 - 99 mg/dL Final       Creatinine   Date Value Ref Range Status   11/16/2023 2.97 (H) 0.50 - 1.05 mg/dL Final   11/15/2023 2.67 (H) 0.50 - 1.05 mg/dL Final   11/15/2023 2.81 (H) 0.50 - 1.05 mg/dL Final   11/14/2023 2.26 (H) 0.50 - 1.05 mg/dL Final   11/14/2023 2.23 (H) 0.50 - 1.05 mg/dL Final   Initial creatinine on hospitalization was 2.70     Lab Results   Component Value Date    CALCIUM 9.1 11/16/2023    PHOS 7.4 (H) 11/16/2023     Magnesium 2.29    Latest Reference Range & Units 11/09/23 06:07   Flu A Result Not Detected  Not Detected   Flu B Result Not Detected  Not Detected   RSV PCR Not Detected  Not Detected      Latest Reference Range & Units 11/09/23 16:48   Fungitell Beta-D Glucan,Serum <80 pg/mL <31      Latest Reference Range & Units 11/09/23 16:48   Aspergillus Galactomanan EIA,S <0.500  0.056   Culture  No results found for the last 90 days.      Continuous  medications  HYDROmorphone,     PRN medications  PRN medications: acetaminophen **OR** acetaminophen **OR** acetaminophen, bisacodyl, dextrose 10 % in water (D10W), dextrose, docusate sodium, glucagon, guaiFENesin, hydrALAZINE, ipratropium-albuteroL, loperamide, melatonin, naloxone, ondansetron ODT **OR** ondansetron, oxygen, polyethylene glycol, sodium chloride    CXR 11/9/2023  IMPRESSION:  1.  Perihilar airspace disease with underlying interstitial opacities  and correlate with pulmonary fibrosis. Correlate with atypical  infection.    CT chest wo IV contrast Result Date: 11/7/2023    Persistent extensive ground-glass densities in the lung apices and anterior aspect of the bilateral upper lobes with progression of previously identified ground-glass densities in the more confluent airspace opacities involving the bilateral lower lobes and posterior aspects of the bilateral upper lobes. Findings suggest atypical infection. Correlation with COVID status is recommended.        Assessment/Plan   Principal Problem:    Acute hypoxemic respiratory failure (CMS/HCC)  Active Problems:    COVID-19    Pneumonia of both lower lobes due to infectious organism    Macarena Wilson is a 68 year old female with a PMH of CKD III and HTN admitted to the MICU from OSH with AHRF 2/2 COVID pneumonia (positive on 10/13) complicated by ARDS and pulmonary fibrosis concerning for organizing PNA. Course complicated by distal DVTs and likely PE, oliguric HUNG on CKD, and intermittent hypoxia-associated SVT. Overall, patient remains tenuous and continues to require significant respiratory support via HFNC: 60L at 90% FiO2, with limited options for escalation of care.    11/16 updates:  - NGT placed today   > Started trickle feeds at 10 ml/hr   > Nutrition consult  - Switching from heparin gtt to eliquis today   > Difficulty with supratherapeutic gtt and wanting to decrease pokes  - Continues to require significant respiratory support on 60L  at 85% HFNC: satting mid to high 80s and desats with slight positional changes or talking. DNR/DNI following palliative discussion.    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  - ICU delirium protocol, she has been in the ICU for >2 weeks   - Palliative consulted for discussion about GOC and quality of life - DNR/DNI     CARDIOVASCULAR:  #Hypertension  Pressures have been within goal.  - Holding Losartan 100mg in the setting of recurrent HUNG on CKD  - PRN hydralazine, SBP >180    #Distal DVTs  #Likely PE  - D/c heparin gtt  - Starting eliquis with loading period per protocol  - Holding off on CTPE 2/2 current tenuous state    #Intermittent SVT  Likely 2/2 hypoxemia - responsive to vagal maneuvers and without HDS instability.  - Goal sats > 88  - Has received spot pushes of metop 5 mg  - Scheduling metop tartrate 12.5 q6 via NGT, titrating to symptom control and with BPs      PULMONARY:  #AHRF 2/2 COVID PNA (10/13) c/b ARDS  #GGO c/f pulmonary fibrosis vs organizing PNA, likely 2/2 COVID PNA  #Likely PE, known DVTs  CT chest 11/7 - worsening bilateral changes consistent with severe COVID-19 pneumonitis and ARDS. On HFNC as patient could not tolerate BIPAP due to severe anxiety. S/p Remdesivir (completed course), a dose of Actemra on 10/24, and a dose of toci 10/26. ID not recommending another remdesivir course, and cannot get baricitinib until 11/24 (4 weeks out from toci). RVP, step pneumo, urine legionella negative, procal  11/7 0.25, now downtrending to 0.04 on 11/13. Pulmonary fibrosis 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 Fungitell, aspergillus galactomannan negative, with blasto and histo pending. Discontinued voriconazole and meropenem on 11/13. Managing DVTs on eliquis, with likely PE at play as well.  - Methylpred 80 mg q6  > Daily SS bactrim for PCP prophy  - Fluticasone 1 spray bid   - Blasto, histo pending  - Holding further diuresis  - On HFNC 60 l/min on 90%, wean to sat 89-92%  - Pneumomediastinum resolved   > Avoid further CPAP attempts  - Infectious Control contacted - no need for Covid precautions     RENAL/GENITOURINARY:  #Oliguric HUNG on CKD III, RECURRENT, with azotemia   2.97 with baseline of 1.6 - UOP lagging, and appears dry on exam. Likely prerenal. Will give a total of 1L LR in 500 mL bolus pending tolerance from respiratory standpoint, and start Nepro tube feeds.  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  - Nepro at 10 ml/hr   > Follow RFP for refeeding  - Nutrition consult    #Diarrhea, improving   Stool panel negative.  - Loperamide ordered PRN  - PRN miralax and docusate senna ffor constipation      ENDOCRINOLOGY:  #Steroid induced hyperglycemia  Sugars well-controlled.  - LDSSI    RHEUMATOLOGY:  No active issues      HEMATOLOGY:  No active issues    MUSCULOSKELETAL/ SKIN:  #Eczema/ Rosasea  - On home dupixent     INFECTIOUS DISEASE:  #Covid PNA, unvaccinated  Procal downtrending, from 0.25 on 11/9 to 0.04 on 11/13. Discontinued voriconazole and meropenem per ID recommendations, and with low concern for fungal or bacterial infection, respectively.  - S/p Vancomycin 10/22-28, 11/7-11/7, with negative MRSA nares,  meropenem 11/7- 11/13, and voriconazole 11/9- 11/13     Fluids: S/p 1L LR today  Electrolytes: prn  Nutrition: TF at 10 ml/hr  Antimicrobials: None, d/c'd 11/13  On methylpred 80 q6  DVT PPX: Eliquis started today  GI ppx: Pantoprazole daily  Bowel care: Miralax and docusate senna prn, loperamide prn (diarrhea 11/11 improved)  Catheter: Pure wick external catheter   Lines: PIV  Oxygen: Airvow 60L FIO2 90%      Disposition:   To be determined   GOC discussion ongoing     Code Status: DNR/DNI  NOK:  Primary Emergency Contact: Mathew Wilson, Wilfrido Phone: [phone]    Madiha Cervantes MD  Internal Medicine and Pediatrics, PGY-1  [facility]

## 2023-11-16 NOTE — PROCEDURES
#12 Fr, small bore feeding tube inserted into L nare with Enteral Access System CORTRAK 2 per provider orders, including insertion of nasal bridle and removal of wire stylet; patient tolerated procedure; feeding tube secured at 75 cm; bedside nurse notified; no bleeding or adverse reaction noted; KUB activated for placement verification.

## 2023-11-16 NOTE — PROGRESS NOTES
Nutrition Assessment    Nutrition Follow Up Assessment:        Patient is a 68 y.o. female who remains in the MICU for care.  She is being treated for COVID pneumonitis c/b ARDS in setting of COVID infection on 10/13.  Difficulty tolerating Bipap so she is on high amounts of oxygen via HFNC-- 60L/90% earlier today.  Also with oliguric HUNG on CKD with uremia.     Pt has been NPO during the course of her 7 day admission.  Had decompensation after RDN visit last week and has needed higher levels of oxygen since that time so diet was not advanced.  Concern for feeding tube is pt's inability to tolerate placement or tube going into her lung while already with tenuous respiratory status.  She is DNR-DNI.      Anthropometrics:           IBW/kg (Dietitian Calculated): 52.3 kg  Percent of IBW: 131 %       Objective/Subjective Weight History:     Weight Change %:     11/16/23: 60.2kg-- she is now down 22% from her usual body weight and 15% in less than 3 weeks from OSH admit weight.    11/09/23 68.4 kg (150 lb 12.7 oz)   11/08/23 68.9 kg (151 lb 14.4 oz)   01/19/23 76.3 kg (168 lb 3.2 oz)   12/14/22 76.2 kg (167 lb 15.9 oz)   10/17/22 74 kg (163 lb 2.3 oz)   07/19/22 78.9 kg (174 lb)   05/13/22 78.9 kg (174 lb)   03/23/22 78.9 kg (174 lb)   03/16/22 78.9 kg (174 lb)   03/02/22 79 kg (174 lb 3.2 oz)       Usual body weight= 77.3kg  OSH admit weight 11/1/23: 71.1kg          Nutrition Focused Physical Exam Findings:  Defer-- see initial assessment    Edema:  Edema: none       Physical Findings:              Skin: Negative    Objective Data:    Last BM Date: 11/15/23    Nutrition Significant Labs:  Na+ 145  K+ 4.9  Chloride 99  Bicarb 21    Creatinine 2.97  Calcium 9.1  Phos 7.4  Mag 2.99  Sugars 202, 127  A1c 6.3% in Oct 2023    Nutrition Specific Mediations:    Current Facility-Administered Medications:     acetaminophen (Tylenol) tablet 650 mg, 650 mg, oral, q4h PRN, 650 mg at 11/12/23 6335 **OR** acetaminophen  (Tylenol) oral liquid 650 mg, 650 mg, nasogastric tube, q4h PRN **OR** acetaminophen (Tylenol) suppository 650 mg, 650 mg, rectal, q4h PRN, Bruno Blanco MD    bisacodyl (Dulcolax) EC tablet 10 mg, 10 mg, oral, Daily PRN, Bruno Blanco MD    dextrose 10 % in water (D10W) infusion, 0.3 g/kg/hr, intravenous, Once PRN, Bruno Blanco MD    dextrose 50 % injection 25 g, 25 g, intravenous, q15 min PRN, Bruno Blanco MD    docusate sodium (Colace) capsule 100 mg, 100 mg, oral, BID PRN, Bruno Blanco MD    fluticasone (Flonase) nasal spray 1 spray, 1 spray, Each Nostril, BID, Bruno Blanco MD, 1 spray at 11/16/23 0818    glucagon (Glucagen) injection 1 mg, 1 mg, intramuscular, q15 min PRN, Bruno Blanco MD    guaiFENesin (Robitussin) 100 mg/5 mL syrup 200 mg, 200 mg, oral, q4h PRN, Bruno Blanco MD    heparin (porcine) injection 2,000-4,000 Units, 2,000-4,000 Units, intravenous, q4h PRN, Madiha Cervantes MD    heparin 25,000 Units in dextrose 5% 250 mL (100 Units/mL) infusion (premix), 0-4,500 Units/hr, intravenous, Continuous, Madiha Cervantes MD, Stopped at 11/16/23 1220    hydrALAZINE (Apresoline) injection 10 mg, 10 mg, intravenous, q8h PRN, Bruno Blanco MD    hydromorphone PCA 0.5 mg/mL in NS opioid naive, , intravenous, Continuous, Madiha Cervantes MD, Rate Verify at 11/16/23 0700    insulin lispro (HumaLOG) injection 0-5 Units, 0-5 Units, subcutaneous, TID with meals, Bruno Blanco MD, 2 Units at 11/15/23 1159    ipratropium-albuteroL (Duo-Neb) 0.5-2.5 mg/3 mL nebulizer solution 3 mL, 3 mL, nebulization, q4h PRN, Bruno Blanco MD    lidocaine 4 % patch 1 patch, 1 patch, transdermal, Daily, Birdie Oleary MD, 1 patch at 11/16/23 0817    loperamide (Imodium) capsule 2 mg, 2 mg, oral, 4x daily PRN, Russel Wesley,     melatonin tablet 6 mg, 6 mg, oral, Nightly PRN, Bruno Blanco MD, 6 mg at 11/13/23  2322    methylPREDNISolone sod succinate (PF) (SOLU-Medrol) injection 81.25 mg, 81.25 mg, intravenous, q6h, Elijah Mccoy MD, 81.25 mg at 11/16/23 1155    naloxone (Narcan) injection 0.2 mg, 0.2 mg, intravenous, PRN, Madiha Cervantes MD    ondansetron ODT (Zofran-ODT) disintegrating tablet 4 mg, 4 mg, oral, q8h PRN **OR** ondansetron (Zofran) injection 4 mg, 4 mg, intravenous, q8h PRN, Bruno Blanco MD    oxygen (O2) therapy, , inhalation, q4h PRN, Nely Gonzales MD    pantoprazole (ProtoNix) injection 40 mg, 40 mg, intravenous, Daily, Madiha Cervantes MD, 40 mg at 11/16/23 0817    polyethylene glycol (Glycolax, Miralax) packet 17 g, 17 g, oral, Daily PRN, Bruno Blanco MD    sodium chloride (Ocean) 0.65 % nasal spray 1 spray, 1 spray, Each Nostril, PRN, Bruno Blanco MD    sulfamethoxazole-trimethoprim (Bactrim) 80 mg of trimethoprim in dextrose 5 % in water (D5W) 125 mL IV, 80 mg of trimethoprim, intravenous, Daily, Madiha Cervantes MD, Stopped at 11/16/23 0947    [Held by provider] traZODone (Desyrel) tablet 25 mg, 25 mg, oral, Nightly, Bruno Blanco MD, 25 mg at 11/14/23 0144        Dietary Orders (From admission, onward)       Start     Ordered    11/14/23 1616  NPO Diet; Effective now  Diet effective now         11/14/23 1615    11/09/23 0400  May Participate in Room Service With Assistance  Once        Question:  .  Answer:  Yes    11/09/23 0400                     Estimated Needs:   Total Energy Estimated Needs (kCal):  (4378-1107)   Method for Estimating Needs: MSJ= 1183    Total Protein Estimated Needs (g):  (65-75)   Method for Estimating Needs: 1.2-1.4 x 52.3kg          Nutrition Diagnosis   Nutrition Diagnosis:  Malnutrition Diagnosis  Patient has Malnutrition Diagnosis: Yes  Diagnosis Status: New  Malnutrition Diagnosis: Severe malnutrition related to acute disease or injury  As Evidenced by: 22% weight loss from normal weight/15% down in less than 3 weeks, along with  moderate to severe muscle and fat loss on physical exam in setting of a decrease in PO intake/appetite after being diagnosed with COVID        TF at goal if access placed= 1512kcals, 68grams protein    Nutrition Interventions/Recommendations   Nutrition Interventions and Recommendations:        Nutrition Prescription:  Strongly encourage dobhoff placement in this patient who is severely malourished and NPO now for at least 7 days.  If concern is for tube placement, can ask ENT or GI for help.  If tube placed, can do the following feeding regimen: Nepro at start rate of 15mls/hr.  Increase by 10mls q8hrs until goal rate of 35mls/hr reached.  Water flushes per team's discretion-- goal rate feeds provide 590mls free water daily.   Pt will be a refeeding risk.  Can do 100mgs thiamin once daily for 7 days to help alleviate this risk.           Time Spent/Follow-up Reminder:   Follow Up  Time Spent (min): 60 minutes  Last Date of Nutrition Visit: 11/16/23  Nutrition Follow-Up Needed?: Dietitian to reassess per policy  Follow up Comment: NPO since admit (1 week) but plans for tube but TF recs left

## 2023-11-16 NOTE — PROGRESS NOTES
Physical Therapy                 Therapy Communication Note    Patient Name: Macarena Wilson  MRN: [id number]  Today's Date: 11/16/2023     Discipline: Physical Therapy    Missed Visit Reason: Missed Visit Reason:  (per MICU Fellow Dr. Gonzales, please hold at this time d/t patient going into multiple bouts of SVT with HR in the 200s and with tenuous respiratory status; PT will hold and re-attempt as time and schedule allows and as medically appropriate.)    Missed Time: Attempt    Comment:

## 2023-11-17 NOTE — PROGRESS NOTES
NEPHROLOGY FOLLOW UP NOTE    Macarena Wilson   68 y.o.     MRN/Room: [id number]/19/19-A    Subjective: Per patient, no change in clinical status. She has not felt palpitations or overt fatigue. She feels that breathing has overall been stable and she feels improved with initiation of tube feeds yesterday.    Objective:     Meds:   apixaban, 10 mg, BID   Followed by  [START ON 11/23/2023] apixaban, 5 mg, BID  fluticasone, 1 spray, BID  insulin lispro, 0-5 Units, q4h  lidocaine, 1 patch, Daily  [START ON 11/18/2023] methylPREDNISolone sodium succinate (PF), 40 mg, q6h  methylPREDNISolone sodium succinate (PF), 81.25 mg, q6h  metoprolol tartrate, 12.5 mg, q6h  pantoprazole, 40 mg, Daily  sulfamethoxazole-trimethoprim, 80 mg of trimethoprim, q24h KARL  thiamine, 100 mg, Daily  [Held by provider] traZODone, 25 mg, Nightly           acetaminophen, 650 mg, q8h PRN   Or  acetaminophen, 650 mg, q8h PRN   Or  acetaminophen, 650 mg, q8h PRN  dextrose 10 % in water (D10W), 0.3 g/kg/hr, Once PRN  dextrose, 25 g, q15 min PRN  docusate sodium, 100 mg, BID PRN  glucagon, 1 mg, q15 min PRN  guaiFENesin, 200 mg, q4h PRN  hydrALAZINE, 10 mg, q8h PRN  ipratropium-albuteroL, 3 mL, q4h PRN  melatonin, 6 mg, Nightly PRN  naloxone, 0.2 mg, PRN  ondansetron ODT, 4 mg, q8h PRN   Or  ondansetron, 4 mg, q8h PRN  oxyCODONE, 5 mg, q8h PRN  oxygen, , q4h PRN  polyethylene glycol, 17 g, Daily PRN  sodium chloride, 1 spray, PRN        Vitals:    11/17/23 1200   BP: 125/69   Pulse: 87   Resp: 24   Temp:    SpO2: 95%          Intake/Output Summary (Last 24 hours) at 11/17/2023 1342  Last data filed at 11/17/2023 1200  Gross per 24 hour   Intake 1885 ml   Output 700 ml   Net 1185 ml       General appearance: Awake and alert, oriented, . No distress  HEENT: NC/AT, moist oral mucosa  Skin: no apparent rash  Heart: heart sounds 1 & 2 present and normal, no murmurs heard or rub  Lungs: Mild respiratory distress, very faint rales bilaterally  Abdomen:  soft, non tender  Extremities: No edema, no joint swelling  Neuro: No FND  ACCESS: PIV, external urinary catheter    Blood Labs:  Results for orders placed or performed during the hospital encounter of 11/09/23 (from the past 24 hour(s))   POCT GLUCOSE   Result Value Ref Range    POCT Glucose 121 (H) 74 - 99 mg/dL   POCT GLUCOSE   Result Value Ref Range    POCT Glucose 140 (H) 74 - 99 mg/dL   Magnesium   Result Value Ref Range    Magnesium 2.72 (H) 1.60 - 2.40 mg/dL   Comprehensive metabolic panel   Result Value Ref Range    Glucose 132 (H) 74 - 99 mg/dL    Sodium 142 136 - 145 mmol/L    Potassium 3.9 3.5 - 5.3 mmol/L    Chloride 105 98 - 107 mmol/L    Bicarbonate 21 21 - 32 mmol/L    Anion Gap 20 10 - 20 mmol/L    Urea Nitrogen 139 (HH) 6 - 23 mg/dL    Creatinine 2.46 (H) 0.50 - 1.05 mg/dL    eGFR 21 (L) >60 mL/min/1.73m*2    Calcium 8.3 (L) 8.6 - 10.6 mg/dL    Albumin 3.1 (L) 3.4 - 5.0 g/dL    Alkaline Phosphatase 50 33 - 136 U/L    Total Protein 5.3 (L) 6.4 - 8.2 g/dL    AST 50 (H) 9 - 39 U/L    Bilirubin, Total 0.9 0.0 - 1.2 mg/dL    ALT 53 (H) 7 - 45 U/L   Phosphorus   Result Value Ref Range    Phosphorus 5.4 (H) 2.5 - 4.9 mg/dL   POCT GLUCOSE   Result Value Ref Range    POCT Glucose 144 (H) 74 - 99 mg/dL   POCT GLUCOSE   Result Value Ref Range    POCT Glucose 175 (H) 74 - 99 mg/dL   CBC and Auto Differential   Result Value Ref Range    WBC 14.1 (H) 4.4 - 11.3 x10*3/uL    nRBC 0.0 0.0 - 0.0 /100 WBCs    RBC 4.44 4.00 - 5.20 x10*6/uL    Hemoglobin 12.4 12.0 - 16.0 g/dL    Hematocrit 39.2 36.0 - 46.0 %    MCV 88 80 - 100 fL    MCH 27.9 26.0 - 34.0 pg    MCHC 31.6 (L) 32.0 - 36.0 g/dL    RDW 14.9 (H) 11.5 - 14.5 %    Platelets 139 (L) 150 - 450 x10*3/uL    Neutrophils % 95.7 40.0 - 80.0 %    Immature Granulocytes %, Automated 0.5 0.0 - 0.9 %    Lymphocytes % 1.1 13.0 - 44.0 %    Monocytes % 2.6 2.0 - 10.0 %    Eosinophils % 0.0 0.0 - 6.0 %    Basophils % 0.1 0.0 - 2.0 %    Neutrophils Absolute 13.46 (H) 1.20 -  7.70 x10*3/uL    Immature Granulocytes Absolute, Automated 0.07 0.00 - 0.70 x10*3/uL    Lymphocytes Absolute 0.15 (L) 1.20 - 4.80 x10*3/uL    Monocytes Absolute 0.36 0.10 - 1.00 x10*3/uL    Eosinophils Absolute 0.00 0.00 - 0.70 x10*3/uL    Basophils Absolute 0.01 0.00 - 0.10 x10*3/uL   Comprehensive metabolic panel   Result Value Ref Range    Glucose 181 (H) 74 - 99 mg/dL    Sodium 141 136 - 145 mmol/L    Potassium 4.5 3.5 - 5.3 mmol/L    Chloride 103 98 - 107 mmol/L    Bicarbonate 24 21 - 32 mmol/L    Anion Gap 19 10 - 20 mmol/L    Urea Nitrogen 144 (HH) 6 - 23 mg/dL    Creatinine 2.55 (H) 0.50 - 1.05 mg/dL    eGFR 20 (L) >60 mL/min/1.73m*2    Calcium 8.9 8.6 - 10.6 mg/dL    Albumin 3.2 (L) 3.4 - 5.0 g/dL    Alkaline Phosphatase 61 33 - 136 U/L    Total Protein 5.6 (L) 6.4 - 8.2 g/dL    AST 44 (H) 9 - 39 U/L    Bilirubin, Total 0.6 0.0 - 1.2 mg/dL    ALT 56 (H) 7 - 45 U/L   POCT GLUCOSE   Result Value Ref Range    POCT Glucose 206 (H) 74 - 99 mg/dL   POCT GLUCOSE   Result Value Ref Range    POCT Glucose 200 (H) 74 - 99 mg/dL          ASSESSMENT:  Macarena Wilson is a  68 y.o.  Year old , with PMHx of HTN, CKD III-IV who presents as a transfer from [facility] for Covid pneumonia c/b fibrosis and organizing pneumonia. She was transferred to [facility] on [facility] and has been diuresed and treated with empiric antibiotics and antifungals. She had acute worsening of her hypoxia overnight which is possibly secondary to worsening of her fibrosis vs DVT/PE. Nephrology consulted for steadily rising BUN/Cr as well as poor urinary response to IV Lasix     #HUNG on CKD III-IV, stage II non-oliguric  -Rising BUN/Cr with most recent Cr of 2.81 up from 2.25 yesterday  -FEUrea of 40.6% suggesting intrinsic renal disease  -Her creatinine did reach a low of 1.22 during this hospitalization  -Etiology is likely multifactorial with hypoxia, prolonged diuresis, Covid infection, addition of Bactrim contributing to rising BUN/Cr  -Most recent UA  showing 1+ proteinuria, large LE, High RBC and WBC  -Renal ultrasound showing no hydronephrosis  -Clinically, patient appears euvolemic on exam. IVC assessment done this AM also showing euvolemic status  -Electrolytes WNL  -Acid base status WNL        Recommendations:  - Agree with holding off on further diuresis given patient's euvolemic status  - No urgent dialysis needs. Patient's BUN and creatinine did improve yesterday. Will continue to monitor lab values over the weekend  - Ok for CTPE if results will . Otherwise please avoid contrast if possible  - Avoid nephrotoxins  - strict Is/Os  - Renal dosing for medications for latest eGFR, follow medication trough as appropriate  - Avoid hypotension/rapid fluctuations in BPs     Hung Cesar MD  Nephrology Resident  24 hour Renal Pager - [phone]     Discussed with attending nephrologist

## 2023-11-17 NOTE — PROGRESS NOTES
"Macarena Wilson is a 68 y.o. female on day 8 of admission presenting with Acute hypoxemic respiratory failure (CMS/HCC).    Subjective   Macarnea Wilson is a 68 year old female with a PMH of CKD III and HTN admitted to the MICU from OSH with AHRF 2/2 COVID pneumonia (positive on 10/13) complicated by ARDS and pulmonary fibrosis concerning for organizing PNA. Course complicated by distal DVTs and likely PE, along with oliguric HUNG on CKD.     Dobhoff placed yesterday. Tolerating feeds at 25cc/hr, successfully switched to DOAC, on 12.5 mg metop tartrate q6 - no runs of SVT overnight. Overall feeling hopeful. No changes from a respiratory standpoint, still on 60L, 90%.    Objective     Last Recorded Vitals  Blood pressure 125/69, pulse 87, temperature 35.6 °C (96.1 °F), temperature source Temporal, resp. rate 24, height 1.6 m (5' 2.99\"), weight 63.2 kg (139 lb 5.3 oz), SpO2 95 %.  Intake/Output last 3 Shifts:  I/O last 3 completed shifts:  In: 1884.4 (27.5 mL/kg) [I.V.:259.4 (3.8 mL/kg); NG/GT:495; IV Piggyback:1130]  Out: 650 (9.5 mL/kg) [Urine:650 (0.3 mL/kg/hr)]  Dosing Weight: 68.4 kg       Intake/Output Summary (Last 24 hours) at 11/17/2023 1400  Last data filed at 11/17/2023 1200  Gross per 24 hour   Intake 1875 ml   Output 700 ml   Net 1175 ml   1900 mL UOP    FiO2 (%):  [90 %] 90 %  60L/min on 85%    Physical Exam  HENT:      Nose:      Comments: Dobhoff in place, secured  Cardiovascular:      Rate and Rhythm: Regular rhythm. Tachycardia present.      Pulses: Normal pulses.      Heart sounds: Normal heart sounds.   Pulmonary:      Effort: Pulmonary effort is normal.      Breath sounds: No wheezing, rhonchi or rales.      Comments: Airvo 60L/min at 90%  Poor air movement BL  No increased WOB at rest  Abdominal:      Palpations: Abdomen is soft.   Skin:     General: Skin is warm.      Capillary Refill: Capillary refill takes less than 2 seconds.   Neurological:      General: No focal deficit present.      " Mental Status: She is alert.       Relevant Results  Scheduled medications  apixaban, 10 mg, nasogastric tube, BID   Followed by  [START ON 11/23/2023] apixaban, 5 mg, nasogastric tube, BID  fluticasone, 1 spray, Each Nostril, BID  insulin lispro, 0-5 Units, subcutaneous, q4h  lidocaine, 1 patch, transdermal, Daily  [START ON 11/18/2023] methylPREDNISolone sodium succinate (PF), 40 mg, intravenous, q6h  methylPREDNISolone sodium succinate (PF), 81.25 mg, intravenous, q6h  metoprolol tartrate, 12.5 mg, nasogastric tube, q6h  pantoprazole, 40 mg, intravenous, Daily  sulfamethoxazole-trimethoprim, 80 mg of trimethoprim, nasogastric tube, q24h KARL  thiamine, 100 mg, oral, Daily  [Held by provider] traZODone, 25 mg, oral, Nightly      Lab Results   Component Value Date    WBC 14.1 (H) 11/17/2023    HGB 12.4 11/17/2023    HCT 39.2 11/17/2023    MCV 88 11/17/2023     (L) 11/17/2023     Lab Results   Component Value Date    CREATININE 2.55 (H) 11/17/2023     (HH) 11/17/2023     11/17/2023    K 4.5 11/17/2023     11/17/2023    CO2 24 11/17/2023     Glucose   Date Value Ref Range Status   11/17/2023 181 (H) 74 - 99 mg/dL Final       Creatinine   Date Value Ref Range Status   11/17/2023 2.55 (H) 0.50 - 1.05 mg/dL Final   11/16/2023 2.46 (H) 0.50 - 1.05 mg/dL Final   11/16/2023 2.97 (H) 0.50 - 1.05 mg/dL Final   11/15/2023 2.67 (H) 0.50 - 1.05 mg/dL Final   11/15/2023 2.81 (H) 0.50 - 1.05 mg/dL Final   Initial creatinine on hospitalization was 2.70     Lab Results   Component Value Date    CALCIUM 8.9 11/17/2023    PHOS 5.4 (H) 11/16/2023     Magnesium 2.29    Latest Reference Range & Units 11/09/23 06:07   Flu A Result Not Detected  Not Detected   Flu B Result Not Detected  Not Detected   RSV PCR Not Detected  Not Detected      Latest Reference Range & Units 11/09/23 16:48   Fungitell Beta-D Glucan,Serum <80 pg/mL <31      Latest Reference Range & Units 11/09/23 16:48   Aspergillus Galactomanan EIA,S  <0.500  0.056   Culture  No results found for the last 90 days.      Continuous medications     PRN medications  PRN medications: acetaminophen **OR** acetaminophen **OR** acetaminophen, dextrose 10 % in water (D10W), dextrose, docusate sodium, glucagon, guaiFENesin, hydrALAZINE, ipratropium-albuteroL, melatonin, naloxone, ondansetron ODT **OR** ondansetron, oxyCODONE, oxygen, polyethylene glycol, sodium chloride    CXR 11/9/2023  IMPRESSION:  1.  Perihilar airspace disease with underlying interstitial opacities  and correlate with pulmonary fibrosis. Correlate with atypical  infection.    CT chest wo IV contrast Result Date: 11/7/2023    Persistent extensive ground-glass densities in the lung apices and anterior aspect of the bilateral upper lobes with progression of previously identified ground-glass densities in the more confluent airspace opacities involving the bilateral lower lobes and posterior aspects of the bilateral upper lobes. Findings suggest atypical infection. Correlation with COVID status is recommended.        Assessment/Plan   Principal Problem:    Acute hypoxemic respiratory failure (CMS/HCC)  Active Problems:    COVID-19    Pneumonia of both lower lobes due to infectious organism    Macarena Wilson is a 68 year old female with a PMH of CKD III and HTN admitted to the MICU from OSH with AHRF 2/2 COVID pneumonia (positive on 10/13) complicated by ARDS and pulmonary fibrosis with resulting organizing PNA. Course complicated by distal DVTs and likely PE, oliguric HUNG on CKD, and intermittent hypoxia-associated SVT. Overall, patient remains tenuous and continues to require significant respiratory support via HFNC at 60L at 90% FiO2, however, is tentatively improving globally, with improvement in kidney function following placement of NGT and initiation of feeds + gentle rehydration.    11/17 updates:  - NGT in place   > Feeds at 25 ml/hr, uptitrating to goal of 35    > Thiamine 100 mg daily  added   > Nutrition consult  - Stable on Eliquis, loading dosing for the first week  - Continues to require significant respiratory support on 60L at 90% FiO2, but is satting up to 97 today!   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  - ICU delirium protocol, she has been in the ICU for >2 weeks   - Palliative following  > DNR/DNI     CARDIOVASCULAR:  #Hypertension  Pressures have been within goal.  - Holding Losartan 100mg in the setting of recurrent HUNG on CKD  - PRN hydralazine, SBP >180    #Distal DVTs  #Likely PE  - D/c heparin gtt on 11/16  - On Eliquis with loading period per protocol (started 11/16)  > Held off on CTPE 2/2 tenuous state    #Intermittent SVT  Likely 2/2 hypoxemia - responsive to vagal maneuvers and without HDS instability.  - Goal sats > 88  - Has received spot pushes of metop 5 mg PRN  - Scheduling metop tartrate 12.5 q6 via NGT, titrating to symptom control and with BPs      PULMONARY:  #AHRF 2/2 COVID PNA (10/13) c/b ARDS  #GGO c/f pulmonary fibrosis - organizing PNA, likely 2/2 COVID PNA  #Likely PE, known DVTs  CT chest 11/7 - worsening bilateral changes consistent with severe COVID-19 pneumonitis and ARDS. On HFNC as patient could not tolerate BIPAP due to severe anxiety. S/p  Remdesivir (completed course), a dose of Actemra on 10/24, and a dose of toci 10/26. ID not recommending another remdesivir course, and cannot get baricitinib until 11/24 (4 weeks out from toci). RVP, step pneumo, urine legionella negative, procal 11/7 0.25, now downtrending to 0.04 on 11/13. Pulmonary fibrosis 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 Fungitell, aspergillus galactomannan negative, with blasto and histo pending. Discontinued voriconazole and meropenem on 11/13. Managing DVTs on eliquis, with likely PE at play as well. Tolerating gentle rehydration and initiation of enteric feeding well.  - Methylpred 80 mg q6   > To begin wean to 40 mg q6 on 11/18  > Daily SS bactrim for PCP prophy   - Increase to daily DS bactrim once EGFR > 30  - Fluticasone 1 spray bid   - Blasto, histo pending  - Holding further diuresis  - On HFNC 60 l/min on 90%, wean to sat 89-92%  - Pneumomediastinum resolved   > Avoid further CPAP attempts  - Infectious Control contacted - no need for Covid precautions     RENAL/GENITOURINARY:  #Oliguric HUNG on CKD III, RECURRENT, with azotemia   2.55 with baseline of 1.6 - UOP lagging, and appears better hydrated today on exam. Likely prerenal. Renal US with non-obstructing stones BL, no hydronephrosis, cysts BL. Continuing TF, and giving additional LR today, 1 to 1.5L in 500 ml aliquots pending tolerance.  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 Tolerating feeds.  - Nepro at 25 ml/hr, goal of 35   > Follow RFP for refeeding  - Nutrition consult    #Diarrhea, improving   Stool panel negative.  - Loperamide ordered PRN  - PRN miralax and docusate senna ffor constipation       ENDOCRINOLOGY:  #Steroid induced hyperglycemia  Sugars well-controlled.  - LDSSI    RHEUMATOLOGY:  No active issues      HEMATOLOGY:  No active issues    MUSCULOSKELETAL/ SKIN:  #Eczema/ Rosasea  - On home dupixent     INFECTIOUS DISEASE:  #Covid PNA, unvaccinated  Procal downtrending, from 0.25 on 11/9 to 0.04 on 11/13. Discontinued voriconazole and meropenem per ID recommendations, and with low concern for fungal or bacterial infection, respectively.  - S/p Vancomycin 10/22-28, 11/7-11/7, with negative MRSA nares, meropenem 11/7- 11/13, and voriconazole 11/9- 11/13     Fluids: S/p 1L LR today  Electrolytes: prn  Nutrition: TF at 25 ml/hr  Antimicrobials: None, d/c'd 11/13  On methylpred 80 q6  DVT PPX: Eliquis started 11/16  GI ppx: Pantoprazole daily  Bowel care: Miralax and docusate senna prn, loperamide prn (diarrhea 11/11 improved)  Catheter: Pure wick external catheter   Lines: PIV  Oxygen: Airvow 60L FIO2 90%      Disposition:   Remain in MICU 2/2 oxygen requirement     Code Status: DNR/DNI  NOK:  Primary Emergency Contact: Mathew Wilson, Home Phone: [phone]    Madiha Cervantes MD  Internal Medicine and Pediatrics, PGY-1  Epic [employer]

## 2023-11-17 NOTE — PROGRESS NOTES
Physical Therapy    Physical Therapy Treatment    Patient Name: Macarena Wilson  MRN: [id number]  Today's Date: 11/17/2023  Time Calculation  Start Time: 1011  Stop Time: 1051  Time Calculation (min): 40 min       Assessment/Plan   PT Assessment  End of Session Communication: Bedside nurse  End of Session Patient Position: Bed, 3 rail up, Alarm off, not on at start of session, Alarm off, caregiver present     PT Plan  Treatment/Interventions: Bed mobility, Transfer training, Gait training, Balance training, Strengthening, Endurance training, Therapeutic exercise, Therapeutic activity, Postural re-education  PT Plan: Skilled PT  PT Frequency: 4 times per week  PT Discharge Recommendations: Moderate intensity level of continued care  PT - OK to Discharge: Yes      General Visit Information:   PT  Visit  PT Received On: 11/17/23  General  Family/Caregiver Present: No  Co-Treatment: OT  Co-Treatment Reason: maximize functional mobility, ensure patient safety with mobility; patient with limited activity tolerance d/t tenuous respiratory status.  Prior to Session Communication: Bedside nurse, Physician  Patient Position Received: Bed, 3 rail up, Alarm off, caregiver present  General Comment: pleasant and agreeable to participate in therapy. Appears anxious at times about mobilizing; reassurance and support provided to the patient.    Subjective   Precautions:  Precautions  Medical Precautions: Fall precautions, Oxygen therapy device and L/min  Vital Signs:  Vital Signs  Heart Rate:  (pre: 85 post: 87; after returning to supine after sitting EOB, patient's HR elevated into the 180s, in SVT; RN gave 5 mg of metoperol, medical team including attending in the room.)  Resp:  (pre: 20 post: 25)  SpO2:  (pre: 94% post: 90%, in DEP chair position, Spo2 ranged to low 90s to low 80s, recovered to 89% prior to sitting EOB attempt: sitting EOB SpO2 dropped to 63%, recovered to mid 70s, then decreased to low 60s, patient assisted to  "supine.) 60L/90% AirVo  BP:  (pre: 129/73 post: 132/78)    Objective   Pain:  Pain Assessment  Pain Assessment: 0-10  Pain Score:  (did not report pain; mentioned feeling \"heavy\" in her chest relating to increase WOB.)  Cognition:  Cognition  Overall Cognitive Status: Within Functional Limits  Orientation Level:  (AxO x3)  Postural Control:  Postural Control  Postural Control: Impaired    Activity Tolerance:  Activity Tolerance  Endurance: Tolerates less than 10 min exercise with changes in vital signs  Early Mobility/Exercise Safety Screen: Proceed with mobilization - No exclusion criteria met  Treatments:  Therapeutic Exercise  Therapeutic Exercise Performed: Yes  Therapeutic Exercise Activity 1: supine: AP 2x10 B, with 3 second hold in end available range neutral DF.    Therapeutic Activity  Therapeutic Activity Performed: Yes  Therapeutic Activity 1: PT placed bed in DEP chair position x12 mins prior to attempting supine->sit task. PT monitored patient's hemodynamic response to upright positioning. Patient reported mild SOB. PT provided demo and education for activity pacing, PLB, paired breathing.    Balance/Neuromuscular Re-Education  Balance/Neuromuscular Re-Education Activity Performed: Yes  Balance/Neuromuscular Re-Education Activity 1: EOB x8 minutes; initially MAXx1, brief bouts of MINx1, retrolateral lean R; patient appeared anxious sitting EOB; cueing and demo for PLB, relaxation, posture, paired breathing    Bed Mobility  Bed Mobility: Yes  Bed Mobility 1  Bed Mobility 1: Supine to sitting, Sitting to supine  Level of Assistance 1: Maximum assistance, Moderate verbal cues, Moderate tactile cues  Bed Mobility Comments 1: HOB elevated, draw sheet; patient appeared timid to assist with movement; cueing and assist for paired breathing with limb advancement towards EOB for supine->sit task.    Outcome Measures:  [facility] Basic Mobility  Turning from your back to your side while in a flat bed without using " bedrails: A lot  Moving from lying on your back to sitting on the side of a flat bed without using bedrails: A lot  Moving to and from bed to chair (including a wheelchair): Total  Standing up from a chair using your arms (e.g. wheelchair or bedside chair): Total  To walk in hospital room: Total  Climbing 3-5 steps with railing: Total  Basic Mobility - Total Score: 8    FSS-ICU  Ambulation: Unable to attempt due to weakness  Rolling: Maximal assistance (performs 25% - 49% of task)  Sitting: Maximal assistance (performs 25% - 49% of task)  Transfer Sit-to-Stand: Unable to perform  Transfer Supine-to-Sit: Total assistance (performs 25% or requires another person)  Total Score: 5      E = Exercise and Early Mobility  Early Mobility/Exercise Safety Screen: Proceed with mobilization - No exclusion criteria met  Current Activity: Sitting at edge of bed    Education Documentation  Mobility Training, taught by Cindy Olmos PT at 11/17/2023  3:14 PM.  Learner: Patient  Readiness: Acceptance  Method: Explanation, Demonstration  Response: Needs Reinforcement  Comment: PLB, activity pacing; highly encouraged patient to allow RN staff to complete Q2 turns for weightshifting off middle of buttocks; highly encouraged patient to partial self-prone to assist in optomizing O2 saturations.    Education Comments  No comments found.           Encounter Problems       Encounter Problems (Active)       Balance       patient will complete static (SBAx1) and dynamic (Cgx1) standing balance activities using LRD as needed without acute LOB, while maintaining stable vitals.  (Not Progressing)       Start:  11/10/23    Expected End:  11/24/23               Mobility       STG - Patient will ambulate >/=50 ft using LRD as needed with Cgx1 assist without acute LOB, while maintaining stable vitals.  (Not Progressing)       Start:  11/10/23    Expected End:  11/24/23            patient will participate in BLE there-ex in order to improve strength  and to assist with the completion of functional mobility tasks.  (Progressing)       Start:  11/10/23    Expected End:  11/24/23            patient will ambulate >/=30 ft consecutively and >/=75 ft cumulatively with </=1 sitting rest break while maintaining stable vitals, reporting <13/20 on the RPE scale.  (Not Progressing)       Start:  11/10/23    Expected End:  11/24/23                 Transfers       STG - Transfer from bed to chair with CGx1 assist without acute LOB, using LRD as needed  (Not Progressing)       Start:  11/10/23    Expected End:  11/24/23            STG - Patient will perform bed mobility with SBAx1 without acute LOB, using bedrailing as needed.  (Progressing)       Start:  11/10/23    Expected End:  11/24/23            STG - Patient will transfer sit to and from stand with Cgx1 assist using LRD as needed without acute LOB  (Not Progressing)       Start:  11/10/23    Expected End:  11/24/23                 Cindy Olmos, PT, DPT

## 2023-11-17 NOTE — PROGRESS NOTES
Occupational Therapy    Evaluation/Treatment    Patient Name: Macarena Wilson  MRN: [id number]  : 1955  Today's Date: 23  Time Calculation  Start Time: 101  Stop Time: 1053  Time Calculation (min): 41 min       Assessment:  OT Assessment: impaired ADLs/transfers  Prognosis: Good  End of Session Communication: Bedside nurse, Physician  End of Session Patient Position: Bed, 3 rail up, Alarm off, not on at start of session  OT Assessment Results: Decreased ADL status, Decreased upper extremity strength, Decreased endurance, Decreased functional mobility, Decreased IADLs  Prognosis: Good  Plan:  Treatment Interventions: ADL retraining, Functional transfer training, UE strengthening/ROM, Endurance training, Patient/family training, Equipment evaluation/education, Compensatory technique education  OT Frequency: 3 times per week  OT Discharge Recommendations: Moderate intensity level of continued care  OT - OK to Discharge: Yes  Treatment Interventions: ADL retraining, Functional transfer training, UE strengthening/ROM, Endurance training, Patient/family training, Equipment evaluation/education, Compensatory technique education    Subjective   General:   OT Received On: 23  General  Reason for Referral: transferred from OS to the MICU for acute hypoxic respiratory failure d/t COVID PNA; unvaccinated Flu/COVID  Past Medical History Relevant to Rehab: CKD stage III, HTN, rosasea; denied falls/yr.  Family/Caregiver Present: No  Co-Treatment: PT  Co-Treatment Reason: to maximize patient safety, mobility and activity tolerance 2/2 patient on max AirVo settings and limited tolerance  Prior to Session Communication: Bedside nurse, Physician  Patient Position Received: Bed, 3 rail up, Alarm off, not on at start of session  General Comment: Patient pleasant, agreeable to therapy, provided encouragement to patient to move UEs as tolerance in bed throughout the day for maintaining ROM/strength and increasing  activity tolerance.  Precautions:  Hearing/Visual Limitations: hearing is WFL  Medical Precautions: Fall precautions, Oxygen therapy device and L/min  Vital Signs:  Heart Rate:  (pre 87, after patient returned to supine from EOB sitting patient's HR increased to 170s-180s in SVT, RN and team to bedside and RN providing IV metoprolol and patient's HR decreased to 90s-100s; post 79)  Resp:  (pre 21, post 26)  SpO2:  (pre 94%, while EOB patient's SpO2 decreased to 63% then fluctuated between upper 60s-mid 70s, patient returned to supine; post 87%)  BP:  (pre 129/73, post 132/78)  Pain:  Pain Assessment  Pain Assessment: 0-10  Pain Score: 0 - No pain    Objective   Cognition:  Overall Cognitive Status:  (appeared anxious with movement)  Arousal/Alertness: Appropriate responses to stimuli  Orientation Level:  (oriented x3, CAM-ICU (-))  Following Commands: Follows one step commands consistently    Home Living:  Type of Home: House  Lives With: Alone  Home Layout: One level  Home Access: Stairs to enter with rails  Entrance Stairs-Number of Steps: 1-2  Bathroom Shower/Tub: Walk-in shower  Prior Function:  Level of [place]: Independent with ADLs and functional transfers, Independent with homemaking with ambulation  ADL Assistance: Independent  Homemaking Assistance: Independent  Ambulatory Assistance: Independent  Vocational: Full time employment  Hand Dominance: Right  Prior Function Comments: (+) drive  IADL History:     ADL:  Eating Assistance: Independent  Eating Deficit: Setup (anticipated)  Grooming Assistance: Independent  Grooming Deficit:  (in supine)  Bathing Assistance: Moderate  Bathing Deficit:  (anticipated)  UE Dressing Assistance: Minimal  UE Dressing Deficit:  (anticipated)  LE Dressing Assistance: Maximal  LE Dressing Deficit: Don/doff R sock, Don/doff L sock  Toileting Assistance with Device: Maximal  Toileting Deficit:  (anticipated)     Bed Mobility/Transfers: Bed Mobility  Bed Mobility: Yes  Bed  Mobility 1  Bed Mobility 1: Supine to sitting, Sitting to supine  Level of Assistance 1: Maximum assistance, Minimal verbal cues  Bed Mobility Comments 1: x2 assist; HOB elevated; patient appeared timid to assist with movement    Transfers  Transfer: No    Sitting Balance:  Static Sitting Balance  Static Sitting-Level of Assistance:  (initially max A 2/2 posterior lean, progressed to brief moments of min A)  Dynamic Sitting Balance  Dynamic Sitting-Balance:  (mod-max A)     Cognitive Skill Development:  Cognitive Skill Development Activity 1: TREATMENT: patient appearing anxious with mobility especially when reporting feeling SOB sitting EOB, instructed patient in paired breathing with hand squeeze/release with OT holding patient's hands, attempted to use guided imagery with patient to visualize air flow as blue color coming in her nose, filling her lungs and then breathing it out her mouth, attempted to use counting for inhale/exhale with patient; all techniques utilized to promote decrease in SOB and improve SpO2 sats while patient sitting EOB 2/2 decreased in SpO2, with patient participating in relaxation/breathing techniques patient able to increase SpO2 from 60s to mid 70s% and patient able to slow down RR however unable to maintain improvement in SpO2.     Vision:Vision - Basic Assessment  Current Vision:  (patient did not report using corrective lenses)  Sensation:  Light Touch:  (patient did not report any numbness/tingling)  Strength:  Strength Comments: (B)  WFL, (B) shoulders/elbows >/= 3/5      Hand Function:  Hand Function  Gross Grasp: Functional  Coordination: Functional  Extremities: RUE   RUE : Within Functional Limits and LUE   LUE: Within Functional Limits    Outcome Measures: [facility] Daily Activity  Putting on and taking off regular lower body clothing: A lot  Bathing (including washing, rinsing, drying): A lot  Putting on and taking off regular upper body clothing: A little  Toileting, which  includes using toilet, bedpan or urinal: A lot  Taking care of personal grooming such as brushing teeth: A little  Eating Meals: None  Daily Activity - Total Score: 16      ,  , Confusion Assessment Method-ICU (CAM-ICU)  Feature 1: Acute Onset or Fluctuating Course: Negative  Overall CAM-ICU: Negative  ,      , and E = Exercise and Early Mobility  Current Activity: Sitting at edge of bed    Education Documentation  Body Mechanics, taught by Alicia Kilgore OT at 11/17/2023  2:57 PM.  Learner: Patient  Readiness: Acceptance  Method: Explanation  Response: Needs Reinforcement, Verbalizes Understanding    ADL Training, taught by Alicia Kilgore OT at 11/17/2023  2:57 PM.  Learner: Patient  Readiness: Acceptance  Method: Explanation  Response: Needs Reinforcement, Verbalizes Understanding    Education Comments  No comments found.         Goals:  Encounter Problems       Encounter Problems (Active)       ADLs       Patient with complete upper body dressing with independent level of assistance donning and doffing all UE clothes. (Progressing)       Start:  11/17/23    Expected End:  12/01/23            Patient with complete lower body dressing with minimal assist  level of assistance donning and doffing all LE clothes  with PRN adaptive equipment. (Progressing)       Start:  11/17/23    Expected End:  12/01/23            Patient will complete daily grooming tasks with stand by assist level of assistance and PRN adaptive equipment while in sitting. (Progressing)       Start:  11/17/23    Expected End:  12/01/23            Patient will complete toileting including hygiene clothing management/hygiene with minimal assist  level of assistance. (Progressing)       Start:  11/17/23    Expected End:  12/01/23               BALANCE       Pt will maintain dynamic sitting balance during ADL task with contact guard assist level of assistance in order to demonstrate decreased risk of falling and improved postural control.  (Progressing)       Start:  11/17/23    Expected End:  12/01/23               COGNITION/SAFETY       Patient will verbalize and demonstrate >2 relaxation and breathing techniques during sessions with independence. (Progressing)       Start:  11/17/23    Expected End:  12/01/23               EXERCISE/STRENGTHENING       Patient will complete BUE exercises in order to improve activity tolerance for ADL performance.  (Progressing)       Start:  11/17/23    Expected End:  12/01/23               TRANSFERS       Patient will perform bed mobility minimal assist  level of assistance in order to improve safety and independence with mobility (Progressing)       Start:  11/17/23    Expected End:  12/01/23            Patient will complete functional transfers with least restrictive device with minimal assist  level of assistance. (Progressing)       Start:  11/17/23    Expected End:  12/01/23              SANDRA Kraus/L        Adbry Counseling: I discussed with the patient the risks of tralokinumab including but not limited to eye infection and irritation, cold sores, injection site reactions, worsening of asthma, allergic reactions and increased risk of parasitic infection.  Live vaccines should be avoided while taking tralokinumab. The patient understands that monitoring is required and they must alert us or the primary physician if symptoms of infection or other concerning signs are noted.

## 2023-11-18 NOTE — PROGRESS NOTES
"Macarena Wilson is a 68 y.o. female on day 9 of admission presenting with Acute hypoxemic respiratory failure (CMS/HCC).    Subjective   This morning Macarena feels well, denies any chest pain or lightheadedness/dizziness.        Objective     Physical Exam  Constitutional:       General: She is not in acute distress.     Appearance: She is not toxic-appearing.   HENT:      Head: Normocephalic and atraumatic.      Nose: Nose normal.   Eyes:      General: No scleral icterus.     Pupils: Pupils are equal, round, and reactive to light.   Cardiovascular:      Rate and Rhythm: Normal rate and regular rhythm.      Pulses: Normal pulses.      Heart sounds: No murmur heard.     No friction rub. No gallop.   Pulmonary:      Effort: No respiratory distress.      Breath sounds: No wheezing, rhonchi or rales.      Comments: HFNC in place. Prolonged expiratory phase.  Abdominal:      General: There is no distension.      Palpations: Abdomen is soft.   Musculoskeletal:      Cervical back: Normal range of motion.      Right lower leg: No edema.      Left lower leg: No edema.   Skin:     Comments: Ecchymosis of forearms.    Neurological:      General: No focal deficit present.      Mental Status: She is alert.   Psychiatric:         Mood and Affect: Mood normal.         Last Recorded Vitals  Blood pressure 135/56, pulse 63, temperature 35.5 °C (95.9 °F), temperature source Temporal, resp. rate 21, height 1.6 m (5' 2.99\"), weight 63.2 kg (139 lb 5.3 oz), SpO2 92 %.  Intake/Output last 3 Shifts:  I/O last 3 completed shifts:  In: 3285 (48 mL/kg) [NG/GT:2285; IV Piggyback:1000]  Out: 1300 (19 mL/kg) [Urine:1300 (0.5 mL/kg/hr)]  Dosing Weight: 68.4 kg     Relevant Results  Scheduled medications  apixaban, 10 mg, nasogastric tube, BID   Followed by  [START ON 11/23/2023] apixaban, 5 mg, nasogastric tube, BID  fluticasone, 1 spray, Each Nostril, BID  insulin lispro, 0-5 Units, subcutaneous, q4h  lidocaine, 1 patch, transdermal, " Daily  melatonin, 5 mg, oral, Daily  methylPREDNISolone sodium succinate (PF), 40 mg, intravenous, q6h  metoprolol tartrate, 25 mg, nasogastric tube, q6h  pantoprazole, 40 mg, intravenous, Daily  sulfamethoxazole-trimethoprim, 80 mg of trimethoprim, nasogastric tube, q24h KARL  thiamine, 100 mg, oral, Daily  traZODone, 25 mg, nasogastric tube, Nightly      Continuous medications     PRN medications  PRN medications: acetaminophen **OR** acetaminophen **OR** acetaminophen, dextrose 10 % in water (D10W), dextrose, docusate sodium, glucagon, guaiFENesin, hydrALAZINE, ipratropium-albuteroL, melatonin, naloxone, ondansetron ODT **OR** ondansetron, oxyCODONE, oxygen, polyethylene glycol, sodium chloride    Results for orders placed or performed during the hospital encounter of 11/09/23 (from the past 24 hour(s))   POCT GLUCOSE   Result Value Ref Range    POCT Glucose 222 (H) 74 - 99 mg/dL   Renal function panel   Result Value Ref Range    Glucose 219 (H) 74 - 99 mg/dL    Sodium 140 136 - 145 mmol/L    Potassium 4.0 3.5 - 5.3 mmol/L    Chloride 102 98 - 107 mmol/L    Bicarbonate 27 21 - 32 mmol/L    Anion Gap 15 10 - 20 mmol/L    Urea Nitrogen 126 (HH) 6 - 23 mg/dL    Creatinine 2.26 (H) 0.50 - 1.05 mg/dL    eGFR 23 (L) >60 mL/min/1.73m*2    Calcium 8.7 8.6 - 10.6 mg/dL    Phosphorus 3.9 2.5 - 4.9 mg/dL    Albumin 2.9 (L) 3.4 - 5.0 g/dL   Magnesium   Result Value Ref Range    Magnesium 2.64 (H) 1.60 - 2.40 mg/dL   POCT GLUCOSE   Result Value Ref Range    POCT Glucose 206 (H) 74 - 99 mg/dL   POCT GLUCOSE   Result Value Ref Range    POCT Glucose 242 (H) 74 - 99 mg/dL   Renal function panel   Result Value Ref Range    Glucose 228 (H) 74 - 99 mg/dL    Sodium 139 136 - 145 mmol/L    Potassium 4.4 3.5 - 5.3 mmol/L    Chloride 102 98 - 107 mmol/L    Bicarbonate 25 21 - 32 mmol/L    Anion Gap 16 10 - 20 mmol/L    Urea Nitrogen 126 (HH) 6 - 23 mg/dL    Creatinine 2.06 (H) 0.50 - 1.05 mg/dL    eGFR 26 (L) >60 mL/min/1.73m*2     Calcium 8.3 (L) 8.6 - 10.6 mg/dL    Phosphorus 3.9 2.5 - 4.9 mg/dL    Albumin 3.0 (L) 3.4 - 5.0 g/dL   Magnesium   Result Value Ref Range    Magnesium 2.64 (H) 1.60 - 2.40 mg/dL   CBC   Result Value Ref Range    WBC 19.0 (H) 4.4 - 11.3 x10*3/uL    nRBC 0.0 0.0 - 0.0 /100 WBCs    RBC 4.11 4.00 - 5.20 x10*6/uL    Hemoglobin 11.5 (L) 12.0 - 16.0 g/dL    Hematocrit 34.1 (L) 36.0 - 46.0 %    MCV 83 80 - 100 fL    MCH 28.0 26.0 - 34.0 pg    MCHC 33.7 32.0 - 36.0 g/dL    RDW 14.4 11.5 - 14.5 %    Platelets 126 (L) 150 - 450 x10*3/uL   POCT GLUCOSE   Result Value Ref Range    POCT Glucose 213 (H) 74 - 99 mg/dL   POCT GLUCOSE   Result Value Ref Range    POCT Glucose 222 (H) 74 - 99 mg/dL   POCT GLUCOSE   Result Value Ref Range    POCT Glucose 214 (H) 74 - 99 mg/dL               Assessment/Plan   Principal Problem:    Acute hypoxemic respiratory failure (CMS/HCC)  Active Problems:    COVID-19    Pneumonia of both lower lobes due to infectious organism    Macarena Wilson is a 68 year old female with a PMH of CKD III and HTN admitted to the MICU from OSH with AHRF 2/2 COVID pneumonia (positive on 10/13) complicated by ARDS and pulmonary fibrosis with resulting organizing PNA. Course complicated by distal DVTs and likely PE, oliguric HUNG on CKD, and intermittent hypoxia-associated SVT. Overall, patient remains tenuous and continues to require significant respiratory support via HFNC at 60L at 90% FiO2, however, is tentatively improving globally, with improvement in kidney function following placement of NGT and initiation of feeds + gentle rehydration.     11/18 updates:  - NGT in place              > Feeds at goal of 35                          > Thiamine 100 mg daily added              > Nutrition consult  - Stable on Eliquis, loading dosing for the first week  - Continues to require significant respiratory support on 60L at 90% FiO2, but is more consistently satting in the 90's into the high 90's and not requiring  additional mask with the HFNC  - Begin steroid wean today to 40 of methylpred q6   - Developed Afib w/ RVR last night requiring dig after additional 5 and 25 of metop. 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  - ICU delirium protocol, she has been in the ICU for >2 weeks   - Palliative following  > DNR/DNI     CARDIOVASCULAR:  #Afib   #Intermittent SVT  Likely 2/2 hypoxemia   - Goal sats > 88  - Has received spot pushes of metop 5 mg PRN and dig load 11/17 overnight, now off dig  - Scheduling metop tartrate 25 q6 via NGT, titrating to symptom control and with BPs    #Hypertension  Pressures have been within goal.  - Holding Losartan 100mg in the setting of recurrent HUNG on CKD  - PRN hydralazine, SBP >180     #Distal DVTs  #Likely PE  - D/c heparin gtt on 11/16  - On Eliquis with loading period per protocol (started 11/16)  > Held off on CTPE 2/2 tenuous state      PULMONARY:  #AHRF 2/2 COVID PNA (10/13) c/b ARDS  #GGO c/f pulmonary fibrosis - organizing PNA, likely 2/2 COVID PNA  #Likely PE, known DVTs  CT chest 11/7 - worsening bilateral changes consistent with severe COVID-19 pneumonitis and ARDS. On HFNC as patient could not tolerate BIPAP due to severe anxiety. S/p Remdesivir (completed course), a dose of Actemra on 10/24, and a dose of toci 10/26. ID not recommending another remdesivir course, and cannot get baricitinib until 11/24 (4 weeks out from toci). RVP, step pneumo, urine legionella negative, procal 11/7 0.25, now downtrending to 0.04 on 11/13. Pulmonary fibrosis highly likely to be 2/2 COVID, favor this etiology over a  fungal cause. Fungitell, aspergillus galactomannan negative, with blasto and histo pending. Discontinued voriconazole and meropenem on 11/13. Managing DVTs on eliquis, with likely PE at play as well. Tolerating gentle rehydration and initiation of enteric feeding well.  - Methylpred 40 mg q6   > Will need to plan a definitive weaning plan  > Daily SS bactrim for PCP prophy              - Increase to daily DS bactrim once EGFR > 30  - Fluticasone 1 spray bid   - Blasto, histo pending  - Holding further diuresis  - On HFNC 60 l/min on 90%, wean to sat 89-92%  - Pneumomediastinum resolved              > Avoid further CPAP attempts  - Infectious Control contacted - no need for Covid precautions     RENAL/GENITOURINARY:  #Oliguric HUNG on CKD III, RECURRENT, with azotemia   2.55 with baseline of 1.6 - UOP lagging, and appears better hydrated today on exam. Likely prerenal. Renal US with non-obstructing stones BL, no hydronephrosis, cysts BL. Continuing TF, and giving additional LR today, 1 to 1.5L in 500 ml aliquots pending tolerance.  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 Tolerating feeds.  - Nepro at goal of 35              > Follow RFP for refeeding, can stop after 11/18 if no signs  - Nutrition consult     #Diarrhea, improving   Stool panel negative.  - Loperamide ordered PRN  - PRN miralax and docusate senna ffor constipation      ENDOCRINOLOGY:  #Steroid induced hyperglycemia  Sugars well-controlled.  - LDSSI     RHEUMATOLOGY:  No active issues      HEMATOLOGY:  No active issues     MUSCULOSKELETAL/ SKIN:  #Eczema/ Rosasea  - On home dupixent     INFECTIOUS DISEASE:  #Covid PNA, unvaccinated  Procal downtrending, from 0.25 on 11/9 to 0.04 on  11/13. Discontinued voriconazole and meropenem per ID recommendations, and with low concern for fungal or bacterial infection, respectively.  - S/p Vancomycin 10/22-28, 11/7-11/7, with negative MRSA nares, meropenem 11/7- 11/13, and voriconazole 11/9- 11/13     Fluids: PRN  Electrolytes: replete prn  Nutrition: TF at 35 ml/hr  Antimicrobials: None, d/c'd 11/13  On methylpred 40 q6 --> bactrim ppx  DVT PPX: Eliquis started 11/16  GI ppx: Pantoprazole daily  Bowel care: Miralax and docusate senna prn, loperamide prn (diarrhea 11/11 improved)  Catheter: Pure wick external catheter   Lines: PIV  Oxygen: Airvow 60L FIO2 90%       Disposition:   Remain in MICU 2/2 oxygen requirement     Code Status: DNR/DNI  NOK:  Primary Emergency Contact: Mathew Wilson, Home Phone: [phone]          Jamee Nj MD  Internal Medicine-Pediatrics, PGY-1  [employer]

## 2023-11-19 NOTE — PROGRESS NOTES
"Macarena Wilson is a 68 y.o. female on day 10 of admission presenting with Acute hypoxemic respiratory failure (CMS/HCC).    Subjective   Continues to feel well, weaning down O2 support.    Objective     Physical Exam  Constitutional:       General: She is not in acute distress.     Appearance: She is not toxic-appearing.   HENT:      Head: Normocephalic and atraumatic.      Nose: Nose normal.   Eyes:      General: No scleral icterus.     Pupils: Pupils are equal, round, and reactive to light.   Cardiovascular:      Rate and Rhythm: Normal rate and regular rhythm.      Pulses: Normal pulses.      Heart sounds: No murmur heard.     No friction rub. No gallop.   Pulmonary:      Effort: No respiratory distress.      Breath sounds: No wheezing, rhonchi or rales.      Comments: HFNC in place. Prolonged expiratory phase.  Abdominal:      General: There is no distension.      Palpations: Abdomen is soft.   Musculoskeletal:      Cervical back: Normal range of motion.      Right lower leg: No edema.      Left lower leg: No edema.   Skin:     Comments: Ecchymosis of forearms.    Neurological:      General: No focal deficit present.      Mental Status: She is alert.   Psychiatric:         Mood and Affect: Mood normal.         Last Recorded Vitals  Blood pressure 139/64, pulse 84, temperature 36.7 °C (98.1 °F), resp. rate 25, height 1.6 m (5' 2.99\"), weight 63.2 kg (139 lb 5.3 oz), SpO2 91 %.  Intake/Output last 3 Shifts:  I/O last 3 completed shifts:  In: 2810 (41.1 mL/kg) [NG/GT:2560; IV Piggyback:250]  Out: 1815 (26.5 mL/kg) [Urine:1815 (0.7 mL/kg/hr)]  Dosing Weight: 68.4 kg     Relevant Results  Scheduled medications  apixaban, 10 mg, nasogastric tube, BID   Followed by  [START ON 11/23/2023] apixaban, 5 mg, nasogastric tube, BID  fluticasone, 1 spray, Each Nostril, BID  insulin lispro, 0-10 Units, subcutaneous, TID with meals  lidocaine, 1 patch, transdermal, Daily  melatonin, 5 mg, oral, Daily  metoprolol tartrate, 25 " mg, nasogastric tube, q6h  pantoprazole, 40 mg, intravenous, Daily  predniSONE, 50 mg, oral, q6h  [START ON 11/20/2023] sulfamethoxazole-trimethoprim, 160 mg of trimethoprim, nasogastric tube, q24h KARL  thiamine, 100 mg, oral, Daily  traZODone, 25 mg, nasogastric tube, Nightly      Continuous medications     PRN medications  PRN medications: acetaminophen **OR** acetaminophen **OR** acetaminophen, dextrose 10 % in water (D10W), dextrose, docusate sodium, glucagon, guaiFENesin, hydrALAZINE, ipratropium-albuteroL, melatonin, naloxone, ondansetron ODT **OR** ondansetron, oxyCODONE, oxygen, polyethylene glycol, sodium chloride    Results for orders placed or performed during the hospital encounter of 11/09/23 (from the past 24 hour(s))   POCT GLUCOSE   Result Value Ref Range    POCT Glucose 207 (H) 74 - 99 mg/dL   Renal function panel   Result Value Ref Range    Glucose 178 (H) 74 - 99 mg/dL    Sodium 140 136 - 145 mmol/L    Potassium 3.3 (L) 3.5 - 5.3 mmol/L    Chloride 106 98 - 107 mmol/L    Bicarbonate 23 21 - 32 mmol/L    Anion Gap 14 10 - 20 mmol/L    Urea Nitrogen 106 (HH) 6 - 23 mg/dL    Creatinine 1.52 (H) 0.50 - 1.05 mg/dL    eGFR 37 (L) >60 mL/min/1.73m*2    Calcium 7.5 (L) 8.6 - 10.6 mg/dL    Phosphorus 2.5 2.5 - 4.9 mg/dL    Albumin 2.7 (L) 3.4 - 5.0 g/dL   Magnesium   Result Value Ref Range    Magnesium 2.10 1.60 - 2.40 mg/dL   POCT GLUCOSE   Result Value Ref Range    POCT Glucose 194 (H) 74 - 99 mg/dL   POCT GLUCOSE   Result Value Ref Range    POCT Glucose 217 (H) 74 - 99 mg/dL   POCT GLUCOSE   Result Value Ref Range    POCT Glucose 275 (H) 74 - 99 mg/dL   CBC and Auto Differential   Result Value Ref Range    WBC 23.8 (H) 4.4 - 11.3 x10*3/uL    nRBC 0.0 0.0 - 0.0 /100 WBCs    RBC 4.14 4.00 - 5.20 x10*6/uL    Hemoglobin 11.9 (L) 12.0 - 16.0 g/dL    Hematocrit 35.8 (L) 36.0 - 46.0 %    MCV 87 80 - 100 fL    MCH 28.7 26.0 - 34.0 pg    MCHC 33.2 32.0 - 36.0 g/dL    RDW 14.4 11.5 - 14.5 %    Platelets 107 (L)  150 - 450 x10*3/uL    Neutrophils % 95.6 40.0 - 80.0 %    Immature Granulocytes %, Automated 1.3 (H) 0.0 - 0.9 %    Lymphocytes % 0.5 13.0 - 44.0 %    Monocytes % 2.4 2.0 - 10.0 %    Eosinophils % 0.0 0.0 - 6.0 %    Basophils % 0.2 0.0 - 2.0 %    Neutrophils Absolute 22.71 (H) 1.20 - 7.70 x10*3/uL    Immature Granulocytes Absolute, Automated 0.30 0.00 - 0.70 x10*3/uL    Lymphocytes Absolute 0.13 (L) 1.20 - 4.80 x10*3/uL    Monocytes Absolute 0.58 0.10 - 1.00 x10*3/uL    Eosinophils Absolute 0.00 0.00 - 0.70 x10*3/uL    Basophils Absolute 0.04 0.00 - 0.10 x10*3/uL   Magnesium   Result Value Ref Range    Magnesium 2.50 (H) 1.60 - 2.40 mg/dL   Hepatic function panel   Result Value Ref Range    Albumin 3.2 (L) 3.4 - 5.0 g/dL    Bilirubin, Total 0.6 0.0 - 1.2 mg/dL    Bilirubin, Direct 0.1 0.0 - 0.3 mg/dL    Alkaline Phosphatase 71 33 - 136 U/L    ALT 63 (H) 7 - 45 U/L    AST 27 9 - 39 U/L    Total Protein 5.4 (L) 6.4 - 8.2 g/dL   Phosphorus   Result Value Ref Range    Phosphorus 5.6 (H) 2.5 - 4.9 mg/dL   Basic Metabolic Panel   Result Value Ref Range    Glucose 245 (H) 74 - 99 mg/dL    Sodium 137 136 - 145 mmol/L    Potassium 4.8 3.5 - 5.3 mmol/L    Chloride 99 98 - 107 mmol/L    Bicarbonate 25 21 - 32 mmol/L    Anion Gap 18 10 - 20 mmol/L    Urea Nitrogen 113 (HH) 6 - 23 mg/dL    Creatinine 1.63 (H) 0.50 - 1.05 mg/dL    eGFR 34 (L) >60 mL/min/1.73m*2    Calcium 8.6 8.6 - 10.6 mg/dL   POCT GLUCOSE   Result Value Ref Range    POCT Glucose 254 (H) 74 - 99 mg/dL               Assessment/Plan   Principal Problem:    Acute hypoxemic respiratory failure (CMS/HCC)  Active Problems:    COVID-19    Pneumonia of both lower lobes due to infectious organism    Macarenakevin Wilson is a 68 year old female with a PMH of CKD III and HTN admitted to the MICU from OSH with AHRF 2/2 COVID pneumonia (positive on 10/13) complicated by ARDS and pulmonary fibrosis with resulting organizing PNA. Course complicated by distal DVTs and likely PE,  oliguric HUNG on CKD, and intermittent hypoxia-associated SVT and afib with RVR. Overall, patient is tentatively globally improving, with improvement in kidney function following placement of NGT and initiation of feeds + gentle rehydration, and some progress in weaning O2 requirements.     11/19 updates:  - NGT in place              > Feeds at goal of 35                          > Thiamine 100 mg daily added              > Nutrition consult  - Stable on Eliquis, loading dosing for the first week  - Weaning down HFNC, now at 40L and 60% FiO2, not requiring additional mask with the HFNC  - Continue steroid wean today + converting to PO with 50 mg prednisone q6  - Developed Afib w/ RVR 11/17, requiring dig load, and increased metoprolol to 25mg Q6   > Remained in NSR overnight, with stable pressures  - Oliguric HUNG on CKD, improving with fluids and enteral nutrition              > Hold off on desouza except if absolutely necessary, 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  - ICU delirium protocol, she has been in the ICU for >2 weeks   - Palliative following  > DNR/DNI     CARDIOVASCULAR:  #Afib   #Intermittent SVT  Likely 2/2 hypoxemia.  - Goal sats > 88  - Has received spot pushes of metop 5 mg PRN and dig load 11/17 overnight, now off dig  - Scheduling metop tartrate 25 q6 via NGT, titrating to symptom control and with BPs    #Hypertension  Pressures have been within goal.  - Holding Losartan 100mg in the setting of recurrent HUNG on CKD  - PRN hydralazine, SBP >180     #Distal DVTs  #Likely PE  - D/c heparin gtt on 11/16  - On Eliquis with loading period per protocol (started 11/16)  > Held off on CTPE 2/2 tenuous state      PULMONARY:  #AHRF 2/2 COVID PNA (10/13) c/b ARDS  #GGO c/f pulmonary fibrosis - organizing PNA, likely 2/2 COVID  PNA  #Likely PE, known DVTs  CT chest 11/7 - worsening bilateral changes consistent with severe COVID-19 pneumonitis and ARDS. On HFNC as patient could not tolerate BIPAP due to severe anxiety. S/p Remdesivir (completed course), a dose of Actemra on 10/24, and a dose of toci 10/26. ID not recommending another remdesivir course, and cannot get baricitinib until 11/24 (4 weeks out from toci). RVP, step pneumo, urine legionella negative, procal 11/7 0.25, now downtrending to 0.04 on 11/13. Pulmonary fibrosis 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 Fungitell, aspergillus galactomannan negative, with blasto and histo pending. Discontinued voriconazole and meropenem on 11/13. Managing DVTs on eliquis, with likely PE at play as well. Tolerating gentle rehydration and initiation of enteric feeding well.  - Prednisone 50 mg q6   > Will need to plan a definitive weaning plan  > Daily DS bactrim for PCP prophy  - Fluticasone 1 spray bid   - Blasto, histo pending  - Holding further diuresis  - On HFNC 40L 60%,, wean to sat 89-92%  - Pneumomediastinum resolved              > Avoid further CPAP attempts  - Infectious Control contacted - no need for Covid precautions     RENAL/GENITOURINARY:  #Oliguric HUNG on CKD III, RECURRENT, with azotemia   Improving with downtrending creatinine, baseline of 1.6, and increased UOP. Likely prerenal. Renal US with non-obstructing stones BL, no hydronephrosis, cysts BL. Continuing TF with free water flushes.  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 Tolerating feeds.  - Nepro at goal of 35, no concern for refeeding  - Nutrition consult     #Diarrhea, improving   Stool panel negative.  - Loperamide  ordered PRN  - PRN miralax and docusate senna ffor constipation      ENDOCRINOLOGY:  #Steroid induced hyperglycemia  Sugars 250s.  - Increase to MDSSI     RHEUMATOLOGY:  No active issues      HEMATOLOGY:  #Neutrophilic leukocytosis  Initially thought to be 2/2 steroid dosing increase, but has continued to uptrend. No signs/sx of active infx, but will continue to monitor.  - Daily CBC with diff     MUSCULOSKELETAL/ SKIN:  #Eczema/ Rosasea  - On home dupixent     INFECTIOUS DISEASE:  #Covid PNA, unvaccinated  Procal downtrending, from 0.25 on 11/9 to 0.04 on 11/13. Discontinued voriconazole and meropenem per ID recommendations, and with low concern for fungal or bacterial infection, respectively.  - S/p Vancomycin 10/22-28, 11/7-11/7, with negative MRSA nares, meropenem 11/7- 11/13, and voriconazole 11/9- 11/13     Fluids: PRN  Electrolytes: replete prn  Nutrition: TF at 35 ml/hr  Antimicrobials: None, d/c'd 11/13  On prednisone 50 q6 --> bactrim ppx  DVT PPX: Eliquis started 11/16  GI ppx: Pantoprazole daily  Bowel care: Miralax and docusate senna prn, loperamide prn (diarrhea 11/11 improved)  Catheter: Pure wick external catheter   Lines: PIV  Oxygen: Airvow 40L FIO2 60%       Disposition:   Remain in MICU 2/2 oxygen requirement     Code Status: DNR/DNI  NOK:  Primary Emergency Contact: Mathew Wilson, Home Phone: [phone]          Madiha Cervantes MD  Internal Medicine and Pediatrics, PGY-1  Epic Chat

## 2023-11-19 NOTE — CARE PLAN
The patient's goals for the shift include  remaining comfortable on oxygen settings with minimal anxiety.     The clinical goals for the shift include maintain oxygen sat >88%    Over the shift, the patient did not make progress toward the following goals. Barriers to progression include desaturation with turns. Recommendations to address these barriers include GOC with team and patient.

## 2023-11-19 NOTE — SIGNIFICANT EVENT
Renal function continuing to improve, remains nonoliguric. Will sign off. Please call with questions

## 2023-11-20 NOTE — CARE PLAN
The patient's goals for the shift include  physical therapy.     The clinical goals for the shift include maintain oxygen sat >88%    Over the shift, the patient did not make progress toward the following goals. Barriers to progression include anxiety and oxygenation requirements. Recommendations to address these barriers include deep breathing and talking through anxiousness.

## 2023-11-20 NOTE — PROGRESS NOTES
"Macarena Wilson is a 68 y.o. female on day 11 of admission presenting with Acute hypoxemic respiratory failure (CMS/HCC).    Subjective   Continues to feel well, weaning down O2 support.    Objective     Physical Exam  Constitutional:       General: She is not in acute distress.     Appearance: She is not toxic-appearing.   HENT:      Head: Normocephalic and atraumatic.      Nose: Nose normal.   Eyes:      General: No scleral icterus.     Pupils: Pupils are equal, round, and reactive to light.   Cardiovascular:      Rate and Rhythm: Normal rate and regular rhythm.      Pulses: Normal pulses.      Heart sounds: No murmur heard.     No friction rub. No gallop.   Pulmonary:      Effort: No respiratory distress.      Breath sounds: No wheezing, rhonchi or rales.      Comments: Now on NC, improvement in air movement (though still poor)  Abdominal:      General: There is no distension.      Palpations: Abdomen is soft.   Musculoskeletal:      Cervical back: Normal range of motion.      Right lower leg: No edema.      Left lower leg: No edema.   Skin:     Comments: Ecchymosis of forearms.    Neurological:      General: No focal deficit present.      Mental Status: She is alert.   Psychiatric:         Mood and Affect: Mood normal.         Last Recorded Vitals  Blood pressure 142/59, pulse 71, temperature 36.7 °C (98.1 °F), temperature source Temporal, resp. rate 21, height 1.6 m (5' 2.99\"), weight 63.2 kg (139 lb 5.3 oz), SpO2 91 %.  Intake/Output last 3 Shifts:  I/O last 3 completed shifts:  In: 3090 (45.2 mL/kg) [NG/GT:2840; IV Piggyback:250]  Out: 1665 (24.3 mL/kg) [Urine:1665 (0.7 mL/kg/hr)]  Dosing Weight: 68.4 kg     Relevant Results  Scheduled medications  apixaban, 10 mg, nasogastric tube, BID   Followed by  [START ON 11/23/2023] apixaban, 5 mg, nasogastric tube, BID  [START ON 11/21/2023] pantoprazole, 40 mg, oral, Daily before breakfast   Or  [START ON 11/21/2023] esomeprazole, 40 mg, nasoduodenal tube, Daily " before breakfast  fluticasone, 1 spray, Each Nostril, BID  insulin lispro, 0-10 Units, subcutaneous, q4h  lidocaine, 1 patch, transdermal, Daily  melatonin, 5 mg, oral, Daily  metoprolol tartrate, 25 mg, nasogastric tube, q6h  predniSONE, 50 mg, oral, q6h  sulfamethoxazole-trimethoprim, 160 mg of trimethoprim, nasogastric tube, q24h KARL  thiamine, 100 mg, oral, Daily  traZODone, 25 mg, nasogastric tube, Nightly      Continuous medications     PRN medications  PRN medications: acetaminophen **OR** acetaminophen **OR** acetaminophen, dextrose 10 % in water (D10W), dextrose, docusate sodium, glucagon, guaiFENesin, hydrALAZINE, ipratropium-albuteroL, naloxone, ondansetron ODT **OR** ondansetron, oxyCODONE, oxygen, polyethylene glycol, sodium chloride    Results for orders placed or performed during the hospital encounter of 11/09/23 (from the past 24 hour(s))   POCT GLUCOSE   Result Value Ref Range    POCT Glucose 188 (H) 74 - 99 mg/dL   POCT GLUCOSE   Result Value Ref Range    POCT Glucose 198 (H) 74 - 99 mg/dL   POCT GLUCOSE   Result Value Ref Range    POCT Glucose 208 (H) 74 - 99 mg/dL   POCT GLUCOSE   Result Value Ref Range    POCT Glucose 244 (H) 74 - 99 mg/dL   CBC and Auto Differential   Result Value Ref Range    WBC 23.2 (H) 4.4 - 11.3 x10*3/uL    nRBC 0.0 0.0 - 0.0 /100 WBCs    RBC 3.85 (L) 4.00 - 5.20 x10*6/uL    Hemoglobin 11.2 (L) 12.0 - 16.0 g/dL    Hematocrit 33.8 (L) 36.0 - 46.0 %    MCV 88 80 - 100 fL    MCH 29.1 26.0 - 34.0 pg    MCHC 33.1 32.0 - 36.0 g/dL    RDW 14.6 (H) 11.5 - 14.5 %    Platelets 138 (L) 150 - 450 x10*3/uL    Neutrophils % 93.6 40.0 - 80.0 %    Immature Granulocytes %, Automated 1.8 (H) 0.0 - 0.9 %    Lymphocytes % 0.6 13.0 - 44.0 %    Monocytes % 3.8 2.0 - 10.0 %    Eosinophils % 0.0 0.0 - 6.0 %    Basophils % 0.2 0.0 - 2.0 %    Neutrophils Absolute 21.71 (H) 1.20 - 7.70 x10*3/uL    Immature Granulocytes Absolute, Automated 0.41 0.00 - 0.70 x10*3/uL    Lymphocytes Absolute 0.13 (L)  1.20 - 4.80 x10*3/uL    Monocytes Absolute 0.87 0.10 - 1.00 x10*3/uL    Eosinophils Absolute 0.00 0.00 - 0.70 x10*3/uL    Basophils Absolute 0.04 0.00 - 0.10 x10*3/uL   Renal function panel   Result Value Ref Range    Glucose 251 (H) 74 - 99 mg/dL    Sodium 137 136 - 145 mmol/L    Potassium 3.9 3.5 - 5.3 mmol/L    Chloride 100 98 - 107 mmol/L    Bicarbonate 27 21 - 32 mmol/L    Anion Gap 14 10 - 20 mmol/L    Urea Nitrogen 102 (HH) 6 - 23 mg/dL    Creatinine 1.44 (H) 0.50 - 1.05 mg/dL    eGFR 39 (L) >60 mL/min/1.73m*2    Calcium 8.5 (L) 8.6 - 10.6 mg/dL    Phosphorus 3.0 2.5 - 4.9 mg/dL    Albumin 2.9 (L) 3.4 - 5.0 g/dL   Magnesium   Result Value Ref Range    Magnesium 2.33 1.60 - 2.40 mg/dL   B-type natriuretic peptide   Result Value Ref Range     (H) 0 - 99 pg/mL   POCT GLUCOSE   Result Value Ref Range    POCT Glucose 233 (H) 74 - 99 mg/dL   POCT GLUCOSE   Result Value Ref Range    POCT Glucose 183 (H) 74 - 99 mg/dL               Assessment/Plan   Principal Problem:    Acute hypoxemic respiratory failure (CMS/HCC)  Active Problems:    COVID-19    Pneumonia of both lower lobes due to infectious organism    Macarena Wilson is a 68 year old female with a PMH of CKD III and HTN admitted to the MICU from OSH with AHRF 2/2 COVID pneumonia (positive on 10/13) complicated by ARDS and pulmonary fibrosis with resulting organizing PNA. Course complicated by distal DVTs and likely PE, oliguric HUNG on CKD, and intermittent hypoxia-associated SVT and afib with RVR. Overall, patient is slowly globally improving, with improvement in kidney function following placement of NGT and initiation of feeds + gentle rehydration, and notable progress in weaning O2 requirements. Will consider transitioning to SDU in the coming days - would like to see her tolerate PT without significant hypoxia and/or runs of SVT or afib.    11/20 updates:  - NGT in place              > Feeds at goal of 35                          > Thiamine 100 mg  daily added              > Nutrition consult  - Stable on Eliquis, loading dosing for the first week  - Weaning down on O2 - now on 10L NC!   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  - ICU delirium protocol, she has been in the ICU for >2 weeks   - Palliative following  > DNR/DNI     CARDIOVASCULAR:  #Afib   #Intermittent SVT  Likely 2/2 hypoxemia.  - Goal sats > 88  - Has received spot pushes of metop 5 mg PRN and dig load 11/17 overnight, now off dig  - Scheduling metop tartrate 25 q6 via NGT, titrating to symptom control and with BPs    #Hypertension  Pressures have been within goal.  - Holding Losartan 100mg in the setting of recurrent HUNG on CKD  - PRN hydralazine, SBP >180     #Distal DVTs  #Likely PE  - D/c heparin gtt on 11/16  - On Eliquis with loading period per protocol (started 11/16)  > Held off on CTPE 2/2 tenuous state      PULMONARY:  #AHRF 2/2 COVID PNA (10/13) c/b ARDS  #GGO c/f pulmonary fibrosis - organizing PNA, likely 2/2 COVID PNA  #Likely PE, known DVTs  CT chest 11/7 - worsening bilateral changes consistent with severe COVID-19 pneumonitis and ARDS. On HFNC as patient could not tolerate BIPAP due to severe anxiety. S/p Remdesivir (completed course), a dose of Actemra on 10/24, and a dose of toci 10/26. ID not  recommending another remdesivir course, and cannot get baricitinib until 11/24 (4 weeks out from toci). RVP, step pneumo, urine legionella negative, procal 11/7 0.25, now downtrending to 0.04 on 11/13. Pulmonary fibrosis 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 Fungitell, aspergillus galactomannan negative, with blasto and histo pending. Discontinued voriconazole and meropenem on 11/13. Managing DVTs on eliquis, with likely PE at play as well. Tolerating gentle rehydration and initiation of enteric feeding well, and is making good progress weaning down O2 support.   - Prednisone 50 mg q6   > Will need to plan a definitive weaning plan  > Daily DS bactrim for PCP prophy  - Fluticasone 1 spray bid   - Blasto, histo pending  - Holding further diuresis  - On NC 10L,, wean to sat 89-92%  - Pneumomediastinum resolved              > Avoid further CPAP attempts  - Infectious Control contacted - no need for Covid precautions     RENAL/GENITOURINARY:  #Oliguric HUNG on CKD III, RECURRENT, with azotemia   Improving with downtrending creatinine, baseline of 1.6, and increased UOP. Likely prerenal. Renal US with non-obstructing stones BL, no hydronephrosis, cysts BL. Continuing TF with free water flushes.  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 Tolerating feeds.  - Nepro at goal of 35, no concern for refeeding  - Nutrition consult     #Diarrhea, improving   Stool panel negative.  - Loperamide ordered PRN  - PRN miralax and docusate senna ffor constipation      ENDOCRINOLOGY:  #Steroid induced hyperglycemia  Sugars 250s.  - MDSSI     RHEUMATOLOGY:  No active issues      HEMATOLOGY:  #Neutrophilic leukocytosis  Initially thought to  be 2/2 steroid dosing increase, but has continued to uptrend. No signs/sx of active infx, but will continue to monitor.  - Daily CBC with diff     MUSCULOSKELETAL/ SKIN:  #Eczema/ Rosasea  - On home dupixent     INFECTIOUS DISEASE:  #Covid PNA, unvaccinated  Procal downtrending, from 0.25 on 11/9 to 0.04 on 11/13. Discontinued voriconazole and meropenem per ID recommendations, and with low concern for fungal or bacterial infection, respectively.  - S/p Vancomycin 10/22-28, 11/7-11/7, with negative MRSA nares, meropenem 11/7- 11/13, and voriconazole 11/9- 11/13     Fluids: PRN  Electrolytes: replete prn  Nutrition: TF at 35 ml/hr  Antimicrobials: None, d/c'd 11/13  On prednisone 50 q6 --> bactrim ppx  DVT PPX: Eliquis started 11/16  GI ppx: Pantoprazole daily  Bowel care: Miralax and docusate senna prn, loperamide prn (diarrhea 11/11 improved)  Catheter: Pure wick external catheter   Lines: PIV  Oxygen: 10L NC     Disposition:   Remain in MICU 2/2 oxygen requirement, may be stable for stepdown in the coming days     Code Status: DNR/DNI  NOK:  Primary Emergency Contact: Mathew Wilson, Home Phone: [phone]          Madiha Cervantes MD  Internal Medicine and Pediatrics, PGY-1  Epic Chat

## 2023-11-21 NOTE — PROGRESS NOTES
Macarena Wilson is a 68 y.o. female on day 12 of admission presenting with Acute hypoxemic respiratory failure (CMS/HCC).  TCC Note    Plan per Medical/Surgical Team: NGT in place, weaning down O2, A-fib, HUNG on CKD, plan to wean prednisone, nephrology consulted, nutrition consult  Status: Inpatient  Payor Source: [facility]  Discharge disposition: SNF need FOC  Expected date of discharge:11/28/23  Barriers: Medical  Provided patient with the list, patient will discuss with her daughter. Will follow up with FOC.

## 2023-11-21 NOTE — PROGRESS NOTES
Spiritual Care Visit    Clinical Encounter Type  Visited With: Patient  Routine Visit: Follow-up  Continue Visiting: Yes     visited patient Macarena Wilson briefly. Patient shared she experienced a bit of a set back today, and that it took some effort for her to talk. She was appreciative of  stopping by and did not have any needs at this time.  assured her of ongoing prayer and support. Spiritual Care remains available as needed/requested.    Rev. Estee Lai MDiv, BCC

## 2023-11-21 NOTE — PROGRESS NOTES
"Macarena Wilson is a 68 y.o. female on day 12 of admission presenting with Acute hypoxemic respiratory failure (CMS/HCC).    Subjective   Transferred from MICU to SDU overnight.  This am, c/o nasal congestion and feeling that the O2 is \"not getting in\" via HFNC.  O2 sats >90%.  Denies cough, SOB, CP, N/V, dysuria  Endorses BLE weakness       Objective     Constitutional: elderly female,  fatigued, SOB with speech,   Eyes: PERRL, EOMI, no icterus   ENMT: mucous membranes moist, no apparent injury, no lesions seen  Head/Neck: Neck supple, no apparent injury  Respiratory/Thorax: Lungs CTA bilaterally, diminished, SOB with speech,  weak cough, on 10 L HFNC  Cardiovascular: Regular, rate and rhythm, no murmurs, normal S1 and S2  Gastrointestinal: Nondistended, soft, non-tender, BS present x 4  : voiding per external female cath, clear yellow  Musculoskeletal: Passive ROM intact, no joint swelling,   Extremities: normal extremities, no edema, contusions or wounds  Neurological: alert and oriented x 3, speech clear, follows commands appropriately, cr. n. II-XII intact, sensation grossly intact, BLE unable to move against gravity.  Moves BUE equally and independently  Skin: Warm and dry, bruising throughout BUE      Last Recorded Vitals  Blood pressure 112/59, pulse 58, temperature 36.6 °C (97.9 °F), temperature source Temporal, resp. rate 19, height 1.6 m (5' 2.99\"), weight 63.2 kg (139 lb 5.3 oz), SpO2 95 %.  Intake/Output last 3 Shifts:  I/O last 3 completed shifts:  In: 3200 (46.8 mL/kg) [NG/GT:3200]  Out: 1050 (15.4 mL/kg) [Urine:1050 (0.4 mL/kg/hr)]  Dosing Weight: 68.4 kg     Relevant Results                  Malnutrition Diagnosis Status: New  Malnutrition Diagnosis: Severe malnutrition related to acute disease or injury  As Evidenced by: 12% weight loss from normal along with moderate to severe muscle and fat loss on physical exam in setting of a decrease in PO intake/appetite after being diagnosed with " COVID  I agree with the dietitian's malnutrition diagnosis.      Assessment/Plan   Principal Problem:    Acute hypoxemic respiratory failure (CMS/HCC)  Active Problems:    COVID-19    Pneumonia of both lower lobes due to infectious organism    Macarena Wilson is a 68 year old female with a PMH of CKD III and HTN admitted to the MICU from OSH with AHRF 2/2 COVID pneumonia (positive on 10/13) complicated by ARDS and pulmonary fibrosis with resulting organizing PNA. Course complicated by distal DVTs and likely PE, oliguric HUNG on CKD, and intermittent hypoxia-associated SVT and afib with RVR.   Pt weaned to HFNC and transferred to SDU for continued care. This am, weaned to 10 L HFNC     NEUROLOGY/ PSYCH:  Awake, alert, oriented x3.  Endorses feeling anxious today.  No home meds prior to hospitalization for anxiety, states Oxy here has help with air hunger and anxiety feeling  - ICU delirium protocol, she has been in the ICU for >2 weeks   - PRN Oxy for pain or air hunger  - Palliative following  > DNR/DNI     CARDIOVASCULAR: Afib, Intermittent SVT, HTN  - HDS  - Has received spot pushes of metop 5 mg PRN and dig load 11/17 overnight, now off dig  - Scheduling metop tartrate 25 q6 via NGT, titrating to symptom control and with BPs  - Holding Losartan 100mg in the setting of recurrent HUNG on CKD  - PRN hydralazine, SBP >180         PULMONARY: AHRF 2/2 COVID PNA (11/23) c/b ARDS, GGO c/f pulmonary fibrosis - organizing PNA, likely 2/2 COVID PNA, Likely PE, known DVTs  - Weaned to 10 L HFNC  - Pulmonary fibrosis 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   - Fungitell, aspergillus galactomannan negative, blasto and histo Ag neg  - Prednisone 50 mg q6==> plan to continue this for one week, then taper on 11/26 to 80 mg Q12H  > Daily DS bactrim for PCP prophy  - Fluticasone 1 spray bid   - Holding further diuresis==> consider IV lasix tomorrow  - Pneumomediastinum resolved              > Avoid  further CPAP attempts     RENAL/GENITOURINARY: Oliguric HUNG on CKD III, RECURRENT, with azotemia   - Cr peaked at 3.97, now downtrending. BUN downtrending  -  baseline of 1.6, and increased UOP. Likely prerenal. Renal US with non-obstructing stones BL, no hydronephrosis, cysts BL. 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  Diarrhea subsiding  - Nepro at goal of 35, no concern for refeeding  - Stool panel negative.  - Loperamide ordered PRN  - PRN miralax and docusate senna for constipation      ENDOCRINOLOGY: Steroid induced hyperglycemia  - A1C 6.3, pre-DM  - Sugars 250s.  - MDSSI     HEMATOLOGY:  DVT, presumed PE  - 11/13: BUE U/S: +R PT DVT, +L Soleal DVT  - transitioned to BID eliquis     MUSCULOSKELETAL/ SKIN: Eczema/ Rosasea; Sacral pressure injury, stage 3  - On home dupixent  - seen by wound care RN  - for sacral wound: Clean buttocks with cleansing cloths and apply Triad to open skin BID and as needed. Leave RIZWANA      INFECTIOUS DISEASE: Covid PNA (received Decadron, Remdesivir, Tocilizumab at OSH) Neutrophilic leukocytosis  - Increasing leukocytosis, Afebrile  MICRO: 11/21 BC x2 NGTD, urine Cx pending,   10/26 & 11/10: stool studies, including C. Diff neg, 11/9 Step pneumonaie Ag, Legionella Ag neg, 11/9 Flu A/B, RSV neg      Lines: PIV, Cortrak     Code Status: DNR/DNI  NOK:  Primary Emergency Contact: SteveMathew, Home Phone: [phone]     Disposition:   Continue SDU for hypoxic resp failure with high FiO2 needs  I spent 35 minutes in the professional and overall care of this patient.      Gabriella Cheung, APRN-CNP

## 2023-11-21 NOTE — CONSULTS
Wound Care Consult     Visit Date: 11/21/2023      Patient Name: Macarena Wilson         MRN: [id number]           YOB: 1955     Reason for Consult: sacrum and pressure injury to neck        Wound History: Macarena Wilson is a 68 y.o. female on day 11 of admission presenting with Acute hypoxemic respiratory failure .       Pertinent Labs:   Albumin   Date Value Ref Range Status   11/21/2023 3.1 (L) 3.4 - 5.0 g/dL Final       Wound Assessment:  Wound 11/17/23 Pressure Injury Sacrum (Active)   Wound Image   11/21/23 1332   Site Assessment Pink;White 11/21/23 1332   Sobia-Wound Assessment Blanchable erythema 11/21/23 1332   Non-staged Wound Description Partial thickness 11/21/23 1332   Pressure Injury Stage 3 11/21/23 1332   Shape cluster of circular lesion 11/21/23 1332   Wound Length (cm) 3 cm 11/21/23 1332   Wound Width (cm) 4 cm 11/21/23 1332   Wound Surface Area (cm^2) 12 cm^2 11/21/23 1332   Wound Depth (cm) 0.5 cm 11/21/23 1332   Wound Volume (cm^3) 6 cm^3 11/21/23 1332   State of Healing Slough 11/18/23 2000   Margins Poorly defined 11/21/23 1332   Drainage Description Serosanguineous 11/21/23 1332   Drainage Amount Small 11/21/23 1332   Dressing Xeroform;Silicone border dressing 11/21/23 1332   Dressing Changed New 11/21/23 1332   Dressing Status Clean 11/21/23 1332       Wound 11/17/23 Pressure Injury Throat Anterior;Medial (Active)       Wound Team Summary Assessment: Patient in bed, Grand daughter at bedside. Patient's pulse ox ranging between 86-90. Once she was turned for assessment, it dropped to the low 80's. This took awhile to rebound.   Area to buttocks has irregularly shaped clustered, circular lesions. These are not located on a bony prominence and do not look like pressure. Patient has been consistently incontinent of urine and stool. These are most likely related to Incontinent dermatitis.   Wound team is unable to see any form of pressure injury to her neck. All skin is  intact. So, this has been removed from the avatar and flowsheet.     Recommendations:  Clean buttocks with cleansing cloths and apply Triad to open skin BID and as needed. Leave RIZWANA and do not cover with a bordered foam as this will hold urine and stool against her skin.   Please place waffle mattress over regular mattress.       Wound Team Plan: Wound team will not continue to follow. Please re consult for worsening of wounds.      SUZANNE GERHARDT, RN, BSN, CWOCN  11/21/2023  4:25 PM

## 2023-11-21 NOTE — CARE PLAN
Problem: Nutrition  Goal: Less than 5 days NPO/clear liquids  Outcome: Progressing  Goal: Oral intake greater than 50%  Outcome: Progressing  Goal: Oral intake greater 75%  Outcome: Progressing  Goal: Consume prescribed supplement  Outcome: Progressing  Goal: Adequate PO fluid intake  Outcome: Progressing  Goal: Nutrition support goals are met within 48 hrs  Outcome: Progressing  Goal: Nutrition support is meeting 75% of nutrient needs  Outcome: Progressing  Goal: Tube feed tolerance  Outcome: Progressing  Goal: BG  mg/dL  Outcome: Progressing  Goal: Lab values WNL  Outcome: Progressing  Goal: Electrolytes WNL  Outcome: Progressing  Goal: Promote healing  Outcome: Progressing  Goal: Maintain stable weight  Outcome: Progressing  Goal: Reduce weight from edema/fluid  Outcome: Progressing  Goal: Gradual weight gain  Outcome: Progressing  Goal: Improve ostomy output  Outcome: Progressing     Problem: Skin  Goal: Decreased wound size/increased tissue granulation at next dressing change  Outcome: Progressing  Flowsheets (Taken 11/21/2023 0238)  Decreased wound size/increased tissue granulation at next dressing change:   Promote sleep for wound healing   Utilize specialty bed per algorithm   Protective dressings over bony prominences  Goal: Participates in plan/prevention/treatment measures  Outcome: Progressing  Flowsheets (Taken 11/21/2023 0238)  Participates in plan/prevention/treatment measures:   Discuss with provider PT/OT consult   Increase activity/out of bed for meals   Elevate heels  Goal: Prevent/manage excess moisture  Outcome: Progressing  Flowsheets (Taken 11/21/2023 0238)  Prevent/manage excess moisture:   Cleanse incontinence/protect with barrier cream   Moisturize dry skin   Use wicking fabric (obtain order)   Follow provider orders for dressing changes   Monitor for/manage infection if present  Goal: Prevent/minimize sheer/friction injuries  Outcome: Progressing  Flowsheets (Taken 11/21/2023  0238)  Prevent/minimize sheer/friction injuries:   Complete micro-shifts as needed if patient unable. Adjust patient position to relieve pressure points, not a full turn   HOB 30 degrees or less   Increase activity/out of bed for meals   Turn/reposition every 2 hours/use positioning/transfer devices   Use pull sheet   Utilize specialty bed per algorithm  Goal: Promote/optimize nutrition  Outcome: Progressing  Flowsheets (Taken 11/21/2023 0238)  Promote/optimize nutrition:   Assist with feeding   Discuss with provider if NPO > 2 days   Offer water/supplements/favorite foods   Consume > 50% meals/supplements   Monitor/record intake including meals   Reassess MST if dietician not consulted  Goal: Promote skin healing  Outcome: Progressing  Flowsheets (Taken 11/21/2023 0238)  Promote skin healing:   Assess skin/pad under line(s)/device(s)   Ensure correct size (line/device) and apply per  instructions   Protective dressings over bony prominences   Rotate device position/do not position patient on device   Turn/reposition every 2 hours/use positioning/transfer devices     Problem: Fall/Injury  Goal: Not fall by end of shift  Outcome: Progressing  Goal: Be free from injury by end of the shift  Outcome: Progressing  Goal: Verbalize understanding of personal risk factors for fall in the hospital  Outcome: Progressing  Goal: Verbalize understanding of risk factor reduction measures to prevent injury from fall in the home  Outcome: Progressing  Goal: Use assistive devices by end of the shift  Outcome: Progressing  Goal: Pace activities to prevent fatigue by end of the shift  Outcome: Progressing     Problem: Pain - Adult  Goal: Verbalizes/displays adequate comfort level or baseline comfort level  Outcome: Progressing     Problem: Safety - Adult  Goal: Free from fall injury  Outcome: Progressing     Problem: Discharge Planning  Goal: Discharge to home or other facility with appropriate resources  Outcome:  Progressing     Problem: Chronic Conditions and Co-morbidities  Goal: Patient's chronic conditions and co-morbidity symptoms are monitored and maintained or improved  Outcome: Progressing     Problem: Respiratory  Goal: Clear secretions with interventions this shift  Outcome: Progressing  Goal: Minimize anxiety/maximize coping throughout shift  Outcome: Progressing  Goal: Minimal/no exertional discomfort or dyspnea this shift  Outcome: Progressing  Goal: No signs of respiratory distress (eg. Use of accessory muscles. Peds grunting)  Outcome: Progressing  Goal: Patent airway maintained this shift  Outcome: Progressing  Goal: Tolerate mechanical ventilation evidenced by VS/agitation level this shift  Outcome: Progressing  Goal: Tolerate pulmonary toileting this shift  Outcome: Progressing  Goal: Verbalize decreased shortness of breath this shift  Outcome: Progressing  Goal: Wean oxygen to maintain O2 saturation per order/standard this shift  Outcome: Progressing  Goal: Increase self care and/or family involvement in next 24 hours  Outcome: Progressing       The patient's goals for the shift include  maintain O2 sats    The clinical goals for the shift include maintain oxygen sat >88%

## 2023-11-22 NOTE — PROGRESS NOTES
"Macarena Wilson is a 68 y.o. female on day 13 of admission presenting with Acute hypoxemic respiratory failure (CMS/HCC).    Subjective   Worsening cough overnight keeping her up, states feeling congested and unable to cough up phlegm.  PRN Oxy has help for air hunger.         Objective   Constitutional: elderly female,  fatigued, SOB with speech,   Eyes: PERRL, EOMI, no icterus   ENMT: mucous membranes moist, no apparent injury, no lesions seen  Head/Neck: Neck supple, no apparent injury  Respiratory/Thorax: Lungs CTA bilaterally, diminished, SOB with speech,  weak cough, on 40 L/50% Airvo  Cardiovascular: Regular, rate and rhythm, no murmurs, normal S1 and S2  Gastrointestinal: Nondistended, soft, non-tender, BS present x 4  : voiding per external female cath, clear, dark yellow  Musculoskeletal: Passive ROM intact, no joint swelling,   Extremities: normal extremities, no edema, contusions or wounds  Neurological: alert and oriented x 3, speech clear, follows commands appropriately, cr. n. II-XII intact, sensation grossly intact, BLE unable to move against gravity.  Moves BUE equally and independently  Skin: Warm and dry, bruising throughout BUE; sacral redness; PIVs      Last Recorded Vitals  Blood pressure 110/59, pulse 55, temperature 36 °C (96.8 °F), temperature source Temporal, resp. rate 17, height 1.6 m (5' 2.99\"), weight 63.2 kg (139 lb 5.3 oz), SpO2 98 %.  Intake/Output last 3 Shifts:  I/O last 3 completed shifts:  In: 2055 (30 mL/kg) [NG/GT:2055]  Out: 1900 (27.8 mL/kg) [Urine:1900 (0.8 mL/kg/hr)]  Dosing Weight: 68.4 kg     Relevant Results                        Malnutrition Diagnosis Status: New  Malnutrition Diagnosis: Severe malnutrition related to acute disease or injury  As Evidenced by: 12% weight loss from normal along with moderate to severe muscle and fat loss on physical exam in setting of a decrease in PO intake/appetite after being diagnosed with COVID  I agree with the dietitian's " malnutrition diagnosis.      Assessment/Plan   Principal Problem:    Acute hypoxemic respiratory failure (CMS/HCC)  Active Problems:    COVID-19    Pneumonia of both lower lobes due to infectious organism    Macarena Wilson is a 68 year old female with a PMH of CKD III and HTN admitted to the MICU from OSH with AHRF 2/2 COVID pneumonia (positive on 10/13) complicated by ARDS and pulmonary fibrosis with resulting organizing PNA. Course complicated by distal DVTs and likely PE, oliguric HUNG on CKD, and intermittent hypoxia-associated SVT and afib with RVR.   Pt weaned to HFNC and transferred to SDU for continued care. Last evening, more hypoxic with movement in bed/turns- requiring increased FiO2 to Airvo 40 L/70%, now on 40 L/50%.     NEUROLOGY/ PSYCH: Ongoing anxiety  Awake, alert, oriented x3.  Endorses feeling anxious today.  No home meds prior to hospitalization for anxiety, states Oxy here has help with air hunger and anxiety feeling  - PRN tylenol for Pain or PRN Oxy for pain or air hunger  - will try Atarax PRN for anxiety  - Palliative following     CARDIOVASCULAR: Afib, Intermittent SVT, HTN  - HDS, Afib on tele  - continue Metoprolol Tartrate Q6H, hold for SBP <100, HR <50  - Holding Home Losartan 100mg in the setting of recurrent HUNG on CKD     PULMONARY: AHRF 2/2 COVID PNA (11/23) c/b ARDS, GGO c/f pulmonary fibrosis - organizing PNA, likely 2/2 COVID PNA, Likely PE, known DVTs  - Weaned to 10 L HFNC, then increased FiO2 overnight  - Pulmonary fibrosis 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   - Fungitell, aspergillus galactomannan negative, blasto and histo Ag neg  - Prednisone 50 mg q6==> plan to continue this for one week, then taper on 11/26 to 80 mg Q12H  > Daily DS bactrim for PCP prophy  - Fluticasone 1 spray bid   - PRN IV Lasix-- give 40 mg IV lasix today.   - Pneumomediastinum resolved              > Avoid further CPAP attempts     RENAL/GENITOURINARY: Oliguric  HUNG on CKD III, RECURRENT, with azotemia   - Cr peaked at 3.97, now downtrending. BUN downtrending  -  baseline of 1.6, and increased UOP. Likely prerenal. Renal US with non-obstructing stones BL, no hydronephrosis, cysts BL. Continuing TF with free water flushes for uremia  - Goal to stay net even  - Hold off on desouza except if absolutely necessary, has bladder fistula in past (followed by urogyn): if needed, think about touching base with urology     FEN/GI: Dysphagia, s/p cortrak  - Tolerating TF.  Diarrhea subsiding  - Nepro at goal of 35, no concern for refeeding  - Stool panel negative.  - Loperamide ordered PRN  - PRN miralax and docusate senna for constipation      ENDOCRINOLOGY: Steroid induced hyperglycemia  - A1C 6.3, pre-DM  - Sugars 250s.  - MDSSI     HEMATOLOGY:  DVT, presumed PE  - 11/13: BUE U/S: +R PT DVT, +L Soleal DVT  - transitioned to BID eliquis     MUSCULOSKELETAL/ SKIN: Eczema/ Rosasea; Sacral pressure injury, stage 3  - On home dupixent  - seen by wound care RN  - for sacral wound: Clean buttocks with cleansing cloths and apply Triad to open skin BID and as needed. Leave RIZWANA      INFECTIOUS DISEASE: Covid PNA (received Decadron, Remdesivir, Tocilizumab at OSH) Neutrophilic leukocytosis  - Increasing leukocytosis, Afebrile  MICRO: 11/21 BC x2 3/4 GNB, final sens pending, urine Cx pending,   10/26 & 11/10: stool studies, including C. Diff neg, 11/9 Step pneumonaie Ag, Legionella Ag neg, 11/9 Flu A/B, RSV neg   - 11/22: start renally dosed Zosyn and follow up on final sens     Lines: PIV, Cortrak     Code Status: DNR/DNI  NOK:  Primary Emergency Contact: Mathew Wilson, Home Phone: [phone]     Disposition:  Continue SDU for hypoxic resp failure with high FiO2 needs    Gabriella Cheung, APRN-CNP

## 2023-11-22 NOTE — PROGRESS NOTES
Occupational Therapy    Occupational Therapy Treatment    Name: Macarena Wilson  MRN: [id number]  : 1955  Date: 23  Time Calculation  Start Time: 1040  Stop Time: 1119  Time Calculation (min): 39 min    Assessment:  End of Session Communication: Bedside nurse  End of Session Patient Position: Bed, 3 rail up, Alarm off, not on at start of session  Plan:  Treatment Interventions: ADL retraining, Functional transfer training, UE strengthening/ROM, Endurance training, Patient/family training, Equipment evaluation/education, Compensatory technique education  OT Frequency: 3 times per week  OT Discharge Recommendations: Moderate intensity level of continued care  OT - OK to Discharge: Yes    Subjective   Previous Visit Info:  OT Last Visit  OT Received On: 23  General:  General  Family/Caregiver Present: No  Co-Treatment: PT  Co-Treatment Reason: to maximize patient safety, mobility and activity tolerance 2/2 patient with tenuous respiratory status, x2 skilled assist for mobility  Prior to Session Communication: Bedside nurse  Patient Position Received: Bed, 3 rail up, Alarm off, not on at start of session  General Comment: patient required encouragement to participate 2/2 reporting not sleeping last night 2/2 coughing frequently, patient requested medication for anxiety prior to mobility, PT spoke to NP and RN, RN provided oxycodone via patient's dobhoff prior to mobilizing patient to EOB, patient desat to 56% SpO2 while sitting EOB this date requiring immediate return to supine; patient on AirVo 40L/60%, dobhoff, tele, external catheter  Precautions:  Medical Precautions: Fall precautions, Oxygen therapy device and L/min  Vitals:  Vital Signs  Heart Rate:  (pre 68, post 73)  Resp:  (pre 24, post 24)  SpO2:  (pre 93%, while EOB patient's SpO2 decreased to 56% at lowest, recovered to low-mid 80s after ~3 minutes of recovery in supine, RN aware; post 86%)  BP:  (pre 114/54, post 125/64)  MAP (mmHg):   (pre 72)  Pain Assessment:  Pain Assessment  Pain Assessment: 0-10  Pain Score: 0 - No pain     Objective   Activities of Daily Living: Grooming  Grooming Comments: min A for using shampoo cap in upright positioning in bed, patient able to reach up with (B)UEs to use shampoo cap; patient able to finger comb hair with min A; limited by decreased activity tolerance 2/2 respiratory status    LE Dressing  LE Dressing: Yes  Sock Level of Assistance: Dependent     Bed Mobility/Transfers: Bed Mobility  Bed Mobility: Yes  Bed Mobility 1  Bed Mobility 1: Supine to sitting, Sitting to supine  Level of Assistance 1: Maximum assistance, Moderate verbal cues  Bed Mobility Comments 1: x2 assist, HOB elevated for supine to sit; +draw sheet    Transfers  Transfer: No     Therapy/Activity: Therapeutic Activity  Therapeutic Activity Performed: Yes  Therapeutic Activity 1: Patient sat EOB x8 minutes with max A for balance, patient demonstrated retrolateral (R) lean, mod-max cues to encourage patient to lean anteriorly to improve balance, patient with increased anxiousness in sitting EOB and resistant to cues for balance.  Sensory:     Cognitive Skill Development:  Cognitive Skill Development  Cognitive Skill Development Activity 1: Instructed patient in paired breathing activity with mobility as well as guided imagery and breathing techniques in attempt to assist patient with reduction of SOB feeling and anxiousness; utilized calming tone and slower speech pattern to guide patient with breathing in for 2 counts/out for 2 counts and to visualize air flowing in to her lungs and out of her lungs, patient with difficulty slowing respiratory rate and difficulty taking deeper breaths; patient reports a happy place for her is being in her home with her family, encouraged patient during session to close her eyes and visualize being at home with her family in attempt to assist with managing anxiousness; patient with difficulty focusing on  breathing techniques and relaxation techniques.  Outcome Measures:  [facility] Daily Activity  Putting on and taking off regular lower body clothing: Total  Bathing (including washing, rinsing, drying): A lot  Putting on and taking off regular upper body clothing: A little  Toileting, which includes using toilet, bedpan or urinal: A lot  Taking care of personal grooming such as brushing teeth: A little  Eating Meals: None  Daily Activity - Total Score: 15     and E = Exercise and Early Mobility  Current Activity: Sitting at edge of bed    Education Documentation  Body Mechanics, taught by Alicia Kilgore OT at 11/22/2023  1:02 PM.  Learner: Patient  Readiness: Acceptance  Method: Explanation  Response: Needs Reinforcement    ADL Training, taught by Alicia Kilgore OT at 11/22/2023  1:02 PM.  Learner: Patient  Readiness: Acceptance  Method: Explanation  Response: Needs Reinforcement    Education Comments  No comments found.      Goals:  Encounter Problems       Encounter Problems (Active)       ADLs       Patient with complete upper body dressing with independent level of assistance donning and doffing all UE clothes. (Progressing)       Start:  11/17/23    Expected End:  12/01/23            Patient with complete lower body dressing with minimal assist  level of assistance donning and doffing all LE clothes  with PRN adaptive equipment. (Progressing)       Start:  11/17/23    Expected End:  12/01/23            Patient will complete daily grooming tasks with stand by assist level of assistance and PRN adaptive equipment while in sitting. (Progressing)       Start:  11/17/23    Expected End:  12/01/23            Patient will complete toileting including hygiene clothing management/hygiene with minimal assist  level of assistance. (Progressing)       Start:  11/17/23    Expected End:  12/01/23               BALANCE       Pt will maintain dynamic sitting balance during ADL task with contact guard assist level of assistance in  order to demonstrate decreased risk of falling and improved postural control. (Progressing)       Start:  11/17/23    Expected End:  12/01/23               COGNITION/SAFETY       Patient will verbalize and demonstrate >2 relaxation and breathing techniques during sessions with independence. (Progressing)       Start:  11/17/23    Expected End:  12/01/23               EXERCISE/STRENGTHENING       Patient will complete BUE exercises in order to improve activity tolerance for ADL performance.  (Progressing)       Start:  11/17/23    Expected End:  12/01/23               TRANSFERS       Patient will perform bed mobility minimal assist  level of assistance in order to improve safety and independence with mobility (Progressing)       Start:  11/17/23    Expected End:  12/01/23            Patient will complete functional transfers with least restrictive device with minimal assist  level of assistance. (Progressing)       Start:  11/17/23    Expected End:  12/01/23              Alicia Kilgore OTR/L

## 2023-11-22 NOTE — PROGRESS NOTES
Physical Therapy    Physical Therapy Treatment    Patient Name: Macarena Wilson  MRN: [id number]  Today's Date: 11/22/2023  Time Calculation  Start Time: 1038  Stop Time: 1119  Time Calculation (min): 41 min     Assessment/Plan   PT Assessment  End of Session Communication: Bedside nurse  End of Session Patient Position: Bed, 3 rail up, Alarm off, not on at start of session     PT Plan  Treatment/Interventions: Bed mobility, Transfer training, Gait training, Balance training, Strengthening, Endurance training, Therapeutic exercise, Therapeutic activity, Postural re-education  PT Plan: Skilled PT  PT Frequency: 4 times per week  PT Discharge Recommendations: Moderate intensity level of continued care  PT - OK to Discharge: Yes      General Visit Information:   PT  Visit  PT Received On: 11/22/23  General  Co-Treatment: OT  Co-Treatment Reason: to maximize patient safety, mobility and activity tolerance 2/2 patient on higher AirVo settings and limited activity tolerance  Prior to Session Communication: Bedside nurse  Patient Position Received: Bed, 3 rail up, Alarm off, not on at start of session  General Comment: patient required encouragement to attempt mobility this date; PT provided extensive education regarding benefits of mobility and benefits of mobility on the respiratory system. Patient requires increase time to mobilize this date d/t tenuous respiratory status. Patient desaturated to the mid 50s on AirVo EOB with O2 recovery briefly into the 60s/low 70s however, then remained in the mid 50s, after ~8 mins, patient assisted back to supine. RN, CNP notified. RN pre-medicated with oxy. PT spoke with CNP about anti-anxiety meds possibly being beneficial d/t patient expressing feeling anxious about movement.    Subjective   Precautions:  Precautions  Medical Precautions: Fall precautions, Oxygen therapy device and L/min  Vital Signs:  Vital Signs  Heart Rate:  (pre: 72 post: 71)  Resp:  (pre: 28 post: 29)  SpO2:   (pre 93%, while EOB patient's SpO2 decreased to 56% at lowest, recovered to low-mid 80s after ~3 minutes of recovery in supine, RN aware; post 86%; patient desaturates to the low-mid 80s with bed exercises as well.)  BP:  (pre: 114/54 post: 125/64)    Objective   Pain:  Pain Assessment  Pain Assessment: 0-10  Pain Score: 0 - No pain  Cognition:  Cognition  Overall Cognitive Status:  (reports feeling anxious about mobility)  Orientation Level:  (AxO x3)  Following Commands: Follows one step commands with repetition  Postural Control:  Postural Control  Postural Control: Impaired  Posture Comment: retrolateral lean R; cueing and assist required to attempt to have patient re-orient to midline; patient demo'd limited ability to correct posture.    Activity Tolerance:  Activity Tolerance  Endurance: Tolerates less than 10 min exercise with changes in vital signs  Early Mobility/Exercise Safety Screen: Proceed with mobilization - No exclusion criteria met  Treatments:  Therapeutic Exercise  Therapeutic Exercise Performed: Yes  Therapeutic Exercise Activity 1: supine: AP 2x10 B, with 3 second hold in end available range neutral DF.    Therapeutic Activity  Therapeutic Activity Performed: Yes  Therapeutic Activity 1:  (Patient sat EOB x8 minutes with max A for balance, patient demonstrated retrolateral (R) lean, mod-max cues to encourage patient to lean anteriorly to improve balance, patient with increased anxiousness in sitting EOB and resistant to cues for balance.)      Bed Mobility  Bed Mobility: Yes  Bed Mobility 1  Bed Mobility 1: Supine to sitting, Sitting to supine  Level of Assistance 1: Maximum assistance, Minimal verbal cues  Bed Mobility Comments 1: x2 assist, HOB elevated for supine to sit; +draw sheet    Outcome Measures:  [facility] Basic Mobility  Turning from your back to your side while in a flat bed without using bedrails: A lot  Moving from lying on your back to sitting on the side of a flat bed without  using bedrails: A lot  Moving to and from bed to chair (including a wheelchair): Total  Standing up from a chair using your arms (e.g. wheelchair or bedside chair): Total  To walk in hospital room: Total  Climbing 3-5 steps with railing: Total  Basic Mobility - Total Score: 8    FSS-ICU  Ambulation: Unable to attempt due to weakness  Rolling: Maximal assistance (performs 25% - 49% of task)  Sitting: Maximal assistance (performs 25% - 49% of task)  Transfer Sit-to-Stand: Unable to perform  Transfer Supine-to-Sit: Total assistance (performs 25% or requires another person)  Total Score: 5      E = Exercise and Early Mobility  Early Mobility/Exercise Safety Screen: Proceed with mobilization - No exclusion criteria met  Current Activity: Sitting at edge of bed    Education Documentation  Mobility Training, taught by Cindy Olmos PT at 11/22/2023  2:33 PM.  Learner: Patient  Readiness: Acceptance  Method: Explanation, Demonstration  Response: Needs Reinforcement    Education Comments  No comments found.        Encounter Problems       Encounter Problems (Active)       Balance       patient will complete static (SBAx1) and dynamic (Cgx1) standing balance activities using LRD as needed without acute LOB, while maintaining stable vitals.  (Progressing)       Start:  11/10/23    Expected End:  11/24/23               Mobility       STG - Patient will ambulate >/=50 ft using LRD as needed with Cgx1 assist without acute LOB, while maintaining stable vitals.  (Not Progressing)       Start:  11/10/23    Expected End:  11/24/23            patient will participate in BLE there-ex in order to improve strength and to assist with the completion of functional mobility tasks.  (Progressing)       Start:  11/10/23    Expected End:  11/24/23            patient will ambulate >/=30 ft consecutively and >/=75 ft cumulatively with </=1 sitting rest break while maintaining stable vitals, reporting <13/20 on the RPE scale.  (Not Progressing)        Start:  11/10/23    Expected End:  11/24/23                        Transfers       STG - Transfer from bed to chair with CGx1 assist without acute LOB, using LRD as needed  (Not Progressing)       Start:  11/10/23    Expected End:  11/24/23            STG - Patient will perform bed mobility with SBAx1 without acute LOB, using bedrailing as needed.  (Not Progressing)       Start:  11/10/23    Expected End:  11/24/23            STG - Patient will transfer sit to and from stand with Cgx1 assist using LRD as needed without acute LOB  (Not Progressing)       Start:  11/10/23    Expected End:  11/24/23                 Cindy Olmos, PT, DPT

## 2023-11-22 NOTE — PROGRESS NOTES
Speech-Language Pathology    Inpatient Speech-Language Pathology Clinical Swallow Evaluation    Patient Name: Macarena Wilson  MRN: [id number]  Today's Date: 11/22/2023             Current Problem:   1. Acute hypoxemic respiratory failure (CMS/HCC)  Lower extremity venous duplex bilateral    Lower extremity venous duplex bilateral      2. Acute respiratory failure with hypoxemia (CMS/HCC)  Transthoracic Echo (TTE) Complete    Transthoracic Echo (TTE) Complete    Lower extremity venous duplex bilateral    Lower extremity venous duplex bilateral      3. COVID-19  CANCELED: Lower extremity venous duplex bilateral    CANCELED: Lower extremity venous duplex bilateral    CANCELED: Lower extremity venous duplex bilateral    CANCELED: Lower extremity venous duplex bilateral      4. Localized edema  Lower extremity venous duplex bilateral        PMH of CKD stage 3, HTN, and [place] who was transferred to [facility] MICU for acute hypoxic respiratory failure and ARDS 2/2 Covid-19, now found to have severe COVID-19 pneumonitis requiring HFNC. Imaging and timeline concerning for pulmonary fibrosis and organizing PNA.          Recommendations:  Risk for Aspiration: Yes  Additional Recommendations: Fiberoptic endoscopic evaluation of swallowing (Ice chips and small sips of water after oral care.)  Solid Diet Recommendations : NPO  Liquid Diet Recommendations: NPO  Medication Administration Recommendations: Non Oral  Dysphagia Goals: Patient will tolerate recommended diet without observed clinical signs of aspiration      Assessment:   Patient received in bed, alert and cooperative. Oral mechanism exam remarkable for weak cough. Three ounce water protocol; an evidenced based swallow initiated at the bedside. Pt unable to take consecutive sips of water without the need to complete multiple swallows per sips which is suggestive of reduced pharyngeal function. Inconsistent cough noted throughout the session rendering airway invasion  difficult to r/o at the bedside. Additional imaging of the swallow warranted via Fiberoptic Endoscopic Exam of the Swallow. Patient agreeable to exam which will be scheduled Friday 11/24 given the holiday.       Plan:  SLP Plan: Skilled SLP  SLP Frequency: 1x per week  SLP - OK to Discharge: Yes  FEES exam on 11/24 Friday due to the holiday.    Subjective   Current Problem:  Dysphagia with Dobbhoff placement.      General Visit Information:  Patient Class: Inpatient  Reason for Referral: Concern for dysphagia    Baseline Assessment:  Respiratory Status: Other (Comment) (Highflow nasal cannula)  Hearing: Exceptions to WFL  Baseline Vocal Quality: Weak  Volitional Cough: Weak  40L per min on highflow    Pain:  Pain Score: 0 - No pain       Oral/Motor Assessment:  Dentition: Adequate/Natural  Oral Motor: Within Functional Limits  Labial Agility: Within Functional Limits  Labial Strength: Within Functional Limits  Labial Symmetry: Within Functional Limits  Lingual Agility: Within Functional Limits  Lingual ROM: Within Functional Limits  Lingual Strength: Within Functional Limits  Breath Support: Adequate for speech  Hearing: Exceptions to WFL      Consistencies Trialed:  Consistencies Trialed: Yes  Consistencies Trialed: Ice Chips, Thin (IDDSI Level 0) - Straw, Thin (IDDSI Level 0) - Cup, Thin (IDDSI Level 0) - Spoon      Clinical Observations:  Patient Positioning: Upright in Bed  Management of Oral Secretions: Adequate  Was The 3 oz Swallow Protocol Completed: Yes  Multiple Swallows: Thin (IDDSI Level 0) - Straw, Thin (IDDSI Level 0) - Cup      Consultations/Referrals/Coordination of Services: n/a

## 2023-11-22 NOTE — CARE PLAN
Problem: Nutrition  Goal: Less than 5 days NPO/clear liquids  Outcome: Progressing  Goal: Oral intake greater than 50%  Outcome: Progressing  Goal: Oral intake greater 75%  Outcome: Progressing  Goal: Consume prescribed supplement  Outcome: Progressing  Goal: Adequate PO fluid intake  Outcome: Progressing  Goal: Nutrition support goals are met within 48 hrs  Outcome: Progressing  Goal: Nutrition support is meeting 75% of nutrient needs  Outcome: Progressing  Goal: Tube feed tolerance  Outcome: Progressing  Goal: BG  mg/dL  Outcome: Progressing  Goal: Lab values WNL  Outcome: Progressing  Goal: Electrolytes WNL  Outcome: Progressing  Goal: Promote healing  Outcome: Progressing  Goal: Maintain stable weight  Outcome: Progressing  Goal: Reduce weight from edema/fluid  Outcome: Progressing  Goal: Gradual weight gain  Outcome: Progressing  Goal: Improve ostomy output  Outcome: Progressing     Problem: Skin  Goal: Decreased wound size/increased tissue granulation at next dressing change  Outcome: Progressing  Flowsheets (Taken 11/21/2023 2306)  Decreased wound size/increased tissue granulation at next dressing change:   Promote sleep for wound healing   Utilize specialty bed per algorithm   Protective dressings over bony prominences  Goal: Participates in plan/prevention/treatment measures  Outcome: Progressing  Flowsheets (Taken 11/21/2023 2306)  Participates in plan/prevention/treatment measures:   Discuss with provider PT/OT consult   Increase activity/out of bed for meals   Elevate heels  Goal: Prevent/manage excess moisture  Outcome: Progressing  Flowsheets (Taken 11/21/2023 2306)  Prevent/manage excess moisture:   Cleanse incontinence/protect with barrier cream   Moisturize dry skin   Use wicking fabric (obtain order)   Follow provider orders for dressing changes   Monitor for/manage infection if present  Goal: Prevent/minimize sheer/friction injuries  Outcome: Progressing  Flowsheets (Taken 11/21/2023  1812)  Prevent/minimize sheer/friction injuries:   Complete micro-shifts as needed if patient unable. Adjust patient position to relieve pressure points, not a full turn   HOB 30 degrees or less   Increase activity/out of bed for meals   Turn/reposition every 2 hours/use positioning/transfer devices   Use pull sheet   Utilize specialty bed per algorithm  Goal: Promote/optimize nutrition  Outcome: Progressing  Flowsheets (Taken 11/21/2023 2300)  Promote/optimize nutrition:   Assist with feeding   Discuss with provider if NPO > 2 days   Offer water/supplements/favorite foods   Consume > 50% meals/supplements   Monitor/record intake including meals   Reassess MST if dietician not consulted  Goal: Promote skin healing  Outcome: Progressing  Flowsheets (Taken 11/21/2023 2306)  Promote skin healing:   Assess skin/pad under line(s)/device(s)   Ensure correct size (line/device) and apply per  instructions   Protective dressings over bony prominences   Rotate device position/do not position patient on device   Turn/reposition every 2 hours/use positioning/transfer devices     Problem: Fall/Injury  Goal: Not fall by end of shift  Outcome: Progressing  Goal: Be free from injury by end of the shift  Outcome: Progressing  Goal: Verbalize understanding of personal risk factors for fall in the hospital  Outcome: Progressing  Goal: Verbalize understanding of risk factor reduction measures to prevent injury from fall in the home  Outcome: Progressing  Goal: Use assistive devices by end of the shift  Outcome: Progressing  Goal: Pace activities to prevent fatigue by end of the shift  Outcome: Progressing     Problem: Pain - Adult  Goal: Verbalizes/displays adequate comfort level or baseline comfort level  Outcome: Progressing     Problem: Safety - Adult  Goal: Free from fall injury  Outcome: Progressing     Problem: Discharge Planning  Goal: Discharge to home or other facility with appropriate resources  Outcome:  Progressing     Problem: Chronic Conditions and Co-morbidities  Goal: Patient's chronic conditions and co-morbidity symptoms are monitored and maintained or improved  Outcome: Progressing     Problem: Respiratory  Goal: Clear secretions with interventions this shift  Outcome: Progressing  Goal: Minimize anxiety/maximize coping throughout shift  Outcome: Progressing  Goal: Minimal/no exertional discomfort or dyspnea this shift  Outcome: Progressing  Goal: No signs of respiratory distress (eg. Use of accessory muscles. Peds grunting)  Outcome: Progressing  Goal: Patent airway maintained this shift  Outcome: Progressing  Goal: Tolerate mechanical ventilation evidenced by VS/agitation level this shift  Outcome: Progressing  Goal: Tolerate pulmonary toileting this shift  Outcome: Progressing  Goal: Verbalize decreased shortness of breath this shift  Outcome: Progressing  Goal: Wean oxygen to maintain O2 saturation per order/standard this shift  Outcome: Progressing  Goal: Increase self care and/or family involvement in next 24 hours  Outcome: Progressing       The patient's goals for the shift include rest comfortably      The clinical goals for the shift include maintain oxygen sat >88%

## 2023-11-23 NOTE — CARE PLAN
The patient's goals for the shift include      The clinical goals for the shift include patient will maintain pox > 90%    Problem: Skin  Goal: Participates in plan/prevention/treatment measures  Outcome: Progressing     Problem: Skin  Goal: Promote/optimize nutrition  Outcome: Met     Problem: Fall/Injury  Goal: Not fall by end of shift  Outcome: Met  Goal: Be free from injury by end of the shift  Outcome: Met  Goal: Pace activities to prevent fatigue by end of the shift  Outcome: Met     Problem: Safety - Adult  Goal: Free from fall injury  Outcome: Met     Problem: Nutrition  Goal: BG  mg/dL  Outcome: Not Progressing

## 2023-11-23 NOTE — PROGRESS NOTES
"Macarena Wilson is a 68 y.o. female on day 14 of admission presenting with Acute hypoxemic respiratory failure (CMS/HCC).    Subjective   No issues or concerns from patient on exam this morning    ROS: Endorses dry cough.  Denies SOB at rest, sputum production, CP, Palpations, abdominal pain, and N/V     Overnight no acute events. Patient reports improved sleep.       Objective   Physical Exam:  Constitutional: pt in NAD, alert and cooperative  Eyes: PERRL, EOMI, no icterus   ENMT: mucous membranes moist  Head/Neck: Neck supple, no apparent injury  Respiratory/Thorax: Lungs diminished bilaterally, non-labored breathing, dry cough, on Airvo 30L/80%  Cardiovascular: Regular, rate and rhythm, no murmurs, normal S1 and S2  Gastrointestinal: Nondistended, soft, non-tender, BS present x 4, Cortrak present   : External catheter in place   Musculoskeletal: ROM intact  Extremities: no edema  Neurological: alert and oriented x 3, speech clear, follows commands appropriately, without focal deficits, weakness B/L LE  Skin: Warm and dry    Vital Signs  Blood pressure 132/64, pulse 65, temperature 35.8 °C (96.4 °F), temperature source Temporal, resp. rate 21, height 1.6 m (5' 2.99\"), weight 63.2 kg (139 lb 5.3 oz), SpO2 96 %.  Oxygen Therapy  SpO2: 96 %  Medical Gas Therapy: Supplemental oxygen  O2 Delivery Method: High flow nasal cannula  FiO2 (%):  [60 %-80 %] 80 %    Intake/Output last 3 Shifts:  I/O last 3 completed shifts:  In: 2050 (30 mL/kg) [NG/GT:1850; IV Piggyback:200]  Out: 2450 (35.8 mL/kg) [Urine:2450 (1 mL/kg/hr)]  Dosing Weight: 68.4 kg     Lines and Tubes:  Peripheral IV 11/08/23 20 G Left;Anterior Hand (Active)   Placement Date/Time: 11/08/23 0000   Size (Gauge): 20 G  Orientation: Left;Anterior  Location: Hand   Number of days: 15       Peripheral IV 11/09/23 20 G Right;Anterior Forearm (Active)   Placement Date/Time: 11/09/23 0600   Size (Gauge): 20 G  Orientation: Right;Anterior  Location: Forearm  " Technique: Ultrasound guidance   Number of days: 14       NG/OG/Feeding Tube Right nostril (Active)   Placement Date/Time: 11/16/23 1522   Tube Length: 75 cm  Tube Location: Right nostril   Number of days: 6       External Urinary Catheter (Active)   Placement Date/Time: 11/09/23 0400     Number of days: 14         Relevant Results  Scheduled medications  albuterol, 2.5 mg, nebulization, BID  apixaban, 5 mg, nasogastric tube, BID  esomeprazole, 40 mg, nasoduodenal tube, Daily before breakfast  fluticasone, 1 spray, Each Nostril, BID  insulin lispro, 0-10 Units, subcutaneous, q4h  lidocaine, 1 patch, transdermal, Daily  melatonin, 5 mg, oral, Daily  metoprolol tartrate, 25 mg, nasogastric tube, q6h  piperacillin-tazobactam, 2.25 g, intravenous, q6h  predniSONE, 50 mg, oral, q6h  sodium chloride, 3 mL, nebulization, BID  sulfamethoxazole-trimethoprim, 160 mg of trimethoprim, nasogastric tube, q24h KARL  thiamine, 100 mg, oral, Daily  traZODone, 25 mg, nasogastric tube, Nightly      Continuous medications     PRN medications  PRN medications: acetaminophen **OR** [DISCONTINUED] acetaminophen **OR** [DISCONTINUED] acetaminophen, dextrose 10 % in water (D10W), dextrose, docusate sodium, hydrOXYzine HCL, ipratropium-albuteroL, ondansetron ODT **OR** [DISCONTINUED] ondansetron, oxyCODONE, oxygen, polyethylene glycol, sodium chloride    Results for orders placed or performed during the hospital encounter of 11/09/23 (from the past 24 hour(s))   POCT GLUCOSE   Result Value Ref Range    POCT Glucose 196 (H) 74 - 99 mg/dL   POCT GLUCOSE   Result Value Ref Range    POCT Glucose 177 (H) 74 - 99 mg/dL   POCT GLUCOSE   Result Value Ref Range    POCT Glucose 215 (H) 74 - 99 mg/dL   POCT GLUCOSE   Result Value Ref Range    POCT Glucose 231 (H) 74 - 99 mg/dL   POCT GLUCOSE   Result Value Ref Range    POCT Glucose 206 (H) 74 - 99 mg/dL   CBC and Auto Differential   Result Value Ref Range    WBC 16.9 (H) 4.4 - 11.3 x10*3/uL    nRBC 0.0  0.0 - 0.0 /100 WBCs    RBC 3.51 (L) 4.00 - 5.20 x10*6/uL    Hemoglobin 9.9 (L) 12.0 - 16.0 g/dL    Hematocrit 29.8 (L) 36.0 - 46.0 %    MCV 85 80 - 100 fL    MCH 28.2 26.0 - 34.0 pg    MCHC 33.2 32.0 - 36.0 g/dL    RDW 14.6 (H) 11.5 - 14.5 %    Platelets 148 (L) 150 - 450 x10*3/uL    Neutrophils % 92.6 40.0 - 80.0 %    Immature Granulocytes %, Automated 1.8 (H) 0.0 - 0.9 %    Lymphocytes % 0.7 13.0 - 44.0 %    Monocytes % 4.7 2.0 - 10.0 %    Eosinophils % 0.0 0.0 - 6.0 %    Basophils % 0.2 0.0 - 2.0 %    Neutrophils Absolute 15.63 (H) 1.20 - 7.70 x10*3/uL    Immature Granulocytes Absolute, Automated 0.31 0.00 - 0.70 x10*3/uL    Lymphocytes Absolute 0.11 (L) 1.20 - 4.80 x10*3/uL    Monocytes Absolute 0.79 0.10 - 1.00 x10*3/uL    Eosinophils Absolute 0.00 0.00 - 0.70 x10*3/uL    Basophils Absolute 0.03 0.00 - 0.10 x10*3/uL   Renal function panel   Result Value Ref Range    Glucose 206 (H) 74 - 99 mg/dL    Sodium 137 136 - 145 mmol/L    Potassium 3.9 3.5 - 5.3 mmol/L    Chloride 97 (L) 98 - 107 mmol/L    Bicarbonate 30 21 - 32 mmol/L    Anion Gap 14 10 - 20 mmol/L    Urea Nitrogen 86 (H) 6 - 23 mg/dL    Creatinine 1.27 (H) 0.50 - 1.05 mg/dL    eGFR 46 (L) >60 mL/min/1.73m*2    Calcium 8.4 (L) 8.6 - 10.6 mg/dL    Phosphorus 2.8 2.5 - 4.9 mg/dL    Albumin 2.7 (L) 3.4 - 5.0 g/dL   POCT GLUCOSE   Result Value Ref Range    POCT Glucose 228 (H) 74 - 99 mg/dL     XR chest 1 view    Result Date: 11/22/2023  Interpreted By:  Salvador Robles, STUDY: XR CHEST 1 VIEW;  11/22/2023 7:48 am   INDICATION: Signs/Symptoms:hypoxia.   COMPARISON: 11/17/2023   ACCESSION NUMBER(S): [id number]   ORDERING CLINICIAN: HUGH BEASLEY   FINDINGS:     CARDIOMEDIASTINAL SILHOUETTE: Cardiomediastinal silhouette is normal in size and configuration.   LUNGS: Continued coarse perihilar airspace opacities/edema. Basilar lucencies and correlate with underlying emphysematous or fibrotic changes.   ABDOMEN: Dobbhoff tube overlies the body of stomach.    BONES: No acute osseous changes.       1.  Continued coarse perihilar basilar opacities and correlate with residual edema/pneumonia/atypical infection. No pneumothorax.     Signed by: Salvador Robles 11/22/2023 10:08 AM Dictation workstation:   [id number]       Malnutrition Diagnosis Status: New  Malnutrition Diagnosis: Severe malnutrition related to acute disease or injury  As Evidenced by: 12% weight loss from normal along with moderate to severe muscle and fat loss on physical exam in setting of a decrease in PO intake/appetite after being diagnosed with COVID  I agree with the dietitian's malnutrition diagnosis.    XR chest 1 view 11/22/2023    Narrative  Interpreted By:  Salvador Robles,  STUDY:  XR CHEST 1 VIEW;  11/22/2023 7:48 am    INDICATION:  Signs/Symptoms:hypoxia.    COMPARISON:  11/17/2023    ACCESSION NUMBER(S):  [id number]    ORDERING CLINICIAN:  HUGH BEASLEY    FINDINGS:      CARDIOMEDIASTINAL SILHOUETTE:  Cardiomediastinal silhouette is normal in size and configuration.    LUNGS:  Continued coarse perihilar airspace opacities/edema. Basilar  lucencies and correlate with underlying emphysematous or fibrotic  changes.    ABDOMEN:  Dobbhoff tube overlies the body of stomach.    BONES:  No acute osseous changes.    Impression  1.  Continued coarse perihilar basilar opacities and correlate with  residual edema/pneumonia/atypical infection. No pneumothorax.      Signed by: Salvador Robles 11/22/2023 10:08 AM  Dictation workstation:   [id number]      Assessment/Plan   Principal Problem:    Acute hypoxemic respiratory failure (CMS/HCC)  Active Problems:    COVID-19    Pneumonia of both lower lobes due to infectious organism    Macarena Wilson is a 68 year old female with a PMH of CKD III and HTN admitted to the MICU from OSH with AHRF 2/2 COVID pneumonia (positive on 10/13) complicated by ARDS and pulmonary fibrosis with resulting organizing PNA. Course complicated by distal DVTs and likely  PE, oliguric HUNG on CKD, and intermittent hypoxia-associated SVT and afib with RVR.   Pt weaned to HFNC and transferred to SDU for continued care. Patient remains on Airvo 30L/80%     Neuro:   - Today patient is A&Ox3. Not endorsing feelings of anxiety at this time, but has previously in this admission.  No home meds prior to hospitalization for anxiety.   - PRN tylenol for Pain or PRN Oxy for pain or air hunger.  Lidocaine patch for back discomfort  - Continue Trazodone 25mg HS and Melatonin HS   - Continue Atarax PRN for anxiety  - Palliative following  - PT/OT     CV:   #Hx of HTN  #Afib, Intermittent SVT  - Patient remains HDS, NSR on telemetry today  - Continue Metoprolol Tartrate 25mg Q6H, hold for SBP <100, HR <50  - Holding Home Losartan 100mg in the setting of recurrent HUNG on CKD   - Plan to check BNP this evening, most recently 210 on 11/20     Pulm:   #AHRF 2/2 COVID PNA (11/23) c/b ARDS, GGO c/f pulmonary fibrosis - organizing PNA, likely 2/2 COVID PNA, Likely PE, known DVTs  #Pneumomediastinum, resolved   - Previously on 10 L HFNC on 11/21, increased to Airvo 11/22.  Today patient remains on Airvo 30L/80% with intermittent desaturation with turns/repositioning  - Pulmonary fibrosis 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   - Fungitell, aspergillus galactomannan negative, blasto and histo Ag neg  - Prednisone 50 mg q6==> plan to continue this for one week, then taper on 11/26 to 80 mg Q12H  > Daily DS bactrim for PCP prophy  - Fluticasone 1 spray bid   - PRN IV Lasix-- 40 mg IV lasix given yesterday and again today.   - Avoid further CPAP attempts d/t pneumomediastinum which occurred after trying patient on CPAP     Renal:   #Oliguric HUNG on CKD III, RECURRENT, with azotemia   - Cr peaked at 3.97, 1.27. BUN downtrending at 86 today  - Baseline of 1.6, and increased UOP. Likely prerenal. Renal US with non-obstructing stones BL, no hydronephrosis, cysts BL. 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  Diarrhea subsiding  - Cortrak in place, continue Nepro at goal of 35 with 200ml FWF Q6  - Stool panel negative.  - Loperamide ordered PRN  - PRN miralax and docusate senna for constipation      ENDOCRINOLOGY:   #Steroid induced hyperglycemia  - A1C 6.3, pre-DM  - Improved Glycemic control, blood glucose 206  - Continue with MDSSI     Heme:    #DVT and presumed PE  - 11/13: BUE U/S: +R PT DVT, +L Soleal DVT  - Continue Apixaban BID     MSK/Skin:   #Eczema/Rosasea  #Sacral pressure injury, stage 3  - On home dupixent  - seen by wound care RN  - for sacral wound: Clean buttocks with cleansing cloths and apply Triad to open skin BID and as needed. Leave RIZWANA      ID:   #Covid PNA (received Decadron, Remdesivir, Tocilizumab at OSH) Neutrophilic leukocytosis  - Improving leukocytosis, remains afebrile  MICRO: 11/21 BC x2 3/4 Pseudomonas Aeruginosa, final sensitivity pending, urine Cx pending,   10/26 & 11/10: stool studies, including C. Diff neg, 11/9 Step pneumonaie Ag, Legionella Ag neg, 11/9 Flu A/B, RSV neg   - 11/22: started renally dosed Zosyn and follow up on final sens     Lines: PIV, Cortrak     Code Status: DNR/DNI  NOK:  Primary Emergency Contact: Mathew Wilson, Home Phone: [phone]     Dispo:  Continue SDU for hypoxic resp failure with high FiO2 needs    Pt discussed with Dr. Moya, seen and examined. All labs, VS and previous plan of care reviewed.      Jw Florez, APRN-CNP

## 2023-11-24 NOTE — PROGRESS NOTES
"Macarena Wilson is a 68 y.o. female on day 15 of admission presenting with Acute hypoxemic respiratory failure (CMS/HCC).    Subjective   No issues or concerns expressed on exam this morning.  Patient reports uneventful night and improved quality of sleep.  Anxiety is improved in comparison to the previous two days.      ROS: Endorses dry cough but states it occurs less frequently than previous days.  Endorses some anxiety with repositioning and some SOB with turns.  Denies fever, chills, night sweats, CP, palpations, abdominal pain, and N/V.     No acute events overnight.  Ongoing episodes of oxygen desaturation with turns and repositioning, resolves with rest.       Objective   Physical Exam:  Constitutional: pt in NAD, alert and cooperative  Eyes: PERRL, EOMI, no icterus   ENMT: mucous membranes moist  Head/Neck: Neck supple, no apparent injury  Respiratory/Thorax: Lungs diminished bilaterally, non-labored breathing, dry cough, on Airvo 30L/80%  Cardiovascular: Regular, rate and rhythm, no murmurs, normal S1 and S2  Gastrointestinal: Nondistended, soft, non-tender, BS present x 4, Cortrak present   : External catheter in place   Musculoskeletal: ROM intact  Extremities: no edema  Neurological: alert and oriented x 3, speech clear, follows commands appropriately, without focal deficits, weakness B/L LE  Skin: Warm and dry    Vital Signs  Blood pressure 112/58, pulse 70, temperature 35.8 °C (96.4 °F), temperature source Temporal, resp. rate 18, height 1.6 m (5' 2.99\"), weight 63.2 kg (139 lb 5.3 oz), SpO2 98 %.  Oxygen Therapy  SpO2: 98 %  Medical Gas Therapy: Supplemental oxygen  O2 Delivery Method: High flow nasal cannula  FiO2 (%):  [80 %] 80 %    Intake/Output last 3 Shifts:  I/O last 3 completed shifts:  In: 2570 (37.6 mL/kg) [NG/GT:2270; IV Piggyback:300]  Out: 2000 (29.2 mL/kg) [Urine:2000 (0.8 mL/kg/hr)]  Dosing Weight: 68.4 kg     Lines and Tubes:  Peripheral IV 11/08/23 20 G Left;Anterior Hand " (Active)   Placement Date/Time: 11/08/23 0000   Size (Gauge): 20 G  Orientation: Left;Anterior  Location: Hand   Number of days: 16       Peripheral IV 11/09/23 20 G Right;Anterior Forearm (Active)   Placement Date/Time: 11/09/23 0600   Size (Gauge): 20 G  Orientation: Right;Anterior  Location: Forearm  Technique: Ultrasound guidance   Number of days: 15       NG/OG/Feeding Tube Right nostril (Active)   Placement Date/Time: 11/16/23 1522   Tube Length: 75 cm  Tube Location: Right nostril   Number of days: 7       External Urinary Catheter (Active)   Placement Date/Time: 11/09/23 0400     Number of days: 15         Relevant Results  Scheduled medications  albuterol, 2.5 mg, nebulization, BID  apixaban, 5 mg, nasogastric tube, BID  esomeprazole, 40 mg, nasoduodenal tube, Daily before breakfast  fluticasone, 1 spray, Each Nostril, BID  insulin lispro, 0-10 Units, subcutaneous, q4h  lidocaine, 1 patch, transdermal, Daily  melatonin, 5 mg, oral, Daily  metoprolol tartrate, 25 mg, nasogastric tube, q6h  piperacillin-tazobactam, 2.25 g, intravenous, q6h  predniSONE, 50 mg, oral, q6h  sodium chloride, 3 mL, nebulization, BID  sulfamethoxazole-trimethoprim, 160 mg of trimethoprim, nasogastric tube, q24h KARL  thiamine, 100 mg, oral, Daily  traZODone, 25 mg, nasogastric tube, Nightly      Continuous medications     PRN medications  PRN medications: acetaminophen **OR** [DISCONTINUED] acetaminophen **OR** [DISCONTINUED] acetaminophen, dextrose 10 % in water (D10W), dextrose, docusate sodium, hydrOXYzine HCL, ipratropium-albuteroL, ondansetron ODT **OR** [DISCONTINUED] ondansetron, oxyCODONE, oxygen, polyethylene glycol, sodium chloride    Results for orders placed or performed during the hospital encounter of 11/09/23 (from the past 24 hour(s))   POCT GLUCOSE   Result Value Ref Range    POCT Glucose 228 (H) 74 - 99 mg/dL   POCT GLUCOSE   Result Value Ref Range    POCT Glucose 199 (H) 74 - 99 mg/dL   POCT GLUCOSE   Result Value  Ref Range    POCT Glucose 189 (H) 74 - 99 mg/dL   B-type natriuretic peptide   Result Value Ref Range    BNP 99 0 - 99 pg/mL   POCT GLUCOSE   Result Value Ref Range    POCT Glucose 221 (H) 74 - 99 mg/dL   POCT GLUCOSE   Result Value Ref Range    POCT Glucose 197 (H) 74 - 99 mg/dL   POCT GLUCOSE   Result Value Ref Range    POCT Glucose 227 (H) 74 - 99 mg/dL   CBC and Auto Differential   Result Value Ref Range    WBC 14.8 (H) 4.4 - 11.3 x10*3/uL    nRBC 0.0 0.0 - 0.0 /100 WBCs    RBC 3.15 (L) 4.00 - 5.20 x10*6/uL    Hemoglobin 8.9 (L) 12.0 - 16.0 g/dL    Hematocrit 27.0 (L) 36.0 - 46.0 %    MCV 86 80 - 100 fL    MCH 28.3 26.0 - 34.0 pg    MCHC 33.0 32.0 - 36.0 g/dL    RDW 15.0 (H) 11.5 - 14.5 %    Platelets 142 (L) 150 - 450 x10*3/uL    Neutrophils % 92.6 40.0 - 80.0 %    Immature Granulocytes %, Automated 3.0 (H) 0.0 - 0.9 %    Lymphocytes % 1.1 13.0 - 44.0 %    Monocytes % 3.2 2.0 - 10.0 %    Eosinophils % 0.0 0.0 - 6.0 %    Basophils % 0.1 0.0 - 2.0 %    Neutrophils Absolute 13.67 (H) 1.20 - 7.70 x10*3/uL    Immature Granulocytes Absolute, Automated 0.44 0.00 - 0.70 x10*3/uL    Lymphocytes Absolute 0.16 (L) 1.20 - 4.80 x10*3/uL    Monocytes Absolute 0.47 0.10 - 1.00 x10*3/uL    Eosinophils Absolute 0.00 0.00 - 0.70 x10*3/uL    Basophils Absolute 0.02 0.00 - 0.10 x10*3/uL   Renal function panel   Result Value Ref Range    Glucose 233 (H) 74 - 99 mg/dL    Sodium 137 136 - 145 mmol/L    Potassium 3.6 3.5 - 5.3 mmol/L    Chloride 96 (L) 98 - 107 mmol/L    Bicarbonate 29 21 - 32 mmol/L    Anion Gap 16 10 - 20 mmol/L    Urea Nitrogen 85 (H) 6 - 23 mg/dL    Creatinine 1.43 (H) 0.50 - 1.05 mg/dL    eGFR 40 (L) >60 mL/min/1.73m*2    Calcium 8.1 (L) 8.6 - 10.6 mg/dL    Phosphorus 2.9 2.5 - 4.9 mg/dL    Albumin 2.3 (L) 3.4 - 5.0 g/dL   Magnesium   Result Value Ref Range    Magnesium 2.05 1.60 - 2.40 mg/dL     XR chest 1 view    Result Date: 11/22/2023  Interpreted By:  Salvador Robles, STUDY: XR CHEST 1 VIEW;  11/22/2023  7:48 am   INDICATION: Signs/Symptoms:hypoxia.   COMPARISON: 11/17/2023   ACCESSION NUMBER(S): [id number]   ORDERING CLINICIAN: HUGH BEASLEY   FINDINGS:     CARDIOMEDIASTINAL SILHOUETTE: Cardiomediastinal silhouette is normal in size and configuration.   LUNGS: Continued coarse perihilar airspace opacities/edema. Basilar lucencies and correlate with underlying emphysematous or fibrotic changes.   ABDOMEN: Dobbhoff tube overlies the body of stomach.   BONES: No acute osseous changes.       1.  Continued coarse perihilar basilar opacities and correlate with residual edema/pneumonia/atypical infection. No pneumothorax.     Signed by: Salvador Robles 11/22/2023 10:08 AM Dictation workstation:   [id number]         Malnutrition Diagnosis Status: New  Malnutrition Diagnosis: Severe malnutrition related to acute disease or injury  As Evidenced by: 12% weight loss from normal along with moderate to severe muscle and fat loss on physical exam in setting of a decrease in PO intake/appetite after being diagnosed with COVID  I agree with the dietitian's malnutrition diagnosis.    XR chest 1 view 11/22/2023    Narrative  Interpreted By:  Salvador Robles,  STUDY:  XR CHEST 1 VIEW;  11/22/2023 7:48 am    INDICATION:  Signs/Symptoms:hypoxia.    COMPARISON:  11/17/2023    ACCESSION NUMBER(S):  [id number]    ORDERING CLINICIAN:  HUGH BEASLEY    FINDINGS:      CARDIOMEDIASTINAL SILHOUETTE:  Cardiomediastinal silhouette is normal in size and configuration.    LUNGS:  Continued coarse perihilar airspace opacities/edema. Basilar  lucencies and correlate with underlying emphysematous or fibrotic  changes.    ABDOMEN:  Dobbhoff tube overlies the body of stomach.    BONES:  No acute osseous changes.    Impression  1.  Continued coarse perihilar basilar opacities and correlate with  residual edema/pneumonia/atypical infection. No pneumothorax.      Signed by: Salvador Robles 11/22/2023 10:08 AM  Dictation workstation:    [id number]      Assessment/Plan   Principal Problem:    Acute hypoxemic respiratory failure (CMS/HCC)  Active Problems:    COVID-19    Pneumonia of both lower lobes due to infectious organism    Macarena Wilson is a 68 year old female with a PMH of CKD III and HTN admitted to the MICU from OSH with AHRF 2/2 COVID pneumonia (positive on 10/13) complicated by ARDS and pulmonary fibrosis with resulting organizing PNA. Course complicated by distal DVTs and likely PE, oliguric HUNG on CKD, and intermittent hypoxia-associated SVT and afib with RVR.   Pt weaned to HFNC and transferred to SDU for continued care. Patient remains on Airvo 30L/80%     Update 11/24  - Narrowed antibiotics, started Cipro 500 mg BID via Cortrak and stopped zosyn.   - Plan for repeat blood cultures and urine culture with AM labs to show clearance of bacteremia     Neuro:   - Today patient is A&Ox3. Anxiety improved in comparison to previous days.   - PRN tylenol for Pain or PRN Oxy for pain or air hunger.  Lidocaine patch for back discomfort  - Continue Trazodone 25mg HS and Melatonin HS   - Continue Atarax PRN for anxiety  - Palliative following, recs appreciated   - PT/OT     CV:   #Hx of HTN  #Afib, Intermittent SVT  - Patient remains HDS, NSR on telemetry today  - Continue Metoprolol Tartrate 25mg Q6H, hold for SBP <100, HR <50  - Holding Home Losartan 100mg in the setting of recurrent HUNG on CKD   -  on 11/20, down to 99 11/23  - Monitor for Qtc prolongation while on Cipro and Atarax.    - Qtc 390 today     Pulm:   #AHRF 2/2 COVID PNA (11/23) c/b ARDS, GGO c/f pulmonary fibrosis - organizing PNA, likely 2/2 COVID PNA, Likely PE, known DVTs  #Pneumomediastinum, resolved   - Today patient remains on Airvo 30L/80% with intermittent desaturation with turns/repositioning which resolve with rest  - Pulmonary fibrosis 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   - Fungitell, aspergillus galactomannan negative,  blasto and histo Ag neg  - Prednisone 50 mg q6==> plan to continue this for one week, then taper on 11/26 to 80 mg Q12H  > Daily DS bactrim for PCP prophy  - Fluticasone 1 spray bid   - PRN IV Lasix-- 40 mg IV lasix given past two days, not diuresing today d/t increase in Scr (Please note I&O's not completely accurate d/t episodes of incontinence that external catheter was unable to collect)   - Avoid further CPAP attempts d/t pneumomediastinum which occurred after trying patient on CPAP     Renal:   #Oliguric HUNG on CKD III, RECURRENT, with azotemia   - Cr peaked at 3.97. Scr 1.4 today, up from 1.27 yesterday. BUN downtrending at 85 today  - Baseline of 1.6, and increased UOP. Likely prerenal. Renal US with non-obstructing stones BL, no hydronephrosis, cysts BL. 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  Diarrhea subsiding  - Cortrak in place, continue Nepro at goal of 35 with 200ml FWF Q6  - SLP following --> Originally planned for FEEs today but not performed d/t increased FiO2 from last time patient was evaluated on Wednesday.    - Stool panel negative.  - Loperamide ordered PRN  - PRN miralax and docusate senna for constipation      ENDOCRINOLOGY:   #Steroid induced hyperglycemia  - A1C 6.3, pre-DM  - Blood glucose ranging 205-233  - Continue with MDSSI     Heme:    #DVT and presumed PE  #Anemia of unknown cause  - Hgb 11.6 -> 10.8 -> 9.9 -> 8.9 today.  No outward signs of bleeding noted.  Three loose stools yesterday with no melena reported.  Continue to monitor.   - 11/13: BUE U/S: +R PT DVT, +L Soleal DVT  - Continue Apixaban BID --> Consider holding if hgb continues to downtrend.  Previously on Heparin gtt, started on Apixaban 11/16  - CBC daily and prn     MSK/Skin:    #Eczema/Rosasea  #Sacral pressure injury, stage 3  - On home dupixent  - Seen by wound care RN  - For sacral wound: Clean buttocks with cleansing cloths and apply Triad to open skin BID and as needed. Leave RIZWANA      ID:   #Covid PNA (received Decadron, Remdesivir, Tocilizumab at OSH) Neutrophilic leukocytosis  - Improving leukocytosis, remains afebrile  MICRO: 11/21 BC x2 3/4 Pseudomonas Aeruginosa, Susceptible to Zosyn and Cipro, urine Cx canceled by lab plan obtain new urine cultures in AM along with BC x2   10/26 & 11/10: stool studies, including C. Diff neg, 11/9 Step pneumonaie Ag, Legionella Ag neg, 11/9 Flu A/B, RSV neg   - Zosyn 11/22- 11/24; Ciprofloxacin 500 mg BID (11/24 - tentative stop date of 11/30)     Lines: PIV, Cortrak     Code Status: DNR/DNI  NOK:  Primary Emergency Contact: Mathew Wilson, Home Phone: [phone]     Dispo:  Continue SDU for hypoxic resp failure with high FiO2 needs     Pt discussed with Dr. Moya, seen and examined. All labs, VS and previous plan of care reviewed.    Jw Florez, ZION-CNP

## 2023-11-24 NOTE — PROGRESS NOTES
SW following for discharge planning.  Pt recommended for SNF and pt previously provided with choice list.  SW met with pt to obtain choices.  Pt selected the following: [facility], [facility], [facility], and [facility].  Referrals sent.  SW to follow and advise of updates.    11/24/23 at 3:20 PM - Elizabeth Gunsalus

## 2023-11-24 NOTE — PROGRESS NOTES
Speech-Language Pathology                 Therapy Communication Note    Patient Name: Macarena Wilson  MRN: [id number]  Today's Date: 11/24/2023     Discipline: Speech Language Pathology    Missed Visit Reason:   Pt with increasing O2 needs, Fiberoptic Endoscopic Exam of the Swallow deferred this date. Speech Language Pathologist to follow up as needed.     Missed Time: Attempt @ 0902    Comment:

## 2023-11-24 NOTE — SIGNIFICANT EVENT
RAPID  RESPONSE RN NOTE:       11/24/23 0839   Onset Documentation   Rapid Response Initiated By Radar auto page   Location/Room [facility]  (Ta [id number] - [facility])   Pager Time [phone]   Arrival Time 0855   Event End Time 0859   Level II Called No   Primary Reason for Call Radar auto page  (RADAR score = 7)     RADAR auto page received. Temp 36.3, HR 71, RR 25, /58, pulse ox 93%.  Per bedside RN and primary team, pt is near baseline. Dyspneic on minimal exertion.  Respiratory status remains tenuous per team and pt remains in step down unit on AIRVO.  No rapid  response needs at this time per team.  Please page rapid response for any concerns for deterioration or assistance needed.

## 2023-11-25 NOTE — PROGRESS NOTES
"Macarena Wilson is a 68 y.o. female on day 16 of admission presenting with Acute hypoxemic respiratory failure (CMS/HCC).    Subjective   Chief Complaint \"I was up a lot overnight with them getting blood\"    ROS: Endorses intermittent anxiety which is worse when moving.  Denies fever, chills, abdominal pain, N/V, CP, Palpations, and SOB at rest    Overnight: Ongoing episodes of desaturation with turns and repositioning.  Patient resting comfortably in bed this morning with normal work of breathing SpO2 95% on 30L/80%.      Objective   Physical Exam:  Constitutional: pt in NAD, alert and cooperative  Eyes: PERRL, EOMI, no icterus   ENMT: mucous membranes moist  Head/Neck: Neck supple, no apparent injury  Respiratory/Thorax: Lungs diminished bilaterally, non-labored breathing, dry cough, on Airvo 30L/80%  Cardiovascular: Regular, rate and rhythm, no murmurs, normal S1 and S2  Gastrointestinal: Nondistended, soft, non-tender, BS present x 4, Cortrak present   : External catheter in place   Musculoskeletal: ROM intact  Extremities: no edema  Neurological: alert and oriented x 3, speech clear, follows commands appropriately, without focal deficits, weakness B/L LE  Skin: Warm and dry    Vital Signs  Blood pressure 126/64, pulse 63, temperature 36.3 °C (97.3 °F), resp. rate 22, height 1.6 m (5' 2.99\"), weight 63.2 kg (139 lb 5.3 oz), SpO2 100 %.  Oxygen Therapy  SpO2: 100 %  Medical Gas Therapy: Supplemental oxygen  O2 Delivery Method: High flow nasal cannula  FiO2 (%):  [80 %] 80 %    Intake/Output last 3 Shifts:  I/O last 3 completed shifts:  In: 2320 (33.9 mL/kg) [NG/GT:2170; IV Piggyback:150]  Out: 835 (12.2 mL/kg) [Urine:835 (0.3 mL/kg/hr)]  Dosing Weight: 68.4 kg     Lines and Tubes:  Peripheral IV 11/08/23 20 G Left;Anterior Hand (Active)   Placement Date/Time: 11/08/23 0000   Size (Gauge): 20 G  Orientation: Left;Anterior  Location: Hand   Number of days: 17       Peripheral IV 11/09/23 20 G Right;Anterior " Forearm (Active)   Placement Date/Time: 11/09/23 0600   Size (Gauge): 20 G  Orientation: Right;Anterior  Location: Forearm  Technique: Ultrasound guidance   Number of days: 16       NG/OG/Feeding Tube Right nostril (Active)   Placement Date/Time: 11/16/23 1522   Tube Length: 75 cm  Tube Location: Right nostril   Number of days: 8       External Urinary Catheter (Active)   Placement Date/Time: 11/09/23 0400     Number of days: 16         Relevant Results  Scheduled medications  albuterol, 2.5 mg, nebulization, BID  apixaban, 5 mg, nasogastric tube, BID  ciprofloxacin, 500 mg, nasogastric tube, q12h KARL  esomeprazole, 40 mg, nasoduodenal tube, Daily before breakfast  fluticasone, 1 spray, Each Nostril, BID  insulin lispro, 0-10 Units, subcutaneous, q4h  lidocaine, 1 patch, transdermal, Daily  melatonin, 5 mg, oral, Daily  metoprolol tartrate, 25 mg, nasogastric tube, q6h  predniSONE, 50 mg, oral, q6h  sodium chloride, 3 mL, nebulization, BID  sulfamethoxazole-trimethoprim, 160 mg of trimethoprim, nasogastric tube, q24h KARL  thiamine, 100 mg, oral, Daily  traZODone, 25 mg, nasogastric tube, Nightly      Continuous medications     PRN medications  PRN medications: acetaminophen **OR** [DISCONTINUED] acetaminophen **OR** [DISCONTINUED] acetaminophen, dextrose 10 % in water (D10W), dextrose, docusate sodium, hydrOXYzine HCL, ipratropium-albuteroL, ondansetron ODT **OR** [DISCONTINUED] ondansetron, oxyCODONE, oxygen, polyethylene glycol, sodium chloride    Results for orders placed or performed during the hospital encounter of 11/09/23 (from the past 24 hour(s))   POCT GLUCOSE   Result Value Ref Range    POCT Glucose 183 (H) 74 - 99 mg/dL   POCT GLUCOSE   Result Value Ref Range    POCT Glucose 216 (H) 74 - 99 mg/dL   POCT GLUCOSE   Result Value Ref Range    POCT Glucose 240 (H) 74 - 99 mg/dL   POCT GLUCOSE   Result Value Ref Range    POCT Glucose 221 (H) 74 - 99 mg/dL   CBC and Auto Differential   Result Value Ref Range     WBC 18.1 (H) 4.4 - 11.3 x10*3/uL    nRBC 0.0 0.0 - 0.0 /100 WBCs    RBC 3.31 (L) 4.00 - 5.20 x10*6/uL    Hemoglobin 9.5 (L) 12.0 - 16.0 g/dL    Hematocrit 29.0 (L) 36.0 - 46.0 %    MCV 88 80 - 100 fL    MCH 28.7 26.0 - 34.0 pg    MCHC 32.8 32.0 - 36.0 g/dL    RDW 15.2 (H) 11.5 - 14.5 %    Platelets 155 150 - 450 x10*3/uL    Neutrophils % 92.3 40.0 - 80.0 %    Immature Granulocytes %, Automated 3.2 (H) 0.0 - 0.9 %    Lymphocytes % 1.0 13.0 - 44.0 %    Monocytes % 3.3 2.0 - 10.0 %    Eosinophils % 0.0 0.0 - 6.0 %    Basophils % 0.2 0.0 - 2.0 %    Neutrophils Absolute 16.67 (H) 1.20 - 7.70 x10*3/uL    Immature Granulocytes Absolute, Automated 0.58 0.00 - 0.70 x10*3/uL    Lymphocytes Absolute 0.18 (L) 1.20 - 4.80 x10*3/uL    Monocytes Absolute 0.59 0.10 - 1.00 x10*3/uL    Eosinophils Absolute 0.00 0.00 - 0.70 x10*3/uL    Basophils Absolute 0.03 0.00 - 0.10 x10*3/uL   Renal function panel   Result Value Ref Range    Glucose 190 (H) 74 - 99 mg/dL    Sodium 140 136 - 145 mmol/L    Potassium 4.0 3.5 - 5.3 mmol/L    Chloride 99 98 - 107 mmol/L    Bicarbonate 28 21 - 32 mmol/L    Anion Gap 17 10 - 20 mmol/L    Urea Nitrogen 91 (HH) 6 - 23 mg/dL    Creatinine 1.42 (H) 0.50 - 1.05 mg/dL    eGFR 40 (L) >60 mL/min/1.73m*2    Calcium 8.0 (L) 8.6 - 10.6 mg/dL    Phosphorus 3.3 2.5 - 4.9 mg/dL    Albumin 2.3 (L) 3.4 - 5.0 g/dL   Magnesium   Result Value Ref Range    Magnesium 2.19 1.60 - 2.40 mg/dL   POCT GLUCOSE   Result Value Ref Range    POCT Glucose 177 (H) 74 - 99 mg/dL   Blood Culture    Specimen: Peripheral Venipuncture; Blood culture   Result Value Ref Range    Blood Culture Loaded on Instrument - Culture in progress    Blood Culture    Specimen: Peripheral Venipuncture; Blood culture   Result Value Ref Range    Blood Culture Loaded on Instrument - Culture in progress    POCT GLUCOSE   Result Value Ref Range    POCT Glucose 204 (H) 74 - 99 mg/dL     XR chest 1 view    Result Date: 11/24/2023  Interpreted By:  Travis  Salvador, STUDY: XR CHEST 1 VIEW;  11/24/2023 7:42 am   INDICATION: Signs/Symptoms:Covid PNA.   COMPARISON: 02/22/2023.   ACCESSION NUMBER(S): [id number]   ORDERING CLINICIAN: GAGANDEEP DE LA CRUZ   FINDINGS:     CARDIOMEDIASTINAL SILHOUETTE: Cardiomediastinal silhouette is normal in size and configuration.   LUNGS: Continued coarse perihilar airspace opacities with subpleural cystic change. No pneumothorax.   ABDOMEN: Dobbhoff tube overlies the duodenal bulb.   BONES: No acute osseous changes.       1.  Continued coarse perihilar airspace opacities consistent with history of COVID-19 pneumonia. Basilar lucencies and correlate with a component of aerated lung versus developing fibrotic changes.     Signed by: Salvador Robles 11/24/2023 8:38 AM Dictation workstation:   [id number]       Malnutrition Diagnosis Status: New  Malnutrition Diagnosis: Severe malnutrition related to acute disease or injury  As Evidenced by: 12% weight loss from normal along with moderate to severe muscle and fat loss on physical exam in setting of a decrease in PO intake/appetite after being diagnosed with COVID  I agree with the dietitian's malnutrition diagnosis.    XR chest 1 view 11/24/2023    Narrative  Interpreted By:  Salvador Robles,  STUDY:  XR CHEST 1 VIEW;  11/24/2023 7:42 am    INDICATION:  Signs/Symptoms:Covid PNA.    COMPARISON:  02/22/2023.    ACCESSION NUMBER(S):  [id number]    ORDERING CLINICIAN:  GAGANDEEP DE LA CRUZ    FINDINGS:      CARDIOMEDIASTINAL SILHOUETTE:  Cardiomediastinal silhouette is normal in size and configuration.    LUNGS:  Continued coarse perihilar airspace opacities with subpleural cystic  change. No pneumothorax.    ABDOMEN:  Dobbhoff tube overlies the duodenal bulb.    BONES:  No acute osseous changes.    Impression  1.  Continued coarse perihilar airspace opacities consistent with  history of COVID-19 pneumonia. Basilar lucencies and correlate with a  component of aerated lung versus developing fibrotic  changes.      Signed by: Salvador Robles 11/24/2023 8:38 AM  Dictation workstation:   [id number]      Assessment/Plan   Principal Problem:    Acute hypoxemic respiratory failure (CMS/HCC)  Active Problems:    COVID-19    Pneumonia of both lower lobes due to infectious organism    Macarena Wilson is a 68 year old female with a PMH of CKD III and HTN admitted to the MICU from OSH with AHRF 2/2 COVID pneumonia (positive on 10/13) complicated by ARDS and pulmonary fibrosis with resulting organizing PNA. Course complicated by distal DVTs and likely PE, oliguric HUNG on CKD, and intermittent hypoxia-associated SVT and afib with RVR.   Pt weaned to HFNC and transferred to SDU for continued care. Patient remains on Airvo 30L/80%     Update 11/25  - Repeat BC x2 and Urine culture obtained with morning labs, continue to follow  - Started Nystatin for oral thrush   - Plan to continue steroids at current dose and frequency for now.   - Will order CT PE when patient is able to tolerate laying flat on Non-re breather.  Bolus patient with 500 ml LR prior to scan d/t patients HUNG on CKD.   - If CT PE negative plan to order ECHO with bubble study.  - Consider consulting transplant surgery tomorrow or Monday.      Neuro:   - Today patient is A&Ox3. No reports of anxiety this morning  - PRN tylenol for Pain or PRN Oxy for pain or air hunger.  Lidocaine patch for back discomfort  - Continue Trazodone 25mg HS and Melatonin HS   - Continue Atarax PRN for anxiety  - Palliative following, recs appreciated   - PT/OT      CV:   #Hx of HTN  #Afib, Intermittent SVT  - Patient remains HDS, NSR on telemetry today  - Continue Metoprolol Tartrate 25mg Q6H, hold for SBP <100, HR <50  - Holding Home Losartan 100mg in the setting of recurrent HUNG on CKD   -  on 11/20, down to 99 11/23  - Monitor for Qtc prolongation while on Cipro and Atarax.    - Qtc 390 11/24     Pulm:   #AHRF 2/2 COVID PNA (11/23) c/b ARDS, GGO c/f pulmonary fibrosis -  organizing PNA, likely 2/2 COVID PNA, Likely PE, known DVTs  #Pneumomediastinum, resolved   - Today patient remains on Airvo 30L/80%, continued intermittent desaturation with turns/repositioning which resolve with rest  - Pulmonary fibrosis 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   - Fungitell, aspergillus galactomannan negative, blasto and histo Ag neg  - Prednisone 50 mg q6==> plan to continue this dose until CTPE can be completed, then taper on to 80 mg Q12H  > Daily DS bactrim for PCP prophy  - Fluticasone 1 spray bid   - PRN IV Lasix-- 40 mg IV lasix given past two days, not diuresing today d/t increase in Scr (Please note I&O's not completely accurate d/t episodes of incontinence that external catheter was unable to collect)   - Avoid further CPAP attempts d/t pneumomediastinum which occurred after trying patient on CPAP  - Will order CT PE when patient is able to tolerate laying flat on Non-re breather.  Bolus patient with 500 ml LR prior to scan d/t patients HUNG on CKD.      Renal:   #Oliguric HUNG on CKD III, RECURRENT, with azotemia   - Cr peaked at 3.97. Scr 1.4 today, BUN up-trending at 91 today  - Baseline of 1.6. Likely prerenal. Renal US with non-obstructing stones BL, no hydronephrosis, cysts BL. 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  Ongoing Diarrhea   - Cortrak in place, continue Nepro at goal of 35 with 200ml FWF Q6  - SLP following --> Originally planned for FEEs 11/24 but not performed d/t increased FiO2 from last time patient was evaluated on Wednesday.    - Stool panel negative.  - Loperamide ordered PRN  - PRN miralax and docusate senna for constipation      ENDOCRINOLOGY:   #Steroid induced hyperglycemia  - A1C 6.3, pre-DM  - Blood  glucose ranging 190-233  - Continue with MDSSI     Heme:    #DVT and presumed PE  #Anemia of unknown cause  - Hgb 11.6 -> 10.8 -> 9.9 -> 8.9 ->9.5 today.  No outward signs of bleeding noted.  No melena reported in stools.  Continue to monitor.   - 11/13: BUE U/S: +R PT DVT, +L Soleal DVT  - Continue Apixaban BID -  - CBC daily and prn     MSK/Skin:   #Eczema/Rosasea  #Sacral pressure injury, stage 3  - On home dupixent  - Seen by wound care RN  - For sacral wound: Clean buttocks with cleansing cloths and apply Triad to open skin BID and as needed. Leave RIZWANA      ID:   #Covid PNA (received Decadron, Remdesivir, Tocilizumab at OSH) Neutrophilic leukocytosis  - Worsening Leukocytosis, WBC 18.1 today, up from 41.8 yesterday.  MICRO: 11/21 BC x2 3/4 Pseudomonas Aeruginosa, Susceptible to Zosyn and Cipro, Repeat UC and Bcx2 obtained this morning  - 10/26 & 11/10: stool studies, including C. Diff neg, 11/9 Step pneumonaie Ag, Legionella Ag neg, 11/9 Flu A/B, RSV neg   - Ongoing Diarrhea with worsening Leukocytosis, consider re-sending C. Dif sample if diarrhea persists   - Zosyn 11/22- 11/24; Ciprofloxacin 500 mg BID (11/24 - tentative stop date of 11/30)     Lines: PIV, Joshua     Code Status: DNR/DNI  NOK:  Primary Emergency Contact: Mathew Wilson, Home Phone: [phone]     Dispo:  Continue SDU for hypoxic resp failure with high FiO2 needs    Pt discussed with Dr. Cespedes, seen and examined. All labs, VS and previous plan of care reviewed.    Jw Florez, APRN-CNP

## 2023-11-26 NOTE — PROGRESS NOTES
"Macarena Wilson is a 68 y.o. female on day 17 of admission presenting with Acute hypoxemic respiratory failure (CMS/HCC).    Subjective   No acute events over night. Episodes of desaturation with minimal activity.  Ccontinue on Airvo 30L, 80%    Objective   Physical Exam:  Constitutional: pt in NAD, alert and cooperative  Eyes: PERRL, EOMI, no icterus   ENMT: mucous membranes moist, no apparent injury, no lesions seen  Head/Neck: Neck supple, no apparent injury  Respiratory/Thorax: Lungs with fine bilateral crackles, non-labored breathing, Cardiovascular: Regular, rate and rhythm, no murmurs  Gastrointestinal: Nondistended, soft, non-tender, BS present x 4, Corpak tube in place  : external urine catheter  Musculoskeletal: ROM intact, no joint swelling, normal strength  Extremities: normal extremities, no edema, contusions or wounds  Neurological: alert and oriented x 3, speech clear, follows commands appropriately, without focal deficits  Skin: Warm and dry,multiple BUE bruising     Last Recorded Vitals  Blood pressure 126/54, pulse 64, temperature 36.3 °C (97.3 °F), temperature source Temporal, resp. rate 16, height 1.6 m (5' 2.99\"), weight 63.2 kg (139 lb 5.3 oz), SpO2 99 %.    Intake/Output last 3 Shifts:  I/O last 3 completed shifts:  In: 1710 (25 mL/kg) [NG/GT:1710]  Out: 960 (14 mL/kg) [Urine:960 (0.4 mL/kg/hr)]  Dosing Weight: 68.4 kg     Scheduled medications  albuterol, 2.5 mg, nebulization, BID  apixaban, 5 mg, nasogastric tube, BID  ciprofloxacin, 500 mg, nasogastric tube, q12h KARL  esomeprazole, 40 mg, nasoduodenal tube, Daily before breakfast  fluticasone, 1 spray, Each Nostril, BID  insulin lispro, 0-10 Units, subcutaneous, q4h  lactated Ringer's, 500 mL, intravenous, Once  lidocaine, 1 patch, transdermal, Daily  melatonin, 5 mg, oral, Daily  metoprolol tartrate, 25 mg, nasogastric tube, q6h  nystatin, 4 mL, Swish & Spit, BID  predniSONE, 50 mg, oral, q6h  sodium chloride, 3 mL, nebulization, " BID  sulfamethoxazole-trimethoprim, 160 mg of trimethoprim, nasogastric tube, q24h KARL  thiamine, 100 mg, oral, Daily  traZODone, 25 mg, nasogastric tube, Nightly      PRN medications  PRN medications: acetaminophen **OR** [DISCONTINUED] acetaminophen **OR** [DISCONTINUED] acetaminophen, dextrose 10 % in water (D10W), dextrose, docusate sodium, hydrOXYzine HCL, ipratropium-albuteroL, ondansetron ODT **OR** [DISCONTINUED] ondansetron, oxyCODONE, oxygen, polyethylene glycol, sodium chloride     Relevant Results  Results for orders placed or performed during the hospital encounter of 11/09/23 (from the past 24 hour(s))   POCT GLUCOSE   Result Value Ref Range    POCT Glucose 204 (H) 74 - 99 mg/dL   POCT GLUCOSE   Result Value Ref Range    POCT Glucose 205 (H) 74 - 99 mg/dL   POCT GLUCOSE   Result Value Ref Range    POCT Glucose 183 (H) 74 - 99 mg/dL   C. difficile, PCR    Specimen: Stool   Result Value Ref Range    C. difficile, PCR Not Detected Not Detected   POCT GLUCOSE   Result Value Ref Range    POCT Glucose 217 (H) 74 - 99 mg/dL   POCT GLUCOSE   Result Value Ref Range    POCT Glucose 211 (H) 74 - 99 mg/dL   POCT GLUCOSE   Result Value Ref Range    POCT Glucose 167 (H) 74 - 99 mg/dL   CBC and Auto Differential   Result Value Ref Range    WBC 14.4 (H) 4.4 - 11.3 x10*3/uL    nRBC 0.0 0.0 - 0.0 /100 WBCs    RBC 3.24 (L) 4.00 - 5.20 x10*6/uL    Hemoglobin 9.2 (L) 12.0 - 16.0 g/dL    Hematocrit 28.1 (L) 36.0 - 46.0 %    MCV 87 80 - 100 fL    MCH 28.4 26.0 - 34.0 pg    MCHC 32.7 32.0 - 36.0 g/dL    RDW 14.8 (H) 11.5 - 14.5 %    Platelets 163 150 - 450 x10*3/uL    Neutrophils % 91.2 40.0 - 80.0 %    Immature Granulocytes %, Automated 4.4 (H) 0.0 - 0.9 %    Lymphocytes % 1.0 13.0 - 44.0 %    Monocytes % 3.3 2.0 - 10.0 %    Eosinophils % 0.0 0.0 - 6.0 %    Basophils % 0.1 0.0 - 2.0 %    Neutrophils Absolute 13.11 (H) 1.20 - 7.70 x10*3/uL    Immature Granulocytes Absolute, Automated 0.63 0.00 - 0.70 x10*3/uL    Lymphocytes  Absolute 0.15 (L) 1.20 - 4.80 x10*3/uL    Monocytes Absolute 0.48 0.10 - 1.00 x10*3/uL    Eosinophils Absolute 0.00 0.00 - 0.70 x10*3/uL    Basophils Absolute 0.02 0.00 - 0.10 x10*3/uL   Renal function panel   Result Value Ref Range    Glucose 188 (H) 74 - 99 mg/dL    Sodium 139 136 - 145 mmol/L    Potassium 4.3 3.5 - 5.3 mmol/L    Chloride 100 98 - 107 mmol/L    Bicarbonate 28 21 - 32 mmol/L    Anion Gap 15 10 - 20 mmol/L    Urea Nitrogen 90 (H) 6 - 23 mg/dL    Creatinine 1.32 (H) 0.50 - 1.05 mg/dL    eGFR 44 (L) >60 mL/min/1.73m*2    Calcium 8.0 (L) 8.6 - 10.6 mg/dL    Phosphorus 3.4 2.5 - 4.9 mg/dL    Albumin 2.4 (L) 3.4 - 5.0 g/dL   Magnesium   Result Value Ref Range    Magnesium 2.16 1.60 - 2.40 mg/dL      XR chest 1 view  Result Date: 11/24/2023   1.  Continued coarse perihilar airspace opacities consistent with history of COVID-19 pneumonia. Basilar lucencies and correlate with a component of aerated lung versus developing fibrotic changes.        US renal complete  Result Date: 11/16/2023  1. Atrophic right kidney.   2. Bilateral nonobstructing nephrolithiasis measuring up to 1.5 cm on the right and 0.9 cm on the left. No hydronephrosis.   3. Bilateral renal cysts.       XR abdomen 1 view  Result Date: 11/16/2023  1.  Nonspecific KUB. Enterogastric tube in the expected position of the stomach with tip at the pylorus.       Lower extremity venous duplex bilateral  Result Date: 11/13/2023  Right Lower Venous: There is acute occlusive deep vein thrombosis visualized in the posterior tibial vein. The remainder of the right leg is negative for deep vein thrombosis.   Left Lower Venous: There is acute occlusive deep vein thrombosis visualized in the soleal vein. The remainder of the left leg is negative for deep vein thrombosis.     Transthoracic Echo (TTE) Complete  Result Date: 11/9/2023  1. Left ventricular systolic function is hyperdynamic with a 70-75% estimated ejection fraction.    2. Spectral Doppler shows  an impaired relaxation pattern of left ventricular diastolic filling.     CT chest wo IV contrast  Result Date: 11/7/2023  Persistent extensive ground-glass densities in the lung apices and anterior aspect of the bilateral upper lobes with progression of previously identified ground-glass densities in the more confluent airspace opacities involving the bilateral lower lobes and posterior aspects of the bilateral upper lobes. Findings suggest atypical infection. Correlation with COVID status is recommended.            Malnutrition Diagnosis Status: New  Malnutrition Diagnosis: Severe malnutrition related to acute disease or injury  As Evidenced by: 12% weight loss from normal along with moderate to severe muscle and fat loss on physical exam in setting of a decrease in PO intake/appetite after being diagnosed with COVID  I agree with the dietitian's malnutrition diagnosis.      Assessment/Plan   Principal Problem:    Acute hypoxemic respiratory failure (CMS/HCC)  Active Problems:    COVID-19    Pneumonia of both lower lobes due to infectious organism    Mrs Wilson is a 67 y/o  female with a PMH of CKD III and HTN admitted to the MICU from OSH with AHRF 2/2 COVID pneumonia (positive on 10/13) complicated by ARDS and pulmonary fibrosis with resulting organizing PNA. Currently on high flow O2 via Airvo  30L/80%      Neuro: hx anxiety   -A&Ox3  - PRN tylenol for Pain or PRN Oxy for pain or air hunger.    -Lidocaine patch for back discomfort  - Continue Trazodone 25mg HS and Melatonin HS   - Continue Atarax PRN for anxiety  - Palliative following, recs appreciated   - PT/OT/OOB as tolerates        CV: Hx of HTN, Afib, Intermittent SVT  --Stable HR and BP  - Continue Metoprolol Tartrate 25mg Q6H, hold for SBP <100, HR <50  - Holding Home Losartan 100mg in the setting of recurrent HUNG on CKD   -  on 11/20, down to 99 11/23  - Monitor for Qtc while on Cipro and Atarax.    - Qtc 390 11/24     Pulm: AHRF 2/2 COVID PNA  (10/23) c/b ARDS, GGO c/f pulmonary fibrosis - organizing PNA, likely 2/2 COVID PNA, Pneumomediastinum, resolved   - wean O2 for goal Pox >92%, on Airvo 30L/80%, stable sats at rest, but frequent desat with minimal activity, turns, which resolve with rest  - Pulmonary fibrosis 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   - Fungitell, aspergillus galactomannan negative, blasto and histo Ag neg  -CT PE done today NEGATIVE PE   - Prednisone 50 mg q6- starting taper today after CT PE result- decreasing to 80 mg BID starting PM of 11/26   Daily DS bactrim for PCP prophy  - Fluticasone 1 spray bid   - PRN IV Lasix-- 40 mg IV lasix given past few days, holding now given HUNG Scr   - Avoid further CPAP attempts d/t pneumomediastinum which occurred after trying patient on CPAP  -will consult lung transplant Monday to evaluate      Renal:  Oliguric HUNG on CKD III, with azotemia   - Cr peaked at 3.97. at 1.3 today, BUN up-trending at 90 today  - Baseline of 1.6. Likely prerenal. Renal US with non-obstructing stones BL, no hydronephrosis, cysts BL.  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    -consider discussing with SLP Monday 11/27  - Stool panel negative, Cdiff PCR negative on 11/25  - started Imodium PRN  - PRN miralax and docusate senna for constipation - holding     ENDO:  Steroid induced hyperglycemia  - A1C 6.3, pre-DM  - Blood glucose ranging 160s-220s  - Continue with MDSSI and accu checks  -hypoglycemia protocol     Heme:  anemia, DVT     -CT PE from today 11/26 negative for  PE  -HGB stable , no s/sx bleeding  - 11/13: BUE U/S: +R PT DVT, +L Soleal DVT  - Continue Apixaban BID -  - CBC daily and prn     MSK/Skin: Eczema/Rosasea, Sacral pressure injury, stage 3  - On home dupixent  - Seen by wound care  - For sacral wound: Clean buttocks with cleansing cloths and apply Triad to open skin BID and as needed. Leave RIZWAAN      ID: Covid PNA (received Decadron, Remdesivir, Tocilizumab at OSH) Neutrophilic leukocytosis, trending down  - WBC 14.4 today from 18.1   MICRO: 11/21 BC x2 3/4 Pseudomonas Aeruginosa, Susceptible to Zosyn and Cipro, Repeat UC and Bcx2 from 11/25 negative  - 10/26 & 11/10: stool studies, including C. Diff neg, most recent 11/25,  11/9 Step pneumonaie Ag, Legionella Ag neg, 11/9 Flu A/B, RSV neg   - Zosyn 11/22- 11/24; Ciprofloxacin 500 mg BID for 14 day course (11/24 - tentative stop date of 11/28)     Lines: Joshua DONOVAN     Code Status: DNR/DNI  NOK:  Primary Emergency Contact: Mathew Wilson, Home Phone: [phone]     Dispo:  Continue SDU care for hypoxic resp failure with high FiO2 needs     D/w  Dr. Coy SANDHU spent >45 minutes in the professional and overall care of this patient.    Alba Rosenthal, APRN-CNP

## 2023-11-27 PROBLEM — B96.5 PSEUDOMONAL BACTEREMIA: Status: ACTIVE | Noted: 2023-01-01

## 2023-11-27 PROBLEM — I48.91 A-FIB (MULTI): Status: ACTIVE | Noted: 2023-01-01

## 2023-11-27 PROBLEM — R78.81 PSEUDOMONAL BACTEREMIA: Status: ACTIVE | Noted: 2023-01-01

## 2023-11-27 NOTE — NURSING NOTE
Nurse went in to patients room to change her bed pad. Patient stated that she was very tired and would like to try and get back to sleep. Patient stated that she was dry and did not need a roll change at this time.

## 2023-11-27 NOTE — PROGRESS NOTES
"Macarena Wilson is a 68 y.o. female on day 18 of admission presenting with Acute hypoxemic respiratory failure (CMS/HCC).    Subjective   No acute events over night    Objective   Physical Exam:  Constitutional: pt in NAD, alert and cooperative  Eyes: PERRL, EOMI, no icterus   ENMT: mucous membranes moist, no apparent injury, no lesions seen  Head/Neck: Neck supple, no apparent injury  Respiratory/Thorax: Lungs with fine bilateral crackles, non-labored breathing, Cardiovascular: Regular, rate and rhythm, no murmurs  Gastrointestinal: Nondistended, soft, non-tender, BS present x 4, Corpak tube in place  : external urine catheter  Musculoskeletal: ROM intact, no joint swelling, normal strength  Extremities: normal extremities, no edema, contusions or wounds  Neurological: alert and oriented x 3, speech clear, follows commands appropriately, without focal deficits  Skin: Warm and dry,multiple BUE bruising     Last Recorded Vitals  Blood pressure 120/55, pulse 61, temperature 36.4 °C (97.5 °F), temperature source Temporal, resp. rate 21, height 1.6 m (5' 2.99\"), weight 63.2 kg (139 lb 5.3 oz), SpO2 95 %.    Intake/Output last 3 Shifts:  I/O last 3 completed shifts:  In: 2060 (30.1 mL/kg) [NG/GT:2060]  Out: 1650 (24.1 mL/kg) [Urine:1650 (0.7 mL/kg/hr)]  Dosing Weight: 68.4 kg     Scheduled medications  albuterol, 2.5 mg, nebulization, BID  apixaban, 5 mg, nasogastric tube, BID  ciprofloxacin, 500 mg, nasogastric tube, q12h KARL  esomeprazole, 40 mg, nasoduodenal tube, Daily before breakfast  fluticasone, 1 spray, Each Nostril, BID  insulin lispro, 0-10 Units, subcutaneous, q4h  lidocaine, 1 patch, transdermal, Daily  melatonin, 5 mg, oral, Daily  metoprolol tartrate, 25 mg, nasogastric tube, q6h  nystatin, 4 mL, Swish & Spit, BID  predniSONE, 80 mg, oral, BID  sodium chloride, 3 mL, nebulization, BID  sulfamethoxazole-trimethoprim, 160 mg of trimethoprim, nasogastric tube, q24h KARL  thiamine, 100 mg, oral, " Daily  traZODone, 25 mg, nasogastric tube, Nightly      PRN medications  PRN medications: acetaminophen **OR** [DISCONTINUED] acetaminophen **OR** [DISCONTINUED] acetaminophen, dextrose 10 % in water (D10W), dextrose, docusate sodium, hydrOXYzine HCL, ipratropium-albuteroL, loperamide, ondansetron ODT **OR** [DISCONTINUED] ondansetron, oxyCODONE, oxygen, polyethylene glycol, sodium chloride     Relevant Results  Results for orders placed or performed during the hospital encounter of 11/09/23 (from the past 24 hour(s))   POCT GLUCOSE   Result Value Ref Range    POCT Glucose 211 (H) 74 - 99 mg/dL   POCT GLUCOSE   Result Value Ref Range    POCT Glucose 189 (H) 74 - 99 mg/dL   POCT GLUCOSE   Result Value Ref Range    POCT Glucose 196 (H) 74 - 99 mg/dL   POCT GLUCOSE   Result Value Ref Range    POCT Glucose 171 (H) 74 - 99 mg/dL   POCT GLUCOSE   Result Value Ref Range    POCT Glucose 157 (H) 74 - 99 mg/dL   POCT GLUCOSE   Result Value Ref Range    POCT Glucose 212 (H) 74 - 99 mg/dL   Magnesium   Result Value Ref Range    Magnesium 2.15 1.60 - 2.40 mg/dL   CBC and Auto Differential   Result Value Ref Range    WBC 21.5 (H) 4.4 - 11.3 x10*3/uL    nRBC 0.0 0.0 - 0.0 /100 WBCs    RBC 3.20 (L) 4.00 - 5.20 x10*6/uL    Hemoglobin 9.2 (L) 12.0 - 16.0 g/dL    Hematocrit 27.8 (L) 36.0 - 46.0 %    MCV 87 80 - 100 fL    MCH 28.8 26.0 - 34.0 pg    MCHC 33.1 32.0 - 36.0 g/dL    RDW 15.0 (H) 11.5 - 14.5 %    Platelets 172 150 - 450 x10*3/uL    Neutrophils % 92.7 40.0 - 80.0 %    Immature Granulocytes %, Automated 3.8 (H) 0.0 - 0.9 %    Lymphocytes % 1.0 13.0 - 44.0 %    Monocytes % 2.3 2.0 - 10.0 %    Eosinophils % 0.0 0.0 - 6.0 %    Basophils % 0.2 0.0 - 2.0 %    Neutrophils Absolute 19.98 (H) 1.20 - 7.70 x10*3/uL    Immature Granulocytes Absolute, Automated 0.81 (H) 0.00 - 0.70 x10*3/uL    Lymphocytes Absolute 0.22 (L) 1.20 - 4.80 x10*3/uL    Monocytes Absolute 0.49 0.10 - 1.00 x10*3/uL    Eosinophils Absolute 0.00 0.00 - 0.70  x10*3/uL    Basophils Absolute 0.04 0.00 - 0.10 x10*3/uL   Renal function panel   Result Value Ref Range    Glucose 202 (H) 74 - 99 mg/dL    Sodium 138 136 - 145 mmol/L    Potassium 4.5 3.5 - 5.3 mmol/L    Chloride 99 98 - 107 mmol/L    Bicarbonate 28 21 - 32 mmol/L    Anion Gap 16 10 - 20 mmol/L    Urea Nitrogen 84 (H) 6 - 23 mg/dL    Creatinine 1.17 (H) 0.50 - 1.05 mg/dL    eGFR 51 (L) >60 mL/min/1.73m*2    Calcium 8.2 (L) 8.6 - 10.6 mg/dL    Phosphorus 3.5 2.5 - 4.9 mg/dL    Albumin 2.5 (L) 3.4 - 5.0 g/dL      XR chest 1 view  Result Date: 11/24/2023   1.  Continued coarse perihilar airspace opacities consistent with history of COVID-19 pneumonia. Basilar lucencies and correlate with a component of aerated lung versus developing fibrotic changes.         US renal complete  Result Date: 11/16/2023  1. Atrophic right kidney.   2. Bilateral nonobstructing nephrolithiasis measuring up to 1.5 cm on the right and 0.9 cm on the left. No hydronephrosis.   3. Bilateral renal cysts.        XR abdomen 1 view  Result Date: 11/16/2023  1.  Nonspecific KUB. Enterogastric tube in the expected position of the stomach with tip at the pylorus.        Lower extremity venous duplex bilateral  Result Date: 11/13/2023  Right Lower Venous: There is acute occlusive deep vein thrombosis visualized in the posterior tibial vein. The remainder of the right leg is negative for deep vein thrombosis.   Left Lower Venous: There is acute occlusive deep vein thrombosis visualized in the soleal vein. The remainder of the left leg is negative for deep vein thrombosis.      Transthoracic Echo (TTE) Complete  Result Date: 11/9/2023  1. Left ventricular systolic function is hyperdynamic with a 70-75% estimated ejection fraction.    2. Spectral Doppler shows an impaired relaxation pattern of left ventricular diastolic filling.      CT chest wo IV contrast  Result Date: 11/7/2023  Persistent extensive ground-glass densities in the lung apices and  anterior aspect of the bilateral upper lobes with progression of previously identified ground-glass densities in the more confluent airspace opacities involving the bilateral lower lobes and posterior aspects of the bilateral upper lobes. Findings suggest atypical infection. Correlation with COVID status is recommended.           Malnutrition Diagnosis Status: New  Malnutrition Diagnosis: Severe malnutrition related to acute disease or injury  As Evidenced by: 12% weight loss from normal along with moderate to severe muscle and fat loss on physical exam in setting of a decrease in PO intake/appetite after being diagnosed with COVID  I agree with the dietitian's malnutrition diagnosis.      Assessment/Plan   Principal Problem:    Acute hypoxemic respiratory failure (CMS/HCC)  Active Problems:    COVID-19    Pneumonia of both lower lobes due to infectious organism    Mrs Wilson is a 69 y/o  female with a PMH of CKD III and HTN admitted to the MICU from OSH with AHRF 2/2 COVID pneumonia (positive on 10/13) complicated by ARDS and pulmonary fibrosis with resulting organizing PNA. Currently on high flow O2 via Airvo  30L/80%      Neuro: hx anxiety   -A&Ox3  - PRN tylenol for Pain or PRN Oxy for pain or air hunger.    -Lidocaine patch for back discomfort  - Continue Trazodone 25mg HS and Melatonin HS   -changed PRN Atarax to Seroquel 25 mg HS and daily PRN 12.5 mg (as per pal care recs)  - Palliative following, recs appreciated   - PT/OT/OOB as tolerates         CV: Hx of HTN, Afib, Intermittent SVT  --Stable HR and BP  - Continue Metoprolol Tartrate 25mg Q6H, hold for SBP <100, HR <50  - Holding Home Losartan 100mg in the setting of recurrent HUNG on CKD   -  on 11/20, down to 99 11/23  - Monitor for Qtc while on Cipro and Seroquel     - Qtc 390 11/24     Pulm: AHRF 2/2 COVID PNA (10/23) c/b ARDS, GGO c/f pulmonary fibrosis - organizing PNA, likely 2/2 COVID PNA, Pneumomediastinum, resolved   - wean O2 for goal Pox  >92%, on Airvo 30L/80%, stable sats at rest, but frequent desat with minimal activity, turns, which resolve with rest  - Pulmonary fibrosis 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   - Fungitell, aspergillus galactomannan negative, blasto and histo Ag neg  -CT PE from 11/27 negative for PE  - starting Prednisone taper, changed to 80 mg  BID starting PM of 11/26   Daily DS bactrim for PCP prophy  - Fluticasone 1 spray bid   - PRN IV Lasix-- 40 mg IV lasix given past few days, holding now given HUNG Scr -which is improving  - Avoid further CPAP attempts d/t pneumomediastinum which occurred after trying patient on CPAP  -consider lung transplant consult     Renal:  Oliguric HUNG on CKD III, with azotemia   - Cr peaked at 3.97. at 1.17 today  - Baseline of 1.6. Likely prerenal. Renal US with non-obstructing stones BL, no hydronephrosis, cysts BL.  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    -consider discussing with SLP Tuesday 11/28  - Stool panel negative, Cdiff PCR negative on 11/25  - started Imodium PRN  - PRN miralax and docusate senna for constipation - holding     ENDO:  Steroid induced hyperglycemia  - A1C 6.3, pre-DM  - Continue with MDSSI and accu checks  -hypoglycemia protocol     Heme:  anemia, DVT     -CT PE from 11/26 negative for PE  -HGB stable , no s/sx bleeding  - 11/13: BUE U/S: +R PT DVT, +L Soleal DVT  - Continue Apixaban 5 mg BID -  - CBC daily and prn     MSK/Skin: Eczema/Rosasea, Sacral pressure injury, stage 3  - On home dupixent  - Seen by wound care  - For sacral wound: Clean  buttocks with cleansing cloths and apply Triad to open skin BID and as needed. Leave RIZWANA      ID: Covid PNA (received Decadron, Remdesivir, Tocilizumab at OSH) Neutrophilic leukocytosis, trending down  - WBC 14.4 today from 18.1   MICRO: 11/21 BC x2 3/4 Pseudomonas Aeruginosa, Susceptible to Zosyn and Cipro, Repeat UC and Bcx2 from 11/25 negative  - 10/26 & 11/10: stool studies, including C. Diff neg, most recent 11/25,  11/9 Step pneumonaie Ag, Legionella Ag neg, 11/9 Flu A/B, RSV neg   - Zosyn 11/22- 11/24; Ciprofloxacin 500 mg BID for 14 day course (11/24 - tentative stop date of 11/28)     Lines: Joshua DONOVAN     Code Status: DNR/DNI  NOK:  Primary Emergency Contact: Mathew Wilosn, Home Phone: [phone]     Dispo:  Continue SDU care for hypoxic resp failure with high FiO2 needs     D/w  Dr. Krishnan     I spent >45  minutes in the professional and overall care of this patient.    Alba Rosenthal, APRN-CNP

## 2023-11-27 NOTE — PROGRESS NOTES
"Macarena Wilson is a 68 y.o. female on day 18 of admission presenting with Acute hypoxemic respiratory failure (CMS/HCC).    Subjective   Today during patient interview, Ms. Wilson reports that she is overall feeling better. Her oxygen requirements have improved and she looks forward to discussing her options with the transplant team. We plan to reconvene following this discussion once she has more of the information she needs to better make long term decisions.     She reports that she current goal is to get active again and go back to work. She reports that prior to recent events she was a very active individual working towards this is important to her.        Objective     Physical Exam  Constitutional:       Appearance: Normal appearance.   HENT:      Head: Normocephalic and atraumatic.      Mouth/Throat:      Mouth: Mucous membranes are moist.   Eyes:      Extraocular Movements: Extraocular movements intact.      Pupils: Pupils are equal, round, and reactive to light.   Cardiovascular:      Rate and Rhythm: Normal rate and regular rhythm.   Pulmonary:      Effort: Pulmonary effort is normal. No respiratory distress.   Abdominal:      Palpations: Abdomen is soft.   Neurological:      General: No focal deficit present.      Mental Status: She is alert and oriented to person, place, and time.   Psychiatric:         Mood and Affect: Mood normal.         Behavior: Behavior normal.         Last Recorded Vitals  Blood pressure 153/79, pulse 68, temperature 36.4 °C (97.5 °F), temperature source Temporal, resp. rate 23, height 1.6 m (5' 2.99\"), weight 63.2 kg (139 lb 5.3 oz), SpO2 90 %.  Intake/Output last 3 Shifts:  I/O last 3 completed shifts:  In: 2060 (30.1 mL/kg) [NG/GT:2060]  Out: 1650 (24.1 mL/kg) [Urine:1650 (0.7 mL/kg/hr)]  Dosing Weight: 68.4 kg     Relevant Results        Assessment/Plan   Principal Problem:    Acute hypoxemic respiratory failure (CMS/HCC)  Active Problems:    COVID-19    Pneumonia of both " lower lobes due to infectious organism  Macarena Wilson is a 68 year old female with a PMH of CKD stage 3, HTN, and Rosasea who was transferred to [facility] MICU for acute hypoxic respiratory failure and ARDS 2/2 Covid-19, now found to have severe COVID-19 pneumonitis requiring HFNC. Imaging concerning for pulmonary fibrosis and organizing PNA. Pneumomediastinum Resolved.    #Anxiety  :: Previously on Atarax 10 mg PRN  - We recommend switching to Seroquel 25 Scheduled at bedtime and Seroquel 12.5 PRN for daytime symptoms.       Given the patients age, this recommendation puts the patient at reduced risk for anticholinergic side effects. If her needs are not addressed by this regimen then Atarax may be considered.         Ashok Fairchild MD  Internal Medicine PGY-I

## 2023-11-27 NOTE — SIGNIFICANT EVENT
RAPID RESPONSE RN NOTE:       11/27/23 1630   Onset Documentation   Rapid Response Initiated By Radar auto page   Location/Room [facility]  ([facility])   Pager Time 1626   Arrival Time 1640   Event End Time 1642   Level II Called No   Primary Reason for Call Radar auto page  (RADAR score = 6)     RADAR auto page received. Temp 36.4, HR 68, RR 23, /79, pulse ox 90%. On my arrival, pt in bed in no acute distress.  Pulse ox 94%, RR 20-22.  Primary team NP Galo aware.  Pulse ox levels continue to wax and wane.  Please page rapid response for any deterioration or assistance needed.

## 2023-11-28 NOTE — PROGRESS NOTES
Speech-Language Pathology    Inpatient  Speech-Language Pathology Treatment     Patient Name: Macarena Wilson  MRN: [id number]  Today's Date: 11/28/2023            Current Problem:   1. Acute hypoxemic respiratory failure (CMS/HCC)  Lower extremity venous duplex bilateral    Lower extremity venous duplex bilateral      2. Acute respiratory failure with hypoxemia (CMS/HCC)  Transthoracic Echo (TTE) Complete    Transthoracic Echo (TTE) Complete    Lower extremity venous duplex bilateral    Lower extremity venous duplex bilateral      3. COVID-19  CANCELED: Lower extremity venous duplex bilateral    CANCELED: Lower extremity venous duplex bilateral    CANCELED: Lower extremity venous duplex bilateral    CANCELED: Lower extremity venous duplex bilateral      4. Localized edema  Lower extremity venous duplex bilateral            SLP Assessment:   Spoke with NP, patient and RT regarding dysphagia POC. Pt currently on high flow NC 30L @ 60% and is now transitioning to reg NC 10L. Speech Language Pathologist to follow up this afternoon for a Fiberoptic Endoscopic Exam of the Swallow to determine PO safety and efficiency.           Plan:  SLP Frequency: 1x per week  SLP - OK to Discharge: Yes  Fiberoptic Endoscopic Exam of the Swallow     Subjective   Pt alert and cooperative, oriented x4.       Consultations/Referrals/Coordination of Services: n/a

## 2023-11-28 NOTE — PROGRESS NOTES
"Macarena Wilson is a 68 y.o. female on day 19 of admission presenting with Acute hypoxemic respiratory failure (CMS/HCC).    Subjective   Airvo 30L/65% this morning.  Weaned to 10 L HF    Objective   Physical Exam:  Constitutional: pt in NAD, alert and cooperative  Eyes: PERRL, EOMI, no icterus   ENMT: mucous membranes moist, no apparent injury, no lesions seen  Head/Neck: Neck supple, no apparent injury  Respiratory/Thorax: Lungs with fine bilateral crackles, non-labored breathing, Cardiovascular: Regular, rate and rhythm, no murmurs  Gastrointestinal: Nondistended, soft, non-tender, BS present x 4, Corpak tube in place  : external urine catheter  Musculoskeletal: ROM intact, no joint swelling, normal strength  Extremities: normal extremities, no edema, contusions or wounds  Neurological: alert and oriented x 3, speech clear, follows commands appropriately, without focal deficits  Skin: Warm and dry,multiple BUE bruising     Last Recorded Vitals  Blood pressure 128/56, pulse 63, temperature 36.2 °C (97.2 °F), temperature source Temporal, resp. rate 15, height 1.6 m (5' 2.99\"), weight 63.2 kg (139 lb 5.3 oz), SpO2 97 %.    Intake/Output last 3 Shifts:  I/O last 3 completed shifts:  In: 1840 (26.9 mL/kg) [NG/GT:1840]  Out: 1700 (24.9 mL/kg) [Urine:1700 (0.7 mL/kg/hr)]  Dosing Weight: 68.4 kg     Scheduled medications  albuterol, 2.5 mg, nebulization, BID  apixaban, 5 mg, nasogastric tube, BID  ciprofloxacin, 500 mg, nasogastric tube, q12h KARL  esomeprazole, 40 mg, nasoduodenal tube, Daily  fluticasone, 1 spray, Each Nostril, BID  insulin lispro, 0-10 Units, subcutaneous, q4h  lidocaine, 1 patch, transdermal, Daily  melatonin, 5 mg, oral, Daily  metoprolol tartrate, 50 mg, nasogastric tube, BID  nystatin, 4 mL, Swish & Spit, BID  predniSONE, 80 mg, oral, BID  QUEtiapine, 25 mg, oral, Nightly  sodium chloride, 3 mL, nebulization, BID  sulfamethoxazole-trimethoprim, 160 mg of trimethoprim, nasogastric tube, q24h " Gastroenterology KARL  thiamine, 100 mg, oral, Daily  traZODone, 25 mg, nasogastric tube, Nightly      PRN medications  PRN medications: acetaminophen **OR** [DISCONTINUED] acetaminophen **OR** [DISCONTINUED] acetaminophen, dextrose 10 % in water (D10W), dextrose, docusate sodium, ipratropium-albuteroL, loperamide, ondansetron ODT **OR** [DISCONTINUED] ondansetron, oxyCODONE, oxygen, polyethylene glycol, QUEtiapine, sodium chloride     Relevant Results  Results for orders placed or performed during the hospital encounter of 11/09/23 (from the past 24 hour(s))   POCT GLUCOSE   Result Value Ref Range    POCT Glucose 191 (H) 74 - 99 mg/dL   POCT GLUCOSE   Result Value Ref Range    POCT Glucose 175 (H) 74 - 99 mg/dL   POCT GLUCOSE   Result Value Ref Range    POCT Glucose 254 (H) 74 - 99 mg/dL   POCT GLUCOSE   Result Value Ref Range    POCT Glucose 223 (H) 74 - 99 mg/dL   POCT GLUCOSE   Result Value Ref Range    POCT Glucose 227 (H) 74 - 99 mg/dL   POCT GLUCOSE   Result Value Ref Range    POCT Glucose 153 (H) 74 - 99 mg/dL   CBC and Auto Differential   Result Value Ref Range    WBC 22.4 (H) 4.4 - 11.3 x10*3/uL    nRBC 0.0 0.0 - 0.0 /100 WBCs    RBC 3.28 (L) 4.00 - 5.20 x10*6/uL    Hemoglobin 9.7 (L) 12.0 - 16.0 g/dL    Hematocrit 28.6 (L) 36.0 - 46.0 %    MCV 87 80 - 100 fL    MCH 29.6 26.0 - 34.0 pg    MCHC 33.9 32.0 - 36.0 g/dL    RDW 14.7 (H) 11.5 - 14.5 %    Platelets 179 150 - 450 x10*3/uL    Neutrophils % 93.0 40.0 - 80.0 %    Immature Granulocytes %, Automated 3.5 (H) 0.0 - 0.9 %    Lymphocytes % 0.8 13.0 - 44.0 %    Monocytes % 2.5 2.0 - 10.0 %    Eosinophils % 0.0 0.0 - 6.0 %    Basophils % 0.2 0.0 - 2.0 %    Neutrophils Absolute 20.79 (H) 1.20 - 7.70 x10*3/uL    Immature Granulocytes Absolute, Automated 0.78 (H) 0.00 - 0.70 x10*3/uL    Lymphocytes Absolute 0.18 (L) 1.20 - 4.80 x10*3/uL    Monocytes Absolute 0.55 0.10 - 1.00 x10*3/uL    Eosinophils Absolute 0.00 0.00 - 0.70 x10*3/uL    Basophils Absolute 0.05 0.00 - 0.10 x10*3/uL    Renal function panel   Result Value Ref Range    Glucose 129 (H) 74 - 99 mg/dL    Sodium 137 136 - 145 mmol/L    Potassium 4.9 3.5 - 5.3 mmol/L    Chloride 100 98 - 107 mmol/L    Bicarbonate 26 21 - 32 mmol/L    Anion Gap 16 10 - 20 mmol/L    Urea Nitrogen 83 (H) 6 - 23 mg/dL    Creatinine 1.04 0.50 - 1.05 mg/dL    eGFR 58 (L) >60 mL/min/1.73m*2    Calcium 8.1 (L) 8.6 - 10.6 mg/dL    Phosphorus 3.5 2.5 - 4.9 mg/dL    Albumin 2.5 (L) 3.4 - 5.0 g/dL   Magnesium   Result Value Ref Range    Magnesium 2.11 1.60 - 2.40 mg/dL   POCT GLUCOSE   Result Value Ref Range    POCT Glucose 249 (H) 74 - 99 mg/dL      Imaging:  CT angio chest for pulmonary embolism  Result Date: 11/26/2023  1. No evidence of pulmonary embolism.   2. Multifocal consolidative opacities more in the lower lobes, slightly improved compared to prior study. Findings remain concerning for multifocal infectious process.   3. Interlobular septal thickening, architectural distortion and mild bronchiectasis scattered throughout the lungs, slightly worse and concerning for fibrotic like changes, likely sequela of infection. Cannot exclude component of interstitial pulmonary edema.   4.  Other findings as described above.         XR chest 1 view  Result Date: 11/24/2023   1.  Continued coarse perihilar airspace opacities consistent with history of COVID-19 pneumonia. Basilar lucencies and correlate with a component of aerated lung versus developing fibrotic changes.         US renal complete  Result Date: 11/16/2023  1. Atrophic right kidney.   2. Bilateral nonobstructing nephrolithiasis measuring up to 1.5 cm on the right and 0.9 cm on the left. No hydronephrosis.   3. Bilateral renal cysts.        XR abdomen 1 view  Result Date: 11/16/2023  1.  Nonspecific KUB. Enterogastric tube in the expected position of the stomach with tip at the pylorus.        Lower extremity venous duplex bilateral  Result Date: 11/13/2023  Right Lower Venous: There is acute occlusive  deep vein thrombosis visualized in the posterior tibial vein. The remainder of the right leg is negative for deep vein thrombosis.   Left Lower Venous: There is acute occlusive deep vein thrombosis visualized in the soleal vein. The remainder of the left leg is negative for deep vein thrombosis.      Transthoracic Echo (TTE) Complete  Result Date: 11/9/2023  1. Left ventricular systolic function is hyperdynamic with a 70-75% estimated ejection fraction.    2. Spectral Doppler shows an impaired relaxation pattern of left ventricular diastolic filling.      CT chest wo IV contrast  Result Date: 11/7/2023  Persistent extensive ground-glass densities in the lung apices and anterior aspect of the bilateral upper lobes with progression of previously identified ground-glass densities in the more confluent airspace opacities involving the bilateral lower lobes and posterior aspects of the bilateral upper lobes. Findings suggest atypical infection. Correlation with COVID status is recommended.          Malnutrition Diagnosis Status: New  Malnutrition Diagnosis: Severe malnutrition related to acute disease or injury  As Evidenced by: 12% weight loss from normal along with moderate to severe muscle and fat loss on physical exam in setting of a decrease in PO intake/appetite after being diagnosed with COVID  I agree with the dietitian's malnutrition diagnosis.      Assessment/Plan   Principal Problem:    Acute hypoxemic respiratory failure (CMS/HCC)  Active Problems:    Stage 4 chronic kidney disease (CMS/HCC)    COVID-19    Pneumonia of both lower lobes due to infectious organism    Pseudomonal bacteremia    A-fib (CMS/HCC)    Mrs Wilson is a 67 y/o  female with a PMH of CKD III and HTN admitted to the MICU from OSH with AHRF 2/2 COVID pneumonia (positive on 10/13) complicated by ARDS and pulmonary fibrosis with resulting organizing PNA. Currently on high flow O2 via Airvo  30L/80%      Neuro: hx anxiety   -A&Ox3  - PRN  tylenol for Pain or PRN Oxy for pain or air hunger.    -Lidocaine patch for back discomfort  - Continue Trazodone 25mg HS and Melatonin HS   -changed PRN Atarax to Seroquel 25 mg HS and daily PRN 12.5 mg (as per pal care recs)  - Palliative following, recs appreciated   -Monitor Qtc - most recent at .380 on 11/28  - PT/OT/OOB as tolerates         CV: Hx of HTN, Afib, Intermittent SVT  --Stable HR and BP  - Continue Metoprolol Tartrate 25mg Q6H, hold for SBP <100, HR <50  - Holding Home Losartan 100mg in the setting of recurrent HUNG on CKD   -  on 11/20, down to 99 11/23     Pulm: AHRF 2/2 COVID PNA (10/23) c/b ARDS, GGO c/f pulmonary fibrosis - organizing PNA, likely 2/2 COVID PNA, Pneumomediastinum, resolved   - wean O2 for goal Pox >92%, on Airvo 30L/65%, over night, weaned to 10 L HF NC on 11/28 and will continue weaning  , stable sats at rest, but frequent desat with minimal activity, turns, which resolve with rest  - Pulmonary fibrosis 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   - Fungitell, aspergillus galactomannan negative, blasto and histo Ag neg  -CT PE from 11/27 negative for PE  - starting Prednisone taper, changed to 80 mg  BID starting PM of 11/26   -Daily DS bactrim for PCP prophy  - Fluticasone 1 spray bid   - PRN IV Lasix-- 40 mg IV ,most recent dose 11/23, held since given HUNG , now creat  improving  - Avoid further CPAP attempts d/t pneumomediastinum which occurred after trying patient on CPAP  -msg sent to lung transplant team (Dr Jacqui Valdez) on 11/28  to see if will consider for transplant evaluation      Renal:  Oliguric HUNG on CKD III, with azotemia improving   - Cr peaked at 3.97. at 1.04 today  - Baseline of 1.6. Likely prerenal. Renal US with non-obstructing stones BL, no hydronephrosis, cysts BL.  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 (followed by urology)     FEN/GI:  Dysphagia, resolved,  diarrhea resolving  -seen  by SLP today, passed FEES (11/28) for regular diet  -will keep feeding tube for now but holding today and monitor intake  -consult nutrition  - TF patient has been receiving is Neprho at 35 ml/hr   - Stool panel negative, Cdiff PCR negative on 11/25  - continue Imodium PRN  - PRN miralax and docusate senna for constipation - holding     ENDO:  Steroid induced hyperglycemia  - A1C 6.3, pre-DM  - Continue with MDSSI and accu checks  -hypoglycemia protocol     Heme:  anemia, DVT     -CT PE from 11/26 negative for PE  -HGB stable , no s/sx bleeding  - 11/13: BUE U/S: +R PT DVT, +L Soleal DVT  - Continue Apixaban 5 mg BID -  - CBC daily and prn     MSK/Skin: Eczema/Rosasea, Sacral pressure injury, stage 3  - On home dupixent  - Seen by wound care  - For sacral wound: Clean buttocks with cleansing cloths and apply Triad to open skin BID and as needed. Leave RIZWANA      ID: Covid PNA (received Decadron, Remdesivir, Tocilizumab at OSH) Neutrophilic leukocytosis, trending down  - WBC 14.4 today from 18.1   MICRO: 11/21 BC x2 3/4 Pseudomonas Aeruginosa, Susceptible to Zosyn and Cipro, Repeat UC and Bcx2 from 11/25 negative  - 10/26 & 11/10: stool studies, including C. Diff neg, most recent 11/25,  11/9 Step pneumonaie Ag, Legionella Ag neg, 11/9 Flu A/B, RSV neg   - Zosyn 11/22- 11/24; Ciprofloxacin 500 mg BID for 14 day course (11/24 - tentative stop date of 12/5)     Lines: Joshua DONOVAN     Code Status: DNR/DNI  NOK:  Primary Emergency Contact: Mathew Wilson, Home Phone: [phone]     Dispo:  Continue SDU care for hypoxic resp failure with high FiO2 needs.  PT recs - SNF when medically ready.  Family given list of facilities to review      D/w  Dr. Krishnan     I spent >45 minutes in the professional and overall care of this patient.    Alba Rosenthal, APRN-CNP

## 2023-11-28 NOTE — PROGRESS NOTES
Overnight Events:    Overall improved from respiratory standpoint this morning, down to 10L HFNC and tolerating well. Pt reports no new symptoms and is comfortable in bed today. She was able to sleep more last night with fewer interruptions and is appreciative of that. She overall feels optimistic about her ongoing improvement.      Objective Data:  Last Recorded Vitals:  Vitals:    11/28/23 0902 11/28/23 0943 11/28/23 0952 11/28/23 1000   BP: 139/64   129/64   BP Location:    Right arm   Patient Position:    Lying   Pulse: 69   66   Resp:    21   Temp:  35.7 °C (96.3 °F)     TempSrc:       SpO2:   97%    Weight:       Height:           Last Labs:  CBC - 11/27/2023:  5:31 AM  21.5 9.2 172    27.8      CMP - 11/27/2023:  5:31 AM  8.2 5.4 27 --- 0.6   3.5 2.5 63 71      PTT - 11/13/2023:  4:52 PM  1.0   11.4 26     TROPHS   Date/Time Value Ref Range Status   11/09/2023 05:45 AM 5 0 - 34 ng/L Final     BNP   Date/Time Value Ref Range Status   11/23/2023 03:48 PM 99 0 - 99 pg/mL Final   11/20/2023 05:01  0 - 99 pg/mL Final     HGBA1C   Date/Time Value Ref Range Status   10/24/2023 06:16 AM 6.3 See below % Final      Last I/O:  I/O last 3 completed shifts:  In: 1865 (27.3 mL/kg) [NG/GT:1865]  Out: 1550 (22.7 mL/kg) [Urine:1550 (0.6 mL/kg/hr)]  Dosing Weight: 68.4 kg     Inpatient Medications:  Scheduled medications   Medication Dose Route Frequency    apixaban  5 mg nasogastric tube BID    ciprofloxacin  500 mg nasogastric tube q12h KARL    esomeprazole  40 mg nasoduodenal tube Daily    fluticasone  1 spray Each Nostril BID    insulin lispro  0-10 Units subcutaneous q4h    lidocaine  1 patch transdermal Daily    melatonin  5 mg oral Daily    metoprolol tartrate  50 mg nasogastric tube BID    nystatin  4 mL Swish & Spit BID    predniSONE  80 mg oral BID    QUEtiapine  25 mg oral Nightly    sulfamethoxazole-trimethoprim  160 mg of trimethoprim nasogastric tube q24h KARL    thiamine  100 mg oral Daily    traZODone  25  mg nasogastric tube Nightly     PRN medications   Medication    acetaminophen    dextrose 10 % in water (D10W)    dextrose    docusate sodium    ipratropium-albuteroL    loperamide    ondansetron ODT    oxyCODONE    oxygen    polyethylene glycol    QUEtiapine    sodium chloride     Continuous Medications   Medication Dose Last Rate       Physical Exam:  Physical Exam  Constitutional:       General: She is not in acute distress.     Appearance: Normal appearance. She is ill-appearing.   Cardiovascular:      Rate and Rhythm: Normal rate and regular rhythm.      Pulses: Normal pulses.      Heart sounds: Normal heart sounds. No murmur heard.     No friction rub. No gallop.   Pulmonary:      Effort: Pulmonary effort is normal. No respiratory distress.      Breath sounds: Normal breath sounds. No wheezing.   Abdominal:      General: Abdomen is flat. Bowel sounds are normal. There is no distension.      Palpations: Abdomen is soft.      Tenderness: There is no abdominal tenderness.   Musculoskeletal:         General: No tenderness.      Right lower leg: No edema.      Left lower leg: No edema.   Skin:     General: Skin is warm and dry.      Capillary Refill: Capillary refill takes less than 2 seconds.      Coloration: Skin is not jaundiced.   Neurological:      General: No focal deficit present.      Mental Status: She is alert and oriented to person, place, and time.   Psychiatric:         Mood and Affect: Mood normal.            Assessment/Plan   Principal Problem:    Acute hypoxemic respiratory failure (CMS/HCC)  Active Problems:    COVID-19    Pneumonia of both lower lobes due to infectious organism  Macarena Wilson is a 68 year old female with a PMH of CKD stage 3, HTN, and Rosasea who was transferred to [facility] MICU for acute hypoxic respiratory failure and ARDS 2/2 Covid-19, now found to have severe COVID-19 pneumonitis requiring HFNC. Imaging concerning for pulmonary fibrosis and organizing PNA. Pneumomediastinum  Resolved.     #Anxiety  - Symptoms improved with starting Seroquel at bedtime and PRN dosing during daytime.   - Monitor QTc while on seroquel. Limit anticholinergic medications and side-effects.   - Continue with the current regimen. Limit sleep interruptions. Day/night cycle reorientation.    Palliative care will continue to follow along. Pt staffed with Dr. Jacobs.       Peripheral IV 11/08/23 20 G Left;Anterior Hand (Active)   Site Assessment Clean;Dry;Intact 11/28/23 0900   Dressing Type Transparent 11/28/23 0900   Line Status No blood return;Flushed 11/28/23 0900   Dressing Status Dry;Clean;Occlusive 11/28/23 0900   Number of days: 20       Peripheral IV 11/09/23 20 G Right;Anterior Forearm (Active)   Site Assessment Clean;Dry;Intact 11/28/23 0900   Dressing Type Transparent 11/28/23 0900   Line Status Flushed;No blood return 11/28/23 0900   Dressing Status Clean;Dry;Occlusive 11/28/23 0900   Number of days: 19       NG/OG/Feeding Tube Right nostril (Active)   Tube Status Continuous tube feeding 11/28/23 0900   Site Assessment Clean;Dry;Intact 11/28/23 0900   Tube Securement Nasal bridle 11/28/23 0900   Number of days: 12       Code Status:  DNR and No Intubation    I spent 30 minutes in the professional and overall care of this patient.    Sapna Caballero DO

## 2023-11-28 NOTE — PROCEDURES
Speech-Language Pathology    SLP Adult FEES Evaluation  Patient Name: Macarena Wilson  MRN: [id number]  Today's Date: 11/28/2023   Time Calculation  Start Time: 1430  Stop Time: 1450  Time Calculation (min): 20 min       Recommendations:   Recommendations: Continue skilled dysphagia services  Referrals : Not recommended  Solid Consistency: Regular (IDDSI Level 7)  Liquid Consistency: Thin (IDDSI Level 0)  Medication Administration: Take one pill at a time  Supervision: Independent  Compensatory Strategy Recommendations: Slow rate, Small bites, Other (Comment) (Pt may be at 45 degrees)      Assessment/Impression:  Mild pharyngeal dysphagia. Pt with intact oral prepping skills across all consistencies. Adequate white out duration; followed by no evidence of penetration or aspiration across all consistencies. Following the swallow min amounts of pharyngeal stasis with all consistencies at the valleculae space due to reduced base of tongue retraction. Pt able to clear with reflexive dry swallow.       Prognosis:  Prognosis for Safe Diet Advancement: Excellent      Plan:  Inpatient/Swing Bed or Outpatient: Inpatient  Treatment/Interventions: Pharyngeal exercises  SLP Plan: Skilled SLP  SLP Frequency: 1x per week  Solid Consistency: Regular (IDDSI Level 7)  Liquid Consistency: Thin (IDDSI Level 0)  Discussed POC: Patient  Discussed Risks/Benefits: Yes  Patient/Caregiver Agreeable: Yes  SLP - OK to Discharge: Yes    Baseline Assessment:  Respiratory Status: Other (Comment) (Highflow nasal cannula)  Hearing: Exceptions to WFL  Baseline Vocal Quality: Weak  Volitional Cough: Weak    Objective     Oral/Motor Assessment:  Dentition: Adequate/Natural  Oral Motor: Within Functional Limits  Labial Agility: Within Functional Limits  Labial Strength: Within Functional Limits  Labial Symmetry: Within Functional Limits  Lingual Agility: Within Functional Limits  Lingual ROM: Within Functional Limits  Lingual Strength: Within  Functional Limits  Breath Support: Adequate for speech  Hearing: Exceptions to WFL    FEES:  FEES Verbal Consent: Fiberoptic endoscopic evaluation of the swallow exam was completed once informed verbal consent was obtained - Yes  Scope Passed: Through left nare (Significant dried thick bloody crusting removed from nasal passage with saline flush.)  Patient Tolerated Procedure: Well  FEES Oral Phase  Volitional Hold With Thin Liquid Bolus:: Yes, intact  Anterior Oral Spillage (Lip Seal):: No  Oral Residues After The Swallow:: No  FEES Anatomy: Structural Appearances  Nasal Passage: Significant dried thick bloody crusting removed from nasal passage with saline flush.  Velopharryngeal Port Closure: Complete  Palate: Intact  Valleculae: Unremarkable  Arytenoids: Unremarkable  Base of Tongue: Unremarkable  Epiglottis: Unremarkable  Pyriform Sinuses: Unremarkable  Vocal Fold Appearance: WFL   Laryngeal Function:  Adduction: Full  Abduction: Full  Arytenoid Movement: Symmetrical  Vocal Quality: Clear  Secretion Management  Secretion Management:  (None)  Aspiration of Secretions: No    Consistencies trialed: Thin liquid via tsp and straw, puree via tsp, and regular solids.    Oral Phase:  Oral Phase: Within Functional Limits       Pharyngeal Phase:  Pharyngeal Phase: Impaired  Pharyngeal Phase  Reduced Tongue Base Retraction: All Consistencies Trialed     [place]:  Consistencies/Score: Thin: 1 - Material does not enter airway  Consistencies/Score: Pudding/Puree: 1 - Material does not enter airway  Consistencies/Score: Solid: 1 - Material does not enter airway      Inpatient Education:  Extensive education provided to patient re: current swallow function, recommendations/results and dysphagia POC.     Goals:   Patient will tolerate the least restrictive diet without overt s/s of oropharyngeal dysphagia 100% of the time.   Pt will complete 30-40 effortful swallows per session.

## 2023-11-29 NOTE — CARE PLAN
The patient's goals for the shift include      The clinical goals for the shift include patient will maintain pox > 90%      Problem: Nutrition  Goal: Less than 5 days NPO/clear liquids  Outcome: Progressing  Goal: Oral intake greater than 50%  Outcome: Progressing  Goal: Oral intake greater 75%  Outcome: Progressing  Goal: Consume prescribed supplement  Outcome: Progressing  Goal: Adequate PO fluid intake  Outcome: Progressing  Goal: Nutrition support goals are met within 48 hrs  Outcome: Progressing  Goal: Nutrition support is meeting 75% of nutrient needs  Outcome: Progressing  Goal: Tube feed tolerance  Outcome: Progressing  Goal: BG  mg/dL  Outcome: Progressing  Goal: Lab values WNL  Outcome: Progressing  Goal: Electrolytes WNL  Outcome: Progressing  Goal: Promote healing  Outcome: Progressing  Goal: Maintain stable weight  Outcome: Progressing  Goal: Reduce weight from edema/fluid  Outcome: Progressing  Goal: Gradual weight gain  Outcome: Progressing  Goal: Improve ostomy output  Outcome: Progressing     Problem: Skin  Goal: Decreased wound size/increased tissue granulation at next dressing change  Outcome: Progressing  Goal: Participates in plan/prevention/treatment measures  Outcome: Progressing  Goal: Prevent/manage excess moisture  Outcome: Progressing  Goal: Prevent/minimize sheer/friction injuries  Outcome: Progressing  Goal: Promote skin healing  Outcome: Progressing     Problem: Fall/Injury  Goal: Verbalize understanding of personal risk factors for fall in the hospital  Outcome: Progressing  Goal: Verbalize understanding of risk factor reduction measures to prevent injury from fall in the home  Outcome: Progressing  Goal: Use assistive devices by end of the shift  Outcome: Progressing     Problem: Pain - Adult  Goal: Verbalizes/displays adequate comfort level or baseline comfort level  Outcome: Progressing     Problem: Discharge Planning  Goal: Discharge to home or other facility with  appropriate resources  Outcome: Progressing     Problem: Chronic Conditions and Co-morbidities  Goal: Patient's chronic conditions and co-morbidity symptoms are monitored and maintained or improved  Outcome: Progressing     Problem: Respiratory  Goal: Clear secretions with interventions this shift  Outcome: Progressing  Goal: Minimize anxiety/maximize coping throughout shift  Outcome: Progressing  Goal: Minimal/no exertional discomfort or dyspnea this shift  Outcome: Progressing  Goal: No signs of respiratory distress (eg. Use of accessory muscles. Peds grunting)  Outcome: Progressing  Goal: Patent airway maintained this shift  Outcome: Progressing  Goal: Tolerate mechanical ventilation evidenced by VS/agitation level this shift  Outcome: Progressing  Goal: Tolerate pulmonary toileting this shift  Outcome: Progressing  Goal: Verbalize decreased shortness of breath this shift  Outcome: Progressing  Goal: Wean oxygen to maintain O2 saturation per order/standard this shift  Outcome: Progressing  Goal: Increase self care and/or family involvement in next 24 hours  Outcome: Progressing

## 2023-11-29 NOTE — PROGRESS NOTES
"Occupational Therapy    Occupational Therapy Treatment    Name: Macarena Wilson  MRN: [id number]  : 1955  Date: 23  Time Calculation  Start Time: 1040  Stop Time: 1122  Time Calculation (min): 42 min    Assessment:  End of Session Communication: Bedside nurse (NP)  End of Session Patient Position: Bed, 3 rail up, Alarm off, not on at start of session  Plan:  Treatment Interventions: ADL retraining, Functional transfer training, UE strengthening/ROM, Endurance training, Patient/family training, Equipment evaluation/education, Compensatory technique education  OT Frequency: 3 times per week  OT Discharge Recommendations: Moderate intensity level of continued care  OT - OK to Discharge: Yes    Subjective   Previous Visit Info:  OT Last Visit  OT Received On: 23  General:  General  Family/Caregiver Present: No  Co-Treatment: PT  Co-Treatment Reason: to maximize patient safety, mobility and activity tolerance 2/2 patient with tenuous respiratory status, x2 skilled assist for mobility  Prior to Session Communication: Bedside nurse  Patient Position Received: Bed, 3 rail up, Alarm off, not on at start of session  General Comment: Patient supine in bed on arrival, agreeable to OT however initially hesistant to sit EOB 2/2 \"it crunches me and I can't breathe\", encouragement provided and patient agreeable; encouragement while EOB to participate in dynamic activities. tele, dobhoff, external catheter, 10L high flow NC  Precautions:  Medical Precautions: Fall precautions, Oxygen therapy device and L/min  Vitals:  Vital Signs  SpO2:  (pre 92%; while EOB patient with desat to mid-50s SpO2 on 10L NC, O2 increased to 15L high flow NC and 25L non-rebreather by RN with increased in SpO2 to >90% after ~1 minute; end of session patient on 10L high flow NC and SpO2 87%; RN and NP aware)  Pain Assessment:  Pain Assessment  Pain Assessment: 0-10  Pain Score: 0 - No pain     Objective   Activities of Daily Living: " Grooming  Grooming Comments: mod A for using shampoo cap and min A for using comb while sitting EOB, encouragement for patient to actively complete as much of activities as possible for herself; required physical support under elbows to faciliate reaching    LE Dressing  LE Dressing: Yes  Sock Level of Assistance: Maximum assistance  LE Dressing Where Assessed: Edge of bed  LE Dressing Comments: patient required physical assistance to bring each LE up over opposite knee and physical assist for dynamic balance to allow for reaching to socks    Functional Standing Tolerance:     Bed Mobility/Transfers: Bed Mobility  Bed Mobility: Yes  Bed Mobility 1  Bed Mobility 1: Supine to sitting, Sitting to supine  Level of Assistance 1: Maximum assistance, Moderate verbal cues  Bed Mobility Comments 1: x2 assist; HOB elevated + draw sheet  Bed Mobility 2  Bed Mobility  2: Rolling left  Level of Assistance 2: Maximum assistance, Moderate verbal cues  IADL's:   Communication:     Splinting:     Therapy/Activity: Therapeutic Exercise  Therapeutic Exercise Activity 1: Educated patient on importance of completing UE ther ex for digit/elbow/shoulder flexion/extension for x10 reps multiple times/day for preventing further deconditioning.    Therapeutic Activity  Therapeutic Activity Performed: Yes  Therapeutic Activity 1: Patient sat EOB x20 minutes with fluctuating assist. Patient required mod-max A for balance majority of the time in sitting, achieved brief moments of CGA with increased cues and optimal positioning. Patient with posterior lean and resistant to leaning anteriorly at times 2/2 stating leaning anteriorly made it harder for him to breathe. Patient completed punches anteriorly alternating UEs x5-6 reps in to pillow with mod-max A for balance; max A for balance while participating in dynamic grooming tasks  Outcome Measures:  [facility] Daily Activity  Putting on and taking off regular lower body clothing: A lot  Bathing  (including washing, rinsing, drying): A lot  Putting on and taking off regular upper body clothing: A lot  Toileting, which includes using toilet, bedpan or urinal: A lot  Taking care of personal grooming such as brushing teeth: A lot  Eating Meals: A little  Daily Activity - Total Score: 13     and E = Exercise and Early Mobility  Current Activity: Sitting at edge of bed    Education Documentation  No documentation found.  Education Comments  No comments found.      Goals:  Encounter Problems       Encounter Problems (Active)       ADLs       Patient with complete upper body dressing with independent level of assistance donning and doffing all UE clothes. (Progressing)       Start:  11/17/23    Expected End:  12/01/23            Patient with complete lower body dressing with minimal assist  level of assistance donning and doffing all LE clothes  with PRN adaptive equipment. (Progressing)       Start:  11/17/23    Expected End:  12/01/23            Patient will complete daily grooming tasks with stand by assist level of assistance and PRN adaptive equipment while in sitting. (Progressing)       Start:  11/17/23    Expected End:  12/01/23            Patient will complete toileting including hygiene clothing management/hygiene with minimal assist  level of assistance. (Progressing)       Start:  11/17/23    Expected End:  12/01/23               BALANCE       Pt will maintain dynamic sitting balance during ADL task with contact guard assist level of assistance in order to demonstrate decreased risk of falling and improved postural control. (Progressing)       Start:  11/17/23    Expected End:  12/01/23               COGNITION/SAFETY       Patient will verbalize and demonstrate >2 relaxation and breathing techniques during sessions with independence. (Progressing)       Start:  11/17/23    Expected End:  12/01/23               EXERCISE/STRENGTHENING       Patient will complete BUE exercises in order to improve activity  tolerance for ADL performance.  (Progressing)       Start:  11/17/23    Expected End:  12/01/23               TRANSFERS       Patient will perform bed mobility minimal assist  level of assistance in order to improve safety and independence with mobility (Not Progressing)       Start:  11/17/23    Expected End:  12/01/23            Patient will complete functional transfers with least restrictive device with minimal assist  level of assistance. (Not Progressing)       Start:  11/17/23    Expected End:  12/01/23              Alicia Kilgore OTR/L

## 2023-11-29 NOTE — PROGRESS NOTES
Speech-Language Pathology    Inpatient  Speech-Language Pathology Treatment     Patient Name: Macarena Wilson  MRN: [id number]  Today's Date: 11/29/2023  Time Calculation  Start Time: 1330  Stop Time: 1415  Time Calculation (min): 45 min         Current Problem: SLP following up with diet tolerance post FEES exam completed on 11/28.      Recommendations:  Solid Consistency: Regular (IDDSI Level 7)  Liquid Consistency: Thin (IDDSI Level 0)  Medication Administration: Take one pill at a time  Supervision: Independent  Compensatory Strategy Recommendations: Slow rate, Small bites, Other (Comment) (Pt may be at 45 degrees)    Assessment:  Pt received in bed, alert and oriented. Pt tolerating regular textures and thin liquids with frequent small meals. No overt s/s of oropharyngeal dysphagia have been observed. Pt introduced to base of tongue or pharyngeal exercise; effortful swallow to assist with recovery of deconditioned swallow mechanics. Pt able to complete 3 sets of 10 during session with good accuracy. O2 support remains stable with no significant changes to vitals. Speech Language Pathologist to sign off at this time. Re-consult if pt presents with any decline in swallow function with increased O2 support.       Plan:  Solid Consistency: Regular (IDDSI Level 7)  Liquid Consistency: Thin (IDDSI Level 0)  Discussed POC: Patient  Discussed Risks/Benefits: Yes  Patient/Caregiver Agreeable: Yes  SLP - OK to Discharge: Yes    Extensive education provided to patient re: current swallow function, recommendations/results and dysphagia POC.

## 2023-11-29 NOTE — PROGRESS NOTES
"Macarena Wilson is a 68 y.o. female on day 20 of admission presenting with Acute hypoxemic respiratory failure (CMS/HCC).    Subjective     Denies cough, SOB, CP, palpations, abdominal pain, and N/V    No acute events overnight.  Patient resting comfortably on 10L HFNC.  While working with physical therapy patient with Oxygen desaturation to 54% on 15L HFNC when sitting at edge of bed.  NRB placed on patient and patient laid back in better and slowly recovered.  Weaned back to 10L HFNC.  Patient extremely deconditioned, unable to lift B/L LE and keep head raised while at edge of bed.     Objective   Physical Exam:  Constitutional: pt in NAD, alert and cooperative  Eyes: PERRL, EOMI, no icterus   ENMT: mucous membranes moist  Head/Neck: Neck supple, no apparent injury  Respiratory/Thorax: Lungs diminished bilaterally, non-labored breathing, dry cough, on 10L HFNC  Cardiovascular: Regular, rate and rhythm, no murmurs, normal S1 and S2  Gastrointestinal: Nondistended, soft, non-tender, BS present x 4, Cortrak present   : External catheter in place   Musculoskeletal: ROM intact, unable to lift B/L LE off bed.   Extremities: no edema  Neurological: alert and oriented x 3, speech clear, follows commands appropriately, without focal deficits, weakness B/L LE  Skin: Warm and dry, scattered bruising    Vital Signs  Blood pressure 121/58, pulse 63, temperature 35.9 °C (96.6 °F), temperature source Temporal, resp. rate 17, height 1.6 m (5' 2.99\"), weight 63.2 kg (139 lb 5.3 oz), SpO2 100 %.  Oxygen Therapy  SpO2: 100 %  Medical Gas Therapy: Supplemental oxygen  O2 Delivery Method: High flow nasal cannula (12LNC)       Intake/Output last 3 Shifts:  I/O last 3 completed shifts:  In: 1560 (22.8 mL/kg) [NG/GT:1560]  Out: 1503 (22 mL/kg) [Urine:1500 (0.6 mL/kg/hr); Stool:3]  Dosing Weight: 68.4 kg     Lines and Tubes:  Peripheral IV 11/08/23 20 G Left;Anterior Hand (Active)   Placement Date/Time: 11/08/23 0000   Size (Gauge): " 20 G  Orientation: Left;Anterior  Location: Hand   Number of days: 21       Peripheral IV 11/09/23 20 G Right;Anterior Forearm (Active)   Placement Date/Time: 11/09/23 0600   Size (Gauge): 20 G  Orientation: Right;Anterior  Location: Forearm  Technique: Ultrasound guidance   Number of days: 20       NG/OG/Feeding Tube Right nostril (Active)   Placement Date/Time: 11/16/23 1522   Tube Length: 75 cm  Tube Location: Right nostril   Number of days: 12       External Urinary Catheter (Active)   Placement Date/Time: 11/09/23 0400     Number of days: 20         Relevant Results  Scheduled medications  apixaban, 5 mg, nasogastric tube, BID  ciprofloxacin, 500 mg, nasogastric tube, q12h KARL  esomeprazole, 40 mg, nasoduodenal tube, Daily  fluticasone, 1 spray, Each Nostril, BID  insulin lispro, 0-10 Units, subcutaneous, TID with meals  lidocaine, 1 patch, transdermal, Daily  melatonin, 5 mg, oral, Daily  metoprolol tartrate, 50 mg, nasogastric tube, BID  nystatin, 4 mL, Swish & Spit, BID  predniSONE, 80 mg, oral, BID  QUEtiapine, 25 mg, oral, Nightly  sulfamethoxazole-trimethoprim, 160 mg of trimethoprim, nasogastric tube, q24h KARL  thiamine, 100 mg, oral, Daily  traZODone, 25 mg, nasogastric tube, Nightly      Continuous medications     PRN medications  PRN medications: acetaminophen **OR** [DISCONTINUED] acetaminophen **OR** [DISCONTINUED] acetaminophen, dextrose 10 % in water (D10W), dextrose, docusate sodium, ipratropium-albuteroL, loperamide, ondansetron ODT **OR** [DISCONTINUED] ondansetron, oxyCODONE, oxygen, polyethylene glycol, QUEtiapine, sodium chloride    Results for orders placed or performed during the hospital encounter of 11/09/23 (from the past 24 hour(s))   POCT GLUCOSE   Result Value Ref Range    POCT Glucose 153 (H) 74 - 99 mg/dL   CBC and Auto Differential   Result Value Ref Range    WBC 22.4 (H) 4.4 - 11.3 x10*3/uL    nRBC 0.0 0.0 - 0.0 /100 WBCs    RBC 3.28 (L) 4.00 - 5.20 x10*6/uL    Hemoglobin 9.7 (L)  12.0 - 16.0 g/dL    Hematocrit 28.6 (L) 36.0 - 46.0 %    MCV 87 80 - 100 fL    MCH 29.6 26.0 - 34.0 pg    MCHC 33.9 32.0 - 36.0 g/dL    RDW 14.7 (H) 11.5 - 14.5 %    Platelets 179 150 - 450 x10*3/uL    Neutrophils % 93.0 40.0 - 80.0 %    Immature Granulocytes %, Automated 3.5 (H) 0.0 - 0.9 %    Lymphocytes % 0.8 13.0 - 44.0 %    Monocytes % 2.5 2.0 - 10.0 %    Eosinophils % 0.0 0.0 - 6.0 %    Basophils % 0.2 0.0 - 2.0 %    Neutrophils Absolute 20.79 (H) 1.20 - 7.70 x10*3/uL    Immature Granulocytes Absolute, Automated 0.78 (H) 0.00 - 0.70 x10*3/uL    Lymphocytes Absolute 0.18 (L) 1.20 - 4.80 x10*3/uL    Monocytes Absolute 0.55 0.10 - 1.00 x10*3/uL    Eosinophils Absolute 0.00 0.00 - 0.70 x10*3/uL    Basophils Absolute 0.05 0.00 - 0.10 x10*3/uL   Renal function panel   Result Value Ref Range    Glucose 129 (H) 74 - 99 mg/dL    Sodium 137 136 - 145 mmol/L    Potassium 4.9 3.5 - 5.3 mmol/L    Chloride 100 98 - 107 mmol/L    Bicarbonate 26 21 - 32 mmol/L    Anion Gap 16 10 - 20 mmol/L    Urea Nitrogen 83 (H) 6 - 23 mg/dL    Creatinine 1.04 0.50 - 1.05 mg/dL    eGFR 58 (L) >60 mL/min/1.73m*2    Calcium 8.1 (L) 8.6 - 10.6 mg/dL    Phosphorus 3.5 2.5 - 4.9 mg/dL    Albumin 2.5 (L) 3.4 - 5.0 g/dL   Magnesium   Result Value Ref Range    Magnesium 2.11 1.60 - 2.40 mg/dL   POCT GLUCOSE   Result Value Ref Range    POCT Glucose 249 (H) 74 - 99 mg/dL   POCT GLUCOSE   Result Value Ref Range    POCT Glucose 171 (H) 74 - 99 mg/dL   POCT GLUCOSE   Result Value Ref Range    POCT Glucose 116 (H) 74 - 99 mg/dL             Malnutrition Diagnosis Status: New  Malnutrition Diagnosis: Severe malnutrition related to acute disease or injury  As Evidenced by: 12% weight loss from normal along with moderate to severe muscle and fat loss on physical exam in setting of a decrease in PO intake/appetite after being diagnosed with COVID  I agree with the dietitian's malnutrition diagnosis.    CT angio chest for pulmonary embolism  11/26/2023    Narrative  Interpreted By:  Geovanny Roberts,  STUDY:  CT ANGIO CHEST FOR PULMONARY EMBOLISM;  11/26/2023 1:34 pm    INDICATION:  Signs/Symptoms:c/f PE.    COMPARISON:  CT dated 11/07/2023    ACCESSION NUMBER(S):  [id number]    ORDERING CLINICIAN:  GAGANDEEP DE LA CRUZ    TECHNIQUE:  Helical data acquisition of the chest was obtained after intravenous  administration of 75 cc of Omnipaque 350, as per PE protocol. Images  were reformatted in coronal and sagittal planes. Axial and coronal  maximum intensity projection (MIP) images were created and reviewed.    FINDINGS:  POTENTIAL LIMITATIONS OF THE STUDY: Study is slightly limited due to  motion artifact.    HEART AND VESSELS:  There are no discrete filling defects within main pulmonary artery  and its branches to suggest acute pulmonary embolism. Main pulmonary  artery and its branches are normal in caliber.    The thoracic aorta normal in course and caliber.  Mild/minimal coronary artery calcifications are seen. Please note,the  study is not optimized for evaluation of coronary arteries.    The cardiac chambers are not enlarged.    There is no pericardial effusion seen.    MEDIASTINUM AND WALTER, LOWER NECK AND AXILLA:  The visualized thyroid gland is within normal limits.  No evidence of thoracic lymphadenopathy by CT criteria.  Esophagus appears within normal limits as seen.    LUNGS AND AIRWAYS:  The trachea and central airways are patent. No endobronchial lesion  is seen.    There is redemonstration of consolidative opacities in bilateral  lungs more in the lower lobes and slightly improved compared to prior  study.    Areas of architectural distortion, and mild bronchiectasis scattered  throughout the lungs with coarsening of interstitial markings.      UPPER ABDOMEN:  An enteric tube in place terminating in the proximal duodenum.  The visualized subdiaphragmatic structures demonstrate no remarkable  findings.        CHEST WALL AND OSSEOUS  STRUCTURES:  Chest wall is within normal limits.  No acute osseous pathology.There are no suspicious osseous lesions.    Impression  1. No evidence of pulmonary embolism.  2. Multifocal consolidative opacities more in the lower lobes,  slightly improved compared to prior study. Findings remain concerning  for multifocal infectious process.  3. Interlobular septal thickening, architectural distortion and mild  bronchiectasis scattered throughout the lungs, slightly worse and  concerning for fibrotic like changes, likely sequela of infection.  Cannot exclude component of interstitial pulmonary edema.  4.  Other findings as described above.      MACRO:  None    Signed by: Geovanny Zepeda 11/26/2023 2:13 PM  Dictation workstation:   [id number]      Assessment/Plan   Principal Problem:    Acute hypoxemic respiratory failure (CMS/HCC)  Active Problems:    Stage 4 chronic kidney disease (CMS/HCC)    COVID-19    Pneumonia of both lower lobes due to infectious organism    Pseudomonal bacteremia    A-fib (CMS/[facility])  Macarena Wilson is a 68 year old female with a PMH of CKD III and HTN admitted to the MICU from OSH with AHRF 2/2 COVID pneumonia (positive on 10/13) complicated by ARDS and pulmonary fibrosis with resulting organizing PNA. Course complicated by distal DVTs and likely PE, oliguric HUNG on CKD, and intermittent hypoxia-associated SVT and afib with RVR.   Pt weaned to HFNC and transferred to SDU for continued care. Patient remains on Airvo 30L/80%         Neuro:   #Hx of anxiety   - Today patient is A&Ox3. Denies anxiety this morning  - PRN tylenol for Pain or PRN Oxy for pain or air hunger.  Lidocaine patch for back discomfort  - Continue Trazodone 25mg HS and Melatonin HS   - Continue Seroquel 25 mg HS and daily PRN 12.5mg   - Palliative following, recs appreciated   - Monitor Qtc - .380 on 11/28  - PT/OT     CV:   #Hx of HTN  #Afib, Intermittent SVT  - Patient remains HDS, NSR/SB on telemetry today  -  Continue Metoprolol Tartrate 50mg Q6H, hold for SBP <100, HR <50  - Holding Home Losartan 100mg in the setting of recurrent HUNG on CKD   -  on 11/20, down to 99 11/23  - Monitor for Qtc prolongation while on Cipro and Seroquel    - Qtc .380 on 11/28     Pulm:   #AHRF 2/2 COVID PNA (11/23) c/b ARDS, GGO c/f pulmonary fibrosis - organizing PNA, likely 2/2 COVID PNA, Likely PE, known DVTs  #Pneumomediastinum, resolved   - Today patient remains on 10L HFNC, continued intermittent desaturation with turns/repositioning which resolve with rest.  Desaturation to 54% while sitting edge of bed with PT/OT, required NRB for brief period of time to aide in recovery.   - Pulmonary fibrosis 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   - Fungitell, aspergillus galactomannan negative, blasto and histo Ag neg  - Continue Prednisone Taper --> Started on 80 mg BID evening of 11/26, previously on 50mg q6 x 1 week days.            > Daily DS bactrim for PCP prophy  - Fluticasone 1 spray bid   - PRN IV Lasix-- 40 mg IV lasix most recent 11/23  - Avoid further CPAP attempts d/t pneumomediastinum which occurred after trying patient on CPAP  - 11/27 CT PE negative for PE  - Message sent to Dr. Jacqui Valdez with the lung transplant team to assess if patient appropriate to consider lung transplant evaluation.      Renal:   #Oliguric HUNG on CKD III, RECURRENT, with improving azotemia   - Voiding via external catheter  - Cr peaked at 3.97. Scr 1.04 today, BUN 83 today  - Baseline of 1.6. Likely prerenal. Renal US with non-obstructing stones BL, no hydronephrosis, cysts BL. 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  Previously on Nephro 35ml/hr.  Plan to remove Cortrak if adequate oral intake   - Stool panel negative.  - Continue Loperamide 2 mg QID PRN  - PRN miralax and docusate senna for constipation -> Holding d/t diarrhea      ENDOCRINOLOGY:   #Steroid induced hyperglycemia  - A1C 6.3, pre-DM  - Blood glucose ranging 116-249  - Continue with MDSSI     Heme:    #DVT and presumed PE  #Anemia of unknown cause  - Hgb stable, 9.7 today.    - 11/13: BUE U/S: +R PT DVT, +L Soleal DVT  - Continue Apixaban BID   - CBC daily and prn     MSK/Skin:   #Eczema/Rosasea  #Sacral pressure injury, stage 3  - On home dupixent  - Seen by wound care RN  - For sacral wound: Clean buttocks with cleansing cloths and apply Triad to open skin BID and as needed. Leave RIZWANA      ID:   #Covid PNA (received Decadron, Remdesivir, Tocilizumab at OSH) Neutrophilic leukocytosis  - WBC 22.4 yesterday, CBC pending  MICRO: 11/21 BC x2 3/4 Pseudomonas Aeruginosa, Susceptible to Zosyn and Cipro, 11/25 Bcx2 NGTD and UC positive Candida Tropicalis, likely contamination, plan to repeat today  - 10/26 & 11/10: stool studies, including C. Diff neg, 11/25 C dif negative,  11/9 Step pneumonaie Ag, Legionella Ag neg, 11/9 Flu A/B, RSV neg   - Zosyn 11/22- 11/24; Ciprofloxacin 500 mg BID for 14 day course (11/24 - tentative stop date of 12/5)     Lines: PIV, Cortrak     Code Status: DNR/DNI  NOK:  Primary Emergency Contact: Mathew Wilson, Home Phone: [phone]     Dispo:  Continue SDU for hypoxic resp failure with high FiO2 needs     Pt discussed with Dr. Krishnan, seen and examined. All labs, VS and previous plan of care reviewed.    Jw Florez, APRN-CNP

## 2023-11-29 NOTE — PROGRESS NOTES
Physical Therapy    Physical Therapy Treatment    Patient Name: Macarena Wilson  MRN: [id number]  Today's Date: 11/29/2023  Time Calculation  Start Time: 1039  Stop Time: 1122  Time Calculation (min): 43 min       Assessment/Plan   PT Assessment  Rehab Prognosis: Fair (limited activity tolerance/endurance, prolonged hospital stay with profound weakness)  End of Session Communication: Bedside nurse  End of Session Patient Position: Bed, 3 rail up, Alarm off, not on at start of session (DEP chair position)     PT Plan  Treatment/Interventions: Bed mobility, Transfer training, Gait training, Balance training, Strengthening, Endurance training, Therapeutic exercise, Therapeutic activity, Postural re-education  PT Plan: Skilled PT  PT Frequency: 4 times per week  PT Discharge Recommendations: Moderate intensity level of continued care  PT - OK to Discharge: Yes    General Visit Information:   PT  Visit  PT Received On: 11/29/23  General  Family/Caregiver Present: No  Co-Treatment: OT  Co-Treatment Reason: to maximize patient safety, mobility and activity tolerance 2/2 patient with tenuous respiratory status, x2 skilled assist for mobility, AMPAC <10  Prior to Session Communication: Bedside nurse  Patient Position Received: Bed, 3 rail up, Alarm off, not on at start of session (DEP chair position)  General Comment: Patient agreeable to participate in therapy; patient is profoundly weak and deconditioned. PT expressed concern to the patient regarding patient's immobility and weakness and questioned if patient was completing HEP that was demonstrated by the PT. Patient appears to have limited insight into deficits. patient with poor activity tolerance. Communicated to RN that patient's buttocks wound bed is bleeding and does not have a meplex in place.    Subjective   Precautions:  Precautions  Medical Precautions: Oxygen therapy device and L/min, Fall precautions  Vital Signs:  Vital Signs  Heart Rate:  (pre: 68 post:  65)  Resp:  (pre: 16 post: 24)  SpO2:  (pre: 92% on 10L HFNC, sitting EOB, pt desat to mid 50s, O2 increased to 15L HFNC + 25L via NRB added by RN. SpO2 ranged mid 80s-low 90s. post: 86% on 10L HFNC)  BP:  (pre: 100/69 post: 137/73)    Objective   Pain:  Pain Assessment  Pain Assessment: 0-10  Pain Score: 0 - No pain  Cognition:  Cognition  Overall Cognitive Status: Within Functional Limits  Orientation Level:  (AxO x3)  Following Commands: Follows one step commands with repetition  Insight:  (appears limited into extent of current deficits and patient's limited functional mobility)  Postural Control:  Postural Control  Postural Control: Impaired  Head Control: favors increase FF head posture with downward gaze; unable to actively extend c-spine to maintain a neutral head alignment with forward gaze when sitting EOB; PT provided tactile A to keep head in neutral positioning.  Trunk Control: fair-poor; retrolean; favors lateral lean R; requires increase assist to maintain midline.    Activity Tolerance:  Activity Tolerance  Endurance: Tolerates 10 - 20 min exercise with multiple rests  Early Mobility/Exercise Safety Screen: Proceed with mobilization - No exclusion criteria met  Treatments:  Therapeutic Exercise  Therapeutic Exercise Performed: Yes  Therapeutic Exercise Activity 1: supine: AP x10 B, knee ext/hip ext simultaneously against mild resistance by PT x10 B; sitting EOB: LAQ 2x5 B (minimal activation of quads to complete available ROM, only achieves 25% of available ROM), cervical ext, held 3 seconds x10; profoundly weak    Balance/Neuromuscular Re-Education  Balance/Neuromuscular Re-Education Activity Performed: Yes  Balance/Neuromuscular Re-Education Activity 1: EOB x20 minutes; brief CGx1 with BUE support after positioning and cueing, only able to maintain ~15 seconds; mostly required MOD-MAXx1 assist to maintain positioning; limited trunk activation, cervical extensor weakness. Required BUE support on  EOB; MAXx1 during dynamic balance activitites with 1 UE support.    Bed Mobility  Bed Mobility: Yes  Bed Mobility 1  Bed Mobility 1: Supine to sitting, Sitting to supine  Level of Assistance 1: Maximum assistance, Moderate verbal cues  Bed Mobility Comments 1: x2 assist, HOB elevated for supine to sit; +draw sheet  Bed Mobility 2  Bed Mobility  2: Rolling left  Level of Assistance 2: Maximum assistance, Moderate verbal cues  Bed Mobility Comments 2: x1 assist    Transfers  Transfer: No d/t patient being unable to sit EOB without significant assistance.     Outcome Measures:  [facility] Basic Mobility  Turning from your back to your side while in a flat bed without using bedrails: A lot  Moving from lying on your back to sitting on the side of a flat bed without using bedrails: A lot  Moving to and from bed to chair (including a wheelchair): Total  Standing up from a chair using your arms (e.g. wheelchair or bedside chair): Total  To walk in hospital room: Total  Climbing 3-5 steps with railing: Total  Basic Mobility - Total Score: 8    FSS-ICU  Ambulation: Unable to attempt due to weakness  Rolling: Maximal assistance (performs 25% - 49% of task)  Sitting: Maximal assistance (performs 25% - 49% of task)  Transfer Sit-to-Stand: Unable to perform  Transfer Supine-to-Sit: Total assistance (performs 25% or requires another person)  Total Score: 5    Education Documentation  Mobility Training, taught by Cindy Olmos PT at 11/29/2023  1:39 PM.  Learner: Patient  Readiness: Acceptance  Method: Explanation  Response: Needs Reinforcement  Comment: extensive education about moving frequently in bed: chair position by RN staff; sitting EOB with RN assist, holding bedrails B and completing anterior trunk leans; AP, GS, QS (multiple sets/reps a day ex: 10 reps x10 sets)    Education Comments  No comments found.           Encounter Problems       Encounter Problems (Active)       Balance       patient will complete static (SBAx1)  and dynamic (Cgx1) standing balance activities using LRD as needed without acute LOB, while maintaining stable vitals.  (Not Progressing)       Start:  11/10/23    Expected End:  12/20/23               Mobility       STG - Patient will ambulate >/=50 ft using LRD as needed with Cgx1 assist without acute LOB, while maintaining stable vitals.  (Not Progressing)       Start:  11/10/23    Expected End:  12/20/23            patient will participate in BLE there-ex in order to improve strength and to assist with the completion of functional mobility tasks.  (Progressing)       Start:  11/10/23    Expected End:  12/20/23            patient will ambulate >/=30 ft consecutively and >/=75 ft cumulatively with </=1 sitting rest break while maintaining stable vitals, reporting <13/20 on the RPE scale.  (Not Progressing)       Start:  11/10/23    Expected End:  12/20/23               Pain - Adult          Transfers       STG - Transfer from bed to chair with CGx1 assist without acute LOB, using LRD as needed  (Not Progressing)       Start:  11/10/23    Expected End:  12/20/23            STG - Patient will perform bed mobility with SBAx1 without acute LOB, using bedrailing as needed.  (Not Progressing)       Start:  11/10/23    Expected End:  12/20/23            STG - Patient will transfer sit to and from stand with Cgx1 assist using LRD as needed without acute LOB  (Not Progressing)       Start:  11/10/23    Expected End:  12/20/23                 Cindy Olmos, PT, DPT

## 2023-11-30 NOTE — CONSULTS
Consults  History Of Present Illness:    Macarena Wilson is a 68 y.o. female presenting with respiratory failure & Covid fibrosis.     Last Recorded Vitals:  Vitals:    11/30/23 0800 11/30/23 0854 11/30/23 1200 11/30/23 1600   BP: (!) 107/94  118/61 118/62   BP Location: Right arm  Right arm Right arm   Patient Position: Lying  Lying Lying   Pulse: 67  58 68   Resp: 24  25 21   Temp:  35.7 °C (96.3 °F) 36 °C (96.8 °F)    TempSrc:  Temporal Temporal    SpO2: 90%  97% 96%   Weight:       Height:           Last Labs:  CBC - 11/30/2023:  2:46 AM  15.1 10.6 194    33.1      CMP - 11/30/2023:  2:46 AM  8.3 5.4 27 --- 0.6   4.8 2.6 63 71      PTT - 11/13/2023:  4:52 PM  1.0   11.4 26     Troponin I, High Sensitivity   Date/Time Value Ref Range Status   11/09/2023 05:45 AM 5 0 - 34 ng/L Final     BNP   Date/Time Value Ref Range Status   11/23/2023 03:48 PM 99 0 - 99 pg/mL Final   11/20/2023 05:01  (H) 0 - 99 pg/mL Final     Hemoglobin A1C   Date/Time Value Ref Range Status   10/24/2023 06:16 AM 6.3 (H) See below % Final      Last I/O:  I/O last 3 completed shifts:  In: 300 (4.4 mL/kg) [NG/GT:300]  Out: 1250 (18.3 mL/kg) [Urine:1250 (0.5 mL/kg/hr)]  Dosing Weight: 68.4 kg     Past Cardiology Tests (Last 3 Years):  EKG:  Electrocardiogram, 12-lead PRN ACS symptoms 11/29/2023      Electrocardiogram, 12-lead PRN ACS symptoms 11/29/2023      ECG 12 lead 11/15/2023    Echo:  Transthoracic Echo (TTE) Complete 11/09/2023      Transthoracic Echo (TTE) Complete 10/31/2023    Ejection Fractions:  EF   Date/Time Value Ref Range Status   11/09/2023 10:46 AM 73       Cath:  No results found for this or any previous visit from the past 1095 days.    Stress Test:  No results found for this or any previous visit from the past 1095 days.    Cardiac Imaging:  No results found for this or any previous visit from the past 1095 days.      Past Medical History:  She has a past medical history of Chronic kidney disease, Hypertension, and  Patient presents with left-sided flank pain. Patient states that he does have a history of kidney stones and believes that is what it is. Patient states that this pain started on Sunday. He states that it is intermittent and resolved on Monday however he returned this morning at a proximally 5 AM.  Patient states his pain is a 7 out of 10. He describes as a sharp stabbing pain on the left side of his abdomen. Patient states that he has also had some difficulty urinating his urine has looked more orange as of late. Patient also endorses some nausea but denies any vomiting. Patient denies any fever or penile discharge. The history is provided by the patient. No  was used. Review of Systems   Constitutional: Negative for chills and fever. HENT: Negative for ear pain, sinus pressure and sore throat. Eyes: Negative for pain, discharge and redness. Respiratory: Negative for cough, shortness of breath and wheezing. Cardiovascular: Negative for chest pain. Gastrointestinal: Positive for nausea. Negative for abdominal pain, diarrhea and vomiting. Genitourinary: Positive for difficulty urinating, flank pain and hematuria. Negative for dysuria and frequency. Musculoskeletal: Negative for arthralgias and back pain. Skin: Negative for rash and wound. Neurological: Negative for weakness and headaches. Hematological: Negative for adenopathy. All other systems reviewed and are negative. Physical Exam  Vitals and nursing note reviewed. Constitutional:       General: He is not in acute distress. Appearance: He is well-developed. He is obese. HENT:      Head: Normocephalic and atraumatic. Eyes:      Conjunctiva/sclera: Conjunctivae normal.   Cardiovascular:      Rate and Rhythm: Normal rate and regular rhythm. Heart sounds: Normal heart sounds. No murmur heard. Pulmonary:      Effort: Pulmonary effort is normal. No respiratory distress. Breath sounds: Normal breath sounds. No wheezing or rales. Abdominal:      General: Bowel sounds are normal.      Palpations: Abdomen is soft. Tenderness: There is abdominal tenderness (Left flank). There is no right CVA tenderness, left CVA tenderness, guarding or rebound. Musculoskeletal:         General: No tenderness or deformity. Cervical back: Normal range of motion and neck supple. Skin:     General: Skin is warm and dry. Neurological:      Mental Status: He is alert and oriented to person, place, and time. Cranial Nerves: No cranial nerve deficit. Coordination: Coordination normal.          Procedures     MDM  Number of Diagnoses or Management Options  Diagnosis management comments: Patient presents with left-sided flank pain. Toradol given for pain. Zofran given for nausea. CBC was unremarkable. CMP did show an DELORIS with a creatinine of 1.7. Fluids were started. Lipase was elevated to 247. Urinalysis was positive for moderate blood but did not show any signs of infection. CT of the abdomen pelvis did show hydroutereonephrosis of the left kidney and ureter with 4 mm stone. Patient will need to be admitted for further evaluation of his DELORIS and possible pancreatitis. Patient was informed the results of plan and is agreeable. Patient admitted to [facility]. Amount and/or Complexity of Data Reviewed  Clinical lab tests: reviewed  Tests in the radiology section of CPT®: reviewed  Decide to obtain previous medical records or to obtain history from someone other than the patient: yes                  --------------------------------------------- PAST HISTORY ---------------------------------------------  Past Medical History:  has a past medical history of Arthritis, Coronary artery disease due to lipid rich plaque, Diabetes mellitus ([address] Hypercholesteremia, Hypertension, Squamous cell carcinoma, and Thyroid disease.     Past Surgical History:  has a past surgical history Personal history of diseases of the skin and subcutaneous tissue.    Past Surgical History:  She has a past surgical history that includes Appendectomy (04/01/2016); Other surgical history (11/14/2022); and Hysterectomy.      Social History:  She reports that she has never smoked. She has never used smokeless tobacco. She reports that she does not use drugs. No history on file for alcohol use.    Family History:  Family History   Problem Relation Name Age of Onset    Breast cancer Mother      Heart failure Mother      No Known Problems Father      No Known Problems Sister          Allergies:  Fesoterodine, Mirabegron, and Prednisone    Inpatient Medications:  Scheduled medications   Medication Dose Route Frequency    apixaban  5 mg nasogastric tube BID    ciprofloxacin  500 mg nasogastric tube q12h KARL    esomeprazole  40 mg nasoduodenal tube Daily    fluticasone  1 spray Each Nostril BID    insulin lispro  0-10 Units subcutaneous TID with meals    lidocaine  1 patch transdermal Daily    melatonin  5 mg oral Daily    metoprolol tartrate  50 mg nasogastric tube BID    nystatin  4 mL Swish & Spit BID    predniSONE  80 mg oral BID    QUEtiapine  25 mg oral Nightly    sulfamethoxazole-trimethoprim  160 mg of trimethoprim nasogastric tube q24h KARL    thiamine  100 mg oral Daily    traZODone  25 mg nasogastric tube Nightly     PRN medications   Medication    acetaminophen    dextrose 10 % in water (D10W)    dextrose    docusate sodium    ipratropium-albuteroL    loperamide    ondansetron ODT    oxyCODONE    oxygen    polyethylene glycol    QUEtiapine    sodium chloride     Continuous Medications   Medication Dose Last Rate     Outpatient Medications:  Current Outpatient Medications   Medication Instructions    acetaminophen (TYLENOL) 650 mg, oral, Every 4 hours PRN    acetaminophen (TYLENOL) 650 mg, nasogastric tube, Every 4 hours PRN    acetaminophen (TYLENOL) 650 mg, rectal, Every 4 hours PRN    acetylcysteine  (MUCOMYST) 600 mg, nebulization, 3 times daily RT    budesonide (PULMICORT) 0.5 mg, nebulization, 2 times daily RT, Rinse mouth with water after use to reduce aftertaste and incidence of candidiasis. Do not swallow.    dexAMETHasone (DECADRON) 10 mg, intravenous, Every 12 hours    dexmedeTOMIDine in NS (Precedex) 400 mcg in 100 mL (4 mcg/mL) premix 0.1-1.5 mcg/kg/hr, intravenous, Continuous    fluticasone (Flonase) 50 mcg/actuation nasal spray 1 spray, Each Nostril, 2 times daily, Shake gently. Before first use, prime pump. After use, clean tip and replace cap.    glucagon (GLUCAGEN) 1 mg, intramuscular, Every 15 min PRN    guaiFENesin (ROBITUSSIN) 200 mg, oral, Every 4 hours PRN    heparin (porcine) 0-4,000 Units/hr (0-4,000 Units/hr), intravenous, Continuous    hydrALAZINE (APRESOLINE) 10 mg, intravenous, Every 8 hours PRN    hydrocortisone 1 % cream 1 Application, Topical, Daily, As needed for eczema     insulin glargine (LANTUS) 16 Units, subcutaneous, Nightly, Take as directed per insulin instructions.    ipratropium-albuteroL (Duo-Neb) 0.5-2.5 mg/3 mL nebulizer solution 3 mL, nebulization, Every 6 hours while awake RT    ipratropium-albuteroL (Duo-Neb) 0.5-2.5 mg/3 mL nebulizer solution 3 mL, nebulization, Every 2 hour PRN    levocetirizine (XYZAL) 5 mg, oral, Every evening, As needed    losartan (COZAAR) 100 mg, oral, Daily    meropenem (MERREM) 500 mg, intravenous, Every 12 hours    mv-mn/C/glutamin/lysin/pvua134 ([place], ASCORBATE SODIUM, ORAL) 1 tablet, oral, Daily, As needed    oxygen (O2) gas therapy 1 each, inhalation, Every 24 hours    pantoprazole (PROTONIX) 40 mg, oral, Daily before breakfast, Do not crush, chew, or split.    sodium chloride (Ocean) 0.65 % nasal spray 1 spray, Each Nostril, As needed       Physical Exam:  Neuro: AO x 3, awake, alert. BLE plantar/dorsi 2/5, BLE 1/5, BUE 3/5  Cardiac: RRR, + pulses, afebrile, trace edema  Resp: Diminished breath sounds throughout, coarse  that includes shoulder surgery; Carpal tunnel release; Tonsillectomy; Hemorrhoid surgery; Cosmetic surgery; fracture surgery; fracture surgery; skin biopsy; Coronary angioplasty with stent (04/08/2019); Cardiac surgery; and Cholecystectomy, laparoscopic (N/A, 6/3/2021). Social History:  reports that he has quit smoking. He quit smokeless tobacco use about 32 years ago. He reports that he does not drink alcohol and does not use drugs. Family History: family history includes Alzheimer's Disease in his mother; Cancer in his sister; Stroke in his father. The patients home medications have been reviewed.     Allergies: Statins    -------------------------------------------------- RESULTS -------------------------------------------------    LABS:  Results for orders placed or performed during the hospital encounter of 09/28/21   CBC Auto Differential   Result Value Ref Range    WBC 8.1 4.5 - 11.5 E9/L    RBC 4.88 3.80 - 5.80 E12/L    Hemoglobin 13.9 12.5 - 16.5 g/dL    Hematocrit 41.5 37.0 - 54.0 %    MCV 85.0 80.0 - 99.9 fL    MCH 28.5 26.0 - 35.0 pg    MCHC 33.5 32.0 - 34.5 %    RDW 13.4 11.5 - 15.0 fL    Platelets 756 835 - 380 E9/L    MPV 9.9 7.0 - 12.0 fL    Neutrophils % 64.6 43.0 - 80.0 %    Immature Granulocytes % 0.4 0.0 - 5.0 %    Lymphocytes % 23.6 20.0 - 42.0 %    Monocytes % 9.4 2.0 - 12.0 %    Eosinophils % 1.6 0.0 - 6.0 %    Basophils % 0.4 0.0 - 2.0 %    Neutrophils Absolute 5.23 1.80 - 7.30 E9/L    Immature Granulocytes # 0.03 E9/L    Lymphocytes Absolute 1.91 1.50 - 4.00 E9/L    Monocytes Absolute 0.76 0.10 - 0.95 E9/L    Eosinophils Absolute 0.13 0.05 - 0.50 E9/L    Basophils Absolute 0.03 0.00 - 0.20 E9/L   Comprehensive Metabolic Panel w/ Reflex to MG   Result Value Ref Range    Sodium 140 132 - 146 mmol/L    Potassium reflex Magnesium 3.8 3.5 - 5.0 mmol/L    Chloride 105 98 - 107 mmol/L    CO2 23 22 - 29 mmol/L    Anion Gap 12 7 - 16 mmol/L    Glucose 107 (H) 74 - 99 mg/dL    BUN 19 6 - 23 throughout  GI: abdo soft, nontender, normoactive BS  Skin: Bruising     Assessment/Plan   Ms. Wilson was reviewed by myself and Dr. Jacqui Valdez at the request of the primary team for potential pre-transplant evaluation. Ms. Wilson's daughter-in-law was present for the conversation on the phone.     At the present moment Ms. Wilson is not a candidate for evaluation. Her limiting factors are high amounts of steroids and deconditioning.     In order to proceed with a pre-transplant evaluation:  - Must be tapered down to no more than 10mg Prednisone per day  - Must be able to physically walk 100ft (on any amount of oxygen)    Recommendations:  - Improve conditioning as much as possible with therapy. Consider using HFNC/Airvo to meet oxygen requirements during physical therapy.   - Wean steroids as possible    We dicussed the transplant process at length and Ms. Wilson appears very motivated with excellent social support. From that perspective, she is very well.     All questions answered.       Peripheral IV 11/08/23 20 G Left;Anterior Hand (Active)   Site Assessment Clean;Dry;Intact 11/30/23 1600   Dressing Type Transparent 11/30/23 1600   Line Status Flushed;Saline locked 11/30/23 1600   Dressing Status Clean;Dry;Occlusive 11/30/23 1600   Number of days: 22       Peripheral IV 11/09/23 20 G Right;Anterior Forearm (Active)   Site Assessment Clean;Dry;Intact 11/30/23 1600   Dressing Type Transparent 11/30/23 1600   Line Status Flushed;Saline locked 11/30/23 1600   Dressing Status Clean;Dry;Occlusive 11/30/23 1600   Number of days: 21       NG/OG/Feeding Tube Right nostril (Active)   Tube Status Clamped 11/30/23 0800   Site Assessment Clean;Dry;Intact 11/30/23 0800   Tube Securement Nasal bridle 11/30/23 0800   Number of days: 14       Code Status:  DNR and No Intubation    I spent 45 minutes in the professional and overall care of this patient.        Britney Webber, ZION-CNP   RDW 13.3 11.5 - 15.0 fL    Platelets 406 456 - 963 E9/L    MPV 9.3 7.0 - 12.0 fL    Neutrophils % 60.0 43.0 - 80.0 %    Immature Granulocytes % 0.3 0.0 - 5.0 %    Lymphocytes % 25.9 20.0 - 42.0 %    Monocytes % 11.0 2.0 - 12.0 %    Eosinophils % 2.5 0.0 - 6.0 %    Basophils % 0.3 0.0 - 2.0 %    Neutrophils Absolute 4.23 1.80 - 7.30 E9/L    Immature Granulocytes # 0.02 E9/L    Lymphocytes Absolute 1.83 1.50 - 4.00 E9/L    Monocytes Absolute 0.78 0.10 - 0.95 E9/L    Eosinophils Absolute 0.18 0.05 - 0.50 E9/L    Basophils Absolute 0.02 0.00 - 0.20 E9/L   Magnesium   Result Value Ref Range    Magnesium 2.1 1.6 - 2.6 mg/dL   POCT Glucose   Result Value Ref Range    Meter Glucose 172 (H) 74 - 99 mg/dL   POCT Glucose   Result Value Ref Range    Meter Glucose 94 74 - 99 mg/dL   POCT Glucose   Result Value Ref Range    Meter Glucose 170 (H) 74 - 99 mg/dL   POCT Glucose   Result Value Ref Range    Meter Glucose 91 74 - 99 mg/dL       RADIOLOGY:  CT ABDOMEN PELVIS WO CONTRAST Additional Contrast? None   Final Result   Interval cholecystectomy. Questionable distal choledocholithiasis. MRCP may be obtained confirm if   clinically indicated. A 0.4 cm stone in the distal left ureter at the level of the pelvic brim   causing mild upstream hydroureteronephrosis. Multiple small bilateral renal stones. Markedly enlarged prostate. Correlation with rectal exam and serum PSA   levels recommended. Distal colonic diverticulosis. No acute diverticulitis.               ------------------------- NURSING NOTES AND VITALS REVIEWED ---------------------------  Date / Time Roomed:  9/28/2021  5:51 PM  ED Bed Assignment:  [id number]    The nursing notes within the ED encounter and vital signs as below have been reviewed.      Patient Vitals for the past 24 hrs:   BP Temp Temp src Pulse Resp SpO2 Weight   09/29/21 1200 -- -- -- 62 -- -- --   09/29/21 1130 (!) 160/68 -- -- -- -- -- --   09/29/21 0744 (!) 198/86 98 °F (36.7 °C) Oral 61 16 93 % --   09/29/21 0330 -- -- -- -- -- -- 200 lb (90.7 kg)   09/28/21 2315 (!) 166/72 97.8 °F (36.6 °C) Oral 62 18 95 % --   09/28/21 2038 (!) 184/79 98 °F (36.7 °C) Oral 61 16 96 % --   09/28/21 1959 (!) 218/98 -- -- -- -- -- --       Oxygen Saturation Interpretation: Normal    ------------------------------------------ PROGRESS NOTES ------------------------------------------  Re-evaluation(s):  Time: 1912  Patients symptoms show no change  Repeat physical examination is not changed    Counseling:  I have spoken with the patient and discussed todays results, in addition to providing specific details for the plan of care and counseling regarding the diagnosis and prognosis. Their questions are answered at this time and they are agreeable with the plan of admission.    --------------------------------- ADDITIONAL PROVIDER NOTES ---------------------------------  Consultations:  Time: 1937. Spoke with Dr. Palak Brandon.  Discussed case. They will admit the patient. This patient's ED course included: a personal history and physicial examination, multiple bedside re-evaluations and IV medications    This patient has remained hemodynamically stable during their ED course. Diagnosis:  1. Ureterolithiasis    2. DELORIS (acute kidney injury) ([facility] [address]        Disposition:  Patient's disposition: Admit to med/surg floor  Patient's condition is stable.       Patient was seen and evaluated by both myself and Héctor Kearns MD.           Gaetano Garcia DO  Resident  09/29/21 [id number]

## 2023-11-30 NOTE — PROGRESS NOTES
"Macarena Wilson is a 68 y.o. female on day 21 of admission presenting with Acute hypoxemic respiratory failure (CMS/HCC).    Subjective   No acute events overnight.  Ongoing episodes of oxygen desaturation with turns and repositioning intermittently requiring increased FiO2. Patient reports improved sleep and is in bed eating breakfast comfortably.    Denies SOB, cough, fever, chills, N/V, CP, abdominal pain, and feelings of anxiety       Objective   Physical Exam:  Constitutional: pt in NAD, alert and cooperative  Eyes: PERRL, EOMI, no icterus   ENMT: mucous membranes moist  Head/Neck: Neck supple, no apparent injury  Respiratory/Thorax: Lungs diminished bilaterally, non-labored breathing, dry cough, on 10L HFNC  Cardiovascular: Regular, rate and rhythm, no murmurs, normal S1 and S2  Gastrointestinal: Nondistended, soft, non-tender, BS present x 4, Cortrak present   : External catheter in place   Musculoskeletal: ROM intact, unable to lift B/L LE off bed.   Extremities: no edema  Neurological: alert and oriented x 3, speech clear, follows commands appropriately, without focal deficits, weakness B/L LE  Skin: Warm and dry, scattered bruising    Vital Signs  Blood pressure 122/57, pulse 51, temperature 36 °C (96.8 °F), temperature source Temporal, resp. rate 13, height 1.6 m (5' 2.99\"), weight 63.2 kg (139 lb 5.3 oz), SpO2 100 %.  Oxygen Therapy  SpO2: 100 %  Medical Gas Therapy: Supplemental oxygen  O2 Delivery Method: High flow nasal cannula       Intake/Output last 3 Shifts:  I/O last 3 completed shifts:  In: 300 (4.4 mL/kg) [NG/GT:300]  Out: 1250 (18.3 mL/kg) [Urine:1250 (0.5 mL/kg/hr)]  Dosing Weight: 68.4 kg     Lines and Tubes:  Peripheral IV 11/08/23 20 G Left;Anterior Hand (Active)   Placement Date/Time: 11/08/23 0000   Size (Gauge): 20 G  Orientation: Left;Anterior  Location: Hand   Number of days: 22       Peripheral IV 11/09/23 20 G Right;Anterior Forearm (Active)   Placement Date/Time: 11/09/23 0600 "   Size (Gauge): 20 G  Orientation: Right;Anterior  Location: Forearm  Technique: Ultrasound guidance   Number of days: 21       Peripheral IV 11/29/23 20 G 2.25 cm Left Forearm (Active)   Placement Date/Time: 11/29/23 1315   Hand Hygiene Completed: Yes  Size (Gauge): 20 G  Catheter Length (cm): 2.25 cm  Orientation: Left  Location: Forearm  Site Prep: Chlorhexidine   Technique: Ultrasound guidance  Placed by: Brandon CRAFT  Patient Toleranc...   Number of days: 0       NG/OG/Feeding Tube Right nostril (Active)   Placement Date/Time: 11/16/23 1522   Tube Length: 75 cm  Tube Location: Right nostril   Number of days: 13       External Urinary Catheter (Active)   Placement Date/Time: 11/09/23 0400     Number of days: 21         Relevant Results  Scheduled medications  apixaban, 5 mg, nasogastric tube, BID  ciprofloxacin, 500 mg, nasogastric tube, q12h KARL  esomeprazole, 40 mg, nasoduodenal tube, Daily  fluticasone, 1 spray, Each Nostril, BID  insulin lispro, 0-10 Units, subcutaneous, TID with meals  lidocaine, 1 patch, transdermal, Daily  melatonin, 5 mg, oral, Daily  metoprolol tartrate, 50 mg, nasogastric tube, BID  nystatin, 4 mL, Swish & Spit, BID  predniSONE, 80 mg, oral, BID  QUEtiapine, 25 mg, oral, Nightly  sulfamethoxazole-trimethoprim, 160 mg of trimethoprim, nasogastric tube, q24h KARL  thiamine, 100 mg, oral, Daily  traZODone, 25 mg, nasogastric tube, Nightly      Continuous medications     PRN medications  PRN medications: acetaminophen **OR** [DISCONTINUED] acetaminophen **OR** [DISCONTINUED] acetaminophen, dextrose 10 % in water (D10W), dextrose, docusate sodium, ipratropium-albuteroL, loperamide, ondansetron ODT **OR** [DISCONTINUED] ondansetron, oxyCODONE, oxygen, polyethylene glycol, QUEtiapine, sodium chloride      Results for orders placed or performed during the hospital encounter of 11/09/23 (from the past 24 hour(s))   POCT GLUCOSE   Result Value Ref Range    POCT Glucose 124 (H) 74 - 99 mg/dL    Magnesium   Result Value Ref Range    Magnesium 2.11 1.60 - 2.40 mg/dL   CBC and Auto Differential   Result Value Ref Range    WBC 18.4 (H) 4.4 - 11.3 x10*3/uL    nRBC 0.0 0.0 - 0.0 /100 WBCs    RBC 3.44 (L) 4.00 - 5.20 x10*6/uL    Hemoglobin 10.1 (L) 12.0 - 16.0 g/dL    Hematocrit 30.7 (L) 36.0 - 46.0 %    MCV 89 80 - 100 fL    MCH 29.4 26.0 - 34.0 pg    MCHC 32.9 32.0 - 36.0 g/dL    RDW 14.8 (H) 11.5 - 14.5 %    Platelets 198 150 - 450 x10*3/uL    Neutrophils % 94.7 40.0 - 80.0 %    Immature Granulocytes %, Automated 2.8 (H) 0.0 - 0.9 %    Lymphocytes % 0.8 13.0 - 44.0 %    Monocytes % 1.5 2.0 - 10.0 %    Eosinophils % 0.0 0.0 - 6.0 %    Basophils % 0.2 0.0 - 2.0 %    Neutrophils Absolute 17.46 (H) 1.20 - 7.70 x10*3/uL    Immature Granulocytes Absolute, Automated 0.51 0.00 - 0.70 x10*3/uL    Lymphocytes Absolute 0.15 (L) 1.20 - 4.80 x10*3/uL    Monocytes Absolute 0.28 0.10 - 1.00 x10*3/uL    Eosinophils Absolute 0.00 0.00 - 0.70 x10*3/uL    Basophils Absolute 0.03 0.00 - 0.10 x10*3/uL   Renal function panel   Result Value Ref Range    Glucose 133 (H) 74 - 99 mg/dL    Sodium 139 136 - 145 mmol/L    Potassium 5.1 3.5 - 5.3 mmol/L    Chloride 102 98 - 107 mmol/L    Bicarbonate 29 21 - 32 mmol/L    Anion Gap 13 10 - 20 mmol/L    Urea Nitrogen 79 (H) 6 - 23 mg/dL    Creatinine 1.16 (H) 0.50 - 1.05 mg/dL    eGFR 51 (L) >60 mL/min/1.73m*2    Calcium 8.1 (L) 8.6 - 10.6 mg/dL    Phosphorus 4.3 2.5 - 4.9 mg/dL    Albumin 2.6 (L) 3.4 - 5.0 g/dL   POCT GLUCOSE   Result Value Ref Range    POCT Glucose 167 (H) 74 - 99 mg/dL   Electrocardiogram, 12-lead PRN ACS symptoms   Result Value Ref Range    Ventricular Rate 67 BPM    Atrial Rate 67 BPM    UT Interval 134 ms    QRS Duration 80 ms    QT Interval 352 ms    QTC Calculation(Bazett) 371 ms    P Axis 45 degrees    R Axis 39 degrees    T Axis 87 degrees    QRS Count 11 beats    Q Onset 226 ms    P Onset 159 ms    P Offset 209 ms    T Offset 402 ms    QTC Fredericia 365 ms    Electrocardiogram, 12-lead PRN ACS symptoms   Result Value Ref Range    Ventricular Rate 67 BPM    Atrial Rate 67 BPM    MO Interval 130 ms    QRS Duration 78 ms    QT Interval 370 ms    QTC Calculation(Bazett) 390 ms    P Axis 61 degrees    R Axis 41 degrees    T Axis 104 degrees    QRS Count 11 beats    Q Onset 224 ms    P Onset 159 ms    P Offset 192 ms    T Offset 409 ms    QTC Fredericia 384 ms   Magnesium   Result Value Ref Range    Magnesium 2.12 1.60 - 2.40 mg/dL   CBC and Auto Differential   Result Value Ref Range    WBC 15.1 (H) 4.4 - 11.3 x10*3/uL    nRBC 0.0 0.0 - 0.0 /100 WBCs    RBC 3.69 (L) 4.00 - 5.20 x10*6/uL    Hemoglobin 10.6 (L) 12.0 - 16.0 g/dL    Hematocrit 33.1 (L) 36.0 - 46.0 %    MCV 90 80 - 100 fL    MCH 28.7 26.0 - 34.0 pg    MCHC 32.0 32.0 - 36.0 g/dL    RDW 14.7 (H) 11.5 - 14.5 %    Platelets 194 150 - 450 x10*3/uL    Neutrophils % 94.7 40.0 - 80.0 %    Immature Granulocytes %, Automated 2.4 (H) 0.0 - 0.9 %    Lymphocytes % 1.3 13.0 - 44.0 %    Monocytes % 1.4 2.0 - 10.0 %    Eosinophils % 0.0 0.0 - 6.0 %    Basophils % 0.2 0.0 - 2.0 %    Neutrophils Absolute 14.26 (H) 1.20 - 7.70 x10*3/uL    Immature Granulocytes Absolute, Automated 0.36 0.00 - 0.70 x10*3/uL    Lymphocytes Absolute 0.19 (L) 1.20 - 4.80 x10*3/uL    Monocytes Absolute 0.21 0.10 - 1.00 x10*3/uL    Eosinophils Absolute 0.00 0.00 - 0.70 x10*3/uL    Basophils Absolute 0.03 0.00 - 0.10 x10*3/uL   Renal function panel   Result Value Ref Range    Glucose 204 (H) 74 - 99 mg/dL    Sodium 139 136 - 145 mmol/L    Potassium 5.2 3.5 - 5.3 mmol/L    Chloride 101 98 - 107 mmol/L    Bicarbonate 25 21 - 32 mmol/L    Anion Gap 18 10 - 20 mmol/L    Urea Nitrogen 80 (H) 6 - 23 mg/dL    Creatinine 1.26 (H) 0.50 - 1.05 mg/dL    eGFR 46 (L) >60 mL/min/1.73m*2    Calcium 8.3 (L) 8.6 - 10.6 mg/dL    Phosphorus 4.8 2.5 - 4.9 mg/dL    Albumin 2.6 (L) 3.4 - 5.0 g/dL   POCT GLUCOSE   Result Value Ref Range    POCT Glucose 167 (H) 74 - 99 mg/dL            Electrocardiogram, 12-lead PRN ACS symptoms    Result Date: 11/29/2023  Normal sinus rhythm Nonspecific T wave abnormality Abnormal ECG When compared with ECG of 24-NOV-2023 12:55, (unconfirmed) No significant change was found Confirmed by Maulik Rowland ([id number]) on 11/29/2023 7:42:31 PM    Electrocardiogram, 12-lead PRN ACS symptoms    Result Date: 11/29/2023  Normal sinus rhythm T wave abnormality, consider lateral ischemia Abnormal ECG When compared with ECG of 17-NOV-2023 22:14, Sinus rhythm has replaced Atrial fibrillation Vent. rate has decreased BY  91 BPM ST no longer depressed in Inferior leads ST no longer depressed in Lateral leads T wave inversion no longer evident in Inferior leads T wave inversion less evident in Lateral leads Confirmed by Maulik Rowland ([id number]) on 11/29/2023 7:42:16 PM         Malnutrition Diagnosis Status: New  Malnutrition Diagnosis: Severe malnutrition related to acute disease or injury  As Evidenced by: 12% weight loss from normal along with moderate to severe muscle and fat loss on physical exam in setting of a decrease in PO intake/appetite after being diagnosed with COVID  I agree with the dietitian's malnutrition diagnosis.    CT angio chest for pulmonary embolism 11/26/2023    Narrative  Interpreted By:  Geovanny Roberts,  STUDY:  CT ANGIO CHEST FOR PULMONARY EMBOLISM;  11/26/2023 1:34 pm    INDICATION:  Signs/Symptoms:c/f PE.    COMPARISON:  CT dated 11/07/2023    ACCESSION NUMBER(S):  [id number]    ORDERING CLINICIAN:  GAGANDEEP DE LA CRUZ    TECHNIQUE:  Helical data acquisition of the chest was obtained after intravenous  administration of 75 cc of Omnipaque 350, as per PE protocol. Images  were reformatted in coronal and sagittal planes. Axial and coronal  maximum intensity projection (MIP) images were created and reviewed.    FINDINGS:  POTENTIAL LIMITATIONS OF THE STUDY: Study is slightly limited due to  motion artifact.    HEART AND VESSELS:  There are no  discrete filling defects within main pulmonary artery  and its branches to suggest acute pulmonary embolism. Main pulmonary  artery and its branches are normal in caliber.    The thoracic aorta normal in course and caliber.  Mild/minimal coronary artery calcifications are seen. Please note,the  study is not optimized for evaluation of coronary arteries.    The cardiac chambers are not enlarged.    There is no pericardial effusion seen.    MEDIASTINUM AND WALTER, LOWER NECK AND AXILLA:  The visualized thyroid gland is within normal limits.  No evidence of thoracic lymphadenopathy by CT criteria.  Esophagus appears within normal limits as seen.    LUNGS AND AIRWAYS:  The trachea and central airways are patent. No endobronchial lesion  is seen.    There is redemonstration of consolidative opacities in bilateral  lungs more in the lower lobes and slightly improved compared to prior  study.    Areas of architectural distortion, and mild bronchiectasis scattered  throughout the lungs with coarsening of interstitial markings.      UPPER ABDOMEN:  An enteric tube in place terminating in the proximal duodenum.  The visualized subdiaphragmatic structures demonstrate no remarkable  findings.        CHEST WALL AND OSSEOUS STRUCTURES:  Chest wall is within normal limits.  No acute osseous pathology.There are no suspicious osseous lesions.    Impression  1. No evidence of pulmonary embolism.  2. Multifocal consolidative opacities more in the lower lobes,  slightly improved compared to prior study. Findings remain concerning  for multifocal infectious process.  3. Interlobular septal thickening, architectural distortion and mild  bronchiectasis scattered throughout the lungs, slightly worse and  concerning for fibrotic like changes, likely sequela of infection.  Cannot exclude component of interstitial pulmonary edema.  4.  Other findings as described above.      MACRO:  None    Signed by: Geovanny Zepeda 11/26/2023 2:13  PM  Dictation workstation:   [id number]      Assessment/Plan   Principal Problem:    Acute hypoxemic respiratory failure (CMS/[facility])  Active Problems:    Stage 4 chronic kidney disease (CMS/[facility])    COVID-19    Pneumonia of both lower lobes due to infectious organism    Pseudomonal bacteremia    A-fib (CMS/[facility])    Macarena Wilson is a 68 year old female with a PMH of CKD III and HTN admitted to the MICU from OSH with AHRF 2/2 COVID pneumonia (positive on 10/13) complicated by ARDS and pulmonary fibrosis with resulting organizing PNA. Course complicated by distal DVTs and likely PE, oliguric HUNG on CKD, and intermittent hypoxia-associated SVT and afib with RVR.  Patient on 10L HFNC         Neuro:   #Hx of anxiety   - Today patient is A&Ox3.  - PRN tylenol for Pain or PRN Oxy for pain or air hunger.  Lidocaine patch for back discomfort  - Continue Trazodone 25mg HS and Melatonin HS   - Continue Seroquel 25 mg HS and daily PRN 12.5mg   - Palliative following, recs appreciated   - Monitor Qtc - .380 on 11/28  - PT/OT     CV:   #Hx of HTN  #Afib, Intermittent SVT  - Patient remains HDS, NSR/SB on telemetry today  - Continue Metoprolol Tartrate 50mg Q6H, hold for SBP <100, HR <50  - Holding Home Losartan 100mg in the setting of recurrent HUNG on CKD   -  on 11/20, down to 99 11/23  - Monitor for Qtc prolongation while on Cipro and Seroquel    - Qtc .380 on 11/28 --> repeat EKG ordered for today     Pulm:   #AHRF 2/2 COVID PNA (11/23) c/b ARDS, GGO c/f pulmonary fibrosis - organizing PNA, likely 2/2 COVID PNA, Likely PE, known DVTs  #Pneumomediastinum, resolved   - Today patient remains on 10L HFNC, continued intermittent desaturation with turns/repositioning which resolve with rest.  Desaturation to 54% while sitting edge of bed with PT/OT, required NRB for brief period of time to aide in recovery.   - Pulmonary fibrosis 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   - Fungitell, aspergillus  galactomannan negative, blasto and histo Ag neg  - Continue Prednisone Taper --> Started on 80 mg BID evening of 11/26, previously on 50mg q6 x 1 week days.            > Tentative taper plan: On 12/4 change to 60 mg BID for 1 wk, 40 mg BID 1 wk, 60 mg daily 2-4 wks, 40mg daily 2-4 wks           > Daily DS bactrim for PCP prophy  - Fluticasone 1 spray bid   - PRN IV Lasix-- 40 mg IV lasix most recent 11/23  - Avoid further CPAP attempts d/t pneumomediastinum which occurred after trying patient on CPAP  - 11/27 CT PE negative for PE  - Dr. Jacqui Valdez with the lung transplant team to assess patient today for possible lung transplant workup     Renal:   #Oliguric HUNG on CKD III, RECURRENT, with improving azotemia   - Voiding via external catheter  - Cr peaked at 3.97. Scr 1.26 today up from day prior, BUN  80 today  - Baseline of 1.6. Likely prerenal. Renal US with non-obstructing stones BL, no hydronephrosis, cysts BL. 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  Will reassess appropriateness to stop early next week.   - Stool panel negative.  - Continue Loperamide 2 mg QID PRN  - PRN miralax and docusate senna for constipation -> Holding d/t diarrhea      ENDOCRINOLOGY:   #Steroid induced hyperglycemia  - A1C 6.3, pre-DM  - Blood glucose ranging 116-249  - Continue with MDSSI     Heme:    #DVT   #Anemia of unknown cause  - Hgb stable, 10.6 today.    - 11/13: BUE U/S: +R PT DVT, +L Soleal DVT  - 11/25 CTPE without evidence of PE  - Continue Apixaban BID   - CBC daily and prn     MSK/Skin:   #Eczema/Rosasea  #Sacral pressure injury, stage  3  - On home dupixent  - Seen by wound care RN  - For sacral wound: Clean buttocks with cleansing cloths and apply Triad to open skin BID and as needed. Leave RIZWANA      ID:   #Covid PNA (received Decadron, Remdesivir, Tocilizumab at OSH) Neutrophilic leukocytosis  - WBC 22.4 yesterday, CBC pending  MICRO: 11/21 BC x2 3/4 Pseudomonas Aeruginosa, Susceptible to Zosyn and Cipro, 11/25 Bcx2 NGTD and UC positive Candida Tropicalis, likely contamination, repeat UC ordered 11/29, follow up needed   - 10/26 & 11/10: stool studies, including C. Diff neg, 11/25 C dif negative,  11/9 Step pneumonaie Ag, Legionella Ag neg, 11/9 Flu A/B, RSV neg   - Zosyn 11/22- 11/24; Ciprofloxacin 500 mg BID for 14 day course (11/24 - tentative stop date of 12/5)      Lines: VENUS, Joshua     Code Status: DNR/DNI  NOK:  Primary Emergency Contact: Mathew Wilson, Home Phone: [phone]     Dispo:  Continue SDU for hypoxic resp failure with high FiO2 needs     Pt discussed with Dr. Krishnan, seen and examined. All labs, VS and previous plan of care reviewed.    Jw Florez, ZION-CNP

## 2023-11-30 NOTE — PROGRESS NOTES
Nutrition Assessment    Nutrition Follow Up Assessment: Reconsult for nocturnal TF recs.       Patient is a 68 y.o. female being treated for COVID pneumonitis and ARDS.  After spending time in the MICU on Bipap and Airvo, she is now in the step-down unit on nasal canula.  Noted that she passed her FEES on 11/28 for a regular diet with thin liquids.  Continues to have dobhoff in place.    Met with patient.  TF not running at visit-- had been on Nepro for sole source nutrition while unsafe to eat.  She had already finished breakfast-- raisin bran, milk, peaches and coffee.  She is feeling good and thinks she has a good appetite.  No GI complaints to report.  She is interesting in trying Ensure for extra calories and protein.  Met with NP in SDU-- he reports that she ate about 60% of her breakfast yesterday and then only one bite of lunch and no dinner.  Reports that she is very weak and cannot hold her head up when sitting, assisted, at the side of the bed.  May need to continue to provide patient with TF to help her meet her nutrition needs-- see below for details.     Anthropometrics:        11/17/23: 63.2kg-- no new weight  11/16/23: 60.2kg-- down 22% from usual body weight and down 15% in the course of her admission  11/09/23 68.4 kg (150 lb 12.7 oz)   11/08/23 68.9 kg (151 lb 14.4 oz)   01/19/23 76.3 kg (168 lb 3.2 oz)   12/14/22 76.2 kg (167 lb 15.9 oz)   10/17/22 74 kg (163 lb 2.3 oz)   07/19/22 78.9 kg (174 lb)   05/13/22 78.9 kg (174 lb)   03/23/22 78.9 kg (174 lb)   03/16/22 78.9 kg (174 lb)   03/02/22 79 kg (174 lb 3.2 oz)        IBW/kg (Dietitian Calculated): 52.3 kg  Percent of IBW: 131 %         Nutrition Focused Physical Exam Findings:  Defer    Edema:      Edema Location: non-pitting edema   Physical Findings:              Skin: Positive (PI to sacrum)    Objective Data:    Last BM Date: 11/30/23    Nutrition Significant Labs:  BMP Trend:   Results from last 7 days   Lab Units 11/29/23  1850  "11/28/23  1320 11/27/23  0531 11/26/23  0417   GLUCOSE mg/dL 133* 129* 202* 188*   CALCIUM mg/dL 8.1* 8.1* 8.2* 8.0*   SODIUM mmol/L 139 137 138 139   POTASSIUM mmol/L 5.1 4.9 4.5 4.3   CO2 mmol/L 29 26 28 28   CHLORIDE mmol/L 102 100 99 100   BUN mg/dL 79* 83* 84* 90*   CREATININE mg/dL 1.16* 1.04 1.17* 1.32*    , BG POCT trend:   Results from last 7 days   Lab Units 11/30/23  0850 11/29/23  1635 11/29/23  1149 11/29/23  0809 11/28/23  1900   POCT GLUCOSE mg/dL 167* 167* 124* 116* 171*    , Renal Lab Trend:   Results from last 7 days   Lab Units 11/29/23  1315 11/28/23  1320 11/27/23  0531 11/26/23  0417   POTASSIUM mmol/L 5.1 4.9 4.5 4.3   PHOSPHORUS mg/dL 4.3 3.5 3.5 3.4   SODIUM mmol/L 139 137 138 139   MAGNESIUM mg/dL 2.11 2.11 2.15 2.16   EGFR mL/min/1.73m*2 51* 58* 51* 44*   BUN mg/dL 79* 83* 84* 90*   CREATININE mg/dL 1.16* 1.04 1.17* 1.32*    , Vit D: No results found for: \"VITD25\"     Nutrition Specific Mediations:  Scheduled medications  apixaban, 5 mg, nasogastric tube, BID  ciprofloxacin, 500 mg, nasogastric tube, q12h KARL  esomeprazole, 40 mg, nasoduodenal tube, Daily  fluticasone, 1 spray, Each Nostril, BID  insulin lispro, 0-10 Units, subcutaneous, TID with meals  lidocaine, 1 patch, transdermal, Daily  melatonin, 5 mg, oral, Daily  metoprolol tartrate, 50 mg, nasogastric tube, BID  nystatin, 4 mL, Swish & Spit, BID  predniSONE, 80 mg, oral, BID  QUEtiapine, 25 mg, oral, Nightly  sulfamethoxazole-trimethoprim, 160 mg of trimethoprim, nasogastric tube, q24h KARL  thiamine, 100 mg, oral, Daily  traZODone, 25 mg, nasogastric tube, Nightly      Dietary Orders (From admission, onward)       Start     Ordered    11/28/23 1438  Adult diet Regular  Diet effective now        Question:  Diet type  Answer:  Regular    11/28/23 1437 11/09/23 0400  May Participate in Room Service With Assistance  Once        Question:  .  Answer:  Yes    11/09/23 0400                     Estimated Needs:   Total Energy " Estimated Needs (kCal):  (3438-3492)   Method for Estimating Needs: MSJ= 1099 using 60kg    Total Protein Estimated Needs (g):  (70-80)   Method for Estimating Needs: 1.3-1.5 x 52.3kg          Nutrition Diagnosis   Nutrition Diagnosis:  Malnutrition Diagnosis  Patient has Malnutrition Diagnosis: Yes  Diagnosis Status: New  Malnutrition Diagnosis: Severe malnutrition related to acute disease or injury  As Evidenced by: 12% weight loss from normal along with moderate to severe muscle and fat loss on physical exam in setting of a decrease in PO intake/appetite after being diagnosed with COVID        Nocturnal TF at goal= 972kcals, 44grams protein    Nutrition Interventions/Recommendations   Nutrition Interventions and Recommendations:        Nutrition Prescription:  Can change pt's tube feed regimen to be Nepro at 45mls/hr over 12hrs at night, from 7p7a, to provide ~60% of her estimated calorie and protein needs.  Check Vitamin D level.  RDN will order Ensure/Boost to come BID.         Time Spent/Follow-up Reminder:   Follow Up  Time Spent (min): 60 minutes  Last Date of Nutrition Visit: 11/30/23  Nutrition Follow-Up Needed?: Dietitian to reassess per policy  Follow up Comment: nocturnal TF recs provided/now on PO diet, supps

## 2023-12-01 NOTE — PROGRESS NOTES
Spiritual Care Visit    Clinical Encounter Type  Visited With: Patient  Routine Visit: Follow-up  Continue Visiting: Yes    Taxonomy  Intended Effects: Build relationship of care and support, Demonstrate caring and concern  Methods: Encourage self reflection, Offer support  Interventions: Acknowledge current situation, Active listening, Discuss coping mechanisms with someone     visited with patient Macarena iWlson. Patient updated  on how she has been since 's last visit, how she is processing certain decisions, and how she remains focused on what is within her control at the moment. Patient expressed simultaneously being realistic and hopeful, and this allowing her to have a level of acceptance. Patient shared that using her phone and watching TV have been helpful if she starts ruminating on things too much.  provided support through reflective listening and supportive conversation. Patient was preparing for physical therapy, so  excused self. Patient was appreciative and did not have any needs. Spiritual Care remains available as needed/requested.    Rev. Estee Lai MDiv, [facility]

## 2023-12-01 NOTE — PROGRESS NOTES
"Macarena Wilson is a 68 y.o. female on day 22 of admission presenting with Acute hypoxemic respiratory failure (CMS/HCC).    Subjective     No acute events overnight.     Denies SOB, CP, cough, fever, chills, abdominal pain, and N/V    Objective   Physical Exam:  Constitutional: pt in NAD, alert and cooperative  Eyes: PERRL, EOMI, no icterus   ENMT: mucous membranes moist  Head/Neck: Neck supple, no apparent injury  Respiratory/Thorax: Lungs diminished bilaterally, non-labored breathing, dry cough, on 10L HFNC  Cardiovascular: Regular, rate and rhythm, no murmurs, normal S1 and S2  Gastrointestinal: Nondistended, soft, non-tender, BS present x 4, Cortrak present   : External catheter in place   Musculoskeletal: ROM intact, unable to lift B/L LE off bed.   Extremities: no edema  Neurological: alert and oriented x 3, speech clear, follows commands appropriately, without focal deficits, weakness B/L LE  Skin: Warm and dry, scattered bruising    Vital Signs  Blood pressure 103/58, pulse 59, temperature 36 °C (96.8 °F), temperature source Temporal, resp. rate 15, height 1.6 m (5' 2.99\"), weight 63.2 kg (139 lb 5.3 oz), SpO2 99 %.  Oxygen Therapy  SpO2: 99 %  Medical Gas Therapy: Supplemental oxygen  O2 Delivery Method: High flow nasal cannula       Intake/Output last 3 Shifts:  I/O last 3 completed shifts:  In: - (0 mL/kg)   Out: 1050 (15.4 mL/kg) [Urine:1050 (0.4 mL/kg/hr)]  Dosing Weight: 68.4 kg     Lines and Tubes:  Peripheral IV 11/08/23 20 G Left;Anterior Hand (Active)   Placement Date/Time: 11/08/23 0000   Size (Gauge): 20 G  Orientation: Left;Anterior  Location: Hand   Number of days: 23       Peripheral IV 11/09/23 20 G Right;Anterior Forearm (Active)   Placement Date/Time: 11/09/23 0600   Size (Gauge): 20 G  Orientation: Right;Anterior  Location: Forearm  Technique: Ultrasound guidance   Number of days: 22       Peripheral IV 11/29/23 20 G 2.25 cm Left Forearm (Active)   Placement Date/Time: 11/29/23 1315  "  Hand Hygiene Completed: Yes  Size (Gauge): 20 G  Catheter Length (cm): 2.25 cm  Orientation: Left  Location: Forearm  Site Prep: Chlorhexidine   Technique: Ultrasound guidance  Placed by: Brandon CRAFT  Patient Toleranc...   Number of days: 1       NG/OG/Feeding Tube Right nostril (Active)   Placement Date/Time: 11/16/23 1522   Tube Length: 75 cm  Tube Location: Right nostril   Number of days: 14       External Urinary Catheter (Active)   Placement Date/Time: 11/09/23 0400     Number of days: 22         Relevant Results  Scheduled medications  apixaban, 5 mg, nasogastric tube, BID  ciprofloxacin, 500 mg, nasogastric tube, q12h KARL  esomeprazole, 40 mg, nasoduodenal tube, Daily  fluticasone, 1 spray, Each Nostril, BID  insulin lispro, 0-10 Units, subcutaneous, TID with meals  lidocaine, 1 patch, transdermal, Daily  melatonin, 5 mg, oral, Daily  metoprolol tartrate, 50 mg, nasogastric tube, BID  nystatin, 4 mL, Swish & Spit, BID  predniSONE, 80 mg, oral, BID  QUEtiapine, 25 mg, oral, Nightly  sodium zirconium cyclosilicate, 5 g, oral, TID  sulfamethoxazole-trimethoprim, 160 mg of trimethoprim, nasogastric tube, q24h KARL  thiamine, 100 mg, oral, Daily  traZODone, 25 mg, nasogastric tube, Nightly      Continuous medications     PRN medications  PRN medications: acetaminophen **OR** [DISCONTINUED] acetaminophen **OR** [DISCONTINUED] acetaminophen, dextrose 10 % in water (D10W), dextrose, docusate sodium, ipratropium-albuteroL, loperamide, ondansetron ODT **OR** [DISCONTINUED] ondansetron, oxyCODONE, oxygen, polyethylene glycol, QUEtiapine, sodium chloride    Results for orders placed or performed during the hospital encounter of 11/09/23 (from the past 24 hour(s))   POCT GLUCOSE   Result Value Ref Range    POCT Glucose 167 (H) 74 - 99 mg/dL   POCT GLUCOSE   Result Value Ref Range    POCT Glucose 193 (H) 74 - 99 mg/dL   POCT GLUCOSE   Result Value Ref Range    POCT Glucose 195 (H) 74 - 99 mg/dL   POCT GLUCOSE   Result Value  Ref Range    POCT Glucose 197 (H) 74 - 99 mg/dL   Magnesium   Result Value Ref Range    Magnesium 1.97 1.60 - 2.40 mg/dL   CBC and Auto Differential   Result Value Ref Range    WBC 14.2 (H) 4.4 - 11.3 x10*3/uL    nRBC 0.0 0.0 - 0.0 /100 WBCs    RBC 3.22 (L) 4.00 - 5.20 x10*6/uL    Hemoglobin 9.1 (L) 12.0 - 16.0 g/dL    Hematocrit 27.3 (L) 36.0 - 46.0 %    MCV 85 80 - 100 fL    MCH 28.3 26.0 - 34.0 pg    MCHC 33.3 32.0 - 36.0 g/dL    RDW 14.3 11.5 - 14.5 %    Platelets 168 150 - 450 x10*3/uL    Neutrophils % 94.5 40.0 - 80.0 %    Immature Granulocytes %, Automated 2.4 (H) 0.0 - 0.9 %    Lymphocytes % 1.4 13.0 - 44.0 %    Monocytes % 1.6 2.0 - 10.0 %    Eosinophils % 0.0 0.0 - 6.0 %    Basophils % 0.1 0.0 - 2.0 %    Neutrophils Absolute 13.44 (H) 1.20 - 7.70 x10*3/uL    Immature Granulocytes Absolute, Automated 0.34 0.00 - 0.70 x10*3/uL    Lymphocytes Absolute 0.20 (L) 1.20 - 4.80 x10*3/uL    Monocytes Absolute 0.23 0.10 - 1.00 x10*3/uL    Eosinophils Absolute 0.00 0.00 - 0.70 x10*3/uL    Basophils Absolute 0.02 0.00 - 0.10 x10*3/uL   Renal function panel   Result Value Ref Range    Glucose 152 (H) 74 - 99 mg/dL    Sodium 135 (L) 136 - 145 mmol/L    Potassium 5.4 (H) 3.5 - 5.3 mmol/L    Chloride 102 98 - 107 mmol/L    Bicarbonate 24 21 - 32 mmol/L    Anion Gap 14 10 - 20 mmol/L    Urea Nitrogen 71 (H) 6 - 23 mg/dL    Creatinine 1.12 (H) 0.50 - 1.05 mg/dL    eGFR 53 (L) >60 mL/min/1.73m*2    Calcium 8.0 (L) 8.6 - 10.6 mg/dL    Phosphorus 4.7 2.5 - 4.9 mg/dL    Albumin 2.5 (L) 3.4 - 5.0 g/dL   Vitamin D 25-Hydroxy,Total (for eval of Vitamin D levels)   Result Value Ref Range    Vitamin D, 25-Hydroxy, Total 19 (L) 30 - 100 ng/mL     Electrocardiogram, 12-lead PRN ACS symptoms    Result Date: 11/29/2023  Normal sinus rhythm Nonspecific T wave abnormality Abnormal ECG When compared with ECG of 24-NOV-2023 12:55, (unconfirmed) No significant change was found Confirmed by Maulik Rowland ([id number]) on 11/29/2023 7:42:31  PM    Electrocardiogram, 12-lead PRN ACS symptoms    Result Date: 11/29/2023  Normal sinus rhythm T wave abnormality, consider lateral ischemia Abnormal ECG When compared with ECG of 17-NOV-2023 22:14, Sinus rhythm has replaced Atrial fibrillation Vent. rate has decreased BY  91 BPM ST no longer depressed in Inferior leads ST no longer depressed in Lateral leads T wave inversion no longer evident in Inferior leads T wave inversion less evident in Lateral leads Confirmed by Maulik Rowland ([id number]) on 11/29/2023 7:42:16 PM           Malnutrition Diagnosis Status: New  Malnutrition Diagnosis: Severe malnutrition related to acute disease or injury  As Evidenced by: 12% weight loss from normal along with moderate to severe muscle and fat loss on physical exam in setting of a decrease in PO intake/appetite after being diagnosed with COVID  I agree with the dietitian's malnutrition diagnosis.    CT angio chest for pulmonary embolism 11/26/2023    Narrative  Interpreted By:  Geovanny Roberts,  STUDY:  CT ANGIO CHEST FOR PULMONARY EMBOLISM;  11/26/2023 1:34 pm    INDICATION:  Signs/Symptoms:c/f PE.    COMPARISON:  CT dated 11/07/2023    ACCESSION NUMBER(S):  [id number]    ORDERING CLINICIAN:  GAGANDEEP DE LA CRUZ    TECHNIQUE:  Helical data acquisition of the chest was obtained after intravenous  administration of 75 cc of Omnipaque 350, as per PE protocol. Images  were reformatted in coronal and sagittal planes. Axial and coronal  maximum intensity projection (MIP) images were created and reviewed.    FINDINGS:  POTENTIAL LIMITATIONS OF THE STUDY: Study is slightly limited due to  motion artifact.    HEART AND VESSELS:  There are no discrete filling defects within main pulmonary artery  and its branches to suggest acute pulmonary embolism. Main pulmonary  artery and its branches are normal in caliber.    The thoracic aorta normal in course and caliber.  Mild/minimal coronary artery calcifications are seen. Please  note,the  study is not optimized for evaluation of coronary arteries.    The cardiac chambers are not enlarged.    There is no pericardial effusion seen.    MEDIASTINUM AND WALTER, LOWER NECK AND AXILLA:  The visualized thyroid gland is within normal limits.  No evidence of thoracic lymphadenopathy by CT criteria.  Esophagus appears within normal limits as seen.    LUNGS AND AIRWAYS:  The trachea and central airways are patent. No endobronchial lesion  is seen.    There is redemonstration of consolidative opacities in bilateral  lungs more in the lower lobes and slightly improved compared to prior  study.    Areas of architectural distortion, and mild bronchiectasis scattered  throughout the lungs with coarsening of interstitial markings.      UPPER ABDOMEN:  An enteric tube in place terminating in the proximal duodenum.  The visualized subdiaphragmatic structures demonstrate no remarkable  findings.        CHEST WALL AND OSSEOUS STRUCTURES:  Chest wall is within normal limits.  No acute osseous pathology.There are no suspicious osseous lesions.    Impression  1. No evidence of pulmonary embolism.  2. Multifocal consolidative opacities more in the lower lobes,  slightly improved compared to prior study. Findings remain concerning  for multifocal infectious process.  3. Interlobular septal thickening, architectural distortion and mild  bronchiectasis scattered throughout the lungs, slightly worse and  concerning for fibrotic like changes, likely sequela of infection.  Cannot exclude component of interstitial pulmonary edema.  4.  Other findings as described above.      MACRO:  None    Signed by: Geovanny Zepeda 11/26/2023 2:13 PM  Dictation workstation:   [id number]      Assessment/Plan   Principal Problem:    Acute hypoxemic respiratory failure (CMS/HCC)  Active Problems:    Stage 4 chronic kidney disease (CMS/HCC)    COVID-19    Pneumonia of both lower lobes due to infectious organism    Pseudomonal bacteremia     A-fib (CMS/[facility])    Macarena Wilson is a 68 year old female with a PMH of CKD III and HTN admitted to the MICU from OSH with AHRF 2/2 COVID pneumonia (positive on 10/13) complicated by ARDS and pulmonary fibrosis with resulting organizing PNA. Course complicated by distal DVTs and likely PE, oliguric HUNG on CKD, and intermittent hypoxia-associated SVT and afib with RVR.  Patient on 10L HFNC         Neuro:   #Hx of anxiety   - Today patient is A&Ox3.  - PRN tylenol for Pain or PRN Oxy for pain or air hunger.  Lidocaine patch for back discomfort  - Continue Trazodone 25mg HS and Melatonin HS   - Continue Seroquel 25 mg HS and daily PRN 12.5mg   - Palliative following, recs appreciated   - Monitor Qtc - .380 on 11/28  - PT/OT     CV:   #Hx of HTN  #Afib, Intermittent SVT  - Patient remains HDS, NSR/SB on telemetry today  - Continue Metoprolol Tartrate 50mg Q6H, hold for SBP <100, HR <60  - Holding Home Losartan 100mg in the setting of recurrent HUNG on CKD   -  on 11/20, down to 99 11/23  - Monitor for Qtc prolongation while on Cipro and Seroquel    - Qtc .393 today     Pulm:   #AHRF 2/2 COVID PNA (11/23) c/b ARDS, GGO c/f pulmonary fibrosis - organizing PNA, likely 2/2 COVID PNA, Likely PE, known DVTs  #Pneumomediastinum, resolved   - Today patient remains on 10L HFNC, continued intermittent desaturation with turns/repositioning which resolve with rest.  Prior to PT/OT, please put patient on Airvo to help with oxygenation during PT.   - Pulmonary fibrosis 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   - Fungitell, aspergillus galactomannan negative, blasto and histo Ag neg  - Continue Prednisone Taper --> Started on 80 mg BID evening of 11/26, previously on 50mg q6 x 1 week days.            > Tentative taper plan: On 12/4 change to 60 mg BID for 1 wk, 40 mg BID 1 wk, 60 mg daily 2-4 wks, 40mg daily 2-4 wks           > Daily DS bactrim for PCP prophy  - Fluticasone 1 spray bid   -  PRN IV Lasix-- 40 mg IV lasix most recent 11/23  - Avoid further CPAP attempts d/t pneumomediastinum which occurred after trying patient on CPAP  - 11/27 CT PE negative for PE  - Seen by lung transplant team 11/30        > Must be tapered down to no more than 10mg Prednisone per day and must be able to physically walk 100ft (on any amount of oxygen).  Plan to re-engage transplant team at that time.      Renal:   #Oliguric HUNG on CKD III, RECURRENT, with improving azotemia   - Voiding via external catheter  - Cr peaked at 3.97. Scr 1.12 today down from day prior, BUN  71 today  - Baseline of 1.6. Likely prerenal. Renal US with non-obstructing stones BL, no hydronephrosis, cysts BL. 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 Lokelma x3 doses ordered    - FEES completed 11/28 --> okay for regular diet   - Nutrition consulted --> Nocturnal feeds, Nepro @ 45ml/hr, start at 1900 stop 0700.  Will reassess appropriateness to stop early next week.   - Stool panel negative.  - Continue Loperamide 2 mg QID PRN  - PRN miralax and docusate senna for constipation -> Holding d/t diarrhea      ENDOCRINOLOGY:   #Steroid induced hyperglycemia  - A1C 6.3, pre-DM  - Blood glucose ranging 116-249  - Continue with MDSSI     Heme:    #DVT   #Anemia of unknown cause  - Hgb stable, 9.1 today.    - 11/13: BUE U/S: +R PT DVT, +L Soleal DVT  - 11/25 CTPE without evidence of PE  - Continue Apixaban BID   - CBC daily and prn     MSK/Skin:   #Eczema/Rosasea  #Sacral pressure injury, stage 3  - On home dupixent  - Seen by wound care RN  - For sacral wound: Clean buttocks with cleansing cloths and apply Triad to open skin BID and as needed.  Leave RIZWANA      ID:   #Covid PNA (received Decadron, Remdesivir, Tocilizumab at OSH) Neutrophilic leukocytosis  - WBC 22.4 yesterday, CBC pending  MICRO: 11/21 BC x2 3/4 Pseudomonas Aeruginosa, Susceptible to Zosyn and Cipro, 11/25 Bcx2 NGTD and UC positive Candida Tropicalis, likely contamination, repeat UC ordered 11/29, follow up needed   - 10/26 & 11/10: stool studies, including C. Diff neg, 11/25 C dif negative,  11/9 Step pneumonaie Ag, Legionella Ag neg, 11/9 Flu A/B, RSV neg   - Zosyn 11/22- 11/24; Ciprofloxacin 500 mg BID for 14 day course (11/24 - tentative stop date of 12/5)      Lines: Joshua DONOVAN     Code Status: DNR/DNI  NOK:  Primary Emergency Contact: Mathew Wilson, Home Phone: [phone]     Dispo:  Continue SDU for hypoxic resp failure with high FiO2 needs     Pt discussed with Dr. Krishnan, seen and examined. All labs, VS and previous plan of care reviewed.    Jw Florez, APRN-CNP

## 2023-12-01 NOTE — PROGRESS NOTES
Occupational Therapy    Occupational Therapy Treatment    Name: Macarena Wilson  MRN: [id number]  : 1955  Date: 23  Time Calculation  Start Time: 1104  Stop Time: 1150  Time Calculation (min): 46 min    Assessment:  End of Session Communication: Bedside nurse  End of Session Patient Position: Bed, 3 rail up, Alarm off, not on at start of session (patient in side-lying on (L) side with support of pillows)  Plan:  Treatment Interventions: ADL retraining, Functional transfer training, UE strengthening/ROM, Endurance training, Patient/family training, Equipment evaluation/education, Compensatory technique education  OT Frequency: 3 times per week  OT Discharge Recommendations: Moderate intensity level of continued care  OT - OK to Discharge: Yes    Subjective   Previous Visit Info:  OT Last Visit  OT Received On: 23  General:  General  Family/Caregiver Present: No  Co-Treatment: PT  Co-Treatment Reason: to maximize patient safety, mobility and activity tolerance 2/2 patient with tenuous respiratory status, x2 skilled assist for mobility  Prior to Session Communication: Bedside nurse  Patient Position Received: Bed, 3 rail up, Alarm off, not on at start of session  General Comment: Patient supine in bed on arrival, agreeable to participate in therapy. Prior to OT session, RN placed patient on AirVo. Patient continues to require increase assist x2 for all mobility tasks. Patient desaturated to the high 70s on AirVo, per RN, place NRB in addition to AirVo support.  Precautions:  Medical Precautions: Fall precautions, Oxygen therapy device and L/min  Vitals:  Vital Signs  SpO2:  (pre: 94% on 40L/55%, sitting EOB: desaturated to 77-79%, 15L NRB, O2 increased to mid 80s-low 90s; post: saturated on AirVo mid 80s-high 80s in supine.)  Pain Assessment:  Pain Assessment  Pain Assessment: 0-10  Pain Score: 0 - No pain     Objective      Bed Mobility/Transfers: Bed Mobility  Bed Mobility: Yes  Bed Mobility  "1  Bed Mobility 1: Supine to sitting, Sitting to supine  Level of Assistance 1: Maximum assistance, Moderate verbal cues  Bed Mobility Comments 1: x2 assist, HOB elevated for supine to sit, +draw sheet  Bed Mobility 2  Bed Mobility  2: Rolling left  Level of Assistance 2: Maximum assistance, Minimal verbal cues  Bed Mobility Comments 2: facilitated patient in to (L) sidelying with support of pillows in attempt to improve oxygenation    Transfers  Transfer: Yes  Transfer 1  Transfer From 1: Sit to, Stand to  Transfer to 1: Sit, Stand  Technique 1: Sit to stand, Stand to sit  Transfer Level of Assistance 1: Dependent, +2, Moderate verbal cues  Trials/Comments 1: (B) arm in arm assist + draw sheet under patient's hips; achieved 25% of full standing; x2 trials     Therapy/Activity: Therapeutic Activity  Therapeutic Activity Performed: Yes  Therapeutic Activity 1: Patient sat EOB x25 minutes; required max A for balance most of the time in sitting, able to improve to moments of mod A briefly with increased cues and (B)UE support on bed, patient with (R) posterolateral lean, patient completed dynamic \"punching pillow\" activity alternating UEs with max A for maintaining balance; patient stood x2 trials with dependent x2 assist, (B) arm in arm assist, heavy (R) lateral lean  Outcome Measures:  [facility] Daily Activity  Putting on and taking off regular lower body clothing: A lot  Bathing (including washing, rinsing, drying): A lot  Putting on and taking off regular upper body clothing: A lot  Toileting, which includes using toilet, bedpan or urinal: A lot  Taking care of personal grooming such as brushing teeth: A lot  Eating Meals: A little  Daily Activity - Total Score: 13     and E = Exercise and Early Mobility  Current Activity: Sitting at edge of bed    Education Documentation  Body Mechanics, taught by Alicia Kilgore OT at 12/1/2023  2:48 PM.  Learner: Patient  Readiness: Acceptance  Method: Explanation  Response: Needs " Reinforcement    ADL Training, taught by Alicia Kilgore OT at 12/1/2023  2:48 PM.  Learner: Patient  Readiness: Acceptance  Method: Explanation  Response: Needs Reinforcement    Education Comments  No comments found.      Goals:  Encounter Problems       Encounter Problems (Active)       ADLs       Patient with complete upper body dressing with independent level of assistance donning and doffing all UE clothes. (Progressing)       Start:  11/17/23    Expected End:  12/22/23            Patient with complete lower body dressing with minimal assist  level of assistance donning and doffing all LE clothes  with PRN adaptive equipment. (Progressing)       Start:  11/17/23    Expected End:  12/22/23            Patient will complete daily grooming tasks with stand by assist level of assistance and PRN adaptive equipment while in sitting. (Progressing)       Start:  11/17/23    Expected End:  12/22/23            Patient will complete toileting including hygiene clothing management/hygiene with minimal assist  level of assistance. (Progressing)       Start:  11/17/23    Expected End:  12/22/23               BALANCE       Pt will maintain dynamic sitting balance during ADL task with contact guard assist level of assistance in order to demonstrate decreased risk of falling and improved postural control. (Progressing)       Start:  11/17/23    Expected End:  12/22/23               COGNITION/SAFETY       Patient will verbalize and demonstrate >2 relaxation and breathing techniques during sessions with independence. (Progressing)       Start:  11/17/23    Expected End:  12/22/23               EXERCISE/STRENGTHENING       Patient will complete BUE exercises in order to improve activity tolerance for ADL performance.  (Progressing)       Start:  11/17/23    Expected End:  12/22/23               TRANSFERS       Patient will perform bed mobility minimal assist  level of assistance in order to improve safety and independence with  mobility (Progressing)       Start:  11/17/23    Expected End:  12/22/23            Patient will complete functional transfers with least restrictive device with minimal assist  level of assistance. (Progressing)       Start:  11/17/23    Expected End:  12/22/23              Alicia Kilgore OTR/L

## 2023-12-02 NOTE — PROGRESS NOTES
"MEDICAL STEPDOWN UNIT DAILY PROGRESS NOTE    Macarena Wilson is a 68 y.o. female on day 23 of admission presenting with Acute hypoxemic respiratory failure (CMS/HCC).    Subjective   Reports sleeping better, feeling less anxious.  Pain is manageable.  No N/V/D.  Started on PO diet and taking small bites each meal.        Objective   Constitutional: elderly female, resting comfortably in bed  Eyes: PERRL, EOMI, no icterus   ENMT: mucous membranes moist, no apparent injury, no lesions seen  Head/Neck: Neck supple, no apparent injury  Respiratory/Thorax: Lungs CTA bilaterally, diminished, no wheezing or rhonchi on 4 L NC  Cardiovascular: Regular, rate and rhythm, no murmurs, normal S1 and S2  Gastrointestinal: Nondistended, soft, non-tender, BS present x 4, cortract  : voiding per external female cath  Musculoskeletal: Passive ROM intact, no joint swelling,   Extremities: normal extremities, no edema, contusions or wounds  Neurological: alert and oriented x 3, speech clear, follows commands appropriately, cr. n. II-XII intact, sensation grossly intact, BLE unable to move against gravity.  Moves BUE equally and independently  Skin: Warm and dry, bruising throughout BUE; sacral redness; PIVs    Vital Signs  Blood pressure 95/64, pulse 59, temperature 35.7 °C (96.3 °F), resp. rate 16, height 1.6 m (5' 2.99\"), weight 63.2 kg (139 lb 5.3 oz), SpO2 97 %.  Oxygen Therapy  SpO2: 97 %  Medical Gas Therapy: Supplemental oxygen  O2 Delivery Method: High flow nasal cannula       Intake/Output last 3 Shifts:  I/O last 3 completed shifts:  In: 795 (11.6 mL/kg) [NG/GT:795]  Out: 1650 (24.1 mL/kg) [Urine:1650 (0.7 mL/kg/hr)]  Dosing Weight: 68.4 kg     Lines and Tubes:  Peripheral IV 11/08/23 20 G Left;Anterior Hand (Active)   Placement Date/Time: 11/08/23 0000   Size (Gauge): 20 G  Orientation: Left;Anterior  Location: Hand   Number of days: 24       Peripheral IV 11/09/23 20 G Right;Anterior Forearm (Active)   Placement " Date/Time: 11/09/23 0600   Size (Gauge): 20 G  Orientation: Right;Anterior  Location: Forearm  Technique: Ultrasound guidance   Number of days: 23       Peripheral IV 11/29/23 20 G 2.25 cm Left Forearm (Active)   Placement Date/Time: 11/29/23 1315   Hand Hygiene Completed: Yes  Size (Gauge): 20 G  Catheter Length (cm): 2.25 cm  Orientation: Left  Location: Forearm  Site Prep: Chlorhexidine   Technique: Ultrasound guidance  Placed by: Brandon CRAFT  Patient Toleranc...   Number of days: 2       NG/OG/Feeding Tube Right nostril (Active)   Placement Date/Time: 11/16/23 1522   Tube Length: 75 cm  Tube Location: Right nostril   Number of days: 15       External Urinary Catheter (Active)   Placement Date/Time: 11/09/23 0400     Number of days: 23         Relevant Results  Results from last 7 days   Lab Units 12/01/23  0547 11/30/23  0246 11/29/23  1315   WBC AUTO x10*3/uL 14.2* 15.1* 18.4*   HEMOGLOBIN g/dL 9.1* 10.6* 10.1*   HEMATOCRIT % 27.3* 33.1* 30.7*   PLATELETS AUTO x10*3/uL 168 194 198     Results from last 7 days   Lab Units 12/01/23  0547 11/30/23  0246 11/29/23  1315   SODIUM mmol/L 135* 139 139   POTASSIUM mmol/L 5.4* 5.2 5.1   CHLORIDE mmol/L 102 101 102   CO2 mmol/L 24 25 29   BUN mg/dL 71* 80* 79*   CREATININE mg/dL 1.12* 1.26* 1.16*   CALCIUM mg/dL 8.0* 8.3* 8.1*   GLUCOSE mg/dL 152* 204* 133*                          Scheduled medications  apixaban, 5 mg, nasogastric tube, BID  ciprofloxacin, 500 mg, nasogastric tube, q12h KARL  esomeprazole, 40 mg, nasoduodenal tube, Daily  fluticasone, 1 spray, Each Nostril, BID  insulin lispro, 0-10 Units, subcutaneous, TID with meals  lidocaine, 1 patch, transdermal, Daily  melatonin, 5 mg, oral, Daily  metoprolol tartrate, 50 mg, nasogastric tube, BID  nystatin, 4 mL, Swish & Spit, BID  predniSONE, 80 mg, oral, BID  QUEtiapine, 25 mg, oral, Nightly  sulfamethoxazole-trimethoprim, 160 mg of trimethoprim, nasogastric tube, q24h KARL  thiamine, 100 mg, oral, Daily  traZODone,  25 mg, nasogastric tube, Nightly      Continuous medications     PRN medications  PRN medications: acetaminophen **OR** [DISCONTINUED] acetaminophen **OR** [DISCONTINUED] acetaminophen, dextrose 10 % in water (D10W), dextrose, docusate sodium, ipratropium-albuteroL, loperamide, ondansetron ODT **OR** [DISCONTINUED] ondansetron, oxyCODONE, oxygen, polyethylene glycol, QUEtiapine, sodium chloride            Malnutrition Diagnosis Status: New  Malnutrition Diagnosis: Severe malnutrition related to acute disease or injury  As Evidenced by: 12% weight loss from normal along with moderate to severe muscle and fat loss on physical exam in setting of a decrease in PO intake/appetite after being diagnosed with COVID  I agree with the dietitian's malnutrition diagnosis.    CT angio chest for pulmonary embolism 11/26/2023    Narrative  Interpreted By:  Geovanny Roberts,  STUDY:  CT ANGIO CHEST FOR PULMONARY EMBOLISM;  11/26/2023 1:34 pm    INDICATION:  Signs/Symptoms:c/f PE.    COMPARISON:  CT dated 11/07/2023    ACCESSION NUMBER(S):  [id number]    ORDERING CLINICIAN:  GAGANDEEP DE LA CRUZ    TECHNIQUE:  Helical data acquisition of the chest was obtained after intravenous  administration of 75 cc of Omnipaque 350, as per PE protocol. Images  were reformatted in coronal and sagittal planes. Axial and coronal  maximum intensity projection (MIP) images were created and reviewed.    FINDINGS:  POTENTIAL LIMITATIONS OF THE STUDY: Study is slightly limited due to  motion artifact.    HEART AND VESSELS:  There are no discrete filling defects within main pulmonary artery  and its branches to suggest acute pulmonary embolism. Main pulmonary  artery and its branches are normal in caliber.    The thoracic aorta normal in course and caliber.  Mild/minimal coronary artery calcifications are seen. Please note,the  study is not optimized for evaluation of coronary arteries.    The cardiac chambers are not enlarged.    There is no  pericardial effusion seen.    MEDIASTINUM AND WALTER, LOWER NECK AND AXILLA:  The visualized thyroid gland is within normal limits.  No evidence of thoracic lymphadenopathy by CT criteria.  Esophagus appears within normal limits as seen.    LUNGS AND AIRWAYS:  The trachea and central airways are patent. No endobronchial lesion  is seen.    There is redemonstration of consolidative opacities in bilateral  lungs more in the lower lobes and slightly improved compared to prior  study.    Areas of architectural distortion, and mild bronchiectasis scattered  throughout the lungs with coarsening of interstitial markings.      UPPER ABDOMEN:  An enteric tube in place terminating in the proximal duodenum.  The visualized subdiaphragmatic structures demonstrate no remarkable  findings.        CHEST WALL AND OSSEOUS STRUCTURES:  Chest wall is within normal limits.  No acute osseous pathology.There are no suspicious osseous lesions.    Impression  1. No evidence of pulmonary embolism.  2. Multifocal consolidative opacities more in the lower lobes,  slightly improved compared to prior study. Findings remain concerning  for multifocal infectious process.  3. Interlobular septal thickening, architectural distortion and mild  bronchiectasis scattered throughout the lungs, slightly worse and  concerning for fibrotic like changes, likely sequela of infection.  Cannot exclude component of interstitial pulmonary edema.  4.  Other findings as described above.      MACRO:  None    Signed by: Geovanny Zepeda 11/26/2023 2:13 PM  Dictation workstation:   [id number]      Assessment/Plan   Principal Problem:    Acute hypoxemic respiratory failure (CMS/HCC)  Active Problems:    Stage 4 chronic kidney disease (CMS/HCC)    COVID-19    Pneumonia of both lower lobes due to infectious organism    Pseudomonal bacteremia    A-fib (CMS/HCC)    NEUROLOGY/ PSYCH: Anxiety, resolving with current meds  Awake, alert, oriented x3.   comfortable and no  c/o of pain or axiety  - PRN tylenol for Pain or PRN Oxy for pain or air hunger.  Lidocaine patch for back discomfort  - Continue Trazodone 25mg HS and Melatonin HS   - Continue Seroquel 25 mg HS and daily PRN 12.5mg   - Palliative following, recs appreciated   - Monitor Qtc - .380 on 11/28  - PT/OT    CARDIOVASCULAR: Afib, Intermittent SVT, HTN  - HDS, Afib on tele  - continue Metoprolol Tartrate Q6H, hold for SBP <100, HR <50  - Holding Home Losartan 100mg in the setting of recurrent HUNG on CKD   - will check EKG to assess Qtc tomorrow     PULMONARY: AHRF 2/2 COVID PNA (11/23) c/b ARDS, GGO c/f pulmonary fibrosis - organizing PNA, likely 2/2 COVID PNA, Likely PE, known DVTs  - 11/27 CT PE neg  - Weaned to 10 L HFNC, then increased FiO2 overnight  - Pulmonary fibrosis 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   - Fungitell, aspergillus galactomannan negative, blasto and histo Ag neg  - Prednisone 50 mg q6 for one week, then taper on 11/26 to 80 mg Q12H and will decrease to 60 mg BID starting 12/4, then 40 mg BID x 7 days, then 60 mg daily x 2-4 weeks, 40 mg daily 2- 4 weeks  > Daily DS bactrim for PCP prophy  - Fluticasone 1 spray bid   - PRN IV Lasix-  - Pneumomediastinum resolved              > Avoid further CPAP attempts  - Seen by lung transplant team 11/30        > Must be tapered down to no more than 10mg Prednisone per day and must be able to physically walk 100ft (on any amount of oxygen).  Plan to re-engage transplant team at that time.         RENAL/GENITOURINARY: Oliguric HUNG on CKD III, RECURRENT, with azotemia. Vit D insuff  - Cr peaked at 3.97, now downtrending to 1.13 BUN downtrending  -  baseline of 1.6, and increased UOP. Likely prerenal. Renal US with non-obstructing stones BL, no hydronephrosis, cysts BL. 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  Will reassess appropriateness to stop early next week.   - Stool panel negative.  - Stool panel negative.  - Loperamide ordered PRN  - PRN miralax and docusate senna for constipation (holding 2/2 diarrhea)     ENDOCRINOLOGY: Steroid induced hyperglycemia  - A1C 6.3, pre-DM  - Sugars 250s.  - Mod SSI Lispro     HEMATOLOGY:  DVT, presumed PE  - 11/13: BUE U/S: +R PT DVT, +L Soleal DVT  - 11/27 CT PE neg  - transitioned to BID eliquis     MUSCULOSKELETAL/ SKIN: Eczema/ Rosasea; Sacral pressure injury, stage 3  - On home dupixent  - seen by wound care RN  - for sacral wound: Clean buttocks with cleansing cloths and apply Triad to open skin BID and as needed. Leave RIZWANA      INFECTIOUS DISEASE: Covid PNA (received Decadron, Remdesivir, Tocilizumab at OSH) Neutrophilic leukocytosis  - decreasing leukocytosis 13.0 today.   Afebrile  MICRO: 11/21 BC x2 3/4 GNB, final sens pending, urine Cx pending,   10/26 & 11/10: stool studies, including C. Diff neg, 11/9 Step pneumonaie Ag, Legionella Ag neg, 11/9 Flu A/B, RSV neg   - 11/22: start renally dosed Zosyn through 11/24, then switched to PO Cipro 500 mg BID for 14 days-- stop 12/5     Lines: PIV, Cortrak     Code Status: DNR/DNI  NOK:  Primary Emergency Contact: Mathew Wilson, Home Phone: [phone]       I spent 45 minutes in the professional and overall care of this patient.     Gabriella Cheung, APRN-CNP

## 2023-12-02 NOTE — CARE PLAN
Problem: Nutrition  Goal: Less than 5 days NPO/clear liquids  Outcome: Progressing  Goal: Oral intake greater than 50%  Outcome: Progressing  Goal: Oral intake greater 75%  Outcome: Progressing  Goal: Consume prescribed supplement  Outcome: Progressing  Goal: Adequate PO fluid intake  Outcome: Progressing  Goal: Nutrition support goals are met within 48 hrs  Outcome: Progressing  Goal: Nutrition support is meeting 75% of nutrient needs  Outcome: Progressing  Goal: Tube feed tolerance  Outcome: Progressing  Goal: BG  mg/dL  Outcome: Progressing  Goal: Lab values WNL  Outcome: Progressing  Goal: Electrolytes WNL  Outcome: Progressing  Goal: Promote healing  Outcome: Progressing  Goal: Maintain stable weight  Outcome: Progressing  Goal: Reduce weight from edema/fluid  Outcome: Progressing  Goal: Gradual weight gain  Outcome: Progressing  Goal: Improve ostomy output  Outcome: Progressing     Problem: Skin  Goal: Decreased wound size/increased tissue granulation at next dressing change  Outcome: Progressing  Flowsheets (Taken 12/2/2023 0100)  Decreased wound size/increased tissue granulation at next dressing change:   Promote sleep for wound healing   Utilize specialty bed per algorithm   Protective dressings over bony prominences  Goal: Participates in plan/prevention/treatment measures  Outcome: Progressing  Flowsheets (Taken 12/2/2023 0100)  Participates in plan/prevention/treatment measures:   Discuss with provider PT/OT consult   Increase activity/out of bed for meals   Elevate heels  Goal: Prevent/manage excess moisture  Outcome: Progressing  Flowsheets (Taken 12/2/2023 0100)  Prevent/manage excess moisture:   Cleanse incontinence/protect with barrier cream   Moisturize dry skin   Use wicking fabric (obtain order)   Follow provider orders for dressing changes   Monitor for/manage infection if present  Goal: Prevent/minimize sheer/friction injuries  Outcome: Progressing  Flowsheets (Taken 12/2/2023  0100)  Prevent/minimize sheer/friction injuries:   Complete micro-shifts as needed if patient unable. Adjust patient position to relieve pressure points, not a full turn   HOB 30 degrees or less   Increase activity/out of bed for meals   Turn/reposition every 2 hours/use positioning/transfer devices   Use pull sheet   Utilize specialty bed per algorithm  Goal: Promote skin healing  Outcome: Progressing  Flowsheets (Taken 12/2/2023 0100)  Promote skin healing:   Assess skin/pad under line(s)/device(s)   Ensure correct size (line/device) and apply per  instructions   Protective dressings over bony prominences   Rotate device position/do not position patient on device   Turn/reposition every 2 hours/use positioning/transfer devices     Problem: Fall/Injury  Goal: Verbalize understanding of personal risk factors for fall in the hospital  Outcome: Progressing  Goal: Verbalize understanding of risk factor reduction measures to prevent injury from fall in the home  Outcome: Progressing  Goal: Use assistive devices by end of the shift  Outcome: Progressing     Problem: Pain - Adult  Goal: Verbalizes/displays adequate comfort level or baseline comfort level  Outcome: Progressing     Problem: Discharge Planning  Goal: Discharge to home or other facility with appropriate resources  Outcome: Progressing     Problem: Chronic Conditions and Co-morbidities  Goal: Patient's chronic conditions and co-morbidity symptoms are monitored and maintained or improved  Outcome: Progressing     Problem: Respiratory  Goal: Clear secretions with interventions this shift  Outcome: Progressing  Goal: Minimize anxiety/maximize coping throughout shift  Outcome: Progressing  Goal: Minimal/no exertional discomfort or dyspnea this shift  Outcome: Progressing  Goal: No signs of respiratory distress (eg. Use of accessory muscles. Peds grunting)  Outcome: Progressing  Goal: Patent airway maintained this shift  Outcome: Progressing  Goal:  Tolerate mechanical ventilation evidenced by VS/agitation level this shift  Outcome: Progressing  Goal: Tolerate pulmonary toileting this shift  Outcome: Progressing  Goal: Verbalize decreased shortness of breath this shift  Outcome: Progressing  Goal: Wean oxygen to maintain O2 saturation per order/standard this shift  Outcome: Progressing  Goal: Increase self care and/or family involvement in next 24 hours  Outcome: Progressing       The patient's goals for the shift include  get a full nights sleep    The clinical goals for the shift include maintain SPO2 >90% and have minimal dyspnea with exertion

## 2023-12-03 NOTE — PROGRESS NOTES
"MEDICAL STEPDOWN UNIT DAILY PROGRESS NOTE    Macarena Wilson is a 68 y.o. female on day 24 of admission presenting with Acute hypoxemic respiratory failure (CMS/HCC).    Subjective   No overnight events  No c/o pain, CP, N/V  +BM's  Endorses SOB with exertion       Objective     Constitutional: elderly female, resting comfortably in bed  Eyes: PERRL, EOMI, no icterus   ENMT: mucous membranes moist, no apparent injury, no lesions seen  Head/Neck: Neck supple, no apparent injury  Respiratory/Thorax: Lungs CTA bilaterally, diminished, no wheezing or rhonchi on 4 L NC  Cardiovascular: Regular, rate and rhythm, no murmurs, normal S1 and S2  Gastrointestinal: Nondistended, soft, non-tender, BS present x 4, cortract  : voiding per external female cath  Musculoskeletal: Passive ROM intact, no joint swelling,   Extremities: normal extremities, no edema, contusions or wounds  Neurological: alert and oriented x 3, speech clear, follows commands appropriately, cr. n. II-XII intact, sensation grossly intact, BLE unable to move against gravity.  Moves BUE equally and independently  Skin: Warm and dry, bruising throughout BUE; sacral redness; PIVs    Vital Signs  Blood pressure 123/62, pulse 60, temperature 36.1 °C (97 °F), temperature source Temporal, resp. rate 15, height 1.6 m (5' 2.99\"), weight 63.2 kg (139 lb 5.3 oz), SpO2 100 %.  Oxygen Therapy  SpO2: 100 %  Medical Gas Therapy: Supplemental oxygen  O2 Delivery Method: High flow nasal cannula       Intake/Output last 3 Shifts:  I/O last 3 completed shifts:  In: 1990 (29.1 mL/kg) [P.O.:760; NG/GT:1230]  Out: 1350 (19.7 mL/kg) [Urine:1350 (0.5 mL/kg/hr)]  Dosing Weight: 68.4 kg     Lines and Tubes:  Peripheral IV 11/08/23 20 G Left;Anterior Hand (Active)   Placement Date/Time: 11/08/23 0000   Size (Gauge): 20 G  Orientation: Left;Anterior  Location: Hand   Number of days: 25       Peripheral IV 11/09/23 20 G Right;Anterior Forearm (Active)   Placement Date/Time: 11/09/23 " 0600   Size (Gauge): 20 G  Orientation: Right;Anterior  Location: Forearm  Technique: Ultrasound guidance   Number of days: 24       Peripheral IV 11/29/23 20 G 2.25 cm Left Forearm (Active)   Placement Date/Time: 11/29/23 1315   Hand Hygiene Completed: Yes  Size (Gauge): 20 G  Catheter Length (cm): 2.25 cm  Orientation: Left  Location: Forearm  Site Prep: Chlorhexidine   Technique: Ultrasound guidance  Placed by: Brandon CRAFT  Patient Toleranc...   Number of days: 3       NG/OG/Feeding Tube Right nostril (Active)   Placement Date/Time: 11/16/23 1522   Tube Length: 75 cm  Tube Location: Right nostril   Number of days: 16       External Urinary Catheter (Active)   Placement Date/Time: 11/09/23 0400     Number of days: 24         Relevant Results  Results from last 7 days   Lab Units 12/03/23  0449 12/02/23  0626 12/01/23  0547   WBC AUTO x10*3/uL 11.2 13.0* 14.2*   HEMOGLOBIN g/dL 9.5* 10.1* 9.1*   HEMATOCRIT % 27.7* 29.6* 27.3*   PLATELETS AUTO x10*3/uL 167 179 168     Results from last 7 days   Lab Units 12/02/23  0626 12/01/23  0547 11/30/23  0246   SODIUM mmol/L 136 135* 139   POTASSIUM mmol/L 4.1 5.4* 5.2   CHLORIDE mmol/L 101 102 101   CO2 mmol/L 26 24 25   BUN mg/dL 67* 71* 80*   CREATININE mg/dL 1.13* 1.12* 1.26*   CALCIUM mg/dL 8.2* 8.0* 8.3*   GLUCOSE mg/dL 258* 152* 204*                            Scheduled medications  apixaban, 5 mg, nasogastric tube, BID  ciprofloxacin, 500 mg, nasogastric tube, q12h KARL  esomeprazole, 40 mg, nasoduodenal tube, Daily  fluticasone, 1 spray, Each Nostril, BID  insulin lispro, 0-10 Units, subcutaneous, TID with meals  lidocaine, 1 patch, transdermal, Daily  melatonin, 5 mg, oral, Daily  metoprolol tartrate, 50 mg, nasogastric tube, BID  nystatin, 4 mL, Swish & Spit, BID  predniSONE, 80 mg, oral, BID  QUEtiapine, 25 mg, oral, Nightly  sulfamethoxazole-trimethoprim, 160 mg of trimethoprim, nasogastric tube, q24h KARL  thiamine, 100 mg, oral, Daily  traZODone, 25 mg, nasogastric  tube, Nightly      Continuous medications     PRN medications  PRN medications: acetaminophen **OR** [DISCONTINUED] acetaminophen **OR** [DISCONTINUED] acetaminophen, dextrose 10 % in water (D10W), dextrose, docusate sodium, ipratropium-albuteroL, loperamide, ondansetron ODT **OR** [DISCONTINUED] ondansetron, oxyCODONE, oxygen, polyethylene glycol, QUEtiapine, sodium chloride          Malnutrition Diagnosis Status: New  Malnutrition Diagnosis: Severe malnutrition related to acute disease or injury  As Evidenced by: 12% weight loss from normal along with moderate to severe muscle and fat loss on physical exam in setting of a decrease in PO intake/appetite after being diagnosed with COVID  I agree with the dietitian's malnutrition diagnosis.    CT angio chest for pulmonary embolism 11/26/2023    HEART AND VESSELS:  There are no discrete filling defects within main pulmonary artery  and its branches to suggest acute pulmonary embolism. Main pulmonary  artery and its branches are normal in caliber.    The thoracic aorta normal in course and caliber.  Mild/minimal coronary artery calcifications are seen. Please note,the  study is not optimized for evaluation of coronary arteries.    The cardiac chambers are not enlarged.    There is no pericardial effusion seen.    MEDIASTINUM AND WALTER, LOWER NECK AND AXILLA:  The visualized thyroid gland is within normal limits.  No evidence of thoracic lymphadenopathy by CT criteria.  Esophagus appears within normal limits as seen.    LUNGS AND AIRWAYS:  The trachea and central airways are patent. No endobronchial lesion  is seen.    There is redemonstration of consolidative opacities in bilateral  lungs more in the lower lobes and slightly improved compared to prior  study.    Areas of architectural distortion, and mild bronchiectasis scattered  throughout the lungs with coarsening of interstitial markings.      UPPER ABDOMEN:  An enteric tube in place terminating in the proximal  duodenum.  The visualized subdiaphragmatic structures demonstrate no remarkable  findings.    CHEST WALL AND OSSEOUS STRUCTURES:  Chest wall is within normal limits.  No acute osseous pathology.There are no suspicious osseous lesions.    Impression  1. No evidence of pulmonary embolism.  2. Multifocal consolidative opacities more in the lower lobes,  slightly improved compared to prior study. Findings remain concerning  for multifocal infectious process.  3. Interlobular septal thickening, architectural distortion and mild  bronchiectasis scattered throughout the lungs, slightly worse and  concerning for fibrotic like changes, likely sequela of infection.  Cannot exclude component of interstitial pulmonary edema.  4.  Other findings as described above.      Assessment/Plan   Principal Problem:    Acute hypoxemic respiratory failure (CMS/[facility])  Active Problems:    Stage 4 chronic kidney disease (CMS/[facility])    COVID-19    Pneumonia of both lower lobes due to infectious organism    Pseudomonal bacteremia    A-fib (CMS/[facility])      Macarena Wilson is a 68 year old female with a PMH of CKD III and HTN admitted to the MICU from OSH with AHRF 2/2 COVID pneumonia (positive on 10/13) complicated by ARDS and pulmonary fibrosis with resulting organizing PNA. Course complicated by distal DVTs and likely PE, oliguric HUNG on CKD, weaned to 6 L NC at rest and increased FiO2 with exertion.    NEUROLOGY/ PSYCH: Anxiety, resolving with current meds  Awake, alert, oriented x3.   comfortable and no c/o of pain or axiety  - PRN tylenol for Pain or PRN Oxy for pain or air hunger.  Lidocaine patch for back discomfort  - Continue Trazodone 25mg HS and Melatonin HS   - Continue Seroquel 25 mg HS and daily PRN 12.5mg   - Palliative following, recs appreciated   - Monitor Qtc - .380 on 11/28  - PT/OT     CARDIOVASCULAR: Afib, Intermittent SVT, HTN  - HDS, Afib on tele  - continue Metoprolol Tartrate Q6H, hold for SBP <100, HR <50  - Holding Home  Losartan 100mg in the setting of recurrent HUNG on CKD      PULMONARY: AHRF 2/2 COVID PNA (11/23) c/b ARDS, GGO c/f pulmonary fibrosis - organizing PNA, likely 2/2 COVID PNA, Likely PE, known DVTs  - 11/27 CT PE neg  - Pulmonary fibrosis 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   - Prednisone 50 mg q6 for one week, then taper on 11/26 to 80 mg Q12H and will decrease to 60 mg BID starting 12/4, then 40 mg BID x 7 days, then 60 mg daily x 2-4 weeks, 40 mg daily 2- 4 weeks  > Daily DS bactrim for PCP prophy  - Fluticasone 1 spray bid   - PRN IV Lasix  - Pneumomediastinum resolved              > Avoid further CPAP attempts  - Seen by lung transplant team 11/30        > Must be tapered down to no more than 10mg Prednisone per day and must be able to physically walk 100ft (on any amount of oxygen).  Plan to re-engage transplant team at that time.         RENAL/GENITOURINARY: Oliguric HUNG on CKD III, RECURRENT, with azotemia. Vit D insuff  - Cr peaked at 3.97, now downtrending to 0.96 baseline of 1.6, and increased UOP. Likely prerenal. Renal US with non-obstructing stones BL, no hydronephrosis, cysts BL. 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   - start calorie count at dinner time tonight through dinner on Monday. HOLD TF overnight to assess PO intake.  - Loperamide ordered PRN  - PRN miralax and docusate senna for constipation (holding 2/2 diarrhea)     ENDOCRINOLOGY: Steroid induced hyperglycemia  - A1C 6.3, pre-DM  - Sugars 184-275 requiring 16 units/24 hours  - Mod SSI Lispro Q AC and HS  - add Q HS lantus 10 units starting toonight      HEMATOLOGY:  DVT, presumed PE  - 11/13: BUE U/S: +R PT DVT, +L Soleal DVT  - 11/27 CT PE neg  - transitioned to BID eliquis     MUSCULOSKELETAL/ SKIN: Eczema/ Rosasea; Sacral pressure injury, stage 3  - On home dupixent  - seen by wound care RN  - for sacral wound: Clean buttocks with cleansing cloths and apply Triad to open skin BID and as needed. Leave RIZWANA      INFECTIOUS DISEASE: Covid PNA (received Decadron, Remdesivir, Tocilizumab at OSH) Neutrophilic leukocytosis, PSA bacteremia  - decreasing leukocytosis 13.0 --> 11.2   Afebrile, Tmax 36.3C  MICRO: 11/21 BC x2 3/4 PSA, sool studies, including 11/25 C. Diff neg, 11/9 Step pneumonaie Ag, Legionella Ag neg, 11/9 Flu A/B, RSV neg   - repeat blood cx x2 11/25 Neg  - 11/22: start renally dosed Zosyn through 11/24, then switched to PO Cipro 500 mg BID for 14 days-- stop 12/5     Lines: PIV, Cortrak     Code Status: DNR/DNI  NOK:  Primary Emergency Contact: Mathew Wilson, Home Phone: [phone]     I spent 45 minutes in the professional and overall care of this patient.     ZION Garcia-CNP

## 2023-12-03 NOTE — CARE PLAN
Problem: Nutrition  Goal: Less than 5 days NPO/clear liquids  Outcome: Progressing  Goal: Oral intake greater than 50%  Outcome: Progressing  Goal: Oral intake greater 75%  Outcome: Progressing  Goal: Consume prescribed supplement  Outcome: Progressing  Goal: Adequate PO fluid intake  Outcome: Progressing  Goal: Nutrition support goals are met within 48 hrs  Outcome: Progressing  Goal: Nutrition support is meeting 75% of nutrient needs  Outcome: Progressing  Goal: Tube feed tolerance  Outcome: Progressing  Goal: BG  mg/dL  Outcome: Progressing  Goal: Lab values WNL  Outcome: Progressing  Goal: Electrolytes WNL  Outcome: Progressing  Goal: Promote healing  Outcome: Progressing  Goal: Maintain stable weight  Outcome: Progressing  Goal: Reduce weight from edema/fluid  Outcome: Progressing  Goal: Gradual weight gain  Outcome: Progressing  Goal: Improve ostomy output  Outcome: Progressing     Problem: Skin  Goal: Decreased wound size/increased tissue granulation at next dressing change  Outcome: Progressing  Flowsheets (Taken 12/3/2023 0052)  Decreased wound size/increased tissue granulation at next dressing change:   Promote sleep for wound healing   Utilize specialty bed per algorithm   Protective dressings over bony prominences  Goal: Participates in plan/prevention/treatment measures  Outcome: Progressing  Flowsheets (Taken 12/3/2023 0052)  Participates in plan/prevention/treatment measures:   Discuss with provider PT/OT consult   Increase activity/out of bed for meals   Elevate heels  Goal: Prevent/manage excess moisture  Outcome: Progressing  Flowsheets (Taken 12/3/2023 0052)  Prevent/manage excess moisture:   Cleanse incontinence/protect with barrier cream   Moisturize dry skin   Use wicking fabric (obtain order)   Follow provider orders for dressing changes   Monitor for/manage infection if present  Goal: Prevent/minimize sheer/friction injuries  Outcome: Progressing  Flowsheets (Taken 12/3/2023  0052)  Prevent/minimize sheer/friction injuries:   Complete micro-shifts as needed if patient unable. Adjust patient position to relieve pressure points, not a full turn   HOB 30 degrees or less   Increase activity/out of bed for meals   Turn/reposition every 2 hours/use positioning/transfer devices   Use pull sheet   Utilize specialty bed per algorithm  Goal: Promote skin healing  Outcome: Progressing  Flowsheets (Taken 12/3/2023 0052)  Promote skin healing:   Assess skin/pad under line(s)/device(s)   Ensure correct size (line/device) and apply per  instructions   Protective dressings over bony prominences   Rotate device position/do not position patient on device   Turn/reposition every 2 hours/use positioning/transfer devices     Problem: Fall/Injury  Goal: Verbalize understanding of personal risk factors for fall in the hospital  Outcome: Progressing  Goal: Verbalize understanding of risk factor reduction measures to prevent injury from fall in the home  Outcome: Progressing  Goal: Use assistive devices by end of the shift  Outcome: Progressing     Problem: Pain - Adult  Goal: Verbalizes/displays adequate comfort level or baseline comfort level  Outcome: Progressing     Problem: Discharge Planning  Goal: Discharge to home or other facility with appropriate resources  Outcome: Progressing     Problem: Chronic Conditions and Co-morbidities  Goal: Patient's chronic conditions and co-morbidity symptoms are monitored and maintained or improved  Outcome: Progressing     Problem: Respiratory  Goal: Clear secretions with interventions this shift  Outcome: Progressing  Goal: Minimize anxiety/maximize coping throughout shift  Outcome: Progressing  Goal: Minimal/no exertional discomfort or dyspnea this shift  Outcome: Progressing  Goal: No signs of respiratory distress (eg. Use of accessory muscles. Peds grunting)  Outcome: Progressing  Goal: Patent airway maintained this shift  Outcome: Progressing  Goal:  Tolerate mechanical ventilation evidenced by VS/agitation level this shift  Outcome: Progressing  Goal: Tolerate pulmonary toileting this shift  Outcome: Progressing  Goal: Verbalize decreased shortness of breath this shift  Outcome: Progressing  Goal: Wean oxygen to maintain O2 saturation per order/standard this shift  Outcome: Progressing  Goal: Increase self care and/or family involvement in next 24 hours  Outcome: Progressing       The patient's goals for the shift include  sleep all night     The clinical goals for the shift include maintain SPO2 >90% and have minimal dyspnea with exertion

## 2023-12-04 NOTE — CARE PLAN
Problem: Nutrition  Goal: Less than 5 days NPO/clear liquids  Outcome: Progressing  Goal: Oral intake greater than 50%  Outcome: Progressing  Goal: Oral intake greater 75%  Outcome: Progressing  Goal: Consume prescribed supplement  Outcome: Progressing  Goal: Adequate PO fluid intake  Outcome: Progressing  Goal: Nutrition support goals are met within 48 hrs  Outcome: Progressing  Goal: Nutrition support is meeting 75% of nutrient needs  Outcome: Progressing  Goal: Tube feed tolerance  Outcome: Progressing  Goal: BG  mg/dL  Outcome: Progressing  Goal: Lab values WNL  Outcome: Progressing  Goal: Electrolytes WNL  Outcome: Progressing  Goal: Promote healing  Outcome: Progressing  Goal: Maintain stable weight  Outcome: Progressing  Goal: Reduce weight from edema/fluid  Outcome: Progressing  Goal: Gradual weight gain  Outcome: Progressing  Goal: Improve ostomy output  Outcome: Progressing     Problem: Skin  Goal: Decreased wound size/increased tissue granulation at next dressing change  Outcome: Progressing  Flowsheets (Taken 12/3/2023 2319)  Decreased wound size/increased tissue granulation at next dressing change:   Promote sleep for wound healing   Utilize specialty bed per algorithm   Protective dressings over bony prominences  Goal: Participates in plan/prevention/treatment measures  Outcome: Progressing  Flowsheets (Taken 12/3/2023 2319)  Participates in plan/prevention/treatment measures:   Discuss with provider PT/OT consult   Increase activity/out of bed for meals   Elevate heels  Goal: Prevent/manage excess moisture  Outcome: Progressing  Flowsheets (Taken 12/3/2023 2319)  Prevent/manage excess moisture:   Cleanse incontinence/protect with barrier cream   Moisturize dry skin   Use wicking fabric (obtain order)   Follow provider orders for dressing changes   Monitor for/manage infection if present  Goal: Prevent/minimize sheer/friction injuries  Outcome: Progressing  Flowsheets (Taken 12/3/2023  0612)  Prevent/minimize sheer/friction injuries:   Complete micro-shifts as needed if patient unable. Adjust patient position to relieve pressure points, not a full turn   HOB 30 degrees or less   Increase activity/out of bed for meals   Turn/reposition every 2 hours/use positioning/transfer devices   Use pull sheet   Utilize specialty bed per algorithm  Goal: Promote skin healing  Outcome: Progressing  Flowsheets (Taken 12/3/2023 6232)  Promote skin healing:   Assess skin/pad under line(s)/device(s)   Ensure correct size (line/device) and apply per  instructions   Protective dressings over bony prominences   Rotate device position/do not position patient on device   Turn/reposition every 2 hours/use positioning/transfer devices     Problem: Fall/Injury  Goal: Verbalize understanding of personal risk factors for fall in the hospital  Outcome: Progressing  Goal: Verbalize understanding of risk factor reduction measures to prevent injury from fall in the home  Outcome: Progressing  Goal: Use assistive devices by end of the shift  Outcome: Progressing     Problem: Pain - Adult  Goal: Verbalizes/displays adequate comfort level or baseline comfort level  Outcome: Progressing     Problem: Discharge Planning  Goal: Discharge to home or other facility with appropriate resources  Outcome: Progressing     Problem: Chronic Conditions and Co-morbidities  Goal: Patient's chronic conditions and co-morbidity symptoms are monitored and maintained or improved  Outcome: Progressing     Problem: Respiratory  Goal: Clear secretions with interventions this shift  Outcome: Progressing  Goal: Minimize anxiety/maximize coping throughout shift  Outcome: Progressing  Goal: Minimal/no exertional discomfort or dyspnea this shift  Outcome: Progressing  Goal: No signs of respiratory distress (eg. Use of accessory muscles. Peds grunting)  Outcome: Progressing  Goal: Patent airway maintained this shift  Outcome: Progressing  Goal:  Tolerate mechanical ventilation evidenced by VS/agitation level this shift  Outcome: Progressing  Goal: Tolerate pulmonary toileting this shift  Outcome: Progressing  Goal: Verbalize decreased shortness of breath this shift  Outcome: Progressing  Goal: Wean oxygen to maintain O2 saturation per order/standard this shift  Outcome: Progressing  Goal: Increase self care and/or family involvement in next 24 hours  Outcome: Progressing       The patient's goals for the shift include  none at this time     The clinical goals for the shift include maintain SPO2 >90% and have minimal dyspnea with exertion

## 2023-12-04 NOTE — PROGRESS NOTES
"Macarena Wilson is a 68 y.o. female on day 25 of admission presenting with Acute hypoxemic respiratory failure (CMS/HCC).    Subjective   Tolerating PO diet.  TF held overnight and 24 hr calorie count started       Objective     Constitutional: elderly female, resting comfortably in bed, in no distress, pleasant and cooperative  Eyes: PERRL, EOMI, no icterus   ENMT: mucous membranes moist, no apparent injury, no lesions seen  Head/Neck: Neck supple, no apparent injury  Respiratory/Thorax: Lungs CTA bilaterally, diminished, no wheezing or rhonchi on 8 L NC  Cardiovascular: Regular, rate and rhythm, no murmurs, normal S1 and S2  Gastrointestinal: Nondistended, soft, non-tender, BS present x 4,  : voiding per external female cath  Musculoskeletal: Passive ROM intact, no joint swelling,   Extremities: normal extremities, no edema, contusions or wounds  Neurological: alert and oriented x 3, speech clear, follows commands appropriately, cr. n. II-XII intact, sensation grossly intact, BLE unable to move against gravity.  Moves BUE equally and independently  Skin: Warm and dry, bruising throughout BUE; sacral redness; PIVs     Last Recorded Vitals  Blood pressure 122/66, pulse 91, temperature 36.1 °C (97 °F), temperature source Temporal, resp. rate 22, height 1.6 m (5' 2.99\"), weight 63.2 kg (139 lb 5.3 oz), SpO2 94 %.  Intake/Output last 3 Shifts:  I/O last 3 completed shifts:  In: 2060 (30.1 mL/kg) [P.O.:1240; NG/GT:820]  Out: 1600 (23.4 mL/kg) [Urine:1600 (0.6 mL/kg/hr)]  Dosing Weight: 68.4 kg     Relevant Results            Scheduled medications  apixaban, 5 mg, nasogastric tube, BID  ciprofloxacin, 500 mg, nasogastric tube, q12h KARL  fluticasone, 1 spray, Each Nostril, BID  insulin glargine, 10 Units, subcutaneous, Nightly  insulin lispro, 0-10 Units, subcutaneous, Before meals & nightly  lidocaine, 1 patch, transdermal, Daily  melatonin, 5 mg, oral, Daily  metoprolol tartrate, 50 mg, nasogastric tube, " BID  [START ON 12/5/2023] pantoprazole, 40 mg, oral, Daily before breakfast  predniSONE, 60 mg, oral, BID  QUEtiapine, 25 mg, oral, Nightly  sulfamethoxazole-trimethoprim, 160 mg of trimethoprim, nasogastric tube, q24h KARL  thiamine, 100 mg, oral, Daily  traZODone, 25 mg, nasogastric tube, Nightly      Continuous medications  oxygen, , Last Rate: 8 L/min (12/04/23 1346)      PRN medications  PRN medications: acetaminophen **OR** [DISCONTINUED] acetaminophen **OR** [DISCONTINUED] acetaminophen, dextrose 10 % in water (D10W), dextrose, docusate sodium, ipratropium-albuteroL, loperamide, ondansetron ODT **OR** [DISCONTINUED] ondansetron, oxyCODONE, QUEtiapine        Malnutrition Diagnosis Status: New  Malnutrition Diagnosis: Severe malnutrition related to acute disease or injury  As Evidenced by: 12% weight loss from normal along with moderate to severe muscle and fat loss on physical exam in setting of a decrease in PO intake/appetite after being diagnosed with COVID  I agree with the dietitian's malnutrition diagnosis.      Assessment/Plan   Principal Problem:    Acute hypoxemic respiratory failure (CMS/HCC)  Active Problems:    Stage 4 chronic kidney disease (CMS/HCC)    COVID-19    Pneumonia of both lower lobes due to infectious organism    Pseudomonal bacteremia    A-fib (CMS/HCC)    Macarena Wilson is a 68 year old female with a PMH of CKD III and HTN admitted to the MICU from OSH with AHRF 2/2 COVID pneumonia (positive on 10/13) complicated by ARDS and pulmonary fibrosis with resulting organizing PNA. Course complicated by distal DVTs and likely PE, oliguric HUNG on CKD, weaned to 6 L NC at rest and increased FiO2 with exertion. Improving daily with increasing PO intake, slowly weaning FIO2.     NEUROLOGY/ PSYCH: Anxiety, resolved with current meds  Awake, alert, oriented x3.   comfortable and no c/o of pain or axiety  - PRN tylenol for Pain or PRN Oxy for pain or air hunger.  Lidocaine patch for back  discomfort  - Continue Trazodone 25mg HS and Melatonin HS   - Continue Seroquel 25 mg HS and daily PRN 12.5mg   - continue PT/OT, mobilizing out of bed and/or chair position daily     CARDIOVASCULAR: Afib, Intermittent SVT, HTN  - HDS, Afib on tele  - continue Metoprolol Tartrate Q6H, hold for SBP <100, HR <50  - Holding Home Losartan 100mg in the setting of recurrent HUNG on CKD ==> HUNG has resolved, BP stable and no indication to resume at this time     PULMONARY: AHRF 2/2 COVID PNA (11/23) c/b ARDS, GGO c/f pulmonary fibrosis - organizing PNA, likely 2/2 COVID PNA, Likely PE, known DVTs  - 11/27 CT PE neg  - Pulmonary fibrosis 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   - Prednisone 50 mg q6 for one week, then taper on 11/26 to 80 mg Q12H and will decrease to 60 mg BID starting 12/4, then 40 mg BID x 7 days, then 60 mg daily x 2-4 weeks, 40 mg daily 2- 4 weeks  > Daily DS bactrim for PCP prophy  - Fluticasone 1 spray bid   - PRN IV Lasix  - Pneumomediastinum resolved              > Avoid further CPAP attempts  - Seen by lung transplant team 11/30        > Must be tapered down to no more than 10mg Prednisone per day and must be able to physically walk 100ft (on any amount of oxygen).  Plan to re-engage transplant team at that time.         RENAL/GENITOURINARY: Oliguric HUNG on CKD III, RECURRENT, with azotemia. Vit D insuff  - Cr peaked at 3.97, now downtrending to 0.96 baseline of 1.6, and increased UOP. Likely prerenal. Renal US with non-obstructing stones BL, no hydronephrosis, cysts BL. 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  Titrate as appropriate     HEMATOLOGY:  DVT, presumed PE  - 11/13: BUE U/S: +R PT DVT, +L Soleal DVT  - 11/27 CT PE neg  - transitioned to BID eliquis     MUSCULOSKELETAL/ SKIN: Eczema/ Rosasea; Sacral pressure injury, stage 3  - On home dupixent  - for sacral wound: Clean buttocks with cleansing cloths and apply Triad to open skin BID and as needed. Leave RIZWANA      INFECTIOUS DISEASE: Covid PNA (received Decadron, Remdesivir, Tocilizumab at OSH) Neutrophilic leukocytosis, PSA bacteremia  -   Afebrile, Tmax 36.1 C  MICRO: 11/21 BC x2 3/4 PSA, stool studies, including 11/25 C. Diff neg, 11/9 Step pneumonaie Ag, Legionella Ag neg, 11/9 Flu A/B, RSV neg   - repeat blood cx x2 11/25 Neg  - 11/22: start renally dosed Zosyn through 11/24, then switched to PO Cipro 500 mg BID for 14 days-- stop 12/5     Lines: PIV, Cortrak     Code Status: DNR/DNI  NOK:  Primary Emergency Contact: Mathew Wilson, [place] Phone: [phone]    Dispo: continue SDU for close respiratory monitoring.  Starting discharge planning-- SNF choices sent to .  Possible discharge in next 7 days        I spent 45 minutes in the professional and overall care of this patient.    Gabriella Cheung, APRN-CNP

## 2023-12-04 NOTE — SIGNIFICANT EVENT
RADAR 6     12/03/23 2142   Onset Documentation   Rapid Response Initiated By Radar auto page   Location/Room [facility]   Pager Time 2142   Arrival Time 2142   Event End Time 2145   Primary Reason for Call Radar auto page     Rapid Response RN Note    Rapid response RN at bedside for RADAR score 6 due to the following VS: T 36.1 °Celsius; HR 91 ; RR 22; /68; SPO2 94%.     Reviewed above VS with bedside RN.  VS within patient's current trends.  No interventions by rapid response team indicated at this time.      Staff to page rapid response for any concerns or acute change in condition/VS.

## 2023-12-05 NOTE — PROGRESS NOTES
Physical Therapy    Physical Therapy Treatment    Patient Name: Macarena Wilson  MRN: [id number]  Today's Date: 12/5/2023  Time Calculation  Start Time: 1427  Stop Time: 1500  Time Calculation (min): 33 min       Assessment/Plan   PT Assessment  Rehab Prognosis: Fair  End of Session Communication: Bedside nurse  End of Session Patient Position: Bed, 3 rail up, Alarm off, not on at start of session (quarter-turned L with pillow as a wedge for weight-shifting)     PT Plan  Treatment/Interventions: Bed mobility, Transfer training, Gait training, Balance training, Strengthening, Endurance training, Therapeutic exercise, Therapeutic activity, Postural re-education  PT Plan: Skilled PT  PT Frequency: 4 times per week  PT Discharge Recommendations: Moderate intensity level of continued care  PT - OK to Discharge: Yes    General Visit Information:   PT  Visit  PT Received On: 12/05/23  General  Co-Treatment: OT  Co-Treatment Reason: to maximize patient safety, mobility and activity tolerance 2/2 patient with tenuous respiratory status, x2 skilled assist for mobility  Prior to Session Communication: Bedside nurse  Patient Position Received: Bed, 3 rail up, Alarm off, not on at start of session  General Comment: agreeable to participate in therapy, however, patient appeared anxious prior to and during session. per CNP, keep Spo2 >88%. Reported between moderate and severe SOB with sitting EOB activity    Subjective   Precautions:  Precautions  Medical Precautions: Fall precautions, Oxygen therapy device and L/min  Vital Signs:  Vital Signs  SpO2:  (92% on 6L, desat to low 80s on 6L with bed-level exercise. Increased to 10L for activity, added NRB d/t desat to 78%, (NRB ~15L->10L->6L->0L), Spo2 EOB mid 80s-mid 90s. Post 6L 89-90%)--> RN and CNP made aware of titration.     Objective   Pain:  Pain Assessment  Pain Assessment: 0-10  Pain Score: 0 - No pain  Cognition:  Cognition  Overall Cognitive Status:  Impaired  Arousal/Alertness: Appropriate responses to stimuli  Orientation Level:  (AxO x3)  Following Commands: Follows one step commands with repetition  Cognition Comments: intermittent anxiety  Postural Control:  Postural Control  Postural Control: Impaired  Head Control: favors increase FF head posture with downward gaze; limited ability to actively extend c-spine to maintain a neutral head alignment with forward gaze when sitting EOB; PT provided tactile A to keep head in neutral positioning.  Trunk Control: fair-poor; retrolean; favors lateral lean R; requires increase assist to maintain midline.  Posture Comment: retrolateral lean R; cueing and assist required to attempt to have patient re-orient to midline; patient demo'd ability to correct posture with increase assist. Brief MOD with BUE support and assist to maintain midline; mostly MAXx1 however, especially for dynamic sit balance activities.    Activity Tolerance:  Activity Tolerance  Endurance: Decreased tolerance for upright activites  Early Mobility/Exercise Safety Screen: Proceed with mobilization - No exclusion criteria met  Treatments:  Balance/Neuromuscular Re-Education  Balance/Neuromuscular Re-Education Activity Performed: Yes  Balance/Neuromuscular Re-Education Activity 1: EOB x15 minutes; brief MODx1 with BUE support after positioning and cueing, only able to maintain ~10 seconds; mostly required MAXx1 assist to maintain positioning; limited trunk activation, cervical extensor weakness.    Bed Mobility  Bed Mobility: Yes  Bed Mobility 1  Bed Mobility 1: Supine to sitting, Sitting to supine  Level of Assistance 1: Maximum assistance, Moderate verbal cues  Bed Mobility Comments 1: x2 assist, HOB elevated for supine to sit, +draw sheet; unable to complete hip ABD of RLE to assist with lateral transition of LE towards EOB.  Bed Mobility 2  Bed Mobility  2: Rolling left, Rolling right  Level of Assistance 2: Maximum assistance, Minimal verbal  cues  Bed Mobility Comments 2: x1 assist; d/t blue pad not being underneath patient. Unable to flex knee without max assist from PT to assist with turning.    Transfers  Transfer: No (d/t patient reporting moderate-severe SOB sitting EOB)    Outcome Measures:  [facility] Basic Mobility  Turning from your back to your side while in a flat bed without using bedrails: A lot  Moving from lying on your back to sitting on the side of a flat bed without using bedrails: A lot  Moving to and from bed to chair (including a wheelchair): Total  Standing up from a chair using your arms (e.g. wheelchair or bedside chair): Total  To walk in hospital room: Total  Climbing 3-5 steps with railing: Total  Basic Mobility - Total Score: 8    FSS-ICU  Ambulation: Unable to attempt due to weakness  Rolling: Maximal assistance (performs 25% - 49% of task)  Sitting: Maximal assistance (performs 25% - 49% of task)  Transfer Sit-to-Stand: Unable to perform  Transfer Supine-to-Sit: Total assistance (performs 25% or requires another person)  Total Score: 5      E = Exercise and Early Mobility  Early Mobility/Exercise Safety Screen: Proceed with mobilization - No exclusion criteria met  Current Activity: Sitting at edge of bed    Education Documentation  Mobility Training, taught by Cindy Olmos, PT at 12/5/2023  3:27 PM.  Learner: Patient  Readiness: Acceptance  Method: Explanation  Response: Needs Reinforcement    Education Comments  No comments found.           Encounter Problems       Encounter Problems (Active)       Balance       patient will complete static (SBAx1) and dynamic (Cgx1) standing balance activities using LRD as needed without acute LOB, while maintaining stable vitals.  (Not Progressing)       Start:  11/10/23    Expected End:  12/08/23               Mobility       STG - Patient will ambulate >/=50 ft using LRD as needed with Cgx1 assist without acute LOB, while maintaining stable vitals.  (Not Progressing)       Start:  11/10/23     Expected End:  12/08/23            patient will participate in BLE there-ex in order to improve strength and to assist with the completion of functional mobility tasks.  (Progressing)       Start:  11/10/23    Expected End:  12/08/23            patient will ambulate >/=30 ft consecutively and >/=75 ft cumulatively with </=1 sitting rest break while maintaining stable vitals, reporting <13/20 on the RPE scale.  (Not Progressing)       Start:  11/10/23    Expected End:  12/08/23                        Transfers       STG - Transfer from bed to chair with CGx1 assist without acute LOB, using LRD as needed  (Not Progressing)       Start:  11/10/23    Expected End:  12/08/23            STG - Patient will perform bed mobility with SBAx1 without acute LOB, using bedrailing as needed.  (Progressing)       Start:  11/10/23    Expected End:  12/08/23            STG - Patient will transfer sit to and from stand with Cgx1 assist using LRD as needed without acute LOB  (Progressing)       Start:  11/10/23    Expected End:  12/08/23                 Cindy Olmos, PT, DPT

## 2023-12-05 NOTE — PROGRESS NOTES
"Occupational Therapy    Occupational Therapy Treatment    Name: Macarena Wilson  MRN: [id number]  : 1955  Date: 23  Time Calculation  Start Time: 1428  Stop Time: 1500  Time Calculation (min): 32 min    Assessment:  End of Session Communication: Bedside nurse  End of Session Patient Position: Bed, 3 rail up, Alarm off, not on at start of session  Plan:  Treatment Interventions: ADL retraining, Functional transfer training, UE strengthening/ROM, Endurance training, Patient/family training, Equipment evaluation/education, Compensatory technique education  OT Frequency: 3 times per week  OT Discharge Recommendations: Moderate intensity level of continued care  OT - OK to Discharge: Yes    Subjective   Previous Visit Info:  OT Last Visit  OT Received On: 23  General:  General  Family/Caregiver Present: No  Co-Treatment: PT  Co-Treatment Reason: to maximize patient safety, mobility and activity tolerance 2/2 patient with tenuous respiratory status, x2 skilled assist for mobility  Prior to Session Communication: Bedside nurse (NP)  Patient Position Received: Bed, 3 rail up, Alarm off, not on at start of session  General Comment: Patient supine in bed on arrival, pleasant and agreeable to OT, did appear to have increased anxiousness with activity this session and reporting \"more than moderate\" shortness of breath while sitting EOB; tele, 6L NC, external catheter  Precautions:  Medical Precautions: Fall precautions, Oxygen therapy device and L/min  Precautions Comment: NP reports to keep patient's SpO2 >88%  Vitals:  Vital Signs  SpO2:  (92% on 6L, desat to low 80s on 6L with bed-level exercise. Increased to 10L for activity, added NRB d/t desat to 78%, (NRB ~15L->10L->6L->0L), Spo2 EOB mid 80s-mid 90s. Post 6L 89-90%; RN/NP aware of titration)  Pain Assessment:  Pain Assessment  Pain Assessment: 0-10  Pain Score: 0 - No pain     Objective      Bed Mobility/Transfers: Bed Mobility  Bed Mobility: Yes  Bed " Mobility 1  Bed Mobility 1: Supine to sitting, Sitting to supine  Level of Assistance 1: Maximum assistance, Moderate verbal cues  Bed Mobility Comments 1: x2 assist, HOB elevated for supine to sit, +draw sheet  Bed Mobility 2  Bed Mobility  2: Rolling right, Rolling left  Level of Assistance 2: Maximum assistance, Minimal verbal cues  Bed Mobility Comments 2: facilitated patient in to (L) sidelying with support of pillows in attempt to improve oxygenation    Transfers  Transfer: No  Transfer 1  Transfer From 1: Sit to, Stand to  Transfer to 1: Sit, Stand  Technique 1: Sit to stand, Stand to sit  Transfer Level of Assistance 1: Dependent, +2, Moderate verbal cues  Trials/Comments 1: (B) arm in arm assist + draw sheet under patient's hips; achieved 25% of full standing; x2 trials     Therapy/Activity: Therapeutic Activity  Therapeutic Activity Performed: Yes  Therapeutic Activity 1: Patient sat EOB x15 minutes with max A for balance, did improve to mod A for brief moments however unable to sustain and leans posteriorly. Patient participated in functional reaching from bed to arms of chair placed in front of patient and back to bed to encourage anterior lean and dynamic balance with max A, patient completed placing hands from bed on to her knees back to bed alternating UEs for balance challenge, patient required max A for maintaining balance 2/2 weakness and impaired postural control.    Outcome Measures:  [facility] Daily Activity  Putting on and taking off regular lower body clothing: Total  Bathing (including washing, rinsing, drying): A lot  Putting on and taking off regular upper body clothing: A little  Toileting, which includes using toilet, bedpan or urinal: A lot  Taking care of personal grooming such as brushing teeth: A little  Eating Meals: None  Daily Activity - Total Score: 15     and E = Exercise and Early Mobility  Current Activity: Sitting at edge of bed    Education Documentation  Body Mechanics, taught  by Alicia Kilgore OT at 12/5/2023  3:29 PM.  Learner: Patient  Readiness: Acceptance  Method: Explanation  Response: Needs Reinforcement    ADL Training, taught by Alicia Kilgore OT at 12/5/2023  3:29 PM.  Learner: Patient  Readiness: Acceptance  Method: Explanation  Response: Needs Reinforcement    Education Comments  No comments found.      Goals:  Encounter Problems       Encounter Problems (Active)       ADLs       Patient with complete upper body dressing with independent level of assistance donning and doffing all UE clothes. (Progressing)       Start:  11/17/23    Expected End:  12/08/23            Patient with complete lower body dressing with minimal assist  level of assistance donning and doffing all LE clothes  with PRN adaptive equipment. (Not Progressing)       Start:  11/17/23    Expected End:  12/08/23            Patient will complete daily grooming tasks with stand by assist level of assistance and PRN adaptive equipment while in sitting. (Progressing)       Start:  11/17/23    Expected End:  12/08/23            Patient will complete toileting including hygiene clothing management/hygiene with minimal assist  level of assistance. (Not Progressing)       Start:  11/17/23    Expected End:  12/08/23               BALANCE       Pt will maintain dynamic sitting balance during ADL task with contact guard assist level of assistance in order to demonstrate decreased risk of falling and improved postural control. (Progressing)       Start:  11/17/23    Expected End:  12/08/23               COGNITION/SAFETY       Patient will verbalize and demonstrate >2 relaxation and breathing techniques during sessions with independence. (Progressing)       Start:  11/17/23    Expected End:  12/08/23               EXERCISE/STRENGTHENING       Patient will complete BUE exercises in order to improve activity tolerance for ADL performance.  (Progressing)       Start:  11/17/23    Expected End:  12/08/23                TRANSFERS       Patient will perform bed mobility minimal assist  level of assistance in order to improve safety and independence with mobility (Progressing)       Start:  11/17/23    Expected End:  12/08/23            Patient will complete functional transfers with least restrictive device with minimal assist  level of assistance. (Not Progressing)       Start:  11/17/23    Expected End:  12/08/23               Alicia Kilgore OTR/L

## 2023-12-05 NOTE — CARE PLAN
Problem: Nutrition  Goal: Oral intake greater 75%  12/5/2023 1325 by Katina Gasca RN  Outcome: Progressing  12/5/2023 1323 by Katina Gasca RN  Outcome: Progressing  Goal: Consume prescribed supplement  12/5/2023 1325 by Katina Gasca RN  Outcome: Progressing  12/5/2023 1323 by Katina Gasca RN  Outcome: Progressing  Goal: Adequate PO fluid intake  12/5/2023 1325 by Katina Gasca RN  Outcome: Progressing  12/5/2023 1323 by Katina Gasca RN  Outcome: Progressing  Goal: Nutrition support goals are met within 48 hrs  12/5/2023 1325 by Katina Gasca RN  Outcome: Progressing  12/5/2023 1323 by Katina Gasca RN  Outcome: Progressing  Goal: Nutrition support is meeting 75% of nutrient needs  12/5/2023 1325 by Katina Gasca RN  Outcome: Progressing  12/5/2023 1323 by Katina Gasca RN  Outcome: Progressing  Goal: BG  mg/dL  12/5/2023 1325 by Katina Gasca RN  Outcome: Progressing  12/5/2023 1323 by Katina Gasca RN  Outcome: Progressing  Goal: Lab values WNL  12/5/2023 1325 by Katina Gasca RN  Outcome: Progressing  12/5/2023 1323 by Katina Gasca RN  Outcome: Progressing  Goal: Electrolytes WNL  12/5/2023 1325 by Katina Gasca RN  Outcome: Progressing  12/5/2023 1323 by Katina Gasca RN  Outcome: Progressing  Goal: Promote healing  12/5/2023 1325 by Katina Gasca RN  Outcome: Progressing  12/5/2023 1323 by Katina Gasca RN  Outcome: Progressing     Problem: Skin  Goal: Decreased wound size/increased tissue granulation at next dressing change  12/5/2023 1325 by Katina Gasca RN  Outcome: Progressing  Flowsheets (Taken 12/3/2023 2319 by Alma Delia Chambers RN)  Decreased wound size/increased tissue granulation at next dressing change:   Promote sleep for wound healing   Utilize specialty bed per algorithm   Protective dressings over bony prominences  12/5/2023 1323 by Katina Gasca, RN  Outcome: Progressing  Goal: Participates in plan/prevention/treatment  measures  12/5/2023 1325 by Katina Gasca RN  Outcome: Progressing  Flowsheets (Taken 12/3/2023 2319 by Alma Delia Chambers RN)  Participates in plan/prevention/treatment measures:   Discuss with provider PT/OT consult   Increase activity/out of bed for meals   Elevate heels  12/5/2023 1323 by Katina Gasca RN  Outcome: Progressing  Goal: Prevent/manage excess moisture  12/5/2023 1325 by Katina Gasca RN  Outcome: Progressing  Flowsheets (Taken 12/3/2023 2319 by Alma Delia Chambers RN)  Prevent/manage excess moisture:   Cleanse incontinence/protect with barrier cream   Moisturize dry skin   Use wicking fabric (obtain order)   Follow provider orders for dressing changes   Monitor for/manage infection if present  12/5/2023 1323 by Katina Gasca RN  Outcome: Progressing  Goal: Prevent/minimize sheer/friction injuries  12/5/2023 1325 by Katina Gasca RN  Outcome: Progressing  Flowsheets (Taken 12/3/2023 2319 by Alma Delia Chambers RN)  Prevent/minimize sheer/friction injuries:   Complete micro-shifts as needed if patient unable. Adjust patient position to relieve pressure points, not a full turn   HOB 30 degrees or less   Increase activity/out of bed for meals   Turn/reposition every 2 hours/use positioning/transfer devices   Use pull sheet   Utilize specialty bed per algorithm  12/5/2023 1323 by Katina Gasca RN  Outcome: Progressing  Goal: Promote skin healing  12/5/2023 1325 by Katina Gasca RN  Outcome: Progressing  Flowsheets (Taken 12/3/2023 2319 by Alma Delia Chambers RN)  Promote skin healing:   Assess skin/pad under line(s)/device(s)   Ensure correct size (line/device) and apply per  instructions   Protective dressings over bony prominences   Rotate device position/do not position patient on device   Turn/reposition every 2 hours/use positioning/transfer devices  12/5/2023 1323 by Katina Gasca RN  Outcome: Progressing     Problem: Fall/Injury  Goal: Verbalize understanding of personal  risk factors for fall in the hospital  12/5/2023 1325 by Katina Gasca RN  Outcome: Progressing  12/5/2023 1323 by Katina Gasca RN  Outcome: Progressing  Goal: Verbalize understanding of risk factor reduction measures to prevent injury from fall in the home  12/5/2023 1325 by Katina Gasca RN  Outcome: Progressing  12/5/2023 1323 by Katina Gasca RN  Outcome: Progressing  Goal: Use assistive devices by end of the shift  12/5/2023 1325 by Katina Gasca RN  Outcome: Progressing  12/5/2023 1323 by Katina Gasca RN  Outcome: Progressing     Problem: Discharge Planning  Goal: Discharge to home or other facility with appropriate resources  12/5/2023 1325 by Katina Gasca RN  Outcome: Progressing  12/5/2023 1323 by Katina Gasca RN  Outcome: Progressing     Problem: Chronic Conditions and Co-morbidities  Goal: Patient's chronic conditions and co-morbidity symptoms are monitored and maintained or improved  12/5/2023 1325 by Katina Gasca RN  Outcome: Progressing  12/5/2023 1323 by Katina Gasca RN  Outcome: Progressing     Problem: Respiratory  Goal: Clear secretions with interventions this shift  12/5/2023 1325 by Katina Gasca RN  Outcome: Progressing  12/5/2023 1323 by Katina Gasca RN  Outcome: Progressing  Goal: Minimize anxiety/maximize coping throughout shift  12/5/2023 1325 by Katina Gasca RN  Outcome: Progressing  12/5/2023 1323 by Katina Gasca RN  Outcome: Progressing  Goal: Minimal/no exertional discomfort or dyspnea this shift  12/5/2023 1325 by Katina Gasca RN  Outcome: Progressing  12/5/2023 1323 by Ktaina Gasca RN  Outcome: Progressing  Goal: No signs of respiratory distress (eg. Use of accessory muscles. Peds grunting)  12/5/2023 1325 by Katina Gasca RN  Outcome: Progressing  12/5/2023 1323 by Katina Gasca RN  Outcome: Progressing  Goal: Patent airway maintained this shift  12/5/2023 1325 by Katina Gasca RN  Outcome: Progressing  12/5/2023 1323  by Katina Coppella, RN  Outcome: Progressing  Goal: Tolerate pulmonary toileting this shift  12/5/2023 1325 by Katina Gasca RN  Outcome: Progressing  12/5/2023 1323 by Katina Gasca RN  Outcome: Progressing  Goal: Verbalize decreased shortness of breath this shift  12/5/2023 1325 by Katina Gasca RN  Outcome: Progressing  12/5/2023 1323 by Katina Gasca RN  Outcome: Progressing  Goal: Wean oxygen to maintain O2 saturation per order/standard this shift  12/5/2023 1325 by Katina Gasca RN  Outcome: Progressing  12/5/2023 1323 by Katina Gasca RN  Outcome: Progressing  Goal: Increase self care and/or family involvement in next 24 hours  12/5/2023 1325 by Katina Gasca RN  Outcome: Progressing  12/5/2023 1323 by Katina Gasca RN  Outcome: Progressing      The clinical goals for the shift include patient will remain safe and maintain SPO2 >90% and have minimal dyspnea with exertion

## 2023-12-05 NOTE — PROGRESS NOTES
"Macarena Wilson is a 68 y.o. female on day 26 of admission presenting with Acute hypoxemic respiratory failure (CMS/HCC).    Subjective   No acute overnight events.    ROS:  Pt denies any CP/Sob, fevetrs/chills, NVD or and pain  Chief Complaint:  None indicated at the time of assessment    Objective   Physical Exam:  Constitutional: Elderly female in NAD, alert, pleasant and cooperative  Eyes: Anicteric   ENMT: mucous membranes moist, no apparent injury  Head/Neck: AT/NC  Respiratory/Thorax: Lungs CTA bilaterally, non-labored breathing, diminished b/l, no cough, on 8L NC   Cardiovascular: RRR.  S1/S2 normal.  No obvious MRG  Gastrointestinal: Nondistended, soft, non-tender, BS present x 4  Musculoskeletal: ROM intact, no joint swelling, normal strength  Extremities: normal extremities, no edema, contusions or wounds  Neurological: alert and oriented x 3, speech clear, follows commands appropriately  Skin: Warm and dry, no lesions, no rashes    Vital Signs  Blood pressure 105/64, pulse 58, temperature 36.3 °C (97.3 °F), temperature source Temporal, resp. rate 18, height 1.6 m (5' 2.99\"), weight 63.2 kg (139 lb 5.3 oz), SpO2 100 %.  Oxygen Therapy  SpO2: 100 %  Medical Gas Therapy: Supplemental oxygen  O2 Delivery Method: High flow nasal cannula (airvo)       Intake/Output last 3 Shifts:  I/O last 3 completed shifts:  In: 600 (8.8 mL/kg) [P.O.:480; NG/GT:120]  Out: 1600 (23.4 mL/kg) [Urine:1600 (0.6 mL/kg/hr)]  Dosing Weight: 68.4 kg     Lines and Tubes:  Peripheral IV 11/08/23 20 G Left;Anterior Hand (Active)   Placement Date/Time: 11/08/23 0000   Size (Gauge): 20 G  Orientation: Left;Anterior  Location: Hand   Number of days: 27       Peripheral IV 11/29/23 20 G 2.25 cm Left Forearm (Active)   Placement Date/Time: 11/29/23 1315   Hand Hygiene Completed: Yes  Size (Gauge): 20 G  Catheter Length (cm): 2.25 cm  Orientation: Left  Location: Forearm  Site Prep: Chlorhexidine   Technique: Ultrasound guidance  Placed " by: Brandon CRAFT  Patient Toleranc...   Number of days: 5       External Urinary Catheter (Active)   Placement Date/Time: 11/09/23 0400     Number of days: 26         Relevant Results   Scheduled medications  apixaban, 5 mg, nasogastric tube, BID  ciprofloxacin, 500 mg, nasogastric tube, q12h KARL  fluticasone, 1 spray, Each Nostril, BID  insulin glargine, 10 Units, subcutaneous, Nightly  insulin lispro, 0-10 Units, subcutaneous, Before meals & nightly  lidocaine, 1 patch, transdermal, Daily  melatonin, 5 mg, oral, Daily  metoprolol tartrate, 50 mg, nasogastric tube, BID  pantoprazole, 40 mg, oral, Daily before breakfast  predniSONE, 60 mg, oral, BID  QUEtiapine, 25 mg, oral, Nightly  sulfamethoxazole-trimethoprim, 160 mg of trimethoprim, nasogastric tube, q24h KARL  thiamine, 100 mg, oral, Daily  traZODone, 25 mg, nasogastric tube, Nightly      Continuous medications  oxygen, , Last Rate: 8 L/min (12/04/23 2115)      PRN medications  PRN medications: acetaminophen **OR** [DISCONTINUED] acetaminophen **OR** [DISCONTINUED] acetaminophen, dextrose 10 % in water (D10W), dextrose, docusate sodium, ipratropium-albuteroL, loperamide, ondansetron ODT **OR** [DISCONTINUED] ondansetron, oxyCODONE, QUEtiapine  Results for orders placed or performed during the hospital encounter of 11/09/23 (from the past 24 hour(s))   POCT GLUCOSE   Result Value Ref Range    POCT Glucose 177 (H) 74 - 99 mg/dL   POCT GLUCOSE   Result Value Ref Range    POCT Glucose 185 (H) 74 - 99 mg/dL   POCT GLUCOSE   Result Value Ref Range    POCT Glucose 225 (H) 74 - 99 mg/dL   CBC and Auto Differential   Result Value Ref Range    WBC 9.7 4.4 - 11.3 x10*3/uL    nRBC 0.2 (H) 0.0 - 0.0 /100 WBCs    RBC 3.83 (L) 4.00 - 5.20 x10*6/uL    Hemoglobin 11.1 (L) 12.0 - 16.0 g/dL    Hematocrit 34.0 (L) 36.0 - 46.0 %    MCV 89 80 - 100 fL    MCH 29.0 26.0 - 34.0 pg    MCHC 32.6 32.0 - 36.0 g/dL    RDW 15.4 (H) 11.5 - 14.5 %    Platelets 182 150 - 450 x10*3/uL    Immature  Granulocytes %, Automated 6.5 (H) 0.0 - 0.9 %    Immature Granulocytes Absolute, Automated 0.63 0.00 - 0.70 x10*3/uL   Manual Differential   Result Value Ref Range    Neutrophils %, Manual 89.4 40.0 - 80.0 %    Bands %, Manual 4.4 0.0 - 5.0 %    Lymphocytes %, Manual 1.7 13.0 - 44.0 %    Monocytes %, Manual 1.8 2.0 - 10.0 %    Eosinophils %, Manual 0.0 0.0 - 6.0 %    Basophils %, Manual 0.0 0.0 - 2.0 %    Metamyelocytes %, Manual 0.9 0.0 - 0.0 %    Myelocytes %, Manual 1.8 0.0 - 0.0 %    Seg Neutrophils Absolute, Manual 8.67 (H) 1.20 - 7.00 x10*3/uL    Bands Absolute, Manual 0.43 0.00 - 0.70 x10*3/uL    Lymphocytes Absolute, Manual 0.16 (L) 1.20 - 4.80 x10*3/uL    Monocytes Absolute, Manual 0.17 0.10 - 1.00 x10*3/uL    Eosinophils Absolute, Manual 0.00 0.00 - 0.70 x10*3/uL    Basophils Absolute, Manual 0.00 0.00 - 0.10 x10*3/uL    Metamyelocytes Absolute, Manual 0.09 0.00 - 0.00 x10*3/uL    Myelocytes Absolute, Manual 0.17 0.00 - 0.00 x10*3/uL    Total Cells Counted 113     Neutrophils Absolute, Manual 9.10 (H) 1.20 - 7.70 x10*3/uL    RBC Morphology See Below     Ovalocytes Few     Durham Cells Few    POCT GLUCOSE   Result Value Ref Range    POCT Glucose 257 (H) 74 - 99 mg/dL     Electrocardiogram, 12-lead PRN ACS symptoms    Result Date: 11/29/2023  Normal sinus rhythm Nonspecific T wave abnormality Abnormal ECG When compared with ECG of 24-NOV-2023 12:55, (unconfirmed) No significant change was found Confirmed by Maulik Rowland ([id number]) on 11/29/2023 7:42:31 PM    Electrocardiogram, 12-lead PRN ACS symptoms    Result Date: 11/29/2023  Normal sinus rhythm T wave abnormality, consider lateral ischemia Abnormal ECG When compared with ECG of 17-NOV-2023 22:14, Sinus rhythm has replaced Atrial fibrillation Vent. rate has decreased BY  91 BPM ST no longer depressed in Inferior leads ST no longer depressed in Lateral leads T wave inversion no longer evident in Inferior leads T wave inversion less evident in Lateral leads  Confirmed by Maulik Rowland ([id number]) on 11/29/2023 7:42:16 PM    CT angio chest for pulmonary embolism    Result Date: 11/26/2023  Interpreted By:  Geovanny Roberts, STUDY: CT ANGIO CHEST FOR PULMONARY EMBOLISM;  11/26/2023 1:34 pm   INDICATION: Signs/Symptoms:c/f PE.   COMPARISON: CT dated 11/07/2023   ACCESSION NUMBER(S): [id number]   ORDERING CLINICIAN: GAGANDEEP DE LA CRUZ   TECHNIQUE: Helical data acquisition of the chest was obtained after intravenous administration of 75 cc of Omnipaque 350, as per PE protocol. Images were reformatted in coronal and sagittal planes. Axial and coronal maximum intensity projection (MIP) images were created and reviewed.   FINDINGS: POTENTIAL LIMITATIONS OF THE STUDY: Study is slightly limited due to motion artifact.   HEART AND VESSELS: There are no discrete filling defects within main pulmonary artery and its branches to suggest acute pulmonary embolism. Main pulmonary artery and its branches are normal in caliber.   The thoracic aorta normal in course and caliber. Mild/minimal coronary artery calcifications are seen. Please note,the study is not optimized for evaluation of coronary arteries.   The cardiac chambers are not enlarged.   There is no pericardial effusion seen.   MEDIASTINUM AND WALTER, LOWER NECK AND AXILLA: The visualized thyroid gland is within normal limits. No evidence of thoracic lymphadenopathy by CT criteria. Esophagus appears within normal limits as seen.   LUNGS AND AIRWAYS: The trachea and central airways are patent. No endobronchial lesion is seen.   There is redemonstration of consolidative opacities in bilateral lungs more in the lower lobes and slightly improved compared to prior study.   Areas of architectural distortion, and mild bronchiectasis scattered throughout the lungs with coarsening of interstitial markings.     UPPER ABDOMEN: An enteric tube in place terminating in the proximal duodenum. The visualized subdiaphragmatic structures  demonstrate no remarkable findings.       CHEST WALL AND OSSEOUS STRUCTURES: Chest wall is within normal limits. No acute osseous pathology.There are no suspicious osseous lesions.       1. No evidence of pulmonary embolism. 2. Multifocal consolidative opacities more in the lower lobes, slightly improved compared to prior study. Findings remain concerning for multifocal infectious process. 3. Interlobular septal thickening, architectural distortion and mild bronchiectasis scattered throughout the lungs, slightly worse and concerning for fibrotic like changes, likely sequela of infection. Cannot exclude component of interstitial pulmonary edema. 4.  Other findings as described above.     MACRO: None   Signed by: Geovanny Zepeda 11/26/2023 2:13 PM Dictation workstation:   [id number]    XR chest 1 view    Result Date: 11/24/2023  Interpreted By:  Salvador Robles, STUDY: XR CHEST 1 VIEW;  11/24/2023 7:42 am   INDICATION: Signs/Symptoms:Covid PNA.   COMPARISON: 02/22/2023.   ACCESSION NUMBER(S): [id number]   ORDERING CLINICIAN: GAGANDEEP DE LA CRUZ   FINDINGS:     CARDIOMEDIASTINAL SILHOUETTE: Cardiomediastinal silhouette is normal in size and configuration.   LUNGS: Continued coarse perihilar airspace opacities with subpleural cystic change. No pneumothorax.   ABDOMEN: Dobbhoff tube overlies the duodenal bulb.   BONES: No acute osseous changes.       1.  Continued coarse perihilar airspace opacities consistent with history of COVID-19 pneumonia. Basilar lucencies and correlate with a component of aerated lung versus developing fibrotic changes.     Signed by: Salvador Robles 11/24/2023 8:38 AM Dictation workstation:   [id number]    XR chest 1 view    Result Date: 11/22/2023  Interpreted By:  Salvador Robles, STUDY: XR CHEST 1 VIEW;  11/22/2023 7:48 am   INDICATION: Signs/Symptoms:hypoxia.   COMPARISON: 11/17/2023   ACCESSION NUMBER(S): [id number]   ORDERING CLINICIAN: HUGH BEASLEY   FINDINGS:      CARDIOMEDIASTINAL SILHOUETTE: Cardiomediastinal silhouette is normal in size and configuration.   LUNGS: Continued coarse perihilar airspace opacities/edema. Basilar lucencies and correlate with underlying emphysematous or fibrotic changes.   ABDOMEN: Dobbhoff tube overlies the body of stomach.   BONES: No acute osseous changes.       1.  Continued coarse perihilar basilar opacities and correlate with residual edema/pneumonia/atypical infection. No pneumothorax.     Signed by: Salvador Robles 11/22/2023 10:08 AM Dictation workstation:   [id number]    XR chest 1 view    Result Date: 11/18/2023  Interpreted By:  Salvador Robles and Hanreck James STUDY: XR CHEST 1 VIEW;  11/17/2023 7:55 pm   INDICATION: Signs/Symptoms:desat, side pain.   COMPARISON: 11/16/2023 chest radiograph   ACCESSION NUMBER(S): [id number]   ORDERING CLINICIAN: ANDERSON GLORIA   FINDINGS: AP radiograph of the chest was provided.   Enteric tube courses below diaphragm with its tip beyond the field of view.   CARDIOMEDIASTINAL SILHOUETTE: Cardiomediastinal silhouette is normal in size and configuration.   LUNGS: Increased bilateral patchy lung opacities. No pleural effusion or pneumothorax.   ABDOMEN: No remarkable upper abdominal findings.   BONES: No acute osseous changes.       1. Increased bilateral patchy lung opacities concerning for multifocal pneumonia. Correlate with underlying emphysematous/fibrotic changes.   I personally reviewed the images/study and I agree with the resident Bill Calvin's findings as stated. This study was interpreted at [facility], [place], [place].   MACRO: None   Signed by: Salvador Robles 11/18/2023 10:01 AM Dictation workstation:   [id number]    US renal complete    Result Date: 11/16/2023  Interpreted By:  Tomas Brice and Liller Gregory STUDY: US RENAL COMPLETE;  11/16/2023 2:15 pm   INDICATION: Signs/Symptoms:evaluate renal infarct.   COMPARISON: CT abdomen  pelvis 03/01/2023   ACCESSION NUMBER(S): [id number]   ORDERING CLINICIAN: CHRISTIANNE BEY   TECHNIQUE: Multiple images of the kidneys were obtained.   FINDINGS: RIGHT KIDNEY: Right kidney is atrophic measuring up to 5.8 cm. There is increased renal cortical echogenicity and the renal cortex is thinned. A cyst is noted within the interpolar region measuring up to 1.3 x 1.3 x 1 cm. Nonobstructing nephrolithiasis is visualized measuring up to 1.5 cm. No hydronephrosis.   LEFT KIDNEY: The left kidney measures 11 cm in length. Renal cortical echogenicity is increased. However, renal cortical thickness is normal. A cyst is noted within the interpolar region measuring up to 2.1 x 1.8 x 1.9 cm. There is no hydronephrosis.   BLADDER: Bladder is decompressed, limited for evaluation.       1. Atrophic right kidney. 2. Bilateral nonobstructing nephrolithiasis measuring up to 1.5 cm on the right and 0.9 cm on the left. No hydronephrosis. 3. Bilateral renal cysts.   I personally reviewed the images/study and I agree with the findings as stated above by resident physician, Dr. Alejandro Guillory. The study was interpreted at [facility].   MACRO: None   Signed by: Tomas Brice 11/16/2023 9:43 PM Dictation workstation:   [id number]    XR abdomen 1 view    Result Date: 11/16/2023  Interpreted By:  Garrett Mittal, STUDY: XR ABDOMEN 1 VIEW;  11/16/2023 1:29 pm   INDICATION: Signs/Symptoms:confirm dobhoff.   COMPARISON: None.   ACCESSION NUMBER(S): [id number]   ORDERING CLINICIAN: MOY ADEN   FINDINGS: There is an enterogastric tube with tip at the expected position of the pylorus. Semi-erect KUB showing the lower lung fields in mid pelvic level show scattered small and large bowel gas without evidence of distention. Nonobstructive bowel gas pattern. Limited evaluation of pneumoperitoneum on supine imaging, however no gross evidence of free air is noted.   Lungs show bilateral  patchy airspace opacities.   Osseous structures demonstrate no acute bony changes.       1.  Nonspecific KUB. Enterogastric tube in the expected position of the stomach with tip at the pylorus.   MACRO: None   Signed by: Garrtet Mittal 11/16/2023 1:38 PM Dictation workstation:   [id number]    XR chest 1 view    Result Date: 11/16/2023  Interpreted By:  Geovanny Roberts, STUDY: XR CHEST 1 VIEW;  11/16/2023 7:54 am   INDICATION: Signs/Symptoms:Assess for pneumomediastinum, pneumopericardium.   COMPARISON: Radiograph dated 11/15/2023 and CT dated 11/07/2023   ACCESSION NUMBER(S): [id number]   ORDERING CLINICIAN: ROBERTO LUO   FINDINGS: Cardiac silhouette size is stable. No definite evidence of pneumomediastinum.   Unchanged appearance of the lungs with persistent bilateral patchy airspace opacity. No sizable pleural effusion or pneumothorax.   No acute osseous abnormality.       1. No appreciable pneumothorax or pneumomediastinum. 2. Unchanged appearance of the lungs with extensive bilateral patchy airspace opacity.       Signed by: Geovanny Zepeda 11/16/2023 12:49 PM Dictation workstation:   [id number]    XR chest 1 view    Result Date: 11/16/2023  Interpreted By:  Geovanny Roberts and Hanreck James STUDY: XR CHEST 1 VIEW;  11/15/2023 10:22 pm   INDICATION: Signs/Symptoms:Pneumomediastinum.   COMPARISON: Same day chest radiograph   ACCESSION NUMBER(S): [id number]   ORDERING CLINICIAN: VICKIE CUNHA   FINDINGS: AP radiograph of the chest was provided.     CARDIOMEDIASTINAL SILHOUETTE: Cardiomediastinal silhouette is normal in size and configuration. No appreciable pneumomediastinum.   LUNGS: Similar extensive bilateral patchy airspace opacities. No sizable pleural effusion or pneumothorax.   ABDOMEN: No remarkable upper abdominal findings.   BONES: No acute osseous changes.       1. Similar extensive bilateral patchy airspace opacities compatible with multifocal infection. 2. No  appreciable pneumothorax or pneumomediastinum in the current study.   I personally reviewed the images/study and I agree with the resident Bill Calvin's findings as stated. This study was interpreted at [place], [place].   MACRO: None   Signed by: Geovanny Zepeda 11/16/2023 7:25 AM Dictation workstation:   [id number]    XR chest 1 view    Result Date: 11/16/2023  Interpreted By:  Geovanny Roberts, STUDY: XR CHEST 1 VIEW;  11/15/2023 4:55 pm   INDICATION: Signs/Symptoms:sob.   COMPARISON: CT dated 11/07/2023   ACCESSION NUMBER(S): [id number]   ORDERING CLINICIAN: BILAL ALI   FINDINGS: Cardiac silhouette size is within normal limits. Questionable lucency along the left heart border/left mediastinum.   Redemonstration of patchy airspace opacity in bilateral lungs. No sizable pleural effusion or pneumothorax.   No acute osseous abnormality.       1. Questionable lucency along the left heart border/mediastinum which is likely artifactual, however pneumomediastinum is not excluded. Recommend attention on follow-up study. 2. Redemonstration of multifocal patchy airspace opacity.       Signed by: Geovanny Zepeda 11/16/2023 7:25 AM Dictation workstation:   [id number]    XR chest 1 view    Result Date: 11/16/2023  Interpreted By:  Geovanny Roberts,  and Samreen Orellana STUDY: XR CHEST 1 VIEW;  11/15/2023 4:48 pm   INDICATION: Signs/Symptoms:Hypoxia.   COMPARISON: Radiograph 11/15/2023 4:47 p.m.   ACCESSION NUMBER(S): [id number]   ORDERING CLINICIAN: BILAL ALI   FINDINGS: Lateral decubitus radiograph with left side up.   CARDIOMEDIASTINAL SILHOUETTE: Cardiomediastinal silhouette is normal in size and configuration.   LUNGS: Patchy airspace opacity in the visualized portion of the lungs. Previously noted suspicious pneumomediastinum is not well appreciated on lateral radiograph. No appreciable pneumothorax.   ABDOMEN: No remarkable upper  abdominal findings.   BONES: No acute osseous changes.       1.  No appreciable pneumothorax.   I personally reviewed the images/study and I agree with the findings as stated by Esteban Jolley MD. This study was interpreted at [facility], [place], [place].   MACRO: None.   Signed by: Geovanny Zepeda 11/16/2023 7:23 AM Dictation workstation:   [id number]    XR chest 1 view    Result Date: 11/15/2023  Interpreted By:  Garrett Mittal, STUDY: XR CHEST 1 VIEW;  11/15/2023 4:46 pm   INDICATION: Signs/Symptoms:sob.   COMPARISON: 11/14/2023   ACCESSION NUMBER(S): [id number]   ORDERING CLINICIAN: CHRISTIANNE BEY   FINDINGS: Semi-erect portable chest.       CARDIOMEDIASTINAL SILHOUETTE: Cardiomediastinal silhouette is normal in size and configuration.   LUNGS: Lungs show interstitial nodular prominence in the right and left chest is similar to previous exam. There is however a new right lateral lucency at the cardiac margin and questionable lucency centrally in the mediastinum suggesting possible pneumomediastinum.   ABDOMEN: No remarkable upper abdominal findings.   BONES: No acute osseous changes.       1.  New finding possible pneumomediastinum       MACRO: Critical Finding:  See findings. Notification was initiated on 11/15/2023 at 5:09 pm by  Garrett Mittal.  (**-OCF-**) Instructions:   Signed by: Garrett Mittal 11/15/2023 5:10 PM Dictation workstation:   [id number]    ECG 12 lead    Result Date: 11/15/2023  Normal sinus rhythm Normal ECG When compared with ECG of 12-OCT-2022 08:31, No significant change was found Confirmed by Riccardo Bagley ([id number]) on 11/15/2023 11:19:18 AM    XR chest 1 view    Result Date: 11/14/2023  Interpreted By:  Garrett Mittal, STUDY: XR CHEST 1 VIEW;  11/14/2023 1:22 pm   INDICATION: Signs/Symptoms:shortness of breath.   COMPARISON: 11/13/2023   ACCESSION NUMBER(S): [id number]   ORDERING CLINICIAN: RACHELLE ACUNA   FINDINGS: Upright portable chest.        CARDIOMEDIASTINAL SILHOUETTE: Cardiomediastinal silhouette is normal in size and configuration.   LUNGS: Lung fields are moderately expanded there is patchy opacification and interstitial prominence in the right and left chest with similar findings to previous exam. There is no convincing evidence of pleural effusion. There is no sign of pneumothorax. Findings suggest acute interstitial edema on chronic lung disease.   ABDOMEN: No remarkable upper abdominal findings.   BONES: No acute osseous changes.       1.  Stable interstitial edema and probable chronic lung disease.       MACRO: None   Signed by: Garrett Mittal 11/14/2023 1:27 PM Dictation workstation:   [id number]    Lower extremity venous duplex bilateral    Result Date: 11/13/2023            [place] [address]   Tel [phone] and Fax [phone]  Vascular Lab Report [facility] LOWER EXTREMITY VENOUS DUPLEX BILATERAL  Patient Name:     KARLA Cruz Physician: [id number] Jonas Woods MD Study Date:       11/13/2023         Ordering           [id number] BETTINA FERNANDES                                      Physician: MRN/PID:          [id number]           Technologist:      Krunal Ruggiero RVT Accession#:       [id number]       Technologist 2: Date of           1955 / 68      Encounter#:        [id number] Birth/Age:        years Gender:           F Admission Status: Inpatient          Location           [facility]                                      Performed:  Diagnosis/ICD: Localized (leg) edema-R60.0 CPT Codes:     42529 Peripheral venous duplex scan for DVT complete  **CRITICAL RESULT** Critical Result: Acute DVT in bilateral calves. Notification called to Madiha Hu MD on 11/13/2023 at 4:52:56 PM by Krunal Ruggiero RVT.  CONCLUSIONS: Right Lower Venous: There is acute occlusive deep vein thrombosis visualized in the posterior tibial  vein. The remainder of the right leg is negative for deep vein thrombosis. Left Lower Venous: There is acute occlusive deep vein thrombosis visualized in the soleal vein. The remainder of the left leg is negative for deep vein thrombosis.  Imaging & Doppler Findings:  Right       Compressible    Thrombus            Flow CFV             Yes           None       Spontaneous/Phasic PFV             Yes           None FV Proximal     Yes           None       Spontaneous/Phasic FV Mid          Yes           None FV Distal       Yes           None Popliteal       Yes           None       Spontaneous/Phasic Peroneal        Yes           None PTV              No      Acute occlusive  Left                  Compress    Thrombus            Flow Distal External Iliac               None       Spontaneous/Phasic CFV                     Yes         None       Spontaneous/Phasic PFV                     Yes         None FV Proximal             Yes         None       Spontaneous/Phasic FV Mid                  Yes         None FV Distal               Yes         None Popliteal               Yes         None       Spontaneous/Phasic Peroneal                Yes         None PTV                     Yes         None Soleal                   No    Acute occlusive  [id number] Jonas Woods MD Electronically signed by [id number] Jonas Woods MD on 11/13/2023 at 6:58:49 PM  ** Final **     XR chest 1 view    Result Date: 11/13/2023  Interpreted By:  OByrne, Garrett,  and Milo Rachel STUDY: XR CHEST 1 VIEW;  11/13/2023 8:43 am   INDICATION: Signs/Symptoms:Increased oxygen requirement; c/f aspiration.   COMPARISON: Chest radiograph 11/12/2023   ACCESSION NUMBER(S): [id number]   ORDERING CLINICIAN: QUINTON GANDARA   FINDINGS: Single AP semi-upright portable radiograph of the chest was provided.   CARDIOMEDIASTINAL SILHOUETTE: The cardiomediastinal silhouette is stable in size and configuration.   LUNGS: Similar appearance of bibasilar  predominant interstitial opacities. No sizable pneumothorax or pleural effusion. No focal parenchymal consolidation.   ABDOMEN: No remarkable upper abdominal findings.   BONES: No acute osseous abnormalities.       1. Unchanged appearance of interstitial opacities most prominent in the bilateral lower lobes, likely related to chronic interstitial lung disease.   I personally reviewed the image(s)/study and resident interpretation as stated by Dr. Rachel Pedraza MD. I agree with the findings as stated. This study was interpreted at [facility], [place], [place].   MACRO: None   Signed by: Garrett Mittal 11/13/2023 9:55 AM Dictation workstation:   [id number]    XR chest 1 view    Result Date: 11/12/2023  Interpreted By:  Enmanuel Myers and Dervishi Mario STUDY: XR CHEST 1 VIEW;  11/12/2023 12:24 pm   INDICATION: Signs/Symptoms:covid.   COMPARISON: Chest radiograph 11/09/2023.   ACCESSION NUMBER(S): [id number]   ORDERING CLINICIAN: RACHELLE ACUNA   FINDINGS: AP radiograph of the chest was provided.     CARDIOMEDIASTINAL SILHOUETTE: Cardiomediastinal silhouette is stable in size and configuration.   LUNGS: There is re-demonstration of bilateral airspace and interstitial opacities predominantly in the mid to lower lungs which appears slightly improved from prior exam. There is no evidence of pneumothorax or large pleural effusion.   ABDOMEN: No remarkable upper abdominal findings.   BONES: No acute osseous changes.       1.  Mild interval improvement in mid to lower lung predominant airspace and interstitial opacities.     I personally reviewed the images/study and I agree with the findings as stated by Resident Kevin Pena MD. This study was interpreted at [facility], [place], [place].   MACRO: NONE.   Signed by: Enmanuel Myers 11/12/2023 3:09 PM Dictation workstation:   [id number]    Transthoracic Echo (TTE) Complete    Result Date:  11/9/2023   [facility], [address] [place] [address]                Tel [phone] and Fax [phone] TRANSTHORACIC ECHOCARDIOGRAM REPORT  Patient Name:      KARLA Cruz Physician:    [id number] Jose Guadalupe Vieira MD Study Date:        11/9/2023           Ordering Provider:    [id number] KATHY LOBO MRN/PID:           [id number]            Fellow: Accession#:        [id number]        Nurse: Date of Birth/Age: 1955 / 68 years Sonographer:          Shayne Singh                                                              RDCS, RCS, FASE,                                                              ACS Gender:            F                   Additional Staff: Height:            160.02 cm           Admit Date: Weight:            68.04 kg            Admission Status:     Inpatient - STAT BSA:               1.71 m2             Encounter#:           [id number]                                        [address]  [address] Blood Pressure: 126 /68 mmHg Study Type:    TRANSTHORACIC ECHO (TTE) COMPLETE Diagnosis/ICD: Acute respiratory failure with hypoxia-J96.01 Patient History: Pertinent History: CKDIII, acute hypoxic respiratory failure due to COVID PNA,                    severe ARDS. Study Detail: The following Echo studies were performed: 2D, M-Mode, Doppler and               color flow. Image quality for this study is poor. Technically               challenging study due to poor acoustic windows and patient lying               in supine position. Definity used as a contrast agent for               endocardial border definition. Total contrast used for this               procedure was 1.5 mL via IV push.  PHYSICIAN INTERPRETATION: Left Ventricle: The left ventricular systolic function is hyperdynamic, with an estimated ejection fraction of 70-75%. There are no regional wall motion abnormalities. The left  ventricular cavity size is normal. Spectral Doppler shows an impaired relaxation pattern of left ventricular diastolic filling. Left Atrium: The left atrium was not well visualized. Right Ventricle: The right ventricle is normal in size. There is normal right ventricular global systolic function. RV free wall is not well visualized. Right Atrium: The right atrium was not well visualized. Aortic Valve: The aortic valve is trileaflet. There is minimal aortic valve cusp calcification. There is no evidence of aortic valve stenosis. There is no evidence of aortic valve regurgitation. The peak instantaneous gradient of the aortic valve is 7.2 mmHg. Mitral Valve: The mitral valve is normal in structure. There is trace mitral valve regurgitation. Tricuspid Valve: The tricuspid valve is structurally normal. There is trace to mild tricuspid regurgitation. The right ventricular systolic pressure is unable to be estimated. Pulmonic Valve: The pulmonic valve is structurally normal. There is mild pulmonic valve regurgitation. Pericardium: There is no pericardial effusion noted. Aorta: The aortic root is normal. Systemic Veins: The inferior vena cava appears to be of normal size. There is IVC inspiratory collapse greater than 50%. In comparison to the previous echocardiogram(s): There are no prior studies on this patient for comparison purposes.  CONCLUSIONS:  1. Left ventricular systolic function is hyperdynamic with a 70-75% estimated ejection fraction.  2. Spectral Doppler shows an impaired relaxation pattern of left ventricular diastolic filling. QUANTITATIVE DATA SUMMARY: 2D MEASUREMENTS:                          Normal Ranges: Ao Root d:     2.90 cm   (2.0-3.7cm) LAs:           2.50 cm   (2.7-4.0cm) IVSd:          1.00 cm   (0.6-1.1cm) LVPWd:         0.90 cm   (0.6-1.1cm) LVIDd:         4.00 cm   (3.9-5.9cm) LVIDs:         2.40 cm LV Mass Index: 69.1 g/m2 LV % FS        40.0 % AORTA MEASUREMENTS:                    Normal  Ranges: Asc Ao, d: 3.10 cm (2.1-3.4cm) LV SYSTOLIC FUNCTION BY 2D PLANIMETRY (MOD):                     Normal Ranges: EF-A4C View: 72.1 % (>=55%) EF-A2C View: 72.3 % EF-Biplane:  73.0 % LV DIASTOLIC FUNCTION:                           Normal Ranges: MV Peak E:    0.86 m/s    (0.7-1.2 m/s) MV Peak A:    0.97 m/s    (0.42-0.7 m/s) E/A Ratio:    0.89        (1.0-2.2) MV e'         0.06 m/s    (>8.0) MV lateral e' 0.07 m/s MV medial e'  0.05 m/s MV A Dur:     174.00 msec E/e' Ratio:   14.28       (<8.0) MITRAL VALVE:                 Normal Ranges: MV DT: 219 msec (150-240msec) AORTIC VALVE:                         Normal Ranges: AoV Vmax:      1.34 m/s (<=1.7m/s) AoV Peak P.2 mmHg (<20mmHg) LVOT Max Garrett:  0.86 m/s (<=1.1m/s) LVOT VTI:      14.30 cm LVOT Diameter: 1.90 cm  (1.8-2.4cm) AoV Area,Vmax: 1.81 cm2 (2.5-4.5cm2)  RIGHT VENTRICLE: RV s' 0.15 m/s PULMONIC VALVE:                         Normal Ranges: PV Accel Time: 105 msec (>120ms) PV Max Garrett:    1.0 m/s  (0.6-0.9m/s) PV Max P.1 mmHg  [id number] Jose Guadalupe Vieira MD Electronically signed on 2023 at 1:52:33 PM  ** Final **     XR chest 1 view    Result Date: 2023  Interpreted By:  Salvador Robles, STUDY: XR CHEST 1 VIEW;  2023 7:44 am   INDICATION: .   COMPARISON: 2023.   ACCESSION NUMBER(S): [id number]   ORDERING CLINICIAN: ELVIS RAMIREZ   FINDINGS:     CARDIOMEDIASTINAL SILHOUETTE: Cardiomediastinal silhouette is normal in size and configuration.   LUNGS: Extensive coarse basilar opacities consistent with underlying pulmonary fibrosis with perihilar edema. No pneumothorax.   ABDOMEN: No remarkable upper abdominal findings.   BONES: No acute osseous changes.       1.  Perihilar airspace disease with underlying interstitial opacities and correlate with pulmonary fibrosis. Correlate with atypical infection.     Signed by: Salvador Robles 2023 8:14 AM Dictation workstation:   [id number]    CT chest wo IV contrast    Result  Date: 11/7/2023  Interpreted By:  Da Samuels, STUDY: CT CHEST WO IV CONTRAST; 11/7/2023 10:57 am   INDICATION: Signs/Symptoms:worsening pneumonia.   COMPARISON: October 24, 2023.   ACCESSION NUMBER(S): [id number]   ORDERING CLINICIAN: DENNYS MERCADO   TECHNIQUE: Axial unenhanced images were obtained through the chest. Post processing sagittal and coronal reconstruction images were also performed. Coronal MIP images.   FINDINGS: Lung and Large Airways: The extensive ground-glass densities noted throughout both lungs now appear more confluent in the bilateral lower lobes and posterior aspect of the bilateral upper lobes. There is persistent ground-glass densities in the anterior aspect of the bilateral upper lobes. Pleura: within normal limits. Vessels: No evidence for aortic aneurysm is noted. Heart: normal size. No pericardial effusion. Mediastinum and Gogo: within normal limits. Chest Wall and Lower Neck: within normal limits.   Upper Abdomen: within normal limits.   Bones: within normal limits.       Persistent extensive ground-glass densities in the lung apices and anterior aspect of the bilateral upper lobes with progression of previously identified ground-glass densities in the more confluent airspace opacities involving the bilateral lower lobes and posterior aspects of the bilateral upper lobes. Findings suggest atypical infection. Correlation with COVID status is recommended.   All CT examinations are performed with 1 or more of the following dose reduction techniques: Automated exposure control, adjustment of mA and/or kv according to patient's size, or use of iterative reconstruction techniques.   MACRO: none   Signed by: Da Samuels 11/7/2023 3:30 PM Dictation workstation:   [id number]    XR chest 1 view    Result Date: 11/6/2023  Interpreted By:  Karina Rodriguez, STUDY: XR CHEST 1 VIEW; 11/6/2023 1:07 pm   INDICATION: Signs/Symptoms:hypoxia / pneumonia / covid   COMPARISON: 11/03/2023    ACCESSION NUMBER(S): [id number]   ORDERING CLINICIAN: DENNYS MERCADO   TECHNIQUE: Frontal  chest radiographs.   FINDINGS: The cardiomediastinal silhouette is unremarkable. Diffuse airspace opacities overlying both lungs appears stable as compared to prior examination. Question trace bibasilar effusions.   The osseous structures are intact.       Diffuse airspace opacities overlying both lungs is unchanged as compared to prior examinations.       Signed by: Karina Rodriguez 11/6/2023 9:13 PM Dictation workstation:   [id number]    Lower extremity venous duplex bilateral    Result Date: 11/6/2023  Interpreted By:  Aba Roach, STUDY: [facility] US LOWER EXTREMITY VENOUS DUPLEX BILATERAL; 11/6/2023 12:31 pm   INDICATION: Signs/Symptoms:swelling. /patient COVID positive/swelling/patient on blood thinners   COMPARISON: None   ACCESSION NUMBER(S): [id number]   ORDERING CLINICIAN: ERICKSON FALCON   TECHNIQUE: High resolution real-time compression grayscale ultrasound imaging with color flow/Doppler and spectral waveform analysis of the bilateral lower extremity was performed to evaluate for deep vein thrombosis. Attempts were made to visualize the posterior tibial and peroneal veins.   FINDINGS: RIGHT LOWER EXTREMITY:   The common femoral vein through the popliteal vein demonstrates normal compressibility, color, and spectral Doppler waveform analysis. Portions of the posterior tibial and peroneal veins were visualized.   LEFT LOWER EXTREMITY:   The common femoral vein through the popliteal vein demonstrates normal compressibility, color, and spectral Doppler waveform analysis. Portions of the posterior tibial and peroneal veins were visualized.       No evidence of deep vein thrombosis of the  bilateral lower extremities within the limits of this examination.   MACRO: None.   Signed by: Aba Roach 11/6/2023 12:33 PM Dictation workstation:   [id number]                  Malnutrition Diagnosis Status:  New  Malnutrition Diagnosis: Severe malnutrition related to acute disease or injury  As Evidenced by: 12% weight loss from normal along with moderate to severe muscle and fat loss on physical exam in setting of a decrease in PO intake/appetite after being diagnosed with COVID  I agree with the dietitian's malnutrition diagnosis.    CT angio chest for pulmonary embolism 11/26/2023    Narrative  Interpreted By:  Geovanny Roberts,  STUDY:  CT ANGIO CHEST FOR PULMONARY EMBOLISM;  11/26/2023 1:34 pm    INDICATION:  Signs/Symptoms:c/f PE.    COMPARISON:  CT dated 11/07/2023    ACCESSION NUMBER(S):  [id number]    ORDERING CLINICIAN:  GAGANDEEP DE LA CRUZ    TECHNIQUE:  Helical data acquisition of the chest was obtained after intravenous  administration of 75 cc of Omnipaque 350, as per PE protocol. Images  were reformatted in coronal and sagittal planes. Axial and coronal  maximum intensity projection (MIP) images were created and reviewed.    FINDINGS:  POTENTIAL LIMITATIONS OF THE STUDY: Study is slightly limited due to  motion artifact.    HEART AND VESSELS:  There are no discrete filling defects within main pulmonary artery  and its branches to suggest acute pulmonary embolism. Main pulmonary  artery and its branches are normal in caliber.    The thoracic aorta normal in course and caliber.  Mild/minimal coronary artery calcifications are seen. Please note,the  study is not optimized for evaluation of coronary arteries.    The cardiac chambers are not enlarged.    There is no pericardial effusion seen.    MEDIASTINUM AND WALTER, LOWER NECK AND AXILLA:  The visualized thyroid gland is within normal limits.  No evidence of thoracic lymphadenopathy by CT criteria.  Esophagus appears within normal limits as seen.    LUNGS AND AIRWAYS:  The trachea and central airways are patent. No endobronchial lesion  is seen.    There is redemonstration of consolidative opacities in bilateral  lungs more in the lower lobes and  slightly improved compared to prior  study.    Areas of architectural distortion, and mild bronchiectasis scattered  throughout the lungs with coarsening of interstitial markings.      UPPER ABDOMEN:  An enteric tube in place terminating in the proximal duodenum.  The visualized subdiaphragmatic structures demonstrate no remarkable  findings.        CHEST WALL AND OSSEOUS STRUCTURES:  Chest wall is within normal limits.  No acute osseous pathology.There are no suspicious osseous lesions.    Impression  1. No evidence of pulmonary embolism.  2. Multifocal consolidative opacities more in the lower lobes,  slightly improved compared to prior study. Findings remain concerning  for multifocal infectious process.  3. Interlobular septal thickening, architectural distortion and mild  bronchiectasis scattered throughout the lungs, slightly worse and  concerning for fibrotic like changes, likely sequela of infection.  Cannot exclude component of interstitial pulmonary edema.  4.  Other findings as described above.      MACRO:  None    Signed by: Geovanny Zepeda 11/26/2023 2:13 PM  Dictation workstation:   [id number]      Assessment/Plan   Principal Problem:    Acute hypoxemic respiratory failure (CMS/HCC)  Active Problems:    Stage 4 chronic kidney disease (CMS/HCC)    COVID-19    Pneumonia of both lower lobes due to infectious organism    Pseudomonal bacteremia    A-fib (CMS/HCC)    (Primary.  50-year-old female with previous medical history hypertension, CKD stage III who was admitted to the MICU from outside hospital with AHRF 2/2 COVID-pneumonia (tested positive on 10/13), course C/B by ABRS and pulmonary fibrosis resulting in organizing PNA.  Course also complicated by distal DVT, oliguric HUNG on CKD.  Patient weaned to 6 to 8 L nasal cannula owever requires increased FiO2 with movement and exertion.    Update 12/5/2020:    -Continue to monitor for any signs/symptoms of A-fib or intermittent SVT, continue to wean  oxygen requirements, and continue with steroid treatment.  Antibiotic tx for 14 days--> stop day on 12/5    Neuro/psych:  #Anxiety  -Continue with trazodone 25 mg at bedtime and melatonin  -Continue with Seroquel 25 mg at bedtime and as needed daily 12.5 mg  -Pain regimen: PRN oxy for pain and air hunger.  Lidocaine patch for back pain  -Continue with PT/OT: OOB as tolerated    CV:  #Intermittent SVT  #A-fib  #Hypertension  -Cont with metoprolol titrate 50 mg twice daily: Holding parameters--> SBP less than 100, HR less than 60  -Continue to hold home losartan 100 mg i/s/o's HUGN on CKD.  sCr has returned to baseline however BP stable and there is no indication at this time fto start d/t risk of hypotension    Pulm:  #AHRF 2/2 COVID PNA (11/23) c/b ARDS  #Pulm fibrosis  #Organizing PNA 2/2 Covid 19  #Presumed PE  -CTA PE obtained on 11/26 shows no evidence of PE, findings consistent with multifocal PNA (improving from last study), bronchiectasis scattered throughout the lungs (slightly worst) and fibrotic changes.    - Prednisone 50 mg q6 for one week, then taper on 11/26 to 80 mg Q12H and will decrease to 60 mg BID starting 12/4, then 40 mg BID x 7 days, then 60 mg daily x 2-4 weeks, 40 mg daily 2- 4 weeks   -Cont with Bactrim DS for PCP   -Fluticasone 1 spry BID   -Seen by lung transplant on 11/30--> Steroid treatment must be tapers down to <=10mg of Prednisone per day and PT must be able to ambulate 100ft (on any amount of O2).  Will re-engage transplant once Pt meets those criteria.  -CXR 2V w/don scheduled for 12/6--> ATTN Dr. Robles     #Pneumomediastinum (resolved)  -Avoid any further attempts to CPAP     Renal:  #Oliguric HUNG on CKD stage III (baseline serum creatinine 1.6)  #Azotemia  #Vitamin D deficiency  -Bilateral renal ultrasound on 11/16 shows nonobstructing stones bilaterally, no hydronephrosis, and bilateral cysts  - serum creatinine peaked at 3.97; 1.17 today  -Hold off on diuresing today; goal  is net even  -Patient has a previous medical history of bladder fistula (followed by uro/gyn); will hold off on Max placement.  Can consider consulting with urology    FEN/GI:  #Dysphagia s/p Cortrak (removed 12/4)  -SLP complete FFES on 11/28--> OK to resume PO diet  -Ensure with breakfast and lunch    #Diarrhea (resolved)   D. Diff PCR 11/25 negative   -Loperamide 2mg 4 times per day  -Bowel Regimen; PRN Colace; holding miralx and senna i/s/o diarrhea     Endo:  #Steroid-induced hyperglycemia  -Most recent A1c 6.3  -POC Accu-Chek this a.m. 257  -Patient received 8U on 12/4  -Increased ISS from level 2 to level 3  moderate intensity SSI lispro ACHS's; and increase Lantus nightly from 10 units to 15   -Monitor for hypoglycemia as steroid taper is decreased; titrate as needed    Heme:  #DVT   #Presumed PE  -11/27  CTA PE -  -11/13 B/L Duplex scan:  +Rt PT DVT, and + LT Soleal DVT  -Cont with Eliquis 5mg BID     MSK/Derm:  #Eczema/Rosasea  -Cont with dupixent     #Sacral Pressure Ulcer (Stage 3)   -Consult for wound care on 11/17  -Images in chart   - Clean buttocks with cleansing cloths and apply Triad to open skin BID and as needed. Leave RIZWANA     ID:  #Covid PNA (received Decadron, Remdesivir, Tocilizumab at OSH) #Neutrophilic leukocytosis  #PSA bacteremia  -   Afebrile, Tmax 36.3 C  MICRO: 11/21 BC x2 3/4 PSA, stool studies, including 11/25 C. Diff neg, 11/9 Step pneumonaie Ag, Legionella Ag neg, 11/9 Flu A/B, RSV neg   - repeat blood cx x2 11/25 Neg  - 11/22: start renally dosed Zosyn through 11/24, then switched to PO Cipro 500 mg BID for 14 days-- stop 12/5     F:  PRN  E: K>4, MG >2  N:  Regular diet w/Ensure w/breakfast and lunch  DVT:  Eliquis 5mg BID  GI:  None  Drips:  None  ATBCs:  Bactrim (PJP)   Access: PIV x2   Code Status:  DNR/DNI  NOK:  Mathew Wilson [phone]    Dispo:  Pt will remain under the care of the SDU for respo monitoring and weaning with goal of lung transplant.  SNF choices sent to  LSW.  ~ discharge in 5-7 days.      Pt discussed with Dr. Matta seen and examined. All labs, VS and previous plan of care reviewed.    I spent 45 minutes in the professional and overall care of this patient.    Donavan Schneider, ZION-CNP

## 2023-12-05 NOTE — CARE PLAN
Palliative care consulted for medical decision making. Goals are clear at this time. Primary team managing symptoms.     Palliative care will sign off at this time but palliative care SW and/or  will continue to follow peripherally.  Please don't hesitate to page us if any further questions arise.  Thank you for allowing us to participate in the care of this patient.    Alma Delia Clements, APRN-CNP

## 2023-12-06 NOTE — CARE PLAN
Problem: Nutrition  Goal: Oral intake greater 75%  Outcome: Progressing  Goal: Consume prescribed supplement  Outcome: Progressing  Goal: Adequate PO fluid intake  Outcome: Progressing  Goal: Nutrition support goals are met within 48 hrs  Outcome: Progressing  Goal: Nutrition support is meeting 75% of nutrient needs  Outcome: Progressing  Goal: BG  mg/dL  Outcome: Progressing  Goal: Lab values WNL  Outcome: Progressing  Goal: Electrolytes WNL  Outcome: Progressing  Goal: Promote healing  Outcome: Progressing     Problem: Skin  Goal: Decreased wound size/increased tissue granulation at next dressing change  Outcome: Progressing  Flowsheets (Taken 12/3/2023 2319 by Alma Delia Chambers, RN)  Decreased wound size/increased tissue granulation at next dressing change:   Promote sleep for wound healing   Utilize specialty bed per algorithm   Protective dressings over bony prominences  Goal: Participates in plan/prevention/treatment measures  Outcome: Progressing  Flowsheets (Taken 12/3/2023 2319 by Alma Delia Chambers, RN)  Participates in plan/prevention/treatment measures:   Discuss with provider PT/OT consult   Increase activity/out of bed for meals   Elevate heels  Goal: Prevent/manage excess moisture  Outcome: Progressing  Flowsheets (Taken 12/3/2023 2319 by Alma Delia Chambers, RN)  Prevent/manage excess moisture:   Cleanse incontinence/protect with barrier cream   Moisturize dry skin   Use wicking fabric (obtain order)   Follow provider orders for dressing changes   Monitor for/manage infection if present  Goal: Prevent/minimize sheer/friction injuries  Outcome: Progressing  Flowsheets (Taken 12/3/2023 2319 by Alma Delia Chambers, RN)  Prevent/minimize sheer/friction injuries:   Complete micro-shifts as needed if patient unable. Adjust patient position to relieve pressure points, not a full turn   HOB 30 degrees or less   Increase activity/out of bed for meals   Turn/reposition every 2 hours/use positioning/transfer  devices   Use pull sheet   Utilize specialty bed per algorithm  Goal: Promote skin healing  Outcome: Progressing  Flowsheets (Taken 12/3/2023 2319 by Alma Delia Chambers RN)  Promote skin healing:   Assess skin/pad under line(s)/device(s)   Ensure correct size (line/device) and apply per  instructions   Protective dressings over bony prominences   Rotate device position/do not position patient on device   Turn/reposition every 2 hours/use positioning/transfer devices     Problem: Fall/Injury  Goal: Verbalize understanding of personal risk factors for fall in the hospital  Outcome: Progressing  Goal: Verbalize understanding of risk factor reduction measures to prevent injury from fall in the home  Outcome: Progressing  Goal: Use assistive devices by end of the shift  Outcome: Progressing     Problem: Discharge Planning  Goal: Discharge to home or other facility with appropriate resources  Outcome: Progressing     Problem: Chronic Conditions and Co-morbidities  Goal: Patient's chronic conditions and co-morbidity symptoms are monitored and maintained or improved  Outcome: Progressing     Problem: Respiratory  Goal: Clear secretions with interventions this shift  Outcome: Progressing  Goal: Minimize anxiety/maximize coping throughout shift  Outcome: Progressing  Goal: Minimal/no exertional discomfort or dyspnea this shift  Outcome: Progressing  Goal: No signs of respiratory distress (eg. Use of accessory muscles. Peds grunting)  Outcome: Progressing  Goal: Patent airway maintained this shift  Outcome: Progressing  Goal: Tolerate pulmonary toileting this shift  Outcome: Progressing  Goal: Verbalize decreased shortness of breath this shift  Outcome: Progressing  Goal: Wean oxygen to maintain O2 saturation per order/standard this shift  Outcome: Progressing  Goal: Increase self care and/or family involvement in next 24 hours  Outcome: Progressing

## 2023-12-06 NOTE — PROGRESS NOTES
SW continuing to follow for discharge planning.  Anticipate pt to be ready for discharge to snf 12/7 - 12/8.  Updates sent via [employer].  Message received from pt  Noreen [phone]. SW returned call.  Call went to .  SW left a message requesting a call back.  SW to continue to follow and advise of updates.    12/06/23 at 2:06 PM - Elizabeth Gunsalus, LISW-S

## 2023-12-06 NOTE — PROGRESS NOTES
"Macarena Wilson is a 68 y.o. female on day 27 of admission presenting with Acute hypoxemic respiratory failure (CMS/HCC).    Subjective   Stable over night , remains on 8L HF NC    Objective   Physical Exam:  Constitutional: pt in NAD, alert and cooperative  Eyes: PERRL, EOMI, no icterus   ENMT: mucous membranes moist, no apparent injury, no lesions seen  Head/Neck: Neck supple, no apparent injury  Respiratory/Thorax: Lungs with fine bilateral crackles, non-labored breathing  Cardiovascular: Regular, rate and rhythm, no murmurs  Gastrointestinal: Nondistended, soft, non-tender, BS present x 4, Corpak tube in place  : external urine catheter  Musculoskeletal: ROM intact, no joint swelling, normal strength  Extremities: normal extremities, no edema, contusions or wounds  Neurological: alert and oriented x 3, speech clear, follows commands appropriately, without focal deficits  Skin: Warm and dry,multiple BUE bruising     Last Recorded Vitals  Blood pressure 116/66, pulse 68, temperature 36.2 °C (97.2 °F), temperature source Temporal, resp. rate 13, height 1.6 m (5' 2.99\"), weight 63.2 kg (139 lb 5.3 oz), SpO2 96 %.    Intake/Output last 3 Shifts:  I/O last 3 completed shifts:  In: - (0 mL/kg)   Out: 900 (13.2 mL/kg) [Urine:900 (0.4 mL/kg/hr)]  Dosing Weight: 68.4 kg     Scheduled medications  apixaban, 5 mg, nasogastric tube, BID  fluticasone, 1 spray, Each Nostril, BID  insulin glargine, 15 Units, subcutaneous, Nightly  insulin lispro, 0-15 Units, subcutaneous, TID with meals  lidocaine, 1 patch, transdermal, Daily  melatonin, 5 mg, oral, Daily  metoprolol tartrate, 50 mg, nasogastric tube, BID  pantoprazole, 40 mg, oral, Daily before breakfast  predniSONE, 60 mg, oral, BID  QUEtiapine, 25 mg, oral, Nightly  sulfamethoxazole-trimethoprim, 160 mg of trimethoprim, nasogastric tube, q24h KARL  thiamine, 100 mg, oral, Daily  traZODone, 25 mg, nasogastric tube, Nightly    Continuous medications  oxygen, , Last Rate: 8 " L/min (12/05/23 1506)    PRN medications  PRN medications: acetaminophen **OR** [DISCONTINUED] acetaminophen **OR** [DISCONTINUED] acetaminophen, dextrose 10 % in water (D10W), dextrose, docusate sodium, ipratropium-albuteroL, loperamide, ondansetron ODT **OR** [DISCONTINUED] ondansetron, oxyCODONE, QUEtiapine     Relevant Results  Results for orders placed or performed during the hospital encounter of 11/09/23 (from the past 24 hour(s))   POCT GLUCOSE   Result Value Ref Range    POCT Glucose 161 (H) 74 - 99 mg/dL   CBC and Auto Differential   Result Value Ref Range    WBC 7.9 4.4 - 11.3 x10*3/uL    nRBC 0.4 (H) 0.0 - 0.0 /100 WBCs    RBC 3.16 (L) 4.00 - 5.20 x10*6/uL    Hemoglobin 9.0 (L) 12.0 - 16.0 g/dL    Hematocrit 26.9 (L) 36.0 - 46.0 %    MCV 85 80 - 100 fL    MCH 28.5 26.0 - 34.0 pg    MCHC 33.5 32.0 - 36.0 g/dL    RDW 15.1 (H) 11.5 - 14.5 %    Platelets 170 150 - 450 x10*3/uL    Immature Granulocytes %, Automated 7.9 (H) 0.0 - 0.9 %    Immature Granulocytes Absolute, Automated 0.62 0.00 - 0.70 x10*3/uL   Renal Function Panel   Result Value Ref Range    Glucose 179 (H) 74 - 99 mg/dL    Sodium 141 136 - 145 mmol/L    Potassium 4.9 3.5 - 5.3 mmol/L    Chloride 111 (H) 98 - 107 mmol/L    Bicarbonate 23 21 - 32 mmol/L    Anion Gap 12 10 - 20 mmol/L    Urea Nitrogen 65 (H) 6 - 23 mg/dL    Creatinine 1.07 (H) 0.50 - 1.05 mg/dL    eGFR 56 (L) >60 mL/min/1.73m*2    Calcium 8.2 (L) 8.6 - 10.6 mg/dL    Phosphorus 3.6 2.5 - 4.9 mg/dL    Albumin 2.6 (L) 3.4 - 5.0 g/dL   Magnesium   Result Value Ref Range    Magnesium 1.97 1.60 - 2.40 mg/dL   POCT GLUCOSE   Result Value Ref Range    POCT Glucose 176 (H) 74 - 99 mg/dL   Manual Differential   Result Value Ref Range    Neutrophils %, Manual 90.3 40.0 - 80.0 %    Bands %, Manual 4.4 0.0 - 5.0 %    Lymphocytes %, Manual 0.9 13.0 - 44.0 %    Monocytes %, Manual 0.9 2.0 - 10.0 %    Eosinophils %, Manual 0.0 0.0 - 6.0 %    Basophils %, Manual 0.0 0.0 - 2.0 %    Atypical  Lymphocytes %, Manual 1.7 0.0 - 2.0 %    Metamyelocytes %, Manual 0.9 0.0 - 0.0 %    Myelocytes %, Manual 0.9 0.0 - 0.0 %    Seg Neutrophils Absolute, Manual 7.13 (H) 1.20 - 7.00 x10*3/uL    Bands Absolute, Manual 0.35 0.00 - 0.70 x10*3/uL    Lymphocytes Absolute, Manual 0.07 (L) 1.20 - 4.80 x10*3/uL    Monocytes Absolute, Manual 0.07 (L) 0.10 - 1.00 x10*3/uL    Eosinophils Absolute, Manual 0.00 0.00 - 0.70 x10*3/uL    Basophils Absolute, Manual 0.00 0.00 - 0.10 x10*3/uL    Atypical Lymphs Absolute, Manual 0.13 0.00 - 0.50 x10*3/uL    Metamyelocytes Absolute, Manual 0.07 0.00 - 0.00 x10*3/uL    Myelocytes Absolute, Manual 0.07 0.00 - 0.00 x10*3/uL    Total Cells Counted 114     Neutrophils Absolute, Manual 7.48 1.20 - 7.70 x10*3/uL    RBC Morphology No significant RBC morphology present    POCT GLUCOSE   Result Value Ref Range    POCT Glucose 367 (H) 74 - 99 mg/dL      Imaging:  XR chest 2 views  Result Date: 12/6/2023  1.  There are diffuse interstitial airspace opacities with traction bronchiectatic changes. This may represent residua of prior COVID-19 pneumonia/ARDS.        CT angio chest for pulmonary embolism  Result Date: 11/26/2023  1. No evidence of pulmonary embolism.   2. Multifocal consolidative opacities more in the lower lobes, slightly improved compared to prior study. Findings remain concerning for multifocal infectious process.   3. Interlobular septal thickening, architectural distortion and mild bronchiectasis scattered throughout the lungs, slightly worse and concerning for fibrotic like changes, likely sequela of infection. Cannot exclude component of interstitial pulmonary edema.   4.  Other findings as described above.          XR chest 1 view  Result Date: 11/24/2023   1.  Continued coarse perihilar airspace opacities consistent with history of COVID-19 pneumonia. Basilar lucencies and correlate with a component of aerated lung versus developing fibrotic changes.         US renal  complete  Result Date: 11/16/2023  1. Atrophic right kidney.   2. Bilateral nonobstructing nephrolithiasis measuring up to 1.5 cm on the right and 0.9 cm on the left. No hydronephrosis.   3. Bilateral renal cysts.        XR abdomen 1 view  Result Date: 11/16/2023  1.  Nonspecific KUB. Enterogastric tube in the expected position of the stomach with tip at the pylorus.        Lower extremity venous duplex bilateral  Result Date: 11/13/2023  Right Lower Venous: There is acute occlusive deep vein thrombosis visualized in the posterior tibial vein. The remainder of the right leg is negative for deep vein thrombosis.   Left Lower Venous: There is acute occlusive deep vein thrombosis visualized in the soleal vein. The remainder of the left leg is negative for deep vein thrombosis.      Transthoracic Echo (TTE) Complete  Result Date: 11/9/2023  1. Left ventricular systolic function is hyperdynamic with a 70-75% estimated ejection fraction.    2. Spectral Doppler shows an impaired relaxation pattern of left ventricular diastolic filling.      CT chest wo IV contrast  Result Date: 11/7/2023  Persistent extensive ground-glass densities in the lung apices and anterior aspect of the bilateral upper lobes with progression of previously identified ground-glass densities in the more confluent airspace opacities involving the bilateral lower lobes and posterior aspects of the bilateral upper lobes. Findings suggest atypical infection. Correlation with COVID status is recommended.           Malnutrition Diagnosis Status: New  Malnutrition Diagnosis: Severe malnutrition related to acute disease or injury  As Evidenced by: 12% weight loss from normal along with moderate to severe muscle and fat loss on physical exam in setting of a decrease in PO intake/appetite after being diagnosed with COVID  I agree with the dietitian's malnutrition diagnosis.      Assessment/Plan   Principal Problem:    Acute hypoxemic respiratory failure  (CMS/[facility])  Active Problems:    Stage 4 chronic kidney disease (CMS/[facility])    COVID-19    Pneumonia of both lower lobes due to infectious organism    Pseudomonal bacteremia    A-fib (CMS/[facility])    MS Wilson is a 69 y/o female with hx HTN, CKD stage III who was admitted to the MICU from outside hospital with AHRF 2/2 COVID-pneumonia (tested positive on 10/13), course C/B by ARDS and pulmonary fibrosis resulting in organizing PNA.  Course also complicated by distal DVT, oliguric HUNG on CKD.  Patient weaned to 6 to 8 L nasal cannula but  requires increased FiO2 with movement and exertion.       Neuro/psych:  hx Anxiety  -Continue with trazodone 25 mg at bedtime and melatonin  -Continue with Seroquel 25 mg at bedtime and as needed daily 12.5 mg  -Pain regimen: PRN oxy for pain and air hunger.  Lidocaine patch for back pain  -Continue with PT/OT: OOB as tolerated     CV:  hx HTN, Intermittent SVT, A-fib  -Continue  metoprolol Tartrate  50 mg twice daily: Holding parameters--> SBP less than 100, HR less than 60  -Continue to hold home losartan 100 mg i/s/o's HUNG on CKD.  sCr has returned to baseline however BP stable and there is no indication at this time fto start d/t risk of hypotension     Pulm:  AHRF 2/2 COVID PNA (11/23) c/b ARDS, Pulm fibrosis, Organizing PNA 2/2 Covid 19, Presumed PE, pneumomediastinum (resolved)  -CTA PE from 11/26 negative for PE, findings consistent with multifocal PNA (improving from last study), bronchiectasis scattered throughout the lungs (slightly worst) and fibrotic changes.    - Prednisone slow taper- currently on 60 mg BID starting 12/4 x7 days,  then 40 mg BID x 7 days, then starting 12/10 changed to 60 mg daily x 2-4 weeks, 40 mg daily 2- 4 weeks   -Cont with Bactrim DS for PCP   -Fluticasone 1 spry BID   -Seen by lung transplant on 11/30--> Steroid treatment must be tapers down to <=10mg of Prednisone per day and PT must be able to ambulate 100ft (on any amount of O2).  Will re-engage  transplant once Pt meets those criteria.  -CXR 2V w/don done on 12/6- showing  diffuse interstitial airspace opacities with traction bronchiectatic changes.   -Avoid any further attempts to CPAP      Renal:  Oliguric HUNG on CKD stage III (baseline serum creatinine 1.6), Azotemia, Vitamin D deficiency  -Bilateral renal ultrasound on 11/16 shows nonobstructing stones bilaterally, no hydronephrosis, and bilateral cysts  - serum creatinine peaked at 3.97; 1.07 today  -Hold off on diuresing today; goal is net even  -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  Can consider consulting with urology     FEN/GI:  Dysphagia s/p Cortrak (removed 12/4), Diarrhea (resolved)   -SLP complete FFES on 11/28--> OK to resume PO diet  -Ensure with breakfast and lunch  -C Diff PCR 11/25 negative   -Loperamide 2mg 4 times per day  -Bowel Regimen; PRN Colace; holding miralx and senna i/s/o diarrhea      Endo:  Steroid-induced hyperglycemia  -Most recent A1c 6.3  -continue SSI Level 3  moderate intensity SSI lispro ACHS's; and  Lantus 15 U HS  -Monitor for hypoglycemia as steroid taper is decreased; titrate as needed     Heme:  DVT   -11/27  CTA PE negative  -11/13 B/L Duplex scan:  +Rt PT DVT, and + LT Soleal DVT  -Cont with Eliquis 5mg BID      MSK/Derm:  Eczema/Rosasea, Sacral Pressure Ulcer (Stage 3)   -Cont with dupixent    -follow on wound care recs  - Clean buttocks with cleansing cloths and apply Triad to open skin BID and as needed. Leave RIZWANA        ID: Covid PNA (received Decadron, Remdesivir, Tocilizumab at OSH) #Neutrophilic leukocytosis,  PSA bacteremia  -   Afebrile, no leukocytosis   MICRO: 11/21 BC x2 3/4 PSA, stool studies, including 11/25 C. Diff neg, 11/9 Step pneumonaie Ag, Legionella Ag neg, 11/9 Flu A/B, RSV neg   - repeat blood cx x2 11/25 Neg  - 11/22: start renally dosed Zosyn through 11/24, then switched to PO Cipro 500 mg BID for 14 days-- stopped 12/5  -continue  Bactrim for PCP prophylactics      Prophy:  -Eliquis 5mg BID  -PPI    Access: PIV x2   Code Status:  DNR/DNI  NOK:  Mathew Wilson [phone]     Dispo:  continue with Step down care while weaning down on O2, social work is working on placement. Likely early to mid next week    D/w  Dr. Paxton SANDHU spent >45 minutes in the professional and overall care of this patient.      Alba Rosenthal, APRN-CNP

## 2023-12-07 NOTE — PROGRESS NOTES
Macarena Wilson is a 68 y.o. female on day 28 of admission presenting with Acute hypoxemic respiratory failure (CMS/HCC).    New SNF preferences needed for referral. Pt directed SW to consult daughter-in-law Kendra for her input on SNFs. SW reached out to Kendra and emailed a new SNF choice list to her, with [facility] [phone] as location. SW will follow. Da WILKINSON, LSW.

## 2023-12-07 NOTE — PROGRESS NOTES
Nutrition Assessment    Nutrition Follow Up Assessment:        Patient is a 68 y.o. female with prolonged hospital course thus far including treatment for COVID pneumonitis.  She is now on nasal canula and prepping for discharge.      Pt had her dobhoff pulled earlier in the week after RDN had verbal discussion with SDU NP as pt had an improved PO intake.  Met with patient.  She feels that she is eating great.  She did have a stack of Ensure at her bedside.  It is ordered BID and she would prefer it once daily-- RDN will make that adjustment.  She has been using the Ensure with ice cream as a milkshake and has been using it with cereal in the mornings.  No GI issues to report.        Anthropometrics:           IBW/kg (Dietitian Calculated): 52.3 kg  Percent of IBW: 131 %     11/17/23: 63.2kg-- no new weight  11/16/23: 60.2kg-- down 22% from usual body weight and down 15% in the course of her admission  11/09/23 68.4 kg (150 lb 12.7 oz)   11/08/23 68.9 kg (151 lb 14.4 oz)   01/19/23 76.3 kg (168 lb 3.2 oz)   12/14/22 76.2 kg (167 lb 15.9 oz)   10/17/22 74 kg (163 lb 2.3 oz)   07/19/22 78.9 kg (174 lb)   05/13/22 78.9 kg (174 lb)   03/23/22 78.9 kg (174 lb)   03/16/22 78.9 kg (174 lb)   03/02/22 79 kg (174 lb 3.2 oz)       Nutrition Focused Physical Exam Findings:  Defer      Edema:          Physical Findings:                   Objective Data:    Last BM Date: 12/07/23    Nutrition Significant Labs:  BMP Trend:   Results from last 7 days   Lab Units 12/07/23  0534 12/06/23  0625 12/05/23  0606 12/04/23  0503   GLUCOSE mg/dL 106* 179* 172* 150*   CALCIUM mg/dL 8.4* 8.2* 8.6 8.1*   SODIUM mmol/L 143 141 142 139   POTASSIUM mmol/L 5.0 4.9 4.9 4.3   CO2 mmol/L 22 23 23 27   CHLORIDE mmol/L 112* 111* 107 106   BUN mg/dL 66* 65* 72* 68*   CREATININE mg/dL 0.98 1.07* 1.17* 0.96    , BG POCT trend:   Results from last 7 days   Lab Units 12/07/23  1328 12/07/23  0741 12/06/23  2040 12/06/23  1733 12/06/23  1125   POCT GLUCOSE  mg/dL 145* 79 202* 202* 367*    , Renal Lab Trend:   Results from last 7 days   Lab Units 12/07/23  0534 12/06/23  0625 12/05/23  0606 12/04/23  0503   POTASSIUM mmol/L 5.0 4.9 4.9 4.3   PHOSPHORUS mg/dL 3.7 3.6 3.8 3.6   SODIUM mmol/L 143 141 142 139   MAGNESIUM mg/dL 2.14 1.97 2.21 2.06   EGFR mL/min/1.73m*2 63 56* 51* 64   BUN mg/dL 66* 65* 72* 68*   CREATININE mg/dL 0.98 1.07* 1.17* 0.96    , Vit D:   Lab Results   Component Value Date    VITD25 19 (L) 12/01/2023        Nutrition Specific Mediations:  Scheduled medications  apixaban, 5 mg, oral, BID  fluticasone, 1 spray, Each Nostril, BID  insulin glargine, 15 Units, subcutaneous, Nightly  insulin lispro, 0-15 Units, subcutaneous, TID with meals  lidocaine, 1 patch, transdermal, Daily  melatonin, 5 mg, oral, Daily  metoprolol tartrate, 50 mg, oral, BID  pantoprazole, 40 mg, oral, Daily before breakfast  predniSONE, 60 mg, oral, BID  QUEtiapine, 25 mg, oral, Nightly  sulfamethoxazole-trimethoprim, 160 mg of trimethoprim, oral, q24h KARL  thiamine, 100 mg, oral, Daily  traZODone, 25 mg, oral, Nightly      Continuous medications    Dietary Orders (From admission, onward)       Start     Ordered    12/07/23 1412  Adult diet Carb Controlled; 75 gram carb/meal, 45 gram Carb evening snack  Diet effective now        Question Answer Comment   Diet type Carb Controlled    Carb diet selection: 75 gram carb/meal, 45 gram Carb evening snack        12/07/23 1412    11/30/23 1128  Oral nutritional supplements  Until discontinued        Question Answer Comment   Deliver with Breakfast vanilla   Deliver with Lunch    Select supplement: Ensure Plus        11/30/23 1128    11/09/23 0400  May Participate in Room Service With Assistance  Once        Question:  .  Answer:  Yes    11/09/23 0400                     Estimated Needs:   Total Energy Estimated Needs (kCal):  (9473-6394)   Method for Estimating Needs: MSJ= 1099 using 60kg    Total Protein Estimated Needs (g):  (70-80)    Method for Estimating Needs: 1.3-1.5 x 52.3kg      Nutrition Diagnosis   Nutrition Diagnosis:  Malnutrition Diagnosis  Patient has Malnutrition Diagnosis: Yes  Diagnosis Status: New  Malnutrition Diagnosis: Severe malnutrition related to acute disease or injury  As Evidenced by: 12% weight loss from normal along with moderate to severe muscle and fat loss on physical exam in setting of a decrease in PO intake/appetite after being diagnosed with COVID          Nutrition Interventions/Recommendations   Nutrition Interventions and Recommendations:        Nutrition Prescription:  Continue PO diet as tolerated.  For deficient Vitamin D-- order 50,000IUs (tablet) once weekly for 6-8 weeks.  Should recheck level at that time.          Time Spent/Follow-up Reminder:   Follow Up  Time Spent (min): 45 minutes  Last Date of Nutrition Visit: 12/07/23  Nutrition Follow-Up Needed?: Dietitian to reassess per policy  Follow up Comment: no longer on TF, PO diet/Ensure only

## 2023-12-07 NOTE — CARE PLAN
The patient's goals for the shift include  up in chair today    The clinical goals for the shift include pt will not need increase in O2 support during shift    Over the shift, the patient did not make progress toward the following goals. Barriers to progression include . Recommendations to address these barriers include \.

## 2023-12-07 NOTE — PROGRESS NOTES
"Macarena Wilson is a 68 y.o. female on day 28 of admission presenting with Acute hypoxemic respiratory failure (CMS/HCC).  Subjective   Weaned to 6L NC.  Slept well. No acute events over night  Objective   Physical Exam:  Constitutional: pt in NAD, alert and cooperative  Eyes: PERRL, EOMI, no icterus   ENMT: mucous membranes moist, no apparent injury, no lesions seen  Head/Neck: Neck supple, no apparent injury  Respiratory/Thorax: Lungs with fine bilateral crackles, non-labored breathing  Cardiovascular: Regular, rate and rhythm, no murmurs  Gastrointestinal: Nondistended, soft, non-tender, BS present x 4  : external urine catheter  Musculoskeletal: ROM intact, no joint swelling, normal strength  Extremities: normal extremities, no edema, contusions or wounds  Neurological: alert and oriented x 3, speech clear, follows commands appropriately, no focal deficits  Skin: Warm and dry     Last Recorded Vitals  Blood pressure 141/65, pulse 62, temperature 36.2 °C (97.2 °F), resp. rate 16, height 1.6 m (5' 2.99\"), weight 63.2 kg (139 lb 5.3 oz), SpO2 95 %.    Intake/Output last 3 Shifts:  I/O last 3 completed shifts:  In: - (0 mL/kg)   Out: 500 (7.3 mL/kg) [Urine:500 (0.2 mL/kg/hr)]  Dosing Weight: 68.4 kg   Net negative 450 Ml    Scheduled medications  apixaban, 5 mg, oral, BID  fluticasone, 1 spray, Each Nostril, BID  insulin glargine, 15 Units, subcutaneous, Nightly  insulin lispro, 0-15 Units, subcutaneous, TID with meals  lidocaine, 1 patch, transdermal, Daily  melatonin, 5 mg, oral, Daily  metoprolol tartrate, 50 mg, oral, BID  pantoprazole, 40 mg, oral, Daily before breakfast  predniSONE, 60 mg, oral, BID  QUEtiapine, 25 mg, oral, Nightly  sulfamethoxazole-trimethoprim, 160 mg of trimethoprim, oral, q24h KARL  thiamine, 100 mg, oral, Daily  traZODone, 25 mg, oral, Nightly    Continuous medications  oxygen, , Last Rate: 8 L/min (12/05/23 1506)    PRN medications  PRN medications: acetaminophen **OR** " [DISCONTINUED] acetaminophen **OR** [DISCONTINUED] acetaminophen, dextrose 10 % in water (D10W), dextrose, docusate sodium, ipratropium-albuteroL, loperamide, ondansetron ODT **OR** [DISCONTINUED] ondansetron, oxyCODONE, QUEtiapine     Relevant Results  Results for orders placed or performed during the hospital encounter of 11/09/23 (from the past 24 hour(s))   POCT GLUCOSE   Result Value Ref Range    POCT Glucose 367 (H) 74 - 99 mg/dL   POCT GLUCOSE   Result Value Ref Range    POCT Glucose 202 (H) 74 - 99 mg/dL   POCT GLUCOSE   Result Value Ref Range    POCT Glucose 202 (H) 74 - 99 mg/dL   CBC and Auto Differential   Result Value Ref Range    WBC 9.7 4.4 - 11.3 x10*3/uL    nRBC 0.6 (H) 0.0 - 0.0 /100 WBCs    RBC 3.25 (L) 4.00 - 5.20 x10*6/uL    Hemoglobin 9.3 (L) 12.0 - 16.0 g/dL    Hematocrit 28.7 (L) 36.0 - 46.0 %    MCV 88 80 - 100 fL    MCH 28.6 26.0 - 34.0 pg    MCHC 32.4 32.0 - 36.0 g/dL    RDW 15.8 (H) 11.5 - 14.5 %    Platelets 169 150 - 450 x10*3/uL    Neutrophils %      Immature Granulocytes %, Automated      Lymphocytes %      Monocytes %      Eosinophils %      Basophils %      Neutrophils Absolute      Lymphocytes Absolute      Monocytes Absolute      Eosinophils Absolute      Basophils Absolute     Magnesium   Result Value Ref Range    Magnesium 2.14 1.60 - 2.40 mg/dL   Renal function panel   Result Value Ref Range    Glucose 106 (H) 74 - 99 mg/dL    Sodium 143 136 - 145 mmol/L    Potassium 5.0 3.5 - 5.3 mmol/L    Chloride 112 (H) 98 - 107 mmol/L    Bicarbonate 22 21 - 32 mmol/L    Anion Gap 14 10 - 20 mmol/L    Urea Nitrogen 66 (H) 6 - 23 mg/dL    Creatinine 0.98 0.50 - 1.05 mg/dL    eGFR 63 >60 mL/min/1.73m*2    Calcium 8.4 (L) 8.6 - 10.6 mg/dL    Phosphorus 3.7 2.5 - 4.9 mg/dL    Albumin 2.7 (L) 3.4 - 5.0 g/dL      Imaging:  XR chest 2 views  Result Date: 12/6/2023  1.  There are diffuse interstitial airspace opacities with traction bronchiectatic changes. This may represent residua of prior  COVID-19 pneumonia/ARDS.         CT angio chest for pulmonary embolism  Result Date: 11/26/2023  1. No evidence of pulmonary embolism.   2. Multifocal consolidative opacities more in the lower lobes, slightly improved compared to prior study. Findings remain concerning for multifocal infectious process.   3. Interlobular septal thickening, architectural distortion and mild bronchiectasis scattered throughout the lungs, slightly worse and concerning for fibrotic like changes, likely sequela of infection. Cannot exclude component of interstitial pulmonary edema.   4.  Other findings as described above.          XR chest 1 view  Result Date: 11/24/2023   1.  Continued coarse perihilar airspace opacities consistent with history of COVID-19 pneumonia. Basilar lucencies and correlate with a component of aerated lung versus developing fibrotic changes.         US renal complete  Result Date: 11/16/2023  1. Atrophic right kidney.   2. Bilateral nonobstructing nephrolithiasis measuring up to 1.5 cm on the right and 0.9 cm on the left. No hydronephrosis.   3. Bilateral renal cysts.        XR abdomen 1 view  Result Date: 11/16/2023  1.  Nonspecific KUB. Enterogastric tube in the expected position of the stomach with tip at the pylorus.        Lower extremity venous duplex bilateral  Result Date: 11/13/2023  Right Lower Venous: There is acute occlusive deep vein thrombosis visualized in the posterior tibial vein. The remainder of the right leg is negative for deep vein thrombosis.   Left Lower Venous: There is acute occlusive deep vein thrombosis visualized in the soleal vein. The remainder of the left leg is negative for deep vein thrombosis.      Transthoracic Echo (TTE) Complete  Result Date: 11/9/2023  1. Left ventricular systolic function is hyperdynamic with a 70-75% estimated ejection fraction.    2. Spectral Doppler shows an impaired relaxation pattern of left ventricular diastolic filling.      CT chest wo IV  contrast  Result Date: 11/7/2023  Persistent extensive ground-glass densities in the lung apices and anterior aspect of the bilateral upper lobes with progression of previously identified ground-glass densities in the more confluent airspace opacities involving the bilateral lower lobes and posterior aspects of the bilateral upper lobes. Findings suggest atypical infection. Correlation with COVID status is recommended.         Malnutrition Diagnosis Status: New  Malnutrition Diagnosis: Severe malnutrition related to acute disease or injury  As Evidenced by: 12% weight loss from normal along with moderate to severe muscle and fat loss on physical exam in setting of a decrease in PO intake/appetite after being diagnosed with COVID  I agree with the dietitian's malnutrition diagnosis.      Assessment/Plan   Principal Problem:    Acute hypoxemic respiratory failure (CMS/HCC)  Active Problems:    Stage 4 chronic kidney disease (CMS/HCC)    COVID-19    Pneumonia of both lower lobes due to infectious organism    Pseudomonal bacteremia    A-fib (CMS/[facility])    MS Wilson is a 69 y/o female with hx HTN, CKD stage III who was admitted to the MICU from outside hospital with AHRF 2/2 COVID-pneumonia (tested positive on 10/13), course C/B by ARDS and pulmonary fibrosis resulting in organizing PNA.  Course also complicated by distal DVT, oliguric HUNG on CKD.  Patient weaned to 6 to 8 L nasal cannula but  requires increased FiO2 with movement and exertion.     Neuro/psych:  hx Anxiety  -Continue with trazodone 25 mg at bedtime and melatonin  -Continue with Seroquel 25 mg at bedtime and as needed daily 12.5 mg  -Pain regimen: PRN oxy for pain and air hunger.  Lidocaine patch for back pain  -will add Seroquel 12.5 mg PRN prior to working with PT   -Continue with PT/OT: OOB as tolerated     CV:  hx HTN, Intermittent SVT, A-fib  -Continue  metoprolol Tartrate  50 mg twice daily: Holding parameters--> SBP less than 100, HR less than  60  -Continue holding home losartan 100 mg 2/2 HUNG on CKD.  sCr has returned to baseline however BP stable and there is no indication at this time fto start d/t risk of hypotension     Pulm:  AHRF 2/2 COVID PNA (11/23) c/b ARDS, Pulm fibrosis, Organizing PNA 2/2 Covid 19,  pneumomediastinum (resolved)  -CTA PE from 11/26 negative for PE, findings consistent with multifocal PNA (improving from last study), bronchiectasis scattered throughout the lungs (slightly worst) and fibrotic changes.    - Prednisone slow taper- currently on 60 mg BID starting 12/4 x7 days,  then starting 12/10 change to 40 mg BID x 7 days,  then 12/16 changed to 60 mg daily x 2-4 weeks, 40 mg daily 2- 4 weeks  (might consider faster taper if respiratory status improves)   -Cont with Bactrim DS for PCP   -Fluticasone 1 spry BID   -Seen by lung transplant on 11/30--> Steroid treatment must be tapers down to <=10mg of Prednisone per day and PT must be able to ambulate 100ft (on any amount of O2).  Will re-engage transplant once Pt meets those criteria.  -CXR 2V w/don done on 12/6- showing  diffuse interstitial airspace opacities with traction bronchiectatic changes.   -Avoid CPAP d/t hx pneumomediastinum during stay    ID: Covid PNA (received Decadron, Remdesivir, Tocilizumab at OSH) #Neutrophilic leukocytosis,  PSA bacteremia  -   Afebrile, no leukocytosis   MICRO: 11/21 BC x2 3/4 PSA, stool studies, including 11/25 C. Diff neg, 11/9 Step pneumonaie Ag, Legionella Ag neg, 11/9 Flu A/B, RSV neg   - repeat blood cx x2 11/25 Neg  - ABX: Zosyn 11/22- 11/24, switched to PO Cipro 500 mg BID 11/25-12/5 for total 14 days  -continue Bactrim for PCP prophylactics       FEN/GI:  Dysphagia resolved, s/p Cortrak (removed 12/4), Diarrhea (resolved)   -continue regular diet after FEES on 11/28  -Ensure with breakfast and lunch  -C Diff PCR 11/25 negative   -PRN Loperamide for diarrhea   -Bowel Regimen; PRN Colace; holding miralx and senna i/s/o diarrhea    -PPI    Renal:  Oliguric HUNG on CKD stage III (baseline serum creatinine 1.6), Azotemia, Vitamin D deficiency  -Bilateral renal ultrasound on 11/16 shows nonobstructing stones bilaterally, no hydronephrosis, and bilateral cysts  - serum creatinine peaked at 3.97; now trending down- at 0.96 today  -diurese PRN for goal net even- holding off on diuresing for now  -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  Can consider consulting with urology     Endo:  Steroid-induced hyperglycemia  -Most recent A1c 6.3  -continue SSI Level 3  moderate intensity SSI lispro ACHS's; and  Lantus 15 U HS  -Monitor for hypoglycemia as steroid taper is decreased; titrate as needed     Heme:  DVT   -11/27  CTA PE negative  -11/13 B/L Duplex scan:  +Rt PT DVT, and + LT Soleal DVT  -Cont with Eliquis 5mg BID      MSK/Derm:  Eczema/Rosasea, Sacral Pressure Ulcer (Stage 3)   -Cont with dupixent    -follow on wound care recs  - Clean buttocks with cleansing cloths and apply Triad to open skin BID and as needed. Leave RIZWANA      Prophy:  -Eliquis 5mg BID  -PPI     Access: PIV x2   Code Status:  DNR/DNI  NOK:  Mathew Wilson [phone]     Dispo:  continue with Step down care while weaning down on O2, social work is working on placement. Family picked few places      D/w  Dr. Paxton SANDHU spent >35 minutes in the professional and overall care of this patient.    Alba Rosenthal, APRN-CNP

## 2023-12-07 NOTE — PROGRESS NOTES
Macarena Wilson is a 68 y.o. female on day 28 of admission presenting with Acute hypoxemic respiratory failure (CMS/HCC).    New SNF preferences needed for referral. Pt directed SW to consult daughter-in-law Kendra for her input on SNFs. SW reached out to Kendra and emailed a new SNF choice list to her, with [place], [phone] as location. SW will follow. LORY Miguel.     12/07/2023 1432  Kendra received the new SNF list and will notify SW of preferences. Referral in [facility] will be updated once preferences are received. LORY Miguel.     12/07/2023  Pt daughter-in-law Kendra chose new preferences for SNF: 1 [facility], 2. [facility], 3. [facility], 4. [facility], and 5. [facility]. Referral updated and sent in [facility]. SW will follow. LORY Miguel.

## 2023-12-07 NOTE — SIGNIFICANT EVENT
RADAR   12/07/23 1710   Onset Documentation   Rapid Response Initiated By Radar auto page   Pager Time [phone]   Arrival Time 1715   Event End Time 1730   Primary Reason for Call Radar auto page     RADAR page auto generated from VS -see documented VS. Upon arrival pt resting and pulse ox 94-95% and RR easy non labored. Spoke with bedside nurse and with activity saturation levels fluctuate. Pt on 6L HF NC. Pt ok to remain on the floor for continued monitoring- and will call with any concerns.

## 2023-12-07 NOTE — NURSING NOTE
PT/OT  was at bedside earlier to assist pt into chair, pt persistent that she does not want to get into a reclining chair.  NP, MD PT?OT and this nurse at bedside encouraging body movement to help expand lungs and improve mobility, pt became anxious and ST did not get pt OOB at thistime

## 2023-12-07 NOTE — PROGRESS NOTES
Physical Therapy    Physical Therapy Treatment    Patient Name: Macarena Wilson  MRN: [id number]  Today's Date: 12/7/2023  Time Calculation  Start Time: 1211  Stop Time: 1320  Time Calculation (min): 69 min       Assessment/Plan   PT Assessment  Rehab Prognosis: Fair  End of Session Communication: Bedside nurse (CNP)  End of Session Patient Position: Bed, 3 rail up, Alarm off, not on at start of session     PT Plan  Treatment/Interventions: Bed mobility, Transfer training, Gait training, Balance training, Strengthening, Endurance training, Therapeutic exercise, Therapeutic activity, Postural re-education  PT Plan: Skilled PT  PT Frequency: 4 times per week  PT Discharge Recommendations: Moderate intensity level of continued care  PT - OK to Discharge: Yes      General Visit Information:   PT  Visit  PT Received On: 12/07/23  General  Family/Caregiver Present: No  Co-Treatment: OT  Co-Treatment Reason: to maximize patient safety, mobility and activity tolerance 2/2 patient with tenuous respiratory status, x2 skilled assist for mobility  Prior to Session Communication: Bedside nurse  Patient Position Received: Bed, 3 rail up, Alarm off, not on at start of session  General Comment: agreeable however, appeared very anxious about mobility (goal was to laterally slide patient to recliner) even before therapists mobilized patient. RN provided anti-anxiety meds (oral) prior to mobility. Patient's anxiety appears to limit her ability to progress mobility.    Subjective   Precautions:  Precautions  Medical Precautions: Fall precautions, Oxygen therapy device and L/min  Precautions Comment:  (Spo2 >88% goal)  Vital Signs:  Vital Signs  Heart Rate:  (HR elevated to 149 sitting EOB, PVCs increased to 32; PT called CNP to come into the room. Patient had this happen when sitting EOB as PT was explaining how therapists would laterally slide her over to a recliner.)  SpO2:  (on arrival patient on 6L nasal cannula and MD increased  patient to 12L for therapy session, patient with SpO2 >92%; patient also placed on 15L NRB per NP for pre-oxygenation prior to sitting EOB; SpO2 decreased to low 80s with activity, recovered to >88%)    Objective   Pain:  Pain Assessment  Pain Assessment: 0-10  Pain Score: 0 - No pain  Cognition:  Cognition  Overall Cognitive Status: Impaired  Orientation Level:  (AxO x3)  Following Commands: Follows one step commands with repetition (sometimes limited d/t patient becoming anxious)  Cognition Comments: appeared anxious  Postural Control:  Postural Control  Postural Control: Impaired  Head Control: favors increase FF head posture with downward gaze; limited ability to actively extend c-spine to maintain a neutral head alignment with forward gaze when sitting EOB; PT provided tactile A to try to keep head in neutral positioning.  Trunk Control: fair-poor; retrolean; favors retro- lateral lean R; requires increase assist to maintain midline.  Posture Comment: BUEs support on EOB at times; however, patient appearing more anxious, PT then held draw sheet around patient and held patient dependently and instructed patient to rest UEs in lap and to take deep, calming breaths. DEPx1, brief MAX however, mostly DEPx1.    Activity Tolerance:  Activity Tolerance  Endurance:  (decrease tolerance for mobility)  Early Mobility/Exercise Safety Screen: Proceed with mobilization - No exclusion criteria met  Treatments:  Therapeutic Exercise  Therapeutic Exercise Performed: Yes  Therapeutic Exercise Activity 1: SAQ in supine: 2x10 B, limited quad activation B, passive gastroc stretch held 30 seconds x2 B    Balance/Neuromuscular Re-Education  Balance/Neuromuscular Re-Education Activity Performed: Yes  Balance/Neuromuscular Re-Education Activity 1: EOB x30 mins cumulative; DEPx1, patient feeling like therapists are pushing patient's trunk forward excessively however, PT explained that patient is sitting upright and not excessively  forward. Patient has an impiared sense of sitting balance at this time.    Bed Mobility  Bed Mobility: Yes  Bed Mobility 1  Bed Mobility 1: Supine to sitting, Sitting to supine  Level of Assistance 1: Maximum assistance, Moderate verbal cues  Bed Mobility Comments 1: x2 assist, HOB elevated for supine to sit, +draw sheet  Bed Mobility 2  Bed Mobility  2: Rolling right, Rolling left  Level of Assistance 2: Maximum assistance, Minimal verbal cues  Bed Mobility Comments 2: x1, required use of bedpan; rolling B x2 each direction; requires total A for LE management, MAXx1 for UE management    Transfers  Transfer: No (therapists were going to slide patient laterally to the recliner via draw sheet, patient appeared to become increasingly anxious sitting EOB, became adament that therapists return her to supine.)  Outcome Measures:  [facility] Basic Mobility  Turning from your back to your side while in a flat bed without using bedrails: A lot  Moving from lying on your back to sitting on the side of a flat bed without using bedrails: A lot  Moving to and from bed to chair (including a wheelchair): Total  Standing up from a chair using your arms (e.g. wheelchair or bedside chair): Total  To walk in hospital room: Total  Climbing 3-5 steps with railing: Total  Basic Mobility - Total Score: 8    Education Documentation  Mobility Training, taught by Cindy Olmos PT at 12/7/2023  3:54 PM.  Learner: Patient  Readiness: Acceptance  Method: Explanation  Response: Needs Reinforcement  Comment: encouraged completing BLE ther-ex in bed, allowing therapists to slide patient to recliner in order to get OOB.    Education Comments  No comments found.           Encounter Problems       Encounter Problems (Active)       Balance       patient will complete static (SBAx1) and dynamic (Cgx1) standing balance activities using LRD as needed without acute LOB, while maintaining stable vitals.  (Not Progressing)       Start:  11/10/23    Expected  End:  12/08/23               Mobility       STG - Patient will ambulate >/=50 ft using LRD as needed with Cgx1 assist without acute LOB, while maintaining stable vitals.  (Not Progressing)       Start:  11/10/23    Expected End:  12/08/23            patient will participate in BLE there-ex in order to improve strength and to assist with the completion of functional mobility tasks.  (Progressing)       Start:  11/10/23    Expected End:  12/08/23            patient will ambulate >/=30 ft consecutively and >/=75 ft cumulatively with </=1 sitting rest break while maintaining stable vitals, reporting <13/20 on the RPE scale.  (Not Progressing)       Start:  11/10/23    Expected End:  12/08/23               Pain - Adult          Transfers       STG - Transfer from bed to chair with CGx1 assist without acute LOB, using LRD as needed  (Not Progressing)       Start:  11/10/23    Expected End:  12/08/23            STG - Patient will perform bed mobility with SBAx1 without acute LOB, using bedrailing as needed.  (Not Progressing)       Start:  11/10/23    Expected End:  12/08/23            STG - Patient will transfer sit to and from stand with Cgx1 assist using LRD as needed without acute LOB  (Progressing)       Start:  11/10/23    Expected End:  12/08/23                 Cindy Olmos, PT, DPT

## 2023-12-07 NOTE — PROGRESS NOTES
Macarena Wilson is a 68 y.o. female on day 28 of admission presenting with Acute hypoxemic respiratory failure (CMS/HCC).    New SNF preferences needed for referral. Pt directed SW to consult daughter-in-law Kendra for her input on SNFs. SW reached out to Kendra and emailed a new SNF choice list to her, with [place] [phone] as location. SW will follow. Da WILKINSON, LORY.     12/07/2023 1432  Kendra received the new SNF list and will notify SW of preferences. Referral in [facility] will be updated once preferences are received. Da WILKINSON, LORY.

## 2023-12-07 NOTE — PROGRESS NOTES
"Occupational Therapy    Occupational Therapy Treatment    Name: Macarena Wilson  MRN: [id number]  : 1955  Date: 23  Time Calculation  Start Time: 1210  Stop Time: 1320  Time Calculation (min): 70 min    Assessment:  End of Session Communication: Bedside nurse (NP)  End of Session Patient Position: Bed, 3 rail up, Alarm off, not on at start of session  Plan:  Treatment Interventions: ADL retraining, Functional transfer training, UE strengthening/ROM, Endurance training, Patient/family training, Equipment evaluation/education, Compensatory technique education  OT Frequency: 3 times per week  OT Discharge Recommendations: Moderate intensity level of continued care  OT - OK to Discharge: Yes    Subjective   Previous Visit Info:  OT Last Visit  OT Received On: 23  General:  General  Family/Caregiver Present: No  Co-Treatment: PT  Co-Treatment Reason: to maximize patient safety, mobility and activity tolerance 2/2 patient with decreased respiratory status, x2 skilled assist for mobility  Prior to Session Communication: Bedside nurse, Physician (NP)  Patient Position Received: Bed, 3 rail up, Alarm off, not on at start of session  General Comment: Patient supine in bed on arrival, agreeable to therapy, initially declining therapy plan to get OOB to recliner chair however became agreeable after explanation of therapy wanting to progress patient and with reassurance from MD/NP, NP had RN provide patient with anxiety medication shortly after start of therapy session, patient requesting \"go slow\" and therapists provided reassurance that session would be progressed as slowly as possible while also being safe.  Precautions:  Medical Precautions: Fall precautions, Oxygen therapy device and L/min  Vitals:  Vital Signs  Heart Rate:  (while sitting EOB as therapists explaining process of assisting patient to scoot from bed to recliner chair patient's HR increased to 149 with monitor reading Vtach, NP notified and " came in to assess patient, patient's HR did decreased to 100s)  SpO2:  (on arrival patient on 6L nasal cannula and MD increased patient to 12L for therapy session, patient with SpO2 >92%; patient also placed on 15L NRB per NP for pre-oxygenation prior to sitting EOB; SpO2 decreased to low 80s with activity, recovered to >88%)  Pain Assessment:  Pain Assessment  Pain Assessment: 0-10  Pain Score: 0 - No pain     Objective   Activities of Daily Living: Feeding  Feeding Comments: independent with set-up    LE Dressing  LE Dressing: Yes  Sock Level of Assistance: Maximum assistance  LE Dressing Where Assessed: Edge of bed  LE Dressing Comments: mod A for partially doffing and donning (B) socks in upright supported sitting in bed with assistance to bring feet up towards self and lean anteriorly to reach socks    Toileting  Toileting Comments: max A for bowel hygiene in supine after use of bedpan     Bed Mobility/Transfers: Bed Mobility  Bed Mobility: Yes  Bed Mobility 1  Bed Mobility 1: Rolling right, Rolling left  Level of Assistance 1: Maximum assistance, Moderate verbal cues  Bed Mobility Comments 1: x1 assist  Bed Mobility 2  Bed Mobility  2: Supine to sitting, Sitting to supine  Level of Assistance 2: Maximum assistance, Moderate verbal cues  Bed Mobility Comments 2: x2 assist; HOB elevated + draw sheet    Transfers  Transfer: No (unable to attempt bed to chair transfer 2/2 patient with Vtach while sitting EOB)  Transfer 1  Transfer From 1: Sit to, Stand to  Transfer to 1: Sit, Stand  Technique 1: Sit to stand, Stand to sit  Transfer Level of Assistance 1: Dependent, +2, Moderate verbal cues  Trials/Comments 1: (B) arm in arm assist + draw sheet under patient's hips; achieved 25% of full standing; x2 trials  IADL's: Health Management  Health Management: Spoke with patient regarding her expression of feeling anxious and asked patient to describe how she is feeling, spoke to patient about OT's experience with anxiety  "and use of mantras/empowering words/phrase and of use of distraction to attempt to decreased anxiety; encouraged patient to think of a mantra/empowering phrase to use during  therapy sessions to assist with management of anxiousness as well as encouraged patient to visualize herself sitting in the recliner chair in attempt to decrease anxiousness when therapy attempts bed to chair transfer in future session, patient verbalized understanding.     Therapy/Activity: Therapeutic Activity  Therapeutic Activity Performed: Yes  Therapeutic Activity 1: Patient sat EOB x30 minutes total during session with dependent A for balance majority of the time in sitting, improved to brief moments of max A. Patient with strong posterior and (R) lateral-posterior lean with minimal trunk/core muscle activation to correct. Patient with (B)UEs on bed however minimally able to use UEs for support to improve balance. Patient reporting increased difficulty breathing when therapists attempting to facilitate patient leaning anteriorly to upright position due to feeling \"crunched\".    Outcome Measures:  [facility] Daily Activity  Putting on and taking off regular lower body clothing: A lot  Bathing (including washing, rinsing, drying): A lot  Putting on and taking off regular upper body clothing: A little  Toileting, which includes using toilet, bedpan or urinal: A lot  Taking care of personal grooming such as brushing teeth: A little  Eating Meals: None  Daily Activity - Total Score: 16     and E = Exercise and Early Mobility  Current Activity: Sitting at edge of bed    Education Documentation  Body Mechanics, taught by Alicia Kilgore OT at 12/7/2023  4:01 PM.  Learner: Patient  Readiness: Acceptance  Method: Explanation  Response: Needs Reinforcement    ADL Training, taught by Alicia Kilgore OT at 12/7/2023  4:01 PM.  Learner: Patient  Readiness: Acceptance  Method: Explanation  Response: Needs Reinforcement    Education Comments  No comments " found.      Goals:  Encounter Problems       Encounter Problems (Active)       ADLs       Patient with complete upper body dressing with independent level of assistance donning and doffing all UE clothes. (Progressing)       Start:  11/17/23    Expected End:  12/08/23            Patient with complete lower body dressing with minimal assist  level of assistance donning and doffing all LE clothes  with PRN adaptive equipment. (Not Progressing)       Start:  11/17/23    Expected End:  12/08/23            Patient will complete daily grooming tasks with stand by assist level of assistance and PRN adaptive equipment while in sitting. (Progressing)       Start:  11/17/23    Expected End:  12/08/23            Patient will complete toileting including hygiene clothing management/hygiene with minimal assist  level of assistance. (Not Progressing)       Start:  11/17/23    Expected End:  12/08/23               BALANCE       Pt will maintain dynamic sitting balance during ADL task with contact guard assist level of assistance in order to demonstrate decreased risk of falling and improved postural control. (Progressing)       Start:  11/17/23    Expected End:  12/08/23               COGNITION/SAFETY       Patient will verbalize and demonstrate >2 relaxation and breathing techniques during sessions with independence. (Progressing)       Start:  11/17/23    Expected End:  12/08/23               EXERCISE/STRENGTHENING       Patient will complete BUE exercises in order to improve activity tolerance for ADL performance.  (Progressing)       Start:  11/17/23    Expected End:  12/08/23               TRANSFERS       Patient will perform bed mobility minimal assist  level of assistance in order to improve safety and independence with mobility (Progressing)       Start:  11/17/23    Expected End:  12/08/23            Patient will complete functional transfers with least restrictive device with minimal assist  level of assistance. (Not  Progressing)       Start:  11/17/23    Expected End:  12/08/23               Alicia Kilgore, OTR/L

## 2023-12-08 NOTE — PROGRESS NOTES
"Macarena Wilson is a 68 y.o. female on day 29 of admission presenting with Acute hypoxemic respiratory failure (CMS/HCC).    Subjective     No acute events overnight.  ROS: Endorses JUAREZ but improved.  Denies fever, chills, night sweats, abdominal pain, CP, palpations, cough, and N/V      Objective   Physical Exam:  Constitutional: pt in NAD, alert and cooperative  Eyes: PERRL, EOMI, no icterus   ENMT: mucous membranes moist  Head/Neck: Neck supple, no apparent injury  Respiratory/Thorax: Lungs diminished bilaterally, non-labored breathing, no cough, on 6L NC  Cardiovascular: Regular, rate and rhythm, no murmurs, normal S1 and S2  Gastrointestinal: Nondistended, soft, non-tender, BS present x 4  : External catheter in place   Musculoskeletal: ROM intact, generalized weakness  Extremities: No edema, scattered areas of bruising   Neurological: alert and oriented x 3, speech clear, follows commands appropriately, without focal deficits  Skin: Warm and dry    Vital Signs  Blood pressure 124/81, pulse 62, temperature 35.6 °C (96.1 °F), temperature source Temporal, resp. rate 22, height 1.6 m (5' 2.99\"), weight 67.7 kg (149 lb 4 oz), SpO2 100 %.  Oxygen Therapy  SpO2: 100 %  Medical Gas Therapy: Supplemental oxygen  O2 Delivery Method: Nasal cannula  FiO2 (%):  [44 %] 44 %    Intake/Output last 3 Shifts:  I/O last 3 completed shifts:  In: 180 (2.6 mL/kg) [P.O.:180]  Out: 1100 (16.1 mL/kg) [Urine:1100 (0.4 mL/kg/hr)]  Dosing Weight: 68.4 kg     Lines and Tubes:  Peripheral IV 11/08/23 20 G Left;Anterior Hand (Active)   Placement Date/Time: 11/08/23 0000   Size (Gauge): 20 G  Orientation: Left;Anterior  Location: Hand   Number of days: 30       Peripheral IV 11/29/23 20 G 2.25 cm Left Forearm (Active)   Placement Date/Time: 11/29/23 1315   Hand Hygiene Completed: Yes  Size (Gauge): 20 G  Catheter Length (cm): 2.25 cm  Orientation: Left  Location: Forearm  Site Prep: Chlorhexidine   Technique: Ultrasound guidance  Placed " by: Brandon CRAFT  Patient Toleranc...   Number of days: 8       External Urinary Catheter (Active)   Placement Date/Time: 11/09/23 0400     Number of days: 29         Relevant Results  Scheduled medications  apixaban, 5 mg, oral, BID  ergocalciferol, 1,250 mcg, oral, Weekly  fluticasone, 1 spray, Each Nostril, BID  insulin glargine, 15 Units, subcutaneous, Nightly  insulin lispro, 0-15 Units, subcutaneous, TID with meals  lidocaine, 1 patch, transdermal, Daily  melatonin, 5 mg, oral, Daily  metoprolol tartrate, 50 mg, oral, BID  pantoprazole, 40 mg, oral, Daily before breakfast  predniSONE, 60 mg, oral, BID  QUEtiapine, 25 mg, oral, Nightly  sodium zirconium cyclosilicate, 5 g, oral, TID  sulfamethoxazole-trimethoprim, 160 mg of trimethoprim, oral, q24h KARL  thiamine, 100 mg, oral, Daily  traZODone, 25 mg, oral, Nightly      Continuous medications  oxygen, , Last Rate: 8 L/min (12/05/23 1506)      PRN medications  PRN medications: acetaminophen **OR** [DISCONTINUED] acetaminophen **OR** [DISCONTINUED] acetaminophen, dextrose 10 % in water (D10W), dextrose, docusate sodium, ipratropium-albuteroL, loperamide, ondansetron ODT **OR** [DISCONTINUED] ondansetron, oxyCODONE, QUEtiapine, QUEtiapine    Results for orders placed or performed during the hospital encounter of 11/09/23 (from the past 24 hour(s))   POCT GLUCOSE   Result Value Ref Range    POCT Glucose 145 (H) 74 - 99 mg/dL   POCT GLUCOSE   Result Value Ref Range    POCT Glucose 129 (H) 74 - 99 mg/dL   POCT GLUCOSE   Result Value Ref Range    POCT Glucose 290 (H) 74 - 99 mg/dL   Renal function panel   Result Value Ref Range    Glucose 142 (H) 74 - 99 mg/dL    Sodium 141 136 - 145 mmol/L    Potassium 5.5 (H) 3.5 - 5.3 mmol/L    Chloride 111 (H) 98 - 107 mmol/L    Bicarbonate 21 21 - 32 mmol/L    Anion Gap 15 10 - 20 mmol/L    Urea Nitrogen 71 (H) 6 - 23 mg/dL    Creatinine 1.08 (H) 0.50 - 1.05 mg/dL    eGFR 56 (L) >60 mL/min/1.73m*2    Calcium 8.5 (L) 8.6 - 10.6 mg/dL     Phosphorus 4.2 2.5 - 4.9 mg/dL    Albumin 2.7 (L) 3.4 - 5.0 g/dL   CBC and Auto Differential   Result Value Ref Range    WBC 8.9 4.4 - 11.3 x10*3/uL    nRBC 0.4 (H) 0.0 - 0.0 /100 WBCs    RBC 3.23 (L) 4.00 - 5.20 x10*6/uL    Hemoglobin 9.3 (L) 12.0 - 16.0 g/dL    Hematocrit 28.3 (L) 36.0 - 46.0 %    MCV 88 80 - 100 fL    MCH 28.8 26.0 - 34.0 pg    MCHC 32.9 32.0 - 36.0 g/dL    RDW 16.0 (H) 11.5 - 14.5 %    Platelets 156 150 - 450 x10*3/uL    Immature Granulocytes %, Automated 9.4 (H) 0.0 - 0.9 %    Immature Granulocytes Absolute, Automated 0.84 (H) 0.00 - 0.70 x10*3/uL   Magnesium   Result Value Ref Range    Magnesium 2.24 1.60 - 2.40 mg/dL   Manual Differential   Result Value Ref Range    Neutrophils %, Manual 80.6 40.0 - 80.0 %    Bands %, Manual 10.1 0.0 - 5.0 %    Lymphocytes %, Manual 4.2 13.0 - 44.0 %    Monocytes %, Manual 1.7 2.0 - 10.0 %    Eosinophils %, Manual 0.0 0.0 - 6.0 %    Basophils %, Manual 0.0 0.0 - 2.0 %    Metamyelocytes %, Manual 1.7 0.0 - 0.0 %    Myelocytes %, Manual 1.7 0.0 - 0.0 %    Seg Neutrophils Absolute, Manual 7.17 (H) 1.20 - 7.00 x10*3/uL    Bands Absolute, Manual 0.90 (H) 0.00 - 0.70 x10*3/uL    Lymphocytes Absolute, Manual 0.37 (L) 1.20 - 4.80 x10*3/uL    Monocytes Absolute, Manual 0.15 0.10 - 1.00 x10*3/uL    Eosinophils Absolute, Manual 0.00 0.00 - 0.70 x10*3/uL    Basophils Absolute, Manual 0.00 0.00 - 0.10 x10*3/uL    Metamyelocytes Absolute, Manual 0.15 0.00 - 0.00 x10*3/uL    Myelocytes Absolute, Manual 0.15 0.00 - 0.00 x10*3/uL    Total Cells Counted 119     Neutrophils Absolute, Manual 8.07 (H) 1.20 - 7.70 x10*3/uL    RBC Morphology See Below     Polychromasia Mild     Ovalocytes Few     Kennedy Cells Few    POCT GLUCOSE   Result Value Ref Range    POCT Glucose 124 (H) 74 - 99 mg/dL               Malnutrition Diagnosis Status: New  Malnutrition Diagnosis: Severe malnutrition related to acute disease or injury  As Evidenced by: 12% weight loss from normal along with  moderate to severe muscle and fat loss on physical exam in setting of a decrease in PO intake/appetite after being diagnosed with COVID  I agree with the dietitian's malnutrition diagnosis.    XR chest 2 views 12/06/2023    Narrative  Interpreted By:  Salvador Robles,  STUDY:  XR CHEST 2 VIEWS;  12/6/2023 10:11 am    INDICATION:  Signs/Symptoms:Eval of Puml Fibrosis.    COMPARISON:  11/24/2023.    ACCESSION NUMBER(S):  [id number]    ORDERING CLINICIAN:  JORDON PARRISH    FINDINGS:      CARDIOMEDIASTINAL SILHOUETTE:  Cardiomediastinal silhouette is normal in size and configuration.    LUNGS:  Digital tomosynthesis of the lung parenchyma was performed.  There is diffuse interstitial airspace opacities with prominent  traction bronchiectatic changes. There is no evidence of pneumothorax.    ABDOMEN:  No remarkable upper abdominal findings.    BONES:  No acute osseous changes.    Impression  1.  There are diffuse interstitial airspace opacities with traction  bronchiectatic changes. This may represent residua of prior COVID-19  pneumonia/ARDS.      Signed by: Salvador Robles 12/6/2023 10:21 AM  Dictation workstation:   [id number]      Assessment/Plan   Principal Problem:    Acute hypoxemic respiratory failure (CMS/HCC)  Active Problems:    Stage 4 chronic kidney disease (CMS/HCC)    COVID-19    Pneumonia of both lower lobes due to infectious organism    Pseudomonal bacteremia    A-fib (CMS/HCC)    MS Wilson is a 67 y/o female with hx HTN, CKD stage III who was admitted to the MICU from outside hospital with AHRF 2/2 COVID-pneumonia (tested positive on 10/13), course C/B by ARDS and pulmonary fibrosis resulting in organizing PNA.  Course also complicated by distal DVT, oliguric HUNG on CKD.  Patient weaned to 6 to 8 L nasal cannula but  requires increased FiO2 with movement and exertion.     Neuro/psych:    #hx Anxiety  -Continue with trazodone 25 mg at bedtime and melatonin  -Continue with Seroquel 25 mg at  bedtime and as needed daily 12.5 mg  -Pain regimen: PRN oxy for pain and air hunger.  Lidocaine patch for back pain  -will add Seroquel 12.5 mg PRN prior to working with PT   -Continue with PT/OT: OOB as tolerated     CV:    #Hx HTN, Intermittent SVT, A-fib  - HDS, NSR on tele  - Continue  metoprolol Tartrate  50 mg twice daily: Holding parameters--> SBP less than 100, HR less than 60  - Continue holding home losartan 100 mg 2/2 HUNG on CKD.  sCr has returned to baseline however BP stable and there is no indication at this time fto start d/t risk of hypotension     Pulm:    #AHRF 2/2 COVID PNA (11/23) c/b ARDS, Pulm fibrosis, Organizing PNA 2/2 Covid 19    #Pneumomediastinum (resolved)  - Stable on 6L NC.  Continued oxygen desaturation while working with PT/OT, but improving  - CTA PE from 11/26 negative for PE, findings consistent with multifocal PNA (improving from last study), bronchiectasis scattered throughout the lungs (slightly worst) and fibrotic changes.    - Prednisone slow taper- currently on 60 mg BID starting 12/4 x7 days. On 12/11 plan to decreased Prednisone to 60mg daily   - Pharmacy contacted regarding Prednisone taper, it would be appropriate to decrease to 60mg Daily on Monday if patient  continues to tolerate current Prednisone dose.   - Continue with Bactrim DS for PCP --> Change from liquid to pill daily   - Fluticasone 1 spry BID   - Seen by lung transplant on 11/30--> Steroid treatment must be tapers down to <=10mg of Prednisone per day and PT must be able to ambulate 100ft (on any amount of O2).  Will re-engage transplant once Pt meets those criteria.  - CXR 2V w/don done on 12/6- showing  diffuse interstitial airspace opacities with traction bronchiectatic changes.  - Avoid CPAP d/t hx pneumomediastinum during stay     ID:   #Covid PNA (received Decadron, Remdesivir, Tocilizumab at OSH) #Neutrophilic leukocytosis,  PSA bacteremia  - Afebrile, no leukocytosis   MICRO: 11/21 BC x2 3/4 PSA,  stool studies, including 11/25 C. Diff neg, 11/9 Step pneumonaie Ag, Legionella Ag neg, 11/9 Flu A/B, RSV neg   - Repeat blood cx x2 11/25 Neg  - ABX: Zosyn 11/22- 11/24, switched to PO Cipro 500 mg BID 11/25-12/5 for total 14 days  - Continue Bactrim for PCP prophylactics       FEN/GI:    #Dysphagia resolved, s/p Cortrak (removed 12/4)  #Diarrhea (resolved)   #Hyperkalemia, likely medication related   - Lokelma x3 doses ordered, reassess K on morning labs   - Continue Carb Controlled diet after FEES on 11/28  - Ensure with breakfast and lunch  - C Diff PCR 11/25 negative   - PRN Loperamide for diarrhea   - Bowel Regimen; PRN Colace; holding miralx and senna i/s/o diarrhea   - PPI  - Daily RFP/Mag      Renal:    #Oliguric HUNG on CKD stage III (baseline serum creatinine 1.6)  #Azotemia, improved   #Vitamin D deficiency  - Bilateral renal ultrasound on 11/16 shows nonobstructing stones bilaterally, no hydronephrosis, and bilateral cysts  - Serum creatinine peaked at 3.97; now trending down- at 1.08 today  - Diurese PRN for goal net even- holding off on diuresing for now  - 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  Can consider consulting with urology     Endo:    #Steroid-induced hyperglycemia  - Most recent A1c 6.3  - Continue SSI Level 3  moderate intensity SSI lispro ACHS's; and  Lantus 15 U HS  - Monitor for hypoglycemia as steroid taper is decreased; titrate as needed     Heme:    #DVT   - 11/27  CTA PE negative  - 11/13 B/L Duplex scan:  +Rt PT DVT, and + LT Soleal DVT  - Cont with Eliquis 5mg BID      MSK/Derm:    #Eczema/Rosasea, Sacral Pressure Ulcer (Stage 3)   - Continue with dupixent   - Follow on wound care recs  - Clean buttocks with cleansing cloths and apply Triad to open skin BID and as needed. Leave RIZWANA      Prophy:  DVT: Eliquis 5mg BID  GI: PPI     Access: PIV x2   Code Status:  DNR/DNI  NOK:  Mathew Wilson [phone]     Dispo:  Continue care in  SDU while weaning down on O2, social work is working on placement.     Pt discussed with Dr. Matta, seen and examined. All labs, VS and previous plan of care reviewed.      wJ Florez, ZION-CNP

## 2023-12-08 NOTE — CARE PLAN
Problem: Nutrition  Goal: Oral intake greater 75%  Outcome: Progressing  Goal: Consume prescribed supplement  Outcome: Progressing  Goal: Adequate PO fluid intake  Outcome: Progressing  Goal: Nutrition support goals are met within 48 hrs  Outcome: Progressing  Goal: Nutrition support is meeting 75% of nutrient needs  Outcome: Progressing  Goal: BG  mg/dL  Outcome: Progressing  Goal: Lab values WNL  Outcome: Progressing  Goal: Electrolytes WNL  Outcome: Progressing  Goal: Promote healing  Outcome: Progressing     Problem: Skin  Goal: Decreased wound size/increased tissue granulation at next dressing change  Outcome: Progressing  Goal: Participates in plan/prevention/treatment measures  Outcome: Progressing  Goal: Prevent/manage excess moisture  Outcome: Progressing  Flowsheets (Taken 12/7/2023 2352)  Prevent/manage excess moisture: Cleanse incontinence/protect with barrier cream  Goal: Prevent/minimize sheer/friction injuries  Outcome: Progressing  Flowsheets (Taken 12/7/2023 2352)  Prevent/minimize sheer/friction injuries:   Complete micro-shifts as needed if patient unable. Adjust patient position to relieve pressure points, not a full turn   HOB 30 degrees or less   Use pull sheet  Goal: Promote skin healing  Outcome: Progressing     Problem: Fall/Injury  Goal: Verbalize understanding of personal risk factors for fall in the hospital  Outcome: Progressing  Goal: Verbalize understanding of risk factor reduction measures to prevent injury from fall in the home  Outcome: Progressing  Goal: Use assistive devices by end of the shift  Outcome: Progressing     Problem: Chronic Conditions and Co-morbidities  Goal: Patient's chronic conditions and co-morbidity symptoms are monitored and maintained or improved  Outcome: Progressing     Problem: Respiratory  Goal: Clear secretions with interventions this shift  Outcome: Progressing  Goal: Minimize anxiety/maximize coping throughout shift  Outcome: Progressing  Goal:  Minimal/no exertional discomfort or dyspnea this shift  Outcome: Progressing  Goal: No signs of respiratory distress (eg. Use of accessory muscles. Peds grunting)  Outcome: Progressing  Goal: Patent airway maintained this shift  Outcome: Progressing  Goal: Tolerate pulmonary toileting this shift  Outcome: Progressing  Goal: Verbalize decreased shortness of breath this shift  Outcome: Progressing  Goal: Wean oxygen to maintain O2 saturation per order/standard this shift  Outcome: Progressing  Goal: Increase self care and/or family involvement in next 24 hours  Outcome: Progressing   The patient's goals for the shift include      The clinical goals for the shift include pt will maintain spo2>90

## 2023-12-09 NOTE — PROGRESS NOTES
"Macarena Wilson is a 68 y.o. female on day 30 of admission presenting with Acute hypoxemic respiratory failure (CMS/HCC).    Subjective   No acute events overnight.  Patient remains HDS on 6L NC    ROS: Denies fever, chills, CP, palpitations, SOB, cough, abdominal pain, and N/V      Objective   Physical Exam:  Constitutional: pt in NAD, alert and cooperative  Eyes: PERRL, EOMI, no icterus   ENMT: mucous membranes moist  Head/Neck: Neck supple, no apparent injury  Respiratory/Thorax: Lungs diminished bilaterally, non-labored breathing, no cough, on 6L NC  Cardiovascular: Regular, rate and rhythm, no murmurs, normal S1 and S2  Gastrointestinal: Nondistended, soft, non-tender, BS present x 4  : External catheter in place   Musculoskeletal: ROM intact, generalized weakness, unable to lift legs against gravity  Extremities: No edema, scattered areas of bruising   Neurological: alert and oriented x 3, speech clear, follows commands appropriately, without focal deficits  Skin: Warm and dry, Stage III sacrum covered with dressing c/d/i    Vital Signs  Blood pressure 137/61, pulse 58, temperature 36.1 °C (97 °F), temperature source Temporal, resp. rate 18, height 1.6 m (5' 2.99\"), weight 67.7 kg (149 lb 4 oz), SpO2 100 %.  Oxygen Therapy  SpO2: 100 %  Medical Gas Therapy: Supplemental oxygen  O2 Delivery Method: High flow nasal cannula       Intake/Output last 3 Shifts:  I/O last 3 completed shifts:  In: 685 (10 mL/kg) [P.O.:685]  Out: 1450 (21.2 mL/kg) [Urine:1450 (0.6 mL/kg/hr)]  Dosing Weight: 68.4 kg     Lines and Tubes:  Peripheral IV 11/08/23 20 G Left;Anterior Hand (Active)   Placement Date/Time: 11/08/23 0000   Size (Gauge): 20 G  Orientation: Left;Anterior  Location: Hand   Number of days: 31       Peripheral IV 11/29/23 20 G 2.25 cm Left Forearm (Active)   Placement Date/Time: 11/29/23 1315   Hand Hygiene Completed: Yes  Size (Gauge): 20 G  Catheter Length (cm): 2.25 cm  Orientation: Left  Location: Forearm  " Site Prep: Chlorhexidine   Technique: Ultrasound guidance  Placed by: Brandon CRAFT  Patient Toleranc...   Number of days: 9       External Urinary Catheter (Active)   Placement Date/Time: 11/09/23 0400     Number of days: 30         Relevant Results  Scheduled medications  apixaban, 5 mg, oral, BID  ergocalciferol, 1,250 mcg, oral, Weekly  fluticasone, 1 spray, Each Nostril, BID  insulin glargine, 15 Units, subcutaneous, Nightly  insulin lispro, 0-15 Units, subcutaneous, TID with meals  lidocaine, 1 patch, transdermal, Daily  melatonin, 5 mg, oral, Daily  metoprolol tartrate, 50 mg, oral, BID  pantoprazole, 40 mg, oral, Daily before breakfast  predniSONE, 60 mg, oral, BID  QUEtiapine, 12.5 mg, oral, Daily with breakfast  QUEtiapine, 25 mg, oral, Nightly  sulfamethoxazole-trimethoprim, 80 mg, oral, Daily  thiamine, 100 mg, oral, Daily  traZODone, 25 mg, oral, Nightly      Continuous medications  oxygen, , Last Rate: 8 L/min (12/05/23 1506)      PRN medications  PRN medications: acetaminophen **OR** [DISCONTINUED] acetaminophen **OR** [DISCONTINUED] acetaminophen, dextrose 10 % in water (D10W), dextrose, docusate sodium, ipratropium-albuteroL, loperamide, ondansetron ODT **OR** [DISCONTINUED] ondansetron, oxyCODONE, QUEtiapine    Results for orders placed or performed during the hospital encounter of 11/09/23 (from the past 24 hour(s))   POCT GLUCOSE   Result Value Ref Range    POCT Glucose 225 (H) 74 - 99 mg/dL   POCT GLUCOSE   Result Value Ref Range    POCT Glucose 174 (H) 74 - 99 mg/dL   POCT GLUCOSE   Result Value Ref Range    POCT Glucose 201 (H) 74 - 99 mg/dL   CBC and Auto Differential   Result Value Ref Range    WBC 9.0 4.4 - 11.3 x10*3/uL    nRBC 0.6 (H) 0.0 - 0.0 /100 WBCs    RBC 2.97 (L) 4.00 - 5.20 x10*6/uL    Hemoglobin 8.4 (L) 12.0 - 16.0 g/dL    Hematocrit 26.0 (L) 36.0 - 46.0 %    MCV 88 80 - 100 fL    MCH 28.3 26.0 - 34.0 pg    MCHC 32.3 32.0 - 36.0 g/dL    RDW 16.1 (H) 11.5 - 14.5 %    Platelets 175 150  - 450 x10*3/uL    Neutrophils % 79.5 40.0 - 80.0 %    Immature Granulocytes %, Automated 9.9 (H) 0.0 - 0.9 %    Lymphocytes % 5.2 13.0 - 44.0 %    Monocytes % 5.1 2.0 - 10.0 %    Eosinophils % 0.0 0.0 - 6.0 %    Basophils % 0.3 0.0 - 2.0 %    Neutrophils Absolute 7.18 1.20 - 7.70 x10*3/uL    Immature Granulocytes Absolute, Automated 0.89 (H) 0.00 - 0.70 x10*3/uL    Lymphocytes Absolute 0.47 (L) 1.20 - 4.80 x10*3/uL    Monocytes Absolute 0.46 0.10 - 1.00 x10*3/uL    Eosinophils Absolute 0.00 0.00 - 0.70 x10*3/uL    Basophils Absolute 0.03 0.00 - 0.10 x10*3/uL   Magnesium   Result Value Ref Range    Magnesium 2.18 1.60 - 2.40 mg/dL   Renal function panel   Result Value Ref Range    Glucose 144 (H) 74 - 99 mg/dL    Sodium 142 136 - 145 mmol/L    Potassium 5.4 (H) 3.5 - 5.3 mmol/L    Chloride 112 (H) 98 - 107 mmol/L    Bicarbonate 23 21 - 32 mmol/L    Anion Gap 12 10 - 20 mmol/L    Urea Nitrogen 70 (H) 6 - 23 mg/dL    Creatinine 0.94 0.50 - 1.05 mg/dL    eGFR 66 >60 mL/min/1.73m*2    Calcium 8.0 (L) 8.6 - 10.6 mg/dL    Phosphorus 4.7 2.5 - 4.9 mg/dL    Albumin 2.5 (L) 3.4 - 5.0 g/dL   POCT GLUCOSE   Result Value Ref Range    POCT Glucose 113 (H) 74 - 99 mg/dL           Malnutrition Diagnosis Status: New  Malnutrition Diagnosis: Severe malnutrition related to acute disease or injury  As Evidenced by: 12% weight loss from normal along with moderate to severe muscle and fat loss on physical exam in setting of a decrease in PO intake/appetite after being diagnosed with COVID  I agree with the dietitian's malnutrition diagnosis.    XR chest 2 views 12/06/2023    Narrative  Interpreted By:  Salvador Robles,  STUDY:  XR CHEST 2 VIEWS;  12/6/2023 10:11 am    INDICATION:  Signs/Symptoms:Eval of Puml Fibrosis.    COMPARISON:  11/24/2023.    ACCESSION NUMBER(S):  [id number]    ORDERING CLINICIAN:  JORDON PARRISH    FINDINGS:      CARDIOMEDIASTINAL SILHOUETTE:  Cardiomediastinal silhouette is normal in size and  configuration.    LUNGS:  Digital tomosynthesis of the lung parenchyma was performed.  There is diffuse interstitial airspace opacities with prominent  traction bronchiectatic changes. There is no evidence of pneumothorax.    ABDOMEN:  No remarkable upper abdominal findings.    BONES:  No acute osseous changes.    Impression  1.  There are diffuse interstitial airspace opacities with traction  bronchiectatic changes. This may represent residua of prior COVID-19  pneumonia/ARDS.      Signed by: Salvador Robles 12/6/2023 10:21 AM  Dictation workstation:   [id number]      Assessment/Plan   Principal Problem:    Acute hypoxemic respiratory failure (CMS/HCC)  Active Problems:    Stage 4 chronic kidney disease (CMS/HCC)    COVID-19    Pneumonia of both lower lobes due to infectious organism    Pseudomonal bacteremia    A-fib (CMS/[facility])    MS Wilson is a 67 y/o female with hx HTN, CKD stage III who was admitted to the MICU from outside hospital with AHRF 2/2 COVID-pneumonia (tested positive on 10/13), course C/B by ARDS and pulmonary fibrosis resulting in organizing PNA.  Course also complicated by distal DVT, oliguric HUNG on CKD.  Patient weaned to 6 to 8 L nasal cannula but  requires increased FiO2 with movement and exertion.     Neuro/psych:    #Hx Anxiety  - A&O x3   - Continue with trazodone 25 mg at bedtime and melatonin  - Continue with Seroquel 25 mg at bedtime and 12.5mg as needed with PT  - Pain regimen: PRN oxy for pain and air hunger.  Lidocaine patch for back pain  - Start Seroquel 12.5 mg scheduled every morning, stop if patient is too sleepy throughout the day.  - Continue with PT/OT: OOB as tolerated     CV:    #Hx HTN, Intermittent SVT, A-fib  - HDS, NSR on tele  - Continue  metoprolol Tartrate  50 mg twice daily: Holding parameters--> SBP less than 100, HR less than 60  - Continue holding home losartan 100 mg 2/2 HUNG on CKD.  sCr has returned to baseline however BP stable and there is no indication at  this time fto start d/t risk of hypotension  - Qtc 393 12/04     Pulm:    #AHRF 2/2 COVID PNA (11/23) c/b ARDS, Pulm fibrosis, Organizing PNA 2/2 Covid 19    #Pneumomediastinum (resolved)  - Stable on 6L NC.  Continued oxygen desaturation while working with PT/OT, but improving  - CTA PE from 11/26 negative for PE, findings consistent with multifocal PNA (improving from last study), bronchiectasis scattered throughout the lungs (slightly worst) and fibrotic changes.    - Prednisone slow taper- currently on 60 mg BID starting 12/4 x7 days. On 12/11 decrease Prednisone to 80mg daily x7 days, 12/18 60mg daily 2-4 weeks, then consider decreasing by 10-20mg every 1-2 weeks based on clinical appearance.    - Continue with Bactrim DS for PCP   - Fluticasone 1 spry BID   - Seen by lung transplant on 11/30--> Steroid treatment must be tapers down to <=10mg of Prednisone per day and PT must be able to ambulate 100ft (on any amount of O2).  Will re-engage transplant once Pt meets those criteria.  - CXR 2V w/don done on 12/6- showing  diffuse interstitial airspace opacities with traction bronchiectatic changes.  - Avoid CPAP d/t hx pneumomediastinum during stay     ID:   #Covid PNA (received Decadron, Remdesivir, Tocilizumab at OSH) #Neutrophilic leukocytosis,  PSA bacteremia  - Afebrile, no leukocytosis   MICRO: 11/21 BC x2 3/4 PSA, stool studies, including 11/25 C. Diff neg, 11/9 Step pneumonaie Ag, Legionella Ag neg, 11/9 Flu A/B, RSV neg   - Repeat blood cx x2 11/25 Neg  - ABX: Zosyn 11/22- 11/24, switched to PO Cipro 500mg BID 11/25-12/5 for total 14 days  - Continue Bactrim daily for PCP prophylactics       FEN/GI:    #Dysphagia resolved, s/p Cortrak (removed 12/4)  #Diarrhea (resolved)   #Hyperkalemia, likely medication related   - Potassium 5.4 this morning with mild hemolysis  - Continue Carb Controlled diet after FEES on 11/28  - Ensure with breakfast and lunch  - C Diff PCR 11/25 negative   - PRN Loperamide for  diarrhea   - Bowel Regimen; PRN Colace; holding miralx and senna i/s/o diarrhea   - PPI  - Daily RFP/Mag      Renal:    #Oliguric HUNG on CKD stage III (baseline serum creatinine 1.6)  #Azotemia, improved   #Vitamin D deficiency  - Bilateral renal ultrasound on 11/16 shows nonobstructing stones bilaterally, no hydronephrosis, and bilateral cysts  - Serum creatinine peaked at 3.97; now trending down- at 0.94 today  - Diurese PRN for goal net even- holding off on diuresing for now  - 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  Can consider consulting with urology     Endo:    #Steroid-induced hyperglycemia  - Most recent A1c 6.3  - Continue SSI Level 3  moderate intensity SSI lispro ACHS's; and  Lantus 15 U HS  - Monitor for hypoglycemia as steroid taper is decreased; titrate as needed     Heme:    #DVT   - 11/27  CTA PE negative  - 11/13 B/L Duplex scan:  +Rt PT DVT, and + LT Soleal DVT  - Cont with Eliquis 5mg BID      MSK/Derm:    #Eczema/Rosasea  #Sacral Pressure Ulcer (Stage 3)   - Continue with dupixent   - Follow on wound care recs  - Clean buttocks with cleansing cloths and apply Triad to open skin BID and as needed. Leave RIZWANA      Prophy:  DVT: Eliquis 5mg BID  GI: PPI     Access: PIV x2   Code Status:  DNR/DNI  NOK:  Mathew Wilson [phone]     Dispo:  Continue care in SDU while weaning down on O2, social work is working on placement.      Pt discussed with Dr. Matta, seen and examined. All labs, VS and previous plan of care reviewed.      Jw Florez, APRN-CNP

## 2023-12-10 NOTE — PROGRESS NOTES
"Macarena Wilson is a 68 y.o. female on day 31 of admission presenting with Acute hypoxemic respiratory failure (CMS/HCC).    Subjective   No acute events over nigh  Objective   Physical Exam:  Constitutional: pt in NAD, alert and cooperative  Eyes: PERRL, EOMI, no icterus   ENMT: mucous membranes moist, no apparent injury, no lesions seen  Head/Neck: Neck supple, no apparent injury  Respiratory/Thorax: Lungs with fine bilateral crackles, non-labored breathing  Cardiovascular: Regular, rate and rhythm, no murmurs  Gastrointestinal: Nondistended, soft, non-tender, BS present x 4  : external urine catheter  Musculoskeletal: ROM intact, no joint swelling, normal strength  Extremities: normal extremities, no edema, contusions or wounds  Neurological: alert and oriented x 3, speech clear, follows commands appropriately, no focal deficits  Skin: Warm and dry     Last Recorded Vitals  Blood pressure 123/75, pulse 67, temperature 36 °C (96.8 °F), temperature source Temporal, resp. rate 16, height 1.6 m (5' 2.99\"), weight 67.7 kg (149 lb 4 oz), SpO2 96 %.    Intake/Output last 3 Shifts:  I/O last 3 completed shifts:  In: 685 (10 mL/kg) [P.O.:685]  Out: 1400 (20.5 mL/kg) [Urine:1400 (0.6 mL/kg/hr)]  Dosing Weight: 68.4 kg     Scheduled medications  apixaban, 5 mg, oral, BID  ergocalciferol, 1,250 mcg, oral, Weekly  fluticasone, 1 spray, Each Nostril, BID  insulin glargine, 15 Units, subcutaneous, Nightly  insulin lispro, 0-15 Units, subcutaneous, TID with meals  lidocaine, 1 patch, transdermal, Daily  melatonin, 5 mg, oral, Daily  metoprolol tartrate, 50 mg, oral, BID  pantoprazole, 40 mg, oral, Daily before breakfast  predniSONE, 60 mg, oral, BID  QUEtiapine, 12.5 mg, oral, Daily with breakfast  QUEtiapine, 25 mg, oral, Nightly  sulfamethoxazole-trimethoprim, 80 mg, oral, Daily  thiamine, 100 mg, oral, Daily  traZODone, 25 mg, oral, Nightly    PRN medications  PRN medications: acetaminophen **OR** [DISCONTINUED] " acetaminophen **OR** [DISCONTINUED] acetaminophen, dextrose 10 % in water (D10W), dextrose, docusate sodium, ipratropium-albuteroL, loperamide, ondansetron ODT **OR** [DISCONTINUED] ondansetron, oxyCODONE, QUEtiapine     Relevant Results  Results for orders placed or performed during the hospital encounter of 11/09/23 (from the past 24 hour(s))   POCT GLUCOSE   Result Value Ref Range    POCT Glucose 199 (H) 74 - 99 mg/dL   POCT GLUCOSE   Result Value Ref Range    POCT Glucose 188 (H) 74 - 99 mg/dL   POCT GLUCOSE   Result Value Ref Range    POCT Glucose 109 (H) 74 - 99 mg/dL   CBC and Auto Differential   Result Value Ref Range    WBC 10.1 4.4 - 11.3 x10*3/uL    nRBC 0.5 (H) 0.0 - 0.0 /100 WBCs    RBC 3.05 (L) 4.00 - 5.20 x10*6/uL    Hemoglobin 8.5 (L) 12.0 - 16.0 g/dL    Hematocrit 26.5 (L) 36.0 - 46.0 %    MCV 87 80 - 100 fL    MCH 27.9 26.0 - 34.0 pg    MCHC 32.1 32.0 - 36.0 g/dL    RDW 15.9 (H) 11.5 - 14.5 %    Platelets 131 (L) 150 - 450 x10*3/uL    Immature Granulocytes %, Automated 10.1 (H) 0.0 - 0.9 %    Immature Granulocytes Absolute, Automated 1.02 (H) 0.00 - 0.70 x10*3/uL   Renal function panel   Result Value Ref Range    Glucose 109 (H) 74 - 99 mg/dL    Sodium 143 136 - 145 mmol/L    Potassium 5.1 3.5 - 5.3 mmol/L    Chloride 111 (H) 98 - 107 mmol/L    Bicarbonate 24 21 - 32 mmol/L    Anion Gap 13 10 - 20 mmol/L    Urea Nitrogen 67 (H) 6 - 23 mg/dL    Creatinine 0.94 0.50 - 1.05 mg/dL    eGFR 66 >60 mL/min/1.73m*2    Calcium 8.3 (L) 8.6 - 10.6 mg/dL    Phosphorus 4.2 2.5 - 4.9 mg/dL    Albumin 2.6 (L) 3.4 - 5.0 g/dL   Magnesium   Result Value Ref Range    Magnesium 2.04 1.60 - 2.40 mg/dL   Manual Differential   Result Value Ref Range    Neutrophils %, Manual 85.7 40.0 - 80.0 %    Bands %, Manual 6.7 0.0 - 5.0 %    Lymphocytes %, Manual 3.4 13.0 - 44.0 %    Monocytes %, Manual 1.7 2.0 - 10.0 %    Eosinophils %, Manual 0.0 0.0 - 6.0 %    Basophils %, Manual 0.0 0.0 - 2.0 %    Metamyelocytes %, Manual 0.8 0.0  - 0.0 %    Myelocytes %, Manual 1.7 0.0 - 0.0 %    Seg Neutrophils Absolute, Manual 8.66 (H) 1.20 - 7.00 x10*3/uL    Bands Absolute, Manual 0.68 0.00 - 0.70 x10*3/uL    Lymphocytes Absolute, Manual 0.34 (L) 1.20 - 4.80 x10*3/uL    Monocytes Absolute, Manual 0.17 0.10 - 1.00 x10*3/uL    Eosinophils Absolute, Manual 0.00 0.00 - 0.70 x10*3/uL    Basophils Absolute, Manual 0.00 0.00 - 0.10 x10*3/uL    Metamyelocytes Absolute, Manual 0.08 0.00 - 0.00 x10*3/uL    Myelocytes Absolute, Manual 0.17 0.00 - 0.00 x10*3/uL    Total Cells Counted 119     Neutrophils Absolute, Manual 9.34 (H) 1.20 - 7.70 x10*3/uL    RBC Morphology No significant RBC morphology present         Imaging:  XR chest 2 views  Result Date: 12/6/2023  1.  There are diffuse interstitial airspace opacities with traction bronchiectatic changes. This may represent residua of prior COVID-19 pneumonia/ARDS.         CT angio chest for pulmonary embolism  Result Date: 11/26/2023  1. No evidence of pulmonary embolism.   2. Multifocal consolidative opacities more in the lower lobes, slightly improved compared to prior study. Findings remain concerning for multifocal infectious process.   3. Interlobular septal thickening, architectural distortion and mild bronchiectasis scattered throughout the lungs, slightly worse and concerning for fibrotic like changes, likely sequela of infection. Cannot exclude component of interstitial pulmonary edema.   4.  Other findings as described above.          XR chest 1 view  Result Date: 11/24/2023   1.  Continued coarse perihilar airspace opacities consistent with history of COVID-19 pneumonia. Basilar lucencies and correlate with a component of aerated lung versus developing fibrotic changes.         US renal complete  Result Date: 11/16/2023  1. Atrophic right kidney.   2. Bilateral nonobstructing nephrolithiasis measuring up to 1.5 cm on the right and 0.9 cm on the left. No hydronephrosis.   3. Bilateral renal cysts.        XR  abdomen 1 view  Result Date: 11/16/2023  1.  Nonspecific KUB. Enterogastric tube in the expected position of the stomach with tip at the pylorus.        Lower extremity venous duplex bilateral  Result Date: 11/13/2023  Right Lower Venous: There is acute occlusive deep vein thrombosis visualized in the posterior tibial vein. The remainder of the right leg is negative for deep vein thrombosis.   Left Lower Venous: There is acute occlusive deep vein thrombosis visualized in the soleal vein. The remainder of the left leg is negative for deep vein thrombosis.      Transthoracic Echo (TTE) Complete  Result Date: 11/9/2023  1. Left ventricular systolic function is hyperdynamic with a 70-75% estimated ejection fraction.    2. Spectral Doppler shows an impaired relaxation pattern of left ventricular diastolic filling.      CT chest wo IV contrast  Result Date: 11/7/2023  Persistent extensive ground-glass densities in the lung apices and anterior aspect of the bilateral upper lobes with progression of previously identified ground-glass densities in the more confluent airspace opacities involving the bilateral lower lobes and posterior aspects of the bilateral upper lobes. Findings suggest atypical infection. Correlation with COVID status is recommended.           Malnutrition Diagnosis Status: New  Malnutrition Diagnosis: Severe malnutrition related to acute disease or injury  As Evidenced by: 12% weight loss from normal along with moderate to severe muscle and fat loss on physical exam in setting of a decrease in PO intake/appetite after being diagnosed with COVID  I agree with the dietitian's malnutrition diagnosis.      Assessment/Plan   Principal Problem:    Acute hypoxemic respiratory failure (CMS/HCC)  Active Problems:    Stage 4 chronic kidney disease (CMS/HCC)    COVID-19    Pneumonia of both lower lobes due to infectious organism    Pseudomonal bacteremia    A-fib (CMS/HCC)       MS Wilson is a 67 y/o female with hx  HTN, CKD stage III who was admitted to the MICU from outside hospital with AHRF 2/2 COVID-pneumonia (tested positive on 10/13), course C/B by ARDS and pulmonary fibrosis resulting in organizing PNA.  Course also complicated by distal DVT, oliguric HUNG on CKD.  Patient weaned to 6 to 8 L nasal cannula but  requires increased FiO2 with movement and exertion.     Neuro/psych:  Hx Anxiety  - Continue with trazodone 25 mg at bedtime and melatonin  -Continue with Seroquel 12.5  mg in am,  25 mg at bedtime and 12.5mg as needed with PT  - Pain regimen: PRN oxy for pain and air hunger.  Lidocaine patch for back pain  - Continue with PT/OT: OOB as tolerated     CV:  Hx HTN, Intermittent SVT, A-fib  - Continue  metoprolol Tartrate  50 mg twice daily: Holding parameters--> SBP less than 100, HR less than 60  - Continue holding home losartan 100 mg 2/2 HUNG on CKD.  sCr has returned to baseline however BP stable and there is no indication at this time fto start d/t risk of hypotension  - Qtc stable at 389 today 12/10     Pulm:  AHRF 2/2 COVID PNA (11/23) c/b ARDS, Pulm fibrosis, Organizing PNA 2/2 Covid 19  , Pneumomediastinum (resolved)  - Stable on 6L NC.  Continued oxygen desaturation while working with PT/OT, but improving  - CTA PE from 11/26 negative for PE, findings consistent with multifocal PNA (improving from last study), bronchiectasis scattered throughout the lungs (slightly worst) and fibrotic changes.    - Prednisone slow taper- currently on 60 mg BID starting 12/4 x7 days. On 12/11 decrease Prednisone to 80mg daily x7 days, 12/18 60mg daily 2-4 weeks, then consider decreasing by 10-20mg every 1-2 weeks based on clinical appearance.    - Continue with Bactrim DS for PCP   - Fluticasone 1 spry BID   - Seen by lung transplant on 11/30--> Steroid treatment must be tapers down to <=10mg of Prednisone per day and PT must be able to ambulate 100ft (on any amount of O2).  Will re-engage transplant once Pt meets those  criteria.  - CXR 2V w/don done on 12/6- showing  diffuse interstitial airspace opacities with traction bronchiectatic changes.  - Avoid CPAP d/t hx pneumomediastinum during stay     ID: Covid PNA (received Decadron, Remdesivir, Tocilizumab at OSH) , Neutrophilic leukocytosis,  PSA bacteremia  - Afebrile, no leukocytosis   MICRO: 11/21 BC x2 3/4 PSA, stool studies, including 11/25 C. Diff neg, 11/9 Step pneumonaie Ag, Legionella Ag neg, 11/9 Flu A/B, RSV neg   - Repeat blood cx x2 11/25 Neg  - ABX: Zosyn 11/22- 11/24, switched to PO Cipro 500mg BID 11/25-12/5 for total 14 days  - Continue Bactrim daily for PCP prophylactics       FEN/GI:  Dysphagia resolved  -continue carb controlled diet after FEES on 11/28  - Ensure with breakfast and lunch  - C Diff PCR 11/25 negative   - PRN Loperamide for diarrhea   - Bowel Regimen; PRN Colace; holding miralx and senna i/s/o diarrhea   - PPI  - Daily RFP/Mag      Renal:  Oliguric HUNG on CKD stage III (baseline serum creatinine 1.6), Azotemia, improved,  Vitamin D deficiency  - Bilateral renal ultrasound on 11/16 shows nonobstructing stones bilaterally, no hydronephrosis, and bilateral cysts  - Serum creatinine peaked at 3.97; now trending down- at 0.94 today  - Diurese PRN for goal net even- holding off on diuresing for now  - 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  Can consider consulting with urology     Endo:  Steroid-induced hyperglycemia  - Most recent A1c 6.3  - Continue SSI Level 3  moderate intensity SSI lispro ACHS's; and  Lantus 15 U HS  - Monitor for hypoglycemia as steroid taper is decreased; titrate as needed (BS ranging 100s-140s in 24 hrs)     Heme:   hx DVT   - 11/27  CTA PE negative  - 11/13 B/L Duplex scan:  +Rt PT DVT, and + LT Soleal DVT  - Cont with Eliquis 5mg BID      MSK/Derm:  Eczema/Rosasea, Sacral Pressure Ulcer (Stage 3)   - Continue with dupixent   - Follow on wound care recs  - Clean buttocks  with cleansing cloths and apply Triad to open skin BID and as needed. Leave RIZWANA      Prophy:  DVT: Eliquis 5mg BID  GI: PPI     Access: PIV x2   Code Status:  DNR/DNI  NOK:  Mathew Wilson [phone]     Dispo: will transfer to [facility] with continuous Pox, social work arranging SNF at d/c     D/w Dr Paxton SANDHU spent >45 minutes in the professional and overall care of this patient.      Alba Rosenthal, APRN-CNP

## 2023-12-10 NOTE — CARE PLAN
The patient's goals for the shift include      The clinical goals for the shift include pt will maintain spo2>90      Problem: Skin  Goal: Decreased wound size/increased tissue granulation at next dressing change  Outcome: Progressing  Flowsheets (Taken 12/10/2023 0919)  Decreased wound size/increased tissue granulation at next dressing change:   Promote sleep for wound healing   Utilize specialty bed per algorithm  Goal: Participates in plan/prevention/treatment measures  Outcome: Progressing  Flowsheets (Taken 12/6/2023 2052 by Michele Eduardo RN)  Participates in plan/prevention/treatment measures: Elevate heels  Goal: Prevent/manage excess moisture  Outcome: Progressing  Flowsheets (Taken 12/10/2023 0919)  Prevent/manage excess moisture: Cleanse incontinence/protect with barrier cream  Goal: Prevent/minimize sheer/friction injuries  Outcome: Progressing  Flowsheets (Taken 12/10/2023 0919)  Prevent/minimize sheer/friction injuries:   Complete micro-shifts as needed if patient unable. Adjust patient position to relieve pressure points, not a full turn   Increase activity/out of bed for meals  Goal: Promote skin healing  Outcome: Progressing  Flowsheets (Taken 12/10/2023 0919)  Promote skin healing:   Assess skin/pad under line(s)/device(s)   Ensure correct size (line/device) and apply per  instructions     Problem: Nutrition  Goal: Oral intake greater 75%  Outcome: Progressing  Goal: Consume prescribed supplement  Outcome: Progressing  Goal: Adequate PO fluid intake  Outcome: Progressing  Goal: Nutrition support goals are met within 48 hrs  Outcome: Progressing  Goal: Nutrition support is meeting 75% of nutrient needs  Outcome: Progressing  Goal: BG  mg/dL  Outcome: Progressing  Goal: Lab values WNL  Outcome: Progressing  Goal: Electrolytes WNL  Outcome: Progressing  Goal: Promote healing  Outcome: Progressing

## 2023-12-10 NOTE — CODE DOCUMENTATION
Discussed Code status on transfer with patient. She was previously DNR/DNI but is not stating that as she is doing much better, she wishes to be full code, though she does wish to think more about being intubated. Told patient that this is an open discussion and can also be revisited. Patient stated she would like to be full code in the interim and understood she may change her wishes.

## 2023-12-10 NOTE — PROGRESS NOTES
"Subjective   Doing well. States that her breathing is doing well. Nervous about transfer to new bed (as she has had desats with PT).     General: Denies fevers/chills   CV: Denies chest pain   Pulm: Denies SOA         Current Vitals  BP (!) 161/97 (BP Location: Left arm, Patient Position: Lying)   Pulse 85   Temp 36 °C (96.8 °F) (Temporal)   Resp 17   Ht 1.6 m (5' 2.99\")   Wt 67.7 kg (149 lb 4 oz)   SpO2 95%   BMI 26.45 kg/m²      I/O last 3 completed shifts:  In: 240 (3.5 mL/kg) [P.O.:240]  Out: 1700 (24.9 mL/kg) [Urine:1700 (0.7 mL/kg/hr)]  Dosing Weight: 68.4 kg     Physical Exam:     Constitutional: in NAD   HEENT: Anicteric sclera, PERRLA, EOMI   CV: RRR, no murmurs noted   PULM: Distant shallow breath sounds but appreciable air movement throughout bot sides.    GI: Abd soft, NT, +BS   Skin: Warm and dry   Ext: Bilateral Lower extremity without pitting edema   Neuro: Alert and conversant, moves all ext   Psych: Affect appropriate     Relevant Results      Labs:    CBC: WBC 10.1 , HGB 8.5,   BMP: , K 5.1, Cl 111, HCO3 24, BUN 67, CR 0.94, Glu 109  LFTS: AST 27 , ALT 63, ALKPHOS 71 , TBILI 0.6 , DBILI 0.1  TROP: 5  BNP: 99  COAGS: PT 11.4 , PTT 26  , INR 1.0  UA:     ABG:    CALCIUM 8.3 MAG No results found for requested labs within last 365 days. ALB 2.6 LACTATE 1.5 PHOS No results found for requested labs within last 365 days. COVIDNo results found for requested labs within last 365 days.  VBG:   Recent Labs     10/25/23  0705 10/24/23  1313   LACTATEVEN 1.7 2.2*        Micro/culture data:  Susceptibility data from last 120 days.  Collected Organism Aztreonam Cefepime Ceftazidime Ciprofloxacin Gentamicin Levofloxacin Piperacillin/Tazobactam Tobramycin   11/25/23 0604 Candida tropicalis           11/21/23 1530 Pseudomonas aeruginosa S S S S S S S S   11/21/23 1528 Pseudomonas aeruginosa               Imaging:           MEDS:  Scheduled medications  apixaban, 5 mg, oral, BID  ergocalciferol, " 1,250 mcg, oral, Weekly  fluticasone, 1 spray, Each Nostril, BID  insulin glargine, 15 Units, subcutaneous, Nightly  insulin lispro, 0-15 Units, subcutaneous, TID with meals  lidocaine, 1 patch, transdermal, Daily  melatonin, 5 mg, oral, Daily  metoprolol tartrate, 50 mg, oral, BID  pantoprazole, 40 mg, oral, Daily before breakfast  predniSONE, 60 mg, oral, BID  QUEtiapine, 12.5 mg, oral, Daily with breakfast  QUEtiapine, 25 mg, oral, Nightly  sulfamethoxazole-trimethoprim, 80 mg, oral, Daily  thiamine, 100 mg, oral, Daily  traZODone, 25 mg, oral, Nightly      Continuous medications  oxygen, , Last Rate: 8 L/min (12/05/23 1506)      PRN medications  PRN medications: acetaminophen **OR** [DISCONTINUED] acetaminophen **OR** [DISCONTINUED] acetaminophen, dextrose 10 % in water (D10W), dextrose, docusate sodium, ipratropium-albuteroL, loperamide, ondansetron ODT **OR** [DISCONTINUED] ondansetron, oxyCODONE, QUEtiapine     Assessment/Plan:   MS Wilson is a 69 y/o female with hx HTN, CKD stage III who was admitted to the MICU from outside hospital with AHRF 2/2 COVID-pneumonia (tested positive on 10/13), course C/B by ARDS and pulmonary fibrosis resulting in organizing PNA.  Course also complicated by distal DVT, oliguric HUNG on CKD.  Patient weaned to 6 to 8 L nasal cannula but  requires increased FiO2 with movement and exertion. Transferring to [facility].     #AHRF 2/2 COVID PNA (11/23) c/b ARDS, Pulm fibrosis, Organizing PNA 2/2 Covid 19  , Pneumomediastinum (resolved)  - Stable on 6L NC.  desats with movement   - CTA PE from 11/26 negative for PE, showing multifocal pnuemonia   And bronchiectasis as well as fibrosis  - Prednisone slow taper- currently on 60 mg BID starting 12/4 x7 days. On 12/11 decrease Prednisone to 80mg daily x7 days, 12/18 60mg daily 2-4 weeks, then consider decreasing by 10-20mg every 1-2 weeks based on clinical appearance.  (can readdress if needed)  - Continue with Bactrim DS for PCP  (MONITOR HYPERK)  - Fluticasone 1 spry BID   - Seen by lung transplant on 11/30--> per team, must be tapered to less than 10 mg pred and be able to amblate 100 ft on any amount of o2, rengage as appropriate   - Avoid CPAP d/t hx pneumomediastinum during stay   -Monitor sugars on Steroid, on 15 units of lantus (decrease if am sugar low) and SSI     #Covid PNA   -received Decadron, Remdesivir, Tocilizumab at OSH, Neutrophilic leukocytosis,  PSA bacteremia  - Afebrile, no leukocytosis   - 11/21 BC x2 3/4 pseudomonas   -11/25 Bcx Negative   -was on Zosyn11/22- 11/24, switched to PO Cipro 500mg BID 11/25-12/5 for total 14 days  - Continue Bactrim daily for PCP prophylactics     #DVT   -11/13: +Rt PT DVT, and + LT Soleal DVT   -CT PE negative   -Eliquis 5 mg BID     #HTN   #Interittent SvT   #Afib   -Metop tartrate 50 mg BID   -Holding home losartan 100 mg , bp stable   -EKG 12/10 stable, qtc 389     #Oliguric HUNG  on CKD (RESOLVED)   -Per documentation, previous baseline was 1.6, current creat around 1   -PRN diuresis as needed   -hx of bladder fistula    #Anxiety   #Pain control  -Continue seroquel 25 mg at night and 12.5 mg as needed with PT   -PRN oxy for pain, lidocaine  -Hold trazadone 25 mg, re-initiate if needed   -Bowel reg while on opiods   -Has had diarrhea previously, monitor     #Eczema/Rosasea  -Continue with dupixent     # Sacral Pressure Ulcer (stage 3)   -Wound care     F: PRN   E: PRN   N: Consistent card   A: PIV   DVT: Eliquis   GI: PPI     Disposition:   SNF    Code Status: Full Code (confirmed on admission)   NOK:  Primary Emergency Contact: Mathew Wilson, Home Phone: [phone]       Omaira Krishna MD

## 2023-12-10 NOTE — PROGRESS NOTES
Spiritual Care Visit    Clinical Encounter Type  Visited With: Patient and family together  Routine Visit: Follow-up     followed up with patient Macarena Wilson and her family. Patient expressed she was making a little progress, continuing to take things day by day. She and family did not have any needs at this time and were appreciative of care. Spiritual Care remains available as needed/requested.    Rev. Estee Lai MDiv, [facility]

## 2023-12-11 NOTE — PROGRESS NOTES
"Macarena Wilson is a 68 y.o. female on day 32 of admission presenting with Acute hypoxemic respiratory failure (CMS/HCC).    Subjective   Today, the patient reports that she is doing well with no specific concerns. She reports that she is breathing well at her baseline 6L and increases to 8L with exertion. Does not have any cough. Has been tolerating PO intake without nausea, vomiting, diarrhea, constipation. Denies any prior history of COPR or other pulmonary disease, no cardiac history, never smoked.       Objective     Physical Exam:  Constitutional: pt in NAD, alert and cooperative  Eyes: PERRL, EOMI, no icterus   ENMT: mucous membranes moist  Head/Neck: Neck supple, no apparent injury  Respiratory/Thorax: Lungs diminished bilaterally, non-labored breathing, no cough, on 6L NC with no increased work of breaething  Cardiovascular: Regular, rate and rhythm, no murmurs, normal S1 and S2  Gastrointestinal: Nondistended, soft, non-tender, BS present x 4  : External catheter in place   Musculoskeletal: ROM intact, generalized weakness, unable to lift legs against gravity  Extremities: No edema, scattered areas of bruising   Neurological: alert and oriented x 3, speech clear, follows commands appropriately, without focal deficits  Skin: Warm and dry, Stage III sacrum covered with dressing c/d/i    Last Recorded Vitals  Blood pressure 132/84, pulse 96, temperature 36 °C (96.8 °F), temperature source Temporal, resp. rate 20, height 1.6 m (5' 2.99\"), weight 67.7 kg (149 lb 4 oz), SpO2 92 %.  Intake/Output last 3 Shifts:  I/O last 3 completed shifts:  In: 200 (2.9 mL/kg) [P.O.:200]  Out: 875 (12.8 mL/kg) [Urine:875 (0.4 mL/kg/hr)]  Dosing Weight: 68.4 kg     Relevant Results  Scheduled medications  apixaban, 5 mg, oral, BID  ergocalciferol, 1,250 mcg, oral, Weekly  fluticasone, 1 spray, Each Nostril, BID  insulin glargine, 15 Units, subcutaneous, Nightly  insulin lispro, 0-15 Units, subcutaneous, TID with " meals  lidocaine, 1 patch, transdermal, Daily  melatonin, 5 mg, oral, Daily  metoprolol tartrate, 50 mg, oral, BID  pantoprazole, 40 mg, oral, Daily before breakfast  predniSONE, 60 mg, oral, BID  QUEtiapine, 12.5 mg, oral, Daily with breakfast  QUEtiapine, 25 mg, oral, Nightly  sodium zirconium cyclosilicate, 10 g, oral, TID  sulfamethoxazole-trimethoprim, 80 mg, oral, Daily  thiamine, 100 mg, oral, Daily  traZODone, 25 mg, oral, Nightly      Continuous medications  oxygen, , Last Rate: 5 L/min (12/10/23 2040)      PRN medications  PRN medications: acetaminophen **OR** [DISCONTINUED] acetaminophen **OR** [DISCONTINUED] acetaminophen, dextrose 10 % in water (D10W), dextrose, docusate sodium, ipratropium-albuteroL, loperamide, ondansetron ODT **OR** [DISCONTINUED] ondansetron, oxyCODONE, QUEtiapine  Results for orders placed or performed during the hospital encounter of 11/09/23 (from the past 24 hour(s))   POCT GLUCOSE   Result Value Ref Range    POCT Glucose 239 (H) 74 - 99 mg/dL   POCT GLUCOSE   Result Value Ref Range    POCT Glucose 135 (H) 74 - 99 mg/dL   CBC and Auto Differential   Result Value Ref Range    WBC 11.3 4.4 - 11.3 x10*3/uL    nRBC 0.4 (H) 0.0 - 0.0 /100 WBCs    RBC 2.98 (L) 4.00 - 5.20 x10*6/uL    Hemoglobin 8.6 (L) 12.0 - 16.0 g/dL    Hematocrit 25.9 (L) 36.0 - 46.0 %    MCV 87 80 - 100 fL    MCH 28.9 26.0 - 34.0 pg    MCHC 33.2 32.0 - 36.0 g/dL    RDW 16.5 (H) 11.5 - 14.5 %    Platelets 123 (L) 150 - 450 x10*3/uL    Immature Granulocytes %, Automated 9.4 (H) 0.0 - 0.9 %    Immature Granulocytes Absolute, Automated 1.06 (H) 0.00 - 0.70 x10*3/uL   Renal function panel   Result Value Ref Range    Glucose 96 74 - 99 mg/dL    Sodium 143 136 - 145 mmol/L    Potassium 5.5 (H) 3.5 - 5.3 mmol/L    Chloride 112 (H) 98 - 107 mmol/L    Bicarbonate 23 21 - 32 mmol/L    Anion Gap 14 10 - 20 mmol/L    Urea Nitrogen 63 (H) 6 - 23 mg/dL    Creatinine 0.90 0.50 - 1.05 mg/dL    eGFR 69 >60 mL/min/1.73m*2    Calcium  8.2 (L) 8.6 - 10.6 mg/dL    Phosphorus 4.8 2.5 - 4.9 mg/dL    Albumin 2.5 (L) 3.4 - 5.0 g/dL   Magnesium   Result Value Ref Range    Magnesium 2.16 1.60 - 2.40 mg/dL   Manual Differential   Result Value Ref Range    Neutrophils %, Manual 79.7 40.0 - 80.0 %    Bands %, Manual 7.6 0.0 - 5.0 %    Lymphocytes %, Manual 2.6 13.0 - 44.0 %    Monocytes %, Manual 4.2 2.0 - 10.0 %    Eosinophils %, Manual 0.0 0.0 - 6.0 %    Basophils %, Manual 0.0 0.0 - 2.0 %    Metamyelocytes %, Manual 1.7 0.0 - 0.0 %    Myelocytes %, Manual 4.2 0.0 - 0.0 %    Seg Neutrophils Absolute, Manual 9.01 (H) 1.20 - 7.00 x10*3/uL    Bands Absolute, Manual 0.86 (H) 0.00 - 0.70 x10*3/uL    Lymphocytes Absolute, Manual 0.29 (L) 1.20 - 4.80 x10*3/uL    Monocytes Absolute, Manual 0.47 0.10 - 1.00 x10*3/uL    Eosinophils Absolute, Manual 0.00 0.00 - 0.70 x10*3/uL    Basophils Absolute, Manual 0.00 0.00 - 0.10 x10*3/uL    Metamyelocytes Absolute, Manual 0.19 0.00 - 0.00 x10*3/uL    Myelocytes Absolute, Manual 0.47 0.00 - 0.00 x10*3/uL    Total Cells Counted 118     Neutrophils Absolute, Manual 9.87 (H) 1.20 - 7.70 x10*3/uL    RBC Morphology See Below     Polychromasia Mild     RBC Fragments Few     Ovalocytes Few    POCT GLUCOSE   Result Value Ref Range    POCT Glucose 100 (H) 74 - 99 mg/dL   POCT GLUCOSE   Result Value Ref Range    POCT Glucose 87 74 - 99 mg/dL   ECG 12 lead   Result Value Ref Range    Ventricular Rate 64 BPM    Atrial Rate 64 BPM    ID Interval 130 ms    QRS Duration 80 ms    QT Interval 378 ms    QTC Calculation(Bazett) 389 ms    P Axis -6 degrees    R Axis 35 degrees    T Axis 114 degrees    QRS Count 11 beats    Q Onset 219 ms    P Onset 154 ms    P Offset 204 ms    T Offset 408 ms    QTC Fredericia 386 ms   POCT GLUCOSE   Result Value Ref Range    POCT Glucose 153 (H) 74 - 99 mg/dL     ECG 12 lead    Result Date: 12/11/2023  Sinus rhythm with Fusion complexes ST & T wave abnormality, consider lateral ischemia Abnormal ECG When  compared with ECG of 01-DEC-2023 10:56, (unconfirmed) Fusion complexes are now Present T wave inversion now evident in Anterior leads                           Malnutrition Diagnosis Status: New  Malnutrition Diagnosis: Severe malnutrition related to acute disease or injury  As Evidenced by: 12% weight loss from normal along with moderate to severe muscle and fat loss on physical exam in setting of a decrease in PO intake/appetite after being diagnosed with COVID  I agree with the dietitian's malnutrition diagnosis.      Assessment/Plan   Principal Problem:    Acute hypoxemic respiratory failure (CMS/HCC)  Active Problems:    Stage 4 chronic kidney disease (CMS/[facility])    COVID-19    Pneumonia of both lower lobes due to infectious organism    Pseudomonal bacteremia    A-fib (CMS/[facility])    MS Wilson is a 69 y/o female with hx HTN, CKD stage III who was admitted to the MICU from outside hospital with AHRF 2/2 COVID-pneumonia (tested positive on 10/13), course C/B by ARDS and pulmonary fibrosis resulting in organizing PNA.  Course also complicated by distal DVT, oliguric HUNG on CKD.  Patient weaned to 6 from 8 L nasal cannula but  requires increased FiO2 with movement and exertion. Transferred to [facility] service on 12/11 for continued management.     Updates 12/11:  - Prednisone slow taper- currently on 60 mg BID starting 12/4 x7 days. On 12/11 decrease Prednisone to 80mg daily x7 days, 12/18 60mg daily 2-4 weeks, then consider decreasing by 10-20mg every 1-2 weeks based on clinical appearance.  (can readdress if needed)  - C/w Bactrim DS for PCP ppx    #AHRF 2/2 COVID PNA (11/23) c/b ARDS, Pulm fibrosis, Organizing PNA 2/2 Covid 19  , Pneumomediastinum (resolved)  - Stable on 6L NC.  desats with movement   - CTA PE from 11/26 negative for PE, showing multifocal pnuemonia   And bronchiectasis as well as fibrosis  - Prednisone slow taper- currently on 60 mg BID starting 12/4 x7 days. On 12/11 decrease Prednisone to 80mg  daily x7 days, 12/18 60mg daily 2-4 weeks, then consider decreasing by 10-20mg every 1-2 weeks based on clinical appearance.  (can readdress if needed)  - Continue with Bactrim DS for PCP (MONITOR HYPERK)  - Fluticasone 1 spry BID   - Seen by lung transplant on 11/30--> per team, must be tapered to less than 10 mg pred and be able to amblate 100 ft on any amount of o2, rengage as appropriate   - Avoid CPAP d/t hx pneumomediastinum during stay   -Monitor sugars on Steroid, on 15 units of lantus (decrease if am sugar low) and SSI      #Covid PNA   -received Decadron, Remdesivir, Tocilizumab at OSH, Neutrophilic leukocytosis,  PSA bacteremia  - Afebrile, no leukocytosis   - 11/21 BC x2 3/4 pseudomonas   -11/25 Bcx Negative   -was on Zosyn11/22- 11/24, switched to PO Cipro 500mg BID 11/25-12/5 for total 14 days  - Continue Bactrim daily for PCP prophylactics      #DVT   -11/13: +Rt PT DVT, and + LT Soleal DVT   -CT PE negative   -Eliquis 5 mg BID      #HTN   #Interittent SvT   #Afib   -Metop tartrate 50 mg BID   -Holding home losartan 100 mg , bp stable   -EKG 12/10 stable, qtc 389      #Oliguric HUNG  on CKD (RESOLVED)   -Per documentation, previous baseline was 1.6, current creat around 1   -PRN diuresis as needed   -hx of bladder fistula     #Anxiety   #Pain control  -Continue seroquel 25 mg at night and 12.5 mg as needed with PT   -PRN oxy for pain, lidocaine  -Hold trazadone 25 mg, re-initiate if needed   -Bowel reg while on opiods   -Has had diarrhea previously, monitor      #Eczema/Rosasea  -Continue with dupixent      # Sacral Pressure Ulcer (stage 3)   -Wound care      F: PRN   E: PRN   N: Consistent card   A: PIV   DVT: Eliquis   GI: PPI      Disposition:   SNF     Code Status: Full Code (confirmed on admission)   NOK:  Primary Emergency Contact: Mathew Wilson, Home Phone: [phone]            Yariel Mitchell MD

## 2023-12-11 NOTE — PROGRESS NOTES
"Macarena Wilson is a 68 y.o. female on day 32 of admission presenting with Acute hypoxemic respiratory failure (CMS/HCC).    Subjective   No acute overnight events; pt indicated that subjectively she feels better. Able to sit at the sit of the bed with the assistance of PT/OT on 12/10.      Objective   Physical Exam:  Constitutional: pt in NAD, alert and cooperative  Eyes: PERRL, EOMI, no icterus   ENMT: mucous membranes moist, no apparent injury, no lesions seen  Head/Neck: Neck supple, no apparent injury  Respiratory/Thorax: Bilat crackles, non-labored breathing, no cough, on 5L NC  Cardiovascular: Regular, rate and rhythm, no murmurs, normal S1 and S2  Gastrointestinal: Nondistended, soft, non-tender, BS present x 4  Musculoskeletal: ROM intact, no joint swelling, normal strength  Extremities: normal extremities, no edema, contusions or wounds  Neurological: alert and oriented x 3, speech clear, follows commands appropriately, cr. n. II-XII intact, sensation grossly intact, motor 5/5 throughout  Skin: Warm and dry, no lesions, no rashes    Vital Signs  Blood pressure 134/84, pulse 80, temperature 35.9 °C (96.6 °F), temperature source Temporal, resp. rate 20, height 1.6 m (5' 2.99\"), weight 67.7 kg (149 lb 4 oz), SpO2 94 %.  Oxygen Therapy  SpO2: 94 %  Medical Gas Therapy: Supplemental oxygen  O2 Delivery Method: High flow nasal cannula       Intake/Output last 3 Shifts:  I/O last 3 completed shifts:  In: 200 (2.9 mL/kg) [P.O.:200]  Out: 875 (12.8 mL/kg) [Urine:875 (0.4 mL/kg/hr)]  Dosing Weight: 68.4 kg     Lines and Tubes:  Peripheral IV 11/08/23 20 G Left;Anterior Hand (Active)   Placement Date/Time: 11/08/23 0000   Size (Gauge): 20 G  Orientation: Left;Anterior  Location: Hand   Number of days: 33       Peripheral IV 11/29/23 20 G 2.25 cm Left Forearm (Active)   Placement Date/Time: 11/29/23 1315   Hand Hygiene Completed: Yes  Size (Gauge): 20 G  Catheter Length (cm): 2.25 cm  Orientation: Left  Location: " Forearm  Site Prep: Chlorhexidine   Technique: Ultrasound guidance  Placed by: Brandon CRAFT  Patient Toleranc...   Number of days: 11       External Urinary Catheter (Active)   Placement Date/Time: 11/09/23 0400     Number of days: 32         Relevant Results           Scheduled medications  apixaban, 5 mg, oral, BID  ergocalciferol, 1,250 mcg, oral, Weekly  fluticasone, 1 spray, Each Nostril, BID  insulin glargine, 15 Units, subcutaneous, Nightly  insulin lispro, 0-15 Units, subcutaneous, TID with meals  lidocaine, 1 patch, transdermal, Daily  melatonin, 5 mg, oral, Daily  metoprolol tartrate, 50 mg, oral, BID  pantoprazole, 40 mg, oral, Daily before breakfast  predniSONE, 60 mg, oral, BID  QUEtiapine, 12.5 mg, oral, Daily with breakfast  QUEtiapine, 25 mg, oral, Nightly  sulfamethoxazole-trimethoprim, 80 mg, oral, Daily  thiamine, 100 mg, oral, Daily  traZODone, 25 mg, oral, Nightly      Continuous medications  oxygen, , Last Rate: 5 L/min (12/10/23 2040)      PRN medications  PRN medications: acetaminophen **OR** [DISCONTINUED] acetaminophen **OR** [DISCONTINUED] acetaminophen, dextrose 10 % in water (D10W), dextrose, docusate sodium, ipratropium-albuteroL, loperamide, ondansetron ODT **OR** [DISCONTINUED] ondansetron, oxyCODONE, QUEtiapine  Results for orders placed or performed during the hospital encounter of 11/09/23 (from the past 24 hour(s))   POCT GLUCOSE   Result Value Ref Range    POCT Glucose 239 (H) 74 - 99 mg/dL   POCT GLUCOSE   Result Value Ref Range    POCT Glucose 135 (H) 74 - 99 mg/dL   CBC and Auto Differential   Result Value Ref Range    WBC 11.3 4.4 - 11.3 x10*3/uL    nRBC 0.4 (H) 0.0 - 0.0 /100 WBCs    RBC 2.98 (L) 4.00 - 5.20 x10*6/uL    Hemoglobin 8.6 (L) 12.0 - 16.0 g/dL    Hematocrit 25.9 (L) 36.0 - 46.0 %    MCV 87 80 - 100 fL    MCH 28.9 26.0 - 34.0 pg    MCHC 33.2 32.0 - 36.0 g/dL    RDW 16.5 (H) 11.5 - 14.5 %    Platelets 123 (L) 150 - 450 x10*3/uL    Immature Granulocytes %, Automated 9.4  (H) 0.0 - 0.9 %    Immature Granulocytes Absolute, Automated 1.06 (H) 0.00 - 0.70 x10*3/uL   Renal function panel   Result Value Ref Range    Glucose 96 74 - 99 mg/dL    Sodium 143 136 - 145 mmol/L    Potassium 5.5 (H) 3.5 - 5.3 mmol/L    Chloride 112 (H) 98 - 107 mmol/L    Bicarbonate 23 21 - 32 mmol/L    Anion Gap 14 10 - 20 mmol/L    Urea Nitrogen 63 (H) 6 - 23 mg/dL    Creatinine 0.90 0.50 - 1.05 mg/dL    eGFR 69 >60 mL/min/1.73m*2    Calcium 8.2 (L) 8.6 - 10.6 mg/dL    Phosphorus 4.8 2.5 - 4.9 mg/dL    Albumin 2.5 (L) 3.4 - 5.0 g/dL   Magnesium   Result Value Ref Range    Magnesium 2.16 1.60 - 2.40 mg/dL   Manual Differential   Result Value Ref Range    Neutrophils %, Manual 79.7 40.0 - 80.0 %    Bands %, Manual 7.6 0.0 - 5.0 %    Lymphocytes %, Manual 2.6 13.0 - 44.0 %    Monocytes %, Manual 4.2 2.0 - 10.0 %    Eosinophils %, Manual 0.0 0.0 - 6.0 %    Basophils %, Manual 0.0 0.0 - 2.0 %    Metamyelocytes %, Manual 1.7 0.0 - 0.0 %    Myelocytes %, Manual 4.2 0.0 - 0.0 %    Seg Neutrophils Absolute, Manual 9.01 (H) 1.20 - 7.00 x10*3/uL    Bands Absolute, Manual 0.86 (H) 0.00 - 0.70 x10*3/uL    Lymphocytes Absolute, Manual 0.29 (L) 1.20 - 4.80 x10*3/uL    Monocytes Absolute, Manual 0.47 0.10 - 1.00 x10*3/uL    Eosinophils Absolute, Manual 0.00 0.00 - 0.70 x10*3/uL    Basophils Absolute, Manual 0.00 0.00 - 0.10 x10*3/uL    Metamyelocytes Absolute, Manual 0.19 0.00 - 0.00 x10*3/uL    Myelocytes Absolute, Manual 0.47 0.00 - 0.00 x10*3/uL    Total Cells Counted 118     Neutrophils Absolute, Manual 9.87 (H) 1.20 - 7.70 x10*3/uL    RBC Morphology See Below     Polychromasia Mild     RBC Fragments Few     Ovalocytes Few    POCT GLUCOSE   Result Value Ref Range    POCT Glucose 100 (H) 74 - 99 mg/dL   POCT GLUCOSE   Result Value Ref Range    POCT Glucose 87 74 - 99 mg/dL     XR chest 2 views    Result Date: 12/6/2023  Interpreted By:  Salvador Robles, STUDY: XR CHEST 2 VIEWS;  12/6/2023 10:11 am   INDICATION:  Signs/Symptoms:Eval of Puml Fibrosis.   COMPARISON: 11/24/2023.   ACCESSION NUMBER(S): [id number]   ORDERING CLINICIAN: JORDON PARRISH   FINDINGS:     CARDIOMEDIASTINAL SILHOUETTE: Cardiomediastinal silhouette is normal in size and configuration.   LUNGS: Digital tomosynthesis of the lung parenchyma was performed. There is diffuse interstitial airspace opacities with prominent traction bronchiectatic changes. There is no evidence of pneumothorax.   ABDOMEN: No remarkable upper abdominal findings.   BONES: No acute osseous changes.       1.  There are diffuse interstitial airspace opacities with traction bronchiectatic changes. This may represent residua of prior COVID-19 pneumonia/ARDS.     Signed by: Salvador Robles 12/6/2023 10:21 AM Dictation workstation:   [id number]    ECG 12 lead    Result Date: 12/4/2023  Normal sinus rhythm Normal ECG When compared with ECG of 01-DEC-2023 10:54, (unconfirmed) QRS axis Shifted left Criteria for Inferior infarct are no longer Present Non-specific change in ST segment in Lateral leads    Electrocardiogram, 12-lead PRN ACS symptoms    Result Date: 11/29/2023  Normal sinus rhythm Nonspecific T wave abnormality Abnormal ECG When compared with ECG of 24-NOV-2023 12:55, (unconfirmed) No significant change was found Confirmed by Maulik Rowland ([id number]) on 11/29/2023 7:42:31 PM    Electrocardiogram, 12-lead PRN ACS symptoms    Result Date: 11/29/2023  Normal sinus rhythm T wave abnormality, consider lateral ischemia Abnormal ECG When compared with ECG of 17-NOV-2023 22:14, Sinus rhythm has replaced Atrial fibrillation Vent. rate has decreased BY  91 BPM ST no longer depressed in Inferior leads ST no longer depressed in Lateral leads T wave inversion no longer evident in Inferior leads T wave inversion less evident in Lateral leads Confirmed by Maulik Rowland ([id number]) on 11/29/2023 7:42:16 PM    CT angio chest for pulmonary embolism    Result Date: 11/26/2023  Interpreted By:   Lonnie Zepeda [facility], STUDY: CT ANGIO CHEST FOR PULMONARY EMBOLISM;  11/26/2023 1:34 pm   INDICATION: Signs/Symptoms:c/f PE.   COMPARISON: CT dated 11/07/2023   ACCESSION NUMBER(S): [id number]   ORDERING CLINICIAN: GAGANDEEP DE LA CRUZ   TECHNIQUE: Helical data acquisition of the chest was obtained after intravenous administration of 75 cc of Omnipaque 350, as per PE protocol. Images were reformatted in coronal and sagittal planes. Axial and coronal maximum intensity projection (MIP) images were created and reviewed.   FINDINGS: POTENTIAL LIMITATIONS OF THE STUDY: Study is slightly limited due to motion artifact.   HEART AND VESSELS: There are no discrete filling defects within main pulmonary artery and its branches to suggest acute pulmonary embolism. Main pulmonary artery and its branches are normal in caliber.   The thoracic aorta normal in course and caliber. Mild/minimal coronary artery calcifications are seen. Please note,the study is not optimized for evaluation of coronary arteries.   The cardiac chambers are not enlarged.   There is no pericardial effusion seen.   MEDIASTINUM AND WALTER, LOWER NECK AND AXILLA: The visualized thyroid gland is within normal limits. No evidence of thoracic lymphadenopathy by CT criteria. Esophagus appears within normal limits as seen.   LUNGS AND AIRWAYS: The trachea and central airways are patent. No endobronchial lesion is seen.   There is redemonstration of consolidative opacities in bilateral lungs more in the lower lobes and slightly improved compared to prior study.   Areas of architectural distortion, and mild bronchiectasis scattered throughout the lungs with coarsening of interstitial markings.     UPPER ABDOMEN: An enteric tube in place terminating in the proximal duodenum. The visualized subdiaphragmatic structures demonstrate no remarkable findings.       CHEST WALL AND OSSEOUS STRUCTURES: Chest wall is within normal limits. No acute osseous pathology.There are no  suspicious osseous lesions.       1. No evidence of pulmonary embolism. 2. Multifocal consolidative opacities more in the lower lobes, slightly improved compared to prior study. Findings remain concerning for multifocal infectious process. 3. Interlobular septal thickening, architectural distortion and mild bronchiectasis scattered throughout the lungs, slightly worse and concerning for fibrotic like changes, likely sequela of infection. Cannot exclude component of interstitial pulmonary edema. 4.  Other findings as described above.     MACRO: None   Signed by: Geovanny Zepeda 11/26/2023 2:13 PM Dictation workstation:   [id number]    XR chest 1 view    Result Date: 11/24/2023  Interpreted By:  Salvador Robles, STUDY: XR CHEST 1 VIEW;  11/24/2023 7:42 am   INDICATION: Signs/Symptoms:Covid PNA.   COMPARISON: 02/22/2023.   ACCESSION NUMBER(S): [id number]   ORDERING CLINICIAN: GAGANDEEP DE LA CRUZ   FINDINGS:     CARDIOMEDIASTINAL SILHOUETTE: Cardiomediastinal silhouette is normal in size and configuration.   LUNGS: Continued coarse perihilar airspace opacities with subpleural cystic change. No pneumothorax.   ABDOMEN: Dobbhoff tube overlies the duodenal bulb.   BONES: No acute osseous changes.       1.  Continued coarse perihilar airspace opacities consistent with history of COVID-19 pneumonia. Basilar lucencies and correlate with a component of aerated lung versus developing fibrotic changes.     Signed by: Salvador Robles 11/24/2023 8:38 AM Dictation workstation:   [id number]    XR chest 1 view    Result Date: 11/22/2023  Interpreted By:  Salvador Robles, STUDY: XR CHEST 1 VIEW;  11/22/2023 7:48 am   INDICATION: Signs/Symptoms:hypoxia.   COMPARISON: 11/17/2023   ACCESSION NUMBER(S): [id number]   ORDERING CLINICIAN: HUGH BEASLEY   FINDINGS:     CARDIOMEDIASTINAL SILHOUETTE: Cardiomediastinal silhouette is normal in size and configuration.   LUNGS: Continued coarse perihilar airspace opacities/edema.  Basilar lucencies and correlate with underlying emphysematous or fibrotic changes.   ABDOMEN: Dobbhoff tube overlies the body of stomach.   BONES: No acute osseous changes.       1.  Continued coarse perihilar basilar opacities and correlate with residual edema/pneumonia/atypical infection. No pneumothorax.     Signed by: Salvador Robles 11/22/2023 10:08 AM Dictation workstation:   [id number]    XR chest 1 view    Result Date: 11/18/2023  Interpreted By:  Salvador Robles and Hanreck James STUDY: XR CHEST 1 VIEW;  11/17/2023 7:55 pm   INDICATION: Signs/Symptoms:desat, side pain.   COMPARISON: 11/16/2023 chest radiograph   ACCESSION NUMBER(S): [id number]   ORDERING CLINICIAN: ANDERSON GLORIA   FINDINGS: AP radiograph of the chest was provided.   Enteric tube courses below diaphragm with its tip beyond the field of view.   CARDIOMEDIASTINAL SILHOUETTE: Cardiomediastinal silhouette is normal in size and configuration.   LUNGS: Increased bilateral patchy lung opacities. No pleural effusion or pneumothorax.   ABDOMEN: No remarkable upper abdominal findings.   BONES: No acute osseous changes.       1. Increased bilateral patchy lung opacities concerning for multifocal pneumonia. Correlate with underlying emphysematous/fibrotic changes.   I personally reviewed the images/study and I agree with the resident Bill Calvin's findings as stated. This study was interpreted at [place], [place].   MACRO: None   Signed by: Salvador Robles 11/18/2023 10:01 AM Dictation workstation:   [id number]    ECG 12 lead    Result Date: 11/17/2023  Poor data quality, interpretation may be adversely affected Sinus tachycardia with occasional Premature ventricular complexes and Fusion complexes Nonspecific ST and T wave abnormality Abnormal ECG When compared with ECG of 15-NOV-2023 21:40, Fusion complexes are now Present Premature ventricular complexes are now Present Confirmed by  Hill Jaimes ([id number]) on 11/17/2023 5:45:56 AM    US renal complete    Result Date: 11/16/2023  Interpreted By:  Tomas Brice and Liller Gregory STUDY: US RENAL COMPLETE;  11/16/2023 2:15 pm   INDICATION: Signs/Symptoms:evaluate renal infarct.   COMPARISON: CT abdomen pelvis 03/01/2023   ACCESSION NUMBER(S): [id number]   ORDERING CLINICIAN: BILAL MAIDA   TECHNIQUE: Multiple images of the kidneys were obtained.   FINDINGS: RIGHT KIDNEY: Right kidney is atrophic measuring up to 5.8 cm. There is increased renal cortical echogenicity and the renal cortex is thinned. A cyst is noted within the interpolar region measuring up to 1.3 x 1.3 x 1 cm. Nonobstructing nephrolithiasis is visualized measuring up to 1.5 cm. No hydronephrosis.   LEFT KIDNEY: The left kidney measures 11 cm in length. Renal cortical echogenicity is increased. However, renal cortical thickness is normal. A cyst is noted within the interpolar region measuring up to 2.1 x 1.8 x 1.9 cm. There is no hydronephrosis.   BLADDER: Bladder is decompressed, limited for evaluation.       1. Atrophic right kidney. 2. Bilateral nonobstructing nephrolithiasis measuring up to 1.5 cm on the right and 0.9 cm on the left. No hydronephrosis. 3. Bilateral renal cysts.   I personally reviewed the images/study and I agree with the findings as stated above by resident physician, Dr. Alejandro Guillory. The study was interpreted at [facility] in [facility].   MACRO: None   Signed by: Tomas Brice 11/16/2023 9:43 PM Dictation workstation:   [id number]    XR abdomen 1 view    Result Date: 11/16/2023  Interpreted By:  Garrett Mittal, STUDY: XR ABDOMEN 1 VIEW;  11/16/2023 1:29 pm   INDICATION: Signs/Symptoms:confirm dobhoff.   COMPARISON: None.   ACCESSION NUMBER(S): [id number]   ORDERING CLINICIAN: MOY ADEN   FINDINGS: There is an enterogastric tube with tip at the expected position of the pylorus. Semi-erect KUB showing the lower  lung fields in mid pelvic level show scattered small and large bowel gas without evidence of distention. Nonobstructive bowel gas pattern. Limited evaluation of pneumoperitoneum on supine imaging, however no gross evidence of free air is noted.   Lungs show bilateral patchy airspace opacities.   Osseous structures demonstrate no acute bony changes.       1.  Nonspecific KUB. Enterogastric tube in the expected position of the stomach with tip at the pylorus.   MACRO: None   Signed by: Garrett Mittal 11/16/2023 1:38 PM Dictation workstation:   [id number]    XR chest 1 view    Result Date: 11/16/2023  Interpreted By:  Geovanny Roberts, STUDY: XR CHEST 1 VIEW;  11/16/2023 7:54 am   INDICATION: Signs/Symptoms:Assess for pneumomediastinum, pneumopericardium.   COMPARISON: Radiograph dated 11/15/2023 and CT dated 11/07/2023   ACCESSION NUMBER(S): [id number]   ORDERING CLINICIAN: ROBERTO LUO   FINDINGS: Cardiac silhouette size is stable. No definite evidence of pneumomediastinum.   Unchanged appearance of the lungs with persistent bilateral patchy airspace opacity. No sizable pleural effusion or pneumothorax.   No acute osseous abnormality.       1. No appreciable pneumothorax or pneumomediastinum. 2. Unchanged appearance of the lungs with extensive bilateral patchy airspace opacity.       Signed by: Geovanny Zepeda 11/16/2023 12:49 PM Dictation workstation:   [id number]    XR chest 1 view    Result Date: 11/16/2023  Interpreted By:  Geovanny Roberts  and Nikunj Khan STUDY: XR CHEST 1 VIEW;  11/15/2023 10:22 pm   INDICATION: Signs/Symptoms:Pneumomediastinum.   COMPARISON: Same day chest radiograph   ACCESSION NUMBER(S): [id number]   ORDERING CLINICIAN: VICKIE CUNHA   FINDINGS: AP radiograph of the chest was provided.     CARDIOMEDIASTINAL SILHOUETTE: Cardiomediastinal silhouette is normal in size and configuration. No appreciable pneumomediastinum.   LUNGS: Similar extensive bilateral  patchy airspace opacities. No sizable pleural effusion or pneumothorax.   ABDOMEN: No remarkable upper abdominal findings.   BONES: No acute osseous changes.       1. Similar extensive bilateral patchy airspace opacities compatible with multifocal infection. 2. No appreciable pneumothorax or pneumomediastinum in the current study.   I personally reviewed the images/study and I agree with the resident Bill Calvin's findings as stated. This study was interpreted at [facility], [place], [place].   MACRO: None   Signed by: Geovanny Zepeda 11/16/2023 7:25 AM Dictation workstation:   [id number]    XR chest 1 view    Result Date: 11/16/2023  Interpreted By:  Geovanny Roberts, STUDY: XR CHEST 1 VIEW;  11/15/2023 4:55 pm   INDICATION: Signs/Symptoms:sob.   COMPARISON: CT dated 11/07/2023   ACCESSION NUMBER(S): [id number]   ORDERING CLINICIAN: BILAL ALI   FINDINGS: Cardiac silhouette size is within normal limits. Questionable lucency along the left heart border/left mediastinum.   Redemonstration of patchy airspace opacity in bilateral lungs. No sizable pleural effusion or pneumothorax.   No acute osseous abnormality.       1. Questionable lucency along the left heart border/mediastinum which is likely artifactual, however pneumomediastinum is not excluded. Recommend attention on follow-up study. 2. Redemonstration of multifocal patchy airspace opacity.       Signed by: Geovanny Zepeda 11/16/2023 7:25 AM Dictation workstation:   [id number]    XR chest 1 view    Result Date: 11/16/2023  Interpreted By:  Geovanny Roberts,  and Samreen Orellana STUDY: XR CHEST 1 VIEW;  11/15/2023 4:48 pm   INDICATION: Signs/Symptoms:Hypoxia.   COMPARISON: Radiograph 11/15/2023 4:47 p.m.   ACCESSION NUMBER(S): [id number]   ORDERING CLINICIAN: BILAL ALI   FINDINGS: Lateral decubitus radiograph with left side up.   CARDIOMEDIASTINAL SILHOUETTE: Cardiomediastinal silhouette is  normal in size and configuration.   LUNGS: Patchy airspace opacity in the visualized portion of the lungs. Previously noted suspicious pneumomediastinum is not well appreciated on lateral radiograph. No appreciable pneumothorax.   ABDOMEN: No remarkable upper abdominal findings.   BONES: No acute osseous changes.       1.  No appreciable pneumothorax.   I personally reviewed the images/study and I agree with the findings as stated by Esteban Jolley MD. This study was interpreted at [facility], [place], [place].   MACRO: None.   Signed by: Geovanny Zepeda 11/16/2023 7:23 AM Dictation workstation:   [id number]    XR chest 1 view    Result Date: 11/15/2023  Interpreted By:  Garrett Mittal, STUDY: XR CHEST 1 VIEW;  11/15/2023 4:46 pm   INDICATION: Signs/Symptoms:sob.   COMPARISON: 11/14/2023   ACCESSION NUMBER(S): [id number]   ORDERING CLINICIAN: BILAL ALI   FINDINGS: Semi-erect portable chest.       CARDIOMEDIASTINAL SILHOUETTE: Cardiomediastinal silhouette is normal in size and configuration.   LUNGS: Lungs show interstitial nodular prominence in the right and left chest is similar to previous exam. There is however a new right lateral lucency at the cardiac margin and questionable lucency centrally in the mediastinum suggesting possible pneumomediastinum.   ABDOMEN: No remarkable upper abdominal findings.   BONES: No acute osseous changes.       1.  New finding possible pneumomediastinum       MACRO: Critical Finding:  See findings. Notification was initiated on 11/15/2023 at 5:09 pm by  Garrett Mittal.  (**-OCF-**) Instructions:   Signed by: Garrett Mittal 11/15/2023 5:10 PM Dictation workstation:   [id number]    ECG 12 lead    Result Date: 11/15/2023  Normal sinus rhythm Normal ECG When compared with ECG of 12-OCT-2022 08:31, No significant change was found Confirmed by Riccardo Bagley ([id number]) on 11/15/2023 11:19:18 AM    XR chest 1 view    Result Date:  11/14/2023  Interpreted By:  Garrett Mittal, STUDY: XR CHEST 1 VIEW;  11/14/2023 1:22 pm   INDICATION: Signs/Symptoms:shortness of breath.   COMPARISON: 11/13/2023   ACCESSION NUMBER(S): [id number]   ORDERING CLINICIAN: RACHELLE ACUNA   FINDINGS: Upright portable chest.       CARDIOMEDIASTINAL SILHOUETTE: Cardiomediastinal silhouette is normal in size and configuration.   LUNGS: Lung fields are moderately expanded there is patchy opacification and interstitial prominence in the right and left chest with similar findings to previous exam. There is no convincing evidence of pleural effusion. There is no sign of pneumothorax. Findings suggest acute interstitial edema on chronic lung disease.   ABDOMEN: No remarkable upper abdominal findings.   BONES: No acute osseous changes.       1.  Stable interstitial edema and probable chronic lung disease.       MACRO: None   Signed by: Garrett Mittal 11/14/2023 1:27 PM Dictation workstation:   [id number]    Lower extremity venous duplex bilateral    Result Date: 11/13/2023            [place] [id number]   Tel [phone] and Fax [phone]  Vascular Lab Report [facility] LOWER EXTREMITY VENOUS DUPLEX BILATERAL  Patient Name:     KARLA Cruz Physician: [id number] Jonas Woods MD Study Date:       11/13/2023         Ordering           [id number] BETTINA FERNANDES                                      Physician: MRN/PID:          [id number]           Technologist:      Krunal Ruggiero RVT Accession#:       [id number]       Technologist 2: Date of           1955 / 68      Encounter#:        [id number] Birth/Age:        years Gender:           F Admission Status: Inpatient          Location           [facility]                                      Performed:  Diagnosis/ICD: Localized (leg) edema-R60.0 CPT Codes:     47013 Peripheral venous duplex scan for DVT  complete  **CRITICAL RESULT** Critical Result: Acute DVT in bilateral calves. Notification called to Madiha Hu MD on 11/13/2023 at 4:52:56 PM by Krunal Ruggiero JOSE.  CONCLUSIONS: Right Lower Venous: There is acute occlusive deep vein thrombosis visualized in the posterior tibial vein. The remainder of the right leg is negative for deep vein thrombosis. Left Lower Venous: There is acute occlusive deep vein thrombosis visualized in the soleal vein. The remainder of the left leg is negative for deep vein thrombosis.  Imaging & Doppler Findings:  Right       Compressible    Thrombus            Flow CFV             Yes           None       Spontaneous/Phasic PFV             Yes           None FV Proximal     Yes           None       Spontaneous/Phasic FV Mid          Yes           None FV Distal       Yes           None Popliteal       Yes           None       Spontaneous/Phasic Peroneal        Yes           None PTV              No      Acute occlusive  Left                  Compress    Thrombus            Flow Distal External Iliac               None       Spontaneous/Phasic CFV                     Yes         None       Spontaneous/Phasic PFV                     Yes         None FV Proximal             Yes         None       Spontaneous/Phasic FV Mid                  Yes         None FV Distal               Yes         None Popliteal               Yes         None       Spontaneous/Phasic Peroneal                Yes         None PTV                     Yes         None Soleal                   No    Acute occlusive  [id number] Jonas Woods MD Electronically signed by [id number] Jonas Woods MD on 11/13/2023 at 6:58:49 PM  ** Final **     XR chest 1 view    Result Date: 11/13/2023  Interpreted By:  OByrne, Garrett,  and Milo Rachel STUDY: XR CHEST 1 VIEW;  11/13/2023 8:43 am   INDICATION: Signs/Symptoms:Increased oxygen requirement; c/f aspiration.   COMPARISON: Chest radiograph 11/12/2023   ACCESSION NUMBER(S):  [id number]   ORDERING CLINICIAN: QUINTON GANDARA   FINDINGS: Single AP semi-upright portable radiograph of the chest was provided.   CARDIOMEDIASTINAL SILHOUETTE: The cardiomediastinal silhouette is stable in size and configuration.   LUNGS: Similar appearance of bibasilar predominant interstitial opacities. No sizable pneumothorax or pleural effusion. No focal parenchymal consolidation.   ABDOMEN: No remarkable upper abdominal findings.   BONES: No acute osseous abnormalities.       1. Unchanged appearance of interstitial opacities most prominent in the bilateral lower lobes, likely related to chronic interstitial lung disease.   I personally reviewed the image(s)/study and resident interpretation as stated by Dr. Rachel Pedraza MD. I agree with the findings as stated. This study was interpreted at [facility], [place], [place].   MACRO: None   Signed by: Garrett Mittal 11/13/2023 9:55 AM Dictation workstation:   [id number]    XR chest 1 view    Result Date: 11/12/2023  Interpreted By:  Enmanuel Myers and Dervishi Mario STUDY: XR CHEST 1 VIEW;  11/12/2023 12:24 pm   INDICATION: Signs/Symptoms:covid.   COMPARISON: Chest radiograph 11/09/2023.   ACCESSION NUMBER(S): [id number]   ORDERING CLINICIAN: RACHELLE ACUNA   FINDINGS: AP radiograph of the chest was provided.     CARDIOMEDIASTINAL SILHOUETTE: Cardiomediastinal silhouette is stable in size and configuration.   LUNGS: There is re-demonstration of bilateral airspace and interstitial opacities predominantly in the mid to lower lungs which appears slightly improved from prior exam. There is no evidence of pneumothorax or large pleural effusion.   ABDOMEN: No remarkable upper abdominal findings.   BONES: No acute osseous changes.       1.  Mild interval improvement in mid to lower lung predominant airspace and interstitial opacities.     I personally reviewed the images/study and I agree with the findings as stated by  Resident Kevin Pena MD. This study was interpreted at [facility], [place], [place].   MACRO: NONE.   Signed by: Enmanuel Myers 11/12/2023 3:09 PM Dictation workstation:   [id number]                  Malnutrition Diagnosis Status: New  Malnutrition Diagnosis: Severe malnutrition related to acute disease or injury  As Evidenced by: 12% weight loss from normal along with moderate to severe muscle and fat loss on physical exam in setting of a decrease in PO intake/appetite after being diagnosed with COVID  I agree with the dietitian's malnutrition diagnosis.    XR chest 2 views 12/06/2023    Narrative  Interpreted By:  Salvador Robles,  STUDY:  XR CHEST 2 VIEWS;  12/6/2023 10:11 am    INDICATION:  Signs/Symptoms:Eval of Puml Fibrosis.    COMPARISON:  11/24/2023.    ACCESSION NUMBER(S):  [id number]    ORDERING CLINICIAN:  JORDON PARRISH    FINDINGS:      CARDIOMEDIASTINAL SILHOUETTE:  Cardiomediastinal silhouette is normal in size and configuration.    LUNGS:  Digital tomosynthesis of the lung parenchyma was performed.  There is diffuse interstitial airspace opacities with prominent  traction bronchiectatic changes. There is no evidence of pneumothorax.    ABDOMEN:  No remarkable upper abdominal findings.    BONES:  No acute osseous changes.    Impression  1.  There are diffuse interstitial airspace opacities with traction  bronchiectatic changes. This may represent residua of prior COVID-19  pneumonia/ARDS.      Signed by: Salvador Robles 12/6/2023 10:21 AM  Dictation workstation:   [id number]      Assessment/Plan   Principal Problem:    Acute hypoxemic respiratory failure (CMS/HCC)  Active Problems:    Stage 4 chronic kidney disease (CMS/HCC)    COVID-19    Pneumonia of both lower lobes due to infectious organism    Pseudomonal bacteremia    A-fib (CMS/[facility])    Macarena Wilson 50-year-old female with previous medical history hypertension, CKD stage III who was admitted  to the MICU from outside hospital with AHRF 2/2 COVID-pneumonia (tested positive on 10/13), course C/B by ARDS and pulmonary fibrosis resulting in organizing PNA.  Course also complicated by distal DVT, oliguric HUNG on CKD.  Patient weaned to 6 to 8 L nasal cannula owever requires increased FiO2 with movement and exertion.     Update 12/11/2020:    Plan for transfer to the medical floor      Neuro/psych:  #Anxiety  -Continue with trazodone 25 mg at bedtime and melatonin  -Continue with Seroquel 25 mg at bedtime and as needed daily 12.5 mg  -Pain regimen: PRN oxy for pain and air hunger.  Lidocaine patch for back pain  -Continue with PT/OT: OOB as tolerated     CV:  #Intermittent SVT  #A-fib  #Hypertension  -Cont with metoprolol titrate 50 mg twice daily: Holding parameters--> SBP less than 100, HR less than 60  -Continue to hold home losartan 100 mg i/s/o's HUNG on CKD.  sCr has returned to baseline however BP stable and there is no indication at this time fto start d/t risk of hypotension     Pulm:  #AHRF 2/2 COVID PNA (11/23) c/b ARDS  #Pulm fibrosis  #Organizing PNA 2/2 Covid 19  #Pneumomediastinum (resolved)   -CTA PE obtained on 11/26 shows no evidence of PE, findings consistent with multifocal PNA (improving from last study), bronchiectasis scattered throughout the lungs (slightly worst) and fibrotic changes.    -CXR 2V w/Angel 12/6 shows diffused interstitial airspace opacities with traction bronchietatic changes   - Prednisone 50 mg q6 for one week, then taper on 11/26 to 80 mg Q12H and will decrease to 60 mg BID starting 12/4, then 40 mg BID x 7 days, then 60 mg daily x 2-4 weeks, 40 mg daily 2- 4 weeks   -Cont with Bactrim DS for PCP   -Fluticasone 1 spry BID   -Seen by lung transplant on 11/30--> Steroid treatment must be tapers down to <=10mg of Prednisone per day and PT must be able to ambulate 100ft (on any amount of O2).  Will re-engage transplant once Pt meets those criteria.     #Pneumomediastinum  (resolved)  -Avoid any further attempts to CPAP      Renal:  #Oliguric HUNG on CKD stage III (baseline serum creatinine 1.6)  #Azotemia  #Vitamin D deficiency  -Bilateral renal ultrasound on 11/16 shows nonobstructing stones bilaterally, no hydronephrosis, and bilateral cysts  - serum creatinine peaked at 3.97; 0.9 today  -Hold off on diuresing today; goal is net even  -Patient has a previous medical history of bladder fistula (followed by uro/gyn); will hold off on Max placement.  Can consider consulting with urology     FEN/GI:  #Dysphagia s/p Cortrak (removed 12/4)  -SLP complete FFES on 11/28--> OK to resume PO diet  -Ensure with breakfast and lunch   -PPI   -RFP/Mg daily Qtc 386,  t wave3 inversion in anterior leads     #Diarrhea (resolved)   -C. Diff PCR 11/25 negative   -Loperamide 2mg 4 times per day  -Bowel Regimen; PRN Colace; holding miralx and senna i/s/o diarrhea     #Hyperkalemia   -Start lokelma 10 mg TID; stop once K<5.5  -RFP BID  -Tele  -ECG daily     Endo:  #Steroid-induced hyperglycemia  -Most recent A1c 6.3  -POC Accu-Chek this a.m. 93  -Cont with level 3 moderate intensity SSI lispro ACHS's; and Lantus nighty 15U   -Monitor for hypoglycemia as steroid taper is decreased; titrate as needed     Heme:  #DVT   #Presumed PE  -11/27  CTA PE -  -11/13 B/L Duplex scan:  +Rt PT DVT, and + LT Soleal DVT  -Cont with Eliquis 5mg BID      MSK/Derm:  #Eczema/Rosasea  -Cont with dupixent      #Sacral Pressure Ulcer (Stage 3)   -Consult for wound care on 11/17  -Images in chart   - Clean buttocks with cleansing cloths and apply Triad to open skin BID and as needed. Leave RIZWANA      ID:  #Covid PNA (received Decadron, Remdesivir, Tocilizumab at OSH) #Neutrophilic leukocytosis  #PSA bacteremia  -   Afebrile, Tmax 36.3 C  MICRO: 11/21 BC x2 3/4 PSA, stool studies, including 11/25 C. Diff neg, 11/9 Step pneumonaie Ag, Legionella Ag neg, 11/9 Flu A/B, RSV neg   - repeat blood cx x2 11/25 Neg  - 11/22: start renally  dosed Zosyn through 11/24, then switched to PO Cipro 500 mg BID for 14 days-- stop 12/5  -Cont Bactrim DS daily for PCP prophylactics      F:  PRN  E: K>4, MG >2  N:  Regular diet w/Ensure w/breakfast and lunch  DVT:  Eliquis 5mg BID  GI:  None  Drips:  None  ATBCs:  Bactrim (PJP)   Access: PIV x2   Code Status:  DNR/DNI  NOK:  Mathew Wilson [phone]     Dispo: Plan for transfer to SNF; LSW arranging.       Pt discussed with Dr. Ward seen and examined. All labs, VS and previous plan of care reviewed.     I spent 45 minutes in the professional and overall care of this patient.    Donavan Schneider, APRN-CNP

## 2023-12-12 NOTE — CARE PLAN
The patient's goals for the shift include        Problem: Nutrition  Goal: Oral intake greater 75%  Outcome: Progressing  Goal: Consume prescribed supplement  Outcome: Progressing  Goal: Adequate PO fluid intake  Outcome: Progressing  Goal: Nutrition support goals are met within 48 hrs  Outcome: Progressing  Goal: Nutrition support is meeting 75% of nutrient needs  Outcome: Progressing  Goal: BG  mg/dL  Outcome: Progressing  Goal: Lab values WNL  Outcome: Progressing  Goal: Electrolytes WNL  Outcome: Progressing  Goal: Promote healing  Outcome: Progressing     Problem: Skin  Goal: Decreased wound size/increased tissue granulation at next dressing change  Outcome: Progressing  Flowsheets (Taken 12/12/2023 1141)  Decreased wound size/increased tissue granulation at next dressing change:   Promote sleep for wound healing   Utilize specialty bed per algorithm   Protective dressings over bony prominences  Goal: Participates in plan/prevention/treatment measures  Outcome: Progressing  Flowsheets (Taken 12/12/2023 1141)  Participates in plan/prevention/treatment measures:   Elevate heels   Discuss with provider PT/OT consult  Goal: Prevent/manage excess moisture  Outcome: Progressing  Flowsheets (Taken 12/12/2023 1141)  Prevent/manage excess moisture:   Cleanse incontinence/protect with barrier cream   Moisturize dry skin   Follow provider orders for dressing changes   Monitor for/manage infection if present  Goal: Prevent/minimize sheer/friction injuries  Outcome: Progressing  Flowsheets (Taken 12/12/2023 1141)  Prevent/minimize sheer/friction injuries:   Complete micro-shifts as needed if patient unable. Adjust patient position to relieve pressure points, not a full turn   Use pull sheet  Goal: Promote skin healing  Outcome: Progressing  Flowsheets (Taken 12/12/2023 1141)  Promote skin healing:   Assess skin/pad under line(s)/device(s)   Protective dressings over bony prominences   Turn/reposition every 2 hours/use  positioning/transfer devices     Problem: Fall/Injury  Goal: Verbalize understanding of personal risk factors for fall in the hospital  Outcome: Progressing  Goal: Verbalize understanding of risk factor reduction measures to prevent injury from fall in the home  Outcome: Progressing  Goal: Use assistive devices by end of the shift  Outcome: Progressing     Problem: Discharge Planning  Goal: Discharge to home or other facility with appropriate resources  Outcome: Progressing     Problem: Chronic Conditions and Co-morbidities  Goal: Patient's chronic conditions and co-morbidity symptoms are monitored and maintained or improved  Outcome: Progressing     Problem: Respiratory  Goal: Clear secretions with interventions this shift  Outcome: Progressing  Goal: Minimize anxiety/maximize coping throughout shift  Outcome: Progressing  Goal: Minimal/no exertional discomfort or dyspnea this shift  Outcome: Progressing  Goal: No signs of respiratory distress (eg. Use of accessory muscles. Peds grunting)  Outcome: Progressing  Goal: Patent airway maintained this shift  Outcome: Progressing  Goal: Tolerate pulmonary toileting this shift  Outcome: Progressing  Goal: Verbalize decreased shortness of breath this shift  Outcome: Progressing  Goal: Wean oxygen to maintain O2 saturation per order/standard this shift  Outcome: Progressing  Goal: Increase self care and/or family involvement in next 24 hours  Outcome: Progressing

## 2023-12-12 NOTE — NURSING NOTE
2230: Pt departs from unit at this time. All belongings packed and sent on bed. No new skin alterations observed. Sating well on O2 with no distress observed.

## 2023-12-12 NOTE — PROGRESS NOTES
"Macarena Wilson is a 68 y.o. female on day 33 of admission presenting with Acute hypoxemic respiratory failure (CMS/HCC).    Subjective     Ms. Wilson had no acute overnight events.  Denies SOB at rest while on 6 L.  Patient endorses being able to sit up on edge of bed for first time yesterday but notes that she still is not able to walk.  States that at baseline she is physically active, holding a job while also living independently.          Objective     Physical Exam    Constitutional: Patient in no acute distress.  Alert and cooperative.  Eyes: PERRL, EOMI, no icterus.  ENMT: Mucous membranes moist.  Head/Neck: Neck supple with no apparent injury.  Respiratory/Thorax: Lungs diminished bilaterally, non-labored breathing, no cough.  On 6L NC.  Cardiovascular: Regular rate and rhythm.  No murmurs.  Normal S1 and S2.  Gastrointestinal: Nondistended, soft, non-tender.  : External catheter in place.  Musculoskeletal: ROM intact, generalized weakness.  Extremities: No edema.  Scattered areas of bruising.  Neurological: Alert and oriented to person, place and time.  Speech clear.  Follows commands appropriately and without focal deficits.  Skin: Warm and dry.    Last Recorded Vitals  Blood pressure 145/80, pulse 77, temperature 36 °C (96.8 °F), resp. rate 18, height 1.6 m (5' 2.99\"), weight 67.7 kg (149 lb 4 oz), SpO2 96 %.  Intake/Output last 3 Shifts:  I/O last 3 completed shifts:  In: 200 (2.9 mL/kg) [P.O.:200]  Out: 575 (8.4 mL/kg) [Urine:575 (0.2 mL/kg/hr)]  Dosing Weight: 68.4 kg     Relevant Results    Scheduled medications  apixaban, 5 mg, oral, BID  ergocalciferol, 1,250 mcg, oral, Weekly  fluticasone, 1 spray, Each Nostril, BID  insulin glargine, 15 Units, subcutaneous, Nightly  insulin lispro, 0-15 Units, subcutaneous, TID with meals  lidocaine, 1 patch, transdermal, Daily  melatonin, 5 mg, oral, Daily  metoprolol tartrate, 50 mg, oral, BID  pantoprazole, 40 mg, oral, Daily before breakfast  [START ON " 12/18/2023] predniSONE, 30 mg, oral, BID  predniSONE, 80 mg, oral, Daily  QUEtiapine, 25 mg, oral, Nightly  sennosides-docusate sodium, 1 tablet, oral, BID  sodium zirconium cyclosilicate, 10 g, oral, TID  sulfamethoxazole-trimethoprim, 80 mg, oral, Daily  thiamine, 100 mg, oral, Daily      Continuous medications  oxygen, , Last Rate: 5 L/min (12/10/23 2040)      PRN medications  PRN medications: acetaminophen **OR** [DISCONTINUED] acetaminophen **OR** [DISCONTINUED] acetaminophen, dextrose 10 % in water (D10W), dextrose, ipratropium-albuteroL, loperamide, ondansetron ODT **OR** [DISCONTINUED] ondansetron, oxyCODONE, QUEtiapine     Results for orders placed or performed during the hospital encounter of 11/09/23 (from the past 24 hour(s))   POCT GLUCOSE   Result Value Ref Range    POCT Glucose 153 (H) 74 - 99 mg/dL   POCT GLUCOSE   Result Value Ref Range    POCT Glucose 201 (H) 74 - 99 mg/dL   Renal Function Panel   Result Value Ref Range    Glucose 244 (H) 74 - 99 mg/dL    Sodium 142 136 - 145 mmol/L    Potassium 4.6 3.5 - 5.3 mmol/L    Chloride 108 (H) 98 - 107 mmol/L    Bicarbonate 22 21 - 32 mmol/L    Anion Gap 17 10 - 20 mmol/L    Urea Nitrogen 70 (H) 6 - 23 mg/dL    Creatinine 1.29 (H) 0.50 - 1.05 mg/dL    eGFR 45 (L) >60 mL/min/1.73m*2    Calcium 8.3 (L) 8.6 - 10.6 mg/dL    Phosphorus 4.4 2.5 - 4.9 mg/dL    Albumin 2.9 (L) 3.4 - 5.0 g/dL   POCT GLUCOSE   Result Value Ref Range    POCT Glucose 260 (H) 74 - 99 mg/dL   CBC and Auto Differential   Result Value Ref Range    WBC 12.4 (H) 4.4 - 11.3 x10*3/uL    nRBC 0.6 (H) 0.0 - 0.0 /100 WBCs    RBC 3.16 (L) 4.00 - 5.20 x10*6/uL    Hemoglobin 9.0 (L) 12.0 - 16.0 g/dL    Hematocrit 29.3 (L) 36.0 - 46.0 %    MCV 93 80 - 100 fL    MCH 28.5 26.0 - 34.0 pg    MCHC 30.7 (L) 32.0 - 36.0 g/dL    RDW 16.5 (H) 11.5 - 14.5 %    Platelets 120 (L) 150 - 450 x10*3/uL    Immature Granulocytes %, Automated 10.5 (H) 0.0 - 0.9 %    Immature Granulocytes Absolute, Automated 1.30 (H)  0.00 - 0.70 x10*3/uL   Renal function panel   Result Value Ref Range    Glucose 73 (L) 74 - 99 mg/dL    Sodium 143 136 - 145 mmol/L    Potassium 4.4 3.5 - 5.3 mmol/L    Chloride 111 (H) 98 - 107 mmol/L    Bicarbonate 21 21 - 32 mmol/L    Anion Gap 15 10 - 20 mmol/L    Urea Nitrogen 69 (H) 6 - 23 mg/dL    Creatinine 1.09 (H) 0.50 - 1.05 mg/dL    eGFR 55 (L) >60 mL/min/1.73m*2    Calcium 8.3 (L) 8.6 - 10.6 mg/dL    Phosphorus 4.5 2.5 - 4.9 mg/dL    Albumin 2.8 (L) 3.4 - 5.0 g/dL   Magnesium   Result Value Ref Range    Magnesium 2.28 1.60 - 2.40 mg/dL   Manual Differential   Result Value Ref Range    Neutrophils %, Manual 81.4 40.0 - 80.0 %    Bands %, Manual 9.3 0.0 - 5.0 %    Lymphocytes %, Manual 9.3 13.0 - 44.0 %    Monocytes %, Manual 0.0 2.0 - 10.0 %    Eosinophils %, Manual 0.0 0.0 - 6.0 %    Basophils %, Manual 0.0 0.0 - 2.0 %    Seg Neutrophils Absolute, Manual 10.09 (H) 1.20 - 7.00 x10*3/uL    Bands Absolute, Manual 1.15 (H) 0.00 - 0.70 x10*3/uL    Lymphocytes Absolute, Manual 1.15 (L) 1.20 - 4.80 x10*3/uL    Monocytes Absolute, Manual 0.00 (L) 0.10 - 1.00 x10*3/uL    Eosinophils Absolute, Manual 0.00 0.00 - 0.70 x10*3/uL    Basophils Absolute, Manual 0.00 0.00 - 0.10 x10*3/uL    Total Cells Counted 118     Neutrophils Absolute, Manual 11.24 (H) 1.20 - 7.70 x10*3/uL    RBC Morphology See Below     Polychromasia Mild     Ovalocytes Few     Bertrand Cells Few    POCT GLUCOSE   Result Value Ref Range    POCT Glucose 65 (L) 74 - 99 mg/dL   POCT GLUCOSE   Result Value Ref Range    POCT Glucose 72 (L) 74 - 99 mg/dL     Malnutrition Diagnosis Status: New  Malnutrition Diagnosis: Severe malnutrition related to acute disease or injury  As Evidenced by: 12% weight loss from normal along with moderate to severe muscle and fat loss on physical exam in setting of a decrease in PO intake/appetite after being diagnosed with COVID  I agree with the dietitian's malnutrition diagnosis    Assessment/Plan   Principal Problem:     Acute hypoxemic respiratory failure (CMS/[facility])  Active Problems:    Stage 4 chronic kidney disease (CMS/[facility])    COVID-19    Pneumonia of both lower lobes due to infectious organism    Pseudomonal bacteremia    A-fib (CMS/[facility])    MS Wilson is a 67 y/o female with hx HTN, CKD stage III who was admitted to the MICU from outside hospital with AHRF 2/2 COVID-pneumonia (tested positive on 10/13), course C/B by ARDS and pulmonary fibrosis resulting in organizing PNA.  Course also complicated by distal DVT, oliguric HUNG on CKD.  Patient weaned to 6 from 8 L nasal cannula but  requires increased FiO2 with movement and exertion. Transferred to [facility] on 12/11 for continued management.     Updates 12/12:  -Prednisone taper- Yesterday (12/11) patient was decreased prednisone to 80mg daily for 7 days.  On 12/18, switch to 60mg daily 2-4 weeks.  Then, can consider decreasing by 10-20mg every 1-2 weeks based on clinical appearance.    - C/w Bactrim DS for PCP ppx     #AHRF 2/2 COVID PNA (11/23) c/b ARDS, Pulm fibrosis, Organizing PNA 2/2 Covid 19  , Pneumomediastinum (resolved)  - Stable on 6L NC.  Desats with movement   - CTA PE from 11/26 negative for PE, showing multifocal pnuemonia, bronchiectasis, and fibrosis  - Continue with Bactrim DS for PCP (Monitor hyperkalemia)  - Fluticasone 1 spry BID   - Seen by lung transplant on 11/30--> per team, must be tapered to less than 10 mg pred and be able to amblate 100 ft on any amount of o2, rengage as appropriate   - Avoid CPAP d/t hx pneumomediastinum during stay   -Monitor sugars on Steroid, on 15 units of lantus (decrease if am sugar low) and SSI      #Covid PNA   -received Decadron, Remdesivir, Tocilizumab at OSH, Neutrophilic leukocytosis,  PSA bacteremia  - Afebrile, no leukocytosis   - 11/21 BC x2 3/4 pseudomonas   -11/25 Bcx Negative   -was on Zosyn11/22- 11/24, switched to PO Cipro 500mg BID 11/25-12/5 for total 14 days  - Continue Bactrim daily for PCP  prophylactics      #DVT   -11/13: +Rt PT DVT, and + LT Soleal DVT   -CT PE negative   -Eliquis 5 mg BID      #HTN   #Interittent SvT   #Afib   -Metop tartrate 50 mg BID   -Holding home losartan 100 mg , bp stable   -EKG 12/10 stable, qtc 389      #Oliguric HUNG  on CKD (RESOLVED)   -Per documentation, previous baseline was 1.6, current creat around 1   -PRN diuresis as needed   -hx of bladder fistula     #Anxiety   #Pain control  -Continue seroquel 25 mg at night and 12.5 mg as needed with PT   -PRN oxy for pain, lidocaine  -Hold trazadone 25 mg, re-initiate if needed   -Bowel reg while on opiods   -Has had diarrhea previously, monitor      #Eczema/Rosasea  -Continue with dupixent      #Sacral Pressure Ulcer (stage 3)   -Wound care      F: PRN   E: PRN   N: Consistent card   A: PIV   DVT: Eliquis   GI: PPI      Disposition:   SNF     Code Status: Full Code (confirmed on admission)   NOK:  Primary Emergency Contact: Mathew Wilson, Home Phone: [phone]       Lambert Watson, DAKSHA  Please page [phone] with any questions or Epicchat

## 2023-12-12 NOTE — CARE PLAN
Patient was transferred and arrived to [facility] 12/11 evening. The patient's goals for the shift include      The clinical goals for the shift include Patient will remain HDS and VSS throughout shift 12/12/23 by 0700      Problem: Nutrition  Goal: Oral intake greater 75%  Outcome: Progressing  Goal: Consume prescribed supplement  Outcome: Progressing  Goal: Adequate PO fluid intake  Outcome: Progressing  Goal: Nutrition support goals are met within 48 hrs  Outcome: Progressing  Goal: Nutrition support is meeting 75% of nutrient needs  Outcome: Progressing  Goal: BG  mg/dL  Outcome: Progressing  Goal: Lab values WNL  Outcome: Progressing  Goal: Electrolytes WNL  Outcome: Progressing  Goal: Promote healing  Outcome: Progressing     Problem: Skin  Goal: Decreased wound size/increased tissue granulation at next dressing change  Outcome: Progressing  Flowsheets (Taken 12/10/2023 0919 by Caren Kendall RN)  Decreased wound size/increased tissue granulation at next dressing change:   Promote sleep for wound healing   Utilize specialty bed per algorithm  Goal: Participates in plan/prevention/treatment measures  Outcome: Progressing  Flowsheets (Taken 12/6/2023 2052 by Michele Eduardo RN)  Participates in plan/prevention/treatment measures: Elevate heels  Goal: Prevent/manage excess moisture  Outcome: Progressing  Flowsheets (Taken 12/12/2023 0034)  Prevent/manage excess moisture:   Cleanse incontinence/protect with barrier cream   Moisturize dry skin   Follow provider orders for dressing changes   Monitor for/manage infection if present  Goal: Prevent/minimize sheer/friction injuries  Outcome: Progressing  Flowsheets (Taken 12/10/2023 0919 by Caren Kendall RN)  Prevent/minimize sheer/friction injuries:   Complete micro-shifts as needed if patient unable. Adjust patient position to relieve pressure points, not a full turn   Increase activity/out of bed for meals  Goal: Promote skin healing  Outcome:  Progressing  Flowsheets (Taken 12/12/2023 0034)  Promote skin healing:   Assess skin/pad under line(s)/device(s)   Protective dressings over bony prominences   Rotate device position/do not position patient on device   Turn/reposition every 2 hours/use positioning/transfer devices     Problem: Fall/Injury  Goal: Verbalize understanding of personal risk factors for fall in the hospital  Outcome: Progressing  Goal: Verbalize understanding of risk factor reduction measures to prevent injury from fall in the home  Outcome: Progressing  Goal: Use assistive devices by end of the shift  Outcome: Progressing     Problem: Discharge Planning  Goal: Discharge to home or other facility with appropriate resources  Outcome: Progressing     Problem: Chronic Conditions and Co-morbidities  Goal: Patient's chronic conditions and co-morbidity symptoms are monitored and maintained or improved  Outcome: Progressing     Problem: Respiratory  Goal: Clear secretions with interventions this shift  Outcome: Progressing  Goal: Minimize anxiety/maximize coping throughout shift  Outcome: Progressing  Goal: Minimal/no exertional discomfort or dyspnea this shift  Outcome: Progressing  Goal: No signs of respiratory distress (eg. Use of accessory muscles. Peds grunting)  Outcome: Progressing  Goal: Patent airway maintained this shift  Outcome: Progressing  Goal: Tolerate pulmonary toileting this shift  Outcome: Progressing  Goal: Verbalize decreased shortness of breath this shift  Outcome: Progressing  Goal: Wean oxygen to maintain O2 saturation per order/standard this shift  Outcome: Progressing  Goal: Increase self care and/or family involvement in next 24 hours  Outcome: Progressing

## 2023-12-13 NOTE — CARE PLAN
The patient's goals for the shift include      The clinical goals for the shift include Patient will remain HDS and VSS by 12/13/23 by 0700      Problem: Nutrition  Goal: Oral intake greater 75%  Outcome: Progressing  Goal: Consume prescribed supplement  Outcome: Progressing  Goal: Adequate PO fluid intake  Outcome: Progressing  Goal: Nutrition support goals are met within 48 hrs  Outcome: Progressing  Goal: Nutrition support is meeting 75% of nutrient needs  Outcome: Progressing  Goal: BG  mg/dL  Outcome: Progressing  Goal: Lab values WNL  Outcome: Progressing  Goal: Electrolytes WNL  Outcome: Progressing  Goal: Promote healing  Outcome: Progressing     Problem: Skin  Goal: Decreased wound size/increased tissue granulation at next dressing change  Outcome: Progressing  Flowsheets (Taken 12/12/2023 1141 by Evangelits Lee RN)  Decreased wound size/increased tissue granulation at next dressing change:   Promote sleep for wound healing   Utilize specialty bed per algorithm   Protective dressings over bony prominences  Goal: Participates in plan/prevention/treatment measures  Outcome: Progressing  Flowsheets (Taken 12/12/2023 1141 by Evangelist Lee RN)  Participates in plan/prevention/treatment measures:   Elevate heels   Discuss with provider PT/OT consult  Goal: Prevent/manage excess moisture  Outcome: Progressing  Flowsheets (Taken 12/12/2023 1141 by Evangelist Lee RN)  Prevent/manage excess moisture:   Cleanse incontinence/protect with barrier cream   Moisturize dry skin   Follow provider orders for dressing changes   Monitor for/manage infection if present  Goal: Prevent/minimize sheer/friction injuries  Outcome: Progressing  Flowsheets (Taken 12/12/2023 1141 by Evangelist Lee RN)  Prevent/minimize sheer/friction injuries:   Complete micro-shifts as needed if patient unable. Adjust patient position to relieve pressure points, not a full turn   Use pull sheet  Goal: Promote skin healing  Outcome:  Progressing  Flowsheets (Taken 12/12/2023 1141 by Evangelist Lee RN)  Promote skin healing:   Assess skin/pad under line(s)/device(s)   Protective dressings over bony prominences   Turn/reposition every 2 hours/use positioning/transfer devices     Problem: Fall/Injury  Goal: Verbalize understanding of personal risk factors for fall in the hospital  Outcome: Progressing  Goal: Verbalize understanding of risk factor reduction measures to prevent injury from fall in the home  Outcome: Progressing  Goal: Use assistive devices by end of the shift  Outcome: Progressing  Goal: Not fall by end of shift  Outcome: Progressing  Goal: Be free from injury by end of the shift  Outcome: Progressing  Goal: Pace activities to prevent fatigue by end of the shift  Outcome: Progressing     Problem: Discharge Planning  Goal: Discharge to home or other facility with appropriate resources  Outcome: Progressing     Problem: Chronic Conditions and Co-morbidities  Goal: Patient's chronic conditions and co-morbidity symptoms are monitored and maintained or improved  Outcome: Progressing     Problem: Respiratory  Goal: Clear secretions with interventions this shift  Outcome: Progressing  Goal: Minimize anxiety/maximize coping throughout shift  Outcome: Progressing  Goal: Minimal/no exertional discomfort or dyspnea this shift  Outcome: Progressing  Goal: No signs of respiratory distress (eg. Use of accessory muscles. Peds grunting)  Outcome: Progressing  Goal: Patent airway maintained this shift  Outcome: Progressing  Goal: Tolerate pulmonary toileting this shift  Outcome: Progressing  Goal: Verbalize decreased shortness of breath this shift  Outcome: Progressing  Goal: Wean oxygen to maintain O2 saturation per order/standard this shift  Outcome: Progressing  Goal: Increase self care and/or family involvement in next 24 hours  Outcome: Progressing

## 2023-12-13 NOTE — CARE PLAN
The patient's goals for the shift include        Problem: Nutrition  Goal: Oral intake greater 75%  Outcome: Progressing  Goal: Consume prescribed supplement  Outcome: Progressing  Goal: Adequate PO fluid intake  Outcome: Progressing  Goal: Nutrition support goals are met within 48 hrs  Outcome: Progressing  Goal: Nutrition support is meeting 75% of nutrient needs  Outcome: Progressing  Goal: BG  mg/dL  Outcome: Progressing  Goal: Lab values WNL  Outcome: Progressing  Goal: Electrolytes WNL  Outcome: Progressing  Goal: Promote healing  Outcome: Progressing     Problem: Skin  Goal: Decreased wound size/increased tissue granulation at next dressing change  Outcome: Progressing  Flowsheets (Taken 12/13/2023 1036)  Decreased wound size/increased tissue granulation at next dressing change:   Promote sleep for wound healing   Protective dressings over bony prominences  Goal: Participates in plan/prevention/treatment measures  Outcome: Progressing  Flowsheets (Taken 12/13/2023 1036)  Participates in plan/prevention/treatment measures: Elevate heels  Goal: Prevent/manage excess moisture  Outcome: Progressing  Flowsheets (Taken 12/13/2023 1036)  Prevent/manage excess moisture:   Cleanse incontinence/protect with barrier cream   Follow provider orders for dressing changes   Monitor for/manage infection if present  Goal: Prevent/minimize sheer/friction injuries  Outcome: Progressing  Flowsheets (Taken 12/13/2023 1036)  Prevent/minimize sheer/friction injuries:   Complete micro-shifts as needed if patient unable. Adjust patient position to relieve pressure points, not a full turn   Use pull sheet   Turn/reposition every 2 hours/use positioning/transfer devices  Goal: Promote skin healing  Outcome: Progressing  Flowsheets (Taken 12/13/2023 1036)  Promote skin healing:   Assess skin/pad under line(s)/device(s)   Protective dressings over bony prominences   Turn/reposition every 2 hours/use positioning/transfer devices      Problem: Fall/Injury  Goal: Verbalize understanding of personal risk factors for fall in the hospital  Outcome: Progressing  Goal: Verbalize understanding of risk factor reduction measures to prevent injury from fall in the home  Outcome: Progressing  Goal: Use assistive devices by end of the shift  Outcome: Progressing  Goal: Not fall by end of shift  Outcome: Progressing  Goal: Be free from injury by end of the shift  Outcome: Progressing  Goal: Pace activities to prevent fatigue by end of the shift  Outcome: Progressing     Problem: Discharge Planning  Goal: Discharge to home or other facility with appropriate resources  Outcome: Progressing     Problem: Chronic Conditions and Co-morbidities  Goal: Patient's chronic conditions and co-morbidity symptoms are monitored and maintained or improved  Outcome: Progressing     Problem: Respiratory  Goal: Clear secretions with interventions this shift  Outcome: Progressing  Goal: Minimize anxiety/maximize coping throughout shift  Outcome: Progressing  Goal: Minimal/no exertional discomfort or dyspnea this shift  Outcome: Progressing  Goal: No signs of respiratory distress (eg. Use of accessory muscles. Peds grunting)  Outcome: Progressing  Goal: Patent airway maintained this shift  Outcome: Progressing  Goal: Tolerate pulmonary toileting this shift  Outcome: Progressing  Goal: Verbalize decreased shortness of breath this shift  Outcome: Progressing  Goal: Wean oxygen to maintain O2 saturation per order/standard this shift  Outcome: Progressing  Goal: Increase self care and/or family involvement in next 24 hours  Outcome: Progressing

## 2023-12-13 NOTE — PROGRESS NOTES
Physical Therapy    Physical Therapy Treatment    Patient Name: Macarena Wilson  MRN: [id number]  Today's Date: 12/13/2023  Time Calculation  Start Time: 1439  Stop Time: 1507  Time Calculation (min): 28 min       Assessment/Plan   PT Assessment  Evaluation/Treatment Tolerance:  (session limited this date 2/2 to pt decreased upright activity tolerance.)  Medical Staff Made Aware: Yes  End of Session Communication: Bedside nurse  End of Session Patient Position: Bed, 3 rail up, Alarm off, not on at start of session (chair position)  PT Plan  Inpatient/Swing Bed or Outpatient: Inpatient  PT Plan  Treatment/Interventions: Bed mobility, Transfer training, Gait training, Balance training, Strengthening, Endurance training, Therapeutic exercise, Therapeutic activity, Postural re-education  PT Plan: Skilled PT  PT Frequency: 4 times per week  PT Discharge Recommendations: Moderate intensity level of continued care  PT - OK to Discharge: Yes      General Visit Information:   PT  Visit  PT Received On: 12/13/23  Response to Previous Treatment: Patient with no complaints from previous session.  General  Co-Treatment: OT  Co-Treatment Reason: to safely progress functional mobility with pt with AMPAC of 6  Prior to Session Communication: Bedside nurse  Patient Position Received: Bed, 3 rail up, Alarm off, not on at start of session  General Comment: Pt supine in bed upon entry to room. Pt nervous about working with PT, but agreeable once RN provided anxiety medication. 6L of o2 noted. RN in room during session to titrate oxygen up as pt demonstrating increased oxygen needs sitting EOB.    Subjective   Precautions:  Precautions  Medical Precautions: Fall precautions, Oxygen therapy device and L/min  Vital Signs:  Vital Signs  Heart Rate: 79 (supine; HR in mid 90s with sitting EOB)  SpO2: 96 % (6L supine; 80% with 6L sitting; 88-92% with 15L sitting)    Objective   Pain:  Pain Assessment  Pain Assessment: 0-10  Pain Score: 0 -  No pain  Cognition:  Cognition  Orientation Level: Oriented X4  Postural Control:  Postural Control  Posture Comment: 1 UE supported on bed rail; pt not obtaining full upright posture this date, with pt requesting to be be dependently reclined sitting EOB and pt adament about not sitting up further    Activity Tolerance:  Activity Tolerance  Endurance: Tolerates less than 10 min exercise with changes in vital signs  Treatments:  Therapeutic Activity  Therapeutic Activity Performed: Yes  Therapeutic Activity 1: pt sitting EOB for ~15 minutes this date with dependent assist with pt requesting to be reclined. Pt unable to tolerate upright posture this date 2/2 to pt reporting that she felt like she could not breath when being moved forward into upright posture    Bed Mobility 1  Bed Mobility 1: Rolling right, Rolling left  Level of Assistance 1: Maximum assistance  Bed Mobility 2  Bed Mobility  2: Supine to sitting, Sitting to supine  Level of Assistance 2: Dependent (x2; draw sheet)  Bed Mobility 3  Bed Mobility 3: Scooting  Level of Assistance 3: Dependent (x2)    Ambulation/Gait Training  Ambulation/Gait Training Performed: No  Transfers  Transfer: No (unable to safely advance this date)    Outcome Measures:  [facility] Basic Mobility  Turning from your back to your side while in a flat bed without using bedrails: Total  Moving from lying on your back to sitting on the side of a flat bed without using bedrails: Total  Moving to and from bed to chair (including a wheelchair): Total  Standing up from a chair using your arms (e.g. wheelchair or bedside chair): Total  To walk in hospital room: Total  Climbing 3-5 steps with railing: Total  Basic Mobility - Total Score: 6    Education Documentation  Mobility Training, taught by Carmel Mcdaniel PT at 12/13/2023  4:00 PM.  Learner: Patient  Readiness: Acceptance  Method: Explanation  Response: Needs Reinforcement    Education Comments  No comments found.        OP  EDUCATION:       Encounter Problems       Encounter Problems (Active)       Balance       patient will complete static (SBAx1) and dynamic (Cgx1) standing balance activities using LRD as needed without acute LOB, while maintaining stable vitals.  (Not Progressing)       Start:  11/10/23    Expected End:  12/08/23               Mobility       STG - Patient will ambulate >/=50 ft using LRD as needed with Cgx1 assist without acute LOB, while maintaining stable vitals.  (Not Progressing)       Start:  11/10/23    Expected End:  12/08/23            patient will participate in BLE there-ex in order to improve strength and to assist with the completion of functional mobility tasks.  (Progressing)       Start:  11/10/23    Expected End:  12/08/23            patient will ambulate >/=30 ft consecutively and >/=75 ft cumulatively with </=1 sitting rest break while maintaining stable vitals, reporting <13/20 on the RPE scale.  (Not Progressing)       Start:  11/10/23    Expected End:  12/08/23               Pain - Adult          Transfers       STG - Transfer from bed to chair with CGx1 assist without acute LOB, using LRD as needed  (Not Progressing)       Start:  11/10/23    Expected End:  12/08/23            STG - Patient will perform bed mobility with SBAx1 without acute LOB, using bedrailing as needed.  (Not Progressing)       Start:  11/10/23    Expected End:  12/08/23            STG - Patient will transfer sit to and from stand with Cgx1 assist using LRD as needed without acute LOB  (Progressing)       Start:  11/10/23    Expected End:  12/08/23 12/13/23 at 4:01 PM - Carmel Mcdaniel, PT

## 2023-12-13 NOTE — CONSULTS
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  Course also complicated by distal DVT, oliguric HUNG on CKD.  Patient weaned to 6 from 8 L nasal cannula but  requires increased FiO2 with movement and exertion.      Pertinent Labs:   Albumin   Date Value Ref Range Status   12/12/2023 2.8 (L) 3.4 - 5.0 g/dL Final       Wound Assessment:  Wound 11/17/23 Pressure Injury Sacrum (Active)   Wound Image   12/13/23 1047   Site Assessment Yellow;Pink 12/13/23 1047   Sobia-Wound Assessment Blanchable erythema 12/13/23 1047   Non-staged Wound Description Partial thickness 12/13/23 1021   Pressure Injury Stage 3 12/13/23 1047   Shape cluster of circular lesion 11/21/23 1332   Wound Length (cm) 4.2 cm 12/13/23 1047   Wound Width (cm) 8 cm 12/13/23 1047   Wound Surface Area (cm^2) 33.6 cm^2 12/13/23 1047   Wound Depth (cm) 0.3 cm 12/13/23 1047   Wound Volume (cm^3) 10.08 cm^3 12/13/23 1047   Wound Healing % -68 12/13/23 1047   State of Healing Slough 12/10/23 0400   Margins Unable to assess 11/22/23 1600   Drainage Description None 12/11/23 0400   Drainage Amount Unable to assess 12/11/23 0400   Dressing Other (Comment) 12/12/23 1304   Dressing Changed Changed 12/12/23 1304   Dressing Status Clean 12/12/23 2315       Wound 12/12/23 Pressure Injury Vagina Left (Active)   Wound Image   12/13/23 1045   Site Assessment Pink 12/13/23 1047   Sobia-Wound Assessment Intact 12/13/23 1047   Pressure Injury Stage 2 12/13/23 1047   Shape Irregular  12/13/23 1045   Wound Length (cm) 2 cm 12/13/23 1047   Wound Width (cm) 0.5 cm 12/13/23 1047   Wound Surface  Area (cm^2) 1 cm^2 12/13/23 1047   Wound Depth (cm) 0.2 cm 12/13/23 1045   Wound Volume (cm^3) 0.2 cm^3 12/13/23 1045   Dressing Changed Changed 12/13/23 1047   Dressing Status Clean 12/13/23 1047       Wound 12/12/23 Other (comment) Mouth (Active)   Wound Image   12/13/23 1042   Site Assessment Painful;Pale;Pink;Swelling 12/12/23 2315   Shape Irregular  12/13/23 1042   Wound Length (cm) 0.5 cm 12/13/23 1042   Wound Width (cm) 2 cm 12/13/23 1042   Wound Surface Area (cm^2) 1 cm^2 12/13/23 1042   Wound Depth (cm) 0 cm 12/13/23 1042   Wound Volume (cm^3) 0 cm^3 12/13/23 1042       Wound Team Summary Assessment:   For sacrum: Daily  Irrigate wounds with normal saline  Apply Triad wound dressing to wound   Cover with Mepilex dressing   Turn every 2 hours    For labia: 3 x /day   Cleanse wound with bath wipes or saline  Apply Triad wound dressing   If no improvement in 24 hours STOP using purewik (wound most likely re lated to purewik)   Continue Triad application 3x/day and as needed     Would have medical team examine mouth wound.       Wound Team Plan: Please review recommendations and place orders in EMR     Gena HALL   12/13/2023  6:07 PM

## 2023-12-13 NOTE — PROGRESS NOTES
"Macarena Wilson is a 68 y.o. female on day 34 of admission presenting with Acute hypoxemic respiratory failure (CMS/HCC).    Subjective     Ms. Wilson reports no acute overnight events.  She reports that she is comfortable while resting at 6L O2.  She reports a goal today of trying to sit up in bed, nothing that she has not been able to walk for the last several weeks.  She states that she is motivated to gain her strength back.    Objective     Physical Exam    Constitutional: Patient in no acute distress.  Alert and cooperative.  Eyes: PERRL, EOMI, no icterus.  ENMT: Mucous membranes moist.  Head/Neck: Neck supple with no apparent injury.  Respiratory/Thorax: Lungs diminished bilaterally, non-labored breathing, no cough.  On 6L NC.  Cardiovascular: Regular rate and rhythm.  No murmurs.  Normal S1 and S2.  Gastrointestinal: Nondistended, soft, non-tender.  : External catheter in place.  Musculoskeletal: ROM intact, generalized weakness.  Extremities: No edema.  Scattered areas of bruising.  Neurological: Alert and oriented to person, place and time.  Speech clear.  Follows commands appropriately and without focal deficits.  Skin: Warm and dry.    Last Recorded Vitals  Blood pressure (!) 163/92, pulse 82, temperature 36 °C (96.8 °F), temperature source Temporal, resp. rate 18, height 1.6 m (5' 2.99\"), weight 67.7 kg (149 lb 4 oz), SpO2 100 %.  Intake/Output last 3 Shifts:  I/O last 3 completed shifts:  In: 75 (1.1 mL/kg) [P.O.:75]  Out: 925 (13.5 mL/kg) [Urine:925 (0.4 mL/kg/hr)]  Dosing Weight: 68.4 kg     Relevant Results    Scheduled medications  apixaban, 5 mg, oral, BID  ergocalciferol, 1,250 mcg, oral, Weekly  fluticasone, 1 spray, Each Nostril, BID  insulin glargine, 8 Units, subcutaneous, Nightly  insulin lispro, 0-15 Units, subcutaneous, TID with meals  lidocaine, 1 patch, transdermal, Daily  melatonin, 5 mg, oral, Daily  metoprolol tartrate, 50 mg, oral, BID  pantoprazole, 40 mg, oral, Daily before " breakfast  [START ON 12/18/2023] predniSONE, 30 mg, oral, BID  predniSONE, 80 mg, oral, Daily  QUEtiapine, 25 mg, oral, Nightly  sennosides-docusate sodium, 1 tablet, oral, BID  sodium zirconium cyclosilicate, 10 g, oral, TID  sulfamethoxazole-trimethoprim, 80 mg, oral, Daily  thiamine, 100 mg, oral, Daily      Continuous medications  oxygen, , Last Rate: 5 L/min (12/10/23 2040)      PRN medications  PRN medications: acetaminophen **OR** [DISCONTINUED] acetaminophen **OR** [DISCONTINUED] acetaminophen, dextrose 10 % in water (D10W), dextrose, ipratropium-albuteroL, loperamide, ondansetron ODT **OR** [DISCONTINUED] ondansetron, oxyCODONE, QUEtiapine     Results for orders placed or performed during the hospital encounter of 11/09/23 (from the past 24 hour(s))   POCT GLUCOSE   Result Value Ref Range    POCT Glucose 108 (H) 74 - 99 mg/dL   POCT GLUCOSE   Result Value Ref Range    POCT Glucose 226 (H) 74 - 99 mg/dL   POCT GLUCOSE   Result Value Ref Range    POCT Glucose 247 (H) 74 - 99 mg/dL   POCT GLUCOSE   Result Value Ref Range    POCT Glucose 224 (H) 74 - 99 mg/dL   POCT GLUCOSE   Result Value Ref Range    POCT Glucose 105 (H) 74 - 99 mg/dL          Malnutrition Diagnosis Status: New  Malnutrition Diagnosis: Severe malnutrition related to acute disease or injury  As Evidenced by: 12% weight loss from normal along with moderate to severe muscle and fat loss on physical exam in setting of a decrease in PO intake/appetite after being diagnosed with COVID  I agree with the dietitian's malnutrition diagnosis.      Assessment/Plan   Principal Problem:    Acute hypoxemic respiratory failure (CMS/HCC)  Active Problems:    Stage 4 chronic kidney disease (CMS/HCC)    COVID-19    Pneumonia of both lower lobes due to infectious organism    Pseudomonal bacteremia    A-fib (CMS/[facility])         Ms. Wilson is a 67 y/o female with hx HTN, CKD stage III who was admitted to the MICU from outside hospital with AHRF 2/2 COVID-pneumonia  (tested positive on 10/13), course C/B by ARDS and pulmonary fibrosis resulting in organizing PNA.  Course also complicated by distal DVT, oliguric HUNG on CKD.  Patient weaned to 6 from 8 L nasal cannula but  requires increased FiO2 with movement and exertion. Transferred to [facility] on 12/11 for continued management.     Updates 12/13:  -Prednisone taper- On Monday (12/11) patient was decreased prednisone to 80mg daily for 7 days.  On 12/18, switch to 60mg daily 2-4 weeks.  Then, can consider decreasing by 10-20mg every 1-2 weeks based on clinical appearance.  -Lantus decreased to 8 units in conjunction with prednisone taper/improved glucose control (POC glucose 105 mg/dL this morning).  -C/w Bactrim DS for PCP ppx     #AHRF 2/2 COVID PNA (11/23) c/b ARDS, Pulm fibrosis, Organizing PNA 2/2 Covid 19  , Pneumomediastinum (resolved)  - Stable on 6L NC.  Desats with movement   - CTA PE from 11/26 negative for PE, showing multifocal pnuemonia, bronchiectasis, and fibrosis  - Continue with Bactrim DS for PCP (Monitor hyperkalemia)  - Fluticasone 1 spry BID   - Seen by lung transplant on 11/30--> per team, must be tapered to less than 10 mg pred and be able to amblate 100 ft on any amount of o2, rengage as appropriate   - Avoid CPAP d/t hx pneumomediastinum during stay   -Monitor sugars on Steroid, on 15 units of lantus (decrease if am sugar low) and SSI      #Covid PNA   -received Decadron, Remdesivir, Tocilizumab at OSH, Neutrophilic leukocytosis,  PSA bacteremia  - Afebrile, no leukocytosis   - 11/21 BC x2 3/4 pseudomonas   -11/25 Bcx Negative   -was on Zosyn11/22- 11/24, switched to PO Cipro 500mg BID 11/25-12/5 for total 14 days  - Continue Bactrim daily for PCP prophylactics      #DVT   -11/13: +Rt PT DVT, and + LT Soleal DVT   -CT PE negative   -Eliquis 5 mg BID      #HTN   #Interittent SvT   #Afib   -Metop tartrate 50 mg BID   -Holding home losartan 100 mg , bp stable   -EKG 12/10 stable, qtc 389       #Oliguric HUNG  on CKD (RESOLVED)   -Per documentation, previous baseline was 1.6, current creat around 1   -PRN diuresis as needed   -hx of bladder fistula     #Anxiety   #Pain control  -Continue seroquel 25 mg at night and 12.5 mg as needed with PT   -PRN oxy for pain, lidocaine  -Hold trazadone 25 mg, re-initiate if needed   -Bowel reg while on opiods   -Has had diarrhea previously, monitor      #Eczema/Rosasea  -Continue with dupixent      #Sacral Pressure Ulcer (stage 3)   -Wound care      F: PRN   E: PRN   N: Consistent card   A: PIV   DVT: Eliquis   GI: PPI      Disposition:   SNF     Code Status: Full Code (confirmed on admission)   NOK:  Primary Emergency Contact: Mathew Wilson, Home Phone: [phone]        Lambert Watson, M3  Please page [phone] or Epic Chat with any questions      I, or a resident under my supervision, was present with the medical student who participated in the documentation of this note.  I have personally seen and examined the patient and performed the medical decision-making components. I have reviewed the medical student documentation and/or resident documentation and verified the findings in the note as written with additions or exceptions as stated in the body of the note.    Jonas Eden MD

## 2023-12-13 NOTE — PROGRESS NOTES
Occupational Therapy    Occupational Therapy    Occupational Therapy Treatment    Name: Macarena Wilson  MRN: [id number]  : 1955  Date: 23  Time Calculation  Start Time: 1438  Stop Time: 1507  Time Calculation (min): 29 min    Assessment:  End of Session Communication: Bedside nurse  End of Session Patient Position: Bed, 3 rail up, Alarm off, not on at start of session (bed in chair position)  Plan:  Treatment Interventions: ADL retraining, Functional transfer training, UE strengthening/ROM, Endurance training, Patient/family training, Equipment evaluation/education, Compensatory technique education  OT Frequency: 3 times per week  OT Discharge Recommendations: Moderate intensity level of continued care  OT - OK to Discharge: Yes    Subjective   General:  OT Last Visit  OT Received On: 23  Co-Treatment: PT  Co-Treatment Reason: to maximzie pts safety and rehab potenital, pt requires 2 skilled set of hands to mobilzie safely  Prior to Session Communication: Bedside nurse  Patient Position Received: Bed, 3 rail up, Alarm off, not on at start of session  Family/Caregiver Present: No  General Comment: Pt in supine on arrival, willing to particiapte with min encouragement. RN present during EOB sitting to titrate O2 as pt requiring increased in O2 with sitting EOB.   Vitals:  Vital Signs  Heart Rate: 79  Heart Rate Source: Monitor  SpO2: 96 % (on 6 L O2, SpO2 decreased to 80 % once sitting EOB, RN gradually increasing O2 to 15 L to maintain SpO2 88-92%. SpO2 returned back to 93 % at end of session on 6 L. Back in supine)  Lines/Tubes/Drains:  External Urinary Catheter (Active)   Number of days: 34       Cognition:  Orientation Level: Oriented X4    Pain Assessment:  Pain Assessment  Pain Assessment: 0-10  Pain Score: 0 - No pain     Objective   Activities of Daily Living:      LE Dressing  Sock Level of Assistance: Dependent       Bed Mobility/Transfers:   Bed Mobility 1  Bed Mobility 1: Rolling  right, Rolling left  Level of Assistance 1: Maximum assistance  Bed Mobility 2  Bed Mobility  2: Supine to sitting, Sitting to supine  Level of Assistance 2: Dependent (x2)  Bed Mobility Comments 2: use of draw sheet and HOB up  Bed Mobility 3  Bed Mobility 3: Scooting  Level of Assistance 3: Dependent (x2)       Balance:  Static Sitting Balance  Static Sitting-Balance Support: Bilateral upper extremity supported (support from therapist at trunk)  Static Sitting-Comment/Number of Minutes: Pt tolerated sitting EOB ~ 10 min requiring toital A as pt unable to tolerate sitting fully upright and requiring to lean posteriorly on to therapist.      Outcome Measures:  [facility] Daily Activity  Putting on and taking off regular lower body clothing: Total  Bathing (including washing, rinsing, drying): Total  Putting on and taking off regular upper body clothing: A lot  Toileting, which includes using toilet, bedpan or urinal: A lot  Taking care of personal grooming such as brushing teeth: A lot  Eating Meals: A little  Daily Activity - Total Score: 11     Education Documentation  Body Mechanics, taught by Gemini Campbell OT at 12/13/2023  3:41 PM.  Learner: Patient  Readiness: Acceptance  Method: Explanation  Response: Verbalizes Understanding    ADL Training, taught by Gemini Campbell OT at 12/13/2023  3:41 PM.  Learner: Patient  Readiness: Acceptance  Method: Explanation  Response: Verbalizes Understanding    Education Comments  No comments found.        Goals:  Encounter Problems       Encounter Problems (Active)       ADLs       Patient with complete upper body dressing with independent level of assistance donning and doffing all UE clothes. (Progressing)       Start:  11/17/23    Expected End:  12/08/23            Patient with complete lower body dressing with minimal assist  level of assistance donning and doffing all LE clothes  with PRN adaptive equipment. (Progressing)       Start:  11/17/23    Expected End:   12/08/23            Patient will complete daily grooming tasks with stand by assist level of assistance and PRN adaptive equipment while in sitting. (Progressing)       Start:  11/17/23    Expected End:  12/08/23            Patient will complete toileting including hygiene clothing management/hygiene with minimal assist  level of assistance. (Progressing)       Start:  11/17/23    Expected End:  12/08/23               BALANCE       Pt will maintain dynamic sitting balance during ADL task with contact guard assist level of assistance in order to demonstrate decreased risk of falling and improved postural control. (Progressing)       Start:  11/17/23    Expected End:  12/08/23                       COGNITION/SAFETY       Patient will verbalize and demonstrate >2 relaxation and breathing techniques during sessions with independence. (Progressing)       Start:  11/17/23    Expected End:  12/08/23               EXERCISE/STRENGTHENING       Patient will complete BUE exercises in order to improve activity tolerance for ADL performance.  (Progressing)       Start:  11/17/23    Expected End:  12/08/23                 TRANSFERS       Patient will perform bed mobility minimal assist  level of assistance in order to improve safety and independence with mobility (Progressing)       Start:  11/17/23    Expected End:  12/08/23            Patient will complete functional transfers with least restrictive device with minimal assist  level of assistance. (Progressing)       Start:  11/17/23    Expected End:  12/08/23 12/13/23 at 3:42 PM   Gemini Campbell, OT   [phone]

## 2023-12-14 NOTE — DOCUMENTATION CLARIFICATION NOTE
"    PATIENT:               MACARENA AGUILERA  ACCT #:                  [id number]  MRN:                       [id number]  :                       1955  ADMIT DATE:       10/23/2023 3:35 PM  DISCH DATE:        2023 2:54 AM  RESPONDING PROVIDER #:        [id number]          PROVIDER RESPONSE TEXT:    Sepsis with associated acute multi-system organ dysfunction of HUNG and critical illness myopathy    CDI QUERY TEXT:    [facility]_Sepsis Link Organ Dysfunction    Instruction:    Based on your assessment of the patient and the clinical information, please provide the requested documentation by clicking on the appropriate radio button and enter any additional information if prompted.    Question: Please further clarify if a relationship exists between the Sepsis and acute organ dysfunction    When answering this query, please exercise your independent professional judgment. The fact that a question is being asked, does not imply that any particular answer is desired or expected.    The patient's clinical indicators include:  Clinical Information:  \"Macarena Aguilera is a 68 y.o. year-old female who was admitted on 10/23/2023 for Acute hypoxemic respiratory failure due to COVID-19, HUNG, among others.\"    Documented Diagnosis:   10/31/23- Hospitalist Progress Note:  \"...COVID-19 pneumonia sepsis\"    Clinical Indicators:  -Vital Signs: BP WNL; Temp WNL  -WBC: 10/27:  13.8; 10/29:  15:  10/30 22  -Microbiology Results:  SARS-CoV-2 PCR Abnormal 10/23/23  -Band Neutrophil Count/percent Band Neutrophil:  Not Performed  -Lactic acid:  10/24/23:  2.2  -BUN/Creat:  Creatanine:11/15/23  2.67;  2.97/2.46;  2.06  -Blood cultures:  Not Performed  -Bilirubin:  WNL  -MAP:  Not Performed  -Gardner Coma Scale:  -PAO2/FIO2:  FIO2 %  -Procalcitonin:  Not Performed  -Platelets:   130  -Other clinical indicators: 10/29/23 UA:  Leukocyte Esterase 500 Fanny/ul;  Turbid appearance, Color Brown;    10/28/23:  Progress Notes:  \"Acute " "cystitis with hematuria\"    10/31/23 Hospitalist Progress Note:  \"Acute hypoxemic respiratory failure due to COVID-19, HUNG, among others.\"; \"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\"      Treatment:  Vancomycin 1250 mg ; IVPB 10/23/23Rocephine 1 g 10/23/23, doxycycline 100 mg IVPB 10/23; zosyn 2.25 g 10/23-10/26; Remdesivir 200 mg IVPB 10/24/23; Actemra 600 mg IVPB 10/24; Meropenem 500 mg IVPB 11/7-11/8;    Risk Factors:  Age, CKD, prediabetes  Options provided:  -- Sepsis with associated acute renal organ dysfunction of HUNG  -- Sepsis with associated acute musculoskeletal organ dysfunction of critical illness myopathy  -- Sepsis with associated acute multi-system organ dysfunction of HUNG and critical illness myopathy  -- Sepsis with other associated acute organ dysfunction, Please specify additional information below  -- Other - I will add my own diagnosis  -- Refer to Clinical Documentation Reviewer    Query created by: Becky Whitaker on 11/28/2023 9:52 AM      Electronically signed by:  CUCA SAM DO 12/14/2023 1:08 PM          "

## 2023-12-14 NOTE — PROGRESS NOTES
Macarena Wilson is a 68 y.o. female on day 35 of admission presenting with Acute hypoxemic respiratory failure (CMS/[facility]).    12/14/23 @ 1520  Per team, patient is medically ready to discharge. Met with Kendra, daughter in law, to go over FOC. [facility] [place] is their #1 choice. Facility is able to accept. Sent them updated clinical notes and PT/OT notes.  Updated them on current oxygen requirement. Will wait to hear back from facility and when they can start pre-cert.  POORNIMA CHU RN TCC

## 2023-12-14 NOTE — PROGRESS NOTES
"Macarena Wilson is a 68 y.o. female on day 35 of admission presenting with Acute hypoxemic respiratory failure (CMS/HCC).    Subjective     Ms. Wilson reports no acute overnight events.  She reports that she is comfortable at rest on 6 L O2.  She states that she met with PT and OT yesterday.  Her current maximal level of activity is sitting up at the edge of the bed.      Objective     Physical Exam    Constitutional: Patient in no acute distress.  Alert and cooperative.  Eyes: PERRL, EOMI, no icterus.  ENMT: Mucous membranes moist.  Head/Neck: Neck supple with no apparent injury.  Respiratory/Thorax: Lungs diminished bilaterally.  Non-labored breathing, no cough.  On 6L NC.  Cardiovascular: Regular rate and rhythm.  No murmurs.  Normal S1 and S2.  Gastrointestinal: Nondistended, soft, non-tender.  : External catheter in place.  Musculoskeletal: ROM intact, generalized weakness.  Extremities: No edema.  Scattered areas of bruising, decreased in comparison to two days ago.  Neurological: Alert and oriented to person, place and time.  Speech clear.  Follows commands appropriately and without focal deficits.  Skin: Warm and dry.    Last Recorded Vitals  Blood pressure 144/81, pulse 97, temperature 35.9 °C (96.6 °F), temperature source Temporal, resp. rate 18, height 1.6 m (5' 2.99\"), weight 67.7 kg (149 lb 4 oz), SpO2 94 %.  Intake/Output last 3 Shifts:  I/O last 3 completed shifts:  In: - (0 mL/kg)   Out: 1600 (23.4 mL/kg) [Urine:1600 (0.6 mL/kg/hr)]  Dosing Weight: 68.4 kg     Relevant Results    Scheduled medications  apixaban, 5 mg, oral, BID  ergocalciferol, 1,250 mcg, oral, Weekly  fluticasone, 1 spray, Each Nostril, BID  insulin glargine, 8 Units, subcutaneous, Nightly  insulin lispro, 0-15 Units, subcutaneous, TID with meals  lidocaine, 1 patch, transdermal, Daily  melatonin, 5 mg, oral, Daily  metoprolol tartrate, 50 mg, oral, BID  pantoprazole, 40 mg, oral, Daily before breakfast  [START ON 12/18/2023] " predniSONE, 30 mg, oral, BID  predniSONE, 80 mg, oral, Daily  QUEtiapine, 25 mg, oral, Nightly  sennosides-docusate sodium, 1 tablet, oral, BID  sodium zirconium cyclosilicate, 10 g, oral, TID  sulfamethoxazole-trimethoprim, 80 mg, oral, Daily  thiamine, 100 mg, oral, Daily      Continuous medications  oxygen, , Last Rate: 5 L/min (12/10/23 2040)      PRN medications  PRN medications: acetaminophen **OR** [DISCONTINUED] acetaminophen **OR** [DISCONTINUED] acetaminophen, dextrose 10 % in water (D10W), dextrose, ipratropium-albuteroL, loperamide, ondansetron ODT **OR** [DISCONTINUED] ondansetron, oxyCODONE, QUEtiapine        Results for orders placed or performed during the hospital encounter of 11/09/23 (from the past 24 hour(s))   POCT GLUCOSE   Result Value Ref Range    POCT Glucose 148 (H) 74 - 99 mg/dL   POCT GLUCOSE   Result Value Ref Range    POCT Glucose 258 (H) 74 - 99 mg/dL   POCT GLUCOSE   Result Value Ref Range    POCT Glucose 268 (H) 74 - 99 mg/dL   Magnesium   Result Value Ref Range    Magnesium 2.26 1.60 - 2.40 mg/dL   Renal Function Panel   Result Value Ref Range    Glucose 64 (L) 74 - 99 mg/dL    Sodium 145 136 - 145 mmol/L    Potassium 4.0 3.5 - 5.3 mmol/L    Chloride 113 (H) 98 - 107 mmol/L    Bicarbonate 22 21 - 32 mmol/L    Anion Gap 14 10 - 20 mmol/L    Urea Nitrogen 63 (H) 6 - 23 mg/dL    Creatinine 0.84 0.50 - 1.05 mg/dL    eGFR 75 >60 mL/min/1.73m*2    Calcium 8.6 8.6 - 10.6 mg/dL    Phosphorus 3.7 2.5 - 4.9 mg/dL    Albumin 2.8 (L) 3.4 - 5.0 g/dL   CBC and Auto Differential   Result Value Ref Range    WBC 11.2 4.4 - 11.3 x10*3/uL    nRBC 0.8 (H) 0.0 - 0.0 /100 WBCs    RBC 3.16 (L) 4.00 - 5.20 x10*6/uL    Hemoglobin 9.2 (L) 12.0 - 16.0 g/dL    Hematocrit 29.7 (L) 36.0 - 46.0 %    MCV 94 80 - 100 fL    MCH 29.1 26.0 - 34.0 pg    MCHC 31.0 (L) 32.0 - 36.0 g/dL    RDW 16.6 (H) 11.5 - 14.5 %    Platelets 102 (L) 150 - 450 x10*3/uL    Immature Granulocytes %, Automated 11.7 (H) 0.0 - 0.9 %     Immature Granulocytes Absolute, Automated 1.31 (H) 0.00 - 0.70 x10*3/uL   Manual Differential   Result Value Ref Range    Neutrophils %, Manual 81.4 40.0 - 80.0 %    Bands %, Manual 8.8 0.0 - 5.0 %    Lymphocytes %, Manual 5.3 13.0 - 44.0 %    Monocytes %, Manual 0.0 2.0 - 10.0 %    Eosinophils %, Manual 0.0 0.0 - 6.0 %    Basophils %, Manual 0.0 0.0 - 2.0 %    Atypical Lymphocytes %, Manual 2.7 0.0 - 2.0 %    Promyelocytes %, Manual 1.8 0.0 - 0.0 %    Seg Neutrophils Absolute, Manual 9.12 (H) 1.20 - 7.00 x10*3/uL    Bands Absolute, Manual 0.99 (H) 0.00 - 0.70 x10*3/uL    Lymphocytes Absolute, Manual 0.59 (L) 1.20 - 4.80 x10*3/uL    Monocytes Absolute, Manual 0.00 (L) 0.10 - 1.00 x10*3/uL    Eosinophils Absolute, Manual 0.00 0.00 - 0.70 x10*3/uL    Basophils Absolute, Manual 0.00 0.00 - 0.10 x10*3/uL    Atypical Lymphs Absolute, Manual 0.30 0.00 - 0.50 x10*3/uL    Promyelocytes Absolute, Manual 0.20 0.00 - 0.00 x10*3/uL    Total Cells Counted 113     Neutrophils Absolute, Manual 10.11 (H) 1.20 - 7.70 x10*3/uL    RBC Morphology See Below     Polychromasia Mild     RBC Fragments Few     Target Cells Few     Ovalocytes Few     Perry Cells Many    POCT GLUCOSE   Result Value Ref Range    POCT Glucose 54 (L) 74 - 99 mg/dL   POCT GLUCOSE   Result Value Ref Range    POCT Glucose 63 (L) 74 - 99 mg/dL   POCT GLUCOSE   Result Value Ref Range    POCT Glucose 87 74 - 99 mg/dL              Malnutrition Diagnosis Status: New  Malnutrition Diagnosis: Severe malnutrition related to acute disease or injury  As Evidenced by: 12% weight loss from normal along with moderate to severe muscle and fat loss on physical exam in setting of a decrease in PO intake/appetite after being diagnosed with COVID  I agree with the dietitian's malnutrition diagnosis.      Assessment/Plan   Principal Problem:    Acute hypoxemic respiratory failure (CMS/HCC)  Active Problems:    Stage 4 chronic kidney disease (CMS/HCC)    COVID-19    Pneumonia of both  lower lobes due to infectious organism    Pseudomonal bacteremia    A-fib (CMS/HCC)       Ms. Wilson is a 69 y/o female with hx HTN, CKD stage III who was admitted to the MICU from outside hospital with AHRF 2/2 COVID-pneumonia (tested positive on 10/13), course C/B by ARDS and pulmonary fibrosis resulting in organizing PNA.  Course also complicated by distal DVT, oliguric HUNG on CKD.  Patient weaned to 6 from 8 L nasal cannula but requires increased FiO2 with movement and exertion. Transferred to [facility] on 12/11 for continued management.     Updates 12/14:  -Resolved of hypoglycemia this AM- POCT glucose was 54 mg/dL at 08:13.  Up to 87 mg/dL by 08:42.  -Prednisone taper- On Monday (12/11) patient was decreased prednisone to 80mg daily for 7 days.  On 12/18, switch to 60mg daily 2-4 weeks.  Then, can consider decreasing by 10-20mg every 1-2 weeks based on clinical appearance.  -Minimal medical management being done at this time.  Searching for appropriate SNF for rehabilitation near patient's home in [place].    #AHRF 2/2 COVID PNA (11/23) c/b ARDS, Pulm fibrosis, Organizing PNA 2/2 Covid 19  , Pneumomediastinum (resolved)  - Stable on 6L NC.  Desats with movement   - CTA PE from 11/26 negative for PE, showing multifocal pnuemonia, bronchiectasis, and fibrosis  - Continue with Bactrim DS for PCP (Monitor hyperkalemia)  - Fluticasone 1 spry BID   - Seen by lung transplant on 11/30--> per team, must be tapered to less than 10 mg pred and be able to amblate 100 ft on any amount of o2, rengage as appropriate   - Avoid CPAP d/t hx pneumomediastinum during stay   -Monitor sugars on Steroid, on 15 units of lantus (decrease if am sugar low) and SSI      #Covid PNA   -received Decadron, Remdesivir, Tocilizumab at OSH, Neutrophilic leukocytosis,  PSA bacteremia  - Afebrile, no leukocytosis   - 11/21 BC x2 3/4 pseudomonas   -11/25 Bcx Negative   -was on Zosyn11/22- 11/24, switched to PO Cipro 500mg BID 11/25-12/5  for total 14 days  - Continue Bactrim daily for PCP prophylactics      #DVT   -11/13: +Rt PT DVT, and + LT Soleal DVT   -CT PE negative   -Eliquis 5 mg BID      #HTN   #Interittent SvT   #Afib   -Metop tartrate 50 mg BID   -Holding home losartan 100 mg , bp stable   -EKG 12/10 stable, qtc 389      #Oliguric HUNG  on CKD (RESOLVED)   -Per documentation, previous baseline was 1.6, current creat around 1   -PRN diuresis as needed   -hx of bladder fistula     #Anxiety   #Pain control  -Continue seroquel 25 mg at night and 12.5 mg as needed with PT   -PRN oxy for pain, lidocaine  -Hold trazadone 25 mg, re-initiate if needed   -Bowel reg while on opiods   -Has had diarrhea previously, monitor      #Eczema/Rosasea  -Continue with dupixent      #Sacral Pressure Ulcer (stage 3)   -Wound care      F: PRN   E: PRN   N: Consistent card   A: PIV   DVT: Eliquis   GI: PPI      Disposition:   SNF     Code Status: Full Code (confirmed on admission)   NOK:  Primary Emergency Contact: SteveMathew, Home Phone: [phone]     Lambert Watson, M3    Excellent note written by medical student, edits made as appropriate.    Stiven Patrick DO  PGY-1 Neurology      I saw and evaluated the patient. I personally obtained the key and critical portions of the history and physical exam or was physically present for key and critical portions performed by the resident/fellow. I reviewed the resident/fellow's documentation and discussed the patient with the resident/fellow. I agree with the resident/fellow's medical decision making as documented in the note.    Jonas Eden MD

## 2023-12-14 NOTE — CARE PLAN
The patient's goals for the shift include      The clinical goals for the shift include Patient will remain HDS and VSS throughout shift 12/14/23 by 0700.      Problem: Nutrition  Goal: Oral intake greater 75%  Outcome: Progressing  Goal: Consume prescribed supplement  Outcome: Progressing  Goal: Adequate PO fluid intake  Outcome: Progressing  Goal: Nutrition support goals are met within 48 hrs  Outcome: Progressing  Goal: Nutrition support is meeting 75% of nutrient needs  Outcome: Progressing  Goal: BG  mg/dL  Outcome: Progressing  Goal: Lab values WNL  Outcome: Progressing  Goal: Electrolytes WNL  Outcome: Progressing  Goal: Promote healing  Outcome: Progressing     Problem: Skin  Goal: Decreased wound size/increased tissue granulation at next dressing change  Outcome: Progressing  Flowsheets (Taken 12/13/2023 1036 by Evangelist Lee RN)  Decreased wound size/increased tissue granulation at next dressing change:   Promote sleep for wound healing   Protective dressings over bony prominences  Goal: Participates in plan/prevention/treatment measures  Outcome: Progressing  Flowsheets (Taken 12/13/2023 1036 by Evangelist Lee RN)  Participates in plan/prevention/treatment measures: Elevate heels  Goal: Prevent/manage excess moisture  Outcome: Progressing  Flowsheets (Taken 12/13/2023 1036 by Evangelist Lee RN)  Prevent/manage excess moisture:   Cleanse incontinence/protect with barrier cream   Follow provider orders for dressing changes   Monitor for/manage infection if present  Goal: Prevent/minimize sheer/friction injuries  Outcome: Progressing  Flowsheets (Taken 12/13/2023 1036 by Evangelist Lee RN)  Prevent/minimize sheer/friction injuries:   Complete micro-shifts as needed if patient unable. Adjust patient position to relieve pressure points, not a full turn   Use pull sheet   Turn/reposition every 2 hours/use positioning/transfer devices  Goal: Promote skin healing  Outcome: Progressing  Flowsheets (Taken  12/13/2023 1036 by Evangelist Lee RN)  Promote skin healing:   Assess skin/pad under line(s)/device(s)   Protective dressings over bony prominences   Turn/reposition every 2 hours/use positioning/transfer devices     Problem: Fall/Injury  Goal: Verbalize understanding of personal risk factors for fall in the hospital  Outcome: Progressing  Goal: Verbalize understanding of risk factor reduction measures to prevent injury from fall in the home  Outcome: Progressing  Goal: Use assistive devices by end of the shift  Outcome: Progressing  Goal: Not fall by end of shift  Outcome: Progressing  Goal: Be free from injury by end of the shift  Outcome: Progressing  Goal: Pace activities to prevent fatigue by end of the shift  Outcome: Progressing     Problem: Discharge Planning  Goal: Discharge to home or other facility with appropriate resources  Outcome: Progressing     Problem: Chronic Conditions and Co-morbidities  Goal: Patient's chronic conditions and co-morbidity symptoms are monitored and maintained or improved  Outcome: Progressing     Problem: Respiratory  Goal: Clear secretions with interventions this shift  Outcome: Progressing  Goal: Minimize anxiety/maximize coping throughout shift  Outcome: Progressing  Goal: Minimal/no exertional discomfort or dyspnea this shift  Outcome: Progressing  Goal: No signs of respiratory distress (eg. Use of accessory muscles. Peds grunting)  Outcome: Progressing  Goal: Patent airway maintained this shift  Outcome: Progressing  Goal: Tolerate pulmonary toileting this shift  Outcome: Progressing  Goal: Verbalize decreased shortness of breath this shift  Outcome: Progressing  Goal: Wean oxygen to maintain O2 saturation per order/standard this shift  Outcome: Progressing  Goal: Increase self care and/or family involvement in next 24 hours  Outcome: Progressing

## 2023-12-14 NOTE — PROGRESS NOTES
Physical Therapy    Physical Therapy Treatment    Patient Name: Macarena Wilson  MRN: [id number]  Today's Date: 12/14/2023  Time Calculation  Start Time: 1136  Stop Time: 1220  Time Calculation (min): 44 min       Assessment/Plan   PT Assessment  Evaluation/Treatment Tolerance: Patient limited by fatigue  Medical Staff Made Aware: Yes  End of Session Communication: Bedside nurse  End of Session Patient Position: Bed, 3 rail up, Alarm off, not on at start of session (chair position)  PT Plan  Inpatient/Swing Bed or Outpatient: Inpatient  PT Plan  Treatment/Interventions: Bed mobility, Transfer training, Gait training, Balance training, Strengthening, Endurance training, Therapeutic exercise, Therapeutic activity, Postural re-education  PT Plan: Skilled PT  PT Frequency: 4 times per week  PT Discharge Recommendations: Moderate intensity level of continued care  PT - OK to Discharge: Yes      General Visit Information:   PT  Visit  PT Received On: 12/14/23  Response to Previous Treatment: Patient with no complaints from previous session.  General  Prior to Session Communication: Bedside nurse  Patient Position Received: Bed, 3 rail up, Alarm off, not on at start of session (chair position)  General Comment: Pt supine in bed upon entry to room. Pt pleasant, cooperative and willing to work with PT.    Subjective   Precautions:  Precautions  Medical Precautions: Fall precautions, Oxygen therapy device and L/min  Vital Signs:  Vital Signs  SpO2: 94 %    Objective   Pain:  Pain Assessment  Pain Assessment: 0-10  Pain Score: 0 - No pain  Cognition:  Cognition  Orientation Level: Oriented X4  Postural Control:  Postural Control  Posture Comment: pt in depependent chair position this date.    Treatments:  Therapeutic Exercise  Therapeutic Exercise Performed: Yes  Therapeutic Exercise Activity 1: x10 B ankle pumps with end range stretch applied by PT  Therapeutic Exercise Activity 2: x10 B quad sets  Therapeutic Exercise  Activity 3: x10 B glute sets  Therapeutic Exercise Activity 4: x10 B hip adduction isometric    Therapeutic Activity  Therapeutic Activity Performed: Yes  Therapeutic Activity 1: pt instructed throughout session on use of oursed lip breathing and importance of taking deep breaths vs short shallow breaths. Pt able to demonstrate proper use of incentive spirometer, but pulling less then 500cc at this time. Pt needing additional instruction while completing ther ex to not hold breath and to keep breathing.  Therapeutic Activity 2: Pt placed in long sitting from HOB elevated with max assist in bed with pt able to help assist minimally with arms to move into long sitting; pt unable to maintain postition by self and requring dependent assist to maintain position for ~5 seconds.    Bed Mobility  Bed Mobility: Yes  Bed Mobility 1  Bed Mobility 1:  (boost)  Level of Assistance 1: Dependent (x2)       Transfers  Transfer: No  Transfer 1  Transfer From 1:  (unable to safely progress this date)    Outcome Measures:  [facility] Basic Mobility  Turning from your back to your side while in a flat bed without using bedrails: Total  Moving from lying on your back to sitting on the side of a flat bed without using bedrails: Total  Moving to and from bed to chair (including a wheelchair): Total  Standing up from a chair using your arms (e.g. wheelchair or bedside chair): Total  To walk in hospital room: Total  Climbing 3-5 steps with railing: Total  Basic Mobility - Total Score: 6    Education Documentation  Handouts, taught by Carmel Mcdaniel PT at 12/14/2023  3:32 PM.  Learner: Patient  Readiness: Acceptance  Method: Explanation  Response: Needs Reinforcement  Comment: pt given handout of supine ther ex this date.    Mobility Training, taught by Carmel Mcdaniel PT at 12/14/2023  3:32 PM.  Learner: Patient  Readiness: Acceptance  Method: Explanation  Response: Needs Reinforcement  Comment: pt given handout of supine ther ex this  date.    Education Comments  No comments found.        OP EDUCATION:       Encounter Problems       Encounter Problems (Active)       Balance       patient will complete static (SBAx1) and dynamic (Cgx1) standing balance activities using LRD as needed without acute LOB, while maintaining stable vitals.  (Not Progressing)       Start:  11/10/23    Expected End:  12/27/23               Mobility       STG - Patient will ambulate >/=50 ft using LRD as needed with Cgx1 assist without acute LOB, while maintaining stable vitals.  (Progressing)       Start:  11/10/23    Expected End:  12/27/23            patient will participate in BLE there-ex in order to improve strength and to assist with the completion of functional mobility tasks.  (Not Progressing)       Start:  11/10/23    Expected End:  12/27/23            patient will ambulate >/=30 ft consecutively and >/=75 ft cumulatively with </=1 sitting rest break while maintaining stable vitals, reporting <13/20 on the RPE scale.  (Not Progressing)       Start:  11/10/23    Expected End:  12/27/23               Pain - Adult          Transfers       STG - Transfer from bed to chair with CGx1 assist without acute LOB, using LRD as needed  (Not Progressing)       Start:  11/10/23    Expected End:  12/27/23            STG - Patient will perform bed mobility with SBAx1 without acute LOB, using bedrailing as needed.  (Progressing)       Start:  11/10/23    Expected End:  12/27/23            STG - Patient will transfer sit to and from stand with Cgx1 assist using LRD as needed without acute LOB  (Not Progressing)       Start:  11/10/23    Expected End:  12/27/23 12/14/23 at 3:33 PM - Carmel Mcdaniel, PT

## 2023-12-14 NOTE — CARE PLAN
The patient's goals for the shift include      The clinical goals for the shift include pt will remain safe and free from injuries and falls throughout shift      Problem: Nutrition  Goal: Oral intake greater 75%  12/14/2023 0759 by Amilcar Myers RN  Outcome: Progressing  12/14/2023 0758 by Amilcar Myers RN  Outcome: Progressing  Goal: Consume prescribed supplement  12/14/2023 0759 by Amilcar Myers RN  Outcome: Progressing  12/14/2023 0758 by Amilcar Myers RN  Outcome: Progressing  Goal: Adequate PO fluid intake  12/14/2023 0759 by Amilcar Myers RN  Outcome: Progressing  12/14/2023 0758 by Amilcar Myers RN  Outcome: Progressing  Goal: Nutrition support goals are met within 48 hrs  12/14/2023 0759 by Amilcar Myers RN  Outcome: Progressing  12/14/2023 0758 by Amilcar Myers RN  Outcome: Progressing  Goal: Nutrition support is meeting 75% of nutrient needs  12/14/2023 0759 by Amilcar Myers RN  Outcome: Progressing  12/14/2023 0758 by Amilcar Myers RN  Outcome: Progressing  Goal: BG  mg/dL  12/14/2023 0759 by Amilcar Myers RN  Outcome: Progressing  12/14/2023 0758 by Amilcar Myers RN  Outcome: Progressing  Goal: Lab values WNL  12/14/2023 0759 by Amilcar Myers RN  Outcome: Progressing  12/14/2023 0758 by Amilcar Myers RN  Outcome: Progressing  Goal: Electrolytes WNL  12/14/2023 0759 by Amilcar Myers RN  Outcome: Progressing  12/14/2023 0758 by Amilcar Myers RN  Outcome: Progressing  Goal: Promote healing  12/14/2023 0759 by Amilcar Myers RN  Outcome: Progressing  12/14/2023 0758 by Amilcar Myers RN  Outcome: Progressing     Problem: Skin  Goal: Decreased wound size/increased tissue granulation at next dressing change  12/14/2023 0759 by Amilcar Myers RN  Outcome: Progressing  12/14/2023 0758 by Amilcar Myers RN  Outcome: Progressing  Goal: Participates in plan/prevention/treatment measures  12/14/2023 0759 by Amilcar Myers  RN  Outcome: Progressing  12/14/2023 0758 by Amilcar Myers RN  Outcome: Progressing  Goal: Prevent/manage excess moisture  12/14/2023 0759 by Amilcar Myers RN  Outcome: Progressing  12/14/2023 0758 by Amilcar Myers RN  Outcome: Progressing  Goal: Prevent/minimize sheer/friction injuries  12/14/2023 0759 by Amilcar Myers RN  Outcome: Progressing  12/14/2023 0758 by Amilcar Myers RN  Outcome: Progressing  Goal: Promote skin healing  12/14/2023 0759 by Amilcar Myers RN  Outcome: Progressing  12/14/2023 0758 by Amilcar Myers RN  Outcome: Progressing     Problem: Fall/Injury  Goal: Verbalize understanding of personal risk factors for fall in the hospital  12/14/2023 0759 by Amilcar Myers RN  Outcome: Progressing  12/14/2023 0758 by Amilcar Myers RN  Outcome: Progressing  Goal: Verbalize understanding of risk factor reduction measures to prevent injury from fall in the home  12/14/2023 0759 by Amilcar Myers RN  Outcome: Progressing  12/14/2023 0758 by Amilcar Myers RN  Outcome: Progressing  Goal: Use assistive devices by end of the shift  12/14/2023 0759 by Amilcar Myers RN  Outcome: Progressing  12/14/2023 0758 by Amilcar Myers RN  Outcome: Progressing  Goal: Not fall by end of shift  12/14/2023 0759 by Amilcar Myers RN  Outcome: Progressing  12/14/2023 0758 by Amilcar Myers RN  Outcome: Progressing  Goal: Be free from injury by end of the shift  12/14/2023 0759 by Amilcar Myers RN  Outcome: Progressing  12/14/2023 0758 by Amilcar Myers RN  Outcome: Progressing  Goal: Pace activities to prevent fatigue by end of the shift  12/14/2023 0759 by Amilcar Myers RN  Outcome: Progressing  12/14/2023 0758 by Amilcar Myers RN  Outcome: Progressing     Problem: Discharge Planning  Goal: Discharge to home or other facility with appropriate resources  12/14/2023 0759 by Amilcar Myers RN  Outcome: Progressing  12/14/2023 0758 by Amilcar Myers  RN  Outcome: Progressing     Problem: Chronic Conditions and Co-morbidities  Goal: Patient's chronic conditions and co-morbidity symptoms are monitored and maintained or improved  12/14/2023 0759 by Amilcar Myers RN  Outcome: Progressing  12/14/2023 0758 by Amilcar Myers RN  Outcome: Progressing     Problem: Respiratory  Goal: Clear secretions with interventions this shift  12/14/2023 0759 by Amilcar Myers RN  Outcome: Progressing  12/14/2023 0758 by Amilcar Myers RN  Outcome: Progressing  Goal: Minimize anxiety/maximize coping throughout shift  12/14/2023 0759 by Amilcar Myers RN  Outcome: Progressing  12/14/2023 0758 by Amilcar Myers RN  Outcome: Progressing  Goal: Minimal/no exertional discomfort or dyspnea this shift  12/14/2023 0759 by Amilcar Myers RN  Outcome: Progressing  12/14/2023 0758 by Amilcar Myers RN  Outcome: Progressing  Goal: No signs of respiratory distress (eg. Use of accessory muscles. Peds grunting)  12/14/2023 0759 by Amilcar Myers RN  Outcome: Progressing  12/14/2023 0758 by Amilcar Myers RN  Outcome: Progressing  Goal: Patent airway maintained this shift  12/14/2023 0759 by Amilcar Myers RN  Outcome: Progressing  12/14/2023 0758 by Amilcar Myers RN  Outcome: Progressing  Goal: Tolerate pulmonary toileting this shift  12/14/2023 0759 by Amilcar Myers RN  Outcome: Progressing  12/14/2023 0758 by Amilcar Myers RN  Outcome: Progressing  Goal: Verbalize decreased shortness of breath this shift  12/14/2023 0759 by Amilcar Myers RN  Outcome: Progressing  12/14/2023 0758 by Amilcar Myers RN  Outcome: Progressing  Goal: Wean oxygen to maintain O2 saturation per order/standard this shift  12/14/2023 0759 by Amilcar Myers RN  Outcome: Progressing  12/14/2023 0758 by Amilcar Myers RN  Outcome: Progressing  Goal: Increase self care and/or family involvement in next 24 hours  12/14/2023 0759 by Amilcar Myers RN  Outcome:  Progressing  12/14/2023 0758 by Amilcar Myers, RN  Outcome: Progressing

## 2023-12-15 NOTE — PROGRESS NOTES
Macarena Wilson is a 68 y.o. female on day 36 of admission presenting with Acute hypoxemic respiratory failure (CMS/HCC).    New SNF preferences needed for referral. Pt directed SW to consult daughter-in-law Kendra for her input on SNFs. SW reached out to Kendra and emailed a new SNF choice list to her, with [place], [phone] as location. SW will follow. LORY Miguel.     12/07/2023 1432  Kendra received the new SNF list and will notify SW of preferences. Referral in [facility] will be updated once preferences are received. LORY Miguel.     12/07/2023  Pt daughter-in-law Kendra chose new preferences for SNF: 1 [facility] [place], 2. [facility], 3. [facility], 4. [facility], and 5. [facility]. Referral updated and sent in [facility]. SW will follow. LORY Miguel.     12/15/2023  [facility] [place] is waiting on final acceptance from their medical team. SW asked for an update. SW will follow. LORY Miguel.

## 2023-12-15 NOTE — PROGRESS NOTES
"Macarena Wilson is a 68 y.o. female on day 36 of admission presenting with Acute hypoxemic respiratory failure (CMS/HCC).    Subjective     Ms. Wilson reports no acute overnight events.  She states that she is comfortable at rest on 6 L of oxygen.  She states that she saw both OT and PT yesterday, and was actively involved in leg strengthening exercises.    Objective     Physical Exam    Constitutional: Patient in no acute distress.  Alert and cooperative.  Eyes: PERRL, EOMI, no icterus.  ENMT: Mucous membranes moist.  Head/Neck: Neck supple with no apparent injury.  Respiratory/Thorax: Lungs diminished bilaterally.  Non-labored breathing, no cough.  On 6L NC.  Cardiovascular: Regular rate and rhythm.  No murmurs.  Normal S1 and S2.  Gastrointestinal: Nondistended, soft, non-tender.  : External catheter in place.  Musculoskeletal: ROM intact, generalized weakness.  Extremities: No edema. Bruising improving.  Neurological: Alert and oriented to person, place and time.  Speech clear.  Follows commands appropriately and without focal deficits.  Skin: Warm and dry.    Last Recorded Vitals  Blood pressure 132/63, pulse 85, temperature 36.1 °C (97 °F), temperature source Temporal, resp. rate 18, height 1.6 m (5' 2.99\"), weight 67.7 kg (149 lb 4 oz), SpO2 100 %.  Intake/Output last 3 Shifts:  I/O last 3 completed shifts:  In: - (0 mL/kg)   Out: 1500 (21.9 mL/kg) [Urine:1500 (0.6 mL/kg/hr)]  Dosing Weight: 68.4 kg     Relevant Results              Scheduled medications  apixaban, 5 mg, oral, BID  ergocalciferol, 1,250 mcg, oral, Weekly  fluticasone, 1 spray, Each Nostril, BID  insulin glargine, 8 Units, subcutaneous, Nightly  insulin lispro, 0-15 Units, subcutaneous, TID with meals  lidocaine, 1 patch, transdermal, Daily  melatonin, 5 mg, oral, Daily  metoprolol tartrate, 50 mg, oral, BID  pantoprazole, 40 mg, oral, Daily before breakfast  [START ON 12/18/2023] predniSONE, 30 mg, oral, BID  predniSONE, 80 mg, oral, " Daily  QUEtiapine, 25 mg, oral, Nightly  sennosides-docusate sodium, 1 tablet, oral, BID  sodium zirconium cyclosilicate, 10 g, oral, TID  sulfamethoxazole-trimethoprim, 80 mg, oral, Daily  thiamine, 100 mg, oral, Daily      Continuous medications  oxygen, , Last Rate: 5 L/min (12/10/23 2040)      PRN medications  PRN medications: acetaminophen **OR** [DISCONTINUED] acetaminophen **OR** [DISCONTINUED] acetaminophen, dextrose 10 % in water (D10W), dextrose, ipratropium-albuteroL, loperamide, ondansetron ODT **OR** [DISCONTINUED] ondansetron, oxyCODONE, QUEtiapine     Results for orders placed or performed during the hospital encounter of 11/09/23 (from the past 24 hour(s))   POCT GLUCOSE   Result Value Ref Range    POCT Glucose 163 (H) 74 - 99 mg/dL   CBC and Auto Differential   Result Value Ref Range    WBC 11.1 4.4 - 11.3 x10*3/uL    nRBC 0.8 (H) 0.0 - 0.0 /100 WBCs    RBC 3.32 (L) 4.00 - 5.20 x10*6/uL    Hemoglobin 9.5 (L) 12.0 - 16.0 g/dL    Hematocrit 30.2 (L) 36.0 - 46.0 %    MCV 91 80 - 100 fL    MCH 28.6 26.0 - 34.0 pg    MCHC 31.5 (L) 32.0 - 36.0 g/dL    RDW 16.8 (H) 11.5 - 14.5 %    Platelets 87 (L) 150 - 450 x10*3/uL    Immature Granulocytes %, Automated 9.8 (H) 0.0 - 0.9 %    Immature Granulocytes Absolute, Automated 1.08 (H) 0.00 - 0.70 x10*3/uL   Magnesium   Result Value Ref Range    Magnesium 1.95 1.60 - 2.40 mg/dL   Renal Function Panel   Result Value Ref Range    Glucose 190 (H) 74 - 99 mg/dL    Sodium 144 136 - 145 mmol/L    Potassium 3.2 (L) 3.5 - 5.3 mmol/L    Chloride 110 (H) 98 - 107 mmol/L    Bicarbonate 23 21 - 32 mmol/L    Anion Gap 14 10 - 20 mmol/L    Urea Nitrogen 58 (H) 6 - 23 mg/dL    Creatinine 0.89 0.50 - 1.05 mg/dL    eGFR 70 >60 mL/min/1.73m*2    Calcium 8.3 (L) 8.6 - 10.6 mg/dL    Phosphorus 3.1 2.5 - 4.9 mg/dL    Albumin 2.7 (L) 3.4 - 5.0 g/dL   Manual Differential   Result Value Ref Range    Neutrophils %, Manual 85.5 40.0 - 80.0 %    Bands %, Manual 2.6 0.0 - 5.0 %    Lymphocytes  %, Manual 4.3 13.0 - 44.0 %    Monocytes %, Manual 2.5 2.0 - 10.0 %    Eosinophils %, Manual 0.0 0.0 - 6.0 %    Basophils %, Manual 0.0 0.0 - 2.0 %    Metamyelocytes %, Manual 1.7 0.0 - 0.0 %    Myelocytes %, Manual 3.4 0.0 - 0.0 %    Seg Neutrophils Absolute, Manual 9.49 (H) 1.20 - 7.00 x10*3/uL    Bands Absolute, Manual 0.29 0.00 - 0.70 x10*3/uL    Lymphocytes Absolute, Manual 0.48 (L) 1.20 - 4.80 x10*3/uL    Monocytes Absolute, Manual 0.28 0.10 - 1.00 x10*3/uL    Eosinophils Absolute, Manual 0.00 0.00 - 0.70 x10*3/uL    Basophils Absolute, Manual 0.00 0.00 - 0.10 x10*3/uL    Metamyelocytes Absolute, Manual 0.19 0.00 - 0.00 x10*3/uL    Myelocytes Absolute, Manual 0.38 0.00 - 0.00 x10*3/uL    Total Cells Counted 117     Neutrophils Absolute, Manual 9.78 (H) 1.20 - 7.70 x10*3/uL    RBC Morphology See Below     Ovalocytes Few     Teardrop Cells Few     Bertrand Cells Few               Malnutrition Diagnosis Status: New  Malnutrition Diagnosis: Severe malnutrition related to acute disease or injury  As Evidenced by: 12% weight loss from normal along with moderate to severe muscle and fat loss on physical exam in setting of a decrease in PO intake/appetite after being diagnosed with COVID  I agree with the dietitian's malnutrition diagnosis.      Assessment/Plan   Principal Problem:    Acute hypoxemic respiratory failure (CMS/HCC)  Active Problems:    Stage 4 chronic kidney disease (CMS/[facility])    COVID-19    Pneumonia of both lower lobes due to infectious organism    Pseudomonal bacteremia    A-fib (CMS/[facility])    Ms. Wilson is a 69 y/o female with hx HTN, CKD stage III who was admitted to the MICU from outside hospital with AHRF 2/2 COVID-pneumonia (tested positive on 10/13), course C/B by ARDS and pulmonary fibrosis resulting in organizing PNA.  Course also complicated by distal DVT, oliguric HUNG on CKD.  Patient weaned to 6 from 8 L nasal cannula but requires increased FiO2 with movement and exertion. Transferred to  [facility] service on 12/11 for continued management.     Updates 12/15:  -Prednisone taper- On Monday (12/11) patient was decreased prednisone to 80mg daily for 7 days.  On 12/18, switch to 60mg daily 2-4 weeks.  Then, can consider decreasing by 10-20mg every 1-2 weeks based on clinical appearance.  -Patient's potassium (K=3.2 today) repleted with 40 meQ potassium chloride.  -Plan to discharge patient to SNF early next week.  Patient's number one choice is [facility] in [facility].     #AHRF 2/2 COVID PNA (11/23) c/b ARDS, Pulm fibrosis, Organizing PNA 2/2 Covid 19  , Pneumomediastinum (resolved)  - Stable on 6L NC.  Desats with movement   - CTA PE from 11/26 negative for PE, showing multifocal pnuemonia, bronchiectasis, and fibrosis  - Continue with Bactrim DS for PCP (Monitor hyperkalemia)  - Fluticasone 1 spry BID   - Seen by lung transplant on 11/30--> per team, must be tapered to less than 10 mg pred and be able to amblate 100 ft on any amount of o2, rengage as appropriate   - Avoid CPAP d/t hx pneumomediastinum during stay   -Monitor sugars on Steroid, on 15 units of lantus (decrease if am sugar low) and SSI      #Covid PNA   -received Decadron, Remdesivir, Tocilizumab at OSH, Neutrophilic leukocytosis,  PSA bacteremia  - Afebrile, no leukocytosis   - 11/21 BC x2 3/4 pseudomonas   -11/25 Bcx Negative   -was on Zosyn11/22- 11/24, switched to PO Cipro 500mg BID 11/25-12/5 for total 14 days  - Continue Bactrim daily for PCP prophylactics      #DVT   -11/13: +Rt PT DVT, and + LT Soleal DVT   -CT PE negative   -Eliquis 5 mg BID      #HTN   #Interittent SvT   #Afib   -Metop tartrate 50 mg BID   -Holding home losartan 100 mg , bp stable   -EKG 12/10 stable, qtc 389      #Oliguric HUNG  on CKD (RESOLVED)   -Per documentation, previous baseline was 1.6, current creat around 1   -PRN diuresis as needed   -hx of bladder fistula     #Anxiety   #Pain control  -Continue seroquel 25 mg at night and 12.5 mg  as needed with PT   -PRN oxy for pain, lidocaine  -Hold trazadone 25 mg, re-initiate if needed   -Bowel reg while on opiods   -Has had diarrhea previously, monitor      #Eczema/Rosasea  -Continue with dupixent      #Sacral Pressure Ulcer (stage 3)   -Wound care      F: PRN   E: PRN   N: Consistent card   A: PIV   DVT: Eliquis   GI: PPI      Disposition:   SNF     Code Status: Full Code (confirmed on admission)   NOK:  Primary Emergency Contact: Mathew Wilson, Home Phone: [phone]     Lambert Watson,

## 2023-12-15 NOTE — CARE PLAN
The patient's goals for the shift include      The clinical goals for the shift include Patient will remain HDS and VSS by 12/15/23 at 1900      Problem: Nutrition  Goal: Oral intake greater 75%  Outcome: Progressing  Goal: Consume prescribed supplement  Outcome: Progressing  Goal: Adequate PO fluid intake  Outcome: Progressing  Goal: Nutrition support goals are met within 48 hrs  Outcome: Progressing  Goal: Nutrition support is meeting 75% of nutrient needs  Outcome: Progressing  Goal: BG  mg/dL  Outcome: Progressing  Goal: Lab values WNL  Outcome: Progressing  Goal: Electrolytes WNL  Outcome: Progressing  Goal: Promote healing  Outcome: Progressing     Problem: Skin  Goal: Decreased wound size/increased tissue granulation at next dressing change  Outcome: Progressing  Flowsheets (Taken 12/14/2023 2220 by Birdie Henry, RN)  Decreased wound size/increased tissue granulation at next dressing change:   Promote sleep for wound healing   Protective dressings over bony prominences  Goal: Participates in plan/prevention/treatment measures  Outcome: Progressing  Flowsheets (Taken 12/14/2023 2221 by Birdie Henry, RN)  Participates in plan/prevention/treatment measures:   Elevate heels   Discuss with provider PT/OT consult  Goal: Prevent/manage excess moisture  Outcome: Progressing  Flowsheets (Taken 12/14/2023 2221 by Birdie Henry, RN)  Prevent/manage excess moisture:   Cleanse incontinence/protect with barrier cream   Follow provider orders for dressing changes  Goal: Prevent/minimize sheer/friction injuries  Outcome: Progressing  Flowsheets (Taken 12/14/2023 2221 by Birdie Henry, RN)  Prevent/minimize sheer/friction injuries:   Complete micro-shifts as needed if patient unable. Adjust patient position to relieve pressure points, not a full turn   Turn/reposition every 2 hours/use positioning/transfer devices   Use pull sheet  Goal: Promote skin healing  Outcome: Progressing  Flowsheets (Taken 12/14/2023 2221  by Birdie Elaine, RN)  Promote skin healing:   Assess skin/pad under line(s)/device(s)   Protective dressings over bony prominences   Turn/reposition every 2 hours/use positioning/transfer devices     Problem: Fall/Injury  Goal: Verbalize understanding of personal risk factors for fall in the hospital  Outcome: Progressing  Goal: Verbalize understanding of risk factor reduction measures to prevent injury from fall in the home  Outcome: Progressing  Goal: Use assistive devices by end of the shift  Outcome: Progressing  Goal: Not fall by end of shift  Outcome: Progressing  Goal: Be free from injury by end of the shift  Outcome: Progressing  Goal: Pace activities to prevent fatigue by end of the shift  Outcome: Progressing     Problem: Discharge Planning  Goal: Discharge to home or other facility with appropriate resources  Outcome: Progressing     Problem: Chronic Conditions and Co-morbidities  Goal: Patient's chronic conditions and co-morbidity symptoms are monitored and maintained or improved  Outcome: Progressing     Problem: Respiratory  Goal: Clear secretions with interventions this shift  Outcome: Progressing  Goal: Minimize anxiety/maximize coping throughout shift  Outcome: Progressing  Goal: Minimal/no exertional discomfort or dyspnea this shift  Outcome: Progressing  Goal: No signs of respiratory distress (eg. Use of accessory muscles. Peds grunting)  Outcome: Progressing  Goal: Patent airway maintained this shift  Outcome: Progressing  Goal: Tolerate pulmonary toileting this shift  Outcome: Progressing  Goal: Verbalize decreased shortness of breath this shift  Outcome: Progressing  Goal: Wean oxygen to maintain O2 saturation per order/standard this shift  Outcome: Progressing  Goal: Increase self care and/or family involvement in next 24 hours  Outcome: Progressing

## 2023-12-15 NOTE — CARE PLAN
Problem: Nutrition  Goal: Oral intake greater 75%  Outcome: Progressing  Goal: Consume prescribed supplement  Outcome: Progressing  Goal: Adequate PO fluid intake  Outcome: Progressing  Goal: Nutrition support goals are met within 48 hrs  Outcome: Progressing  Goal: Nutrition support is meeting 75% of nutrient needs  Outcome: Progressing  Goal: BG  mg/dL  Outcome: Progressing  Goal: Lab values WNL  Outcome: Progressing  Goal: Electrolytes WNL  Outcome: Progressing  Goal: Promote healing  Outcome: Progressing     Problem: Skin  Goal: Decreased wound size/increased tissue granulation at next dressing change  Outcome: Progressing  Flowsheets (Taken 12/14/2023 2220)  Decreased wound size/increased tissue granulation at next dressing change:   Promote sleep for wound healing   Protective dressings over bony prominences  Goal: Participates in plan/prevention/treatment measures  12/14/2023 2221 by Birdie Henry RN  Flowsheets (Taken 12/14/2023 2221)  Participates in plan/prevention/treatment measures:   Elevate heels   Discuss with provider PT/OT consult  12/14/2023 2220 by Birdie Henry RN  Outcome: Progressing  Goal: Prevent/manage excess moisture  12/14/2023 2221 by Birdie Hnery RN  Flowsheets (Taken 12/14/2023 2221)  Prevent/manage excess moisture:   Cleanse incontinence/protect with barrier cream   Follow provider orders for dressing changes  12/14/2023 2220 by Birdie Henry RN  Outcome: Progressing  Goal: Prevent/minimize sheer/friction injuries  12/14/2023 2221 by Birdie Henry RN  Flowsheets (Taken 12/14/2023 2221)  Prevent/minimize sheer/friction injuries:   Complete micro-shifts as needed if patient unable. Adjust patient position to relieve pressure points, not a full turn   Turn/reposition every 2 hours/use positioning/transfer devices   Use pull sheet  12/14/2023 2220 by Birdie Henry RN  Outcome: Progressing  Goal: Promote skin healing  12/14/2023 2221 by Birdie Henry RN  Flowsheets  (Taken 12/14/2023 2221)  Promote skin healing:   Assess skin/pad under line(s)/device(s)   Protective dressings over bony prominences   Turn/reposition every 2 hours/use positioning/transfer devices  12/14/2023 2220 by Birdie Henry RN  Outcome: Progressing     Problem: Fall/Injury  Goal: Verbalize understanding of personal risk factors for fall in the hospital  Outcome: Progressing  Goal: Verbalize understanding of risk factor reduction measures to prevent injury from fall in the home  Outcome: Progressing  Goal: Use assistive devices by end of the shift  Outcome: Progressing  Goal: Not fall by end of shift  Outcome: Progressing  Goal: Be free from injury by end of the shift  Outcome: Progressing  Goal: Pace activities to prevent fatigue by end of the shift  Outcome: Progressing     Problem: Discharge Planning  Goal: Discharge to home or other facility with appropriate resources  Outcome: Progressing     Problem: Chronic Conditions and Co-morbidities  Goal: Patient's chronic conditions and co-morbidity symptoms are monitored and maintained or improved  Outcome: Progressing     Problem: Respiratory  Goal: Clear secretions with interventions this shift  Outcome: Progressing  Goal: Minimize anxiety/maximize coping throughout shift  Outcome: Progressing  Goal: Minimal/no exertional discomfort or dyspnea this shift  Outcome: Progressing  Goal: No signs of respiratory distress (eg. Use of accessory muscles. Peds grunting)  Outcome: Progressing  Goal: Patent airway maintained this shift  Outcome: Progressing  Goal: Tolerate pulmonary toileting this shift  Outcome: Progressing  Goal: Verbalize decreased shortness of breath this shift  Outcome: Progressing  Goal: Wean oxygen to maintain O2 saturation per order/standard this shift  Outcome: Progressing  Goal: Increase self care and/or family involvement in next 24 hours  Outcome: Progressing

## 2023-12-16 NOTE — CARE PLAN
Problem: Nutrition  Goal: Oral intake greater 75%  Outcome: Progressing  Goal: Consume prescribed supplement  Outcome: Progressing  Goal: Adequate PO fluid intake  Outcome: Progressing  Goal: Nutrition support goals are met within 48 hrs  Outcome: Progressing  Goal: Nutrition support is meeting 75% of nutrient needs  Outcome: Progressing  Goal: BG  mg/dL  Outcome: Progressing  Goal: Lab values WNL  Outcome: Progressing  Goal: Electrolytes WNL  Outcome: Progressing  Goal: Promote healing  Outcome: Progressing     Problem: Skin  Goal: Decreased wound size/increased tissue granulation at next dressing change  Outcome: Progressing  Flowsheets (Taken 12/15/2023 2315)  Decreased wound size/increased tissue granulation at next dressing change:   Promote sleep for wound healing   Protective dressings over bony prominences  Goal: Participates in plan/prevention/treatment measures  Outcome: Progressing  Flowsheets (Taken 12/15/2023 2315)  Participates in plan/prevention/treatment measures:   Discuss with provider PT/OT consult   Elevate heels  Goal: Prevent/manage excess moisture  Outcome: Progressing  Flowsheets (Taken 12/15/2023 2315)  Prevent/manage excess moisture:   Cleanse incontinence/protect with barrier cream   Follow provider orders for dressing changes  Goal: Prevent/minimize sheer/friction injuries  Outcome: Progressing  Flowsheets (Taken 12/15/2023 2315)  Prevent/minimize sheer/friction injuries:   Turn/reposition every 2 hours/use positioning/transfer devices   Use pull sheet  Goal: Promote skin healing  Outcome: Progressing  Flowsheets (Taken 12/15/2023 2315)  Promote skin healing:   Assess skin/pad under line(s)/device(s)   Protective dressings over bony prominences   Turn/reposition every 2 hours/use positioning/transfer devices     Problem: Fall/Injury  Goal: Verbalize understanding of personal risk factors for fall in the hospital  Outcome: Progressing  Goal: Verbalize understanding of risk factor  reduction measures to prevent injury from fall in the home  Outcome: Progressing  Goal: Use assistive devices by end of the shift  Outcome: Progressing  Goal: Not fall by end of shift  Outcome: Progressing  Goal: Be free from injury by end of the shift  Outcome: Progressing  Goal: Pace activities to prevent fatigue by end of the shift  Outcome: Progressing     Problem: Discharge Planning  Goal: Discharge to home or other facility with appropriate resources  Outcome: Progressing     Problem: Chronic Conditions and Co-morbidities  Goal: Patient's chronic conditions and co-morbidity symptoms are monitored and maintained or improved  Outcome: Progressing     Problem: Respiratory  Goal: Clear secretions with interventions this shift  Outcome: Progressing  Goal: Minimize anxiety/maximize coping throughout shift  Outcome: Progressing  Goal: Minimal/no exertional discomfort or dyspnea this shift  Outcome: Progressing  Goal: No signs of respiratory distress (eg. Use of accessory muscles. Peds grunting)  Outcome: Progressing  Goal: Patent airway maintained this shift  Outcome: Progressing  Goal: Tolerate pulmonary toileting this shift  Outcome: Progressing  Goal: Verbalize decreased shortness of breath this shift  Outcome: Progressing  Goal: Wean oxygen to maintain O2 saturation per order/standard this shift  Outcome: Progressing  Goal: Increase self care and/or family involvement in next 24 hours  Outcome: Progressing

## 2023-12-17 NOTE — CARE PLAN
Problem: Skin  Goal: Decreased wound size/increased tissue granulation at next dressing change  Flowsheets (Taken 12/17/2023 0154)  Decreased wound size/increased tissue granulation at next dressing change:   Promote sleep for wound healing   Protective dressings over bony prominences  Goal: Participates in plan/prevention/treatment measures  Flowsheets (Taken 12/17/2023 0154)  Participates in plan/prevention/treatment measures:   Discuss with provider PT/OT consult   Elevate heels  Goal: Prevent/manage excess moisture  Flowsheets (Taken 12/17/2023 0154)  Prevent/manage excess moisture:   Cleanse incontinence/protect with barrier cream   Follow provider orders for dressing changes  Goal: Prevent/minimize sheer/friction injuries  Flowsheets (Taken 12/17/2023 0154)  Prevent/minimize sheer/friction injuries:   Turn/reposition every 2 hours/use positioning/transfer devices   Use pull sheet  Goal: Promote skin healing  Flowsheets (Taken 12/17/2023 0154)  Promote skin healing:   Assess skin/pad under line(s)/device(s)   Protective dressings over bony prominences   Turn/reposition every 2 hours/use positioning/transfer devices

## 2023-12-17 NOTE — PROGRESS NOTES
"Macarena Wilson is a 68 y.o. female on day 38 of admission presenting with Acute hypoxemic respiratory failure (CMS/HCC).    Subjective   No acute events overnight. Feels well this AM, with no additional concerns. Denies CP, SOB, cough, constipation, or diarrhea. Feels ready to go to SNF.        Objective     Physical Exam  Constitutional: Patient in no acute distress.  Alert and cooperative.  Eyes: PERRL, EOMI, no icterus.  ENMT: Mucous membranes moist.  Head/Neck: Neck supple with no apparent injury.  Respiratory/Thorax: Lungs diminished bilaterally.  Non-labored breathing, no cough.  On 6L NC.  Cardiovascular: Regular rate and rhythm.  No murmurs.  Normal S1 and S2.  Gastrointestinal: Nondistended, soft, non-tender.  : External catheter in place.  Musculoskeletal: ROM intact, generalized weakness.  Extremities: No edema. Bruising improving.  Neurological: Alert and oriented to person, place and time.  Speech clear.  Follows commands appropriately and without focal deficits.  Skin: Warm and dry.    Last Recorded Vitals  Blood pressure 115/74, pulse (!) 116, temperature 36.3 °C (97.3 °F), temperature source Temporal, resp. rate 18, height 1.6 m (5' 2.99\"), weight 67.7 kg (149 lb 4 oz), SpO2 97 %.  Intake/Output last 3 Shifts:  I/O last 3 completed shifts:  In: - (0 mL/kg)   Out: 1000 (14.6 mL/kg) [Urine:1000 (0.4 mL/kg/hr)]  Dosing Weight: 68.4 kg     Results for orders placed or performed during the hospital encounter of 11/09/23 (from the past 24 hour(s))   POCT GLUCOSE   Result Value Ref Range    POCT Glucose 306 (H) 74 - 99 mg/dL   CBC and Auto Differential   Result Value Ref Range    WBC 9.3 4.4 - 11.3 x10*3/uL    nRBC 0.9 (H) 0.0 - 0.0 /100 WBCs    RBC 3.33 (L) 4.00 - 5.20 x10*6/uL    Hemoglobin 9.4 (L) 12.0 - 16.0 g/dL    Hematocrit 30.3 (L) 36.0 - 46.0 %    MCV 91 80 - 100 fL    MCH 28.2 26.0 - 34.0 pg    MCHC 31.0 (L) 32.0 - 36.0 g/dL    RDW 16.9 (H) 11.5 - 14.5 %    Platelets 65 (L) 150 - 450 " x10*3/uL    Immature Granulocytes %, Automated 7.6 (H) 0.0 - 0.9 %    Immature Granulocytes Absolute, Automated 0.70 0.00 - 0.70 x10*3/uL   Magnesium   Result Value Ref Range    Magnesium 2.08 1.60 - 2.40 mg/dL   Renal Function Panel   Result Value Ref Range    Glucose 80 74 - 99 mg/dL    Sodium 144 136 - 145 mmol/L    Potassium 3.6 3.5 - 5.3 mmol/L    Chloride 110 (H) 98 - 107 mmol/L    Bicarbonate 27 21 - 32 mmol/L    Anion Gap 11 10 - 20 mmol/L    Urea Nitrogen 47 (H) 6 - 23 mg/dL    Creatinine 0.81 0.50 - 1.05 mg/dL    eGFR 79 >60 mL/min/1.73m*2    Calcium 8.7 8.6 - 10.6 mg/dL    Phosphorus 3.2 2.5 - 4.9 mg/dL    Albumin 2.8 (L) 3.4 - 5.0 g/dL   Manual Differential   Result Value Ref Range    Neutrophils %, Manual 80.0 40.0 - 80.0 %    Bands %, Manual 9.0 0.0 - 5.0 %    Lymphocytes %, Manual 3.0 13.0 - 44.0 %    Monocytes %, Manual 2.0 2.0 - 10.0 %    Eosinophils %, Manual 0.0 0.0 - 6.0 %    Basophils %, Manual 0.0 0.0 - 2.0 %    Metamyelocytes %, Manual 3.0 0.0 - 0.0 %    Myelocytes %, Manual 3.0 0.0 - 0.0 %    Seg Neutrophils Absolute, Manual 7.44 (H) 1.20 - 7.00 x10*3/uL    Bands Absolute, Manual 0.84 (H) 0.00 - 0.70 x10*3/uL    Lymphocytes Absolute, Manual 0.28 (L) 1.20 - 4.80 x10*3/uL    Monocytes Absolute, Manual 0.19 0.10 - 1.00 x10*3/uL    Eosinophils Absolute, Manual 0.00 0.00 - 0.70 x10*3/uL    Basophils Absolute, Manual 0.00 0.00 - 0.10 x10*3/uL    Metamyelocytes Absolute, Manual 0.28 0.00 - 0.00 x10*3/uL    Myelocytes Absolute, Manual 0.28 0.00 - 0.00 x10*3/uL    Total Cells Counted 100     Neutrophils Absolute, Manual 8.28 (H) 1.20 - 7.70 x10*3/uL    RBC Morphology See Below     Polychromasia Mild     RBC Fragments Few     Ovalocytes Few    POCT GLUCOSE   Result Value Ref Range    POCT Glucose 185 (H) 74 - 99 mg/dL         Malnutrition Diagnosis Status: New  Malnutrition Diagnosis: Severe malnutrition related to acute disease or injury  As Evidenced by: 12% weight loss from normal along with  moderate to severe muscle and fat loss on physical exam in setting of a decrease in PO intake/appetite after being diagnosed with COVID  I agree with the dietitian's malnutrition diagnosis.      Assessment/Plan   Principal Problem:    Acute hypoxemic respiratory failure (CMS/[facility])  Active Problems:    Stage 4 chronic kidney disease (CMS/[facility])    COVID-19    Pneumonia of both lower lobes due to infectious organism    Pseudomonal bacteremia    A-fib (CMS/[facility])    Ms. Wilson is a 67 y/o female with hx HTN, CKD stage III who was admitted to the MICU from outside hospital with AHRF 2/2 COVID-pneumonia (tested positive on 10/13), course C/B by ARDS and pulmonary fibrosis resulting in organizing PNA.  Course also complicated by distal DVT, oliguric HUNG on CKD.  Patient weaned to 6 from 8 L nasal cannula but requires increased FiO2 with movement and exertion. Transferred to [facility] on 12/11 for continued management.     Updates 12/17:  -Prednisone taper- On Monday (12/11) patient was decreased prednisone to 80mg daily for 7 days.  On 12/18, switch to 60mg daily 2-4 weeks.  Then, can consider decreasing by 10-20mg every 1-2 weeks based on clinical appearance.  -Medically ready for discharge, plan to discharge patient to SNF early next week.  Patient's number one choice is [facility] [place] in [facility].  -Continue Lokelma TID; check evening RFP given slightly lower K in AM (to finalize homegoing regimen at SNF with less frequent lab checks)     #AHRF 2/2 COVID PNA (11/23) c/b ARDS, Pulm fibrosis, Organizing PNA 2/2 Covid 19  , Pneumomediastinum (resolved)  - Stable on 6L NC.  Desats with movement   - CTA PE from 11/26 negative for PE, showing multifocal pnuemonia, bronchiectasis, and fibrosis  - Continue with Bactrim DS for PCP (Monitor hyperkalemia)  - Fluticasone 1 spry BID   - Seen by lung transplant on 11/30--> per team, must be tapered to less than 10 mg pred and be able to amblate 100 ft on any  amount of o2, rengage as appropriate   - Avoid CPAP d/t hx pneumomediastinum during stay   -Monitor sugars on Steroid, on 15 units of lantus (decrease if am sugar low) and SSI      #Covid PNA   -received Decadron, Remdesivir, Tocilizumab at OSH, Neutrophilic leukocytosis,  PSA bacteremia  - Afebrile, no leukocytosis   - 11/21 BC x2 3/4 pseudomonas   -11/25 Bcx Negative   -was on Zosyn11/22- 11/24, switched to PO Cipro 500mg BID 11/25-12/5 for total 14 days  - Continue Bactrim daily for PCP prophylactics      #DVT   -11/13: +Rt PT DVT, and + LT Soleal DVT   -CT PE negative   -Eliquis 5 mg BID      #HTN   #Interittent SvT   #Afib   -Metop tartrate 50 mg BID   -Holding home losartan 100 mg , bp stable   -EKG 12/10 stable, qtc 389      #Oliguric HUNG  on CKD (RESOLVED)   -Per documentation, previous baseline was 1.6, current creat around 1   -PRN diuresis as needed   -hx of bladder fistula     #Anxiety   #Pain control  -Continue seroquel 25 mg at night and 12.5 mg as needed with PT   -PRN oxy for pain, lidocaine  -Hold trazadone 25 mg, re-initiate if needed   -Bowel reg while on opiods   -Has had diarrhea previously, monitor      #Eczema/Rosasea  -Continue with dupixent      #Sacral Pressure Ulcer (stage 3)   -Wound care      F: PRN   E: PRN   N: Consistent card   A: PIV   DVT: Eliquis   GI: PPI      Disposition:   SNF 12/18     Code Status: Full Code (confirmed on admission)   NOK:  Primary Emergency Contact: Mathew Wilson, Home Phone: [phone]            Edward Arboleda MD

## 2023-12-18 NOTE — CARE PLAN
Problem: Nutrition  Goal: Oral intake greater 75%  Outcome: Progressing  Goal: Consume prescribed supplement  Outcome: Progressing  Goal: Adequate PO fluid intake  Outcome: Progressing  Goal: Nutrition support goals are met within 48 hrs  Outcome: Progressing  Goal: Nutrition support is meeting 75% of nutrient needs  Outcome: Progressing  Goal: BG  mg/dL  Outcome: Progressing  Goal: Lab values WNL  Outcome: Progressing  Goal: Electrolytes WNL  Outcome: Progressing  Goal: Promote healing  Outcome: Progressing     Problem: Skin  Goal: Decreased wound size/increased tissue granulation at next dressing change  Outcome: Progressing  Flowsheets (Taken 12/17/2023 2130)  Decreased wound size/increased tissue granulation at next dressing change:   Promote sleep for wound healing   Protective dressings over bony prominences  Goal: Participates in plan/prevention/treatment measures  Outcome: Progressing  Flowsheets (Taken 12/17/2023 2130)  Participates in plan/prevention/treatment measures: Elevate heels  Goal: Prevent/manage excess moisture  Outcome: Progressing  Flowsheets (Taken 12/17/2023 2130)  Prevent/manage excess moisture:   Cleanse incontinence/protect with barrier cream   Moisturize dry skin  Goal: Prevent/minimize sheer/friction injuries  Outcome: Progressing  Flowsheets (Taken 12/17/2023 2130)  Prevent/minimize sheer/friction injuries:   Use pull sheet   Turn/reposition every 2 hours/use positioning/transfer devices  Goal: Promote skin healing  Outcome: Progressing  Flowsheets (Taken 12/17/2023 2130)  Promote skin healing:   Assess skin/pad under line(s)/device(s)   Rotate device position/do not position patient on device   Turn/reposition every 2 hours/use positioning/transfer devices     Problem: Fall/Injury  Goal: Verbalize understanding of personal risk factors for fall in the hospital  Outcome: Progressing  Goal: Verbalize understanding of risk factor reduction measures to prevent injury from fall in  the home  Outcome: Progressing  Goal: Use assistive devices by end of the shift  Outcome: Progressing  Goal: Not fall by end of shift  Outcome: Progressing  Goal: Be free from injury by end of the shift  Outcome: Progressing  Goal: Pace activities to prevent fatigue by end of the shift  Outcome: Progressing     Problem: Discharge Planning  Goal: Discharge to home or other facility with appropriate resources  Outcome: Progressing     Problem: Chronic Conditions and Co-morbidities  Goal: Patient's chronic conditions and co-morbidity symptoms are monitored and maintained or improved  Outcome: Progressing     Problem: Respiratory  Goal: Clear secretions with interventions this shift  Outcome: Progressing  Goal: Minimize anxiety/maximize coping throughout shift  Outcome: Progressing  Goal: Minimal/no exertional discomfort or dyspnea this shift  Outcome: Progressing  Goal: No signs of respiratory distress (eg. Use of accessory muscles. Peds grunting)  Outcome: Progressing  Goal: Patent airway maintained this shift  Outcome: Progressing  Goal: Tolerate pulmonary toileting this shift  Outcome: Progressing  Goal: Verbalize decreased shortness of breath this shift  Outcome: Progressing  Goal: Wean oxygen to maintain O2 saturation per order/standard this shift  Outcome: Progressing  Goal: Increase self care and/or family involvement in next 24 hours  Outcome: Progressing

## 2023-12-18 NOTE — PROGRESS NOTES
"Macarena Wilson is a 68 y.o. female on day 39 of admission presenting with Acute hypoxemic respiratory failure (CMS/HCC).    Subjective   Overnight, patient reports severe left lateral heel pain without a precipitating event. Describes the pain as \"sharp,\" but denies any radiation or neuropathic-like component. Pain improved by dilaudid given overnight.    Today, she reports that her left lateral heel pain is still well-controlled. Denies any leg swelling, SOB, palpitations, decreased sensation in legs.       Objective     Physical Exam  Constitutional: Patient in no acute distress.  Alert and cooperative.  Eyes: PERRL, EOMI, no icterus.  ENMT: Mucous membranes moist.  Head/Neck: Neck supple with no apparent injury.  Respiratory/Thorax: Lungs diminished bilaterally.  Non-labored breathing, no cough.  On 6L NC.  Cardiovascular: Regular rate and rhythm.  No murmurs.  Normal S1 and S2.  Gastrointestinal: Nondistended, soft, non-tender.  : External catheter in place.  Musculoskeletal: ROM intact, generalized weakness.  Extremities: No edema. Bruising improving.  Neurological: Alert and oriented to person, place and time.  Speech clear.  Follows commands appropriately and without focal deficits.  Skin: Warm and dry.    Last Recorded Vitals  Blood pressure 139/66, pulse 63, temperature 36.6 °C (97.9 °F), resp. rate 18, height 1.6 m (5' 2.99\"), weight 67.7 kg (149 lb 4 oz), SpO2 100 %.  Intake/Output last 3 Shifts:  I/O last 3 completed shifts:  In: - (0 mL/kg)   Out: 200 (2.9 mL/kg) [Urine:200 (0.1 mL/kg/hr)]  Dosing Weight: 68.4 kg     Relevant Results  Scheduled medications  apixaban, 5 mg, oral, BID  ergocalciferol, 1,250 mcg, oral, Weekly  fluticasone, 1 spray, Each Nostril, BID  insulin glargine, 8 Units, subcutaneous, Nightly  insulin lispro, 0-15 Units, subcutaneous, TID with meals  lidocaine, 1 patch, transdermal, Daily  melatonin, 5 mg, oral, Daily  metoprolol tartrate, 50 mg, oral, BID  pantoprazole, 40 mg, " oral, Daily before breakfast  predniSONE, 30 mg, oral, BID  QUEtiapine, 25 mg, oral, Nightly  sennosides-docusate sodium, 1 tablet, oral, BID  [Held by provider] sodium zirconium cyclosilicate, 10 g, oral, TID  sulfamethoxazole-trimethoprim, 80 mg, oral, Daily  thiamine, 100 mg, oral, Daily      Continuous medications  oxygen, , Last Rate: 5 L/min (12/10/23 2040)      PRN medications  PRN medications: acetaminophen **OR** [DISCONTINUED] acetaminophen **OR** [DISCONTINUED] acetaminophen, dextrose 10 % in water (D10W), dextrose, ipratropium-albuteroL, lidocaine-diphenhydraMINE-Maalox 1:1:1, loperamide, ondansetron ODT **OR** [DISCONTINUED] ondansetron, oxyCODONE, QUEtiapine  Results for orders placed or performed during the hospital encounter of 11/09/23 (from the past 24 hour(s))   CBC and Auto Differential   Result Value Ref Range    WBC 9.3 4.4 - 11.3 x10*3/uL    nRBC 0.9 (H) 0.0 - 0.0 /100 WBCs    RBC 3.33 (L) 4.00 - 5.20 x10*6/uL    Hemoglobin 9.4 (L) 12.0 - 16.0 g/dL    Hematocrit 30.3 (L) 36.0 - 46.0 %    MCV 91 80 - 100 fL    MCH 28.2 26.0 - 34.0 pg    MCHC 31.0 (L) 32.0 - 36.0 g/dL    RDW 16.9 (H) 11.5 - 14.5 %    Platelets 65 (L) 150 - 450 x10*3/uL    Immature Granulocytes %, Automated 7.6 (H) 0.0 - 0.9 %    Immature Granulocytes Absolute, Automated 0.70 0.00 - 0.70 x10*3/uL   Magnesium   Result Value Ref Range    Magnesium 2.08 1.60 - 2.40 mg/dL   Renal Function Panel   Result Value Ref Range    Glucose 80 74 - 99 mg/dL    Sodium 144 136 - 145 mmol/L    Potassium 3.6 3.5 - 5.3 mmol/L    Chloride 110 (H) 98 - 107 mmol/L    Bicarbonate 27 21 - 32 mmol/L    Anion Gap 11 10 - 20 mmol/L    Urea Nitrogen 47 (H) 6 - 23 mg/dL    Creatinine 0.81 0.50 - 1.05 mg/dL    eGFR 79 >60 mL/min/1.73m*2    Calcium 8.7 8.6 - 10.6 mg/dL    Phosphorus 3.2 2.5 - 4.9 mg/dL    Albumin 2.8 (L) 3.4 - 5.0 g/dL   Manual Differential   Result Value Ref Range    Neutrophils %, Manual 80.0 40.0 - 80.0 %    Bands %, Manual 9.0 0.0 - 5.0 %     Lymphocytes %, Manual 3.0 13.0 - 44.0 %    Monocytes %, Manual 2.0 2.0 - 10.0 %    Eosinophils %, Manual 0.0 0.0 - 6.0 %    Basophils %, Manual 0.0 0.0 - 2.0 %    Metamyelocytes %, Manual 3.0 0.0 - 0.0 %    Myelocytes %, Manual 3.0 0.0 - 0.0 %    Seg Neutrophils Absolute, Manual 7.44 (H) 1.20 - 7.00 x10*3/uL    Bands Absolute, Manual 0.84 (H) 0.00 - 0.70 x10*3/uL    Lymphocytes Absolute, Manual 0.28 (L) 1.20 - 4.80 x10*3/uL    Monocytes Absolute, Manual 0.19 0.10 - 1.00 x10*3/uL    Eosinophils Absolute, Manual 0.00 0.00 - 0.70 x10*3/uL    Basophils Absolute, Manual 0.00 0.00 - 0.10 x10*3/uL    Metamyelocytes Absolute, Manual 0.28 0.00 - 0.00 x10*3/uL    Myelocytes Absolute, Manual 0.28 0.00 - 0.00 x10*3/uL    Total Cells Counted 100     Neutrophils Absolute, Manual 8.28 (H) 1.20 - 7.70 x10*3/uL    RBC Morphology See Below     Polychromasia Mild     RBC Fragments Few     Ovalocytes Few    POCT GLUCOSE   Result Value Ref Range    POCT Glucose 185 (H) 74 - 99 mg/dL   POCT GLUCOSE   Result Value Ref Range    POCT Glucose 150 (H) 74 - 99 mg/dL   Renal Function Panel   Result Value Ref Range    Glucose 233 (H) 74 - 99 mg/dL    Sodium 140 136 - 145 mmol/L    Potassium 3.9 3.5 - 5.3 mmol/L    Chloride 107 98 - 107 mmol/L    Bicarbonate 24 21 - 32 mmol/L    Anion Gap 13 10 - 20 mmol/L    Urea Nitrogen 49 (H) 6 - 23 mg/dL    Creatinine 0.72 0.50 - 1.05 mg/dL    eGFR >90 >60 mL/min/1.73m*2    Calcium 8.3 (L) 8.6 - 10.6 mg/dL    Phosphorus 3.0 2.5 - 4.9 mg/dL    Albumin 2.9 (L) 3.4 - 5.0 g/dL   POCT GLUCOSE   Result Value Ref Range    POCT Glucose 203 (H) 74 - 99 mg/dL   POCT GLUCOSE   Result Value Ref Range    POCT Glucose 220 (H) 74 - 99 mg/dL     No results found.                          Malnutrition Diagnosis Status: New  Malnutrition Diagnosis: Severe malnutrition related to acute disease or injury  As Evidenced by: 12% weight loss from normal along with moderate to severe muscle and fat loss on physical exam in  setting of a decrease in PO intake/appetite after being diagnosed with COVID  I agree with the dietitian's malnutrition diagnosis.      Assessment/Plan   Principal Problem:    Acute hypoxemic respiratory failure (CMS/[facility])  Active Problems:    Stage 4 chronic kidney disease (CMS/[facility])    COVID-19    Pneumonia of both lower lobes due to infectious organism    Pseudomonal bacteremia    A-fib (CMS/[facility])    Ms. Wilson is a 69 y/o female with hx HTN, CKD stage III who was admitted to the MICU from outside hospital with AHRF 2/2 COVID-pneumonia (tested positive on 10/13), course C/B by ARDS and pulmonary fibrosis resulting in organizing PNA.  Course also complicated by distal DVT, oliguric HUNG on CKD.  Patient weaned to 6 from 8 L nasal cannula but requires increased FiO2 with movement and exertion. Transferred to [facility] on 12/11 for continued management.     Updates 12/18:  - Left lateral heel pain likely MSK in nature, but ordered BL LE US to r/o DVT which came back negative. Pt has been on apixaban and VSS. Ordered diclofenac gel and tylenol for pain.  -Prednisone taper- On Monday (12/11) patient was decreased prednisone to 80mg daily for 7 days.  On 12/18, switch to 60mg daily 2-4 weeks.  Then, can consider decreasing by 10-20mg every 1-2 weeks based on clinical appearance.  -Medically ready for discharge, plan to discharge patient to SNF early next week.  Patient's number one choice is [facility] in [facility].     #AHRF 2/2 COVID PNA (11/23) c/b ARDS, Pulm fibrosis, Organizing PNA 2/2 Covid 19  , Pneumomediastinum (resolved)  - Stable on 6L NC.  Desats with movement   - CTA PE from 11/26 negative for PE, showing multifocal pnuemonia, bronchiectasis, and fibrosis  - Continue with Bactrim DS for PCP (Monitor hyperkalemia)  - Fluticasone 1 spry BID   - Seen by lung transplant on 11/30--> per team, must be tapered to less than 10 mg pred and be able to amblate 100 ft on any amount of o2,  rengage as appropriate   - Avoid CPAP d/t hx pneumomediastinum during stay   -Monitor sugars on Steroid, on 15 units of lantus (decrease if am sugar low) and SSI      #Covid PNA   -received Decadron, Remdesivir, Tocilizumab at OSH, Neutrophilic leukocytosis,  PSA bacteremia  - Afebrile, no leukocytosis   - 11/21 BC x2 3/4 pseudomonas   -11/25 Bcx Negative   -was on Zosyn11/22- 11/24, switched to PO Cipro 500mg BID 11/25-12/5 for total 14 days  - Continue Bactrim daily for PCP prophylactics      #DVT   -11/13: +Rt PT DVT, and + LT Soleal DVT   -CT PE negative   -Eliquis 5 mg BID      #HTN   #Interittent SvT   #Afib   -Metop tartrate 50 mg BID   -Holding home losartan 100 mg , bp stable   -EKG 12/10 stable, qtc 389      #Oliguric HUNG  on CKD (RESOLVED)   -Per documentation, previous baseline was 1.6, current creat around 1   -PRN diuresis as needed   -hx of bladder fistula     #Anxiety   #Pain control  -Continue seroquel 25 mg at night and 12.5 mg as needed with PT   -PRN oxy for pain, lidocaine  -Hold trazadone 25 mg, re-initiate if needed   -Bowel reg while on opiods   -Has had diarrhea previously, monitor      #Eczema/Rosasea  -Continue with dupixent      #Sacral Pressure Ulcer (stage 3)   -Wound care      F: PRN   E: PRN   N: Consistent card   A: PIV   DVT: Eliquis   GI: PPI      Disposition:   [facility] 12/18     Code Status: Full Code (confirmed on admission)   NOK:  Primary Emergency Contact: Mathew Wilson, Home Phone: [phone]            Yariel Mitchell MD

## 2023-12-18 NOTE — PROGRESS NOTES
Macarena Wilson is a 68 y.o. female on day 39 of admission presenting with Acute hypoxemic respiratory failure (CMS/HCC).    New SNF preferences needed for referral. Pt directed SW to consult daughter-in-law Kendra for her input on SNFs. SW reached out to Kendra and emailed a new SNF choice list to her, with [place], [phone] as location. SW will follow. Da WILKINSON, LAMBERT.     12/07/2023 1432  Kendra received the new SNF list and will notify SW of preferences. [facility] will be [facility] once preferences are received. Da WILKINSON, LAMBERTW.     12/07/2023  Pt daughter-in-law Kendra chose new preferences for SNF: 1 [facility], 2. [facility], 3. [facility], 4. [facility], and 5. [facility]. Referral updated and sent in [facility]. SW will follow. Da WILKINSON, LORY.     12/15/2023  [facility] is waiting on final acceptance from their medical team. SW asked for an update. SW will follow. Da WILKINSON, LORY.     12/18/23 @ 1430  [facility] states they are out of network and patient would have to pay ded/oop $8500. Their sister facility [facility] is in network. Sent message in [facility] to facility, as they originally stated out of network.  Referral also resent to [facility] per daughter in Kendra frost.   Cindy Jo RN TCC

## 2023-12-18 NOTE — PROGRESS NOTES
Occupational Therapy    Communication Note    Missed Visit: Yes  Missed Visit Reason:  (Pt is off the floor at vascular.)      12/18/23 at 8:09 AM   Gemini Campbell, OT   Rehab Office: [phone]

## 2023-12-18 NOTE — DISCHARGE INSTRUCTIONS
Dear Ms. Steve,    Thank you for letting us take care of you. You were initially admitted to the hospital intensive care unit (ICU) for respiratory failure due to COVID. We treated you with antibiotics/antivirals and steroids and you began to improve with decreased need for supplemental oxygen so you were transferred to the general pulmonology floor. You developed some left heel pain and ultrasound of your legs was negative for a clot so gave you pain medications. You also developed some blood in your urine with burning with urination and increased frequency, so we treated you with antibiotics for a UTI and asked out urology and gynecology colleagues to see you, urology placed a urinary catheter. Your blood levels dropped quite a bit so we gave you a blood transfusion to bring your numbers up. We repeated the ultrasound of your legs and found a clot this time however because you had bleeding from your bladder, we could not give you blood thinners. We contacted our vascular medicine specialists who will arrange for you to get ultrasounds of your legs every week for the next couple of weeks to monitor the clot. You will be going to the facility with a catheter in place, the facility has instruction on how to care for your catheter. In order to improve your mobility and increase your strength, we are discharging you to a rehab facility.    Medication changes:  - Please continue to take your steroid, Prednisone 10mg once daily by mouth, until 1/14/24.  - Please continue to take metoprolol tartrate 75mg twice a day  - Please finish the antibiotic, Bactrim, until 1/2/24  - Please continue to take your vitamin B1, or thiamine 100mg, once daily  - Please STOP taking your blood pressure medication, Losartan, as your blood pressure has been well controlled      Follow-up appointments:  - Please follow-up with your primary care doctor to discuss your hospital course   - Please follow-up with the pulmonology team (Ora  Tamera) at [facility] on 1/11/24 at 11AM  - Vascular medicine will arrange for weekly ultrasounds every Friday through January 12th; Vascular Medicine Service will arrange for this near SNF/rehab in [facility]

## 2023-12-18 NOTE — DISCHARGE SUMMARY
Discharge Diagnosis  Acute hypoxemic respiratory failure (CMS/[facility])    Issues Requiring Follow-Up  - PCP follow-up    Test Results Pending At Discharge  Pending Labs       Order Current Status    Extra Urine Gray Tube Collected (11/21/23 1756)    Fungal Culture, Blood Collected (11/12/23 0002)    Urinalysis with Reflex Microscopic and Culture In process            Hospital Course  Macarena Wilson is a 68 year old female with a PMH of CKD stage 3, HTN, and rosasea who presented to the MICU in acute hypoxemic respiratory failure due to COVID pneumonia from an OSH where she was hospitalized on 10/24 with complaints of shortness of breath. Positive for Covid on 10/13 and continued to have shortness of breath, prompting initial presentation to [facility] on 10/24 and admitted for 16 days   treated for acute respiratory failure secondary to coronavirus pneumonitis c/by severe ARDS. CT of the chest showed worsening bilateral changes consistent with severe COVID-19 pneumonitis and ARDS.  She continued to do marginal at best on high flow nasal cannula with intermittent use of nonrebreather and could not tolerate a BiPAP device due to anxiety. In addition, she completed a course of remdesivir and received a dose of Actemra on October 24, 2023.    Patient transferred to [facility] MICU on 11/9 for further evaluation of Acute hypoxic respiratory failure requiring AirVo 40L/90% . Empirically covered for bacterial and fungal etiologies of organizing PNA - discontinued antimicrobials on 11/13 per guidance from ID, with workup unremarkable. Continued course of steroids for management of organizing PNA 2/2 COVID, initially 10 mg decadron daily, then received several days of 160 mg methylpred q6, then weaned down to 80 q6, then to 40 q6, and transitioned to 50 mg prednisone q6 once enteral access was obtained. Started on bactrim for PCP prophylaxis while on steroids, and steroid-induced hyperglycemia managed with SSI. O2 requirements  fluctuated, but had weaned down to 35L/50% FiO2, then on 11/13, required increased support to 60L at 90% FiO2, where she was satting high 80s. High clinical concern for PE given acute decompensation, with findings of distal DVTs; did not obtain CTPE due to tenuous clinical status. Initially on heparin gtt, but had difficulties with her levels being supratherapeutic. Ultimately started on Eliquis.     On 11/15, attempted 10 minutes of CPAP therapy to attempt to correct atelectasis - had resultant small pneumomediastinum, which resolved. Course complicated by intermittent runs of SVT and afib with RVR corresponding with hypoxemia - ultimately was loaded with digoxin and started on metoprolol tartrate 25 q6 for rate control. Also aggressively diuresed to the point of prerenal HUNG with azotemia, with BUN > 150 and oliguria, without significant improvement in respiratory status. On 11/16, began gentle rehydration with LR and placed NGT to begin enteral feeding. Tolerated uptitration of feeds to goal, and kidney function improved significantly. Slowly weaned down on O2 requirements with time - once transitioned to nasal canula, was transferred to SDU.      While in SDU increased Fio2 requirements on Airvo, now wean to 8lHF, Airvo with PT.  CTA PE on 11/26 shows no evidence of PE, findings consistent with multifocal PNA (improving from last study), bronchiectasis scattered throughout the lungs (slightly worst) and fibrotic changes.  Continue with steroid taper:  Prednisone 50 mg q6 for one week, then taper on 11/26 to 80 mg Q12H and will decrease to 60 mg BID starting 12/4, then 40 mg BID x 7 days, then 60 mg daily x 2-4 weeks, 40 mg daily 2- 4 weeks.  Lung transplant consulted on 11/30; Steroid treatment must be tapered down to <=10mg of Prednisone per day and PT must be able to ambulate 100ft (on any amount of O2).  Will re-engage transplant once patient meets those criteria.  SLP complete FEES (passed) on 11/28; resume  diet and Cortrak removed. Weaned down to 6L NC. Added Seroquil 12.5mg PRN for anxiety when working with PT.; stable to transfer to [facility] Team.       MICRO:  11/21 BC x2 3/4 PSA, stool studies, including 11/25 C. Diff neg, 11/9 Step pneumonaie Ag, Legionella Ag neg, 11/9 Flu A/B, RSV neg; repeat blood cx x2 11/25 Neg    Abx: Zosyn (11/22 - 11/24), switched to PO Cipro 500 mg BID (11/25 - 12/5) for total 14 days    While on the floor, the patient remained HDS and was back to her baseline 6L O2. On 12/18, she developed some sharp, non-radiating lateral left heel pain and US BL LE was ordered to r/o DVT and was negative for DVT, suggesting a likely MSK etiology to pain. The PT team evaluated her and recommended moderate intensity rehab, so patient was discharged to LTAC        Pertinent Physical Exam At Time of Discharge  Physical Exam  Constitutional: Patient in no acute distress.  Alert and cooperative.  Eyes: PERRL, EOMI, no icterus.  ENMT: Mucous membranes moist.  Head/Neck: Neck supple with no apparent injury.  Respiratory/Thorax: Lungs diminished bilaterally.  Non-labored breathing, no cough.  On 6L NC.  Cardiovascular: Regular rate and rhythm.  No murmurs.  Normal S1 and S2.  Gastrointestinal: Nondistended, soft, non-tender.  : External catheter in place.  Musculoskeletal: ROM intact, generalized weakness.  Extremities: No edema. Bruising improving.  Neurological: Alert and oriented to person, place and time.  Speech clear.  Follows commands appropriately and without focal deficits.  Skin: Warm and dry.    Home Medications     Medication List      ASK your doctor about these medications     * acetaminophen 325 mg tablet; Commonly known as: Tylenol; Take 2   tablets (650 mg) by mouth every 4 hours if needed for fever (temp greater   than 38.0 C) (greater than or equal to 38 C).   * acetaminophen 325 mg/10.15 mL oral liquid; Commonly known as: Tylenol;   20.3 mL (650 mg) by nasogastric tube route every 4 hours  if needed for   fever (temp greater than 38.0 C) (greater than or equal to 38 C).   * acetaminophen 650 mg suppository; Commonly known as: Tylenol; Insert 1   suppository (650 mg) into the rectum every 4 hours if needed for fever   (temp greater than 38.0 C) (greater than or equal to 38 C).   acetylcysteine 200 mg/mL (20 %) nebulizer solution; Commonly known as:   Mucomyst; Take 3 mL (600 mg) by nebulization 3 times a day.   AIRBORNE (ASCORBATE SODIUM) ORAL   budesonide 0.5 mg/2 mL nebulizer solution; Commonly known as: Pulmicort;   Take 2 mL (0.5 mg) by nebulization 2 times a day for 360 doses. Rinse   mouth with water after use to reduce aftertaste and incidence of   candidiasis. Do not swallow.   dexAMETHasone 10 mg/mL injection; Commonly known as: Decadron; Infuse 1   mL (10 mg) into a venous catheter every 12 hours.   dexmedeTOMIDine in  mcg in 100 mL (4 mcg/mL) premix; Commonly   known as: Precedex; Infuse 6..6 mcg/hr at 1.71-25.65 mL/hr into a   venous catheter continuously.   fluticasone 50 mcg/actuation nasal spray; Commonly known as: Flonase;   Administer 1 spray into each nostril 2 times a day. Shake gently. Before   first use, prime pump. After use, clean tip and replace cap.   glucagon 1 mg injection; Commonly known as: Glucagen; Inject 1 mg into   the muscle every 15 minutes if needed for low blood sugar - see comments   (For blood glucose less than or equal to 70 mg/dL and no IV access).   guaiFENesin 100 mg/5 mL syrup; Commonly known as: Robitussin; Take 10 mL   (200 mg) by mouth every 4 hours if needed for congestion.   heparin (porcine) 25,000 unit/250 mL(100 unit/mL) parenteral solution in   D5W infusion; Infuse 0-4,000 Units/hr at 0-40 mL/hr into a venous catheter   continuously.   hydrALAZINE 20 mg/mL injection; Commonly known as: Apresoline; Infuse   0.5 mL (10 mg) into a venous catheter every 8 hours if needed (If SBP   greater than 170 or DBP greater than 100).   hydrocortisone 1  % cream   insulin glargine 100 unit/mL injection; Commonly known as: Lantus;   Inject 16 Units under the skin once daily at bedtime. Take as directed per   insulin instructions.   * ipratropium-albuteroL 0.5-2.5 mg/3 mL nebulizer solution; Commonly   known as: Duo-Neb; Take 3 mL by nebulization every 6 hours while awake.   * ipratropium-albuteroL 0.5-2.5 mg/3 mL nebulizer solution; Commonly   known as: Duo-Neb; Take 3 mL by nebulization every 2 hours if needed for   wheezing.   losartan 100 mg tablet; Commonly known as: Cozaar   meropenem 500 mg/100 mL IV; Commonly known as: Merrem; Infuse 100 mL   (500 mg) into a venous catheter every 12 hours.   oxygen gas therapy; Commonly known as: O2; Inhale 1 each once every 24   hours.   pantoprazole 40 mg EC tablet; Commonly known as: ProtoNix; Take 1 tablet   (40 mg) by mouth once daily in the morning. Take before meals. Do not   crush, chew, or split.   sodium chloride 0.65 % nasal spray; Commonly known as: Ocean; Administer   1 spray into each nostril if needed for congestion.   Xyzal 5 mg tablet; Generic drug: levocetirizine  * This list has 5 medication(s) that are the same as other medications   prescribed for you. Read the directions carefully, and ask your doctor or   other care provider to review them with you.       Outpatient Follow-Up  Future Appointments   Date Time Provider Department [place]   2/1/2024  9:20 AM Russel Duran MD [id number]   5/2/2024  8:40 AM Arlene Sandoval MD [id number]       Yariel Mitchell MD

## 2023-12-18 NOTE — CARE PLAN
The patient's goals for the shift include      The clinical goals for the shift include Pt will have decreased pain through end of shift 12/18/2023 1900      Problem: Nutrition  Goal: Oral intake greater 75%  Outcome: Progressing  Goal: Consume prescribed supplement  Outcome: Progressing  Goal: Adequate PO fluid intake  Outcome: Progressing  Goal: Nutrition support goals are met within 48 hrs  Outcome: Progressing  Goal: Nutrition support is meeting 75% of nutrient needs  Outcome: Progressing  Goal: BG  mg/dL  Outcome: Progressing  Goal: Lab values WNL  Outcome: Progressing  Goal: Electrolytes WNL  Outcome: Progressing  Goal: Promote healing  Outcome: Progressing     Problem: Skin  Goal: Decreased wound size/increased tissue granulation at next dressing change  Outcome: Progressing  Flowsheets (Taken 12/18/2023 1059)  Decreased wound size/increased tissue granulation at next dressing change: Promote sleep for wound healing  Goal: Participates in plan/prevention/treatment measures  Outcome: Progressing  Flowsheets (Taken 12/18/2023 1059)  Participates in plan/prevention/treatment measures: Elevate heels  Goal: Prevent/manage excess moisture  Outcome: Progressing  Flowsheets (Taken 12/18/2023 1059)  Prevent/manage excess moisture:   Cleanse incontinence/protect with barrier cream   Follow provider orders for dressing changes  Goal: Prevent/minimize sheer/friction injuries  Outcome: Progressing  Flowsheets (Taken 12/18/2023 1059)  Prevent/minimize sheer/friction injuries:   Turn/reposition every 2 hours/use positioning/transfer devices   Use pull sheet  Goal: Promote skin healing  Outcome: Progressing  Flowsheets (Taken 12/18/2023 1059)  Promote skin healing:   Turn/reposition every 2 hours/use positioning/transfer devices   Protective dressings over bony prominences     Problem: Fall/Injury  Goal: Verbalize understanding of personal risk factors for fall in the hospital  Outcome: Progressing  Goal: Verbalize  understanding of risk factor reduction measures to prevent injury from fall in the home  Outcome: Progressing  Goal: Use assistive devices by end of the shift  Outcome: Progressing  Goal: Not fall by end of shift  Outcome: Progressing  Goal: Be free from injury by end of the shift  Outcome: Progressing  Goal: Pace activities to prevent fatigue by end of the shift  Outcome: Progressing     Problem: Discharge Planning  Goal: Discharge to home or other facility with appropriate resources  Outcome: Progressing     Problem: Chronic Conditions and Co-morbidities  Goal: Patient's chronic conditions and co-morbidity symptoms are monitored and maintained or improved  Outcome: Progressing     Problem: Respiratory  Goal: Clear secretions with interventions this shift  Outcome: Progressing  Goal: Minimize anxiety/maximize coping throughout shift  Outcome: Progressing  Goal: Minimal/no exertional discomfort or dyspnea this shift  Outcome: Progressing  Goal: No signs of respiratory distress (eg. Use of accessory muscles. Peds grunting)  Outcome: Progressing  Goal: Patent airway maintained this shift  Outcome: Progressing  Goal: Tolerate pulmonary toileting this shift  Outcome: Progressing  Goal: Verbalize decreased shortness of breath this shift  Outcome: Progressing  Goal: Wean oxygen to maintain O2 saturation per order/standard this shift  Outcome: Progressing  Goal: Increase self care and/or family involvement in next 24 hours  Outcome: Progressing     Problem: Pain  Goal: Takes deep breaths with improved pain control throughout the shift  Outcome: Progressing  Goal: Turns in bed with improved pain control throughout the shift  Outcome: Progressing  Goal: Walks with improved pain control throughout the shift  Outcome: Progressing  Goal: Performs ADL's with improved pain control throughout shift  Outcome: Progressing  Goal: Participates in PT with improved pain control throughout the shift  Outcome: Progressing  Goal: Free from  opioid side effects throughout the shift  Outcome: Progressing  Goal: Free from acute confusion related to pain meds throughout the shift  Outcome: Progressing

## 2023-12-19 NOTE — PROGRESS NOTES
Macarena Wilson is a 68 y.o. female on day 40 of admission presenting with Acute hypoxemic respiratory failure (CMS/HCC).    New SNF preferences needed for referral. Pt directed SW to consult daughter-in-law Kendra for her input on SNFs. SW reached out to Kendra and emailed a new SNF choice list to her, with [place], [phone] as location. SW will follow. Da WILKINSON, W.     12/07/2023 1432  Kendra received the new SNF list and will notify SW of preferences. Referral in [facility] will be updated once preferences are received. Da WILKINSON, LSW.     12/07/2023  Pt daughter-in-law Kendra chose new preferences for SNF: 1 [facility], 2. [facility], 3. [facility], 4. [facility], and 5. [facility]. Referral updated and sent in [facility]. SW will follow. Da WILKINSON, LAMBERT.     12/15/2023  [facility] is waiting on final acceptance from their medical team. SW asked for an update. SW will follow. Da WILKINSON, LAMBERT.     12/18/23 @ 1430  [facility] states they are out of network and patient would have to pay ded/oop $8500. Their sister facility [facility] is in network. Sent message in [facility] to facility, as they originally stated out of network.  Referral also resent to [facility] per daughter in Kendra frost.   Cindy Jo RN TCC    12/19/23 @ 1200  Spoke with Godfrey who is the admissions director for [facility].  [phone]. New referral placed in [facility] as he was unable to send message or see clinical notes.  He will have facility start auth and will have a bed available tomorrow.  Cindy Jo RN TCC

## 2023-12-19 NOTE — CARE PLAN
The clinical goals for the shift include patient will remain HDS throughout shift 12/18 at 0700      Problem: Skin  Goal: Promote skin healing  Flowsheets (Taken 12/19/2023 0533)  Promote skin healing: Assess skin/pad under line(s)/device(s)     Problem: Skin  Goal: Prevent/minimize sheer/friction injuries  Flowsheets (Taken 12/19/2023 0533)  Prevent/minimize sheer/friction injuries: HOB 30 degrees or less     Problem: Skin  Goal: Prevent/manage excess moisture  Flowsheets (Taken 12/19/2023 0533)  Prevent/manage excess moisture: Moisturize dry skin     Problem: Skin  Goal: Participates in plan/prevention/treatment measures  Flowsheets (Taken 12/19/2023 0533)  Participates in plan/prevention/treatment measures: Elevate heels

## 2023-12-19 NOTE — PROGRESS NOTES
Physical Therapy    Physical Therapy Treatment    Patient Name: Macarena Wilson  MRN: [id number]  Today's Date: 12/19/2023  Time Calculation  Start Time: 1453  Stop Time: 1528  Time Calculation (min): 35 min       Assessment/Plan   PT Assessment  Evaluation/Treatment Tolerance: Patient limited by fatigue  Assessment Comment: Pt with improved upright activity tolerance this date, with pt able to tolerate full chair position this date. Continue to recommend MOD intensity continued PT after DC.  End of Session Patient Position: Bed, 3 rail up, Alarm off, not on at start of session  PT Plan  Inpatient/Swing Bed or Outpatient: Inpatient  PT Plan  Treatment/Interventions: Bed mobility, Transfer training, Gait training, Balance training, Strengthening, Endurance training, Therapeutic exercise, Therapeutic activity, Postural re-education  PT Plan: Skilled PT  PT Frequency: 4 times per week  PT Discharge Recommendations: Moderate intensity level of continued care  PT - OK to Discharge: Yes      General Visit Information:   PT  Visit  PT Received On: 12/19/23  Response to Previous Treatment: Patient with no complaints from previous session.  General  Prior to Session Communication: Bedside nurse  Patient Position Received: Bed, 3 rail up, Alarm off, not on at start of session  General Comment: Pt supine in bed upon entry to room. Pt pleasant, cooperative and willing to work with PT.    Subjective   Precautions:  Precautions  Medical Precautions: Fall precautions, Oxygen therapy device and L/min  Vital Signs:  Vital Signs  SpO2: 95 %    Objective   Pain:  Pain Assessment  Pain Assessment: 0-10  Pain Score:  (unrated)  Pain Location:  (R ankle)  Cognition:  Cognition  Orientation Level: Oriented X4    Treatments:  Therapeutic Exercise  Therapeutic Exercise Performed: Yes  Therapeutic Exercise Activity 1: x10 B ankle pumps  Therapeutic Exercise Activity 2: x10 B quad sets  Therapeutic Exercise Activity 3: x10 AAROM hip  flexion  Therapeutic Exercise Activity 4: x10 B hip adduction isometric    Therapeutic Activity  Therapeutic Activity Performed: Yes  Therapeutic Activity 1: Pt dependently placed into chair position this date for ~15 minutes total and into a more upright position with pillow behind trunk. Pt able to maintain more upright position for ~5 minutes this date. Pt completed x10 incentive spirometer reps in regular chair position with no pillow behind trunk.    Outcome Measures:  [facility] Basic Mobility  Turning from your back to your side while in a flat bed without using bedrails: A lot  Moving from lying on your back to sitting on the side of a flat bed without using bedrails: A lot  Moving to and from bed to chair (including a wheelchair): Total  Standing up from a chair using your arms (e.g. wheelchair or bedside chair): Total  To walk in hospital room: Total  Climbing 3-5 steps with railing: Total  Basic Mobility - Total Score: 8    Education Documentation  Mobility Training, taught by Carmel Mcdaniel PT at 12/19/2023  4:16 PM.  Learner: Patient  Readiness: Acceptance  Method: Explanation  Response: Needs Reinforcement    Education Comments  No comments found.        OP EDUCATION:       Encounter Problems       Encounter Problems (Active)       Balance       patient will complete static (SBAx1) and dynamic (Cgx1) standing balance activities using LRD as needed without acute LOB, while maintaining stable vitals.  (Not Progressing)       Start:  11/10/23    Expected End:  12/27/23               Mobility       STG - Patient will ambulate >/=50 ft using LRD as needed with Cgx1 assist without acute LOB, while maintaining stable vitals.  (Not Progressing)       Start:  11/10/23    Expected End:  12/27/23            patient will participate in BLE there-ex in order to improve strength and to assist with the completion of functional mobility tasks.  (Progressing)       Start:  11/10/23    Expected End:  12/27/23             patient will ambulate >/=30 ft consecutively and >/=75 ft cumulatively with </=1 sitting rest break while maintaining stable vitals, reporting <13/20 on the RPE scale.  (Not Progressing)       Start:  11/10/23    Expected End:  12/27/23               Pain - Adult          Transfers       STG - Transfer from bed to chair with CGx1 assist without acute LOB, using LRD as needed  (Not Progressing)       Start:  11/10/23    Expected End:  12/27/23            STG - Patient will perform bed mobility with SBAx1 without acute LOB, using bedrailing as needed.  (Not Progressing)       Start:  11/10/23    Expected End:  12/27/23            STG - Patient will transfer sit to and from stand with Cgx1 assist using LRD as needed without acute LOB  (Not Progressing)       Start:  11/10/23    Expected End:  12/27/23 12/19/23 at 4:18 PM - Carmel Mcdaniel, PT

## 2023-12-19 NOTE — PROGRESS NOTES
"Macarena Wilson is a 68 y.o. female on day 40 of admission presenting with Acute hypoxemic respiratory failure (CMS/HCC).    Subjective   No acute events overnight. Today, the patient reports that left lateral heel pain is well-controlled on prn tylenol and lidocaine patch. Otherwise denies SOB, new cough, leg swelling.       Objective     Physical Exam  Constitutional: Patient in no acute distress.  Alert and cooperative.  Eyes: PERRL, EOMI, no icterus.  ENMT: Mucous membranes moist.  Head/Neck: Neck supple with no apparent injury.  Respiratory/Thorax: Non-labored breathing, no cough.  On 6L NC.  Cardiovascular: Regular rate and rhythm.  No murmurs.  Normal S1 and S2.  Gastrointestinal: Nondistended, soft, non-tender.  : External catheter in place.  Musculoskeletal: ROM intact, generalized weakness.  Extremities: No edema. Bruising improving.  Neurological: Alert and oriented to person, place and time.  Speech clear.  Follows commands appropriately and without focal deficits.  Skin: Warm and dry.    Last Recorded Vitals  Blood pressure 159/66, pulse 73, temperature 35.5 °C (95.9 °F), temperature source Temporal, resp. rate 18, height 1.6 m (5' 2.99\"), weight 67.7 kg (149 lb 4 oz), SpO2 94 %.  Intake/Output last 3 Shifts:  I/O last 3 completed shifts:  In: - (0 mL/kg)   Out: 930 (13.6 mL/kg) [Urine:930 (0.4 mL/kg/hr)]  Dosing Weight: 68.4 kg     Relevant Results  Scheduled medications  apixaban, 5 mg, oral, BID  ergocalciferol, 1,250 mcg, oral, Weekly  fluticasone, 1 spray, Each Nostril, BID  insulin glargine, 8 Units, subcutaneous, Nightly  insulin lispro, 0-15 Units, subcutaneous, TID with meals  lidocaine, 1 patch, transdermal, Daily  lidocaine, 1 patch, transdermal, Daily  melatonin, 5 mg, oral, Daily  metoprolol tartrate, 50 mg, oral, BID  pantoprazole, 40 mg, oral, Daily before breakfast  predniSONE, 30 mg, oral, BID  QUEtiapine, 25 mg, oral, Nightly  sennosides-docusate sodium, 1 tablet, oral, BID  [Held " by provider] sodium zirconium cyclosilicate, 10 g, oral, TID  sulfamethoxazole-trimethoprim, 80 mg, oral, Daily  thiamine, 100 mg, oral, Daily      Continuous medications  oxygen, , Last Rate: 6 L/min (12/18/23 2308)      PRN medications  PRN medications: acetaminophen **OR** [DISCONTINUED] acetaminophen **OR** [DISCONTINUED] acetaminophen, dextrose 10 % in water (D10W), dextrose, diclofenac sodium, ipratropium-albuteroL, lidocaine-diphenhydraMINE-Maalox 1:1:1, loperamide, ondansetron ODT **OR** [DISCONTINUED] ondansetron, oxyCODONE, QUEtiapine  Results for orders placed or performed during the hospital encounter of 11/09/23 (from the past 24 hour(s))   Renal Function Panel   Result Value Ref Range    Glucose 61 (L) 74 - 99 mg/dL    Sodium 147 (H) 136 - 145 mmol/L    Potassium 3.4 (L) 3.5 - 5.3 mmol/L    Chloride 109 (H) 98 - 107 mmol/L    Bicarbonate 26 21 - 32 mmol/L    Anion Gap 15 10 - 20 mmol/L    Urea Nitrogen 45 (H) 6 - 23 mg/dL    Creatinine 0.72 0.50 - 1.05 mg/dL    eGFR >90 >60 mL/min/1.73m*2    Calcium 8.5 (L) 8.6 - 10.6 mg/dL    Phosphorus 2.9 2.5 - 4.9 mg/dL    Albumin 2.8 (L) 3.4 - 5.0 g/dL   Magnesium   Result Value Ref Range    Magnesium 1.83 1.60 - 2.40 mg/dL   CBC and Auto Differential   Result Value Ref Range    WBC 9.8 4.4 - 11.3 x10*3/uL    nRBC 0.7 (H) 0.0 - 0.0 /100 WBCs    RBC 3.18 (L) 4.00 - 5.20 x10*6/uL    Hemoglobin 9.1 (L) 12.0 - 16.0 g/dL    Hematocrit 28.7 (L) 36.0 - 46.0 %    MCV 90 80 - 100 fL    MCH 28.6 26.0 - 34.0 pg    MCHC 31.7 (L) 32.0 - 36.0 g/dL    RDW 17.0 (H) 11.5 - 14.5 %    Platelets 81 (L) 150 - 450 x10*3/uL    Immature Granulocytes %, Automated 7.6 (H) 0.0 - 0.9 %    Immature Granulocytes Absolute, Automated 0.74 (H) 0.00 - 0.70 x10*3/uL   Manual Differential   Result Value Ref Range    Neutrophils %, Manual 81.5 40.0 - 80.0 %    Bands %, Manual 1.7 0.0 - 5.0 %    Lymphocytes %, Manual 10.1 13.0 - 44.0 %    Monocytes %, Manual 2.5 2.0 - 10.0 %    Eosinophils %, Manual  0.0 0.0 - 6.0 %    Basophils %, Manual 0.0 0.0 - 2.0 %    Metamyelocytes %, Manual 1.7 0.0 - 0.0 %    Myelocytes %, Manual 2.5 0.0 - 0.0 %    Seg Neutrophils Absolute, Manual 7.99 (H) 1.20 - 7.00 x10*3/uL    Bands Absolute, Manual 0.17 0.00 - 0.70 x10*3/uL    Lymphocytes Absolute, Manual 0.99 (L) 1.20 - 4.80 x10*3/uL    Monocytes Absolute, Manual 0.25 0.10 - 1.00 x10*3/uL    Eosinophils Absolute, Manual 0.00 0.00 - 0.70 x10*3/uL    Basophils Absolute, Manual 0.00 0.00 - 0.10 x10*3/uL    Metamyelocytes Absolute, Manual 0.17 0.00 - 0.00 x10*3/uL    Myelocytes Absolute, Manual 0.25 0.00 - 0.00 x10*3/uL    Total Cells Counted 119     Neutrophils Absolute, Manual 8.16 (H) 1.20 - 7.70 x10*3/uL    RBC Morphology See Below     Polychromasia Mild     Ovalocytes Few    POCT GLUCOSE   Result Value Ref Range    POCT Glucose 114 (H) 74 - 99 mg/dL   POCT GLUCOSE   Result Value Ref Range    POCT Glucose 124 (H) 74 - 99 mg/dL   POCT GLUCOSE   Result Value Ref Range    POCT Glucose 192 (H) 74 - 99 mg/dL   POCT GLUCOSE   Result Value Ref Range    POCT Glucose 132 (H) 74 - 99 mg/dL   POCT GLUCOSE   Result Value Ref Range    POCT Glucose 119 (H) 74 - 99 mg/dL     Lower extremity venous duplex bilateral    Result Date: 12/18/2023            [place] [address]   Tel [phone] and Fax [phone]  Vascular Lab Report [facility] US LOWER EXTREMITY VENOUS DUPLEX BILATERAL  Patient Name:      KARLA Cruz Physician: [id number] Alisa Puri MD Study Date:        12/18/2023          Ordering           [id number] BRENDAN LIU                                        Physician:         SONG MRN/PID:           [id number]            Technologist:      Jonh Hamilton S Accession#:        [id number]        Technologist 2: Date of Birth/Age: 1955 / 68 years Encounter#:        [id number] Gender:            F Admission Status:  Inpatient            Location           [facility]                                        Performed:  Diagnosis/ICD: Generalized edema (anasarca)-R60.1 CPT Codes:     93896 Peripheral venous duplex scan for DVT complete  CONCLUSIONS: Right Lower Venous: No evidence of acute deep vein thrombus visualized in the right lower extremity. Left Lower Venous: No evidence of acute deep vein thrombus visualized in the left lower extremity. Cannot rule out thrombus in non-visualized peroneal vein.  Imaging & Doppler Findings:  Right                 Compressible Thrombus        Flow Distal External Iliac     Yes        None   Spontaneous/Phasic CFV                       Yes        None   Spontaneous/Phasic PFV                       Yes        None FV Proximal               Yes        None       Continuous FV Mid                    Yes        None FV Distal                 Yes        None Popliteal                 Yes        None       Continuous Peroneal                  Yes        None PTV                       Yes        None  Left                  Compress Thrombus        Flow Distal External Iliac   Yes      None   Spontaneous/Phasic CFV                     Yes      None   Spontaneous/Phasic PFV                     Yes      None FV Proximal             Yes      None   Spontaneous/Phasic FV Mid                  Yes      None FV Distal               Yes      None Popliteal               Yes      None       Continuous PTV                     Yes      None  [id number] Alisa Puri MD Electronically signed by [id number] Alisa Puri MD on 12/18/2023 at 1:18:07 PM  ** Final **                           Malnutrition Diagnosis Status: New  Malnutrition Diagnosis: Severe malnutrition related to acute disease or injury  As Evidenced by: 12% weight loss from normal along with moderate to severe muscle and fat loss on physical exam in setting of a decrease in PO intake/appetite after being diagnosed with COVID  I agree with the dietitian's  malnutrition diagnosis.      Assessment/Plan   Principal Problem:    Acute hypoxemic respiratory failure (CMS/[facility])  Active Problems:    Stage 4 chronic kidney disease (CMS/[facility])    COVID-19    Pneumonia of both lower lobes due to infectious organism    Pseudomonal bacteremia    A-fib (CMS/[facility])    Ms. Wilson is a 69 y/o female with hx HTN, CKD stage III who was admitted to the MICU from outside hospital with AHRF 2/2 COVID-pneumonia (tested positive on 10/13), course C/B by ARDS and pulmonary fibrosis resulting in organizing PNA.  Course also complicated by distal DVT, oliguric HUNG on CKD.  Patient weaned to 6 from 8 L nasal cannula but requires increased FiO2 with movement and exertion. Transferred to [facility] on 12/11 for continued management.     Updates 12/19:  - Left lateral heel pain likely MSK in nature, BL LE US negative for DVT. Well controlled with diclofenac gel and tylenol for pain.  -Prednisone taper- On Monday (12/11) patient was decreased prednisone to 80mg daily for 7 days.  On 12/18, switch to 60mg daily 2-4 weeks.  Then, can consider decreasing by 10-20mg every 1-2 weeks based on clinical appearance.  -Medically ready for discharge, plan to discharge patient to SNF early next week.  Patient's number one choice is [facility] in [place] but out of network, so TCC setting up alternate arrangements.     #AHRF 2/2 COVID PNA (11/23) c/b ARDS, Pulm fibrosis, Organizing PNA 2/2 Covid 19  , Pneumomediastinum (resolved)  - Stable on 6L NC.  Desats with movement   - CTA PE from 11/26 negative for PE, showing multifocal pnuemonia, bronchiectasis, and fibrosis  - Continue with Bactrim DS for PCP (Monitor hyperkalemia)  - Fluticasone 1 spry BID   - Seen by lung transplant on 11/30--> per team, must be tapered to less than 10 mg pred and be able to amblate 100 ft on any amount of o2, rengage as appropriate   - Avoid CPAP d/t hx pneumomediastinum during stay   -Monitor sugars on  Steroid, on 15 units of lantus (decrease if am sugar low) and SSI      #Covid PNA   -received Decadron, Remdesivir, Tocilizumab at OSH, Neutrophilic leukocytosis,  PSA bacteremia  - Afebrile, no leukocytosis   - 11/21 BC x2 3/4 pseudomonas   -11/25 Bcx Negative   -was on Zosyn11/22- 11/24, switched to PO Cipro 500mg BID 11/25-12/5 for total 14 days  - Continue Bactrim daily for PCP prophylactics      #DVT   -11/13: +Rt PT DVT, and + LT Soleal DVT   -CT PE negative   -Eliquis 5 mg BID      #HTN   #Interittent SvT   #Afib   -Metop tartrate 50 mg BID   -Holding home losartan 100 mg , bp stable   -EKG 12/10 stable, qtc 389      #Oliguric HUNG  on CKD (RESOLVED)   -Per documentation, previous baseline was 1.6, current creat around 1   -PRN diuresis as needed   -hx of bladder fistula     #Anxiety   #Pain control  -Continue seroquel 25 mg at night and 12.5 mg as needed with PT   -PRN oxy for pain, lidocaine  -Hold trazadone 25 mg, re-initiate if needed   -Bowel reg while on opiods   -Has had diarrhea previously, monitor      #Eczema/Rosasea  -Continue with dupixent      #Sacral Pressure Ulcer (stage 3)   -Wound care      F: PRN   E: PRN   N: Consistent carb  A: PIV   DVT: Eliquis   GI: PPI      Disposition:   SNF      Code Status: Full Code (confirmed on admission)   NOK:  Primary Emergency Contact: Mathew Wilson, Home Phone: [phone]            Yariel Mitchell MD  NOK:  Primary Emergency Contact: Mathew Wilson, Home Phone: [phone]

## 2023-12-19 NOTE — CARE PLAN
The patient's goals for the shift include        Problem: Nutrition  Goal: Oral intake greater 75%  Outcome: Progressing  Goal: Consume prescribed supplement  Outcome: Progressing  Goal: Adequate PO fluid intake  Outcome: Progressing  Goal: Nutrition support goals are met within 48 hrs  Outcome: Progressing  Goal: Nutrition support is meeting 75% of nutrient needs  Outcome: Progressing  Goal: BG  mg/dL  Outcome: Progressing  Goal: Lab values WNL  Outcome: Progressing  Goal: Electrolytes WNL  Outcome: Progressing  Goal: Promote healing  Outcome: Progressing     Problem: Skin  Goal: Decreased wound size/increased tissue granulation at next dressing change  Outcome: Progressing  Flowsheets (Taken 12/19/2023 1129)  Decreased wound size/increased tissue granulation at next dressing change: Promote sleep for wound healing  Goal: Participates in plan/prevention/treatment measures  Outcome: Progressing  Flowsheets (Taken 12/19/2023 1129)  Participates in plan/prevention/treatment measures: Elevate heels  Goal: Prevent/manage excess moisture  Outcome: Progressing  Flowsheets (Taken 12/19/2023 1129)  Prevent/manage excess moisture: Moisturize dry skin  Goal: Prevent/minimize sheer/friction injuries  Outcome: Progressing  Flowsheets (Taken 12/19/2023 1129)  Prevent/minimize sheer/friction injuries:   Turn/reposition every 2 hours/use positioning/transfer devices   Use pull sheet  Goal: Promote skin healing  Outcome: Progressing  Flowsheets (Taken 12/19/2023 1132)  Promote skin healing:   Assess skin/pad under line(s)/device(s)   Turn/reposition every 2 hours/use positioning/transfer devices     Problem: Fall/Injury  Goal: Verbalize understanding of personal risk factors for fall in the hospital  Outcome: Progressing  Goal: Verbalize understanding of risk factor reduction measures to prevent injury from fall in the home  Outcome: Progressing  Goal: Use assistive devices by end of the shift  Outcome: Progressing  Goal: Not  fall by end of shift  Outcome: Progressing  Goal: Be free from injury by end of the shift  Outcome: Progressing  Goal: Pace activities to prevent fatigue by end of the shift  Outcome: Progressing     Problem: Discharge Planning  Goal: Discharge to home or other facility with appropriate resources  Outcome: Progressing     Problem: Chronic Conditions and Co-morbidities  Goal: Patient's chronic conditions and co-morbidity symptoms are monitored and maintained or improved  Outcome: Progressing     Problem: Respiratory  Goal: Clear secretions with interventions this shift  Outcome: Progressing  Goal: Minimize anxiety/maximize coping throughout shift  Outcome: Progressing  Goal: Minimal/no exertional discomfort or dyspnea this shift  Outcome: Progressing  Goal: No signs of respiratory distress (eg. Use of accessory muscles. Peds grunting)  Outcome: Progressing  Goal: Patent airway maintained this shift  Outcome: Progressing  Goal: Tolerate pulmonary toileting this shift  Outcome: Progressing  Goal: Verbalize decreased shortness of breath this shift  Outcome: Progressing  Goal: Wean oxygen to maintain O2 saturation per order/standard this shift  Outcome: Progressing  Goal: Increase self care and/or family involvement in next 24 hours  Outcome: Progressing     Problem: Pain  Goal: Takes deep breaths with improved pain control throughout the shift  Outcome: Progressing  Goal: Turns in bed with improved pain control throughout the shift  Outcome: Progressing  Goal: Walks with improved pain control throughout the shift  Outcome: Progressing  Goal: Performs ADL's with improved pain control throughout shift  Outcome: Progressing  Goal: Participates in PT with improved pain control throughout the shift  Outcome: Progressing  Goal: Free from opioid side effects throughout the shift  Outcome: Progressing  Goal: Free from acute confusion related to pain meds throughout the shift  Outcome: Progressing

## 2023-12-20 NOTE — PROGRESS NOTES
Macarena Wilson is a 68 y.o. female on day 41 of admission presenting with Acute hypoxemic respiratory failure (CMS/HCC).    New SNF preferences needed for referral. Pt directed SW to consult daughter-in-law Kendra for her input on SNFs. SW reached out to Kendra and emailed a new SNF choice list to her, with [place] [id number] as location. SW will follow. Da WILKINSON, W.     12/07/2023 1432  Kendra received the new SNF list and will notify SW of preferences. Referral in [facility] will be updated once preferences are received. Da WILKINSON, LSW.     12/07/2023  Pt daughter-in-law Kendra chose new preferences for SNF: 1 [facility], 2. [facility], 3. [facility], 4. [facility], and 5. [facility]. Referral updated and sent in [facility]. SW will follow. Da WILKINSON, [facility].     12/15/2023  [facility] is waiting on final acceptance from their medical team. SW asked for an update. SW will follow. Da WILKINSON, [facility].     12/18/23 @ 1430  [facility] states they are out of network and patient would have to pay ded/oop $8500. Their sister facility [facility] is in network. [facility] as [facility] stated out of network.  Referral also resent to [facility] per daughter in Kendra frost.   Cindy Jo RN TCC    12/19/23 @ 1200  Spoke with Godfrey who is the admissions director for [facility].  [phone]. New referral placed in [facility] as he was unable to send message or see clinical notes.  He will have facility start auth and will have a bed available tomorrow.  Cindy Jo RN TCC    12/20/23 @ 1410  Updated notes and clinicals were faxed over to [facility] this morning---[facility] unable to accept patient as her respiratory needs are more than they can care for at this time. DEANA Gardner, updated.  Referral sent again to [facility]. May need to consider LTACH.  KALEB Kauffman MD made aware of dispo updates.  Cindy Jo RN TCC

## 2023-12-20 NOTE — CARE PLAN
Problem: Nutrition  Goal: Oral intake greater 75%  Outcome: Progressing  Goal: Consume prescribed supplement  Outcome: Progressing  Goal: Adequate PO fluid intake  Outcome: Progressing  Goal: Nutrition support goals are met within 48 hrs  Outcome: Progressing  Goal: Nutrition support is meeting 75% of nutrient needs  Outcome: Progressing  Goal: BG  mg/dL  Outcome: Progressing  Goal: Lab values WNL  Outcome: Progressing  Goal: Electrolytes WNL  Outcome: Progressing  Goal: Promote healing  Outcome: Progressing     Problem: Skin  Goal: Decreased wound size/increased tissue granulation at next dressing change  Outcome: Progressing  Flowsheets (Taken 12/19/2023 2028)  Decreased wound size/increased tissue granulation at next dressing change: Promote sleep for wound healing  Goal: Participates in plan/prevention/treatment measures  Outcome: Progressing  Flowsheets (Taken 12/19/2023 2028)  Participates in plan/prevention/treatment measures: Elevate heels  Goal: Prevent/manage excess moisture  Outcome: Progressing  Flowsheets (Taken 12/19/2023 2028)  Prevent/manage excess moisture: Moisturize dry skin  Goal: Prevent/minimize sheer/friction injuries  Outcome: Progressing  Flowsheets (Taken 12/19/2023 2028)  Prevent/minimize sheer/friction injuries:   Turn/reposition every 2 hours/use positioning/transfer devices   Use pull sheet  Goal: Promote skin healing  Outcome: Progressing  Flowsheets (Taken 12/19/2023 2028)  Promote skin healing:   Assess skin/pad under line(s)/device(s)   Turn/reposition every 2 hours/use positioning/transfer devices     Problem: Fall/Injury  Goal: Verbalize understanding of personal risk factors for fall in the hospital  Outcome: Progressing  Goal: Verbalize understanding of risk factor reduction measures to prevent injury from fall in the home  Outcome: Progressing  Goal: Use assistive devices by end of the shift  Outcome: Progressing  Goal: Not fall by end of shift  Outcome:  Progressing  Goal: Be free from injury by end of the shift  Outcome: Progressing  Goal: Pace activities to prevent fatigue by end of the shift  Outcome: Progressing     Problem: Discharge Planning  Goal: Discharge to home or other facility with appropriate resources  Outcome: Progressing     Problem: Chronic Conditions and Co-morbidities  Goal: Patient's chronic conditions and co-morbidity symptoms are monitored and maintained or improved  Outcome: Progressing     Problem: Respiratory  Goal: Clear secretions with interventions this shift  Outcome: Progressing  Goal: Minimize anxiety/maximize coping throughout shift  Outcome: Progressing  Goal: Minimal/no exertional discomfort or dyspnea this shift  Outcome: Progressing  Goal: No signs of respiratory distress (eg. Use of accessory muscles. Peds grunting)  Outcome: Progressing  Goal: Patent airway maintained this shift  Outcome: Progressing  Goal: Tolerate pulmonary toileting this shift  Outcome: Progressing  Goal: Verbalize decreased shortness of breath this shift  Outcome: Progressing  Goal: Wean oxygen to maintain O2 saturation per order/standard this shift  Outcome: Progressing  Goal: Increase self care and/or family involvement in next 24 hours  Outcome: Progressing     Problem: Pain  Goal: Takes deep breaths with improved pain control throughout the shift  Outcome: Progressing  Goal: Turns in bed with improved pain control throughout the shift  Outcome: Progressing  Goal: Walks with improved pain control throughout the shift  Outcome: Progressing  Goal: Performs ADL's with improved pain control throughout shift  Outcome: Progressing  Goal: Participates in PT with improved pain control throughout the shift  Outcome: Progressing  Goal: Free from opioid side effects throughout the shift  Outcome: Progressing  Goal: Free from acute confusion related to pain meds throughout the shift  Outcome: Progressing

## 2023-12-20 NOTE — CARE PLAN
The patient's goals for the shift include        Problem: Nutrition  Goal: Oral intake greater 75%  Outcome: Progressing  Goal: Consume prescribed supplement  Outcome: Progressing  Goal: Adequate PO fluid intake  Outcome: Progressing  Goal: Nutrition support goals are met within 48 hrs  Outcome: Progressing  Goal: Nutrition support is meeting 75% of nutrient needs  Outcome: Progressing  Goal: BG  mg/dL  Outcome: Progressing  Goal: Lab values WNL  Outcome: Progressing  Goal: Electrolytes WNL  Outcome: Progressing  Goal: Promote healing  Outcome: Progressing     Problem: Skin  Goal: Decreased wound size/increased tissue granulation at next dressing change  Outcome: Progressing  Flowsheets (Taken 12/20/2023 1107)  Decreased wound size/increased tissue granulation at next dressing change: Promote sleep for wound healing  Goal: Participates in plan/prevention/treatment measures  Outcome: Progressing  Flowsheets (Taken 12/20/2023 1107)  Participates in plan/prevention/treatment measures: Elevate heels  Goal: Prevent/manage excess moisture  Outcome: Progressing  Flowsheets (Taken 12/20/2023 1107)  Prevent/manage excess moisture:   Cleanse incontinence/protect with barrier cream   Moisturize dry skin  Goal: Prevent/minimize sheer/friction injuries  Outcome: Progressing  Flowsheets (Taken 12/20/2023 1107)  Prevent/minimize sheer/friction injuries:   Turn/reposition every 2 hours/use positioning/transfer devices   Use pull sheet  Goal: Promote skin healing  Outcome: Progressing  Flowsheets (Taken 12/20/2023 1107)  Promote skin healing:   Assess skin/pad under line(s)/device(s)   Turn/reposition every 2 hours/use positioning/transfer devices     Problem: Fall/Injury  Goal: Verbalize understanding of personal risk factors for fall in the hospital  Outcome: Progressing  Goal: Verbalize understanding of risk factor reduction measures to prevent injury from fall in the home  Outcome: Progressing  Goal: Use assistive devices  by end of the shift  Outcome: Progressing  Goal: Not fall by end of shift  Outcome: Progressing  Goal: Be free from injury by end of the shift  Outcome: Progressing  Goal: Pace activities to prevent fatigue by end of the shift  Outcome: Progressing     Problem: Discharge Planning  Goal: Discharge to home or other facility with appropriate resources  Outcome: Progressing     Problem: Chronic Conditions and Co-morbidities  Goal: Patient's chronic conditions and co-morbidity symptoms are monitored and maintained or improved  Outcome: Progressing     Problem: Respiratory  Goal: Clear secretions with interventions this shift  Outcome: Progressing  Goal: Minimize anxiety/maximize coping throughout shift  Outcome: Progressing  Goal: Minimal/no exertional discomfort or dyspnea this shift  Outcome: Progressing  Goal: No signs of respiratory distress (eg. Use of accessory muscles. Peds grunting)  Outcome: Progressing  Goal: Patent airway maintained this shift  Outcome: Progressing  Goal: Tolerate pulmonary toileting this shift  Outcome: Progressing  Goal: Verbalize decreased shortness of breath this shift  Outcome: Progressing  Goal: Wean oxygen to maintain O2 saturation per order/standard this shift  Outcome: Progressing  Goal: Increase self care and/or family involvement in next 24 hours  Outcome: Progressing     Problem: Pain  Goal: Takes deep breaths with improved pain control throughout the shift  Outcome: Progressing  Goal: Turns in bed with improved pain control throughout the shift  Outcome: Progressing  Goal: Walks with improved pain control throughout the shift  Outcome: Progressing  Goal: Performs ADL's with improved pain control throughout shift  Outcome: Progressing  Goal: Participates in PT with improved pain control throughout the shift  Outcome: Progressing  Goal: Free from opioid side effects throughout the shift  Outcome: Progressing  Goal: Free from acute confusion related to pain meds throughout the  shift  Outcome: Progressing

## 2023-12-20 NOTE — PROGRESS NOTES
"Macarena Wilson is a 68 y.o. female on day 41 of admission presenting with Acute hypoxemic respiratory failure (CMS/HCC).    Subjective   No acute events overnight. Today, the patient reports that she is doing well with no concerns. She was able to work with PT yesterday. Denies SOB, cough.       Objective     Physical Exam  Constitutional: Patient in no acute distress.  Alert and cooperative.  Eyes: PERRL, EOMI, no icterus.  ENMT: Mucous membranes moist.  Head/Neck: Neck supple with no apparent injury.  Respiratory/Thorax: Non-labored breathing, no cough.  On 6L NC which is baseline for her.  Cardiovascular: Regular rate and rhythm.  No murmurs.  Normal S1 and S2.  Gastrointestinal: Nondistended, soft, non-tender.  : External catheter in place.  Musculoskeletal: ROM intact, generalized weakness.  Extremities: No edema. Bruising improving.  Neurological: Alert and oriented to person, place and time.  Speech clear.  Follows commands appropriately and without focal deficits.  Skin: Warm and dry.    Last Recorded Vitals  Blood pressure 168/81, pulse 64, temperature 36 °C (96.8 °F), temperature source Temporal, resp. rate 18, height 1.6 m (5' 2.99\"), weight 67.7 kg (149 lb 4 oz), SpO2 99 %.  Intake/Output last 3 Shifts:  I/O last 3 completed shifts:  In: - (0 mL/kg)   Out: 675 (9.9 mL/kg) [Urine:675 (0.3 mL/kg/hr)]  Dosing Weight: 68.4 kg     Relevant Results  Scheduled medications  apixaban, 5 mg, oral, BID  ergocalciferol, 1,250 mcg, oral, Weekly  fluticasone, 1 spray, Each Nostril, BID  insulin glargine, 8 Units, subcutaneous, Nightly  insulin lispro, 0-15 Units, subcutaneous, TID with meals  lidocaine, 1 patch, transdermal, Daily  lidocaine, 1 patch, transdermal, Daily  melatonin, 5 mg, oral, Daily  metoprolol tartrate, 50 mg, oral, BID  pantoprazole, 40 mg, oral, Daily before breakfast  predniSONE, 30 mg, oral, BID  QUEtiapine, 25 mg, oral, Nightly  sennosides-docusate sodium, 1 tablet, oral, BID  [Held by " provider] sodium zirconium cyclosilicate, 10 g, oral, TID  sulfamethoxazole-trimethoprim, 80 mg, oral, Daily  thiamine, 100 mg, oral, Daily      Continuous medications  oxygen, , Last Rate: 6 L/min (12/18/23 2308)      PRN medications  PRN medications: acetaminophen **OR** [DISCONTINUED] acetaminophen **OR** [DISCONTINUED] acetaminophen, dextrose 10 % in water (D10W), dextrose, diclofenac sodium, ipratropium-albuteroL, lidocaine-diphenhydraMINE-Maalox 1:1:1, loperamide, ondansetron ODT **OR** [DISCONTINUED] ondansetron, oxyCODONE, QUEtiapine  Results for orders placed or performed during the hospital encounter of 11/09/23 (from the past 24 hour(s))   POCT GLUCOSE   Result Value Ref Range    POCT Glucose 119 (H) 74 - 99 mg/dL   CBC and Auto Differential   Result Value Ref Range    WBC 9.2 4.4 - 11.3 x10*3/uL    nRBC 0.5 (H) 0.0 - 0.0 /100 WBCs    RBC 2.97 (L) 4.00 - 5.20 x10*6/uL    Hemoglobin 8.7 (L) 12.0 - 16.0 g/dL    Hematocrit 27.0 (L) 36.0 - 46.0 %    MCV 91 80 - 100 fL    MCH 29.3 26.0 - 34.0 pg    MCHC 32.2 32.0 - 36.0 g/dL    RDW 17.2 (H) 11.5 - 14.5 %    Platelets 85 (L) 150 - 450 x10*3/uL    Immature Granulocytes %, Automated 5.9 (H) 0.0 - 0.9 %    Immature Granulocytes Absolute, Automated 0.54 0.00 - 0.70 x10*3/uL   Magnesium   Result Value Ref Range    Magnesium 2.11 1.60 - 2.40 mg/dL   Renal function panel   Result Value Ref Range    Glucose 127 (H) 74 - 99 mg/dL    Sodium 144 136 - 145 mmol/L    Potassium 4.4 3.5 - 5.3 mmol/L    Chloride 108 (H) 98 - 107 mmol/L    Bicarbonate 26 21 - 32 mmol/L    Anion Gap 14 10 - 20 mmol/L    Urea Nitrogen 42 (H) 6 - 23 mg/dL    Creatinine 0.74 0.50 - 1.05 mg/dL    eGFR 88 >60 mL/min/1.73m*2    Calcium 8.6 8.6 - 10.6 mg/dL    Phosphorus 3.0 2.5 - 4.9 mg/dL    Albumin 2.9 (L) 3.4 - 5.0 g/dL   Manual Differential   Result Value Ref Range    Neutrophils %, Manual 83.5 40.0 - 80.0 %    Lymphocytes %, Manual 3.5 13.0 - 44.0 %    Monocytes %, Manual 7.8 2.0 - 10.0 %     Eosinophils %, Manual 2.6 0.0 - 6.0 %    Basophils %, Manual 0.0 0.0 - 2.0 %    Metamyelocytes %, Manual 0.9 0.0 - 0.0 %    Myelocytes %, Manual 1.7 0.0 - 0.0 %    Seg Neutrophils Absolute, Manual 7.68 (H) 1.20 - 7.00 x10*3/uL    Lymphocytes Absolute, Manual 0.32 (L) 1.20 - 4.80 x10*3/uL    Monocytes Absolute, Manual 0.72 0.10 - 1.00 x10*3/uL    Eosinophils Absolute, Manual 0.24 0.00 - 0.70 x10*3/uL    Basophils Absolute, Manual 0.00 0.00 - 0.10 x10*3/uL    Metamyelocytes Absolute, Manual 0.08 0.00 - 0.00 x10*3/uL    Myelocytes Absolute, Manual 0.16 0.00 - 0.00 x10*3/uL    Total Cells Counted 115     Manual nRBC per 100 Cells 2.6 (H) 0.0 - 0.0 %    RBC Morphology No significant RBC morphology present     Clumped Platelets Present    POCT GLUCOSE   Result Value Ref Range    POCT Glucose 192 (H) 74 - 99 mg/dL   POCT GLUCOSE   Result Value Ref Range    POCT Glucose 153 (H) 74 - 99 mg/dL   POCT GLUCOSE   Result Value Ref Range    POCT Glucose 219 (H) 74 - 99 mg/dL   POCT GLUCOSE   Result Value Ref Range    POCT Glucose 73 (L) 74 - 99 mg/dL     Lower extremity venous duplex bilateral    Result Date: 12/18/2023            [place] [id number]   Tel [phone] and Fax [phone]  Vascular Lab Report [facility] US LOWER EXTREMITY VENOUS DUPLEX BILATERAL  Patient Name:      KARLA Cruz Physician: [id number] Alisa Puri MD Study Date:        12/18/2023          Zena[place]           [id number] [address]                                        Physician:         SONG MRN/PID:           [id number]            Technologist:      Jonh BARKLEY Accession#:        [id number]        Technologist 2: Date of Birth/Age: 1955 / 68 years Encounter#:        [id number] Gender:            F Admission Status:  Inpatient           Location           [facility]                                        Performed:   Diagnosis/ICD: Generalized edema (anasarca)-R60.1 CPT Codes:     00224 Peripheral venous duplex scan for DVT complete  CONCLUSIONS: Right Lower Venous: No evidence of acute deep vein thrombus visualized in the right lower extremity. Left Lower Venous: No evidence of acute deep vein thrombus visualized in the left lower extremity. Cannot rule out thrombus in non-visualized peroneal vein.  Imaging & Doppler Findings:  Right                 Compressible Thrombus        Flow Distal External Iliac     Yes        None   Spontaneous/Phasic CFV                       Yes        None   Spontaneous/Phasic PFV                       Yes        None FV Proximal               Yes        None       Continuous FV Mid                    Yes        None FV Distal                 Yes        None Popliteal                 Yes        None       Continuous Peroneal                  Yes        None PTV                       Yes        None  Left                  Compress Thrombus        Flow Distal External Iliac   Yes      None   Spontaneous/Phasic CFV                     Yes      None   Spontaneous/Phasic PFV                     Yes      None FV Proximal             Yes      None   Spontaneous/Phasic FV Mid                  Yes      None FV Distal               Yes      None Popliteal               Yes      None       Continuous PTV                     Yes      None  [id number] Alisa Puri MD Electronically signed by [id number] Alisa Puri MD on 12/18/2023 at 1:18:07 PM  ** Final **                            Malnutrition Diagnosis Status: New  Malnutrition Diagnosis: Severe malnutrition related to acute disease or injury  As Evidenced by: 12% weight loss from normal along with moderate to severe muscle and fat loss on physical exam in setting of a decrease in PO intake/appetite after being diagnosed with COVID  I agree with the dietitian's malnutrition diagnosis.      Assessment/Plan   Principal Problem:    Acute hypoxemic respiratory failure  (CMS/[facility])  Active Problems:    Stage 4 chronic kidney disease (CMS/[facility])    COVID-19    Pneumonia of both lower lobes due to infectious organism    Pseudomonal bacteremia    A-fib (CMS/HCC)    Ms. Wilson is a 67 y/o female with hx HTN, CKD stage III who was admitted to the MICU from outside hospital with AHRF 2/2 COVID-pneumonia (tested positive on 10/13), course C/B by ARDS and pulmonary fibrosis resulting in organizing PNA.  Course also complicated by distal DVT, oliguric HUNG on CKD.  Patient weaned to 6 from 8 L nasal cannula but requires increased FiO2 with movement and exertion. Transferred to [facility] on 12/11 for continued management.     Updates 12/20:  -Prednisone taper- On Monday (12/11) patient was decreased prednisone to 80mg daily for 7 days.  On 12/18, switch to 60mg daily 2-4 weeks.  Then, can consider decreasing by 10-20mg every 1-2 weeks based on clinical appearance.  -Medically ready for discharge, plan to discharge patient to SNF early next week.  Patient's number one choice is [facility] [place] in [place] but out of network, so TCC setting up alternate arrangements.     #AHRF 2/2 COVID PNA (11/23) c/b ARDS, Pulm fibrosis, Organizing PNA 2/2 Covid 19  , Pneumomediastinum (resolved)  - Stable on 6L NC.  Desats with movement   - CTA PE from 11/26 negative for PE, showing multifocal pnuemonia, bronchiectasis, and fibrosis  - Continue with Bactrim DS for PCP (Monitor hyperkalemia)  - Fluticasone 1 spry BID   - Seen by lung transplant on 11/30--> per team, must be tapered to less than 10 mg pred and be able to amblate 100 ft on any amount of o2, rengage as appropriate   - Avoid CPAP d/t hx pneumomediastinum during stay   -Monitor sugars on Steroid, on 15 units of lantus (decrease if am sugar low) and SSI      #Covid PNA   -received Decadron, Remdesivir, Tocilizumab at OSH, Neutrophilic leukocytosis,  PSA bacteremia  - Afebrile, no leukocytosis   - 11/21 BC x2 3/4 pseudomonas    -11/25 Bcx Negative   -was on Zosyn11/22- 11/24, switched to PO Cipro 500mg BID 11/25-12/5 for total 14 days  - Continue Bactrim daily for PCP prophylactics      #DVT   -11/13: +Rt PT DVT, and + LT Soleal DVT   -CT PE negative   -Eliquis 5 mg BID      #HTN   #Interittent SvT   #Afib   -Metop tartrate 50 mg BID   -Holding home losartan 100 mg , bp stable   -EKG 12/10 stable, qtc 389      #Oliguric HUNG  on CKD (RESOLVED)   -Per documentation, previous baseline was 1.6, current creat around 1   -PRN diuresis as needed   -hx of bladder fistula     #Anxiety   #Pain control  -Continue seroquel 25 mg at night and 12.5 mg as needed with PT   -PRN oxy for pain, lidocaine  -Hold trazadone 25 mg, re-initiate if needed   -Bowel reg while on opiods   -Has had diarrhea previously, monitor      #Eczema/Rosasea  -Continue with dupixent      #Sacral Pressure Ulcer (stage 3)   -Wound care      F: PRN   E: PRN   N: Consistent carb  A: PIV   DVT: Eliquis   GI: PPI      Disposition:   SNF      Code Status: Full Code (confirmed on admission)   Please call [phone] or [facility] with any questions.            Yariel Mitchell MD    Attestation for incorrect patient. Disregard. Correct attestation: Awaiting rehab, working with PT.

## 2023-12-21 NOTE — PROGRESS NOTES
SW spoke with pt's Kendra LEBLANC re: LTACH.  DEANA reports family is in agreement to explore.  [facility] and [facility] are desired.  SW will send referrals to facilities via [facility].  Will follow.    FELIPA Young

## 2023-12-21 NOTE — PROGRESS NOTES
Macarena Wilson is a 68 y.o. female on day 42 of admission presenting with Acute hypoxemic respiratory failure (CMS/HCC).    New SNF preferences needed for referral. Pt directed SW to consult daughter-in-law Kendra for her input on SNFs. SW reached out to Kendra and emailed a new SNF choice list to her, with [place] [phone] as location. SW will follow. Da WILKINSON, W.     12/07/2023 1432  Kendra received the new SNF list and will notify SW of preferences. Referral in [facility] will be updated once preferences are received. Da WILKINSON, LSW.     12/07/2023  Pt daughter-in-law Kendra chose new preferences for SNF: 1 [facility], 2. [facility], 3. [facility], 4. [facility], and 5. [facility]. Referral updated and sent in [facility]. SW will follow. Da WILKINSON, [facility].     12/15/2023  [facility] is waiting on final acceptance from their medical team. SW asked for an update. SW will follow. Da WILKINSON, [facility].     12/18/23 @ 1430  [facility] states they are out of network and patient would have to pay ded/oop $8500. Their sister facility [facility] is in network. [facility] as they [facility] stated out of network.  Referral also resent to [facility] per daughter in Kendra frost.   Cindy Jo RN TCC    12/19/23 @ 1200  Spoke with Godfrey who is the admissions director for [facility].  [phone]. New referral placed in [facility]Port as he was unable to send message or see clinical notes.  He will have facility start auth and will have a bed available tomorrow.  Cindy Jo RN TCC    12/20/23 @ 1410  Updated notes and clinicals were faxed over to [facility] this morning---[facility] unable to accept patient as her respiratory needs are more than they can care for at this time. DEANA Gardner, updated.  Referral sent again to [facility]. May need to consider LTACH.  KALEB Kauffman MD made aware of dispo updates.  Cindy Jo RN TCC    12/21/23 @ 1400  Spoke with  from [facility]. Her name was Elian [phone]. If we need any assistance with her insurance, she is available.  Cindy Jo RN TCC

## 2023-12-21 NOTE — CARE PLAN
The patient's goals for the shift include      The clinical goals for the shift include patient will remain HDS throughout shift 12/21 at 0700    Patient will tolerate PT.

## 2023-12-21 NOTE — PROGRESS NOTES
Physical Therapy    Physical Therapy Treatment    Patient Name: Macarena Wilson  MRN: [id number]  Today's Date: 12/21/2023  Time Calculation  Start Time: 1243  Stop Time: 1318  Time Calculation (min): 35 min       Assessment/Plan   PT Assessment  End of Session Communication: Bedside nurse  Assessment Comment: pt with slightly improved upright activity tolerance this date. Continue to recommend MOD intensity continued PT after DC.  End of Session Patient Position: Bed, 3 rail up, Alarm on (chair position)  PT Plan  Inpatient/Swing Bed or Outpatient: Inpatient  PT Plan  Treatment/Interventions: Bed mobility, Transfer training, Gait training, Balance training, Strengthening, Endurance training, Therapeutic exercise, Therapeutic activity, Postural re-education  PT Plan: Skilled PT  PT Frequency: 4 times per week  PT Discharge Recommendations: Moderate intensity level of continued care  PT - OK to Discharge: Yes      General Visit Information:   PT  Visit  PT Received On: 12/21/23  Response to Previous Treatment: Patient with no complaints from previous session.  General  Co-Treatment: OT  Co-Treatment Reason: to safely progress functional mobility  Prior to Session Communication: Bedside nurse  Patient Position Received: Bed, 3 rail up, Alarm on  General Comment: Pt supine in bed upon entry to room. Pt pleasant, cooperative and willing to work with PT. Purewick and 6L via NC noted.    Subjective   Precautions:  Precautions  Medical Precautions: Fall precautions, Oxygen therapy device and L/min  Vital Signs:  Vital Signs  Heart Rate: 103  SpO2: (!) 81 % (while seated EOB; spo2 varying from 84-90% while seated EOB with additional time to recover and instructed on breathing techniques. Spo2 returned to 91% at end of session)    Objective   Pain:  Pain Assessment  Pain Score: 0 - No pain  Cognition:  Cognition  Orientation Level: Oriented X4    Treatments:  Therapeutic Activity  Therapeutic Activity Performed:  Yes  Therapeutic Activity 1: pt dependently sitting EOB ~10 minutes this date with pt in slightly reclined position and instructed on pursed lip breathing and relaxation to improve spo2.    Bed Mobility  Bed Mobility: Yes  Bed Mobility 1  Bed Mobility 1: Rolling right, Rolling left  Level of Assistance 1: Maximum assistance  Bed Mobility Comments 1: assistance for BLE to promote rolling  Bed Mobility 2  Bed Mobility  2: Supine to sitting, Sitting to supine  Level of Assistance 2: Maximum assistance (x2)  Bed Mobility Comments 2: HOB slightly elevated       Transfers  Transfer: No (deferred this date 2/2 to fatigue)    Outcome Measures:  [facility] Basic Mobility  Turning from your back to your side while in a flat bed without using bedrails: A lot  Moving from lying on your back to sitting on the side of a flat bed without using bedrails: Total  Moving to and from bed to chair (including a wheelchair): Total  Standing up from a chair using your arms (e.g. wheelchair or bedside chair): Total  To walk in hospital room: Total  Climbing 3-5 steps with railing: Total  Basic Mobility - Total Score: 7    Education Documentation  Mobility Training, taught by Carmel Mcdaniel PT at 12/21/2023  3:35 PM.  Learner: Patient  Readiness: Acceptance  Method: Explanation  Response: Needs Reinforcement    Education Comments  No comments found.        OP EDUCATION:       Encounter Problems       Encounter Problems (Active)       Balance       patient will complete static (SBAx1) and dynamic (Cgx1) standing balance activities using LRD as needed without acute LOB, while maintaining stable vitals.  (Not Progressing)       Start:  11/10/23    Expected End:  12/27/23               Mobility       STG - Patient will ambulate >/=50 ft using LRD as needed with Cgx1 assist without acute LOB, while maintaining stable vitals.  (Not Progressing)       Start:  11/10/23    Expected End:  12/27/23            patient will participate in BLE there-ex in  order to improve strength and to assist with the completion of functional mobility tasks.  (Progressing)       Start:  11/10/23    Expected End:  12/27/23            patient will ambulate >/=30 ft consecutively and >/=75 ft cumulatively with </=1 sitting rest break while maintaining stable vitals, reporting <13/20 on the RPE scale.  (Not Progressing)       Start:  11/10/23    Expected End:  12/27/23               Pain - Adult          Transfers       STG - Transfer from bed to chair with CGx1 assist without acute LOB, using LRD as needed  (Not Progressing)       Start:  11/10/23    Expected End:  12/27/23            STG - Patient will perform bed mobility with SBAx1 without acute LOB, using bedrailing as needed.  (Not Progressing)       Start:  11/10/23    Expected End:  12/27/23            STG - Patient will transfer sit to and from stand with Cgx1 assist using LRD as needed without acute LOB  (Not Progressing)       Start:  11/10/23    Expected End:  12/27/23 12/21/23 at 3:36 PM - Carmel Mcdaniel, PT

## 2023-12-21 NOTE — PROGRESS NOTES
"Macarena Wilson is a 68 y.o. female on day 42 of admission presenting with Acute hypoxemic respiratory failure (CMS/HCC).    Subjective   No acute events overnight. Today, the patient reports that she is doing well without SOB or cough or fever.       Objective     Physical Exam  Constitutional: Patient in no acute distress.  Alert and cooperative.  Eyes: PERRL, EOMI, no icterus.  ENMT: Mucous membranes moist.  Head/Neck: Neck supple with no apparent injury.  Respiratory/Thorax: Non-labored breathing, no cough.  On 6L NC which is baseline for her.  Cardiovascular: Regular rate and rhythm.  No murmurs.  Normal S1 and S2.  Gastrointestinal: Nondistended, soft, non-tender.  : External catheter in place.  Musculoskeletal: ROM intact, generalized weakness.  Extremities: No edema. Bruising improving.  Neurological: Alert and oriented to person, place and time.  Speech clear.  Follows commands appropriately and without focal deficits.  Skin: Warm and dry.    Last Recorded Vitals  Blood pressure 143/88, pulse 110, temperature 36 °C (96.8 °F), temperature source Temporal, resp. rate 18, height 1.6 m (5' 2.99\"), weight 67.7 kg (149 lb 4 oz), SpO2 97 %.  Intake/Output last 3 Shifts:  I/O last 3 completed shifts:  In: 240 (3.5 mL/kg) [P.O.:240]  Out: 400 (5.8 mL/kg) [Urine:400 (0.2 mL/kg/hr)]  Dosing Weight: 68.4 kg     Relevant Results  Scheduled medications  apixaban, 5 mg, oral, BID  ergocalciferol, 1,250 mcg, oral, Weekly  fluticasone, 1 spray, Each Nostril, BID  insulin glargine, 8 Units, subcutaneous, Nightly  insulin lispro, 0-15 Units, subcutaneous, TID with meals  lidocaine, 1 patch, transdermal, Daily  lidocaine, 1 patch, transdermal, Daily  melatonin, 5 mg, oral, Daily  metoprolol tartrate, 50 mg, oral, BID  pantoprazole, 40 mg, oral, Daily before breakfast  predniSONE, 30 mg, oral, BID  QUEtiapine, 25 mg, oral, Nightly  sennosides-docusate sodium, 1 tablet, oral, BID  [Held by provider] sodium zirconium " cyclosilicate, 10 g, oral, TID  sulfamethoxazole-trimethoprim, 80 mg, oral, Daily  thiamine, 100 mg, oral, Daily      Continuous medications  oxygen, , Last Rate: 6 L/min (12/18/23 1746)      PRN medications  PRN medications: acetaminophen **OR** [DISCONTINUED] acetaminophen **OR** [DISCONTINUED] acetaminophen, dextrose 10 % in water (D10W), dextrose, diclofenac sodium, ipratropium-albuteroL, lidocaine-diphenhydraMINE-Maalox 1:1:1, loperamide, ondansetron ODT **OR** [DISCONTINUED] ondansetron, oxyCODONE, QUEtiapine  Results for orders placed or performed during the hospital encounter of 11/09/23 (from the past 24 hour(s))   POCT GLUCOSE   Result Value Ref Range    POCT Glucose 157 (H) 74 - 99 mg/dL   POCT GLUCOSE   Result Value Ref Range    POCT Glucose 231 (H) 74 - 99 mg/dL   POCT GLUCOSE   Result Value Ref Range    POCT Glucose 181 (H) 74 - 99 mg/dL   POCT GLUCOSE   Result Value Ref Range    POCT Glucose 204 (H) 74 - 99 mg/dL   POCT GLUCOSE   Result Value Ref Range    POCT Glucose 75 74 - 99 mg/dL     No results found.                          Malnutrition Diagnosis Status: New  Malnutrition Diagnosis: Severe malnutrition related to acute disease or injury  As Evidenced by: 12% weight loss from normal along with moderate to severe muscle and fat loss on physical exam in setting of a decrease in PO intake/appetite after being diagnosed with COVID  I agree with the dietitian's malnutrition diagnosis.      Assessment/Plan   Principal Problem:    Acute hypoxemic respiratory failure (CMS/[facility])  Active Problems:    Stage 4 chronic kidney disease (CMS/[facility])    COVID-19    Pneumonia of both lower lobes due to infectious organism    Pseudomonal bacteremia    A-fib (CMS/[facility])    Ms. Wilson is a 69 y/o female with hx HTN, CKD stage III who was admitted to the MICU from outside hospital with AHRF 2/2 COVID-pneumonia (tested positive on 10/13), course C/B by ARDS and pulmonary fibrosis resulting in organizing PNA.  Course also  complicated by distal DVT, oliguric HUNG on CKD.  Patient weaned to 6 from 8 L nasal cannula but requires increased FiO2 with movement and exertion. Transferred to [facility] on 12/11 for continued management.     Updates 12/21:  -Prednisone taper- On Monday (12/11) patient was decreased prednisone to 80mg daily for 7 days.  On 12/18, switch to 60mg daily 2-4 weeks.  Then, can consider decreasing by 10-20mg every 1-2 weeks based on clinical appearance.  -Medically ready for discharge, plan to discharge patient to SNF early next week.  Looking into [facility] vs. [facility] as other SNF's have denied patient.  - Only needs labs twice a week     #AHRF 2/2 COVID PNA (11/23) c/b ARDS, Pulm fibrosis, Organizing PNA 2/2 Covid 19  , Pneumomediastinum (resolved)  - Stable on 6L NC.  Desats with movement   - CTA PE from 11/26 negative for PE, showing multifocal pnuemonia, bronchiectasis, and fibrosis  - Continue with Bactrim DS for PCP (Monitor hyperkalemia)  - Fluticasone 1 spry BID   - Seen by lung transplant on 11/30--> per team, must be tapered to less than 10 mg pred and be able to amblate 100 ft on any amount of o2, rengage as appropriate   - Avoid CPAP d/t hx pneumomediastinum during stay   -Monitor sugars on Steroid, on 15 units of lantus (decrease if am sugar low) and SSI      #Covid PNA   -received Decadron, Remdesivir, Tocilizumab at OSH, Neutrophilic leukocytosis,  PSA bacteremia  - Afebrile, no leukocytosis   - 11/21 BC x2 3/4 pseudomonas   -11/25 Bcx Negative   -was on Zosyn11/22- 11/24, switched to PO Cipro 500mg BID 11/25-12/5 for total 14 days  - Continue Bactrim daily for PCP prophylactics      #DVT   -11/13: +Rt PT DVT, and + LT Soleal DVT   -CT PE negative   -Eliquis 5 mg BID      #HTN   #Interittent SvT   #Afib   -Metop tartrate 50 mg BID   -Holding home losartan 100 mg , bp stable   -EKG 12/10 stable, qtc 389      #Oliguric HUNG  on CKD (RESOLVED)   -Per documentation, previous baseline was 1.6, current  creat around 1   -PRN diuresis as needed   -hx of bladder fistula     #Anxiety   #Pain control  -Continue seroquel 25 mg at night and 12.5 mg as needed with PT   -PRN oxy for pain, lidocaine  -Hold trazadone 25 mg, re-initiate if needed   -Bowel reg while on opiods   -Has had diarrhea previously, monitor      #Eczema/Rosasea  -Continue with dupixent      #Sacral Pressure Ulcer (stage 3)   -Wound care      F: PRN   E: PRN   N: Consistent carb  A: PIV   DVT: Eliquis   GI: PPI      Disposition:   SNF      Code Status: Full Code (confirmed on admission)   Please call [facility] or [employer] with any questions.            Yariel Mitchell MD  Please call [employer] or [employer] with any questions.

## 2023-12-21 NOTE — PROGRESS NOTES
Occupational Therapy    OT Treatment    Patient Name: Macarena Wilson  MRN: [id number]  Today's Date: 12/21/2023  Time Calculation  Start Time: 1244  Stop Time: 1318  Time Calculation (min): 34 min         Assessment:  OT Assessment: Pt participated in OT treatment bedside. Toileting for hygiene performed at bed level, bed mobility to sit EOB with MAX A x2. Decrease in spO2 observed on 6L, increased time for breathing technique and relaxation to promote vitals. Pt continues to benefit from skilled OT services to address goals and deficits.  Prognosis: Good  Evaluation/Treatment Tolerance: Patient limited by fatigue  End of Session Communication: Bedside nurse  End of Session Patient Position: Bed, 3 rail up, Alarm on  Prognosis: Good  Evaluation/Treatment Tolerance: Patient limited by fatigue  Plan:  Treatment Interventions: ADL retraining, Functional transfer training, UE strengthening/ROM, Endurance training, Patient/family training, Equipment evaluation/education, Compensatory technique education  OT Frequency: 3 times per week  OT Discharge Recommendations: Moderate intensity level of continued care  OT - OK to Discharge: Yes  Treatment Interventions: ADL retraining, Functional transfer training, UE strengthening/ROM, Endurance training, Patient/family training, Equipment evaluation/education, Compensatory technique education    Subjective   Previous Visit Info:  OT Last Visit  OT Received On: 12/21/23  General:  General  Co-Treatment: PT  Co-Treatment Reason: to optimize mobility and safety while focusing on discipline specific goals due to low AMPAC  Prior to Session Communication: Bedside nurse  Patient Position Received: Bed, 3 rail up, Alarm on  General Comment: Pt supine with HOB elevated upon arrival. Treatment deferred initially due to wanting/awaiting anxiety medication prior to participating with therapy. Pt agreeable following RN administering meds.  Precautions:  Medical Precautions: Fall  precautions, Oxygen therapy device and L/min  Vital Signs:  Vital Signs  Heart Rate: 103  SpO2: (!) 81 % (Initailly seated EOB spO2 81%, verbal cuing and use of relaxing visualization to promote breathing techniques to promote spO2. Primarily varied between 84%-87% seated EOB. Upon returning to supine with HOB elevated spO2 90%.)  Pain:  Pain Assessment  Pain Score: 0 - No pain    Objective    Cognition:  Cognition  Orientation Level: Oriented X4  Coordination:     Activities of Daily Living: Grooming  Grooming Comments: Pt declined grooming tasks while seated EOB to promote anxiety management and relaxation. Deferred to visualization techniques for relaxation.    LE Dressing  Sock Level of Assistance: Dependent  LE Dressing Where Assessed: Bed level    Toileting  Toileting Level of Assistance: Dependent  Where Assessed: Bed level  Toileting Comments: Incontient episode prior to arrival. Bowel hygiene performed at bed level with DEP A.  Functional Standing Tolerance:     Bed Mobility/Transfers: Bed Mobility  Bed Mobility: Yes  Bed Mobility 1  Bed Mobility 1: Rolling right, Rolling left  Level of Assistance 1: Maximum assistance, Moderate verbal cues  Bed Mobility Comments 1: Assistance for LE positioning to promote rolling  Bed Mobility 2  Bed Mobility  2: Supine to sitting, Sitting to supine  Level of Assistance 2: Maximum assistance, Moderate verbal cues, Moderate tactile cues (2 person)  Bed Mobility Comments 2: HOB slightly elevated, use of bed rails. Log roll technique utilized to promote participation of pt.    Transfers  Transfer: No    Sitting Balance:  Static Sitting Balance  Static Sitting-Level of Assistance: Dependent  Static Sitting-Comment/Number of Minutes: EOB sitting ~ 8 minutes with DEP A focusing on breathing techniques and anxiety management  Standing Balance:     Modalities:     IADL's:   Communication:     Splinting:     Casting:     Therapy/Activity: Therapeutic Exercise  Therapeutic Exercise  Performed: Yes  Therapeutic Exercise Activity 1: Scapular retraction x5 performed to incorporate into breathing techniques while spO2 at 90%, decreased spO2 to 84% with scapular retraction. Deferred additional exercises.  Sensory:     Cognitive Skill Development:     Community/Work Reintegration Training:     Community Mobility:     Vision:     Strength:     Other Activity:           Outcome Measures:[facility] Daily Activity  Putting on and taking off regular lower body clothing: Total  Bathing (including washing, rinsing, drying): Total  Putting on and taking off regular upper body clothing: A lot  Toileting, which includes using toilet, bedpan or urinal: Total  Taking care of personal grooming such as brushing teeth: A little  Eating Meals: None  Daily Activity - Total Score: 12        Education Documentation  ADL Training, taught by Juliet Lane OT at 12/21/2023  2:34 PM.  Learner: Patient  Readiness: Acceptance  Method: Explanation  Response: Needs Reinforcement    Education Comments  No comments found.        OP EDUCATION:       Goals:  Encounter Problems       Encounter Problems (Active)       ADLs       Patient with complete upper body dressing with independent level of assistance donning and doffing all UE clothes. (Progressing)       Start:  11/17/23    Expected End:  12/08/23            Patient with complete lower body dressing with minimal assist  level of assistance donning and doffing all LE clothes  with PRN adaptive equipment. (Progressing)       Start:  11/17/23    Expected End:  12/08/23            Patient will complete daily grooming tasks with stand by assist level of assistance and PRN adaptive equipment while in sitting. (Progressing)       Start:  11/17/23    Expected End:  12/08/23            Patient will complete toileting including hygiene clothing management/hygiene with minimal assist  level of assistance. (Progressing)       Start:  11/17/23    Expected End:  12/08/23                BALANCE       Pt will maintain dynamic sitting balance during ADL task with contact guard assist level of assistance in order to demonstrate decreased risk of falling and improved postural control. (Progressing)       Start:  11/17/23    Expected End:  12/08/23               Balance       patient will complete static (SBAx1) and dynamic (Cgx1) standing balance activities using LRD as needed without acute LOB, while maintaining stable vitals.  (Not Progressing)       Start:  11/10/23    Expected End:  12/27/23               COGNITION/SAFETY       Patient will verbalize and demonstrate >2 relaxation and breathing techniques during sessions with independence. (Progressing)       Start:  11/17/23    Expected End:  12/08/23               EXERCISE/STRENGTHENING       Patient will complete BUE exercises in order to improve activity tolerance for ADL performance.  (Progressing)       Start:  11/17/23    Expected End:  12/08/23               Mobility       STG - Patient will ambulate >/=50 ft using LRD as needed with Cgx1 assist without acute LOB, while maintaining stable vitals.  (Not Progressing)       Start:  11/10/23    Expected End:  12/27/23            patient will participate in BLE there-ex in order to improve strength and to assist with the completion of functional mobility tasks.  (Progressing)       Start:  11/10/23    Expected End:  12/27/23            patient will ambulate >/=30 ft consecutively and >/=75 ft cumulatively with </=1 sitting rest break while maintaining stable vitals, reporting <13/20 on the RPE scale.  (Not Progressing)       Start:  11/10/23    Expected End:  12/27/23               TRANSFERS       Patient will perform bed mobility minimal assist  level of assistance in order to improve safety and independence with mobility (Progressing)       Start:  11/17/23    Expected End:  12/08/23            Patient will complete functional transfers with least restrictive device with minimal assist  level  of assistance. (Progressing)       Start:  11/17/23    Expected End:  12/08/23               Transfers       STG - Transfer from bed to chair with CGx1 assist without acute LOB, using LRD as needed  (Not Progressing)       Start:  11/10/23    Expected End:  12/27/23            STG - Patient will perform bed mobility with SBAx1 without acute LOB, using bedrailing as needed.  (Not Progressing)       Start:  11/10/23    Expected End:  12/27/23            STG - Patient will transfer sit to and from stand with Cgx1 assist using LRD as needed without acute LOB  (Not Progressing)       Start:  11/10/23    Expected End:  12/27/23

## 2023-12-21 NOTE — CARE PLAN
The clinical goals for the shift include patient will remain HDS throughout shift 12/21 at 0700      Problem: Skin  Goal: Decreased wound size/increased tissue granulation at next dressing change  Flowsheets (Taken 12/21/2023 0131)  Decreased wound size/increased tissue granulation at next dressing change: Promote sleep for wound healing     Problem: Skin  Goal: Prevent/minimize sheer/friction injuries  Flowsheets (Taken 12/21/2023 0131)  Prevent/minimize sheer/friction injuries: HOB 30 degrees or less     Problem: Skin  Goal: Prevent/manage excess moisture  Flowsheets (Taken 12/21/2023 0131)  Prevent/manage excess moisture: Moisturize dry skin     Problem: Skin  Goal: Promote skin healing  Flowsheets (Taken 12/21/2023 0131)  Promote skin healing: Assess skin/pad under line(s)/device(s)

## 2023-12-21 NOTE — PROGRESS NOTES
Macarena Wilson is a 68 y.o. female on day 42 of admission presenting with Acute hypoxemic respiratory failure (CMS/HCC).    New SNF preferences needed for referral. Pt directed SW to consult daughter-in-law Kendra for her input on SNFs. SW reached out to Kendra and emailed a new SNF choice list to her, with [place] [phone] as location. SW will follow. Da WILKINSON, W.     12/07/2023 1432  Kendra received the new SNF list and will notify SW of preferences. Referral in [facility] will be updated once preferences are received. Da WILKINSON, LSW.     12/07/2023  Pt daughter-in-law Kendra chose new preferences for SNF: 1 [facility], 2. [facility], 3. [facility], 4. [facility], and 5. [facility]. Referral updated and sent in [facility]. SW will follow. Da WILKINSON, \[facility] [place] [facility]\"".     12/15/2023  [facility] is waiting on final acceptance from their medical team. SW asked for an update. SW will follow. Da WILKINSON, \[facility] [place] [facility]\"".     12/18/23 @ 1430  [facility] states they are out of network and patient would have to pay ded/oop $8500. Their sister facility [facility] is in network. [facility], as they originally stated out of network.  Referral also resent to [facility] per daughter in Kendra frost.   Cindy Jo RN TCC    12/19/23 @ 1200  Spoke with Godfrey who is the admissions director for [facility].  [phone]. New referral placed in [facility] as he was unable to send message or see clinical notes.  He will have facility start auth and will have a bed available tomorrow.  Cindy Jo RN TCC    12/20/23 @ 1410  Updated notes and clinicals were faxed over to [facility] this morning---[facility] unable to accept patient as her respiratory needs are more than they can care for at this time. DEANA Gardner, updated.  Referral sent again to [facility]. May need to consider LTACH.  KALEB Kauffman MD made aware of dispo updates.  Cindy Jo RN TCC    12/21/23 @ 1400  Spoke with  from [facility]. Her name was Elian [phone]. If we need any assistance with her insurance, she is available.  Cindy Jo RN TCC    12/21/23 @ 5534  Pre-cert started for [facility]. Will send updates as requested. Kendra LEBLANC, made aware.  Cindy Jo RN TCC

## 2023-12-22 NOTE — PROGRESS NOTES
"Nutrition Follow Up Assessment:   Nutrition Assessment    The patient is a 68 y.o. female who is hospital day #43.  was admitted to the MICU from outside hospital with AHRF 2/2 COVID-pneumonia (tested positive on 10/13), course C/B by ARDS and pulmonary fibrosis resulting in organizing PNA.  Course also complicated by distal DVT, oliguric HUNG on CKD.  Patient weaned to 6 from 8 L nasal cannula but requires increased FiO2 with movement and exertion. Transferred to [facility] on 12/11 for continued management. Awaiting precert for LTACH.     Nutrition History:  Energy Intake: Good > 75 %  Food and Nutrient History: Pt reports eating well recently. Has been consuming 3 meals a day and ordering a variety of foods from the menu. Per health touch review, pt always inclduing some type of protein and cho with meals. Pt reports that she usually eats more of her meals but it may vary depending if she had a late lunch or if she has been working with PT. Pt also shares with RDN that she was without any food for about 3 weeks and that her stomach shrunk. Believes she is eating more than what her baseline is but she is doing so to help get back to baseline and build some muscle. Drinking ONS intermittently.        Anthropometrics:  Start of admission anthropometrics:  Height: 160 cm (5' 3\")  Weight: 68.4 kg (150 lb 12.7 oz)  BMI (Calculated): 26.72    IBW/kg (Dietitian Calculated): 52.3 kg  Percent of IBW: 131 %       Weight         11/14/2023  0400 11/15/2023  0600 11/17/2023  0500 11/17/2023  0800 12/8/2023  0600    Weight: 61.4 kg (135 lb 5.8 oz) 60.2 kg (132 lb 11.5 oz) 63.2 kg (139 lb 5.3 oz) 63.2 kg (139 lb 5.3 oz) 67.7 kg (149 lb 4 oz)            Weight Change %:  Weight History / % Weight Change: No new wt in the past two weeks. Unable to determine wt changes in the past week. Though pt eating well so do not suspect wt loss.    Nutrition Focused Physical Exam Findings:    Edema:  Edema Location: +1 generalized; +2 " BLE  Physical Findings:  Skin:  (stg 3 P.I @sacrum)    Objective Data:    Last BM Date: 12/21/23    Nutrition Significant Labs:    Results from last 7 days   Lab Units 12/21/23  0844 12/19/23  1022 12/18/23  0940   HEMOGLOBIN g/dL 9.2* 8.7* 9.1*   MCV fL 90 91 90   GLUCOSE mg/dL 76 127* 61*   POTASSIUM mmol/L 4.2 4.4 3.4*   SODIUM mmol/L 141 144 147*   PHOSPHORUS mg/dL 2.9 3.0 2.9   MAGNESIUM mg/dL 2.16 2.11 1.83   CREATININE mg/dL 0.71 0.74 0.72   BUN mg/dL 37* 42* 45*       Nutrition Specific Medications:  ergocalciferol, 1,250 mcg, oral, Weekly  insulin glargine, 8 Units, subcutaneous, Nightly  insulin lispro, 0-15 Units, subcutaneous, TID with meals  pantoprazole, 40 mg, oral, Daily before breakfast  predniSONE, 30 mg, oral, BID  sennosides-docusate sodium, 1 tablet, oral, BID  sulfamethoxazole-trimethoprim, 80 mg, oral, Daily  thiamine, 100 mg, oral, Daily    I/O:   I/O last 2 completed shifts:  In: - (0 mL/kg)   Out: 650 (9.5 mL/kg) [Urine:650 (0.4 mL/kg/hr)]  Dosing Weight: 68.4 kg     Dietary Orders (From admission, onward)       Start     Ordered    12/08/23 0651  Adult diet Carb Controlled; 90 gram carb/meal, 60 gram Carb evening snack  Diet effective now        Question Answer Comment   Diet type Carb Controlled    Carb diet selection: 90 gram carb/meal, 60 gram Carb evening snack        12/08/23 0650    12/07/23 1436  Oral nutritional supplements  Until discontinued        Question Answer Comment   Deliver with Breakfast vanilla   Select supplement: Ensure Plus        12/07/23 1435    11/09/23 0400  May Participate in Room Service With Assistance  Once        Question:  .  Answer:  Yes    11/09/23 0400                     Estimated Needs:   Total Energy Estimated Needs (kCal):  (8004-8358)  Method for Estimating Needs: MSJ= 1099 using 60kg    Total Protein Estimated Needs (g):  (70-80)  Method for Estimating Needs: 1.3-1.5 x 52.3kg               Nutrition Diagnosis   Malnutrition Diagnosis  Patient has  Malnutrition Diagnosis: Yes  Diagnosis Status: Ongoing  Malnutrition Diagnosis: Severe malnutrition related to acute disease or injury  As Evidenced by: Though pt still meets criteria given previous significant wt loss and losses noted in NFPE she has been eating adequately for the past 2+ weeks. Nutrition status improving.  Will need a repeat wt to determine if wt has stabilized.            Nutrition Interventions/Recommendations     Continue with regular diet  ONS order updated   Ok to discontinue thiamine supplementation   Obtain updated wt    Pt eating adequately for the past 2+ weeks. Will need an updated wt to ensure that wt has stabilized - RDN to monitor peripherally. RDN to sign off at this time given no further indication for nutrition intervention/MNT. Re-consult as needed.         Nutrition Prescription:  Individualized Nutrition Prescription Provided for : 1600 kcal and 75g protein via PO diet      Nutrition Education:   N/A - pt denied having any nutrition questions or concerns.      Nutrition Monitoring and Evaluation    None -RDN to sign off                       Time Spent/Follow-up Reminder:   Time Spent (min): 45 minutes  Last Date of Nutrition Visit: 12/22/23  Nutrition Follow-Up Needed?: None  Follow up Comment: RDN signed off

## 2023-12-22 NOTE — PROGRESS NOTES
"Macarena Wilson is a 68 y.o. female on day 43 of admission presenting with Acute hypoxemic respiratory failure (CMS/HCC).    Subjective   No acute events overnight. Today, the patient reports that she is doing well with no SOB or cough.       Objective     Physical Exam  Constitutional: Patient in no acute distress.  Alert and cooperative.  Eyes: PERRL, EOMI, no icterus.  ENMT: Mucous membranes moist.  Head/Neck: Neck supple with no apparent injury.  Respiratory/Thorax: Non-labored breathing, no cough.  On 6L NC which is baseline for her.  Cardiovascular: Regular rate and rhythm.  No murmurs.  Normal S1 and S2.  Gastrointestinal: Nondistended, soft, non-tender.  : External catheter in place.  Musculoskeletal: ROM intact, generalized weakness.  Extremities: No edema. Bruising improving.  Neurological: Alert and oriented to person, place and time.  Speech clear.  Follows commands appropriately and without focal deficits.  Skin: Warm and dry.    Last Recorded Vitals  Blood pressure 166/80, pulse 71, temperature 36.1 °C (97 °F), temperature source Temporal, resp. rate 18, height 1.6 m (5' 2.99\"), weight 67.7 kg (149 lb 4 oz), SpO2 99 %.  Intake/Output last 3 Shifts:  I/O last 3 completed shifts:  In: - (0 mL/kg)   Out: 650 (9.5 mL/kg) [Urine:650 (0.3 mL/kg/hr)]  Dosing Weight: 68.4 kg     Relevant Results  Scheduled medications  apixaban, 5 mg, oral, BID  ergocalciferol, 1,250 mcg, oral, Weekly  fluticasone, 1 spray, Each Nostril, BID  insulin glargine, 8 Units, subcutaneous, Nightly  insulin lispro, 0-15 Units, subcutaneous, TID with meals  lidocaine, 1 patch, transdermal, Daily  lidocaine, 1 patch, transdermal, Daily  melatonin, 5 mg, oral, Daily  metoprolol tartrate, 50 mg, oral, BID  pantoprazole, 40 mg, oral, Daily before breakfast  predniSONE, 30 mg, oral, BID  QUEtiapine, 25 mg, oral, Nightly  sennosides-docusate sodium, 1 tablet, oral, BID  [Held by provider] sodium zirconium cyclosilicate, 10 g, oral, " TID  sulfamethoxazole-trimethoprim, 80 mg, oral, Daily  thiamine, 100 mg, oral, Daily      Continuous medications  oxygen, , Last Rate: 6 L/min (12/18/23 2308)      PRN medications  PRN medications: acetaminophen **OR** [DISCONTINUED] acetaminophen **OR** [DISCONTINUED] acetaminophen, dextrose 10 % in water (D10W), dextrose, diclofenac sodium, ipratropium-albuteroL, lidocaine-diphenhydraMINE-Maalox 1:1:1, loperamide, ondansetron ODT **OR** [DISCONTINUED] ondansetron, oxyCODONE, QUEtiapine  Results for orders placed or performed during the hospital encounter of 11/09/23 (from the past 24 hour(s))   POCT GLUCOSE   Result Value Ref Range    POCT Glucose 75 74 - 99 mg/dL   CBC and Auto Differential   Result Value Ref Range    WBC 10.7 4.4 - 11.3 x10*3/uL    nRBC 1.1 (H) 0.0 - 0.0 /100 WBCs    RBC 3.27 (L) 4.00 - 5.20 x10*6/uL    Hemoglobin 9.2 (L) 12.0 - 16.0 g/dL    Hematocrit 29.4 (L) 36.0 - 46.0 %    MCV 90 80 - 100 fL    MCH 28.1 26.0 - 34.0 pg    MCHC 31.3 (L) 32.0 - 36.0 g/dL    RDW 17.2 (H) 11.5 - 14.5 %    Platelets 82 (L) 150 - 450 x10*3/uL    Immature Granulocytes %, Automated 6.1 (H) 0.0 - 0.9 %    Immature Granulocytes Absolute, Automated 0.66 0.00 - 0.70 x10*3/uL   Magnesium   Result Value Ref Range    Magnesium 2.16 1.60 - 2.40 mg/dL   Renal Function Panel   Result Value Ref Range    Glucose 76 74 - 99 mg/dL    Sodium 141 136 - 145 mmol/L    Potassium 4.2 3.5 - 5.3 mmol/L    Chloride 106 98 - 107 mmol/L    Bicarbonate 23 21 - 32 mmol/L    Anion Gap 16 10 - 20 mmol/L    Urea Nitrogen 37 (H) 6 - 23 mg/dL    Creatinine 0.71 0.50 - 1.05 mg/dL    eGFR >90 >60 mL/min/1.73m*2    Calcium 8.6 8.6 - 10.6 mg/dL    Phosphorus 2.9 2.5 - 4.9 mg/dL    Albumin 2.9 (L) 3.4 - 5.0 g/dL   Manual Differential   Result Value Ref Range    Neutrophils %, Manual 93.8 40.0 - 80.0 %    Bands %, Manual 0.9 0.0 - 5.0 %    Lymphocytes %, Manual 2.6 13.0 - 44.0 %    Monocytes %, Manual 0.9 2.0 - 10.0 %    Eosinophils %, Manual 0.0 0.0 -  6.0 %    Basophils %, Manual 0.0 0.0 - 2.0 %    Metamyelocytes %, Manual 0.9 0.0 - 0.0 %    Myelocytes %, Manual 0.9 0.0 - 0.0 %    Seg Neutrophils Absolute, Manual 10.04 (H) 1.20 - 7.00 x10*3/uL    Bands Absolute, Manual 0.10 0.00 - 0.70 x10*3/uL    Lymphocytes Absolute, Manual 0.28 (L) 1.20 - 4.80 x10*3/uL    Monocytes Absolute, Manual 0.10 0.10 - 1.00 x10*3/uL    Eosinophils Absolute, Manual 0.00 0.00 - 0.70 x10*3/uL    Basophils Absolute, Manual 0.00 0.00 - 0.10 x10*3/uL    Metamyelocytes Absolute, Manual 0.10 0.00 - 0.00 x10*3/uL    Myelocytes Absolute, Manual 0.10 0.00 - 0.00 x10*3/uL    Total Cells Counted 114     Neutrophils Absolute, Manual 10.14 (H) 1.20 - 7.70 x10*3/uL    RBC Morphology See Below     Polychromasia Mild     RBC Fragments Few     Basophilic Stippling Present    POCT GLUCOSE   Result Value Ref Range    POCT Glucose 123 (H) 74 - 99 mg/dL   POCT GLUCOSE   Result Value Ref Range    POCT Glucose 295 (H) 74 - 99 mg/dL   POCT GLUCOSE   Result Value Ref Range    POCT Glucose 270 (H) 74 - 99 mg/dL   POCT GLUCOSE   Result Value Ref Range    POCT Glucose 108 (H) 74 - 99 mg/dL     No results found.                          Malnutrition Diagnosis Status: New  Malnutrition Diagnosis: Severe malnutrition related to acute disease or injury  As Evidenced by: 12% weight loss from normal along with moderate to severe muscle and fat loss on physical exam in setting of a decrease in PO intake/appetite after being diagnosed with COVID  I agree with the dietitian's malnutrition diagnosis.      Assessment/Plan   Principal Problem:    Acute hypoxemic respiratory failure (CMS/HCC)  Active Problems:    Stage 4 chronic kidney disease (CMS/HCC)    COVID-19    Pneumonia of both lower lobes due to infectious organism    Pseudomonal bacteremia    A-fib (CMS/[facility])    Ms. Wilson is a 67 y/o female with hx HTN, CKD stage III who was admitted to the MICU from outside hospital with AHRF 2/2 COVID-pneumonia (tested positive on  10/13), course C/B by ARDS and pulmonary fibrosis resulting in organizing PNA.  Course also complicated by distal DVT, oliguric HUNG on CKD.  Patient weaned to 6 from 8 L nasal cannula but requires increased FiO2 with movement and exertion. Transferred to [facility] on 12/11 for continued management.     Updates 12/22:  -Prednisone taper- On Monday (12/11) patient was decreased prednisone to 80mg daily for 7 days.  On 12/18, switch to 60mg daily 2-4 weeks.  Then, can consider decreasing by 10-20mg every 1-2 weeks based on clinical appearance.  -Medically ready for discharge, plan to discharge patient to SNF/LTAC. Approved at [facility], possible discharge today or tomorrow.  - Only needs labs twice a week     #AHRF 2/2 COVID PNA (11/23) c/b ARDS, Pulm fibrosis, Organizing PNA 2/2 Covid 19  , Pneumomediastinum (resolved)  - Stable on 6L NC.  Desats with movement   - CTA PE from 11/26 negative for PE, showing multifocal pnuemonia, bronchiectasis, and fibrosis  - Continue with Bactrim DS for PCP (Monitor hyperkalemia)  - Fluticasone 1 spry BID   - Seen by lung transplant on 11/30--> per team, must be tapered to less than 10 mg pred and be able to amblate 100 ft on any amount of o2, rengage as appropriate   - Avoid CPAP d/t hx pneumomediastinum during stay   -Monitor sugars on Steroid, on 15 units of lantus (decrease if am sugar low) and SSI      #Covid PNA   -received Decadron, Remdesivir, Tocilizumab at OSH, Neutrophilic leukocytosis,  PSA bacteremia  - Afebrile, no leukocytosis   - 11/21 BC x2 3/4 pseudomonas   -11/25 Bcx Negative   -was on Zosyn11/22- 11/24, switched to PO Cipro 500mg BID 11/25-12/5 for total 14 days  - Continue Bactrim daily for PCP prophylactics      #DVT   -11/13: +Rt PT DVT, and + LT Soleal DVT   -CT PE negative   -Eliquis 5 mg BID      #HTN   #Interittent SvT   #Afib   -Metop tartrate 50 mg BID   -Holding home losartan 100 mg , bp stable   -EKG 12/10 stable, qtc 389       #Oliguric HUNG  on CKD (RESOLVED)   -Per documentation, previous baseline was 1.6, current creat around 1   -PRN diuresis as needed   -hx of bladder fistula     #Anxiety   #Pain control  -Continue seroquel 25 mg at night and 12.5 mg as needed with PT   -PRN oxy for pain, lidocaine  -Hold trazadone 25 mg, re-initiate if needed   -Bowel reg while on opiods   -Has had diarrhea previously, monitor      #Eczema/Rosasea  -Continue with dupixent      #Sacral Pressure Ulcer (stage 3)   -Wound care      F: PRN   E: PRN   N: Consistent carb  A: PIV   DVT: Eliquis   GI: PPI      Disposition:   SNF      Code Status: Full Code (confirmed on admission)   Please call [phone] or [email or web address] Chat with any questions.               Yariel Mitchell MD

## 2023-12-22 NOTE — CARE PLAN
The patient's goals for the shift include      The clinical goals for the shift include pt will remain safe and free from injury through shift    Problem: Skin  Goal: Decreased wound size/increased tissue granulation at next dressing change  Outcome: Progressing  Flowsheets (Taken 12/21/2023 8103)  Decreased wound size/increased tissue granulation at next dressing change: Promote sleep for wound healing     Problem: Fall/Injury  Goal: Verbalize understanding of personal risk factors for fall in the hospital  Outcome: Progressing     Problem: Fall/Injury  Goal: Be free from injury by end of the shift  Outcome: Progressing

## 2023-12-23 NOTE — PROGRESS NOTES
Precert to [facility] is still pending. Medical team updated of above.     Ean Segura RN  Transitional Care Coordinator/TCC  [id number]

## 2023-12-23 NOTE — CARE PLAN
Problem: Nutrition  Goal: Oral intake greater 75%  Outcome: Progressing  Goal: Consume prescribed supplement  Outcome: Progressing  Goal: Adequate PO fluid intake  Outcome: Progressing  Goal: Nutrition support goals are met within 48 hrs  Outcome: Progressing  Goal: Nutrition support is meeting 75% of nutrient needs  Outcome: Progressing  Goal: BG  mg/dL  Outcome: Progressing  Goal: Lab values WNL  Outcome: Progressing  Goal: Electrolytes WNL  Outcome: Progressing  Goal: Promote healing  Outcome: Progressing     Problem: Skin  Goal: Decreased wound size/increased tissue granulation at next dressing change  Outcome: Progressing  Flowsheets (Taken 12/23/2023 0959)  Decreased wound size/increased tissue granulation at next dressing change: Promote sleep for wound healing  Goal: Participates in plan/prevention/treatment measures  Outcome: Progressing  Flowsheets (Taken 12/23/2023 0959)  Participates in plan/prevention/treatment measures: Elevate heels  Goal: Prevent/manage excess moisture  Outcome: Progressing  Flowsheets (Taken 12/23/2023 0959)  Prevent/manage excess moisture:   Moisturize dry skin   Cleanse incontinence/protect with barrier cream  Goal: Prevent/minimize sheer/friction injuries  Outcome: Progressing  Flowsheets (Taken 12/23/2023 0959)  Prevent/minimize sheer/friction injuries: Use pull sheet  Goal: Promote skin healing  Outcome: Progressing  Flowsheets (Taken 12/23/2023 0959)  Promote skin healing:   Rotate device position/do not position patient on device   Assess skin/pad under line(s)/device(s)     Problem: Fall/Injury  Goal: Verbalize understanding of personal risk factors for fall in the hospital  Outcome: Progressing  Goal: Verbalize understanding of risk factor reduction measures to prevent injury from fall in the home  Outcome: Progressing  Goal: Use assistive devices by end of the shift  Outcome: Progressing  Goal: Not fall by end of shift  Outcome: Progressing  Goal: Be free from  injury by end of the shift  Outcome: Progressing  Goal: Pace activities to prevent fatigue by end of the shift  Outcome: Progressing     Problem: Discharge Planning  Goal: Discharge to home or other facility with appropriate resources  Outcome: Progressing     Problem: Chronic Conditions and Co-morbidities  Goal: Patient's chronic conditions and co-morbidity symptoms are monitored and maintained or improved  Outcome: Progressing     Problem: Respiratory  Goal: Clear secretions with interventions this shift  Outcome: Progressing  Goal: Minimize anxiety/maximize coping throughout shift  Outcome: Progressing  Goal: Minimal/no exertional discomfort or dyspnea this shift  Outcome: Progressing  Goal: No signs of respiratory distress (eg. Use of accessory muscles. Peds grunting)  Outcome: Progressing  Goal: Patent airway maintained this shift  Outcome: Progressing  Goal: Tolerate pulmonary toileting this shift  Outcome: Progressing  Goal: Verbalize decreased shortness of breath this shift  Outcome: Progressing  Goal: Wean oxygen to maintain O2 saturation per order/standard this shift  Outcome: Progressing  Goal: Increase self care and/or family involvement in next 24 hours  Outcome: Progressing     Problem: Pain  Goal: Takes deep breaths with improved pain control throughout the shift  Outcome: Progressing  Goal: Turns in bed with improved pain control throughout the shift  Outcome: Progressing  Goal: Walks with improved pain control throughout the shift  Outcome: Progressing  Goal: Performs ADL's with improved pain control throughout shift  Outcome: Progressing  Goal: Participates in PT with improved pain control throughout the shift  Outcome: Progressing  Goal: Free from opioid side effects throughout the shift  Outcome: Progressing  Goal: Free from acute confusion related to pain meds throughout the shift  Outcome: Progressing       The clinical goals for the shift include Pt will remain free of injury throughout  shift.

## 2023-12-23 NOTE — PROGRESS NOTES
"Macarena Wilson is a 68 y.o. female on day 44 of admission presenting with Acute hypoxemic respiratory failure (CMS/HCC).    Subjective   No acute events overnight. Today, the patient reports that she is doing well with no concerns. Denies cough or SOB.       Objective     Physical Exam  Constitutional: Patient in no acute distress.  Alert and cooperative.  Eyes: PERRL, EOMI, no icterus.  ENMT: Mucous membranes moist.  Head/Neck: Neck supple with no apparent injury.  Respiratory/Thorax: Non-labored breathing, no cough.  On 6L NC which is baseline for her.  Cardiovascular: Regular rate and rhythm.  No murmurs.  Normal S1 and S2.  Gastrointestinal: Nondistended, soft, non-tender.  : External catheter in place.  Musculoskeletal: ROM intact, generalized weakness.  Extremities: No edema. Bruising improving.  Neurological: Alert and oriented to person, place and time.  Speech clear.  Follows commands appropriately and without focal deficits.  Skin: Warm and dry.       Last Recorded Vitals  Blood pressure 142/82, pulse 103, temperature 36.3 °C (97.3 °F), temperature source Temporal, resp. rate 16, height 1.6 m (5' 2.99\"), weight 67.7 kg (149 lb 4 oz), SpO2 100 %.  Intake/Output last 3 Shifts:  I/O last 3 completed shifts:  In: - (0 mL/kg)   Out: 1400 (20.5 mL/kg) [Urine:1400 (0.6 mL/kg/hr)]  Dosing Weight: 68.4 kg     Relevant Results  Scheduled medications  apixaban, 5 mg, oral, BID  ergocalciferol, 1,250 mcg, oral, Weekly  fluticasone, 1 spray, Each Nostril, BID  insulin glargine, 8 Units, subcutaneous, Nightly  insulin lispro, 0-15 Units, subcutaneous, TID with meals  lidocaine, 1 patch, transdermal, Daily  lidocaine, 1 patch, transdermal, Daily  melatonin, 5 mg, oral, Daily  metoprolol tartrate, 50 mg, oral, BID  pantoprazole, 40 mg, oral, Daily before breakfast  predniSONE, 30 mg, oral, BID  QUEtiapine, 25 mg, oral, Nightly  sennosides-docusate sodium, 1 tablet, oral, BID  [Held by provider] sodium zirconium " cyclosilicate, 10 g, oral, TID  sulfamethoxazole-trimethoprim, 80 mg, oral, Daily  thiamine, 100 mg, oral, Daily      Continuous medications  oxygen, , Last Rate: 6 L/min (12/18/23 2308)      PRN medications  PRN medications: acetaminophen **OR** [DISCONTINUED] acetaminophen **OR** [DISCONTINUED] acetaminophen, dextrose 10 % in water (D10W), dextrose, diclofenac sodium, ipratropium-albuteroL, lidocaine-diphenhydraMINE-Maalox 1:1:1, loperamide, ondansetron ODT **OR** [DISCONTINUED] ondansetron, oxyCODONE, QUEtiapine  Results for orders placed or performed during the hospital encounter of 11/09/23 (from the past 24 hour(s))   POCT GLUCOSE   Result Value Ref Range    POCT Glucose 291 (H) 74 - 99 mg/dL   POCT GLUCOSE   Result Value Ref Range    POCT Glucose 164 (H) 74 - 99 mg/dL   POCT GLUCOSE   Result Value Ref Range    POCT Glucose 100 (H) 74 - 99 mg/dL     No results found.                               Assessment/Plan   Principal Problem:    Acute hypoxemic respiratory failure (CMS/[facility])  Active Problems:    Stage 4 chronic kidney disease (CMS/[facility])    COVID-19    Pneumonia of both lower lobes due to infectious organism    Pseudomonal bacteremia    A-fib (CMS/[facility])    Ms. Wilson is a 67 y/o female with hx HTN, CKD stage III who was admitted to the MICU from outside hospital with AHRF 2/2 COVID-pneumonia (tested positive on 10/13), course C/B by ARDS and pulmonary fibrosis resulting in organizing PNA.  Course also complicated by distal DVT, oliguric HUNG on CKD.  Patient weaned to 6 from 8 L nasal cannula but requires increased FiO2 with movement and exertion. Transferred to [facility] on 12/11 for continued management.     Updates 12/23:  -Prednisone taper- On Monday (12/11) patient was decreased prednisone to 80mg daily for 7 days.  On 12/18, switch to 60mg daily 2-4 weeks.  Then, can consider decreasing by 10-20mg every 1-2 weeks based on clinical appearance.  -Medically ready for discharge, plan to discharge  "patient to SNF/LTAC. Approved at [facility], possible discharge \"after the Holidays.\"  - Only needs labs twice a week     #AHRF 2/2 COVID PNA (11/23) c/b ARDS, Pulm fibrosis, Organizing PNA 2/2 Covid 19  , Pneumomediastinum (resolved)  - Stable on 6L NC.  Desats with movement   - CTA PE from 11/26 negative for PE, showing multifocal pnuemonia, bronchiectasis, and fibrosis  - Continue with Bactrim DS for PCP (Monitor hyperkalemia)  - Fluticasone 1 spry BID   - Seen by lung transplant on 11/30--> per team, must be tapered to less than 10 mg pred and be able to amblate 100 ft on any amount of o2, rengage as appropriate   - Avoid CPAP d/t hx pneumomediastinum during stay   -Monitor sugars on Steroid, on 15 units of lantus (decrease if am sugar low) and SSI      #Covid PNA   -received Decadron, Remdesivir, Tocilizumab at OSH, Neutrophilic leukocytosis,  PSA bacteremia  - Afebrile, no leukocytosis   - 11/21 BC x2 3/4 pseudomonas   -11/25 Bcx Negative   -was on Zosyn11/22- 11/24, switched to PO Cipro 500mg BID 11/25-12/5 for total 14 days  - Continue Bactrim daily for PCP prophylactics      #DVT   -11/13: +Rt PT DVT, and + LT Soleal DVT   -CT PE negative   -Eliquis 5 mg BID      #HTN   #Interittent SvT   #Afib   -Metop tartrate 50 mg BID   -Holding home losartan 100 mg , bp stable   -EKG 12/10 stable, qtc 389      #Oliguric HUNG  on CKD (RESOLVED)   -Per documentation, previous baseline was 1.6, current creat around 1   -PRN diuresis as needed   -hx of bladder fistula     #Anxiety   #Pain control  -Continue seroquel 25 mg at night and 12.5 mg as needed with PT   -PRN oxy for pain, lidocaine  -Hold trazadone 25 mg, re-initiate if needed   -Bowel reg while on opiods   -Has had diarrhea previously, monitor      #Eczema/Rosasea  -Continue with dupixent      #Sacral Pressure Ulcer (stage 3)   -Wound care      F: PRN   E: PRN   N: Consistent carb  A: PIV   DVT: Eliquis   GI: PPI      Disposition:   SNF      Code " Status: Full Code (confirmed on admission)             Yariel Mitchell MD  Please call [phone] or [employer] Chat with any questions.

## 2023-12-23 NOTE — CARE PLAN
The patient's goals for the shift include      The clinical goals for the shift include patient will sit in chair position >3 hours      Problem: Nutrition  Goal: Oral intake greater 75%  Outcome: Progressing  Goal: Consume prescribed supplement  Outcome: Progressing  Goal: Adequate PO fluid intake  Outcome: Progressing  Goal: Nutrition support goals are met within 48 hrs  Outcome: Progressing  Goal: Nutrition support is meeting 75% of nutrient needs  Outcome: Progressing  Goal: BG  mg/dL  Outcome: Progressing  Goal: Lab values WNL  Outcome: Progressing  Goal: Electrolytes WNL  Outcome: Progressing  Goal: Promote healing  Outcome: Progressing     Problem: Skin  Goal: Decreased wound size/increased tissue granulation at next dressing change  Outcome: Progressing  Flowsheets (Taken 12/23/2023 0244)  Decreased wound size/increased tissue granulation at next dressing change: Promote sleep for wound healing  Goal: Participates in plan/prevention/treatment measures  Outcome: Progressing  Flowsheets (Taken 12/23/2023 0244)  Participates in plan/prevention/treatment measures: Elevate heels  Goal: Prevent/manage excess moisture  Outcome: Progressing  Flowsheets (Taken 12/23/2023 0244)  Prevent/manage excess moisture:   Moisturize dry skin   Cleanse incontinence/protect with barrier cream  Goal: Prevent/minimize sheer/friction injuries  Outcome: Progressing  Flowsheets (Taken 12/23/2023 0244)  Prevent/minimize sheer/friction injuries:   Use pull sheet   Turn/reposition every 2 hours/use positioning/transfer devices  Goal: Promote skin healing  Outcome: Progressing  Flowsheets (Taken 12/23/2023 0244)  Promote skin healing: Assess skin/pad under line(s)/device(s)     Problem: Fall/Injury  Goal: Verbalize understanding of personal risk factors for fall in the hospital  Outcome: Progressing  Goal: Verbalize understanding of risk factor reduction measures to prevent injury from fall in the home  Outcome: Progressing  Goal:  Use assistive devices by end of the shift  Outcome: Progressing  Goal: Not fall by end of shift  Outcome: Progressing  Goal: Be free from injury by end of the shift  Outcome: Progressing  Goal: Pace activities to prevent fatigue by end of the shift  Outcome: Progressing     Problem: Discharge Planning  Goal: Discharge to home or other facility with appropriate resources  Outcome: Progressing     Problem: Chronic Conditions and Co-morbidities  Goal: Patient's chronic conditions and co-morbidity symptoms are monitored and maintained or improved  Outcome: Progressing     Problem: Respiratory  Goal: Clear secretions with interventions this shift  Outcome: Progressing  Goal: Minimize anxiety/maximize coping throughout shift  Outcome: Progressing  Goal: Minimal/no exertional discomfort or dyspnea this shift  Outcome: Progressing  Goal: No signs of respiratory distress (eg. Use of accessory muscles. Peds grunting)  Outcome: Progressing  Goal: Patent airway maintained this shift  Outcome: Progressing  Goal: Tolerate pulmonary toileting this shift  Outcome: Progressing  Goal: Verbalize decreased shortness of breath this shift  Outcome: Progressing  Goal: Wean oxygen to maintain O2 saturation per order/standard this shift  Outcome: Progressing  Goal: Increase self care and/or family involvement in next 24 hours  Outcome: Progressing     Problem: Pain  Goal: Takes deep breaths with improved pain control throughout the shift  Outcome: Progressing  Goal: Turns in bed with improved pain control throughout the shift  Outcome: Progressing  Goal: Walks with improved pain control throughout the shift  Outcome: Progressing  Goal: Performs ADL's with improved pain control throughout shift  Outcome: Progressing  Goal: Participates in PT with improved pain control throughout the shift  Outcome: Progressing  Goal: Free from opioid side effects throughout the shift  Outcome: Progressing  Goal: Free from acute confusion related to pain meds  throughout the shift  Outcome: Progressing

## 2023-12-24 NOTE — SIGNIFICANT EVENT
I was called by patient's RN for vaginal bleeding, at bedside patient is laying comfortably on bed, upon questioning patient endorsed one episode of vaginal bleeding, patient endorsed burring pain with urination, denied any dizziness, headache, abdominal pain, nausea, vomiting, diarrhea, or constipation. On exam patient was already cleaned by RN (Pictures of the bleeding was provided), showing blood on pads and small amount on the bed sheet with some clots, on examination patient does not have active bleeding from the vagina or urethra, no abdominal tenderness, and other exam unremarkable. VS T: 36 HR:78 BP:176/84 RR:18 SpO2: 100 %. Last Hgb 9.2 on 12/21    Based on chart review Ms. Wilson is 67 y/o female with hx HTN, CKD stage III who was admitted to the MICU from outside hospital with AHRF 2/2 COVID-pneumonia (tested positive on 10/13), course C/B by ARDS and pulmonary fibrosis resulting in organizing PNA. Course also complicated by distal DVT (on Eliquis 5 BID), oliguric HUNG on CKD.   Given the patient is having one episode of new vaginal bleeding, and currently not actively bleeding and HDS, with Hgb of 9.2 on 12/21, will order CBC STAT, Type and screen, call OBGYN in the morning, last dose of Eliquis was given at 20:33 and next dose will be at 8 am tomorrow morning.       Blanco Carreno MD  Internal Medicine, PGY1  [facility] Team (NF)                                                                              5

## 2023-12-24 NOTE — CARE PLAN
The patient's goals for the shift include      The clinical goals for the shift include Pt will remain free of injury throughout shift.      Problem: Nutrition  Goal: Oral intake greater 75%  Outcome: Progressing  Goal: Consume prescribed supplement  Outcome: Progressing  Goal: Adequate PO fluid intake  Outcome: Progressing  Goal: Nutrition support goals are met within 48 hrs  Outcome: Progressing  Goal: Nutrition support is meeting 75% of nutrient needs  Outcome: Progressing  Goal: BG  mg/dL  Outcome: Progressing  Goal: Lab values WNL  Outcome: Progressing  Goal: Electrolytes WNL  Outcome: Progressing  Goal: Promote healing  Outcome: Progressing     Problem: Skin  Goal: Decreased wound size/increased tissue granulation at next dressing change  Outcome: Progressing  Goal: Participates in plan/prevention/treatment measures  Outcome: Progressing  Flowsheets (Taken 12/24/2023 0502)  Participates in plan/prevention/treatment measures: Elevate heels  Goal: Prevent/manage excess moisture  Outcome: Progressing  Flowsheets (Taken 12/24/2023 0502)  Prevent/manage excess moisture:   Moisturize dry skin   Cleanse incontinence/protect with barrier cream  Goal: Prevent/minimize sheer/friction injuries  Outcome: Progressing  Flowsheets (Taken 12/24/2023 0502)  Prevent/minimize sheer/friction injuries:   Use pull sheet   Turn/reposition every 2 hours/use positioning/transfer devices  Goal: Promote skin healing  Outcome: Progressing     Problem: Fall/Injury  Goal: Verbalize understanding of personal risk factors for fall in the hospital  Outcome: Progressing  Goal: Verbalize understanding of risk factor reduction measures to prevent injury from fall in the home  Outcome: Progressing  Goal: Use assistive devices by end of the shift  Outcome: Progressing  Goal: Not fall by end of shift  Outcome: Progressing  Goal: Be free from injury by end of the shift  Outcome: Progressing  Goal: Pace activities to prevent fatigue by end of  the shift  Outcome: Progressing     Problem: Discharge Planning  Goal: Discharge to home or other facility with appropriate resources  Outcome: Progressing     Problem: Chronic Conditions and Co-morbidities  Goal: Patient's chronic conditions and co-morbidity symptoms are monitored and maintained or improved  Outcome: Progressing     Problem: Respiratory  Goal: Clear secretions with interventions this shift  Outcome: Progressing  Goal: Minimize anxiety/maximize coping throughout shift  Outcome: Progressing  Goal: Minimal/no exertional discomfort or dyspnea this shift  Outcome: Progressing  Goal: No signs of respiratory distress (eg. Use of accessory muscles. Peds grunting)  Outcome: Progressing  Goal: Patent airway maintained this shift  Outcome: Progressing  Goal: Tolerate pulmonary toileting this shift  Outcome: Progressing  Goal: Verbalize decreased shortness of breath this shift  Outcome: Progressing  Goal: Wean oxygen to maintain O2 saturation per order/standard this shift  Outcome: Progressing  Goal: Increase self care and/or family involvement in next 24 hours  Outcome: Progressing     Problem: Pain  Goal: Takes deep breaths with improved pain control throughout the shift  Outcome: Progressing  Goal: Turns in bed with improved pain control throughout the shift  Outcome: Progressing  Goal: Walks with improved pain control throughout the shift  Outcome: Progressing  Goal: Performs ADL's with improved pain control throughout shift  Outcome: Progressing  Goal: Participates in PT with improved pain control throughout the shift  Outcome: Progressing  Goal: Free from opioid side effects throughout the shift  Outcome: Progressing  Goal: Free from acute confusion related to pain meds throughout the shift  Outcome: Progressing

## 2023-12-24 NOTE — PROGRESS NOTES
"Macarena Wilson is a 68 y.o. female on day 45 of admission presenting with Acute hypoxemic respiratory failure (CMS/HCC).    Subjective   Overnight patient reports hematuria with dysuria and increased frequency. Denies any trauma to genital region and reports that it feels like her previous UTIs. Otherwise denies fever/chills, flank pain, dizziness, lightheadedness.        Objective     Physical Exam  Constitutional: Patient in no acute distress.  Alert and cooperative.  Eyes: PERRL, EOMI, no icterus.  ENMT: Mucous membranes moist.  Head/Neck: Neck supple with no apparent injury.  Respiratory/Thorax: Non-labored breathing, no cough.  On 6L NC which is baseline for her.  Cardiovascular: Regular rate and rhythm.  No murmurs.  Normal S1 and S2.  Gastrointestinal: Nondistended, soft, non-tender.  : External catheter in place.  Musculoskeletal: ROM intact, generalized weakness.  Extremities: No edema. Bruising improving.  Neurological: Alert and oriented to person, place and time.  Speech clear.  Follows commands appropriately and without focal deficits.  Skin: Warm and dry.    Last Recorded Vitals  Blood pressure 147/89, pulse 101, temperature 36 °C (96.8 °F), resp. rate 18, height 1.6 m (5' 2.99\"), weight 67.7 kg (149 lb 4 oz), SpO2 100 %.  Intake/Output last 3 Shifts:  I/O last 3 completed shifts:  In: - (0 mL/kg)   Out: 650 (9.5 mL/kg) [Urine:650 (0.3 mL/kg/hr)]  Dosing Weight: 68.4 kg     Relevant Results  Scheduled medications  apixaban, 5 mg, oral, BID  ciprofloxacin, 250 mg, oral, BID  ergocalciferol, 1,250 mcg, oral, Weekly  fluticasone, 1 spray, Each Nostril, BID  insulin glargine, 8 Units, subcutaneous, Nightly  insulin lispro, 0-15 Units, subcutaneous, TID with meals  lidocaine, 1 patch, transdermal, Daily  lidocaine, 1 patch, transdermal, Daily  melatonin, 5 mg, oral, Daily  metoprolol tartrate, 75 mg, oral, BID  pantoprazole, 40 mg, oral, Daily before breakfast  predniSONE, 30 mg, oral, " BID  QUEtiapine, 25 mg, oral, Nightly  sennosides-docusate sodium, 1 tablet, oral, BID  [Held by provider] sodium zirconium cyclosilicate, 10 g, oral, TID  sulfamethoxazole-trimethoprim, 80 mg, oral, Daily  thiamine, 100 mg, oral, Daily      Continuous medications  oxygen, , Last Rate: 6 L/min (12/18/23 2308)      PRN medications  PRN medications: acetaminophen **OR** [DISCONTINUED] acetaminophen **OR** [DISCONTINUED] acetaminophen, dextrose 10 % in water (D10W), dextrose, diclofenac sodium, ipratropium-albuteroL, lidocaine-diphenhydraMINE-Maalox 1:1:1, loperamide, ondansetron ODT **OR** [DISCONTINUED] ondansetron, oxyCODONE, QUEtiapine  Results for orders placed or performed during the hospital encounter of 11/09/23 (from the past 24 hour(s))   POCT GLUCOSE   Result Value Ref Range    POCT Glucose 247 (H) 74 - 99 mg/dL   Urinalysis with Reflex Microscopic   Result Value Ref Range    Color, Urine Red (N) Straw, Yellow    Appearance, Urine Hazy (N) Clear    Specific Gravity, Urine 1.019 1.005 - 1.035    pH, Urine 6.0 5.0, 5.5, 6.0, 6.5, 7.0, 7.5, 8.0    Protein, Urine >=500 (3+) (N) NEGATIVE mg/dL    Glucose, Urine 50 (1+) (A) NEGATIVE mg/dL    Blood, Urine LARGE (3+) (A) NEGATIVE    Ketones, Urine NEGATIVE NEGATIVE mg/dL    Bilirubin, Urine NEGATIVE NEGATIVE    Urobilinogen, Urine <2.0 <2.0 mg/dL    Nitrite, Urine NEGATIVE NEGATIVE    Leukocyte Esterase, Urine NEGATIVE NEGATIVE   Microscopic Only, Urine   Result Value Ref Range    WBC, Urine NONE 1-5, NONE /HPF    RBC, Urine >20 (A) NONE, 1-2, 3-5 /HPF   CBC and Auto Differential   Result Value Ref Range    WBC 9.9 4.4 - 11.3 x10*3/uL    nRBC 1.5 (H) 0.0 - 0.0 /100 WBCs    RBC 2.81 (L) 4.00 - 5.20 x10*6/uL    Hemoglobin 7.9 (L) 12.0 - 16.0 g/dL    Hematocrit 26.4 (L) 36.0 - 46.0 %    MCV 94 80 - 100 fL    MCH 28.1 26.0 - 34.0 pg    MCHC 29.9 (L) 32.0 - 36.0 g/dL    RDW 17.4 (H) 11.5 - 14.5 %    Platelets 82 (L) 150 - 450 x10*3/uL    Immature Granulocytes %,  Automated 9.8 (H) 0.0 - 0.9 %    Immature Granulocytes Absolute, Automated 0.97 (H) 0.00 - 0.70 x10*3/uL   Magnesium   Result Value Ref Range    Magnesium 2.03 1.60 - 2.40 mg/dL   Renal Function Panel   Result Value Ref Range    Glucose 162 (H) 74 - 99 mg/dL    Sodium 139 136 - 145 mmol/L    Potassium 4.3 3.5 - 5.3 mmol/L    Chloride 108 (H) 98 - 107 mmol/L    Bicarbonate 18 (L) 21 - 32 mmol/L    Anion Gap 17 10 - 20 mmol/L    Urea Nitrogen 52 (H) 6 - 23 mg/dL    Creatinine 0.84 0.50 - 1.05 mg/dL    eGFR 75 >60 mL/min/1.73m*2    Calcium 8.4 (L) 8.6 - 10.6 mg/dL    Phosphorus 3.3 2.5 - 4.9 mg/dL    Albumin 2.6 (L) 3.4 - 5.0 g/dL   Manual Differential   Result Value Ref Range    Neutrophils %, Manual 93.9 40.0 - 80.0 %    Bands %, Manual 1.7 0.0 - 5.0 %    Lymphocytes %, Manual 0.9 13.0 - 44.0 %    Monocytes %, Manual 0.0 2.0 - 10.0 %    Eosinophils %, Manual 0.0 0.0 - 6.0 %    Basophils %, Manual 0.0 0.0 - 2.0 %    Myelocytes %, Manual 3.5 0.0 - 0.0 %    Seg Neutrophils Absolute, Manual 9.30 (H) 1.20 - 7.00 x10*3/uL    Bands Absolute, Manual 0.17 0.00 - 0.70 x10*3/uL    Lymphocytes Absolute, Manual 0.09 (L) 1.20 - 4.80 x10*3/uL    Monocytes Absolute, Manual 0.00 (L) 0.10 - 1.00 x10*3/uL    Eosinophils Absolute, Manual 0.00 0.00 - 0.70 x10*3/uL    Basophils Absolute, Manual 0.00 0.00 - 0.10 x10*3/uL    Myelocytes Absolute, Manual 0.35 0.00 - 0.00 x10*3/uL    Total Cells Counted 115     Neutrophils Absolute, Manual 9.47 (H) 1.20 - 7.70 x10*3/uL    RBC Morphology See Below     Polychromasia Mild     RBC Fragments Few     Teardrop Cells Few     Bertrand Cells Few    Type and screen   Result Value Ref Range    ABO TYPE A     Rh TYPE POS     ANTIBODY SCREEN NEG    POCT GLUCOSE   Result Value Ref Range    POCT Glucose 170 (H) 74 - 99 mg/dL   POCT GLUCOSE   Result Value Ref Range    POCT Glucose 110 (H) 74 - 99 mg/dL     No results found.                               Assessment/Plan   Principal Problem:    Acute hypoxemic  "respiratory failure (CMS/[facility])  Active Problems:    Stage 4 chronic kidney disease (CMS/[facility])    COVID-19    Pneumonia of both lower lobes due to infectious organism    Pseudomonal bacteremia    A-fib (CMS/HCC)    Ms. Wilson is a 69 y/o female with hx HTN, CKD stage III who was admitted to the MICU from outside hospital with AHRF 2/2 COVID-pneumonia (tested positive on 10/13), course C/B by ARDS and pulmonary fibrosis resulting in organizing PNA.  Course also complicated by distal DVT, oliguric HUNG on CKD.  Patient weaned to 6 from 8 L nasal cannula but requires increased FiO2 with movement and exertion. Transferred to [facility] on 12/11 for continued management.     Updates 12/24:  - For gross hematuria, started patient on cipro 250mg BID for 7 days as feels UTI symptoms. Although UA was negative for LE, nitrites, WBC, could be sterile due to prednisone she is on. Also ordered repeat CBC to further assess, patient has been HDS.  - /90 in AM, increased metoprolol to 75mg BID as patient has UTI and don't want to worsen with home losartan 100mg  -Prednisone taper- On Monday (12/11) patient was decreased prednisone to 80mg daily for 7 days.  On 12/18, switch to 60mg daily 2-4 weeks.  Then, can consider decreasing by 10-20mg every 1-2 weeks based on clinical appearance.  -Medically ready for discharge, plan to discharge patient to SNF/[facility]AC. Approved at [facility], possible discharge \"after the Holidays.\"  - Only needs labs twice a week     #AHRF 2/2 COVID PNA (11/23) c/b ARDS, Pulm fibrosis, Organizing PNA 2/2 Covid 19  , Pneumomediastinum (resolved)  - Stable on 6L NC.  Desats with movement   - CTA PE from 11/26 negative for PE, showing multifocal pnuemonia, bronchiectasis, and fibrosis  - Continue with Bactrim DS for PCP (Monitor hyperkalemia)  - Fluticasone 1 spry BID   - Seen by lung transplant on 11/30--> per team, must be tapered to less than 10 mg pred and be able to amblate 100 ft on " any amount of o2, rengage as appropriate   - Avoid CPAP d/t hx pneumomediastinum during stay   -Monitor sugars on Steroid, on 15 units of lantus (decrease if am sugar low) and SSI      #Covid PNA   -received Decadron, Remdesivir, Tocilizumab at OSH, Neutrophilic leukocytosis,  PSA bacteremia  - Afebrile, no leukocytosis   - 11/21 BC x2 3/4 pseudomonas   -11/25 Bcx Negative   -was on Zosyn11/22- 11/24, switched to PO Cipro 500mg BID 11/25-12/5 for total 14 days  - Continue Bactrim daily for PCP prophylactics      #DVT   -11/13: +Rt PT DVT, and + LT Soleal DVT   -CT PE negative   -Eliquis 5 mg BID      #HTN   #Interittent SvT   #Afib   -Metop tartrate 50 mg BID   -Holding home losartan 100 mg , bp stable   -EKG 12/10 stable, qtc 389      #Oliguric HUNG  on CKD (RESOLVED)   -Per documentation, previous baseline was 1.6, current creat around 1   -PRN diuresis as needed   -hx of bladder fistula     #Anxiety   #Pain control  -Continue seroquel 25 mg at night and 12.5 mg as needed with PT   -PRN oxy for pain, lidocaine  -Hold trazadone 25 mg, re-initiate if needed   -Bowel reg while on opiods   -Has had diarrhea previously, monitor      #Eczema/Rosasea  -Continue with dupixent      #Sacral Pressure Ulcer (stage 3)   -Wound care      F: PRN   E: PRN   N: Consistent carb  A: PIV   DVT: Eliquis   GI: PPI      Disposition:   SNF      Code Status: Full Code (confirmed on admission)             Yariel Mitchell MD  Please call [phone] or [employer] Chat with any questions.

## 2023-12-24 NOTE — CARE PLAN
Problem: Nutrition  Goal: Oral intake greater 75%  Outcome: Progressing  Goal: Consume prescribed supplement  Outcome: Progressing  Goal: Adequate PO fluid intake  Outcome: Progressing  Goal: Nutrition support goals are met within 48 hrs  Outcome: Progressing  Goal: Nutrition support is meeting 75% of nutrient needs  Outcome: Progressing  Goal: BG  mg/dL  Outcome: Progressing  Goal: Lab values WNL  Outcome: Progressing  Goal: Electrolytes WNL  Outcome: Progressing  Goal: Promote healing  Outcome: Progressing     Problem: Skin  Goal: Decreased wound size/increased tissue granulation at next dressing change  Outcome: Progressing  Flowsheets (Taken 12/24/2023 1237)  Decreased wound size/increased tissue granulation at next dressing change: Promote sleep for wound healing  Goal: Participates in plan/prevention/treatment measures  Outcome: Progressing  Flowsheets (Taken 12/24/2023 1237)  Participates in plan/prevention/treatment measures: Elevate heels  Goal: Prevent/manage excess moisture  Outcome: Progressing  Flowsheets (Taken 12/24/2023 1237)  Prevent/manage excess moisture:   Moisturize dry skin   Cleanse incontinence/protect with barrier cream  Goal: Prevent/minimize sheer/friction injuries  Outcome: Progressing  Flowsheets (Taken 12/24/2023 1237)  Prevent/minimize sheer/friction injuries:   Use pull sheet   Turn/reposition every 2 hours/use positioning/transfer devices  Goal: Promote skin healing  Outcome: Progressing  Flowsheets (Taken 12/24/2023 1237)  Promote skin healing:   Rotate device position/do not position patient on device   Turn/reposition every 2 hours/use positioning/transfer devices   Assess skin/pad under line(s)/device(s)     Problem: Fall/Injury  Goal: Verbalize understanding of personal risk factors for fall in the hospital  Outcome: Progressing  Goal: Verbalize understanding of risk factor reduction measures to prevent injury from fall in the home  Outcome: Progressing  Goal: Use  assistive devices by end of the shift  Outcome: Progressing  Goal: Not fall by end of shift  Outcome: Progressing  Goal: Be free from injury by end of the shift  Outcome: Progressing  Goal: Pace activities to prevent fatigue by end of the shift  Outcome: Progressing     Problem: Discharge Planning  Goal: Discharge to home or other facility with appropriate resources  Outcome: Progressing     Problem: Chronic Conditions and Co-morbidities  Goal: Patient's chronic conditions and co-morbidity symptoms are monitored and maintained or improved  Outcome: Progressing     Problem: Respiratory  Goal: Clear secretions with interventions this shift  Outcome: Progressing  Goal: Minimize anxiety/maximize coping throughout shift  Outcome: Progressing  Goal: Minimal/no exertional discomfort or dyspnea this shift  Outcome: Progressing  Goal: No signs of respiratory distress (eg. Use of accessory muscles. Peds grunting)  Outcome: Progressing  Goal: Patent airway maintained this shift  Outcome: Progressing  Goal: Tolerate pulmonary toileting this shift  Outcome: Progressing  Goal: Verbalize decreased shortness of breath this shift  Outcome: Progressing  Goal: Wean oxygen to maintain O2 saturation per order/standard this shift  Outcome: Progressing  Goal: Increase self care and/or family involvement in next 24 hours  Outcome: Progressing     Problem: Pain  Goal: Takes deep breaths with improved pain control throughout the shift  Outcome: Progressing  Goal: Turns in bed with improved pain control throughout the shift  Outcome: Progressing  Goal: Walks with improved pain control throughout the shift  Outcome: Progressing  Goal: Performs ADL's with improved pain control throughout shift  Outcome: Progressing  Goal: Participates in PT with improved pain control throughout the shift  Outcome: Progressing  Goal: Free from opioid side effects throughout the shift  Outcome: Progressing  Goal: Free from acute confusion related to pain meds  throughout the shift  Outcome: Progressing       The clinical goals for the shift include Pt will report managed pain level less than 7/10 throughout shift.

## 2023-12-25 PROBLEM — U07.1 DEEP VEIN THROMBOSIS (DVT) ASSOCIATED WITH COVID-19: Status: ACTIVE | Noted: 2023-01-01

## 2023-12-25 PROBLEM — I82.90 DEEP VEIN THROMBOSIS (DVT) ASSOCIATED WITH COVID-19: Status: ACTIVE | Noted: 2023-01-01

## 2023-12-25 PROBLEM — Z79.01 ON APIXABAN THERAPY: Status: ACTIVE | Noted: 2023-01-01

## 2023-12-25 PROBLEM — Z29.89 NEED FOR PNEUMOCYSTIS PROPHYLAXIS: Status: ACTIVE | Noted: 2023-01-01

## 2023-12-25 PROBLEM — Z79.52 LONG TERM (CURRENT) USE OF SYSTEMIC STEROIDS: Status: ACTIVE | Noted: 2023-01-01

## 2023-12-25 NOTE — PROGRESS NOTES
"Macarena Wilson is a 68 y.o. female on day 46 of admission presenting with Acute hypoxemic respiratory failure (CMS/HCC).        Subjective   Overnight patient reports no more hematuria or  dysuria . denies fever/chills, flank pain, dizziness, lightheadedness.         Objective   Physical Exam  Constitutional: Patient in no acute distress.  Alert and cooperative.  Eyes: PERRL, EOMI, no icterus.  ENMT: Mucous membranes moist.  Head/Neck: Neck supple with no apparent injury.  Respiratory/Thorax: Non-labored breathing, no cough.  On 6L NC which is baseline for her.  Cardiovascular: Regular rate and rhythm.  No murmurs.  Normal S1 and S2.  Gastrointestinal: Nondistended, soft, non-tender.  : External catheter in place.  Musculoskeletal: ROM intact, generalized weakness.  Extremities: No edema. Bruising improving.  Neurological: Alert and oriented to person, place and time.  Speech clear.  Follows commands appropriately and without focal deficits.  Skin: Warm and dry.     Last Recorded Vitals  Blood pressure 147/89, pulse 101, temperature 36 °C (96.8 °F), resp. rate 18, height 1.6 m (5' 2.99\"), weight 67.7 kg (149 lb 4 oz), SpO2 100 %.  Intake/Output last 3 Shifts:  I/O last 3 completed shifts:  In: - (0 mL/kg)   Out: 650 (9.5 mL/kg) [Urine:650 (0.3 mL/kg/hr)]  Dosing Weight: 68.4 kg      Relevant Results  Scheduled medications  apixaban, 5 mg, oral, BID  ciprofloxacin, 250 mg, oral, BID  ergocalciferol, 1,250 mcg, oral, Weekly  fluticasone, 1 spray, Each Nostril, BID  insulin glargine, 8 Units, subcutaneous, Nightly  insulin lispro, 0-15 Units, subcutaneous, TID with meals  lidocaine, 1 patch, transdermal, Daily  lidocaine, 1 patch, transdermal, Daily  melatonin, 5 mg, oral, Daily  metoprolol tartrate, 75 mg, oral, BID  pantoprazole, 40 mg, oral, Daily before breakfast  predniSONE, 30 mg, oral, BID  QUEtiapine, 25 mg, oral, Nightly  sennosides-docusate sodium, 1 tablet, oral, BID  [Held by provider] sodium " zirconium cyclosilicate, 10 g, oral, TID  sulfamethoxazole-trimethoprim, 80 mg, oral, Daily  thiamine, 100 mg, oral, Daily        Continuous medications  oxygen, , Last Rate: 6 L/min (12/18/23 2308)        PRN medications  PRN medications: acetaminophen **OR** [DISCONTINUED] acetaminophen **OR** [DISCONTINUED] acetaminophen, dextrose 10 % in water (D10W), dextrose, diclofenac sodium, ipratropium-albuteroL, lidocaine-diphenhydraMINE-Maalox 1:1:1, loperamide, ondansetron ODT **OR** [DISCONTINUED] ondansetron, oxyCODONE, QUEtiapine        Results for orders placed or performed during the hospital encounter of 11/09/23 (from the past 24 hour(s))   POCT GLUCOSE   Result Value Ref Range     POCT Glucose 247 (H) 74 - 99 mg/dL   Urinalysis with Reflex Microscopic   Result Value Ref Range     Color, Urine Red (N) Straw, Yellow     Appearance, Urine Hazy (N) Clear     Specific Gravity, Urine 1.019 1.005 - 1.035     pH, Urine 6.0 5.0, 5.5, 6.0, 6.5, 7.0, 7.5, 8.0     Protein, Urine >=500 (3+) (N) NEGATIVE mg/dL     Glucose, Urine 50 (1+) (A) NEGATIVE mg/dL     Blood, Urine LARGE (3+) (A) NEGATIVE     Ketones, Urine NEGATIVE NEGATIVE mg/dL     Bilirubin, Urine NEGATIVE NEGATIVE     Urobilinogen, Urine <2.0 <2.0 mg/dL     Nitrite, Urine NEGATIVE NEGATIVE     Leukocyte Esterase, Urine NEGATIVE NEGATIVE   Microscopic Only, Urine   Result Value Ref Range     WBC, Urine NONE 1-5, NONE /HPF     RBC, Urine >20 (A) NONE, 1-2, 3-5 /HPF   CBC and Auto Differential   Result Value Ref Range     WBC 9.9 4.4 - 11.3 x10*3/uL     nRBC 1.5 (H) 0.0 - 0.0 /100 WBCs     RBC 2.81 (L) 4.00 - 5.20 x10*6/uL     Hemoglobin 7.9 (L) 12.0 - 16.0 g/dL     Hematocrit 26.4 (L) 36.0 - 46.0 %     MCV 94 80 - 100 fL     MCH 28.1 26.0 - 34.0 pg     MCHC 29.9 (L) 32.0 - 36.0 g/dL     RDW 17.4 (H) 11.5 - 14.5 %     Platelets 82 (L) 150 - 450 x10*3/uL     Immature Granulocytes %, Automated 9.8 (H) 0.0 - 0.9 %     Immature Granulocytes Absolute, Automated 0.97 (H)  0.00 - 0.70 x10*3/uL   Magnesium   Result Value Ref Range     Magnesium 2.03 1.60 - 2.40 mg/dL   Renal Function Panel   Result Value Ref Range     Glucose 162 (H) 74 - 99 mg/dL     Sodium 139 136 - 145 mmol/L     Potassium 4.3 3.5 - 5.3 mmol/L     Chloride 108 (H) 98 - 107 mmol/L     Bicarbonate 18 (L) 21 - 32 mmol/L     Anion Gap 17 10 - 20 mmol/L     Urea Nitrogen 52 (H) 6 - 23 mg/dL     Creatinine 0.84 0.50 - 1.05 mg/dL     eGFR 75 >60 mL/min/1.73m*2     Calcium 8.4 (L) 8.6 - 10.6 mg/dL     Phosphorus 3.3 2.5 - 4.9 mg/dL     Albumin 2.6 (L) 3.4 - 5.0 g/dL   Manual Differential   Result Value Ref Range     Neutrophils %, Manual 93.9 40.0 - 80.0 %     Bands %, Manual 1.7 0.0 - 5.0 %     Lymphocytes %, Manual 0.9 13.0 - 44.0 %     Monocytes %, Manual 0.0 2.0 - 10.0 %     Eosinophils %, Manual 0.0 0.0 - 6.0 %     Basophils %, Manual 0.0 0.0 - 2.0 %     Myelocytes %, Manual 3.5 0.0 - 0.0 %     Seg Neutrophils Absolute, Manual 9.30 (H) 1.20 - 7.00 x10*3/uL     Bands Absolute, Manual 0.17 0.00 - 0.70 x10*3/uL     Lymphocytes Absolute, Manual 0.09 (L) 1.20 - 4.80 x10*3/uL     Monocytes Absolute, Manual 0.00 (L) 0.10 - 1.00 x10*3/uL     Eosinophils Absolute, Manual 0.00 0.00 - 0.70 x10*3/uL     Basophils Absolute, Manual 0.00 0.00 - 0.10 x10*3/uL     Myelocytes Absolute, Manual 0.35 0.00 - 0.00 x10*3/uL     Total Cells Counted 115       Neutrophils Absolute, Manual 9.47 (H) 1.20 - 7.70 x10*3/uL     RBC Morphology See Below       Polychromasia Mild       RBC Fragments Few       Teardrop Cells Few       Bertrand Cells Few     Type and screen   Result Value Ref Range     ABO TYPE A       Rh TYPE POS       ANTIBODY SCREEN NEG     POCT GLUCOSE   Result Value Ref Range     POCT Glucose 170 (H) 74 - 99 mg/dL   POCT GLUCOSE   Result Value Ref Range     POCT Glucose 110 (H) 74 - 99 mg/dL      No results found.                                         Assessment/Plan   Principal Problem:    Acute hypoxemic respiratory failure  "(CMS/[facility])  Active Problems:    Stage 4 chronic kidney disease (CMS/[facility])    COVID-19    Pneumonia of both lower lobes due to infectious organism    Pseudomonal bacteremia    A-fib (CMS/HCC)     Ms. Wilson is a 69 y/o female with hx HTN, CKD stage III who was admitted to the MICU from outside hospital with AHRF 2/2 COVID-pneumonia (tested positive on 10/13), course C/B by ARDS and pulmonary fibrosis resulting in organizing PNA.  Course also complicated by distal DVT, oliguric HUNG on CKD.  Patient weaned to 6 from 8 L nasal cannula but requires increased FiO2 with movement and exertion. Transferred to [facility] on 12/11 for continued management.     Updates 12/25:  - For gross hematuria, started patient on cipro 250mg BID for 7 days as feels UTI symptoms. Although UA was negative for LE, nitrites, WBC, could be sterile due to prednisone she is on. Also ordered repeat CBC to further assess, patient has been HDS. Date 12/24  -Prednisone taper- On Monday (12/11) patient was decreased prednisone to 80mg daily for 7 days.  On 12/18, switch to 60mg daily 4 weeks.  Then, can consider decreasing by 10 mg every 2 weeks based on clinical appearance.    -Medically ready for discharge, plan to discharge patient to SNF/[facility]. Approved at [facility], possible discharge \"after the Holidays.\"  - Only needs labs twice a week     #AHRF 2/2 COVID PNA (11/23) c/b ARDS, Pulm fibrosis, Organizing PNA 2/2 Covid 19  , Pneumomediastinum (resolved)  - Stable on 6L NC.  Desats with movement   - CTA PE from 11/26 negative for PE, showing multifocal pnuemonia, bronchiectasis, and fibrosis  - Continue with Bactrim DS for PCP (Monitor hyperkalemia)  - Fluticasone 1 spry BID   - Seen by lung transplant on 11/30--> per team, must be tapered to less than 10 mg pred and be able to amblate 100 ft on any amount of o2, rengage as appropriate   - Avoid CPAP d/t hx pneumomediastinum during stay   -Monitor sugars on Steroid, on 15 units " of lantus (decrease if am sugar low) and SSI      #Covid PNA   -received Decadron, Remdesivir, Tocilizumab at OSH, Neutrophilic leukocytosis,  PSA bacteremia  - Afebrile, no leukocytosis   - 11/21 BC x2 3/4 pseudomonas   -11/25 Bcx Negative   -was on Zosyn11/22- 11/24, switched to PO Cipro 500mg BID 11/25-12/5 for total 14 days  - Continue Bactrim daily for PCP prophylactics      #DVT   -11/13: +Rt PT DVT, and + LT Soleal DVT   -CT PE negative   -Eliquis 5 mg BID      #HTN   #Interittent SvT   #Afib   -Metop tartrate 50 mg BID   -Holding home losartan 100 mg , bp stable   -EKG 12/10 stable, qtc 389      #Oliguric HUNG  on CKD (RESOLVED)   -Per documentation, previous baseline was 1.6, current creat around 1   -PRN diuresis as needed   -hx of bladder fistula     #Anxiety   #Pain control  -Continue seroquel 25 mg at night and 12.5 mg as needed with PT   -PRN oxy for pain, lidocaine  -Hold trazadone 25 mg, re-initiate if needed   -Bowel reg while on opiods   -Has had diarrhea previously, monitor      #Eczema/Rosasea  -Continue with dupixent      #Sacral Pressure Ulcer (stage 3)   -Wound care      F: PRN   E: PRN   N: Consistent carb  A: PIV   DVT: Eliquis   GI: PPI      Disposition:   SNF      Code Status: Full Code (confirmed on admission)        Esme Kauffman MD

## 2023-12-25 NOTE — CARE PLAN
The patient's goals for the shift include      The clinical goals for the shift include Pt will have less burning in scooter area and remain safe and free from falls through shift      Problem: Nutrition  Goal: Oral intake greater 75%  Outcome: Progressing  Goal: BG  mg/dL  Outcome: Progressing     Problem: Skin  Goal: Prevent/manage excess moisture  Outcome: Progressing  Flowsheets (Taken 12/24/2023 1237 by Caren Flores RN)  Prevent/manage excess moisture:   Moisturize dry skin   Cleanse incontinence/protect with barrier cream  Goal: Prevent/minimize sheer/friction injuries  Outcome: Progressing  Flowsheets (Taken 12/25/2023 1651)  Prevent/minimize sheer/friction injuries:   Use pull sheet   HOB 30 degrees or less  Goal: Promote skin healing  Outcome: Progressing  Flowsheets (Taken 12/25/2023 1651)  Promote skin healing: Turn/reposition every 2 hours/use positioning/transfer devices     Problem: Fall/Injury  Goal: Verbalize understanding of personal risk factors for fall in the hospital  Outcome: Progressing  Goal: Verbalize understanding of risk factor reduction measures to prevent injury from fall in the home  Outcome: Progressing  Goal: Not fall by end of shift  Outcome: Progressing  Goal: Be free from injury by end of the shift  Outcome: Progressing     Problem: Respiratory  Goal: Clear secretions with interventions this shift  Outcome: Progressing  Goal: Minimal/no exertional discomfort or dyspnea this shift  Outcome: Progressing  Goal: No signs of respiratory distress (eg. Use of accessory muscles. Peds grunting)  Outcome: Progressing  Goal: Verbalize decreased shortness of breath this shift  Outcome: Progressing     Problem: Pain  Goal: Free from opioid side effects throughout the shift  Outcome: Progressing  Goal: Free from acute confusion related to pain meds throughout the shift  Outcome: Progressing

## 2023-12-26 PROBLEM — Z91.89 AT RISK FOR BLEEDING ASSOCIATED WITH ANTICOAGULANTS: Status: ACTIVE | Noted: 2023-01-01

## 2023-12-26 PROBLEM — R31.9 GENITOURINARY BLEEDING: Status: ACTIVE | Noted: 2023-01-01

## 2023-12-26 NOTE — CARE PLAN
The clinical goals for the shift include patient will remain HDS and bleeding will decrease throughout shift 12/26 at 0700      Problem: Skin  Goal: Decreased wound size/increased tissue granulation at next dressing change  Flowsheets (Taken 12/24/2023 1237 by Caren Flores RN)  Decreased wound size/increased tissue granulation at next dressing change: Promote sleep for wound healing     Problem: Skin  Goal: Prevent/manage excess moisture  Flowsheets (Taken 12/26/2023 0618)  Prevent/manage excess moisture: Cleanse incontinence/protect with barrier cream     Problem: Skin  Goal: Prevent/minimize sheer/friction injuries  Flowsheets (Taken 12/26/2023 0618)  Prevent/minimize sheer/friction injuries: HOB 30 degrees or less     Problem: Skin  Goal: Promote skin healing  Flowsheets (Taken 12/25/2023 1651 by Shivani Kramer RN)  Promote skin healing: Turn/reposition every 2 hours/use positioning/transfer devices

## 2023-12-26 NOTE — CONSULTS
Reason For Consult  Possible vaginal bleeding    History Of Present Illness  Macarena Wilson is a 68 y.o. female admitted for acute hypoxic respiratory failure. GYN consulted for concern for vaginal bleeding.    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 She is s/p [facility] 2022. She follows with Dr. Duran for the aforementioned problems. She denies any PMB or vaginal bleeding since her hysterectomy.     Pt has had 2 episodes of hematuria vs vaginal bleeding. They were overnight last night and earlier this afternoon. She is unsure if it is vaginal or only when she voids. Per RN the blood soaked through 2 chucks and there were a few ~quarter sized clots. She is feeling well and denies lightheadedness, dizziness. She is on eliquis 5 bid for DVT.     She has a possible vesicovaginal fistula. She had a normal cystoscopy at time of hysterectomy (2022) but concern for fistula based on exam in the office on 2023. Dr. Duran was planning on cystoscopy with possible fistula repair this month however this was cancelled d/t her acute illness.     Her urogyn hx is notable for:  - IC/OAB with h/o large ulcer which was fulgurated and biopsied, biopsy showed chronic with acute inflammation on 10/17/22. S/p botox  - Rodrigo, previously on daily Macrobid     Past Medical History  She has a past medical history of Chronic kidney disease, Hypertension, and Personal history of diseases of the skin and subcutaneous tissue. Kidney stones. IC/OAB, Rodrigo.    Surgical History  She has a past surgical history that includes Appendectomy (2016); Other surgical history (2022); and Hysterectomy (2022).     Social History  She reports that she has never smoked. She has never used smokeless tobacco. She reports that she does not use drugs. No history on file for alcohol use.    Family History  Family History   Problem Relation Name Age of Onset    Breast cancer Mother       "Heart failure Mother      No Known Problems Father      No Known Problems Sister          Allergies  Fesoterodine, Mirabegron, and Prednisone    Physical exam:  General:  AAOx3, No acute distress  Cardiovascular: Warm and well perfused  Respiratory: Normal respiratory effort on 6L NC  Abdominal:  Soft, non-tender, no masses palpated  Extremities: No edema, no calf tenderness   Skin: No rashes or lesions visualized   : Very limited exam due to patient positioning. No vaginal bleeding seen on speculum exam, no blood on glove on bimanual exam. No rectal bleeding noted.      Last Recorded Vitals  Blood pressure 127/75, pulse 82, temperature 35.8 °C (96.4 °F), temperature source Temporal, resp. rate 18, height 1.6 m (5' 2.99\"), weight 67.7 kg (149 lb 4 oz), SpO2 94 %.    Relevant Results  Lab Results   Component Value Date    ABO A 12/24/2023    LABRH POS 12/24/2023    ABSCRN NEG 12/24/2023     Lab Results   Component Value Date    WBC 11.0 12/25/2023    HGB 7.6 (L) 12/25/2023    HCT 23.4 (L) 12/25/2023     (L) 12/25/2023        Assessment/Plan     Vaginal bleeding vs hematuria, possible vesical vaginal fistula  - Low suspicion for vaginal bleeding on exam, however very limited exam. Pt declined second exam tonight  - Suspect hematuria +/- vesicovaginal fistula based on office exam  - Currently HDS, repeat hgb 7.6, stable over past couple of days  - Can consider straight cath for better urine sample  - If she remains HDS without ongoing heavy bleeding, can refer back to outpatient urogyn (Dr. Duran) for ongoing work up  - Can consider urology consult if she has ongoing bleeding, however during her acute illness would not anticipate any surgical intervention at this time  - Given rare bleeding episodes and hemodynamic stability, she is okay to continue her anticoagulation if needed.  - If she has another acute bleeding episode, gyn can re-evaluate at that time.    GYN will continue to follow peripherally. Please " reach out if you have ongoing questions.    Seen and d/w Dr. Lazaro Arias MD, PGY-3   Gynecology [phone]

## 2023-12-26 NOTE — CARE PLAN
The patient's goals for the shift include      The clinical goals for the shift include Pt will remain HDS, VSS and have less bleeding through shift      Problem: Nutrition  Goal: Oral intake greater 75%  Outcome: Progressing  Goal: BG  mg/dL  Outcome: Progressing     Problem: Skin  Goal: Decreased wound size/increased tissue granulation at next dressing change  Outcome: Progressing  Flowsheets (Taken 12/24/2023 1237 by Caren Flores, RN)  Decreased wound size/increased tissue granulation at next dressing change: Promote sleep for wound healing  Goal: Participates in plan/prevention/treatment measures  Outcome: Progressing  Flowsheets (Taken 12/26/2023 0618 by Cesar Snyder RN)  Participates in plan/prevention/treatment measures: Elevate heels  Goal: Prevent/manage excess moisture  Outcome: Progressing  Flowsheets (Taken 12/26/2023 1825)  Prevent/manage excess moisture: Monitor for/manage infection if present  Goal: Prevent/minimize sheer/friction injuries  Outcome: Progressing  Flowsheets (Taken 12/26/2023 1825)  Prevent/minimize sheer/friction injuries:   Use pull sheet   HOB 30 degrees or less  Goal: Promote skin healing  Outcome: Progressing  Flowsheets (Taken 12/26/2023 1825)  Promote skin healing:   Turn/reposition every 2 hours/use positioning/transfer devices   Assess skin/pad under line(s)/device(s)     Problem: Fall/Injury  Goal: Verbalize understanding of personal risk factors for fall in the hospital  Outcome: Progressing  Goal: Verbalize understanding of risk factor reduction measures to prevent injury from fall in the home  Outcome: Progressing  Goal: Use assistive devices by end of the shift  Outcome: Progressing  Goal: Not fall by end of shift  Outcome: Progressing  Goal: Be free from injury by end of the shift  Outcome: Progressing  Goal: Pace activities to prevent fatigue by end of the shift  Outcome: Progressing     Problem: Discharge Planning  Goal: Discharge to home or other  facility with appropriate resources  Outcome: Progressing     Problem: Respiratory  Goal: Clear secretions with interventions this shift  Outcome: Progressing  Goal: Minimize anxiety/maximize coping throughout shift  Outcome: Progressing  Goal: Minimal/no exertional discomfort or dyspnea this shift  Outcome: Progressing  Goal: No signs of respiratory distress (eg. Use of accessory muscles. Peds grunting)  Outcome: Progressing  Goal: Patent airway maintained this shift  Outcome: Progressing  Goal: Tolerate pulmonary toileting this shift  Outcome: Progressing  Goal: Verbalize decreased shortness of breath this shift  Outcome: Progressing  Goal: Wean oxygen to maintain O2 saturation per order/standard this shift  Outcome: Progressing  Goal: Increase self care and/or family involvement in next 24 hours  Outcome: Progressing

## 2023-12-26 NOTE — SIGNIFICANT EVENT
Gyn Update:    Pt case reviewed with Dr. Shaffer and Dr. Duran, Urology. She has been followed outpatient by Dr. Duran for possible fistula tract on cystoscopy with plan for OR delayed due to inpatient admission. Per Dr. Duran, would recommend obtaining urine culture. Dr. Lofton, urogynecology fellow also updated and aware of patient.    Please reach out to urology if further questions arise regarding possible fistula. Please update patient with Dr. Duran's office number [phone] so she can make a follow up visit. His office will re-attempt to schedule outpatient follow up visit as well.     Gyn to sign off at this time.   Cleo Hdez MD

## 2023-12-26 NOTE — PROGRESS NOTES
"       Macarena Wilson is a 68 y.o. female on day 47 of admission presenting with Acute hypoxemic respiratory failure (CMS/HCC).        Subjective   Had another episode of hematuria last night. Now she is off eliquis. Denied abdominal pain or chest pain or SOB      Objective   Physical Exam  Constitutional: Patient in no acute distress.  Alert and cooperative.  Eyes: PERRL, EOMI, no icterus.  ENMT: Mucous membranes moist.  Head/Neck: Neck supple with no apparent injury.  Respiratory/Thorax: Non-labored breathing, no cough.  On 6L NC which is baseline for her.  Cardiovascular: Regular rate and rhythm.  No murmurs.  Normal S1 and S2.  Gastrointestinal: Nondistended, soft, non-tender.  : External catheter in place.  Musculoskeletal: ROM intact, generalized weakness.  Extremities: No edema. Bruising improving.  Neurological: Alert and oriented to person, place and time.  Speech clear.  Follows commands appropriately and without focal deficits.  Skin: Warm and dry.     Last Recorded Vitals  Blood pressure 147/89, pulse 101, temperature 36 °C (96.8 °F), resp. rate 18, height 1.6 m (5' 2.99\"), weight 67.7 kg (149 lb 4 oz), SpO2 100 %.  Intake/Output last 3 Shifts:  I/O last 3 completed shifts:  In: - (0 mL/kg)   Out: 650 (9.5 mL/kg) [Urine:650 (0.3 mL/kg/hr)]  Dosing Weight: 68.4 kg      Relevant Results  Scheduled medications  apixaban, 5 mg, oral, BID  ciprofloxacin, 250 mg, oral, BID  ergocalciferol, 1,250 mcg, oral, Weekly  fluticasone, 1 spray, Each Nostril, BID  insulin glargine, 8 Units, subcutaneous, Nightly  insulin lispro, 0-15 Units, subcutaneous, TID with meals  lidocaine, 1 patch, transdermal, Daily  lidocaine, 1 patch, transdermal, Daily  melatonin, 5 mg, oral, Daily  metoprolol tartrate, 75 mg, oral, BID  pantoprazole, 40 mg, oral, Daily before breakfast  predniSONE, 30 mg, oral, BID  QUEtiapine, 25 mg, oral, Nightly  sennosides-docusate sodium, 1 tablet, oral, BID  [Held by provider] sodium zirconium " cyclosilicate, 10 g, oral, TID  sulfamethoxazole-trimethoprim, 80 mg, oral, Daily  thiamine, 100 mg, oral, Daily        Continuous medications  oxygen, , Last Rate: 6 L/min (12/18/23 2308)        PRN medications  PRN medications: acetaminophen **OR** [DISCONTINUED] acetaminophen **OR** [DISCONTINUED] acetaminophen, dextrose 10 % in water (D10W), dextrose, diclofenac sodium, ipratropium-albuteroL, lidocaine-diphenhydraMINE-Maalox 1:1:1, loperamide, ondansetron ODT **OR** [DISCONTINUED] ondansetron, oxyCODONE, QUEtiapine            Results for orders placed or performed during the hospital encounter of 11/09/23 (from the past 24 hour(s))   POCT GLUCOSE   Result Value Ref Range     POCT Glucose 247 (H) 74 - 99 mg/dL   Urinalysis with Reflex Microscopic   Result Value Ref Range     Color, Urine Red (N) Straw, Yellow     Appearance, Urine Hazy (N) Clear     Specific Gravity, Urine 1.019 1.005 - 1.035     pH, Urine 6.0 5.0, 5.5, 6.0, 6.5, 7.0, 7.5, 8.0     Protein, Urine >=500 (3+) (N) NEGATIVE mg/dL     Glucose, Urine 50 (1+) (A) NEGATIVE mg/dL     Blood, Urine LARGE (3+) (A) NEGATIVE     Ketones, Urine NEGATIVE NEGATIVE mg/dL     Bilirubin, Urine NEGATIVE NEGATIVE     Urobilinogen, Urine <2.0 <2.0 mg/dL     Nitrite, Urine NEGATIVE NEGATIVE     Leukocyte Esterase, Urine NEGATIVE NEGATIVE   Microscopic Only, Urine   Result Value Ref Range     WBC, Urine NONE 1-5, NONE /HPF     RBC, Urine >20 (A) NONE, 1-2, 3-5 /HPF   CBC and Auto Differential   Result Value Ref Range     WBC 9.9 4.4 - 11.3 x10*3/uL     nRBC 1.5 (H) 0.0 - 0.0 /100 WBCs     RBC 2.81 (L) 4.00 - 5.20 x10*6/uL     Hemoglobin 7.9 (L) 12.0 - 16.0 g/dL     Hematocrit 26.4 (L) 36.0 - 46.0 %     MCV 94 80 - 100 fL     MCH 28.1 26.0 - 34.0 pg     MCHC 29.9 (L) 32.0 - 36.0 g/dL     RDW 17.4 (H) 11.5 - 14.5 %     Platelets 82 (L) 150 - 450 x10*3/uL     Immature Granulocytes %, Automated 9.8 (H) 0.0 - 0.9 %     Immature Granulocytes Absolute, Automated 0.97 (H) 0.00 -  0.70 x10*3/uL   Magnesium   Result Value Ref Range     Magnesium 2.03 1.60 - 2.40 mg/dL   Renal Function Panel   Result Value Ref Range     Glucose 162 (H) 74 - 99 mg/dL     Sodium 139 136 - 145 mmol/L     Potassium 4.3 3.5 - 5.3 mmol/L     Chloride 108 (H) 98 - 107 mmol/L     Bicarbonate 18 (L) 21 - 32 mmol/L     Anion Gap 17 10 - 20 mmol/L     Urea Nitrogen 52 (H) 6 - 23 mg/dL     Creatinine 0.84 0.50 - 1.05 mg/dL     eGFR 75 >60 mL/min/1.73m*2     Calcium 8.4 (L) 8.6 - 10.6 mg/dL     Phosphorus 3.3 2.5 - 4.9 mg/dL     Albumin 2.6 (L) 3.4 - 5.0 g/dL   Manual Differential   Result Value Ref Range     Neutrophils %, Manual 93.9 40.0 - 80.0 %     Bands %, Manual 1.7 0.0 - 5.0 %     Lymphocytes %, Manual 0.9 13.0 - 44.0 %     Monocytes %, Manual 0.0 2.0 - 10.0 %     Eosinophils %, Manual 0.0 0.0 - 6.0 %     Basophils %, Manual 0.0 0.0 - 2.0 %     Myelocytes %, Manual 3.5 0.0 - 0.0 %     Seg Neutrophils Absolute, Manual 9.30 (H) 1.20 - 7.00 x10*3/uL     Bands Absolute, Manual 0.17 0.00 - 0.70 x10*3/uL     Lymphocytes Absolute, Manual 0.09 (L) 1.20 - 4.80 x10*3/uL     Monocytes Absolute, Manual 0.00 (L) 0.10 - 1.00 x10*3/uL     Eosinophils Absolute, Manual 0.00 0.00 - 0.70 x10*3/uL     Basophils Absolute, Manual 0.00 0.00 - 0.10 x10*3/uL     Myelocytes Absolute, Manual 0.35 0.00 - 0.00 x10*3/uL     Total Cells Counted 115       Neutrophils Absolute, Manual 9.47 (H) 1.20 - 7.70 x10*3/uL     RBC Morphology See Below       Polychromasia Mild       RBC Fragments Few       Teardrop Cells Few       [place] Cells Few     Type and screen   Result Value Ref Range     ABO TYPE A       Rh TYPE POS       ANTIBODY SCREEN NEG     POCT GLUCOSE   Result Value Ref Range     POCT Glucose 170 (H) 74 - 99 mg/dL   POCT GLUCOSE   Result Value Ref Range     POCT Glucose 110 (H) 74 - 99 mg/dL      No results found.                    Assessment/Plan   Principal Problem:    Acute hypoxemic respiratory failure (CMS/HCC)  Active Problems:    Stage 4  chronic kidney disease (CMS/[facility])    COVID-19    Pneumonia of both lower lobes due to infectious organism    Pseudomonal bacteremia    A-fib (CMS/[facility])     Ms. Wilson is a 69 y/o female with hx HTN, CKD stage III who was admitted to the MICU from outside hospital with AHRF 2/2 COVID-pneumonia (tested positive on 10/13), course C/B by ARDS and pulmonary fibrosis resulting in organizing PNA.  Course also complicated by distal DVT, oliguric HUNG on CKD.  Patient weaned to 6 from 8 L nasal cannula but requires increased FiO2 with movement and exertion. Transferred to [facility] service on 12/11 for continued management.     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  On 12/18, switch to 60mg daily 4 weeks.  Then, can consider decreasing by 10 mg every 2 weeks based on clinical appearance.    -Medically ready for discharge, plan to discharge patient to SNF/LTAC. Approved at [facility], was plan for discharge today but held due to active bleeding  - Daily CBC     #AHRF 2/2 COVID PNA (11/23) c/b ARDS, Pulm fibrosis, Organizing PNA 2/2 Covid 19  , Pneumomediastinum (resolved)  - Stable on 6L NC.  Desats with movement   - CTA PE from 11/26 negative for PE, showing multifocal pnuemonia, bronchiectasis, and fibrosis  - Continue with Bactrim DS for PCP (Monitor hyperkalemia)  - Fluticasone 1 spry BID   - Seen by lung transplant on 11/30--> per team, must be tapered to less than 10 mg pred and be able to amblate 100 ft on any amount of o2, rengage as appropriate   - Avoid CPAP d/t hx pneumomediastinum during stay   -Monitor sugars on Steroid, on 15 units of lantus (decrease if am sugar low) and SSI      #Covid PNA   -received Decadron, Remdesivir, Tocilizumab at OSH,  Neutrophilic leukocytosis,  PSA bacteremia  - Afebrile, no leukocytosis   - 11/21 BC x2 3/4 pseudomonas   -11/25 Bcx Negative   -was on Zosyn11/22- 11/24, switched to PO Cipro 500mg BID 11/25-12/5 for total 14 days  - Continue Bactrim daily for PCP prophylactics      #DVT   -11/13: +Rt PT DVT, and + LT Soleal DVT   -CT PE negative   -Eliquis 5 mg BID      #HTN   #Interittent SvT   #Afib   -Metop tartrate 50 mg BID   -Holding home losartan 100 mg , bp stable   -EKG 12/10 stable, qtc 389      #Oliguric HUNG  on CKD (RESOLVED)   -Per documentation, previous baseline was 1.6, current creat around 1   -PRN diuresis as needed   -hx of bladder fistula     #Anxiety   #Pain control  -Continue seroquel 25 mg at night and 12.5 mg as needed with PT   -PRN oxy for pain, lidocaine  -Hold trazadone 25 mg, re-initiate if needed   -Bowel reg while on opiods   -Has had diarrhea previously, monitor      #Eczema/Rosasea  -Continue with dupixent      #Sacral Pressure Ulcer (stage 3)   -Wound care      F: PRN   E: PRN   N: Consistent carb  A: PIV   DVT: Eliquis   GI: PPI      Disposition:   SNF      Code Status: Full Code (confirmed on admission)         MD Esme Mckenzie MD

## 2023-12-26 NOTE — CONSULTS
Inpatient consult to Vascular Medicine  Consult performed by: Virgilio Bagley MD  Consult ordered by: Deon Fernandez MD  Reason for consult: Bleeding on Eliquis        History Of Present Illness:    Macarena Wilson is a 68-year-old female with past medical history of CKD stage III, hypertension, rosacea presented from an outside hospital on 10/24 with complaints of shortness of breath on 11/9/2023.  Tested positive for COVID 10 days prior to presentation on 10/13.  Initial CT chest on 11/5/2023 showed extensive groundglass opacities in the lung apices and bilateral lungs.  Ultrasound lower extremity venous duplex on 11/13/2023 showed right leg DVT in posterior tibial vein with thrombosis in the left soleal vein.  Initially started on heparin transition to Eliquis on 11/16/2023.  CTA chest on 11/25/2023 showed no PE, multifocal opacities, and fibrotic changes.  Ultrasound lower extremity venous duplex on 12/18/2023 showed no evidence of DVT in either lower extremity. The patient had episode of bleeding while on Eliquis for which Vascular medicine was consulted.  She had 2 episodes of hematuria versus vaginal bleeding yesterday night.  According to the notes, unsure if it is vaginal or only when she voids.  Soaked through 2 checks with a few quarter size clots.  OB/GYN was consulted and has low suspicion for vaginal bleeding on exam.  Suspected hematuria +/- vesicovaginal fistula. OBGYN recommended urology consult if ongoing bleeding is present. Dr. Duran recommended urine culture for evaluation of UTI.  Eliquis was discontinued yesterday evening.  Not on any prophylactic anticoagulation.    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  She is status post total laparoscopic hysterectomy on 12/2022.     Last Recorded Vitals:  Vitals:    12/25/23 2038 12/26/23 0116 12/26/23 0512 12/26/23 0834   BP: 122/56 96/66 106/68 121/78   BP Location:  Left arm  Left arm Left arm   Patient Position:  Lying Lying Lying   Pulse: 88 68 76 95   Resp: 18 16 16 18   Temp: 35.6 °C (96.1 °F) 36.1 °C (97 °F) 36 °C (96.8 °F) 35.3 °C (95.5 °F)   TempSrc:  Temporal Temporal Temporal   SpO2: 100% 96% 97% 95%   Weight:       Height:           Last Labs:  CBC - 12/26/2023:  8:58 AM  9.6 7.0 93    23.0      CMP - 12/26/2023:  8:58 AM  8.3 5.1 11 --- 0.2   3.5 2.7 35 68      PTT - 12/26/2023:  8:57 AM  1.2   13.7 19     Troponin I, High Sensitivity   Date/Time Value Ref Range Status   11/09/2023 05:45 AM 5 0 - 34 ng/L Final     BNP   Date/Time Value Ref Range Status   11/23/2023 03:48 PM 99 0 - 99 pg/mL Final   11/20/2023 05:01  (H) 0 - 99 pg/mL Final     Hemoglobin A1C   Date/Time Value Ref Range Status   10/24/2023 06:16 AM 6.3 (H) See below % Final     LE Venous Duplex 12/18/23:  Right Lower Venous: No evidence of acute deep vein thrombus visualized in the right lower extremity.  Left Lower Venous: No evidence of acute deep vein thrombus visualized in the left lower extremity. Cannot rule out thrombus in non-visualized peroneal vein.    CTA Chest 11/25/23  1. No evidence of pulmonary embolism.  2. Multifocal consolidative opacities more in the lower lobes,  slightly improved compared to prior study. Findings remain concerning  for multifocal infectious process.  3. Interlobular septal thickening, architectural distortion and mild  bronchiectasis scattered throughout the lungs, slightly worse and  concerning for fibrotic like changes, likely sequela of infection.  Cannot exclude component of interstitial pulmonary edema.  4.  Other findings as described above.    LE Venous Duplex 11/13/23:  Right Lower Venous: There is acute occlusive deep vein thrombosis visualized in the posterior tibial vein. The remainder of the right leg is negative for deep vein thrombosis.  Left Lower Venous: There is acute occlusive deep vein thrombosis visualized in the soleal vein. The remainder of the  left leg is negative for deep vein thrombosis.    LE Venous Duplex 11/6/23:  No evidence of deep vein thrombosis of the  bilateral lower  extremities within the limits of this examination.    LE Venous Duplex 10/27/23:  1. Right Lower Extremity: No evidence of deep vein thrombosis.   2. Left Lower Extremity: No evidence of deep vein thrombosis.    Past Medical History:  She has a past medical history of Chronic kidney disease, Hypertension, and Personal history of diseases of the skin and subcutaneous tissue.    Past Surgical History:  She has a past surgical history that includes Appendectomy (04/01/2016); Other surgical history (11/14/2022); and Hysterectomy (12/2022).      Social History:  She reports that she has never smoked. She has never used smokeless tobacco. She reports that she does not use drugs. No history on file for alcohol use.    Family History:  Family History   Problem Relation Name Age of Onset    Breast cancer Mother      Heart failure Mother      No Known Problems Father      No Known Problems Sister          Allergies:  Fesoterodine, Mirabegron, and Prednisone    Inpatient Medications:  Scheduled medications   Medication Dose Route Frequency    ciprofloxacin  750 mg oral BID    ergocalciferol  1,250 mcg oral Weekly    fluticasone  1 spray Each Nostril BID    insulin glargine  4 Units subcutaneous Once    [Held by provider] insulin glargine  8 Units subcutaneous Nightly    insulin lispro  0-15 Units subcutaneous TID with meals    lidocaine  1 patch transdermal Daily    lidocaine  1 patch transdermal Daily    melatonin  5 mg oral Daily    metoprolol tartrate  75 mg oral BID    pantoprazole  40 mg oral Daily before breakfast    QUEtiapine  25 mg oral Nightly    sennosides-docusate sodium  1 tablet oral BID    [Held by provider] sodium zirconium cyclosilicate  10 g oral TID    sulfamethoxazole-trimethoprim  80 mg oral Daily    thiamine  100 mg oral Daily     PRN medications   Medication     acetaminophen    dextrose 10 % in water (D10W)    dextrose    diclofenac sodium    glucagon    ipratropium-albuteroL    lidocaine-diphenhydraMINE-Maalox 1:1:1    loperamide    ondansetron ODT    oxyCODONE    QUEtiapine     Continuous Medications   Medication Dose Last Rate    oxygen   6 L/min (12/25/23 2038)     Outpatient Medications:  Current Outpatient Medications   Medication Instructions    acetaminophen (TYLENOL) 650 mg, oral, Every 4 hours PRN    acetaminophen (TYLENOL) 650 mg, nasogastric tube, Every 4 hours PRN    acetaminophen (TYLENOL) 650 mg, rectal, Every 4 hours PRN    acetylcysteine (MUCOMYST) 600 mg, nebulization, 3 times daily RT    budesonide (PULMICORT) 0.5 mg, nebulization, 2 times daily RT, Rinse mouth with water after use to reduce aftertaste and incidence of candidiasis. Do not swallow.    dexAMETHasone (DECADRON) 10 mg, intravenous, Every 12 hours    dexmedeTOMIDine in NS (Precedex) 400 mcg in 100 mL (4 mcg/mL) premix 0.1-1.5 mcg/kg/hr, intravenous, Continuous    fluticasone (Flonase) 50 mcg/actuation nasal spray 1 spray, Each Nostril, 2 times daily, Shake gently. Before first use, prime pump. After use, clean tip and replace cap.    glucagon (GLUCAGEN) 1 mg, intramuscular, Every 15 min PRN    guaiFENesin (ROBITUSSIN) 200 mg, oral, Every 4 hours PRN    heparin (porcine) 0-4,000 Units/hr (0-4,000 Units/hr), intravenous, Continuous    hydrALAZINE (APRESOLINE) 10 mg, intravenous, Every 8 hours PRN    hydrocortisone 1 % cream 1 Application, Topical, Daily, As needed for eczema     insulin glargine (LANTUS) 16 Units, subcutaneous, Nightly, Take as directed per insulin instructions.    ipratropium-albuteroL (Duo-Neb) 0.5-2.5 mg/3 mL nebulizer solution 3 mL, nebulization, Every 6 hours while awake RT    ipratropium-albuteroL (Duo-Neb) 0.5-2.5 mg/3 mL nebulizer solution 3 mL, nebulization, Every 2 hour PRN    levocetirizine (XYZAL) 5 mg, oral, Every evening, As needed    losartan (COZAAR) 100 mg,  oral, Daily    meropenem (MERREM) 500 mg, intravenous, Every 12 hours    mv-mn/C/glutamin/lysin/tlzw299 ([place], ASCORBATE SODIUM, ORAL) 1 tablet, oral, Daily, As needed    oxygen (O2) gas therapy 1 each, inhalation, Every 24 hours    pantoprazole (PROTONIX) 40 mg, oral, Daily before breakfast, Do not crush, chew, or split.    sodium chloride (Ocean) 0.65 % nasal spray 1 spray, Each Nostril, As needed       Physical Exam:  Constitutional: no acute distress  Heart: normal rate, regular rhythm, no murmurs  Lungs: CTA bilaterally  Abdomen: soft, nontender  Extremities: DP 2+, PT 2+, no swelling noted  Genitourinary: canister of hematuria present  Neck: supple, nontender  Neurologic: AAO x3     Assessment/Plan   68-year-old female with a past medical history of CKD stage III, hypertension presented to the emergency department with shortness of breath on 11/9/2023.  Tested COVID +10 days prior to presentation.  Ultrasound lower extremity venous duplex on 11/13/2023 showed right leg DVT and posterior tibial vein with thrombosis in left soleal vein.  Transition from heparin to Eliquis.  Developed suspected vaginal versus urinary bleeding.  OB/GYN completed exam and believes there is low suspicion for vaginal bleeding.  Patient has history of vesicovagina fistula seen on cystoscopy 8/2023. Pending OR as outpatient. Dr. Duran (OBGYN) sees her outpatient. Urine culture ordered to treat suspected UTI. Urinalysis shows Large blood in sample. Eliquis was discontinued yesterday evening. Hb has remained stable. Plt have decreaesd during admission starting on 12/9/23. Remained stable at . Not on any prophylactic anticoagulation at this time.    #11/13 Right posterior tibial vein thrombosis with left soleal vein thrombosis, resolved on subsequent imaging, no known PE. Provoked in setting of COVID and hospitalization     #Bleeding/hematuria on Eliquis  OB/GYN believes there is low suspicion for vaginal bleeding  history of  vesicovagina fistula. Pending OR as outpatient  #COVID fibrosis  #Thrombocytopenia  #Anemia    Recommendations:  - Given hb has dropped by 2 gm, would recommend work up to determine source of bleeding  - Recommend Lovenox prophylaxis with serial LE Venous ultrasounds  - Schedule next LE Venous duplex this Friday. If negative, it should be followed by weekly ultrasound x2 (if pt is to be discharged prior, please let us know to arrange)  - Maintain elevation of bilateral lower extremities and mechanical compressions   - Encourage ambulation   - Close follow up for signs/symptoms suggesting thrombosis     Please note recommendations are not final until the attending assignment.    Virgilio Bagley MD  Vascular Medicine    I saw and evaluated the patient. I personally obtained the key and critical portions of the history and physical exam or was physically present for key and critical portions performed by the resident/fellow. I reviewed the resident/fellow's documentation and discussed the patient with the resident/fellow. I agree with the resident/fellow's medical decision making as documented in the note.    Ongoing obvious bleeding with more than 2 g drop in hemoglobin is considered major bleeding and AC should be held till there is source control, appreciate primary team and urology input  In the meanwhile I agree with AC prophylaxis and serial ultrasounds given increased risk of thrombosis (COVID, prolonged hospitalization/illness and recent DVT)  Would favor avoiding Eliquis if there is concern about  bleeding, would proceed with Lovenox prophylaxis while hospitalized and for 6 weeks after discharge if okay by primary team and consultants  Rest of the recommendations as above    Thank you for allowing us to participate in the patient's care, vascular medicine will sign off.  Please do not hesitate to call us back with questions/concerns, if DVT ultrasound is positive or if the patient is to become an  anticoagulation candidate.   Will arrange follow-up with vascular medicine    Rea Roberson MD

## 2023-12-26 NOTE — PROGRESS NOTES
Macarena Wilson is a 68 y.o. female on day 47 of admission presenting with Acute hypoxemic respiratory failure (CMS/HCC).    New SNF preferences needed for referral. Pt directed SW to consult daughter-in-law Kendra for her input on SNFs. SW reached out to Kendra and emailed a new SNF choice list to her, with [place] [id number] as location. SW will follow. Da WILKINSON, W.     12/07/2023 1432  Kendra received the new SNF list and will notify SW of preferences. Referral in [facility] will be updated once preferences are received. Da WILKINSON, LSW.     12/07/2023  Pt daughter-in-law Kendra chose new preferences for SNF: 1 [facility], 2. [facility], 3. [facility], 4. [facility], and 5. [facility]. Referral updated and sent in [facility]. SW will follow. Da WILKINSON, [facility].     12/15/2023  [facility] is waiting on final acceptance from their medical team. SW asked for an update. SW will follow. Da WILKINSON, [facility].     12/18/23 @ 1430  [facility] states they are out of network and patient would have to pay ded/oop $8500. Their sister facility [facility] is in network. Sent message in [facility] to facility, as they originally stated out of network.  Referral also resent to [facility] per daughter in Kendra frost.   Cindy Jo RN TCC    12/19/23 @ 1200  Spoke with Godfrey who is the admissions director for [facility].  [phone]. New referral placed in [facility] as he was unable to send message or see clinical notes.  He will have facility start auth and will have a bed available tomorrow.  Cindy Jo RN TCC    12/20/23 @ 1410  Updated notes and clinicals were faxed over to [facility] this morning---[facility] unable to accept patient as her respiratory needs are more than they can care for at this time. DEANA Gardner, updated.  Referral sent again to [facility]. May need to consider LTACH.  KALEB Kauffman MD made aware of dispo updates.  Cindy Jo RN TCC    12/21/23 @ 1400  Spoke with  from [facility]. Her name was Noreen- [phone]. If we need any assistance with her insurance, she is available.  Cindy Jo RN TCC    12/21/23 @ 1515  Pre-cert started for [facility]. Will send updates as requested. Kendra LEBLANC, made aware.  Cindy Jo RN TCC    12/26/23 @ 1130  Auth approved by insurance for medical necessity.  Spoke with Noreen at [facility].  She would like to be updated when patient discharges. [phone]. Cert # [id number]  Patient not able to discharge today per Dr. Kauffman. Will consult with urology and vascular. ADOD another 2-3 days.  DEANA updated on approval and that she will remain here for a few more days until more stable.  Cindy Jo RN TCC

## 2023-12-27 PROBLEM — R78.81 PSEUDOMONAL BACTEREMIA: Status: RESOLVED | Noted: 2023-01-01 | Resolved: 2023-01-01

## 2023-12-27 PROBLEM — D62 ANEMIA ASSOCIATED WITH ACUTE BLOOD LOSS: Status: ACTIVE | Noted: 2023-01-01

## 2023-12-27 PROBLEM — B96.5 PSEUDOMONAL BACTEREMIA: Status: RESOLVED | Noted: 2023-01-01 | Resolved: 2023-01-01

## 2023-12-27 NOTE — PROGRESS NOTES
" Macarena Wilson is a 68 y.o. female on day 48 of admission presenting with Acute hypoxemic respiratory failure (CMS/HCC).    Subjective   No acute events overnight. The patient states that she is comfortable with her breathing. She was waiting for breakfast. Her appetite is the same and she is eating and drinking okay. She things the bleeding from the urine is better. Not getting up and out of bed (has not done this since she got COVID). Patient using the SCDs, but then develops a hot sensation and really bad eczema with it.     Objective   Visit Vitals  /70 (BP Location: Left arm, Patient Position: Lying)   Pulse 69   Temp 36 °C (96.8 °F)   Resp 18   Ht 1.6 m (5' 2.99\")   Wt 67.7 kg (149 lb 4 oz)   SpO2 100%   BMI 26.45 kg/m²   Smoking Status Never   BSA 1.73 m²      Physical Exam  Constitutional:       General: She is not in acute distress.     Appearance: Normal appearance.   HENT:      Head: Normocephalic and atraumatic.      Nose:      Comments: Nasal cannula in place (on 6 L)  Cardiovascular:      Rate and Rhythm: Normal rate and regular rhythm.      Heart sounds: No murmur heard.     No friction rub. No gallop.   Pulmonary:      Comments: Shallow and diminished breath sounds bilaterally  Abdominal:      General: There is no distension.      Palpations: Abdomen is soft.      Tenderness: There is no abdominal tenderness.   Neurological:      Mental Status: She is alert.       Intake/Output Summary (Last 24 hours) at 12/27/2023 1437  Last data filed at 12/27/2023 1334  Gross per 24 hour   Intake --   Output 800 ml   Net -800 ml     Daily Weight  12/08/23 : 67.7 kg (149 lb 4 oz)    Labs:  Lab Results   Component Value Date    WBC 8.1 12/27/2023    HGB 5.9 (LL) 12/27/2023    HCT 18.2 (L) 12/27/2023    MCV 92 12/27/2023    PLT 94 (L) 12/27/2023      Lab Results   Component Value Date    GLUCOSE 91 12/27/2023    CALCIUM 8.6 12/27/2023     12/27/2023    K 4.2 12/27/2023    CO2 23 12/27/2023     " (H) 12/27/2023    BUN 47 (H) 12/27/2023    CREATININE 0.84 12/27/2023      Lab Results   Component Value Date    ALT 31 12/27/2023    AST 15 12/27/2023    ALKPHOS 65 12/27/2023    BILITOT 0.3 12/27/2023      Imaging:   Vascular US Lower Extremity Venous Duplex Bilateral (12/18)  CONCLUSIONS:  Right Lower Venous: No evidence of acute deep vein thrombus visualized in the right lower extremity.  Left Lower Venous: No evidence of acute deep vein thrombus visualized in the left lower extremity. Cannot rule out thrombus in non-visualized peroneal vein.     Assessment/Plan    Ms. Wilson is a 68 year old female with a history of HTN, CKD stage III who was admitted to the MICU from OSH with acute hypoxemic respiratory failure 2/2 COVID-19 pneumonia (positive on 10/13) c/b ARDS and pulmonary fibrosis, now with organizing pneumonia, currently on 6L NC though increased oxygen support needed with exertion. Course also complicated by a distal DVT and oliguric HUNG on CKD. Transferred to [facility] for continued management.     Updates 12/27:  - Hemoglobin dropped to 5.9 --> transfuse 2 units of blood today  - Continue with Lovenox at ppx dose per vascular recommendations  - Continue with prednisone 60 mg. Prednisone taper plan: On 12/11, patient was decreased to prednisone 80 mg daily for 7 days. Then on 12/18 switched to 60 mg daily for 4 weeks (until 1/14). Then decrease by 10 mg every 2 weeks based on clinical appearance.   - Plan to discharge the patient to SNF/LTAC; approved at [facility]   - Continue with ciprofloxacin course for 7 days for presumed UTI (12-24 - 12/31 one dose)    #AHRF 2/2 COVID-19 PNA (11/23) c/b ARDS, pulmonary fibrosis  #Organizing PNA 2/2 COVID-19  #Pneumomediastinum (resolved)  - On 6 L NC, desaturation with movement  - CTA PE (11/26): negative for PE, showing multifocal pneumonia, bronchiectasis, and fibrosis  - Evaluated by lung transplant on 11/20 --> needs to be tapered to less than  10 mg prednisone, ambulate 100 ft on any amount of O2, reengage as appropriate  Plan:  - Continue Bactrim for PCP ppx  - Fluticasone spray BID  - No CPAP d/t hx of pneumomediastinum during hospital stay  - Monitor sugars on steroids: 15 units of Lantus, decrease in the AM if low sugar and SSI    #COVID-19 pneumonia (resolved)  #Pseudomonas Bacteremia (resolved)   - Received Decadron, Remdesevir, Tocilizumab at OSH; Neutrophilic leukocytosis, PSA bacteremia    MICRO:  - Bcx (11/21): 3/4 Pseudomonas  - Bcx (11/25): Negative    Antibiotics:  - Piperacillin-tazobactam (11/22-11/24)  - Ciprofloxacin PO (11/25-12/5)     Plan:  - Bactrim daily for PCP ppx     #DVT  - U/S (11/13): Right PT DVT and Left Soleal DVT  - CT PE negative  - Vascular lab DVT ppx (12/18): No evidence of acute DVTs in the bilateral lower extremities. Cannot rule out thrombus in non-visualized peroneal vein  - Vascular following for management of DVTs I/s/o hematuria/bleeding  Plan:   - Vascular medicine recommendations:   - Lovenox 40 mg q24 hours for DVT chemo-prophylaxis   - SCDs to BLE while in bed for mechanical DVT ppx  - Patient needs serial US every Friday through January 12: vascular medicine service will arrange for this near SNF/rehab in [place], [place]. Vascular will coordinate outpatient follow-up with Dr. Roberson from Vascular Medicine Service.   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 Per chart review, follows with Dr. Duran (for fistula?) and Lori (for non-functional kidney). Chronic intermittent hematuria.     - Followed-up outpatient for possible fistula tract on cystoscopy with plan for OR delayed due to inpatient admission  - GYN consulted: recommend urine culture  - Urology consulted: Monitor UOP; signs of urinary retention including suprapubic pain, low UOP, or clots --> get bladder scan/PVR. If  PVR elevated, page urology from consideration for Max placement. Transfuse as needed.   Plan:  - 2 units of pRBCs (12/27)   - Vascular medicine following for anticoagulation management  - Empirically treated with ciprofloxacin for presumed UTI (12/24/23-12/31 one dose)    #HTN  #Intermittent SVT  #Atrial fibrillation  - Continue with metoprolol tartrate 50 mg BID  - Holding home losartan 100 mg, blood pressure is stable  - EKG (12/10): stable,     #Oliguric HUNG on CKD (resolved)  - Per documentation, previous baseline was 1.6, now creatinine around 1  Plan:  - PRN diuresis as needed  - H/o bladder fistula     #Anxiety  #Pain control  - Continue seroquel 25 mg at night and 12.5 mg as needed with PT   - PRN oxy for pain, lidocaine  - Hold trazadone 25 mg, re-initiate if needed   - Bowel reg while on opiods   - Has had diarrhea previously, monitor     #Eczema/Rosasea  - Continue with dupixent      #Sacral Pressure Ulcer (stage 3)   - Wound care     F: PRN  E: PRN  N: Consistent carb  A: PIVs  DVT: Lovenox  GI: pantoprazole    Dispo SNF    Code status: Full Code     Rosmery Vazquez MD

## 2023-12-27 NOTE — PROGRESS NOTES
[facility]  Consult Note      Patient: Macarena Wilson  Age/Sex: 68 y.o., female  MRN: [id number]  Admit Date: 11/9/2023   Code Status: Full Code  Length of Stay: 48      Interval History/Overnight Events:   Hematuria resolving      Objective  12/25 1900 - 12/27 0659  In: 500   Out: 1050 [Urine:1050]    @72[facility]@  @[facility]@      Medications:  Current Facility-Administered Medications   Medication Dose Route Frequency Provider Last Rate Last Admin    acetaminophen (Tylenol) tablet 650 mg  650 mg oral q8h PRN Yariel Mitchell MD   650 mg at 12/26/23 0604    ciprofloxacin (Cipro) tablet 750 mg  750 mg oral BID Jacqui Valdez MD   750 mg at 12/27/23 0820    dextrose 10 % in water (D10W) infusion  0.3 g/kg/hr intravenous Once PRN Omaira Krishna MD        dextrose 50 % injection 25 g  25 g intravenous q15 min PRN Omaira Krishna MD        diclofenac sodium (Voltaren) 1 % gel 1 Application  4 g Topical 4x daily PRN Esme Kauffman MD        enoxaparin (Lovenox) syringe 40 mg  40 mg subcutaneous Daily Stiven Patrick DO   40 mg at 12/26/23 2128    ergocalciferol (Vitamin D-2) capsule 1,250 mcg  1,250 mcg oral Weekly Omaira Krishna MD   1,250 mcg at 12/22/23 0941    fluticasone (Flonase) nasal spray 1 spray  1 spray Each Nostril BID Omaira Krishna MD   1 spray at 12/26/23 2131    glucagon (Glucagen) injection 1 mg  1 mg intramuscular q15 min PRN Blanco Carreno MD        insulin glargine (Lantus) injection 4 Units  4 Units subcutaneous Once Blanco Carreno MD        [Held by provider] insulin glargine (Lantus) injection 8 Units  8 Units subcutaneous Nightly Cristina Corona MD   8 Units at 12/24/23 2119    insulin lispro (HumaLOG) injection 0-15 Units  0-15 Units subcutaneous TID with meals Omaira Krishna MD   6 Units at 12/24/23 1818    ipratropium-albuteroL (Duo-Neb) 0.5-2.5 mg/3 mL nebulizer solution 3 mL  3 mL nebulization q4h PRN Omaira Krishna MD   3 mL at 12/26/23 0904    lido-diphen-Maalox 1:1:1 Magic  Mouthwash  10 mL Swish & Spit q4h PRN Leroy Augustin MD   10 mL at 12/18/23 2210    lidocaine 4 % patch 1 patch  1 patch transdermal Daily Omaira Krishna MD   1 patch at 12/26/23 2129    lidocaine 4 % patch 1 patch  1 patch transdermal Daily Cristina Corona MD   1 patch at 12/21/23 0900    loperamide (Imodium A-D) liquid 2 mg  2 mg oral 4x daily PRN Omaira Krishna MD   2 mg at 12/08/23 2120    melatonin tablet 5 mg  5 mg oral Daily Omaira Krishna MD   5 mg at 12/26/23 2128    metoprolol tartrate (Lopressor) tablet 75 mg  75 mg oral BID Yariel Mitchell MD   75 mg at 12/27/23 0818    ondansetron ODT (Zofran-ODT) disintegrating tablet 4 mg  4 mg oral q8h PRN Omaira Krishna MD        oxyCODONE (Roxicodone) immediate release tablet 5 mg  5 mg oral q6h PRN Yariel Mitchell MD   5 mg at 12/26/23 2128    oxygen (O2) therapy   inhalation Continuous Omaira Krishna ,000 mL/hr at 12/27/23 0927 6 L/min at 12/27/23 0927    pantoprazole (ProtoNix) EC tablet 40 mg  40 mg oral Daily before breakfast Omaira Krishna MD   40 mg at 12/27/23 0555    QUEtiapine (SEROquel) tablet 12.5 mg  12.5 mg oral Daily PRN Omaira Krishna MD   12.5 mg at 12/21/23 1223    QUEtiapine (SEROquel) tablet 25 mg  25 mg oral Nightly Omaira Krishna MD   25 mg at 12/26/23 2128    sennosides-docusate sodium (Sobia-Colace) 8.6-50 mg per tablet 1 tablet  1 tablet oral BID Omaira Krishna MD   1 tablet at 12/27/23 0818    [Held by provider] sodium zirconium cyclosilicate (Lokelma) packet 10 g  10 g oral TID Donavan Schneider APRN-CNP   10 g at 12/17/23 2100    sulfamethoxazole-trimethoprim (Bactrim) 400-80 mg per tablet 80 mg  80 mg oral Daily Omaira Krishna MD   80 mg at 12/27/23 0820    thiamine (Vitamin B-1) tablet 100 mg  100 mg oral Daily Omaira Krishna MD   100 mg at 12/27/23 0818         PE:   Constitutional: Alert, in NAD  HEENT: Normocephalic, atraumatic  Neuro: A&O x3  CV: Regular rate  Pulm: Non-laboured breathing on NC  GI:  Abdomen soft, non-distended, non-tender  : Voiding dark brown/red urine via purewick.   Skin: Scattered bruising  Musculoskeletal: UNRULY  Extremities: no edema or cyanosis noted      Imaging    CT UROGRAPHY WITH AND WITHOUT CONTRAST;  9/24/2022 7:23 am     INDICATION:  hematuria  R31.0: Intermittent gross hematuria.     COMPARISON:  Renal colic CT of 05/08/2020.     ACCESSION NUMBER(S):  [id number]     ORDERING CLINICIAN:  NAZIA ROSARIO     TECHNIQUE:  CT urogram was performed. Unenhanced axial images were obtained  through the abdomen and pelvis. Subsequently following intravenous  administration of  120 mL Omnipaque 350 contrast, contiguous axial  images were obtained through the abdomen and pelvis during  nephrographic and delayed phases. Coronal and sagittal reformations  were made. 3D reformations of the renal collecting systems, ureters  and bladder were also created and reviewed.     FINDINGS:  KIDNEYS, URETERS, AND BLADDER:  Unenhanced images show a right renal upper pole lobulated  nonobstructing calculus measuring 0.9 x 1.4 cm in axial dimension. No  left renal calculi. No ureteral calculi bilaterally. No urinary  bladder calculi are seen. Few scattered small pelvic phleboliths are  present.     Right renal atrophy and diffuse cortical thinning has progressed from  prior. There is diffuse urothelial thickening and mild increased  enhancement of the right renal pelvis and ureter with mild adjacent  fat stranding. Minimal contrast excretion is seen from the right  kidney on delayed series. Right renal collecting system and ureter  are not well opacified on delayed series. No right  hydroureteronephrosis.     There is a left renal lower pole 2 cm above water attenuation lesion  which does not exhibit enhancement compatible with a complicated cyst  containing debris or blood product. No enhancing left renal lesion.  The left renal collecting system and ureter is opacified on delayed  series. No left  hydroureteronephrosis. No left renal or ureteral  filling defect identified.     Urinary bladder is partially distended and partially opacified on  delayed series. There is mild circumferential urinary bladder wall  thickening. No discrete intraluminal bladder mass is identified. On  delayed series there is also contrast opacification of the vagina and  fornices compatible with a fistula although no discrete fistula tract  is appreciated.     3D reformations show no left hydroureteronephrosis. Right renal  collecting system and ureter is not well depicted due to incomplete  opacification.     LOWER CHEST:  Lung bases are clear. Small hiatal hernia is present.     ABDOMEN:     LIVER:  There is diffuse fatty infiltration of the liver with small region of  relative sparing adjacent to the gallbladder.     BILE DUCTS:  Nondilated.     GALLBLADDER:  The gallbladder is not distended and without calcified stones.     PANCREAS:  Within normal limits.     SPLEEN:  Within normal limits.     ADRENAL GLANDS:  Within normal limits.     VESSELS:  Small scattered irregular atherosclerotic calcifications are seen  within the aorta and branch vessels. No aortic aneurysm. IVC is  unremarkable.     BOWEL:  No bowel obstruction. Appendix not discretely identified although no  regional inflammation is seen.     Scattered colonic diverticula are present without acute  diverticulitis.     PERITONEUM/RETROPERITONEUM/LYMPH NODES:  Right central pelvic metallic clips or coils are again seen near the  adnexal region, streak artifact from which obscures some regional  visualization. There is central uterine low-attenuation thickening. A  vague rounded intermediate attenuation approximate 2.3 cm lesion of  the uterus most likely is a fibroid.     No ascites or free air, no fluid collection.     Nonspecific small scattered retroperitoneal lymph nodes greatest in  the right periaortic and pericaval region are similar to prior with  individual  nodes measuring less than 1 cm in short axis. No  retroperitoneal fluid collection.     ABDOMINAL WALL:  Tiny fat containing periumbilical hernia present without inflammation.     BONE AND SOFT TISSUE:  Generalized osteopenia is present. Multilevel disc space narrowing  and endplate spurring is seen throughout the spine.     IMPRESSION:  Right renal upper pole lobulated nonobstructing 0.9 x 1.4 cm  calculus. No left renal calculi. No ureteral calculi bilaterally.     Interval development of right renal cortical thinning and atrophy.     Diffuse urothelial thickening and mild increased enhancement of the  right renal pelvis and throughout the right ureter. Urothelial  neoplasm can not be excluded.     Minimal contrast excretion observed from the right kidney. No right  hydroureteronephrosis.     Left renal lower pole 2 cm above water attenuation nonenhancing  partially exophytic lesion most compatible with a complicated cyst  containing debris or blood product. No enhancing left renal lesion.  No left hydroureteronephrosis. No left renal collecting system or  ureteral filling defect identified.     Nonspecific mild circumferential urinary bladder wall thickening.     On delayed series urinary bladder is partially opacified by the  excreted contrast. There is also contrast visible within the vagina  and fornices compatible with fistula although a discrete fistula  tract is not identified.     Central uterine low-attenuation prominence could represent  endometrial thickening. Probable 2.3 cm in fibroid. Pelvic ultrasound  could be considered for further evaluation.     Diffuse fatty infiltration of the liver with small regions of  relative sparing near the gallbladder.     Colonic diverticulosis without acute diverticulitis.    STUDY:  CT CYSTOGRAM;  8/16/2023 8:58 am     INDICATION:  POSSIBLE FISTULA IN BLADDER AT LEVEL OF RIGHT URETERAL ORIFICE.     COMPARISON:  None.     ACCESSION NUMBER(S):  [id number]     ORDERING  CLINICIAN:  NAZIA DURAN     TECHNIQUE:  CT of the pelvis was performed before and after instillation of  dilute contrast into urinary bladder via Max catheter. Standard  contiguous axial images were obtained at 3 mm slice thickness through  pelvis. Coronal and sagittal reconstructions at 3 mm slice thickness  were performed.     FINDINGS:  PELVIS:     BLADDER:  A Max catheter is in place. Urinary bladder on the post contrast  scan is mildly distended with contrast with a small amount of air.  There is reflux of contrast into both ureters. No definite fistula is  seen.     REPRODUCTIVE ORGANS:  The uterus appears to be absent. There is a small amount of contrast  in the lower vagina, however no discrete fistula with the urinary  bladder is visualized. There is a tract of contrast extending from  the lower urethra to the vagina on sagittal series 303, image 62/121     BOWEL:  Severe sigmoid diverticulosis. No dilated bowel is visualized.     VESSELS:  Bi-iliac atherosclerosis without aneurysm.     PERITONEUM/RETROPERITONEUM/LYMPH NODES:  No visualized free fluid or free air. No pelvic lymphadenopathy.     BONES AND ABDOMINAL WALL:  Lower lumbar, sacroiliac joints, and hip degenerative changes.     IMPRESSION:  1.  Intact urinary bladder without evidence of fistula.  Bilateral vesicoureteral reflux.  2. Small amount of contrast in the inferior vagina which may relate  to a fistula with the caudal portion of the urethra. It is also  possible contrast has tracked into the vagina from urethral leakage..        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 Follows with Rosina Duran  (for ?fistula) and Lori (for non functional kidney).  Chronic intermittent hematuria. Current hematuria resolving.      Recommendations:     - Hematuria appears to be resolving.  - Monitor UOP, if patient has signs of urinary retention including suprapubic pain, low urine output, or significant  clots, obtain bladder scan/PVR. If PVR is elevated, page urology for consideration for desouza placement  - Follow up vasc medicine recs for holding anticoagulation; from a  perspective, criteria to hold anticoagulation would be hematuria so severe as to require transfusion or with unstable vital signs  - Transfuse as needed. Continue to trend hgb, INR, rule out other sources of bleeding as hematuria does not appear to be severe.  -Follow up appointments are seen in future encounters  - Urology to follow sign off      Vivek Mann MD, PGY-5   Urology  Pager: [phone]

## 2023-12-27 NOTE — PROGRESS NOTES
"Macarena Wilson is a 68 y.o. female on day 48 of admission presenting from OSH with initial complaint of SOB 11/9/23.  Initial imaging showed RLE DVT in posterior tibial vein and LLE DVT in soleal vein.  CTA was negative for PE.  Initially treated with Eliquis that ws stopped 12/26/23 due to hematuria vs vesico-vaginal bleeding.      Subjective   Patient reports plan for DC to SNF/rehab located in [facility], possibly tomorrow.  Patient reports bloody urine earlier today.  Denies CP or SOB.     Objective   Vascular Medicine Service following for BLE proximal DVT.   Continues with Lovenox 40mg q24 hours for DVT prophylaxis.     Physical Exam  Resting in bed in NAD  Multiple areas of bruising noted over bilateral forearms.  Respirations shallow but regular, with symmetrical chest expansion; breath sounds somewhat diminished all anterior lung fields; supplemental oxygen 6 liters/min via nasal cannula  Normal heart sounds with regular rate/rhythm; vital signs are stable  Extremities are warm with palpable bilateral radial and DP pulses; PIV; mild overall edema, with moderate edema at tops of bilateral feet.  Abdomen soft and nontender to palpation  Pure Wick in place with hematuria noted in cannister  Patient is awake and alert, conversant; calm and cooperative with pleasant demeanor    Last Recorded Vitals  Blood pressure 120/77, pulse 84, temperature 36.2 °C (97.2 °F), resp. rate 18, height 1.6 m (5' 2.99\"), weight 67.7 kg (149 lb 4 oz), SpO2 98 %.  Intake/Output last 3 Shifts:  I/O last 3 completed shifts:  In: 500 (7.3 mL/kg) [IV Piggyback:500]  Out: 1050 (15.4 mL/kg) [Urine:1050 (0.4 mL/kg/hr)]  Dosing Weight: 68.4 kg     Relevant Results  Scheduled medications  ciprofloxacin, 750 mg, oral, BID  enoxaparin, 40 mg, subcutaneous, Daily  ergocalciferol, 1,250 mcg, oral, Weekly  fluticasone, 1 spray, Each Nostril, BID  insulin glargine, 4 Units, subcutaneous, Once  [Held by provider] insulin glargine, 8 Units, " subcutaneous, Nightly  insulin lispro, 0-15 Units, subcutaneous, TID with meals  lidocaine, 1 patch, transdermal, Daily  lidocaine, 1 patch, transdermal, Daily  melatonin, 5 mg, oral, Daily  metoprolol tartrate, 75 mg, oral, BID  pantoprazole, 40 mg, oral, Daily before breakfast  [START ON 12/28/2023] predniSONE, 30 mg, oral, BID  QUEtiapine, 25 mg, oral, Nightly  sennosides-docusate sodium, 1 tablet, oral, BID  [Held by provider] sodium zirconium cyclosilicate, 10 g, oral, TID  sulfamethoxazole-trimethoprim, 80 mg, oral, Daily  thiamine, 100 mg, oral, Daily    Continuous medications  oxygen, , Last Rate: 6 L/min (12/27/23 0927)      Results for orders placed or performed during the hospital encounter of 11/09/23 (from the past 24 hour(s))   POCT GLUCOSE   Result Value Ref Range    POCT Glucose 114 (H) 74 - 99 mg/dL   POCT GLUCOSE   Result Value Ref Range    POCT Glucose 195 (H) 74 - 99 mg/dL   POCT GLUCOSE   Result Value Ref Range    POCT Glucose 84 74 - 99 mg/dL   Comprehensive metabolic panel   Result Value Ref Range    Glucose 91 74 - 99 mg/dL    Sodium 141 136 - 145 mmol/L    Potassium 4.2 3.5 - 5.3 mmol/L    Chloride 108 (H) 98 - 107 mmol/L    Bicarbonate 23 21 - 32 mmol/L    Anion Gap 14 10 - 20 mmol/L    Urea Nitrogen 47 (H) 6 - 23 mg/dL    Creatinine 0.84 0.50 - 1.05 mg/dL    eGFR 75 >60 mL/min/1.73m*2    Calcium 8.6 8.6 - 10.6 mg/dL    Albumin 2.7 (L) 3.4 - 5.0 g/dL    Alkaline Phosphatase 65 33 - 136 U/L    Total Protein 5.3 (L) 6.4 - 8.2 g/dL    AST 15 9 - 39 U/L    Bilirubin, Total 0.3 0.0 - 1.2 mg/dL    ALT 31 7 - 45 U/L   Coagulation Screen   Result Value Ref Range    Protime 13.0 (H) 9.8 - 12.8 seconds    INR 1.2 (H) 0.9 - 1.1    aPTT 17 (L) 27 - 38 seconds   CBC   Result Value Ref Range    WBC 8.1 4.4 - 11.3 x10*3/uL    nRBC 2.8 (H) 0.0 - 0.0 /100 WBCs    RBC 1.98 (L) 4.00 - 5.20 x10*6/uL    Hemoglobin 5.9 (LL) 12.0 - 16.0 g/dL    Hematocrit 18.2 (L) 36.0 - 46.0 %    MCV 92 80 - 100 fL    MCH 29.8  26.0 - 34.0 pg    MCHC 32.4 32.0 - 36.0 g/dL    RDW 18.1 (H) 11.5 - 14.5 %    Platelets 94 (L) 150 - 450 x10*3/uL        Assessment/Plan   Principal Problem:    Acute hypoxemic respiratory failure (CMS/[facility])  Active Problems:    Stage 4 chronic kidney disease (CMS/HCC)    COVID-19    Pneumonia of both lower lobes due to infectious organism    Pseudomonal bacteremia    A-fib (CMS/[facility])    Need for pneumocystis prophylaxis    Long term (current) use of systemic steroids    Deep vein thrombosis (DVT) associated with COVID-19    On apixaban therapy    Genitourinary bleeding    At risk for bleeding associated with anticoagulants    68 female with BLE distal DVT.  Vascular Medicine Service following for anticoagulation recommendations due to new onset of hematuria after initial anticoagulation treatment with Eliquis.   Continues with Lovenox 40 mg q24 hours for DVT prophylaxis.  Plan for serial US evaluation of BLE q Friday x3 to assess for propagation/new DVT of know BLE distal DVT.  Review of labs today shows decreased hemoglobin to 5.9 grams from 7.0 grams with plan for transfusion of 2 units of PRBC, stable platelets 94K, and stable serum creatinine 0.84.   Patient given Lovenox Med Alert band.    Recommendations:  ~Continue Lovenox 40mg q24 hours for DVT chemo-prophylaxis.  ~SCDs to BLE while in bed for mechanical DVT prophylaxis.   ~If patient remains hospitalized on FRIDAY 12/29/23 please order Venous US of BLE in the Vascular Lab for serial evaluation of known BLE proximal DVTs.  ~Will need serial US every Friday through January 12th; Vascular Medicine Service will arrange for this near SNF/rehab in [facility]  ~I will coordinate outpatient follow up with Dr. Roberson from Vascular Medicine Service      Plan of care discussed in detail with Dr. Roberson  Plan of care discussed with Dr. Carlyn Kauffman from the [facility] Pulmonary Team    Madiha Redding, APRN-CNP  Vascular Medicine Service   Pager [phone]    Pt continues to  bleed on prophylaxis AC/Lovenox requiring blood transfusions with no  source control, please hold for now. Monitor closely will off AC for signs/symptoms suggesting thrombosis, rest as above      Rea Roberson MD

## 2023-12-27 NOTE — CARE PLAN
The patient's goals for the shift include      The clinical goals for the shift include Pt will have decrease in bloody urine output and will remain HDS and safe from injury through shift      Problem: Nutrition  Goal: Oral intake greater 75%  Outcome: Progressing  Goal: BG  mg/dL  Outcome: Progressing  Goal: Promote healing  Outcome: Progressing     Problem: Skin  Goal: Decreased wound size/increased tissue granulation at next dressing change  Outcome: Progressing  Flowsheets (Taken 12/27/2023 0237 by Em Sainz, RN)  Decreased wound size/increased tissue granulation at next dressing change: Promote sleep for wound healing  Goal: Participates in plan/prevention/treatment measures  Outcome: Progressing  Flowsheets (Taken 12/27/2023 0237 by Em Sainz, RN)  Participates in plan/prevention/treatment measures: Elevate heels  Goal: Prevent/manage excess moisture  Outcome: Progressing  Flowsheets (Taken 12/27/2023 0237 by Em Sainz RN)  Prevent/manage excess moisture: Monitor for/manage infection if present  Goal: Prevent/minimize sheer/friction injuries  Outcome: Progressing  Flowsheets (Taken 12/27/2023 0237 by Em Sainz RN)  Prevent/minimize sheer/friction injuries: HOB 30 degrees or less  Goal: Promote skin healing  Outcome: Progressing  Flowsheets (Taken 12/27/2023 1727)  Promote skin healing: Turn/reposition every 2 hours/use positioning/transfer devices     Problem: Fall/Injury  Goal: Verbalize understanding of personal risk factors for fall in the hospital  Outcome: Progressing  Goal: Verbalize understanding of risk factor reduction measures to prevent injury from fall in the home  Outcome: Progressing  Goal: Use assistive devices by end of the shift  Outcome: Progressing  Goal: Not fall by end of shift  Outcome: Progressing  Goal: Be free from injury by end of the shift  Outcome: Progressing  Goal: Pace activities to prevent fatigue by end of the shift  Outcome:  Progressing     Problem: Discharge Planning  Goal: Discharge to home or other facility with appropriate resources  Outcome: Progressing     Problem: Chronic Conditions and Co-morbidities  Goal: Patient's chronic conditions and co-morbidity symptoms are monitored and maintained or improved  Outcome: Progressing     Problem: Respiratory  Goal: Clear secretions with interventions this shift  Outcome: Progressing  Goal: Minimize anxiety/maximize coping throughout shift  Outcome: Progressing  Goal: Minimal/no exertional discomfort or dyspnea this shift  Outcome: Progressing  Goal: No signs of respiratory distress (eg. Use of accessory muscles. Peds grunting)  Outcome: Progressing  Goal: Patent airway maintained this shift  Outcome: Progressing  Goal: Verbalize decreased shortness of breath this shift  Outcome: Progressing

## 2023-12-27 NOTE — CARE PLAN
The patient's goals for the shift include      The clinical goals for the shift include pt will remain safe and free from injury through shift    Problem: Skin  Goal: Decreased wound size/increased tissue granulation at next dressing change  Outcome: Progressing  Flowsheets (Taken 12/27/2023 0237)  Decreased wound size/increased tissue granulation at next dressing change: Promote sleep for wound healing     Problem: Skin  Goal: Participates in plan/prevention/treatment measures  Outcome: Progressing  Flowsheets (Taken 12/27/2023 0237)  Participates in plan/prevention/treatment measures: Elevate heels     Problem: Skin  Goal: Prevent/manage excess moisture  Outcome: Progressing  Flowsheets (Taken 12/27/2023 0237)  Prevent/manage excess moisture: Monitor for/manage infection if present

## 2023-12-28 PROBLEM — Z79.01 ON APIXABAN THERAPY: Status: RESOLVED | Noted: 2023-01-01 | Resolved: 2023-01-01

## 2023-12-28 NOTE — CARE PLAN
The patient's goals for the shift include      The clinical goals for the shift include pt will remain safe and free from injury through shift      Problem: Skin  Goal: Decreased wound size/increased tissue granulation at next dressing change  Outcome: Progressing  Flowsheets (Taken 12/28/2023 0010)  Decreased wound size/increased tissue granulation at next dressing change: Promote sleep for wound healing     Problem: Skin  Goal: Participates in plan/prevention/treatment measures  Outcome: Progressing  Flowsheets (Taken 12/27/2023 0237)  Participates in plan/prevention/treatment measures: Elevate heels     Problem: Skin  Goal: Promote skin healing  Outcome: Progressing  Flowsheets (Taken 12/28/2023 0010)  Promote skin healing: Assess skin/pad under line(s)/device(s)

## 2023-12-28 NOTE — CARE PLAN
The patient's goals for the shift include      The clinical goals for the shift include pt will remain safe and free from injury through shift    O

## 2023-12-28 NOTE — PROGRESS NOTES
"Macarena Wilson is a 68 y.o. female on day 49 of admission presenting from OSH with initial complaint of SOB 11/9/23.  Initial imaging showed RLE DVT in posterior tibial vein and LLE DVT in soleal vein.  CTA was negative for PE.  Initially treated with Eliquis that was stopped 12/26/23 due to hematuria vs vesico-vaginal bleeding.       Subjective   Patient reports feeling having more energy today after receiving 2 units PRBC yesterday.  Denies CP or SOB, continues with hematuria.  Reports tentative plan to DC to SNF maybe tomorrow.     Objective   Vascular Medicine Service following for BLE proximal DVT.   Continues with Lovenox 40mg q24 hours for DVT prophylaxis.     Physical Exam  Resting in bed in NAD  Multiple areas of bruising noted over bilateral forearms.  Respirations shallow but regular, with symmetrical chest expansion; breath sounds somewhat diminished all anterior lung fields; supplemental oxygen 6 liters/min via nasal cannula  Normal heart sounds with regular rate/rhythm; vital signs are stable  Extremities are warm with palpable bilateral radial and DP pulses; PIV; mild overall edema, with moderate edema at tops of bilateral feet.  Pure Wick in place with dark colored urine noted in cannister  Patient is awake and alert, conversant; calm and cooperative with pleasant demeanor.    Last Recorded Vitals  Blood pressure 117/75, pulse 80, temperature 36.5 °C (97.7 °F), temperature source Temporal, resp. rate 16, height 1.6 m (5' 2.99\"), weight 67.7 kg (149 lb 4 oz), SpO2 100 %.  Intake/Output last 3 Shifts:  I/O last 3 completed shifts:  In: 1350 (19.7 mL/kg) [Blood:1350]  Out: 700 (10.2 mL/kg) [Urine:700 (0.3 mL/kg/hr)]  Dosing Weight: 68.4 kg     Relevant Results  Scheduled medications  ciprofloxacin, 750 mg, oral, BID  enoxaparin, 40 mg, subcutaneous, Daily  ergocalciferol, 1,250 mcg, oral, Weekly  fluticasone, 1 spray, Each Nostril, BID  insulin glargine, 4 Units, subcutaneous, Once  [Held by " provider] insulin glargine, 8 Units, subcutaneous, Nightly  insulin lispro, 0-15 Units, subcutaneous, TID with meals  lidocaine, 1 patch, transdermal, Daily  lidocaine, 1 patch, transdermal, Daily  melatonin, 5 mg, oral, Daily  metoprolol tartrate, 75 mg, oral, BID  pantoprazole, 40 mg, oral, Daily before breakfast  predniSONE, 30 mg, oral, BID  QUEtiapine, 25 mg, oral, Nightly  sennosides-docusate sodium, 1 tablet, oral, BID  [Held by provider] sodium zirconium cyclosilicate, 10 g, oral, TID  sulfamethoxazole-trimethoprim, 80 mg, oral, Daily  thiamine, 100 mg, oral, Daily      Results for orders placed or performed during the hospital encounter of 11/09/23 (from the past 24 hour(s))   POCT GLUCOSE   Result Value Ref Range    POCT Glucose 100 (H) 74 - 99 mg/dL   Prepare RBC: 2 Units   Result Value Ref Range    PRODUCT CODE M2383D49     Unit Number [id number]     Unit ABO A     Unit RH POS     XM INTEP COMP     Dispense Status TR     Blood Expiration Date January 17, 2024 23:59 EST     PRODUCT BLOOD TYPE 6200     UNIT VOLUME 350     PRODUCT CODE H6435B13     Unit Number [id number]     Unit ABO A     Unit RH POS     XM INTEP COMP     Dispense Status IS     Blood Expiration Date January 13, 2024 23:59 EST     PRODUCT BLOOD TYPE 6200     UNIT VOLUME 350    CBC   Result Value Ref Range    WBC 9.7 4.4 - 11.3 x10*3/uL    nRBC 1.5 (H) 0.0 - 0.0 /100 WBCs    RBC 3.63 (L) 4.00 - 5.20 x10*6/uL    Hemoglobin 10.2 (L) 12.0 - 16.0 g/dL    Hematocrit 31.9 (L) 36.0 - 46.0 %    MCV 88 80 - 100 fL    MCH 28.1 26.0 - 34.0 pg    MCHC 32.0 32.0 - 36.0 g/dL    RDW 16.9 (H) 11.5 - 14.5 %    Platelets 94 (L) 150 - 450 x10*3/uL   CBC and Auto Differential   Result Value Ref Range    WBC 8.9 4.4 - 11.3 x10*3/uL    nRBC 2.8 (H) 0.0 - 0.0 /100 WBCs    RBC 3.60 (L) 4.00 - 5.20 x10*6/uL    Hemoglobin 10.2 (L) 12.0 - 16.0 g/dL    Hematocrit 31.0 (L) 36.0 - 46.0 %    MCV 86 80 - 100 fL    MCH 28.3 26.0 - 34.0 pg    MCHC 32.9 32.0 - 36.0  g/dL    RDW 17.6 (H) 11.5 - 14.5 %    Platelets 103 (L) 150 - 450 x10*3/uL    Immature Granulocytes %, Automated 11.9 (H) 0.0 - 0.9 %    Immature Granulocytes Absolute, Automated 1.06 (H) 0.00 - 0.70 x10*3/uL   Renal function panel   Result Value Ref Range    Glucose 142 (H) 74 - 99 mg/dL    Sodium 140 136 - 145 mmol/L    Potassium 4.0 3.5 - 5.3 mmol/L    Chloride 108 (H) 98 - 107 mmol/L    Bicarbonate 22 21 - 32 mmol/L    Anion Gap 14 10 - 20 mmol/L    Urea Nitrogen 40 (H) 6 - 23 mg/dL    Creatinine 0.78 0.50 - 1.05 mg/dL    eGFR 82 >60 mL/min/1.73m*2    Calcium 8.4 (L) 8.6 - 10.6 mg/dL    Phosphorus 3.3 2.5 - 4.9 mg/dL    Albumin 2.7 (L) 3.4 - 5.0 g/dL   Magnesium   Result Value Ref Range    Magnesium 1.91 1.60 - 2.40 mg/dL   Manual Differential   Result Value Ref Range    Neutrophils %, Manual 80.2 40.0 - 80.0 %    Bands %, Manual 6.0 0.0 - 5.0 %    Lymphocytes %, Manual 5.2 13.0 - 44.0 %    Monocytes %, Manual 5.2 2.0 - 10.0 %    Eosinophils %, Manual 0.0 0.0 - 6.0 %    Basophils %, Manual 0.0 0.0 - 2.0 %    Metamyelocytes %, Manual 1.7 0.0 - 0.0 %    Myelocytes %, Manual 1.7 0.0 - 0.0 %    Seg Neutrophils Absolute, Manual 7.14 (H) 1.20 - 7.00 x10*3/uL    Bands Absolute, Manual 0.53 0.00 - 0.70 x10*3/uL    Lymphocytes Absolute, Manual 0.46 (L) 1.20 - 4.80 x10*3/uL    Monocytes Absolute, Manual 0.46 0.10 - 1.00 x10*3/uL    Eosinophils Absolute, Manual 0.00 0.00 - 0.70 x10*3/uL    Basophils Absolute, Manual 0.00 0.00 - 0.10 x10*3/uL    Metamyelocytes Absolute, Manual 0.15 0.00 - 0.00 x10*3/uL    Myelocytes Absolute, Manual 0.15 0.00 - 0.00 x10*3/uL    Total Cells Counted 116     Neutrophils Absolute, Manual 7.67 1.20 - 7.70 x10*3/uL    RBC Morphology See Below     Polychromasia Mild     Ovalocytes Few     Teardrop Cells Few     Tiro Cells Many    POCT GLUCOSE   Result Value Ref Range    POCT Glucose 133 (H) 74 - 99 mg/dL        Assessment/Plan   Principal Problem:    Acute hypoxemic respiratory failure  (CMS/[facility])  Active Problems:    Stage 4 chronic kidney disease (CMS/HCC)    COVID-19    Pneumonia of both lower lobes due to infectious organism    A-fib (CMS/HCC)    Need for pneumocystis prophylaxis    Long term (current) use of systemic steroids    Deep vein thrombosis (DVT) associated with COVID-19    On apixaban therapy    Genitourinary bleeding    At risk for bleeding associated with anticoagulants    Anemia associated with acute blood loss      68 female with BLE distal DVT.  Vascular Medicine Service following for anticoagulation recommendations due to new onset of hematuria after initial anticoagulation treatment with Eliquis.   Continues with Lovenox 40 mg q24 hours for DVT prophylaxis.  Plan for serial US evaluation of BLE q Friday x3 to assess for propagation/new DVT of know BLE distal DVT.  Review of labs today shows increased hemoglobin after receiving 2 units PRBC transfusion yesterday to 10.2 grams, stable platelets 103K, and stable serum creatinine 0.78.        Recommendations:  ~Please STOP Lovenox administration.   ~SCDs to BLE while in bed for mechanical DVT prophylaxis.   ~Monitor closely for signs/symptoms of further thrombosis such as limb edema/swelling/erythema or pain, SOB, increased need for supplemental oxygen; have low threshold for imaging chest/limbs.  ~If patient remains hospitalized on FRIDAY 12/29/23 please order Venous US of BLE in the Vascular Lab for serial evaluation of known BLE proximal DVTs.  ~Will need serial US every Friday through January 12th; Vascular Medicine Service will arrange for this near SNF/rehab in [facility]  ~I will coordinate outpatient follow up with Dr. Roberson from Vascular Medicine Service         Plan of care discussed in detail with Dr. Roberson  Plan of care discussed via EPIC chat with Dr. Sawyer from the [facility] Pulmonary Team     ZION Napoles-CNP  Vascular Medicine Service   Pager [phone]

## 2023-12-28 NOTE — PROGRESS NOTES
"Macarena Wilson is a 68 y.o. female on day 49 of admission presenting with Acute hypoxemic respiratory failure (CMS/HCC).    Subjective   No acute events overnight. The patient's hemoglobin went from 5.9 to 10.2. Still has bright red blood in the canister. Endorses a pressure sensation at the same time the bleeding started. Denies dysuria. Breathing is the same, denies more shortness of breath. Eating and drinking well. No other bleeding. Did not really use the SCDs.     Objective   Visit Vitals  /76 (BP Location: Left arm, Patient Position: Lying)   Pulse 75   Temp 36.6 °C (97.9 °F) (Temporal)   Resp 16   Ht 1.6 m (5' 2.99\")   Wt 67.7 kg (149 lb 4 oz)   SpO2 94%   BMI 26.45 kg/m²   Smoking Status Never   BSA 1.73 m²      Physical Exam  GEN: Awake, alert, no acute distress  HEAD: Normocephalic, atraumatic  ENT: Nasal cannula in place on 6 L  CV: Normal S1 and S2, no murmurs, rubs, or gallops  PULM: Shallow breath sounds  ABD: No tenderness to palpation or suprapubic tenderness  EXTREM: Trace edema in the bilateral lower extremities     Intake/Output last 3 Shifts:  I/O last 3 completed shifts:  In: 1350 (19.7 mL/kg) [Blood:1350]  Out: 700 (10.2 mL/kg) [Urine:700 (0.3 mL/kg/hr)]  Dosing Weight: 68.4 kg     Labs:  Lab Results   Component Value Date    WBC 8.9 12/28/2023    HGB 10.2 (L) 12/28/2023    HCT 31.0 (L) 12/28/2023    MCV 86 12/28/2023     (L) 12/28/2023      Lab Results   Component Value Date    GLUCOSE 142 (H) 12/28/2023    CALCIUM 8.4 (L) 12/28/2023     12/28/2023    K 4.0 12/28/2023    CO2 22 12/28/2023     (H) 12/28/2023    BUN 40 (H) 12/28/2023    CREATININE 0.78 12/28/2023     MICRO:  Urine culture (12/27): In progress     Imaging:  Pending Vascular US lower extremity and US bladder    Assessment/Plan   Ms. Wilson is a 68 year old female with a history of HTN, CKD stage III who was admitted to the MICU from OSH with acute hypoxemic respiratory failure 2/2 COVID-19 pneumonia " (positive on 10/13) c/b ARDS and pulmonary fibrosis, now with organizing pneumonia, currently on 6L NC though increased oxygen support needed with exertion. Course also complicated by a distal DVT, oliguric HUNG on CKD (resolved), and hematuria requiring 2 units of pRBCs . Transferred to [facility] for continued management.      Updates 12/28:  - Hemoglobin from 5.9 --> 10.2 s/p 2 units of RBCs on 12/28   - Holding Lovenox   - Continue prednisone 60 mg. Taper plan --> 12/11, patient was decreased to prednisone 80 mg daily for 7 days. Then on 12/18 switched to 60 mg daily for 4 weeks (until 1/14). Then decrease by 10 mg every 2 weeks based on clinical appearance.   - Continue with ciprofloxacin course for 7 days for presumed UTI (12-24 - 12/31 one dose)  - F/up urine cultures  - Pending ultrasound of the bilateral lower extremities per vascular recommendations and serial assessments   - Formal ultrasound of bladder to evaluate for clots per urology recommendations.   - Plan to discharge the patient to SNF/LTAC; approved at [facility].      #AHRF 2/2 COVID-19 PNA (11/23) c/b ARDS, pulmonary fibrosis  #Organizing PNA 2/2 COVID-19  #Pneumomediastinum (resolved)  - On 6 L NC, desaturation with movement  - CTA PE (11/26): negative for PE, showing multifocal pneumonia, bronchiectasis, and fibrosis  - Evaluated by lung transplant on 11/30 --> needs to be tapered to less than 10 mg prednisone, ambulate 100 ft on any amount of O2, reengage as appropriate  Plan:  - Continue Bactrim for PCP ppx  - Fluticasone spray BID  - No CPAP d/t hx of pneumomediastinum during hospital stay  - Monitor sugars on steroids: 15 units of Lantus, decrease in the AM if low sugar ,and SSI     #COVID-19 pneumonia (resolved)  #Pseudomonas Bacteremia (resolved)   - Received Decadron, Remdesevir, Tocilizumab at OSH; Neutrophilic leukocytosis, PSA bacteremia     MICRO:  - Bcx (11/21): 3/4 Pseudomonas  - Bcx (11/25): Negative      Antibiotics:  - Piperacillin-tazobactam (11/22-11/24)  - Ciprofloxacin PO (11/25-12/5)      Plan:  - Bactrim daily for PCP ppx      #DVT  - U/S (11/13): Right PT DVT and Left Soleal DVT  - CT PE negative  - Vascular lab DVT ppx (12/18): No evidence of acute DVTs in the bilateral lower extremities. Cannot rule out thrombus in non-visualized peroneal vein  - Vascular following for management of DVTs I/s/o hematuria/bleeding  Plan:   - Vascular medicine recommendations:              - Holding Lovenox 40 mg q24 hours for DVT chemo-prophylaxis in the setting of bleeding               - SCDs to BLE while in bed for mechanical DVT ppx  - Patient needs serial US every Friday through January 12: vascular medicine service will arrange for this near SNF/rehab in [place], [place]. Vascular will coordinate outpatient follow-up with Dr. Roberson from Vascular Medicine Service.   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 Per chart review, follows with Dr. Duran (for fistula?) and Lori (for non-functional kidney). Chronic intermittent hematuria.                - Followed-up outpatient for possible fistula tract on cystoscopy with plan for OR delayed due to inpatient admission  - GYN consulted: recommend urine culture  - Urology consulted: Monitor UOP; signs of urinary retention including suprapubic pain, low UOP, or clots --> get bladder scan/PVR. If PVR elevated, page urology from consideration for Max placement. Transfuse as needed.    - Re-engaged urology in the setting of persistent hematuria. Formal bladder ultrasound to evaluate for clots.   - S/p 2 units of pRBCs (12/28) with appropriate increment   Plan:  - Vascular medicine following for anticoagulation management  - Empirically treated with ciprofloxacin for presumed UTI (12/24/23-12/31 one dose)  - Formal bladder ultrasound to evaluate  for clots      #HTN  #Intermittent SVT  #Atrial fibrillation  - Continue with metoprolol tartrate 50 mg BID  - Holding home losartan 100 mg, blood pressure is stable  - EKG (12/10): stable,      #Oliguric HUNG on CKD (resolved)  - Per documentation, previous baseline was 1.6, now creatinine around 1  Plan:  - PRN diuresis as needed  - H/o bladder fistula      #Anxiety  #Pain control  - Continue seroquel 25 mg at night and 12.5 mg as needed with PT   - PRN oxy for pain, lidocaine  - Hold trazadone 25 mg, re-initiate if needed   - Bowel reg while on opioids   - Has had diarrhea previously, monitor      #Eczema/Rosasea  - Continue with dupixent      #Sacral Pressure Ulcer (stage 3)   - Wound care      F: PRN  E: PRN  N: Consistent carb  A: PIVs  DVT: Hold   GI: Pantoprazole     Dispo: SNF     Code status: Full Code     Rosmery Vazquez MD

## 2023-12-28 NOTE — PROGRESS NOTES
Urology [place]  Consult Note      Patient: Macarena Wilson  Age/Sex: 68 y.o., female  MRN: [id number]  Admit Date: 11/9/2023   Code Status: Full Code  Length of Stay: 49      Interval History/Overnight Events:   Urology re-engaged for persistent hematuria and patient requiring transfusion of 2U yesterday  Hb incremented well, stable from am  Has purewick; moderate hematuria noted in cannister  Patient denied inability to void, signs of retention      Objective  12/26 1900 - 12/28 0659  In: 1350   Out: 700 [Urine:700]    Medications:  Current Facility-Administered Medications   Medication Dose Route Frequency Provider Last Rate Last Admin    acetaminophen (Tylenol) tablet 650 mg  650 mg oral q8h PRN Yariel Mitchell MD   650 mg at 12/26/23 0604    ciprofloxacin (Cipro) tablet 750 mg  750 mg oral BID Jacqui Valdez MD   750 mg at 12/28/23 0825    dextrose 10 % in water (D10W) infusion  0.3 g/kg/hr intravenous Once PRN Omaira Krishna MD        dextrose 50 % injection 25 g  25 g intravenous q15 min PRN Omaira Krishna MD        diclofenac sodium (Voltaren) 1 % gel 1 Application  4 g Topical 4x daily PRN Esme Kauffman MD        enoxaparin (Lovenox) syringe 40 mg  40 mg subcutaneous Daily Stiven Patrick DO   40 mg at 12/27/23 2102    ergocalciferol (Vitamin D-2) capsule 1,250 mcg  1,250 mcg oral Weekly Omaira Krishna MD   1,250 mcg at 12/22/23 0941    fluticasone (Flonase) nasal spray 1 spray  1 spray Each Nostril BID Omaira Krishna MD   1 spray at 12/28/23 0830    glucagon (Glucagen) injection 1 mg  1 mg intramuscular q15 min PRN Blanco Carreno MD        insulin glargine (Lantus) injection 4 Units  4 Units subcutaneous Once Blanco Carreno MD        [Held by provider] insulin glargine (Lantus) injection 8 Units  8 Units subcutaneous Nightly Cristina Corona MD   8 Units at 12/24/23 2119    insulin lispro (HumaLOG) injection 0-15 Units  0-15 Units subcutaneous TID with meals Omaira Krishna MD   6 Units at  12/24/23 1818    ipratropium-albuteroL (Duo-Neb) 0.5-2.5 mg/3 mL nebulizer solution 3 mL  3 mL nebulization q4h PRN Omaira Krishna MD   3 mL at 12/26/23 0904    lido-diphen-Maalox 1:1:1 Magic Mouthwash  10 mL Swish & Spit q4h PRN Leroy Augustin MD   10 mL at 12/18/23 2210    lidocaine 4 % patch 1 patch  1 patch transdermal Daily Omaira Krishna MD   1 patch at 12/27/23 2102    lidocaine 4 % patch 1 patch  1 patch transdermal Daily Cristina Corona MD   1 patch at 12/21/23 0900    loperamide (Imodium A-D) liquid 2 mg  2 mg oral 4x daily PRN Omaira Krishna MD   2 mg at 12/08/23 2120    melatonin tablet 5 mg  5 mg oral Daily Omaira Krishna MD   5 mg at 12/27/23 2103    metoprolol tartrate (Lopressor) tablet 75 mg  75 mg oral BID Yariel Mitchell MD   75 mg at 12/28/23 0825    ondansetron ODT (Zofran-ODT) disintegrating tablet 4 mg  4 mg oral q8h PRN Omaira Krishna MD        oxyCODONE (Roxicodone) immediate release tablet 5 mg  5 mg oral q6h PRN Yariel Mtichell MD   5 mg at 12/28/23 1230    oxygen (O2) therapy   inhalation Continuous Omaira Krishna ,000 mL/hr at 12/27/23 0927 6 L/min at 12/27/23 0927    pantoprazole (ProtoNix) EC tablet 40 mg  40 mg oral Daily before breakfast Omaira Krishna MD   40 mg at 12/28/23 0701    predniSONE (Deltasone) tablet 30 mg  30 mg oral BID Rosmery Vazquez MD   30 mg at 12/28/23 0825    QUEtiapine (SEROquel) tablet 12.5 mg  12.5 mg oral Daily PRN Omaira Krishna MD   12.5 mg at 12/21/23 1223    QUEtiapine (SEROquel) tablet 25 mg  25 mg oral Nightly Omaira Krishna MD   25 mg at 12/27/23 2102    sennosides-docusate sodium (Sobia-Colace) 8.6-50 mg per tablet 1 tablet  1 tablet oral BID Omaira Krishna MD   1 tablet at 12/28/23 0826    [Held by provider] sodium zirconium cyclosilicate (Lokelma) packet 10 g  10 g oral TID Donavan Schneider APRN-CNP   10 g at 12/17/23 2100    sulfamethoxazole-trimethoprim (Bactrim) 400-80 mg per tablet 80 mg  80 mg oral Daily Omaira  MD Erendira   80 mg at 12/28/23 0825    thiamine (Vitamin B-1) tablet 100 mg  100 mg oral Daily Omaira Krishna MD   100 mg at 12/28/23 0826         PE:   Constitutional: Alert, in NAD  HEENT: Normocephalic, atraumatic  Neuro: A&O x3  CV: Regular rate  Pulm: Non-laboured breathing on NC  GI: Abdomen soft, non-distended, non-tender  : Voiding dark red urine via purewick.   Skin: Scattered bruising  Musculoskeletal: UNRULY  Extremities: no edema or cyanosis noted      Imaging    CT UROGRAPHY WITH AND WITHOUT CONTRAST;  9/24/2022 7:23 am     INDICATION:  hematuria  R31.0: Intermittent gross hematuria.     COMPARISON:  Renal colic CT of 05/08/2020.     ACCESSION NUMBER(S):  [id number]     ORDERING CLINICIAN:  NAZIA ROSARIO     TECHNIQUE:  CT urogram was performed. Unenhanced axial images were obtained  through the abdomen and pelvis. Subsequently following intravenous  administration of  120 mL Omnipaque 350 contrast, contiguous axial  images were obtained through the abdomen and pelvis during  nephrographic and delayed phases. Coronal and sagittal reformations  were made. 3D reformations of the renal collecting systems, ureters  and bladder were also created and reviewed.     FINDINGS:  KIDNEYS, URETERS, AND BLADDER:  Unenhanced images show a right renal upper pole lobulated  nonobstructing calculus measuring 0.9 x 1.4 cm in axial dimension. No  left renal calculi. No ureteral calculi bilaterally. No urinary  bladder calculi are seen. Few scattered small pelvic phleboliths are  present.     Right renal atrophy and diffuse cortical thinning has progressed from  prior. There is diffuse urothelial thickening and mild increased  enhancement of the right renal pelvis and ureter with mild adjacent  fat stranding. Minimal contrast excretion is seen from the right  kidney on delayed series. Right renal collecting system and ureter  are not well opacified on delayed series. No right  hydroureteronephrosis.     There is a left renal  lower pole 2 cm above water attenuation lesion  which does not exhibit enhancement compatible with a complicated cyst  containing debris or blood product. No enhancing left renal lesion.  The left renal collecting system and ureter is opacified on delayed  series. No left hydroureteronephrosis. No left renal or ureteral  filling defect identified.     Urinary bladder is partially distended and partially opacified on  delayed series. There is mild circumferential urinary bladder wall  thickening. No discrete intraluminal bladder mass is identified. On  delayed series there is also contrast opacification of the vagina and  fornices compatible with a fistula although no discrete fistula tract  is appreciated.     3D reformations show no left hydroureteronephrosis. Right renal  collecting system and ureter is not well depicted due to incomplete  opacification.     LOWER CHEST:  Lung bases are clear. Small hiatal hernia is present.     ABDOMEN:     LIVER:  There is diffuse fatty infiltration of the liver with small region of  relative sparing adjacent to the gallbladder.     BILE DUCTS:  Nondilated.     GALLBLADDER:  The gallbladder is not distended and without calcified stones.     PANCREAS:  Within normal limits.     SPLEEN:  Within normal limits.     ADRENAL GLANDS:  Within normal limits.     VESSELS:  Small scattered irregular atherosclerotic calcifications are seen  within the aorta and branch vessels. No aortic aneurysm. IVC is  unremarkable.     BOWEL:  No bowel obstruction. Appendix not discretely identified although no  regional inflammation is seen.     Scattered colonic diverticula are present without acute  diverticulitis.     PERITONEUM/RETROPERITONEUM/LYMPH NODES:  Right central pelvic metallic clips or coils are again seen near the  adnexal region, streak artifact from which obscures some regional  visualization. There is central uterine low-attenuation thickening. A  vague rounded intermediate attenuation  approximate 2.3 cm lesion of  the uterus most likely is a fibroid.     No ascites or free air, no fluid collection.     Nonspecific small scattered retroperitoneal lymph nodes greatest in  the right periaortic and pericaval region are similar to prior with  individual nodes measuring less than 1 cm in short axis. No  retroperitoneal fluid collection.     ABDOMINAL WALL:  Tiny fat containing periumbilical hernia present without inflammation.     BONE AND SOFT TISSUE:  Generalized osteopenia is present. Multilevel disc space narrowing  and endplate spurring is seen throughout the spine.     IMPRESSION:  Right renal upper pole lobulated nonobstructing 0.9 x 1.4 cm  calculus. No left renal calculi. No ureteral calculi bilaterally.     Interval development of right renal cortical thinning and atrophy.     Diffuse urothelial thickening and mild increased enhancement of the  right renal pelvis and throughout the right ureter. Urothelial  neoplasm can not be excluded.     Minimal contrast excretion observed from the right kidney. No right  hydroureteronephrosis.     Left renal lower pole 2 cm above water attenuation nonenhancing  partially exophytic lesion most compatible with a complicated cyst  containing debris or blood product. No enhancing left renal lesion.  No left hydroureteronephrosis. No left renal collecting system or  ureteral filling defect identified.     Nonspecific mild circumferential urinary bladder wall thickening.     On delayed series urinary bladder is partially opacified by the  excreted contrast. There is also contrast visible within the vagina  and fornices compatible with fistula although a discrete fistula  tract is not identified.     Central uterine low-attenuation prominence could represent  endometrial thickening. Probable 2.3 cm in fibroid. Pelvic ultrasound  could be considered for further evaluation.     Diffuse fatty infiltration of the liver with small regions of  relative sparing near  the gallbladder.     Colonic diverticulosis without acute diverticulitis.    STUDY:  CT CYSTOGRAM;  8/16/2023 8:58 am     INDICATION:  POSSIBLE FISTULA IN BLADDER AT LEVEL OF RIGHT URETERAL ORIFICE.     COMPARISON:  None.     ACCESSION NUMBER(S):  [id number]     ORDERING CLINICIAN:  NAZIA DURAN     TECHNIQUE:  CT of the pelvis was performed before and after instillation of  dilute contrast into urinary bladder via Max catheter. Standard  contiguous axial images were obtained at 3 mm slice thickness through  pelvis. Coronal and sagittal reconstructions at 3 mm slice thickness  were performed.     FINDINGS:  PELVIS:     BLADDER:  A Max catheter is in place. Urinary bladder on the post contrast  scan is mildly distended with contrast with a small amount of air.  There is reflux of contrast into both ureters. No definite fistula is  seen.     REPRODUCTIVE ORGANS:  The uterus appears to be absent. There is a small amount of contrast  in the lower vagina, however no discrete fistula with the urinary  bladder is visualized. There is a tract of contrast extending from  the lower urethra to the vagina on sagittal series 303, image 62/121     BOWEL:  Severe sigmoid diverticulosis. No dilated bowel is visualized.     VESSELS:  Bi-iliac atherosclerosis without aneurysm.     PERITONEUM/RETROPERITONEUM/LYMPH NODES:  No visualized free fluid or free air. No pelvic lymphadenopathy.     BONES AND ABDOMINAL WALL:  Lower lumbar, sacroiliac joints, and hip degenerative changes.     IMPRESSION:  1.  Intact urinary bladder without evidence of fistula.  Bilateral vesicoureteral reflux.  2. Small amount of contrast in the inferior vagina which may relate  to a fistula with the caudal portion of the urethra. It is also  possible contrast has tracked into the vagina from urethral leakage..        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 Follows with Rosina Duran (for fistula) and Lori (for  non functional kidney).  Chronic intermittent hematuria. Current hematuria resolving.      Recommendations:     - Formal bladder ultrasound to assess if there is clot burden in bladder   - Monitor UOP, if patient has signs of urinary retention including suprapubic pain, low urine output, or significant clots, obtain bladder scan/PVR. If PVR is elevated, page urology for consideration for desouza placement  - Follow up vas medicine recs for holding anticoagulation; from a  perspective, criteria to hold anticoagulation would be hematuria so severe as to require transfusion or with unstable vital signs  - Transfuse as needed. Continue to trend hgb, INR, rule out other sources of bleeding as hematuria does not appear to be severe.  -Follow up appointments are seen in future encounters     Nerissa Tang MD, PGY-1  Urology  Pager: [phone]

## 2023-12-29 PROBLEM — N28.1 HEMORRHAGE OF CYST OF NATIVE KIDNEY: Status: ACTIVE | Noted: 2023-01-01

## 2023-12-29 PROBLEM — N32.89 BLOOD CLOT IN BLADDER: Status: ACTIVE | Noted: 2023-01-01

## 2023-12-29 PROBLEM — J84.89 ORGANIZING PNEUMONIA (MULTI): Status: ACTIVE | Noted: 2023-01-01

## 2023-12-29 PROBLEM — N28.89 HEMORRHAGE OF CYST OF NATIVE KIDNEY: Status: ACTIVE | Noted: 2023-01-01

## 2023-12-29 NOTE — CARE PLAN
The patient's goals for the shift include      The clinical goals for the shift include pt will remain safe and free from injury through shift    Problem: Skin  Goal: Decreased wound size/increased tissue granulation at next dressing change  Outcome: Progressing  Flowsheets (Taken 12/29/2023 0123)  Decreased wound size/increased tissue granulation at next dressing change: Protective dressings over bony prominences     Problem: Skin  Goal: Participates in plan/prevention/treatment measures  Outcome: Progressing  Flowsheets (Taken 12/29/2023 0123)  Participates in plan/prevention/treatment measures: Elevate heels     Problem: Skin  Goal: Prevent/manage excess moisture  Outcome: Progressing  Flowsheets (Taken 12/29/2023 0123)  Prevent/manage excess moisture: Moisturize dry skin

## 2023-12-29 NOTE — PROGRESS NOTES
Physical Therapy                 Therapy Communication Note    Patient Name: Macarena Wilson  MRN: [id number]  Today's Date: 12/29/2023     Discipline: Physical Therapy    Missed Visit Reason: Missed Visit Reason: Patient refused (Pt declining PT this date. Pt stating that she hasnt been feeling the best and wants to rest at this time.)    Missed Time: Attempt      12/29/23 at 2:09 PM - Carmel Mcdaniel, PT

## 2023-12-29 NOTE — CARE PLAN
The patient's goals for the shift include      The clinical goals for the shift include pt will remain safe and free from injury through end of shift 12/29/23 @ 1900      Problem: Pain  Goal: Turns in bed with improved pain control throughout the shift  Outcome: Progressing  Goal: Walks with improved pain control throughout the shift  Outcome: Progressing  Goal: Free from opioid side effects throughout the shift  Outcome: Progressing     Problem: Fall/Injury  Goal: Not fall by end of shift  Outcome: Met  Goal: Be free from injury by end of the shift  Outcome: Met     Pt remained safe and free from injury through end of shift. VSS and HDS. Max placed by urology, bright red blood from catheter. Pt refusing turns and dressing on sacrum. Pt received pain medication for pain in bladder area. Had US done and CT.

## 2023-12-29 NOTE — CONSULTS
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 Sandoval) interstitial cystitis, OAB, and Rodrigo, and past surgical history of TLH and lysis of adhesions 12/14/2022 (Dr. Duran) who was admitted from OSH 11/10 with AHRF 2/2 COVID-pneumonia with complicated hospital course including DVT and oliguric HUNG on CKD (sCr now wnl) requiring long inpatient admission. She was medically cleared for discharge to SNF, but developed blood/clots per vagina two days ago with hgb decrement from 8.1>7.6>7.0 for which eliquis was held 12/26.  exam was limited, but was without sign of vaginal bleeding. Vasc med is consulted for risk assessment. She has been continued on bactrim UTI prophylaxis with UA 12/24 without sign of infection. 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 Duran. Follow-up CT cystogram 8/16/23 did not show clear evidence of fistula, but demonstrated a small amount of contrast in the inferior vagina, possible fistula with the caudal portion of the urethra vs contrast leakage tracked into the vagina. Cystoscopy demonstrated possible fistula tract and large ulcer (fulgurated and biopsied as benign) with plan for OR in December with Dr. Duran for possible intervention, though this was deferred due to inpatient status. 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     Subjective Continues to endorse bladder pain and dysuria. Benefits of desouza placement explained to pt, who  is in agreement with plan. No other complaints.      Objective      PE:   Constitutional: Alert, in NAD  HEENT: Normocephalic, atraumatic  Neuro: A&O x3  CV: Regular rate  Pulm: Non-laboured breathing on NC  GI: Abdomen soft, non-distended, non-tender  : Voiding dark brown/red urine via purewick. Per gyn note : Very limited exam due to patient positioning. No vaginal bleeding seen on speculum exam, no blood on glove on bimanual exam. No rectal bleeding noted.    Skin: Scattered bruising  Musculoskeletal: UNRULY  Extremities: no edema or cyanosis noted     VS:   Vitals:    23 0544   BP: 147/79   Pulse: 75   Resp: 16   Temp: 36.4 °C (97.5 °F)   SpO2: 99%     Labs:   Results from last 72 hours   Lab Units 23  0857 23  0044 23  1133 23  0846 23  0858   CREATININE mg/dL 0.78  --   --  0.84 0.83   HEMOGLOBIN g/dL 10.2* 10.2* 5.9*  --  7.0*   WBC AUTO x10*3/uL 8.9 9.7 8.1  --  9.6   GLUCOSE mg/dL 142*  --   --  91 79   MAGNESIUM mg/dL 1.91  --   --   --   --    POTASSIUM mmol/L 4.0  --   --  4.2 4.1   CHLORIDE mmol/L 108*  --   --  108* 108*   CALCIUM mg/dL 8.4*  --   --  8.6 8.3*     *UA  - +blood/-nitrites/-LE*     Imagin/28 bladder U/S -  Complex heterogeneously echogenic layering material within the bladder, likely represents debris versus clot.     DINA - Atrophic right kidney. Bilateral nonobstructing nephrolithiasis measuring up to 1.5 cm on the right and 0.9 cm on the left. No hydronephrosis. Bilateral renal cysts.     CT cystogram - Intact urinary bladder without evidence of fistula. Bilateral vesicoureteral reflux. Small amount of contrast in the inferior vagina which may relate to a fistula with the caudal portion of the urethra vs possible contrast leakage tracked into the vagina from urethral leakage    23 renogram - Left kidney: 85.7%, Right kidney: 14.3%    3/1/23 CT a/p -  1.9 cm exophytic complex cyst from the left lower pole laterally similar to  prior examination likely hemorrhagic cyst. The left kidney and urinary tract otherwise is unremarkable. Moderate right renal cortical atrophy, with nonobstructing calculus at the upper pole slightly increased from the previous exam. Pericystic reticulation of the urinary bladder, possibly from cystitis as described.    9/24/22 CTU - 1.4 cm right renal nonobstructing calculus. No left renal calculi. Interval development of right renal cortical thinning and atrophy. Diffuse urothelial thickening and mild increased enhancement of the right renal pelvis and throughout the right ureter. Urothelial neoplasm can not be excluded. Minimal contrast excretion observed from the right kidney. Left renal lower pole 2 cm above water attenuation nonenhancing partially exophytic lesion most compatible with a complicated cyst containing debris or blood product. Nonspecific mild circumferential urinary bladder wall thickening. Bladder is partially opacified by the excreted contrast. There is also contrast visible within the vagina and fornices compatible with fistula although a discrete fistula tract is not identified.     12/29 CTU - Hyperdense material in the bladder consistent with blood. Previously mentioned urethral vaginal fistula is not evident. Stable complex left hemorrhagic cyst. Nonobstructive tiny 0.3 cm calculus in the right mid ureter and calyx calculi measuring 1.3 x 1.6 cm  in the right renal upper pole. Stable right renal atrophy. Diffuse mid to proximal right urothelial thickening, seen on prior imaging. Urothelial neoplasm can not be excluded.     Chart review: Per gyn note 12/26 - Pt case reviewed with Dr. Shaffer and Dr. Duran, Urology. She has been followed outpatient by Dr. Duran for possible fistula tract on cystoscopy with plan for OR delayed due to inpatient admission. Per Dr. Duran, would recommend obtaining urine culture. Dr. Lofton, urogynecology fellow also updated and aware of patient.    Per addison Duran  outpatient note 10/19 - had negative cysto after hysterectomy, relatively benign findings on CT AP 3/23, CT cystogram negative for fistula, but still concern. Cystoscopy demonstrating possible fistula tract, large ulcer which was fulgurated and biopsied, biopsy showed chronic with acute inflammation on 10/17/22. Plan on cystocopy in OR with possible repair vs if normal, injection of AM. DNA NGS demonstrated polymicrobial UTI on macrobid prophylaxis/estradiol cream    Updates:  12/29: VSS. Low urine output recorded, 375mL. sCr wnl. Hgb stable after 2u RBC on 12/27, 10.2<10.2<7. Voiding via purewick, brick red urine in canister, unchanged. 3 way hematuria catheter placed and flushed to pink with return of clot.     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 Other significant  findings include stable complex left hemorrhagic cyst, stable right renal atrophy, non-obstructive 1.6cm right nephrolithiasis, diffuse mid to proximal right urothelial thickening as seen on prior imaging.      Recommendations:     - Urology placed 22fr 3 way hematuria catheter for continued hematuria, organized bladder clot formation  - RN to manually irrigate q4hr or prn for clots, worsening suprapubic pressure, or low UOP. To do so:       1. Instill 120 cc NS or sterile water through main (middle) port of catheter       2. Aspirate 60 cc. Waste this 60 cc.       3. Instill/aspirate/waste with 60 cc.       4. Repeat #3 at least 3x, until no more clots are returned and urine is dark pink or lighter. Please note that this may take several syringes, possibly more than 3.  - If desouza not able to be flushed despite the above, please page urology  - Monitor UOP, strict I&Os  - Recommend CT Urogram to assess gross hematuria  - Follow up [facility] medicine recs for holding anticoagulation; from a  perspective, criteria to hold  anticoagulation would be hematuria so severe as to require transfusion or with unstable vital signs  - Transfuse as needed. Continue to trend hgb, INR, rule out other sources of bleeding as hematuria does not appear to be severe.  - Please update patient with Dr. Duran's office number [phone] so she can make a follow up visit. His office will re-attempt to schedule outpatient follow up visit as well.  - Patient has follow-up scheduled with Dr. Sandoval who is monitoring other imaging findings yearly, next on 5/2. Her previously scheduled appointment with Dr. Duran was 2/1.   - Urology to follow, please page with questions/concerns/clinical status changes     Patient assessment and plan to be discussed with Chief resident Dr. Ana Jimenes PA-C  Pager [phone]

## 2023-12-29 NOTE — PROGRESS NOTES
"Macarena Wilson is a 68 y.o. female on day 50 of admission presenting from OSH with initial complaint of SOB 11/9/23.  Initial imaging showed RLE DVT in posterior tibial vein and LLE DVT in soleal vein.  CTA was negative for PE.  Initially treated with Eliquis that was stopped 12/26/23 due to hematuria vs vesico-vaginal bleeding.      Surgical Urology service consulted for hematuria and dysuria.  3-way Max catheter placed after bladder US showed echogenic layering material within the bladder debris vs clot.       Subjective   Patient reports that she has a blood clot in the bladder.  Reports that she hasn't been out of bed for several days.  Denies CP, SOB or bleeding other than hematuria.      Objective   Vascular Medicine Service following for BLE proximal DVT.     Physical Exam  Resting in bed in NAD  Continues with multiple areas of bruising over bilateral forearms.  Respirations full and easy, with symmetrical chest expansion; breath sounds somewhat diminished all anterior lung fields; supplemental oxygen 6 liters/min via nasal cannula  Normal heart sounds with regular rate/rhythm; vital signs are stable  Max to CD with cherry-red hematuria without evidence of clots.  Extremities are warm with palpable bilateral radial and DP pulses; PIV; mild overall edema, with moderate edema at tops of bilateral feet.  Patient is awake and alert, conversant; calm and cooperative with pleasant demeanor.    Last Recorded Vitals  Blood pressure 138/76, pulse 78, temperature 35.9 °C (96.6 °F), temperature source Temporal, resp. rate 16, height 1.6 m (5' 2.99\"), weight 67.7 kg (149 lb 4 oz), SpO2 95 %.  Intake/Output last 3 Shifts:  I/O last 3 completed shifts:  In: 1000 (14.6 mL/kg) [Blood:1000]  Out: 625 (9.1 mL/kg) [Urine:625 (0.3 mL/kg/hr)]  Dosing Weight: 68.4 kg     Relevant Results  Scheduled medications  ciprofloxacin, 750 mg, oral, BID  [Held by provider] enoxaparin, 40 mg, subcutaneous, Daily  ergocalciferol, " 1,250 mcg, oral, Weekly  fluticasone, 1 spray, Each Nostril, BID  insulin glargine, 4 Units, subcutaneous, Once  [Held by provider] insulin glargine, 8 Units, subcutaneous, Nightly  insulin lispro, 0-15 Units, subcutaneous, TID with meals  lidocaine, 1 Application, urethral, Once  lidocaine, 1 patch, transdermal, Daily  lidocaine, 1 patch, transdermal, Daily  melatonin, 5 mg, oral, Daily  metoprolol tartrate, 75 mg, oral, BID  pantoprazole, 40 mg, oral, Daily before breakfast  predniSONE, 30 mg, oral, BID  QUEtiapine, 25 mg, oral, Nightly  sennosides-docusate sodium, 1 tablet, oral, BID  [Held by provider] sodium zirconium cyclosilicate, 10 g, oral, TID  sulfamethoxazole-trimethoprim, 80 mg, oral, Daily  thiamine, 100 mg, oral, Daily    Continuous medications  oxygen, , Last Rate: 6 L/min (12/27/23 0927)      Results for orders placed or performed during the hospital encounter of 11/09/23 (from the past 24 hour(s))   POCT GLUCOSE   Result Value Ref Range    POCT Glucose 200 (H) 74 - 99 mg/dL   POCT GLUCOSE   Result Value Ref Range    POCT Glucose 166 (H) 74 - 99 mg/dL   POCT GLUCOSE   Result Value Ref Range    POCT Glucose 127 (H) 74 - 99 mg/dL   POCT GLUCOSE   Result Value Ref Range    POCT Glucose 97 74 - 99 mg/dL   CBC and Auto Differential   Result Value Ref Range    WBC 9.7 4.4 - 11.3 x10*3/uL    nRBC 1.3 (H) 0.0 - 0.0 /100 WBCs    RBC 3.37 (L) 4.00 - 5.20 x10*6/uL    Hemoglobin 9.8 (L) 12.0 - 16.0 g/dL    Hematocrit 29.2 (L) 36.0 - 46.0 %    MCV 87 80 - 100 fL    MCH 29.1 26.0 - 34.0 pg    MCHC 33.6 32.0 - 36.0 g/dL    RDW 18.6 (H) 11.5 - 14.5 %    Platelets 103 (L) 150 - 450 x10*3/uL    Immature Granulocytes %, Automated 11.2 (H) 0.0 - 0.9 %    Immature Granulocytes Absolute, Automated 1.09 (H) 0.00 - 0.70 x10*3/uL   Renal function panel   Result Value Ref Range    Glucose 107 (H) 74 - 99 mg/dL    Sodium 139 136 - 145 mmol/L    Potassium 4.7 3.5 - 5.3 mmol/L    Chloride 107 98 - 107 mmol/L    Bicarbonate 22  21 - 32 mmol/L    Anion Gap 15 10 - 20 mmol/L    Urea Nitrogen 43 (H) 6 - 23 mg/dL    Creatinine 1.00 0.50 - 1.05 mg/dL    eGFR 61 >60 mL/min/1.73m*2    Calcium 8.8 8.6 - 10.6 mg/dL    Phosphorus 3.9 2.5 - 4.9 mg/dL    Albumin 3.0 (L) 3.4 - 5.0 g/dL   Magnesium   Result Value Ref Range    Magnesium 2.05 1.60 - 2.40 mg/dL   Manual Differential   Result Value Ref Range    Neutrophils %, Manual 85.1 40.0 - 80.0 %    Lymphocytes %, Manual 7.0 13.0 - 44.0 %    Monocytes %, Manual 0.0 2.0 - 10.0 %    Eosinophils %, Manual 0.0 0.0 - 6.0 %    Basophils %, Manual 0.0 0.0 - 2.0 %    Atypical Lymphocytes %, Manual 0.9 0.0 - 2.0 %    Metamyelocytes %, Manual 0.9 0.0 - 0.0 %    Myelocytes %, Manual 6.1 0.0 - 0.0 %    Seg Neutrophils Absolute, Manual 8.25 (H) 1.20 - 7.00 x10*3/uL    Lymphocytes Absolute, Manual 0.68 (L) 1.20 - 4.80 x10*3/uL    Monocytes Absolute, Manual 0.00 (L) 0.10 - 1.00 x10*3/uL    Eosinophils Absolute, Manual 0.00 0.00 - 0.70 x10*3/uL    Basophils Absolute, Manual 0.00 0.00 - 0.10 x10*3/uL    Atypical Lymphs Absolute, Manual 0.09 0.00 - 0.50 x10*3/uL    Metamyelocytes Absolute, Manual 0.09 0.00 - 0.00 x10*3/uL    Myelocytes Absolute, Manual 0.59 0.00 - 0.00 x10*3/uL    Total Cells Counted 114     RBC Morphology See Below     [place] Cells Few     Basophilic Stippling Present      VASC US LOWER EXTREMITY VENOUS DUPLEX BILATERAL  Patient Name:     KARLA MARIFER AGUILERA Reading Physician: [id number] Jonas Woods MD  Study Date:       11/13/2023     Right Lower Venous: There is acute occlusive deep vein thrombosis visualized in the posterior tibial vein. The remainder of the right leg is negative for deep vein thrombosis.  Left Lower Venous: There is acute occlusive deep vein thrombosis visualized in the soleal vein. The remainder of the left leg is negative for deep vein thrombosis.    VASC US LOWER EXTREMITY VENOUS DUPLEX BILATERAL   Patient Name:      KARLA AGUILERA  [place] Physician: [id number] Alisa Puri  MD  Study Date:        12/18/2023  Right Lower Venous: No evidence of acute deep vein thrombus visualized in the right lower extremity.  Left Lower Venous: No evidence of acute deep vein thrombus visualized in the left lower extremity. Cannot rule out thrombus in non-visualized peroneal vein.     [facility] US LOWER EXTREMITY VENOUS DUPLEX BILATERAL   Study Date:       12/29/2023        Ordering           [id number] JOSE DAILEY GIN   PRELIMINARY CONCLUSIONS:  Right Lower Venous: No evidence of acute deep vein thrombus visualized in the right lower extremity.  Left Lower Venous: There is acute occlusive deep vein thrombosis visualized in the soleal vein. There is acute non-occlusive deep vein thrombosis visualized in the posterior tibial vein. The remainder of the left leg is negative for deep vein thrombosis.      Assessment/Plan   Principal Problem:    Acute hypoxemic respiratory failure (CMS/HCC)  Active Problems:    Stage 4 chronic kidney disease (CMS/HCC)    COVID-19    Pneumonia of both lower lobes due to infectious organism    A-fib (CMS/HCC)    Need for pneumocystis prophylaxis    Long term (current) use of systemic steroids    Deep vein thrombosis (DVT) associated with COVID-19    Genitourinary bleeding    At risk for bleeding associated with anticoagulants    Anemia associated with acute blood loss    68 female with BLE distal DVT.  Vascular Medicine Service following for anticoagulation recommendations due to new onset of hematuria after initial anticoagulation treatment with Eliquis.   Continuing to hold anticoagulation due to persistent hematuria.   Plan for serial US evaluation of BLE q Friday x3 to assess for propagation/new DVT of know BLE distal DVT.  Review of labs today shows downtrend of hemoglobin to 9.8 grams, stable platelets 103K, and increased  serum creatinine 1.00..         Recommendations:  ~Continue to hold anticoagulation due to hematuria requiring transfusion.    ~SCDs to BLE while in bed for  mechanical DVT prophylaxis.   ~Monitor closely for signs/symptoms of further thrombosis such as limb edema/swelling/erythema or pain, SOB, increased need for supplemental oxygen; have low threshold for imaging chest/limbs.  ~Needs Venous US of BLE in the Vascular Lab for serial evaluation of known BLE proximal DVTs.  ~Will need serial US every Friday through January 12th; Vascular Medicine Service will arrange for this near SNF/rehab in [facility]  ~I will coordinate outpatient follow up with Dr. Roberson from Vascular Medicine Service       UPDATE: 1824: US of BLE shows an acute occlusive deep vein thrombosis visualized in the LEFT soleal vein, and an acute non-occlusive deep vein thrombosis visualized in the LEFT posterior tibial vein; this LLE DVT is considered acute/new as previous US completed 12/18/23 was negative for any evidence of DVT.  Patient should have another vascular BLE US in the Vascular Lab in 3-4 days if she remains hospitalized, otherwise this will be arranged to be completed as outpatient if patient has been discharged to SNF.     Dr. Miguel Angel Shetty will be on call for the Vascular Medicine Service this weekend; he can be reached on pager [phone] or on EPIC chat.        Plan of care discussed in detail with Dr. Roberson  Plan of care discussed via [employer] chat with Dr. Sawyer from the [facility] Pulmonary Team     ZION Napoles-CNP  Vascular Medicine Service   Pager [phone]

## 2023-12-30 NOTE — PROGRESS NOTES
"Macarena Wilson is a 68 y.o. female on day 51 of admission presenting with Acute hypoxemic respiratory failure (CMS/HCC).    Subjective   No acute events overnight. Patient seen and examined this morning, eating breakfast in bed. Denying chest pain and shortness of breath.     Objective   Visit Vitals  /78 (BP Location: Right arm, Patient Position: Lying)   Pulse 56   Temp 35.6 °C (96.1 °F) (Temporal)   Resp 16   Ht 1.6 m (5' 2.99\")   Wt 67.7 kg (149 lb 4 oz)   SpO2 100%   BMI 26.45 kg/m²   Smoking Status Never   BSA 1.73 m²      Physical Exam  GEN: Awake, alert, no acute distress   HEAD: Normocephalic, atraumatic   ENT: Nasal cannula in place on 6 L  PULM: No increased work of breathing or use of accessory respiratory muscles  ABD: Nondistended    Intake/Output last 3 Shifts:  I/O last 3 completed shifts:  In: - (0 mL/kg)   Out: 850 (12.4 mL/kg) [Urine:850 (0.3 mL/kg/hr)]  Dosing Weight: 68.4 kg     Labs:  Lab Results   Component Value Date    WBC 9.6 12/30/2023    HGB 11.0 (L) 12/30/2023    HCT 32.8 (L) 12/30/2023    MCV 87 12/30/2023     (L) 12/30/2023      Lab Results   Component Value Date    GLUCOSE 136 (H) 12/30/2023    CALCIUM 8.6 12/30/2023     12/30/2023    K 4.5 12/30/2023    CO2 18 (L) 12/30/2023     12/30/2023    BUN 38 (H) 12/30/2023    CREATININE 0.87 12/30/2023     MICRO:  Urine culture (12/27): Normal genitourinary tita.     Imaging:  U/S of bladder (12/28)  IMPRESSION:  1.  Complex heterogeneously echogenic layering material within the bladder, likely represents debris versus clot.    CT urogram (12/29)  IMPRESSION:   1. Hyperdense material in the bladder consistent with blood. Air foci in the bladder likely from Max instrumentation.  2. Previously mentioned urethral vaginal fistula is not evident on this exam. There is no contrast in the vaginal vault  3. Stable complex left hemorrhagic cyst.  4. Nonobstructive tiny 0.3 cm calculus in the right mid ureter and calyx " calculi measuring 1.3 x 1.6 cm  in the right renal upper pole.  No left renal or ureter calculi.  5. Stable right renal atrophy.  6. Diffuse mid to proximal right urothelial thickening, seen on prior imaging. Urothelial neoplasm can not be excluded.  7. Again seen interlobular septal thickening, architectural distortion and mild bronchiectasis throughout the lungs similar to  prior CT chest from 11/26/2023 and are concerning for fibrotic/emphysematous lung changes.  8. Small left joint effusion without evidence of acute osseous process.    U/S Bilateral Lower Extremities (12/29)  CONCLUSIONS:  Right Lower Venous: No evidence of acute deep vein thrombus visualized in the right lower extremity.  Left Lower Venous: There is acute occlusive deep vein thrombosis visualized in the soleal vein. There is acute non-occlusive deep vein thrombosis visualized in the posterior tibial vein. The remainder of the left leg is negative for deep vein thrombosis.    Assessment/Plan   Ms. Wilson is a 68 year old female with a history of HTN, CKD stage III who was admitted to the MICU from OSH with acute hypoxemic respiratory failure 2/2 COVID-19 pneumonia (positive on 10/13) c/b ARDS and pulmonary fibrosis, now with organizing pneumonia, currently on 6L NC though increased oxygen support needed with exertion. Course also complicated by a distal DVT, oliguric HUNG on CKD (resolved), and hematuria requiring 2 units of pRBCs with appropriate increment in her hemoglobin. Transferred to [facility] for continued management.      Updates 12/30:  - Planning for discharge today  - Patient with new DVTs not seen on the previous US (acute occlusive DVT in the soleal vein, non-occlusive DVT in the posterior tibial vein). Will follow up with ultrasound Friday when patient is at facility, arranged by vascular medicine   - Continue prednisone 20 mg until 1/14/24, follow up with pulmonology scheduled on 1/11/24  - Done with cipro course  - F/up urine  cultures  - Urology inserted a hematuria catheter (12/29); continuing conversation about whether she will need catheter on discharge   - Plan to discharge the patient to SNF/LTAC; approved at [facility].      #AHRF 2/2 COVID-19 PNA (11/23) c/b ARDS, pulmonary fibrosis  #Organizing PNA 2/2 COVID-19  #Pneumomediastinum (resolved)  - On 6 L NC, desaturation with movement  - CTA PE (11/26): negative for PE, showing multifocal pneumonia, bronchiectasis, and fibrosis  - Evaluated by lung transplant on 11/30 --> needs to be tapered to less than 10 mg prednisone, ambulate 100 ft on any amount of O2, reengage as appropriate  Plan:  - Continue Bactrim for PCP ppx  - Fluticasone spray BID  - No CPAP d/t hx of pneumomediastinum during hospital stay  - Monitor sugars on steroids: 15 units of Lantus, decrease in the AM if low sugar,and SSI     #COVID-19 pneumonia (resolved)  #Pseudomonas Bacteremia (resolved)   - Received Decadron, Remdesevir, Tocilizumab at OSH; Neutrophilic leukocytosis, PSA bacteremia     MICRO:  - Bcx (11/21): 3/4 Pseudomonas  - Bcx (11/25): Negative     Antibiotics:  - Piperacillin-tazobactam (11/22-11/24)  - Ciprofloxacin PO (11/25-12/5)      Plan:  - Bactrim daily for PCP ppx      #DVT  - U/S (11/13): Right PT DVT and Left Soleal DVT  - CT PE negative  - Vascular lab DVT U/S (12/18): No evidence of acute DVTs in the bilateral lower extremities. Cannot rule out thrombus in non-visualized peroneal vein.  - Vascular lab DVT U/S (12/29): Acute occlusive DVT in the soleal vein, non-occlusive DVT in the posterior tibial vein.   - Vascular following for management of DVTs I/s/o hematuria/bleeding  Plan:   - Vascular medicine recommendations:              - Holding Lovenox 40 mg q24 hours for DVT chemo-prophylaxis in the setting of bleeding               - SCDs to BLE while in bed for mechanical DVT ppx  - Patient will need a b/l DVT ultrasound in 4 days if still hospitalized, or every Fridays  until 1/12/24 if patient discharged to facility, this will be arranged by vascular medicine.     #Hematuria  - Patient with hematuria on 12/24  - Chronic intermittent hematuria.                - Followed-up outpatient for possible fistula tract on cystoscopy with plan for OR delayed due to inpatient admission  - GYN consulted: recommend urine culture  - Urology consulted and re-engaged due to persistent hematuria. Recommended formal bladder U/S (completed) and CT urogram (completed), s/p Desouza placement (12/29).   - S/p 2 units of pRBCs (12/28) with appropriate increment   - U/S bladder (12/28): complex heterogeneously echogenic layering material within the bladder.   - CT urogram (12/29): hyperdense material consistent with blood. Stable left hemorrhagic cyst and a nonobstructive calculi. Diffuse mid to proximal right urothelial thickening, seen on prior imaging. Urothelial neoplasm cannot be ruled out.     MICRO:  UCx (12/27): Normal  tita     Antibiotics:  Ciprofloxacin (12/24/23 - 12/31 one dose)     Plan:  - Vascular medicine following for anticoagulation management  - Urology following: placed a 22 fr 3 way hematuria catheter on 12/29.   -  RN to manually irrigate q4hr or prn for clots, worsening suprapubic pressure, or low UOP. To do so:        1. Instill 120 cc NS or sterile water through main (middle) port of catheter        2. Aspirate 60 cc. Waste this 60 cc.        3. Instill/aspirate/waste with 60 cc.  4. Repeat #3 at least 3x, until no more clots are returned and urine is dark pink or lighter. Please note that this may take several syringes, possibly more than 3.  - If desouza not able to be flushed despite the above, please page urology  - Monitor UOP, strict I&Os     #HTN  #Intermittent SVT  #Atrial fibrillation  - Continue with metoprolol tartrate 50 mg BID  - Holding home losartan 100 mg, blood pressure is stable  - EKG (12/10): stable,      #Oliguric HUNG on CKD (resolved)  - Per  documentation, previous baseline was 1.6, now creatinine around 1.0  Plan:  - PRN diuresis as needed     #Anxiety  #Pain control  - Continue seroquel 25 mg at night  - PRN oxy for pain, lidocaine  - Hold trazadone 25 mg, re-initiate if needed   - Bowel reg while on opioids      #Eczema/Rosasea  - Continue with dupixent      #Sacral Pressure Ulcer (stage 3)   - Wound care      F: PRN  E: PRN  N: Consistent carb  A: PIVs, hematuria catheter (12/29)  DVT: Hold i/so hematuria  GI: Pantoprazole     Dispo: SNF  Code status: Full Code     Jamee Landers MD

## 2023-12-30 NOTE — PROGRESS NOTES
"Macarena Wilson is a 68 y.o. female on day 50 of admission presenting with Acute hypoxemic respiratory failure (CMS/HCC).    Subjective   No acute events overnight. The patient is still feeling pain in the bladder, and she was previously straining to pee. It is still bloody. Her breathing is okay and is about the same. She is using the incentive spirometer. She did not have the SCDs on.     Objective   Visit Vitals  /81 (BP Location: Left arm, Patient Position: Lying)   Pulse 84   Temp 36 °C (96.8 °F) (Temporal)   Resp 16   Ht 1.6 m (5' 2.99\")   Wt 67.7 kg (149 lb 4 oz)   SpO2 100%   BMI 26.45 kg/m²   Smoking Status Never   BSA 1.73 m²      Physical Exam  GEN: Awake, alert, no acute distress   HEAD: Normocephalic, atraumatic   ENT: Nasal cannula in place on 6 L  PULM: No increased work of breathing or use of accessory respiratory muscles  ABD: Nondistended    Intake/Output last 3 Shifts:  I/O last 3 completed shifts:  In: - (0 mL/kg)   Out: 675 (9.9 mL/kg) [Urine:675 (0.3 mL/kg/hr)]  Dosing Weight: 68.4 kg     Labs:  Lab Results   Component Value Date    WBC 9.7 12/29/2023    HGB 9.8 (L) 12/29/2023    HCT 29.2 (L) 12/29/2023    MCV 87 12/29/2023     (L) 12/29/2023      Lab Results   Component Value Date    GLUCOSE 107 (H) 12/29/2023    CALCIUM 8.8 12/29/2023     12/29/2023    K 4.7 12/29/2023    CO2 22 12/29/2023     12/29/2023    BUN 43 (H) 12/29/2023    CREATININE 1.00 12/29/2023     MICRO:  Urine culture (12/27): Normal genitourinary tita.     Imaging:  U/S of bladder (12/28)  IMPRESSION:  1.  Complex heterogeneously echogenic layering material within the bladder, likely represents debris versus clot.    CT urogram (12/29)  IMPRESSION:   1. Hyperdense material in the bladder consistent with blood. Air foci in the bladder likely from Max instrumentation.  2. Previously mentioned urethral vaginal fistula is not evident on this exam. There is no contrast in the vaginal vault  3. Stable " complex left hemorrhagic cyst.  4. Nonobstructive tiny 0.3 cm calculus in the right mid ureter and calyx calculi measuring 1.3 x 1.6 cm  in the right renal upper pole.  No left renal or ureter calculi.  5. Stable right renal atrophy.  6. Diffuse mid to proximal right urothelial thickening, seen on prior imaging. Urothelial neoplasm can not be excluded.  7. Again seen interlobular septal thickening, architectural distortion and mild bronchiectasis throughout the lungs similar to  prior CT chest from 11/26/2023 and are concerning for fibrotic/emphysematous lung changes.  8. Small left joint effusion without evidence of acute osseous process.    U/S Bilateral Lower Extremities (12/29)  CONCLUSIONS:  Right Lower Venous: No evidence of acute deep vein thrombus visualized in the right lower extremity.  Left Lower Venous: There is acute occlusive deep vein thrombosis visualized in the soleal vein. There is acute non-occlusive deep vein thrombosis visualized in the posterior tibial vein. The remainder of the left leg is negative for deep vein thrombosis.    Assessment/Plan   Ms. Wilson is a 68 year old female with a history of HTN, CKD stage III who was admitted to the MICU from OSH with acute hypoxemic respiratory failure 2/2 COVID-19 pneumonia (positive on 10/13) c/b ARDS and pulmonary fibrosis, now with organizing pneumonia, currently on 6L NC though increased oxygen support needed with exertion. Course also complicated by a distal DVT, oliguric HUNG on CKD (resolved), and hematuria requiring 2 units of pRBCs with appropriate increment in her hemoglobin. Transferred to [facility] for continued management.      Updates 12/29:  - Patient with new DVTs not seen on the previous US (acute occlusive DVT in the soleal vein, non-occlusive DVT in the posterior tibial vein). Will need another venous BLE US in 4 days if the patient remains in the hospital.   - Continue prednisone 60 mg. Taper plan --> 12/11, patient was decreased to  prednisone 80 mg daily for 7 days. Then on 12/18 switched to 60 mg daily for 4 weeks (until 1/14). Then decrease by 10 mg every 2 weeks based on clinical appearance.   - Continue with ciprofloxacin course for 7 days for presumed UTI (12-24 - 12/31 one dose)  - F/up urine cultures  - U/S of the bladder showing a complex heterogeneously echogenic layering material within the bladder. CT urogram showing hyperdense material consistent with blood and no previously mentioned urethral vaginal fistula. Stable left hemorrhagic cyst and a nonobstructive calculi. Diffuse mid to proximal right urothelial thickening, seen on prior imaging. Urothelial neoplasm cannot be ruled out.   - Urology inserted a hematuria catheter (12/29)  - Plan to discharge the patient to SNF/LTAC; approved at [facility].      #AHRF 2/2 COVID-19 PNA (11/23) c/b ARDS, pulmonary fibrosis  #Organizing PNA 2/2 COVID-19  #Pneumomediastinum (resolved)  - On 6 L NC, desaturation with movement  - CTA PE (11/26): negative for PE, showing multifocal pneumonia, bronchiectasis, and fibrosis  - Evaluated by lung transplant on 11/30 --> needs to be tapered to less than 10 mg prednisone, ambulate 100 ft on any amount of O2, reengage as appropriate  Plan:  - Continue Bactrim for PCP ppx  - Fluticasone spray BID  - No CPAP d/t hx of pneumomediastinum during hospital stay  - Monitor sugars on steroids: 15 units of Lantus, decrease in the AM if low sugar,and SSI     #COVID-19 pneumonia (resolved)  #Pseudomonas Bacteremia (resolved)   - Received Decadron, Remdesevir, Tocilizumab at OSH; Neutrophilic leukocytosis, PSA bacteremia     MICRO:  - Bcx (11/21): 3/4 Pseudomonas  - Bcx (11/25): Negative     Antibiotics:  - Piperacillin-tazobactam (11/22-11/24)  - Ciprofloxacin PO (11/25-12/5)      Plan:  - Bactrim daily for PCP ppx      #DVT  - U/S (11/13): Right PT DVT and Left Soleal DVT  - CT PE negative  - Vascular lab DVT U/S (12/18): No evidence of acute DVTs  in the bilateral lower extremities. Cannot rule out thrombus in non-visualized peroneal vein.  - Vascular lab DVT U/S (12/29): Acute occlusive DVT in the soleal vein, non-occlusive DVT in the posterior tibial vein.   - Vascular following for management of DVTs I/s/o hematuria/bleeding  Plan:   - Vascular medicine recommendations:              - Holding Lovenox 40 mg q24 hours for DVT chemo-prophylaxis in the setting of bleeding               - SCDs to BLE while in bed for mechanical DVT ppx  - Patient will need a b/l DVT ultrasound in 4 days if still hospitalized.     #Hematuria  - Patient with hematuria on 12/24  - Chronic intermittent hematuria.                - Followed-up outpatient for possible fistula tract on cystoscopy with plan for OR delayed due to inpatient admission  - GYN consulted: recommend urine culture  - Urology consulted and re-engaged due to persistent hematuria. Recommended formal bladder U/S (completed) and CT urogram (completed), s/p Max placement (12/29).   - S/p 2 units of pRBCs (12/28) with appropriate increment   - U/S bladder (12/28): complex heterogeneously echogenic layering material within the bladder.   - CT urogram (12/29): hyperdense material consistent with blood. Stable left hemorrhagic cyst and a nonobstructive calculi. Diffuse mid to proximal right urothelial thickening, seen on prior imaging. Urothelial neoplasm cannot be ruled out.     MICRO:  UCx (12/27): Normal  tita     Antibiotics:  Ciprofloxacin (12/24/23 - 12/31 one dose)     Plan:  - Vascular medicine following for anticoagulation management  - Urology following: placed a 22 fr 3 way hematuria catheter on 12/29.   -  RN to manually irrigate q4hr or prn for clots, worsening suprapubic pressure, or low UOP. To do so:        1. Instill 120 cc NS or sterile water through main (middle) port of catheter        2. Aspirate 60 cc. Waste this 60 cc.        3. Instill/aspirate/waste with 60 cc.  4. Repeat #3 at least 3x,  until no more clots are returned and urine is dark pink or lighter. Please note that this may take several syringes, possibly more than 3.  - If desouza not able to be flushed despite the above, please page urology  - Monitor UOP, strict I&Os     #HTN  #Intermittent SVT  #Atrial fibrillation  - Continue with metoprolol tartrate 50 mg BID  - Holding home losartan 100 mg, blood pressure is stable  - EKG (12/10): stable,      #Oliguric HUNG on CKD (resolved)  - Per documentation, previous baseline was 1.6, now creatinine around 1.0  Plan:  - PRN diuresis as needed     #Anxiety  #Pain control  - Continue seroquel 25 mg at night  - PRN oxy for pain, lidocaine  - Hold trazadone 25 mg, re-initiate if needed   - Bowel reg while on opioids      #Eczema/Rosasea  - Continue with dupixent      #Sacral Pressure Ulcer (stage 3)   - Wound care      F: PRN  E: PRN  N: Consistent carb  A: PIVs, hematuria catheter (12/29)  DVT: Hold i/so hematuria  GI: Pantoprazole     Dispo: SNF  Code status: Full Code     Rosmery Vazquez MD

## 2023-12-30 NOTE — PROGRESS NOTES
12/30/23 3043 Transitional Care Coordinator Notes:    Per team, patient can discharge to LTAC today. Transportation placed in [facility]. Final [place] sent. Will continue to follow for transportation time.    Transportation is set for 6 pm via [facility]. Nurse given number for report [phone].         Assessment/Plan   Principal Problem:    Acute hypoxemic respiratory failure (CMS/HCC)  Active Problems:    Calculus of right kidney    Stage 4 chronic kidney disease (CMS/HCC)    COVID-19    Pneumonia of both lower lobes due to infectious organism    A-fib (CMS/HCC)    Need for pneumocystis prophylaxis    Long term (current) use of systemic steroids    Deep vein thrombosis (DVT) associated with COVID-19    Genitourinary bleeding    At risk for bleeding associated with anticoagulants    Anemia associated with acute blood loss    Organizing pneumonia (CMS/HCC)    Blood clot in bladder    Hemorrhage of cyst of native kidney    Discharge Plans: discharge to LTAC            Anastasia Gonzalez RN

## 2023-12-30 NOTE — PROGRESS NOTES
"Macarena Wilson is a 68 y.o. female on day 51 of admission presenting with Acute hypoxemic respiratory failure (CMS/HCC).    Subjective   Attempted to flush catheter this AM, patient asked us to return after she finished her breakfast. Will return later in day. Nurse had just flushed patient. Urine fruit punch color in tubing.       Objective     Physical Exam  Constitutional: Alert, in NAD  HEENT: Normocephalic, atraumatic  Neuro: A&O x3  CV: Regular rate  Pulm: Non-laboured breathing on NC  GI: Abdomen soft, non-distended, non-tender  : 22F 3 way hematuria catheter with fruit punch colored urine  Skin: Scattered bruising  Musculoskeletal: UNRULY  Extremities: no edema or cyanosis noted    Last Recorded Vitals  Blood pressure 129/66, pulse 68, temperature 35.7 °C (96.3 °F), temperature source Temporal, resp. rate 18, height 1.6 m (5' 2.99\"), weight 67.7 kg (149 lb 4 oz), SpO2 99 %.  Intake/Output last 3 Shifts:  I/O last 3 completed shifts:  In: - (0 mL/kg)   Out: 850 (12.4 mL/kg) [Urine:850 (0.3 mL/kg/hr)]  Dosing Weight: 68.4 kg     Relevant Results  Scheduled medications  ciprofloxacin, 750 mg, oral, BID  [Held by provider] enoxaparin, 40 mg, subcutaneous, Daily  ergocalciferol, 1,250 mcg, oral, Weekly  fluticasone, 1 spray, Each Nostril, BID  insulin glargine, 4 Units, subcutaneous, Once  [Held by provider] insulin glargine, 8 Units, subcutaneous, Nightly  insulin lispro, 0-15 Units, subcutaneous, TID with meals  lidocaine, 1 Application, urethral, Once  lidocaine, 1 patch, transdermal, Daily  lidocaine, 1 patch, transdermal, Daily  melatonin, 5 mg, oral, Daily  metoprolol tartrate, 75 mg, oral, BID  pantoprazole, 40 mg, oral, Daily before breakfast  predniSONE, 30 mg, oral, BID  QUEtiapine, 25 mg, oral, Nightly  sennosides-docusate sodium, 1 tablet, oral, BID  [Held by provider] sodium zirconium cyclosilicate, 10 g, oral, TID  sulfamethoxazole-trimethoprim, 80 mg, oral, Daily  thiamine, 100 mg, oral, " Daily      Continuous medications  oxygen, , Last Rate: 6 L/min (12/27/23 4143)      PRN medications  PRN medications: acetaminophen **OR** [DISCONTINUED] acetaminophen **OR** [DISCONTINUED] acetaminophen, dextrose 10 % in water (D10W), dextrose, diclofenac sodium, glucagon, ipratropium-albuteroL, lidocaine-diphenhydraMINE-Maalox 1:1:1, loperamide, ondansetron ODT **OR** [DISCONTINUED] ondansetron, oxyCODONE, QUEtiapine    Results for orders placed or performed during the hospital encounter of 11/09/23 (from the past 24 hour(s))   POCT GLUCOSE   Result Value Ref Range    POCT Glucose 214 (H) 74 - 99 mg/dL   POCT GLUCOSE   Result Value Ref Range    POCT Glucose 120 (H) 74 - 99 mg/dL   POCT GLUCOSE   Result Value Ref Range    POCT Glucose 174 (H) 74 - 99 mg/dL   POCT GLUCOSE   Result Value Ref Range    POCT Glucose 199 (H) 74 - 99 mg/dL   POCT GLUCOSE   Result Value Ref Range    POCT Glucose 125 (H) 74 - 99 mg/dL   CBC and Auto Differential   Result Value Ref Range    WBC 9.6 4.4 - 11.3 x10*3/uL    nRBC 1.0 (H) 0.0 - 0.0 /100 WBCs    RBC 3.78 (L) 4.00 - 5.20 x10*6/uL    Hemoglobin 11.0 (L) 12.0 - 16.0 g/dL    Hematocrit 32.8 (L) 36.0 - 46.0 %    MCV 87 80 - 100 fL    MCH 29.1 26.0 - 34.0 pg    MCHC 33.5 32.0 - 36.0 g/dL    RDW 18.6 (H) 11.5 - 14.5 %    Platelets 118 (L) 150 - 450 x10*3/uL    Immature Granulocytes %, Automated 11.5 (H) 0.0 - 0.9 %    Immature Granulocytes Absolute, Automated 1.10 (H) 0.00 - 0.70 x10*3/uL   Renal function panel   Result Value Ref Range    Glucose 136 (H) 74 - 99 mg/dL    Sodium 136 136 - 145 mmol/L    Potassium 4.5 3.5 - 5.3 mmol/L    Chloride 106 98 - 107 mmol/L    Bicarbonate 18 (L) 21 - 32 mmol/L    Anion Gap 17 10 - 20 mmol/L    Urea Nitrogen 38 (H) 6 - 23 mg/dL    Creatinine 0.87 0.50 - 1.05 mg/dL    eGFR 72 >60 mL/min/1.73m*2    Calcium 8.6 8.6 - 10.6 mg/dL    Phosphorus 3.9 2.5 - 4.9 mg/dL    Albumin 2.9 (L) 3.4 - 5.0 g/dL   Magnesium   Result Value Ref Range    Magnesium 1.83  1.60 - 2.40 mg/dL   Manual Differential   Result Value Ref Range    Neutrophils %, Manual 74.0 40.0 - 80.0 %    Bands %, Manual 2.0 0.0 - 5.0 %    Lymphocytes %, Manual 7.0 13.0 - 44.0 %    Monocytes %, Manual 12.0 2.0 - 10.0 %    Eosinophils %, Manual 0.0 0.0 - 6.0 %    Basophils %, Manual 0.0 0.0 - 2.0 %    Atypical Lymphocytes %, Manual 3.0 0.0 - 2.0 %    Metamyelocytes %, Manual 2.0 0.0 - 0.0 %    Seg Neutrophils Absolute, Manual 7.10 (H) 1.20 - 7.00 x10*3/uL    Bands Absolute, Manual 0.19 0.00 - 0.70 x10*3/uL    Lymphocytes Absolute, Manual 0.67 (L) 1.20 - 4.80 x10*3/uL    Monocytes Absolute, Manual 1.15 (H) 0.10 - 1.00 x10*3/uL    Eosinophils Absolute, Manual 0.00 0.00 - 0.70 x10*3/uL    Basophils Absolute, Manual 0.00 0.00 - 0.10 x10*3/uL    Atypical Lymphs Absolute, Manual 0.29 0.00 - 0.50 x10*3/uL    Metamyelocytes Absolute, Manual 0.19 0.00 - 0.00 x10*3/uL    Total Cells Counted 100     Neutrophils Absolute, Manual 7.29 1.20 - 7.70 x10*3/uL    RBC Morphology See Below     Polychromasia Mild    POCT GLUCOSE   Result Value Ref Range    POCT Glucose 100 (H) 74 - 99 mg/dL     Imagin/28 bladder U/S -  Complex heterogeneously echogenic layering material within the bladder, likely represents debris versus clot.      DINA - Atrophic right kidney. Bilateral nonobstructing nephrolithiasis measuring up to 1.5 cm on the right and 0.9 cm on the left. No hydronephrosis. Bilateral renal cysts.      CT cystogram - Intact urinary bladder without evidence of fistula. Bilateral vesicoureteral reflux. Small amount of contrast in the inferior vagina which may relate to a fistula with the caudal portion of the urethra vs possible contrast leakage tracked into the vagina from urethral leakage     23 renogram - Left kidney: 85.7%, Right kidney: 14.3%     3/1/23 CT a/p -  1.9 cm exophytic complex cyst from the left lower pole laterally similar to prior examination likely hemorrhagic cyst. The left kidney and  urinary tract otherwise is unremarkable. Moderate right renal cortical atrophy, with nonobstructing calculus at the upper pole slightly increased from the previous exam. Pericystic reticulation of the urinary bladder, possibly from cystitis as described.     9/24/22 CTU - 1.4 cm right renal nonobstructing calculus. No left renal calculi. Interval development of right renal cortical thinning and atrophy. Diffuse urothelial thickening and mild increased enhancement of the right renal pelvis and throughout the right ureter. Urothelial neoplasm can not be excluded. Minimal contrast excretion observed from the right kidney. Left renal lower pole 2 cm above water attenuation nonenhancing partially exophytic lesion most compatible with a complicated cyst containing debris or blood product. Nonspecific mild circumferential urinary bladder wall thickening. Bladder is partially opacified by the excreted contrast. There is also contrast visible within the vagina and fornices compatible with fistula although a discrete fistula tract is not identified.      12/29 CTU - Hyperdense material in the bladder consistent with blood. Previously mentioned urethral vaginal fistula is not evident. Stable complex left hemorrhagic cyst. Nonobstructive tiny 0.3 cm calculus in the right mid ureter and calyx calculi measuring 1.3 x 1.6 cm  in the right renal upper pole. Stable right renal atrophy. Diffuse mid to proximal right urothelial thickening, seen on prior imaging. Urothelial neoplasm can not be excluded.     Chart review: Per gyn note 12/26 - Pt case reviewed with Dr. Shaffer and Dr. Duran, Urology. She has been followed outpatient by Dr. Duran for possible fistula tract on cystoscopy with plan for OR delayed due to inpatient admission. Per Dr. Duran, would recommend obtaining urine culture. Dr. Lofton, urogynecology fellow also updated and aware of patient.     Per last [facility] outpatient note 10/19 - had negative cysto after hysterectomy,  relatively benign findings on CT AP 3/23, CT cystogram negative for fistula, but still concern. Cystoscopy demonstrating possible fistula tract, large ulcer which was fulgurated and biopsied, biopsy showed chronic with acute inflammation on 10/17/22. Plan on cystocopy in OR with possible repair vs if normal, injection of AM. DNA NGS demonstrated polymicrobial UTI on macrobid prophylaxis/estradiol cream     Updates:  12/29: VSS. Low urine output recorded, 375mL. sCr wnl. Hgb stable after 2u RBC on 12/27, 10.2<10.2<7. Voiding via purewick, brick red urine in canister, unchanged. 3 way hematuria catheter placed and flushed to pink with return of clot.       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 Other significant  findings include stable complex left hemorrhagic cyst, stable right renal atrophy, non-obstructive 1.6cm right nephrolithiasis, diffuse mid to proximal right urothelial thickening as seen on prior imaging, 3mm mid right ureteral stone    12/30: desouza draining fruit punch urine. Will return to flush later in day .     Recommendations:      - maintain  22fr 3 way hematuria catheter for continued hematuria, organized bladder clot formation  - Will arrange for follow-up with Urology in 2-3 weeks from time of discharge  - Follow up [facility] medicine recs for holding anticoagulation; from a  perspective, criteria to hold anticoagulation would be hematuria so severe as to require transfusion or with unstable vital signs  - Transfuse as needed. Continue to trend hgb, INR, rule out other sources of bleeding as hematuria does not appear to be severe.  - please page with questions/concerns/clinical status changes     Discussed with chief resident Dr. Ana Celis MD   Urology PGY-2  Pager: [phone]

## 2023-12-30 NOTE — CARE PLAN
The patient's goals for the shift include      The clinical goals for the shift include pt will remain safe and free from injury through end of shift 12/30 0700      Problem: Nutrition  Goal: Oral intake greater 75%  Outcome: Progressing  Goal: Consume prescribed supplement  Outcome: Progressing  Goal: Adequate PO fluid intake  Outcome: Progressing  Goal: Nutrition support goals are met within 48 hrs  Outcome: Progressing  Goal: Nutrition support is meeting 75% of nutrient needs  Outcome: Progressing  Goal: BG  mg/dL  Outcome: Progressing  Goal: Lab values WNL  Outcome: Progressing  Goal: Electrolytes WNL  Outcome: Progressing  Goal: Promote healing  Outcome: Progressing     Problem: Skin  Goal: Decreased wound size/increased tissue granulation at next dressing change  Outcome: Progressing  Flowsheets (Taken 12/30/2023 0145)  Decreased wound size/increased tissue granulation at next dressing change: Protective dressings over bony prominences  Note: Pt declines turn  Goal: Participates in plan/prevention/treatment measures  Outcome: Progressing  Flowsheets (Taken 12/30/2023 0145)  Participates in plan/prevention/treatment measures:   Discuss with provider PT/OT consult   Elevate heels  Goal: Prevent/manage excess moisture  Outcome: Progressing  Flowsheets (Taken 12/30/2023 0145)  Prevent/manage excess moisture: Cleanse incontinence/protect with barrier cream  Goal: Prevent/minimize sheer/friction injuries  Outcome: Progressing  Flowsheets (Taken 12/30/2023 0145)  Prevent/minimize sheer/friction injuries:   HOB 30 degrees or less   Turn/reposition every 2 hours/use positioning/transfer devices  Goal: Promote skin healing  Outcome: Progressing  Flowsheets (Taken 12/30/2023 0145)  Promote skin healing: Turn/reposition every 2 hours/use positioning/transfer devices     Problem: Fall/Injury  Goal: Verbalize understanding of personal risk factors for fall in the hospital  Outcome: Progressing  Goal: Verbalize  understanding of risk factor reduction measures to prevent injury from fall in the home  Outcome: Progressing  Goal: Use assistive devices by end of the shift  Outcome: Progressing  Goal: Pace activities to prevent fatigue by end of the shift  Outcome: Progressing     Problem: Discharge Planning  Goal: Discharge to home or other facility with appropriate resources  Outcome: Progressing     Problem: Chronic Conditions and Co-morbidities  Goal: Patient's chronic conditions and co-morbidity symptoms are monitored and maintained or improved  Outcome: Progressing     Problem: Respiratory  Goal: Clear secretions with interventions this shift  Outcome: Progressing  Goal: Minimize anxiety/maximize coping throughout shift  Outcome: Progressing  Goal: Minimal/no exertional discomfort or dyspnea this shift  Outcome: Progressing  Goal: No signs of respiratory distress (eg. Use of accessory muscles. Peds grunting)  Outcome: Progressing  Goal: Patent airway maintained this shift  Outcome: Progressing  Goal: Tolerate pulmonary toileting this shift  Outcome: Progressing  Goal: Verbalize decreased shortness of breath this shift  Outcome: Progressing  Goal: Wean oxygen to maintain O2 saturation per order/standard this shift  Outcome: Progressing  Goal: Increase self care and/or family involvement in next 24 hours  Outcome: Progressing     Problem: Pain  Goal: Takes deep breaths with improved pain control throughout the shift  Outcome: Progressing  Goal: Turns in bed with improved pain control throughout the shift  Outcome: Progressing  Goal: Walks with improved pain control throughout the shift  Outcome: Progressing  Goal: Performs ADL's with improved pain control throughout shift  Outcome: Progressing  Goal: Participates in PT with improved pain control throughout the shift  Outcome: Progressing  Goal: Free from opioid side effects throughout the shift  Outcome: Progressing  Goal: Free from acute confusion related to pain meds  throughout the shift  Outcome: Progressing

## 2023-12-30 NOTE — DISCHARGE SUMMARY
Discharge Diagnosis  Acute hypoxemic respiratory failure (CMS/HCC)    Issues Requiring Follow-Up  - Follow up with pulmonology on 1/11/24  - Follow up with PCP  - Follow up with vascular medicine for weekly lower extremity ultrasounds for DVT  - Follow up with urology    Test Results Pending At Discharge  Pending Labs       Order Current Status    Extra Urine Gray Tube Collected (11/21/23 1756)    Fungal Culture, Blood Collected (11/12/23 0002)    Urinalysis with Reflex Microscopic and Culture In process    CBC and Auto Differential Preliminary result            Hospital Course  Macarena Wilson is a 68 year old female with a PMH of CKD stage 3, HTN, and rosasea who presented to the MICU in acute hypoxemic respiratory failure due to COVID pneumonia from an OSH where she was hospitalized on 10/24 with complaints of dyspnea. She was positive for COVID on 10/13 with persistent dyspnea, prompting initial presentation to [facility] on 10/24 and admission for 16 days. She was treated for acute respiratory failure secondary to coronavirus pneumonitis c/b severe ARDS. CT of the chest showed worsening bilateral changes consistent with severe COVID-19 pneumonitis and ARDS.  She continued to do marginal at best on high flow nasal cannula with intermittent use of nonrebreather and could not tolerate a BiPAP device due to anxiety. In addition, she completed a course of remdesivir and received a dose of Actemra on October 24, 2023.    [facility] on 11/9 for further evaluation of acute hypoxemic respiratory failure requiring AirVo 40L/90%. Empirically covered for bacterial and fungal etiologies of organizing PNA - discontinued antimicrobials on 11/13 per guidance from ID as with workup was largely unremarkable. Continued course of steroids for management of organizing PNA 2/2 COVID, initially with 10 mg decadron daily, then received several days of 160 mg methylpred q6, weaned down to 80 q6, then to 40 q6, and  transitioned to 50 mg prednisone q6 once enteral access was obtained. Started on bactrim for PCP prophylaxis while on steroids, and steroid-induced hyperglycemia managed with SSI. O2 requirements fluctuated, but had weaned down to 35L/50% FiO2, then on 11/13, required increased support to 60L at 90% FiO2, where she was satting high 80s. High clinical concern for PE given acute decompensation, with findings of distal DVTs; however, did not obtain CTPE due to tenuous clinical status. Initially on heparin gtt, but had difficulties with her levels being supratherapeutic. Ultimately started on Eliquis.     On 11/15, attempted 10 minutes of CPAP therapy to correct atelectasis - had resultant small pneumomediastinum, which resolved. Course complicated by intermittent runs of SVT and Afib with RVR corresponding with hypoxemia - ultimately was loaded with digoxin and started on metoprolol tartrate 25 q6 for rate control. Also aggressively diuresed to the point of prerenal HUNG with azotemia, with BUN > 150 and oliguria, without significant improvement in respiratory status. On 11/16, began gentle rehydration with LR and placed NGT to begin enteral feeding. Tolerated uptitration of feeds to goal, and kidney function improved significantly. Slowly weaned down on O2 requirements with time - once transitioned to nasal canula, was transferred to SDU.      While in SDU, she had increased FiO2 requirements on Airvo, then weaned to 8 L HF and AirVo with PT. CTA PE on 11/26 showed no evidence of PE, findings consistent with multifocal PNA (improving from last study), bronchiectasis scattered throughout the lungs (slightly worst) and fibrotic changes. She was continued on a steroid taper. Blood cultures from 11/21 grew Pseudomonas, and she was started on Zosyn then switched to ciprofloxacin for a total of 14 days. Lung transplant team consulted on 11/30, who stated that steroid treatment must be tapered down to <10 mg of prednisone,  and the patient must be able to ambulate 100 feet (on any amount of O2). Transplant team to be re-engaged when the patient meets those criteria. SLP completed FEES (passed) on 11/28; resumed diet and Cortrak removed. Weaned down to 6L NC. Added Seroquil 12.5mg PRN for anxiety when working with PT. Patient was stable for transfer to floors.     While on the floor, the patient remained HDS and was at her baseline 6L O2. On 12/18, she developed sharp, non-radiating lateral left heel pain and US BL LE was ordered to r/o DVT and was negative for DVT, suggesting a likely MSK etiology to pain. On 12/24, she developed gross hematuria with dysuria and increased frequency. She remained hemodynamically stable. GYN consulted who recommended urine culture, which grew normal  tita (collected after antibiotics were started). Urology consulted as well, recommended monitoring urine output and transfusions as needed. She was treated with cipro 250mg BID for 7 days for presumed UTI. On 12/27, her hemoglobin dropped to 5.9 and she required 2 units of blood with appropriate increment. Given the patient's drop in hemoglobin, Lovenox was held per vascular medicine recommendations. Urology re-engaged who recommended formal bladder ultrasound to assess for clots. U/S of the bladder showed a complex heterogeneously echogenic layer material within the bladder. Urology placed a hematuria catheter. CT urogram showed hyperdense material consistent with blood as well as a stable left hemorrhagic cyst and a nonobstructive calculi. There was diffuse mid to proximal right urothelial thickening. Repeat DVT ultrasound showed an acute occlusive DVT in the soleal vein and a non-occlusive DVT in the posterior tibial vein. Vascular medicine contacted, recommend weekly ultrasounds which will be arranged by vascular medicine at the [facility].     The PT team evaluated her and recommended moderate intensity rehab, so patient will be discharged to [facility].      Pertinent Physical Exam At Time of Discharge  Physical Exam  Constitutional:       Appearance: Normal appearance. She is normal weight.   HENT:      Nose: Nose normal.   Eyes:      Extraocular Movements: Extraocular movements intact.      Conjunctiva/sclera: Conjunctivae normal.      Pupils: Pupils are equal, round, and reactive to light.   Cardiovascular:      Rate and Rhythm: Normal rate and regular rhythm.   Pulmonary:      Comments: Breathing comfortably on 6L NC  Abdominal:      General: Abdomen is flat. Bowel sounds are normal.      Palpations: Abdomen is soft.   Genitourinary:     Comments: Urinary catheter in place  Musculoskeletal:      Cervical back: Normal range of motion and neck supple.   Skin:     General: Skin is warm and dry.   Neurological:      General: No focal deficit present.      Mental Status: She is alert.   Psychiatric:         Mood and Affect: Mood normal.        Vitals:    12/30/23 1037   BP: 146/78   Pulse: 56   Resp: 16   Temp: 35.6 °C (96.1 °F)   SpO2: 100%         Home Medications     Medication List      START taking these medications     ciprofloxacin 750 mg tablet; Commonly known as: Cipro; Take 1 tablet   (750 mg) by mouth 2 times a day for 2 days.   ergocalciferol 1.25 MG (17338 UT) capsule; Commonly known as: Vitamin   D-2; Take 1 capsule (1,250 mcg) by mouth 1 (one) time per week. Do not   start before January 5, 2024.; Start taking on: January 5, 2024   fluticasone 50 mcg/actuation nasal spray; Commonly known as: Flonase;   Administer 1 spray into each nostril 2 times a day. Shake gently. Before   first use, prime pump. After use, clean tip and replace cap.   insulin lispro 100 unit/mL injection; Commonly known as: HumaLOG; Inject   0-0.15 mL (0-15 Units) under the skin 3 times a day with meals. Take as   directed per insulin instructions.   * lidocaine 4 % patch; Place 1 patch over 12 hours on the skin once   daily. Remove & discard patch within 12  hours or as directed by MD. Apply   to back.   * lidocaine 4 % patch; Place 1 patch over 12 hours on the skin once   daily. Remove & discard patch within 12 hours or as directed by MD. Apply   to foot.   lidocaine-diphenhydrAMINE-Maalox 1:1:1 954--40 mg/30 mL liquid   mouthwash; Commonly known as: Magic Mouthwash; Swish and spit 10 mL every   4 hours if needed (tongue lesion).   melatonin 5 mg tablet; Take 1 tablet (5 mg) by mouth once daily at   bedtime.   metoprolol tartrate 75 mg tablet; Commonly known as: Lopressor; Take 1   tablet (75 mg) by mouth 2 times a day. Hold for SBP<90, HR<60   oxyCODONE 5 mg immediate release tablet; Commonly known as: Roxicodone;   Take 1 tablet (5 mg) by mouth every 6 hours if needed for severe pain (7 -   10) for up to 7 days.   predniSONE 10 mg tablet; Commonly known as: Deltasone; Take 6 tablets   (60 mg) by mouth once daily for 16 days, THEN 5 tablets (50 mg) once daily   for 14 days. Do not start before December 30, 2023.; Start taking on:   December 30, 2023   QUEtiapine 25 mg tablet; Commonly known as: SEROquel; Take 1 tablet (25   mg) by mouth once daily at bedtime.   sennosides-docusate sodium 8.6-50 mg tablet; Commonly known as:   Sobia-Colace; Take 1 tablet by mouth 2 times a day.   sulfamethoxazole-trimethoprim 400-80 mg tablet; Commonly known as:   Bactrim; Take 1 tablet by mouth 2 times a day for 4 days.   thiamine 100 mg tablet; Commonly known as: Vitamin B-1; Take 1 tablet   (100 mg) by mouth once daily. Do not start before December 30, 2023.  * This list has 2 medication(s) that are the same as other medications   prescribed for you. Read the directions carefully, and ask your doctor or   other care provider to review them with you.     CHANGE how you take these medications     acetaminophen 325 mg tablet; Commonly known as: Tylenol; Take 2 tablets   (650 mg) by mouth every 8 hours if needed for fever (temp greater than   38.0 C), mild pain (1 - 3) or moderate  pain (4 - 6).; What changed: when   to take this, reasons to take this, Another medication with the same name   was removed. Continue taking this medication, and follow the directions   you see here.   insulin glargine 100 unit/mL injection; Commonly known as: Lantus;   Inject 8 Units under the skin once daily at bedtime. Take as directed per   insulin instructions.; What changed: how much to take   ipratropium-albuteroL 0.5-2.5 mg/3 mL nebulizer solution; Commonly known   as: Duo-Neb; Take 3 mL by nebulization every 4 hours if needed for   wheezing or shortness of breath.; What changed: when to take this, reasons   to take this, Another medication with the same name was removed. Continue   taking this medication, and follow the directions you see here.   oxygen gas therapy; Commonly known as: O2; Inhale 1 each continuously.;   What changed: when to take this     CONTINUE taking these medications     glucagon 1 mg injection; Commonly known as: Glucagen; Inject 1 mg into   the muscle every 15 minutes if needed for low blood sugar - see comments   (For blood glucose less than or equal to 70 mg/dL and no IV access).   hydrocortisone 1 % cream   pantoprazole 40 mg EC tablet; Commonly known as: ProtoNix; Take 1 tablet   (40 mg) by mouth once daily in the morning. Take before meals. Do not   crush, chew, or split. Do not start before December 30, 2023.   Xyzal 5 mg tablet; Generic drug: levocetirizine     STOP taking these medications     acetylcysteine 200 mg/mL (20 %) nebulizer solution; Commonly known as:   Mucomyst   AIRBORNE (ASCORBATE SODIUM) ORAL   budesonide 0.5 mg/2 mL nebulizer solution; Commonly known as: Pulmicort   dexAMETHasone 10 mg/mL injection; Commonly known as: Decadron   dexmedeTOMIDine in  mcg in 100 mL (4 mcg/mL) premix; Commonly   known as: Precedex   guaiFENesin 100 mg/5 mL syrup; Commonly known as: Robitussin   heparin (porcine) 25,000 unit/250 mL(100 unit/mL) parenteral solution in   D5W  infusion   hydrALAZINE 20 mg/mL injection; Commonly known as: Apresoline   losartan 100 mg tablet; Commonly known as: Cozaar   meropenem 500 mg/100 mL IV; Commonly known as: Merrem   sodium chloride 0.65 % nasal spray; Commonly known as: [place]       Outpatient Follow-Up  Future Appointments   Date Time Provider Department Center   1/11/2024 11:00 AM ZION Soni-CNP [id number]   2/1/2024  9:20 AM MD JESÚS [id number] [facility]   5/2/2024  8:40 AM MD PATY [id number] [facility]       Jamee Landers MD

## 2023-12-31 NOTE — CONSULTS
Pulmonary Critical Care note    Patient Name :Macarena Wilson  Date of service : 12/31/23  MRN: [id number]  YOB: 1955      Interval History:    History of Present Illness:   68 years old  female who had an good state of health prior to her acute illness which required hospitalization recently, she had underlying history of CKD 3, hypertension, presented to the MICU at [facility] with acute hypoxic respiratory failure after COVID-pneumonia after being transferred from outside hospital, she was positive for COVID on 1013 with persistent dyspnea, CT scan of the chest showed worsening bilateral changes consistent with severe COPD and pneumonitis and ARDS, she remains on heated high flow oxygen, did receive remdesivir and Actemra on October 24, she was started on empiric therapy with bacterial and fungal treatment for concern for organizing pneumonia in addition to Decadron, she has steroids include hyperglycemia during the treatment, developed acute DVT with concern of clinical PE and she was started on anticoagulation with Eliquis, started on CPAP treatment, was aggressively diuresed but then she developed acute kidney injury,, she was evaluated by GYN for recurrent urinary symptoms, anticoagulation was held due to acute drop of hemoglobin, CT of the abdomen with urogram showed hyperdense material consistent with normal blood and left hemorrhagic cyst, repeat DVT workup showed still persistent DVT, with recommendation of weekly ultrasound  Past Medical/Surgical History:   Past Medical History:   Diagnosis Date    Chronic kidney disease     Hypertension     Personal history of diseases of the skin and subcutaneous tissue     History of rosacea    Pseudomonal bacteremia 11/27/2023     Past Surgical History:   Procedure Laterality Date    APPENDECTOMY  04/01/2016    Appendectomy    HYSTERECTOMY  12/2022    ANGEL TO    OTHER SURGICAL HISTORY  11/14/2022    Cystoscopy       Family History:   No  "relevant family history has been documented for this patient.    Allergies:     Allergies   Allergen Reactions    Fesoterodine Unknown    Mirabegron Rash    Prednisone Other     \"body aches, flu like symptoms\"         Social history:   reports that she has never smoked. She has never used smokeless tobacco. She reports that she does not use drugs.      Review of Systems:   General: denies weight gain, denies loss of appetite, fever, chills, night sweats.  HEENT: denies headaches, dizziness, head trauma, visual changes, eye pain, tinnitus, nosebleeds, hoarseness or throat pain    Respiratory: denies chest pain, dyspnea, cough and hemoptysis  Cardiovascular: denies orthopnea, paroxysmal nocturnal dyspnea, leg swelling, and previous heart attack.    Gastrointestinal: denies pain, nausea vomiting, diarrhea, constipation, melena or bleeding.  Genitourinary: denies hematuria, frequency, urgency or dysuria  Neurology: denies syncope, seizures, paralysis, paraesthesia   Endocrine: denies polyuria, polydipsia, skin or hair changes, and heat or cold intolerance  Musculoskeletal: denies joint pain, swelling, arthritis or myalgia  Hematologic: denies bleeding, adenopathy and easy bruising  Skin: denies rashes and skin discoloration  Psychiatry: denies depression    Physical Exam:   Vital Signs:   There were no vitals filed for this visit.  There were no vitals filed for this visit.      Input/Output:  No intake or output data in the 24 hours ending 12/31/23 1158    Oxygen requirements:    Ventilator Information:              General appearance: Not in pain or distress, in no respiratory distress    HEENT: Atraumatic/normocephalic, EOMI, JANICE, pharynx clear, moist mucosa, redness of the uvula appreciated,   Neck: Supple, no jugular venous distension, lymphadenopathy, thyromegaly or carotid bruits  Chest: Bilateral diminished breath sounds  Cardiovascular: Normal S1, S2, regular rate and rhythm, no murmur, rub or " gallop  Abdomen: Normal sounds present, soft, lax with no tenderness, no hepatosplenomegaly, and no masses  Extremities: No edema. Pulses are equally present.   Skin: intact, no rashes   Neurologic: Alert and oriented x 3, No focal deficit         Investigations:  Labs, radiological imaging and cardiac work up were personally reviewed    Results from last 7 days   Lab Units 12/31/23  0610 12/30/23  0845 12/29/23  0835   SODIUM mmol/L 137 136 139   POTASSIUM mmol/L 4.4 4.5 4.7   CHLORIDE mmol/L 107 106 107   CO2 mmol/L 23 18* 22   BUN mg/dL 37* 38* 43*   CREATININE mg/dL 0.80 0.87 1.00   GLUCOSE mg/dL 95 136* 107*   CALCIUM mg/dL 8.1* 8.6 8.8     Results from last 7 days   Lab Units 12/31/23  0610   WBC AUTO x10*3/uL 9.9   HEMOGLOBIN g/dL 9.5*   HEMATOCRIT % 29.7*   PLATELETS AUTO x10*3/uL 146*         CT urography w 3D volume rendered imaging    Result Date: 12/31/2023  Interpreted By:  Yakov Jacob and Ogievich Taessa STUDY: CT UROGRAPHY WITH 3D VOLUME RENDERED IMAGING;  12/29/2023 11:36 am   INDICATION: Signs/Symptoms:Per urology recommendations, hematuria.   COMPARISON: CT cystogram 08/16/2023 CT abdomen/pelvis 03/01/2023 CT urography 09/24/2022 CTA chest 11/26/2023   ACCESSION NUMBER(S): [id number]   ORDERING CLINICIAN: CASEY DYER   TECHNIQUE: CT of the abdomen and pelvis was performed.  MULTIPHASE KIDNEY protocol was performed including contiguous axial images through the abdomen at 3 mm slice thickness before and in arterial and delayed phases post contrast. Coronal and sagittal reconstructions at 3 mm slice thickness were performed. 3D volume rendering images were obtained. 75 ml of contrast Omnipaque 350 were administered intravenously without immediate complication.   FINDINGS: LOWER CHEST: Again seen interlobular septal thickening, architectural distortion and mild bronchiectasis throughout the lungs similar to prior CT chest from 11/26/2023. No evidence of pleural effusion or pneumothorax.    ABDOMEN:   KIDNEYS AND URETERS: Left kidney/ureter: Left kidney is normal in size. No evidence of nephrolithiasis or hydroureteronephrosis. The proximal left ureter is not opacified on delayed series (series 6, images 193-247). There is an hypoattenuating exophytic cortical cyst in the left lower renal pole measuring 2 x 2 cm (series 3, image 70), above water attenuation compatible with a hemorrhagic versus proteinaceous cyst, has a similar appearance compared to prior CT from 03/01/2023.   Right kidney/ureter: Moderate right renal cortical thinning and atrophy. There is a 0.3 cm stone in the right mid ureter on nonenhanced imaging (series 2, image 97), was seen on prior CT cystogram 08/16/2023. No evidence of hydroureteronephrosis. There is decreased opacification of the right ureter distal to the ureteral calyx on delayed series. There is a nonobstructive calyx stone measuring 1.3 x 1.6 cm on unenhanced images (series 2, image 65). Again seen is urothelial thickening predominantly with mild enhancement in the mid to proximal ureter (series 3, images 77-95), similar to prior. Again seen several round hypodensities in the right kidney with the largest measuring 1.4 x 1.3 cm in the midpole (series 3, image 68), favoring to represent a simple cyst.   3D reformations: Left proximal ureter is not depicted due to incomplete opacification, within this limitation there is no evidence of hydroureteronephrosis. The distal right ureter is not depicted due to incomplete opacification. The visualized portion of the right renal collecting system without evidence of hydroureteronephrosis.   BLADDER: Hyperattenuating material in the bladder with a proximally 50 Hounsfield units and may represent unclotted blood. There is contrast material in the posterior dependent portion. The urinary bladder is filled with air with a Max catheter in place.   LIVER: The liver is normal in size without evidence of focal liver lesions.  Subcentimeter hypodensity in segment JOCELYNE, too small to characterize.   BILE DUCTS: The bile ducts are not dilated.   GALLBLADDER: Gallbladder: No calcified stones. No wall thickening.   PANCREAS: The pancreas appears unremarkable.   SPLEEN: Within normal limits.   ADRENAL GLANDS: Within normal limits.   REPRODUCTIVE ORGANS: The uterus is surgically absent. No contrast in the vaginal vault to suggest vesicovaginal fistula. No adnexal mass.   BOWEL: The stomach is unremarkable. Bowel is partially obscured at the pelvis due to beam hardening. The small and large bowel are normal in caliber and demonstrate no wall thickening. Large colon diverticulosis without diverticulitis. The appendix is not definitely visualized. There is however no pericecal stranding or fluid.   VESSELS: Vascular calcification of the abdominal aorta and its branching vessels. There is no aneurysmal dilatation of the abdominal aorta. The IVC is within normal limits.   PERITONEUM/RETROPERITONEUM/LYMPH NODES: Few small pelvic phleboliths are present. No ascites or free air, no fluid collection.  The retroperitoneum appears unremarkable.  No enlarged mesenteric lymph nodes.   ABDOMINAL WALL: Within normal limits.   BONE AND SOFT TISSUE: No suspicious osseous lesions are present. Small left joint effusion. Degenerative discogenic disease is noted in the lower thoracic and lumbar spine.       1. Hyperdense material in the bladder consistent with blood products. Air foci in the bladder likely from Max instrumentation. 2. 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 There is no contrast in the vaginal vault identified. 3. Stable complex left hemorrhagic cyst. 4. Nonobstructive tiny 0.3 cm calculus in the right mid ureter and calyx calculi measuring 1.3 x 1.6 cm  in the right renal upper pole. No left renal or ureter calculi. 5. Stable right renal atrophy. 6. Diffuse mid to proximal right urothelial thickening,  seen on prior imaging. 7. Again seen interlobular septal thickening, architectural distortion and mild bronchiectasis throughout the lungs similar to prior CT chest from 11/26/2023 and are concerning for fibrotic/emphysematous lung changes. 8. Small left joint effusion without evidence of acute osseous process.   I personally reviewed the images/study and I agree with the findings as stated by Louise Ma DO, PGY-2. This study was interpreted at [facility], [place], [place].   MACRO: None   Signed by: Yakov Jacob 12/31/2023 10:07 AM Dictation workstation:   [id number]    Vascular US lower extremity venous duplex bilateral    Result Date: 12/29/2023            [place] [address]   Tel [phone] and Fax [phone]  Vascular Lab Report VASC US LOWER EXTREMITY VENOUS DUPLEX BILATERAL  Patient Name:     KARLA CAICEDO WILLY Reading           [id number] Kami Wells MD,                                      Physician:        BHUPINDER Study Date:       12/29/2023         Ordering          [id number] JOSE FERNANDEZ                                      Physician: MRN/PID:          [id number]           Technologist:     Aruna Bonner RVT Accession#:       [id number]       Technologist 2: Date of           1955 / 68      Encounter#:       [id number] Birth/Age:        years Gender:           F Admission Status: Inpatient          Location          [facility]                                      Performed:  Diagnosis/ICD: Acute embolism and thrombosis of unspecified deep veins of left                distal lower extremity-I82.4Z2 Indication:    Venous thrombosis (CAVA) CPT Codes:     95532 Peripheral venous duplex scan for DVT complete  Patient History Previous DVT.  **CRITICAL RESULT** Critical Result: Left PTV and soleal vein DVT Notification called to Jose Fernandez MD on 12/29/2023 at 4:00:01 PM by Aruna Bonner RVT.   CONCLUSIONS: Right Lower Venous: No evidence of acute deep vein thrombus visualized in the right lower extremity. Left Lower Venous: There is acute occlusive deep vein thrombosis visualized in the soleal vein. There is acute non-occlusive deep vein thrombosis visualized in the posterior tibial vein. The remainder of the left leg is negative for deep vein thrombosis.  Comparison: Compared with study from 12/18/2023, there is new left PTV DVT. Patient has known left soleal vein DVT from 11/13/23, that was not visualized in the previous exam, but was visualized in today's exam.  Imaging & Doppler Findings:  Right                 Compressible Thrombus        Flow Distal External Iliac     Yes        None   Spontaneous/Phasic CFV                       Yes        None   Spontaneous/Phasic PFV                       Yes        None FV Proximal               Yes        None   Spontaneous/Phasic FV Mid                    Yes        None FV Distal                 Yes        None Popliteal                 Yes        None   Spontaneous/Phasic Peroneal                  Yes        None PTV                       Yes        None  Left                  Compress      Thrombus              Flow Distal External Iliac   Yes           None         Spontaneous/Phasic CFV                     Yes           None         Spontaneous/Phasic PFV                     Yes           None FV Proximal             Yes           None         Spontaneous/Phasic FV Mid                  Yes           None FV Distal               Yes           None Popliteal               Yes           None         Spontaneous/Phasic Peroneal                Yes           None PTV                   Partial  Acute non-occlusive Soleal                Partial    Acute occlusive  [id number] Kami Wells MD, RPVI Electronically signed by [id number] Kami Wells MD, RPCHRIS on 12/29/2023 at 7:49:05 PM  ** Final **     US bladder    Result Date: 12/29/2023  Interpreted By:  Cholo Cevallos,  and  Kaylene Young STUDY: US BLADDER;  12/28/2023 7:45 pm   INDICATION: Signs/Symptoms:R/o clots iso hematuria per Urology.   COMPARISON: CT cystogram on 08/16/2023   ACCESSION NUMBER(S): [id number]   ORDERING CLINICIAN: JOSEFINA MILAN   TECHNIQUE: Multiple grayscale and color ultrasound images of the bladder were obtained.   FINDINGS: There is a complex heterogeneously echogenic region seen at the inferior aspect of the bladder, without flow seen on Doppler images.       1.  Complex heterogeneously echogenic layering material within the bladder, likely represents debris versus clot.   I personally reviewed the images/study and I agree with the findings as stated. This study was interpreted at [place], [place].   MACRO: None   Signed by: Cholo Cevallos 12/29/2023 5:56 AM Dictation workstation:   [id number]    Lower extremity venous duplex bilateral    Result Date: 12/18/2023            [place] [address]   Tel [phone] and Fax [phone]  Vascular Lab Report [facility] US LOWER EXTREMITY VENOUS DUPLEX BILATERAL  Patient Name:      KARLA Cruz Physician: [id number] Alisa Puri MD Study Date:        12/18/2023          Ordering           [id number] BRENDAN LIU                                        Physician:         SONG MRN/PID:           [id number]            Technologist:      Jonh BARKLEY Accession#:        [id number]        Technologist 2: Date of Birth/Age: 1955 / 68 years Encounter#:        [id number] Gender:            F Admission Status:  Inpatient           Location           [facility]                                        Performed:  Diagnosis/ICD: Generalized edema (anasarca)-R60.1 CPT Codes:     66302 Peripheral venous duplex scan for DVT complete  CONCLUSIONS: Right Lower Venous: No evidence of acute deep vein thrombus visualized  in the right lower extremity. Left Lower Venous: No evidence of acute deep vein thrombus visualized in the left lower extremity. Cannot rule out thrombus in non-visualized peroneal vein.  Imaging & Doppler Findings:  Right                 Compressible Thrombus        Flow Distal External Iliac     Yes        None   Spontaneous/Phasic CFV                       Yes        None   Spontaneous/Phasic PFV                       Yes        None FV Proximal               Yes        None       Continuous FV Mid                    Yes        None FV Distal                 Yes        None Popliteal                 Yes        None       Continuous Peroneal                  Yes        None PTV                       Yes        None  Left                  Compress Thrombus        Flow Distal External Iliac   Yes      None   Spontaneous/Phasic CFV                     Yes      None   Spontaneous/Phasic PFV                     Yes      None FV Proximal             Yes      None   Spontaneous/Phasic FV Mid                  Yes      None FV Distal               Yes      None Popliteal               Yes      None       Continuous PTV                     Yes      None  [id number] Alisa Puri MD Electronically signed by [id number] Alisa Puri MD on 12/18/2023 at 1:18:07 PM  ** Final **     ECG 12 lead    Result Date: 12/11/2023  Sinus rhythm with Fusion complexes ST & T wave abnormality, consider lateral ischemia Abnormal ECG When compared with ECG of 01-DEC-2023 10:56, (unconfirmed) Fusion complexes are now Present T wave inversion now evident in Anterior leads    XR chest 2 views    Result Date: 12/6/2023  Interpreted By:  Salvador Robles, STUDY: XR CHEST 2 VIEWS;  12/6/2023 10:11 am   INDICATION: Signs/Symptoms:Eval of Puml Fibrosis.   COMPARISON: 11/24/2023.   ACCESSION NUMBER(S): [id number]   ORDERING CLINICIAN: JORDON PARRISH   FINDINGS:     CARDIOMEDIASTINAL SILHOUETTE: Cardiomediastinal silhouette is normal in size and  configuration.   LUNGS: Digital tomosynthesis of the lung parenchyma was performed. There is diffuse interstitial airspace opacities with prominent traction bronchiectatic changes. There is no evidence of pneumothorax.   ABDOMEN: No remarkable upper abdominal findings.   BONES: No acute osseous changes.       1.  There are diffuse interstitial airspace opacities with traction bronchiectatic changes. This may represent residua of prior COVID-19 pneumonia/ARDS.     Signed by: Salvador Robles 12/6/2023 10:21 AM Dictation workstation:   [id number]      Current Medications:   Scheduled medications  Reviewed  Continuous medications    PRN medications    Medications from transferring facility:  cheduled medications  ciprofloxacin, 750 mg, oral, BID  [Held by provider] enoxaparin, 40 mg, subcutaneous, Daily  ergocalciferol, 1,250 mcg, oral, Weekly  fluticasone, 1 spray, Each Nostril, BID  insulin glargine, 4 Units, subcutaneous, Once  [Held by provider] insulin glargine, 8 Units, subcutaneous, Nightly  insulin lispro, 0-15 Units, subcutaneous, TID with meals  lidocaine, 1 Application, urethral, Once  lidocaine, 1 patch, transdermal, Daily  lidocaine, 1 patch, transdermal, Daily  melatonin, 5 mg, oral, Daily  metoprolol tartrate, 75 mg, oral, BID  pantoprazole, 40 mg, oral, Daily before breakfast  predniSONE, 30 mg, oral, BID  QUEtiapine, 25 mg, oral, Nightly  sennosides-docusate sodium, 1 tablet, oral, BID  [Held by provider] sodium zirconium cyclosilicate, 10 g, oral, TID  sulfamethoxazole-trimethoprim, 80 mg, oral, Daily  thiamine, 100 mg, oral, Daily        Continuous medications  oxygen, , Last Rate: 6 L/min (12/27/23 0927)        PRN medications  PRN medications: acetaminophen **OR** [DISCONTINUED] acetaminophen **OR** [DISCONTINUED] acetaminophen, dextrose 10 % in water (D10W), dextrose, diclofenac sodium, glucagon, ipratropium-albuteroL, lidocaine-diphenhydraMINE-Maalox 1:1:1, loperamide, ondansetron ODT **OR**  [DISCONTINUED] ondansetron, oxyCODONE, QUEtiapine  Assessment  Patient is -year-old (man/woman) with the following medical Problems:    Acute severe hypoxic respiratory failure  ARDS  COVID-19 infection with possible secondary pneumonia status post remdesivir treatment and Actima  Persistent DVT of anticoagulation due to the recent bleed with concern of coexisting PE  Recent acute kidney injury  Recent acute anemia, 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  Weekly duplex ultrasound is recommended  Management of hematuria per primary care provider, urology recommendation was to continue three-way flushing  Follow-up with urology in 2 to 3 weeks  Per urology note from transferring facility if hematuria stopped anticoagulation could be restarted  Monitor H&H transfuse hemoglobin less than 7  Check chest x-ray        Umang Jackson MD  12/31/23     STAFF PHYSICIAN NOTE OF PERSONAL INVOLVEMENT IN CARE  As the attending physician, I certify that I personally reviewed the patient's history and personally examined the patient to confirm the physical findings described above, and that I reviewed the relevant imaging studies and available reports.  I also discussed the differential diagnosis and all of the proposed management plans with the patient and individuals accompanying the patient to this visit.  They had the opportunity to ask questions about the proposed management plans and to have those questions answered.

## 2023-12-31 NOTE — CONSULTS
Reason For Consult  Hematuria HUNG  Inpatient consult to nephrology    Subjective  68-year-old female past history of chronic urinary tract infections, hematuria, chronic kidney disease, hypertension ,nephrolithiasis and right renal cyst who was in her usual state of health until she developed COVID-19 pneumonia.  He also admitted to [facility] with severe acute hypoxemic respiratory failure he developed ARDS for which she received remdesivir and Actemra.  She also received empiric antifungal antibacterial therapy steroids were given IV.  Patient developed thrombophilia and acute DVT for which she was started on anticoagulation.  She developed hematuria and apparently a clot in the bladder was found secondary to the left hemorrhagic cyst.  He denies flank pain but still she have a Max with persistent hematuria.  Patient was sent to [facility] for continuation of care.  Have elevation of the blood sugars which she attributes secondary to steroid therapy she denies past history of diabetes currently patient is on insulin    Objective   There were no vitals taken for this visit.  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  ARDS  Deep vein thrombosis  Hypertension  Hyperglycemia secondary to steroid use  COVID-19 pneumonia  Rosacea      Review of system; General; complaint  Skin; ecchymosis from Ivs  HEENT; denies double vision  Lungs; positive for shortness of breath denies hemoptysis  Heart; denies palpitation complain of occasional edema  GI; complain of heartburn nausea  ; chronic hematuria urinary tract infections denies metallic taste  Musculoskeletal; denies any arthralgias  Neurological denies history of  seizures    Physical Exam     ..;  Pleasant 6 68-year-old female chronically looking mild skin pallor  HEENT; pupils react to light and accommodation  Neck; no JVD no bruit no thyromegaly  Lungs; clear to observation and percussion  Heart no gallop no  rubs no thrills  Abdomen; active peristalsis no rebound or guarding  ; Max in place disclosing HEMATURIA  Musculoskeletal good muscle tone  Neurological; negative for jugular flexes  Data Review  Lab Results   Component Value Date    WBC 9.9 12/31/2023    HGB 9.5 (L) 12/31/2023    HCT 29.7 (L) 12/31/2023    MCV 91 12/31/2023     (L) 12/31/2023      Lab Results   Component Value Date    CALCIUM 8.1 (L) 12/31/2023    ALBUMIN 2.9 (L) 12/31/2023     12/31/2023    K 4.4 12/31/2023    CO2 23 12/31/2023     12/31/2023    BUN 37 (H) 12/31/2023    CREATININE 0.80 12/31/2023    ALT 38 12/31/2023    AST 16 12/31/2023        Assessment/Plan     Hematuria  Renal cyst  Nephrolithiasis  Anemia  COVID-19 infection  Hypertension        Surgical History  She has a past surgical history that includes Appendectomy (04/01/2016); Other surgical history (11/14/2022); and Hysterectomy (12/2022).     Social History  She reports that she has never smoked. She has never used smokeless tobacco. She reports that she does not use drugs. No history on file for alcohol use.    Family History  Family History   Problem Relation Name Age of Onset    Breast cancer Mother      Heart failure Mother      No Known Problems Father      No Known Problems Sister          Allergies  Fesoterodine, Mirabegron, and Prednisone       Plan urinalysis  Renal ultrasound  Renal indices        Thank you very much for allowing to participate in the care of this patient        Kendall Brennan MD

## 2024-01-01 ENCOUNTER — HOSPITAL ENCOUNTER (OUTPATIENT)
Dept: VASCULAR MEDICINE | Facility: HOSPITAL | Age: 69
Discharge: HOME | End: 2024-01-03
Payer: COMMERCIAL

## 2024-01-01 ENCOUNTER — HOSPITAL ENCOUNTER (OUTPATIENT)
Dept: RADIOLOGY | Facility: HOSPITAL | Age: 69
Discharge: HOME | End: 2024-01-05
Payer: COMMERCIAL

## 2024-01-01 ENCOUNTER — TELEPHONE (OUTPATIENT)
Dept: PULMONOLOGY | Facility: CLINIC | Age: 69
End: 2024-01-01
Payer: COMMERCIAL

## 2024-01-01 ENCOUNTER — APPOINTMENT (OUTPATIENT)
Dept: PRIMARY CARE | Facility: CLINIC | Age: 69
End: 2024-01-01
Payer: COMMERCIAL

## 2024-01-01 ENCOUNTER — HOSPITAL ENCOUNTER (OUTPATIENT)
Dept: RADIOLOGY | Facility: HOSPITAL | Age: 69
Discharge: HOME | End: 2024-01-22
Payer: COMMERCIAL

## 2024-01-01 ENCOUNTER — HOSPITAL ENCOUNTER (OUTPATIENT)
Dept: RADIOLOGY | Facility: HOSPITAL | Age: 69
Discharge: HOME | End: 2024-01-28
Payer: COMMERCIAL

## 2024-01-01 ENCOUNTER — HOSPITAL ENCOUNTER (OUTPATIENT)
Dept: RADIOLOGY | Facility: HOSPITAL | Age: 69
Discharge: HOME | End: 2024-01-02
Payer: COMMERCIAL

## 2024-01-01 ENCOUNTER — HOSPITAL ENCOUNTER (OUTPATIENT)
Dept: RADIOLOGY | Facility: HOSPITAL | Age: 69
Discharge: HOME | End: 2024-01-09
Payer: COMMERCIAL

## 2024-01-01 ENCOUNTER — HOSPITAL ENCOUNTER (INPATIENT)
Facility: HOSPITAL | Age: 69
End: 2024-01-01
Attending: INTERNAL MEDICINE | Admitting: INTERNAL MEDICINE
Payer: COMMERCIAL

## 2024-01-01 ENCOUNTER — APPOINTMENT (OUTPATIENT)
Dept: PULMONOLOGY | Facility: CLINIC | Age: 69
End: 2024-01-01
Payer: COMMERCIAL

## 2024-01-01 ENCOUNTER — HOSPITAL ENCOUNTER (OUTPATIENT)
Dept: RADIOLOGY | Facility: HOSPITAL | Age: 69
Discharge: HOME | End: 2024-01-15
Payer: COMMERCIAL

## 2024-01-01 ENCOUNTER — TELEPHONE (OUTPATIENT)
Dept: CARDIOLOGY | Facility: CLINIC | Age: 69
End: 2024-01-01

## 2024-01-01 DIAGNOSIS — M79.89 LEG SWELLING: Primary | ICD-10-CM

## 2024-01-01 DIAGNOSIS — M79.89 LEG SWELLING: ICD-10-CM

## 2024-01-01 LAB
ABO GROUP (TYPE) IN BLOOD: NORMAL
ABO GROUP (TYPE) IN BLOOD: NORMAL
ACANTHOCYTES BLD QL SMEAR: ABNORMAL
ALBUMIN SERPL BCP-MCNC: 2.8 G/DL (ref 3.4–5)
ALBUMIN SERPL BCP-MCNC: 2.9 G/DL (ref 3.4–5)
ALBUMIN SERPL BCP-MCNC: 2.9 G/DL (ref 3.4–5)
ALBUMIN SERPL BCP-MCNC: 3 G/DL (ref 3.4–5)
ALBUMIN SERPL BCP-MCNC: 3.1 G/DL (ref 3.4–5)
ALBUMIN SERPL BCP-MCNC: 3.1 G/DL (ref 3.4–5)
ALBUMIN SERPL BCP-MCNC: 3.2 G/DL (ref 3.4–5)
ALBUMIN SERPL BCP-MCNC: 3.3 G/DL (ref 3.4–5)
ALP SERPL-CCNC: 108 U/L (ref 33–136)
ALP SERPL-CCNC: 63 U/L (ref 33–136)
ALP SERPL-CCNC: 65 U/L (ref 33–136)
ALP SERPL-CCNC: 80 U/L (ref 33–136)
ALP SERPL-CCNC: 87 U/L (ref 33–136)
ALP SERPL-CCNC: 88 U/L (ref 33–136)
ALP SERPL-CCNC: 91 U/L (ref 33–136)
ALP SERPL-CCNC: 93 U/L (ref 33–136)
ALP SERPL-CCNC: 97 U/L (ref 33–136)
ALP SERPL-CCNC: 99 U/L (ref 33–136)
ALT SERPL W P-5'-P-CCNC: 114 U/L (ref 7–45)
ALT SERPL W P-5'-P-CCNC: 35 U/L (ref 7–45)
ALT SERPL W P-5'-P-CCNC: 38 U/L (ref 7–45)
ALT SERPL W P-5'-P-CCNC: 45 U/L (ref 7–45)
ALT SERPL W P-5'-P-CCNC: 48 U/L (ref 7–45)
ALT SERPL W P-5'-P-CCNC: 50 U/L (ref 7–45)
ALT SERPL W P-5'-P-CCNC: 54 U/L (ref 7–45)
ALT SERPL W P-5'-P-CCNC: 57 U/L (ref 7–45)
ALT SERPL W P-5'-P-CCNC: 66 U/L (ref 7–45)
ALT SERPL W P-5'-P-CCNC: 68 U/L (ref 7–45)
ANA SER QL HEP2 SUBST: NEGATIVE
ANION GAP BLDA CALCULATED.4IONS-SCNC: 12 MMO/L (ref 10–25)
ANION GAP SERPL CALC-SCNC: 10 MMOL/L (ref 10–20)
ANION GAP SERPL CALC-SCNC: 11 MMOL/L (ref 10–20)
ANION GAP SERPL CALC-SCNC: 12 MMOL/L (ref 10–20)
ANION GAP SERPL CALC-SCNC: 13 MMOL/L (ref 10–20)
ANION GAP SERPL CALC-SCNC: 14 MMOL/L (ref 10–20)
ANION GAP SERPL CALC-SCNC: 14 MMOL/L (ref 10–20)
ANION GAP SERPL CALC-SCNC: 15 MMOL/L (ref 10–20)
ANION GAP SERPL CALC-SCNC: 16 MMOL/L (ref 10–20)
ANION GAP SERPL CALC-SCNC: 17 MMOL/L (ref 10–20)
ANTIBODY SCREEN: NORMAL
APPARATUS: ABNORMAL
APPEARANCE UR: ABNORMAL
AST SERPL W P-5'-P-CCNC: 16 U/L (ref 9–39)
AST SERPL W P-5'-P-CCNC: 16 U/L (ref 9–39)
AST SERPL W P-5'-P-CCNC: 19 U/L (ref 9–39)
AST SERPL W P-5'-P-CCNC: 21 U/L (ref 9–39)
AST SERPL W P-5'-P-CCNC: 21 U/L (ref 9–39)
AST SERPL W P-5'-P-CCNC: 22 U/L (ref 9–39)
AST SERPL W P-5'-P-CCNC: 26 U/L (ref 9–39)
AST SERPL W P-5'-P-CCNC: 27 U/L (ref 9–39)
AST SERPL W P-5'-P-CCNC: 28 U/L (ref 9–39)
AST SERPL W P-5'-P-CCNC: 35 U/L (ref 9–39)
ATRIAL RATE: 64 BPM
BACTERIA #/AREA URNS AUTO: ABNORMAL /HPF
BACTERIA UR CULT: ABNORMAL
BASE EXCESS BLDA CALC-SCNC: 2.5 MMOL/L (ref -2–3)
BASO STIPL BLD QL SMEAR: PRESENT
BASOPHILS # BLD MANUAL: 0 X10*3/UL (ref 0–0.1)
BASOPHILS NFR BLD MANUAL: 0 %
BILIRUB SERPL-MCNC: 0.2 MG/DL (ref 0–1.2)
BILIRUB SERPL-MCNC: 0.3 MG/DL (ref 0–1.2)
BILIRUB SERPL-MCNC: 0.4 MG/DL (ref 0–1.2)
BILIRUB SERPL-MCNC: 0.4 MG/DL (ref 0–1.2)
BILIRUB UR STRIP.AUTO-MCNC: NEGATIVE MG/DL
BLOOD EXPIRATION DATE: NORMAL
BODY TEMPERATURE: 37 DEGREES CELSIUS
BUN SERPL-MCNC: 31 MG/DL (ref 6–23)
BUN SERPL-MCNC: 33 MG/DL (ref 6–23)
BUN SERPL-MCNC: 37 MG/DL (ref 6–23)
BUN SERPL-MCNC: 38 MG/DL (ref 6–23)
BUN SERPL-MCNC: 42 MG/DL (ref 6–23)
BUN SERPL-MCNC: 42 MG/DL (ref 6–23)
BUN SERPL-MCNC: 47 MG/DL (ref 6–23)
BUN SERPL-MCNC: 56 MG/DL (ref 6–23)
BUN SERPL-MCNC: 61 MG/DL (ref 6–23)
BUN SERPL-MCNC: 75 MG/DL (ref 6–23)
BUN SERPL-MCNC: 87 MG/DL (ref 6–23)
CA-I BLDA-SCNC: 1.29 MMOL/L (ref 1.1–1.33)
CALCIUM SERPL-MCNC: 8.2 MG/DL (ref 8.6–10.3)
CALCIUM SERPL-MCNC: 8.3 MG/DL (ref 8.6–10.3)
CALCIUM SERPL-MCNC: 8.5 MG/DL (ref 8.6–10.3)
CALCIUM SERPL-MCNC: 8.5 MG/DL (ref 8.6–10.3)
CALCIUM SERPL-MCNC: 8.8 MG/DL (ref 8.6–10.3)
CALCIUM SERPL-MCNC: 8.9 MG/DL (ref 8.6–10.3)
CALCIUM SERPL-MCNC: 9 MG/DL (ref 8.6–10.3)
CALCIUM SERPL-MCNC: 9.1 MG/DL (ref 8.6–10.3)
CALCIUM SERPL-MCNC: 9.2 MG/DL (ref 8.6–10.3)
CALCIUM SERPL-MCNC: 9.4 MG/DL (ref 8.6–10.3)
CALCIUM SERPL-MCNC: 9.5 MG/DL (ref 8.6–10.3)
CARDIAC TROPONIN I PNL SERPL HS: 107 NG/L (ref 0–13)
CHLORIDE BLDA-SCNC: 104 MMOL/L (ref 98–107)
CHLORIDE SERPL-SCNC: 100 MMOL/L (ref 98–107)
CHLORIDE SERPL-SCNC: 102 MMOL/L (ref 98–107)
CHLORIDE SERPL-SCNC: 103 MMOL/L (ref 98–107)
CHLORIDE SERPL-SCNC: 105 MMOL/L (ref 98–107)
CHLORIDE SERPL-SCNC: 105 MMOL/L (ref 98–107)
CHLORIDE SERPL-SCNC: 106 MMOL/L (ref 98–107)
CHLORIDE SERPL-SCNC: 107 MMOL/L (ref 98–107)
CHLORIDE SERPL-SCNC: 95 MMOL/L (ref 98–107)
CHLORIDE SERPL-SCNC: 98 MMOL/L (ref 98–107)
CHLORIDE UR-SCNC: 50 MMOL/L
CHLORIDE/CREATININE (MMOL/G) IN URINE: 208 MMOL/G CREAT (ref 38–318)
CHOLEST SERPL-MCNC: 189 MG/DL (ref 0–199)
CHOLESTEROL/HDL RATIO: 3
CO2 SERPL-SCNC: 24 MMOL/L (ref 21–32)
CO2 SERPL-SCNC: 24 MMOL/L (ref 21–32)
CO2 SERPL-SCNC: 26 MMOL/L (ref 21–32)
CO2 SERPL-SCNC: 27 MMOL/L (ref 21–32)
CO2 SERPL-SCNC: 27 MMOL/L (ref 21–32)
CO2 SERPL-SCNC: 29 MMOL/L (ref 21–32)
CO2 SERPL-SCNC: 30 MMOL/L (ref 21–32)
CO2 SERPL-SCNC: 32 MMOL/L (ref 21–32)
CO2 SERPL-SCNC: 35 MMOL/L (ref 21–32)
COLOR UR: ABNORMAL
CREAT SERPL-MCNC: 0.67 MG/DL (ref 0.5–1.05)
CREAT SERPL-MCNC: 0.69 MG/DL (ref 0.5–1.05)
CREAT SERPL-MCNC: 0.75 MG/DL (ref 0.5–1.05)
CREAT SERPL-MCNC: 0.81 MG/DL (ref 0.5–1.05)
CREAT SERPL-MCNC: 0.86 MG/DL (ref 0.5–1.05)
CREAT SERPL-MCNC: 0.89 MG/DL (ref 0.5–1.05)
CREAT SERPL-MCNC: 0.9 MG/DL (ref 0.5–1.05)
CREAT SERPL-MCNC: 0.99 MG/DL (ref 0.5–1.05)
CREAT SERPL-MCNC: 1.01 MG/DL (ref 0.5–1.05)
CREAT SERPL-MCNC: 1.32 MG/DL (ref 0.5–1.05)
CREAT SERPL-MCNC: 1.54 MG/DL (ref 0.5–1.05)
CREAT UR-MCNC: 17.1 MG/DL (ref 20–320)
CREAT UR-MCNC: 17.1 MG/DL (ref 20–320)
CREAT UR-MCNC: 24 MG/DL (ref 20–320)
DACRYOCYTES BLD QL SMEAR: ABNORMAL
DISPENSE STATUS: NORMAL
EGFRCR SERPLBLD CKD-EPI 2021: 36 ML/MIN/1.73M*2
EGFRCR SERPLBLD CKD-EPI 2021: 44 ML/MIN/1.73M*2
EGFRCR SERPLBLD CKD-EPI 2021: 70 ML/MIN/1.73M*2
EGFRCR SERPLBLD CKD-EPI 2021: 79 ML/MIN/1.73M*2
EGFRCR SERPLBLD CKD-EPI 2021: 86 ML/MIN/1.73M*2
EGFRCR SERPLBLD CKD-EPI 2021: >90 ML/MIN/1.73M*2
EGFRCR SERPLBLD CKD-EPI 2021: >90 ML/MIN/1.73M*2
EOSINOPHIL # BLD MANUAL: 0 X10*3/UL (ref 0–0.7)
EOSINOPHIL NFR BLD MANUAL: 0 %
ERYTHROCYTE [DISTWIDTH] IN BLOOD BY AUTOMATED COUNT: 17.4 % (ref 11.5–14.5)
ERYTHROCYTE [DISTWIDTH] IN BLOOD BY AUTOMATED COUNT: 17.5 % (ref 11.5–14.5)
ERYTHROCYTE [DISTWIDTH] IN BLOOD BY AUTOMATED COUNT: 17.6 % (ref 11.5–14.5)
ERYTHROCYTE [DISTWIDTH] IN BLOOD BY AUTOMATED COUNT: 17.6 % (ref 11.5–14.5)
ERYTHROCYTE [DISTWIDTH] IN BLOOD BY AUTOMATED COUNT: 17.9 % (ref 11.5–14.5)
ERYTHROCYTE [DISTWIDTH] IN BLOOD BY AUTOMATED COUNT: 17.9 % (ref 11.5–14.5)
ERYTHROCYTE [DISTWIDTH] IN BLOOD BY AUTOMATED COUNT: 18.1 % (ref 11.5–14.5)
ERYTHROCYTE [DISTWIDTH] IN BLOOD BY AUTOMATED COUNT: 18.3 % (ref 11.5–14.5)
ERYTHROCYTE [DISTWIDTH] IN BLOOD BY AUTOMATED COUNT: 18.4 % (ref 11.5–14.5)
ERYTHROCYTE [DISTWIDTH] IN BLOOD BY AUTOMATED COUNT: 18.6 % (ref 11.5–14.5)
ERYTHROCYTE [DISTWIDTH] IN BLOOD BY AUTOMATED COUNT: 18.6 % (ref 11.5–14.5)
ERYTHROCYTE [DISTWIDTH] IN BLOOD BY AUTOMATED COUNT: 19.1 % (ref 11.5–14.5)
ERYTHROCYTE [DISTWIDTH] IN BLOOD BY AUTOMATED COUNT: 19.2 % (ref 11.5–14.5)
ERYTHROCYTE [DISTWIDTH] IN BLOOD BY AUTOMATED COUNT: 19.3 % (ref 11.5–14.5)
FLOW: 50 LPM
GFR SERPL CREATININE-BSD FRML MDRD: 60 ML/MIN/1.73M*2
GFR SERPL CREATININE-BSD FRML MDRD: 62 ML/MIN/1.73M*2
GFR SERPL CREATININE-BSD FRML MDRD: 69 ML/MIN/1.73M*2
GFR SERPL CREATININE-BSD FRML MDRD: 73 ML/MIN/1.73M*2
GLUCOSE BLD MANUAL STRIP-MCNC: 118 MG/DL (ref 74–99)
GLUCOSE BLD MANUAL STRIP-MCNC: 65 MG/DL (ref 74–99)
GLUCOSE BLD MANUAL STRIP-MCNC: 87 MG/DL (ref 74–99)
GLUCOSE BLDA-MCNC: 328 MG/DL (ref 74–99)
GLUCOSE SERPL-MCNC: 108 MG/DL (ref 74–99)
GLUCOSE SERPL-MCNC: 137 MG/DL (ref 74–99)
GLUCOSE SERPL-MCNC: 140 MG/DL (ref 74–99)
GLUCOSE SERPL-MCNC: 143 MG/DL (ref 74–99)
GLUCOSE SERPL-MCNC: 148 MG/DL (ref 74–99)
GLUCOSE SERPL-MCNC: 181 MG/DL (ref 74–99)
GLUCOSE SERPL-MCNC: 239 MG/DL (ref 74–99)
GLUCOSE SERPL-MCNC: 270 MG/DL (ref 74–99)
GLUCOSE SERPL-MCNC: 75 MG/DL (ref 74–99)
GLUCOSE SERPL-MCNC: 95 MG/DL (ref 74–99)
GLUCOSE SERPL-MCNC: 98 MG/DL (ref 74–99)
GLUCOSE UR STRIP.AUTO-MCNC: NEGATIVE MG/DL
HCO3 BLDA-SCNC: 28.3 MMOL/L (ref 22–26)
HCT VFR BLD AUTO: 23.7 % (ref 36–46)
HCT VFR BLD AUTO: 24.9 % (ref 36–46)
HCT VFR BLD AUTO: 26.3 % (ref 36–46)
HCT VFR BLD AUTO: 26.9 % (ref 36–46)
HCT VFR BLD AUTO: 28.8 % (ref 36–46)
HCT VFR BLD AUTO: 29.5 % (ref 36–46)
HCT VFR BLD AUTO: 29.9 % (ref 36–46)
HCT VFR BLD AUTO: 30.1 % (ref 36–46)
HCT VFR BLD AUTO: 30.6 % (ref 36–46)
HCT VFR BLD AUTO: 31.6 % (ref 36–46)
HCT VFR BLD AUTO: 32.2 % (ref 36–46)
HCT VFR BLD AUTO: 32.3 % (ref 36–46)
HCT VFR BLD AUTO: 34 % (ref 36–46)
HCT VFR BLD AUTO: 34.4 % (ref 36–46)
HCT VFR BLD EST: 30 % (ref 36–46)
HDLC SERPL-MCNC: 62.3 MG/DL
HGB BLD-MCNC: 10 G/DL (ref 12–16)
HGB BLD-MCNC: 10.1 G/DL (ref 12–16)
HGB BLD-MCNC: 10.7 G/DL (ref 12–16)
HGB BLD-MCNC: 10.7 G/DL (ref 12–16)
HGB BLD-MCNC: 7.2 G/DL (ref 12–16)
HGB BLD-MCNC: 7.3 G/DL (ref 12–16)
HGB BLD-MCNC: 8.3 G/DL (ref 12–16)
HGB BLD-MCNC: 8.4 G/DL (ref 12–16)
HGB BLD-MCNC: 9.1 G/DL (ref 12–16)
HGB BLD-MCNC: 9.5 G/DL (ref 12–16)
HGB BLD-MCNC: 9.6 G/DL (ref 12–16)
HGB BLD-MCNC: 9.6 G/DL (ref 12–16)
HGB BLD-MCNC: 9.7 G/DL (ref 12–16)
HGB BLD-MCNC: 9.8 G/DL (ref 12–16)
HGB BLDA-MCNC: 9.9 G/DL (ref 12–16)
HOLD SPECIMEN: NORMAL
HYALINE CASTS #/AREA URNS AUTO: ABNORMAL /LPF
IMM GRANULOCYTES # BLD AUTO: 1.4 X10*3/UL (ref 0–0.7)
IMM GRANULOCYTES # BLD AUTO: 2.35 X10*3/UL (ref 0–0.7)
IMM GRANULOCYTES # BLD AUTO: 2.54 X10*3/UL (ref 0–0.7)
IMM GRANULOCYTES # BLD AUTO: 3.03 X10*3/UL (ref 0–0.7)
IMM GRANULOCYTES NFR BLD AUTO: 10.7 % (ref 0–0.9)
IMM GRANULOCYTES NFR BLD AUTO: 12.7 % (ref 0–0.9)
IMM GRANULOCYTES NFR BLD AUTO: 13 % (ref 0–0.9)
IMM GRANULOCYTES NFR BLD AUTO: 8 % (ref 0–0.9)
INHALED O2 CONCENTRATION: 100 %
INR PPP: 1.1 (ref 0.9–1.1)
IRON SATN MFR SERPL: 11 % (ref 25–45)
IRON SERPL-MCNC: 29 UG/DL (ref 35–150)
KETONES UR STRIP.AUTO-MCNC: NEGATIVE MG/DL
KPC- K.PNEUMONIAE CARBAPENEMASE: ABNORMAL
LACTATE BLDA-SCNC: 1.2 MMOL/L (ref 0.4–2)
LDLC SERPL CALC-MCNC: 65 MG/DL
LEUKOCYTE ESTERASE UR QL STRIP.AUTO: ABNORMAL
LYMPHOCYTES # BLD MANUAL: 0.47 X10*3/UL (ref 1.2–4.8)
LYMPHOCYTES # BLD MANUAL: 0.59 X10*3/UL (ref 1.2–4.8)
LYMPHOCYTES # BLD MANUAL: 0.66 X10*3/UL (ref 1.2–4.8)
LYMPHOCYTES # BLD MANUAL: 1.01 X10*3/UL (ref 1.2–4.8)
LYMPHOCYTES NFR BLD MANUAL: 2 %
LYMPHOCYTES NFR BLD MANUAL: 2 %
LYMPHOCYTES NFR BLD MANUAL: 5 %
LYMPHOCYTES NFR BLD MANUAL: 5 %
MAGNESIUM SERPL-MCNC: 1.69 MG/DL (ref 1.6–2.4)
MAGNESIUM SERPL-MCNC: 1.73 MG/DL (ref 1.6–2.4)
MAGNESIUM SERPL-MCNC: 1.9 MG/DL (ref 1.6–2.4)
MAGNESIUM SERPL-MCNC: 1.98 MG/DL (ref 1.6–2.4)
MCH RBC QN AUTO: 28.1 PG (ref 26–34)
MCH RBC QN AUTO: 28.9 PG (ref 26–34)
MCH RBC QN AUTO: 29.2 PG (ref 26–34)
MCH RBC QN AUTO: 29.2 PG (ref 26–34)
MCH RBC QN AUTO: 29.3 PG (ref 26–34)
MCH RBC QN AUTO: 29.4 PG (ref 26–34)
MCH RBC QN AUTO: 30 PG (ref 26–34)
MCH RBC QN AUTO: 30.2 PG (ref 26–34)
MCH RBC QN AUTO: 30.2 PG (ref 26–34)
MCH RBC QN AUTO: 30.3 PG (ref 26–34)
MCH RBC QN AUTO: 30.5 PG (ref 26–34)
MCH RBC QN AUTO: 30.7 PG (ref 26–34)
MCH RBC QN AUTO: 30.7 PG (ref 26–34)
MCH RBC QN AUTO: 31 PG (ref 26–34)
MCHC RBC AUTO-ENTMCNC: 28.9 G/DL (ref 32–36)
MCHC RBC AUTO-ENTMCNC: 30.7 G/DL (ref 32–36)
MCHC RBC AUTO-ENTMCNC: 30.8 G/DL (ref 32–36)
MCHC RBC AUTO-ENTMCNC: 31.1 G/DL (ref 32–36)
MCHC RBC AUTO-ENTMCNC: 31.1 G/DL (ref 32–36)
MCHC RBC AUTO-ENTMCNC: 31.2 G/DL (ref 32–36)
MCHC RBC AUTO-ENTMCNC: 31.3 G/DL (ref 32–36)
MCHC RBC AUTO-ENTMCNC: 31.5 G/DL (ref 32–36)
MCHC RBC AUTO-ENTMCNC: 31.6 G/DL (ref 32–36)
MCHC RBC AUTO-ENTMCNC: 31.6 G/DL (ref 32–36)
MCHC RBC AUTO-ENTMCNC: 31.9 G/DL (ref 32–36)
MCHC RBC AUTO-ENTMCNC: 32 G/DL (ref 32–36)
MCHC RBC AUTO-ENTMCNC: 32.1 G/DL (ref 32–36)
MCHC RBC AUTO-ENTMCNC: 32.2 G/DL (ref 32–36)
MCV RBC AUTO: 90 FL (ref 80–100)
MCV RBC AUTO: 92 FL (ref 80–100)
MCV RBC AUTO: 93 FL (ref 80–100)
MCV RBC AUTO: 93 FL (ref 80–100)
MCV RBC AUTO: 95 FL (ref 80–100)
MCV RBC AUTO: 96 FL (ref 80–100)
MCV RBC AUTO: 97 FL (ref 80–100)
MCV RBC AUTO: 98 FL (ref 80–100)
MCV RBC AUTO: 98 FL (ref 80–100)
METAMYELOCYTES # BLD MANUAL: 0.66 X10*3/UL
METAMYELOCYTES # BLD MANUAL: 1.01 X10*3/UL
METAMYELOCYTES # BLD MANUAL: 1.18 X10*3/UL
METAMYELOCYTES # BLD MANUAL: 1.63 X10*3/UL
METAMYELOCYTES NFR BLD MANUAL: 4 %
METAMYELOCYTES NFR BLD MANUAL: 5 %
METAMYELOCYTES NFR BLD MANUAL: 5 %
METAMYELOCYTES NFR BLD MANUAL: 7 %
MICROALBUMIN UR-MCNC: 193.3 MG/L
MICROALBUMIN/CREAT UR: 1130.4 UG/MG CREAT
MONOCYTES # BLD MANUAL: 0.59 X10*3/UL (ref 0.1–1)
MONOCYTES # BLD MANUAL: 1.01 X10*3/UL (ref 0.1–1)
MONOCYTES # BLD MANUAL: 1.05 X10*3/UL (ref 0.1–1)
MONOCYTES # BLD MANUAL: 1.17 X10*3/UL (ref 0.1–1)
MONOCYTES NFR BLD MANUAL: 2 %
MONOCYTES NFR BLD MANUAL: 5 %
MONOCYTES NFR BLD MANUAL: 5 %
MONOCYTES NFR BLD MANUAL: 8 %
MUCOUS THREADS #/AREA URNS AUTO: ABNORMAL /LPF
MUCOUS THREADS #/AREA URNS AUTO: ABNORMAL /LPF
MYELOCYTES # BLD MANUAL: 0.52 X10*3/UL
MYELOCYTES # BLD MANUAL: 0.59 X10*3/UL
MYELOCYTES # BLD MANUAL: 0.7 X10*3/UL
MYELOCYTES NFR BLD MANUAL: 2 %
MYELOCYTES NFR BLD MANUAL: 3 %
MYELOCYTES NFR BLD MANUAL: 4 %
NEUTROPHILS # BLD MANUAL: 10.21 X10*3/UL (ref 1.2–7.7)
NEUTROPHILS # BLD MANUAL: 17.09 X10*3/UL (ref 1.2–7.7)
NEUTROPHILS # BLD MANUAL: 18.87 X10*3/UL (ref 1.2–7.7)
NEUTROPHILS # BLD MANUAL: 26.46 X10*3/UL (ref 1.2–7.7)
NEUTS BAND # BLD MANUAL: 1.63 X10*3/UL (ref 0–0.7)
NEUTS BAND # BLD MANUAL: 1.81 X10*3/UL (ref 0–0.7)
NEUTS BAND # BLD MANUAL: 1.83 X10*3/UL (ref 0–0.7)
NEUTS BAND # BLD MANUAL: 4.41 X10*3/UL (ref 0–0.7)
NEUTS BAND NFR BLD MANUAL: 14 %
NEUTS BAND NFR BLD MANUAL: 15 %
NEUTS BAND NFR BLD MANUAL: 7 %
NEUTS BAND NFR BLD MANUAL: 9 %
NEUTS SEG # BLD MANUAL: 15.28 X10*3/UL (ref 1.2–7)
NEUTS SEG # BLD MANUAL: 17.24 X10*3/UL (ref 1.2–7)
NEUTS SEG # BLD MANUAL: 22.05 X10*3/UL (ref 1.2–7)
NEUTS SEG # BLD MANUAL: 8.38 X10*3/UL (ref 1.2–7)
NEUTS SEG NFR BLD MANUAL: 64 %
NEUTS SEG NFR BLD MANUAL: 74 %
NEUTS SEG NFR BLD MANUAL: 75 %
NEUTS SEG NFR BLD MANUAL: 76 %
NEUTS VAC BLD QL SMEAR: PRESENT
NITRITE UR QL STRIP.AUTO: NEGATIVE
NITRITE UR QL STRIP.AUTO: NEGATIVE
NITRITE UR QL STRIP.AUTO: POSITIVE
NON HDL CHOLESTEROL: 127 MG/DL (ref 0–149)
NRBC BLD-RTO: 0.4 /100 WBCS (ref 0–0)
NRBC BLD-RTO: 0.6 /100 WBCS (ref 0–0)
NRBC BLD-RTO: 0.6 /100 WBCS (ref 0–0)
NRBC BLD-RTO: 0.8 /100 WBCS (ref 0–0)
NRBC BLD-RTO: 0.8 /100 WBCS (ref 0–0)
NRBC BLD-RTO: 0.9 /100 WBCS (ref 0–0)
NRBC BLD-RTO: 1 /100 WBCS (ref 0–0)
NRBC BLD-RTO: 1.1 /100 WBCS (ref 0–0)
NRBC BLD-RTO: 1.2 /100 WBCS (ref 0–0)
NRBC BLD-RTO: 1.3 /100 WBCS (ref 0–0)
NRBC BLD-RTO: 1.3 /100 WBCS (ref 0–0)
NRBC BLD-RTO: 1.5 /100 WBCS (ref 0–0)
NRBC BLD-RTO: 1.9 /100 WBCS (ref 0–0)
NRBC BLD-RTO: 2.3 /100 WBCS (ref 0–0)
OVALOCYTES BLD QL SMEAR: ABNORMAL
OXYHGB MFR BLDA: 86.2 % (ref 94–98)
P AXIS: -6 DEGREES
P OFFSET: 204 MS
P ONSET: 154 MS
PCO2 BLDA: 49 MM HG (ref 38–42)
PH BLDA: 7.37 PH (ref 7.38–7.42)
PH UR STRIP.AUTO: 5 [PH]
PH UR STRIP.AUTO: 5 [PH]
PH UR STRIP.AUTO: 6 [PH]
PLATELET # BLD AUTO: 119 X10*3/UL (ref 150–450)
PLATELET # BLD AUTO: 144 X10*3/UL (ref 150–450)
PLATELET # BLD AUTO: 145 X10*3/UL (ref 150–450)
PLATELET # BLD AUTO: 147 X10*3/UL (ref 150–450)
PLATELET # BLD AUTO: 150 X10*3/UL (ref 150–450)
PLATELET # BLD AUTO: 150 X10*3/UL (ref 150–450)
PLATELET # BLD AUTO: 189 X10*3/UL (ref 150–450)
PLATELET # BLD AUTO: 198 X10*3/UL (ref 150–450)
PLATELET # BLD AUTO: 201 X10*3/UL (ref 150–450)
PLATELET # BLD AUTO: 203 X10*3/UL (ref 150–450)
PLATELET # BLD AUTO: 213 X10*3/UL (ref 150–450)
PLATELET # BLD AUTO: 229 X10*3/UL (ref 150–450)
PLATELET # BLD AUTO: 248 X10*3/UL (ref 150–450)
PLATELET # BLD AUTO: 254 X10*3/UL (ref 150–450)
PO2 BLDA: 56 MM HG (ref 85–95)
POLYCHROMASIA BLD QL SMEAR: ABNORMAL
POLYCHROMASIA BLD QL SMEAR: ABNORMAL
POTASSIUM BLDA-SCNC: 4 MMOL/L (ref 3.5–5.3)
POTASSIUM SERPL-SCNC: 3.3 MMOL/L (ref 3.5–5.3)
POTASSIUM SERPL-SCNC: 3.5 MMOL/L (ref 3.5–5.3)
POTASSIUM SERPL-SCNC: 3.6 MMOL/L (ref 3.5–5.3)
POTASSIUM SERPL-SCNC: 3.7 MMOL/L (ref 3.5–5.3)
POTASSIUM SERPL-SCNC: 3.9 MMOL/L (ref 3.5–5.3)
POTASSIUM SERPL-SCNC: 4 MMOL/L (ref 3.5–5.3)
POTASSIUM SERPL-SCNC: 4.5 MMOL/L (ref 3.5–5.3)
POTASSIUM SERPL-SCNC: 4.6 MMOL/L (ref 3.5–5.3)
POTASSIUM UR-SCNC: 23 MMOL/L
POTASSIUM/CREAT UR-RTO: 96 MMOL/G CREAT
PR INTERVAL: 130 MS
PREALB SERPL-MCNC: 34.5 MG/DL (ref 18–40)
PRODUCT BLOOD TYPE: 6200
PRODUCT CODE: NORMAL
PROMYELOCYTES # BLD MANUAL: 0.47 X10*3/UL
PROMYELOCYTES NFR BLD MANUAL: 2 %
PROT SERPL-MCNC: 4.8 G/DL (ref 6.4–8.2)
PROT SERPL-MCNC: 5.1 G/DL (ref 6.4–8.2)
PROT SERPL-MCNC: 5.4 G/DL (ref 6.4–8.2)
PROT SERPL-MCNC: 5.7 G/DL (ref 6.4–8.2)
PROT SERPL-MCNC: 5.7 G/DL (ref 6.4–8.2)
PROT SERPL-MCNC: 5.8 G/DL (ref 6.4–8.2)
PROT SERPL-MCNC: 6 G/DL (ref 6.4–8.2)
PROT SERPL-MCNC: 6.1 G/DL (ref 6.4–8.2)
PROT SERPL-MCNC: 6.2 G/DL (ref 6.4–8.2)
PROT SERPL-MCNC: 6.9 G/DL (ref 6.4–8.2)
PROT UR STRIP.AUTO-MCNC: ABNORMAL MG/DL
PROTHROMBIN TIME: 11.9 SECONDS (ref 9.8–12.8)
Q ONSET: 219 MS
QRS COUNT: 11 BEATS
QRS DURATION: 80 MS
QT INTERVAL: 378 MS
QTC CALCULATION(BAZETT): 389 MS
QTC FREDERICIA: 386 MS
R AXIS: 35 DEGREES
RBC # BLD AUTO: 2.5 X10*6/UL (ref 4–5.2)
RBC # BLD AUTO: 2.56 X10*6/UL (ref 4–5.2)
RBC # BLD AUTO: 2.83 X10*6/UL (ref 4–5.2)
RBC # BLD AUTO: 2.88 X10*6/UL (ref 4–5.2)
RBC # BLD AUTO: 2.96 X10*6/UL (ref 4–5.2)
RBC # BLD AUTO: 3.1 X10*6/UL (ref 4–5.2)
RBC # BLD AUTO: 3.13 X10*6/UL (ref 4–5.2)
RBC # BLD AUTO: 3.21 X10*6/UL (ref 4–5.2)
RBC # BLD AUTO: 3.29 X10*6/UL (ref 4–5.2)
RBC # BLD AUTO: 3.33 X10*6/UL (ref 4–5.2)
RBC # BLD AUTO: 3.33 X10*6/UL (ref 4–5.2)
RBC # BLD AUTO: 3.34 X10*6/UL (ref 4–5.2)
RBC # BLD AUTO: 3.51 X10*6/UL (ref 4–5.2)
RBC # BLD AUTO: 3.53 X10*6/UL (ref 4–5.2)
RBC # UR STRIP.AUTO: ABNORMAL /UL
RBC #/AREA URNS AUTO: >20 /HPF
RBC MORPH BLD: ABNORMAL
RH FACTOR (ANTIGEN D): NORMAL
RH FACTOR (ANTIGEN D): NORMAL
SAO2 % BLDA: 89 % (ref 94–100)
SCHISTOCYTES BLD QL SMEAR: ABNORMAL
SODIUM BLDA-SCNC: 140 MMOL/L (ref 136–145)
SODIUM SERPL-SCNC: 138 MMOL/L (ref 136–145)
SODIUM SERPL-SCNC: 138 MMOL/L (ref 136–145)
SODIUM SERPL-SCNC: 140 MMOL/L (ref 136–145)
SODIUM SERPL-SCNC: 140 MMOL/L (ref 136–145)
SODIUM SERPL-SCNC: 141 MMOL/L (ref 136–145)
SODIUM SERPL-SCNC: 142 MMOL/L (ref 136–145)
SODIUM SERPL-SCNC: 143 MMOL/L (ref 136–145)
SODIUM SERPL-SCNC: 144 MMOL/L (ref 136–145)
SODIUM SERPL-SCNC: 146 MMOL/L (ref 136–145)
SODIUM UR-SCNC: 64 MMOL/L
SODIUM UR-SCNC: 66 MMOL/L
SODIUM/CREAT UR-RTO: 267 MMOL/G CREAT
SODIUM/CREAT UR-RTO: 386 MMOL/G CREAT
SP GR UR STRIP.AUTO: 1.01
SQUAMOUS #/AREA URNS AUTO: ABNORMAL /HPF
T AXIS: 114 DEGREES
T OFFSET: 408 MS
T3FREE SERPL-MCNC: 2.5 PG/ML (ref 2.3–4.2)
T4 FREE SERPL-MCNC: 0.96 NG/DL (ref 0.61–1.12)
TIBC SERPL-MCNC: 253 UG/DL (ref 240–445)
TOTAL CELLS COUNTED BLD: 100
TRANS CELLS #/AREA UR COMP ASSIST: ABNORMAL /HPF
TRIGL SERPL-MCNC: 308 MG/DL (ref 0–149)
TSH SERPL-ACNC: 2.97 MIU/L (ref 0.44–3.98)
UIBC SERPL-MCNC: 224 UG/DL (ref 110–370)
UNIT ABO: NORMAL
UNIT NUMBER: NORMAL
UNIT RH: NORMAL
UNIT VOLUME: 350
UROBILINOGEN UR STRIP.AUTO-MCNC: 4 MG/DL
UROBILINOGEN UR STRIP.AUTO-MCNC: <2 MG/DL
UROBILINOGEN UR STRIP.AUTO-MCNC: <2 MG/DL
VENTRICULAR RATE: 64 BPM
VLDL: 62 MG/DL (ref 0–40)
WBC # BLD AUTO: 10.9 X10*3/UL (ref 4.4–11.3)
WBC # BLD AUTO: 11 X10*3/UL (ref 4.4–11.3)
WBC # BLD AUTO: 11.2 X10*3/UL (ref 4.4–11.3)
WBC # BLD AUTO: 12.5 X10*3/UL (ref 4.4–11.3)
WBC # BLD AUTO: 13.1 X10*3/UL (ref 4.4–11.3)
WBC # BLD AUTO: 16.6 X10*3/UL (ref 4.4–11.3)
WBC # BLD AUTO: 20.1 X10*3/UL (ref 4.4–11.3)
WBC # BLD AUTO: 20.3 X10*3/UL (ref 4.4–11.3)
WBC # BLD AUTO: 21.4 X10*3/UL (ref 4.4–11.3)
WBC # BLD AUTO: 23.3 X10*3/UL (ref 4.4–11.3)
WBC # BLD AUTO: 29.4 X10*3/UL (ref 4.4–11.3)
WBC # BLD AUTO: 8.9 X10*3/UL (ref 4.4–11.3)
WBC # BLD AUTO: 9.1 X10*3/UL (ref 4.4–11.3)
WBC # BLD AUTO: 9.8 X10*3/UL (ref 4.4–11.3)
WBC #/AREA URNS AUTO: >50 /HPF
WBC CLUMPS #/AREA URNS AUTO: ABNORMAL /HPF
XM INTEP: NORMAL
YEAST BUDDING #/AREA UR COMP ASSIST: PRESENT /HPF
YEAST HYPHAE #/AREA UR COMP ASSIST: PRESENT /HPF

## 2024-01-01 PROCEDURE — 84134 ASSAY OF PREALBUMIN: CPT | Mod: PARLAB | Performed by: INTERNAL MEDICINE

## 2024-01-01 PROCEDURE — 71045 X-RAY EXAM CHEST 1 VIEW: CPT | Performed by: RADIOLOGY

## 2024-01-01 PROCEDURE — 36415 COLL VENOUS BLD VENIPUNCTURE: CPT | Performed by: INTERNAL MEDICINE

## 2024-01-01 PROCEDURE — 83735 ASSAY OF MAGNESIUM: CPT | Performed by: INTERNAL MEDICINE

## 2024-01-01 PROCEDURE — 71045 X-RAY EXAM CHEST 1 VIEW: CPT

## 2024-01-01 PROCEDURE — 80061 LIPID PANEL: CPT | Performed by: INTERNAL MEDICINE

## 2024-01-01 PROCEDURE — 84484 ASSAY OF TROPONIN QUANT: CPT | Performed by: INTERNAL MEDICINE

## 2024-01-01 PROCEDURE — 84443 ASSAY THYROID STIM HORMONE: CPT | Performed by: INTERNAL MEDICINE

## 2024-01-01 PROCEDURE — 86038 ANTINUCLEAR ANTIBODIES: CPT | Mod: PARLAB | Performed by: INTERNAL MEDICINE

## 2024-01-01 PROCEDURE — 93970 EXTREMITY STUDY: CPT

## 2024-01-01 PROCEDURE — 80053 COMPREHEN METABOLIC PANEL: CPT | Performed by: INTERNAL MEDICINE

## 2024-01-01 PROCEDURE — 85027 COMPLETE CBC AUTOMATED: CPT | Performed by: INTERNAL MEDICINE

## 2024-01-01 PROCEDURE — 71045 X-RAY EXAM CHEST 1 VIEW: CPT | Performed by: STUDENT IN AN ORGANIZED HEALTH CARE EDUCATION/TRAINING PROGRAM

## 2024-01-01 PROCEDURE — 86901 BLOOD TYPING SEROLOGIC RH(D): CPT | Performed by: INTERNAL MEDICINE

## 2024-01-01 PROCEDURE — 84481 FREE ASSAY (FT-3): CPT | Mod: PARLAB | Performed by: INTERNAL MEDICINE

## 2024-01-01 PROCEDURE — 83540 ASSAY OF IRON: CPT | Performed by: INTERNAL MEDICINE

## 2024-01-01 PROCEDURE — 82570 ASSAY OF URINE CREATININE: CPT | Performed by: INTERNAL MEDICINE

## 2024-01-01 PROCEDURE — 85007 BL SMEAR W/DIFF WBC COUNT: CPT | Performed by: INTERNAL MEDICINE

## 2024-01-01 PROCEDURE — 87086 URINE CULTURE/COLONY COUNT: CPT | Mod: PARLAB | Performed by: INTERNAL MEDICINE

## 2024-01-01 PROCEDURE — 80048 BASIC METABOLIC PNL TOTAL CA: CPT | Performed by: INTERNAL MEDICINE

## 2024-01-01 PROCEDURE — 76770 US EXAM ABDO BACK WALL COMP: CPT

## 2024-01-01 PROCEDURE — P9040 RBC LEUKOREDUCED IRRADIATED: HCPCS

## 2024-01-01 PROCEDURE — 82435 ASSAY OF BLOOD CHLORIDE: CPT | Performed by: INTERNAL MEDICINE

## 2024-01-01 PROCEDURE — 84132 ASSAY OF SERUM POTASSIUM: CPT

## 2024-01-01 PROCEDURE — 84075 ASSAY ALKALINE PHOSPHATASE: CPT | Performed by: INTERNAL MEDICINE

## 2024-01-01 PROCEDURE — 84439 ASSAY OF FREE THYROXINE: CPT | Performed by: INTERNAL MEDICINE

## 2024-01-01 PROCEDURE — 86900 BLOOD TYPING SEROLOGIC ABO: CPT | Performed by: INTERNAL MEDICINE

## 2024-01-01 PROCEDURE — 81001 URINALYSIS AUTO W/SCOPE: CPT | Performed by: INTERNAL MEDICINE

## 2024-01-01 PROCEDURE — 86920 COMPATIBILITY TEST SPIN: CPT

## 2024-01-01 PROCEDURE — 85610 PROTHROMBIN TIME: CPT | Performed by: INTERNAL MEDICINE

## 2024-01-01 PROCEDURE — 93970 EXTREMITY STUDY: CPT | Performed by: INTERNAL MEDICINE

## 2024-01-01 PROCEDURE — 76770 US EXAM ABDO BACK WALL COMP: CPT | Performed by: RADIOLOGY

## 2024-01-01 PROCEDURE — 84133 ASSAY OF URINE POTASSIUM: CPT | Performed by: INTERNAL MEDICINE

## 2024-01-01 PROCEDURE — 82043 UR ALBUMIN QUANTITATIVE: CPT | Performed by: INTERNAL MEDICINE

## 2024-01-01 NOTE — PROGRESS NOTES
Pulmonary Critical Care note    Patient Name :Macarena Wilson  Date of service : 01/01/24  MRN: [id number]  YOB: 1955      Interval History:    History of Present Illness:   68 years old  female who had an good state of health prior to her acute illness which required hospitalization recently, she had underlying history of CKD 3, hypertension, presented to the MICU at [facility] with acute hypoxic respiratory failure after COVID-pneumonia after being transferred from outside hospital, she was positive for COVID on 1013 with persistent dyspnea, CT scan of the chest showed worsening bilateral changes consistent with severe COPD and pneumonitis and ARDS, she remains on heated high flow oxygen, did receive remdesivir and Actemra on October 24, she was started on empiric therapy with bacterial and fungal treatment for concern for organizing pneumonia in addition to Decadron, she has steroids include hyperglycemia during the treatment, developed acute DVT with concern of clinical PE and she was started on anticoagulation with Eliquis, started on CPAP treatment, was aggressively diuresed but then she developed acute kidney injury,, she was evaluated by GYN for recurrent urinary symptoms, anticoagulation was held due to acute drop of hemoglobin, CT of the abdomen with urogram showed hyperdense material consistent with normal blood and left hemorrhagic cyst, repeat DVT workup showed still persistent DVT, with recommendation of weekly ultrasound  Past Medical/Surgical History:   Past Medical History:   Diagnosis Date    Chronic kidney disease     Hypertension     Personal history of diseases of the skin and subcutaneous tissue     History of rosacea    Pseudomonal bacteremia 11/27/2023     Past Surgical History:   Procedure Laterality Date    APPENDECTOMY  04/01/2016    Appendectomy    HYSTERECTOMY  12/2022    ANGEL TO    OTHER SURGICAL HISTORY  11/14/2022    Cystoscopy       Family History:   No  "relevant family history has been documented for this patient.    Allergies:     Allergies   Allergen Reactions    Fesoterodine Unknown    Mirabegron Rash    Prednisone Other     \"body aches, flu like symptoms\"         Social history:   reports that she has never smoked. She has never used smokeless tobacco. She reports that she does not use drugs.      Review of Systems:   General: denies weight gain, denies loss of appetite, fever, chills, night sweats.  HEENT: denies headaches, dizziness, head trauma, visual changes, eye pain, tinnitus, nosebleeds, hoarseness or throat pain    Respiratory: denies chest pain, dyspnea, cough and hemoptysis  Cardiovascular: denies orthopnea, paroxysmal nocturnal dyspnea, leg swelling, and previous heart attack.    Gastrointestinal: denies pain, nausea vomiting, diarrhea, constipation, melena or bleeding.  Genitourinary: denies hematuria, frequency, urgency or dysuria  Neurology: denies syncope, seizures, paralysis, paraesthesia   Endocrine: denies polyuria, polydipsia, skin or hair changes, and heat or cold intolerance  Musculoskeletal: denies joint pain, swelling, arthritis or myalgia  Hematologic: denies bleeding, adenopathy and easy bruising  Skin: denies rashes and skin discoloration  Psychiatry: denies depression    Physical Exam:   Vital Signs:   There were no vitals filed for this visit.  There were no vitals filed for this visit.      Input/Output:  No intake or output data in the 24 hours ending 01/01/24 1151    Oxygen requirements:    Ventilator Information:              General appearance: Not in pain or distress, in no respiratory distress    HEENT: Atraumatic/normocephalic, EOMI, JANICE, pharynx clear, moist mucosa, redness of the uvula appreciated,   Neck: Supple, no jugular venous distension, lymphadenopathy, thyromegaly or carotid bruits  Chest: Bilateral diminished breath sounds  Cardiovascular: Normal S1, S2, regular rate and rhythm, no murmur, rub or " gallop  Abdomen: Normal sounds present, soft, lax with no tenderness, no hepatosplenomegaly, and no masses  Extremities: No edema. Pulses are equally present.   Skin: intact, no rashes   Neurologic: Alert and oriented x 3, No focal deficit         Investigations:  Labs, radiological imaging and cardiac work up were personally reviewed    Results from last 7 days   Lab Units 01/01/24  0950 12/31/23  0610 12/30/23  0845   SODIUM mmol/L 138 137 136   POTASSIUM mmol/L 4.6 4.4 4.5   CHLORIDE mmol/L 105 107 106   CO2 mmol/L 24 23 18*   BUN mg/dL 33* 37* 38*   CREATININE mg/dL 0.90 0.80 0.87   GLUCOSE mg/dL 75 95 136*   CALCIUM mg/dL 8.2* 8.1* 8.6       Results from last 7 days   Lab Units 01/01/24  0950   WBC AUTO x10*3/uL 13.1*   HEMOGLOBIN g/dL 9.5*   HEMATOCRIT % 29.5*   PLATELETS AUTO x10*3/uL 144*           CT urography w 3D volume rendered imaging    Result Date: 12/31/2023  Interpreted By:  Yakov Jacob and Ogievich Taessa STUDY: CT UROGRAPHY WITH 3D VOLUME RENDERED IMAGING;  12/29/2023 11:36 am   INDICATION: Signs/Symptoms:Per urology recommendations, hematuria.   COMPARISON: CT cystogram 08/16/2023 CT abdomen/pelvis 03/01/2023 CT urography 09/24/2022 CTA chest 11/26/2023   ACCESSION NUMBER(S): [id number]   ORDERING CLINICIAN: CASEY DYER   TECHNIQUE: CT of the abdomen and pelvis was performed.  MULTIPHASE KIDNEY protocol was performed including contiguous axial images through the abdomen at 3 mm slice thickness before and in arterial and delayed phases post contrast. Coronal and sagittal reconstructions at 3 mm slice thickness were performed. 3D volume rendering images were obtained. 75 ml of contrast Omnipaque 350 were administered intravenously without immediate complication.   FINDINGS: LOWER CHEST: Again seen interlobular septal thickening, architectural distortion and mild bronchiectasis throughout the lungs similar to prior CT chest from 11/26/2023. No evidence of pleural effusion or  pneumothorax.   ABDOMEN:   KIDNEYS AND URETERS: Left kidney/ureter: Left kidney is normal in size. No evidence of nephrolithiasis or hydroureteronephrosis. The proximal left ureter is not opacified on delayed series (series 6, images 193-247). There is an hypoattenuating exophytic cortical cyst in the left lower renal pole measuring 2 x 2 cm (series 3, image 70), above water attenuation compatible with a hemorrhagic versus proteinaceous cyst, has a similar appearance compared to prior CT from 03/01/2023.   Right kidney/ureter: Moderate right renal cortical thinning and atrophy. There is a 0.3 cm stone in the right mid ureter on nonenhanced imaging (series 2, image 97), was seen on prior CT cystogram 08/16/2023. No evidence of hydroureteronephrosis. There is decreased opacification of the right ureter distal to the ureteral calyx on delayed series. There is a nonobstructive calyx stone measuring 1.3 x 1.6 cm on unenhanced images (series 2, image 65). Again seen is urothelial thickening predominantly with mild enhancement in the mid to proximal ureter (series 3, images 77-95), similar to prior. Again seen several round hypodensities in the right kidney with the largest measuring 1.4 x 1.3 cm in the midpole (series 3, image 68), favoring to represent a simple cyst.   3D reformations: Left proximal ureter is not depicted due to incomplete opacification, within this limitation there is no evidence of hydroureteronephrosis. The distal right ureter is not depicted due to incomplete opacification. The visualized portion of the right renal collecting system without evidence of hydroureteronephrosis.   BLADDER: Hyperattenuating material in the bladder with a proximally 50 Hounsfield units and may represent unclotted blood. There is contrast material in the posterior dependent portion. The urinary bladder is filled with air with a Max catheter in place.   LIVER: The liver is normal in size without evidence of focal liver  lesions. Subcentimeter hypodensity in segment JOCELYNE, too small to characterize.   BILE DUCTS: The bile ducts are not dilated.   GALLBLADDER: Gallbladder: No calcified stones. No wall thickening.   PANCREAS: The pancreas appears unremarkable.   SPLEEN: Within normal limits.   ADRENAL GLANDS: Within normal limits.   REPRODUCTIVE ORGANS: The uterus is surgically absent. No contrast in the vaginal vault to suggest vesicovaginal fistula. No adnexal mass.   BOWEL: The stomach is unremarkable. Bowel is partially obscured at the pelvis due to beam hardening. The small and large bowel are normal in caliber and demonstrate no wall thickening. Large colon diverticulosis without diverticulitis. The appendix is not definitely visualized. There is however no pericecal stranding or fluid.   VESSELS: Vascular calcification of the abdominal aorta and its branching vessels. There is no aneurysmal dilatation of the abdominal aorta. The IVC is within normal limits.   PERITONEUM/RETROPERITONEUM/LYMPH NODES: Few small pelvic phleboliths are present. No ascites or free air, no fluid collection.  The retroperitoneum appears unremarkable.  No enlarged mesenteric lymph nodes.   ABDOMINAL WALL: Within normal limits.   BONE AND SOFT TISSUE: No suspicious osseous lesions are present. Small left joint effusion. Degenerative discogenic disease is noted in the lower thoracic and lumbar spine.       1. Hyperdense material in the bladder consistent with blood products. Air foci in the bladder likely from Max instrumentation. 2. 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 There is no contrast in the vaginal vault identified. 3. Stable complex left hemorrhagic cyst. 4. Nonobstructive tiny 0.3 cm calculus in the right mid ureter and calyx calculi measuring 1.3 x 1.6 cm  in the right renal upper pole. No left renal or ureter calculi. 5. Stable right renal atrophy. 6. Diffuse mid to proximal right urothelial  thickening, seen on prior imaging. 7. Again seen interlobular septal thickening, architectural distortion and mild bronchiectasis throughout the lungs similar to prior CT chest from 11/26/2023 and are concerning for fibrotic/emphysematous lung changes. 8. Small left joint effusion without evidence of acute osseous process.   I personally reviewed the images/study and I agree with the findings as stated by Louise Ma DO, PGY-2. This study was interpreted at [facility], [place], [place].   MACRO: None   Signed by: Yakov Jacob 12/31/2023 10:07 AM Dictation workstation:   [id number]    Vascular US lower extremity venous duplex bilateral    Result Date: 12/29/2023            [place] [address]   Tel [phone] and Fax [phone]  Vascular Lab Report VASC US LOWER EXTREMITY VENOUS DUPLEX BILATERAL  Patient Name:     KARLA MARIFER AGUILERA Reading           [id number] Kami Wells MD,                                      Physician:        BHUPINDER Study Date:       12/29/2023         Ordering          [id number] JOSE FERNANDEZ                                      Physician: MRN/PID:          [id number]           Technologist:     Aruna Bonner [facility] Accession#:       [id number]       Technologist 2: Date of           1955 / 68      Encounter#:       [id number] Birth/Age:        years Gender:           F Admission Status: Inpatient          Location          [facility]                                      Performed:  Diagnosis/ICD: Acute embolism and thrombosis of unspecified deep veins of left                distal lower extremity-I82.4Z2 Indication:    Venous thrombosis (CAVA) CPT Codes:     65374 Peripheral venous duplex scan for DVT complete  Patient History Previous DVT.  **CRITICAL RESULT** Critical Result: Left PTV and soleal vein DVT Notification called to Jose Fernandez MD on 12/29/2023 at 4:00:01 PM by Aruna Bonner  RVT.  CONCLUSIONS: Right Lower Venous: No evidence of acute deep vein thrombus visualized in the right lower extremity. Left Lower Venous: There is acute occlusive deep vein thrombosis visualized in the soleal vein. There is acute non-occlusive deep vein thrombosis visualized in the posterior tibial vein. The remainder of the left leg is negative for deep vein thrombosis.  Comparison: Compared with study from 12/18/2023, there is new left PTV DVT. Patient has known left soleal vein DVT from 11/13/23, that was not visualized in the previous exam, but was visualized in today's exam.  Imaging & Doppler Findings:  Right                 Compressible Thrombus        Flow Distal External Iliac     Yes        None   Spontaneous/Phasic CFV                       Yes        None   Spontaneous/Phasic PFV                       Yes        None FV Proximal               Yes        None   Spontaneous/Phasic FV Mid                    Yes        None FV Distal                 Yes        None Popliteal                 Yes        None   Spontaneous/Phasic Peroneal                  Yes        None PTV                       Yes        None  Left                  Compress      Thrombus              Flow Distal External Iliac   Yes           None         Spontaneous/Phasic CFV                     Yes           None         Spontaneous/Phasic PFV                     Yes           None FV Proximal             Yes           None         Spontaneous/Phasic FV Mid                  Yes           None FV Distal               Yes           None Popliteal               Yes           None         Spontaneous/Phasic Peroneal                Yes           None PTV                   Partial  Acute non-occlusive Soleal                Partial    Acute occlusive  [id number] Kami Wells MD, RPVI Electronically signed by [id number] Kami Wells MD, RPVI on 12/29/2023 at 7:49:05 PM  ** Final **     US bladder    Result Date: 12/29/2023  Interpreted By:  Cholo Cevallos,   and Kaylene Young STUDY: US BLADDER;  12/28/2023 7:45 pm   INDICATION: Signs/Symptoms:R/o clots iso hematuria per Urology.   COMPARISON: CT cystogram on 08/16/2023   ACCESSION NUMBER(S): [id number]   ORDERING CLINICIAN: JOSEFINA MILAN   TECHNIQUE: Multiple grayscale and color ultrasound images of the bladder were obtained.   FINDINGS: There is a complex heterogeneously echogenic region seen at the inferior aspect of the bladder, without flow seen on Doppler images.       1.  Complex heterogeneously echogenic layering material within the bladder, likely represents debris versus clot.   I personally reviewed the images/study and I agree with the findings as stated. This study was interpreted at [place], [place].   MACRO: None   Signed by: Cholo Cevallos 12/29/2023 5:56 AM Dictation workstation:   [id number]    Lower extremity venous duplex bilateral    Result Date: 12/18/2023            [place] [address]   Tel [phone] and Fax [phone]  Vascular Lab Report [facility] US LOWER EXTREMITY VENOUS DUPLEX BILATERAL  Patient Name:      KARLA Cruz Physician: [id number] Alisa Puri MD Study Date:        12/18/2023          Ordering           [id number] BRENDAN LIU                                        Physician:         SONG MRN/PID:           [id number]            Technologist:      Jonh BARKLEY Accession#:        [id number]        Technologist 2: Date of Birth/Age: 1955 / 68 years Encounter#:        [id number] Gender:            F Admission Status:  Inpatient           Location           [facility]                                        Performed:  Diagnosis/ICD: Generalized edema (anasarca)-R60.1 CPT Codes:     39224 Peripheral venous duplex scan for DVT complete  CONCLUSIONS: Right Lower Venous: No evidence of acute deep vein thrombus  visualized in the right lower extremity. Left Lower Venous: No evidence of acute deep vein thrombus visualized in the left lower extremity. Cannot rule out thrombus in non-visualized peroneal vein.  Imaging & Doppler Findings:  Right                 Compressible Thrombus        Flow Distal External Iliac     Yes        None   Spontaneous/Phasic CFV                       Yes        None   Spontaneous/Phasic PFV                       Yes        None FV Proximal               Yes        None       Continuous FV Mid                    Yes        None FV Distal                 Yes        None Popliteal                 Yes        None       Continuous Peroneal                  Yes        None PTV                       Yes        None  Left                  Compress Thrombus        Flow Distal External Iliac   Yes      None   Spontaneous/Phasic CFV                     Yes      None   Spontaneous/Phasic PFV                     Yes      None FV Proximal             Yes      None   Spontaneous/Phasic FV Mid                  Yes      None FV Distal               Yes      None Popliteal               Yes      None       Continuous PTV                     Yes      None  [id number] Alisa Puri MD Electronically signed by [id number] Alisa Puri MD on 12/18/2023 at 1:18:07 PM  ** Final **     ECG 12 lead    Result Date: 12/11/2023  Sinus rhythm with Fusion complexes ST & T wave abnormality, consider lateral ischemia Abnormal ECG When compared with ECG of 01-DEC-2023 10:56, (unconfirmed) Fusion complexes are now Present T wave inversion now evident in Anterior leads    XR chest 2 views    Result Date: 12/6/2023  Interpreted By:  Salvador Robles, STUDY: XR CHEST 2 VIEWS;  12/6/2023 10:11 am   INDICATION: Signs/Symptoms:Eval of Puml Fibrosis.   COMPARISON: 11/24/2023.   ACCESSION NUMBER(S): [id number]   ORDERING CLINICIAN: JORDON PARRISH   FINDINGS:     CARDIOMEDIASTINAL SILHOUETTE: Cardiomediastinal silhouette is normal in size and  configuration.   LUNGS: Digital tomosynthesis of the lung parenchyma was performed. There is diffuse interstitial airspace opacities with prominent traction bronchiectatic changes. There is no evidence of pneumothorax.   ABDOMEN: No remarkable upper abdominal findings.   BONES: No acute osseous changes.       1.  There are diffuse interstitial airspace opacities with traction bronchiectatic changes. This may represent residua of prior COVID-19 pneumonia/ARDS.     Signed by: Salvador Robles 12/6/2023 10:21 AM Dictation workstation:   [id number]      Current Medications:   Scheduled medications  Reviewed  Continuous medications    PRN medications    Medications from transferring facility:  cheduled medications  ciprofloxacin, 750 mg, oral, BID  [Held by provider] enoxaparin, 40 mg, subcutaneous, Daily  ergocalciferol, 1,250 mcg, oral, Weekly  fluticasone, 1 spray, Each Nostril, BID  insulin glargine, 4 Units, subcutaneous, Once  [Held by provider] insulin glargine, 8 Units, subcutaneous, Nightly  insulin lispro, 0-15 Units, subcutaneous, TID with meals  lidocaine, 1 Application, urethral, Once  lidocaine, 1 patch, transdermal, Daily  lidocaine, 1 patch, transdermal, Daily  melatonin, 5 mg, oral, Daily  metoprolol tartrate, 75 mg, oral, BID  pantoprazole, 40 mg, oral, Daily before breakfast  predniSONE, 30 mg, oral, BID  QUEtiapine, 25 mg, oral, Nightly  sennosides-docusate sodium, 1 tablet, oral, BID  [Held by provider] sodium zirconium cyclosilicate, 10 g, oral, TID  sulfamethoxazole-trimethoprim, 80 mg, oral, Daily  thiamine, 100 mg, oral, Daily        Continuous medications  oxygen, , Last Rate: 6 L/min (12/27/23 0927)        PRN medications  PRN medications: acetaminophen **OR** [DISCONTINUED] acetaminophen **OR** [DISCONTINUED] acetaminophen, dextrose 10 % in water (D10W), dextrose, diclofenac sodium, glucagon, ipratropium-albuteroL, lidocaine-diphenhydraMINE-Maalox 1:1:1, loperamide, ondansetron ODT **OR**  [DISCONTINUED] ondansetron, oxyCODONE, QUEtiapine  Assessment  Patient is -year-old (man/woman) with the following medical Problems:    Acute severe hypoxic respiratory failure  ARDS  COVID-19 infection with possible secondary pneumonia status post remdesivir treatment and Actima  Persistent DVT of anticoagulation due to the recent bleed with concern of coexisting PE  Recent acute kidney injury  Recent acute anemia, 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  Weekly duplex ultrasound is recommended  Management of hematuria per primary care provider, urology recommendation was to continue three-way flushing  Follow-up with urology in 2 to 3 weeks  Per urology note from transferring facility if hematuria stopped anticoagulation could be restarted  Monitor H&H transfuse hemoglobin less than 7  Check chest x-ray        Umang Jackson MD  01/01/24     STAFF PHYSICIAN NOTE OF PERSONAL INVOLVEMENT IN CARE  As the attending physician, I certify that I personally reviewed the patient's history and personally examined the patient to confirm the physical findings described above, and that I reviewed the relevant imaging studies and available reports.  I also discussed the differential diagnosis and all of the proposed management plans with the patient and individuals accompanying the patient to this visit.  They had the opportunity to ask questions about the proposed management plans and to have those questions answered.

## 2024-01-01 NOTE — CONSULTS
Pulmonary Critical Care note    Patient Name :Macarena Wilson  Date of service : 01/01/24  MRN: [id number]  YOB: 1955      Interval History:    History of Present Illness:   68 years old  female who had an good state of health prior to her acute illness which required hospitalization recently, she had underlying history of CKD 3, hypertension, presented to the MICU at [facility] with acute hypoxic respiratory failure after COVID-pneumonia after being transferred from outside hospital, she was positive for COVID on 1013 with persistent dyspnea, CT scan of the chest showed worsening bilateral changes consistent with severe COPD and pneumonitis and ARDS, she remains on heated high flow oxygen, did receive remdesivir and Actemra on October 24, she was started on empiric therapy with bacterial and fungal treatment for concern for organizing pneumonia in addition to Decadron, she has steroids include hyperglycemia during the treatment, developed acute DVT with concern of clinical PE and she was started on anticoagulation with Eliquis, started on CPAP treatment, was aggressively diuresed but then she developed acute kidney injury,, she was evaluated by GYN for recurrent urinary symptoms, anticoagulation was held due to acute drop of hemoglobin, CT of the abdomen with urogram showed hyperdense material consistent with normal blood and left hemorrhagic cyst, repeat DVT workup showed still persistent DVT, with recommendation of weekly ultrasound  Past Medical/Surgical History:   Past Medical History:   Diagnosis Date    Chronic kidney disease     Hypertension     Personal history of diseases of the skin and subcutaneous tissue     History of rosacea    Pseudomonal bacteremia 11/27/2023     Past Surgical History:   Procedure Laterality Date    APPENDECTOMY  04/01/2016    Appendectomy    HYSTERECTOMY  12/2022    ANGEL TO    OTHER SURGICAL HISTORY  11/14/2022    Cystoscopy       Family History:   No  "relevant family history has been documented for this patient.    Allergies:     Allergies   Allergen Reactions    Fesoterodine Unknown    Mirabegron Rash    Prednisone Other     \"body aches, flu like symptoms\"         Social history:   reports that she has never smoked. She has never used smokeless tobacco. She reports that she does not use drugs.      Review of Systems:   General: denies weight gain, denies loss of appetite, fever, chills, night sweats.  HEENT: denies headaches, dizziness, head trauma, visual changes, eye pain, tinnitus, nosebleeds, hoarseness or throat pain    Respiratory: denies chest pain, dyspnea, cough and hemoptysis  Cardiovascular: denies orthopnea, paroxysmal nocturnal dyspnea, leg swelling, and previous heart attack.    Gastrointestinal: denies pain, nausea vomiting, diarrhea, constipation, melena or bleeding.  Genitourinary: denies hematuria, frequency, urgency or dysuria  Neurology: denies syncope, seizures, paralysis, paraesthesia   Endocrine: denies polyuria, polydipsia, skin or hair changes, and heat or cold intolerance  Musculoskeletal: denies joint pain, swelling, arthritis or myalgia  Hematologic: denies bleeding, adenopathy and easy bruising  Skin: denies rashes and skin discoloration  Psychiatry: denies depression    Physical Exam:   Vital Signs:   There were no vitals filed for this visit.  There were no vitals filed for this visit.      Input/Output:  No intake or output data in the 24 hours ending 01/01/24 1217    Oxygen requirements:    Ventilator Information:              General appearance: Not in pain or distress, in no respiratory distress    HEENT: Atraumatic/normocephalic, EOMI, JANICE, pharynx clear, moist mucosa, redness of the uvula appreciated,   Neck: Supple, no jugular venous distension, lymphadenopathy, thyromegaly or carotid bruits  Chest: Bilateral diminished breath sounds  Cardiovascular: Normal S1, S2, regular rate and rhythm, no murmur, rub or " gallop  Abdomen: Normal sounds present, soft, lax with no tenderness, no hepatosplenomegaly, and no masses  Extremities: No edema. Pulses are equally present.   Skin: intact, no rashes   Neurologic: Alert and oriented x 3, No focal deficit         Investigations:  Labs, radiological imaging and cardiac work up were personally reviewed    Results from last 7 days   Lab Units 01/01/24  0950 12/31/23  0610 12/30/23  0845   SODIUM mmol/L 138 137 136   POTASSIUM mmol/L 4.6 4.4 4.5   CHLORIDE mmol/L 105 107 106   CO2 mmol/L 24 23 18*   BUN mg/dL 33* 37* 38*   CREATININE mg/dL 0.90 0.80 0.87   GLUCOSE mg/dL 75 95 136*   CALCIUM mg/dL 8.2* 8.1* 8.6       Results from last 7 days   Lab Units 01/01/24  0950   WBC AUTO x10*3/uL 13.1*   HEMOGLOBIN g/dL 9.5*   HEMATOCRIT % 29.5*   PLATELETS AUTO x10*3/uL 144*           CT urography w 3D volume rendered imaging    Result Date: 12/31/2023  Interpreted By:  Yakov Jacob and Ogievich Taessa STUDY: CT UROGRAPHY WITH 3D VOLUME RENDERED IMAGING;  12/29/2023 11:36 am   INDICATION: Signs/Symptoms:Per urology recommendations, hematuria.   COMPARISON: CT cystogram 08/16/2023 CT abdomen/pelvis 03/01/2023 CT urography 09/24/2022 CTA chest 11/26/2023   ACCESSION NUMBER(S): [id number]   ORDERING CLINICIAN: CASEY DYER   TECHNIQUE: CT of the abdomen and pelvis was performed.  MULTIPHASE KIDNEY protocol was performed including contiguous axial images through the abdomen at 3 mm slice thickness before and in arterial and delayed phases post contrast. Coronal and sagittal reconstructions at 3 mm slice thickness were performed. 3D volume rendering images were obtained. 75 ml of contrast Omnipaque 350 were administered intravenously without immediate complication.   FINDINGS: LOWER CHEST: Again seen interlobular septal thickening, architectural distortion and mild bronchiectasis throughout the lungs similar to prior CT chest from 11/26/2023. No evidence of pleural effusion or  pneumothorax.   ABDOMEN:   KIDNEYS AND URETERS: Left kidney/ureter: Left kidney is normal in size. No evidence of nephrolithiasis or hydroureteronephrosis. The proximal left ureter is not opacified on delayed series (series 6, images 193-247). There is an hypoattenuating exophytic cortical cyst in the left lower renal pole measuring 2 x 2 cm (series 3, image 70), above water attenuation compatible with a hemorrhagic versus proteinaceous cyst, has a similar appearance compared to prior CT from 03/01/2023.   Right kidney/ureter: Moderate right renal cortical thinning and atrophy. There is a 0.3 cm stone in the right mid ureter on nonenhanced imaging (series 2, image 97), was seen on prior CT cystogram 08/16/2023. No evidence of hydroureteronephrosis. There is decreased opacification of the right ureter distal to the ureteral calyx on delayed series. There is a nonobstructive calyx stone measuring 1.3 x 1.6 cm on unenhanced images (series 2, image 65). Again seen is urothelial thickening predominantly with mild enhancement in the mid to proximal ureter (series 3, images 77-95), similar to prior. Again seen several round hypodensities in the right kidney with the largest measuring 1.4 x 1.3 cm in the midpole (series 3, image 68), favoring to represent a simple cyst.   3D reformations: Left proximal ureter is not depicted due to incomplete opacification, within this limitation there is no evidence of hydroureteronephrosis. The distal right ureter is not depicted due to incomplete opacification. The visualized portion of the right renal collecting system without evidence of hydroureteronephrosis.   BLADDER: Hyperattenuating material in the bladder with a proximally 50 Hounsfield units and may represent unclotted blood. There is contrast material in the posterior dependent portion. The urinary bladder is filled with air with a Max catheter in place.   LIVER: The liver is normal in size without evidence of focal liver  lesions. Subcentimeter hypodensity in segment JOCELYNE, too small to characterize.   BILE DUCTS: The bile ducts are not dilated.   GALLBLADDER: Gallbladder: No calcified stones. No wall thickening.   PANCREAS: The pancreas appears unremarkable.   SPLEEN: Within normal limits.   ADRENAL GLANDS: Within normal limits.   REPRODUCTIVE ORGANS: The uterus is surgically absent. No contrast in the vaginal vault to suggest vesicovaginal fistula. No adnexal mass.   BOWEL: The stomach is unremarkable. Bowel is partially obscured at the pelvis due to beam hardening. The small and large bowel are normal in caliber and demonstrate no wall thickening. Large colon diverticulosis without diverticulitis. The appendix is not definitely visualized. There is however no pericecal stranding or fluid.   VESSELS: Vascular calcification of the abdominal aorta and its branching vessels. There is no aneurysmal dilatation of the abdominal aorta. The IVC is within normal limits.   PERITONEUM/RETROPERITONEUM/LYMPH NODES: Few small pelvic phleboliths are present. No ascites or free air, no fluid collection.  The retroperitoneum appears unremarkable.  No enlarged mesenteric lymph nodes.   ABDOMINAL WALL: Within normal limits.   BONE AND SOFT TISSUE: No suspicious osseous lesions are present. Small left joint effusion. Degenerative discogenic disease is noted in the lower thoracic and lumbar spine.       1. Hyperdense material in the bladder consistent with blood products. Air foci in the bladder likely from Max instrumentation. 2. 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 There is no contrast in the vaginal vault identified. 3. Stable complex left hemorrhagic cyst. 4. Nonobstructive tiny 0.3 cm calculus in the right mid ureter and calyx calculi measuring 1.3 x 1.6 cm  in the right renal upper pole. No left renal or ureter calculi. 5. Stable right renal atrophy. 6. Diffuse mid to proximal right urothelial  thickening, seen on prior imaging. 7. Again seen interlobular septal thickening, architectural distortion and mild bronchiectasis throughout the lungs similar to prior CT chest from 11/26/2023 and are concerning for fibrotic/emphysematous lung changes. 8. Small left joint effusion without evidence of acute osseous process.   I personally reviewed the images/study and I agree with the findings as stated by Louise Ma DO, PGY-2. This study was interpreted at [facility], [place], [place].   MACRO: None   Signed by: Yakov Jacob 12/31/2023 10:07 AM Dictation workstation:   [id number]    Vascular US lower extremity venous duplex bilateral    Result Date: 12/29/2023            [place] [address]   Tel [phone] and Fax [phone]  Vascular Lab Report VASC US LOWER EXTREMITY VENOUS DUPLEX BILATERAL  Patient Name:     KARLA MARIFER AGUILERA Reading           [id number] Kami Wells MD,                                      Physician:        BHUPINDER Study Date:       12/29/2023         Ordering          [id number] JOSE FERNANDEZ                                      Physician: MRN/PID:          [id number]           Technologist:     Aruna Bonner [facility] Accession#:       [id number]       Technologist 2: Date of           1955 / 68      Encounter#:       [id number] Birth/Age:        years Gender:           F Admission Status: Inpatient          Location          [facility]                                      Performed:  Diagnosis/ICD: Acute embolism and thrombosis of unspecified deep veins of left                distal lower extremity-I82.4Z2 Indication:    Venous thrombosis (CAVA) CPT Codes:     91415 Peripheral venous duplex scan for DVT complete  Patient History Previous DVT.  **CRITICAL RESULT** Critical Result: Left PTV and soleal vein DVT Notification called to Jose Fernandez MD on 12/29/2023 at 4:00:01 PM by Aruna Bonner  RVT.  CONCLUSIONS: Right Lower Venous: No evidence of acute deep vein thrombus visualized in the right lower extremity. Left Lower Venous: There is acute occlusive deep vein thrombosis visualized in the soleal vein. There is acute non-occlusive deep vein thrombosis visualized in the posterior tibial vein. The remainder of the left leg is negative for deep vein thrombosis.  Comparison: Compared with study from 12/18/2023, there is new left PTV DVT. Patient has known left soleal vein DVT from 11/13/23, that was not visualized in the previous exam, but was visualized in today's exam.  Imaging & Doppler Findings:  Right                 Compressible Thrombus        Flow Distal External Iliac     Yes        None   Spontaneous/Phasic CFV                       Yes        None   Spontaneous/Phasic PFV                       Yes        None FV Proximal               Yes        None   Spontaneous/Phasic FV Mid                    Yes        None FV Distal                 Yes        None Popliteal                 Yes        None   Spontaneous/Phasic Peroneal                  Yes        None PTV                       Yes        None  Left                  Compress      Thrombus              Flow Distal External Iliac   Yes           None         Spontaneous/Phasic CFV                     Yes           None         Spontaneous/Phasic PFV                     Yes           None FV Proximal             Yes           None         Spontaneous/Phasic FV Mid                  Yes           None FV Distal               Yes           None Popliteal               Yes           None         Spontaneous/Phasic Peroneal                Yes           None PTV                   Partial  Acute non-occlusive Soleal                Partial    Acute occlusive  [id number] Kami Wells MD, RPVI Electronically signed by [id number] Kami Wells MD, RPVI on 12/29/2023 at 7:49:05 PM  ** Final **     US bladder    Result Date: 12/29/2023  Interpreted By:  Cholo Cevallos,   and Kaylene Young STUDY: US BLADDER;  12/28/2023 7:45 pm   INDICATION: Signs/Symptoms:R/o clots iso hematuria per Urology.   COMPARISON: CT cystogram on 08/16/2023   ACCESSION NUMBER(S): [id number]   ORDERING CLINICIAN: JOSEFINA MILAN   TECHNIQUE: Multiple grayscale and color ultrasound images of the bladder were obtained.   FINDINGS: There is a complex heterogeneously echogenic region seen at the inferior aspect of the bladder, without flow seen on Doppler images.       1.  Complex heterogeneously echogenic layering material within the bladder, likely represents debris versus clot.   I personally reviewed the images/study and I agree with the findings as stated. This study was interpreted at [place], [place].   MACRO: None   Signed by: Cholo Cevallos 12/29/2023 5:56 AM Dictation workstation:   [id number]    Lower extremity venous duplex bilateral    Result Date: 12/18/2023            [place] [address]   Tel [phone] and Fax [phone]  Vascular Lab Report [facility] US LOWER EXTREMITY VENOUS DUPLEX BILATERAL  Patient Name:      KARLA Cruz Physician: [id number] Alisa Puri MD Study Date:        12/18/2023          Ordering           [id number] BRENDAN LIU                                        Physician:         SONG MRN/PID:           [id number]            Technologist:      Jonh BARKLEY Accession#:        [id number]        Technologist 2: Date of Birth/Age: 1955 / 68 years Encounter#:        [id number] Gender:            F Admission Status:  Inpatient           Location           [facility]                                        Performed:  Diagnosis/ICD: Generalized edema (anasarca)-R60.1 CPT Codes:     66448 Peripheral venous duplex scan for DVT complete  CONCLUSIONS: Right Lower Venous: No evidence of acute deep vein thrombus  visualized in the right lower extremity. Left Lower Venous: No evidence of acute deep vein thrombus visualized in the left lower extremity. Cannot rule out thrombus in non-visualized peroneal vein.  Imaging & Doppler Findings:  Right                 Compressible Thrombus        Flow Distal External Iliac     Yes        None   Spontaneous/Phasic CFV                       Yes        None   Spontaneous/Phasic PFV                       Yes        None FV Proximal               Yes        None       Continuous FV Mid                    Yes        None FV Distal                 Yes        None Popliteal                 Yes        None       Continuous Peroneal                  Yes        None PTV                       Yes        None  Left                  Compress Thrombus        Flow Distal External Iliac   Yes      None   Spontaneous/Phasic CFV                     Yes      None   Spontaneous/Phasic PFV                     Yes      None FV Proximal             Yes      None   Spontaneous/Phasic FV Mid                  Yes      None FV Distal               Yes      None Popliteal               Yes      None       Continuous PTV                     Yes      None  [id number] Alisa Puri MD Electronically signed by [id number] Alisa Puri MD on 12/18/2023 at 1:18:07 PM  ** Final **     ECG 12 lead    Result Date: 12/11/2023  Sinus rhythm with Fusion complexes ST & T wave abnormality, consider lateral ischemia Abnormal ECG When compared with ECG of 01-DEC-2023 10:56, (unconfirmed) Fusion complexes are now Present T wave inversion now evident in Anterior leads    XR chest 2 views    Result Date: 12/6/2023  Interpreted By:  Salvador Robles, STUDY: XR CHEST 2 VIEWS;  12/6/2023 10:11 am   INDICATION: Signs/Symptoms:Eval of Puml Fibrosis.   COMPARISON: 11/24/2023.   ACCESSION NUMBER(S): [id number]   ORDERING CLINICIAN: JORDON PARRISH   FINDINGS:     CARDIOMEDIASTINAL SILHOUETTE: Cardiomediastinal silhouette is normal in size and  configuration.   LUNGS: Digital tomosynthesis of the lung parenchyma was performed. There is diffuse interstitial airspace opacities with prominent traction bronchiectatic changes. There is no evidence of pneumothorax.   ABDOMEN: No remarkable upper abdominal findings.   BONES: No acute osseous changes.       1.  There are diffuse interstitial airspace opacities with traction bronchiectatic changes. This may represent residua of prior COVID-19 pneumonia/ARDS.     Signed by: Salvador Robles 12/6/2023 10:21 AM Dictation workstation:   [id number]      Current Medications:   Scheduled medications  Reviewed  Continuous medications    PRN medications    Medications from transferring facility:  cheduled medications  ciprofloxacin, 750 mg, oral, BID  [Held by provider] enoxaparin, 40 mg, subcutaneous, Daily  ergocalciferol, 1,250 mcg, oral, Weekly  fluticasone, 1 spray, Each Nostril, BID  insulin glargine, 4 Units, subcutaneous, Once  [Held by provider] insulin glargine, 8 Units, subcutaneous, Nightly  insulin lispro, 0-15 Units, subcutaneous, TID with meals  lidocaine, 1 Application, urethral, Once  lidocaine, 1 patch, transdermal, Daily  lidocaine, 1 patch, transdermal, Daily  melatonin, 5 mg, oral, Daily  metoprolol tartrate, 75 mg, oral, BID  pantoprazole, 40 mg, oral, Daily before breakfast  predniSONE, 30 mg, oral, BID  QUEtiapine, 25 mg, oral, Nightly  sennosides-docusate sodium, 1 tablet, oral, BID  [Held by provider] sodium zirconium cyclosilicate, 10 g, oral, TID  sulfamethoxazole-trimethoprim, 80 mg, oral, Daily  thiamine, 100 mg, oral, Daily        Continuous medications  oxygen, , Last Rate: 6 L/min (12/27/23 0927)        PRN medications  PRN medications: acetaminophen **OR** [DISCONTINUED] acetaminophen **OR** [DISCONTINUED] acetaminophen, dextrose 10 % in water (D10W), dextrose, diclofenac sodium, glucagon, ipratropium-albuteroL, lidocaine-diphenhydraMINE-Maalox 1:1:1, loperamide, ondansetron ODT **OR**  [DISCONTINUED] ondansetron, oxyCODONE, QUEtiapine        Assessment  Patient is -year-old (man/woman) with the following medical Problems:    Acute severe hypoxic respiratory failure  ARDS  COVID-19 infection with possible secondary pneumonia status post remdesivir treatment and Actima  Persistent DVT of anticoagulation due to the recent bleed with concern of coexisting PE  Recent acute kidney injury  Recent acute anemia, 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  Weekly duplex ultrasound is recommended  Management of hematuria per primary care provider, urology recommendation was to continue three-way flushing  Follow-up with urology in 2 to 3 weeks  Per urology note from transferring facility if hematuria stopped anticoagulation could be restarted  Monitor H&H transfuse hemoglobin less than 7  CXR showed  mild infirlateates        Umang Jackson MD  01/01/24     STAFF PHYSICIAN NOTE OF PERSONAL INVOLVEMENT IN CARE  As the attending physician, I certify that I personally reviewed the patient's history and personally examined the patient to confirm the physical findings described above, and that I reviewed the relevant imaging studies and available reports.  I also discussed the differential diagnosis and all of the proposed management plans with the patient and individuals accompanying the patient to this visit.  They had the opportunity to ask questions about the proposed management plans and to have those questions answered.

## 2024-01-01 NOTE — CONSULTS
Reason For Consult  Hematuria HUNG  Inpatient consult to nephrology    Subjective  Today she denies any complaint  Labs today  Disclose low iron saturations of 11%  Serum iron is low at 29  Normal INR  Lipid panel is close high triglycerides of 308 cholesterol was 189  Thyroid functions are normal  Urinary indices and urinalysis are still pending  Patient still have visible hematuria on Max  Ultrasound of the kidneys pending report  Objective   There were no vitals taken for this visit.  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  ARDS  Deep vein thrombosis  Hypertension  Hyperglycemia secondary to steroid use  COVID-19 pneumonia  Rosacea      Review of system; General; complaint  Skin; ecchymosis from Ivs  HEENT; denies double vision  Lungs; positive for shortness of breath denies hemoptysis  Heart; denies palpitation complain of occasional edema  GI; complain of heartburn nausea  ; chronic hematuria urinary tract infections denies metallic taste  Musculoskeletal; denies any arthralgias  Neurological denies history of seizures    Physical Exam     ..;  Pleasant [age]-year-old female chronically looking mild skin pallor  HEENT; pupils react to light and accommodation  Neck; no JVD no bruit no thyromegaly  Lungs; clear to observation and percussion  Heart no gallop no  rubs no thrills  Abdomen; active peristalsis no rebound or guarding  ; Max in place disclosing HEMATURIA  Musculoskeletal good muscle tone  Neurological; negative for jugular flexes  Data Review  Lab Results   Component Value Date    WBC 13.1 (H) 01/01/2024    HGB 9.5 (L) 01/01/2024    HCT 29.5 (L) 01/01/2024    MCV 90 01/01/2024     (L)  01/01/2024      Lab Results   Component Value Date    CALCIUM 8.2 (L) 01/01/2024    ALBUMIN 2.9 (L) 01/01/2024     01/01/2024    K 4.6 01/01/2024    CO2 24 01/01/2024     01/01/2024    BUN 33 (H) 01/01/2024    CREATININE 0.90 01/01/2024    ALT 38 01/01/2024    AST 16 01/01/2024        Assessment/Plan     Hematuria  Renal cyst  Nephrolithiasis  Anemia  COVID-19 infection  Hypertension        Surgical History  She has a past surgical history that includes Appendectomy (04/01/2016); Other surgical history (11/14/2022); and Hysterectomy (12/2022).     Social History  She reports that she has never smoked. She has never used smokeless tobacco. She reports that she does not use drugs. No history on file for alcohol use.    Family History  Family History   Problem Relation Name Age of Onset    Breast cancer Mother      Heart failure Mother      No Known Problems Father      No Known Problems Sister          Allergies  Fesoterodine, Mirabegron, and Prednisone       Plan;    IV iron  Awaiting report of ultrasound of the kidney and urinary indices        Thank you very much for allowing to participate in the care of this patient        Kendall Brennan MD

## 2024-01-02 NOTE — PROGRESS NOTES
Macarena Wilson is a 68 y.o. female on day 0 of admission presenting with No Principal Problem: There is no principal problem currently on the Problem List. Please update the Problem List and refresh..      Subjective   Patient fully evaluated and clinically improved tolerating nasal cannula oxygen at 4 L at 97% oxygen saturation       Objective     Last Recorded Vitals  There were no vitals taken for this visit.  Intake/Output last 3 Shifts:  No intake or output data in the 24 hours ending 01/02/24 1752    Admission Weight       Daily Weight  12/08/23 : 67.7 kg (149 lb 4 oz)    Image Results  ECG 12 lead  Sinus rhythm  ST & T wave abnormality, consider lateral ischemia  Abnormal ECG  When compared with ECG of 01-DEC-2023 10:56, (unconfirmed)  T wave inversion now evident in Anterior leads  Confirmed by Maulik Rowland ([id number]) on 1/1/2024 3:40:00 PM  XR chest 1 view  Narrative: Interpreted By:  Jose M Eden,   STUDY:  XR CHEST 1 VIEW      INDICATION:  Signs/Symptoms:Hypoxia, shortness of breath.      COMPARISON:  December 6, 2023      ACCESSION NUMBER(S):  [id number]      ORDERING CLINICIAN:  PATRICA CAGLE      FINDINGS:  Bilateral basilar interstitial changes likely chronic fibrosis,  similar to prior study.      No new consolidation. Cardiomegaly unchanged.      Impression: Bilateral basilar interstitial changes likely chronic fibrosis,  similar to prior study.      No new consolidation. Cardiomegaly unchanged.      Signed by: Jose M Eden 1/1/2024 11:17 AM  Dictation workstation:   [id number]      Physical Exam    Relevant Results               Assessment/Plan Pulmonary Critical Care note    Patient Name :Macarena Wilson  Date of service : 01/02/24  MRN: [id number]  YOB: 1955      Interval History:    History of Present Illness:   68 years old  female who had an good state of health prior to her acute illness which required hospitalization recently, she had underlying history of CKD 3,  "hypertension, presented to the MICU at [facility] with acute hypoxic respiratory failure after COVID-pneumonia after being transferred from outside hospital, she was positive for COVID on 1013 with persistent dyspnea, CT scan of the chest showed worsening bilateral changes consistent with severe COPD and pneumonitis and ARDS, she remains on heated high flow oxygen, did receive remdesivir and Actemra on October 24, she was started on empiric therapy with bacterial and fungal treatment for concern for organizing pneumonia in addition to Decadron, she has steroids include hyperglycemia during the treatment, developed acute DVT with concern of clinical PE and she was started on anticoagulation with Eliquis, started on CPAP treatment, was aggressively diuresed but then she developed acute kidney injury,, she was evaluated by GYN for recurrent urinary symptoms, anticoagulation was held due to acute drop of hemoglobin, CT of the abdomen with urogram showed hyperdense material consistent with normal blood and left hemorrhagic cyst, repeat DVT workup showed still persistent DVT, with recommendation of weekly ultrasound  Past Medical/Surgical History:   Past Medical History:   Diagnosis Date    Chronic kidney disease     Hypertension     Personal history of diseases of the skin and subcutaneous tissue     History of rosacea    Pseudomonal bacteremia 11/27/2023     Past Surgical History:   Procedure Laterality Date    APPENDECTOMY  04/01/2016    Appendectomy    HYSTERECTOMY  12/2022    [facility], ANGEL    OTHER SURGICAL HISTORY  11/14/2022    Cystoscopy       Family History:   No relevant family history has been documented for this patient.    Allergies:     Allergies   Allergen Reactions    Fesoterodine Unknown    Mirabegron Rash    Prednisone Other     \"body aches, flu like symptoms\"         Social history:   reports that she has never smoked. She has never used smokeless tobacco. She reports that she does not use " drugs.      Review of Systems:   General: denies weight gain, denies loss of appetite, fever, chills, night sweats.  HEENT: denies headaches, dizziness, head trauma, visual changes, eye pain, tinnitus, nosebleeds, hoarseness or throat pain    Respiratory: denies chest pain, dyspnea, cough and hemoptysis  Cardiovascular: denies orthopnea, paroxysmal nocturnal dyspnea, leg swelling, and previous heart attack.    Gastrointestinal: denies pain, nausea vomiting, diarrhea, constipation, melena or bleeding.  Genitourinary: denies hematuria, frequency, urgency or dysuria  Neurology: denies syncope, seizures, paralysis, paraesthesia   Endocrine: denies polyuria, polydipsia, skin or hair changes, and heat or cold intolerance  Musculoskeletal: denies joint pain, swelling, arthritis or myalgia  Hematologic: denies bleeding, adenopathy and easy bruising  Skin: denies rashes and skin discoloration  Psychiatry: denies depression    Physical Exam:   Vital Signs:   There were no vitals filed for this visit.  There were no vitals filed for this visit.      Input/Output:  No intake or output data in the 24 hours ending 01/02/24 5633    Oxygen requirements:    Ventilator Information:              General appearance: Not in pain or distress, in no respiratory distress    HEENT: Atraumatic/normocephalic, EOMI, JANICE, pharynx clear, moist mucosa, redness of the uvula appreciated,   Neck: Supple, no jugular venous distension, lymphadenopathy, thyromegaly or carotid bruits  Chest: Bilateral diminished breath sounds  Cardiovascular: Normal S1, S2, regular rate and rhythm, no murmur, rub or gallop  Abdomen: Normal sounds present, soft, lax with no tenderness, no hepatosplenomegaly, and no masses  Extremities: No edema. Pulses are equally present.   Skin: intact, no rashes   Neurologic: Alert and oriented x 3, No focal deficit         Investigations:  Labs, radiological imaging and cardiac work up were personally reviewed    Results from last  7 days   Lab Units 01/01/24  0950 12/31/23  0610 12/30/23  0845   SODIUM mmol/L 138 137 136   POTASSIUM mmol/L 4.6 4.4 4.5   CHLORIDE mmol/L 105 107 106   CO2 mmol/L 24 23 18*   BUN mg/dL 33* 37* 38*   CREATININE mg/dL 0.90 0.80 0.87   GLUCOSE mg/dL 75 95 136*   CALCIUM mg/dL 8.2* 8.1* 8.6     Results from last 7 days   Lab Units 01/01/24  0950   WBC AUTO x10*3/uL 13.1*   HEMOGLOBIN g/dL 9.5*   HEMATOCRIT % 29.5*   PLATELETS AUTO x10*3/uL 144*         CT urography w 3D volume rendered imaging    Result Date: 12/31/2023  Interpreted By:  Yakov Jacob and Ogievich Taessa STUDY: CT UROGRAPHY WITH 3D VOLUME RENDERED IMAGING;  12/29/2023 11:36 am   INDICATION: Signs/Symptoms:Per urology recommendations, hematuria.   COMPARISON: CT cystogram 08/16/2023 CT abdomen/pelvis 03/01/2023 CT urography 09/24/2022 CTA chest 11/26/2023   ACCESSION NUMBER(S): [id number]   ORDERING CLINICIAN: CASEY DYER   TECHNIQUE: CT of the abdomen and pelvis was performed.  MULTIPHASE KIDNEY protocol was performed including contiguous axial images through the abdomen at 3 mm slice thickness before and in arterial and delayed phases post contrast. Coronal and sagittal reconstructions at 3 mm slice thickness were performed. 3D volume rendering images were obtained. 75 ml of contrast Omnipaque 350 were administered intravenously without immediate complication.   FINDINGS: LOWER CHEST: Again seen interlobular septal thickening, architectural distortion and mild bronchiectasis throughout the lungs similar to prior CT chest from 11/26/2023. No evidence of pleural effusion or pneumothorax.   ABDOMEN:   KIDNEYS AND URETERS: Left kidney/ureter: Left kidney is normal in size. No evidence of nephrolithiasis or hydroureteronephrosis. The proximal left ureter is not opacified on delayed series (series 6, images 193-247). There is an hypoattenuating exophytic cortical cyst in the left lower renal pole measuring 2 x 2 cm (series 3, image 70), above  water attenuation compatible with a hemorrhagic versus proteinaceous cyst, has a similar appearance compared to prior CT from 03/01/2023.   Right kidney/ureter: Moderate right renal cortical thinning and atrophy. There is a 0.3 cm stone in the right mid ureter on nonenhanced imaging (series 2, image 97), was seen on prior CT cystogram 08/16/2023. No evidence of hydroureteronephrosis. There is decreased opacification of the right ureter distal to the ureteral calyx on delayed series. There is a nonobstructive calyx stone measuring 1.3 x 1.6 cm on unenhanced images (series 2, image 65). Again seen is urothelial thickening predominantly with mild enhancement in the mid to proximal ureter (series 3, images 77-95), similar to prior. Again seen several round hypodensities in the right kidney with the largest measuring 1.4 x 1.3 cm in the midpole (series 3, image 68), favoring to represent a simple cyst.   3D reformations: Left proximal ureter is not depicted due to incomplete opacification, within this limitation there is no evidence of hydroureteronephrosis. The distal right ureter is not depicted due to incomplete opacification. The visualized portion of the right renal collecting system without evidence of hydroureteronephrosis.   BLADDER: Hyperattenuating material in the bladder with a proximally 50 Hounsfield units and may represent unclotted blood. There is contrast material in the posterior dependent portion. The urinary bladder is filled with air with a Max catheter in place.   LIVER: The liver is normal in size without evidence of focal liver lesions. Subcentimeter hypodensity in segment JOCELYNE, too small to characterize.   BILE DUCTS: The bile ducts are not dilated.   GALLBLADDER: Gallbladder: No calcified stones. No wall thickening.   PANCREAS: The pancreas appears unremarkable.   SPLEEN: Within normal limits.   ADRENAL GLANDS: Within normal limits.   REPRODUCTIVE ORGANS: The uterus is surgically absent. No  contrast in the vaginal vault to suggest vesicovaginal fistula. No adnexal mass.   BOWEL: The stomach is unremarkable. Bowel is partially obscured at the pelvis due to beam hardening. The small and large bowel are normal in caliber and demonstrate no wall thickening. Large colon diverticulosis without diverticulitis. The appendix is not definitely visualized. There is however no pericecal stranding or fluid.   VESSELS: Vascular calcification of the abdominal aorta and its branching vessels. There is no aneurysmal dilatation of the abdominal aorta. The IVC is within normal limits.   PERITONEUM/RETROPERITONEUM/LYMPH NODES: Few small pelvic phleboliths are present. No ascites or free air, no fluid collection.  The retroperitoneum appears unremarkable.  No enlarged mesenteric lymph nodes.   ABDOMINAL WALL: Within normal limits.   BONE AND SOFT TISSUE: No suspicious osseous lesions are present. Small left joint effusion. Degenerative discogenic disease is noted in the lower thoracic and lumbar spine.       1. Hyperdense material in the bladder consistent with blood products. Air foci in the bladder likely from Max instrumentation. 2. 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 There is no contrast in the vaginal vault identified. 3. Stable complex left hemorrhagic cyst. 4. Nonobstructive tiny 0.3 cm calculus in the right mid ureter and calyx calculi measuring 1.3 x 1.6 cm  in the right renal upper pole. No left renal or ureter calculi. 5. Stable right renal atrophy. 6. Diffuse mid to proximal right urothelial thickening, seen on prior imaging. 7. Again seen interlobular septal thickening, architectural distortion and mild bronchiectasis throughout the lungs similar to prior CT chest from 11/26/2023 and are concerning for fibrotic/emphysematous lung changes. 8. Small left joint effusion without evidence of acute osseous process.   I personally reviewed the images/study and I agree  with the findings as stated by Louise Ma DO, PGY-2. This study was interpreted at [facility], [place], [place].   MACRO: None   Signed by: Yakov Cecil 12/31/2023 10:07 AM Dictation workstation:   [id number]    Vascular US lower extremity venous duplex bilateral    Result Date: 12/29/2023            [place] [address]   Tel [phone] and Fax [phone]  [facility]AL  Patient Name:     KARLA AGUILERA Reading           [id number] Kami Wells MD,                                      Physician:        BHUPINDER Study Date:       12/29/2023         Ordering          [id number] JOSE FERNANDEZ                                      Physician: MRN/PID:          [id number]           Technologist:     Aruna Bonner RVT Accession#:       [id number]       Technologist 2: Date of           1955 / 68      Encounter#:       [id number] Birth/Age:        years Gender:           F Admission Status: Inpatient          Location          [facility]                                      Performed:  Diagnosis/ICD: Acute embolism and thrombosis of unspecified deep veins of left                distal lower extremity-I82.4Z2 Indication:    Venous thrombosis (CAVA) CPT Codes:     51784 Peripheral venous duplex scan for DVT complete  Patient History Previous DVT.  **CRITICAL RESULT** Critical Result: Left PTV and soleal vein DVT Notification called to Jose Fernandez MD on 12/29/2023 at 4:00:01 PM by Aruna Bonner RVT.  CONCLUSIONS: Right Lower Venous: No evidence of acute deep vein thrombus visualized in the right lower extremity. Left Lower Venous: There is acute occlusive deep vein thrombosis visualized in the soleal vein. There is acute non-occlusive deep vein thrombosis visualized in the posterior tibial vein. The remainder of the left leg is negative for deep vein thrombosis.   Comparison: Compared with study from 12/18/2023, there is new left PTV DVT. Patient has known left soleal vein DVT from 11/13/23, that was not visualized in the previous exam, but was visualized in today's exam.  Imaging & Doppler Findings:  Right                 Compressible Thrombus        Flow Distal External Iliac     Yes        None   Spontaneous/Phasic CFV                       Yes        None   Spontaneous/Phasic PFV                       Yes        None FV Proximal               Yes        None   Spontaneous/Phasic FV Mid                    Yes        None FV Distal                 Yes        None Popliteal                 Yes        None   Spontaneous/Phasic Peroneal                  Yes        None PTV                       Yes        None  Left                  Compress      Thrombus              Flow Distal External Iliac   Yes           None         Spontaneous/Phasic CFV                     Yes           None         Spontaneous/Phasic PFV                     Yes           None FV Proximal             Yes           None         Spontaneous/Phasic FV Mid                  Yes           None FV Distal               Yes           None Popliteal               Yes           None         Spontaneous/Phasic Peroneal                Yes           None PTV                   Partial  Acute non-occlusive Soleal                Partial    Acute occlusive  [id number] Kami Wells MD, RPVI Electronically signed by [id number] Kami Wells MD, RPVI on 12/29/2023 at 7:49:05 PM  ** Final **     US bladder    Result Date: 12/29/2023  Interpreted By:  Cholo Cevallos,  and Kaylene Young STUDY: US BLADDER;  12/28/2023 7:45 pm   INDICATION: Signs/Symptoms:R/o clots iso hematuria per Urology.   COMPARISON: CT cystogram on 08/16/2023   ACCESSION NUMBER(S): [id number]   ORDERING CLINICIAN: JOSEFINA MILAN   TECHNIQUE: Multiple grayscale and color ultrasound images of the bladder were obtained.   FINDINGS: There is a complex heterogeneously  echogenic region seen at the inferior aspect of the bladder, without flow seen on Doppler images.       1.  Complex heterogeneously echogenic layering material within the bladder, likely represents debris versus clot.   I personally reviewed the images/study and I agree with the findings as stated. This study was interpreted at [facility], [place], [place].   MACRO: None   Signed by: Cholo Cevallos 12/29/2023 5:56 AM Dictation workstation:   [id number]    Lower extremity venous duplex bilateral    Result Date: 12/18/2023            [place] [address]   Tel [phone] and Fax [phone]  Vascular Lab Report VASC US LOWER EXTREMITY VENOUS DUPLEX BILATERAL  Patient Name:      KARLA Cruz Physician: [id number] Alisa Puri MD Study Date:        12/18/2023          Ordering           [id number] BRENDAN LIU                                        Physician:         SONG MRN/PID:           [id number]            Technologist:      Jonh Hamilton S Accession#:        [id number]        Technologist 2: Date of Birth/Age: 1955 / 68 years Encounter#:        [id number] Gender:            F Admission Status:  Inpatient           Location           [facility]                                        Performed:  Diagnosis/ICD: Generalized edema (anasarca)-R60.1 CPT Codes:     27772 Peripheral venous duplex scan for DVT complete  CONCLUSIONS: Right Lower Venous: No evidence of acute deep vein thrombus visualized in the right lower extremity. Left Lower Venous: No evidence of acute deep vein thrombus visualized in the left lower extremity. Cannot rule out thrombus in non-visualized peroneal vein.  Imaging & Doppler Findings:  Right                 Compressible Thrombus        Flow Distal External Iliac     Yes        None   Spontaneous/Phasic CFV                       Yes         None   Spontaneous/Phasic PFV                       Yes        None FV Proximal               Yes        None       Continuous FV Mid                    Yes        None FV Distal                 Yes        None Popliteal                 Yes        None       Continuous Peroneal                  Yes        None PTV                       Yes        None  Left                  Compress Thrombus        Flow Distal External Iliac   Yes      None   Spontaneous/Phasic CFV                     Yes      None   Spontaneous/Phasic PFV                     Yes      None FV Proximal             Yes      None   Spontaneous/Phasic FV Mid                  Yes      None FV Distal               Yes      None Popliteal               Yes      None       Continuous PTV                     Yes      None  [id number] Alisa Puri MD Electronically signed by [id number] Alsia Puri MD on 12/18/2023 at 1:18:07 PM  ** Final **     ECG 12 lead    Result Date: 12/11/2023  Sinus rhythm with Fusion complexes ST & T wave abnormality, consider lateral ischemia Abnormal ECG When compared with ECG of 01-DEC-2023 10:56, (unconfirmed) Fusion complexes are now Present T wave inversion now evident in Anterior leads    XR chest 2 views    Result Date: 12/6/2023  Interpreted By:  Salvador Robles, STUDY: XR CHEST 2 VIEWS;  12/6/2023 10:11 am   INDICATION: Signs/Symptoms:Eval of Puml Fibrosis.   COMPARISON: 11/24/2023.   ACCESSION NUMBER(S): [id number]   ORDERING CLINICIAN: JORDON PARRISH   FINDINGS:     CARDIOMEDIASTINAL SILHOUETTE: Cardiomediastinal silhouette is normal in size and configuration.   LUNGS: Digital tomosynthesis of the lung parenchyma was performed. There is diffuse interstitial airspace opacities with prominent traction bronchiectatic changes. There is no evidence of pneumothorax.   ABDOMEN: No remarkable upper abdominal findings.   BONES: No acute osseous changes.       1.  There are diffuse interstitial airspace opacities with  traction bronchiectatic changes. This may represent residua of prior COVID-19 pneumonia/ARDS.     Signed by: Salvador Robles 12/6/2023 10:21 AM Dictation workstation:   [id number]      Current Medications:   Scheduled medications  Reviewed  Continuous medications    PRN medications    Medications from transferring facility:  cheduled medications  ciprofloxacin, 750 mg, oral, BID  [Held by provider] enoxaparin, 40 mg, subcutaneous, Daily  ergocalciferol, 1,250 mcg, oral, Weekly  fluticasone, 1 spray, Each Nostril, BID  insulin glargine, 4 Units, subcutaneous, Once  [Held by provider] insulin glargine, 8 Units, subcutaneous, Nightly  insulin lispro, 0-15 Units, subcutaneous, TID with meals  lidocaine, 1 Application, urethral, Once  lidocaine, 1 patch, transdermal, Daily  lidocaine, 1 patch, transdermal, Daily  melatonin, 5 mg, oral, Daily  metoprolol tartrate, 75 mg, oral, BID  pantoprazole, 40 mg, oral, Daily before breakfast  predniSONE, 30 mg, oral, BID  QUEtiapine, 25 mg, oral, Nightly  sennosides-docusate sodium, 1 tablet, oral, BID  [Held by provider] sodium zirconium cyclosilicate, 10 g, oral, TID  sulfamethoxazole-trimethoprim, 80 mg, oral, Daily  thiamine, 100 mg, oral, Daily        Continuous medications  oxygen, , Last Rate: 6 L/min (12/27/23 0927)        PRN medications  PRN medications: acetaminophen **OR** [DISCONTINUED] acetaminophen **OR** [DISCONTINUED] acetaminophen, dextrose 10 % in water (D10W), dextrose, diclofenac sodium, glucagon, ipratropium-albuteroL, lidocaine-diphenhydraMINE-Maalox 1:1:1, loperamide, ondansetron ODT **OR** [DISCONTINUED] ondansetron, oxyCODONE, QUEtiapine  Assessment  Patient is -year-old (man/woman) with the following medical Problems:    Acute severe hypoxic respiratory failure  ARDS  COVID-19 infection with possible secondary pneumonia status post remdesivir treatment and Actima  Persistent DVT of anticoagulation due to the recent bleed with concern of coexisting  PE  Recent acute kidney injury  Recent acute anemia, 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  Weekly duplex ultrasound is recommended  Management of hematuria per primary care provider, urology recommendation was to continue three-way flushing  Follow-up with urology in 2 to 3 weeks  Per urology note from transferring facility if hematuria stopped anticoagulation could be restarted  Monitor H&H transfuse hemoglobin less than 7  Check chest x-ray        Kulwant Limon MD  01/02/24     STAFF PHYSICIAN NOTE OF PERSONAL INVOLVEMENT IN CARE  As the attending physician, I certify that I personally reviewed the patient's history and personally examined the patient to confirm the physical findings described above, and that I reviewed the relevant imaging studies and available reports.  I also discussed the differential diagnosis and all of the proposed management plans with the patient and individuals accompanying the patient to this visit.  They had the opportunity to ask questions about the proposed management plans and to have those questions answered.                   Active Problems:  There are no active Hospital Problems.    Patient fully evaluated on January seconds in the absence of Dr. Underwood.  Patient's physical and Occupational Therapy to begin.  Recheck labs in AM.  Aggressive pulmonary hygiene.              Kulwant Limon MD

## 2024-01-03 NOTE — PROGRESS NOTES
Pulmonary Critical Care note    Patient Name :Macarena Wilson  Date of service : 01/03/24  MRN: [id number]  YOB: 1955      Interval History:    History of Present Illness:   68 years old  female who had an good state of health prior to her acute illness which required hospitalization recently, she had underlying history of CKD 3, hypertension, presented to the MICU at [facility] with acute hypoxic respiratory failure after COVID-pneumonia after being transferred from outside hospital, she was positive for COVID on 1013 with persistent dyspnea, CT scan of the chest showed worsening bilateral changes consistent with severe COPD and pneumonitis and ARDS, she remains on heated high flow oxygen, did receive remdesivir and Actemra on October 24, she was started on empiric therapy with bacterial and fungal treatment for concern for organizing pneumonia in addition to Decadron, she has steroids include hyperglycemia during the treatment, developed acute DVT with concern of clinical PE and she was started on anticoagulation with Eliquis, started on CPAP treatment, was aggressively diuresed but then she developed acute kidney injury,, she was evaluated by GYN for recurrent urinary symptoms, anticoagulation was held due to acute drop of hemoglobin, CT of the abdomen with urogram showed hyperdense material consistent with normal blood and left hemorrhagic cyst, repeat DVT workup showed still persistent DVT, with recommendation of weekly ultrasound  Past Medical/Surgical History:   Past Medical History:   Diagnosis Date    Chronic kidney disease     Hypertension     Personal history of diseases of the skin and subcutaneous tissue     History of rosacea    Pseudomonal bacteremia 11/27/2023     Past Surgical History:   Procedure Laterality Date    APPENDECTOMY  04/01/2016    Appendectomy    HYSTERECTOMY  12/2022    ANGEL TO    OTHER SURGICAL HISTORY  11/14/2022    Cystoscopy       Family History:   No  "relevant family history has been documented for this patient.    Allergies:     Allergies   Allergen Reactions    Fesoterodine Unknown    Mirabegron Rash    Prednisone Other     \"body aches, flu like symptoms\"         Social history:   reports that she has never smoked. She has never used smokeless tobacco. She reports that she does not use drugs.      Review of Systems:   General: denies weight gain, denies loss of appetite, fever, chills, night sweats.  HEENT: denies headaches, dizziness, head trauma, visual changes, eye pain, tinnitus, nosebleeds, hoarseness or throat pain    Respiratory: denies chest pain, dyspnea, cough and hemoptysis  Cardiovascular: denies orthopnea, paroxysmal nocturnal dyspnea, leg swelling, and previous heart attack.    Gastrointestinal: denies pain, nausea vomiting, diarrhea, constipation, melena or bleeding.  Genitourinary: denies hematuria, frequency, urgency or dysuria  Neurology: denies syncope, seizures, paralysis, paraesthesia   Endocrine: denies polyuria, polydipsia, skin or hair changes, and heat or cold intolerance  Musculoskeletal: denies joint pain, swelling, arthritis or myalgia  Hematologic: denies bleeding, adenopathy and easy bruising  Skin: denies rashes and skin discoloration  Psychiatry: denies depression    Physical Exam:   Vital Signs:   There were no vitals filed for this visit.  There were no vitals filed for this visit.      Input/Output:  No intake or output data in the 24 hours ending 01/03/24 1619    Oxygen requirements:    Ventilator Information:              General appearance: Not in pain or distress, in no respiratory distress    HEENT: Atraumatic/normocephalic, EOMI, JANICE, pharynx clear, moist mucosa, redness of the uvula appreciated,   Neck: Supple, no jugular venous distension, lymphadenopathy, thyromegaly or carotid bruits  Chest: Bilateral diminished breath sounds  Cardiovascular: Normal S1, S2, regular rate and rhythm, no murmur, rub or " gallop  Abdomen: Normal sounds present, soft, lax with no tenderness, no hepatosplenomegaly, and no masses  Extremities: No edema. Pulses are equally present.   Skin: intact, no rashes   Neurologic: Alert and oriented x 3, No focal deficit         Investigations:  Labs, radiological imaging and cardiac work up were personally reviewed    Results from last 7 days   Lab Units 01/03/24  1224 01/01/24  0950 12/31/23  0610   SODIUM mmol/L 140 138 137   POTASSIUM mmol/L 4.5 4.6 4.4   CHLORIDE mmol/L 107 105 107   CO2 mmol/L 24 24 23   BUN mg/dL 42* 33* 37*   CREATININE mg/dL 0.86 0.90 0.80   GLUCOSE mg/dL 108* 75 95   CALCIUM mg/dL 8.5* 8.2* 8.1*       Results from last 7 days   Lab Units 01/03/24  1224   WBC AUTO x10*3/uL 12.5*   HEMOGLOBIN g/dL 9.8*   HEMATOCRIT % 30.6*   PLATELETS AUTO x10*3/uL 147*           CT urography w 3D volume rendered imaging    Result Date: 12/31/2023  Interpreted By:  Yakov Jacob and Ogievich Taessa STUDY: CT UROGRAPHY WITH 3D VOLUME RENDERED IMAGING;  12/29/2023 11:36 am   INDICATION: Signs/Symptoms:Per urology recommendations, hematuria.   COMPARISON: CT cystogram 08/16/2023 CT abdomen/pelvis 03/01/2023 CT urography 09/24/2022 CTA chest 11/26/2023   ACCESSION NUMBER(S): [id number]   ORDERING CLINICIAN: CASEY DYER   TECHNIQUE: CT of the abdomen and pelvis was performed.  MULTIPHASE KIDNEY protocol was performed including contiguous axial images through the abdomen at 3 mm slice thickness before and in arterial and delayed phases post contrast. Coronal and sagittal reconstructions at 3 mm slice thickness were performed. 3D volume rendering images were obtained. 75 ml of contrast Omnipaque 350 were administered intravenously without immediate complication.   FINDINGS: LOWER CHEST: Again seen interlobular septal thickening, architectural distortion and mild bronchiectasis throughout the lungs similar to prior CT chest from 11/26/2023. No evidence of pleural effusion or  pneumothorax.   ABDOMEN:   KIDNEYS AND URETERS: Left kidney/ureter: Left kidney is normal in size. No evidence of nephrolithiasis or hydroureteronephrosis. The proximal left ureter is not opacified on delayed series (series 6, images 193-247). There is an hypoattenuating exophytic cortical cyst in the left lower renal pole measuring 2 x 2 cm (series 3, image 70), above water attenuation compatible with a hemorrhagic versus proteinaceous cyst, has a similar appearance compared to prior CT from 03/01/2023.   Right kidney/ureter: Moderate right renal cortical thinning and atrophy. There is a 0.3 cm stone in the right mid ureter on nonenhanced imaging (series 2, image 97), was seen on prior CT cystogram 08/16/2023. No evidence of hydroureteronephrosis. There is decreased opacification of the right ureter distal to the ureteral calyx on delayed series. There is a nonobstructive calyx stone measuring 1.3 x 1.6 cm on unenhanced images (series 2, image 65). Again seen is urothelial thickening predominantly with mild enhancement in the mid to proximal ureter (series 3, images 77-95), similar to prior. Again seen several round hypodensities in the right kidney with the largest measuring 1.4 x 1.3 cm in the midpole (series 3, image 68), favoring to represent a simple cyst.   3D reformations: Left proximal ureter is not depicted due to incomplete opacification, within this limitation there is no evidence of hydroureteronephrosis. The distal right ureter is not depicted due to incomplete opacification. The visualized portion of the right renal collecting system without evidence of hydroureteronephrosis.   BLADDER: Hyperattenuating material in the bladder with a proximally 50 Hounsfield units and may represent unclotted blood. There is contrast material in the posterior dependent portion. The urinary bladder is filled with air with a Max catheter in place.   LIVER: The liver is normal in size without evidence of focal liver  lesions. Subcentimeter hypodensity in segment JOCELYNE, too small to characterize.   BILE DUCTS: The bile ducts are not dilated.   GALLBLADDER: Gallbladder: No calcified stones. No wall thickening.   PANCREAS: The pancreas appears unremarkable.   SPLEEN: Within normal limits.   ADRENAL GLANDS: Within normal limits.   REPRODUCTIVE ORGANS: The uterus is surgically absent. No contrast in the vaginal vault to suggest vesicovaginal fistula. No adnexal mass.   BOWEL: The stomach is unremarkable. Bowel is partially obscured at the pelvis due to beam hardening. The small and large bowel are normal in caliber and demonstrate no wall thickening. Large colon diverticulosis without diverticulitis. The appendix is not definitely visualized. There is however no pericecal stranding or fluid.   VESSELS: Vascular calcification of the abdominal aorta and its branching vessels. There is no aneurysmal dilatation of the abdominal aorta. The IVC is within normal limits.   PERITONEUM/RETROPERITONEUM/LYMPH NODES: Few small pelvic phleboliths are present. No ascites or free air, no fluid collection.  The retroperitoneum appears unremarkable.  No enlarged mesenteric lymph nodes.   ABDOMINAL WALL: Within normal limits.   BONE AND SOFT TISSUE: No suspicious osseous lesions are present. Small left joint effusion. Degenerative discogenic disease is noted in the lower thoracic and lumbar spine.       1. Hyperdense material in the bladder consistent with blood products. Air foci in the bladder likely from Max instrumentation. 2. 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 There is no contrast in the vaginal vault identified. 3. Stable complex left hemorrhagic cyst. 4. Nonobstructive tiny 0.3 cm calculus in the right mid ureter and calyx calculi measuring 1.3 x 1.6 cm  in the right renal upper pole. No left renal or ureter calculi. 5. Stable right renal atrophy. 6. Diffuse mid to proximal right urothelial  thickening, seen on prior imaging. 7. Again seen interlobular septal thickening, architectural distortion and mild bronchiectasis throughout the lungs similar to prior CT chest from 11/26/2023 and are concerning for fibrotic/emphysematous lung changes. 8. Small left joint effusion without evidence of acute osseous process.   I personally reviewed the images/study and I agree with the findings as stated by Louise Ma DO, PGY-2. This study was interpreted at [facility], [place], [place].   MACRO: None   Signed by: Yakov Jacob 12/31/2023 10:07 AM Dictation workstation:   [id number]    Vascular US lower extremity venous duplex bilateral    Result Date: 12/29/2023            [place] [address]   Tel [phone] and Fax [phone]  Vascular Lab Report VASC US LOWER EXTREMITY VENOUS DUPLEX BILATERAL  Patient Name:     KARLA MARIFER AGUILERA Reading           [id number] Kami Wells MD,                                      Physician:        BHUPINDER Study Date:       12/29/2023         Ordering          [id number] JOSE FERNANDEZ                                      Physician: MRN/PID:          [id number]           Technologist:     Aruna Bonner [facility] [[email or web address]] Accession#:       [id number]       Technologist 2: Date of           1955 / 68      Encounter#:       [id number] Birth/Age:        years Gender:           F Admission Status: Inpatient          Location          [facility]                                      Performed:  Diagnosis/ICD: Acute embolism and thrombosis of unspecified deep veins of left                distal lower extremity-I82.4Z2 Indication:    Venous thrombosis (CAVA) CPT Codes:     77325 Peripheral venous duplex scan for DVT complete  Patient History Previous DVT.  **CRITICAL RESULT** Critical Result: Left PTV and soleal vein DVT Notification called to Jose Fernandez MD on 12/29/2023 at 4:00:01 PM by Aruna Bonner  RVT.  CONCLUSIONS: Right Lower Venous: No evidence of acute deep vein thrombus visualized in the right lower extremity. Left Lower Venous: There is acute occlusive deep vein thrombosis visualized in the soleal vein. There is acute non-occlusive deep vein thrombosis visualized in the posterior tibial vein. The remainder of the left leg is negative for deep vein thrombosis.  Comparison: Compared with study from 12/18/2023, there is new left PTV DVT. Patient has known left soleal vein DVT from 11/13/23, that was not visualized in the previous exam, but was visualized in today's exam.  Imaging & Doppler Findings:  Right                 Compressible Thrombus        Flow Distal External Iliac     Yes        None   Spontaneous/Phasic CFV                       Yes        None   Spontaneous/Phasic PFV                       Yes        None FV Proximal               Yes        None   Spontaneous/Phasic FV Mid                    Yes        None FV Distal                 Yes        None Popliteal                 Yes        None   Spontaneous/Phasic Peroneal                  Yes        None PTV                       Yes        None  Left                  Compress      Thrombus              Flow Distal External Iliac   Yes           None         Spontaneous/Phasic CFV                     Yes           None         Spontaneous/Phasic PFV                     Yes           None FV Proximal             Yes           None         Spontaneous/Phasic FV Mid                  Yes           None FV Distal               Yes           None Popliteal               Yes           None         Spontaneous/Phasic Peroneal                Yes           None PTV                   Partial  Acute non-occlusive Soleal                Partial    Acute occlusive  [id number] aKmi Wells MD, RPVI Electronically signed by [id number] Kami Wells MD, RPVI on 12/29/2023 at 7:49:05 PM  ** Final **     US bladder    Result Date: 12/29/2023  Interpreted By:  Cholo Cevallos,   and Kaylene Young STUDY: US BLADDER;  12/28/2023 7:45 pm   INDICATION: Signs/Symptoms:R/o clots iso hematuria per Urology.   COMPARISON: CT cystogram on 08/16/2023   ACCESSION NUMBER(S): [id number]   ORDERING CLINICIAN: JOSEFINA MILAN   TECHNIQUE: Multiple grayscale and color ultrasound images of the bladder were obtained.   FINDINGS: There is a complex heterogeneously echogenic region seen at the inferior aspect of the bladder, without flow seen on Doppler images.       1.  Complex heterogeneously echogenic layering material within the bladder, likely represents debris versus clot.   I personally reviewed the images/study and I agree with the findings as stated. This study was interpreted at [place], [place].   MACRO: None   Signed by: Cholo Cevallos 12/29/2023 5:56 AM Dictation workstation:   [id number]    Lower extremity venous duplex bilateral    Result Date: 12/18/2023            [place] [address]   Tel [phone] and Fax [phone]  Vascular Lab Report [facility] US LOWER EXTREMITY VENOUS DUPLEX BILATERAL  Patient Name:      KARLA Cruz Physician: [id number] Alisa Puri MD Study Date:        12/18/2023          Ordering           [id number] BRENDAN LIU                                        Physician:         SONG MRN/PID:           [id number]            Technologist:      Jonh BARKLEY Accession#:        [id number]        Technologist 2: Date of Birth/Age: 1955 / 68 years Encounter#:        [id number] Gender:            F Admission Status:  Inpatient           Location           [facility]                                        Performed:  Diagnosis/ICD: Generalized edema (anasarca)-R60.1 CPT Codes:     15497 Peripheral venous duplex scan for DVT complete  CONCLUSIONS: Right Lower Venous: No evidence of acute deep vein thrombus  visualized in the right lower extremity. Left Lower Venous: No evidence of acute deep vein thrombus visualized in the left lower extremity. Cannot rule out thrombus in non-visualized peroneal vein.  Imaging & Doppler Findings:  Right                 Compressible Thrombus        Flow Distal External Iliac     Yes        None   Spontaneous/Phasic CFV                       Yes        None   Spontaneous/Phasic PFV                       Yes        None FV Proximal               Yes        None       Continuous FV Mid                    Yes        None FV Distal                 Yes        None Popliteal                 Yes        None       Continuous Peroneal                  Yes        None PTV                       Yes        None  Left                  Compress Thrombus        Flow Distal External Iliac   Yes      None   Spontaneous/Phasic CFV                     Yes      None   Spontaneous/Phasic PFV                     Yes      None FV Proximal             Yes      None   Spontaneous/Phasic FV Mid                  Yes      None FV Distal               Yes      None Popliteal               Yes      None       Continuous PTV                     Yes      None  [id number] Alisa Puri MD Electronically signed by [id number] Alisa Puri MD on 12/18/2023 at 1:18:07 PM  ** Final **     ECG 12 lead    Result Date: 12/11/2023  Sinus rhythm with Fusion complexes ST & T wave abnormality, consider lateral ischemia Abnormal ECG When compared with ECG of 01-DEC-2023 10:56, (unconfirmed) Fusion complexes are now Present T wave inversion now evident in Anterior leads    XR chest 2 views    Result Date: 12/6/2023  Interpreted By:  Salvador Robles, STUDY: XR CHEST 2 VIEWS;  12/6/2023 10:11 am   INDICATION: Signs/Symptoms:Eval of Puml Fibrosis.   COMPARISON: 11/24/2023.   ACCESSION NUMBER(S): [id number]   ORDERING CLINICIAN: JORDON PARRISH   FINDINGS:     CARDIOMEDIASTINAL SILHOUETTE: Cardiomediastinal silhouette is normal in size and  configuration.   LUNGS: Digital tomosynthesis of the lung parenchyma was performed. There is diffuse interstitial airspace opacities with prominent traction bronchiectatic changes. There is no evidence of pneumothorax.   ABDOMEN: No remarkable upper abdominal findings.   BONES: No acute osseous changes.       1.  There are diffuse interstitial airspace opacities with traction bronchiectatic changes. This may represent residua of prior COVID-19 pneumonia/ARDS.     Signed by: Salvador Robles 12/6/2023 10:21 AM Dictation workstation:   [id number]      Current Medications:   Scheduled medications  Reviewed  Continuous medications    PRN medications    Medications from transferring facility:  cheduled medications  ciprofloxacin, 750 mg, oral, BID  [Held by provider] enoxaparin, 40 mg, subcutaneous, Daily  ergocalciferol, 1,250 mcg, oral, Weekly  fluticasone, 1 spray, Each Nostril, BID  insulin glargine, 4 Units, subcutaneous, Once  [Held by provider] insulin glargine, 8 Units, subcutaneous, Nightly  insulin lispro, 0-15 Units, subcutaneous, TID with meals  lidocaine, 1 Application, urethral, Once  lidocaine, 1 patch, transdermal, Daily  lidocaine, 1 patch, transdermal, Daily  melatonin, 5 mg, oral, Daily  metoprolol tartrate, 75 mg, oral, BID  pantoprazole, 40 mg, oral, Daily before breakfast  predniSONE, 30 mg, oral, BID  QUEtiapine, 25 mg, oral, Nightly  sennosides-docusate sodium, 1 tablet, oral, BID  [Held by provider] sodium zirconium cyclosilicate, 10 g, oral, TID  sulfamethoxazole-trimethoprim, 80 mg, oral, Daily  thiamine, 100 mg, oral, Daily        Continuous medications  oxygen, , Last Rate: 6 L/min (12/27/23 0927)        PRN medications  PRN medications: acetaminophen **OR** [DISCONTINUED] acetaminophen **OR** [DISCONTINUED] acetaminophen, dextrose 10 % in water (D10W), dextrose, diclofenac sodium, glucagon, ipratropium-albuteroL, lidocaine-diphenhydraMINE-Maalox 1:1:1, loperamide, ondansetron ODT **OR**  [DISCONTINUED] ondansetron, oxyCODONE, QUEtiapine  Assessment  Patient is -year-old (man/woman) with the following medical Problems:    Acute severe hypoxic respiratory failure  ARDS  COVID-19 infection with possible secondary pneumonia status post remdesivir treatment and Actima  Persistent DVT of anticoagulation due to the recent bleed with concern of coexisting PE  Recent acute kidney injury  Recent acute anemia, 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  Weekly duplex ultrasound is recommended  Management of hematuria per primary care provider, urology recommendation was to continue three-way flushing  Follow-up with urology in 2 to 3 weeks  Per urology note from transferring facility if hematuria stopped anticoagulation could be restarted  Monitor H&H transfuse hemoglobin less than 7  Check chest x-ray        Umang Jackson MD  01/03/24     STAFF PHYSICIAN NOTE OF PERSONAL INVOLVEMENT IN CARE  As the attending physician, I certify that I personally reviewed the patient's history and personally examined the patient to confirm the physical findings described above, and that I reviewed the relevant imaging studies and available reports.  I also discussed the differential diagnosis and all of the proposed management plans with the patient and individuals accompanying the patient to this visit.  They had the opportunity to ask questions about the proposed management plans and to have those questions answered.

## 2024-01-04 NOTE — PROGRESS NOTES
Pulmonary Critical Care note    Patient Name :Macarena Wilson  Date of service : 01/04/24  MRN: [id number]  YOB: 1955      Interval History:    History of Present Illness:   68 years old  female who had an good state of health prior to her acute illness which required hospitalization recently, she had underlying history of CKD 3, hypertension, presented to the MICU at [facility] with acute hypoxic respiratory failure after COVID-pneumonia after being transferred from outside hospital, she was positive for COVID on 1013 with persistent dyspnea, CT scan of the chest showed worsening bilateral changes consistent with severe COPD and pneumonitis and ARDS, she remains on heated high flow oxygen, did receive remdesivir and Actemra on October 24, she was started on empiric therapy with bacterial and fungal treatment for concern for organizing pneumonia in addition to Decadron, she has steroids include hyperglycemia during the treatment, developed acute DVT with concern of clinical PE and she was started on anticoagulation with Eliquis, started on CPAP treatment, was aggressively diuresed but then she developed acute kidney injury,, she was evaluated by GYN for recurrent urinary symptoms, anticoagulation was held due to acute drop of hemoglobin, CT of the abdomen with urogram showed hyperdense material consistent with normal blood and left hemorrhagic cyst, repeat DVT workup showed still persistent DVT, with recommendation of weekly ultrasound  Past Medical/Surgical History:   Past Medical History:   Diagnosis Date    Chronic kidney disease     Hypertension     Personal history of diseases of the skin and subcutaneous tissue     History of rosacea    Pseudomonal bacteremia 11/27/2023     Past Surgical History:   Procedure Laterality Date    APPENDECTOMY  04/01/2016    Appendectomy    HYSTERECTOMY  12/2022    ANGEL TO    OTHER SURGICAL HISTORY  11/14/2022    Cystoscopy       Family History:   No  "relevant family history has been documented for this patient.    Allergies:     Allergies   Allergen Reactions    Fesoterodine Unknown    Mirabegron Rash    Prednisone Other     \"body aches, flu like symptoms\"         Social history:   reports that she has never smoked. She has never used smokeless tobacco. She reports that she does not use drugs.      Review of Systems:   General: denies weight gain, denies loss of appetite, fever, chills, night sweats.  HEENT: denies headaches, dizziness, head trauma, visual changes, eye pain, tinnitus, nosebleeds, hoarseness or throat pain    Respiratory: denies chest pain, dyspnea, cough and hemoptysis  Cardiovascular: denies orthopnea, paroxysmal nocturnal dyspnea, leg swelling, and previous heart attack.    Gastrointestinal: denies pain, nausea vomiting, diarrhea, constipation, melena or bleeding.  Genitourinary: denies hematuria, frequency, urgency or dysuria  Neurology: denies syncope, seizures, paralysis, paraesthesia   Endocrine: denies polyuria, polydipsia, skin or hair changes, and heat or cold intolerance  Musculoskeletal: denies joint pain, swelling, arthritis or myalgia  Hematologic: denies bleeding, adenopathy and easy bruising  Skin: denies rashes and skin discoloration  Psychiatry: denies depression    Physical Exam:   Vital Signs:   There were no vitals filed for this visit.  There were no vitals filed for this visit.      Input/Output:  No intake or output data in the 24 hours ending 01/04/24 1110    Oxygen requirements:    Ventilator Information:              General appearance: Not in pain or distress, in no respiratory distress    HEENT: Atraumatic/normocephalic, EOMI, JANICE, pharynx clear, moist mucosa, redness of the uvula appreciated,   Neck: Supple, no jugular venous distension, lymphadenopathy, thyromegaly or carotid bruits  Chest: Bilateral diminished breath sounds  Cardiovascular: Normal S1, S2, regular rate and rhythm, no murmur, rub or " gallop  Abdomen: Normal sounds present, soft, lax with no tenderness, no hepatosplenomegaly, and no masses  Extremities: No edema. Pulses are equally present.   Skin: intact, no rashes   Neurologic: Alert and oriented x 3, No focal deficit         Investigations:  Labs, radiological imaging and cardiac work up were personally reviewed    Results from last 7 days   Lab Units 01/03/24  1224 01/01/24  0950 12/31/23  0610   SODIUM mmol/L 140 138 137   POTASSIUM mmol/L 4.5 4.6 4.4   CHLORIDE mmol/L 107 105 107   CO2 mmol/L 24 24 23   BUN mg/dL 42* 33* 37*   CREATININE mg/dL 0.86 0.90 0.80   GLUCOSE mg/dL 108* 75 95   CALCIUM mg/dL 8.5* 8.2* 8.1*       Results from last 7 days   Lab Units 01/03/24  1224   WBC AUTO x10*3/uL 12.5*   HEMOGLOBIN g/dL 9.8*   HEMATOCRIT % 30.6*   PLATELETS AUTO x10*3/uL 147*           CT urography w 3D volume rendered imaging    Result Date: 12/31/2023  Interpreted By:  Yakov Jacob and Ogievich Taessa STUDY: CT UROGRAPHY WITH 3D VOLUME RENDERED IMAGING;  12/29/2023 11:36 am   INDICATION: Signs/Symptoms:Per urology recommendations, hematuria.   COMPARISON: CT cystogram 08/16/2023 CT abdomen/pelvis 03/01/2023 CT urography 09/24/2022 CTA chest 11/26/2023   ACCESSION NUMBER(S): [id number]   ORDERING CLINICIAN: CASEY DYER   TECHNIQUE: CT of the abdomen and pelvis was performed.  MULTIPHASE KIDNEY protocol was performed including contiguous axial images through the abdomen at 3 mm slice thickness before and in arterial and delayed phases post contrast. Coronal and sagittal reconstructions at 3 mm slice thickness were performed. 3D volume rendering images were obtained. 75 ml of contrast Omnipaque 350 were administered intravenously without immediate complication.   FINDINGS: LOWER CHEST: Again seen interlobular septal thickening, architectural distortion and mild bronchiectasis throughout the lungs similar to prior CT chest from 11/26/2023. No evidence of pleural effusion or  pneumothorax.   ABDOMEN:   KIDNEYS AND URETERS: Left kidney/ureter: Left kidney is normal in size. No evidence of nephrolithiasis or hydroureteronephrosis. The proximal left ureter is not opacified on delayed series (series 6, images 193-247). There is an hypoattenuating exophytic cortical cyst in the left lower renal pole measuring 2 x 2 cm (series 3, image 70), above water attenuation compatible with a hemorrhagic versus proteinaceous cyst, has a similar appearance compared to prior CT from 03/01/2023.   Right kidney/ureter: Moderate right renal cortical thinning and atrophy. There is a 0.3 cm stone in the right mid ureter on nonenhanced imaging (series 2, image 97), was seen on prior CT cystogram 08/16/2023. No evidence of hydroureteronephrosis. There is decreased opacification of the right ureter distal to the ureteral calyx on delayed series. There is a nonobstructive calyx stone measuring 1.3 x 1.6 cm on unenhanced images (series 2, image 65). Again seen is urothelial thickening predominantly with mild enhancement in the mid to proximal ureter (series 3, images 77-95), similar to prior. Again seen several round hypodensities in the right kidney with the largest measuring 1.4 x 1.3 cm in the midpole (series 3, image 68), favoring to represent a simple cyst.   3D reformations: Left proximal ureter is not depicted due to incomplete opacification, within this limitation there is no evidence of hydroureteronephrosis. The distal right ureter is not depicted due to incomplete opacification. The visualized portion of the right renal collecting system without evidence of hydroureteronephrosis.   BLADDER: Hyperattenuating material in the bladder with a proximally 50 Hounsfield units and may represent unclotted blood. There is contrast material in the posterior dependent portion. The urinary bladder is filled with air with a Max catheter in place.   LIVER: The liver is normal in size without evidence of focal liver  lesions. Subcentimeter hypodensity in segment JOCELYNE, too small to characterize.   BILE DUCTS: The bile ducts are not dilated.   GALLBLADDER: Gallbladder: No calcified stones. No wall thickening.   PANCREAS: The pancreas appears unremarkable.   SPLEEN: Within normal limits.   ADRENAL GLANDS: Within normal limits.   REPRODUCTIVE ORGANS: The uterus is surgically absent. No contrast in the vaginal vault to suggest vesicovaginal fistula. No adnexal mass.   BOWEL: The stomach is unremarkable. Bowel is partially obscured at the pelvis due to beam hardening. The small and large bowel are normal in caliber and demonstrate no wall thickening. Large colon diverticulosis without diverticulitis. The appendix is not definitely visualized. There is however no pericecal stranding or fluid.   VESSELS: Vascular calcification of the abdominal aorta and its branching vessels. There is no aneurysmal dilatation of the abdominal aorta. The IVC is within normal limits.   PERITONEUM/RETROPERITONEUM/LYMPH NODES: Few small pelvic phleboliths are present. No ascites or free air, no fluid collection.  The retroperitoneum appears unremarkable.  No enlarged mesenteric lymph nodes.   ABDOMINAL WALL: Within normal limits.   BONE AND SOFT TISSUE: No suspicious osseous lesions are present. Small left joint effusion. Degenerative discogenic disease is noted in the lower thoracic and lumbar spine.       1. Hyperdense material in the bladder consistent with blood products. Air foci in the bladder likely from Max instrumentation. 2. 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 There is no contrast in the vaginal vault identified. 3. Stable complex left hemorrhagic cyst. 4. Nonobstructive tiny 0.3 cm calculus in the right mid ureter and calyx calculi measuring 1.3 x 1.6 cm  in the right renal upper pole. No left renal or ureter calculi. 5. Stable right renal atrophy. 6. Diffuse mid to proximal right urothelial  thickening, seen on prior imaging. 7. Again seen interlobular septal thickening, architectural distortion and mild bronchiectasis throughout the lungs similar to prior CT chest from 11/26/2023 and are concerning for fibrotic/emphysematous lung changes. 8. Small left joint effusion without evidence of acute osseous process.   I personally reviewed the images/study and I agree with the findings as stated by Louise Ma DO, PGY-2. This study was interpreted at [facility], [place], [place].   MACRO: None   Signed by: Yakov Jacob 12/31/2023 10:07 AM Dictation workstation:   [id number]    Vascular US lower extremity venous duplex bilateral    Result Date: 12/29/2023            [place] [address]   Tel [phone] and Fax [phone]  Vascular Lab Report VASC US LOWER EXTREMITY VENOUS DUPLEX BILATERAL  Patient Name:     KARLA MARIFER AGUILERA Reading           [id number] Kami Wells MD,                                      Physician:        BHUPINDER Study Date:       12/29/2023         Ordering          [id number] JOSE FERNANDEZ                                      Physician: MRN/PID:          [id number]           Technologist:     Aurna Bonner [facility] Accession#:       [id number]       Technologist 2: Date of           1955 / 68      Encounter#:       [id number] Birth/Age:        years Gender:           F Admission Status: Inpatient          Location          [facility]                                      Performed:  Diagnosis/ICD: Acute embolism and thrombosis of unspecified deep veins of left                distal lower extremity-I82.4Z2 Indication:    Venous thrombosis (CAVA) CPT Codes:     41720 Peripheral venous duplex scan for DVT complete  Patient History Previous DVT.  **CRITICAL RESULT** Critical Result: Left PTV and soleal vein DVT Notification called to Jose Fernandez MD on 12/29/2023 at 4:00:01 PM by Aruna Bonner  RVT.  CONCLUSIONS: Right Lower Venous: No evidence of acute deep vein thrombus visualized in the right lower extremity. Left Lower Venous: There is acute occlusive deep vein thrombosis visualized in the soleal vein. There is acute non-occlusive deep vein thrombosis visualized in the posterior tibial vein. The remainder of the left leg is negative for deep vein thrombosis.  Comparison: Compared with study from 12/18/2023, there is new left PTV DVT. Patient has known left soleal vein DVT from 11/13/23, that was not visualized in the previous exam, but was visualized in today's exam.  Imaging & Doppler Findings:  Right                 Compressible Thrombus        Flow Distal External Iliac     Yes        None   Spontaneous/Phasic CFV                       Yes        None   Spontaneous/Phasic PFV                       Yes        None FV Proximal               Yes        None   Spontaneous/Phasic FV Mid                    Yes        None FV Distal                 Yes        None Popliteal                 Yes        None   Spontaneous/Phasic Peroneal                  Yes        None PTV                       Yes        None  Left                  Compress      Thrombus              Flow Distal External Iliac   Yes           None         Spontaneous/Phasic CFV                     Yes           None         Spontaneous/Phasic PFV                     Yes           None FV Proximal             Yes           None         Spontaneous/Phasic FV Mid                  Yes           None FV Distal               Yes           None Popliteal               Yes           None         Spontaneous/Phasic Peroneal                Yes           None PTV                   Partial  Acute non-occlusive Soleal                Partial    Acute occlusive  [id number] Kami Wells MD, RPVI Electronically signed by [id number] Kami Wells MD, RPVI on 12/29/2023 at 7:49:05 PM  ** Final **     US bladder    Result Date: 12/29/2023  Interpreted By:  Cholo Cevallos,   and Kaylene Young STUDY: US BLADDER;  12/28/2023 7:45 pm   INDICATION: Signs/Symptoms:R/o clots iso hematuria per Urology.   COMPARISON: CT cystogram on 08/16/2023   ACCESSION NUMBER(S): [id number]   ORDERING CLINICIAN: JOSEFINA MILAN   TECHNIQUE: Multiple grayscale and color ultrasound images of the bladder were obtained.   FINDINGS: There is a complex heterogeneously echogenic region seen at the inferior aspect of the bladder, without flow seen on Doppler images.       1.  Complex heterogeneously echogenic layering material within the bladder, likely represents debris versus clot.   I personally reviewed the images/study and I agree with the findings as stated. This study was interpreted at [place], [place].   MACRO: None   Signed by: Cholo Cevallos 12/29/2023 5:56 AM Dictation workstation:   [id number]    Lower extremity venous duplex bilateral    Result Date: 12/18/2023            [place] [address]   Tel [phone] and Fax [phone]  Vascular Lab Report [facility] US LOWER EXTREMITY VENOUS DUPLEX BILATERAL  Patient Name:      KARLA Cruz Physician: [id number] Alisa Puri MD Study Date:        12/18/2023          Ordering           [id number] BRENDAN LIU                                        Physician:         SONG MRN/PID:           [id number]            Technologist:      Jonh BARKLEY Accession#:        [id number]        Technologist 2: Date of Birth/Age: 1955 / 68 years Encounter#:        [id number] Gender:            F Admission Status:  Inpatient           Location           [facility]                                        Performed:  Diagnosis/ICD: Generalized edema (anasarca)-R60.1 CPT Codes:     02751 Peripheral venous duplex scan for DVT complete  CONCLUSIONS: Right Lower Venous: No evidence of acute deep vein thrombus  visualized in the right lower extremity. Left Lower Venous: No evidence of acute deep vein thrombus visualized in the left lower extremity. Cannot rule out thrombus in non-visualized peroneal vein.  Imaging & Doppler Findings:  Right                 Compressible Thrombus        Flow Distal External Iliac     Yes        None   Spontaneous/Phasic CFV                       Yes        None   Spontaneous/Phasic PFV                       Yes        None FV Proximal               Yes        None       Continuous FV Mid                    Yes        None FV Distal                 Yes        None Popliteal                 Yes        None       Continuous Peroneal                  Yes        None PTV                       Yes        None  Left                  Compress Thrombus        Flow Distal External Iliac   Yes      None   Spontaneous/Phasic CFV                     Yes      None   Spontaneous/Phasic PFV                     Yes      None FV Proximal             Yes      None   Spontaneous/Phasic FV Mid                  Yes      None FV Distal               Yes      None Popliteal               Yes      None       Continuous PTV                     Yes      None  [id number] Alisa Puri MD Electronically signed by [id number] Alisa Puri MD on 12/18/2023 at 1:18:07 PM  ** Final **     ECG 12 lead    Result Date: 12/11/2023  Sinus rhythm with Fusion complexes ST & T wave abnormality, consider lateral ischemia Abnormal ECG When compared with ECG of 01-DEC-2023 10:56, (unconfirmed) Fusion complexes are now Present T wave inversion now evident in Anterior leads    XR chest 2 views    Result Date: 12/6/2023  Interpreted By:  Salvador Robles, STUDY: XR CHEST 2 VIEWS;  12/6/2023 10:11 am   INDICATION: Signs/Symptoms:Eval of Puml Fibrosis.   COMPARISON: 11/24/2023.   ACCESSION NUMBER(S): [id number]   ORDERING CLINICIAN: JORDON PARRISH   FINDINGS:     CARDIOMEDIASTINAL SILHOUETTE: Cardiomediastinal silhouette is normal in size and  configuration.   LUNGS: Digital tomosynthesis of the lung parenchyma was performed. There is diffuse interstitial airspace opacities with prominent traction bronchiectatic changes. There is no evidence of pneumothorax.   ABDOMEN: No remarkable upper abdominal findings.   BONES: No acute osseous changes.       1.  There are diffuse interstitial airspace opacities with traction bronchiectatic changes. This may represent residua of prior COVID-19 pneumonia/ARDS.     Signed by: Salvador Robles 12/6/2023 10:21 AM Dictation workstation:   [id number]      Current Medications:   Scheduled medications  Reviewed  Continuous medications    PRN medications    Medications from transferring facility:  cheduled medications  ciprofloxacin, 750 mg, oral, BID  [Held by provider] enoxaparin, 40 mg, subcutaneous, Daily  ergocalciferol, 1,250 mcg, oral, Weekly  fluticasone, 1 spray, Each Nostril, BID  insulin glargine, 4 Units, subcutaneous, Once  [Held by provider] insulin glargine, 8 Units, subcutaneous, Nightly  insulin lispro, 0-15 Units, subcutaneous, TID with meals  lidocaine, 1 Application, urethral, Once  lidocaine, 1 patch, transdermal, Daily  lidocaine, 1 patch, transdermal, Daily  melatonin, 5 mg, oral, Daily  metoprolol tartrate, 75 mg, oral, BID  pantoprazole, 40 mg, oral, Daily before breakfast  predniSONE, 30 mg, oral, BID  QUEtiapine, 25 mg, oral, Nightly  sennosides-docusate sodium, 1 tablet, oral, BID  [Held by provider] sodium zirconium cyclosilicate, 10 g, oral, TID  sulfamethoxazole-trimethoprim, 80 mg, oral, Daily  thiamine, 100 mg, oral, Daily        Continuous medications  oxygen, , Last Rate: 6 L/min (12/27/23 0927)        PRN medications  PRN medications: acetaminophen **OR** [DISCONTINUED] acetaminophen **OR** [DISCONTINUED] acetaminophen, dextrose 10 % in water (D10W), dextrose, diclofenac sodium, glucagon, ipratropium-albuteroL, lidocaine-diphenhydraMINE-Maalox 1:1:1, loperamide, ondansetron ODT **OR**  [DISCONTINUED] ondansetron, oxyCODONE, QUEtiapine  Assessment  Patient is -year-old (man/woman) with the following medical Problems:    Acute severe hypoxic respiratory failure  ARDS  COVID-19 infection with possible secondary pneumonia status post remdesivir treatment and Actima  Persistent DVT of anticoagulation due to the recent bleed with concern of coexisting PE  Recent acute kidney injury  Recent acute anemia, 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  Patient on IV Lasix 20 mg x 1  Still on a three-way lateral flexion at 50 cc/h   oxygen for saturation of 89 to 94%  Incentive spirometry  Bronchodilators therapy as needed  PT/OT  DVT prophylaxis  Minimize benzodiazepine and narcotics  Encourage ambulation  Head evaluation and aspiration precautions.  Weekly duplex ultrasound is recommended  Management of hematuria per primary care provider, urology recommendation was to continue three-way flushing  Follow-up with urology in 2 to 3 weeks  Per urology note from transferring facility if hematuria stopped anticoagulation could be restarted  Monitor H&H transfuse hemoglobin less than 7  Check chest x-ray        Umang Jackson MD  01/04/24     STAFF PHYSICIAN NOTE OF PERSONAL INVOLVEMENT IN CARE  As the attending physician, I certify that I personally reviewed the patient's history and personally examined the patient to confirm the physical findings described above, and that I reviewed the relevant imaging studies and available reports.  I also discussed the differential diagnosis and all of the proposed management plans with the patient and individuals accompanying the patient to this visit.  They had the opportunity to ask questions about the proposed management plans and to have those questions answered.

## 2024-01-05 NOTE — PROGRESS NOTES
Pulmonary Critical Care note    Patient Name :Macarena Wilson  Date of service : 01/05/24  MRN: [id number]  YOB: 1955      Interval History:  Slight improvement shortness of breath, received Lasix 20 mg, no fever no chills    History of Present Illness:   68 years old  female who had an good state of health prior to her acute illness which required hospitalization recently, she had underlying history of CKD 3, hypertension, presented to the MICU at [facility] with acute hypoxic respiratory failure after COVID-pneumonia after being transferred from outside hospital, she was positive for COVID on 1013 with persistent dyspnea, CT scan of the chest showed worsening bilateral changes consistent with severe COPD and pneumonitis and ARDS, she remains on heated high flow oxygen, did receive remdesivir and Actemra on October 24, she was started on empiric therapy with bacterial and fungal treatment for concern for organizing pneumonia in addition to Decadron, she has steroids include hyperglycemia during the treatment, developed acute DVT with concern of clinical PE and she was started on anticoagulation with Eliquis, started on CPAP treatment, was aggressively diuresed but then she developed acute kidney injury,, she was evaluated by GYN for recurrent urinary symptoms, anticoagulation was held due to acute drop of hemoglobin, CT of the abdomen with urogram showed hyperdense material consistent with normal blood and left hemorrhagic cyst, repeat DVT workup showed still persistent DVT, with recommendation of weekly ultrasound  Past Medical/Surgical History:   Past Medical History:   Diagnosis Date    Chronic kidney disease     Hypertension     Personal history of diseases of the skin and subcutaneous tissue     History of rosacea    Pseudomonal bacteremia 11/27/2023     Past Surgical History:   Procedure Laterality Date    APPENDECTOMY  04/01/2016    Appendectomy    HYSTERECTOMY  12/2022    [facility],  "[facility]    OTHER SURGICAL HISTORY  11/14/2022    Cystoscopy       Family History:   No relevant family history has been documented for this patient.    Allergies:     Allergies   Allergen Reactions    Fesoterodine Unknown    Mirabegron Rash    Prednisone Other     \"body aches, flu like symptoms\"         Social history:   reports that she has never smoked. She has never used smokeless tobacco. She reports that she does not use drugs.      Review of Systems:   General: denies weight gain, denies loss of appetite, fever, chills, night sweats.  HEENT: denies headaches, dizziness, head trauma, visual changes, eye pain, tinnitus, nosebleeds, hoarseness or throat pain    Respiratory: denies chest pain, dyspnea, cough and hemoptysis  Cardiovascular: denies orthopnea, paroxysmal nocturnal dyspnea, leg swelling, and previous heart attack.    Gastrointestinal: denies pain, nausea vomiting, diarrhea, constipation, melena or bleeding.  Genitourinary: denies hematuria, frequency, urgency or dysuria  Neurology: denies syncope, seizures, paralysis, paraesthesia   Endocrine: denies polyuria, polydipsia, skin or hair changes, and heat or cold intolerance  Musculoskeletal: denies joint pain, swelling, arthritis or myalgia  Hematologic: denies bleeding, adenopathy and easy bruising  Skin: denies rashes and skin discoloration  Psychiatry: denies depression    Physical Exam:   Vital Signs:   There were no vitals filed for this visit.  There were no vitals filed for this visit.      Input/Output:  No intake or output data in the 24 hours ending 01/05/24 1628    Oxygen requirements:    Ventilator Information:              General appearance: Not in pain or distress, in no respiratory distress    HEENT: Atraumatic/normocephalic, EOMI, JANICE, pharynx clear, moist mucosa, redness of the uvula appreciated,   Neck: Supple, no jugular venous distension, lymphadenopathy, thyromegaly or carotid bruits  Chest: Bilateral diminished breath " sounds  Cardiovascular: Normal S1, S2, regular rate and rhythm, no murmur, rub or gallop  Abdomen: Normal sounds present, soft, lax with no tenderness, no hepatosplenomegaly, and no masses  Extremities: No edema. Pulses are equally present.   Skin: intact, no rashes   Neurologic: Alert and oriented x 3, No focal deficit         Investigations:  Labs, radiological imaging and cardiac work up were personally reviewed    Results from last 7 days   Lab Units 01/03/24  1224 01/01/24  0950 12/31/23  0610   SODIUM mmol/L 140 138 137   POTASSIUM mmol/L 4.5 4.6 4.4   CHLORIDE mmol/L 107 105 107   CO2 mmol/L 24 24 23   BUN mg/dL 42* 33* 37*   CREATININE mg/dL 0.86 0.90 0.80   GLUCOSE mg/dL 108* 75 95   CALCIUM mg/dL 8.5* 8.2* 8.1*       Results from last 7 days   Lab Units 01/03/24  1224   WBC AUTO x10*3/uL 12.5*   HEMOGLOBIN g/dL 9.8*   HEMATOCRIT % 30.6*   PLATELETS AUTO x10*3/uL 147*           CT urography w 3D volume rendered imaging    Result Date: 12/31/2023  Interpreted By:  Yakov Jacob and Ogievich Taessa STUDY: CT UROGRAPHY WITH 3D VOLUME RENDERED IMAGING;  12/29/2023 11:36 am   INDICATION: Signs/Symptoms:Per urology recommendations, hematuria.   COMPARISON: CT cystogram 08/16/2023 CT abdomen/pelvis 03/01/2023 CT urography 09/24/2022 CTA chest 11/26/2023   ACCESSION NUMBER(S): [id number]   ORDERING CLINICIAN: CASEY DYER   TECHNIQUE: CT of the abdomen and pelvis was performed.  MULTIPHASE KIDNEY protocol was performed including contiguous axial images through the abdomen at 3 mm slice thickness before and in arterial and delayed phases post contrast. Coronal and sagittal reconstructions at 3 mm slice thickness were performed. 3D volume rendering images were obtained. 75 ml of contrast Omnipaque 350 were administered intravenously without immediate complication.   FINDINGS: LOWER CHEST: Again seen interlobular septal thickening, architectural distortion and mild bronchiectasis throughout the lungs similar  to prior CT chest from 11/26/2023. No evidence of pleural effusion or pneumothorax.   ABDOMEN:   KIDNEYS AND URETERS: Left kidney/ureter: Left kidney is normal in size. No evidence of nephrolithiasis or hydroureteronephrosis. The proximal left ureter is not opacified on delayed series (series 6, images 193-247). There is an hypoattenuating exophytic cortical cyst in the left lower renal pole measuring 2 x 2 cm (series 3, image 70), above water attenuation compatible with a hemorrhagic versus proteinaceous cyst, has a similar appearance compared to prior CT from 03/01/2023.   Right kidney/ureter: Moderate right renal cortical thinning and atrophy. There is a 0.3 cm stone in the right mid ureter on nonenhanced imaging (series 2, image 97), was seen on prior CT cystogram 08/16/2023. No evidence of hydroureteronephrosis. There is decreased opacification of the right ureter distal to the ureteral calyx on delayed series. There is a nonobstructive calyx stone measuring 1.3 x 1.6 cm on unenhanced images (series 2, image 65). Again seen is urothelial thickening predominantly with mild enhancement in the mid to proximal ureter (series 3, images 77-95), similar to prior. Again seen several round hypodensities in the right kidney with the largest measuring 1.4 x 1.3 cm in the midpole (series 3, image 68), favoring to represent a simple cyst.   3D reformations: Left proximal ureter is not depicted due to incomplete opacification, within this limitation there is no evidence of hydroureteronephrosis. The distal right ureter is not depicted due to incomplete opacification. The visualized portion of the right renal collecting system without evidence of hydroureteronephrosis.   BLADDER: Hyperattenuating material in the bladder with a proximally 50 Hounsfield units and may represent unclotted blood. There is contrast material in the posterior dependent portion. The urinary bladder is filled with air with a Max catheter in place.    LIVER: The liver is normal in size without evidence of focal liver lesions. Subcentimeter hypodensity in segment JOCELYNE, too small to characterize.   BILE DUCTS: The bile ducts are not dilated.   GALLBLADDER: Gallbladder: No calcified stones. No wall thickening.   PANCREAS: The pancreas appears unremarkable.   SPLEEN: Within normal limits.   ADRENAL GLANDS: Within normal limits.   REPRODUCTIVE ORGANS: The uterus is surgically absent. No contrast in the vaginal vault to suggest vesicovaginal fistula. No adnexal mass.   BOWEL: The stomach is unremarkable. Bowel is partially obscured at the pelvis due to beam hardening. The small and large bowel are normal in caliber and demonstrate no wall thickening. Large colon diverticulosis without diverticulitis. The appendix is not definitely visualized. There is however no pericecal stranding or fluid.   VESSELS: Vascular calcification of the abdominal aorta and its branching vessels. There is no aneurysmal dilatation of the abdominal aorta. The IVC is within normal limits.   PERITONEUM/RETROPERITONEUM/LYMPH NODES: Few small pelvic phleboliths are present. No ascites or free air, no fluid collection.  The retroperitoneum appears unremarkable.  No enlarged mesenteric lymph nodes.   ABDOMINAL WALL: Within normal limits.   BONE AND SOFT TISSUE: No suspicious osseous lesions are present. Small left joint effusion. Degenerative discogenic disease is noted in the lower thoracic and lumbar spine.       1. Hyperdense material in the bladder consistent with blood products. Air foci in the bladder likely from Max instrumentation. 2. 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 There is no contrast in the vaginal vault identified. 3. Stable complex left hemorrhagic cyst. 4. Nonobstructive tiny 0.3 cm calculus in the right mid ureter and calyx calculi measuring 1.3 x 1.6 cm  in the right renal upper pole. No left renal or ureter calculi. 5. Stable  right renal atrophy. 6. Diffuse mid to proximal right urothelial thickening, seen on prior imaging. 7. Again seen interlobular septal thickening, architectural distortion and mild bronchiectasis throughout the lungs similar to prior CT chest from 11/26/2023 and are concerning for fibrotic/emphysematous lung changes. 8. Small left joint effusion without evidence of acute osseous process.   I personally reviewed the images/study and I agree with the findings as stated by Louise Ma DO, PGY-2. This study was interpreted at [facility], [place], [place].   MACRO: None   Signed by: Yakov Jacob 12/31/2023 10:07 AM Dictation workstation:   [id number]    Vascular US lower extremity venous duplex bilateral    Result Date: 12/29/2023            [place] [address]   Tel [phone] and Fax [phone]  Vascular Lab Report VASC US LOWER EXTREMITY VENOUS DUPLEX BILATERAL  Patient Name:     KARLA AGUILERA Reading           [id number] Kami Wells MD,                                      Physician:        BHUPINDER Study Date:       12/29/2023         Ordering          [id number] JOSE GREENFIELD                                      Physician: MRN/PID:          [id number]           Technologist:     Aruna Bonner T Accession#:       [id number]       Technologist 2: Date of           1955 / 68      Encounter#:       [id number] Birth/Age:        years Gender:           F Admission Status: Inpatient          Location          [facility]                                      Performed:  Diagnosis/ICD: Acute embolism and thrombosis of unspecified deep veins of left                distal lower extremity-I82.4Z2 Indication:    Venous thrombosis (CAVA) CPT Codes:     95536 Peripheral venous duplex scan for DVT complete  Patient History Previous DVT.  **CRITICAL RESULT** Critical Result: Left PTV and soleal vein DVT Notification called to  Deon Fernandez MD on 12/29/2023 at 4:00:01 PM by Aruna Bonner RVT.  CONCLUSIONS: Right Lower Venous: No evidence of acute deep vein thrombus visualized in the right lower extremity. Left Lower Venous: There is acute occlusive deep vein thrombosis visualized in the soleal vein. There is acute non-occlusive deep vein thrombosis visualized in the posterior tibial vein. The remainder of the left leg is negative for deep vein thrombosis.  Comparison: Compared with study from 12/18/2023, there is new left PTV DVT. Patient has known left soleal vein DVT from 11/13/23, that was not visualized in the previous exam, but was visualized in today's exam.  Imaging & Doppler Findings:  Right                 Compressible Thrombus        Flow Distal External Iliac     Yes        None   Spontaneous/Phasic CFV                       Yes        None   Spontaneous/Phasic PFV                       Yes        None FV Proximal               Yes        None   Spontaneous/Phasic FV Mid                    Yes        None FV Distal                 Yes        None Popliteal                 Yes        None   Spontaneous/Phasic Peroneal                  Yes        None PTV                       Yes        None  Left                  Compress      Thrombus              Flow Distal External Iliac   Yes           None         Spontaneous/Phasic CFV                     Yes           None         Spontaneous/Phasic PFV                     Yes           None FV Proximal             Yes           None         Spontaneous/Phasic FV Mid                  Yes           None FV Distal               Yes           None Popliteal               Yes           None         Spontaneous/Phasic Peroneal                Yes           None PTV                   Partial  Acute non-occlusive Soleal                Partial    Acute occlusive  [id number] Kami Wells MD, BHUPINDER Electronically signed by [id number] BHUPINDER Eaton MD on 12/29/2023 at 7:49:05 PM  ** Final **     US  bladder    Result Date: 12/29/2023  Interpreted By:  Cholo Cevallos and Bera Kaustav STUDY: US BLADDER;  12/28/2023 7:45 pm   INDICATION: Signs/Symptoms:R/o clots iso hematuria per Urology.   COMPARISON: CT cystogram on 08/16/2023   ACCESSION NUMBER(S): [id number]   ORDERING CLINICIAN: JOSEFINA MILAN   TECHNIQUE: Multiple grayscale and color ultrasound images of the bladder were obtained.   FINDINGS: There is a complex heterogeneously echogenic region seen at the inferior aspect of the bladder, without flow seen on Doppler images.       1.  Complex heterogeneously echogenic layering material within the bladder, likely represents debris versus clot.   I personally reviewed the images/study and I agree with the findings as stated. This study was interpreted at [place], [place].   MACRO: None   Signed by: Cholo Cevallos 12/29/2023 5:56 AM Dictation workstation:   [id number]    Lower extremity venous duplex bilateral    Result Date: 12/18/2023            [place] [address]   Tel [phone] and Fax [phone]  Vascular Lab Report VASC US LOWER EXTREMITY VENOUS DUPLEX BILATERAL  Patient Name:      KARLA Cruz Physician: [id number] Alisa Puri MD Study Date:        12/18/2023          Ordering           [id number] BRENDAN LIU                                        Physician:         SONG MRN/PID:           [id number]            Technologist:      Jonh BARKLEY Accession#:        [id number]        Technologist 2: Date of Birth/Age: 1955 / 68 years Encounter#:        [id number] Gender:            F Admission Status:  Inpatient           Location           [facility]                                        Performed:  Diagnosis/ICD: Generalized edema (anasarca)-R60.1 CPT Codes:     87531 Peripheral venous duplex scan for DVT complete   CONCLUSIONS: Right Lower Venous: No evidence of acute deep vein thrombus visualized in the right lower extremity. Left Lower Venous: No evidence of acute deep vein thrombus visualized in the left lower extremity. Cannot rule out thrombus in non-visualized peroneal vein.  Imaging & Doppler Findings:  Right                 Compressible Thrombus        Flow Distal External Iliac     Yes        None   Spontaneous/Phasic CFV                       Yes        None   Spontaneous/Phasic PFV                       Yes        None FV Proximal               Yes        None       Continuous FV Mid                    Yes        None FV Distal                 Yes        None Popliteal                 Yes        None       Continuous Peroneal                  Yes        None PTV                       Yes        None  Left                  Compress Thrombus        Flow Distal External Iliac   Yes      None   Spontaneous/Phasic CFV                     Yes      None   Spontaneous/Phasic PFV                     Yes      None FV Proximal             Yes      None   Spontaneous/Phasic FV Mid                  Yes      None FV Distal               Yes      None Popliteal               Yes      None       Continuous PTV                     Yes      None  [id number] Alisa Puri MD Electronically signed by [id number] Alisa Puri MD on 12/18/2023 at 1:18:07 PM  ** Final **     ECG 12 lead    Result Date: 12/11/2023  Sinus rhythm with Fusion complexes ST & T wave abnormality, consider lateral ischemia Abnormal ECG When compared with ECG of 01-DEC-2023 10:56, (unconfirmed) Fusion complexes are now Present T wave inversion now evident in Anterior leads    XR chest 2 views    Result Date: 12/6/2023  Interpreted By:  Salvador Robles, STUDY: XR CHEST 2 VIEWS;  12/6/2023 10:11 am   INDICATION: Signs/Symptoms:Eval of Puml Fibrosis.   COMPARISON: 11/24/2023.   ACCESSION NUMBER(S): [id number]   ORDERING CLINICIAN: JORDON PARRISH   FINDINGS:      CARDIOMEDIASTINAL SILHOUETTE: Cardiomediastinal silhouette is normal in size and configuration.   LUNGS: Digital tomosynthesis of the lung parenchyma was performed. There is diffuse interstitial airspace opacities with prominent traction bronchiectatic changes. There is no evidence of pneumothorax.   ABDOMEN: No remarkable upper abdominal findings.   BONES: No acute osseous changes.       1.  There are diffuse interstitial airspace opacities with traction bronchiectatic changes. This may represent residua of prior COVID-19 pneumonia/ARDS.     Signed by: Salvador Robles 12/6/2023 10:21 AM Dictation workstation:   [id number]      Current Medications:   Scheduled medications  Reviewed  Continuous medications    PRN medications    Medications from transferring facility:  cheduled medications  ciprofloxacin, 750 mg, oral, BID  [Held by provider] enoxaparin, 40 mg, subcutaneous, Daily  ergocalciferol, 1,250 mcg, oral, Weekly  fluticasone, 1 spray, Each Nostril, BID  insulin glargine, 4 Units, subcutaneous, Once  [Held by provider] insulin glargine, 8 Units, subcutaneous, Nightly  insulin lispro, 0-15 Units, subcutaneous, TID with meals  lidocaine, 1 Application, urethral, Once  lidocaine, 1 patch, transdermal, Daily  lidocaine, 1 patch, transdermal, Daily  melatonin, 5 mg, oral, Daily  metoprolol tartrate, 75 mg, oral, BID  pantoprazole, 40 mg, oral, Daily before breakfast  predniSONE, 30 mg, oral, BID  QUEtiapine, 25 mg, oral, Nightly  sennosides-docusate sodium, 1 tablet, oral, BID  [Held by provider] sodium zirconium cyclosilicate, 10 g, oral, TID  sulfamethoxazole-trimethoprim, 80 mg, oral, Daily  thiamine, 100 mg, oral, Daily        Continuous medications  oxygen, , Last Rate: 6 L/min (12/27/23 0927)        PRN medications  PRN medications: acetaminophen **OR** [DISCONTINUED] acetaminophen **OR** [DISCONTINUED] acetaminophen, dextrose 10 % in water (D10W), dextrose, diclofenac sodium, glucagon,  ipratropium-albuteroL, lidocaine-diphenhydraMINE-Maalox 1:1:1, loperamide, ondansetron ODT **OR** [DISCONTINUED] ondansetron, oxyCODONE, QUEtiapine  Assessment  Patient is -year-old (man/woman) with the following medical Problems:    Acute severe hypoxic respiratory failure  ARDS  COVID-19 infection with possible secondary pneumonia status post remdesivir treatment and Actima  Persistent DVT of anticoagulation due to the recent bleed with concern of coexisting PE  Recent acute kidney injury  Recent acute anemia, 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  Patient on IV Lasix 20 mg x 1  Repeat chest x-ray in a.m.   Cough, shortness of breath and hypoxia still on a three-way lateral flexion at 50 cc/h   oxygen for saturation of 89 to 94%  Incentive spirometry  Bronchodilators therapy as needed  PT/OT  DVT prophylaxis  Minimize benzodiazepine and narcotics  Encourage ambulation  Head evaluation and aspiration precautions.  Weekly duplex ultrasound is recommended  Management of hematuria per primary care provider, urology recommendation was to continue three-way flushing  Follow-up with urology in 2 to 3 weeks  Per urology note from transferring facility if hematuria stopped anticoagulation could be restarted  Monitor H&H transfuse hemoglobin less than 7  Check chest x-ray        Umang Jackson MD  01/05/24     STAFF PHYSICIAN NOTE OF PERSONAL INVOLVEMENT IN CARE  As the attending physician, I certify that I personally reviewed the patient's history and personally examined the patient to confirm the physical findings described above, and that I reviewed the relevant imaging studies and available reports.  I also discussed the differential diagnosis and all of the proposed management plans with the patient and individuals accompanying the patient to this visit.  They had the opportunity  to ask questions about the proposed management plans and to have those questions answered.

## 2024-01-05 NOTE — TELEPHONE ENCOUNTER
Called and left detailed VM to pt, notified per Dr. Da MENESES done to follow up on the known DVT showed no propagation, instructed pt to get another US done next Weds 1/10/24. Provided ph#[phone] for pt to schedule US, and provided ph#[phone] if pt has any additional questions or concerns.

## 2024-01-05 NOTE — PROGRESS NOTES
Macarena Wilson is a 68 y.o. female on day 0 of admission presenting with No Principal Problem: There is no principal problem currently on the Problem List. Please update the Problem List and refresh..      Subjective   Patient fully evaluated and clinically improved tolerating nasal cannula oxygen at 4 L at 97% oxygen saturation       Objective     Last Recorded Vitals  There were no vitals taken for this visit.  Intake/Output last 3 Shifts:  No intake or output data in the 24 hours ending 01/06/24 1814    Admission Weight       Daily Weight  12/08/23 : 67.7 kg (149 lb 4 oz)    Image Results  Vascular US Lower Extremity Venous Duplex Bilateral             [place] [address]  Tel [phone] and Fax [phone]       Vascular Lab Report  VASC US LOWER EXTREMITY VENOUS DUPLEX BILATERAL       Patient Name:      MACARENA WILSON Reading Physician: [id number] Rea Roberson MD  Study Date:        1/3/2024           Ordering           [id number] REA ROBRESON                                        Physician:  MRN/PID:           [id number]           Technologist:      Katina Roblero [facility]  Accession#:        [id number]       Technologist 2:  Date of Birth/Age: 1955 / 68      Encounter#:        [id number]                     years  Gender:            F  Admission Status:  Inpatient          Location           [facility]                                        Performed:       Diagnosis/ICD: Other specified soft tissue disorders-M79.89  CPT Codes:     90385 Peripheral venous duplex scan for DVT complete       CONCLUSIONS:  Right Lower Venous: No evidence of acute deep vein thrombus visualized in the right lower extremity.  Left Lower Venous: There is age indeterminate deep vein thrombosis visualized in the soleal and posterior tibial veins.     Comparison:  Compared with study from 12/29/2023, Acute DVT in Left  Soleal V and PTV noted on previous exam, is now noted to be age indetereminate on today's exam. No significant changes noted to Right LE.     Imaging & Doppler Findings:     Right                 Compressible Thrombus        Flow  Distal External Iliac     Yes        None   Spontaneous/Phasic  CFV                       Yes        None   Spontaneous/Phasic  PFV                       Yes        None  FV Proximal               Yes        None   Spontaneous/Phasic  FV Mid                    Yes        None  FV Distal                 Yes        None  Popliteal                 Yes        None   Spontaneous/Phasic  Peroneal                  Yes        None  PTV                       Yes        None       Left                  Compress Thrombus        Flow  Distal External Iliac   Yes      None   Spontaneous/Phasic  CFV                     Yes      None   Spontaneous/Phasic  PFV                     Yes      None  FV Proximal             Yes      None   Spontaneous/Phasic  FV Mid                  Yes      None  FV Distal               Yes      None  Popliteal               Yes      None   Spontaneous/Phasic  Peroneal                Yes      None  PTV                   Partial   ? Age  Soleal                Partial   ? Age       [id number] Rea Roberson MD  Electronically signed by [id number] Rea Roberson MD on 1/4/2024 at 3:18:13 PM       ** Final **      Physical Exam    Relevant Results               Assessment/Plan Pulmonary Critical Care note    Patient Name :Macarena Wilson  Date of service : 01/06/24  MRN: [id number]  YOB: 1955      Interval History:    History of Present Illness:   68 years old  female who had an good state of health prior to her acute illness which required hospitalization recently, she had underlying history of CKD 3, hypertension, presented to the MICU at [facility] with acute hypoxic respiratory failure after COVID-pneumonia after being transferred from outside hospital,  "she was positive for COVID on 1013 with persistent dyspnea, CT scan of the chest showed worsening bilateral changes consistent with severe COPD and pneumonitis and ARDS, she remains on heated high flow oxygen, did receive remdesivir and Actemra on October 24, she was started on empiric therapy with bacterial and fungal treatment for concern for organizing pneumonia in addition to Decadron, she has steroids include hyperglycemia during the treatment, developed acute DVT with concern of clinical PE and she was started on anticoagulation with Eliquis, started on CPAP treatment, was aggressively diuresed but then she developed acute kidney injury,, she was evaluated by GYN for recurrent urinary symptoms, anticoagulation was held due to acute drop of hemoglobin, CT of the abdomen with urogram showed hyperdense material consistent with normal blood and left hemorrhagic cyst, repeat DVT workup showed still persistent DVT, with recommendation of weekly ultrasound  Past Medical/Surgical History:   Past Medical History:   Diagnosis Date    Chronic kidney disease     Hypertension     Personal history of diseases of the skin and subcutaneous tissue     History of rosacea    Pseudomonal bacteremia 11/27/2023     Past Surgical History:   Procedure Laterality Date    APPENDECTOMY  04/01/2016    Appendectomy    HYSTERECTOMY  12/2022    TL, ANGEL    OTHER SURGICAL HISTORY  11/14/2022    Cystoscopy       Family History:   No relevant family history has been documented for this patient.    Allergies:     Allergies   Allergen Reactions    Fesoterodine Unknown    Mirabegron Rash    Prednisone Other     \"body aches, flu like symptoms\"         Social history:   reports that she has never smoked. She has never used smokeless tobacco. She reports that she does not use drugs.      Review of Systems:   General: denies weight gain, denies loss of appetite, fever, chills, night sweats.  HEENT: denies headaches, dizziness, head trauma, visual " changes, eye pain, tinnitus, nosebleeds, hoarseness or throat pain    Respiratory: denies chest pain, dyspnea, cough and hemoptysis  Cardiovascular: denies orthopnea, paroxysmal nocturnal dyspnea, leg swelling, and previous heart attack.    Gastrointestinal: denies pain, nausea vomiting, diarrhea, constipation, melena or bleeding.  Genitourinary: denies hematuria, frequency, urgency or dysuria  Neurology: denies syncope, seizures, paralysis, paraesthesia   Endocrine: denies polyuria, polydipsia, skin or hair changes, and heat or cold intolerance  Musculoskeletal: denies joint pain, swelling, arthritis or myalgia  Hematologic: denies bleeding, adenopathy and easy bruising  Skin: denies rashes and skin discoloration  Psychiatry: denies depression    Physical Exam:   Vital Signs:   There were no vitals filed for this visit.  There were no vitals filed for this visit.      Input/Output:  No intake or output data in the 24 hours ending 01/06/24 1814    Oxygen requirements:    Ventilator Information:              General appearance: Not in pain or distress, in no respiratory distress    HEENT: Atraumatic/normocephalic, EOMI, JANICE, pharynx clear, moist mucosa, redness of the uvula appreciated,   Neck: Supple, no jugular venous distension, lymphadenopathy, thyromegaly or carotid bruits  Chest: Bilateral diminished breath sounds  Cardiovascular: Normal S1, S2, regular rate and rhythm, no murmur, rub or gallop  Abdomen: Normal sounds present, soft, lax with no tenderness, no hepatosplenomegaly, and no masses  Extremities: No edema. Pulses are equally present.   Skin: intact, no rashes   Neurologic: Alert and oriented x 3, No focal deficit         Investigations:  Labs, radiological imaging and cardiac work up were personally reviewed    Results from last 7 days   Lab Units 01/06/24  0817 01/03/24  1224 01/01/24  0950   SODIUM mmol/L 138 140 138   POTASSIUM mmol/L 3.9 4.5 4.6   CHLORIDE mmol/L 102 107 105   CO2 mmol/L 27 24  24   BUN mg/dL 42* 42* 33*   CREATININE mg/dL 0.99 0.86 0.90   GLUCOSE mg/dL 137* 108* 75   CALCIUM mg/dL 8.3* 8.5* 8.2*       Results from last 7 days   Lab Units 01/06/24  0818   WBC AUTO x10*3/uL 10.9   HEMOGLOBIN g/dL 8.4*   HEMATOCRIT % 26.9*   PLATELETS AUTO x10*3/uL 150           CT urography w 3D volume rendered imaging    Result Date: 12/31/2023  Interpreted By:  Yakov Jacob  and Anil Gil STUDY: CT UROGRAPHY WITH 3D VOLUME RENDERED IMAGING;  12/29/2023 11:36 am   INDICATION: Signs/Symptoms:Per urology recommendations, hematuria.   COMPARISON: CT cystogram 08/16/2023 CT abdomen/pelvis 03/01/2023 CT urography 09/24/2022 CTA chest 11/26/2023   ACCESSION NUMBER(S): [id number]   ORDERING CLINICIAN: CASEY DYER   TECHNIQUE: CT of the abdomen and pelvis was performed.  MULTIPHASE KIDNEY protocol was performed including contiguous axial images through the abdomen at 3 mm slice thickness before and in arterial and delayed phases post contrast. Coronal and sagittal reconstructions at 3 mm slice thickness were performed. 3D volume rendering images were obtained. 75 ml of contrast Omnipaque 350 were administered intravenously without immediate complication.   FINDINGS: LOWER CHEST: Again seen interlobular septal thickening, architectural distortion and mild bronchiectasis throughout the lungs similar to prior CT chest from 11/26/2023. No evidence of pleural effusion or pneumothorax.   ABDOMEN:   KIDNEYS AND URETERS: Left kidney/ureter: Left kidney is normal in size. No evidence of nephrolithiasis or hydroureteronephrosis. The proximal left ureter is not opacified on delayed series (series 6, images 193-247). There is an hypoattenuating exophytic cortical cyst in the left lower renal pole measuring 2 x 2 cm (series 3, image 70), above water attenuation compatible with a hemorrhagic versus proteinaceous cyst, has a similar appearance compared to prior CT from 03/01/2023.   Right kidney/ureter: Moderate  right renal cortical thinning and atrophy. There is a 0.3 cm stone in the right mid ureter on nonenhanced imaging (series 2, image 97), was seen on prior CT cystogram 08/16/2023. No evidence of hydroureteronephrosis. There is decreased opacification of the right ureter distal to the ureteral calyx on delayed series. There is a nonobstructive calyx stone measuring 1.3 x 1.6 cm on unenhanced images (series 2, image 65). Again seen is urothelial thickening predominantly with mild enhancement in the mid to proximal ureter (series 3, images 77-95), similar to prior. Again seen several round hypodensities in the right kidney with the largest measuring 1.4 x 1.3 cm in the midpole (series 3, image 68), favoring to represent a simple cyst.   3D reformations: Left proximal ureter is not depicted due to incomplete opacification, within this limitation there is no evidence of hydroureteronephrosis. The distal right ureter is not depicted due to incomplete opacification. The visualized portion of the right renal collecting system without evidence of hydroureteronephrosis.   BLADDER: Hyperattenuating material in the bladder with a proximally 50 Hounsfield units and may represent unclotted blood. There is contrast material in the posterior dependent portion. The urinary bladder is filled with air with a Max catheter in place.   LIVER: The liver is normal in size without evidence of focal liver lesions. Subcentimeter hypodensity in segment JOCELYNE, too small to characterize.   BILE DUCTS: The bile ducts are not dilated.   GALLBLADDER: Gallbladder: No calcified stones. No wall thickening.   PANCREAS: The pancreas appears unremarkable.   SPLEEN: Within normal limits.   ADRENAL GLANDS: Within normal limits.   REPRODUCTIVE ORGANS: The uterus is surgically absent. No contrast in the vaginal vault to suggest vesicovaginal fistula. No adnexal mass.   BOWEL: The stomach is unremarkable. Bowel is partially obscured at the pelvis due to beam  hardening. The small and large bowel are normal in caliber and demonstrate no wall thickening. Large colon diverticulosis without diverticulitis. The appendix is not definitely visualized. There is however no pericecal stranding or fluid.   VESSELS: Vascular calcification of the abdominal aorta and its branching vessels. There is no aneurysmal dilatation of the abdominal aorta. The IVC is within normal limits.   PERITONEUM/RETROPERITONEUM/LYMPH NODES: Few small pelvic phleboliths are present. No ascites or free air, no fluid collection.  The retroperitoneum appears unremarkable.  No enlarged mesenteric lymph nodes.   ABDOMINAL WALL: Within normal limits.   BONE AND SOFT TISSUE: No suspicious osseous lesions are present. Small left joint effusion. Degenerative discogenic disease is noted in the lower thoracic and lumbar spine.       1. Hyperdense material in the bladder consistent with blood products. Air foci in the bladder likely from Max instrumentation. 2. 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 There is no contrast in the vaginal vault identified. 3. Stable complex left hemorrhagic cyst. 4. Nonobstructive tiny 0.3 cm calculus in the right mid ureter and calyx calculi measuring 1.3 x 1.6 cm  in the right renal upper pole. No left renal or ureter calculi. 5. Stable right renal atrophy. 6. Diffuse mid to proximal right urothelial thickening, seen on prior imaging. 7. Again seen interlobular septal thickening, architectural distortion and mild bronchiectasis throughout the lungs similar to prior CT chest from 11/26/2023 and are concerning for fibrotic/emphysematous lung changes. 8. Small left joint effusion without evidence of acute osseous process.   I personally reviewed the images/study and I agree with the findings as stated by Louise Ma DO, PGY-2. This study was interpreted at [facility], [place], [place].   MACRO: None    Signed by: Yakov Jacob 12/31/2023 10:07 AM Dictation workstation:   [id number]    Vascular US lower extremity venous duplex bilateral    Result Date: 12/29/2023            [place] [address]   Tel [phone] and Fax [phone]  Vascular Lab Report VASC US LOWER EXTREMITY VENOUS DUPLEX BILATERAL  Patient Name:     KARLA AGUILERA Reading           [id number] Kami Wells MD,                                      Physician:        RPVI Study Date:       12/29/2023         Ordering          [id number] JOSE FERNANDEZ                                      Physician: MRN/PID:          [id number]           Technologist:     Aruna Bonner RVT Accession#:       [id number]       Technologist 2: Date of           1955 / 68      Encounter#:       [id number] Birth/Age:        years Gender:           F Admission Status: Inpatient          Location          [facility]                                      Performed:  Diagnosis/ICD: Acute embolism and thrombosis of unspecified deep veins of left                distal lower extremity-I82.4Z2 Indication:    Venous thrombosis (CAVA) CPT Codes:     05425 Peripheral venous duplex scan for DVT complete  Patient History Previous DVT.  **CRITICAL RESULT** Critical Result: Left PTV and soleal vein DVT Notification called to Jose Fernandez MD on 12/29/2023 at 4:00:01 PM by Aruna Bonner RVT.  CONCLUSIONS: Right Lower Venous: No evidence of acute deep vein thrombus visualized in the right lower extremity. Left Lower Venous: There is acute occlusive deep vein thrombosis visualized in the soleal vein. There is acute non-occlusive deep vein thrombosis visualized in the posterior tibial vein. The remainder of the left leg is negative for deep vein thrombosis.  Comparison: Compared with study from 12/18/2023, there is new left PTV DVT. Patient has known left soleal vein DVT from 11/13/23, that was not visualized in the previous  exam, but was visualized in today's exam.  Imaging & Doppler Findings:  Right                 Compressible Thrombus        Flow Distal External Iliac     Yes        None   Spontaneous/Phasic CFV                       Yes        None   Spontaneous/Phasic PFV                       Yes        None FV Proximal               Yes        None   Spontaneous/Phasic FV Mid                    Yes        None FV Distal                 Yes        None Popliteal                 Yes        None   Spontaneous/Phasic Peroneal                  Yes        None PTV                       Yes        None  Left                  Compress      Thrombus              Flow Distal External Iliac   Yes           None         Spontaneous/Phasic CFV                     Yes           None         Spontaneous/Phasic PFV                     Yes           None FV Proximal             Yes           None         Spontaneous/Phasic FV Mid                  Yes           None FV Distal               Yes           None Popliteal               Yes           None         Spontaneous/Phasic Peroneal                Yes           None PTV                   Partial  Acute non-occlusive Soleal                Partial    Acute occlusive  [id number] Kami Wells MD, RPVI Electronically signed by [id number] Kami Wells MD, RPVI on 12/29/2023 at 7:49:05 PM  ** Final **     US bladder    Result Date: 12/29/2023  Interpreted By:  Chool Cevallos  and Kaylene Young STUDY: US BLADDER;  12/28/2023 7:45 pm   INDICATION: Signs/Symptoms:R/o clots iso hematuria per Urology.   COMPARISON: CT cystogram on 08/16/2023   ACCESSION NUMBER(S): [id number]   ORDERING CLINICIAN: JOSEFINA MILAN   TECHNIQUE: Multiple grayscale and color ultrasound images of the bladder were obtained.   FINDINGS: There is a complex heterogeneously echogenic region seen at the inferior aspect of the bladder, without flow seen on Doppler images.       1.  Complex heterogeneously echogenic layering material within the  bladder, likely represents debris versus clot.   I personally reviewed the images/study and I agree with the findings as stated. This study was interpreted at [facility], [place], [place].   MACRO: None   Signed by: Cholo Cevallos 12/29/2023 5:56 AM Dictation workstation:   [id number]    Lower extremity venous duplex bilateral    Result Date: 12/18/2023            [place] [address]   Tel [phone] and Fax [phone]  Vascular Lab Report [facility] US LOWER EXTREMITY VENOUS DUPLEX BILATERAL  Patient Name:      KARLA AGUILERA  Reading Physician: [id number] Alisa Puri MD Study Date:        12/18/2023          Ordering           [id number] BRENDAN LIU                                        Physician:         SONG MRN/PID:           [id number]            Technologist:      Jonh BARKLEY Accession#:        [id number]        Technologist 2: Date of Birth/Age: 1955 / 68 years Encounter#:        [id number] Gender:            F Admission Status:  Inpatient           Location           [facility]                                        Performed:  Diagnosis/ICD: Generalized edema (anasarca)-R60.1 CPT Codes:     88203 Peripheral venous duplex scan for DVT complete  CONCLUSIONS: Right Lower Venous: No evidence of acute deep vein thrombus visualized in the right lower extremity. Left Lower Venous: No evidence of acute deep vein thrombus visualized in the left lower extremity. Cannot rule out thrombus in non-visualized peroneal vein.  Imaging & Doppler Findings:  Right                 Compressible Thrombus        Flow Distal External Iliac     Yes        None   Spontaneous/Phasic CFV                       Yes        None   Spontaneous/Phasic PFV                       Yes        None FV Proximal               Yes        None       Continuous FV Mid                    Yes         None FV Distal                 Yes        None Popliteal                 Yes        None       Continuous Peroneal                  Yes        None PTV                       Yes        None  Left                  Compress Thrombus        Flow Distal External Iliac   Yes      None   Spontaneous/Phasic CFV                     Yes      None   Spontaneous/Phasic PFV                     Yes      None FV Proximal             Yes      None   Spontaneous/Phasic FV Mid                  Yes      None FV Distal               Yes      None Popliteal               Yes      None       Continuous PTV                     Yes      None  [id number] Alisa Puri MD Electronically signed by [id number] Alisa Puri MD on 12/18/2023 at 1:18:07 PM  ** Final **     ECG 12 lead    Result Date: 12/11/2023  Sinus rhythm with Fusion complexes ST & T wave abnormality, consider lateral ischemia Abnormal ECG When compared with ECG of 01-DEC-2023 10:56, (unconfirmed) Fusion complexes are now Present T wave inversion now evident in Anterior leads    XR chest 2 views    Result Date: 12/6/2023  Interpreted By:  Salvador Robles, STUDY: XR CHEST 2 VIEWS;  12/6/2023 10:11 am   INDICATION: Signs/Symptoms:Eval of Puml Fibrosis.   COMPARISON: 11/24/2023.   ACCESSION NUMBER(S): [id number]   ORDERING CLINICIAN: JORDON PARRISH   FINDINGS:     CARDIOMEDIASTINAL SILHOUETTE: Cardiomediastinal silhouette is normal in size and configuration.   LUNGS: Digital tomosynthesis of the lung parenchyma was performed. There is diffuse interstitial airspace opacities with prominent traction bronchiectatic changes. There is no evidence of pneumothorax.   ABDOMEN: No remarkable upper abdominal findings.   BONES: No acute osseous changes.       1.  There are diffuse interstitial airspace opacities with traction bronchiectatic changes. This may represent residua of prior COVID-19 pneumonia/ARDS.     Signed by: Salvador Robles 12/6/2023 10:21 AM Dictation workstation:    [id number]      Current Medications:   Scheduled medications  Reviewed  Continuous medications    PRN medications    Medications from transferring facility:  cheduled medications  ciprofloxacin, 750 mg, oral, BID  [Held by provider] enoxaparin, 40 mg, subcutaneous, Daily  ergocalciferol, 1,250 mcg, oral, Weekly  fluticasone, 1 spray, Each Nostril, BID  insulin glargine, 4 Units, subcutaneous, Once  [Held by provider] insulin glargine, 8 Units, subcutaneous, Nightly  insulin lispro, 0-15 Units, subcutaneous, TID with meals  lidocaine, 1 Application, urethral, Once  lidocaine, 1 patch, transdermal, Daily  lidocaine, 1 patch, transdermal, Daily  melatonin, 5 mg, oral, Daily  metoprolol tartrate, 75 mg, oral, BID  pantoprazole, 40 mg, oral, Daily before breakfast  predniSONE, 30 mg, oral, BID  QUEtiapine, 25 mg, oral, Nightly  sennosides-docusate sodium, 1 tablet, oral, BID  [Held by provider] sodium zirconium cyclosilicate, 10 g, oral, TID  sulfamethoxazole-trimethoprim, 80 mg, oral, Daily  thiamine, 100 mg, oral, Daily        Continuous medications  oxygen, , Last Rate: 6 L/min (12/27/23 0927)        PRN medications  PRN medications: acetaminophen **OR** [DISCONTINUED] acetaminophen **OR** [DISCONTINUED] acetaminophen, dextrose 10 % in water (D10W), dextrose, diclofenac sodium, glucagon, ipratropium-albuteroL, lidocaine-diphenhydraMINE-Maalox 1:1:1, loperamide, ondansetron ODT **OR** [DISCONTINUED] ondansetron, oxyCODONE, QUEtiapine  Assessment  Patient is -year-old (man/woman) with the following medical Problems:    Acute severe hypoxic respiratory failure  ARDS  COVID-19 infection with possible secondary pneumonia status post remdesivir treatment and Actima  Persistent DVT of anticoagulation due to the recent bleed with concern of coexisting PE  Recent acute kidney injury  Recent acute anemia, 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  Weekly duplex ultrasound is recommended  Management of hematuria per primary care provider, urology recommendation was to continue three-way flushing  Follow-up with urology in 2 to 3 weeks  Per urology note from transferring facility if hematuria stopped anticoagulation could be restarted  Monitor H&H transfuse hemoglobin less than 7  Check chest x-ray        Kulwant Limon MD  01/06/24     STAFF PHYSICIAN NOTE OF PERSONAL INVOLVEMENT IN CARE  As the attending physician, I certify that I personally reviewed the patient's history and personally examined the patient to confirm the physical findings described above, and that I reviewed the relevant imaging studies and available reports.  I also discussed the differential diagnosis and all of the proposed management plans with the patient and individuals accompanying the patient to this visit.  They had the opportunity to ask questions about the proposed management plans and to have those questions answered.                   Active Problems:  There are no active Hospital Problems.    Patient fully evaluated on January seconds in the absence of Dr. Underwood.  Patient's physical and Occupational Therapy to begin.  Recheck labs in AM.  Aggressive pulmonary hygiene.     Patient fully evaluated on January 5.  Slow but progressive improvement in respiratory status.  Still receiving oxygen via nasal cannula.  Lab work has been stable.  Physical and Occupational Therapy assessments.         Kulwant Limon MD

## 2024-01-05 NOTE — TELEPHONE ENCOUNTER
----- Message from Rea Roberson MD sent at 1/5/2024 11:06 AM EST -----  US done to follow up on the known DVT showed no propagation, will need another US in a week (next Wed), order placed     Thanks,  Rea Roberson MD

## 2024-01-07 NOTE — PROGRESS NOTES
Pulmonary Critical Care note    Patient Name :Macarena Wilson  Date of service : 01/07/24  MRN: [id number]  YOB: 1955      Interval History:  Slight improvement shortness of breath, received Lasix 20 mg, no fever no chills    History of Present Illness:   68 years old  female who had an good state of health prior to her acute illness which required hospitalization recently, she had underlying history of CKD 3, hypertension, presented to the MICU at [facility] with acute hypoxic respiratory failure after COVID-pneumonia after being transferred from outside hospital, she was positive for COVID on 1013 with persistent dyspnea, CT scan of the chest showed worsening bilateral changes consistent with severe COPD and pneumonitis and ARDS, she remains on heated high flow oxygen, did receive remdesivir and Actemra on October 24, she was started on empiric therapy with bacterial and fungal treatment for concern for organizing pneumonia in addition to Decadron, she has steroids include hyperglycemia during the treatment, developed acute DVT with concern of clinical PE and she was started on anticoagulation with Eliquis, started on CPAP treatment, was aggressively diuresed but then she developed acute kidney injury,, she was evaluated by GYN for recurrent urinary symptoms, anticoagulation was held due to acute drop of hemoglobin, CT of the abdomen with urogram showed hyperdense material consistent with normal blood and left hemorrhagic cyst, repeat DVT workup showed still persistent DVT, with recommendation of weekly ultrasound  Past Medical/Surgical History:   Past Medical History:   Diagnosis Date    Chronic kidney disease     Hypertension     Personal history of diseases of the skin and subcutaneous tissue     History of rosacea    Pseudomonal bacteremia 11/27/2023     Past Surgical History:   Procedure Laterality Date    APPENDECTOMY  04/01/2016    Appendectomy    HYSTERECTOMY  12/2022    [facility],  "[facility]    OTHER SURGICAL HISTORY  11/14/2022    Cystoscopy       Family History:   No relevant family history has been documented for this patient.    Allergies:     Allergies   Allergen Reactions    Fesoterodine Unknown    Mirabegron Rash    Prednisone Other     \"body aches, flu like symptoms\"         Social history:   reports that she has never smoked. She has never used smokeless tobacco. She reports that she does not use drugs.      Review of Systems:   General: denies weight gain, denies loss of appetite, fever, chills, night sweats.  HEENT: denies headaches, dizziness, head trauma, visual changes, eye pain, tinnitus, nosebleeds, hoarseness or throat pain    Respiratory: denies chest pain, dyspnea, cough and hemoptysis  Cardiovascular: denies orthopnea, paroxysmal nocturnal dyspnea, leg swelling, and previous heart attack.    Gastrointestinal: denies pain, nausea vomiting, diarrhea, constipation, melena or bleeding.  Genitourinary: denies hematuria, frequency, urgency or dysuria  Neurology: denies syncope, seizures, paralysis, paraesthesia   Endocrine: denies polyuria, polydipsia, skin or hair changes, and heat or cold intolerance  Musculoskeletal: denies joint pain, swelling, arthritis or myalgia  Hematologic: denies bleeding, adenopathy and easy bruising  Skin: denies rashes and skin discoloration  Psychiatry: denies depression    Physical Exam:   Vital Signs:   There were no vitals filed for this visit.  There were no vitals filed for this visit.      Input/Output:  No intake or output data in the 24 hours ending 01/07/24 1021    Oxygen requirements:    Ventilator Information:              General appearance: Not in pain or distress, in no respiratory distress    HEENT: Atraumatic/normocephalic, EOMI, JANICE, pharynx clear, moist mucosa, redness of the uvula appreciated,   Neck: Supple, no jugular venous distension, lymphadenopathy, thyromegaly or carotid bruits  Chest: Bilateral diminished breath " sounds  Cardiovascular: Normal S1, S2, regular rate and rhythm, no murmur, rub or gallop  Abdomen: Normal sounds present, soft, lax with no tenderness, no hepatosplenomegaly, and no masses  Extremities: No edema. Pulses are equally present.   Skin: intact, no rashes   Neurologic: Alert and oriented x 3, No focal deficit         Investigations:  Labs, radiological imaging and cardiac work up were personally reviewed    Results from last 7 days   Lab Units 01/07/24  0700 01/06/24  0817 01/03/24  1224   SODIUM mmol/L 140 138 140   POTASSIUM mmol/L 4.0 3.9 4.5   CHLORIDE mmol/L 103 102 107   CO2 mmol/L 29 27 24   BUN mg/dL 37* 42* 42*   CREATININE mg/dL 1.01 0.99 0.86   GLUCOSE mg/dL 95 137* 108*   CALCIUM mg/dL 8.5* 8.3* 8.5*       Results from last 7 days   Lab Units 01/07/24  0700   WBC AUTO x10*3/uL 11.0   HEMOGLOBIN g/dL 8.3*   HEMATOCRIT % 26.3*   PLATELETS AUTO x10*3/uL 150           CT urography w 3D volume rendered imaging    Result Date: 12/31/2023  Interpreted By:  Yakov Jacob and Ogievich Taessa STUDY: CT UROGRAPHY WITH 3D VOLUME RENDERED IMAGING;  12/29/2023 11:36 am   INDICATION: Signs/Symptoms:Per urology recommendations, hematuria.   COMPARISON: CT cystogram 08/16/2023 CT abdomen/pelvis 03/01/2023 CT urography 09/24/2022 CTA chest 11/26/2023   ACCESSION NUMBER(S): [id number]   ORDERING CLINICIAN: CASEY DYER   TECHNIQUE: CT of the abdomen and pelvis was performed.  MULTIPHASE KIDNEY protocol was performed including contiguous axial images through the abdomen at 3 mm slice thickness before and in arterial and delayed phases post contrast. Coronal and sagittal reconstructions at 3 mm slice thickness were performed. 3D volume rendering images were obtained. 75 ml of contrast Omnipaque 350 were administered intravenously without immediate complication.   FINDINGS: LOWER CHEST: Again seen interlobular septal thickening, architectural distortion and mild bronchiectasis throughout the lungs similar  to prior CT chest from 11/26/2023. No evidence of pleural effusion or pneumothorax.   ABDOMEN:   KIDNEYS AND URETERS: Left kidney/ureter: Left kidney is normal in size. No evidence of nephrolithiasis or hydroureteronephrosis. The proximal left ureter is not opacified on delayed series (series 6, images 193-247). There is an hypoattenuating exophytic cortical cyst in the left lower renal pole measuring 2 x 2 cm (series 3, image 70), above water attenuation compatible with a hemorrhagic versus proteinaceous cyst, has a similar appearance compared to prior CT from 03/01/2023.   Right kidney/ureter: Moderate right renal cortical thinning and atrophy. There is a 0.3 cm stone in the right mid ureter on nonenhanced imaging (series 2, image 97), was seen on prior CT cystogram 08/16/2023. No evidence of hydroureteronephrosis. There is decreased opacification of the right ureter distal to the ureteral calyx on delayed series. There is a nonobstructive calyx stone measuring 1.3 x 1.6 cm on unenhanced images (series 2, image 65). Again seen is urothelial thickening predominantly with mild enhancement in the mid to proximal ureter (series 3, images 77-95), similar to prior. Again seen several round hypodensities in the right kidney with the largest measuring 1.4 x 1.3 cm in the midpole (series 3, image 68), favoring to represent a simple cyst.   3D reformations: Left proximal ureter is not depicted due to incomplete opacification, within this limitation there is no evidence of hydroureteronephrosis. The distal right ureter is not depicted due to incomplete opacification. The visualized portion of the right renal collecting system without evidence of hydroureteronephrosis.   BLADDER: Hyperattenuating material in the bladder with a proximally 50 Hounsfield units and may represent unclotted blood. There is contrast material in the posterior dependent portion. The urinary bladder is filled with air with a Max catheter in place.    LIVER: The liver is normal in size without evidence of focal liver lesions. Subcentimeter hypodensity in segment JOCELYNE, too small to characterize.   BILE DUCTS: The bile ducts are not dilated.   GALLBLADDER: Gallbladder: No calcified stones. No wall thickening.   PANCREAS: The pancreas appears unremarkable.   SPLEEN: Within normal limits.   ADRENAL GLANDS: Within normal limits.   REPRODUCTIVE ORGANS: The uterus is surgically absent. No contrast in the vaginal vault to suggest vesicovaginal fistula. No adnexal mass.   BOWEL: The stomach is unremarkable. Bowel is partially obscured at the pelvis due to beam hardening. The small and large bowel are normal in caliber and demonstrate no wall thickening. Large colon diverticulosis without diverticulitis. The appendix is not definitely visualized. There is however no pericecal stranding or fluid.   VESSELS: Vascular calcification of the abdominal aorta and its branching vessels. There is no aneurysmal dilatation of the abdominal aorta. The IVC is within normal limits.   PERITONEUM/RETROPERITONEUM/LYMPH NODES: Few small pelvic phleboliths are present. No ascites or free air, no fluid collection.  The retroperitoneum appears unremarkable.  No enlarged mesenteric lymph nodes.   ABDOMINAL WALL: Within normal limits.   BONE AND SOFT TISSUE: No suspicious osseous lesions are present. Small left joint effusion. Degenerative discogenic disease is noted in the lower thoracic and lumbar spine.       1. Hyperdense material in the bladder consistent with blood products. Air foci in the bladder likely from Max instrumentation. 2. 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 There is no contrast in the vaginal vault identified. 3. Stable complex left hemorrhagic cyst. 4. Nonobstructive tiny 0.3 cm calculus in the right mid ureter and calyx calculi measuring 1.3 x 1.6 cm  in the right renal upper pole. No left renal or ureter calculi. 5. Stable  right renal atrophy. 6. Diffuse mid to proximal right urothelial thickening, seen on prior imaging. 7. Again seen interlobular septal thickening, architectural distortion and mild bronchiectasis throughout the lungs similar to prior CT chest from 11/26/2023 and are concerning for fibrotic/emphysematous lung changes. 8. Small left joint effusion without evidence of acute osseous process.   I personally reviewed the images/study and I agree with the findings as stated by Louise Ma DO, PGY-2. This study was interpreted at [facility], [place], [place].   MACRO: None   Signed by: Yakov Jacob 12/31/2023 10:07 AM Dictation workstation:   [id number]    Vascular US lower extremity venous duplex bilateral    Result Date: 12/29/2023            [place] [address]   Tel [phone] and Fax [phone]  Vascular Lab Report VASC US LOWER EXTREMITY VENOUS DUPLEX BILATERAL  Patient Name:     KARLA AGUILERA Reading           [id number] Kami Wells MD,                                      Physician:        BHUPINDER Study Date:       12/29/2023         Ordering          [id number] JOSE GREENFIELD                                      Physician: MRN/PID:          [id number]           Technologist:     Aruna Bonner T Accession#:       [id number]       Technologist 2: Date of           1955 / 68      Encounter#:       [id number] Birth/Age:        years Gender:           F Admission Status: Inpatient          Location          [facility]                                      Performed:  Diagnosis/ICD: Acute embolism and thrombosis of unspecified deep veins of left                distal lower extremity-I82.4Z2 Indication:    Venous thrombosis (CAVA) CPT Codes:     58954 Peripheral venous duplex scan for DVT complete  Patient History Previous DVT.  **CRITICAL RESULT** Critical Result: Left PTV and soleal vein DVT Notification called to  Deon Fernandez MD on 12/29/2023 at 4:00:01 PM by Aruna Bonner RVT.  CONCLUSIONS: Right Lower Venous: No evidence of acute deep vein thrombus visualized in the right lower extremity. Left Lower Venous: There is acute occlusive deep vein thrombosis visualized in the soleal vein. There is acute non-occlusive deep vein thrombosis visualized in the posterior tibial vein. The remainder of the left leg is negative for deep vein thrombosis.  Comparison: Compared with study from 12/18/2023, there is new left PTV DVT. Patient has known left soleal vein DVT from 11/13/23, that was not visualized in the previous exam, but was visualized in today's exam.  Imaging & Doppler Findings:  Right                 Compressible Thrombus        Flow Distal External Iliac     Yes        None   Spontaneous/Phasic CFV                       Yes        None   Spontaneous/Phasic PFV                       Yes        None FV Proximal               Yes        None   Spontaneous/Phasic FV Mid                    Yes        None FV Distal                 Yes        None Popliteal                 Yes        None   Spontaneous/Phasic Peroneal                  Yes        None PTV                       Yes        None  Left                  Compress      Thrombus              Flow Distal External Iliac   Yes           None         Spontaneous/Phasic CFV                     Yes           None         Spontaneous/Phasic PFV                     Yes           None FV Proximal             Yes           None         Spontaneous/Phasic FV Mid                  Yes           None FV Distal               Yes           None Popliteal               Yes           None         Spontaneous/Phasic Peroneal                Yes           None PTV                   Partial  Acute non-occlusive Soleal                Partial    Acute occlusive  [id number] Kami Wells MD, BHUPINDER Electronically signed by [id number] BHUPINDER Eaton MD on 12/29/2023 at 7:49:05 PM  ** Final **     US  bladder    Result Date: 12/29/2023  Interpreted By:  Cholo Cevallos and Bera Kaustav STUDY: US BLADDER;  12/28/2023 7:45 pm   INDICATION: Signs/Symptoms:R/o clots iso hematuria per Urology.   COMPARISON: CT cystogram on 08/16/2023   ACCESSION NUMBER(S): [id number]   ORDERING CLINICIAN: JOSEFINA MILAN   TECHNIQUE: Multiple grayscale and color ultrasound images of the bladder were obtained.   FINDINGS: There is a complex heterogeneously echogenic region seen at the inferior aspect of the bladder, without flow seen on Doppler images.       1.  Complex heterogeneously echogenic layering material within the bladder, likely represents debris versus clot.   I personally reviewed the images/study and I agree with the findings as stated. This study was interpreted at [place], [place].   MACRO: None   Signed by: Cholo Cevallos 12/29/2023 5:56 AM Dictation workstation:   [id number]    Lower extremity venous duplex bilateral    Result Date: 12/18/2023            [place] [address]   Tel [phone] and Fax [phone]  Vascular Lab Report VASC US LOWER EXTREMITY VENOUS DUPLEX BILATERAL  Patient Name:      KARLA Cruz Physician: [id number] Alisa Puri MD Study Date:        12/18/2023          Ordering           [id number] BRENDAN LIU                                        Physician:         SONG MRN/PID:           [id number]            Technologist:      Jonh BARKLEY Accession#:        [id number]        Technologist 2: Date of Birth/Age: 1955 / 68 years Encounter#:        [id number] Gender:            F Admission Status:  Inpatient           Location           [facility]                                        Performed:  Diagnosis/ICD: Generalized edema (anasarca)-R60.1 CPT Codes:     48540 Peripheral venous duplex scan for DVT complete   CONCLUSIONS: Right Lower Venous: No evidence of acute deep vein thrombus visualized in the right lower extremity. Left Lower Venous: No evidence of acute deep vein thrombus visualized in the left lower extremity. Cannot rule out thrombus in non-visualized peroneal vein.  Imaging & Doppler Findings:  Right                 Compressible Thrombus        Flow Distal External Iliac     Yes        None   Spontaneous/Phasic CFV                       Yes        None   Spontaneous/Phasic PFV                       Yes        None FV Proximal               Yes        None       Continuous FV Mid                    Yes        None FV Distal                 Yes        None Popliteal                 Yes        None       Continuous Peroneal                  Yes        None PTV                       Yes        None  Left                  Compress Thrombus        Flow Distal External Iliac   Yes      None   Spontaneous/Phasic CFV                     Yes      None   Spontaneous/Phasic PFV                     Yes      None FV Proximal             Yes      None   Spontaneous/Phasic FV Mid                  Yes      None FV Distal               Yes      None Popliteal               Yes      None       Continuous PTV                     Yes      None  [id number] Alisa Puri MD Electronically signed by [id number] Alisa Puri MD on 12/18/2023 at 1:18:07 PM  ** Final **     ECG 12 lead    Result Date: 12/11/2023  Sinus rhythm with Fusion complexes ST & T wave abnormality, consider lateral ischemia Abnormal ECG When compared with ECG of 01-DEC-2023 10:56, (unconfirmed) Fusion complexes are now Present T wave inversion now evident in Anterior leads    XR chest 2 views    Result Date: 12/6/2023  Interpreted By:  Salvador Robles, STUDY: XR CHEST 2 VIEWS;  12/6/2023 10:11 am   INDICATION: Signs/Symptoms:Eval of Puml Fibrosis.   COMPARISON: 11/24/2023.   ACCESSION NUMBER(S): [id number]   ORDERING CLINICIAN: JORDON PARRISH   FINDINGS:      CARDIOMEDIASTINAL SILHOUETTE: Cardiomediastinal silhouette is normal in size and configuration.   LUNGS: Digital tomosynthesis of the lung parenchyma was performed. There is diffuse interstitial airspace opacities with prominent traction bronchiectatic changes. There is no evidence of pneumothorax.   ABDOMEN: No remarkable upper abdominal findings.   BONES: No acute osseous changes.       1.  There are diffuse interstitial airspace opacities with traction bronchiectatic changes. This may represent residua of prior COVID-19 pneumonia/ARDS.     Signed by: Salvador Robles 12/6/2023 10:21 AM Dictation workstation:   [id number]      Current Medications:   Scheduled medications  Reviewed  Continuous medications    PRN medications    Medications from transferring facility:  cheduled medications  ciprofloxacin, 750 mg, oral, BID  [Held by provider] enoxaparin, 40 mg, subcutaneous, Daily  ergocalciferol, 1,250 mcg, oral, Weekly  fluticasone, 1 spray, Each Nostril, BID  insulin glargine, 4 Units, subcutaneous, Once  [Held by provider] insulin glargine, 8 Units, subcutaneous, Nightly  insulin lispro, 0-15 Units, subcutaneous, TID with meals  lidocaine, 1 Application, urethral, Once  lidocaine, 1 patch, transdermal, Daily  lidocaine, 1 patch, transdermal, Daily  melatonin, 5 mg, oral, Daily  metoprolol tartrate, 75 mg, oral, BID  pantoprazole, 40 mg, oral, Daily before breakfast  predniSONE, 30 mg, oral, BID  QUEtiapine, 25 mg, oral, Nightly  sennosides-docusate sodium, 1 tablet, oral, BID  [Held by provider] sodium zirconium cyclosilicate, 10 g, oral, TID  sulfamethoxazole-trimethoprim, 80 mg, oral, Daily  thiamine, 100 mg, oral, Daily        Continuous medications  oxygen, , Last Rate: 6 L/min (12/27/23 0927)        PRN medications  PRN medications: acetaminophen **OR** [DISCONTINUED] acetaminophen **OR** [DISCONTINUED] acetaminophen, dextrose 10 % in water (D10W), dextrose, diclofenac sodium, glucagon,  ipratropium-albuteroL, lidocaine-diphenhydraMINE-Maalox 1:1:1, loperamide, ondansetron ODT **OR** [DISCONTINUED] ondansetron, oxyCODONE, QUEtiapine  Assessment  Patient is -year-old (man/woman) with the following medical Problems:    Acute severe hypoxic respiratory failure  ARDS  COVID-19 infection with possible secondary pneumonia status post remdesivir treatment and Actima  Persistent DVT of anticoagulation due to the recent bleed with concern of coexisting PE  Recent acute kidney injury  Recent acute anemia, 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  Patient on IV Lasix 20 mg x 1  Repeat chest x-ray in a.m.   Cough, shortness of breath and hypoxia still on a three-way lateral flexion at 50 cc/h   oxygen for saturation of 89 to 94%  Incentive spirometry  Bronchodilators therapy as needed  PT/OT  DVT prophylaxis  Minimize benzodiazepine and narcotics  Encourage ambulation  Head evaluation and aspiration precautions.  Weekly duplex ultrasound is recommended  Management of hematuria per primary care provider, urology recommendation was to continue three-way flushing  Follow-up with urology in 2 to 3 weeks  Per urology note from transferring facility if hematuria stopped anticoagulation could be restarted  Monitor H&H transfuse hemoglobin less than 7  Check chest x-ray        Umang Jackson MD  01/07/24     STAFF PHYSICIAN NOTE OF PERSONAL INVOLVEMENT IN CARE  As the attending physician, I certify that I personally reviewed the patient's history and personally examined the patient to confirm the physical findings described above, and that I reviewed the relevant imaging studies and available reports.  I also discussed the differential diagnosis and all of the proposed management plans with the patient and individuals accompanying the patient to this  visit.  They had the opportunity to ask questions about the proposed management plans and to have those questions answered.

## 2024-01-08 NOTE — PROGRESS NOTES
Pulmonary Critical Care note    Patient Name :Macarena Wilson  Date of service : 01/08/24  MRN: [id number]  YOB: 1955      Interval History:  Currently on low-flow oxygen with 4 4 L/min need to  History of Present Illness:   68 years old  female who had an good state of health prior to her acute illness which required hospitalization recently, she had underlying history of CKD 3, hypertension, presented to the MICU at [facility] with acute hypoxic respiratory failure after COVID-pneumonia after being transferred from outside hospital, she was positive for COVID on 1013 with persistent dyspnea, CT scan of the chest showed worsening bilateral changes consistent with severe COPD and pneumonitis and ARDS, she remains on heated high flow oxygen, did receive remdesivir and Actemra on October 24, she was started on empiric therapy with bacterial and fungal treatment for concern for organizing pneumonia in addition to Decadron, she has steroids include hyperglycemia during the treatment, developed acute DVT with concern of clinical PE and she was started on anticoagulation with Eliquis, started on CPAP treatment, was aggressively diuresed but then she developed acute kidney injury,, she was evaluated by GYN for recurrent urinary symptoms, anticoagulation was held due to acute drop of hemoglobin, CT of the abdomen with urogram showed hyperdense material consistent with normal blood and left hemorrhagic cyst, repeat DVT workup showed still persistent DVT, with recommendation of weekly ultrasound  Past Medical/Surgical History:   Past Medical History:   Diagnosis Date    Chronic kidney disease     Hypertension     Personal history of diseases of the skin and subcutaneous tissue     History of rosacea    Pseudomonal bacteremia 11/27/2023     Past Surgical History:   Procedure Laterality Date    APPENDECTOMY  04/01/2016    Appendectomy    HYSTERECTOMY  12/2022    ANGEL TO    OTHER SURGICAL HISTORY   "11/14/2022    Cystoscopy       Family History:   No relevant family history has been documented for this patient.    Allergies:     Allergies   Allergen Reactions    Fesoterodine Unknown    Mirabegron Rash    Prednisone Other     \"body aches, flu like symptoms\"         Social history:   reports that she has never smoked. She has never used smokeless tobacco. She reports that she does not use drugs.      Review of Systems:   General: denies weight gain, denies loss of appetite, fever, chills, night sweats.  HEENT: denies headaches, dizziness, head trauma, visual changes, eye pain, tinnitus, nosebleeds, hoarseness or throat pain    Respiratory: denies chest pain, dyspnea, cough and hemoptysis  Cardiovascular: denies orthopnea, paroxysmal nocturnal dyspnea, leg swelling, and previous heart attack.    Gastrointestinal: denies pain, nausea vomiting, diarrhea, constipation, melena or bleeding.  Genitourinary: denies hematuria, frequency, urgency or dysuria  Neurology: denies syncope, seizures, paralysis, paraesthesia   Endocrine: denies polyuria, polydipsia, skin or hair changes, and heat or cold intolerance  Musculoskeletal: denies joint pain, swelling, arthritis or myalgia  Hematologic: denies bleeding, adenopathy and easy bruising  Skin: denies rashes and skin discoloration  Psychiatry: denies depression    Physical Exam:   Vital Signs:   There were no vitals filed for this visit.  There were no vitals filed for this visit.      Input/Output:  No intake or output data in the 24 hours ending 01/08/24 1029    Oxygen requirements:    Ventilator Information:              General appearance: Not in pain or distress, in no respiratory distress    HEENT: Atraumatic/normocephalic, EOMI, JANICE, pharynx clear, moist mucosa, redness of the uvula appreciated,   Neck: Supple, no jugular venous distension, lymphadenopathy, thyromegaly or carotid bruits  Chest: Bilateral diminished breath sounds  Cardiovascular: Normal S1, S2, " regular rate and rhythm, no murmur, rub or gallop  Abdomen: Normal sounds present, soft, lax with no tenderness, no hepatosplenomegaly, and no masses  Extremities: No edema. Pulses are equally present.   Skin: intact, no rashes   Neurologic: Alert and oriented x 3, No focal deficit         Investigations:  Labs, radiological imaging and cardiac work up were personally reviewed    Results from last 7 days   Lab Units 01/07/24  0700 01/06/24  0817 01/03/24  1224   SODIUM mmol/L 140 138 140   POTASSIUM mmol/L 4.0 3.9 4.5   CHLORIDE mmol/L 103 102 107   CO2 mmol/L 29 27 24   BUN mg/dL 37* 42* 42*   CREATININE mg/dL 1.01 0.99 0.86   GLUCOSE mg/dL 95 137* 108*   CALCIUM mg/dL 8.5* 8.3* 8.5*       Results from last 7 days   Lab Units 01/07/24  0700   WBC AUTO x10*3/uL 11.0   HEMOGLOBIN g/dL 8.3*   HEMATOCRIT % 26.3*   PLATELETS AUTO x10*3/uL 150           CT urography w 3D volume rendered imaging    Result Date: 12/31/2023  Interpreted By:  Yakov Jacob and Ogievich Taessa STUDY: CT UROGRAPHY WITH 3D VOLUME RENDERED IMAGING;  12/29/2023 11:36 am   INDICATION: Signs/Symptoms:Per urology recommendations, hematuria.   COMPARISON: CT cystogram 08/16/2023 CT abdomen/pelvis 03/01/2023 CT urography 09/24/2022 CTA chest 11/26/2023   ACCESSION NUMBER(S): [id number]   ORDERING CLINICIAN: CASEY DYER   TECHNIQUE: CT of the abdomen and pelvis was performed.  MULTIPHASE KIDNEY protocol was performed including contiguous axial images through the abdomen at 3 mm slice thickness before and in arterial and delayed phases post contrast. Coronal and sagittal reconstructions at 3 mm slice thickness were performed. 3D volume rendering images were obtained. 75 ml of contrast Omnipaque 350 were administered intravenously without immediate complication.   FINDINGS: LOWER CHEST: Again seen interlobular septal thickening, architectural distortion and mild bronchiectasis throughout the lungs similar to prior CT chest from 11/26/2023. No  evidence of pleural effusion or pneumothorax.   ABDOMEN:   KIDNEYS AND URETERS: Left kidney/ureter: Left kidney is normal in size. No evidence of nephrolithiasis or hydroureteronephrosis. The proximal left ureter is not opacified on delayed series (series 6, images 193-247). There is an hypoattenuating exophytic cortical cyst in the left lower renal pole measuring 2 x 2 cm (series 3, image 70), above water attenuation compatible with a hemorrhagic versus proteinaceous cyst, has a similar appearance compared to prior CT from 03/01/2023.   Right kidney/ureter: Moderate right renal cortical thinning and atrophy. There is a 0.3 cm stone in the right mid ureter on nonenhanced imaging (series 2, image 97), was seen on prior CT cystogram 08/16/2023. No evidence of hydroureteronephrosis. There is decreased opacification of the right ureter distal to the ureteral calyx on delayed series. There is a nonobstructive calyx stone measuring 1.3 x 1.6 cm on unenhanced images (series 2, image 65). Again seen is urothelial thickening predominantly with mild enhancement in the mid to proximal ureter (series 3, images 77-95), similar to prior. Again seen several round hypodensities in the right kidney with the largest measuring 1.4 x 1.3 cm in the midpole (series 3, image 68), favoring to represent a simple cyst.   3D reformations: Left proximal ureter is not depicted due to incomplete opacification, within this limitation there is no evidence of hydroureteronephrosis. The distal right ureter is not depicted due to incomplete opacification. The visualized portion of the right renal collecting system without evidence of hydroureteronephrosis.   BLADDER: Hyperattenuating material in the bladder with a proximally 50 Hounsfield units and may represent unclotted blood. There is contrast material in the posterior dependent portion. The urinary bladder is filled with air with a Max catheter in place.   LIVER: The liver is normal in size  without evidence of focal liver lesions. Subcentimeter hypodensity in segment JOCELYNE, too small to characterize.   BILE DUCTS: The bile ducts are not dilated.   GALLBLADDER: Gallbladder: No calcified stones. No wall thickening.   PANCREAS: The pancreas appears unremarkable.   SPLEEN: Within normal limits.   ADRENAL GLANDS: Within normal limits.   REPRODUCTIVE ORGANS: The uterus is surgically absent. No contrast in the vaginal vault to suggest vesicovaginal fistula. No adnexal mass.   BOWEL: The stomach is unremarkable. Bowel is partially obscured at the pelvis due to beam hardening. The small and large bowel are normal in caliber and demonstrate no wall thickening. Large colon diverticulosis without diverticulitis. The appendix is not definitely visualized. There is however no pericecal stranding or fluid.   VESSELS: Vascular calcification of the abdominal aorta and its branching vessels. There is no aneurysmal dilatation of the abdominal aorta. The IVC is within normal limits.   PERITONEUM/RETROPERITONEUM/LYMPH NODES: Few small pelvic phleboliths are present. No ascites or free air, no fluid collection.  The retroperitoneum appears unremarkable.  No enlarged mesenteric lymph nodes.   ABDOMINAL WALL: Within normal limits.   BONE AND SOFT TISSUE: No suspicious osseous lesions are present. Small left joint effusion. Degenerative discogenic disease is noted in the lower thoracic and lumbar spine.       1. Hyperdense material in the bladder consistent with blood products. Air foci in the bladder likely from Max instrumentation. 2. 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 There is no contrast in the vaginal vault identified. 3. Stable complex left hemorrhagic cyst. 4. Nonobstructive tiny 0.3 cm calculus in the right mid ureter and calyx calculi measuring 1.3 x 1.6 cm  in the right renal upper pole. No left renal or ureter calculi. 5. Stable right renal atrophy. 6. Diffuse mid to  proximal right urothelial thickening, seen on prior imaging. 7. Again seen interlobular septal thickening, architectural distortion and mild bronchiectasis throughout the lungs similar to prior CT chest from 11/26/2023 and are concerning for fibrotic/emphysematous lung changes. 8. Small left joint effusion without evidence of acute osseous process.   I personally reviewed the images/study and I agree with the findings as stated by Louise Ma DO, PGY-2. This study was interpreted at [facility], [place], [place].   MACRO: None   Signed by: Yakov Jacob 12/31/2023 10:07 AM Dictation workstation:   [id number]    Vascular US lower extremity venous duplex bilateral    Result Date: 12/29/2023            [place] [address]   Tel [phone] and Fax [phone]  Vascular Lab Report VASC US LOWER EXTREMITY VENOUS DUPLEX BILATERAL  Patient Name:     KARLA AGUILERA Reading           [id number] Kami Wells MD,                                      Physician:        BHUPINDER Study Date:       12/29/2023         Ordering          [id number] JOSE FERNANDEZ                                      Physician: MRN/PID:          [id number]           Technologist:     Aruna Bonner [facility] Accession#:       [id number]       Technologist 2: Date of           1955 / 68      Encounter#:       [id number] Birth/Age:        years Gender:           F Admission Status: Inpatient          Location          [facility]                                      Performed:  Diagnosis/ICD: Acute embolism and thrombosis of unspecified deep veins of left                distal lower extremity-I82.4Z2 Indication:    Venous thrombosis (CAVA) CPT Codes:     72539 Peripheral venous duplex scan for DVT complete  Patient History Previous DVT.  **CRITICAL RESULT** Critical Result: Left PTV and soleal vein DVT Notification called to Jose Fernandez MD on 12/29/2023 at  4:00:01 PM by Aruna Bonner RVT.  CONCLUSIONS: Right Lower Venous: No evidence of acute deep vein thrombus visualized in the right lower extremity. Left Lower Venous: There is acute occlusive deep vein thrombosis visualized in the soleal vein. There is acute non-occlusive deep vein thrombosis visualized in the posterior tibial vein. The remainder of the left leg is negative for deep vein thrombosis.  Comparison: Compared with study from 12/18/2023, there is new left PTV DVT. Patient has known left soleal vein DVT from 11/13/23, that was not visualized in the previous exam, but was visualized in today's exam.  Imaging & Doppler Findings:  Right                 Compressible Thrombus        Flow Distal External Iliac     Yes        None   Spontaneous/Phasic CFV                       Yes        None   Spontaneous/Phasic PFV                       Yes        None FV Proximal               Yes        None   Spontaneous/Phasic FV Mid                    Yes        None FV Distal                 Yes        None Popliteal                 Yes        None   Spontaneous/Phasic Peroneal                  Yes        None PTV                       Yes        None  Left                  Compress      Thrombus              Flow Distal External Iliac   Yes           None         Spontaneous/Phasic CFV                     Yes           None         Spontaneous/Phasic PFV                     Yes           None FV Proximal             Yes           None         Spontaneous/Phasic FV Mid                  Yes           None FV Distal               Yes           None Popliteal               Yes           None         Spontaneous/Phasic Peroneal                Yes           None PTV                   Partial  Acute non-occlusive Soleal                Partial    Acute occlusive  [id number] Kami Wells MD, BHUPINDER Electronically signed by [id number] Kami Wells MD, BHUPINDER on 12/29/2023 at 7:49:05 PM  ** Final **     US bladder    Result Date:  12/29/2023  Interpreted By:  Cholo Cevallos and Bera Kaustav STUDY: US BLADDER;  12/28/2023 7:45 pm   INDICATION: Signs/Symptoms:R/o clots iso hematuria per Urology.   COMPARISON: CT cystogram on 08/16/2023   ACCESSION NUMBER(S): [id number]   ORDERING CLINICIAN: JOSEFINA MILAN   TECHNIQUE: Multiple grayscale and color ultrasound images of the bladder were obtained.   FINDINGS: There is a complex heterogeneously echogenic region seen at the inferior aspect of the bladder, without flow seen on Doppler images.       1.  Complex heterogeneously echogenic layering material within the bladder, likely represents debris versus clot.   I personally reviewed the images/study and I agree with the findings as stated. This study was interpreted at [place], [place].   MACRO: None   Signed by: Cholo Cevallos 12/29/2023 5:56 AM Dictation workstation:   [id number]    Lower extremity venous duplex bilateral    Result Date: 12/18/2023            [place] [address]   Tel [phone] and Fax [phone]  Vascular Lab Report VASC US LOWER EXTREMITY VENOUS DUPLEX BILATERAL  Patient Name:      KARLA AGUILERA  Reading Physician: [id number] Alisa Puri MD Study Date:        12/18/2023          Ordering           [id number] BRENDAN LIU                                        Physician:         SONG MRN/PID:           [id number]            Technologist:      Jonh BARKLEY Accession#:        [id number]        Technologist 2: Date of Birth/Age: 1955 / 68 years Encounter#:        [id number] Gender:            F Admission Status:  Inpatient           Location           [facility]                                        Performed:  Diagnosis/ICD: Generalized edema (anasarca)-R60.1 CPT Codes:     89658 Peripheral venous duplex scan for DVT complete  CONCLUSIONS: Right Lower Venous: No  evidence of acute deep vein thrombus visualized in the right lower extremity. Left Lower Venous: No evidence of acute deep vein thrombus visualized in the left lower extremity. Cannot rule out thrombus in non-visualized peroneal vein.  Imaging & Doppler Findings:  Right                 Compressible Thrombus        Flow Distal External Iliac     Yes        None   Spontaneous/Phasic CFV                       Yes        None   Spontaneous/Phasic PFV                       Yes        None FV Proximal               Yes        None       Continuous FV Mid                    Yes        None FV Distal                 Yes        None Popliteal                 Yes        None       Continuous Peroneal                  Yes        None PTV                       Yes        None  Left                  Compress Thrombus        Flow Distal External Iliac   Yes      None   Spontaneous/Phasic CFV                     Yes      None   Spontaneous/Phasic PFV                     Yes      None FV Proximal             Yes      None   Spontaneous/Phasic FV Mid                  Yes      None FV Distal               Yes      None Popliteal               Yes      None       Continuous PTV                     Yes      None  [id number] Alisa Puri MD Electronically signed by [id number] Alisa Puri MD on 12/18/2023 at 1:18:07 PM  ** Final **     ECG 12 lead    Result Date: 12/11/2023  Sinus rhythm with Fusion complexes ST & T wave abnormality, consider lateral ischemia Abnormal ECG When compared with ECG of 01-DEC-2023 10:56, (unconfirmed) Fusion complexes are now Present T wave inversion now evident in Anterior leads    XR chest 2 views    Result Date: 12/6/2023  Interpreted By:  Salvador Robles, STUDY: XR CHEST 2 VIEWS;  12/6/2023 10:11 am   INDICATION: Signs/Symptoms:Eval of Puml Fibrosis.   COMPARISON: 11/24/2023.   ACCESSION NUMBER(S): [id number]   ORDERING CLINICIAN: JORDON PARRISH   FINDINGS:     CARDIOMEDIASTINAL SILHOUETTE:  Cardiomediastinal silhouette is normal in size and configuration.   LUNGS: Digital tomosynthesis of the lung parenchyma was performed. There is diffuse interstitial airspace opacities with prominent traction bronchiectatic changes. There is no evidence of pneumothorax.   ABDOMEN: No remarkable upper abdominal findings.   BONES: No acute osseous changes.       1.  There are diffuse interstitial airspace opacities with traction bronchiectatic changes. This may represent residua of prior COVID-19 pneumonia/ARDS.     Signed by: Salvador Robles 12/6/2023 10:21 AM Dictation workstation:   [id number]      Current Medications:   Scheduled medications  Reviewed  Continuous medications    PRN medications    Medications from transferring facility:  cheduled medications  ciprofloxacin, 750 mg, oral, BID  [Held by provider] enoxaparin, 40 mg, subcutaneous, Daily  ergocalciferol, 1,250 mcg, oral, Weekly  fluticasone, 1 spray, Each Nostril, BID  insulin glargine, 4 Units, subcutaneous, Once  [Held by provider] insulin glargine, 8 Units, subcutaneous, Nightly  insulin lispro, 0-15 Units, subcutaneous, TID with meals  lidocaine, 1 Application, urethral, Once  lidocaine, 1 patch, transdermal, Daily  lidocaine, 1 patch, transdermal, Daily  melatonin, 5 mg, oral, Daily  metoprolol tartrate, 75 mg, oral, BID  pantoprazole, 40 mg, oral, Daily before breakfast  predniSONE, 30 mg, oral, BID  QUEtiapine, 25 mg, oral, Nightly  sennosides-docusate sodium, 1 tablet, oral, BID  [Held by provider] sodium zirconium cyclosilicate, 10 g, oral, TID  sulfamethoxazole-trimethoprim, 80 mg, oral, Daily  thiamine, 100 mg, oral, Daily        Continuous medications  oxygen, , Last Rate: 6 L/min (12/27/23 0927)        PRN medications  PRN medications: acetaminophen **OR** [DISCONTINUED] acetaminophen **OR** [DISCONTINUED] acetaminophen, dextrose 10 % in water (D10W), dextrose, diclofenac sodium, glucagon, ipratropium-albuteroL,  lidocaine-diphenhydraMINE-Maalox 1:1:1, loperamide, ondansetron ODT **OR** [DISCONTINUED] ondansetron, oxyCODONE, QUEtiapine  Assessment  Patient is -year-old (man/woman) with the following medical Problems:    Acute severe hypoxic respiratory failure  ARDS  COVID-19 infection with possible secondary pneumonia status post remdesivir treatment and Actima  Persistent DVT of anticoagulation due to the recent bleed with concern of coexisting PE  Recent acute kidney injury  Recent acute anemia, 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  Patient on IV Lasix 20 mg x 1  Repeat chest x-ray in a.m.   Cough, shortness of breath and hypoxia still on a three-way lateral flexion at 50 cc/h   oxygen for saturation of 89 to 94%  Incentive spirometry  Bronchodilators therapy as needed  PT/OT  DVT prophylaxis  Minimize benzodiazepine and narcotics  Encourage ambulation  Head evaluation and aspiration precautions.  Weekly duplex ultrasound is recommended  Management of hematuria per primary care provider, urology recommendation was to continue three-way flushing  Follow-up with urology in 2 to 3 weeks  Per urology note from transferring facility if hematuria stopped anticoagulation could be restarted  Monitor H&H transfuse hemoglobin less than 7  Check chest x-ray        Umang Jackson MD  01/08/24     STAFF PHYSICIAN NOTE OF PERSONAL INVOLVEMENT IN CARE  As the attending physician, I certify that I personally reviewed the patient's history and personally examined the patient to confirm the physical findings described above, and that I reviewed the relevant imaging studies and available reports.  I also discussed the differential diagnosis and all of the proposed management plans with the patient and individuals accompanying the patient to this visit.  They had the  opportunity to ask questions about the proposed management plans and to have those questions answered.

## 2024-01-08 NOTE — PROGRESS NOTES
Macarena Wilson is a 68 y.o. female on day 0 of admission presenting with No Principal Problem: There is no principal problem currently on the Problem List. Please update the Problem List and refresh..      Subjective   Patient fully evaluated and clinically improved tolerating nasal cannula oxygen at 4 L at 97% oxygen saturation       Objective     Last Recorded Vitals  There were no vitals taken for this visit.  Intake/Output last 3 Shifts:  No intake or output data in the 24 hours ending 01/08/24 1642    Admission Weight       Daily Weight  12/08/23 : 67.7 kg (149 lb 4 oz)    Image Results  XR chest 1 view  Narrative: Interpreted By:  Jose M Eden,   STUDY:  XR CHEST 1 VIEW      INDICATION:  Signs/Symptoms:Cough, shortness of breath and hypoxia.      COMPARISON:  December 31, 2023      ACCESSION NUMBER(S):  [id number]      ORDERING CLINICIAN:  PATRICA CAGLE      FINDINGS:  Slight worsening of multifocal bilateral airspace opacities scattered  throughout both lungs.      Cardiomegaly unchanged. No effusion or pneumothorax.      Impression: Apparent worsening of the multifocal airspace disease when compared  to prior study. This likely reflects a component of chronic fibrosis  and could represent new superimposed either edema or component of  pneumonia.          Signed by: Jose M Eden 1/7/2024 7:36 AM  Dictation workstation:   [id number]      Physical Exam    Relevant Results               Assessment/Plan Pulmonary Critical Care note    Patient Name :Macarena Wilson  Date of service : 01/08/24  MRN: [id number]  YOB: 1955      Interval History:    History of Present Illness:   68 years old  female who had an good state of health prior to her acute illness which required hospitalization recently, she had underlying history of CKD 3, hypertension, presented to the MICU at [facility] with acute hypoxic respiratory failure after COVID-pneumonia after being transferred from outside  "hospital, she was positive for COVID on 1013 with persistent dyspnea, CT scan of the chest showed worsening bilateral changes consistent with severe COPD and pneumonitis and ARDS, she remains on heated high flow oxygen, did receive remdesivir and Actemra on October 24, she was started on empiric therapy with bacterial and fungal treatment for concern for organizing pneumonia in addition to Decadron, she has steroids include hyperglycemia during the treatment, developed acute DVT with concern of clinical PE and she was started on anticoagulation with Eliquis, started on CPAP treatment, was aggressively diuresed but then she developed acute kidney injury,, she was evaluated by GYN for recurrent urinary symptoms, anticoagulation was held due to acute drop of hemoglobin, CT of the abdomen with urogram showed hyperdense material consistent with normal blood and left hemorrhagic cyst, repeat DVT workup showed still persistent DVT, with recommendation of weekly ultrasound  Past Medical/Surgical History:   Past Medical History:   Diagnosis Date    Chronic kidney disease     Hypertension     Personal history of diseases of the skin and subcutaneous tissue     History of rosacea    Pseudomonal bacteremia 11/27/2023     Past Surgical History:   Procedure Laterality Date    APPENDECTOMY  04/01/2016    Appendectomy    HYSTERECTOMY  12/2022    TL, ANGEL    OTHER SURGICAL HISTORY  11/14/2022    Cystoscopy       Family History:   No relevant family history has been documented for this patient.    Allergies:     Allergies   Allergen Reactions    Fesoterodine Unknown    Mirabegron Rash    Prednisone Other     \"body aches, flu like symptoms\"         Social history:   reports that she has never smoked. She has never used smokeless tobacco. She reports that she does not use drugs.      Review of Systems:   General: denies weight gain, denies loss of appetite, fever, chills, night sweats.  HEENT: denies headaches, dizziness, head trauma, " visual changes, eye pain, tinnitus, nosebleeds, hoarseness or throat pain    Respiratory: denies chest pain, dyspnea, cough and hemoptysis  Cardiovascular: denies orthopnea, paroxysmal nocturnal dyspnea, leg swelling, and previous heart attack.    Gastrointestinal: denies pain, nausea vomiting, diarrhea, constipation, melena or bleeding.  Genitourinary: denies hematuria, frequency, urgency or dysuria  Neurology: denies syncope, seizures, paralysis, paraesthesia   Endocrine: denies polyuria, polydipsia, skin or hair changes, and heat or cold intolerance  Musculoskeletal: denies joint pain, swelling, arthritis or myalgia  Hematologic: denies bleeding, adenopathy and easy bruising  Skin: denies rashes and skin discoloration  Psychiatry: denies depression    Physical Exam:   Vital Signs:   There were no vitals filed for this visit.  There were no vitals filed for this visit.      Input/Output:  No intake or output data in the 24 hours ending 01/08/24 1642    Oxygen requirements:    Ventilator Information:              General appearance: Not in pain or distress, in no respiratory distress    HEENT: Atraumatic/normocephalic, EOMI, JANICE, pharynx clear, moist mucosa, redness of the uvula appreciated,   Neck: Supple, no jugular venous distension, lymphadenopathy, thyromegaly or carotid bruits  Chest: Bilateral diminished breath sounds  Cardiovascular: Normal S1, S2, regular rate and rhythm, no murmur, rub or gallop  Abdomen: Normal sounds present, soft, lax with no tenderness, no hepatosplenomegaly, and no masses  Extremities: No edema. Pulses are equally present.   Skin: intact, no rashes   Neurologic: Alert and oriented x 3, No focal deficit         Investigations:  Labs, radiological imaging and cardiac work up were personally reviewed    Results from last 7 days   Lab Units 01/07/24  0700 01/06/24  0817 01/03/24  1224   SODIUM mmol/L 140 138 140   POTASSIUM mmol/L 4.0 3.9 4.5   CHLORIDE mmol/L 103 102 107   CO2 mmol/L  29 27 24   BUN mg/dL 37* 42* 42*   CREATININE mg/dL 1.01 0.99 0.86   GLUCOSE mg/dL 95 137* 108*   CALCIUM mg/dL 8.5* 8.3* 8.5*       Results from last 7 days   Lab Units 01/07/24  0700   WBC AUTO x10*3/uL 11.0   HEMOGLOBIN g/dL 8.3*   HEMATOCRIT % 26.3*   PLATELETS AUTO x10*3/uL 150           CT urography w 3D volume rendered imaging    Result Date: 12/31/2023  Interpreted By:  Yakov Jacob and Ogievich Taessa STUDY: CT UROGRAPHY WITH 3D VOLUME RENDERED IMAGING;  12/29/2023 11:36 am   INDICATION: Signs/Symptoms:Per urology recommendations, hematuria.   COMPARISON: CT cystogram 08/16/2023 CT abdomen/pelvis 03/01/2023 CT urography 09/24/2022 CTA chest 11/26/2023   ACCESSION NUMBER(S): [id number]   ORDERING CLINICIAN: CASEY DYER   TECHNIQUE: CT of the abdomen and pelvis was performed.  MULTIPHASE KIDNEY protocol was performed including contiguous axial images through the abdomen at 3 mm slice thickness before and in arterial and delayed phases post contrast. Coronal and sagittal reconstructions at 3 mm slice thickness were performed. 3D volume rendering images were obtained. 75 ml of contrast Omnipaque 350 were administered intravenously without immediate complication.   FINDINGS: LOWER CHEST: Again seen interlobular septal thickening, architectural distortion and mild bronchiectasis throughout the lungs similar to prior CT chest from 11/26/2023. No evidence of pleural effusion or pneumothorax.   ABDOMEN:   KIDNEYS AND URETERS: Left kidney/ureter: Left kidney is normal in size. No evidence of nephrolithiasis or hydroureteronephrosis. The proximal left ureter is not opacified on delayed series (series 6, images 193-247). There is an hypoattenuating exophytic cortical cyst in the left lower renal pole measuring 2 x 2 cm (series 3, image 70), above water attenuation compatible with a hemorrhagic versus proteinaceous cyst, has a similar appearance compared to prior CT from 03/01/2023.   Right kidney/ureter:  Moderate right renal cortical thinning and atrophy. There is a 0.3 cm stone in the right mid ureter on nonenhanced imaging (series 2, image 97), was seen on prior CT cystogram 08/16/2023. No evidence of hydroureteronephrosis. There is decreased opacification of the right ureter distal to the ureteral calyx on delayed series. There is a nonobstructive calyx stone measuring 1.3 x 1.6 cm on unenhanced images (series 2, image 65). Again seen is urothelial thickening predominantly with mild enhancement in the mid to proximal ureter (series 3, images 77-95), similar to prior. Again seen several round hypodensities in the right kidney with the largest measuring 1.4 x 1.3 cm in the midpole (series 3, image 68), favoring to represent a simple cyst.   3D reformations: Left proximal ureter is not depicted due to incomplete opacification, within this limitation there is no evidence of hydroureteronephrosis. The distal right ureter is not depicted due to incomplete opacification. The visualized portion of the right renal collecting system without evidence of hydroureteronephrosis.   BLADDER: Hyperattenuating material in the bladder with a proximally 50 Hounsfield units and may represent unclotted blood. There is contrast material in the posterior dependent portion. The urinary bladder is filled with air with a Max catheter in place.   LIVER: The liver is normal in size without evidence of focal liver lesions. Subcentimeter hypodensity in segment JOCELYNE, too small to characterize.   BILE DUCTS: The bile ducts are not dilated.   GALLBLADDER: Gallbladder: No calcified stones. No wall thickening.   PANCREAS: The pancreas appears unremarkable.   SPLEEN: Within normal limits.   ADRENAL GLANDS: Within normal limits.   REPRODUCTIVE ORGANS: The uterus is surgically absent. No contrast in the vaginal vault to suggest vesicovaginal fistula. No adnexal mass.   BOWEL: The stomach is unremarkable. Bowel is partially obscured at the pelvis due  to beam hardening. The small and large bowel are normal in caliber and demonstrate no wall thickening. Large colon diverticulosis without diverticulitis. The appendix is not definitely visualized. There is however no pericecal stranding or fluid.   VESSELS: Vascular calcification of the abdominal aorta and its branching vessels. There is no aneurysmal dilatation of the abdominal aorta. The IVC is within normal limits.   PERITONEUM/RETROPERITONEUM/LYMPH NODES: Few small pelvic phleboliths are present. No ascites or free air, no fluid collection.  The retroperitoneum appears unremarkable.  No enlarged mesenteric lymph nodes.   ABDOMINAL WALL: Within normal limits.   BONE AND SOFT TISSUE: No suspicious osseous lesions are present. Small left joint effusion. Degenerative discogenic disease is noted in the lower thoracic and lumbar spine.       1. Hyperdense material in the bladder consistent with blood products. Air foci in the bladder likely from Max instrumentation. 2. 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 There is no contrast in the vaginal vault identified. 3. Stable complex left hemorrhagic cyst. 4. Nonobstructive tiny 0.3 cm calculus in the right mid ureter and calyx calculi measuring 1.3 x 1.6 cm  in the right renal upper pole. No left renal or ureter calculi. 5. Stable right renal atrophy. 6. Diffuse mid to proximal right urothelial thickening, seen on prior imaging. 7. Again seen interlobular septal thickening, architectural distortion and mild bronchiectasis throughout the lungs similar to prior CT chest from 11/26/2023 and are concerning for fibrotic/emphysematous lung changes. 8. Small left joint effusion without evidence of acute osseous process.   I personally reviewed the images/study and I agree with the findings as stated by Louise Ma DO, PGY-2. This study was interpreted at [facility], [place], [place].   MACRO:  None   Signed by: Yakov Jacob 12/31/2023 10:07 AM Dictation workstation:   [id number]    Vascular US lower extremity venous duplex bilateral    Result Date: 12/29/2023            [place] [address]   Tel [phone] and Fax [phone]  Vascular Lab Report VASC US LOWER EXTREMITY VENOUS DUPLEX BILATERAL  Patient Name:     KARLA AGUILERA Reading           [id number] Kami Wells MD,                                      Physician:        RPVI Study Date:       12/29/2023         Ordering          [id number] JOSE FERNANDEZ                                      Physician: MRN/PID:          [id number]           Technologist:     Aruna Bonner RVT Accession#:       [id number]       Technologist 2: Date of           1955 / 68      Encounter#:       [id number] Birth/Age:        years Gender:           F Admission Status: Inpatient          Location          [facility]                                      Performed:  Diagnosis/ICD: Acute embolism and thrombosis of unspecified deep veins of left                distal lower extremity-I82.4Z2 Indication:    Venous thrombosis (CAVA) CPT Codes:     57187 Peripheral venous duplex scan for DVT complete  Patient History Previous DVT.  **CRITICAL RESULT** Critical Result: Left PTV and soleal vein DVT Notification called to Jose Fernandez MD on 12/29/2023 at 4:00:01 PM by Aruna Bonner RVT.  CONCLUSIONS: Right Lower Venous: No evidence of acute deep vein thrombus visualized in the right lower extremity. Left Lower Venous: There is acute occlusive deep vein thrombosis visualized in the soleal vein. There is acute non-occlusive deep vein thrombosis visualized in the posterior tibial vein. The remainder of the left leg is negative for deep vein thrombosis.  Comparison: Compared with study from 12/18/2023, there is new left PTV DVT. Patient has known left soleal vein DVT from 11/13/23, that was not visualized in the  previous exam, but was visualized in today's exam.  Imaging & Doppler Findings:  Right                 Compressible Thrombus        Flow Distal External Iliac     Yes        None   Spontaneous/Phasic CFV                       Yes        None   Spontaneous/Phasic PFV                       Yes        None FV Proximal               Yes        None   Spontaneous/Phasic FV Mid                    Yes        None FV Distal                 Yes        None Popliteal                 Yes        None   Spontaneous/Phasic Peroneal                  Yes        None PTV                       Yes        None  Left                  Compress      Thrombus              Flow Distal External Iliac   Yes           None         Spontaneous/Phasic CFV                     Yes           None         Spontaneous/Phasic PFV                     Yes           None FV Proximal             Yes           None         Spontaneous/Phasic FV Mid                  Yes           None FV Distal               Yes           None Popliteal               Yes           None         Spontaneous/Phasic Peroneal                Yes           None PTV                   Partial  Acute non-occlusive Soleal                Partial    Acute occlusive  [id number] Kami Wells MD, RPVI Electronically signed by [id number] Kami Wells MD, RPVI on 12/29/2023 at 7:49:05 PM  ** Final **     US bladder    Result Date: 12/29/2023  Interpreted By:  Cholo Cevallos  and Kaylene Young STUDY: US BLADDER;  12/28/2023 7:45 pm   INDICATION: Signs/Symptoms:R/o clots iso hematuria per Urology.   COMPARISON: CT cystogram on 08/16/2023   ACCESSION NUMBER(S): [id number]   ORDERING CLINICIAN: JOSEFINA MILAN   TECHNIQUE: Multiple grayscale and color ultrasound images of the bladder were obtained.   FINDINGS: There is a complex heterogeneously echogenic region seen at the inferior aspect of the bladder, without flow seen on Doppler images.       1.  Complex heterogeneously echogenic layering material within  the bladder, likely represents debris versus clot.   I personally reviewed the images/study and I agree with the findings as stated. This study was interpreted at [facility], [place], [place].   MACRO: None   Signed by: Cholo Cevallos 12/29/2023 5:56 AM Dictation workstation:   [id number]    Lower extremity venous duplex bilateral    Result Date: 12/18/2023            [place] [address]   Tel [phone] and Fax [phone]  Vascular Lab Report [facility] US LOWER EXTREMITY VENOUS DUPLEX BILATERAL  Patient Name:      KARLA AGUILERA  Reading Physician: [id number] Alisa Puri MD Study Date:        12/18/2023          Ordering           [id number] BRENDAN LIU                                        Physician:         SONG MRN/PID:           [id number]            Technologist:      Jonh BARKLEY Accession#:        [id number]        Technologist 2: Date of Birth/Age: 1955 / 68 years Encounter#:        [id number] Gender:            F Admission Status:  Inpatient           Location           [facility]                                        Performed:  Diagnosis/ICD: Generalized edema (anasarca)-R60.1 CPT Codes:     00203 Peripheral venous duplex scan for DVT complete  CONCLUSIONS: Right Lower Venous: No evidence of acute deep vein thrombus visualized in the right lower extremity. Left Lower Venous: No evidence of acute deep vein thrombus visualized in the left lower extremity. Cannot rule out thrombus in non-visualized peroneal vein.  Imaging & Doppler Findings:  Right                 Compressible Thrombus        Flow Distal External Iliac     Yes        None   Spontaneous/Phasic CFV                       Yes        None   Spontaneous/Phasic PFV                       Yes        None FV Proximal               Yes        None       Continuous FV Mid                    Yes         None FV Distal                 Yes        None Popliteal                 Yes        None       Continuous Peroneal                  Yes        None PTV                       Yes        None  Left                  Compress Thrombus        Flow Distal External Iliac   Yes      None   Spontaneous/Phasic CFV                     Yes      None   Spontaneous/Phasic PFV                     Yes      None FV Proximal             Yes      None   Spontaneous/Phasic FV Mid                  Yes      None FV Distal               Yes      None Popliteal               Yes      None       Continuous PTV                     Yes      None  [id number] Alisa Puri MD Electronically signed by [id number] Alisa Puri MD on 12/18/2023 at 1:18:07 PM  ** Final **     ECG 12 lead    Result Date: 12/11/2023  Sinus rhythm with Fusion complexes ST & T wave abnormality, consider lateral ischemia Abnormal ECG When compared with ECG of 01-DEC-2023 10:56, (unconfirmed) Fusion complexes are now Present T wave inversion now evident in Anterior leads    XR chest 2 views    Result Date: 12/6/2023  Interpreted By:  Salvador Robles, STUDY: XR CHEST 2 VIEWS;  12/6/2023 10:11 am   INDICATION: Signs/Symptoms:Eval of Puml Fibrosis.   COMPARISON: 11/24/2023.   ACCESSION NUMBER(S): [id number]   ORDERING CLINICIAN: JORDON PARRISH   FINDINGS:     CARDIOMEDIASTINAL SILHOUETTE: Cardiomediastinal silhouette is normal in size and configuration.   LUNGS: Digital tomosynthesis of the lung parenchyma was performed. There is diffuse interstitial airspace opacities with prominent traction bronchiectatic changes. There is no evidence of pneumothorax.   ABDOMEN: No remarkable upper abdominal findings.   BONES: No acute osseous changes.       1.  There are diffuse interstitial airspace opacities with traction bronchiectatic changes. This may represent residua of prior COVID-19 pneumonia/ARDS.     Signed by: Salvador Robles 12/6/2023 10:21 AM Dictation workstation:    [id number]      Current Medications:   Scheduled medications  Reviewed  Continuous medications    PRN medications    Medications from transferring facility:  cheduled medications  ciprofloxacin, 750 mg, oral, BID  [Held by provider] enoxaparin, 40 mg, subcutaneous, Daily  ergocalciferol, 1,250 mcg, oral, Weekly  fluticasone, 1 spray, Each Nostril, BID  insulin glargine, 4 Units, subcutaneous, Once  [Held by provider] insulin glargine, 8 Units, subcutaneous, Nightly  insulin lispro, 0-15 Units, subcutaneous, TID with meals  lidocaine, 1 Application, urethral, Once  lidocaine, 1 patch, transdermal, Daily  lidocaine, 1 patch, transdermal, Daily  melatonin, 5 mg, oral, Daily  metoprolol tartrate, 75 mg, oral, BID  pantoprazole, 40 mg, oral, Daily before breakfast  predniSONE, 30 mg, oral, BID  QUEtiapine, 25 mg, oral, Nightly  sennosides-docusate sodium, 1 tablet, oral, BID  [Held by provider] sodium zirconium cyclosilicate, 10 g, oral, TID  sulfamethoxazole-trimethoprim, 80 mg, oral, Daily  thiamine, 100 mg, oral, Daily        Continuous medications  oxygen, , Last Rate: 6 L/min (12/27/23 0927)        PRN medications  PRN medications: acetaminophen **OR** [DISCONTINUED] acetaminophen **OR** [DISCONTINUED] acetaminophen, dextrose 10 % in water (D10W), dextrose, diclofenac sodium, glucagon, ipratropium-albuteroL, lidocaine-diphenhydraMINE-Maalox 1:1:1, loperamide, ondansetron ODT **OR** [DISCONTINUED] ondansetron, oxyCODONE, QUEtiapine  Assessment  Patient is -year-old (man/woman) with the following medical Problems:    Acute severe hypoxic respiratory failure  ARDS  COVID-19 infection with possible secondary pneumonia status post remdesivir treatment and Actima  Persistent DVT of anticoagulation due to the recent bleed with concern of coexisting PE  Recent acute kidney injury  Recent acute anemia, 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  Weekly duplex ultrasound is recommended  Management of hematuria per primary care provider, urology recommendation was to continue three-way flushing  Follow-up with urology in 2 to 3 weeks  Per urology note from transferring facility if hematuria stopped anticoagulation could be restarted  Monitor H&H transfuse hemoglobin less than 7  Check chest x-ray        Kulwant Limon MD  01/08/24     STAFF PHYSICIAN NOTE OF PERSONAL INVOLVEMENT IN CARE  As the attending physician, I certify that I personally reviewed the patient's history and personally examined the patient to confirm the physical findings described above, and that I reviewed the relevant imaging studies and available reports.  I also discussed the differential diagnosis and all of the proposed management plans with the patient and individuals accompanying the patient to this visit.  They had the opportunity to ask questions about the proposed management plans and to have those questions answered.                   Active Problems:  There are no active Hospital Problems.    Patient fully evaluated on January seconds in the absence of Dr. Underwood.  Patient's physical and Occupational Therapy to begin.  Recheck labs in AM.  Aggressive pulmonary hygiene.     Patient fully evaluated on January 5.  Slow but progressive improvement in respiratory status.  Still receiving oxygen via nasal cannula.  Lab work has been stable.  Physical and Occupational Therapy assessments.  Patient fully evaluated on January 8.  Respiratory status is stable on 3 L up to 5 L nasal cannula.  Physical and occupational therapy to be continued.  Recheck labs patient lab work fairly unremarkable       Kulwant Limon MD

## 2024-01-09 NOTE — PROGRESS NOTES
Pulmonary Critical Care note    Patient Name :Macarena Wilson  Date of service : 01/09/24  MRN: [id number]  YOB: 1955      Interval History:  Currently on low-flow oxygen with 4  L/min need to  History of Present Illness:   68 years old  female who had an good state of health prior to her acute illness which required hospitalization recently, she had underlying history of CKD 3, hypertension, presented to the MICU at [facility] with acute hypoxic respiratory failure after COVID-pneumonia after being transferred from outside hospital, she was positive for COVID on 1013 with persistent dyspnea, CT scan of the chest showed worsening bilateral changes consistent with severe COPD and pneumonitis and ARDS, she remains on heated high flow oxygen, did receive remdesivir and Actemra on October 24, she was started on empiric therapy with bacterial and fungal treatment for concern for organizing pneumonia in addition to Decadron, she has steroids include hyperglycemia during the treatment, developed acute DVT with concern of clinical PE and she was started on anticoagulation with Eliquis, started on CPAP treatment, was aggressively diuresed but then she developed acute kidney injury,, she was evaluated by GYN for recurrent urinary symptoms, anticoagulation was held due to acute drop of hemoglobin, CT of the abdomen with urogram showed hyperdense material consistent with normal blood and left hemorrhagic cyst, repeat DVT workup showed still persistent DVT, with recommendation of weekly ultrasound  Past Medical/Surgical History:   Past Medical History:   Diagnosis Date    Chronic kidney disease     Hypertension     Personal history of diseases of the skin and subcutaneous tissue     History of rosacea    Pseudomonal bacteremia 11/27/2023     Past Surgical History:   Procedure Laterality Date    APPENDECTOMY  04/01/2016    Appendectomy    HYSTERECTOMY  12/2022    ANGEL TO    OTHER SURGICAL HISTORY   "11/14/2022    Cystoscopy       Family History:   No relevant family history has been documented for this patient.    Allergies:     Allergies   Allergen Reactions    Fesoterodine Unknown    Mirabegron Rash    Prednisone Other     \"body aches, flu like symptoms\"         Social history:   reports that she has never smoked. She has never used smokeless tobacco. She reports that she does not use drugs.      Review of Systems:   General: denies weight gain, denies loss of appetite, fever, chills, night sweats.  HEENT: denies headaches, dizziness, head trauma, visual changes, eye pain, tinnitus, nosebleeds, hoarseness or throat pain    Respiratory: denies chest pain, dyspnea, cough and hemoptysis  Cardiovascular: denies orthopnea, paroxysmal nocturnal dyspnea, leg swelling, and previous heart attack.    Gastrointestinal: denies pain, nausea vomiting, diarrhea, constipation, melena or bleeding.  Genitourinary: denies hematuria, frequency, urgency or dysuria  Neurology: denies syncope, seizures, paralysis, paraesthesia   Endocrine: denies polyuria, polydipsia, skin or hair changes, and heat or cold intolerance  Musculoskeletal: denies joint pain, swelling, arthritis or myalgia  Hematologic: denies bleeding, adenopathy and easy bruising  Skin: denies rashes and skin discoloration  Psychiatry: denies depression    Physical Exam:   Vital Signs:   There were no vitals filed for this visit.  There were no vitals filed for this visit.      Input/Output:  No intake or output data in the 24 hours ending 01/09/24 0927    Oxygen requirements:    Ventilator Information:              General appearance: Not in pain or distress, in no respiratory distress    HEENT: Atraumatic/normocephalic, EOMI, JANICE, pharynx clear, moist mucosa, redness of the uvula appreciated,   Neck: Supple, no jugular venous distension, lymphadenopathy, thyromegaly or carotid bruits  Chest: Bilateral diminished breath sounds  Cardiovascular: Normal S1, S2, " regular rate and rhythm, no murmur, rub or gallop  Abdomen: Normal sounds present, soft, lax with no tenderness, no hepatosplenomegaly, and no masses  Extremities: No edema. Pulses are equally present.   Skin: intact, no rashes   Neurologic: Alert and oriented x 3, No focal deficit         Investigations:  Labs, radiological imaging and cardiac work up were personally reviewed    Results from last 7 days   Lab Units 01/07/24  0700 01/06/24  0817 01/03/24  1224   SODIUM mmol/L 140 138 140   POTASSIUM mmol/L 4.0 3.9 4.5   CHLORIDE mmol/L 103 102 107   CO2 mmol/L 29 27 24   BUN mg/dL 37* 42* 42*   CREATININE mg/dL 1.01 0.99 0.86   GLUCOSE mg/dL 95 137* 108*   CALCIUM mg/dL 8.5* 8.3* 8.5*       Results from last 7 days   Lab Units 01/07/24  0700   WBC AUTO x10*3/uL 11.0   HEMOGLOBIN g/dL 8.3*   HEMATOCRIT % 26.3*   PLATELETS AUTO x10*3/uL 150           CT urography w 3D volume rendered imaging    Result Date: 12/31/2023  Interpreted By:  Yakov Jacob and Ogievich Taessa STUDY: CT UROGRAPHY WITH 3D VOLUME RENDERED IMAGING;  12/29/2023 11:36 am   INDICATION: Signs/Symptoms:Per urology recommendations, hematuria.   COMPARISON: CT cystogram 08/16/2023 CT abdomen/pelvis 03/01/2023 CT urography 09/24/2022 CTA chest 11/26/2023   ACCESSION NUMBER(S): [id number]   ORDERING CLINICIAN: CASEY DYER   TECHNIQUE: CT of the abdomen and pelvis was performed.  MULTIPHASE KIDNEY protocol was performed including contiguous axial images through the abdomen at 3 mm slice thickness before and in arterial and delayed phases post contrast. Coronal and sagittal reconstructions at 3 mm slice thickness were performed. 3D volume rendering images were obtained. 75 ml of contrast Omnipaque 350 were administered intravenously without immediate complication.   FINDINGS: LOWER CHEST: Again seen interlobular septal thickening, architectural distortion and mild bronchiectasis throughout the lungs similar to prior CT chest from 11/26/2023. No  evidence of pleural effusion or pneumothorax.   ABDOMEN:   KIDNEYS AND URETERS: Left kidney/ureter: Left kidney is normal in size. No evidence of nephrolithiasis or hydroureteronephrosis. The proximal left ureter is not opacified on delayed series (series 6, images 193-247). There is an hypoattenuating exophytic cortical cyst in the left lower renal pole measuring 2 x 2 cm (series 3, image 70), above water attenuation compatible with a hemorrhagic versus proteinaceous cyst, has a similar appearance compared to prior CT from 03/01/2023.   Right kidney/ureter: Moderate right renal cortical thinning and atrophy. There is a 0.3 cm stone in the right mid ureter on nonenhanced imaging (series 2, image 97), was seen on prior CT cystogram 08/16/2023. No evidence of hydroureteronephrosis. There is decreased opacification of the right ureter distal to the ureteral calyx on delayed series. There is a nonobstructive calyx stone measuring 1.3 x 1.6 cm on unenhanced images (series 2, image 65). Again seen is urothelial thickening predominantly with mild enhancement in the mid to proximal ureter (series 3, images 77-95), similar to prior. Again seen several round hypodensities in the right kidney with the largest measuring 1.4 x 1.3 cm in the midpole (series 3, image 68), favoring to represent a simple cyst.   3D reformations: Left proximal ureter is not depicted due to incomplete opacification, within this limitation there is no evidence of hydroureteronephrosis. The distal right ureter is not depicted due to incomplete opacification. The visualized portion of the right renal collecting system without evidence of hydroureteronephrosis.   BLADDER: Hyperattenuating material in the bladder with a proximally 50 Hounsfield units and may represent unclotted blood. There is contrast material in the posterior dependent portion. The urinary bladder is filled with air with a Max catheter in place.   LIVER: The liver is normal in size  without evidence of focal liver lesions. Subcentimeter hypodensity in segment JOCELYNE, too small to characterize.   BILE DUCTS: The bile ducts are not dilated.   GALLBLADDER: Gallbladder: No calcified stones. No wall thickening.   PANCREAS: The pancreas appears unremarkable.   SPLEEN: Within normal limits.   ADRENAL GLANDS: Within normal limits.   REPRODUCTIVE ORGANS: The uterus is surgically absent. No contrast in the vaginal vault to suggest vesicovaginal fistula. No adnexal mass.   BOWEL: The stomach is unremarkable. Bowel is partially obscured at the pelvis due to beam hardening. The small and large bowel are normal in caliber and demonstrate no wall thickening. Large colon diverticulosis without diverticulitis. The appendix is not definitely visualized. There is however no pericecal stranding or fluid.   VESSELS: Vascular calcification of the abdominal aorta and its branching vessels. There is no aneurysmal dilatation of the abdominal aorta. The IVC is within normal limits.   PERITONEUM/RETROPERITONEUM/LYMPH NODES: Few small pelvic phleboliths are present. No ascites or free air, no fluid collection.  The retroperitoneum appears unremarkable.  No enlarged mesenteric lymph nodes.   ABDOMINAL WALL: Within normal limits.   BONE AND SOFT TISSUE: No suspicious osseous lesions are present. Small left joint effusion. Degenerative discogenic disease is noted in the lower thoracic and lumbar spine.       1. Hyperdense material in the bladder consistent with blood products. Air foci in the bladder likely from Max instrumentation. 2. 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 There is no contrast in the vaginal vault identified. 3. Stable complex left hemorrhagic cyst. 4. Nonobstructive tiny 0.3 cm calculus in the right mid ureter and calyx calculi measuring 1.3 x 1.6 cm  in the right renal upper pole. No left renal or ureter calculi. 5. Stable right renal atrophy. 6. Diffuse mid to  proximal right urothelial thickening, seen on prior imaging. 7. Again seen interlobular septal thickening, architectural distortion and mild bronchiectasis throughout the lungs similar to prior CT chest from 11/26/2023 and are concerning for fibrotic/emphysematous lung changes. 8. Small left joint effusion without evidence of acute osseous process.   I personally reviewed the images/study and I agree with the findings as stated by Louise Ma DO, PGY-2. This study was interpreted at [facility], [place], [place].   MACRO: None   Signed by: Yakov Jacob 12/31/2023 10:07 AM Dictation workstation:   [id number]    Vascular US lower extremity venous duplex bilateral    Result Date: 12/29/2023            [place] [id number]   Tel [phone] and Fax [phone]  Vascular Lab Report VASC US LOWER EXTREMITY VENOUS DUPLEX BILATERAL  Patient Name:     KARLA AGUILERA Reading           [id number] Kami Wells MD,                                      Physician:        BHUPINDER Study Date:       12/29/2023         Ordering          [id number] JOSE FERNANDEZ                                      Physician: MRN/PID:          [id number]           Technologist:     Aruna Bonner [facility] Accession#:       [id number]       Technologist 2: Date of           1955 / 68      Encounter#:       [id number] Birth/Age:        years Gender:           F Admission Status: Inpatient          Location          [facility]                                      Performed:  Diagnosis/ICD: Acute embolism and thrombosis of unspecified deep veins of left                distal lower extremity-I82.4Z2 Indication:    Venous thrombosis (CAVA) CPT Codes:     09740 Peripheral venous duplex scan for DVT complete  Patient History Previous DVT.  **CRITICAL RESULT** Critical Result: Left PTV and soleal vein DVT Notification called to Jose Fernandez MD on 12/29/2023 at  4:00:01 PM by Aruna Bonner RVT.  CONCLUSIONS: Right Lower Venous: No evidence of acute deep vein thrombus visualized in the right lower extremity. Left Lower Venous: There is acute occlusive deep vein thrombosis visualized in the soleal vein. There is acute non-occlusive deep vein thrombosis visualized in the posterior tibial vein. The remainder of the left leg is negative for deep vein thrombosis.  Comparison: Compared with study from 12/18/2023, there is new left PTV DVT. Patient has known left soleal vein DVT from 11/13/23, that was not visualized in the previous exam, but was visualized in today's exam.  Imaging & Doppler Findings:  Right                 Compressible Thrombus        Flow Distal External Iliac     Yes        None   Spontaneous/Phasic CFV                       Yes        None   Spontaneous/Phasic PFV                       Yes        None FV Proximal               Yes        None   Spontaneous/Phasic FV Mid                    Yes        None FV Distal                 Yes        None Popliteal                 Yes        None   Spontaneous/Phasic Peroneal                  Yes        None PTV                       Yes        None  Left                  Compress      Thrombus              Flow Distal External Iliac   Yes           None         Spontaneous/Phasic CFV                     Yes           None         Spontaneous/Phasic PFV                     Yes           None FV Proximal             Yes           None         Spontaneous/Phasic FV Mid                  Yes           None FV Distal               Yes           None Popliteal               Yes           None         Spontaneous/Phasic Peroneal                Yes           None PTV                   Partial  Acute non-occlusive Soleal                Partial    Acute occlusive  [id number] Kami Wells MD, BHUPINDER Electronically signed by [id number] Kami Wells MD, BHUPINDER on 12/29/2023 at 7:49:05 PM  ** Final **     US bladder    Result Date:  12/29/2023  Interpreted By:  Cholo Cevallos and Bera Kaustav STUDY: US BLADDER;  12/28/2023 7:45 pm   INDICATION: Signs/Symptoms:R/o clots iso hematuria per Urology.   COMPARISON: CT cystogram on 08/16/2023   ACCESSION NUMBER(S): [id number]   ORDERING CLINICIAN: JOSEFINA MILAN   TECHNIQUE: Multiple grayscale and color ultrasound images of the bladder were obtained.   FINDINGS: There is a complex heterogeneously echogenic region seen at the inferior aspect of the bladder, without flow seen on Doppler images.       1.  Complex heterogeneously echogenic layering material within the bladder, likely represents debris versus clot.   I personally reviewed the images/study and I agree with the findings as stated. This study was interpreted at [place], [place].   MACRO: None   Signed by: Cholo Cevallos 12/29/2023 5:56 AM Dictation workstation:   [id number]    Lower extremity venous duplex bilateral    Result Date: 12/18/2023            [place] [address]   Tel [phone] and Fax [phone]  Vascular Lab Report VASC US LOWER EXTREMITY VENOUS DUPLEX BILATERAL  Patient Name:      KARLA AGUILERA  Reading Physician: [id number] Alisa Puri MD Study Date:        12/18/2023          Ordering           [id number] BRENDAN LIU                                        Physician:         SONG MRN/PID:           [id number]            Technologist:      Jonh BARKLEY Accession#:        [id number]        Technologist 2: Date of Birth/Age: 1955 / 68 years Encounter#:        [id number] Gender:            F Admission Status:  Inpatient           Location           [facility]                                        Performed:  Diagnosis/ICD: Generalized edema (anasarca)-R60.1 CPT Codes:     06217 Peripheral venous duplex scan for DVT complete  CONCLUSIONS: Right Lower Venous: No  evidence of acute deep vein thrombus visualized in the right lower extremity. Left Lower Venous: No evidence of acute deep vein thrombus visualized in the left lower extremity. Cannot rule out thrombus in non-visualized peroneal vein.  Imaging & Doppler Findings:  Right                 Compressible Thrombus        Flow Distal External Iliac     Yes        None   Spontaneous/Phasic CFV                       Yes        None   Spontaneous/Phasic PFV                       Yes        None FV Proximal               Yes        None       Continuous FV Mid                    Yes        None FV Distal                 Yes        None Popliteal                 Yes        None       Continuous Peroneal                  Yes        None PTV                       Yes        None  Left                  Compress Thrombus        Flow Distal External Iliac   Yes      None   Spontaneous/Phasic CFV                     Yes      None   Spontaneous/Phasic PFV                     Yes      None FV Proximal             Yes      None   Spontaneous/Phasic FV Mid                  Yes      None FV Distal               Yes      None Popliteal               Yes      None       Continuous PTV                     Yes      None  [id number] Alisa Puri MD Electronically signed by [id number] Alisa Puri MD on 12/18/2023 at 1:18:07 PM  ** Final **     ECG 12 lead    Result Date: 12/11/2023  Sinus rhythm with Fusion complexes ST & T wave abnormality, consider lateral ischemia Abnormal ECG When compared with ECG of 01-DEC-2023 10:56, (unconfirmed) Fusion complexes are now Present T wave inversion now evident in Anterior leads    XR chest 2 views    Result Date: 12/6/2023  Interpreted By:  Salvador Robles, STUDY: XR CHEST 2 VIEWS;  12/6/2023 10:11 am   INDICATION: Signs/Symptoms:Eval of Puml Fibrosis.   COMPARISON: 11/24/2023.   ACCESSION NUMBER(S): [id number]   ORDERING CLINICIAN: JORDON PARRISH   FINDINGS:     CARDIOMEDIASTINAL SILHOUETTE:  Cardiomediastinal silhouette is normal in size and configuration.   LUNGS: Digital tomosynthesis of the lung parenchyma was performed. There is diffuse interstitial airspace opacities with prominent traction bronchiectatic changes. There is no evidence of pneumothorax.   ABDOMEN: No remarkable upper abdominal findings.   BONES: No acute osseous changes.       1.  There are diffuse interstitial airspace opacities with traction bronchiectatic changes. This may represent residua of prior COVID-19 pneumonia/ARDS.     Signed by: Salvador Robles 12/6/2023 10:21 AM Dictation workstation:   [id number]      Current Medications:   Scheduled medications  Reviewed  Continuous medications    PRN medications    Medications from transferring facility:  cheduled medications  ciprofloxacin, 750 mg, oral, BID  [Held by provider] enoxaparin, 40 mg, subcutaneous, Daily  ergocalciferol, 1,250 mcg, oral, Weekly  fluticasone, 1 spray, Each Nostril, BID  insulin glargine, 4 Units, subcutaneous, Once  [Held by provider] insulin glargine, 8 Units, subcutaneous, Nightly  insulin lispro, 0-15 Units, subcutaneous, TID with meals  lidocaine, 1 Application, urethral, Once  lidocaine, 1 patch, transdermal, Daily  lidocaine, 1 patch, transdermal, Daily  melatonin, 5 mg, oral, Daily  metoprolol tartrate, 75 mg, oral, BID  pantoprazole, 40 mg, oral, Daily before breakfast  predniSONE, 30 mg, oral, BID  QUEtiapine, 25 mg, oral, Nightly  sennosides-docusate sodium, 1 tablet, oral, BID  [Held by provider] sodium zirconium cyclosilicate, 10 g, oral, TID  sulfamethoxazole-trimethoprim, 80 mg, oral, Daily  thiamine, 100 mg, oral, Daily        Continuous medications  oxygen, , Last Rate: 6 L/min (12/27/23 0927)        PRN medications  PRN medications: acetaminophen **OR** [DISCONTINUED] acetaminophen **OR** [DISCONTINUED] acetaminophen, dextrose 10 % in water (D10W), dextrose, diclofenac sodium, glucagon, ipratropium-albuteroL,  lidocaine-diphenhydraMINE-Maalox 1:1:1, loperamide, ondansetron ODT **OR** [DISCONTINUED] ondansetron, oxyCODONE, QUEtiapine  Assessment  Patient is -year-old (man/woman) with the following medical Problems:    Acute severe hypoxic respiratory failure  ARDS  COVID-19 infection with possible secondary pneumonia status post remdesivir treatment and Actima  Persistent DVT of anticoagulation due to the recent bleed with concern of coexisting PE  Recent acute kidney injury  Recent acute anemia, 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   Cough, shortness of breath and hypoxia still on a three-way lateral flexion at 50 cc/h   oxygen for saturation of 89 to 94%  Incentive spirometry  Bronchodilators therapy as needed  PT/OT  DVT prophylaxis  Minimize benzodiazepine and narcotics  Encourage ambulation  Head evaluation and aspiration precautions.  Weekly duplex ultrasound is recommended  Management of hematuria per primary care provider, urology recommendation was to continue three-way flushing  Follow-up with urology in 2 to 3 weeks  Per urology note from transferring facility if hematuria stopped anticoagulation could be restarted  Monitor H&H transfuse hemoglobin less than 7  Check chest x-ray        Umang Jackson MD  01/09/24     STAFF PHYSICIAN NOTE OF PERSONAL INVOLVEMENT IN CARE  As the attending physician, I certify that I personally reviewed the patient's history and personally examined the patient to confirm the physical findings described above, and that I reviewed the relevant imaging studies and available reports.  I also discussed the differential diagnosis and all of the proposed management plans  with the patient and individuals accompanying the patient to this visit.  They had the opportunity to ask questions about the proposed management plans and to have those questions answered.

## 2024-01-10 NOTE — TELEPHONE ENCOUNTER
----- Message from JASON Soni sent at 1/10/2024 10:00 AM EST -----  Regarding: patient complexity  Hi Dr. 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

## 2024-01-14 NOTE — PROGRESS NOTES
.      Subjective      Today patient refers progressive weakness .  her hemoglobin have been downtrending today 7.2 WBC counts are stable at the 9100 platelets are stable at 203,000 continue to have hematuria on and off.  Last urine cultures close Klebsiella pneumonia/varicollaL (MDRO)  With more than 100,000 colonies vancomycin-resistant Enterococcus  Infectious disease is consulted  Renal chemistries today BUN 38 creatinine 1.54 with a GFR of 36    Objective   General appearance; alert oriented  Skin pallor  Neck no JVD or bruit or thyromegaly;  Lungs; clear to station percussion  Heart; regular rate no gallop no rubs  Abdomen; active peristalsis no rebound or guarding  ; no CVA tenderness  Max bag urine yellowish   Extremities; no edema        Assessment/Plan      #1. urinary tract infection secondary to multiple drug-resistant organisms  (Klebsiella Varicolla/VRE)    #2 anemia of blood loss and chronic illness  #3 hematuria  #4 acute kidney injury superimposed on chronic kidney disease  Plan; transfuse  #2 ID to adjust antibiotics             Kendall Brennan MD

## 2024-01-16 NOTE — PROGRESS NOTES
.      Subjective     Patient is complaining of shortness of breath at rest.  Radiographic examination pending review by radiology  Patient have alveolar interstitial round areas to the lung fields    Today is 9.6 WBC is 11,200  S/p transfusion of 1 unit of packed cells    Objective   General appearance; alert oriented  Skin pallor  Neck no JVD or bruit or thyromegaly;  Lungs; inspiratory crackles on the bases posteriorly  Heart; regular rate no gallop no rubs  Abdomen; active peristalsis no rebound or guarding  ; no CVA tenderness  Max bag urine yellowish   Extremities; no edema        Assessment/Plan      #1. urinary tract infection secondary to multiple drug-resistant organisms  (Klebsiella Varicolla/VRE)    #2 anemia of blood loss and chronic illness  #3 hematuria  #4 acute kidney injury superimposed on chronic   kidney disease  #5 pneumonitis rule out early ARDS  Plan;   Will add Solu-Medrol and diuretics  Monitor CBC and CMP                 Kendall Brennan MD

## 2024-01-17 NOTE — PROGRESS NOTES
.      Subjective       Refers Breathing Improved  Renal Chemistries downtrending  144/103/31 Glucose 140    3.3/27/0.81  ALT 66  WBC 9800  Hg 9.7    Objective   General appearance; alert oriented  Skin pallor  Neck no JVD or bruit or thyromegaly;  Lungs; clear to percussion  Heart; regular rate no gallop no rubs  Abdomen; active peristalsis no rebound or guarding  ; no CVA tenderness  Max bag urine yellowish   Extremities; no edema        Assessment/Plan      #1. urinary tract infection secondary to multiple drug-resistant organisms  (Klebsiella Varicolla/VRE)    #2 anemia of blood loss and chronic illness  #3 hematuria  #4 acute kidney injury superimposed on chronic   kidney disease  #5  Respiratory failure improved  Plan; continue current therapy  Continue to monitor gentamicin levels                   Kendall Brennan MD

## 2024-01-21 NOTE — PROGRESS NOTES
.      Subjective   No voiced complaint  Antibiotics were held by infectious disease    Objective   General appearance; alert oriented  Skin pallor  Neck no JVD or bruit or thyromegaly;  Lungs; clear to percussion  Heart; regular rate no gallop no rubs  Abdomen; active peristalsis no rebound or guarding  ; no CVA tenderness  Max bag urine yellowish   Extremities; no edema        Assessment/Plan      #1. urinary tract infection secondary to multiple drug-resistant organisms  (Klebsiella Varicolla/VRE)    #2 anemia of blood loss and chronic illness  #3 hematuria  #4 acute kidney injury superimposed on chronic   kidney disease  #5  Respiratory failure improved  Plan; continue current therapy  Labs in the morning                     Kendall Brennan MD

## 2024-01-22 NOTE — PROGRESS NOTES
.      Subjective     Today she refers complaint of shortness of breath  Chest x-ray was done results are pending they disclose alveolar interstitial edema and infiltrates.  Patient renal chemistries BUN today is elevated at 61 rest of electrolytes as follows;  141/100/61 Glucose; 239   3.7/35/0.69  Alb 3.1  ALT;57  AST;21  Hg;9.6  Objective   General appearance; alert oriented  Skin pallor  Neck no JVD or bruit or thyromegaly;  Lungs; bibasilar inspiratory crackles  Heart; regular rate no gallop no rubs  Abdomen; active peristalsis no rebound or guarding  ; no CVA tenderness  Max bag urine yellowish   Extremities; no edema        Assessment/Plan      #1. urinary tract infection secondary to multiple drug-resistant organisms  (Klebsiella Varicolla/VRE)    #2 anemia of blood loss and chronic illness  #3 hematuria  #4 acute kidney injury superimposed on chronic   kidney disease  #5  Mild CHF  6; pneumonitis  Increase Solu-Medrol  Increase diuretics                   Kendall Brennan MD

## 2024-01-24 NOTE — PROGRESS NOTES
.      Subjective     Patient has a urinary tract infection with Candida tropicalis  Hemoglobin today 10.7  WBC 71447  Magnesium;1.73    Objective   General appearance; alert oriented  Skin pallor  Neck no JVD or bruit or thyromegaly;  Lungs; bibasilar inspiratory crackles  Heart; regular rate no gallop no rubs  Abdomen; active peristalsis no rebound or guarding  ; no CVA tenderness  Max bag urine yellowish   Extremities; no edema        Assessment/Plan      #1. urinary tract infection       #2 anemia of blood loss and chronic illness  #3 hematuria self  #4 acute kidney injury superimposed on chronic   kidney disease  #5  Mild CHF  6; pneumonitis  Continue current therapy   Fungal UTI to be addressed by infectious disease                   Kendall Brennan MD

## 2024-01-25 NOTE — PROGRESS NOTES
.      Subjective   Refers she is breathing better with aerosol therapy  Hemoglobin today is 10  WBC count is elevated 20,100  Renal chemistries and magnesium are pending  Patient had Candida tropicalis awaiting infectious disease decision in regard to antifungal therapy  Objective   General appearance; alert oriented  Skin pallor  Neck no JVD or bruit or thyromegaly;  Lungs; bibasilar inspiratory crackles  Heart; regular rate no gallop no rubs  Abdomen; active peristalsis no rebound or guarding  ; no CVA tenderness  Max bag urine yellowish   Extremities; no edema        Assessment/Plan      #1. urinary tract infection     #2 anemia  #3 hematuria  #4 acute kidney injury superimposed on chronic   kidney disease  #5  Mild CHF  6; pneumonitis  Plan continue to monitor renal chemistries and CBC  Antimicrobials as per infectious disease                   Kendall Brennan MD

## 2024-02-01 ENCOUNTER — APPOINTMENT (OUTPATIENT)
Dept: UROLOGY | Facility: CLINIC | Age: 69
End: 2024-02-01

## 2024-05-02 ENCOUNTER — APPOINTMENT (OUTPATIENT)
Dept: UROLOGY | Facility: CLINIC | Age: 69
End: 2024-05-02

## 2024-09-25 NOTE — PROGRESS NOTES
Detail Level: Generalized Physical Therapy    Physical Therapy Treatment    Patient Name: Macarena Wilson  MRN: [id number]  Today's Date: 12/1/2023  Time Calculation  Start Time: 1103  Stop Time: 1150  Time Calculation (min): 47 min       Assessment/Plan   PT Assessment  End of Session Communication: Bedside nurse  End of Session Patient Position: Bed, 3 rail up, Alarm off, not on at start of session (sidelying on L with pillow support, RN aware.)     PT Plan  Treatment/Interventions: Bed mobility, Transfer training, Gait training, Balance training, Strengthening, Endurance training, Therapeutic exercise, Therapeutic activity, Postural re-education  PT Plan: Skilled PT  PT Frequency: 4 times per week  PT Discharge Recommendations: Moderate intensity level of continued care  PT - OK to Discharge: Yes    General Visit Information:   PT  Visit  PT Received On: 12/01/23  General  Family/Caregiver Present: No  Co-Treatment: OT  Co-Treatment Reason: to maximize patient safety, mobility and activity tolerance 2/2 patient with tenuous respiratory status, x2 skilled assist for mobility  Prior to Session Communication: Bedside nurse  Patient Position Received: Bed, 3 rail up, Alarm off, not on at start of session  General Comment: patient received supine, HOB elevated, agreeable to participate in therapy. Prior to PT session, RN placed patient on AirVo. Patient continues to require increase assist x2 for all mobility tasks. Patient desaturated to the high 70s on AirVo, per RN, place NRB in addition to AirVo support.    Subjective   Precautions:  Precautions  Medical Precautions: Fall precautions, Oxygen therapy device and L/min  Vital Signs:  Vital Signs  SpO2:  (pre: 94% on 40L/55%, sitting EOB: desaturated to 77-79%, 15L NRB, O2 increased to mid 80s-low 90s; post: saturated on AirVo mid 80s-high 80s in supine.)    Objective   Pain:  Pain Assessment  Pain Assessment: 0-10  Pain Score: 0 - No pain  Cognition:  Cognition  Overall Cognitive Status:  Impaired  Orientation Level:  (AxO x3)  Cognition Comments: intermittently anxious however, reassurance and support provided to the patient.  Postural Control:  Postural Control  Postural Control: Impaired  Head Control: favors increase FF head posture with downward gaze; limited ability to actively extend c-spine to maintain a neutral head alignment with forward gaze when sitting EOB; PT provided tactile A to keep head in neutral positioning.  Trunk Control: fair-poor; retrolean; favors lateral lean R; requires increase assist to maintain midline.  Posture Comment: retrolateral lean R; cueing and assist required to attempt to have patient re-orient to midline; patient demo'd ability to correct posture with increase assist. However, did have multiple bouts of acute LOB that required total A to re-orient to midline.    Activity Tolerance:  Activity Tolerance  Endurance: Decreased tolerance for upright activites  Early Mobility/Exercise Safety Screen: Proceed with mobilization - No exclusion criteria met  Treatments:  Therapeutic Exercise  Therapeutic Exercise Performed: Yes  Therapeutic Exercise Activity 1: SAQ in supine: 2x10 B, limited quad activation B, passive gastroc stretch held 30 seconds x2 B; sitting EOB: passive pectoral stretch, held 30 seconds x2 B    Balance/Neuromuscular Re-Education  Balance/Neuromuscular Re-Education Activity Performed: Yes  Balance/Neuromuscular Re-Education Activity 1: EOB x25 minutes; brief MODx1 with BUE support after positioning and cueing, only able to maintain ~15 seconds; mostly required MAXx1 assist to maintain positioning; limited trunk activation, cervical extensor weakness.    Bed Mobility  Bed Mobility: Yes  Bed Mobility 1  Bed Mobility 1: Supine to sitting, Sitting to supine  Level of Assistance 1: Maximum assistance, Moderate verbal cues  Bed Mobility Comments 1: x2 assist; HOB elevated + draw sheet  Bed Mobility 2  Bed Mobility  2: Rolling left  Level of Assistance 2:  Maximum assistance, Moderate verbal cues  Bed Mobility Comments 2: x1 assist    Transfers  Transfer: Yes  Transfer 1  Transfer From 1: Sit to, Stand to  Transfer to 1: Sit, Stand  Technique 1: Sit to stand, Stand to sit  Transfer Level of Assistance 1: Dependent, +2, Moderate verbal cues, Moderate tactile cues  Trials/Comments 1: B arm in arm assistance; draw sheet to assist in elevating patient's buttocks off EOB; only able to achieve 25% of full standing, patient with limited BLE, trunk, head musculature activation to assist with task; increase FF posture, limited ability to increase erect posture. x2 trials.    Outcome Measures:  [facility] Basic Mobility  Turning from your back to your side while in a flat bed without using bedrails: A lot  Moving from lying on your back to sitting on the side of a flat bed without using bedrails: A lot  Moving to and from bed to chair (including a wheelchair): Total  Standing up from a chair using your arms (e.g. wheelchair or bedside chair): Total  To walk in hospital room: Total  Climbing 3-5 steps with railing: Total  Basic Mobility - Total Score: 8    FSS-ICU  Ambulation: Unable to attempt due to weakness  Rolling: Maximal assistance (performs 25% - 49% of task)  Sitting: Maximal assistance (performs 25% - 49% of task)  Transfer Sit-to-Stand: Total assistance (performs 25% or requires another person)  Transfer Supine-to-Sit: Unable to perform  Total Score: 5    E = Exercise and Early Mobility  Early Mobility/Exercise Safety Screen: Proceed with mobilization - No exclusion criteria met  Current Activity: Sitting at edge of bed    Education Documentation  Mobility Training, taught by Cindy Olmos PT at 12/1/2023  2:22 PM.  Learner: Patient  Readiness: Acceptance  Method: Explanation  Response: Needs Reinforcement    Education Comments  No comments found.           Encounter Problems       Encounter Problems (Active)       Balance       patient will complete static (SBAx1) and  dynamic (Cgx1) standing balance activities using LRD as needed without acute LOB, while maintaining stable vitals.  (Progressing)       Start:  11/10/23    Expected End:  12/20/23               Mobility       STG - Patient will ambulate >/=50 ft using LRD as needed with Cgx1 assist without acute LOB, while maintaining stable vitals.  (Not Progressing)       Start:  11/10/23    Expected End:  12/20/23            patient will participate in BLE there-ex in order to improve strength and to assist with the completion of functional mobility tasks.  (Progressing)       Start:  11/10/23    Expected End:  12/20/23            patient will ambulate >/=30 ft consecutively and >/=75 ft cumulatively with </=1 sitting rest break while maintaining stable vitals, reporting <13/20 on the RPE scale.  (Not Progressing)       Start:  11/10/23    Expected End:  12/20/23               Pain - Adult          Transfers       STG - Transfer from bed to chair with CGx1 assist without acute LOB, using LRD as needed  (Not Progressing)       Start:  11/10/23    Expected End:  12/20/23            STG - Patient will perform bed mobility with SBAx1 without acute LOB, using bedrailing as needed.  (Progressing)       Start:  11/10/23    Expected End:  12/20/23            STG - Patient will transfer sit to and from stand with Cgx1 assist using LRD as needed without acute LOB  (Progressing)       Start:  11/10/23    Expected End:  12/20/23                 Cindy Olmos, PT, DPT     Detail Level: Zone